# Patient Record
Sex: FEMALE | Race: WHITE | NOT HISPANIC OR LATINO | Employment: OTHER | URBAN - METROPOLITAN AREA
[De-identification: names, ages, dates, MRNs, and addresses within clinical notes are randomized per-mention and may not be internally consistent; named-entity substitution may affect disease eponyms.]

---

## 2017-01-02 ENCOUNTER — TELEPHONE (OUTPATIENT)
Dept: PEDIATRICS | Facility: CLINIC | Age: 77
End: 2017-01-02

## 2017-01-02 NOTE — TELEPHONE ENCOUNTER
Can try Robitussin DM however if the cough has been going on for while may need to make an appointment to be seen

## 2017-01-02 NOTE — TELEPHONE ENCOUNTER
Spoke with patient and informed her that per Atiya she can try robotussin DM.  Patient states she will try that and call the clinic back if it is not helping.      Ayde Sloan,CMA

## 2017-01-02 NOTE — TELEPHONE ENCOUNTER
"Spoke with patient and gave her the phone number to call and schedule test.       Patient also informed that she has stopped taking her allergy medication and started taking coricidin. Patient states she has a \"terrible\" cough. Patient is wondering is there is anything else OTC she should be taking.       Message routed to provider for review and response.    Ayde Sloan CMA    "

## 2017-01-02 NOTE — TELEPHONE ENCOUNTER
Notes Recorded by Cailin Ponce APRN CNP on 1/1/2017 at 6:55 PM  Stress echo not able to do due to leg pain   Will order dobutamine stress echo  I will place order. Please call [phone] to schedule.

## 2017-01-15 DIAGNOSIS — E78.5 HYPERLIPIDEMIA LDL GOAL <100: Primary | ICD-10-CM

## 2017-01-20 RX ORDER — ROSUVASTATIN CALCIUM 20 MG/1
TABLET, COATED ORAL
Qty: 90 TABLET | Refills: 1 | Status: SHIPPED | OUTPATIENT
Start: 2017-01-20 | End: 2017-07-17

## 2017-01-20 NOTE — TELEPHONE ENCOUNTER
Routing refill request to provider for review/approval because:  Drug interaction warning    Whit Rosado RN, AE-C   Crestor     Last Written Prescription Date: 7/22/16  Last Fill Quantity: 90, # refills: 1  Last Office Visit with [facility], ANY or [facility] prescribing provider: 11/8/16   Next 5 appointments (look out 90 days)     Jan 24, 2017 10:00 AM   Return Visit with Ehsan Araya DPM   [facility])    [address] [place] [id number]   [phone]            Apr 14, 2017 10:00 AM   Return Visit with Candice Worley MD, [facility] ENDO NURSE   [facility])    [address] [place] [id number]   [phone]                   CHOL      137   7/26/2016  HDL       57   7/26/2016  LDL       48   7/26/2016  LDL      114   7/21/2015  TRIG      162   7/26/2016  CHOLHDLRATIO      3.6   8/21/2015

## 2017-01-24 ENCOUNTER — OFFICE VISIT (OUTPATIENT)
Dept: PODIATRY | Facility: CLINIC | Age: 77
End: 2017-01-24
Payer: COMMERCIAL

## 2017-01-24 VITALS — OXYGEN SATURATION: 95 % | DIASTOLIC BLOOD PRESSURE: 77 MMHG | SYSTOLIC BLOOD PRESSURE: 127 MMHG | HEART RATE: 83 BPM

## 2017-01-24 DIAGNOSIS — E11.49 DIABETIC NEUROPATHY WITH NEUROLOGIC COMPLICATION (H): ICD-10-CM

## 2017-01-24 DIAGNOSIS — L60.2 ONYCHAUXIS: ICD-10-CM

## 2017-01-24 DIAGNOSIS — B35.1 DERMATOPHYTOSIS OF NAIL: Primary | ICD-10-CM

## 2017-01-24 DIAGNOSIS — L30.9 DERMATITIS: ICD-10-CM

## 2017-01-24 DIAGNOSIS — E11.40 DIABETIC NEUROPATHY WITH NEUROLOGIC COMPLICATION (H): ICD-10-CM

## 2017-01-24 PROCEDURE — G0127 TRIM NAIL(S): HCPCS | Performed by: PODIATRIST

## 2017-01-24 PROCEDURE — 99213 OFFICE O/P EST LOW 20 MIN: CPT | Mod: 25 | Performed by: PODIATRIST

## 2017-01-24 RX ORDER — TRIAMCINOLONE ACETONIDE 1 MG/G
CREAM TOPICAL
Qty: 45 G | Refills: 1 | Status: SHIPPED | OUTPATIENT
Start: 2017-01-24 | End: 2017-10-23

## 2017-01-24 NOTE — PROGRESS NOTES
Past Medical History   Diagnosis Date     Hyperlipidemia LDL goal < 100      Seasonal allergies      Hypothyroidism 1960     s/p subtotal thyroidectomy      Rheumatic fever      x2 between the ages of [age]     Diabetes mellitus (H) 2006     type 2     HTN (hypertension)      Degenerative joint disease      Patient Active Problem List   Diagnosis     Seasonal allergies     Hypothyroidism     Hyperlipidemia LDL goal <100     Fibromyalgia     Osteoarthritis     Actinic keratosis     Advanced directives, counseling/discussion     Glaucoma suspect     Cataracts, bilateral     Obesity     Type 2 diabetes mellitus with hyperglycemia, with long-term current use of insulin (H)     Hypertension goal BP (blood pressure) < 140/90     Overactive bladder     Primary osteoarthritis of both hands     Recurrent UTI     Past Surgical History   Procedure Laterality Date     Thyroidectomy        C appendectomy       C arthroplasty tmj       Colonoscopy       Colonoscopy N/A 8/13/2015     Procedure: COLONOSCOPY;  Surgeon: Duane, William Charles, MD;  Location: [facility] OR     Colonoscopy with co2 insufflation N/A 8/13/2015     Procedure: COLONOSCOPY WITH CO2 INSUFFLATION;  Surgeon: Duane, William Charles, MD;  Location: [facility] OR     Social History     Social History     Marital Status: Single     Spouse Name: N/A     Number of Children: N/A     Years of Education: N/A     Occupational History     Not on file.     Social History Main Topics     Smoking status: Never Smoker      Smokeless tobacco: Never Used      Comment: has had exposure to second hand smoke in the past from [employer], no current exposure     Alcohol Use: No     Drug Use: No     Sexual Activity: No     Other Topics Concern     Parent/Sibling W/ Cabg, Mi Or Angioplasty Before 65f 55m? 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     Hobby Hazards No     Sleep Concern Yes     difficulty staying asleep, up and down all night     Stress Concern No     Weight Concern Yes     would like to lose     Special Diet Yes     low card, low sugar diet     Back Care Yes     chiropratior     Exercise Yes     almost everyday     Bike Helmet No     does not ride bicycle any more     Seat Belt Yes     Self-Exams Yes     Social History Narrative     Family History   Problem Relation Age of Onset     CANCER Father      skin and leukemia     CEREBROVASCULAR DISEASE Maternal Grandfather      CEREBROVASCULAR DISEASE Paternal Grandmother      Eye Disorder Paternal Grandmother      cataracts     CEREBROVASCULAR DISEASE Paternal Grandfather      HEART DISEASE Paternal Grandfather      CANCER Sister      Breast Cancer     Eye Disorder Mother      cataracts     Eye Disorder Brother      cataract     Breast Cancer Daughter        A1C     11.0   11/8/2016   SUBJECTIVE:  A 76-year-old female returns to clinic for onychomycosis and diabetic foot check.  She relates she is doing well.  She has numbness, tingling and neuropathy in her feet.  No foot ulcers but she relates that about a week ago, she started taking a vitamin supplement.  She does not remember the name of it, but it is some sort of oil pill and she got a rash on her legs.  She stopped taking it and has been using triamcinolone cream and some Gold [place] and that has been improving and the rash is clearing up on both of her legs.  She relates it itches.          OBJECTIVE:  DP and PT 2/4 bilaterally.  She has dystrophic, thick and brittle nails with subungual debris on the hallux nails and dystrophy and discoloration bilaterally.  There is no erythema, no drainage, no odor, no calor bilaterally.  Deep tendon reflexes are intact bilaterally.  Sharp dull is decreased with 5.07 [place] monofilament bilaterally.  She has mild dorsally contracted digits bilaterally.  She has a rash on her lower  legs bilaterally that appears to be resolving.  There are no open lesions.        ASSESSMENT/PLAN:  Onychomycosis bilateral hallux nails, onychauxis bilaterally.  She is diabetic with peripheral neuropathy.  She has dermatitis on her bilateral legs.  I advised her to continue to stop the vitamin she was taking.  She relates she could use some more triamcinolone cream.  I sent in a prescription for this.  She is diabetic with peripheral neuropathy.  She will return to clinic and see me in 3 months.  If the rash does not resolve, she will see me sooner as needed.     All the nails were debrided or reduced bilaterally upon consent.

## 2017-01-24 NOTE — NURSING NOTE
"Brandy Leon's goals for this visit include: Toenail trim  She requests these members of her care team be copied on today's visit information: yes    PCP: Cailin Ponce    Referring Provider:  No referring provider defined for this encounter.    Chief Complaint   Patient presents with     RECHECK     Toenail trim       Initial /77 mmHg  Pulse 83  SpO2 95% Estimated body mass index is 33.42 kg/(m^2) as calculated from the following:    Height as of 12/30/16: 1.619 m (5' 3.75\").    Weight as of 12/30/16: 87.6 kg (193 lb 2 oz).  BP completed using cuff size: large    "

## 2017-01-24 NOTE — PATIENT INSTRUCTIONS
Thanks for coming today.  Ortho/Sports Medicine Clinic  [address] [place] [address]    To schedule future appointments in Ortho Clinic, you may call [phone].    To schedule ordered imaging by your Provider: Call [facility] at [phone]    [facility] available online at:   [email or web address]    Please call if any further questions or concerns [phone] and ask for the Orthopedic Department. Clinic hours 8 am to 5 pm.    Return to clinic if symptoms worsen.

## 2017-02-01 ENCOUNTER — TELEPHONE (OUTPATIENT)
Dept: PHARMACY | Facility: CLINIC | Age: 77
End: 2017-02-01

## 2017-02-01 NOTE — TELEPHONE ENCOUNTER
Called patient to schedule a f/u MTM appt. Patient is scheduled for 2/28/2017.  Mirna Perez, Pharm.D, BCACP  Medication Therapy Management Pharmacist

## 2017-02-09 ENCOUNTER — OFFICE VISIT (OUTPATIENT)
Dept: PEDIATRICS | Facility: CLINIC | Age: 77
End: 2017-02-09
Payer: COMMERCIAL

## 2017-02-09 VITALS
BODY MASS INDEX: 32.49 KG/M2 | DIASTOLIC BLOOD PRESSURE: 72 MMHG | SYSTOLIC BLOOD PRESSURE: 124 MMHG | WEIGHT: 190.3 LBS | OXYGEN SATURATION: 96 % | TEMPERATURE: 97 F | HEART RATE: 87 BPM | HEIGHT: 64 IN

## 2017-02-09 DIAGNOSIS — E11.65 TYPE 2 DIABETES MELLITUS WITH HYPERGLYCEMIA, WITH LONG-TERM CURRENT USE OF INSULIN (H): ICD-10-CM

## 2017-02-09 DIAGNOSIS — E03.9 HYPOTHYROIDISM, UNSPECIFIED TYPE: ICD-10-CM

## 2017-02-09 DIAGNOSIS — N32.81 OVERACTIVE BLADDER: ICD-10-CM

## 2017-02-09 DIAGNOSIS — E78.5 HYPERLIPIDEMIA LDL GOAL <100: ICD-10-CM

## 2017-02-09 DIAGNOSIS — R35.0 URINARY FREQUENCY: Primary | ICD-10-CM

## 2017-02-09 DIAGNOSIS — I10 HYPERTENSION GOAL BP (BLOOD PRESSURE) < 140/90: ICD-10-CM

## 2017-02-09 DIAGNOSIS — Z79.4 TYPE 2 DIABETES MELLITUS WITH HYPERGLYCEMIA, WITH LONG-TERM CURRENT USE OF INSULIN (H): ICD-10-CM

## 2017-02-09 LAB
ALBUMIN SERPL-MCNC: 3.8 G/DL (ref 3.4–5)
ALBUMIN UR-MCNC: NEGATIVE MG/DL
ALP SERPL-CCNC: 82 U/L (ref 40–150)
ALT SERPL W P-5'-P-CCNC: 21 U/L (ref 0–50)
ANION GAP SERPL CALCULATED.3IONS-SCNC: 7 MMOL/L (ref 3–14)
APPEARANCE UR: CLEAR
AST SERPL W P-5'-P-CCNC: 16 U/L (ref 0–45)
BACTERIA #/AREA URNS HPF: ABNORMAL /HPF
BILIRUB SERPL-MCNC: 0.3 MG/DL (ref 0.2–1.3)
BILIRUB UR QL STRIP: NEGATIVE
BUN SERPL-MCNC: 15 MG/DL (ref 7–30)
CALCIUM SERPL-MCNC: 8.9 MG/DL (ref 8.5–10.1)
CHLORIDE SERPL-SCNC: 101 MMOL/L (ref 94–109)
CHOLEST SERPL-MCNC: 142 MG/DL
CO2 SERPL-SCNC: 30 MMOL/L (ref 20–32)
COLOR UR AUTO: ABNORMAL
CREAT SERPL-MCNC: 0.67 MG/DL (ref 0.52–1.04)
CREAT UR-MCNC: 49 MG/DL
GFR SERPL CREATININE-BSD FRML MDRD: 86 ML/MIN/1.7M2
GLUCOSE SERPL-MCNC: 114 MG/DL (ref 70–99)
GLUCOSE UR STRIP-MCNC: >1000 MG/DL
HBA1C MFR BLD: 10.7 % (ref 4.3–6)
HDLC SERPL-MCNC: 59 MG/DL
HGB UR QL STRIP: NEGATIVE
KETONES UR STRIP-MCNC: 10 MG/DL
LDLC SERPL CALC-MCNC: 52 MG/DL
LEUKOCYTE ESTERASE UR QL STRIP: ABNORMAL
MICROALBUMIN UR-MCNC: 14 MG/L
MICROALBUMIN/CREAT UR: 28.07 MG/G CR (ref 0–25)
NITRATE UR QL: NEGATIVE
NON-SQ EPI CELLS #/AREA URNS LPF: ABNORMAL /LPF
NONHDLC SERPL-MCNC: 83 MG/DL
PH UR STRIP: 5.5 PH (ref 5–7)
POTASSIUM SERPL-SCNC: 4.2 MMOL/L (ref 3.4–5.3)
PROT SERPL-MCNC: 8.3 G/DL (ref 6.8–8.8)
RBC #/AREA URNS AUTO: ABNORMAL /HPF (ref 0–2)
SODIUM SERPL-SCNC: 138 MMOL/L (ref 133–144)
SP GR UR STRIP: 1.02 (ref 1–1.03)
T4 FREE SERPL-MCNC: 1.25 NG/DL (ref 0.76–1.46)
TRIGL SERPL-MCNC: 157 MG/DL
TSH SERPL DL<=0.05 MIU/L-ACNC: 1.33 MU/L (ref 0.4–4)
URN SPEC COLLECT METH UR: ABNORMAL
UROBILINOGEN UR STRIP-MCNC: NORMAL MG/DL (ref 0–2)
WBC #/AREA URNS AUTO: ABNORMAL /HPF (ref 0–2)
YEAST #/AREA URNS HPF: ABNORMAL /HPF

## 2017-02-09 PROCEDURE — 36415 COLL VENOUS BLD VENIPUNCTURE: CPT | Performed by: NURSE PRACTITIONER

## 2017-02-09 PROCEDURE — 80061 LIPID PANEL: CPT | Performed by: NURSE PRACTITIONER

## 2017-02-09 PROCEDURE — 80053 COMPREHEN METABOLIC PANEL: CPT | Performed by: NURSE PRACTITIONER

## 2017-02-09 PROCEDURE — 84439 ASSAY OF FREE THYROXINE: CPT | Performed by: NURSE PRACTITIONER

## 2017-02-09 PROCEDURE — 82043 UR ALBUMIN QUANTITATIVE: CPT | Performed by: NURSE PRACTITIONER

## 2017-02-09 PROCEDURE — 87086 URINE CULTURE/COLONY COUNT: CPT | Performed by: NURSE PRACTITIONER

## 2017-02-09 PROCEDURE — 83036 HEMOGLOBIN GLYCOSYLATED A1C: CPT | Performed by: NURSE PRACTITIONER

## 2017-02-09 PROCEDURE — 84443 ASSAY THYROID STIM HORMONE: CPT | Performed by: NURSE PRACTITIONER

## 2017-02-09 PROCEDURE — 99214 OFFICE O/P EST MOD 30 MIN: CPT | Performed by: NURSE PRACTITIONER

## 2017-02-09 PROCEDURE — 81001 URINALYSIS AUTO W/SCOPE: CPT | Performed by: NURSE PRACTITIONER

## 2017-02-09 RX ORDER — OXYBUTYNIN CHLORIDE 5 MG/1
TABLET, EXTENDED RELEASE ORAL
Qty: 70 TABLET | Refills: 1 | Status: SHIPPED | OUTPATIENT
Start: 2017-02-09 | End: 2017-04-24

## 2017-02-09 NOTE — PATIENT INSTRUCTIONS
It was a pleasure seeing you today at the [facility]. Thank you for allowing us to care for you today. We truly hope we provided you with the excellent service you deserve. Please let us know if there is anything else we can do for you so we can be sure you are leaving completley satisfied with your care experience.       General information about your clinic   Clinic Hours Lab Hours (Appointments are required)   Mon-Thurs: 7:30 AM - 7 PM Mon-Thurs: 7:30 AM - 7 PM   Fri: 7:30 AM - 5 PM Fri: 7:30 AM - 5 PM        After Hours Nurse Advise & Appts:  [facility]: [phone]  [facility] On Call: to make appointments anytime: [phone] On Call Physician: call [phone] and answering service will page the on call physician.        For urgent appointments, please call [phone] and ask for the triage nurse or your care team clinic nurse.  How to contact my care team:  [facility]: [email or web address]   Phone: [phone]   Fax: [phone]       [facility]:   Phone: [phone]  Hours: 8:00 AM - 6:00 PM  Medication Refills:  Call your pharmacy and they will forward the refill to us. Please allow 3 business days for your refills to be completed.       Normal or non-critical lab and imaging results will be communicated to you by MyChart, letter or phone within 7 days.  If you do not hear from us within 10 days, please call the clinic. If you have a critical or abnormal lab result, we will notify you by phone as soon as possible.       We now have PWIC (Pediatric Walk in Care)  Monday-Friday from 7:30-4. Simply walk in and be seen for your urgent needs like cough, fever, rash, diarrhea or vomiting, pink eye, UTI. No appointments needed. Ask one of the team for more information      -Your Care Team:    Dr. Tara Keller - Internal Medicine/Pediatrics   Dr. Kirit Pérez - Family Medicine  Dr. Deborah Andres - Pediatrics  Cailin Ponce CNP - Family Practice Nurse  Practitioner      PLAN:   1.   Symptomatic therapy suggested: increase fluids and call prn if symptoms persist or worsen.  Will try the Ditropan xl once a day to see if will help with frequency    2.  Orders Placed This Encounter   Medications     oxybutynin (DITROPAN-XL) 5 MG 24 hr tablet     Sig: Take 1 tablet by mouth daily x 1 week, 2 tablets daily     Dispense:  70 tablet     Refill:  1     Orders Placed This Encounter   Procedures     UA with Microscopic reflex to Culture ([facility])     Lipid panel reflex to direct LDL     Comprehensive metabolic panel     Hemoglobin A1c     Albumin Random Urine Quantitative     T4 free     TSH     UROLOGY ADULT REFERRAL       3. Patient needs to follow up in if no improvement,or sooner if worsening of symptoms or other symptoms develop.  CONSULTATION/REFERRAL to urology  Please call [phone] to make appointment  if you do not hear from referrals in the next few days.   Will follow up and/or notify patient on results via phone or mail to determine further need for followup  FOLLOW UP WITH SPECIALIST :Endocrinology as planned

## 2017-02-09 NOTE — PROGRESS NOTES
"  SUBJECTIVE:                                                    Brandy Leon is a 76 year old female who presents to clinic today for the following health issues:      URINARY TRACT SYMPTOMS     Onset: Off and On for the last year     Description:   Painful urination (Dysuria): no   Blood in urine (Hematuria): no   Delay in urine (Hesitency): no     Intensity: mild    Progression of Symptoms:  constant    Accompanying Signs & Symptoms:  Fever/chills: YES- Sweats  Flank pain no   Nausea and vomiting: no   Any vaginal symptoms: none  Abdominal/Pelvic Pain: no all over body aches   History:   History of frequent UTI's: YES  History of kidney stones: no   Sexually Active: no   Possibility of pregnancy: No    Precipitating factors:   Holding urine for more than 2 hours         Therapies Tried and outcome: 4 bottles of water a day     On and off for a year she has had trouble with urinary frequency, urgency, and incontinence.   This episode has been worse for the last 2-3 months.  When she has to go \"I really have to go\" and she is needs to wear a pad d/t incontinence  She is a  and cannot complete her 4 hour shift without stopping to go to the bathroom  No fever, flank pain, dysuria        Diabetes Follow-up    Patient is checking blood sugars: once or twice daily.    Blood sugar testing frequency justification: Uncontrolled diabetes  Results are as follows:         am - This AM was 138, typically 160s    Diabetic concerns: None     Symptoms of hypoglycemia (low blood sugar): none     Paresthesias (numbness or burning in feet) or sores: Yes - bilateral neuropathy in feet     Hyperlipidemia Follow-Up      Rate your low fat/cholesterol diet?: good    Taking statin?  \"off and on\"    Other lipid medications/supplements?:  none     Hypertension Follow-up      Outpatient blood pressures are being checked at drug store.  Results are 115 systolic.    Low Salt Diet: no added salt         Problem list and " histories reviewed & adjusted, as indicated.  Additional history: as documented    Patient Active Problem List   Diagnosis     Seasonal allergies     Hypothyroidism     Hyperlipidemia LDL goal <100     Fibromyalgia     Osteoarthritis     Actinic keratosis     Advanced directives, counseling/discussion     Glaucoma suspect     Cataracts, bilateral     Obesity     Type 2 diabetes mellitus with hyperglycemia, with long-term current use of insulin (H)     Hypertension goal BP (blood pressure) < 140/90     Overactive bladder     Primary osteoarthritis of both hands     Recurrent UTI     Past Surgical History   Procedure Laterality Date     Thyroidectomy        C appendectomy       C arthroplasty tmj       Colonoscopy       Colonoscopy N/A 8/13/2015     Procedure: COLONOSCOPY;  Surgeon: Duane, William Charles, MD;  Location: [facility] OR     Colonoscopy with co2 insufflation N/A 8/13/2015     Procedure: COLONOSCOPY WITH CO2 INSUFFLATION;  Surgeon: Duane, William Charles, MD;  Location: [facility] OR       Social History   Substance Use Topics     Smoking status: Never Smoker      Smokeless tobacco: Never Used      Comment: [facility], no current exposure     Alcohol Use: No     Family History   Problem Relation Age of Onset     CANCER Father      skin and leukemia     CEREBROVASCULAR DISEASE Maternal Grandfather      CEREBROVASCULAR DISEASE Paternal Grandmother      Eye Disorder Paternal Grandmother      cataracts     CEREBROVASCULAR DISEASE Paternal Grandfather      HEART DISEASE Paternal Grandfather      CANCER Sister      Breast Cancer     Eye Disorder Mother      cataracts     Eye Disorder Brother      cataract     Breast Cancer Daughter          Current Outpatient Prescriptions   Medication Sig Dispense Refill     triamcinolone (KENALOG) 0.1 % cream Apply sparingly to affected area two times daily. 45 g 1     canagliflozin (INVOKANA) 300 MG tablet Take 1 tablet (300 mg) by mouth  every morning (before breakfast) 30 tablet 3     insulin glargine (LANTUS SOLOSTAR) 100 UNIT/ML PEN Inject 26 Units Subcutaneous At Bedtime 45 mL 3     glipiZIDE (GLUCOTROL) 10 MG tablet TAKE 2 TABLETS BY MOUTH TWICE A DAY BEFORE MEALS 120 tablet 3     metFORMIN (GLUCOPHAGE) 1000 MG tablet TAKE 1 TABLET (1,000 MG) BY MOUTH 2 TIMES DAILY (WITH MEALS) 180 tablet 1     SYNTHROID 137 MCG tablet TAKE 1 TABLET BY MOUTH DAILY 90 tablet 2     latanoprost (XALATAN) 0.005 % ophthalmic solution Place 1 drop into both eyes At Bedtime 3 Bottle 4     famotidine (PEPCID) 20 MG tablet Take 20 mg by mouth nightly as needed       insulin pen needle (BD JUAN C U/F) 32G X 4 MM Use 1 daily as directed. 100 each PRN     irbesartan-hydrochlorothiazide (AVALIDE) 150-12.5 MG per tablet Take 1 tablet by mouth daily (Patient taking differently: Take 0.5 tablets by mouth daily ) 90 tablet 1     Efinaconazole 10 % SOLN Externally apply topically daily To affected toenails daily. 8 mL 11     order for DME Glucometer covered by insurance. 1 each 0     blood glucose test strip 300 strips by In Vitro route 2 times daily One Touch Ultra Mini test strips. 300 strip 3     aspirin 81 MG EC tablet Take 1 tablet by mouth daily. (Patient taking differently: Take 650 mg by mouth daily ) 90 tablet 3     ONE TOUCH DELICA LANCETS MISC 1 each 2 times daily. One Touch Delica   100 each 12     GLUCOSAMINE CHONDROITIN COMPLX OR 2 Daily       Omega-3 Fatty Acids (OMEGA 3 PO) Take by mouth 2 times daily.        MULTIVITAMIN OR 1 tablet daily       rosuvastatin (CRESTOR) 20 MG tablet TAKE 1 TABLET (20 MG) BY MOUTH DAILY 90 tablet 1     estradiol (ESTRACE VAGINAL) 0.1 MG/GM vaginal cream Apply a pea-sized dab to the vaginal area every night for 2 weeks. Then switch to three times weekly. 42.5 g 11     fluticasone (FLONASE) 50 MCG/ACT nasal spray Spray 2 sprays into both nostrils daily 16 g 5     Allergies   Allergen Reactions     Lipitor [Atorvastatin Calcium]       "Weak and shaky     Sulfa Drugs      Rash       Zocor [Simvastatin]      Weak and shakey       ROS:  CONSTITUTIONAL:NEGATIVE for fever, chills, change in weight  INTEGUMENTARY/SKIN: NEGATIVE for worrisome rashes, moles or lesions  EYES: NEGATIVE for vision changes or irritation  ENT/MOUTH: NEGATIVE for ear, mouth and throat problems  RESP:POSITIVE for cough-productive and NEGATIVE for wheezing  CV: NEGATIVE for chest pain, palpitations or peripheral edema  GI: NEGATIVE for nausea, abdominal pain, heartburn, or change in bowel habits  : POSITIVE for frequency, urgency, and incontinence  MUSCULOSKELETAL: NEGATIVE for significant arthralgias or myalgia  NEURO: NEGATIVE for weakness, dizziness or paresthesias  ENDOCRINE: NEGATIVE for temperature intolerance, skin/hair changes  HEME/ALLERGY/IMMUNE: NEGATIVE for bleeding problems  PSYCHIATRIC: NEGATIVE for changes in mood or affect    OBJECTIVE:                                                    /72 mmHg  Pulse 87  Temp(Src) 97  F (36.1  C) (Temporal)  Ht 5' 3.75\" (1.619 m)  Wt 190 lb 4.8 oz (86.32 kg)  BMI 32.93 kg/m2  SpO2 96%  Body mass index is 32.93 kg/(m^2).  GENERAL: healthy, alert and no distress  EYES: Eyes grossly normal to inspection, PERRL and conjunctivae and sclerae normal  HENT: ear canals and TM's normal, nose and mouth without ulcers or lesions  NECK: no adenopathy, no asymmetry, masses, or scars and thyroid normal to palpation  RESP: lungs clear to auscultation - no rales, rhonchi or wheezes  CV: regular rate and rhythm, normal S1 S2, no S3 or S4, no murmur, click or rub, no peripheral edema and peripheral pulses strong  ABDOMEN: soft, nontender, no hepatosplenomegaly, no masses and bowel sounds normal  MS: no gross musculoskeletal defects noted, no edema  SKIN: no suspicious lesions or rashes  NEURO: Normal strength and tone, mentation intact and speech normal  PSYCH: mentation appears normal, affect normal/bright    Diagnostic Test " Results:  Results for orders placed or performed in visit on 02/09/17 (from the past 24 hour(s))   UA with Microscopic reflex to Culture ([place])   Result Value Ref Range    Color Urine Light Yellow     Appearance Urine Clear     Glucose Urine >1000 (A) NEG mg/dL    Bilirubin Urine Negative NEG    Ketones Urine 10 (A) NEG mg/dL    Specific Gravity Urine 1.019 1.003 - 1.035    Blood Urine Negative NEG    pH Urine 5.5 5.0 - 7.0 pH    Protein Albumin Urine Negative NEG mg/dL    Urobilinogen mg/dL Normal 0.0 - 2.0 mg/dL    Nitrite Urine Negative NEG    Leukocyte Esterase Urine Small (A) NEG    Source Midstream Urine     WBC Urine 2-5 (A) 0 - 2 /HPF    RBC Urine O - 2 0 - 2 /HPF    Bacteria Urine Few (A) NEG /HPF    Yeast Urine Few (A) NEG /HPF    Squamous Epithelial /LPF Urine Few FEW /LPF   Albumin Random Urine Quantitative   Result Value Ref Range    Creatinine Urine 49 mg/dL    Albumin Urine mg/L 14 mg/L    Albumin Urine mg/g Cr 28.07 (H) 0 - 25 mg/g Cr   Lipid panel reflex to direct LDL   Result Value Ref Range    Cholesterol 142 <200 mg/dL    Triglycerides 157 (H) <150 mg/dL    HDL Cholesterol 59 >49 mg/dL    LDL Cholesterol Calculated 52 <100 mg/dL    Non HDL Cholesterol 83 <130 mg/dL   Comprehensive metabolic panel   Result Value Ref Range    Sodium 138 133 - 144 mmol/L    Potassium 4.2 3.4 - 5.3 mmol/L    Chloride 101 94 - 109 mmol/L    Carbon Dioxide 30 20 - 32 mmol/L    Anion Gap 7 3 - 14 mmol/L    Glucose 114 (H) 70 - 99 mg/dL    Urea Nitrogen 15 7 - 30 mg/dL    Creatinine 0.67 0.52 - 1.04 mg/dL    GFR Estimate 86 >60 mL/min/1.7m2    GFR Estimate If Black >90   GFR Calc   >60 mL/min/1.7m2    Calcium 8.9 8.5 - 10.1 mg/dL    Bilirubin Total 0.3 0.2 - 1.3 mg/dL    Albumin 3.8 3.4 - 5.0 g/dL    Protein Total 8.3 6.8 - 8.8 g/dL    Alkaline Phosphatase 82 40 - 150 U/L    ALT 21 0 - 50 U/L    AST 16 0 - 45 U/L   Hemoglobin A1c   Result Value Ref Range    Hemoglobin A1C 10.7 (H) 4.3 - 6.0 %   T4  free   Result Value Ref Range    T4 Free 1.25 0.76 - 1.46 ng/dL   TSH   Result Value Ref Range    TSH 1.33 0.40 - 4.00 mU/L        ASSESSMENT/PLAN:                                                        Brandy was seen today for uti.    Diagnoses and all orders for this visit:    Urinary frequency  -     UA with Microscopic reflex to Culture ([place])  -     Urine Culture Aerobic Bacterial  -     UROLOGY ADULT REFERRAL  -     oxybutynin (DITROPAN-XL) 5 MG 24 hr tablet; Take 1 tablet by mouth daily x 1 week, 2 tablets daily  Symptomatic therapy suggested: increase fluids and call prn if symptoms persist or worsen.  Will try the Ditropan xl once a day to see if will help with frequency    Overactive bladder  -     Urine Culture Aerobic Bacterial  -     UROLOGY ADULT REFERRAL  -     oxybutynin (DITROPAN-XL) 5 MG 24 hr tablet; Take 1 tablet by mouth daily x 1 week, 2 tablets daily    Type 2 diabetes mellitus with hyperglycemia, with long-term current use of insulin (H)  -     Hemoglobin A1c  -     Albumin Random Urine Quantitative  FOLLOW UP WITH SPECIALIST :Endocrinology  .foot care discussed and Podiatry visits discussed, annual eye examinations at Ophthalmology discussed, glycohemoglobin monitoring discussed and long term diabetic complications discussed    Hypertension goal BP (blood pressure) < 140/90  -     Comprehensive metabolic panel  -     Albumin Random Urine Quantitative  Continue current medications.    Hyperlipidemia LDL goal <100  -     Lipid panel reflex to direct LDL  Continue current medications.    Hypothyroidism, unspecified type  -     T4 free  -     TSH  Continue current medications.      PLAN:    Patient needs to follow up in if no improvement,or sooner if worsening of symptoms or other symptoms develop.  CONSULTATION/REFERRAL to urology  Please call [phone] to make appointment  if you do not hear from referrals in the next few days.   Will follow up and/or notify patient on results  via phone or mail to determine further need for followup  FOLLOW UP WITH SPECIALIST :Endocrinology as planned   See Patient Instructions    MAGO Baker CNP  [facility]    CÉSAR Ceja student at [employer] have examined the patient and agree with written evaluation. Assessment and plan presented by this provider.   Cailin Ponce CNP

## 2017-02-09 NOTE — MR AVS SNAPSHOT
After Visit Summary   2/9/2017    Brandy Leon    MRN: [id number]           Patient Information     Date Of Birth          1940        Visit Information        Provider Department      2/9/2017 11:00 AM Cailin Ponce APRN [facility]        Today's Diagnoses     Urinary frequency    -  1     Overactive bladder         Type 2 diabetes mellitus with hyperglycemia, with long-term current use of insulin (H)         Hypertension goal BP (blood pressure) < 140/90         Hyperlipidemia LDL goal <100         Hypothyroidism, unspecified type           Care Instructions    It was a pleasure seeing you today at the [facility]. Thank you for allowing us to care for you today. We truly hope we provided you with the excellent service you deserve. Please let us know if there is anything else we can do for you so we can be sure you are leaving completley satisfied with your care experience.       General information about your clinic   Clinic Hours Lab Hours (Appointments are required)   Mon-Thurs: 7:30 AM - 7 PM Mon-Thurs: 7:30 AM - 7 PM   Fri: 7:30 AM - 5 PM Fri: 7:30 AM - 5 PM        After Hours Nurse Advise & Appts:  [facility]: [phone]  [facility] On Call: to make appointments anytime: [phone] On Call Physician: call [phone] and answering service will page the on call physician.        For urgent appointments, please call [phone] and ask for the triage nurse or your care team clinic nurse.  How to contact my care team:  MyChart: [email or web address]   Phone: [phone]   Fax: [phone]       [facility]:   Phone: [phone]  Hours: 8:00 AM - 6:00 PM  Medication Refills:  Call your pharmacy and they will forward the refill to us. Please allow 3 business days for your refills to be completed.       Normal or non-critical lab and imaging results will be communicated to you by MyChart, letter or  phone within 7 days.  If you do not hear from us within 10 days, please call the clinic. If you have a critical or abnormal lab result, we will notify you by phone as soon as possible.       We now have PWIC (Pediatric Walk in Care)  Monday-Friday from 7:30-4. Simply walk in and be seen for your urgent needs like cough, fever, rash, diarrhea or vomiting, pink eye, UTI. No appointments needed. Ask one of the team for more information      -Your Care Team:    Dr. Tara Keller - Internal Medicine/Pediatrics   Dr. Kirit Pérez - Family Medicine  Dr. Deborah Andres - Pediatrics  Cailin Ponce CNP - Family Practice Nurse Practitioner      PLAN:   1.   Symptomatic therapy suggested: increase fluids and call prn if symptoms persist or worsen.  Will try the Ditropan xl once a day to see if will help with frequency    2.  Orders Placed This Encounter   Medications     oxybutynin (DITROPAN-XL) 5 MG 24 hr tablet     Sig: Take 1 tablet by mouth daily x 1 week, 2 tablets daily     Dispense:  70 tablet     Refill:  1     Orders Placed This Encounter   Procedures     UA with Microscopic reflex to Culture ([place])     Lipid panel reflex to direct LDL     Comprehensive metabolic panel     Hemoglobin A1c     Albumin Random Urine Quantitative     T4 free     TSH     UROLOGY ADULT REFERRAL       3. Patient needs to follow up in if no improvement,or sooner if worsening of symptoms or other symptoms develop.  CONSULTATION/REFERRAL to urology  Please call [phone] to make appointment  if you do not hear from referrals in the next few days.   Will follow up and/or notify patient on results via phone or mail to determine further need for followup  FOLLOW UP WITH SPECIALIST :Endocrinology as planned             Follow-ups after your visit        Additional Services     UROLOGY ADULT REFERRAL       Your provider has referred you to: [facility] [facility] [phone]    [email or web address]    Please be aware that coverage of these services is subject to the terms and limitations of your health insurance plan.  Call member services at your health plan with any benefit or coverage questions.      Please bring the following with you to your appointment:    (1) Any X-Rays, CTs or MRIs which have been performed.  Contact the facility where they were done to arrange for  prior to your scheduled appointment.    (2) List of current medications  (3) This referral request   (4) Any documents/labs given to you for this referral                  Your next 10 appointments already scheduled     Feb 17, 2017 10:30 AM   Ech Dobutamine Stress Test with MGECHR1, MG IMAGING NURSE, MG CARD, MG STRESS RM, MG ECHO TECH, MG CV TECH   [facility] ([facility])    [id number] [place] [address] [phone]           1.  Please bring or wear a comfortable two-piece outfit and walking shoes. 2.  Stop eating 3 hours before the test. You may drink water or juice. 3.  Stop all caffeine 12 hours before the test. This includes coffee, tea, soda pop, chocolate and certain medicines (such as Anacin and Excederin). Also avoid decaf coffee and tea, as these contain small amounts of caffeine. 4.  No alcohol, smoking or use of other tobacco products for 12 hours before the test. 5.  Refer to your provider instructions to see if you need to stop any medications (such as beta-blockers or nitrates) for this test. 6.  For patients with diabetes: -   If you take insulin, call your diabetes care team. Ask if you should take a   dose the morning of your test. -   If you take diabetes medicine by mouth, don't take it on the morning of your test. Bring it with you to take after the test.  (If you have questions, call your diabetes care team) 7.  When you arrive, please tell us if: -   You have diabetes. -   You have taken Viagra,  Cialis or Levitra in the past 48 hours. 8.  For any questions that cannot be answered, please contact the ordering physician            Feb 28, 2017 10:00 AM   TELEMEDICINE with Mirna Perez [facility] [place]    [address] [place] [address] [address]  [facility] [id number]  [facility] [id number] [phone]           Note: this is not an onsite visit; there is no need to come to the facility.            Apr 14, 2017 10:00 AM   Return Visit with Candice Worley MD, [facility] ENDO NURSE   [facility] ([facility])    [address] [place] [address] [address] [facility] [address] [phone]            Apr 25, 2017 10:00 AM   Return Visit with Ehsan Araya DPM   [facility] ([facility])    [address] [place] [address] [phone]              Who to contact     If you have questions or need follow up information about today's clinic visit or your schedule please contact [facility] directly at [phone].  Normal or non-critical lab and imaging results will be communicated to you by MyChart, letter or phone within 4 business days after the clinic has received the results. If you do not hear from us within 7 days, please contact the clinic through MyChart or phone. If you have a critical or abnormal lab result, we will notify you by phone as soon as possible.  Submit refill requests through [employer] or call your pharmacy and they will forward the refill request to us. Please allow 3 business days for your refill to be completed.          Additional Information About Your Visit        [employer] Information     [employer] is an electronic gateway that provides easy, online access to your medical records. With [employer], you can request a clinic appointment, read your test results, renew a prescription or communicate with your care team.     To sign up for [employer] visit the website at  "[email or web address]   You will be asked to enter the access code listed below, as well as some personal information. Please follow the directions to create your username and password.     Your access code is: [email or web address]  Expires: 3/30/2017  1:44 PM     Your access code will  in 90 days. If you need help or a new code, please contact your [facility] or call [phone] for assistance.        Care EveryWhere ID     This is your Care EveryWhere ID. This could be used by other organizations to access your [facility] medical records  [id number]        Your Vitals Were     Pulse Temperature Height BMI (Body Mass Index) Pulse Oximetry       87 97  F (36.1  C) (Temporal) 5' 3.75\" (1.619 m) 32.93 kg/m2 96%        Blood Pressure from Last 3 Encounters:   17 124/72   17 127/77   16 126/78    Weight from Last 3 Encounters:   17 190 lb 4.8 oz (86.32 kg)   16 193 lb 2 oz (87.6 kg)   16 196 lb 4.8 oz (89.041 kg)              We Performed the Following     Albumin Random Urine Quantitative     Comprehensive metabolic panel     Hemoglobin A1c     Lipid panel reflex to direct LDL     T4 free     TSH     UA with Microscopic reflex to Culture ([facility])     Urine Culture Aerobic Bacterial     UROLOGY ADULT REFERRAL          Today's Medication Changes          These changes are accurate as of: 17 11:51 AM.  If you have any questions, ask your nurse or doctor.               Start taking these medicines.        Dose/Directions    oxybutynin 5 MG 24 hr tablet   Commonly known as:  DITROPAN-XL   Used for:  Urinary frequency, Overactive bladder   Started by:  Cailin Ponce APRN CNP        Take 1 tablet by mouth daily x 1 week, 2 tablets daily   Quantity:  70 tablet   Refills:  1         These medicines have changed or have updated prescriptions.        Dose/Directions    aspirin 81 MG EC tablet   This may have changed:  how much to take   Used " for:  Type 2 diabetes, HbA1c goal < 7% (H)        Dose:  81 mg   Take 1 tablet by mouth daily.   Quantity:  90 tablet   Refills:  3       irbesartan-hydrochlorothiazide 150-12.5 MG per tablet   Commonly known as:  AVALIDE   This may have changed:  how much to take   Used for:  Hypertension goal BP (blood pressure) < 140/90        Dose:  1 tablet   Take 1 tablet by mouth daily   Quantity:  90 tablet   Refills:  1            Where to get your medicines      These medications were sent to [facility]/pharmacy #[id number] - [address] AT CORNER OF [address] [address]     Phone:  [phone]    - oxybutynin 5 MG 24 hr tablet             Primary Care Provider Office Phone # Fax #    Cailin MAGO [facility]al [facility] [phone]       [facility] [address]  [place] [address]        Thank you!     Thank you for choosing [facility]  for your care. Our goal is always to provide you with excellent care. Hearing back from our patients is one way we can continue to improve our services. Please take a few minutes to complete the written survey that you may receive in the mail after your visit with us. Thank you!             Your Updated Medication List - Protect others around you: Learn how to safely use, store and throw away your medicines at www.disposemymeds.org.          This list is accurate as of: 2/9/17 11:51 AM.  Always use your most recent med list.                   Brand Name Dispense Instructions for use    aspirin 81 MG EC tablet     90 tablet    Take 1 tablet by mouth daily.       blood glucose monitoring test strip    no brand specified    300 strip    300 strips by In Vitro route 2 times daily One Touch Ultra Mini test strips.       canagliflozin 300 MG tablet    INVOKANA    30 tablet    Take 1 tablet (300 mg) by mouth every morning (before breakfast)       Efinaconazole 10 % Soln     8 mL    Externally apply  topically daily To affected toenails daily.       estradiol 0.1 MG/GM cream    ESTRACE VAGINAL    42.5 g    Apply a pea-sized dab to the vaginal area every night for 2 weeks. Then switch to three times weekly.       famotidine 20 MG tablet    PEPCID     Take 20 mg by mouth nightly as needed       fluticasone 50 MCG/ACT spray    FLONASE    16 g    Spray 2 sprays into both nostrils daily       glipiZIDE 10 MG tablet    GLUCOTROL    120 tablet    TAKE 2 TABLETS BY MOUTH TWICE A DAY BEFORE MEALS       GLUCOSAMINE CHONDROITIN COMPLX PO      2 Daily       insulin glargine 100 UNIT/ML injection    LANTUS SOLOSTAR    45 mL    Inject 26 Units Subcutaneous At Bedtime       insulin pen needle 32G X 4 MM    BD JUAN C U/F    100 each    Use 1 daily as directed.       irbesartan-hydrochlorothiazide 150-12.5 MG per tablet    AVALIDE    90 tablet    Take 1 tablet by mouth daily       latanoprost 0.005 % ophthalmic solution    XALATAN    3 Bottle    Place 1 drop into both eyes At Bedtime       metFORMIN 1000 MG tablet    GLUCOPHAGE    180 tablet    TAKE 1 TABLET (1,000 MG) BY MOUTH 2 TIMES DAILY (WITH MEALS)       MULTIVITAMIN PO      1 tablet daily       OMEGA 3 PO      Take by mouth 2 times daily.       ONE TOUCH DELICA LANCETS Misc     100 each    1 each 2 times daily. One Touch Delica       order for DME     1 each    Glucometer covered by insurance.       oxybutynin 5 MG 24 hr tablet    DITROPAN-XL    70 tablet    Take 1 tablet by mouth daily x 1 week, 2 tablets daily       rosuvastatin 20 MG tablet    CRESTOR    90 tablet    TAKE 1 TABLET (20 MG) BY MOUTH DAILY       SYNTHROID 137 MCG tablet   Generic drug:  levothyroxine     90 tablet    TAKE 1 TABLET BY MOUTH DAILY       triamcinolone 0.1 % cream    KENALOG    45 g    Apply sparingly to affected area two times daily.

## 2017-02-09 NOTE — NURSING NOTE
"Chief Complaint   Patient presents with     UTI     Recheck       Initial /72 mmHg  Pulse 87  Temp(Src) 97  F (36.1  C) (Temporal)  Ht 5' 3.75\" (1.619 m)  Wt 190 lb 4.8 oz (86.32 kg)  BMI 32.93 kg/m2  SpO2 96% Estimated body mass index is 32.93 kg/(m^2) as calculated from the following:    Height as of this encounter: 5' 3.75\" (1.619 m).    Weight as of this encounter: 190 lb 4.8 oz (86.32 kg).  Medication Reconciliation: complete   Ade Sanchez CMA  "

## 2017-02-13 LAB
BACTERIA SPEC CULT: NORMAL
MICRO REPORT STATUS: NORMAL
SPECIMEN SOURCE: NORMAL

## 2017-02-15 ENCOUNTER — TELEPHONE (OUTPATIENT)
Dept: CARDIOLOGY | Facility: CLINIC | Age: 77
End: 2017-02-15

## 2017-02-16 ENCOUNTER — TELEPHONE (OUTPATIENT)
Dept: PEDIATRICS | Facility: CLINIC | Age: 77
End: 2017-02-16

## 2017-02-16 NOTE — TELEPHONE ENCOUNTER
Attempt #1:  Left message for patient to return call to clinic regarding results. Clinic number given.  Shruthi Marcus CMA

## 2017-02-16 NOTE — TELEPHONE ENCOUNTER
Notes Recorded by Cailin Ponce APRN CNP on 2/15/2017 at 9:39 PM  Please call Brandy Leon  Let her know   -Liver and gallbladder tests are normal. (ALT,AST, Alk phos, bilirubin), kidney function is normal (Cr, GFR), Sodium is normal, Potassium is normal, Calcium is normal,   -Cholesterol levels are at your goal levels.  ADVISE: Continuing your medication, a regular exercise program with at least 30 minutes of aerobic exercise 3-4 days/week ( 45 minutes 4-6 days/week if weight loss needed), and a low saturated fat,/low carbohydrate diet are helpful to maintain this.  Rechecking your fasting cholesterol panel in 12 months is recommended (Lipid w/ LDL reflex).  -TSH (thyroid stimulating hormone) level is normal which indicates normal thyroid function.  -A1C test (average blood sugar the last 2-3 months) is still way above your goal. Kieep appointment with endocrinology     -Urine culture is abnormal but it looks like a contaminated, non clean-catch sample.  There is no need for antibiotics at this point.  If new, worsening or persistent symptoms, then you should call or return for a recheck.   make appointment with urology as we talked about

## 2017-02-17 ENCOUNTER — RADIANT APPOINTMENT (OUTPATIENT)
Dept: CARDIOLOGY | Facility: CLINIC | Age: 77
End: 2017-02-17
Attending: NURSE PRACTITIONER
Payer: COMMERCIAL

## 2017-02-17 VITALS — HEART RATE: 80 BPM | SYSTOLIC BLOOD PRESSURE: 144 MMHG | DIASTOLIC BLOOD PRESSURE: 87 MMHG

## 2017-02-17 DIAGNOSIS — R07.89 ATYPICAL CHEST PAIN: ICD-10-CM

## 2017-02-17 PROCEDURE — 93352 ADMIN ECG CONTRAST AGENT: CPT | Performed by: INTERNAL MEDICINE

## 2017-02-17 PROCEDURE — 93350 STRESS TTE ONLY: CPT | Mod: TC | Performed by: INTERNAL MEDICINE

## 2017-02-17 PROCEDURE — 93017 CV STRESS TEST TRACING ONLY: CPT | Performed by: INTERNAL MEDICINE

## 2017-02-17 PROCEDURE — 93016 CV STRESS TEST SUPVJ ONLY: CPT | Performed by: INTERNAL MEDICINE

## 2017-02-17 PROCEDURE — 93350 STRESS TTE ONLY: CPT | Mod: 26 | Performed by: INTERNAL MEDICINE

## 2017-02-17 PROCEDURE — 93018 CV STRESS TEST I&R ONLY: CPT | Performed by: INTERNAL MEDICINE

## 2017-02-17 PROCEDURE — 93321 DOPPLER ECHO F-UP/LMTD STD: CPT | Mod: TC | Performed by: INTERNAL MEDICINE

## 2017-02-17 PROCEDURE — 93325 DOPPLER ECHO COLOR FLOW MAPG: CPT | Mod: 26 | Performed by: INTERNAL MEDICINE

## 2017-02-17 PROCEDURE — 93325 DOPPLER ECHO COLOR FLOW MAPG: CPT | Mod: TC | Performed by: INTERNAL MEDICINE

## 2017-02-17 PROCEDURE — 93321 DOPPLER ECHO F-UP/LMTD STD: CPT | Mod: 26 | Performed by: INTERNAL MEDICINE

## 2017-02-17 RX ORDER — DOBUTAMINE HYDROCHLORIDE 200 MG/100ML
20 INJECTION INTRAVENOUS CONTINUOUS
Status: DISCONTINUED | OUTPATIENT
Start: 2017-02-17 | End: 2019-05-10

## 2017-02-17 RX ADMIN — Medication 6 ML: at 11:25

## 2017-02-17 RX ADMIN — DOBUTAMINE HYDROCHLORIDE 20 MCG/KG/MIN: 200 INJECTION INTRAVENOUS at 11:24

## 2017-02-17 NOTE — TELEPHONE ENCOUNTER
Patient advised of all information below regarding labs and appointments per Cailin Ponce CNP.  Patient understand and agrees.    Ade Sanchez CMA

## 2017-02-17 NOTE — NURSING NOTE
IV started with a 20g Jelco catheter in the left AC.     Stress portion of Dobutamine Echo started utilizing 20 mcg/kg/min of Dobutamine.   Dobutamine increased to 20 mcg/kg/min by increments of 5 mcg/kg/min.                                                                            Dobutamine NDC# 8162-5053-13  Definity given  6 ml , 4 wasted  ml       1.5ml Definity mixed with 8.5ml Saline - NDC 01901-443-55    Dr. Garrido was the supervising physician of this test.

## 2017-02-20 ENCOUNTER — TELEPHONE (OUTPATIENT)
Dept: PEDIATRICS | Facility: CLINIC | Age: 77
End: 2017-02-20

## 2017-02-20 NOTE — PROGRESS NOTES
Please call  Brandy Leon,    Attached are your test results.  Stress echo looks negative for ischemia   If persistent symptoms need to follow up    Please contact us if you have any questions.    Cailin Ponce, CNP

## 2017-02-20 NOTE — LETTER
February 22, 2017      Brandy Leon  [address] [facility] [address]              Dear Brandy,    Attached are your test results.  Stress echo looks negative for ischemia.  If persistent symptoms need to follow up.      Stress Results     Protocol:  Dobutamine Stress Echo        Maximum Predicted HR:   145 bpm     Target HR: 123 bpm                       % Maximum Predicted HR: 86 %                             StageDurationHeart Rate  BP                                (mm:ss)   (bpm)                           Base            82    144/87                           Peak  6:28      125   120/46                             Stress Duration:   6:28 mm:ss                       Maximum Stress HR: 125 bpm     Left Ventricle  The left ventricle is normal in size. Left ventricular systolic function is  normal.     Right Ventricle  The right ventricle is normal in size and function.     Mitral Valve  The mitral valve is normal in structure and function.        Tricuspid Valve  The tricuspid valve is normal in structure and function.     Aortic Valve  The aortic valve is trileaflet. Mild valvular aortic stenosis. The peak AoV  pressure gradient is 2.0mmHg. The mean AoV pressure gradient is 9mmHg.     Vessels  The aortic root is normal size.     Pericardium  There is no pericardial effusion.              Please contact us if you have any questions.        Sincerely,      Cailin Ponce RN, CNP

## 2017-02-20 NOTE — LETTER
February 22, 2017      Brandy Leon  [address] [facility] [address]              Dear Brandy,    Attached are your test results.    Stress echo looks negative for ischemia.  If persistent symptoms need to follow up        Please contact us if you have any questions.          Sincerely,      Cailin Ponce RN, CNP

## 2017-02-20 NOTE — TELEPHONE ENCOUNTER
Notes Recorded by Cailin Ponce APRN CNP on 2/19/2017 at 7:14 PM  Please call  Brandy Leon,    Attached are your test results.  Stress echo looks negative for ischemia   If persistent symptoms need to follow up    Please contact us if you have any questions.    Cailin Ponce, CNP

## 2017-02-22 NOTE — TELEPHONE ENCOUNTER
Patient returned call.  Informed patient of echo results and that a letter was sent with results to her in the mail.    Reny Cedeno RN,   [facility], [facility]

## 2017-02-24 DIAGNOSIS — Z79.4 TYPE 2 DIABETES MELLITUS WITH HYPERGLYCEMIA, WITH LONG-TERM CURRENT USE OF INSULIN (H): ICD-10-CM

## 2017-02-24 DIAGNOSIS — E11.65 TYPE 2 DIABETES MELLITUS WITH HYPERGLYCEMIA, WITH LONG-TERM CURRENT USE OF INSULIN (H): ICD-10-CM

## 2017-02-24 RX ORDER — GLIPIZIDE 10 MG/1
TABLET ORAL
Qty: 120 TABLET | Refills: 2 | Status: SHIPPED | OUTPATIENT
Start: 2017-02-24 | End: 2017-05-23

## 2017-02-28 ENCOUNTER — ALLIED HEALTH/NURSE VISIT (OUTPATIENT)
Dept: PHARMACY | Facility: CLINIC | Age: 77
End: 2017-02-28
Payer: COMMERCIAL

## 2017-02-28 DIAGNOSIS — I10 HYPERTENSION GOAL BP (BLOOD PRESSURE) < 140/90: ICD-10-CM

## 2017-02-28 DIAGNOSIS — B35.1 DERMATOPHYTOSIS OF NAIL: ICD-10-CM

## 2017-02-28 DIAGNOSIS — E11.65 TYPE 2 DIABETES MELLITUS WITH HYPERGLYCEMIA, WITH LONG-TERM CURRENT USE OF INSULIN (H): ICD-10-CM

## 2017-02-28 DIAGNOSIS — Z79.4 TYPE 2 DIABETES MELLITUS WITH HYPERGLYCEMIA, WITH LONG-TERM CURRENT USE OF INSULIN (H): ICD-10-CM

## 2017-02-28 DIAGNOSIS — H40.003 GLAUCOMA SUSPECT, BILATERAL: ICD-10-CM

## 2017-02-28 DIAGNOSIS — N32.81 OVERACTIVE BLADDER: ICD-10-CM

## 2017-02-28 DIAGNOSIS — E78.5 HYPERLIPIDEMIA LDL GOAL <100: ICD-10-CM

## 2017-02-28 DIAGNOSIS — E03.9 HYPOTHYROIDISM, UNSPECIFIED TYPE: ICD-10-CM

## 2017-02-28 PROCEDURE — 99605 MTMS BY PHARM NP 15 MIN: CPT | Performed by: PHARMACIST

## 2017-02-28 PROCEDURE — 99607 MTMS BY PHARM ADDL 15 MIN: CPT | Performed by: PHARMACIST

## 2017-02-28 NOTE — MR AVS SNAPSHOT
After Visit Summary   2/28/2017    Brandy Leon    MRN: [id number]           Patient Information     Date Of Birth          1940        Visit Information        Provider Department      2/28/2017 10:00 AM Mirna Perez, [facility]        Today's Diagnoses     Hyperlipidemia LDL goal <100        Glaucoma suspect, bilateral        Hypertension goal BP (blood pressure) < 140/90        Hypothyroidism, unspecified type        Overactive bladder        Type 2 diabetes mellitus with hyperglycemia, with long-term current use of insulin (H)        Dermatophytosis of nail          Care Instructions    Take Crestor every other day  Go to the lab and have your CPeptide level drawn        Follow-ups after your visit        Your next 10 appointments already scheduled     Mar 28, 2017 10:30 AM CDT   TELEMEDICINE with Mirna Perez [facility] ([facility])    [address] [place], [place] [address]  [facility] [address] [phone]           Note: this is not an onsite visit; there is no need to come to the facility.            Apr 14, 2017 10:00 AM CDT   Return Visit with Candice Worley MD, [facility] ENDO NURSE   [facility])    [id number] [place] [id number] [phone]            Apr 25, 2017 10:00 AM CDT   Return Visit with Ehsan Araya DPM   [facility])    [address] [place] [address] [phone]              Who to contact     If you have questions or need follow up information about today's clinic visit or your schedule please contact [facility] directly at [phone].  Normal or non-critical lab and imaging results will be communicated to you by MyChart, letter or phone within 4 business days after the clinic has received the results. If you do  "not hear from us within 7 days, please contact the clinic through [employer] or phone. If you have a critical or abnormal lab result, we will notify you by phone as soon as possible.  Submit refill requests through [employer] or call your pharmacy and they will forward the refill request to us. Please allow 3 business days for your refill to be completed.          Additional Information About Your Visit        [employer] Information     [employer] lets you send messages to your doctor, view your test results, renew your prescriptions, schedule appointments and more. To sign up, go to [email or web address] . Click on \"Log in\" on the left side of the screen, which will take you to the Welcome page. Then click on \"Sign up Now\" on the right side of the page.     You will be asked to enter the access code listed below, as well as some personal information. Please follow the directions to create your username and password.     Your access code is: [email or web address]  Expires: 3/30/2017  1:44 PM     Your access code will  in 90 days. If you need help or a new code, please call your [facility] clinic or [phone].        Care EveryWhere ID     This is your Care EveryWhere ID. This could be used by other organizations to access your [facility] medical records  [id number]         Blood Pressure from Last 3 Encounters:   17 144/87   17 124/72   17 127/77    Weight from Last 3 Encounters:   17 190 lb 4.8 oz (86.3 kg)   16 193 lb 2 oz (87.6 kg)   16 196 lb 4.8 oz (89 kg)              Today, you had the following     No orders found for display         Today's Medication Changes          These changes are accurate as of: 17 11:30 AM.  If you have any questions, ask your nurse or doctor.               These medicines have changed or have updated prescriptions.        Dose/Directions    aspirin 81 MG EC tablet   This may have changed:  how much to take   Used for:  Type 2 diabetes, HbA1c goal < 7% " (H)        Dose:  81 mg   Take 1 tablet by mouth daily.   Quantity:  90 tablet   Refills:  3       canagliflozin 300 MG tablet   Commonly known as:  INVOKANA   This may have changed:  how much to take   Used for:  Type 2 diabetes mellitus with hyperglycemia, with long-term current use of insulin (H)        Dose:  300 mg   Take 1 tablet (300 mg) by mouth every morning (before breakfast)   Quantity:  30 tablet   Refills:  3       insulin glargine 100 UNIT/ML injection   Commonly known as:  LANTUS SOLOSTAR   This may have changed:  how much to take   Used for:  Type 2 diabetes mellitus with hyperglycemia, with long-term current use of insulin (H)        Dose:  26 Units   Inject 26 Units Subcutaneous At Bedtime   Quantity:  45 mL   Refills:  3       irbesartan-hydrochlorothiazide 150-12.5 MG per tablet   Commonly known as:  AVALIDE   This may have changed:  how much to take   Used for:  Hypertension goal BP (blood pressure) < 140/90        Dose:  1 tablet   Take 1 tablet by mouth daily   Quantity:  90 tablet   Refills:  1         Stop taking these medicines if you haven't already. Please contact your care team if you have questions.     Efinaconazole 10 % Soln                    Primary Care Provider Office Phone # Fax #    Cailin GreeneMAGO [facility] [phone]       [facility] [address]  [place] [address]        Thank you!     Thank you for choosing [facility]  for your care. Our goal is always to provide you with excellent care. Hearing back from our patients is one way we can continue to improve our services. Please take a few minutes to complete the written survey that you may receive in the mail after your visit with us. Thank you!             Your Updated Medication List - Protect others around you: Learn how to safely use, store and throw away your medicines at www.disposemymeds.org.          This list is accurate as of: 2/28/17 11:30 AM.  Always  use your most recent med list.                   Brand Name Dispense Instructions for use    aspirin 81 MG EC tablet     90 tablet    Take 1 tablet by mouth daily.       blood glucose monitoring test strip    no brand specified    300 strip    300 strips by In Vitro route 2 times daily One Touch Ultra Mini test strips.       canagliflozin 300 MG tablet    INVOKANA    30 tablet    Take 1 tablet (300 mg) by mouth every morning (before breakfast)       estradiol 0.1 MG/GM cream    ESTRACE VAGINAL    42.5 g    Apply a pea-sized dab to the vaginal area every night for 2 weeks. Then switch to three times weekly.       famotidine 20 MG tablet    PEPCID     Take 20 mg by mouth nightly as needed       fluticasone 50 MCG/ACT spray    FLONASE    16 g    Spray 2 sprays into both nostrils daily       glipiZIDE 10 MG tablet    GLUCOTROL    120 tablet    TAKE 2 TABLETS BY MOUTH TWICE A DAY BEFORE MEALS       GLUCOSAMINE CHONDROITIN COMPLX PO      2 Daily       insulin glargine 100 UNIT/ML injection    LANTUS SOLOSTAR    45 mL    Inject 26 Units Subcutaneous At Bedtime       insulin pen needle 32G X 4 MM    BD JUAN C U/F    100 each    Use 1 daily as directed.       irbesartan-hydrochlorothiazide 150-12.5 MG per tablet    AVALIDE    90 tablet    Take 1 tablet by mouth daily       latanoprost 0.005 % ophthalmic solution    XALATAN    3 Bottle    Place 1 drop into both eyes At Bedtime       metFORMIN 1000 MG tablet    GLUCOPHAGE    180 tablet    TAKE 1 TABLET (1,000 MG) BY MOUTH 2 TIMES DAILY (WITH MEALS)       MULTIVITAMIN PO      1 tablet daily       OMEGA 3 PO      Take by mouth 2 times daily.       ONE TOUCH DELICA LANCETS Misc     100 each    1 each 2 times daily. One Touch Delica       order for DME     1 each    Glucometer covered by insurance.       oxybutynin 5 MG 24 hr tablet    DITROPAN-XL    70 tablet    Take 1 tablet by mouth daily x 1 week, 2 tablets daily       rosuvastatin 20 MG tablet    CRESTOR    90 tablet    TAKE 1  TABLET (20 MG) BY MOUTH DAILY       SYNTHROID 137 MCG tablet   Generic drug:  levothyroxine     90 tablet    TAKE 1 TABLET BY MOUTH DAILY       triamcinolone 0.1 % cream    KENALOG    45 g    Apply sparingly to affected area two times daily.

## 2017-02-28 NOTE — PROGRESS NOTES
SUBJECTIVE/OBJECTIVE:                                                    Brandy Leon is a 76 year old female called for an initial visit for Medication Therapy Management for 2017.  She was referred to me from Cailin Ponce.     Chief Complaint: Medication Review    Allergies/ADRs: Reviewed in Epic  Tobacco: No tobacco use   Alcohol: none  Caffeine: 2 cups/day of coffee  Activity: walking to car and to her bus and getting in and out of her bus.  PMH: Reviewed in Epic    Medication Adherence: no issues reported    Diabetes:  Pt currently taking current therapy includes Invokana 150mg daily, glipizide 20mg bid, Lantus 22 units daily, metformin 1000mg bid. Patient experiences urinary frequency but this was prior to starting Invokana. Denies UTIs and yeast infections.  Patient thought she could go off of the glipizide but she is still taking it.  SMBG: one time daily.   Ranges (patient reported): 119mg/dL 145, 170, 158, 138mg/dL fasting.  Symptoms of low blood sugar? none. Frequency of hypoglycemia? rarely.  Recent symptoms of high blood sugar? none  Eye exam: up to date  Foot exam: up to date  Microalbumin is < 30 mg/g. Pt is taking an ACEi/ARB.  Aspirin: Taking 81mg daily and denies side effects    Hypertension: Current medications include irbesartan/HCTZ 150-12.5mg 1/2 tablet daily.  Patient does not self-monitor BP.  Patient reports no current medication side effects.    Dermatophytosis: current therapy OTC Fungal Care. Patient is apply once at night. Patient has been using since summer 2016. Patient feels this is working for her. Insurance did not cover efinaconazole.    Hypothyroidism: Patient is taking levothyroxine 137 mcg daily. Patient is having the following symptoms: none.    Hyperlipidemia: Current therapy includes rosuvastatin 20mg once daily.  Pt reports myalgias since on medication. Patient was off of rosuvastatin when she saw endocrinology in December and notes that the pain in her arms  improved  The 10-year ASCVD risk score (Qing JACKMAN Jr., et al., 2013) is: 43%    Values used to calculate the score:      Age: 76 years      Sex: Female      Is Non- : No      Diabetic: Yes      Tobacco smoker: No      Systolic Blood Pressure: 144 mmHg      Is Prescribed Antihypertensives: Yes      HDL Cholesterol: 59 mg/dL      Total Cholesterol: 142 mg/dL     Urinary Incontinence: current therapy includes oxybutynin XL 5mg bid and estradiol cream as needed.  This seems to be helping her symptoms and is not experiencing any side effects. Patient uses estradiol when she experiences vaginal itching.    Glaucoma: current therapy includes Latanoprost 1 drop each eye at bedtime. Patient has an eye exam every August. Patient feels this this working. IOP are good per eye exam 8/23/2016.    Current labs include:  BP Readings from Last 3 Encounters:   02/17/17 144/87   02/09/17 124/72   01/24/17 127/77     Today's Vitals: There were no vitals taken for this visit.  Lab Results   Component Value Date    A1C 10.7 02/09/2017   .  Lab Results   Component Value Date    CHOL 142 02/09/2017     Lab Results   Component Value Date    TRIG 157 02/09/2017     Lab Results   Component Value Date    HDL 59 02/09/2017     Lab Results   Component Value Date    LDL 52 02/09/2017     07/21/2015       Liver Function Studies -   Recent Labs   Lab Test  02/09/17   1200   PROTTOTAL  8.3   ALBUMIN  3.8   BILITOTAL  0.3   ALKPHOS  82   AST  16   ALT  21       Lab Results   Component Value Date    UCRR 49 02/09/2017    MICROL 14 02/09/2017    UMALCR 28.07 (H) 02/09/2017       Last Basic Metabolic Panel:  Lab Results   Component Value Date     02/09/2017      Lab Results   Component Value Date    POTASSIUM 4.2 02/09/2017     Lab Results   Component Value Date    CHLORIDE 101 02/09/2017     Lab Results   Component Value Date    BUN 15 02/09/2017     Lab Results   Component Value Date    CR 0.67 02/09/2017     GFR  Estimate   Date Value Ref Range Status   02/09/2017 86 >60 mL/min/1.7m2 Final     Comment:     Non  GFR Calc   11/08/2016 >90  Non  GFR Calc   >60 mL/min/1.7m2 Final   04/25/2016 80 >60 mL/min/1.7m2 Final     Comment:     Non  GFR Calc     GFR Estimate If Black   Date Value Ref Range Status   02/09/2017 >90   GFR Calc   >60 mL/min/1.7m2 Final   11/08/2016 >90   GFR Calc   >60 mL/min/1.7m2 Final   04/25/2016 >90   GFR Calc   >60 mL/min/1.7m2 Final     TSH   Date Value Ref Range Status   02/09/2017 1.33 0.40 - 4.00 mU/L Final   ]    Most Recent Immunizations   Administered Date(s) Administered     TD (ADULT, 7+) 03/21/2005     Varicella Not Indicated - By Hx 07/24/2000     Zoster vaccine, live 04/27/2012     ASSESSMENT:                                                       Current medications were reviewed today.     Medication Adherence: no issues identified    Diabetes: Needs Improvement. Patient is not meeting A1c goal of < 8%. Patient would benefit from getting her C-Peptide levels drawn before next endocrinology appt.    Hypertension: Stable. Patient is meeting BP goal of < 140/90mmHg. Last BP in clinic was elevated but all other BPs prior have been within range. Recheck next time in clinic.    Hyperlipidemia: Needs Improvement. Patient may benefit from taking rosuvastatin every other day.    Urinary Incontinence: stable    Glaucoma: stable    PLAN:                                                      Rercommend taking Crestor every other day  Have your C-Peptide lab drawn as was previously ordered.    I spent 30 minutes with this patient today.  All changes were made via collaborative practice agreement with Cailin Ponce. A copy of the visit note was provided to the patient's primary care provider.    Will follow up in 1 month.    The patient declined a summary of these recommendations as an after visit  summary.     Mirna Perez, Pharm.D, BCACP  Medication Therapy Management Pharmacist

## 2017-03-04 DIAGNOSIS — E11.65 TYPE 2 DIABETES MELLITUS WITH HYPERGLYCEMIA, WITH LONG-TERM CURRENT USE OF INSULIN (H): Primary | ICD-10-CM

## 2017-03-04 DIAGNOSIS — Z79.4 TYPE 2 DIABETES MELLITUS WITH HYPERGLYCEMIA, WITH LONG-TERM CURRENT USE OF INSULIN (H): Primary | ICD-10-CM

## 2017-03-04 DIAGNOSIS — E11.9 TYPE 2 DIABETES, HBA1C GOAL < 7% (H): ICD-10-CM

## 2017-03-04 NOTE — TELEPHONE ENCOUNTER
blood glucose test strip      Last Written Prescription Date: 11/26/14  Last Fill Quantity: 300,  # refills: 3  Last Office Visit with [facility], [facility] or [facility] prescribing provider: 2/9/17                                         Next 5 appointments (look out 90 days)     Apr 14, 2017 10:00 AM CDT   Return Visit with Candice Worley MD, [facility] ENDO NURSE   [facility]    [address] [place] [id number] [phone]            Apr 25, 2017 10:00 AM CDT   Return Visit with Ehsan Araya DPM   [facility]    [address] [place] [id number] [phone]                      Fritz Faarax  [facility]

## 2017-04-11 ENCOUNTER — TELEPHONE (OUTPATIENT)
Dept: PHARMACY | Facility: CLINIC | Age: 77
End: 2017-04-11

## 2017-04-11 DIAGNOSIS — Z79.4 TYPE 2 DIABETES MELLITUS WITH HYPERGLYCEMIA, WITH LONG-TERM CURRENT USE OF INSULIN (H): ICD-10-CM

## 2017-04-11 DIAGNOSIS — E11.65 TYPE 2 DIABETES MELLITUS WITH HYPERGLYCEMIA, WITH LONG-TERM CURRENT USE OF INSULIN (H): ICD-10-CM

## 2017-04-11 NOTE — TELEPHONE ENCOUNTER
Called patient to schedule appt. LM for patient to return call.  Mirna Perez, Pharm.D, BCACP  Medication Therapy Management Pharmacist

## 2017-04-14 ENCOUNTER — OFFICE VISIT (OUTPATIENT)
Dept: ENDOCRINOLOGY | Facility: CLINIC | Age: 77
End: 2017-04-14
Payer: COMMERCIAL

## 2017-04-14 VITALS
SYSTOLIC BLOOD PRESSURE: 118 MMHG | HEART RATE: 81 BPM | WEIGHT: 187 LBS | HEIGHT: 64 IN | BODY MASS INDEX: 31.92 KG/M2 | DIASTOLIC BLOOD PRESSURE: 78 MMHG

## 2017-04-14 DIAGNOSIS — E11.65 TYPE 2 DIABETES MELLITUS WITH HYPERGLYCEMIA, WITH LONG-TERM CURRENT USE OF INSULIN (H): Primary | ICD-10-CM

## 2017-04-14 DIAGNOSIS — Z79.4 TYPE 2 DIABETES MELLITUS WITH HYPERGLYCEMIA, WITH LONG-TERM CURRENT USE OF INSULIN (H): Primary | ICD-10-CM

## 2017-04-14 LAB
ALT SERPL W P-5'-P-CCNC: 21 U/L (ref 0–50)
ANION GAP SERPL CALCULATED.3IONS-SCNC: 6 MMOL/L (ref 3–14)
BUN SERPL-MCNC: 21 MG/DL (ref 7–30)
CALCIUM SERPL-MCNC: 9.5 MG/DL (ref 8.5–10.1)
CHLORIDE SERPL-SCNC: 102 MMOL/L (ref 94–109)
CK SERPL-CCNC: 168 U/L (ref 30–225)
CO2 SERPL-SCNC: 29 MMOL/L (ref 20–32)
CREAT SERPL-MCNC: 0.76 MG/DL (ref 0.52–1.04)
GFR SERPL CREATININE-BSD FRML MDRD: 74 ML/MIN/1.7M2
GLUCOSE SERPL-MCNC: 151 MG/DL (ref 70–99)
HBA1C MFR BLD: 10.2 % (ref 0–5.7)
HGB BLD-MCNC: 15.5 G/DL (ref 11.7–15.7)
POTASSIUM SERPL-SCNC: 4.3 MMOL/L (ref 3.4–5.3)
SODIUM SERPL-SCNC: 137 MMOL/L (ref 133–144)
VIT B12 SERPL-MCNC: 433 PG/ML (ref 193–986)

## 2017-04-14 PROCEDURE — 80048 BASIC METABOLIC PNL TOTAL CA: CPT | Performed by: INTERNAL MEDICINE

## 2017-04-14 PROCEDURE — 84681 ASSAY OF C-PEPTIDE: CPT | Performed by: INTERNAL MEDICINE

## 2017-04-14 PROCEDURE — 36415 COLL VENOUS BLD VENIPUNCTURE: CPT | Performed by: INTERNAL MEDICINE

## 2017-04-14 PROCEDURE — 82607 VITAMIN B-12: CPT | Performed by: INTERNAL MEDICINE

## 2017-04-14 PROCEDURE — 83036 HEMOGLOBIN GLYCOSYLATED A1C: CPT | Performed by: INTERNAL MEDICINE

## 2017-04-14 PROCEDURE — 82550 ASSAY OF CK (CPK): CPT | Performed by: INTERNAL MEDICINE

## 2017-04-14 PROCEDURE — 84460 ALANINE AMINO (ALT) (SGPT): CPT | Performed by: INTERNAL MEDICINE

## 2017-04-14 PROCEDURE — 99214 OFFICE O/P EST MOD 30 MIN: CPT | Performed by: INTERNAL MEDICINE

## 2017-04-14 PROCEDURE — 85018 HEMOGLOBIN: CPT | Performed by: INTERNAL MEDICINE

## 2017-04-14 NOTE — TELEPHONE ENCOUNTER
Metformin     Last Written Prescription Date: 10/3/2016  Last Fill Quantity: 180, # refills: 1  Last Office Visit with G, P or [facility] prescribing provider:  2/9/2017   Next 5 appointments (look out 90 days)     Apr 25, 2017 10:00 AM CDT   Return Visit with Ehsan Araya DPM   [facility]    [id number] [place] [address] [phone]                   BP Readings from Last 3 Encounters:   04/14/17 118/78   02/17/17 144/87   02/09/17 124/72     Lab Results   Component Value Date    MICROL 14 02/09/2017     Lab Results   Component Value Date    UMALCR 28.07 02/09/2017     Creatinine   Date Value Ref Range Status   04/14/2017 0.76 0.52 - 1.04 mg/dL Final   ]  GFR Estimate   Date Value Ref Range Status   04/14/2017 74 >60 mL/min/1.7m2 Final     Comment:     Non  GFR Calc   02/09/2017 86 >60 mL/min/1.7m2 Final     Comment:     Non  GFR Calc   11/08/2016 >90  Non  GFR Calc   >60 mL/min/1.7m2 Final     GFR Estimate If Black   Date Value Ref Range Status   04/14/2017 90 >60 mL/min/1.7m2 Final     Comment:      GFR Calc   02/09/2017 >90   GFR Calc   >60 mL/min/1.7m2 Final   11/08/2016 >90   GFR Calc   >60 mL/min/1.7m2 Final     Lab Results   Component Value Date    CHOL 142 02/09/2017     Lab Results   Component Value Date    HDL 59 02/09/2017     Lab Results   Component Value Date    LDL 52 02/09/2017     Lab Results   Component Value Date    TRIG 157 02/09/2017     Lab Results   Component Value Date    CHOLHDLRATIO 3.6 08/21/2015     Lab Results   Component Value Date    AST 16 02/09/2017     Lab Results   Component Value Date    ALT 21 04/14/2017     Lab Results   Component Value Date    A1C 10.2 04/14/2017    A1C 10.7 02/09/2017    A1C 11.0 11/08/2016    A1C 10.4 07/26/2016    A1C 11.5 04/15/2016     Potassium   Date Value Ref Range Status   04/14/2017  4.3 3.4 - 5.3 mmol/L Final       Refilled per [facility] protocol.    Sharla Young RN

## 2017-04-14 NOTE — PATIENT INSTRUCTIONS
Test creatinine today. If normal, we will increase Invokana to 300 mg daily     Lab test for C-peptide today.       Sending blood sugars to your provider at [facility]:  We want to help you with your diabetes management, which often requires frequent adjustments to your therapy. For your convenience, we have several ways to send your blood sugars to your doctor for review.    - Send message directly to your doctor through My Chart.  Please ask the rooming staff if you would like to sign up for My Chart.  This is a fast and confidential way to send your information and communicate directly with your provider.   - Record readings and fax to [phone].  We have a template for you to use for your convenience.  - If you have a Medtronic pump, upload to [employer] and notify your provider of your username and password.   - Stop by the clinic with your meter for download.   - My Chart or call Jeannie Hathaway, Diabetes Educator at [phone]  - Call the clinic and speak to one of the endocrine nurses to relay information on the telephone.  Earnestine Sánchez, or Pauly at [phone].   -    Please call the on-call Endocrinologist at the [facility] for after       hours/weekend needs at [phone] Option #4.    Please note that you do not need to FAST if you are just having an A1C drawn. Please remember to ALWAYS bring your glucose meter with to your appointment. This data is very important for the management of your care.    Thank you!  Your [facility] Care Team

## 2017-04-14 NOTE — NURSING NOTE
"Brandy Leon's goals for this visit include:   Chief Complaint   Patient presents with     Diabetes       She requests these members of her care team be copied on today's visit information: Cailin Ponce      PCP: Cailin Ponce    Referring Provider:  ESTABLISHED PATIENT  No address on file    Chief Complaint   Patient presents with     Diabetes       Initial /78  Pulse 81  Ht 1.619 m (5' 3.75\")  Wt 84.8 kg (187 lb)  BMI 32.35 kg/m2 Estimated body mass index is 32.35 kg/(m^2) as calculated from the following:    Height as of this encounter: 1.619 m (5' 3.75\").    Weight as of this encounter: 84.8 kg (187 lb).  Medication Reconciliation: complete    Do you need any medication refills at today's visit? No    Pauly Adler LPN      "

## 2017-04-14 NOTE — PROGRESS NOTES
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     She has had diabetes for more than 12 years  She has retinopathy and neuropathy.  Last eye exam was on August 2016   Last microalbuminuria  Feb 2017 + Microalbuminuria, normal creatinine  Denies any hypoglycemia      Glucometer:   She is usually tests blood sugars in the morning  Average blood sugar 165 with standard deviation of 44  Recent blood sugars have ranged from 100-130    A1C     11.0   11/8/2016  A1C     10.4   7/26/2016  A1C     11.5   4/15/2016  A1C     11.2   2/23/2016  A1C      9.9   12/22/2015    Initially, she took metformin 1 g twice a day and glipizide 20 mg twice daily  Due to difficult blood sugar control in the recent months she was started on Lantus 26 units daily but patient states that this has not benefited her.   Invokana added during the first visit.     Preventative medications  Crestor at this time due to muscle pain but plans to do so at lower dose  Continue mg every other day, but still has pain  irbesartan and aspirin    Diet  She drinks coffee with a toast in the morning and goes to work.  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  She eats a toast with eggs or oatmeal with blueberries.  Around noon she eats a sandwich with milk for lunch and around 5 PM she has dinner which usually very eats depending on what her son cooks.  She has navy beans with soup and fish and milk for dinner.     She does not drink alcohol or sugary beverage  She does not snack in between meals    Exercise  There is no structured exercise    Hypothyroidism  Diagnoses one than 20 years ago with overactive thyroid when she had shakiness.  Eventually she underwent thyroid surgery and  started on levothyroxine  Currently she takes 137  g of levothyroxine  She denies having cold intolerance, change in diet or appetite    Family history  No history of diabetes, obesity.  Brother has history of thyroid surgery     Allergies   Allergen Reactions     Estradiol Itching     Lipitor [Atorvastatin Calcium]      Weak and shaky     Sulfa Drugs      Rash       Zocor [Simvastatin]      Weak and shakey       Current Outpatient Prescriptions   Medication Sig Dispense Refill     ONE TOUCH ULTRA test strip TEST TWICE DAILY 200 strip 11     glipiZIDE (GLUCOTROL) 10 MG tablet TAKE 2 TABLETS BY MOUTH TWICE A DAY BEFORE MEALS 120 tablet 2     oxybutynin (DITROPAN-XL) 5 MG 24 hr tablet Take 1 tablet by mouth daily x 1 week, 2 tablets daily 70 tablet 1     triamcinolone (KENALOG) 0.1 % cream Apply sparingly to affected area two times daily. 45 g 1     rosuvastatin (CRESTOR) 20 MG tablet TAKE 1 TABLET (20 MG) BY MOUTH DAILY 90 tablet 1     canagliflozin (INVOKANA) 300 MG tablet Take 1 tablet (300 mg) by mouth every morning (before breakfast) (Patient taking differently: Take 150 mg by mouth every morning (before breakfast) ) 30 tablet 3     insulin glargine (LANTUS SOLOSTAR) 100 UNIT/ML PEN Inject 26 Units Subcutaneous At Bedtime (Patient taking differently: Inject 22 Units Subcutaneous At Bedtime ) 45 mL 3     metFORMIN (GLUCOPHAGE) 1000 MG tablet TAKE 1 TABLET (1,000 MG) BY MOUTH 2 TIMES DAILY (WITH MEALS) 180 tablet 1     SYNTHROID 137 MCG tablet TAKE 1 TABLET BY MOUTH DAILY 90 tablet 2     latanoprost (XALATAN) 0.005 % ophthalmic solution Place 1 drop into both eyes At Bedtime 3 Bottle 4     famotidine (PEPCID) 20 MG tablet Take 20 mg by mouth nightly as needed       insulin pen needle (BD JUAN C U/F) 32G X 4 MM Use 1 daily as directed. 100 each PRN     irbesartan-hydrochlorothiazide (AVALIDE) 150-12.5 MG per tablet Take 1 tablet by mouth daily (Patient taking differently: Take 0.5 tablets by mouth daily ) 90 tablet 1      order for DME Glucometer covered by insurance. 1 each 0     aspirin 81 MG EC tablet Take 1 tablet by mouth daily. (Patient taking differently: Take 650 mg by mouth daily ) 90 tablet 3     ONE TOUCH DELICA LANCETS MISC 1 each 2 times daily. One Touch Delica   100 each 12     GLUCOSAMINE CHONDROITIN COMPLX OR 2 Daily       Omega-3 Fatty Acids (OMEGA 3 PO) Take by mouth 2 times daily.        MULTIVITAMIN OR 1 tablet daily       fluticasone (FLONASE) 50 MCG/ACT nasal spray Spray 2 sprays into both nostrils daily (Patient not taking: Reported on 4/14/2017) 16 g 5       Review of Systems     Constitutional: Negative for fever and unexpected weight change. Appetite normal   Head: no headache   Eye: no vision change/ loss of peripheral vision.   ENT: No throat congestion.   Respiratory: no cough   Cardiovascular: no chest pain  Gastrointestinal: Negative for vomiting, abdominal pain and diarrhea.  Genitourinary: No bladder problems.  Musculoskeletal: Negative for myalgias. No weakness.  Neurological: Negative for seizures and headaches.  Psychiatric/Behavioral: Negative for behavioral problem and dysphoric mood.    Past Medical History:   Diagnosis Date     Degenerative joint disease      Diabetes mellitus (H) 2006    type 2     HTN (hypertension)      Hyperlipidemia LDL goal < 100      Hypothyroidism 1960    s/p subtotal thyroidectomy      Rheumatic fever     x2 between the ages of 16-18     Seasonal allergies        Past Surgical History:   Procedure Laterality Date     C APPENDECTOMY       C ARTHROPLASTY TMJ       COLONOSCOPY       COLONOSCOPY N/A 8/13/2015    Procedure: COLONOSCOPY;  Surgeon: Duane, William Charles, MD;  Location: [facility] OR     COLONOSCOPY WITH CO2 INSUFFLATION N/A 8/13/2015    Procedure: COLONOSCOPY WITH CO2 INSUFFLATION;  Surgeon: Duane, William Charles, MD;  Location: [facility] OR     THYROIDECTOMY          Family History   Problem Relation Age of Onset     CANCER Father      skin and leukemia      "CEREBROVASCULAR DISEASE Maternal Grandfather      CEREBROVASCULAR DISEASE Paternal Grandmother      Eye Disorder Paternal Grandmother      cataracts     CEREBROVASCULAR DISEASE Paternal Grandfather      HEART DISEASE Paternal Grandfather      CANCER Sister      Breast Cancer     Eye Disorder Mother      cataracts     Eye Disorder Brother      cataract     Breast Cancer Daughter        Social History     Social History     Marital Status: Single     Spouse Name: N/A     Number of Children: N/A     Years of Education: N/A     Social History Main Topics     Smoking status: Never Smoker      Smokeless tobacco: Never Used      Comment: has had exposure to second hand smoke in the past from [facility], no current exposure     Alcohol Use: No     Drug Use: No     Sexual Activity: No     Other Topics Concern     Parent/Sibling W/ Cabg, Mi Or Angioplasty Before 65f 55m? 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     Hobby Hazards No     Sleep Concern Yes     difficulty staying asleep, up and down all night     Stress Concern No     Weight Concern Yes     would like to lose     Special Diet Yes     low card, low sugar diet     Back Care Yes     chiropratior     Exercise Yes     almost everyday     Bike Helmet No     does not ride bicycle any more     Seat Belt Yes     Self-Exams Yes     Social History Narrative       Objective:   /78  Pulse 81  Ht 1.619 m (5' 3.75\")  Wt 84.8 kg (187 lb)  BMI 32.35 kg/m2  Constitutional: Elderly lady in no acute distress   EYES: anicteric, normal extra-ocular movements, no lid lag or retraction   HEENT: Mouth/Throat: Mucous membrane is moist. Oropharynx is clear. No adenopathy. Normal thyroid   Cardiovascular: RRR, S1, S2 normal. No m/g/r   Pulmonary/Chest: CTAB. No wheezing or rales   Abdominal: +BS. Nontender to palpation. " No organomegaly present.  Neurological: Alert. Normal affect. CNII-XII intact. Muscle strength 5/5. Sensory is intact.  Extremities: No clubbing, cyanosis or inflammation   Skin: normal texture, color  Feet: bilateral neuropathy left worse than right  Lymphatic: no cervical lymphadenopathy.  Psychological: appropriate mood     In House Labs:   A1C     11.0   11/8/2016  A1C     10.4   7/26/2016  A1C     11.5   4/15/2016  A1C     11.2   2/23/2016  A1C      9.9   12/22/2015    TSH   Date Value Ref Range Status   02/09/2017 1.33 0.40 - 4.00 mU/L Final   09/27/2016 1.88 0.40 - 4.00 mU/L Final   07/26/2016 1.78 0.40 - 4.00 mU/L Final   11/26/2014 1.14 0.40 - 4.00 mU/L Final     Comment:     Effective 7/30/2014, the reference range for this assay has changed to reflect   new instrumentation/methodology.     08/01/2012 1.34 0.4 - 5.0 mU/L Final     T4 Free   Date Value Ref Range Status   02/09/2017 1.25 0.76 - 1.46 ng/dL Final       Creatinine   Date Value Ref Range Status   02/09/2017 0.67 0.52 - 1.04 mg/dL Final   ]    Recent Labs   Lab Test  07/26/16   0828  02/23/16   1115   08/21/15   0825   06/16/15   0941   CHOL  137  184   < >  174   --   224*   HDL  57  59   < >  49*   --   47*   LDL  48  88   < >  86   < >  120   TRIG  162*  187*   < >  197*   --   283*   CHOLHDLRATIO   --    --    --   3.6   --   4.8    < > = values in this interval not displayed.       No results found for: [id number], [id number], [id number]      Assessment/Treatment Plan:      Brandy Leon is a 76 year old year old female with    1. Type 2 Diabetes with uncontrolled hyperglycemia :   Neuropathy, nephropathy.    Her recent A1c was 10.2. A1c has improved compared to the last visit when it was 11.  We are slowly making progress especially because she is a  and hypoglycemia is a significant risk.   She denies having changes in diet but she may be underreporting her diet.      She has received high dose of glipizide and due  to progress in diabetes she may no longer be producing insulin.  She has not done C-peptide levels. But she plans to do it today.    Further plans  If the creatinine is normal, we will increase Invokana to 300 mg daily  If C-peptide is normal, we will plan to use GLP1 agonist and taper the Lantus.    2.  Hypothyroidism S/P Thyroidectomy for Graves' disease.  Continue levothyroxine 137  g daily  Recheck TSH levels in about six months    3.  Hyperlipidemia  She was complaining about significant muscle pains in both arms and legs.  She reduced Crestor to every other day  We will test CPK, and seems LDL at goal, plan to reduce the dose further      Patient Instructions   Test creatinine today. If normal, we will increase Invokana to 300 mg daily     Lab test for C-peptide today.       Sending blood sugars to your provider at [place]:  We want to help you with your diabetes management, which often requires frequent adjustments to your therapy. For your convenience, we have several ways to send your blood sugars to your doctor for review.    - Send message directly to your doctor through My Chart.  Please ask the rooming staff if you would like to sign up for My Chart.  This is a fast and confidential way to send your information and communicate directly with your provider.   - Record readings and fax to [phone].  We have a template for you to use for your convenience.  - If you have a [employer] pump, upload to [employer] and notify your provider of your username and password.   - Stop by the clinic with your meter for download.   - My Chart or call Jeannie Hathaway Diabetes Educator at [phone]  - Call the clinic and speak to one of the endocrine nurses to relay information on the telephone.  Earnestine Sánchez, or Pauly at [phone].   -    Please call the on-call Endocrinologist at the [place] for after       hours/weekend needs at [phone] Option #4.    Please note that you do not need to FAST if you  are just having an A1C drawn. Please remember to ALWAYS bring your glucose meter with to your appointment. This data is very important for the management of your care.    Thank you!  Your [facility] Team          I will contact the patient with the test results.  Return to clinic in 3 months.    Test and/or medications prescribed today:  Orders Placed This Encounter   Procedures     Hemoglobin A1c POCT     Basic metabolic panel     ALT     Hemoglobin     CK total     Vitamin B12       Candice Worley MD  [id number]  Endocrinology Service

## 2017-04-14 NOTE — LETTER
4/14/2017       RE: Brandy Leon  [address] [facility] [address]     Dear Colleague,    Thank you for referring your patient, Brandy Leon, to the [facility] at [facility]. Please see a copy of my visit note below.    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     She has had diabetes for more than 12 years  She has retinopathy and neuropathy.  Last eye exam was on August 2016   Last microalbuminuria  Feb 2017 + Microalbuminuria, normal creatinine  Denies any hypoglycemia      Glucometer:   She is usually tests blood sugars in the morning  Average blood sugar 165 with standard deviation of 44  Recent blood sugars have ranged from 100-130    A1C     11.0   11/8/2016  A1C     10.4   7/26/2016  A1C     11.5   4/15/2016  A1C     11.2   2/23/2016  A1C      9.9   12/22/2015    Initially, she took metformin 1 g twice a day and glipizide 20 mg twice daily  Due to difficult blood sugar control in the recent months she was started on Lantus 26 units daily but patient states that this has not benefited her.   Invokana added during the first visit.     Preventative medications  Crestor at this time due to muscle pain but plans to do so at lower dose  Continue mg every other day, but still has pain  irbesartan and aspirin    Diet  She drinks coffee with a toast in the morning and goes to work.  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  She eats a toast with eggs or oatmeal with blueberries.  Around noon she eats a sandwich with milk for lunch and around 5 PM she has dinner which usually very eats depending on what her son cooks.  She has navy beans with soup  and fish and milk for dinner.     She does not drink alcohol or sugary beverage  She does not snack in between meals    Exercise  There is no structured exercise    Hypothyroidism  Diagnoses one than 20 years ago with overactive thyroid when she had shakiness.  Eventually she underwent thyroid surgery and started on levothyroxine  Currently she takes 137  g of levothyroxine  She denies having cold intolerance, change in diet or appetite    Family history  No history of diabetes, obesity.  Brother has history of thyroid surgery     Allergies   Allergen Reactions     Estradiol Itching     Lipitor [Atorvastatin Calcium]      Weak and shaky     Sulfa Drugs      Rash       Zocor [Simvastatin]      Weak and shakey       Current Outpatient Prescriptions   Medication Sig Dispense Refill     ONE TOUCH ULTRA test strip TEST TWICE DAILY 200 strip 11     glipiZIDE (GLUCOTROL) 10 MG tablet TAKE 2 TABLETS BY MOUTH TWICE A DAY BEFORE MEALS 120 tablet 2     oxybutynin (DITROPAN-XL) 5 MG 24 hr tablet Take 1 tablet by mouth daily x 1 week, 2 tablets daily 70 tablet 1     triamcinolone (KENALOG) 0.1 % cream Apply sparingly to affected area two times daily. 45 g 1     rosuvastatin (CRESTOR) 20 MG tablet TAKE 1 TABLET (20 MG) BY MOUTH DAILY 90 tablet 1     canagliflozin (INVOKANA) 300 MG tablet Take 1 tablet (300 mg) by mouth every morning (before breakfast) (Patient taking differently: Take 150 mg by mouth every morning (before breakfast) ) 30 tablet 3     insulin glargine (LANTUS SOLOSTAR) 100 UNIT/ML PEN Inject 26 Units Subcutaneous At Bedtime (Patient taking differently: Inject 22 Units Subcutaneous At Bedtime ) 45 mL 3     metFORMIN (GLUCOPHAGE) 1000 MG tablet TAKE 1 TABLET (1,000 MG) BY MOUTH 2 TIMES DAILY (WITH MEALS) 180 tablet 1     SYNTHROID 137 MCG tablet TAKE 1 TABLET BY MOUTH DAILY 90 tablet 2     latanoprost (XALATAN) 0.005 % ophthalmic solution Place 1 drop into both eyes At Bedtime 3 Bottle 4     famotidine (PEPCID) 20  MG tablet Take 20 mg by mouth nightly as needed       insulin pen needle (BD JUAN C U/F) 32G X 4 MM Use 1 daily as directed. 100 each PRN     irbesartan-hydrochlorothiazide (AVALIDE) 150-12.5 MG per tablet Take 1 tablet by mouth daily (Patient taking differently: Take 0.5 tablets by mouth daily ) 90 tablet 1     order for DME Glucometer covered by insurance. 1 each 0     aspirin 81 MG EC tablet Take 1 tablet by mouth daily. (Patient taking differently: Take 650 mg by mouth daily ) 90 tablet 3     ONE TOUCH DELICA LANCETS MISC 1 each 2 times daily. One Touch Delica   100 each 12     GLUCOSAMINE CHONDROITIN COMPLX OR 2 Daily       Omega-3 Fatty Acids (OMEGA 3 PO) Take by mouth 2 times daily.        MULTIVITAMIN OR 1 tablet daily       fluticasone (FLONASE) 50 MCG/ACT nasal spray Spray 2 sprays into both nostrils daily (Patient not taking: Reported on 4/14/2017) 16 g 5       Review of Systems     Constitutional: Negative for fever and unexpected weight change. Appetite normal   Head: no headache   Eye: no vision change/ loss of peripheral vision.   ENT: No throat congestion.   Respiratory: no cough   Cardiovascular: no chest pain  Gastrointestinal: Negative for vomiting, abdominal pain and diarrhea.  Genitourinary: No bladder problems.  Musculoskeletal: Negative for myalgias. No weakness.  Neurological: Negative for seizures and headaches.  Psychiatric/Behavioral: Negative for behavioral problem and dysphoric mood.    Past Medical History:   Diagnosis Date     Degenerative joint disease      Diabetes mellitus (H) 2006    type 2     HTN (hypertension)      Hyperlipidemia LDL goal < 100      Hypothyroidism 1960    s/p subtotal thyroidectomy      Rheumatic fever     x2 between the ages of 15-18     Seasonal allergies        Past Surgical History:   Procedure Laterality Date     C APPENDECTOMY       C ARTHROPLASTY TMJ       COLONOSCOPY       COLONOSCOPY N/A 8/13/2015    Procedure: COLONOSCOPY;  Surgeon: Duane, William  "MD Nahun;  Location: [facility] OR     COLONOSCOPY WITH CO2 INSUFFLATION N/A 8/13/2015    Procedure: COLONOSCOPY WITH CO2 INSUFFLATION;  Surgeon: Duane, William Charles, MD;  Location: [facility] OR     THYROIDECTOMY          Family History   Problem Relation Age of Onset     CANCER Father      skin and leukemia     CEREBROVASCULAR DISEASE Maternal Grandfather      CEREBROVASCULAR DISEASE Paternal Grandmother      Eye Disorder Paternal Grandmother      cataracts     CEREBROVASCULAR DISEASE Paternal Grandfather      HEART DISEASE Paternal Grandfather      CANCER Sister      Breast Cancer     Eye Disorder Mother      cataracts     Eye Disorder Brother      cataract     Breast Cancer Daughter        Social History     Social History     Marital Status: Single     Spouse Name: N/A     Number of Children: N/A     Years of Education: N/A     Social History Main Topics     Smoking status: Never Smoker      Smokeless tobacco: Never Used      Comment: has had exposure to second hand smoke in the past from [place], no current exposure     Alcohol Use: No     Drug Use: No     Sexual Activity: No     Other Topics Concern     Parent/Sibling W/ Cabg, Mi Or Angioplasty Before 65f 55m? 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     Hobby Hazards No     Sleep Concern Yes     difficulty staying asleep, up and down all night     Stress Concern No     Weight Concern Yes     would like to lose     Special Diet Yes     low card, low sugar diet     Back Care Yes     chiropratior     Exercise Yes     almost everyday     Bike Helmet No     does not ride bicycle any more     Seat Belt Yes     Self-Exams Yes     Social History Narrative       Objective:   /78  Pulse 81  Ht 1.619 m (5' 3.75\")  Wt 84.8 kg (187 lb)  BMI 32.35 kg/m2  Constitutional: Elderly lady in no acute distress "   EYES: anicteric, normal extra-ocular movements, no lid lag or retraction   HEENT: Mouth/Throat: Mucous membrane is moist. Oropharynx is clear. No adenopathy. Normal thyroid   Cardiovascular: RRR, S1, S2 normal. No m/g/r   Pulmonary/Chest: CTAB. No wheezing or rales   Abdominal: +BS. Nontender to palpation. No organomegaly present.  Neurological: Alert. Normal affect. CNII-XII intact. Muscle strength 5/5. Sensory is intact.  Extremities: No clubbing, cyanosis or inflammation   Skin: normal texture, color  Feet: bilateral neuropathy left worse than right  Lymphatic: no cervical lymphadenopathy.  Psychological: appropriate mood     In House Labs:   A1C     11.0   11/8/2016  A1C     10.4   7/26/2016  A1C     11.5   4/15/2016  A1C     11.2   2/23/2016  A1C      9.9   12/22/2015    TSH   Date Value Ref Range Status   02/09/2017 1.33 0.40 - 4.00 mU/L Final   09/27/2016 1.88 0.40 - 4.00 mU/L Final   07/26/2016 1.78 0.40 - 4.00 mU/L Final   11/26/2014 1.14 0.40 - 4.00 mU/L Final     Comment:     Effective 7/30/2014, the reference range for this assay has changed to reflect   new instrumentation/methodology.     08/01/2012 1.34 0.4 - 5.0 mU/L Final     T4 Free   Date Value Ref Range Status   02/09/2017 1.25 0.76 - 1.46 ng/dL Final       Creatinine   Date Value Ref Range Status   02/09/2017 0.67 0.52 - 1.04 mg/dL Final   ]    Recent Labs   Lab Test  07/26/16   0828  02/23/16   1115   08/21/15   0825   06/16/15   0941   CHOL  137  184   < >  174   --   224*   HDL  57  59   < >  49*   --   47*   LDL  48  88   < >  86   < >  120   TRIG  162*  187*   < >  197*   --   283*   CHOLHDLRATIO   --    --    --   3.6   --   4.8    < > = values in this interval not displayed.       No results found for: [id number], [id number], [id number]      Assessment/Treatment Plan:      Brandy Leon is a 76 year old year old female with    1. Type 2 Diabetes with uncontrolled hyperglycemia :   Neuropathy, nephropathy.    Her recent A1c was  10.2. A1c has improved compared to the last visit when it was 11.  We are slowly making progress especially because she is a  and hypoglycemia is a significant risk.   She denies having changes in diet but she may be underreporting her diet.      She has received high dose of glipizide and due to progress in diabetes she may no longer be producing insulin.  She has not done C-peptide levels. But she plans to do it today.    Further plans  If the creatinine is normal, we will increase Invokana to 300 mg daily  If C-peptide is normal, we will plan to use GLP1 agonist and taper the Lantus.    2.  Hypothyroidism S/P Thyroidectomy for Graves' disease.  Continue levothyroxine 137  g daily  Recheck TSH levels in about six months    3.  Hyperlipidemia  She was complaining about significant muscle pains in both arms and legs.  She reduced Crestor to every other day  We will test CPK, and seems LDL at goal, plan to reduce the dose further      Patient Instructions   Test creatinine today. If normal, we will increase Invokana to 300 mg daily     Lab test for C-peptide today.       Sending blood sugars to your provider at [place]:  We want to help you with your diabetes management, which often requires frequent adjustments to your therapy. For your convenience, we have several ways to send your blood sugars to your doctor for review.    - Send message directly to your doctor through My Chart.  Please ask the rooming staff if you would like to sign up for My Chart.  This is a fast and confidential way to send your information and communicate directly with your provider.   - Record readings and fax to [phone].  We have a template for you to use for your convenience.  - If you have a Medtronic pump, upload to [employer] and notify your provider of your username and password.   - Stop by the clinic with your meter for download.   - My Chart or call Jeannie Hathaway, Diabetes Educator at [phone]  - Call the  clinic and speak to one of the endocrine nurses to relay information on the telephone.  Earnestine Sánchez, or Pauly at [phone].   -    Please call the on-call Endocrinologist at the [facility] [address] for after       hours/weekend needs at [phone] Option #4.    Please note that you do not need to FAST if you are just having an A1C drawn. Please remember to ALWAYS bring your glucose meter with to your appointment. This data is very important for the management of your care.    Thank you!  Your [place] [facility] Care Team          I will contact the patient with the test results.  Return to clinic in 3 months.    Test and/or medications prescribed today:  Orders Placed This Encounter   Procedures     Hemoglobin A1c POCT     Basic metabolic panel     ALT     Hemoglobin     CK total     Vitamin B12       Candice Worley MD  [address]  Endocrinology Service              Again, thank you for allowing me to participate in the care of your patient.      Sincerely,    Candice Worley MD

## 2017-04-14 NOTE — MR AVS SNAPSHOT
After Visit Summary   4/14/2017    Brandy Leon    MRN: [id number]           Patient Information     Date Of Birth          1940        Visit Information        Provider Department      4/14/2017 10:00 AM Candice Worley MD; [facility] NURSE [facility]        Today's Diagnoses     Type 2 diabetes mellitus with hyperglycemia, with long-term current use of insulin (H)    -  1      Care Instructions    Test creatinine today. If normal, we will increase Invokana to 300 mg daily     Lab test for C-peptide today.       Sending blood sugars to your provider at [place]:  We want to help you with your diabetes management, which often requires frequent adjustments to your therapy. For your convenience, we have several ways to send your blood sugars to your doctor for review.    - Send message directly to your doctor through My Chart.  Please ask the rooming staff if you would like to sign up for My Chart.  This is a fast and confidential way to send your information and communicate directly with your provider.   - Record readings and fax to [phone].  We have a template for you to use for your convenience.  - If you have a Medtronic pump, upload to "[employer]" and notify your provider of your username and password.   - Stop by the clinic with your meter for download.   - My Chart or call Jeannie Hathaway, Diabetes Educator at [phone]  - Call the clinic and speak to one of the endocrine nurses to relay information on the telephone.  Earnestine Sánchez, or Pauly at [phone].   -    Please call the on-call Endocrinologist at the [place] for after       hours/weekend needs at [phone] Option #4.    Please note that you do not need to FAST if you are just having an A1C drawn. Please remember to ALWAYS bring your glucose meter with to your appointment. This data is very important for the management of your care.    Thank you!  Your [place] [facility]  Team              Follow-ups after your visit        Follow-up notes from your care team     Return in about 3 months (around 7/14/2017).      Your next 10 appointments already scheduled     Apr 25, 2017 10:00 AM CDT   Return Visit with Ehsan A[facility]a DPM   [facility]    [address] [address] [place] [id number] [phone]            Jul 21, 2017  2:50 PM CDT   LAB with Candice Worley MD, [facility] ENDO NURSE   [facility] ([facility])    [address] [place] [id number] [phone]           Patient must bring picture ID.  Patient should be prepared to give a urine specimen  Please do not eat 10-12 hours before your appointment if you are coming in fasting for labs on lipids, cholesterol, or glucose (sugar).  Pregnant women should follow their Care Team instructions. Water with medications is okay. Do not drink coffee or other fluids.   If you have concerns about taking  your medications, please ask at office or if scheduling via Directr, send a message by clicking on Secure Messaging, Message Your Care Team.              Future tests that were ordered for you today     Open Standing Orders        Priority Remaining Interval Expires Ordered    Hemoglobin A1c POCT Routine 3/4  4/14/2018 4/14/2017            Who to contact     If you have questions or need follow up information about today's clinic visit or your schedule please contact [facility] directly at [phone].  Normal or non-critical lab and imaging results will be communicated to you by [employer], letter or phone within 4 business days after the clinic has received the results. If you do not hear from us within 7 days, please contact the clinic through [employer] or phone. If you have a critical or abnormal lab result, we will notify you by phone as soon as possible.  Submit refill requests through Directr or call your  "pharmacy and they will forward the refill request to us. Please allow 3 business days for your refill to be completed.          Additional Information About Your Visit        [employer] Information     [employer] is an electronic gateway that provides easy, online access to your medical records. With [employer], you can request a clinic appointment, read your test results, renew a prescription or communicate with your care team.     To sign up for [employer] visit the website at [email or web address]   You will be asked to enter the access code listed below, as well as some personal information. Please follow the directions to create your username and password.     Your access code is: [email or web address]  Expires: 2017 10:26 AM     Your access code will  in 90 days. If you need help or a new code, please contact your [facility] or call [phone] for assistance.        Care EveryWhere ID     This is your Care EveryWhere ID. This could be used by other organizations to access your [facility] medical records  [id number]        Your Vitals Were     Pulse Height BMI (Body Mass Index)             81 1.619 m (5' 3.75\") 32.35 kg/m2          Blood Pressure from Last 3 Encounters:   17 118/78   17 144/87   17 124/72    Weight from Last 3 Encounters:   17 84.8 kg (187 lb)   17 86.3 kg (190 lb 4.8 oz)   16 87.6 kg (193 lb 2 oz)              We Performed the Following     ALT     Basic metabolic panel     C-peptide     CK total     Hemoglobin A1c POCT     Hemoglobin     Vitamin B12          Today's Medication Changes          These changes are accurate as of: 17 10:26 AM.  If you have any questions, ask your nurse or doctor.               These medicines have changed or have updated prescriptions.        Dose/Directions    aspirin 81 MG EC tablet   This may have changed:  how much to take   Used for:  Type 2 diabetes, HbA1c goal < 7% (H)        Dose:  81 " mg   Take 1 tablet by mouth daily.   Quantity:  90 tablet   Refills:  3       canagliflozin 300 MG tablet   Commonly known as:  INVOKANA   This may have changed:  how much to take   Used for:  Type 2 diabetes mellitus with hyperglycemia, with long-term current use of insulin (H)        Dose:  300 mg   Take 1 tablet (300 mg) by mouth every morning (before breakfast)   Quantity:  30 tablet   Refills:  3       insulin glargine 100 UNIT/ML injection   Commonly known as:  LANTUS SOLOSTAR   This may have changed:  how much to take   Used for:  Type 2 diabetes mellitus with hyperglycemia, with long-term current use of insulin (H)        Dose:  26 Units   Inject 26 Units Subcutaneous At Bedtime   Quantity:  45 mL   Refills:  3       irbesartan-hydrochlorothiazide 150-12.5 MG per tablet   Commonly known as:  AVALIDE   This may have changed:  how much to take   Used for:  Hypertension goal BP (blood pressure) < 140/90        Dose:  1 tablet   Take 1 tablet by mouth daily   Quantity:  90 tablet   Refills:  1                Primary Care Provider Office Phone # Fax #    Cailin MAGO Bernal [facility] [phone]       [facility] [address]  [place] [address]        Thank you!     Thank you for choosing [facility]  for your care. Our goal is always to provide you with excellent care. Hearing back from our patients is one way we can continue to improve our services. Please take a few minutes to complete the written survey that you may receive in the mail after your visit with us. Thank you!             Your Updated Medication List - Protect others around you: Learn how to safely use, store and throw away your medicines at www.disposemymeds.org.          This list is accurate as of: 4/14/17 10:26 AM.  Always use your most recent med list.                   Brand Name Dispense Instructions for use    aspirin 81 MG EC tablet     90 tablet    Take 1 tablet by mouth daily.        canagliflozin 300 MG tablet    INVOKANA    30 tablet    Take 1 tablet (300 mg) by mouth every morning (before breakfast)       famotidine 20 MG tablet    PEPCID     Take 20 mg by mouth nightly as needed       fluticasone 50 MCG/ACT spray    FLONASE    16 g    Spray 2 sprays into both nostrils daily       glipiZIDE 10 MG tablet    GLUCOTROL    120 tablet    TAKE 2 TABLETS BY MOUTH TWICE A DAY BEFORE MEALS       GLUCOSAMINE CHONDROITIN COMPLX PO      2 Daily       insulin glargine 100 UNIT/ML injection    LANTUS SOLOSTAR    45 mL    Inject 26 Units Subcutaneous At Bedtime       insulin pen needle 32G X 4 MM    BD JUAN C U/F    100 each    Use 1 daily as directed.       irbesartan-hydrochlorothiazide 150-12.5 MG per tablet    AVALIDE    90 tablet    Take 1 tablet by mouth daily       latanoprost 0.005 % ophthalmic solution    XALATAN    3 Bottle    Place 1 drop into both eyes At Bedtime       metFORMIN 1000 MG tablet    GLUCOPHAGE    180 tablet    TAKE 1 TABLET (1,000 MG) BY MOUTH 2 TIMES DAILY (WITH MEALS)       MULTIVITAMIN PO      1 tablet daily       OMEGA 3 PO      Take by mouth 2 times daily.       ONE TOUCH DELICA LANCETS Misc     100 each    1 each 2 times daily. One Touch Delica       ONE TOUCH ULTRA test strip   Generic drug:  blood glucose monitoring     200 strip    TEST TWICE DAILY       order for DME     1 each    Glucometer covered by insurance.       oxybutynin 5 MG 24 hr tablet    DITROPAN-XL    70 tablet    Take 1 tablet by mouth daily x 1 week, 2 tablets daily       rosuvastatin 20 MG tablet    CRESTOR    90 tablet    TAKE 1 TABLET (20 MG) BY MOUTH DAILY       SYNTHROID 137 MCG tablet   Generic drug:  levothyroxine     90 tablet    TAKE 1 TABLET BY MOUTH DAILY       triamcinolone 0.1 % cream    KENALOG    45 g    Apply sparingly to affected area two times daily.

## 2017-04-17 LAB — C PEPTIDE SERPL-MCNC: 2 NG/ML (ref 0.9–6.9)

## 2017-04-24 DIAGNOSIS — Z79.4 TYPE 2 DIABETES MELLITUS WITH HYPERGLYCEMIA, WITH LONG-TERM CURRENT USE OF INSULIN (H): ICD-10-CM

## 2017-04-24 DIAGNOSIS — E11.65 TYPE 2 DIABETES MELLITUS WITH HYPERGLYCEMIA, WITH LONG-TERM CURRENT USE OF INSULIN (H): ICD-10-CM

## 2017-04-24 DIAGNOSIS — R35.0 URINARY FREQUENCY: ICD-10-CM

## 2017-04-24 DIAGNOSIS — N32.81 OVERACTIVE BLADDER: ICD-10-CM

## 2017-04-25 ENCOUNTER — OFFICE VISIT (OUTPATIENT)
Dept: PODIATRY | Facility: CLINIC | Age: 77
End: 2017-04-25
Payer: COMMERCIAL

## 2017-04-25 VITALS — DIASTOLIC BLOOD PRESSURE: 66 MMHG | SYSTOLIC BLOOD PRESSURE: 128 MMHG

## 2017-04-25 DIAGNOSIS — E11.49 DIABETIC NEUROPATHY WITH NEUROLOGIC COMPLICATION (H): ICD-10-CM

## 2017-04-25 DIAGNOSIS — E11.40 DIABETIC NEUROPATHY WITH NEUROLOGIC COMPLICATION (H): ICD-10-CM

## 2017-04-25 DIAGNOSIS — B35.1 DERMATOPHYTOSIS OF NAIL: Primary | ICD-10-CM

## 2017-04-25 DIAGNOSIS — L60.2 ONYCHAUXIS: ICD-10-CM

## 2017-04-25 PROCEDURE — 99213 OFFICE O/P EST LOW 20 MIN: CPT | Performed by: PODIATRIST

## 2017-04-25 RX ORDER — OXYBUTYNIN CHLORIDE 5 MG/1
TABLET, EXTENDED RELEASE ORAL
Qty: 70 TABLET | Refills: 1 | Status: SHIPPED | OUTPATIENT
Start: 2017-04-25 | End: 2017-07-07

## 2017-04-25 ASSESSMENT — PAIN SCALES - GENERAL: PAINLEVEL: NO PAIN (0)

## 2017-04-25 NOTE — NURSING NOTE
"Brandy Leon's goals for this visit include: Toenail trim  She requests these members of her care team be copied on today's visit information: yes    PCP: Cailin Ponce    Referring Provider:  No referring provider defined for this encounter.    Chief Complaint   Patient presents with     RECHECK     Toenail trim       Initial /66 Estimated body mass index is 32.35 kg/(m^2) as calculated from the following:    Height as of 4/14/17: 1.619 m (5' 3.75\").    Weight as of 4/14/17: 84.8 kg (187 lb).  Medication Reconciliation: complete    "

## 2017-04-25 NOTE — PROGRESS NOTES
Past Medical History:   Diagnosis Date     Degenerative joint disease      Diabetes mellitus (H) 2006    type 2     HTN (hypertension)      Hyperlipidemia LDL goal < 100      Hypothyroidism 1960    s/p subtotal thyroidectomy      Rheumatic fever     x2 between the ages of 15-18     Seasonal allergies      Patient Active Problem List   Diagnosis     Seasonal allergies     Hypothyroidism     Hyperlipidemia LDL goal <100     Fibromyalgia     Osteoarthritis     Actinic keratosis     Advanced directives, counseling/discussion     Glaucoma suspect     Cataracts, bilateral     Obesity     Type 2 diabetes mellitus with hyperglycemia, with long-term current use of insulin (H)     Hypertension goal BP (blood pressure) < 140/90     Overactive bladder     Primary osteoarthritis of both hands     Recurrent UTI     Past Surgical History:   Procedure Laterality Date     C APPENDECTOMY       C ARTHROPLASTY TMJ       COLONOSCOPY       COLONOSCOPY N/A 8/13/2015    Procedure: COLONOSCOPY;  Surgeon: Duane, William Charles, MD;  Location: [facility]     COLONOSCOPY WITH CO2 INSUFFLATION N/A 8/13/2015    Procedure: COLONOSCOPY WITH CO2 INSUFFLATION;  Surgeon: Duane, William Charles, MD;  Location: [facility] OR     THYROIDECTOMY        Social History     Social History     Marital status: Single     Spouse name: N/A     Number of children: N/A     Years of education: N/A     Occupational History     Not on file.     Social History Main Topics     Smoking status: Never Smoker     Smokeless tobacco: Never Used      Comment: has had exposure to second hand smoke in the past from [employer], no current exposure     Alcohol use No     Drug use: No     Sexual activity: No     Other Topics Concern     Parent/Sibling W/ Cabg, Mi Or Angioplasty Before 65f 55m? 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     Hobby Hazards No     Sleep Concern Yes     difficulty staying asleep, up and down all night     Stress Concern No     Weight Concern Yes     would like to lose     Special Diet Yes     low card, low sugar diet     Back Care Yes     chiropratior     Exercise Yes     almost everyday     Bike Helmet No     does not ride bicycle any more     Seat Belt Yes     Self-Exams Yes     Social History Narrative     Family History   Problem Relation Age of Onset     CANCER Father      skin and leukemia     CEREBROVASCULAR DISEASE Maternal Grandfather      CEREBROVASCULAR DISEASE Paternal Grandmother      Eye Disorder Paternal Grandmother      cataracts     CEREBROVASCULAR DISEASE Paternal Grandfather      HEART DISEASE Paternal Grandfather      CANCER Sister      Breast Cancer     Eye Disorder Mother      cataracts     Eye Disorder Brother      cataract     Breast Cancer Daughter      Lab Results   Component Value Date    A1C 10.2 04/14/2017      Last Basic Metabolic Panel:  Lab Results   Component Value Date     04/14/2017      Lab Results   Component Value Date    POTASSIUM 4.3 04/14/2017     Lab Results   Component Value Date    CHLORIDE 102 04/14/2017     Lab Results   Component Value Date    FREDY 9.5 04/14/2017     Lab Results   Component Value Date    CO2 29 04/14/2017     Lab Results   Component Value Date    BUN 21 04/14/2017     Lab Results   Component Value Date    CR 0.76 04/14/2017     Lab Results   Component Value Date     04/14/2017     SUBJECTIVE FINDINGS:  A 76-year-old female presents for a diabetic foot check and onychomycosis and onychauxis.  She relates she is doing well, no ulcers or sores since we had seen her last.  She does have neuropathy that is unchanged.  She is using lotion daily on her feet.      OBJECTIVE FINDINGS:  DP and PT are 2/4 bilaterally.  She has incurvated nails 1 through 5 bilaterally.  She has thick, dystrophic nails with subungual debris and discoloration of the  hallux nails bilaterally.  There is no erythema, no drainage, no odor, no calor bilaterally.      ASSESSMENT AND PLAN:  Onychomycosis bilaterally.  Onychauxis bilaterally.  She is diabetic with peripheral neuropathy.  Diagnosis and treatment options discussed with her.  All the nails were debrided or reduced bilaterally upon consent.  Her dermatitis appears resolved.  She will return to clinic and see me in 3 months.

## 2017-04-25 NOTE — TELEPHONE ENCOUNTER
Oxybutynin     Last Written Prescription Date: 2/9/2017  Last Fill Quantity: 70  # refills: 1   Last Office Visit with [facility] prescribing provider: 2/9/2017         Medication filled according to the [facility] refill protocol.

## 2017-04-25 NOTE — PATIENT INSTRUCTIONS
Thanks for coming today.  [facility]  [address] [place] [address]    To schedule future appointments in Ortho Clinic, you may call [phone].    To schedule ordered imaging by your provider:   Call Central [facility] [phone]    To schedule an injection ordered by your provider:  Call Central [facility] Injection scheduling line: [phone]  [email or web address] available online at:  [email or web address]    Please call if any further questions or concerns ([phone]).  Clinic hours 8 am to 5 pm.    Return to clinic (call) if symptoms worsen or fail to improve.

## 2017-04-28 ENCOUNTER — TELEPHONE (OUTPATIENT)
Dept: FAMILY MEDICINE | Facility: CLINIC | Age: 77
End: 2017-04-28

## 2017-04-28 DIAGNOSIS — E88.9 NUTRITIONAL AND METABOLIC CARDIOMYOPATHY (H): ICD-10-CM

## 2017-04-28 DIAGNOSIS — I51.9 MYXEDEMA HEART DISEASE: ICD-10-CM

## 2017-04-28 DIAGNOSIS — E03.9 MYXEDEMA HEART DISEASE: ICD-10-CM

## 2017-04-28 DIAGNOSIS — I43 NUTRITIONAL AND METABOLIC CARDIOMYOPATHY (H): ICD-10-CM

## 2017-04-28 DIAGNOSIS — E63.9 NUTRITIONAL AND METABOLIC CARDIOMYOPATHY (H): ICD-10-CM

## 2017-05-01 RX ORDER — LEVOTHYROXINE SODIUM 137 UG/1
137 TABLET ORAL DAILY
Qty: 90 TABLET | Refills: 0 | Status: SHIPPED | OUTPATIENT
Start: 2017-05-01 | End: 2017-08-24

## 2017-05-18 ENCOUNTER — OFFICE VISIT (OUTPATIENT)
Dept: URGENT CARE | Facility: URGENT CARE | Age: 77
End: 2017-05-18
Payer: COMMERCIAL

## 2017-05-18 ENCOUNTER — TELEPHONE (OUTPATIENT)
Dept: PEDIATRICS | Facility: CLINIC | Age: 77
End: 2017-05-18

## 2017-05-18 VITALS
BODY MASS INDEX: 32.63 KG/M2 | WEIGHT: 188.6 LBS | SYSTOLIC BLOOD PRESSURE: 146 MMHG | HEART RATE: 75 BPM | OXYGEN SATURATION: 97 % | DIASTOLIC BLOOD PRESSURE: 76 MMHG

## 2017-05-18 DIAGNOSIS — J01.90 ACUTE SINUSITIS WITH SYMPTOMS > 10 DAYS: ICD-10-CM

## 2017-05-18 DIAGNOSIS — R30.0 DYSURIA: Primary | ICD-10-CM

## 2017-05-18 LAB
ALBUMIN UR-MCNC: NEGATIVE MG/DL
APPEARANCE UR: ABNORMAL
BACTERIA #/AREA URNS HPF: ABNORMAL /HPF
BILIRUB UR QL STRIP: NEGATIVE
COLOR UR AUTO: YELLOW
GLUCOSE UR STRIP-MCNC: >=1000 MG/DL
HGB UR QL STRIP: ABNORMAL
KETONES UR STRIP-MCNC: ABNORMAL MG/DL
LEUKOCYTE ESTERASE UR QL STRIP: ABNORMAL
NITRATE UR QL: NEGATIVE
NON-SQ EPI CELLS #/AREA URNS LPF: ABNORMAL /LPF
PH UR STRIP: 5.5 PH (ref 5–7)
RBC #/AREA URNS AUTO: ABNORMAL /HPF (ref 0–2)
SP GR UR STRIP: 1.02 (ref 1–1.03)
URN SPEC COLLECT METH UR: ABNORMAL
UROBILINOGEN UR STRIP-ACNC: 0.2 EU/DL (ref 0.2–1)
WBC #/AREA URNS AUTO: ABNORMAL /HPF (ref 0–2)

## 2017-05-18 PROCEDURE — 99213 OFFICE O/P EST LOW 20 MIN: CPT | Performed by: NURSE PRACTITIONER

## 2017-05-18 PROCEDURE — 81001 URINALYSIS AUTO W/SCOPE: CPT | Performed by: NURSE PRACTITIONER

## 2017-05-18 RX ORDER — AMOXICILLIN 500 MG/1
500 CAPSULE ORAL 3 TIMES DAILY
Qty: 21 CAPSULE | Refills: 0 | Status: SHIPPED | OUTPATIENT
Start: 2017-05-18 | End: 2017-05-25

## 2017-05-18 RX ORDER — NITROFURANTOIN 25; 75 MG/1; MG/1
100 CAPSULE ORAL 2 TIMES DAILY
Qty: 14 CAPSULE | Refills: 0 | Status: SHIPPED | OUTPATIENT
Start: 2017-05-18 | End: 2017-10-31

## 2017-05-18 NOTE — TELEPHONE ENCOUNTER
"[facility] Call Center    Phone Message    Name of Caller: Brandy    Phone Number: Home number on file [phone] (home) or Cell number on file:    Telephone Information:   Mobile NONE       Best time to return call: Any    May a detailed message be left on voicemail: yes    Relation to patient: Self    Reason for Call: Symptoms or Concerns     Does patient have any of the following \"red flag\" symptoms: None    Does patient have any \"Red Flag\" symptoms? No.     Current symptom or concern: Patient is calling regarding her symptoms and would like to be advised on what to do or if she should be seen today with Cailin. Patient states that she has had chest/sinus and coughing up yellow phlem. Patient also reports that her legs especially her right leg pain along with UTI-urgency when urinating and abdominal pressure. Please call to advise.    Symptoms have been present for:  1 week(s)  Action Taken: Message routed to:  Primary Care p [phone]    "

## 2017-05-18 NOTE — PATIENT INSTRUCTIONS
"  Dysuria  Dysuria is pain felt during urination. It is often described as a burning. Learn more about this problem and how it can be treated.     Painful urination (dysuria) is often caused by a problem in the urinary tract.   What causes dysuria?  Possible causes include:    Infection with a bacteria or virus. This can be a urinary tract infection (UTI). Or it may be a sexually transmitted infection (STI).    Sensitivity or allergy to chemicals. These chemicals are found in lotions and other products.    Prostate or bladder problems    Radiation therapy to the pelvic area  How is dysuria diagnosed?  Your health care provider will examine you. He or she will ask about your symptoms and health. After talking with you and doing a physical exam, your health care provider may know what is causing your dysuria. He or she will usually request  a sample of your urine. Tests of your urine, or a \"urinalysis,\" are done. A urinalysis may include:    Looking at the urine sample (visual exam)    Checking for substances (chemical exam)    Checking a small amount under a microscope (microscopic exam)  Some parts of the urinalysis may be done in the provider's office and some in a lab. And, the urine sample may be checked for bacteria and yeast (urine culture). Your health care provider will tell you more about these tests if they are needed.  How is dysuria treated?  Treatment depends on the cause. If you have a bacterial infection, you may need antibiotics. You may be given medications to make it easier for you to urinate and help relieve pain. Your health care provider can tell you more about your treatment options. Untreated, symptoms may get worse.  Call the health care provider right away if you have any of the following:    Fever of 100.4 F (38 C) or higher     No improvement after three days of treatment    Trouble urinating because of pain    New or increased discharge from the vagina or penis    Rash or joint pain    " Increased back or abdominal pain    Enlarged painful lymph nodes (lumps) in the groin     [id number] [employer]. [address] [place] [address], [place] [address]. All rights reserved. This information is not intended as a substitute for professional medical care. Always follow your healthcare professional's instructions.

## 2017-05-18 NOTE — PROGRESS NOTES
SUBJECTIVE:                                                    Brandy Leon is a 76 year old female who presents to clinic today for the following health issues:      RESPIRATORY SYMPTOMS      Duration: 1 month    Description  nasal congestion, rhinorrhea, cough, wheezing, chills, fatigue/malaise, hoarse voice, myalgias and nausea    Severity: moderate    Accompanying signs and symptoms: None    History (predisposing factors):  none    Precipitating or alleviating factors: None    Therapies tried and outcome:  rest and fluids OTC NSAID     Musculoskeletal problem/pain      Duration: 1 week    Description  Location:right leg    Intensity:  moderate    Accompanying signs and symptoms: none    History  Previous similar problem: no   Previous evaluation:  none    Precipitating or alleviating factors:  Trauma or overuse: no   Aggravating factors include: walking    Therapies tried and outcome: rest/inactivity and NSAID - ibuprofen     URINARY TRACT SYMPTOMS      Duration: 1 month    Description  frequency and urgency    Intensity:  moderate    Accompanying signs and symptoms:  Fever/chills: YES  Flank pain no   Nausea and vomiting: YES  Vaginal symptoms: itching  Abdominal/Pelvic Pain: YES    History  History of frequent UTI's: YES  History of kidney stones: no   Sexually Active: no   Possibility of pregnancy: No    Precipitating or alleviating factors: None    Therapies tried and outcome: ibuprofen   Outcome: none           Problem list and histories reviewed & adjusted, as indicated.  Additional history: as documented    Patient Active Problem List   Diagnosis     Seasonal allergies     Hypothyroidism     Hyperlipidemia LDL goal <100     Fibromyalgia     Osteoarthritis     Actinic keratosis     Advanced directives, counseling/discussion     Glaucoma suspect     Cataracts, bilateral     Obesity     Type 2 diabetes mellitus with hyperglycemia, with long-term current use of insulin (H)     Hypertension goal BP  (blood pressure) < 140/90     Overactive bladder     Primary osteoarthritis of both hands     Recurrent UTI     Past Surgical History:   Procedure Laterality Date     C APPENDECTOMY       C ARTHROPLASTY TMJ       COLONOSCOPY       COLONOSCOPY N/A 8/13/2015    Procedure: COLONOSCOPY;  Surgeon: Duane, William Charles, MD;  Location: [facility] OR     COLONOSCOPY WITH CO2 INSUFFLATION N/A 8/13/2015    Procedure: COLONOSCOPY WITH CO2 INSUFFLATION;  Surgeon: Duane, William Charles, MD;  Location: [facility] OR     THYROIDECTOMY          Social History   Substance Use Topics     Smoking status: Never Smoker     Smokeless tobacco: Never Used      Comment: has had exposure to second hand smoke in the past from [employer], no current exposure     Alcohol use No     Family History   Problem Relation Age of Onset     CANCER Father      skin and leukemia     CEREBROVASCULAR DISEASE Maternal Grandfather      CEREBROVASCULAR DISEASE Paternal Grandmother      Eye Disorder Paternal Grandmother      cataracts     CEREBROVASCULAR DISEASE Paternal Grandfather      HEART DISEASE Paternal Grandfather      CANCER Sister      Breast Cancer     Eye Disorder Mother      cataracts     Eye Disorder Brother      cataract     Breast Cancer Daughter          Current Outpatient Prescriptions   Medication Sig Dispense Refill     amoxicillin (AMOXIL) 500 MG capsule Take 1 capsule (500 mg) by mouth 3 times daily for 7 days 21 capsule 0     nitrofurantoin, macrocrystal-monohydrate, (MACROBID) 100 MG capsule Take 1 capsule (100 mg) by mouth 2 times daily 14 capsule 0     levothyroxine (SYNTHROID) 137 MCG tablet Take 1 tablet (137 mcg) by mouth daily 90 tablet 0     oxybutynin (DITROPAN-XL) 5 MG 24 hr tablet TAKE 1 TABLET BY MOUTH DAILY FOR 1 WEEK THEN TAKE 2 TABLETS DAILY 70 tablet 1     canagliflozin (INVOKANA) 300 MG tablet Take 1 tablet (300 mg) by mouth every morning (before breakfast) 30 tablet 3     metFORMIN (GLUCOPHAGE) 1000 MG tablet TAKE 1 TABLET (1,000 MG) BY  MOUTH 2 TIMES DAILY (WITH MEALS) 180 tablet 0     ONE TOUCH ULTRA test strip TEST TWICE DAILY 200 strip 11     glipiZIDE (GLUCOTROL) 10 MG tablet TAKE 2 TABLETS BY MOUTH TWICE A DAY BEFORE MEALS 120 tablet 2     triamcinolone (KENALOG) 0.1 % cream Apply sparingly to affected area two times daily. 45 g 1     rosuvastatin (CRESTOR) 20 MG tablet TAKE 1 TABLET (20 MG) BY MOUTH DAILY 90 tablet 1     insulin glargine (LANTUS SOLOSTAR) 100 UNIT/ML PEN Inject 26 Units Subcutaneous At Bedtime (Patient taking differently: Inject 22 Units Subcutaneous At Bedtime ) 45 mL 3     latanoprost (XALATAN) 0.005 % ophthalmic solution Place 1 drop into both eyes At Bedtime 3 Bottle 4     famotidine (PEPCID) 20 MG tablet Take 20 mg by mouth nightly as needed       insulin pen needle (BD JUAN C U/F) 32G X 4 MM Use 1 daily as directed. 100 each PRN     irbesartan-hydrochlorothiazide (AVALIDE) 150-12.5 MG per tablet Take 1 tablet by mouth daily (Patient taking differently: Take 0.5 tablets by mouth daily ) 90 tablet 1     order for DME Glucometer covered by insurance. 1 each 0     aspirin 81 MG EC tablet Take 1 tablet by mouth daily. (Patient taking differently: Take 650 mg by mouth daily ) 90 tablet 3     ONE TOUCH DELICA LANCETS MISC 1 each 2 times daily. One Touch Delica   100 each 12     GLUCOSAMINE CHONDROITIN COMPLX OR 2 Daily       Omega-3 Fatty Acids (OMEGA 3 PO) Take by mouth 2 times daily.        MULTIVITAMIN OR 1 tablet daily       fluticasone (FLONASE) 50 MCG/ACT nasal spray Spray 2 sprays into both nostrils daily (Patient not taking: Reported on 4/14/2017) 16 g 5     Allergies   Allergen Reactions     Estradiol Itching     Lipitor [Atorvastatin Calcium]      Micah and robert     Sulfa Drugs      Rash       Zocor [Simvastatin]      Weak and shakey       Reviewed and updated as needed this visit by clinical staff  Tobacco  Allergies  Meds       Reviewed and updated as needed this visit by Provider         ROS:  Constitutional,  HEENT, cardiovascular, pulmonary, gi and  systems are negative, except as otherwise noted.    OBJECTIVE:                                                    /76 (BP Location: Right arm, Patient Position: Chair, Cuff Size: Adult Large)  Pulse 75  Wt 188 lb 9.6 oz (85.5 kg)  SpO2 97%  BMI 32.63 kg/m2  Body mass index is 32.63 kg/(m^2).  GENERAL: healthy, alert and no distress  NECK: no adenopathy, no asymmetry, masses, or scars and thyroid normal to palpation  Nose: Nasal turbinates with erythema and purulent discharge  RESP: lungs clear to auscultation - no rales, rhonchi or wheezes  CV: regular rate and rhythm, normal S1 S2, no S3 or S4, no murmur, click or rub, no peripheral edema and peripheral pulses strong  ABDOMEN: soft, nontender, no hepatosplenomegaly, no masses and bowel sounds normal  MS: tenderness of right hip joint on walking. Pulses and sensation intact. ROM is intact    Diagnostic Test Results:  Urinalysis -   Results for orders placed or performed in visit on 05/18/17 (from the past 24 hour(s))   *UA reflex to Microscopic and Culture ([place] and [facility] (except [place] and [place])   Result Value Ref Range    Color Urine Yellow     Appearance Urine Slightly Cloudy     Glucose Urine >=1000 (A) NEG mg/dL    Bilirubin Urine Negative NEG    Ketones Urine Trace (A) NEG mg/dL    Specific Gravity Urine 1.020 1.003 - 1.035    Blood Urine Trace (A) NEG    pH Urine 5.5 5.0 - 7.0 pH    Protein Albumin Urine Negative NEG mg/dL    Urobilinogen Urine 0.2 0.2 - 1.0 EU/dL    Nitrite Urine Negative NEG    Leukocyte Esterase Urine Small (A) NEG    Source Midstream Urine    Urine Microscopic   Result Value Ref Range    WBC Urine  (A) 0 - 2 /HPF    RBC Urine O - 2 0 - 2 /HPF    Squamous Epithelial /LPF Urine Few FEW /LPF    Bacteria Urine Many (A) NEG /HPF          ASSESSMENT/PLAN:                                                        ICD-10-CM    1. Dysuria R30.0 *UA reflex to Microscopic and  Culture ([place] and [facility] (except [place] and [place])     Urine Microscopic     nitrofurantoin, macrocrystal-monohydrate, (MACROBID) 100 MG capsule   2. Acute sinusitis with symptoms > 10 days J01.90 amoxicillin (AMOXIL) 500 MG capsule       I discussed lab results with the patient.  Patient is advised to rest from strainous activities, elevate foot/hip while sitting , ice and tylenol.  Antibiotics as prescribed.  Take more fluids for hydration  Patient educational/instructional material provided including reasons for follow-up.    The patient indicates understanding of these issues and agrees with the plan.    Alesha Diaz NP  [facility]

## 2017-05-18 NOTE — TELEPHONE ENCOUNTER
Patient states she is having pain in her right groin, feels pressure, having urgency with urination, possible UTI.  She states she is also having chest congestion and sinus congestion and couging up yellow phlegm.  Informed patient she needs to be seen.  Offered appt with Dr. Pérez tomorrow but she works until 10am.  She states she will go to [facility] after work today.    Reny Cedeno RN,   [facility]

## 2017-05-18 NOTE — NURSING NOTE
"Chief Complaint   Patient presents with     URI     UTI     Musculoskeletal Problem     right leg       Initial /76 (BP Location: Right arm, Patient Position: Chair, Cuff Size: Adult Large)  Pulse 75  Wt 188 lb 9.6 oz (85.5 kg)  SpO2 97%  BMI 32.63 kg/m2 Estimated body mass index is 32.63 kg/(m^2) as calculated from the following:    Height as of 4/14/17: 5' 3.75\" (1.619 m).    Weight as of this encounter: 188 lb 9.6 oz (85.5 kg).  Medication Reconciliation: complete     Ju Beltran MA    "

## 2017-05-18 NOTE — MR AVS SNAPSHOT
"              After Visit Summary   5/18/2017    Brandy Leon    MRN: [id number]           Patient Information     Date Of Birth          1940        Visit Information        Provider Department      5/18/2017 5:25 PM Alesha Diaz NP [facility]        Today's Diagnoses     Dysuria    -  1    Acute sinusitis with symptoms > 10 days          Care Instructions      Dysuria  Dysuria is pain felt during urination. It is often described as a burning. Learn more about this problem and how it can be treated.     Painful urination (dysuria) is often caused by a problem in the urinary tract.   What causes dysuria?  Possible causes include:    Infection with a bacteria or virus. This can be a urinary tract infection (UTI). Or it may be a sexually transmitted infection (STI).    Sensitivity or allergy to chemicals. These chemicals are found in lotions and other products.    Prostate or bladder problems    Radiation therapy to the pelvic area  How is dysuria diagnosed?  Your health care provider will examine you. He or she will ask about your symptoms and health. After talking with you and doing a physical exam, your health care provider may know what is causing your dysuria. He or she will usually request  a sample of your urine. Tests of your urine, or a \"urinalysis,\" are done. A urinalysis may include:    Looking at the urine sample (visual exam)    Checking for substances (chemical exam)    Checking a small amount under a microscope (microscopic exam)  Some parts of the urinalysis may be done in the provider's office and some in a lab. And, the urine sample may be checked for bacteria and yeast (urine culture). Your health care provider will tell you more about these tests if they are needed.  How is dysuria treated?  Treatment depends on the cause. If you have a bacterial infection, you may need antibiotics. You may be given medications to make it easier for you to urinate and help relieve " pain. Your health care provider can tell you more about your treatment options. Untreated, symptoms may get worse.  Call the health care provider right away if you have any of the following:    Fever of 100.4 F (38 C) or higher     No improvement after three days of treatment    Trouble urinating because of pain    New or increased discharge from the vagina or penis    Rash or joint pain    Increased back or abdominal pain    Enlarged painful lymph nodes (lumps) in the groin     0493-8698 [employer]. [address] [place] [address]. All rights reserved. This information is not intended as a substitute for professional medical care. Always follow your healthcare professional's instructions.              Follow-ups after your visit        Your next 10 appointments already scheduled     Jul 21, 2017  2:50 PM CDT   LAB with Candice Worley MD, MG ENDO NURSE   [facility]    [address] [place] [address] [phone]           Patient must bring picture ID.  Patient should be prepared to give a urine specimen  Please do not eat 10-12 hours before your appointment if you are coming in fasting for labs on lipids, cholesterol, or glucose (sugar).  Pregnant women should follow their Care Team instructions. Water with medications is okay. Do not drink coffee or other fluids.   If you have concerns about taking  your medications, please ask at office or if scheduling via Fastmobile[employer], send a message by clicking on Secure Messaging, Message Your Care Team.            Jul 25, 2017  9:00 AM CDT   Return Visit with Ehsan Araya DPM   [facility]    [address] [place] [address] [phone]              Who to contact     If you have questions or need follow up information about today's clinic visit or your schedule please [facility]  "directly at [phone].  Normal or non-critical lab and imaging results will be communicated to you by [facility], letter or phone within 4 business days after the clinic has received the results. If you do not hear from us within 7 days, please contact the clinic through [facility] or phone. If you have a critical or abnormal lab result, we will notify you by phone as soon as possible.  Submit refill requests through [employer] or call your pharmacy and they will forward the refill request to us. Please allow 3 business days for your refill to be completed.          Additional Information About Your Visit        [facility] Information     Studyplaces lets you send messages to your doctor, view your test results, renew your prescriptions, schedule appointments and more. To sign up, go to [email or web address] . Click on \"Log in\" on the left side of the screen, which will take you to the Welcome page. Then click on \"Sign up Now\" on the right side of the page.     You will be asked to enter the access code listed below, as well as some personal information. Please follow the directions to create your username and password.     Your access code is: [email or web address]  Expires: 2017 10:26 AM     Your access code will  in 90 days. If you need help or a new code, please call your [facility] clinic or [phone].        Care EveryWhere ID     This is your Care EveryWhere ID. This could be used by other organizations to access your [facility] medical records  [id number]        Your Vitals Were     Pulse Pulse Oximetry BMI (Body Mass Index)             75 97% 32.63 kg/m2          Blood Pressure from Last 3 Encounters:   17 146/76   17 128/66   17 118/78    Weight from Last 3 Encounters:   17 188 lb 9.6 oz (85.5 kg)   17 187 lb (84.8 kg)   17 190 lb 4.8 oz (86.3 kg)              We Performed the Following     *UA reflex to Microscopic and Culture ([facility] and [facility] (except [facility] and " [place])     Urine Microscopic          Today's Medication Changes          These changes are accurate as of: 5/18/17  6:14 PM.  If you have any questions, ask your nurse or doctor.               Start taking these medicines.        Dose/Directions    amoxicillin 500 MG capsule   Commonly known as:  AMOXIL   Used for:  Acute sinusitis with symptoms > 10 days   Started by:  Alesha Diaz NP        Dose:  500 mg   Take 1 capsule (500 mg) by mouth 3 times daily for 7 days   Quantity:  21 capsule   Refills:  0       nitrofurantoin (macrocrystal-monohydrate) 100 MG capsule   Commonly known as:  MACROBID   Used for:  Dysuria   Started by:  Alesha Diaz NP        Dose:  100 mg   Take 1 capsule (100 mg) by mouth 2 times daily   Quantity:  14 capsule   Refills:  0         These medicines have changed or have updated prescriptions.        Dose/Directions    aspirin 81 MG EC tablet   This may have changed:  how much to take   Used for:  Type 2 diabetes, HbA1c goal < 7% (H)        Dose:  81 mg   Take 1 tablet by mouth daily.   Quantity:  90 tablet   Refills:  3       insulin glargine 100 UNIT/ML injection   Commonly known as:  LANTUS SOLOSTAR   This may have changed:  how much to take   Used for:  Type 2 diabetes mellitus with hyperglycemia, with long-term current use of insulin (H)        Dose:  26 Units   Inject 26 Units Subcutaneous At Bedtime   Quantity:  45 mL   Refills:  3       irbesartan-hydrochlorothiazide 150-12.5 MG per tablet   Commonly known as:  AVALIDE   This may have changed:  how much to take   Used for:  Hypertension goal BP (blood pressure) < 140/90        Dose:  1 tablet   Take 1 tablet by mouth daily   Quantity:  90 tablet   Refills:  1            Where to get your medicines      These medications were sent to [facility] [place] - [place], [place] - [address]  [address], [place] [address]     Phone:  [phone]     amoxicillin 500 MG capsule    nitrofurantoin  (macrocrystal-monohydrate) 100 MG capsule                Primary Care Provider Office Phone # Fax #    Cailin Ponce, [facility] [phone]       [facility] [address]  [place] [address]        Thank you!     Thank you for choosing [facility]  for your care. Our goal is always to provide you with excellent care. Hearing back from our patients is one way we can continue to improve our services. Please take a few minutes to complete the written survey that you may receive in the mail after your visit with us. Thank you!             Your Updated Medication List - Protect others around you: Learn how to safely use, store and throw away your medicines at www.disposemymeds.org.          This list is accurate as of: 5/18/17  6:14 PM.  Always use your most recent med list.                   Brand Name Dispense Instructions for use    amoxicillin 500 MG capsule    AMOXIL    21 capsule    Take 1 capsule (500 mg) by mouth 3 times daily for 7 days       aspirin 81 MG EC tablet     90 tablet    Take 1 tablet by mouth daily.       canagliflozin 300 MG tablet    INVOKANA    30 tablet    Take 1 tablet (300 mg) by mouth every morning (before breakfast)       famotidine 20 MG tablet    PEPCID     Take 20 mg by mouth nightly as needed       fluticasone 50 MCG/ACT spray    FLONASE    16 g    Spray 2 sprays into both nostrils daily       glipiZIDE 10 MG tablet    GLUCOTROL    120 tablet    TAKE 2 TABLETS BY MOUTH TWICE A DAY BEFORE MEALS       GLUCOSAMINE CHONDROITIN COMPLX PO      2 Daily       insulin glargine 100 UNIT/ML injection    LANTUS SOLOSTAR    45 mL    Inject 26 Units Subcutaneous At Bedtime       insulin pen needle 32G X 4 MM    BD JAUN C U/F    100 each    Use 1 daily as directed.       irbesartan-hydrochlorothiazide 150-12.5 MG per tablet    AVALIDE    90 tablet    Take 1 tablet by mouth daily       latanoprost 0.005 % ophthalmic solution    XALATAN    3  Bottle    Place 1 drop into both eyes At Bedtime       levothyroxine 137 MCG tablet    SYNTHROID    90 tablet    Take 1 tablet (137 mcg) by mouth daily       metFORMIN 1000 MG tablet    GLUCOPHAGE    180 tablet    TAKE 1 TABLET (1,000 MG) BY MOUTH 2 TIMES DAILY (WITH MEALS)       MULTIVITAMIN PO      1 tablet daily       nitrofurantoin (macrocrystal-monohydrate) 100 MG capsule    MACROBID    14 capsule    Take 1 capsule (100 mg) by mouth 2 times daily       OMEGA 3 PO      Take by mouth 2 times daily.       ONE TOUCH DELICA LANCETS Misc     100 each    1 each 2 times daily. One Touch Delica       ONE TOUCH ULTRA test strip   Generic drug:  blood glucose monitoring     200 strip    TEST TWICE DAILY       order for DME     1 each    Glucometer covered by insurance.       oxybutynin 5 MG 24 hr tablet    DITROPAN-XL    70 tablet    TAKE 1 TABLET BY MOUTH DAILY FOR 1 WEEK THEN TAKE 2 TABLETS DAILY       rosuvastatin 20 MG tablet    CRESTOR    90 tablet    TAKE 1 TABLET (20 MG) BY MOUTH DAILY       triamcinolone 0.1 % cream    KENALOG    45 g    Apply sparingly to affected area two times daily.

## 2017-05-23 DIAGNOSIS — E11.65 TYPE 2 DIABETES MELLITUS WITH HYPERGLYCEMIA, WITH LONG-TERM CURRENT USE OF INSULIN (H): ICD-10-CM

## 2017-05-23 DIAGNOSIS — Z79.4 TYPE 2 DIABETES MELLITUS WITH HYPERGLYCEMIA, WITH LONG-TERM CURRENT USE OF INSULIN (H): ICD-10-CM

## 2017-05-24 RX ORDER — GLIPIZIDE 10 MG/1
TABLET ORAL
Qty: 120 TABLET | Refills: 2 | Status: SHIPPED | OUTPATIENT
Start: 2017-05-24 | End: 2017-08-14

## 2017-05-24 NOTE — TELEPHONE ENCOUNTER
glipiZIDE (GLUCOTROL) 10 MG tablet     Last Written Prescription Date: 2/24/2017  Last Fill Quantity: 120, # refills: 2  Last Office Visit with [facility], [facility] or [facility] prescribing provider:  5/18/2017   Next 5 appointments (look out 90 days)     Jul 25, 2017  9:00 AM CDT   Return Visit with Ehsan Araya DPM   [facility]    [address] [facility] [address] [phone]                   BP Readings from Last 3 Encounters:   05/18/17 146/76   04/25/17 128/66   04/14/17 118/78     Lab Results   Component Value Date    MICROL 14 02/09/2017     Lab Results   Component Value Date    UMALCR 28.07 02/09/2017     Creatinine   Date Value Ref Range Status   04/14/2017 0.76 0.52 - 1.04 mg/dL Final   ]  GFR Estimate   Date Value Ref Range Status   04/14/2017 74 >60 mL/min/1.7m2 Final     Comment:     Non  GFR Calc   02/09/2017 86 >60 mL/min/1.7m2 Final     Comment:     Non  GFR Calc   11/08/2016 >90  Non  GFR Calc   >60 mL/min/1.7m2 Final     GFR Estimate If Black   Date Value Ref Range Status   04/14/2017 90 >60 mL/min/1.7m2 Final     Comment:      GFR Calc   02/09/2017 >90   GFR Calc   >60 mL/min/1.7m2 Final   11/08/2016 >90   GFR Calc   >60 mL/min/1.7m2 Final     Lab Results   Component Value Date    CHOL 142 02/09/2017     Lab Results   Component Value Date    HDL 59 02/09/2017     Lab Results   Component Value Date    LDL 52 02/09/2017     Lab Results   Component Value Date    TRIG 157 02/09/2017     Lab Results   Component Value Date    CHOLHDLRATIO 3.6 08/21/2015     Lab Results   Component Value Date    AST 16 02/09/2017     Lab Results   Component Value Date    ALT 21 04/14/2017     Lab Results   Component Value Date    A1C 10.2 04/14/2017    A1C 10.7 02/09/2017    A1C 11.0 11/08/2016    A1C 10.4 07/26/2016    A1C 11.5 04/15/2016     Potassium   Date Value Ref  Range Status   04/14/2017 4.3 3.4 - 5.3 mmol/L Final     Refilled per P protocol.    Sharla Young RN

## 2017-06-23 DIAGNOSIS — E11.65 TYPE 2 DIABETES MELLITUS WITH HYPERGLYCEMIA (H): ICD-10-CM

## 2017-06-23 RX ORDER — PEN NEEDLE, DIABETIC 32GX 5/32"
NEEDLE, DISPOSABLE MISCELLANEOUS
Qty: 100 EACH | Refills: 3 | Status: SHIPPED | OUTPATIENT
Start: 2017-06-23 | End: 2018-07-23

## 2017-06-23 NOTE — TELEPHONE ENCOUNTER
This previously came from endocrinology clinic, spoke to Mai Kinsey, she said to route directly to her.    Mirna Melendrez RN, [facility]

## 2017-07-07 DIAGNOSIS — R35.0 URINARY FREQUENCY: ICD-10-CM

## 2017-07-07 DIAGNOSIS — N32.81 OVERACTIVE BLADDER: ICD-10-CM

## 2017-07-07 NOTE — TELEPHONE ENCOUNTER
oxybutynin (DITROPAN-XL) 5 MG 24 hr tablet      Last Written Prescription Date: 04/25/17  Last Fill Quantity: 70,  # refills: 1   Last Office Visit with FMDEANN, [facility] or [facility] prescribing provider: 02/09/17.                                           Next 5 appointments (look out 90 days)     Jul 25, 2017  9:00 AM CDT   Return Visit with Ehsan Araya DPM   [facility] ([facility]    [address] [place] [address] [phone]

## 2017-07-10 RX ORDER — OXYBUTYNIN CHLORIDE 5 MG/1
TABLET, EXTENDED RELEASE ORAL
Qty: 70 TABLET | Refills: 2 | Status: SHIPPED | OUTPATIENT
Start: 2017-07-10 | End: 2017-10-23

## 2017-07-13 DIAGNOSIS — Z79.4 TYPE 2 DIABETES MELLITUS WITH HYPERGLYCEMIA, WITH LONG-TERM CURRENT USE OF INSULIN (H): ICD-10-CM

## 2017-07-13 DIAGNOSIS — E11.65 TYPE 2 DIABETES MELLITUS WITH HYPERGLYCEMIA, WITH LONG-TERM CURRENT USE OF INSULIN (H): ICD-10-CM

## 2017-07-14 NOTE — TELEPHONE ENCOUNTER
metFORMIN (GLUCOPHAGE) 1000 MG tablet     Last Written Prescription Date: 4/14/2017  Last Fill Quantity: 180, # refills: 0  Last Office Visit with [facility], [facility] or [facility] prescribing provider:  2/9/2017   Next 5 appointments (look out 90 days)     Jul 25, 2017  9:00 AM CDT   Return Visit with Ehsan Araya DPM   [facility]    [address] [place] [address] [phone]                   BP Readings from Last 3 Encounters:   05/18/17 146/76   04/25/17 128/66   04/14/17 118/78     Lab Results   Component Value Date    MICROL 14 02/09/2017     Lab Results   Component Value Date    UMALCR 28.07 02/09/2017     Creatinine   Date Value Ref Range Status   04/14/2017 0.76 0.52 - 1.04 mg/dL Final   ]  GFR Estimate   Date Value Ref Range Status   04/14/2017 74 >60 mL/min/1.7m2 Final     Comment:     Non  GFR Calc   02/09/2017 86 >60 mL/min/1.7m2 Final     Comment:     Non  GFR Calc   11/08/2016 >90  Non  GFR Calc   >60 mL/min/1.7m2 Final     GFR Estimate If Black   Date Value Ref Range Status   04/14/2017 90 >60 mL/min/1.7m2 Final     Comment:      GFR Calc   02/09/2017 >90   GFR Calc   >60 mL/min/1.7m2 Final   11/08/2016 >90   GFR Calc   >60 mL/min/1.7m2 Final     Lab Results   Component Value Date    CHOL 142 02/09/2017     Lab Results   Component Value Date    HDL 59 02/09/2017     Lab Results   Component Value Date    LDL 52 02/09/2017     Lab Results   Component Value Date    TRIG 157 02/09/2017     Lab Results   Component Value Date    CHOLHDLRATIO 3.6 08/21/2015     Lab Results   Component Value Date    AST 16 02/09/2017     Lab Results   Component Value Date    ALT 21 04/14/2017     Lab Results   Component Value Date    A1C 10.2 04/14/2017    A1C 10.7 02/09/2017    A1C 11.0 11/08/2016    A1C 10.4 07/26/2016    A1C 11.5 04/15/2016     Potassium   Date Value  Ref Range Status   04/14/2017 4.3 3.4 - 5.3 mmol/L Final     Patient due for an A1C. Called patient and left detailed message that a 30 day rosita refill was provided for the metformin and to call the main line at [phone] to schedule an A1C lab appointment.     Sharla Young RN

## 2017-07-17 DIAGNOSIS — E78.5 HYPERLIPIDEMIA LDL GOAL <100: ICD-10-CM

## 2017-07-17 RX ORDER — ROSUVASTATIN CALCIUM 20 MG/1
TABLET, COATED ORAL
Qty: 90 TABLET | Refills: 1 | Status: SHIPPED | OUTPATIENT
Start: 2017-07-17 | End: 2017-10-23

## 2017-07-17 NOTE — TELEPHONE ENCOUNTER
[facility]st[facility]     Last Written Prescription Date: 1/20/17  Last Fill Quantity: 90, # refills: 1  Last Office Visit with [facility], [facility] or [facility] prescribing provider: 2/9/17  Next 5 appointments (look out 90 days)     Jul 20, 2017 11:00 AM CDT   Return Visit with MAGO Baker CNP   [facility]    [address] [place] [address]   [phone]            Jul 25, 2017  9:00 AM CDT   Return Visit with Ehsan Araya DPM   [facility] ([facility]    [address] [place] [address] [phone]                   Lab Results   Component Value Date    CHOL 142 02/09/2017     Lab Results   Component Value Date    HDL 59 02/09/2017     Lab Results   Component Value Date    LDL 52 02/09/2017     Lab Results   Component Value Date    TRIG 157 02/09/2017     Lab Results   Component Value Date    CHOLHDLRATIO 3.6 08/21/2015     Medication filled for 6 months per protocol.      Reny Cedeno RN, [facility]

## 2017-07-20 ENCOUNTER — OFFICE VISIT (OUTPATIENT)
Dept: PEDIATRICS | Facility: CLINIC | Age: 77
End: 2017-07-20
Payer: COMMERCIAL

## 2017-07-20 ENCOUNTER — RADIANT APPOINTMENT (OUTPATIENT)
Dept: GENERAL RADIOLOGY | Facility: CLINIC | Age: 77
End: 2017-07-20
Attending: NURSE PRACTITIONER
Payer: COMMERCIAL

## 2017-07-20 VITALS
OXYGEN SATURATION: 94 % | DIASTOLIC BLOOD PRESSURE: 60 MMHG | HEART RATE: 96 BPM | SYSTOLIC BLOOD PRESSURE: 102 MMHG | TEMPERATURE: 97.9 F | BODY MASS INDEX: 31.75 KG/M2 | WEIGHT: 183.5 LBS

## 2017-07-20 DIAGNOSIS — M19.012 OSTEOARTHRITIS OF LEFT SHOULDER, UNSPECIFIED OSTEOARTHRITIS TYPE: Primary | ICD-10-CM

## 2017-07-20 DIAGNOSIS — N39.0 URINARY TRACT INFECTION, SITE UNSPECIFIED: Primary | ICD-10-CM

## 2017-07-20 DIAGNOSIS — R82.90 NONSPECIFIC FINDING ON EXAMINATION OF URINE: ICD-10-CM

## 2017-07-20 DIAGNOSIS — M62.830 SPASM OF BACK MUSCLES: ICD-10-CM

## 2017-07-20 DIAGNOSIS — E11.65 TYPE 2 DIABETES MELLITUS WITH HYPERGLYCEMIA, WITH LONG-TERM CURRENT USE OF INSULIN (H): ICD-10-CM

## 2017-07-20 DIAGNOSIS — M25.512 LEFT SHOULDER PAIN, UNSPECIFIED CHRONICITY: ICD-10-CM

## 2017-07-20 DIAGNOSIS — Z79.4 TYPE 2 DIABETES MELLITUS WITH HYPERGLYCEMIA, WITH LONG-TERM CURRENT USE OF INSULIN (H): ICD-10-CM

## 2017-07-20 DIAGNOSIS — R35.0 URINARY FREQUENCY: ICD-10-CM

## 2017-07-20 LAB
ALBUMIN UR-MCNC: NEGATIVE MG/DL
APPEARANCE UR: CLEAR
BACTERIA #/AREA URNS HPF: ABNORMAL /HPF
BILIRUB UR QL STRIP: NEGATIVE
COLOR UR AUTO: ABNORMAL
GLUCOSE UR STRIP-MCNC: >1000 MG/DL
HBA1C MFR BLD: 9.3 % (ref 4.3–6)
HGB UR QL STRIP: NEGATIVE
KETONES UR STRIP-MCNC: 10 MG/DL
LEUKOCYTE ESTERASE UR QL STRIP: ABNORMAL
NITRATE UR QL: POSITIVE
NON-SQ EPI CELLS #/AREA URNS LPF: ABNORMAL /LPF
PH UR STRIP: 6 PH (ref 5–7)
RBC #/AREA URNS AUTO: ABNORMAL /HPF (ref 0–2)
SP GR UR STRIP: 1.02 (ref 1–1.03)
URN SPEC COLLECT METH UR: ABNORMAL
UROBILINOGEN UR STRIP-MCNC: NORMAL MG/DL (ref 0–2)
WBC #/AREA URNS AUTO: ABNORMAL /HPF (ref 0–2)

## 2017-07-20 PROCEDURE — 81001 URINALYSIS AUTO W/SCOPE: CPT | Performed by: NURSE PRACTITIONER

## 2017-07-20 PROCEDURE — 99214 OFFICE O/P EST MOD 30 MIN: CPT | Performed by: NURSE PRACTITIONER

## 2017-07-20 PROCEDURE — 73030 X-RAY EXAM OF SHOULDER: CPT | Mod: LT | Performed by: RADIOLOGY

## 2017-07-20 PROCEDURE — 87086 URINE CULTURE/COLONY COUNT: CPT | Performed by: NURSE PRACTITIONER

## 2017-07-20 PROCEDURE — 36415 COLL VENOUS BLD VENIPUNCTURE: CPT | Performed by: FAMILY MEDICINE

## 2017-07-20 PROCEDURE — 87088 URINE BACTERIA CULTURE: CPT | Performed by: NURSE PRACTITIONER

## 2017-07-20 PROCEDURE — 87186 SC STD MICRODIL/AGAR DIL: CPT | Performed by: NURSE PRACTITIONER

## 2017-07-20 PROCEDURE — 83036 HEMOGLOBIN GLYCOSYLATED A1C: CPT | Mod: 59 | Performed by: FAMILY MEDICINE

## 2017-07-20 PROCEDURE — 83036 HEMOGLOBIN GLYCOSYLATED A1C: CPT | Performed by: FAMILY MEDICINE

## 2017-07-20 RX ORDER — CEPHALEXIN 500 MG/1
500 CAPSULE ORAL 2 TIMES DAILY
Qty: 14 CAPSULE | Refills: 0 | Status: SHIPPED | OUTPATIENT
Start: 2017-07-20 | End: 2017-07-27

## 2017-07-20 NOTE — MR AVS SNAPSHOT
After Visit Summary   7/20/2017    Brandy Leon    MRN: [id number]           Patient Information     Date Of Birth          1940        Visit Information        Provider Department      7/20/2017 11:00 AM Cailin Ponce APRN [facility]        Today's Diagnoses     Urinary tract infection, site unspecified    -  1    Left shoulder pain, unspecified chronicity        Spasm of upper back muscles        Urinary frequency          Care Instructions    PLAN:   1.   Symptomatic therapy suggested: OTC ibuprofen and call prn if symptoms persist or worsen.  apply heat to affected area, prescription for muscle relaxant    2.  Orders Placed This Encounter   Medications     cephALEXin (KEFLEX) 500 MG capsule     Sig: Take 1 capsule (500 mg) by mouth 2 times daily for 7 days     Dispense:  14 capsule     Refill:  0     tiZANidine (ZANAFLEX) 4 MG tablet     Sig: Take 1 tablet (4 mg) by mouth 3 times daily as needed for muscle spasms     Dispense:  30 tablet     Refill:  1     Orders Placed This Encounter   Procedures     XR Shoulder Left 2 Views     UA with Microscopic reflex to Culture ([facility])       3. Patient needs to follow up in if no improvement,or sooner if worsening of symptoms or other symptoms develop.  FURTHER TESTING:       - xray shoulder   CONSULTATION/REFERRAL to chiropractor   Will follow up and/or notify patient on results via phone or mail to determine further need for followup      It was a pleasure seeing you today at the [facility]. Thank you for allowing us to care for you today. We truly hope we provided you with the excellent service you deserve. Please let us know if there is anything else we can do for you so we can be sure [facility].       General information about your clinic   Clinic Hours Lab Hours (Appointments are required)   Mon-Thurs:  7:30 AM - 7 PM Mon-Thurs: 7:30 AM - 7 PM   Fri: 7:30 AM - 5 PM Fri: 7:30 AM - 5 PM        After Hours Nurse Advise & Appts:  [facility]: [phone]  [facility] On Call: to make appointments anytime: [phone] On Call Physician: call [phone] and answering service will page the on call physician.        For urgent appointments, please call [phone] and ask for the triage nurse or your care team clinic nurse.  How to contact my care team:  MyChart: [email or web address]   Phone: [phone]   Fax: [phone]       [facility]:   Phone: [phone]  Hours: 8:00 AM - 6:00 PM  Medication Refills:  Call your pharmacy and they will forward the refill to us. Please allow 3 business days for your refills to be completed.       Normal or non-critical lab and imaging results will be communicated to you by MyChart, letter or phone within 7 days.  If you do not hear from us within 10 days, please call the clinic. If you have a critical or abnormal lab result, we will notify you by phone as soon as possible.       We now have PWIC (Pediatric Walk in Care)  Monday-Friday from 7:30-4. Simply walk in and be seen for your urgent needs like cough, fever, rash, diarrhea or vomiting, pink eye, UTI. No appointments needed. Ask one of the team for more information      -Your Care Team:    Dr. Tara Keller - Internal Medicine/Pediatrics   Dr. Kirit Pérez - Family Medicine  Dr. Deborah Andres - Pediatrics  Cailin Ponce CNP - Family Practice Nurse Practitioner  Dr. Adelia Drew - Pediatrics  Dr. Marco Antonio Salazar - Internal Medicine                      Follow-ups after your visit        Additional Services     CHIROPRACTIC REFERRAL       Your provider has referred you to: [facility]     Please be aware that coverage of these services is subject to the terms and limitations of your health insurance plan.  Call member services at your health plan with any benefit or  coverage questions.      Please bring the following to your appointment:    >>   Any x-rays, CTs or MRIs which have been performed.  Contact the facility where they were done to arrange for  prior to your scheduled appointment.    >>   List of current medications   >>   This referral request   >>   Any documents/labs given to you for this referral                  Your next 10 appointments already scheduled     Jul 21, 2017  2:50 PM CDT   LAB with Candice Worley MD, MG ENDO NURSE   [facility]    [address] [place] [id number] [phone]           Patient must bring picture ID.  Patient should be prepared to give a urine specimen  Please do not eat 10-12 hours before your appointment if you are coming in fasting for labs on lipids, cholesterol, or glucose (sugar).  Pregnant women should follow their Care Team instructions. Water with medications is okay. Do not drink coffee or other fluids.   If you have concerns about taking  your medications, please ask at office or if scheduling via [employer], send a message by clicking on Secure Messaging, Message Your Care Team.            Jul 25, 2017  9:00 AM CDT   Return Visit with Ehsan Araya DPM   [facility]    [address] [place] [id number] [phone]              Future tests that were ordered for you today     Open Future Orders        Priority Expected Expires Ordered    XR Shoulder Left 2 Views Routine 7/20/2017 7/20/2018 7/20/2017            Who to contact     If you have questions or need follow up information about today's clinic visit or your schedule please contact [facility] directly at [phone].  Normal or non-critical lab and imaging results will be communicated to you by MyChart, letter or phone within 4 business days after the clinic has received the results. If you do not hear  from us within 7 days, please contact the clinic through [employer] or phone. If you have a critical or abnormal lab result, we will notify you by phone as soon as possible.  Submit refill requests through [employer] or call your pharmacy and they will forward the refill request to us. Please allow 3 business days for your refill to be completed.          Additional Information About Your Visit        [employer] Information     [employer] is an electronic gateway that provides easy, online access to your medical records. With [employer], you can request a clinic appointment, read your test results, renew a prescription or communicate with your care team.     To sign up for [employer] visit the website at [email or web address]   You will be asked to enter the access code listed below, as well as some personal information. Please follow the directions to create your username and password.     Your access code is: [email or web address]  Expires: 10/18/2017 11:38 AM     Your access code will  in 90 days. If you need help or a new code, please contact your [facility] or call [phone] for assistance.        Care EveryWhere ID     This is your Care EveryWhere ID. This could be used by other organizations to access your [place] medical records  [id number]        Your Vitals Were     Pulse Temperature Pulse Oximetry Breastfeeding? BMI (Body Mass Index)       96 97.9  F (36.6  C) (Temporal) 94% No 31.75 kg/m2        Blood Pressure from Last 3 Encounters:   17 102/60   17 146/76   17 128/66    Weight from Last 3 Encounters:   17 183 lb 8 oz (83.2 kg)   17 188 lb 9.6 oz (85.5 kg)   17 187 lb (84.8 kg)              We Performed the Following     CHIROPRACTIC REFERRAL     UA with Microscopic reflex to Culture (Agnes Schaefer; [facility])          Today's Medication Changes          These changes are accurate as of: 17 11:38 AM.  If you have any questions, ask your  nurse or doctor.               Start taking these medicines.        Dose/Directions    cephALEXin 500 MG capsule   Commonly known as:  KEFLEX   Used for:  Urinary tract infection, site unspecified   Started by:  Cailin Ponce APRN CNP        Dose:  500 mg   Take 1 capsule (500 mg) by mouth 2 times daily for 7 days   Quantity:  14 capsule   Refills:  0       tiZANidine 4 MG tablet   Commonly known as:  ZANAFLEX   Used for:  Spasm of back muscles   Started by:  Cailin Ponce APRN CNP        Dose:  4 mg   Take 1 tablet (4 mg) by mouth 3 times daily as needed for muscle spasms   Quantity:  30 tablet   Refills:  1         These medicines have changed or have updated prescriptions.        Dose/Directions    aspirin 81 MG EC tablet   This may have changed:  how much to take   Used for:  Type 2 diabetes, HbA1c goal < 7% (H)        Dose:  81 mg   Take 1 tablet by mouth daily.   Quantity:  90 tablet   Refills:  3       insulin glargine 100 UNIT/ML injection   Commonly known as:  LANTUS SOLOSTAR   This may have changed:  how much to take   Used for:  Type 2 diabetes mellitus with hyperglycemia, with long-term current use of insulin (H)        Dose:  26 Units   Inject 26 Units Subcutaneous At Bedtime   Quantity:  45 mL   Refills:  3       irbesartan-hydrochlorothiazide 150-12.5 MG per tablet   Commonly known as:  AVALIDE   This may have changed:  how much to take   Used for:  Hypertension goal BP (blood pressure) < 140/90        Dose:  1 tablet   Take 1 tablet by mouth daily   Quantity:  90 tablet   Refills:  1            Where to get your medicines      These medications were sent to [facility]/pharmacy #[id number] [address] [address]     Phone:  [phone]     cephALEXin 500 MG capsule    tiZANidine 4 MG tablet                Primary Care Provider Office Phone # Fax #    MAGO Baker [phone]        [facility] [address]  [place] [address]        Equal Access to Services     NUSRAT KHAN : Hadii aad ku hadjavio Sojurgenali, waaxda luqadaha, qaybta kaalmada ademarkda, mitch claudioin hayaava celayapema aguillon jolly alaniz. So [facility] [phone].    ATENCIÓN: Si habla español, tiene a garcía disposición servicios gratuitos de asistencia lingüística. Llame al [phone].    We comply with applicable federal civil rights laws and [place] laws. We do not discriminate on the basis of race, color, national origin, age, disability sex, sexual orientation or gender identity.            Thank you!     Thank you for choosing [facility]  for your care. Our goal is always to provide you with excellent care. Hearing back from our patients is one way we can continue to improve our services. Please take a few minutes to complete the written survey that you may receive in the mail after your visit with us. Thank you!             Your Updated Medication List - Protect others around you: Learn how to safely use, store and throw away your medicines at www.disposemymeds.org.          This list is accurate as of: 7/20/17 11:38 AM.  Always use your most recent med list.                   Brand Name Dispense Instructions for use Diagnosis    aspirin 81 MG EC tablet     90 tablet    Take 1 tablet by mouth daily.    Type 2 diabetes, HbA1c goal < 7% (H)       BD JUAN C U/F 32G X 4 MM   Generic drug:  insulin pen needle     100 each    USE 1 DAILY AS DIRECTED.    Type 2 diabetes mellitus with hyperglycemia (H)       canagliflozin 300 MG tablet    INVOKANA    30 tablet    Take 1 tablet (300 mg) by mouth every morning (before breakfast)    Type 2 diabetes mellitus with hyperglycemia, with long-term current use of insulin (H)       cephALEXin 500 MG capsule    KEFLEX    14 capsule    Take 1 capsule (500 mg) by mouth 2 times daily for 7 days    Urinary tract infection, site unspecified       famotidine 20 MG tablet     PEPCID     Take 20 mg by mouth nightly as needed        fluticasone 50 MCG/ACT spray    FLONASE    16 g    Spray 2 sprays into both nostrils daily    Postnasal drip       glipiZIDE 10 MG tablet    GLUCOTROL    120 tablet    TAKE 2 TABLETS BY MOUTH TWICE A DAY BEFORE MEALS    Type 2 diabetes mellitus with hyperglycemia, with long-term current use of insulin (H)       GLUCOSAMINE CHONDROITIN COMPLX PO      2 Daily        insulin glargine 100 UNIT/ML injection    LANTUS SOLOSTAR    45 mL    Inject 26 Units Subcutaneous At Bedtime    Type 2 diabetes mellitus with hyperglycemia, with long-term current use of insulin (H)       irbesartan-hydrochlorothiazide 150-12.5 MG per tablet    AVALIDE    90 tablet    Take 1 tablet by mouth daily    Hypertension goal BP (blood pressure) < 140/90       latanoprost 0.005 % ophthalmic solution    XALATAN    3 Bottle    Place 1 drop into both eyes At Bedtime    Cataracts, bilateral, Primary open angle glaucoma of both eyes, moderate stage       levothyroxine 137 MCG tablet    SYNTHROID    90 tablet    Take 1 tablet (137 mcg) by mouth daily    Myxedema heart disease (H), Nutritional and metabolic cardiomyopathy (H)       metFORMIN 1000 MG tablet    GLUCOPHAGE    60 tablet    TAKE 1 TABLET (1,000 MG) BY MOUTH 2 TIMES DAILY (WITH MEALS)    Type 2 diabetes mellitus with hyperglycemia, with long-term current use of insulin (H)       MULTIVITAMIN PO      1 tablet daily        nitroFURantoin (macrocrystal-monohydrate) 100 MG capsule    MACROBID    14 capsule    Take 1 capsule (100 mg) by mouth 2 times daily    Dysuria       OMEGA 3 PO      Take by mouth 2 times daily.        ONE TOUCH DELICA LANCETS Misc     100 each    1 each 2 times daily. One Touch Delica    Type 2 diabetes, HbA1c goal < 7% (H)       ONE TOUCH ULTRA test strip   Generic drug:  blood glucose monitoring     200 strip    TEST TWICE DAILY    Type 2 diabetes mellitus with hyperglycemia, with long-term current use of insulin (H)        order for DME     1 each    Glucometer covered by insurance.    Type 2 diabetes, HbA1c goal < 7% (H)       oxybutynin 5 MG 24 hr tablet    DITROPAN-XL    70 tablet    TAKE 1 TABLET BY MOUTH DAILY FOR 1 WEEK THEN TAKE 2 TABLETS DAILY    Urinary frequency, Overactive bladder       rosuvastatin 20 MG tablet    CRESTOR    90 tablet    TAKE 1 TABLET (20 MG) BY MOUTH DAILY    Hyperlipidemia LDL goal <100       tiZANidine 4 MG tablet    ZANAFLEX    30 tablet    Take 1 tablet (4 mg) by mouth 3 times daily as needed for muscle spasms    Spasm of back muscles       triamcinolone 0.1 % cream    KENALOG    45 g    Apply sparingly to affected area two times daily.    Dermatitis

## 2017-07-20 NOTE — PROGRESS NOTES
SUBJECTIVE:                                                    Brandy Leon is a 76 year old female who presents to clinic today for the following health issues:    Joint Pain    Onset: 3-4 weeks    Description:   Location: left arm  Character: Sharp and Dull ache    Intensity: moderate, severe    Progression of Symptoms: worse    Accompanying Signs & Symptoms:  Other symptoms: radiation of pain to shoulder, numbness and tingling, ROM is limited     History:   Previous similar pain: no       Precipitating factors:   Trauma or overuse: YES- patient spent 2 weeks traveling     Alleviating factors:  Improved by: NSAID - ibuprofen     Therapies Tried and outcome: ibuprofen, [place] cream, hot water,   Started about 3 weeks ago while on vacation   No injury   Has been using ibuprofen     URINARY TRACT SYMPTOMS  Onset: 2-3 weeks    Description:   Painful urination (Dysuria): no   Blood in urine (Hematuria): no   Delay in urine (Hesitency): no     Intensity: moderate, severe    Progression of Symptoms:  waxing and waning    Accompanying Signs & Symptoms:  Fever/chills: YES- chills  Flank pain no   Nausea and vomiting: YES  Any vaginal symptoms: none  Abdominal/Pelvic Pain: no     History:   History of frequent UTI's: YES  History of kidney stones: no   Sexually Active: no   Possibility of pregnancy: No    Precipitating factors:   none    Therapies Tried and outcome: Cranberry juice prn (contraindicated in Coumadin patients) and Increase fluid intake    Problem list and histories reviewed & adjusted, as indicated.  Additional history: as documented    Patient Active Problem List   Diagnosis     Seasonal allergies     Hypothyroidism     Hyperlipidemia LDL goal <100     Fibromyalgia     Osteoarthritis     Actinic keratosis     Advanced directives, counseling/discussion     Glaucoma suspect     Cataracts, bilateral     Obesity     Type 2 diabetes mellitus with hyperglycemia, with long-term current use of insulin  (H)     Hypertension goal BP (blood pressure) < 140/90     Overactive bladder     Primary osteoarthritis of both hands     Recurrent UTI     Past Surgical History:   Procedure Laterality Date     C APPENDECTOMY       C ARTHROPLASTY TMJ       COLONOSCOPY       COLONOSCOPY N/A 8/13/2015    Procedure: COLONOSCOPY;  Surgeon: Duane, William Charles, MD;  Location: [facility]     COLONOSCOPY WITH CO2 INSUFFLATION N/A 8/13/2015    Procedure: COLONOSCOPY WITH CO2 INSUFFLATION;  Surgeon: Duane, William Charles, MD;  Location: [facility]     THYROIDECTOMY          Social History   Substance Use Topics     Smoking status: Never Smoker     Smokeless tobacco: Never Used      Comment: has had exposure to second hand smoke in the past from [place], no current exposure     Alcohol use No     Family History   Problem Relation Age of Onset     CANCER Father      skin and leukemia     CEREBROVASCULAR DISEASE Maternal Grandfather      CEREBROVASCULAR DISEASE Paternal Grandmother      Eye Disorder Paternal Grandmother      cataracts     CEREBROVASCULAR DISEASE Paternal Grandfather      HEART DISEASE Paternal Grandfather      CANCER Sister      Breast Cancer     Eye Disorder Mother      cataracts     Eye Disorder Brother      cataract     Breast Cancer Daughter          Current Outpatient Prescriptions   Medication Sig Dispense Refill     rosuvastatin (CRESTOR) 20 MG tablet TAKE 1 TABLET (20 MG) BY MOUTH DAILY 90 tablet 1     metFORMIN (GLUCOPHAGE) 1000 MG tablet TAKE 1 TABLET (1,000 MG) BY MOUTH 2 TIMES DAILY (WITH MEALS) 60 tablet 0     oxybutynin (DITROPAN-XL) 5 MG 24 hr tablet TAKE 1 TABLET BY MOUTH DAILY FOR 1 WEEK THEN TAKE 2 TABLETS DAILY 70 tablet 2     BD JUAN C U/F 32G X 4 MM insulin pen needle USE 1 DAILY AS DIRECTED. 100 each 3     glipiZIDE (GLUCOTROL) 10 MG tablet TAKE 2 TABLETS BY MOUTH TWICE A DAY BEFORE MEALS 120 tablet 2     nitrofurantoin, macrocrystal-monohydrate, (MACROBID) 100 MG capsule Take 1 capsule (100 mg) by mouth 2 times  daily 14 capsule 0     levothyroxine (SYNTHROID) 137 MCG tablet Take 1 tablet (137 mcg) by mouth daily 90 tablet 0     canagliflozin (INVOKANA) 300 MG tablet Take 1 tablet (300 mg) by mouth every morning (before breakfast) 30 tablet 3     ONE TOUCH ULTRA test strip TEST TWICE DAILY 200 strip 11     triamcinolone (KENALOG) 0.1 % cream Apply sparingly to affected area two times daily. 45 g 1     insulin glargine (LANTUS SOLOSTAR) 100 UNIT/ML PEN Inject 26 Units Subcutaneous At Bedtime (Patient taking differently: Inject 22 Units Subcutaneous At Bedtime ) 45 mL 3     latanoprost (XALATAN) 0.005 % ophthalmic solution Place 1 drop into both eyes At Bedtime 3 Bottle 4     fluticasone (FLONASE) 50 MCG/ACT nasal spray Spray 2 sprays into both nostrils daily 16 g 5     famotidine (PEPCID) 20 MG tablet Take 20 mg by mouth nightly as needed       irbesartan-hydrochlorothiazide (AVALIDE) 150-12.5 MG per tablet Take 1 tablet by mouth daily (Patient taking differently: Take 0.5 tablets by mouth daily ) 90 tablet 1     order for DME Glucometer covered by insurance. 1 each 0     aspirin 81 MG EC tablet Take 1 tablet by mouth daily. (Patient taking differently: Take 650 mg by mouth daily ) 90 tablet 3     ONE TOUCH DELICA LANCETS MISC 1 each 2 times daily. One Touch Delica   100 each 12     GLUCOSAMINE CHONDROITIN COMPLX OR 2 Daily       Omega-3 Fatty Acids (OMEGA 3 PO) Take by mouth 2 times daily.        MULTIVITAMIN OR 1 tablet daily       Allergies   Allergen Reactions     Estradiol Itching     Lipitor [Atorvastatin Calcium]      Micah and robert     Sulfa Drugs      Rash       Zocor [Simvastatin]      Micah and juan carlos     Recent Labs   Lab Test  04/14/17   1028 04/14/17 02/09/17   1200  11/08/16   1009  09/27/16   1036  07/26/16   0828   02/23/16   1115   A1C   --   10.2  10.7*  11.0*   --   10.4*   < >  11.2*   LDL   --    --   52   --    --   48   --   88   HDL   --    --   59   --    --   57   --   59   TRIG   --    --   157*    --    --   162*   --   187*   ALT  21   --   21  31   --    --    --    --    CR  0.76   --   0.67  0.62   --    --    < >  0.71   GFRESTIMATED  74   --   86  >90  Non  GFR Calc     --    --    < >  80   GFRESTBLACK  90   --   >90   GFR Calc    >90   GFR Calc     --    --    < >  >90   GFR Calc     POTASSIUM  4.3   --   4.2  4.1   --    --    < >  4.0   TSH   --    --   1.33   --   1.88  1.78   --    --     < > = values in this interval not displayed.      BP Readings from Last 3 Encounters:   07/20/17 102/60   05/18/17 146/76   04/25/17 128/66    Wt Readings from Last 3 Encounters:   07/20/17 183 lb 8 oz (83.2 kg)   05/18/17 188 lb 9.6 oz (85.5 kg)   04/14/17 187 lb (84.8 kg)           Reviewed and updated as needed this visit by clinical staffTobacco  Allergies  Meds  Med Hx  Surg Hx  Fam Hx  Soc Hx      Reviewed and updated as needed this visit by Provider          ROS:  CONSTITUTIONAL:NEGATIVE for fever, chills, change in weight  INTEGUMENTARY/SKIN: NEGATIVE for rash   RESP:NEGATIVE for significant cough or SOB  CV: NEGATIVE for chest pain, palpitations or peripheral edema  : dysuria and urinary tract infection  MUSCULOSKELETAL: POSITIVE  for joint pain left shoulder  and NEGATIVE for joint swelling  and joint warmth   NEURO: NEGATIVE for weakness, dizziness or paresthesias  ENDOCRINE: NEGATIVE for temperature intolerance, skin/hair changes and POSITIVE  for HX diabetes    OBJECTIVE:     /60 (BP Location: Right arm, Patient Position: Sitting, Cuff Size: Adult Regular)  Pulse 96  Temp 97.9  F (36.6  C) (Temporal)  Wt 183 lb 8 oz (83.2 kg)  SpO2 94%  Breastfeeding? No  BMI 31.75 kg/m2  Body mass index is 31.75 kg/(m^2).  GENERAL APPEARANCE: alert, active and no distress  NECK: no adenopathy  RESP: lungs clear to auscultation - no rales, rhonchi or wheezes  CV: regular rates and rhythm  ABDOMEN: soft, non-tender  MS: extremities  normal- no gross deformities noted  Inspection: no swelling, bruising, discoloration, or obvious deformity or asymmetry  Tender: AC joint  Non-tender: SC joint, proximal-mid clavicle, proximal bicep tendon and greater tuberosity  Range of Motion  Active:limited due to pain.  Passive: limited due to pain.  Strength: decreased due to pain     Inspection: normal cervical lordosis  Tender:  left paracervical muscles, right paracervical muscles  Non-tender:  spinous processes  Range of Motion:  Full ROM of cervical spine  Strength: Full strength of all neck muscles    SKIN: no suspicious lesions or rashes  NEURO: Normal strength and tone, mentation intact and speech normal  PSYCH: mentation appears normal and affect normal/bright    Diagnostic Test Results:  Results for orders placed or performed in visit on 05/18/17   *UA reflex to Microscopic and Culture ([facility] (except [place] and [place])   Result Value Ref Range    Color Urine Yellow     Appearance Urine Slightly Cloudy     Glucose Urine >=1000 (A) NEG mg/dL    Bilirubin Urine Negative NEG    Ketones Urine Trace (A) NEG mg/dL    Specific Gravity Urine 1.020 1.003 - 1.035    Blood Urine Trace (A) NEG    pH Urine 5.5 5.0 - 7.0 pH    Protein Albumin Urine Negative NEG mg/dL    Urobilinogen Urine 0.2 0.2 - 1.0 EU/dL    Nitrite Urine Negative NEG    Leukocyte Esterase Urine Small (A) NEG    Source Midstream Urine    Urine Microscopic   Result Value Ref Range    WBC Urine  (A) 0 - 2 /HPF    RBC Urine O - 2 0 - 2 /HPF    Squamous Epithelial /LPF Urine Few FEW /LPF    Bacteria Urine Many (A) NEG /HPF       ASSESSMENT/PLAN:     Brandy was seen today for musculoskeletal problem.    Diagnoses and all orders for this visit:    Urinary tract infection, site unspecified  -     cephALEXin (KEFLEX) 500 MG capsule; Take 1 capsule (500 mg) by mouth 2 times daily for 7 days    Left shoulder pain, unspecified chronicity  -     XR Shoulder Left 2 Views;  Future    Spasm of upper back muscles  -     tiZANidine (ZANAFLEX) 4 MG tablet; Take 1 tablet (4 mg) by mouth 3 times daily as needed for muscle spasms  -     CHIROPRACTIC REFERRAL    Urinary frequency  -     UA with Microscopic reflex to Culture ([facility])    Nonspecific finding on examination of urine  -     Urine Culture Aerobic Bacterial    PLAN:   1.   Symptomatic therapy suggested: OTC ibuprofen and call prn if symptoms persist or worsen.  apply heat to affected area, prescription for muscle relaxant    2.  Orders Placed This Encounter   Medications     cephALEXin (KEFLEX) 500 MG capsule     Sig: Take 1 capsule (500 mg) by mouth 2 times daily for 7 days     Dispense:  14 capsule     Refill:  0     tiZANidine (ZANAFLEX) 4 MG tablet     Sig: Take 1 tablet (4 mg) by mouth 3 times daily as needed for muscle spasms     Dispense:  30 tablet     Refill:  1     Orders Placed This Encounter   Procedures     XR Shoulder Left 2 Views     UA with Microscopic reflex to Culture ([facility])       3. Patient needs to follow up in if no improvement,or sooner if worsening of symptoms or other symptoms develop.  FURTHER TESTING:       - xray shoulder   CONSULTATION/REFERRAL to chiropractor   Will follow up and/or notify patient on results via phone or mail to determine further need for followup      See Patient Instructions    MAGO Baker CNP  [facility]

## 2017-07-20 NOTE — PATIENT INSTRUCTIONS
PLAN:   1.   Symptomatic therapy suggested: OTC ibuprofen and call prn if symptoms persist or worsen.  apply heat to affected area, prescription for muscle relaxant    2.  Orders Placed This Encounter   Medications     cephALEXin (KEFLEX) 500 MG capsule     Sig: Take 1 capsule (500 mg) by mouth 2 times daily for 7 days     Dispense:  14 capsule     Refill:  0     tiZANidine (ZANAFLEX) 4 MG tablet     Sig: Take 1 tablet (4 mg) by mouth 3 times daily as needed for muscle spasms     Dispense:  30 tablet     Refill:  1     Orders Placed This Encounter   Procedures     XR Shoulder Left 2 Views     UA with Microscopic reflex to Culture ([place]; [facility])       3. Patient needs to follow up in if no improvement,or sooner if worsening of symptoms or other symptoms develop.  FURTHER TESTING:       - xray shoulder   CONSULTATION/REFERRAL to chiropractor   Will follow up and/or notify patient on results via phone or mail to determine further need for followup      It was a pleasure seeing you today at the [facility]. Thank you for allowing us to care for you today. We truly hope we provided you with the excellent service you deserve. Please let us know if there is anything else we can do for you so we can be sure you are leaving completley satisfied with your care experience.       General information about your clinic   Clinic Hours Lab Hours (Appointments are required)   Mon-Thurs: 7:30 AM - 7 PM Mon-Thurs: 7:30 AM - 7 PM   Fri: 7:30 AM - 5 PM Fri: 7:30 AM - 5 PM        After Hours Nurse Advise & Appts:  [facility]: [phone]  [facility] On Call: to make appointments anytime: [phone] On Call Physician: call [phone] and answering service will page the on call physician.        For urgent appointments, please call [phone] and ask for the triage nurse or your care team clinic nurse.  How to contact my care team:  MyChart: [email or web address]   Phone:  [phone]   Fax: [phone]       [facility]:   Phone: [phone]  Hours: 8:00 AM - 6:00 PM  Medication Refills:  Call your pharmacy and they will forward the refill to us. Please allow 3 business days for your refills to be completed.       Normal or non-critical lab and imaging results will be communicated to you by MyChart, letter or phone within 7 days.  If you do not hear from us within 10 days, please call the clinic. If you have a critical or abnormal lab result, we will notify you by phone as soon as possible.       We now have PWIC (Pediatric Walk in Care)  Monday-Friday from 7:30-4. Simply walk in and be seen for your urgent needs like cough, fever, rash, diarrhea or vomiting, pink eye, UTI. No appointments needed. Ask one of the team for more information      -Your Care Team:    Dr. Tara Keller - Internal Medicine/Pediatrics   Dr. Kirit Pérez - Family Medicine  Dr. Deborah Andres - Pediatrics  Cailin Ponce CNP - Family Practice Nurse Practitioner  Dr. Adelia Drew - Pediatrics  Dr. Marco Antonio Salazar - Internal Medicine

## 2017-07-20 NOTE — NURSING NOTE
"Chief Complaint   Patient presents with     Musculoskeletal Problem     left arm pain x 3-4 weeks, no known injury       Initial /60 (BP Location: Right arm, Patient Position: Sitting, Cuff Size: Adult Regular)  Pulse 96  Temp 97.9  F (36.6  C) (Temporal)  Wt 183 lb 8 oz (83.2 kg)  SpO2 94%  Breastfeeding? No  BMI 31.75 kg/m2 Estimated body mass index is 31.75 kg/(m^2) as calculated from the following:    Height as of 4/14/17: 5' 3.75\" (1.619 m).    Weight as of this encounter: 183 lb 8 oz (83.2 kg).  Medication Reconciliation: complete      KATINA Tucker      "

## 2017-07-21 ENCOUNTER — TELEPHONE (OUTPATIENT)
Dept: PHARMACY | Facility: CLINIC | Age: 77
End: 2017-07-21

## 2017-07-21 NOTE — TELEPHONE ENCOUNTER
Okay to leave detailed message per chart.     Called patient and LM with Atiya's response to XR results. Advised patient to call the clinic at [phone] to schedule an appointment with orthopedics if she has not been contacted by them in the next couple business days.     XR Shoulder Left 2 Views   Status:  Final result   Visible to patient:  No (Not Released) Dx:  Left shoulder pain, unspecified chron... Order: [id number]       Notes Recorded by Cailin Ponce APRN CNP on 7/20/2017 at 11:07 PM  Please let patient know indeed had arthritis in that should and am going to refer her to orthopedics   Please call [phone] to make appointment  if you do not hear from referrals in the next few days.          Details        Reading Physician Reading Date Result Priority       Ellermann, Jutta M, MD 7/20/2017            Narrative             EXAMINATION: XR SHOULDER 2 VIEW LEFT  7/20/2017 11:59 AM     CLINICAL HISTORY:  Pain in left shoulder     COMPARISON: None available    FINDINGS: AP and lateral views of the left shoulder were obtained.  There is mild joint space narrowing involving the glenohumeral joint  with early osteophyte formation. Mild to moderate degenerative changes  of the acromioclavicular joint are seen. Subcortical sclerosis and  cystic changes of the greater tuberosity. No evidence of fracture or  dislocation.             Impression             IMPRESSION:   1. Mild to moderate glenohumeral osteoarthritis.  2. Mild to moderate acromioclavicular osteoarthritis.  3. No evidence of fracture or dislocation.    JUTTA ELLERMANN, MD Tia Kupfer, RN

## 2017-07-22 LAB
BACTERIA SPEC CULT: ABNORMAL
MICRO REPORT STATUS: ABNORMAL
MICROORGANISM SPEC CULT: ABNORMAL
SPECIMEN SOURCE: ABNORMAL

## 2017-07-24 ENCOUNTER — TELEPHONE (OUTPATIENT)
Dept: PEDIATRICS | Facility: CLINIC | Age: 77
End: 2017-07-24

## 2017-07-24 NOTE — TELEPHONE ENCOUNTER
Notes Recorded by Cailin Ponce APRN CNP on 7/23/2017 at 7:43 PM  Please let patient know   -Urine culture is abnormal and grew out bacteria that are sensitive to the antibiotic you have been given.  Complete the medication as prescribed and if you experience new, worsening or persistent symptoms, you should call or return for a recheck.

## 2017-07-24 NOTE — TELEPHONE ENCOUNTER
As per ok to leave message on phone, left detailed message with Atiya Ponce's message.  Left phone number to call back if further questions.    Mirna Melendrez RN, [facility]

## 2017-08-07 DIAGNOSIS — I10 HYPERTENSION GOAL BP (BLOOD PRESSURE) < 140/90: ICD-10-CM

## 2017-08-07 NOTE — TELEPHONE ENCOUNTER
irbesartan-hydrochlorothiazide (AVALIDE) 150-12.5 MG per tablet      Last Written Prescription Date: 02/23/16  Last Fill Quantity: 90, # refills: 1  Last Office Visit with FMDEANN, ERIN or [facility] prescribing provider: 07/20/17    Next 5 appointments (look out 90 days)     Aug 16, 2017  7:00 AM CDT   Return Visit with Ehsan Araya DPM   [facility]    [id number] [place] [id number] [phone]            Sep 25, 2017  8:15 AM CDT   Return Visit with Thomas Serna MD, [facility] NURSE ONLY   [facility] ([facility])    [id number] [place] [address] [id number] [phone]                   Potassium   Date Value Ref Range Status   04/14/2017 4.3 3.4 - 5.3 mmol/L Final     Creatinine   Date Value Ref Range Status   04/14/2017 0.76 0.52 - 1.04 mg/dL Final     BP Readings from Last 3 Encounters:   07/20/17 102/60   05/18/17 146/76   04/25/17 128/66         Phyllis Nix  [facility]

## 2017-08-13 RX ORDER — IRBESARTAN AND HYDROCHLOROTHIAZIDE 150; 12.5 MG/1; MG/1
TABLET, FILM COATED ORAL
Qty: 90 TABLET | Refills: 3 | Status: SHIPPED | OUTPATIENT
Start: 2017-08-13 | End: 2018-06-27 | Stop reason: DRUGHIGH

## 2017-08-14 DIAGNOSIS — Z79.4 TYPE 2 DIABETES MELLITUS WITH HYPERGLYCEMIA, WITH LONG-TERM CURRENT USE OF INSULIN (H): ICD-10-CM

## 2017-08-14 DIAGNOSIS — E11.65 TYPE 2 DIABETES MELLITUS WITH HYPERGLYCEMIA, WITH LONG-TERM CURRENT USE OF INSULIN (H): ICD-10-CM

## 2017-08-14 RX ORDER — GLIPIZIDE 10 MG/1
TABLET ORAL
Qty: 120 TABLET | Refills: 2 | Status: SHIPPED | OUTPATIENT
Start: 2017-08-14 | End: 2017-11-20

## 2017-08-14 NOTE — TELEPHONE ENCOUNTER
glipiZIDE (GLUCOTROL) 10 MG tablet   Last Written Prescription Date: 5/24/2017  Last Fill Quantity: 120, # refills: 2  Last Office Visit with FMG, UMANY or [facility] prescribing provider:  7/20/2017   Next 5 appointments (look out 90 days)     Aug 16, 2017  7:00 AM CDT   Return Visit with Ehsan Araya DPM   [facility]    [address] [facility] [address]   [phone]            Sep 25, 2017  8:15 AM CDT   Return Visit with Thomas Serna MD,  [facility] NURSE ONLY   [facility]    [address] [facility] [id number] [phone]                   BP Readings from Last 3 Encounters:   07/20/17 102/60   05/18/17 146/76   04/25/17 128/66     Lab Results   Component Value Date    MICROL 14 02/09/2017     Lab Results   Component Value Date    UMALCR 28.07 02/09/2017     Creatinine   Date Value Ref Range Status   04/14/2017 0.76 0.52 - 1.04 mg/dL Final   ]  GFR Estimate   Date Value Ref Range Status   04/14/2017 74 >60 mL/min/1.7m2 Final     Comment:     Non  GFR Calc   02/09/2017 86 >60 mL/min/1.7m2 Final     Comment:     Non  GFR Calc   11/08/2016 >90  Non  GFR Calc   >60 mL/min/1.7m2 Final     GFR Estimate If Black   Date Value Ref Range Status   04/14/2017 90 >60 mL/min/1.7m2 Final     Comment:      GFR Calc   02/09/2017 >90   GFR Calc   >60 mL/min/1.7m2 Final   11/08/2016 >90   GFR Calc   >60 mL/min/1.7m2 Final     Lab Results   Component Value Date    CHOL 142 02/09/2017     Lab Results   Component Value Date    HDL 59 02/09/2017     Lab Results   Component Value Date    LDL 52 02/09/2017     Lab Results   Component Value Date    TRIG 157 02/09/2017     Lab Results   Component Value Date    CHOLHDLRATIO 3.6 08/21/2015     Lab Results   Component Value Date    AST 16 02/09/2017     Lab Results    Component Value Date    ALT 21 04/14/2017     Lab Results   Component Value Date    A1C 9.3 07/20/2017    A1C 10.2 04/14/2017    A1C 10.7 02/09/2017    A1C 11.0 11/08/2016    A1C 10.4 07/26/2016     Potassium   Date Value Ref Range Status   04/14/2017 4.3 3.4 - 5.3 mmol/L Final     Refilled per [facility] protocol.    Sharla Young RN

## 2017-08-16 ENCOUNTER — OFFICE VISIT (OUTPATIENT)
Dept: PODIATRY | Facility: CLINIC | Age: 77
End: 2017-08-16
Payer: COMMERCIAL

## 2017-08-16 VITALS — DIASTOLIC BLOOD PRESSURE: 78 MMHG | SYSTOLIC BLOOD PRESSURE: 128 MMHG | OXYGEN SATURATION: 93 % | HEART RATE: 95 BPM

## 2017-08-16 DIAGNOSIS — L60.2 ONYCHAUXIS: ICD-10-CM

## 2017-08-16 DIAGNOSIS — E11.40 DIABETIC NEUROPATHY WITH NEUROLOGIC COMPLICATION (H): ICD-10-CM

## 2017-08-16 DIAGNOSIS — E11.49 DIABETIC NEUROPATHY WITH NEUROLOGIC COMPLICATION (H): ICD-10-CM

## 2017-08-16 DIAGNOSIS — B35.1 DERMATOPHYTOSIS OF NAIL: Primary | ICD-10-CM

## 2017-08-16 PROCEDURE — 99213 OFFICE O/P EST LOW 20 MIN: CPT | Performed by: PODIATRIST

## 2017-08-16 NOTE — MR AVS SNAPSHOT
After Visit Summary   8/16/2017    Brandy Leon    MRN: [id number]           Patient Information     Date Of Birth          1940        Visit Information        Provider Department      8/16/2017 7:00 AM Ehsan Araya DPM [facility]        Today's Diagnoses     Dermatophytosis of nail    -  1    Onychauxis        Diabetic neuropathy with neurologic complication (H)          Care Instructions    Thanks for coming today.  Ortho[facility] Medicine Clinic  [address] [place] [address]    To schedule future appointments in Ortho Clinic, you may call [phone].    To schedule ordered imaging by your Provider: Call [facility] at [phone]    LiveDeal available online at:   [email or web address]    Please call if any further questions or concerns [phone] and ask for the Orthopedic Department. Clinic hours 8 am to 5 pm.    Return to clinic if symptoms worsen.            Follow-ups after your visit        Your next 10 appointments already scheduled     Sep 25, 2017  8:15 AM CDT   Return Visit with Thomas Serna MD, [facility] NURSE ONLY   [facility])    [address] [place] [id number] [phone]            Dec 27, 2017  7:00 AM CST   Return Visit with Ehsan Araya DPM   [facility])    [address] [place] [address] [phone]            Jan 30, 2018  8:45 AM CST   Return Visit with Lucita Jordan MD   [facility])    [address] [place] [address] [phone]              Who to contact     If you have questions or need follow up information about today's clinic visit or your schedule please contact [facility] directly at [phone].  Normal or non-critical lab and imaging results will be communicated to you by  MyChart, letter or phone within 4 business days after the clinic has received the results. If you do not hear from us within 7 days, please contact the clinic through [employer] Light[employer] or phone. If you have a critical or abnormal lab result, we will notify you by phone as soon as possible.  Submit refill requests through RescueTime or call your pharmacy and they will forward the refill request to us. Please allow 3 business days for your refill to be completed.          Additional Information About Your Visit        RescueTime Information     RescueTime is an electronic gateway that provides easy, online access to your medical records. With RescueTime, you can request a clinic appointment, read your test results, renew a prescription or communicate with your care team.     To sign up for RescueTime visit the website at [email or web address]   You will be asked to enter the access code listed below, as well as some personal information. Please follow the directions to create your username and password.     Your access code is: [email or web address]  Expires: 10/18/2017 11:38 AM     Your access code will  in 90 days. If you need help or a new code, please contact your [facility] or call [phone] for assistance.        Care EveryWhere ID     This is your Care EveryWhere ID. This could be used by other organizations to access your [place] medical records  [id number]        Your Vitals Were     Pulse Pulse Oximetry                95 93%           Blood Pressure from Last 3 Encounters:   17 128/78   17 102/60   17 146/76    Weight from Last 3 Encounters:   17 83.2 kg (183 lb 8 oz)   17 85.5 kg (188 lb 9.6 oz)   17 84.8 kg (187 lb)              We Performed the Following     DEBRIDEMENT OF NAIL(S), 1-5          Today's Medication Changes          These changes are accurate as of: 17  7:17 AM.  If you have any questions, ask your nurse or doctor.               These  medicines have changed or have updated prescriptions.        Dose/Directions    aspirin 81 MG EC tablet   This may have changed:  how much to take   Used for:  Type 2 diabetes, HbA1c goal < 7% (H)        Dose:  81 mg   Take 1 tablet by mouth daily.   Quantity:  90 tablet   Refills:  3       insulin glargine 100 UNIT/ML injection   Commonly known as:  LANTUS SOLOSTAR   This may have changed:  how much to take   Used for:  Type 2 diabetes mellitus with hyperglycemia, with long-term current use of insulin (H)        Dose:  26 Units   Inject 26 Units Subcutaneous At Bedtime   Quantity:  45 mL   Refills:  3                Primary Care Provider Office Phone # Fax #    Cailin Ponce, APRN [facility] [phone]       [address]  [place] [address]        Equal Access to Services     NUSRAT KHAN : Hadii ana olverao Sotorey, waaxda luqadaha, qaybta kaalmada adeegyada, mitch azevedo . So [facility] [phone].    ATENCIÓN: Si habla español, tiene a garcía disposición servicios gratuitos de asistencia lingüística. Llame al [phone].    We comply with applicable federal civil rights laws and [place] laws. We do not discriminate on the basis of race, color, national origin, age, disability sex, sexual orientation or gender identity.            Thank you!     Thank you for choosing [facility]  for your care. Our goal is always to provide you with excellent care. Hearing back from our patients is one way we can continue to improve our services. Please take a few minutes to complete the written survey that you may receive in the mail after your visit with us. Thank you!             Your Updated Medication List - Protect others around you: Learn how to safely use, store and throw away your medicines at www.disposemymeds.org.          This list is accurate as of: 8/16/17  7:17 AM.  Always use your most recent med list.                   Brand Name Dispense  Instructions for use Diagnosis    aspirin 81 MG EC tablet     90 tablet    Take 1 tablet by mouth daily.    Type 2 diabetes, HbA1c goal < 7% (H)       BD JUAN C U/F 32G X 4 MM   Generic drug:  insulin pen needle     100 each    USE 1 DAILY AS DIRECTED.    Type 2 diabetes mellitus with hyperglycemia (H)       canagliflozin 300 MG tablet    INVOKANA    30 tablet    Take 1 tablet (300 mg) by mouth every morning (before breakfast)    Type 2 diabetes mellitus with hyperglycemia, with long-term current use of insulin (H)       famotidine 20 MG tablet    PEPCID     Take 20 mg by mouth nightly as needed        fluticasone 50 MCG/ACT spray    FLONASE    16 g    Spray 2 sprays into both nostrils daily    Postnasal drip       glipiZIDE 10 MG tablet    GLUCOTROL    120 tablet    TAKE 2 TABLETS BY MOUTH TWICE A DAY BEFORE MEALS    Type 2 diabetes mellitus with hyperglycemia, with long-term current use of insulin (H)       GLUCOSAMINE CHONDROITIN COMPLX PO      2 Daily        insulin glargine 100 UNIT/ML injection    LANTUS SOLOSTAR    45 mL    Inject 26 Units Subcutaneous At Bedtime    Type 2 diabetes mellitus with hyperglycemia, with long-term current use of insulin (H)       irbesartan-hydrochlorothiazide 150-12.5 MG per tablet    AVALIDE    90 tablet    TAKE 1 TABLET BY MOUTH DAILY    Hypertension goal BP (blood pressure) < 140/90       latanoprost 0.005 % ophthalmic solution    XALATAN    3 Bottle    Place 1 drop into both eyes At Bedtime    Cataracts, bilateral, Primary open angle glaucoma of both eyes, moderate stage       levothyroxine 137 MCG tablet    SYNTHROID    90 tablet    Take 1 tablet (137 mcg) by mouth daily    Myxedema heart disease (H), Nutritional and metabolic cardiomyopathy (H)       metFORMIN 1000 MG tablet    GLUCOPHAGE    60 tablet    TAKE 1 TABLET (1,000 MG) BY MOUTH 2 TIMES DAILY (WITH MEALS)    Type 2 diabetes mellitus with hyperglycemia, with long-term current use of insulin (H)       MULTIVITAMIN PO       1 tablet daily        nitroFURantoin (macrocrystal-monohydrate) 100 MG capsule    MACROBID    14 capsule    Take 1 capsule (100 mg) by mouth 2 times daily    Dysuria       OMEGA 3 PO      Take by mouth 2 times daily.        ONE TOUCH DELICA LANCETS Misc     100 each    1 each 2 times daily. One Touch Delica    Type 2 diabetes, HbA1c goal < 7% (H)       ONE TOUCH ULTRA test strip   Generic drug:  blood glucose monitoring     200 strip    TEST TWICE DAILY    Type 2 diabetes mellitus with hyperglycemia, with long-term current use of insulin (H)       order for DME     1 each    Glucometer covered by insurance.    Type 2 diabetes, HbA1c goal < 7% (H)       oxybutynin 5 MG 24 hr tablet    DITROPAN-XL    70 tablet    TAKE 1 TABLET BY MOUTH DAILY FOR 1 WEEK THEN TAKE 2 TABLETS DAILY    Urinary frequency, Overactive bladder       rosuvastatin 20 MG tablet    CRESTOR    90 tablet    TAKE 1 TABLET (20 MG) BY MOUTH DAILY    Hyperlipidemia LDL goal <100       tiZANidine 4 MG tablet    ZANAFLEX    30 tablet    Take 1 tablet (4 mg) by mouth 3 times daily as needed for muscle spasms    Spasm of back muscles       triamcinolone 0.1 % cream    KENALOG    45 g    Apply sparingly to affected area two times daily.    Dermatitis

## 2017-08-16 NOTE — PATIENT INSTRUCTIONS
Thanks for coming today.  Ortho/Sports Medicine Clinic  [address] [place] [address]    To schedule future appointments in Ortho Clinic, you may call [phone].    To schedule ordered imaging by your Provider: Call [facility] at [phone]    How do you roll? available online at:   [email or web address]    Please call if any further questions or concerns [phone] and ask for the Orthopedic Department. Clinic hours 8 am to 5 pm.    Return to clinic if symptoms worsen.

## 2017-08-16 NOTE — PROGRESS NOTES
Past Medical History:   Diagnosis Date     Degenerative joint disease      Diabetes mellitus (H) 2006    type 2     HTN (hypertension)      Hyperlipidemia LDL goal < 100      Hypothyroidism 1960    s/p subtotal thyroidectomy      Rheumatic fever     x2 between the ages of 16-18     Seasonal allergies      Patient Active Problem List   Diagnosis     Seasonal allergies     Hypothyroidism     Hyperlipidemia LDL goal <100     Fibromyalgia     Osteoarthritis     Actinic keratosis     Advanced directives, counseling/discussion     Glaucoma suspect     Cataracts, bilateral     Obesity     Type 2 diabetes mellitus with hyperglycemia, with long-term current use of insulin (H)     Hypertension goal BP (blood pressure) < 140/90     Overactive bladder     Primary osteoarthritis of both hands     Recurrent UTI     Past Surgical History:   Procedure Laterality Date     C APPENDECTOMY       C ARTHROPLASTY TMJ       COLONOSCOPY       COLONOSCOPY N/A 8/13/2015    Procedure: COLONOSCOPY;  Surgeon: Duane, William Charles, MD;  Location: [facility] OR     COLONOSCOPY WITH CO2 INSUFFLATION N/A 8/13/2015    Procedure: COLONOSCOPY WITH CO2 INSUFFLATION;  Surgeon: Duane, William Charles, MD;  Location: [facility] OR     THYROIDECTOMY        Social History     Social History     Marital status: Single     Spouse name: N/A     Number of children: N/A     Years of education: N/A     Occupational History     Not on file.     Social History Main Topics     Smoking status: Never Smoker     Smokeless tobacco: Never Used      Comment: has had exposure to second hand smoke in the past from [place], no current exposure     Alcohol use No     Drug use: No     Sexual activity: No     Other Topics Concern     Parent/Sibling W/ Cabg, Mi Or Angioplasty Before 65f 55m? 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     Hobby Hazards No     Sleep Concern Yes     difficulty staying asleep, up and down all night     Stress Concern No     Weight Concern Yes     would like to lose     Special Diet Yes     low card, low sugar diet     Back Care Yes     chiropratior     Exercise Yes     almost everyday     Bike Helmet No     does not ride bicycle any more     Seat Belt Yes     Self-Exams Yes     Social History Narrative     Family History   Problem Relation Age of Onset     CANCER Father      skin and leukemia     CEREBROVASCULAR DISEASE Maternal Grandfather      CEREBROVASCULAR DISEASE Paternal Grandmother      Eye Disorder Paternal Grandmother      cataracts     CEREBROVASCULAR DISEASE Paternal Grandfather      HEART DISEASE Paternal Grandfather      CANCER Sister      Breast Cancer     Eye Disorder Mother      cataracts     Eye Disorder Brother      cataract     Breast Cancer Daughter      Lab Results   Component Value Date    A1C 9.3 07/20/2017        SUBJECTIVE FINDINGS:  A 76-year-old female presents for a diabetic foot check and onychomycosis and onychauxis.  She relates she is doing well, no ulcers or sores since we had seen her last, but she did wear a pair of sandals that rubbed top of feet.  She does have neuropathy that is unchanged.  She is using lotion daily on her feet.       OBJECTIVE FINDINGS:  DP and PT are 2/4 bilaterally.  She has incurvated nails 1 through 5 bilaterally.  She has thick, dystrophic nails with subungual debris and discoloration of the hallux nails bilaterally.  Sharp/dull is decreased with 5.07 Henry Weinstien monofilament bilaterally.  There is no erythema, no drainage, no odor, no calor bilaterally.       ASSESSMENT AND PLAN:  Onychomycosis bilaterally.  Onychauxis bilaterally.  She is diabetic with peripheral neuropathy.  Diagnosis and treatment options discussed with her.  All the nails were debrided or reduced bilaterally upon consent.  She opted for no Diabetic shoes.  She will return  to clinic and see me in 3-4 months.

## 2017-08-16 NOTE — NURSING NOTE
"Brandy Leon's goals for this visit include: Diabetic foot and toenail check   She requests these members of her care team be copied on today's visit information: yes    PCP: Cailin Ponce    Referring Provider:  ESTABLISHED PATIENT  No address on file    Chief Complaint   Patient presents with     RECHECK     Diabetic foot check and toenail trim       Initial /78  Pulse 95  SpO2 93% Estimated body mass index is 31.75 kg/(m^2) as calculated from the following:    Height as of 4/14/17: 1.619 m (5' 3.75\").    Weight as of 7/20/17: 83.2 kg (183 lb 8 oz).  Medication Reconciliation: complete    "

## 2017-08-21 ENCOUNTER — TELEPHONE (OUTPATIENT)
Dept: FAMILY MEDICINE | Facility: CLINIC | Age: 77
End: 2017-08-21

## 2017-08-21 DIAGNOSIS — E88.9 NUTRITIONAL AND METABOLIC CARDIOMYOPATHY (H): ICD-10-CM

## 2017-08-21 DIAGNOSIS — E63.9 NUTRITIONAL AND METABOLIC CARDIOMYOPATHY (H): ICD-10-CM

## 2017-08-21 DIAGNOSIS — I51.9 MYXEDEMA HEART DISEASE: ICD-10-CM

## 2017-08-21 DIAGNOSIS — E03.9 MYXEDEMA HEART DISEASE: ICD-10-CM

## 2017-08-21 DIAGNOSIS — I43 NUTRITIONAL AND METABOLIC CARDIOMYOPATHY (H): ICD-10-CM

## 2017-08-21 NOTE — TELEPHONE ENCOUNTER
..Reason for Call: prescription    Detailed comments: Mitul from [facility] said they faxed a denial to us for a prio-auth.  He was wondering was we able to do something about it. CYNTHROID and insurance has denied due to the fact it was not written as an generic    Phone Number Patient can be reached at: [phone]    Best Time: ANYTIME    Can we leave a detailed message on this number? YES    Call taken on 8/21/2017 at 11:53 AM by Antwon Hernandez

## 2017-08-23 NOTE — TELEPHONE ENCOUNTER
Spoke to [facility] pharmacist.  Patient paid cash for 90 days supply on 5/31/17 for brand synthroid. Insurance would not pay for brand.   Patient wants brand synthroid dispensed and wants insurance company to pay for it.  Requesting PA be done.    Reny Cedeno RN,   [facility]

## 2017-08-23 NOTE — TELEPHONE ENCOUNTER
Can we call pharmacy as the order in the chart is for generic synthroid?  Not sure what they are needing

## 2017-08-23 NOTE — TELEPHONE ENCOUNTER
Pharmacy currently closed.  Will try again later.    Reny Cedeno RN,   [facility], [facility]

## 2017-08-24 RX ORDER — LEVOTHYROXINE SODIUM 137 MCG
137 TABLET ORAL DAILY
Qty: 90 TABLET | Refills: 1 | Status: SHIPPED | OUTPATIENT
Start: 2017-08-24 | End: 2018-02-16

## 2017-08-24 NOTE — TELEPHONE ENCOUNTER
[facility] Prior Authorization Team   Phone: [phone]  Fax: [phone]    PA Initiation    Medication: SYNTHROID  Insurance Company:    Pharmacy Filling the Rx: [facility]/PHARMACY #[id number] [place] [id number] [facility]  Filling Pharmacy Phone: [phone]  Filling Pharmacy Fax: [phone]  Start Date: 8/24/2017    INITIATED VIA PHONE.

## 2017-08-24 NOTE — TELEPHONE ENCOUNTER
We can send it for a PA  However we need some reasons why she wants it switched so we have some documentation for the PA team to submit to insurance company

## 2017-08-24 NOTE — TELEPHONE ENCOUNTER
Prior Authorization Approval    Authorization Effective Date: 7/25/2017  Authorization Expiration Date: 8/24/2018  Medication: SYNTHROID-APPROVED  Approved Dose/Quantity:    Reference #: [id number]   Insurance Company: [employer] - Phone [phone] Fax [phone]  Expected CoPay: UNKNOWN     CoPay Card Available:      Foundation Assistance Needed:    Which Pharmacy is filling the prescription (Not needed for infusion/clinic administered): [facility]/PHARMACY #[id number] - [place], [place] - [address] [place] [address] [facility]  Pharmacy Notified: Yes  Patient Notified: Yes

## 2017-08-24 NOTE — TELEPHONE ENCOUNTER
Patient states she cannot take the generic levothyroxine.  It makes her feel weird.  She has been taking the brand synthroid for a couple of years and she did try the generic and did not like it.  She states why mess things up if its working.    Routed to PA Department.    Reny Cedeno RN,   [facility], [facility]

## 2017-08-24 NOTE — TELEPHONE ENCOUNTER
PA approved.  Routed to provider to order RX.    Reny Cedeno RN,   [facility]

## 2017-08-30 ENCOUNTER — TELEPHONE (OUTPATIENT)
Dept: DERMATOLOGY | Facility: CLINIC | Age: 77
End: 2017-08-30

## 2017-08-30 NOTE — TELEPHONE ENCOUNTER
This CMA left message for pt to call clinic back @ [phone] to move up appt to dr. Long.     Francisco Davila, CMA

## 2017-09-25 ENCOUNTER — OFFICE VISIT (OUTPATIENT)
Dept: DERMATOLOGY | Facility: CLINIC | Age: 77
End: 2017-09-25
Payer: COMMERCIAL

## 2017-09-25 ENCOUNTER — OFFICE VISIT (OUTPATIENT)
Dept: OPHTHALMOLOGY | Facility: CLINIC | Age: 77
End: 2017-09-25
Payer: COMMERCIAL

## 2017-09-25 DIAGNOSIS — H40.003 GLAUCOMA SUSPECT, BILATERAL: ICD-10-CM

## 2017-09-25 DIAGNOSIS — E11.65 TYPE 2 DIABETES MELLITUS WITH HYPERGLYCEMIA, WITH LONG-TERM CURRENT USE OF INSULIN (H): Primary | ICD-10-CM

## 2017-09-25 DIAGNOSIS — H52.223 REGULAR ASTIGMATISM OF BOTH EYES: ICD-10-CM

## 2017-09-25 DIAGNOSIS — H52.13 MYOPIA, BILATERAL: ICD-10-CM

## 2017-09-25 DIAGNOSIS — D23.9 DERMATOFIBROMA: ICD-10-CM

## 2017-09-25 DIAGNOSIS — H40.1132 PRIMARY OPEN ANGLE GLAUCOMA OF BOTH EYES, MODERATE STAGE: ICD-10-CM

## 2017-09-25 DIAGNOSIS — Z12.83 SKIN EXAM, SCREENING FOR CANCER: ICD-10-CM

## 2017-09-25 DIAGNOSIS — D22.9 MULTIPLE BENIGN NEVI: Primary | ICD-10-CM

## 2017-09-25 DIAGNOSIS — Z79.4 TYPE 2 DIABETES MELLITUS WITH HYPERGLYCEMIA, WITH LONG-TERM CURRENT USE OF INSULIN (H): Primary | ICD-10-CM

## 2017-09-25 DIAGNOSIS — H52.4 PRESBYOPIA: ICD-10-CM

## 2017-09-25 PROCEDURE — 76514 ECHO EXAM OF EYE THICKNESS: CPT | Performed by: OPHTHALMOLOGY

## 2017-09-25 PROCEDURE — 92083 EXTENDED VISUAL FIELD XM: CPT | Performed by: OPHTHALMOLOGY

## 2017-09-25 PROCEDURE — 92133 CPTRZD OPH DX IMG PST SGM ON: CPT | Performed by: OPHTHALMOLOGY

## 2017-09-25 PROCEDURE — 92015 DETERMINE REFRACTIVE STATE: CPT | Performed by: OPHTHALMOLOGY

## 2017-09-25 PROCEDURE — 99213 OFFICE O/P EST LOW 20 MIN: CPT | Performed by: DERMATOLOGY

## 2017-09-25 PROCEDURE — 92014 COMPRE OPH EXAM EST PT 1/>: CPT | Performed by: OPHTHALMOLOGY

## 2017-09-25 ASSESSMENT — SLIT LAMP EXAM - LIDS
COMMENTS: 2+ DERMATOCHALASIS - UPPER LID
COMMENTS: 2+ DERMATOCHALASIS - UPPER LID

## 2017-09-25 ASSESSMENT — REFRACTION_WEARINGRX
OS_SPHERE: -0.50
OS_AXIS: 170
OD_ADD: +2.75
OS_CYLINDER: +1.00
SPECS_TYPE: BIFOCAL
OD_CYLINDER: +1.25
OS_ADD: +2.75
OD_SPHERE: -1.00
OD_AXIS: 005

## 2017-09-25 ASSESSMENT — REFRACTION_MANIFEST
OS_CYLINDER: +1.25
OS_SPHERE: PLANO
OD_CYLINDER: +1.00
OD_SPHERE: -0.75
OD_ADD: +2.75
OD_AXIS: 005
OS_AXIS: 010
OS_ADD: +2.75

## 2017-09-25 ASSESSMENT — VISUAL ACUITY
OS_SC: 20/30
OS_SC+: -2
OD_SC: 20/20
OD_CC: 20/30
OD_CC: 20/25
METHOD: SNELLEN - LINEAR
OS_CC: 20/20-
OS_CC: 20/25
OD_SC+: -1

## 2017-09-25 ASSESSMENT — TONOMETRY
OD_IOP_MMHG: 15
IOP_METHOD: TONOPEN
OS_IOP_MMHG: 15
OS_IOP_MMHG: 18
OD_IOP_MMHG: 17

## 2017-09-25 ASSESSMENT — PACHYMETRY
OD_CT(UM): 559
OS_CT(UM): 558

## 2017-09-25 ASSESSMENT — CUP TO DISC RATIO
OD_RATIO: 0.55
OS_RATIO: 0.5

## 2017-09-25 ASSESSMENT — EXTERNAL EXAM - LEFT EYE: OS_EXAM: NORMAL

## 2017-09-25 ASSESSMENT — EXTERNAL EXAM - RIGHT EYE: OD_EXAM: NORMAL

## 2017-09-25 NOTE — MR AVS SNAPSHOT
After Visit Summary   9/25/2017    Brandy Leon    MRN: [id number]           Patient Information     Date Of Birth          1940        Visit Information        Provider Department      9/25/2017 8:15 AM Thomas Serna MD; MG OPHTH NURSE ONLY [facility]        Today's Diagnoses     Type 2 diabetes mellitus with hyperglycemia, with long-term current use of insulin (H)    -  1    Glaucoma suspect        Myopia, bilateral        Regular astigmatism of both eyes        Presbyopia        Primary open angle glaucoma of both eyes, moderate stage           Follow-ups after your visit        Follow-up notes from your care team     Return in about 1 year (around 9/25/2018) for Annual Eye Exam, Glaucoma check, Visual field, OCT.      Your next 10 appointments already scheduled     Sep 25, 2017 11:30 AM CDT   [facility] Visit with ANGELIQUE Locke MD   [facility]    [address] [place] [address] [id number] [phone]            Dec 20, 2017  7:00 AM CST   Return Visit with Ehsan Araya DPM   [facility]    [address] [place] [address] [id number] [phone]              Who to contact     If you have questions or need follow up information about today's clinic visit or your schedule please contact [facility] directly at [phone].  Normal or non-critical lab and imaging results will be communicated to you by MyChart, letter or phone within 4 business days after the clinic has received the results. If you do not hear from us within 7 days, please contact the clinic through MyChart or phone. If you have a critical or abnormal lab result, we will notify you by phone as soon as possible.  Submit refill requests through [employer] or call your pharmacy and they will forward the refill request to us. Please allow 3 business days for your refill to  be completed.          Additional Information About Your Visit        [employer] Information     [employer] is an electronic gateway that provides easy, online access to your medical records. With [employer], you can request a clinic appointment, read your test results, renew a prescription or communicate with your care team.     To sign up for [employer] visit the website at [email or web address]   You will be asked to enter the access code listed below, as well as some personal information. Please follow the directions to create your username and password.     Your access code is: [email or web address]  Expires: 10/18/2017 11:38 AM     Your access code will  in 90 days. If you need help or a new code, please contact your [facility] or call [phone] for assistance.        Care EveryWhere ID     This is your Care EveryWhere ID. This could be used by other organizations to access your [facility] medical records  [id number]         Blood Pressure from Last 3 Encounters:   17 128/78   17 102/60   17 146/76    Weight from Last 3 Encounters:   17 83.2 kg (183 lb 8 oz)   17 85.5 kg (188 lb 9.6 oz)   17 84.8 kg (187 lb)              We Performed the Following     HVF 24-2 OU     OCT Optic Nerve RNFL Optovue OU (both eyes)          Today's Medication Changes          These changes are accurate as of: 17  9:27 AM.  If you have any questions, ask your nurse or doctor.               These medicines have changed or have updated prescriptions.        Dose/Directions    aspirin 81 MG EC tablet   This may have changed:  how much to take   Used for:  Type 2 diabetes, HbA1c goal < 7% (H)        Dose:  81 mg   Take 1 tablet by mouth daily.   Quantity:  90 tablet   Refills:  3       insulin glargine 100 UNIT/ML injection   Commonly known as:  LANTUS SOLOSTAR   This may have changed:  how much to take   Used for:  Type 2 diabetes mellitus with hyperglycemia, with  long-term current use of insulin (H)        Dose:  26 Units   Inject 26 Units Subcutaneous At Bedtime   Quantity:  45 mL   Refills:  3                Primary Care Provider Office Phone # Fax #    MAGO Baker [facility] [phone] [address]  [place] [address]        Equal Access to Services     NAI KHAN : Hadii aad ku hadasho Soomaali, waaxda luqadaha, qaybta kaalmada adeegyada, waxay idiin hayaan adeeg kharash laArnoldoaan . So [facility] [phone].    ATENCIÓN: Si habla español, tiene a garcía disposición servicios gratuitos de asistencia lingüística. Llame al [phone].    We comply with applicable federal civil rights laws and [place] laws. We do not discriminate on the basis of race, color, national origin, age, disability sex, sexual orientation or gender identity.            Thank you!     Thank you for choosing [facility]  for your care. Our goal is always to provide you with excellent care. Hearing back from our patients is one way we can continue to improve our services. Please take a few minutes to complete the written survey that you may receive in the mail after your visit with us. Thank you!             Your Updated Medication List - Protect others around you: Learn how to safely use, store and throw away your medicines at www.disposemymeds.org.          This list is accurate as of: 9/25/17  9:27 AM.  Always use your most recent med list.                   Brand Name Dispense Instructions for use Diagnosis    aspirin 81 MG EC tablet     90 tablet    Take 1 tablet by mouth daily.    Type 2 diabetes, HbA1c goal < 7% (H)       BD JUAN C U/F 32G X 4 MM   Generic drug:  insulin pen needle     100 each    USE 1 DAILY AS DIRECTED.    Type 2 diabetes mellitus with hyperglycemia (H)       canagliflozin 300 MG tablet    INVOKANA    30 tablet    Take 1 tablet (300 mg) by mouth every morning (before breakfast)    Type 2 diabetes mellitus with  hyperglycemia, with long-term current use of insulin (H)       famotidine 20 MG tablet    PEPCID     Take 20 mg by mouth nightly as needed        fluticasone 50 MCG/ACT spray    FLONASE    16 g    Spray 2 sprays into both nostrils daily    Postnasal drip       glipiZIDE 10 MG tablet    GLUCOTROL    120 tablet    TAKE 2 TABLETS BY MOUTH TWICE A DAY BEFORE MEALS    Type 2 diabetes mellitus with hyperglycemia, with long-term current use of insulin (H)       GLUCOSAMINE CHONDROITIN COMPLX PO      2 Daily        insulin glargine 100 UNIT/ML injection    LANTUS SOLOSTAR    45 mL    Inject 26 Units Subcutaneous At Bedtime    Type 2 diabetes mellitus with hyperglycemia, with long-term current use of insulin (H)       irbesartan-hydrochlorothiazide 150-12.5 MG per tablet    AVALIDE    90 tablet    TAKE 1 TABLET BY MOUTH DAILY    Hypertension goal BP (blood pressure) < 140/90       latanoprost 0.005 % ophthalmic solution    XALATAN    3 Bottle    Place 1 drop into both eyes At Bedtime    Cataracts, bilateral, Primary open angle glaucoma of both eyes, moderate stage       metFORMIN 1000 MG tablet    GLUCOPHAGE    60 tablet    TAKE 1 TABLET (1,000 MG) BY MOUTH 2 TIMES DAILY (WITH MEALS)    Type 2 diabetes mellitus with hyperglycemia, with long-term current use of insulin (H)       MULTIVITAMIN PO      1 tablet daily        nitroFURantoin (macrocrystal-monohydrate) 100 MG capsule    MACROBID    14 capsule    Take 1 capsule (100 mg) by mouth 2 times daily    Dysuria       OMEGA 3 PO      Take by mouth 2 times daily.        ONE TOUCH DELICA LANCETS Misc     100 each    1 each 2 times daily. One Touch Delica    Type 2 diabetes, HbA1c goal < 7% (H)       ONE TOUCH ULTRA test strip   Generic drug:  blood glucose monitoring     200 strip    TEST TWICE DAILY    Type 2 diabetes mellitus with hyperglycemia, with long-term current use of insulin (H)       order for DME     1 each    Glucometer covered by insurance.    Type 2 diabetes,  HbA1c goal < 7% (H)       oxybutynin 5 MG 24 hr tablet    DITROPAN-XL    70 tablet    TAKE 1 TABLET BY MOUTH DAILY FOR 1 WEEK THEN TAKE 2 TABLETS DAILY    Urinary frequency, Overactive bladder       rosuvastatin 20 MG tablet    CRESTOR    90 tablet    TAKE 1 TABLET (20 MG) BY MOUTH DAILY    Hyperlipidemia LDL goal <100       SYNTHROID 137 MCG tablet   Generic drug:  levothyroxine     90 tablet    Take 1 tablet (137 mcg) by mouth daily    Myxedema heart disease (H), Nutritional and metabolic cardiomyopathy (H)       tiZANidine 4 MG tablet    ZANAFLEX    30 tablet    Take 1 tablet (4 mg) by mouth 3 times daily as needed for muscle spasms    Spasm of back muscles       triamcinolone 0.1 % cream    KENALOG    45 g    Apply sparingly to affected area two times daily.    Dermatitis

## 2017-09-25 NOTE — NURSING NOTE
Patient presents with:  Diabetic Eye Exam  Glaucoma Suspect Follow Up: Pt currently using Xalatan QHS OU, Last gtt 8:30 last night, Compliant  COMPREHENSIVE EYE EXAM      Referring Provider:  ESTABLISHED PATIENT  No address on file    HPI    Last Eye Exam:  8/23/16   Informant(s):  EMR   Affected eye(s):  Both   Symptoms:        Frequency:  Intermittent       Do you have eye pain now?:  No      Comments:  Pt is Type II DM,  this am, A1c  9.3, 7/20/17.  OU feel sticky a times.   No mattering, itching or burning.

## 2017-09-25 NOTE — NURSING NOTE
Dermatology Rooming Note    Brandy Leon's goals for this visit include:   Chief Complaint   Patient presents with     Skin Check     no areas of concern       Is a scribe okay for this visit:N/A    Are records needed for this visit(If yes, obtain release of information): No     Vitals: There were no vitals taken for this visit.    Referring Provider:  ESTABLISHED PATIENT  No address on file    Keisha Spence LPN

## 2017-09-25 NOTE — PROGRESS NOTES
SUBJECTIVE:  Brandy Leon returns for general recheck.  She is a lady who has a family history of melanoma.  Her father who passed away from melanoma. Her daughter currently has ovarian cancer after having survived breast cancer.  She has no particular lesions of concern today, would like a general skin check.        GENERAL:  Shows a healthy lady in no distress.   SKIN:  We checked her face, neck, chest, back, arms, legs, hands and feet.  On her thighs were several  papular lesions suggestive of dermatofibroma.  She had numerous lentigines, I found no actinic keratoses or precancers.  She has numerous small and medium-sized nevi scattered about.      ASSESSMENT:  No worrisome lesions.  No family history of melanoma and cancer in the family.      PLAN:  She was reassured.  She was asked to return in 1 year or sooner if necessary and to be watchful of any expanding macular dark lesion that might suggest an early melanoma.      MEDICATIONS AND ALLERGIES:  Reviewed.         ANGELIQUE HIGGINS MD             D: 2017 12:56   T: 2017 18:57   MT: EILEEN#[id number]      Name:     BRANDY LEON   MRN:      [id number]        Account:      [id number]   :      1940           Visit Date:   2017      Document: [id number]

## 2017-09-25 NOTE — LETTER
2017      RE: Brandy Leon  [address] [id number]       SUBJECTIVE:  Brandy Leon returns for general recheck.  She is a lady who has a family history of melanoma.  Her father who passed away from melanoma. Her daughter currently has ovarian cancer after having survived breast cancer.  She has no particular lesions of concern today, would like a general skin check.        GENERAL:  Shows a healthy lady in no distress.   SKIN:  We checked her face, neck, chest, back, arms, legs, hands and feet.  On her thighs were several  papular lesions suggestive of dermatofibroma.  She had numerous lentigines, I found no actinic keratoses or precancers.  She has numerous small and medium-sized nevi scattered about.      ASSESSMENT:  No worrisome lesions.  No family history of melanoma and cancer in the family.      PLAN:  She was reassured.  She was asked to return in 1 year or sooner if necessary and to be watchful of any expanding macular dark lesion that might suggest an early melanoma.      MEDICATIONS AND ALLERGIES:  Reviewed.         ANGELIQUE HIGGINS MD             D: 2017 12:56   T: 2017 18:57   MT: EILEEN#[id number]      Name:     BRANDY LEON   MRN:      [id number]        Account:      [id number]   :      1940           Visit Date:   2017      Document: [id number]        ANGELIQUE Higgins MD

## 2017-09-25 NOTE — PROGRESS NOTES
Assessment & Plan   Brandy Leon is a 77 year old female who presents with:   Review of systems for the eyes was negative other than the pertinent positives and negatives noted in the HPI.  History is obtained from the patient.    Type 2 diabetes mellitus with hyperglycemia, with long-term current use of insulin (H)  - No signs of retinopathy    Glaucoma suspect  - Stable IOP on Xalatan CPM  - OCT Optic Nerve RNFL Optovue OU (both eyes)  - HVF 24-2 OU  - US OPHTHALMIC DIAG, PACHYMETRY    Myopia, bilateral  - REFRACTION    Regular astigmatism of both eyes  - REFRACTION    Presbyopia  - REFRACTION    Primary open angle glaucoma of both eyes, moderate stage  - Orders sent to pharmacy on file  - latanoprost (XALATAN) 0.005 % ophthalmic solution; Place 1 drop into both eyes At Bedtime      Return in 1 year for annual exam w/ HVF, OCT and pach    Documentation for today's encounter was performed by Minda Roe COA. OSC. Acting as a scribe in my presence. I have reviewed and verified that it is an accurate recording of today's encounter.    Attending Physician Attestation:  Complete documentation of historical and exam elements from today's encounter can be found in the full encounter summary report (not reduplicated in this progress note).  I personally obtained the chief complaint(s) and history of present illness.  I confirmed and edited as necessary the review of systems, past medical/surgical history, family history, social history, and examination findings as documented by others; and I examined the patient myself.  I personally reviewed the relevant tests, images, and reports as documented above.  I formulated and edited as necessary the assessment and plan and discussed the findings and management plan with the patient and family. - Thomas Serna MD

## 2017-09-26 RX ORDER — LATANOPROST 50 UG/ML
1 SOLUTION/ DROPS OPHTHALMIC AT BEDTIME
Qty: 3 BOTTLE | Refills: 4 | Status: SHIPPED | OUTPATIENT
Start: 2017-09-26 | End: 2018-10-15

## 2017-10-23 ENCOUNTER — TELEPHONE (OUTPATIENT)
Dept: PEDIATRICS | Facility: CLINIC | Age: 77
End: 2017-10-23

## 2017-10-23 ENCOUNTER — OFFICE VISIT (OUTPATIENT)
Dept: FAMILY MEDICINE | Facility: CLINIC | Age: 77
End: 2017-10-23
Payer: COMMERCIAL

## 2017-10-23 VITALS
BODY MASS INDEX: 31.38 KG/M2 | WEIGHT: 183.8 LBS | HEIGHT: 64 IN | OXYGEN SATURATION: 96 % | DIASTOLIC BLOOD PRESSURE: 67 MMHG | SYSTOLIC BLOOD PRESSURE: 124 MMHG | TEMPERATURE: 98.1 F | HEART RATE: 92 BPM

## 2017-10-23 DIAGNOSIS — J98.8 VIRAL RESPIRATORY ILLNESS: Primary | ICD-10-CM

## 2017-10-23 DIAGNOSIS — E11.65 TYPE 2 DIABETES MELLITUS WITH HYPERGLYCEMIA, WITH LONG-TERM CURRENT USE OF INSULIN (H): ICD-10-CM

## 2017-10-23 DIAGNOSIS — R35.0 URINARY FREQUENCY: ICD-10-CM

## 2017-10-23 DIAGNOSIS — B97.89 VIRAL RESPIRATORY ILLNESS: Primary | ICD-10-CM

## 2017-10-23 DIAGNOSIS — Z79.4 TYPE 2 DIABETES MELLITUS WITH HYPERGLYCEMIA, WITH LONG-TERM CURRENT USE OF INSULIN (H): ICD-10-CM

## 2017-10-23 DIAGNOSIS — N32.81 OVERACTIVE BLADDER: ICD-10-CM

## 2017-10-23 DIAGNOSIS — I10 HYPERTENSION GOAL BP (BLOOD PRESSURE) < 140/90: ICD-10-CM

## 2017-10-23 DIAGNOSIS — Z28.21 INFLUENZA VACCINATION DECLINED BY PATIENT: ICD-10-CM

## 2017-10-23 LAB
DEPRECATED S PYO AG THROAT QL EIA: NORMAL
FLUAV+FLUBV AG SPEC QL: NEGATIVE
FLUAV+FLUBV AG SPEC QL: NEGATIVE
SPECIMEN SOURCE: NORMAL
SPECIMEN SOURCE: NORMAL

## 2017-10-23 PROCEDURE — 87804 INFLUENZA ASSAY W/OPTIC: CPT | Performed by: NURSE PRACTITIONER

## 2017-10-23 PROCEDURE — 87880 STREP A ASSAY W/OPTIC: CPT | Performed by: NURSE PRACTITIONER

## 2017-10-23 PROCEDURE — 99214 OFFICE O/P EST MOD 30 MIN: CPT | Performed by: NURSE PRACTITIONER

## 2017-10-23 PROCEDURE — 87081 CULTURE SCREEN ONLY: CPT | Performed by: NURSE PRACTITIONER

## 2017-10-23 NOTE — TELEPHONE ENCOUNTER
10.23.17  Patient has laryngitis, sore throat and not feeling well.  Asked to see AIDAN Ponce.  No appt avail.  Will send message to dept for a nurse call back.     Home Phone [phone]   Mobile NONE

## 2017-10-23 NOTE — PATIENT INSTRUCTIONS
Based on your medical history and these are the current health maintenance or preventive care services that you are due for (some may have been done at this visit)  Health Maintenance Due   Topic Date Due     INFLUENZA VACCINE (SYSTEM ASSIGNED)  09/01/2017         At [facility], we strive to deliver an exceptional experience to you, every time we see you.    If you receive a survey in the mail, please send us back your thoughts. We really do value your feedback.    Your care team's suggested websites for health information:  Www.[employer].org : Up to date and easily searchable information on multiple topics.  Www.medlineplus.gov : medication info, interactive tutorials, watch real surgeries online  Www.familydoctor.org : good info from the Academy of Family Physicians  Www.cdc.gov : public health info, travel advisories, epidemics (H1N1)  Www.aap.org : children's health info, normal development, vaccinations  Www.health.[facility].[place].us : [place] dept of health, public health issues in [place], N1N1    How to contact your care team:   [facility] [phone]         Pharmacy [phone]    Dr. Montiel, Janet Van PA-C, Dr. Koch, Lorelei MERCEDES CNP, Ellen Payne PA-C, Dr. Tavera, and MAGO Villarreal CNP    Team RN: Shantell      Clinic hours  M-Th 7 am-7 pm   Fri 7 am-5 pm.   Urgent care M-F 11 am-9 pm,   Sat/Sun 9 am-5 pm.  Pharmacy M-Th 8 am-8 pm Fri 8 am-6 pm  Sat/Sun 9 am-5 pm.     All password changes, disabled accounts, or ID changes in [employer]/MyHealth will be done by our Access Services Department.    If you need help with your account or password, call: [phone]. Clinic staff no longer has the ability to change passwords.     Viral Upper Respiratory Illness (Adult)  You have a viral upper respiratory illness (URI), which is another term for the common cold. This illness is contagious during the first few days. It is spread through the air by coughing and sneezing.  It may also be spread by direct contact (touching the sick person and then touching your own eyes, nose, or mouth). Frequent handwashing will decrease risk of spread. Most viral illnesses go away within 7 to 10 days with rest and simple home remedies. Sometimes the illness may last for several weeks. Antibiotics will not kill a virus, and they are generally not prescribed for this condition.    Home care    If symptoms are severe, rest at home for the first 2 to 3 days. When you resume activity, don't let yourself get too tired.    Avoid being exposed to cigarette smoke (yours or others ).    You may use acetaminophen or ibuprofen to control pain and fever, unless another medicine was prescribed. (Note: If you have chronic liver or kidney disease, have ever had a stomach ulcer or gastrointestinal bleeding, or are taking blood-thinning medicines, talk with your healthcare provider before using these medicines.) Aspirin should never be given to anyone under 18 years of age who is ill with a viral infection or fever. It may cause severe liver or brain damage.    Your appetite may be poor, so a light diet is fine. Avoid dehydration by drinking 6 to 8 glasses of fluids per day (water, soft drinks, juices, tea, or soup). Extra fluids will help loosen secretions in the nose and lungs.    Over-the-counter cold medicines will not shorten the length of time you re sick, but they may be helpful for the following symptoms: cough, sore throat, and nasal and sinus congestion. (Note: Do not use decongestants if you have high blood pressure.)  Follow-up care  Follow up with your healthcare provider, or as advised.  When to seek medical advice  Call your healthcare provider right away if any of these occur:    Cough with lots of colored sputum (mucus)    Severe headache; face, neck, or ear pain    Difficulty swallowing due to throat pain    Fever of 100.4 F (38 C)  Call 911, or get immediate medical care  Call emergency services  right away if any of these occur:    Chest pain, shortness of breath, wheezing, or difficulty breathing    Coughing up blood    Inability to swallow due to throat pain  Date Last Reviewed: 9/13/2015 2000-2017 [employer]. [address] [place] [address], [place] [address]. All rights reserved. This information is not intended as a substitute for professional medical care. Always follow your healthcare professional's instructions.

## 2017-10-23 NOTE — MR AVS SNAPSHOT
After Visit Summary   10/23/2017    Brandy Leon    MRN: [id number]           Patient Information     Date Of Birth          1940        Visit Information        Provider Department      10/23/2017 1:40 PM Jennifer Wheeler APRN CNP [facility]        Today's Diagnoses     Viral respiratory illness    -  1    Influenza vaccination declined by patient          Care Instructions    Based on your medical history and these are the current health maintenance or preventive care services that you are due for (some may have been done at this visit)  Health Maintenance Due   Topic Date Due     INFLUENZA VACCINE (SYSTEM ASSIGNED)  09/01/2017         At [facility], we strive to deliver an exceptional experience to you, every time we see you.    If you receive a survey in the mail, please send us back your thoughts. We really do value your feedback.    Your care team's suggested websites for health information:  Www.Big Screen Tools : Up to date and easily searchable information on multiple topics.  Www.medlineplus.gov : medication info, interactive tutorials, watch real surgeries online  Www.familydoctor.org : good info from the Academy of Family Physicians  Www.cdc.gov : public health info, travel advisories, epidemics (H1N1)  Www.aap.org : children's health info, normal development, vaccinations  Www.health.[facility].[place].us : [place] dept of health, public health issues in [place], N1N1    How to contact your care team:   Lesia Turner/Trav [phone]         Pharmacy [phone]    Dr. Montiel, Janet Van PA-C, Dr. Koch, Lorelei MERCEDES CNP, Ellen Payne PA-C, Dr. Tavera, and MAGO Villarreal CNP    Team RN: Shantell      Clinic hours  M-Th 7 am-7 pm   Fri 7 am-5 pm.   Urgent care M-F 11 am-9 pm,   Sat/Sun 9 am-5 pm.  Pharmacy M-Th 8 am-8 pm Fri 8 am-6 pm  Sat/Sun 9 am-5 pm.     All password changes, disabled accounts, or ID changes in  MyChart/MyHealth will be done by our Access Services Department.    If you need help with your account or password, call: [phone]. Clinic staff no longer has the ability to change passwords.     Viral Upper Respiratory Illness (Adult)  You have a viral upper respiratory illness (URI), which is another term for the common cold. This illness is contagious during the first few days. It is spread through the air by coughing and sneezing. It may also be spread by direct contact (touching the sick person and then touching your own eyes, nose, or mouth). Frequent handwashing will decrease risk of spread. Most viral illnesses go away within 7 to 10 days with rest and simple home remedies. Sometimes the illness may last for several weeks. Antibiotics will not kill a virus, and they are generally not prescribed for this condition.    Home care    If symptoms are severe, rest at home for the first 2 to 3 days. When you resume activity, don't let yourself get too tired.    Avoid being exposed to cigarette smoke (yours or others ).    You may use acetaminophen or ibuprofen to control pain and fever, unless another medicine was prescribed. (Note: If you have chronic liver or kidney disease, have ever had a stomach ulcer or gastrointestinal bleeding, or are taking blood-thinning medicines, talk with your healthcare provider before using these medicines.) Aspirin should never be given to anyone under 18 years of age who is ill with a viral infection or fever. It may cause severe liver or brain damage.    Your appetite may be poor, so a light diet is fine. Avoid dehydration by drinking 6 to 8 glasses of fluids per day (water, soft drinks, juices, tea, or soup). Extra fluids will help loosen secretions in the nose and lungs.    Over-the-counter cold medicines will not shorten the length of time you re sick, but they may be helpful for the following symptoms: cough, sore throat, and nasal and sinus congestion. (Note: Do not use  decongestants if you have high blood pressure.)  Follow-up care  Follow up with your healthcare provider, or as advised.  When to seek medical advice  Call your healthcare provider right away if any of these occur:    Cough with lots of colored sputum (mucus)    Severe headache; face, neck, or ear pain    Difficulty swallowing due to throat pain    Fever of 100.4 F (38 C)  Call 911, or get immediate medical care  Call emergency services right away if any of these occur:    Chest pain, shortness of breath, wheezing, or difficulty breathing    Coughing up blood    Inability to swallow due to throat pain  Date Last Reviewed: 9/13/2015 2000-2017 [employer]. [address] [place] [address]. All rights reserved. This information is not intended as a substitute for professional medical care. Always follow your healthcare professional's instructions.                Follow-ups after your visit        Your next 10 appointments already scheduled     Dec 20, 2017  7:00 AM CST   Return Visit with Ehsan Araya DPM   [facility])    [address] [place] [id number] [phone]            Sep 25, 2018 10:45 AM CDT   Return Visit with Lucita Jordan MD   [facility])    [address] [place] [id number] [phone]              Who to contact     If you have questions or need follow up information about today's clinic visit or your schedule please contact [facility] directly at [phone].  Normal or non-critical lab and imaging results will be communicated to you by MyChart, letter or phone within 4 business days after the clinic has received the results. If you do not hear from us within 7 days, please contact the clinic through MyChart or phone. If you have a critical or abnormal lab result, we will notify you by phone as soon as possible.  Submit refill  "requests through [employer] or call your pharmacy and they will forward the refill request to us. Please allow 3 business days for your refill to be completed.          Additional Information About Your Visit        [facility] [employer] Information     [employer] lets you send messages to your doctor, view your test results, renew your prescriptions, schedule appointments and more. To sign up, go to [email or web address] . Click on \"Log in\" on the left side of the screen, which will take you to the Welcome page. Then click on \"Sign up Now\" on the right side of the page.     You will be asked to enter the access code listed below, as well as some personal information. Please follow the directions to create your username and password.     Your access code is: [email or web address]  Expires: 2018  2:28 PM     Your access code will  in 90 days. If you need help or a new code, please call your [facility] clinic or [phone].        Care EveryWhere ID     This is your Care EveryWhere ID. This could be used by other organizations to access your [facility] medical records  [id number]        Your Vitals Were     Pulse Temperature Height Pulse Oximetry BMI (Body Mass Index)       92 98.1  F (36.7  C) (Oral) 5' 3.75\" (1.619 m) 96% 31.8 kg/m2        Blood Pressure from Last 3 Encounters:   10/23/17 124/67   17 128/78   17 102/60    Weight from Last 3 Encounters:   10/23/17 183 lb 12.8 oz (83.4 kg)   17 183 lb 8 oz (83.2 kg)   17 188 lb 9.6 oz (85.5 kg)              We Performed the Following     Beta strep group A culture     Influenza A/B antigen     Strep, Rapid Screen          Today's Medication Changes          These changes are accurate as of: 10/23/17  2:28 PM.  If you have any questions, ask your nurse or doctor.               These medicines have changed or have updated prescriptions.        Dose/Directions    aspirin 81 MG EC tablet   This may have changed:  how much to take   Used for:  Type 2 diabetes, " HbA1c goal < 7% (H)        Dose:  81 mg   Take 1 tablet by mouth daily.   Quantity:  90 tablet   Refills:  3       insulin glargine 100 UNIT/ML injection   Commonly known as:  LANTUS SOLOSTAR   This may have changed:  how much to take   Used for:  Type 2 diabetes mellitus with hyperglycemia, with long-term current use of insulin (H)        Dose:  26 Units   Inject 26 Units Subcutaneous At Bedtime   Quantity:  45 mL   Refills:  3       irbesartan-hydrochlorothiazide 150-12.5 MG per tablet   Commonly known as:  AVALIDE   This may have changed:  See the new instructions.   Used for:  Hypertension goal BP (blood pressure) < 140/90        TAKE 1 TABLET BY MOUTH DAILY   Quantity:  90 tablet   Refills:  3                Primary Care Provider Office Phone # Fax #    Cailin Ponce, APRN [phone] [address]  [place] [address]        Equal Access to Services     [facility] : Hadii aad ku hadasho Soomaali, waaxda luqadaha, qaybta kaalmada adeegyada, waxay idiin hayaan keri azevedo . So [facility] [phone].    ATENCIÓN: Si habla español, tiene a garcía disposición servicios gratuitos de asistencia lingüística. Vesna al [phone].    We comply with applicable federal civil rights laws and [place] laws. We do not discriminate on the basis of race, color, national origin, age, disability, sex, sexual orientation, or gender identity.            Thank you!     Thank you for choosing [facility]  for your care. Our goal is always to provide you with excellent care. Hearing back from our patients is one way we can continue to improve our services. Please take a few minutes to complete the written survey that you may receive in the mail after your visit with us. Thank you!             Your Updated Medication List - Protect others around you: Learn how to safely use, store and throw away your medicines at www.disposemymeds.org.          This list is accurate  as of: 10/23/17  2:28 PM.  Always use your most recent med list.                   Brand Name Dispense Instructions for use Diagnosis    aspirin 81 MG EC tablet     90 tablet    Take 1 tablet by mouth daily.    Type 2 diabetes, HbA1c goal < 7% (H)       BD JUAN C U/F 32G X 4 MM   Generic drug:  insulin pen needle     100 each    USE 1 DAILY AS DIRECTED.    Type 2 diabetes mellitus with hyperglycemia (H)       canagliflozin 300 MG tablet    INVOKANA    30 tablet    Take 1 tablet (300 mg) by mouth every morning (before breakfast)    Type 2 diabetes mellitus with hyperglycemia, with long-term current use of insulin (H)       famotidine 20 MG tablet    PEPCID     Take 20 mg by mouth nightly as needed        fluticasone 50 MCG/ACT spray    FLONASE    16 g    Spray 2 sprays into both nostrils daily    Postnasal drip       glipiZIDE 10 MG tablet    GLUCOTROL    120 tablet    TAKE 2 TABLETS BY MOUTH TWICE A DAY BEFORE MEALS    Type 2 diabetes mellitus with hyperglycemia, with long-term current use of insulin (H)       GLUCOSAMINE CHONDROITIN COMPLX PO      2 Daily        insulin glargine 100 UNIT/ML injection    LANTUS SOLOSTAR    45 mL    Inject 26 Units Subcutaneous At Bedtime    Type 2 diabetes mellitus with hyperglycemia, with long-term current use of insulin (H)       irbesartan-hydrochlorothiazide 150-12.5 MG per tablet    AVALIDE    90 tablet    TAKE 1 TABLET BY MOUTH DAILY    Hypertension goal BP (blood pressure) < 140/90       latanoprost 0.005 % ophthalmic solution    XALATAN    3 Bottle    Place 1 drop into both eyes At Bedtime    Primary open angle glaucoma of both eyes, moderate stage       metFORMIN 1000 MG tablet    GLUCOPHAGE    60 tablet    TAKE 1 TABLET (1,000 MG) BY MOUTH 2 TIMES DAILY (WITH MEALS)    Type 2 diabetes mellitus with hyperglycemia, with long-term current use of insulin (H)       MULTIVITAMIN PO      1 tablet daily        nitroFURantoin (macrocrystal-monohydrate) 100 MG capsule    MACROBID     14 capsule    Take 1 capsule (100 mg) by mouth 2 times daily    Dysuria       OMEGA 3 PO      Take by mouth 2 times daily.        ONE TOUCH DELICA LANCETS Misc     100 each    1 each 2 times daily. One Touch Delica    Type 2 diabetes, HbA1c goal < 7% (H)       ONE TOUCH ULTRA test strip   Generic drug:  blood glucose monitoring     200 strip    TEST TWICE DAILY    Type 2 diabetes mellitus with hyperglycemia, with long-term current use of insulin (H)       order for DME     1 each    Glucometer covered by insurance.    Type 2 diabetes, HbA1c goal < 7% (H)       oxybutynin 5 MG 24 hr tablet    DITROPAN-XL    70 tablet    TAKE 1 TABLET BY MOUTH DAILY FOR 1 WEEK THEN TAKE 2 TABLETS DAILY    Urinary frequency, Overactive bladder       SYNTHROID 137 MCG tablet   Generic drug:  levothyroxine     90 tablet    Take 1 tablet (137 mcg) by mouth daily    Myxedema heart disease (H), Nutritional and metabolic cardiomyopathy (H)

## 2017-10-23 NOTE — LETTER
October 25, 2017      Brandy Leon  [address] [address]            Dear Brandy,    Your throat culture was negative.  Continue with the supportive measures we discussed at your visit.  Please feel free to call me with any questions or concerns.    Sincerely,      Jennifer Wheeler APRN, CNP/ak        Results for orders placed or performed in visit on 10/23/17   Strep, Rapid Screen   Result Value Ref Range    Specimen Description Throat     Rapid Strep A Screen       NEGATIVE: No Group A streptococcal antigen detected by immunoassay, await culture report.   Influenza A/B antigen   Result Value Ref Range    Influenza A/B Agn Specimen Nasal     Influenza A Negative NEG^Negative    Influenza B Negative NEG^Negative   Beta strep group A culture   Result Value Ref Range    Specimen Description Throat     Culture Micro No beta hemolytic Streptococcus Group A isolated

## 2017-10-23 NOTE — TELEPHONE ENCOUNTER
Called patient, she states she has onset yesterday morning of slightly productive cough, body aches, chills/hot and voice hoarse. She has not taken her temperature.      She denies SOB, wheezing, nasal drainage.    She feels she has slight sore throat from cough.  Advised to be seen today, she will go to [facility] to see Jennifer Wheeler NP.    Routing as FYI to  Jennifer Wheeler NP.    Mirna Melendrez RN, [facility]

## 2017-10-23 NOTE — NURSING NOTE
"Chief Complaint   Patient presents with     URI       Initial /67 (BP Location: Left arm, Patient Position: Sitting, Cuff Size: Adult Regular)  Pulse 92  Temp 98.1  F (36.7  C) (Oral)  Ht 5' 3.75\" (1.619 m)  Wt 183 lb 12.8 oz (83.4 kg)  SpO2 96%  BMI 31.8 kg/m2 Estimated body mass index is 31.8 kg/(m^2) as calculated from the following:    Height as of this encounter: 5' 3.75\" (1.619 m).    Weight as of this encounter: 183 lb 12.8 oz (83.4 kg).  Medication Reconciliation: complete   Jackie Snowden MA      "

## 2017-10-23 NOTE — PROGRESS NOTES
"  SUBJECTIVE:   Brandy Leon is a 77 year old female who presents to clinic today for the following health issues:      ENT Symptoms             Symptoms: cc Present Absent Comment   Fever/Chills  x  Chills    Fatigue  x     Muscle Aches  x     Eye Irritation  x  \"feels like glue in them\"    Sneezing  x  Intermittent    Nasal Cresencio/Drg  x  Nasal drip - thick green with reddish brown lumps   Sinus Pressure/Pain  x  Frontal and maxillary bilaterally   Loss of smell   x    Dental pain   x    Sore Throat  x  Difficulty talking    Swollen Glands  x     Ear Pain/Fullness   x    Cough  x     Wheeze   x    Chest Pain   x    Shortness of breath   x    Rash   x    Other         Symptom duration:  Sunday    Symptom severity:  Severe    Treatments tried:  Coricidin   Contacts:  Yes-friend with similar symptoms.     Blood sugars have been well controlled although her am sugar was 133, normally it runs in the 90's.        No concerns with medication compliance, no adverse effects per patient.     Problem list and histories reviewed & adjusted, as indicated.  Additional history: as documented    Patient Active Problem List   Diagnosis     Seasonal allergies     Hypothyroidism     Hyperlipidemia LDL goal <100     Fibromyalgia     Osteoarthritis     Actinic keratosis     Advanced directives, counseling/discussion     Glaucoma suspect     Cataracts, bilateral     Obesity     Type 2 diabetes mellitus with hyperglycemia, with long-term current use of insulin (H)     Hypertension goal BP (blood pressure) < 140/90     Overactive bladder     Primary osteoarthritis of both hands     Recurrent UTI     Past Surgical History:   Procedure Laterality Date     C APPENDECTOMY       C ARTHROPLASTY TMJ       COLONOSCOPY       COLONOSCOPY N/A 8/13/2015    Procedure: COLONOSCOPY;  Surgeon: Duane, William Charles, MD;  Location: [facility] OR     COLONOSCOPY WITH CO2 INSUFFLATION N/A 8/13/2015    Procedure: COLONOSCOPY WITH CO2 INSUFFLATION;  Surgeon: " "Duane, William Charles, MD;  Location: [facility] OR     THYROIDECTOMY          Social History   Substance Use Topics     Smoking status: Never Smoker     Smokeless tobacco: Never Used      Comment: has had exposure to second hand smoke in the past from [employer], no current exposure     Alcohol use No     Family History   Problem Relation Age of Onset     CANCER Father      skin and leukemia     CEREBROVASCULAR DISEASE Maternal Grandfather      CEREBROVASCULAR DISEASE Paternal Grandmother      Eye Disorder Paternal Grandmother      cataracts     CEREBROVASCULAR DISEASE Paternal Grandfather      HEART DISEASE Paternal Grandfather      CANCER Sister      Breast Cancer     Eye Disorder Mother      cataracts     Eye Disorder Brother      cataract     Breast Cancer Daughter          BP Readings from Last 3 Encounters:   10/23/17 124/67   08/16/17 128/78   07/20/17 102/60    Wt Readings from Last 3 Encounters:   10/23/17 183 lb 12.8 oz (83.4 kg)   07/20/17 183 lb 8 oz (83.2 kg)   05/18/17 188 lb 9.6 oz (85.5 kg)                  Labs reviewed in EPIC        Reviewed and updated as needed this visit by clinical staff       Reviewed and updated as needed this visit by Provider         ROS:  Constitutional, HEENT, cardiovascular, pulmonary, gi and gu systems are negative, except as otherwise noted.      OBJECTIVE:   /67 (BP Location: Left arm, Patient Position: Sitting, Cuff Size: Adult Regular)  Pulse 92  Temp 98.1  F (36.7  C) (Oral)  Ht 5' 3.75\" (1.619 m)  Wt 183 lb 12.8 oz (83.4 kg)  SpO2 96%  BMI 31.8 kg/m2  Body mass index is 31.8 kg/(m^2).  GENERAL: healthy, alert and no distress  EYES: Eyes grossly normal to inspection, PERRL and conjunctivae and sclerae normal  HENT: normal cephalic/atraumatic, ear canals and TM's normal, nose and mouth without ulcers or lesions, posterior pharynx is erythematous but without exudate,  and oral mucous membranes moist  NECK: no adenopathy, no asymmetry, masses, or scars and thyroid " "normal to palpation  RESP: lungs clear to auscultation - no rales, rhonchi or wheezes  CV: regular rate and rhythm, normal S1 S2, no S3 or S4, no murmur, click or rub, no peripheral edema and peripheral pulses strong  ABDOMEN: soft, nontender, no hepatosplenomegaly, no masses and bowel sounds normal  MS: no gross musculoskeletal defects noted, no edema  SKIN: no suspicious lesions or rashes  NEURO: Normal strength and tone, mentation intact and speech normal  PSYCH: mentation appears normal, affect normal/bright  LYMPH: normal ant/post cervical, supraclavicular nodes    Diagnostic Test Results:  Results for orders placed or performed in visit on 10/23/17 (from the past 24 hour(s))   Strep, Rapid Screen   Result Value Ref Range    Specimen Description Throat     Rapid Strep A Screen       NEGATIVE: No Group A streptococcal antigen detected by immunoassay, await culture report.   Influenza A/B antigen   Result Value Ref Range    Influenza A/B Agn Specimen Nasal     Influenza A Negative NEG^Negative    Influenza B Negative NEG^Negative       ASSESSMENT/PLAN:         BMI:   Estimated body mass index is 31.8 kg/(m^2) as calculated from the following:    Height as of this encounter: 5' 3.75\" (1.619 m).    Weight as of this encounter: 183 lb 12.8 oz (83.4 kg).   Weight management plan: Discussed healthy diet and exercise guidelines and patient will follow up in 6 months in clinic to re-evaluate.    Declined immunizations: Influenza due to Concurrent illness      1. Viral respiratory illness    RST and Influenza A/B Ag negative, await TC result.  Supportive measures for now- fluids, rest, humidified air, tylenol/Ibuprofen prn fever, body aches, hand washing reviewed, avoid shared eating/drinking utensils.    - Strep, Rapid Screen  - Influenza A/B antigen  - Beta strep group A culture    2. Hypertension goal BP (blood pressure) < 140/90  BP well controlled, continue present managemetn    3. Type 2 diabetes mellitus with " hyperglycemia, with long-term current use of insulin (H)  Last A1c 9.3 but patient reports normal blood sugars today.Recheck A1c (future order placed). Discussed benefits of well controlled diabetes, risks of poorly controlled diabetes. Patient declines CDE referral at this time.    4. Influenza vaccination declined by patient        See Patient Instructions    MAGO Zapata [facility]

## 2017-10-24 LAB
BACTERIA SPEC CULT: NORMAL
SPECIMEN SOURCE: NORMAL

## 2017-10-24 RX ORDER — OXYBUTYNIN CHLORIDE 5 MG/1
10 TABLET, EXTENDED RELEASE ORAL DAILY
Qty: 180 TABLET | Refills: 1 | Status: SHIPPED | OUTPATIENT
Start: 2017-10-24 | End: 2018-04-23

## 2017-10-24 NOTE — TELEPHONE ENCOUNTER
oxybutynin (DITROPAN-XL) 5 MG 24 hr tablet    Last Written Prescription Date: 7/10/2017  Last Fill Quantity: 70,  # refills: 2   Last Office Visit with [facility], ANY or [facility] prescribing provider: 7/20/2017                                         Next 5 appointments (look out 90 days)     Dec 20, 2017  7:00 AM CST   [facility] Ehsan [facility]a DPM   [facility]    [address] [place] [id number] [phone]                  Refilled per [facility] protocol.    Sharla Young RN

## 2017-10-31 ENCOUNTER — OFFICE VISIT (OUTPATIENT)
Dept: PEDIATRICS | Facility: CLINIC | Age: 77
End: 2017-10-31
Payer: COMMERCIAL

## 2017-10-31 VITALS
BODY MASS INDEX: 31.24 KG/M2 | DIASTOLIC BLOOD PRESSURE: 60 MMHG | SYSTOLIC BLOOD PRESSURE: 120 MMHG | OXYGEN SATURATION: 96 % | TEMPERATURE: 96.4 F | WEIGHT: 180.6 LBS | HEART RATE: 98 BPM

## 2017-10-31 DIAGNOSIS — N39.0 RECURRENT UTI: ICD-10-CM

## 2017-10-31 DIAGNOSIS — R82.90 NONSPECIFIC FINDING ON EXAMINATION OF URINE: ICD-10-CM

## 2017-10-31 DIAGNOSIS — N39.0 URINARY TRACT INFECTION WITHOUT HEMATURIA, SITE UNSPECIFIED: ICD-10-CM

## 2017-10-31 DIAGNOSIS — N32.81 OVERACTIVE BLADDER: ICD-10-CM

## 2017-10-31 DIAGNOSIS — J98.01 COUGH DUE TO BRONCHOSPASM: ICD-10-CM

## 2017-10-31 DIAGNOSIS — J20.9 ACUTE BRONCHITIS WITH SYMPTOMS > 10 DAYS: Primary | ICD-10-CM

## 2017-10-31 LAB
ALBUMIN UR-MCNC: NEGATIVE MG/DL
APPEARANCE UR: CLEAR
BACTERIA #/AREA URNS HPF: ABNORMAL /HPF
BILIRUB UR QL STRIP: NEGATIVE
COLOR UR AUTO: ABNORMAL
GLUCOSE UR STRIP-MCNC: >1000 MG/DL
HGB UR QL STRIP: NEGATIVE
KETONES UR STRIP-MCNC: 5 MG/DL
LEUKOCYTE ESTERASE UR QL STRIP: ABNORMAL
NITRATE UR QL: POSITIVE
NON-SQ EPI CELLS #/AREA URNS LPF: ABNORMAL /LPF
PH UR STRIP: 6 PH (ref 5–7)
RBC #/AREA URNS AUTO: ABNORMAL /HPF
SOURCE: ABNORMAL
SP GR UR STRIP: 1.02 (ref 1–1.03)
UROBILINOGEN UR STRIP-MCNC: NORMAL MG/DL (ref 0–2)
WBC #/AREA URNS AUTO: ABNORMAL /HPF

## 2017-10-31 PROCEDURE — 99214 OFFICE O/P EST MOD 30 MIN: CPT | Performed by: NURSE PRACTITIONER

## 2017-10-31 PROCEDURE — 81001 URINALYSIS AUTO W/SCOPE: CPT | Performed by: NURSE PRACTITIONER

## 2017-10-31 PROCEDURE — 87186 SC STD MICRODIL/AGAR DIL: CPT | Performed by: NURSE PRACTITIONER

## 2017-10-31 PROCEDURE — 87086 URINE CULTURE/COLONY COUNT: CPT | Performed by: NURSE PRACTITIONER

## 2017-10-31 PROCEDURE — 87088 URINE BACTERIA CULTURE: CPT | Performed by: NURSE PRACTITIONER

## 2017-10-31 RX ORDER — DOXYCYCLINE HYCLATE 100 MG
100 TABLET ORAL 2 TIMES DAILY
Qty: 14 TABLET | Refills: 0 | Status: SHIPPED | OUTPATIENT
Start: 2017-10-31 | End: 2017-12-13

## 2017-10-31 RX ORDER — ALBUTEROL SULFATE 90 UG/1
2 AEROSOL, METERED RESPIRATORY (INHALATION) EVERY 4 HOURS PRN
Qty: 1 INHALER | Refills: 1 | Status: SHIPPED | OUTPATIENT
Start: 2017-10-31 | End: 2018-10-18

## 2017-10-31 NOTE — PROGRESS NOTES
SUBJECTIVE:   Brandy Leon is a 77 year old female who presents to clinic today for the following health issues:    Acute Illness   Acute illness concerns: coughing, body aches  Onset: 2 weeks    Fever: no    Chills/Sweats: YES    Headache (location?): YES    Sinus Pressure:YES    Conjunctivitis:  no    Ear Pain: YES- plugged    Rhinorrhea: YES    Congestion: YES    Sore Throat: YES     Cough: YES-productive of grayish brown sputum    Wheeze: no    Decreased Appetite: YES    Nausea: YES    Vomiting: no    Diarrhea:  no    Dysuria/Freq.: YES- hard time controlling bladder    Fatigue/Achiness: YES    Sick/Strep Exposure: no     Therapies Tried and outcome: ibuprofen, coricidin   Been sick for the last 2 weeks  Was seen about a week ago and negative for flu and RSV   Now coughing so hard will pee when she cough   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  Additional history: as documented    Patient Active Problem List   Diagnosis     Seasonal allergies     Hypothyroidism     Hyperlipidemia LDL goal <100     Fibromyalgia     Osteoarthritis     Actinic keratosis     Advanced directives, counseling/discussion     Glaucoma suspect     Cataracts, bilateral     Obesity     Type 2 diabetes mellitus with hyperglycemia, with long-term current use of insulin (H)     Hypertension goal BP (blood pressure) < 140/90     Overactive bladder     Primary osteoarthritis of both hands     Recurrent UTI     Past Surgical History:   Procedure Laterality Date     C APPENDECTOMY       C ARTHROPLASTY TMJ       COLONOSCOPY       COLONOSCOPY N/A 8/13/2015    Procedure: COLONOSCOPY;  Surgeon: Duane, William Charles, MD;  Location: [facility] OR     COLONOSCOPY WITH CO2 INSUFFLATION N/A 8/13/2015    Procedure: COLONOSCOPY WITH CO2 INSUFFLATION;  Surgeon: Duane, William Charles, MD;  Location: [facility] OR     THYROIDECTOMY          Social History   Substance Use Topics     Smoking  status: Never Smoker     Smokeless tobacco: Never Used      Comment: [facility]     Alcohol use No     Family History   Problem Relation Age of Onset     CANCER Father      skin and leukemia     CEREBROVASCULAR DISEASE Maternal Grandfather      CEREBROVASCULAR DISEASE Paternal Grandmother      Eye Disorder Paternal Grandmother      cataracts     CEREBROVASCULAR DISEASE Paternal Grandfather      HEART DISEASE Paternal Grandfather      CANCER Sister      Breast Cancer     Eye Disorder Mother      cataracts     Eye Disorder Brother      cataract     Breast Cancer Daughter      Other Cancer Daughter      ovarian cancer         Current Outpatient Prescriptions   Medication Sig Dispense Refill     oxybutynin (DITROPAN-XL) 5 MG 24 hr tablet Take 2 tablets (10 mg) by mouth daily 180 tablet 1     latanoprost (XALATAN) 0.005 % ophthalmic solution Place 1 drop into both eyes At Bedtime 3 Bottle 4     SYNTHROID 137 MCG tablet Take 1 tablet (137 mcg) by mouth daily 90 tablet 1     glipiZIDE (GLUCOTROL) 10 MG tablet TAKE 2 TABLETS BY MOUTH TWICE A DAY BEFORE MEALS 120 tablet 2     irbesartan-hydrochlorothiazide (AVALIDE) 150-12.5 MG per tablet TAKE 1 TABLET BY MOUTH DAILY (Patient taking differently: TAKE .5 TABLET BY MOUTH DAILY) 90 tablet 3     metFORMIN (GLUCOPHAGE) 1000 MG tablet TAKE 1 TABLET (1,000 MG) BY MOUTH 2 TIMES DAILY (WITH MEALS) 60 tablet 0     BD JUAN C U/F 32G X 4 MM insulin pen needle USE 1 DAILY AS DIRECTED. 100 each 3     canagliflozin (INVOKANA) 300 MG tablet Take 1 tablet (300 mg) by mouth every morning (before breakfast) (Patient taking differently: 0.5 tablet by mouth daily) 30 tablet 3     ONE TOUCH ULTRA test strip TEST TWICE DAILY 200 strip 11     insulin glargine (LANTUS SOLOSTAR) 100 UNIT/ML PEN Inject 26 Units Subcutaneous At Bedtime (Patient taking differently: Inject 18 Units Subcutaneous At Bedtime ) 45 mL 3     famotidine  (PEPCID) 20 MG tablet Take 20 mg by mouth nightly as needed       order for DME Glucometer covered by insurance. 1 each 0     aspirin 81 MG EC tablet Take 1 tablet by mouth daily. (Patient taking differently: Take 650 mg by mouth daily ) 90 tablet 3     ONE TOUCH DELICA LANCETS MISC 1 each 2 times daily. One Touch Delica   100 each 12     GLUCOSAMINE CHONDROITIN COMPLX OR 2 Daily       Omega-3 Fatty Acids (OMEGA 3 PO) Take by mouth 2 times daily.        MULTIVITAMIN OR 1 tablet daily       Allergies   Allergen Reactions     Estradiol Itching     Lipitor [Atorvastatin Calcium]      Weak and shaky     Sulfa Drugs      Rash       Zocor [Simvastatin]      Weak and shakey     Recent Labs   Lab Test  07/20/17   1202  04/14/17   1028 04/14/17 02/09/17   1200  11/08/16   1009  09/27/16   1036  07/26/16   0828   02/23/16   1115   A1C  9.3*   --   10.2  10.7*  11.0*   --   10.4*   < >  11.2*   LDL   --    --    --   52   --    --   48   --   88   HDL   --    --    --   59   --    --   57   --   59   TRIG   --    --    --   157*   --    --   162*   --   187*   ALT   --   21   --   21  31   --    --    --    --    CR   --   0.76   --   0.67  0.62   --    --    < >  0.71   GFRESTIMATED   --   74   --   86  >90  Non  GFR Calc     --    --    < >  80   GFRESTBLACK   --   90   --   >90   GFR Calc    >90   GFR Calc     --    --    < >  >90   GFR Calc     POTASSIUM   --   4.3   --   4.2  4.1   --    --    < >  4.0   TSH   --    --    --   1.33   --   1.88  1.78   --    --     < > = values in this interval not displayed.      BP Readings from Last 3 Encounters:   10/31/17 120/60   10/23/17 124/67   08/16/17 128/78    Wt Readings from Last 3 Encounters:   10/31/17 180 lb 9.6 oz (81.9 kg)   10/23/17 183 lb 12.8 oz (83.4 kg)   07/20/17 183 lb 8 oz (83.2 kg)                  Labs reviewed in EPIC          Reviewed and updated as needed this visit by clinical staffTobacco   Allergies  Meds  Med Hx  Surg Hx  Fam Hx  Soc Hx      Reviewed and updated as needed this visit by Provider         ROS:  CONSTITUTIONAL:POSITIVE  for chills and NEGATIVE  for fever   ENT/MOUTH: POSITIVE for earache bilateral, nasal congestion, postnasal drainage, sinus pressure, sore throat and tooth pain and NEGATIVE for epistaxis and fever  RESP:POSITIVE for cough-productive and NEGATIVE for SOB/dyspnea and wheezing  CV: NEGATIVE for chest pain/chest pressure  GI: NEGATIVE for nausea, poor appetite and vomiting  MUSCULOSKELETAL: NEGATIVE for significant arthralgias or myalgia  ENDOCRINE: NEGATIVE for temperature intolerance, skin/hair changes and POSITIVE  for HX diabetes    OBJECTIVE:     /60 (BP Location: Right arm, Patient Position: Sitting, Cuff Size: Adult Regular)  Pulse 98  Temp 96.4  F (35.8  C) (Temporal)  Wt 180 lb 9.6 oz (81.9 kg)  SpO2 96%  Breastfeeding? No  BMI 31.24 kg/m2  Body mass index is 31.24 kg/(m^2).   Wt Readings from Last 4 Encounters:   10/31/17 180 lb 9.6 oz (81.9 kg)   10/23/17 183 lb 12.8 oz (83.4 kg)   07/20/17 183 lb 8 oz (83.2 kg)   05/18/17 188 lb 9.6 oz (85.5 kg)       GENERAL: Patient is well nourished, well developed,in no apparent distress  ill and uncomfortable  EYES:  Right conjunctiva is not injected and without discharge.  Left conjunctiva is not injected and without discharge.  EARS: negative findings: external ears normal to inspection and palpation, no mastoid process tenderness, positive findings: , Right TM: serous middle ear fluid, Left TM: serous middle ear fluid  NOSE: positive findings: mucosa erythematous and swollen,  Sinus not tender.  THROAT: normal.  NECK: supple with no adenopathy,   CARDIAC:NORMAL - regular rate and rhythm without murmur.  RESP: normal respiratory rate and rhythm and mild intermittent expiratory wheezes  unlabored respirations, no intercostal retractions or accessory muscle use  ABD: Abdomen soft, non-tender.  SKIN: Skin  color, texture, turgor normal. No rashes or lesions.  MS: extremities normal- no gross deformities noted, gait normal and normal muscle tone        Diagnostic Test Results:  Results for orders placed or performed in visit on 10/31/17   UA with Microscopic reflex to Culture   Result Value Ref Range    Color Urine Light Yellow     Appearance Urine Clear     Glucose Urine >1000 (A) NEG^Negative mg/dL    Bilirubin Urine Negative NEG^Negative    Ketones Urine 5 (A) NEG^Negative mg/dL    Specific Gravity Urine 1.022 1.003 - 1.035    Blood Urine Negative NEG^Negative    pH Urine 6.0 5.0 - 7.0 pH    Protein Albumin Urine Negative NEG^Negative mg/dL    Urobilinogen mg/dL Normal 0.0 - 2.0 mg/dL    Nitrite Urine Positive (A) NEG^Negative    Leukocyte Esterase Urine Moderate (A) NEG^Negative    Source Midstream Urine     WBC Urine 10-25 (A) OTO2^O - 2 /HPF    RBC Urine O - 2 OTO2^O - 2 /HPF    Bacteria Urine Many (A) NEG^Negative /HPF    Squamous Epithelial /LPF Urine Few FEW^Few /LPF   Urine Culture Aerobic Bacterial   Result Value Ref Range    Specimen Description Midstream Urine     Culture Micro >100,000 colonies/mL  Escherichia coli   (A)        Susceptibility    Escherichia coli - XIMENA     AMPICILLIN >=32 Resistant ug/mL     CEFAZOLIN* <=4 Sensitive ug/mL      * Cefazolin XIMENA breakpoints are for the treatment of uncomplicated urinary tract infections.  For the treatment of systemic infections, please contact the laboratory for additional testing.     CEFOXITIN <=4 Sensitive ug/mL     CEFTAZIDIME <=1 Sensitive ug/mL     CEFTRIAXONE <=1 Sensitive ug/mL     CIPROFLOXACIN 0.5 Sensitive ug/mL     GENTAMICIN <=1 Sensitive ug/mL     LEVOFLOXACIN 1 Sensitive ug/mL     NITROFURANTOIN <=16 Sensitive ug/mL     TOBRAMYCIN <=1 Sensitive ug/mL     Trimethoprim/Sulfa <=1/19 Sensitive ug/mL     AMPICILLIN/SULBACTAM 16 Intermediate ug/mL     Piperacillin/Tazo <=4 Sensitive ug/mL     CEFEPIME <=1 Sensitive ug/mL       ASSESSMENT/PLAN:      Brandy was seen today for uri.    Diagnoses and all orders for this visit:    Acute bronchitis with symptoms > 10 days  -     doxycycline (VIBRA-TABS) 100 MG tablet; Take 1 tablet (100 mg) by mouth 2 times daily  -     cefdinir (OMNICEF) 300 MG capsule; Take 1 capsule (300 mg) by mouth 2 times daily  Symptomatic therapy suggested: rest, apply heat to sinuses prn, use mist of vaporizer prn, OTC Robitussin DM and call prn if symptoms persist or worsen.\    Cough due to bronchospasm  -     albuterol (PROAIR HFA/PROVENTIL HFA/VENTOLIN HFA) 108 (90 BASE) MCG/ACT Inhaler; Inhale 2 puffs into the lungs every 4 hours as needed for shortness of breath / dyspnea or wheezing  -     Spacer/Aero-Holding Chambers (SPACER/AERO-HOLD CHAMBER MASK) SANTANA; 1 Device as needed    Recurrent UTI  -     UA with Microscopic reflex to Culture  Patient was instructed to drink plenty of fluids, urinate frequently and to contact the office promptly should she notice fever greater than 102, increase in discomfort, skin rash, lack of improvement after 2 days of treatment, or the appearance of new symptoms.    Overactive bladder  -     UA with Microscopic reflex to Culture    Nonspecific finding on examination of urine  -     Urine Culture Aerobic Bacterial    Urinary tract infection without hematuria, site unspecified  -     cefdinir (OMNICEF) 300 MG capsule; Take 1 capsule (300 mg) by mouth 2 times daily    PLAN:   1.   Symptomatic therapy suggested: rest, apply heat to sinuses prn, use mist of vaporizer prn, OTC Robitussin DM and call prn if symptoms persist or worsen.  2.  Orders Placed This Encounter   Medications     doxycycline (VIBRA-TABS) 100 MG tablet     Sig: Take 1 tablet (100 mg) by mouth 2 times daily     Dispense:  14 tablet     Refill:  0     albuterol (PROAIR HFA/PROVENTIL HFA/VENTOLIN HFA) 108 (90 BASE) MCG/ACT Inhaler     Sig: Inhale 2 puffs into the lungs every 4 hours as needed for shortness of breath / dyspnea or  wheezing     Dispense:  1 Inhaler     Refill:  1     Spacer/Aero-Holding Chambers (SPACER/AERO-HOLD CHAMBER MASK) SANTANA     Si Device as needed     Dispense:  1 each     Refill:  0     Orders Placed This Encounter   Procedures     UA with Microscopic reflex to Culture       3. Patient needs to follow up in if no improvement,or sooner if worsening of symptoms or other symptoms develop.      See Patient Instructions    MAGO [facility]

## 2017-10-31 NOTE — NURSING NOTE
"Chief Complaint   Patient presents with     URI     URI x 2 weeks, getting worst       Initial /60 (BP Location: Right arm, Patient Position: Sitting, Cuff Size: Adult Regular)  Pulse 98  Temp 96.4  F (35.8  C) (Temporal)  Wt 180 lb 9.6 oz (81.9 kg)  SpO2 96%  Breastfeeding? No  BMI 31.24 kg/m2 Estimated body mass index is 31.24 kg/(m^2) as calculated from the following:    Height as of 10/23/17: 5' 3.75\" (1.619 m).    Weight as of this encounter: 180 lb 9.6 oz (81.9 kg).  Medication Reconciliation: complete      KATINA Tucker      "

## 2017-10-31 NOTE — PATIENT INSTRUCTIONS
PLAN:   1.   Symptomatic therapy suggested: rest, apply heat to sinuses prn, use mist of vaporizer prn, OTC Robitussin DM and call prn if symptoms persist or worsen.  2.  Orders Placed This Encounter   Medications     doxycycline (VIBRA-TABS) 100 MG tablet     Sig: Take 1 tablet (100 mg) by mouth 2 times daily     Dispense:  14 tablet     Refill:  0     albuterol (PROAIR HFA/PROVENTIL HFA/VENTOLIN HFA) 108 (90 BASE) MCG/ACT Inhaler     Sig: Inhale 2 puffs into the lungs every 4 hours as needed for shortness of breath / dyspnea or wheezing     Dispense:  1 Inhaler     Refill:  1     Spacer/Aero-Holding Chambers (SPACER/AERO-HOLD CHAMBER MASK) SANTANA     Si Device as needed     Dispense:  1 each     Refill:  0     Orders Placed This Encounter   Procedures     UA with Microscopic reflex to Culture       3. Patient needs to follow up in if no improvement,or sooner if worsening of symptoms or other symptoms develop.    It was a pleasure seeing you today at the [facility]. Thank you for allowing us to care for you today. We truly hope we provided you with the excellent service you deserve. Please let us know if there is anything else we can do for you so we can be sure you are leaving completley satisfied with your care experience.       General information about your clinic   Clinic Hours Lab Hours (Appointments are required)   Mon-Thurs: 7:30 AM - 7 PM Mon-Thurs: 7:30 AM - 7 PM   Fri: 7:30 AM - 5 PM Fri: 7:30 AM - 5 PM        After Hours Nurse Advise & Appts:  [facility]: [phone]  [facility] On Call: to make appointments anytime: [phone] On Call Physician: call [phone] and answering service will page the on call physician.        For urgent appointments, please call [phone] and ask for the triage nurse or your care team clinic nurse.  How to contact my care team:  [place]: [email or web address]   Phone: [phone]   Fax: [phone]       [facility]  Pharmacy:   Phone: [phone]  Hours: 8:00 AM - 6:00 PM  Medication Refills:  Call your pharmacy and they will forward the refill to us. Please allow 3 business days for your refills to be completed.       Normal or non-critical lab and imaging results will be communicated to you by MyChart, letter or phone within 7 days.  If you do not hear from us within 10 days, please call the clinic. If you have a critical or abnormal lab result, we will notify you by phone as soon as possible.       We now have PWIC (Pediatric Walk in Care)  Monday-Friday from 7:30-4. Simply walk in and be seen for your urgent needs like cough, fever, rash, diarrhea or vomiting, pink eye, UTI. No appointments needed. Ask one of the team for more information      -Your Care Team:    Dr. Tara Keller - Internal Medicine/Pediatrics   Dr. Kirit Pérez - Family Medicine  Dr. Deborah Andres - Pediatrics  Cailin Ponce CNP - Family Practice Nurse Practitioner  Dr. Adelia Drew - Pediatrics               Using an Inhaler with a Spacer  To control asthma, you need to use your medicines the right way. Some medicines are inhaled using a device called a metered-dose inhaler (MDI). Metered-dose inhalers deliver medicine with a fine spray. You may be asked to use a spacer (holding tube) with your inhaler. The spacer helps make sure all the medicine you need goes into your lungs.   Steps for using an inhaler with a spacer  Step 1:    Remove the caps from the inhaler and spacer.    Shake the inhaler well and attach the spacer. If the inhaler is being used for the first time or has not been used for a while, prime it as directed by the product maker.  Step 2:    Breathe out normally.    Put the spacer between your teeth. Close your lips tightly around it.    Keep your chin up.  Step 3:    Spray 1 puff into the spacer by pressing down on the inhaler.    Then breathe in through your mouth as slowly and deeply as you can. This should take about 5-10 seconds. If  you breathe too quickly, you may hear a whistling sound in certain spacers.  Step 4:    Take the spacer out of your mouth.    Hold your breath for a count of 10.    Then hold your lips together and slowly breathe out through your mouth.          If you re prescribed more than 1 puff of medicine at a time, wait at least 30 seconds between puffs. This number may be different for different medicines. Shake the inhaler again. Then repeat steps 2 to 4.   Date Last Reviewed: 10/1/2016    0191-0707 [employer]. [address] [place] [address]. All rights reserved. This information is not intended as a substitute for professional medical care. Always follow your healthcare professional's instructions.

## 2017-10-31 NOTE — MR AVS SNAPSHOT
After Visit Summary   10/31/2017    Brandy Leon    MRN: [id number]           Patient Information     Date Of Birth          1940        Visit Information        Provider Department      10/31/2017 10:10 AM Cailin Ponce APRN CNP [facility]        Today's Diagnoses     Acute bronchitis with symptoms > 10 days    -  1    Cough due to bronchospasm        Recurrent UTI        Overactive bladder          Care Instructions    PLAN:   1.   Symptomatic therapy suggested: rest, apply heat to sinuses prn, use mist of vaporizer prn, OTC Robitussin DM and call prn if symptoms persist or worsen.  2.  Orders Placed This Encounter   Medications     doxycycline (VIBRA-TABS) 100 MG tablet     Sig: Take 1 tablet (100 mg) by mouth 2 times daily     Dispense:  14 tablet     Refill:  0     albuterol (PROAIR HFA/PROVENTIL HFA/VENTOLIN HFA) 108 (90 BASE) MCG/ACT Inhaler     Sig: Inhale 2 puffs into the lungs every 4 hours as needed for shortness of breath / dyspnea or wheezing     Dispense:  1 Inhaler     Refill:  1     Spacer/Aero-Holding Chambers (SPACER/AERO-HOLD CHAMBER MASK) SANTANA     Si Device as needed     Dispense:  1 each     Refill:  0     Orders Placed This Encounter   Procedures     UA with Microscopic reflex to Culture       3. Patient needs to follow up in if no improvement,or sooner if worsening of symptoms or other symptoms develop.    It was a pleasure seeing you today at the [facility]. Thank you for allowing us to care for you today. We truly hope we provided you with the excellent service you deserve. Please let us know if there is anything else we can do for you so we can be sure you are leaving completley satisfied with your care experience.       General information about your clinic   Clinic Hours Lab Hours (Appointments are required)   Mon-Thurs: 7:30 AM - 7 PM Mon-Thurs: 7:30 AM - 7 PM   Fri: 7:30 AM - 5 PM Fri: 7:30 AM - 5  PM        After Hours Nurse Advise & Appts:  [facility]: [phone]  [facility] On Call: to make appointments anytime: [phone] On Call Physician: call [phone] and answering service will page the on call physician.        For urgent appointments, please call [phone] and ask for the triage nurse or your care team clinic nurse.  How to contact my care team:  MyChart: [email or web address]   Phone: [phone]   Fax: [phone]       [facility]:   Phone: [phone]  Hours: 8:00 AM - 6:00 PM  Medication Refills:  Call your pharmacy and they will forward the refill to us. Please allow 3 business days for your refills to be completed.       Normal or non-critical lab and imaging results will be communicated to you by MyChart, letter or phone within 7 days.  If you do not hear from us within 10 days, please call the clinic. If you have a critical or abnormal lab result, we will notify you by phone as soon as possible.       We now have PWIC (Pediatric Walk in Care)  Monday-Friday from 7:30-4. Simply walk in and be seen for your urgent needs like cough, fever, rash, diarrhea or vomiting, pink eye, UTI. No appointments needed. Ask one of the team for more information      -Your Care Team:    Dr. Tara Keller - Internal Medicine/Pediatrics   Dr. Kirit Pérez - Family Medicine  Dr. Deborah Andres - Pediatrics  Cailin Ponce CNP - Family Practice Nurse Practitioner  Dr. Adelia Drew - Pediatrics               Using an Inhaler with a Spacer  To control asthma, you need to use your medicines the right way. Some medicines are inhaled using a device called a metered-dose inhaler (MDI). Metered-dose inhalers deliver medicine with a fine spray. You may be asked to use a spacer (holding tube) with your inhaler. The spacer helps make sure all the medicine you need goes into your lungs.   Steps for using an inhaler with a spacer  Step 1:    Remove the caps from the inhaler and spacer.    Shake  the inhaler well and attach the spacer. If the inhaler is being used for the first time or has not been used for a while, prime it as directed by the product maker.  Step 2:    Breathe out normally.    Put the spacer between your teeth. Close your lips tightly around it.    Keep your chin up.  Step 3:    Spray 1 puff into the spacer by pressing down on the inhaler.    Then breathe in through your mouth as slowly and deeply as you can. This should take about 5-10 seconds. If you breathe too quickly, you may hear a whistling sound in certain spacers.  Step 4:    Take the spacer out of your mouth.    Hold your breath for a count of 10.    Then hold your lips together and slowly breathe out through your mouth.          If you re prescribed more than 1 puff of medicine at a time, wait at least 30 seconds between puffs. This number may be different for different medicines. Shake the inhaler again. Then repeat steps 2 to 4.   Date Last Reviewed: 10/1/2016    1440-2135 [employer]. [address] [place] [address]. All rights reserved. This information is not intended as a substitute for professional medical care. Always follow your healthcare professional's instructions.                Follow-ups after your visit        Your next 10 appointments already scheduled     Dec 20, 2017  7:00 AM CST   Return Visit with Ehsan Araya DPM   [facility])    [address] [address] [place] [address] [phone]            Sep 25, 2018 10:45 AM CDT   Return Visit with Lucita Jordan MD   [facility])    [address] [place] [address] [phone]              Who to contact     If you have questions or need follow up information about today's clinic visit or your schedule please contact [facility] directly at [phone].  Normal or non-critical lab and imaging results will  be communicated to you by Donewshart, letter or phone within 4 business days after the clinic has received the results. If you do not hear from us within 7 days, please contact the clinic through [employer] or phone. If you have a critical or abnormal lab result, we will notify you by phone as soon as possible.  Submit refill requests through [employer] or call your pharmacy and they will forward the refill request to us. Please allow 3 business days for your refill to be completed.          Additional Information About Your Visit        [employer] Information     [employer] is an electronic gateway that provides easy, online access to your medical records. With [employer], you can request a clinic appointment, read your test results, renew a prescription or communicate with your care team.     To sign up for [employer] visit the website at [email or web address]   You will be asked to enter the access code listed below, as well as some personal information. Please follow the directions to create your username and password.     Your access code is: [email or web address]  Expires: 2018  2:28 PM     Your access code will  in 90 days. If you need help or a new code, please contact your [facility] or call [phone] for assistance.        Care EveryWhere ID     This is your Care EveryWhere ID. This could be used by other organizations to access your [facility] medical records  [id number]        Your Vitals Were     Pulse Temperature Pulse Oximetry Breastfeeding? BMI (Body Mass Index)       98 96.4  F (35.8  C) (Temporal) 96% No 31.24 kg/m2        Blood Pressure from Last 3 Encounters:   10/31/17 120/60   10/23/17 124/67   17 128/78    Weight from Last 3 Encounters:   10/31/17 180 lb 9.6 oz (81.9 kg)   10/23/17 183 lb 12.8 oz (83.4 kg)   17 183 lb 8 oz (83.2 kg)              We Performed the Following     UA with Microscopic reflex to Culture          Today's Medication Changes           These changes are accurate as of: 10/31/17 10:27 AM.  If you have any questions, ask your nurse or doctor.               Start taking these medicines.        Dose/Directions    albuterol 108 (90 BASE) MCG/ACT Inhaler   Commonly known as:  PROAIR HFA/PROVENTIL HFA/VENTOLIN HFA   Used for:  Cough due to bronchospasm   Started by:  Cailin Ponce APRN CNP        Dose:  2 puff   Inhale 2 puffs into the lungs every 4 hours as needed for shortness of breath / dyspnea or wheezing   Quantity:  1 Inhaler   Refills:  1       doxycycline 100 MG tablet   Commonly known as:  VIBRA-TABS   Used for:  Acute bronchitis with symptoms > 10 days   Started by:  Cailin Ponce APRN CNP        Dose:  100 mg   Take 1 tablet (100 mg) by mouth 2 times daily   Quantity:  14 tablet   Refills:  0       spacer/aero-hold chamber mask Aaliyah   Used for:  Cough due to bronchospasm   Started by:  Cailin Ponce APRN CNP        Dose:  1 Device   1 Device as needed   Quantity:  1 each   Refills:  0         These medicines have changed or have updated prescriptions.        Dose/Directions    aspirin 81 MG EC tablet   This may have changed:  how much to take   Used for:  Type 2 diabetes, HbA1c goal < 7% (H)        Dose:  81 mg   Take 1 tablet by mouth daily.   Quantity:  90 tablet   Refills:  3       canagliflozin 300 MG tablet   Commonly known as:  INVOKANA   This may have changed:    - how much to take  - how to take this  - when to take this  - additional instructions   Used for:  Type 2 diabetes mellitus with hyperglycemia, with long-term current use of insulin (H)        Dose:  300 mg   Take 1 tablet (300 mg) by mouth every morning (before breakfast)   Quantity:  30 tablet   Refills:  3       insulin glargine 100 UNIT/ML injection   Commonly known as:  LANTUS SOLOSTAR   This may have changed:  how much to take   Used for:  Type 2 diabetes mellitus with hyperglycemia, with long-term current use of insulin (H)        Dose:  26  Units   Inject 26 Units Subcutaneous At Bedtime   Quantity:  45 mL   Refills:  3       irbesartan-hydrochlorothiazide 150-12.5 MG per tablet   Commonly known as:  AVALIDE   This may have changed:  See the new instructions.   Used for:  Hypertension goal BP (blood pressure) < 140/90        TAKE 1 TABLET BY MOUTH DAILY   Quantity:  90 tablet   Refills:  3            Where to get your medicines      These medications were sent to [facility]/pharmacy #[id number] [address] AT CORNER OF [address] [address]     Phone:  [phone]     albuterol 108 (90 BASE) MCG/ACT Inhaler    doxycycline 100 MG tablet    spacer/aero-hold chamber mask Aaliyah                Primary Care Provider Office Phone # Fax #    Cailin MAGO Bernal [phone] [address]  [place] [address]        Equal Access to Services     [facility]: Hadii aad ku hadasho Soomaali, waaxda luqadaha, qaybta kaalmada adeegyada, waxay idiin hayaan keri azevedo . So [facility] [phone].    ATENCIÓN: Si habla español, tiene a garcía disposición servicios gratuitos de asistencia lingüística. Vesna al [phone].    We comply with applicable federal civil rights laws and [place] laws. We do not discriminate on the basis of race, color, national origin, age, disability, sex, sexual orientation, or gender identity.            Thank you!     Thank you for choosing [facility]  for your care. Our goal is always to provide you with excellent care. Hearing back from our patients is one way we can continue to improve our services. Please take a few minutes to complete the written survey that you may receive in the mail after your visit with us. Thank you!             Your Updated Medication List - Protect others around you: Learn how to safely use, store and throw away your medicines at www.disposemymeds.org.          This list is accurate as of: 10/31/17  10:27 AM.  Always use your most recent med list.                   Brand Name Dispense Instructions for use Diagnosis    albuterol 108 (90 BASE) MCG/ACT Inhaler    PROAIR HFA/PROVENTIL HFA/VENTOLIN HFA    1 Inhaler    Inhale 2 puffs into the lungs every 4 hours as needed for shortness of breath / dyspnea or wheezing    Cough due to bronchospasm       aspirin 81 MG EC tablet     90 tablet    Take 1 tablet by mouth daily.    Type 2 diabetes, HbA1c goal < 7% (H)       BD JUAN C U/F 32G X 4 MM   Generic drug:  insulin pen needle     100 each    USE 1 DAILY AS DIRECTED.    Type 2 diabetes mellitus with hyperglycemia (H)       canagliflozin 300 MG tablet    INVOKANA    30 tablet    Take 1 tablet (300 mg) by mouth every morning (before breakfast)    Type 2 diabetes mellitus with hyperglycemia, with long-term current use of insulin (H)       doxycycline 100 MG tablet    VIBRA-TABS    14 tablet    Take 1 tablet (100 mg) by mouth 2 times daily    Acute bronchitis with symptoms > 10 days       famotidine 20 MG tablet    PEPCID     Take 20 mg by mouth nightly as needed        glipiZIDE 10 MG tablet    GLUCOTROL    120 tablet    TAKE 2 TABLETS BY MOUTH TWICE A DAY BEFORE MEALS    Type 2 diabetes mellitus with hyperglycemia, with long-term current use of insulin (H)       GLUCOSAMINE CHONDROITIN COMPLX PO      2 Daily        insulin glargine 100 UNIT/ML injection    LANTUS SOLOSTAR    45 mL    Inject 26 Units Subcutaneous At Bedtime    Type 2 diabetes mellitus with hyperglycemia, with long-term current use of insulin (H)       irbesartan-hydrochlorothiazide 150-12.5 MG per tablet    AVALIDE    90 tablet    TAKE 1 TABLET BY MOUTH DAILY    Hypertension goal BP (blood pressure) < 140/90       latanoprost 0.005 % ophthalmic solution    XALATAN    3 Bottle    Place 1 drop into both eyes At Bedtime    Primary open angle glaucoma of both eyes, moderate stage       metFORMIN 1000 MG tablet    GLUCOPHAGE    60 tablet    TAKE 1 TABLET (1,000  MG) BY MOUTH 2 TIMES DAILY (WITH MEALS)    Type 2 diabetes mellitus with hyperglycemia, with long-term current use of insulin (H)       MULTIVITAMIN PO      1 tablet daily        OMEGA 3 PO      Take by mouth 2 times daily.        ONE TOUCH DELICA LANCETS Misc     100 each    1 each 2 times daily. One Touch Delica    Type 2 diabetes, HbA1c goal < 7% (H)       ONE TOUCH ULTRA test strip   Generic drug:  blood glucose monitoring     200 strip    TEST TWICE DAILY    Type 2 diabetes mellitus with hyperglycemia, with long-term current use of insulin (H)       order for DME     1 each    Glucometer covered by insurance.    Type 2 diabetes, HbA1c goal < 7% (H)       oxybutynin 5 MG 24 hr tablet    DITROPAN-XL    180 tablet    Take 2 tablets (10 mg) by mouth daily    Urinary frequency, Overactive bladder       spacer/aero-hold chamber mask Aaliyah     1 each    1 Device as needed    Cough due to bronchospasm       SYNTHROID 137 MCG tablet   Generic drug:  levothyroxine     90 tablet    Take 1 tablet (137 mcg) by mouth daily    Myxedema heart disease (H), Nutritional and metabolic cardiomyopathy (H)

## 2017-11-01 DIAGNOSIS — Z79.4 TYPE 2 DIABETES MELLITUS WITH HYPERGLYCEMIA, WITH LONG-TERM CURRENT USE OF INSULIN (H): ICD-10-CM

## 2017-11-01 DIAGNOSIS — E11.65 TYPE 2 DIABETES MELLITUS WITH HYPERGLYCEMIA, WITH LONG-TERM CURRENT USE OF INSULIN (H): ICD-10-CM

## 2017-11-01 RX ORDER — INSULIN GLARGINE 100 [IU]/ML
INJECTION, SOLUTION SUBCUTANEOUS
Qty: 45 ML | Refills: 0 | Status: SHIPPED | OUTPATIENT
Start: 2017-11-01 | End: 2017-12-13

## 2017-11-01 NOTE — TELEPHONE ENCOUNTER
UMANGSILVESTREUS SANOSTAR 100 UNIT/ML soln        Last Written Prescription Date: 11/8/2016  Last Fill Quantity: 45 mL, # refills: 3  Last Office Visit with [facility], [facility] or [facility] prescribing provider:  10/31/2017   Next 5 appointments (look out 90 days)     Dec 20, 2017  7:00 AM CST   [facility] Ehsan [facility]a DPM   [facility]    [address] [facility] [address] [phone]                   BP Readings from Last 3 Encounters:   10/31/17 120/60   10/23/17 124/67   08/16/17 128/78     Lab Results   Component Value Date    MICROL 14 02/09/2017     Lab Results   Component Value Date    UMALCR 28.07 02/09/2017     Creatinine   Date Value Ref Range Status   04/14/2017 0.76 0.52 - 1.04 mg/dL Final   ]  GFR Estimate   Date Value Ref Range Status   04/14/2017 74 >60 mL/min/1.7m2 Final     Comment:     Non  GFR Calc   02/09/2017 86 >60 mL/min/1.7m2 Final     Comment:     Non  GFR Calc   11/08/2016 >90  Non  GFR Calc   >60 mL/min/1.7m2 Final     GFR Estimate If Black   Date Value Ref Range Status   04/14/2017 90 >60 mL/min/1.7m2 Final     Comment:      GFR Calc   02/09/2017 >90   GFR Calc   >60 mL/min/1.7m2 Final   11/08/2016 >90   GFR Calc   >60 mL/min/1.7m2 Final     Lab Results   Component Value Date    CHOL 142 02/09/2017     Lab Results   Component Value Date    HDL 59 02/09/2017     Lab Results   Component Value Date    LDL 52 02/09/2017     Lab Results   Component Value Date    TRIG 157 02/09/2017     Lab Results   Component Value Date    CHOLHDLRATIO 3.6 08/21/2015     Lab Results   Component Value Date    AST 16 02/09/2017     Lab Results   Component Value Date    ALT 21 04/14/2017     Lab Results   Component Value Date    A1C 9.3 07/20/2017    A1C 10.2 04/14/2017    A1C 10.7 02/09/2017    A1C 11.0 11/08/2016    A1C 10.4 07/26/2016     Potassium   Date Value  Ref Range Status   04/14/2017 4.3 3.4 - 5.3 mmol/L Final     Refilled per P protocol.    Sharla Young RN

## 2017-11-01 NOTE — LETTER
November 1, 2017      Brandy Leon  [address] [facility] [address]              Dear Brandy,      We recently received a call from your pharmacy requesting a refill of your medication. We have provided a 30 day refill of your medication, LANTUS SOLOSTAR 100 UNIT/ML soln, as requested.      A review of your chart indicates that your last A1c lab test was on 7/20/2017 and was 9.3.     We have authorized one time 30 day refill of your medication to allow time for you to schedule an A1c recheck lab test.     If you have a history of diabetes or high cholesterol, please come in fasting for the appointment. Fasting entails nothing to eat or drink 8 hours prior to your appointment; with the exception on water. You may take your medication the day of the appointment.    Please call the clinic to schedule your appointment.    Thank you for taking an active role in your healthcare.    Sincerely,  Sharla Young RN,   [facility]  Cailin Ponce CNP

## 2017-11-02 LAB
BACTERIA SPEC CULT: ABNORMAL
SPECIMEN SOURCE: ABNORMAL

## 2017-11-03 ENCOUNTER — TELEPHONE (OUTPATIENT)
Dept: PEDIATRICS | Facility: CLINIC | Age: 77
End: 2017-11-03

## 2017-11-03 DIAGNOSIS — J30.2 SEASONAL ALLERGIC RHINITIS, UNSPECIFIED CHRONICITY, UNSPECIFIED TRIGGER: ICD-10-CM

## 2017-11-03 DIAGNOSIS — Z79.4 TYPE 2 DIABETES MELLITUS WITH HYPERGLYCEMIA, WITH LONG-TERM CURRENT USE OF INSULIN (H): Primary | ICD-10-CM

## 2017-11-03 DIAGNOSIS — E11.65 TYPE 2 DIABETES MELLITUS WITH HYPERGLYCEMIA, WITH LONG-TERM CURRENT USE OF INSULIN (H): Primary | ICD-10-CM

## 2017-11-03 RX ORDER — CEFDINIR 300 MG/1
300 CAPSULE ORAL 2 TIMES DAILY
Qty: 20 CAPSULE | Refills: 0 | Status: SHIPPED | OUTPATIENT
Start: 2017-11-03 | End: 2017-12-13

## 2017-11-03 NOTE — TELEPHONE ENCOUNTER
Called patient and gave message per Atiya Ponce NP.  Patient verbalized understanding and agreement to plan. Mirna Melendrez RN, [facility]          Notes Recorded by Cailin Ponce APRN CNP on 11/3/2017 at 7:40 AM  Please call   Urine culture positive but not to doxycycline which we are using for her URI  Will send in new script   Stop the other antibiotic        3d ago       Specimen Description Midstream Urine     Culture Micro >100,000 colonies/mL  Escherichia coli  (A)

## 2017-11-15 ENCOUNTER — TELEPHONE (OUTPATIENT)
Dept: PEDIATRICS | Facility: CLINIC | Age: 77
End: 2017-11-15

## 2017-11-15 RX ORDER — FLUTICASONE PROPIONATE 50 MCG
1-2 SPRAY, SUSPENSION (ML) NASAL DAILY
Qty: 1 BOTTLE | Refills: 3 | Status: SHIPPED | OUTPATIENT
Start: 2017-11-15 | End: 2017-12-22

## 2017-11-15 NOTE — TELEPHONE ENCOUNTER
Patient called to return call from Mirna Melendrez, would like a call back as soon as possible to talk about previous phone call and questions.

## 2017-11-15 NOTE — TELEPHONE ENCOUNTER
She can complete the doxycycline but antibiotics do not help congestion  Symptomatic therapy suggested: rest, increase fluids, apply heat to sinuses prn, use mist of vaporizer prn, OTC saline nasal spray as needed and call prn if symptoms persist or worsen.  We can even send in a script for some flonase to help open up her head as well.   Also needs to make a follow up appointment with the endocrinologist.

## 2017-11-15 NOTE — TELEPHONE ENCOUNTER
Spoke with patient she reports that she continues to suffer from head congestion. The patient states it is not getting any better. Writer could hear how congested the patient was on the phone, it sounds as though she cannot breathe at all through her nose.     Patient has completed the cefdinir (OMNICEF) 300 MG capsule for the UTI, it did not seem to help alleviate the head congestion though.     Patient is wondering if she should restart the doxycycline (VIBRA-TABS) 100 MG tablet? She currently has 3 days left of this medication.     Patient is also requesting a refill of Metformin. Writer instructed patient that she is due for an A1c. Writer helped patient schedule lab appointment for tomorrow 11/16. There is already an A1c order pending for a MAGO Benavidez. Patient is wondering if she should have a repeat UA? She denies UTI symptoms. Or if any other blood work she be done?    Routing refill request to provider for review/approval because:  Yue given x1 and patient did not follow up until today, please advise  metFORMIN (GLUCOPHAGE) 1000 MG tablet       Last Written Prescription Date: 7/14/2017  Last Fill Quantity: 60, # refills: 0  Last Office Visit with [facility], [facility] or [facility] prescribing provider:  10/31/2017   Next 5 appointments (look out 90 days)     Dec 20, 2017  7:00 AM CST   Return Visit with Ehsan Araya DPM   [facility]    [address] [place] [address] [phone]                   BP Readings from Last 3 Encounters:   10/31/17 120/60   10/23/17 124/67   08/16/17 128/78     Lab Results   Component Value Date    MICROL 14 02/09/2017     Lab Results   Component Value Date    UMALCR 28.07 02/09/2017     Creatinine   Date Value Ref Range Status   04/14/2017 0.76 0.52 - 1.04 mg/dL Final   ]  GFR Estimate   Date Value Ref Range Status   04/14/2017 74 >60 mL/min/1.7m2 Final     Comment:     Non  GFR Calc   02/09/2017  86 >60 mL/min/1.7m2 Final     Comment:     Non  GFR Calc   11/08/2016 >90  Non  GFR Calc   >60 mL/min/1.7m2 Final     GFR Estimate If Black   Date Value Ref Range Status   04/14/2017 90 >60 mL/min/1.7m2 Final     Comment:      GFR Calc   02/09/2017 >90   GFR Calc   >60 mL/min/1.7m2 Final   11/08/2016 >90   GFR Calc   >60 mL/min/1.7m2 Final     Lab Results   Component Value Date    CHOL 142 02/09/2017     Lab Results   Component Value Date    HDL 59 02/09/2017     Lab Results   Component Value Date    LDL 52 02/09/2017     Lab Results   Component Value Date    TRIG 157 02/09/2017     Lab Results   Component Value Date    CHOLHDLRATIO 3.6 08/21/2015     Lab Results   Component Value Date    AST 16 02/09/2017     Lab Results   Component Value Date    ALT 21 04/14/2017     Lab Results   Component Value Date    A1C 9.3 07/20/2017    A1C 10.2 04/14/2017    A1C 10.7 02/09/2017    A1C 11.0 11/08/2016    A1C 10.4 07/26/2016     Potassium   Date Value Ref Range Status   04/14/2017 4.3 3.4 - 5.3 mmol/L Final     Sharla Young RN

## 2017-11-15 NOTE — TELEPHONE ENCOUNTER
Left message for patient to return call to clinic and ask to speak with RN.    Reny Cedeno RN,   [facility]

## 2017-11-16 DIAGNOSIS — E11.65 TYPE 2 DIABETES MELLITUS WITH HYPERGLYCEMIA, WITH LONG-TERM CURRENT USE OF INSULIN (H): ICD-10-CM

## 2017-11-16 DIAGNOSIS — Z79.4 TYPE 2 DIABETES MELLITUS WITH HYPERGLYCEMIA, WITH LONG-TERM CURRENT USE OF INSULIN (H): ICD-10-CM

## 2017-11-16 LAB — HBA1C MFR BLD: 8.8 % (ref 4.3–6)

## 2017-11-16 PROCEDURE — 83036 HEMOGLOBIN GLYCOSYLATED A1C: CPT | Performed by: NURSE PRACTITIONER

## 2017-11-16 PROCEDURE — 36416 COLLJ CAPILLARY BLOOD SPEC: CPT | Performed by: NURSE PRACTITIONER

## 2017-11-16 NOTE — TELEPHONE ENCOUNTER
Clinic Action Needed:No/FYI only  Reason for Call: Brandy was returning call to clinic.  Gave her Cailin Ponce's advice that she can finish the doxycycline, but abx don't help congestion.  Reviewed symptom management advice per Cailin's notes and advised to follow up with worsening symptoms or not responding to home care.  Brandy appears to understand directives and agrees with plan.   Routed to: [facility]    Jeannie Arias, RN  [facility]

## 2017-11-16 NOTE — TELEPHONE ENCOUNTER
Jeannie Arias, RN Registered Nurse Signed    Date of Service: 11/15/2017 6:43 PM Creation Time: 11/15/2017 6:43 PM          Clinic Action Needed:No/FYI only  Reason for Call: Brandy was returning call to clinic.  Gave her Cailin Ponce's advice that she can finish the doxycycline, but abx don't help congestion.  Reviewed symptom management advice per Cailin's notes and advised to follow up with worsening symptoms or not responding to home care.  Brandy appears to understand directives and agrees with plan.   Routed to: [facility]     Jeannie Arias, RN  [facility]

## 2017-11-20 DIAGNOSIS — Z79.4 TYPE 2 DIABETES MELLITUS WITH HYPERGLYCEMIA, WITH LONG-TERM CURRENT USE OF INSULIN (H): ICD-10-CM

## 2017-11-20 DIAGNOSIS — E11.65 TYPE 2 DIABETES MELLITUS WITH HYPERGLYCEMIA, WITH LONG-TERM CURRENT USE OF INSULIN (H): ICD-10-CM

## 2017-11-21 RX ORDER — GLIPIZIDE 10 MG/1
TABLET ORAL
Qty: 120 TABLET | Refills: 5 | Status: SHIPPED | OUTPATIENT
Start: 2017-11-21 | End: 2018-04-03

## 2017-11-21 NOTE — TELEPHONE ENCOUNTER
glipiZIDE (GLUCOTROL) 10 MG tablet     Last Written Prescription Date: 8/14/2017  Last Fill Quantity: 120, # refills: 2  Last Office Visit with [facility], [facility] or [facility] prescribing provider:  10/31/2017   Next 5 appointments (look out 90 days)     Dec 20, 2017  7:00 AM CST   Return Visit with Ehsan Araya DPM   [facility] ([facility])    [address] [place] [id number] [phone]                   BP Readings from Last 3 Encounters:   10/31/17 120/60   10/23/17 124/67   08/16/17 128/78     Lab Results   Component Value Date    MICROL 14 02/09/2017     Lab Results   Component Value Date    UMALCR 28.07 02/09/2017     Creatinine   Date Value Ref Range Status   04/14/2017 0.76 0.52 - 1.04 mg/dL Final   ]  GFR Estimate   Date Value Ref Range Status   04/14/2017 74 >60 mL/min/1.7m2 Final     Comment:     Non  GFR Calc   02/09/2017 86 >60 mL/min/1.7m2 Final     Comment:     Non  GFR Calc   11/08/2016 >90  Non  GFR Calc   >60 mL/min/1.7m2 Final     GFR Estimate If Black   Date Value Ref Range Status   04/14/2017 90 >60 mL/min/1.7m2 Final     Comment:      GFR Calc   02/09/2017 >90   GFR Calc   >60 mL/min/1.7m2 Final   11/08/2016 >90   GFR Calc   >60 mL/min/1.7m2 Final     Lab Results   Component Value Date    CHOL 142 02/09/2017     Lab Results   Component Value Date    HDL 59 02/09/2017     Lab Results   Component Value Date    LDL 52 02/09/2017     Lab Results   Component Value Date    TRIG 157 02/09/2017     Lab Results   Component Value Date    CHOLHDLRATIO 3.6 08/21/2015     Lab Results   Component Value Date    AST 16 02/09/2017     Lab Results   Component Value Date    ALT 21 04/14/2017     Lab Results   Component Value Date    A1C 8.8 11/16/2017    A1C 9.3 07/20/2017    A1C 10.2 04/14/2017    A1C 10.7 02/09/2017    A1C 11.0 11/08/2016     Potassium   Date Value Ref  Range Status   04/14/2017 4.3 3.4 - 5.3 mmol/L Final     Refilled per P protocol.    Sharla Young RN

## 2017-11-22 ENCOUNTER — TELEPHONE (OUTPATIENT)
Dept: PEDIATRICS | Facility: CLINIC | Age: 77
End: 2017-11-22

## 2017-11-22 NOTE — TELEPHONE ENCOUNTER
[facility] Call Center    Phone Message    Name of Caller: Brandy    Phone Number: [phone]    Best time to return call: Any    May a detailed message be left on voicemail: yes    Relation to patient: Self    Reason for Call: Other: Patient is calling to follow up on when Cailin would like her to follow up with Endo. Please advise.      Action Taken: Message routed to:  Adult Clinics: Endocrinology p [phone]

## 2017-11-28 NOTE — TELEPHONE ENCOUNTER
Routing to Atiya Ponce NP to review routing comments from Alisia Mace RN.    Mirna Melendrez RN, [facility]

## 2017-11-28 NOTE — TELEPHONE ENCOUNTER
Contacted patient to review and determine if patient is having concerns with her diabetes and reasoning for Cailin Ponce requesting sooner appointment with Dr. Worley.     Patient notes that she is not certain why Atiya is wanting patient to be seen. She notes that her blood sugars are running between  and she denies any low blood sugars below 70.    Patient requests that writer reach out to primary care provider to determine if she needs sooner office visit other than Dr. Worley's next available. Offered patient appointment this Friday, 12/1/17 at 2:30pm (cancellation slot). Patient reports that it is too hard for her to get to clinic in the afternoon. Next AM appointment with Dr. Worley is in April 2018.      Princess Mace, RN  Endocrine Care Coordinator  [facility]

## 2017-11-29 ENCOUNTER — TELEPHONE (OUTPATIENT)
Dept: PHARMACY | Facility: CLINIC | Age: 77
End: 2017-11-29

## 2017-11-29 NOTE — TELEPHONE ENCOUNTER
In Dr Chow' s note in April she noted to follow up in 3 months so was more of a reminder she needs to make a follow up appointment as looked like was overdue with her follow up   As her numbers are gradually improving if the soonest she can get her in is April I think we need to get her on the books   Thanks

## 2017-11-29 NOTE — TELEPHONE ENCOUNTER
Called patient to schedule f/u MTM visit. LM for patient to return call.    Mirna Perez, Pharm.D, BCACP  Medication Therapy Management Pharmacist

## 2017-11-30 NOTE — TELEPHONE ENCOUNTER
Patient LM on voicemail. Returned patient's call. Patient is scheduled for 12/13 at 8:30am.    Mirna Perez, Pharm.D, BCACP  Medication Therapy Management Pharmacist

## 2017-11-30 NOTE — TELEPHONE ENCOUNTER
Contacted patient to review. Again offered her appointment on 12/1/17 with Dr. Worley. Patient cannot come to afternoon appointments and first available appointment that will fit patient's schedule it 4/6/18. Appointment scheduled. Advised patient to call sooner with any questions or concerns. Patient verbalizes understanding and agrees to plan. Will also place patient on wait list for AM appointment with Dr. Worley.      Princess Mace RN  Endocrine Care Coordinator  [facility]

## 2017-12-13 ENCOUNTER — ALLIED HEALTH/NURSE VISIT (OUTPATIENT)
Dept: PHARMACY | Facility: CLINIC | Age: 77
End: 2017-12-13
Payer: COMMERCIAL

## 2017-12-13 DIAGNOSIS — I10 HYPERTENSION GOAL BP (BLOOD PRESSURE) < 140/90: ICD-10-CM

## 2017-12-13 DIAGNOSIS — E11.65 TYPE 2 DIABETES MELLITUS WITH HYPERGLYCEMIA, WITH LONG-TERM CURRENT USE OF INSULIN (H): ICD-10-CM

## 2017-12-13 DIAGNOSIS — M25.579 PAIN IN JOINT, ANKLE AND FOOT, UNSPECIFIED LATERALITY: ICD-10-CM

## 2017-12-13 DIAGNOSIS — Z79.4 TYPE 2 DIABETES MELLITUS WITH HYPERGLYCEMIA, WITH LONG-TERM CURRENT USE OF INSULIN (H): ICD-10-CM

## 2017-12-13 DIAGNOSIS — J40 BRONCHITIS: ICD-10-CM

## 2017-12-13 DIAGNOSIS — E78.5 DYSLIPIDEMIA, GOAL LDL BELOW 100: Primary | ICD-10-CM

## 2017-12-13 PROCEDURE — 99606 MTMS BY PHARM EST 15 MIN: CPT | Performed by: PHARMACIST

## 2017-12-13 PROCEDURE — 99607 MTMS BY PHARM ADDL 15 MIN: CPT | Performed by: PHARMACIST

## 2017-12-13 RX ORDER — ROSUVASTATIN CALCIUM 5 MG/1
TABLET, COATED ORAL
Qty: 8 TABLET | Refills: 3 | Status: SHIPPED | OUTPATIENT
Start: 2017-12-13 | End: 2018-04-23

## 2017-12-13 NOTE — Clinical Note
Dennis Worley Brandy has not been taking invokana for the last week because she hasn't been feeling well and she has had in increase in frequency of UTIs. Her fasting blood sugars have been running in the 170'smg/dL. Would you like to increase her Lantus dose? She is currently taking 18 units every day. I'd be happy to reach out to patient with a plan. Just let me know. Mirna Perez, Pharm.D, BCACP Medication Therapy Management Pharmacist

## 2017-12-13 NOTE — PROGRESS NOTES
SUBJECTIVE/OBJECTIVE:                                                    Brandy Leon is a 76 year old female called for a follow up visit for Medication Therapy Management.  She was referred to me from Cailin Ponce.     Chief Complaint: Medication Review. Follow-up from 2/28/2017.    Allergies/ADRs: Reviewed in Epic  Tobacco: No tobacco use   Alcohol: none  Caffeine: 2 cups/day of coffee  Activity: walking to car and to her bus and getting in and out of her bus.  PMH: Reviewed in Epic    Medication Adherence: no issues reported    Bronchitis: patient continues to cough and has phlegm (brown/green in color). Patient is not running a fever. Patient was prescribed Omnicef and doxycycline at the end of October. Patient finished all of her antibiotics and did not miss any doses and continues to have symptoms. Robitussin is helping at night and has been sleeping well. Patient has not been using her albuterol inhaler.    Diabetes:  Pt currently taking current therapy includes Invokana 300mg daily (although patient has not been taking for the last week), glipizide 20mg bid, Lantus 18 units daily, metformin 1000mg bid. Patient has not been on Invokana for the last week because she hasn't felt very good. Patient feels like there has been a frequency in urinary tract infections since starting Invokana. Patient thinks she has had about 3 infections this year.   SMBG: one time daily.   Ranges (patient reported): 170'smg/dL fasting. Patient can't get into to see endocrinology until April.   Symptoms of low blood sugar? none. Frequency of hypoglycemia? rarely.  Recent symptoms of high blood sugar? none  Eye exam: up to date  Foot exam: up to date  Microalbumin is < 30 mg/g. Pt is taking an ACEi/ARB.  Aspirin: Taking 81mg daily and denies side effects    Stiffness in Ankles: this occurs all day. Patient has been taking Aleve (3 tablets at time) and this seems to help and she only takes at night. Patient takes this  about 3-4 times a week. Patient states that tylenol doesn't work for her.     Hypertension: Current medications include irbesartan/HCTZ 150-12.5mg 1/2 tablet daily. Patient is taking at night and doesn't report getting up to go to the bathroom.  Patient does not self-monitor BP.  Patient reports no current medication side effects.    Hyperlipidemia: Current therapy includes nothing.  Pt reports myalgias since on medication. Patient has not tried less frequent dosing of statin medication.    The 10-year ASCVD risk score ([place] GASPER Jr, et al., 2013) is: 35.8%    Values used to calculate the score:      Age: 77 years      Sex: Female      Is Non- : No      Diabetic: Yes      Tobacco smoker: No      Systolic Blood Pressure: 120 mmHg      Is BP treated: Yes      HDL Cholesterol: 59 mg/dL      Total Cholesterol: 142 mg/dL     Current labs include:  BP Readings from Last 3 Encounters:   10/31/17 120/60   10/23/17 124/67   08/16/17 128/78     Today's Vitals: There were no vitals taken for this visit.  Lab Results   Component Value Date    A1C 8.8 11/16/2017   .  Lab Results   Component Value Date    CHOL 142 02/09/2017     Lab Results   Component Value Date    TRIG 157 02/09/2017     Lab Results   Component Value Date    HDL 59 02/09/2017     Lab Results   Component Value Date    LDL 52 02/09/2017       Liver Function Studies -   Recent Labs   Lab Test  04/14/17   1028  02/09/17   1200   PROTTOTAL   --   8.3   ALBUMIN   --   3.8   BILITOTAL   --   0.3   ALKPHOS   --   82   AST   --   16   ALT  21  21       Lab Results   Component Value Date    UCRR 49 02/09/2017    MICROL 14 02/09/2017    UMALCR 28.07 (H) 02/09/2017       Last Basic Metabolic Panel:  Lab Results   Component Value Date     04/14/2017      Lab Results   Component Value Date    POTASSIUM 4.3 04/14/2017     Lab Results   Component Value Date    CHLORIDE 102 04/14/2017     Lab Results   Component Value Date    BUN 21 04/14/2017      Lab Results   Component Value Date    CR 0.76 04/14/2017     GFR Estimate   Date Value Ref Range Status   04/14/2017 74 >60 mL/min/1.7m2 Final     Comment:     Non  GFR Calc   02/09/2017 86 >60 mL/min/1.7m2 Final     Comment:     Non  GFR Calc   11/08/2016 >90  Non  GFR Calc   >60 mL/min/1.7m2 Final     GFR Estimate If Black   Date Value Ref Range Status   04/14/2017 90 >60 mL/min/1.7m2 Final     Comment:      GFR Calc   02/09/2017 >90   GFR Calc   >60 mL/min/1.7m2 Final   11/08/2016 >90   GFR Calc   >60 mL/min/1.7m2 Final     TSH   Date Value Ref Range Status   02/09/2017 1.33 0.40 - 4.00 mU/L Final   ]    Most Recent Immunizations   Administered Date(s) Administered     TD (ADULT, 7+) 03/21/2005     Varicella Pt Report Hx of Varicella/Chicken Pox 07/24/2000     Zoster vaccine, live 04/27/2012       ASSESSMENT:                                                       Current medications were reviewed today.     Medication Adherence: no issues identified    Bronchitis: patient needs further evaluation; recommend patient follow up with PCP. Patient may also benefit from using albuterol inhaler for cough.    Diabetes: Needs Improvement. Patient is not meeting A1c goal of < 8%. Invokana may be contributing to increased frequency of UTI. Will consult with endocrinology.    Hypertension: Stable. Patient is meeting BP goal of < 140/90mmHg.     Hyperlipidemia: Needs Improvement. Patient may benefit from taking rosuvastatin 5mg twice a week.    Stiffness in Ankles: patient would benefit from avoiding NSAID use or at least limiting use and decreasing the dose to 1 tablet before bed if needed. Discussed risk of NSAID use with patient.    PLAN:                                                      Recommend using albuterol for cough  Recommend scheduling follow up with PCP  Recommend Crestor 5mg twice a week.  Decrease Aleve dose to  1 tablet and minimize frequency of use.    I spent 30 minutes with this patient today.  All changes were made via collaborative practice agreement with Cailin Ponce. A copy of the visit note was provided to the patient's primary care provider.    Will follow up in 1 month.    The patient declined a summary of these recommendations as an after visit summary.     Mirna Perez, Pharm.D, [facility]  Medication Therapy Management Pharmacist

## 2017-12-13 NOTE — PATIENT INSTRUCTIONS
Recommend using albuterol for cough  Recommend scheduling follow up with PCP  Recommend Crestor 5mg twice a week.  Decrease Aleve dose to 1 tablet and minimize frequency of use.

## 2017-12-13 NOTE — MR AVS SNAPSHOT
After Visit Summary   12/13/2017    Brandy Leon    MRN: [id number]           Patient Information     Date Of Birth          1940        Visit Information        Provider Department      12/13/2017 8:30 AM Mirna Perez [facility] [place]        Today's Diagnoses     Dyslipidemia, goal LDL below 100    -  1    Type 2 diabetes mellitus with hyperglycemia, with long-term current use of insulin (H)        Hypertension goal BP (blood pressure) < 140/90        Bronchitis        Pain in joint, ankle and foot, unspecified laterality          Care Instructions    Recommend using albuterol for cough  Recommend scheduling follow up with PCP  Recommend Crestor 5mg twice a week.  Decrease Aleve dose to 1 tablet and minimize frequency of use.            Follow-ups after your visit        Your next 10 appointments already scheduled     Dec 20, 2017  7:00 AM CST   Return Visit with Ehsan Araya DPM   [facility])    [id number] [place] [id number] [phone]            Apr 06, 2018  8:15 AM CDT   Return Visit with Candice Worley MD, [facility] ENDO NURSE   [facility])    [address] [place] [id number] [phone]            Sep 25, 2018 10:45 AM CDT   Return Visit with Lucita Jordan MD   [facility])    [id number] [place] [id number] [phone]              Who to contact     If you have questions or need follow up information about today's clinic visit or your schedule please contact [facility] directly at [phone].  Normal or non-critical lab and imaging results will be communicated to you by MyChart, letter or phone within 4 business days after the clinic has received the results. If you do not hear from us within 7 days, please contact the  "clinic through [employer] or phone. If you have a critical or abnormal lab result, we will notify you by phone as soon as possible.  Submit refill requests through [employer] or call your pharmacy and they will forward the refill request to us. Please allow 3 business days for your refill to be completed.          Additional Information About Your Visit        [employer] Information     [employer] lets you send messages to your doctor, view your test results, renew your prescriptions, schedule appointments and more. To sign up, go to [email or web address] . Click on \"Log in\" on the left side of the screen, which will take you to the Welcome page. Then click on \"Sign up Now\" on the right side of the page.     You will be asked to enter the access code listed below, as well as some personal information. Please follow the directions to create your username and password.     Your access code is: [email or web address]  Expires: 2018  1:28 PM     Your access code will  in 90 days. If you need help or a new code, please call your [facility] clinic or [phone].        Care EveryWhere ID     This is your Care EveryWhere ID. This could be used by other organizations to access your [facility] medical records  [id number]         Blood Pressure from Last 3 Encounters:   10/31/17 120/60   10/23/17 124/67   17 128/78    Weight from Last 3 Encounters:   10/31/17 180 lb 9.6 oz (81.9 kg)   10/23/17 183 lb 12.8 oz (83.4 kg)   17 183 lb 8 oz (83.2 kg)              Today, you had the following     No orders found for display         Today's Medication Changes          These changes are accurate as of: 17  9:04 AM.  If you have any questions, ask your nurse or doctor.               Start taking these medicines.        Dose/Directions    rosuvastatin 5 MG tablet   Commonly known as:  CRESTOR   Used for:  Dyslipidemia, goal LDL below 100        Take 1 tablet twice a week   Quantity:  8 tablet   Refills:  3         These " medicines have changed or have updated prescriptions.        Dose/Directions    aspirin 81 MG EC tablet   This may have changed:  how much to take   Used for:  Type 2 diabetes, HbA1c goal < 7% (H)        Dose:  81 mg   Take 1 tablet by mouth daily.   Quantity:  90 tablet   Refills:  3       insulin glargine 100 UNIT/ML injection   Commonly known as:  LANTUS SOLOSTAR   This may have changed:  how much to take   Used for:  Type 2 diabetes mellitus with hyperglycemia, with long-term current use of insulin (H)        Dose:  26 Units   Inject 26 Units Subcutaneous At Bedtime   Quantity:  45 mL   Refills:  3       irbesartan-hydrochlorothiazide 150-12.5 MG per tablet   Commonly known as:  AVALIDE   This may have changed:  See the new instructions.   Used for:  Hypertension goal BP (blood pressure) < 140/90        TAKE 1 TABLET BY MOUTH DAILY   Quantity:  90 tablet   Refills:  3            Where to get your medicines      These medications were sent to [facility]/pharmacy [address] AT [address] [address]     Phone:  [phone]     rosuvastatin 5 MG tablet                Primary Care Provider Office Phone # Fax #    Cailin Mckenna Kris, APRN [facility] [phone]       [address]  [place] [address]        Equal Access to Services     NUSRAT KHAN AH: Akbar olverao Sotorey, waaxda luqadaha, qaybta kaalmada adeegyada, mitch toledo hayeugene alaniz. So [facility] [phone].    ATENCIÓN: Si habla español, tiene a garcía disposición servicios gratuitos de asistencia lingüística. ame al [phone].    We comply with applicable federal civil rights laws and [place] laws. We do not discriminate on the basis of race, color, national origin, age, disability, sex, sexual orientation, or gender identity.            Thank you!     Thank you for choosing [facility]  for your care. Our goal is always to  provide you with excellent care. Hearing back from our patients is one way we can continue to improve our services. Please take a few minutes to complete the written survey that you may receive in the mail after your visit with us. Thank you!             Your Updated Medication List - Protect others around you: Learn how to safely use, store and throw away your medicines at www.disposemymeds.org.          This list is accurate as of: 12/13/17  9:04 AM.  Always use your most recent med list.                   Brand Name Dispense Instructions for use Diagnosis    albuterol 108 (90 BASE) MCG/ACT Inhaler    PROAIR HFA/PROVENTIL HFA/VENTOLIN HFA    1 Inhaler    Inhale 2 puffs into the lungs every 4 hours as needed for shortness of breath / dyspnea or wheezing    Cough due to bronchospasm       aspirin 81 MG EC tablet     90 tablet    Take 1 tablet by mouth daily.    Type 2 diabetes, HbA1c goal < 7% (H)       BD JUAN C U/F 32G X 4 MM   Generic drug:  insulin pen needle     100 each    USE 1 DAILY AS DIRECTED.    Type 2 diabetes mellitus with hyperglycemia (H)       canagliflozin 300 MG tablet    INVOKANA    30 tablet    Take 1 tablet (300 mg) by mouth every morning (before breakfast)    Type 2 diabetes mellitus with hyperglycemia, with long-term current use of insulin (H)       famotidine 20 MG tablet    PEPCID     Take 20 mg by mouth nightly as needed        fluticasone 50 MCG/ACT spray    FLONASE    1 Bottle    Spray 1-2 sprays into both nostrils daily    Seasonal allergic rhinitis, unspecified chronicity, unspecified trigger       glipiZIDE 10 MG tablet    GLUCOTROL    120 tablet    TAKE 2 TABLETS BY MOUTH TWICE A DAY BEFORE MEALS    Type 2 diabetes mellitus with hyperglycemia, with long-term current use of insulin (H)       GLUCOSAMINE CHONDROITIN COMPLX PO      2 Daily        insulin glargine 100 UNIT/ML injection    LANTUS SOLOSTAR    45 mL    Inject 26 Units Subcutaneous At Bedtime    Type 2 diabetes mellitus with  hyperglycemia, with long-term current use of insulin (H)       irbesartan-hydrochlorothiazide 150-12.5 MG per tablet    AVALIDE    90 tablet    TAKE 1 TABLET BY MOUTH DAILY    Hypertension goal BP (blood pressure) < 140/90       latanoprost 0.005 % ophthalmic solution    XALATAN    3 Bottle    Place 1 drop into both eyes At Bedtime    Primary open angle glaucoma of both eyes, moderate stage       metFORMIN 1000 MG tablet    GLUCOPHAGE    180 tablet    TAKE 1 TABLET (1,000 MG) BY MOUTH 2 TIMES DAILY (WITH MEALS)    Type 2 diabetes mellitus with hyperglycemia, with long-term current use of insulin (H)       MULTIVITAMIN PO      1 tablet daily        OMEGA 3 PO      Take by mouth 2 times daily.        ONE TOUCH DELICA LANCETS Misc     100 each    1 each 2 times daily. One Touch Delica    Type 2 diabetes, HbA1c goal < 7% (H)       ONETOUCH ULTRA test strip   Generic drug:  blood glucose monitoring     200 strip    TEST TWICE DAILY    Type 2 diabetes mellitus with hyperglycemia, with long-term current use of insulin (H)       order for DME     1 each    Glucometer covered by insurance.    Type 2 diabetes, HbA1c goal < 7% (H)       oxybutynin 5 MG 24 hr tablet    DITROPAN-XL    180 tablet    Take 2 tablets (10 mg) by mouth daily    Urinary frequency, Overactive bladder       rosuvastatin 5 MG tablet    CRESTOR    8 tablet    Take 1 tablet twice a week    Dyslipidemia, goal LDL below 100       spacer/aero-hold chamber mask Aaliyah     1 each    1 Device as needed    Cough due to bronchospasm       SYNTHROID 137 MCG tablet   Generic drug:  levothyroxine     90 tablet    Take 1 tablet (137 mcg) by mouth daily    Myxedema heart disease (H), Nutritional and metabolic cardiomyopathy (H)

## 2017-12-18 NOTE — PROGRESS NOTES
Plan:   Increase Lantus to 20 units, Test fasting BG daily and if persistently above 150 for 3 days , increase by 2 units.     Thanks  RTS  Candice Worley MD  [id number]  Endocrinology Service

## 2017-12-20 ENCOUNTER — TELEPHONE (OUTPATIENT)
Dept: PHARMACY | Facility: CLINIC | Age: 77
End: 2017-12-20

## 2017-12-20 ENCOUNTER — OFFICE VISIT (OUTPATIENT)
Dept: PODIATRY | Facility: CLINIC | Age: 77
End: 2017-12-20
Payer: COMMERCIAL

## 2017-12-20 VITALS — HEART RATE: 107 BPM | OXYGEN SATURATION: 97 % | DIASTOLIC BLOOD PRESSURE: 77 MMHG | SYSTOLIC BLOOD PRESSURE: 134 MMHG

## 2017-12-20 DIAGNOSIS — B35.1 DERMATOPHYTOSIS OF NAIL: Primary | ICD-10-CM

## 2017-12-20 DIAGNOSIS — E11.65 TYPE 2 DIABETES MELLITUS WITH HYPERGLYCEMIA, WITH LONG-TERM CURRENT USE OF INSULIN (H): ICD-10-CM

## 2017-12-20 DIAGNOSIS — Z79.4 TYPE 2 DIABETES MELLITUS WITH HYPERGLYCEMIA, WITH LONG-TERM CURRENT USE OF INSULIN (H): ICD-10-CM

## 2017-12-20 DIAGNOSIS — E11.49 TYPE II OR UNSPECIFIED TYPE DIABETES MELLITUS WITH NEUROLOGICAL MANIFESTATIONS, NOT STATED AS UNCONTROLLED(250.60) (H): ICD-10-CM

## 2017-12-20 PROCEDURE — 99213 OFFICE O/P EST LOW 20 MIN: CPT | Performed by: PODIATRIST

## 2017-12-20 NOTE — MR AVS SNAPSHOT
After Visit Summary   12/20/2017    Brandy Leon    MRN: [id number]           Patient Information     Date Of Birth          1940        Visit Information        Provider Department      12/20/2017 11:30 AM Ehsan Araya DPM [facility]        Today's Diagnoses     Dermatophytosis of nail    -  1    Type II or unspecified type diabetes mellitus with neurological manifestations, not stated as uncontrolled(250.60) (H)          Care Instructions    Thanks for coming today.  [facility]  [address] [place] [address]    To schedule future appointments in Ortho Clinic, you may call [phone].    To schedule ordered imaging by your Provider: Call [facility] at [phone]    [employer] available online at:   [email or web address]    Please call if any further questions or concerns [phone] and ask for the Orthopedic Department. Clinic hours 8 am to 5 pm.    Return to clinic if symptoms worsen.            Follow-ups after your visit        Your next 10 appointments already scheduled     Dec 22, 2017 10:50 AM CST   Return Visit with MAGO Baker [facility])    [address] [place] [id number] [phone]            Arsenio 10, 2018  8:30 AM CST   TELEMEDICINE with Mirna Perez [facility] ([facility])    [address] [place] [address] [facility] [id number]  [facility] [address] [phone]           Note: this is not an onsite visit; there is no need to come to the facility.            Apr 04, 2018  9:00 AM CDT   Return Visit with Ehsan Araya DPM   [facility])    [address] [facility] [id number] [phone]            Apr 06, 2018  8:15 AM CDT   Return Visit with Candice Wroley MD, [facility] ENDO NURSE   [facility]  ([facility])    [address] [place] [id number] [phone]            Sep 25, 2018 10:45 AM CDT   Return Visit with Lucita Jordan MD   [facility] ([facility])    [address] [facility] [address] [phone]              Who to contact     If you have questions or need follow up information about today's clinic visit or your schedule please contact [facility] directly at [phone].  Normal or non-critical lab and imaging results will be communicated to you by MyChart, letter or phone within 4 business days after the clinic has received the results. If you do not hear from us within 7 days, please contact the clinic through [employer] or phone. If you have a critical or abnormal lab result, we will notify you by phone as soon as possible.  Submit refill requests through [employer] or call your pharmacy and they will forward the refill request to us. Please allow 3 business days for your refill to be completed.          Additional Information About Your Visit        [employer] Information     [employer] is an electronic gateway that provides easy, online access to your medical records. With [employer], you can request a clinic appointment, read your test results, renew a prescription or communicate with your care team.     To sign up for [employer] visit the website at [email or web address]   You will be asked to enter the access code listed below, as well as some personal information. Please follow the directions to create your username and password.     Your access code is: [email or web address]  Expires: 2018  1:28 PM     Your access code will  in 90 days. If you need help or a new code, please contact your [facility] or call [phone] for assistance.        Care EveryWhere ID     This is your Care EveryWhere ID. This could be used by other organizations to access your [place]  medical records  [id number]        Your Vitals Were     Pulse Pulse Oximetry                107 97%           Blood Pressure from Last 3 Encounters:   12/20/17 134/77   10/31/17 120/60   10/23/17 124/67    Weight from Last 3 Encounters:   10/31/17 81.9 kg (180 lb 9.6 oz)   10/23/17 83.4 kg (183 lb 12.8 oz)   07/20/17 83.2 kg (183 lb 8 oz)              We Performed the Following     DEBRIDEMENT OF NAIL(S), 1-5          Today's Medication Changes          These changes are accurate as of: 12/20/17 12:02 PM.  If you have any questions, ask your nurse or doctor.               These medicines have changed or have updated prescriptions.        Dose/Directions    aspirin 81 MG EC tablet   This may have changed:  how much to take   Used for:  Type 2 diabetes, HbA1c goal < 7% (H)        Dose:  81 mg   Take 1 tablet by mouth daily.   Quantity:  90 tablet   Refills:  3       insulin glargine 100 UNIT/ML injection   Commonly known as:  LANMITESH BELCHER   This may have changed:  how much to take   Used for:  Type 2 diabetes mellitus with hyperglycemia, with long-term current use of insulin (H)   Changed by:  Mirna Perez, [facility]        Dose:  20 Units   Inject 20 Units Subcutaneous At Bedtime   Quantity:  45 mL   Refills:  3       irbesartan-hydrochlorothiazide 150-12.5 MG per tablet   Commonly known as:  AVALIDE   This may have changed:  See the new instructions.   Used for:  Hypertension goal BP (blood pressure) < 140/90        TAKE 1 TABLET BY MOUTH DAILY   Quantity:  90 tablet   Refills:  3            Where to get your medicines      Some of these will need a paper prescription and others can be bought over the counter.  Ask your nurse if you have questions.     You don't need a prescription for these medications     insulin glargine 100 UNIT/ML injection                Primary Care Provider Office Phone # Fax #    MAGO Baker [facility] [phone] [address]  [facility]  [address]        Equal Access to Services     [facility]: Hadii ana pimentel yuliya Loya, waharshda luqadaha, qaybta kamarcial chris, waxjohnnie paris lowryhowardedwige alaniz. [facility] [phone].    ATENCIÓN: Si habla español, tiene a garcía disposición servicios gratuitos de asistencia lingüística. Sariame al [phone].    We comply with applicable federal civil rights laws and [place] laws. We do not discriminate on the basis of race, color, national origin, age, disability, sex, sexual orientation, or gender identity.            Thank you!     Thank you for choosing [facility]  for your care. Our goal is always to provide you with excellent care. Hearing back from our patients is one way we can continue to improve our services. Please take a few minutes to complete the written survey that you may receive in the mail after your visit with us. Thank you!             Your Updated Medication List - Protect others around you: Learn how to safely use, store and throw away your medicines at www.disposemymeds.org.          This list is accurate as of: 12/20/17 12:02 PM.  Always use your most recent med list.                   Brand Name Dispense Instructions for use Diagnosis    albuterol 108 (90 BASE) MCG/ACT Inhaler    PROAIR HFA/PROVENTIL HFA/VENTOLIN HFA    1 Inhaler    Inhale 2 puffs into the lungs every 4 hours as needed for shortness of breath / dyspnea or wheezing    Cough due to bronchospasm       aspirin 81 MG EC tablet     90 tablet    Take 1 tablet by mouth daily.    Type 2 diabetes, HbA1c goal < 7% (H)       BD JUAN C U/F 32G X 4 MM   Generic drug:  insulin pen needle     100 each    USE 1 DAILY AS DIRECTED.    Type 2 diabetes mellitus with hyperglycemia (H)       famotidine 20 MG tablet    PEPCID     Take 20 mg by mouth nightly as needed        fluticasone 50 MCG/ACT spray    FLONASE    1 Bottle    Spray 1-2 sprays into both nostrils daily    Seasonal allergic rhinitis, unspecified  chronicity, unspecified trigger       glipiZIDE 10 MG tablet    GLUCOTROL    120 tablet    TAKE 2 TABLETS BY MOUTH TWICE A DAY BEFORE MEALS    Type 2 diabetes mellitus with hyperglycemia, with long-term current use of insulin (H)       GLUCOSAMINE CHONDROITIN COMPLX PO      2 Daily        insulin glargine 100 UNIT/ML injection    LANTUS SOLOSTAR    45 mL    Inject 20 Units Subcutaneous At Bedtime    Type 2 diabetes mellitus with hyperglycemia, with long-term current use of insulin (H)       irbesartan-hydrochlorothiazide 150-12.5 MG per tablet    AVALIDE    90 tablet    TAKE 1 TABLET BY MOUTH DAILY    Hypertension goal BP (blood pressure) < 140/90       latanoprost 0.005 % ophthalmic solution    XALATAN    3 Bottle    Place 1 drop into both eyes At Bedtime    Primary open angle glaucoma of both eyes, moderate stage       metFORMIN 1000 MG tablet    GLUCOPHAGE    180 tablet    TAKE 1 TABLET (1,000 MG) BY MOUTH 2 TIMES DAILY (WITH MEALS)    Type 2 diabetes mellitus with hyperglycemia, with long-term current use of insulin (H)       MULTIVITAMIN PO      1 tablet daily        OMEGA 3 PO      Take by mouth 2 times daily.        ONE TOUCH DELICA LANCETS Misc     100 each    1 each 2 times daily. One Touch Delica    Type 2 diabetes, HbA1c goal < 7% (H)       ONETOUCH ULTRA test strip   Generic drug:  blood glucose monitoring     200 strip    TEST TWICE DAILY    Type 2 diabetes mellitus with hyperglycemia, with long-term current use of insulin (H)       order for DME     1 each    Glucometer covered by insurance.    Type 2 diabetes, HbA1c goal < 7% (H)       oxybutynin 5 MG 24 hr tablet    DITROPAN-XL    180 tablet    Take 2 tablets (10 mg) by mouth daily    Urinary frequency, Overactive bladder       rosuvastatin 5 MG tablet    CRESTOR    8 tablet    Take 1 tablet twice a week    Dyslipidemia, goal LDL below 100       spacer/aero-hold chamber mask Aaliyah     1 each    1 Device as needed    Cough due to bronchospasm        SYNTHROID 137 MCG tablet   Generic drug:  levothyroxine     90 tablet    Take 1 tablet (137 mcg) by mouth daily    Myxedema heart disease (H), Nutritional and metabolic cardiomyopathy (H)

## 2017-12-20 NOTE — NURSING NOTE
"Brandy Leon's goals for this visit include: Diabetic foot check   She requests these members of her care team be copied on today's visit information: yes    PCP: Cailin Ponce    Referring Provider:  No referring provider defined for this encounter.    Chief Complaint   Patient presents with     RECHECK     Diabetic foot check        Initial /77  Pulse 107  SpO2 97% Estimated body mass index is 31.24 kg/(m^2) as calculated from the following:    Height as of 10/23/17: 1.619 m (5' 3.75\").    Weight as of 10/31/17: 81.9 kg (180 lb 9.6 oz).  Medication Reconciliation: complete    "

## 2017-12-20 NOTE — PROGRESS NOTES
Past Medical History:   Diagnosis Date     Degenerative joint disease      Diabetes mellitus (H) 2006    type 2     HTN (hypertension)      Hyperlipidemia LDL goal < 100      Hypothyroidism 1960    s/p subtotal thyroidectomy      Rheumatic fever     x2 between the ages of 15-18     Seasonal allergies      Patient Active Problem List   Diagnosis     Seasonal allergies     Hypothyroidism     Hyperlipidemia LDL goal <100     Fibromyalgia     Osteoarthritis     Actinic keratosis     Advanced directives, counseling/discussion     Glaucoma suspect     Cataracts, bilateral     Obesity     Type 2 diabetes mellitus with hyperglycemia, with long-term current use of insulin (H)     Hypertension goal BP (blood pressure) < 140/90     Overactive bladder     Primary osteoarthritis of both hands     Recurrent UTI     Past Surgical History:   Procedure Laterality Date     C APPENDECTOMY       C ARTHROPLASTY TMJ       COLONOSCOPY       COLONOSCOPY N/A 8/13/2015    Procedure: COLONOSCOPY;  Surgeon: Duane, William Charles, MD;  Location: [facility] OR     COLONOSCOPY WITH CO2 INSUFFLATION N/A 8/13/2015    Procedure: COLONOSCOPY WITH CO2 INSUFFLATION;  Surgeon: Duane, William Charles, MD;  Location: [facility] OR     THYROIDECTOMY        Social History     Social History     Marital status: Single     Spouse name: N/A     Number of children: N/A     Years of education: N/A     Occupational History     Not on file.     Social History Main Topics     Smoking status: Never Smoker     Smokeless tobacco: Never Used      Comment: has had exposure to second hand smoke in the past from [employer], no current exposure     Alcohol use No     Drug use: No     Sexual activity: No     Other Topics Concern     Parent/Sibling W/ Cabg, Mi Or Angioplasty Before 65f 55m? 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     Hobby Hazards No     Sleep Concern Yes     difficulty staying asleep, up and down all night     Stress Concern No     Weight Concern Yes     would like to lose     Special Diet Yes     low card, low sugar diet     Back Care Yes     chiropratior     Exercise Yes     almost everyday     Bike Helmet No     does not ride bicycle any more     Seat Belt Yes     Self-Exams Yes     Social History Narrative     Family History   Problem Relation Age of Onset     CANCER Father      skin and leukemia     CEREBROVASCULAR DISEASE Maternal Grandfather      CEREBROVASCULAR DISEASE Paternal Grandmother      Eye Disorder Paternal Grandmother      cataracts     CEREBROVASCULAR DISEASE Paternal Grandfather      HEART DISEASE Paternal Grandfather      CANCER Sister      Breast Cancer     Eye Disorder Mother      cataracts     Eye Disorder Brother      cataract     Breast Cancer Daughter      Other Cancer Daughter      ovarian cancer     Lab Results   Component Value Date    A1C 8.8 11/16/2017      SUBJECTIVE FINDINGS:  A 77-year-old female presents for a diabetic foot check and onychomycosis and onychauxis.  She relates she is doing well, no ulcers or sores since we had seen her last.  She does have neuropathy and her feet feel cold that is unchanged.  She is using lotion daily on her feet.       OBJECTIVE FINDINGS:  DP and PT are 2/4 bilaterally, cft less than 3 seconds.  She has incurvated nails 1 through 5 bilaterally.  She has thick, dystrophic nails with subungual debris and discoloration of the hallux nails bilaterally.  There is no erythema, no drainage, no odor, no calor bilaterally.       ASSESSMENT AND PLAN:  Onychomycosis bilaterally.  Onychauxis bilaterally.  She is diabetic with peripheral neuropathy.  Diagnosis and treatment options discussed with her.  All the nails were debrided or reduced bilaterally upon consent.  She opted for no Diabetic shoes.  She will return to clinic and see me in 3-4 months.

## 2017-12-20 NOTE — TELEPHONE ENCOUNTER
Called patient to discuss plan from endocrinology. Discussed plan with patient below.    Candice Worley MD at 12/13/2017  8:30 AM        Status: Signed            Plan:   Increase Lantus to 20 units, Test fasting BG daily and if persistently above 150 for 3 days , increase by 2 units.      Thanks  RTS  Candice Worley MD  [address]  Endocrinology Service          Mirna Perez, Pharm.D, BCACP  Medication Therapy Management Pharmacist

## 2017-12-20 NOTE — PATIENT INSTRUCTIONS
Thanks for coming today.  Ortho/Sports Medicine Clinic  [address] [place] [address]    To schedule future appointments in Ortho Clinic, you may call [phone].    To schedule ordered imaging by your Provider: Call [facility] at [phone]    [employer] available online at:   [email or web address]    Please call if any further questions or concerns [phone] and ask for the Orthopedic Department. Clinic hours 8 am to 5 pm.    Return to clinic if symptoms worsen.

## 2017-12-22 ENCOUNTER — OFFICE VISIT (OUTPATIENT)
Dept: PEDIATRICS | Facility: CLINIC | Age: 77
End: 2017-12-22
Payer: COMMERCIAL

## 2017-12-22 ENCOUNTER — RADIANT APPOINTMENT (OUTPATIENT)
Dept: GENERAL RADIOLOGY | Facility: CLINIC | Age: 77
End: 2017-12-22
Attending: NURSE PRACTITIONER
Payer: COMMERCIAL

## 2017-12-22 VITALS
TEMPERATURE: 96.6 F | WEIGHT: 182.4 LBS | HEART RATE: 94 BPM | BODY MASS INDEX: 31.55 KG/M2 | OXYGEN SATURATION: 96 % | DIASTOLIC BLOOD PRESSURE: 60 MMHG | SYSTOLIC BLOOD PRESSURE: 120 MMHG

## 2017-12-22 DIAGNOSIS — J20.9 ACUTE BRONCHITIS, UNSPECIFIED ORGANISM: Primary | ICD-10-CM

## 2017-12-22 DIAGNOSIS — R82.90 NONSPECIFIC FINDING ON EXAMINATION OF URINE: ICD-10-CM

## 2017-12-22 DIAGNOSIS — Z79.4 TYPE 2 DIABETES MELLITUS WITH HYPERGLYCEMIA, WITH LONG-TERM CURRENT USE OF INSULIN (H): ICD-10-CM

## 2017-12-22 DIAGNOSIS — R53.83 OTHER FATIGUE: ICD-10-CM

## 2017-12-22 DIAGNOSIS — E11.65 TYPE 2 DIABETES MELLITUS WITH HYPERGLYCEMIA, WITH LONG-TERM CURRENT USE OF INSULIN (H): ICD-10-CM

## 2017-12-22 DIAGNOSIS — E03.9 HYPOTHYROIDISM, UNSPECIFIED TYPE: ICD-10-CM

## 2017-12-22 DIAGNOSIS — K21.9 GASTROESOPHAGEAL REFLUX DISEASE WITHOUT ESOPHAGITIS: ICD-10-CM

## 2017-12-22 DIAGNOSIS — E78.5 HYPERLIPIDEMIA LDL GOAL <100: ICD-10-CM

## 2017-12-22 DIAGNOSIS — R35.0 URINARY FREQUENCY: ICD-10-CM

## 2017-12-22 DIAGNOSIS — R05.9 COUGH: ICD-10-CM

## 2017-12-22 DIAGNOSIS — J01.00 ACUTE NON-RECURRENT MAXILLARY SINUSITIS: ICD-10-CM

## 2017-12-22 LAB
ALBUMIN SERPL-MCNC: 3.5 G/DL (ref 3.4–5)
ALBUMIN UR-MCNC: NEGATIVE MG/DL
ALP SERPL-CCNC: 80 U/L (ref 40–150)
ALT SERPL W P-5'-P-CCNC: 18 U/L (ref 0–50)
ANION GAP SERPL CALCULATED.3IONS-SCNC: 6 MMOL/L (ref 3–14)
APPEARANCE UR: CLEAR
AST SERPL W P-5'-P-CCNC: 19 U/L (ref 0–45)
BACTERIA #/AREA URNS HPF: ABNORMAL /HPF
BASOPHILS # BLD AUTO: 0.1 10E9/L (ref 0–0.2)
BASOPHILS NFR BLD AUTO: 0.6 %
BILIRUB SERPL-MCNC: 0.4 MG/DL (ref 0.2–1.3)
BILIRUB UR QL STRIP: NEGATIVE
BUN SERPL-MCNC: 16 MG/DL (ref 7–30)
CALCIUM SERPL-MCNC: 8.7 MG/DL (ref 8.5–10.1)
CHLORIDE SERPL-SCNC: 100 MMOL/L (ref 94–109)
CHOLEST SERPL-MCNC: 201 MG/DL
CO2 SERPL-SCNC: 29 MMOL/L (ref 20–32)
COLOR UR AUTO: ABNORMAL
CREAT SERPL-MCNC: 0.76 MG/DL (ref 0.52–1.04)
CREAT UR-MCNC: 76 MG/DL
DIFFERENTIAL METHOD BLD: ABNORMAL
EOSINOPHIL # BLD AUTO: 0.6 10E9/L (ref 0–0.7)
EOSINOPHIL NFR BLD AUTO: 5.1 %
ERYTHROCYTE [DISTWIDTH] IN BLOOD BY AUTOMATED COUNT: 14.7 % (ref 10–15)
GFR SERPL CREATININE-BSD FRML MDRD: 74 ML/MIN/1.7M2
GLUCOSE SERPL-MCNC: 146 MG/DL (ref 70–99)
GLUCOSE UR STRIP-MCNC: >1000 MG/DL
HCT VFR BLD AUTO: 44.2 % (ref 35–47)
HDLC SERPL-MCNC: 54 MG/DL
HGB BLD-MCNC: 13.9 G/DL (ref 11.7–15.7)
HGB UR QL STRIP: NEGATIVE
IMM GRANULOCYTES # BLD: 0.1 10E9/L (ref 0–0.4)
IMM GRANULOCYTES NFR BLD: 0.4 %
KETONES UR STRIP-MCNC: 5 MG/DL
LDLC SERPL CALC-MCNC: 115 MG/DL
LEUKOCYTE ESTERASE UR QL STRIP: ABNORMAL
LYMPHOCYTES # BLD AUTO: 2.9 10E9/L (ref 0.8–5.3)
LYMPHOCYTES NFR BLD AUTO: 25.1 %
MCH RBC QN AUTO: 26.8 PG (ref 26.5–33)
MCHC RBC AUTO-ENTMCNC: 31.4 G/DL (ref 31.5–36.5)
MCV RBC AUTO: 85 FL (ref 78–100)
MICROALBUMIN UR-MCNC: 9 MG/L
MICROALBUMIN/CREAT UR: 12.26 MG/G CR (ref 0–25)
MONOCYTES # BLD AUTO: 1 10E9/L (ref 0–1.3)
MONOCYTES NFR BLD AUTO: 8.4 %
NEUTROPHILS # BLD AUTO: 6.9 10E9/L (ref 1.6–8.3)
NEUTROPHILS NFR BLD AUTO: 60.4 %
NITRATE UR QL: NEGATIVE
NON-SQ EPI CELLS #/AREA URNS LPF: ABNORMAL /LPF
NONHDLC SERPL-MCNC: 147 MG/DL
PH UR STRIP: 5.5 PH (ref 5–7)
PLATELET # BLD AUTO: 320 10E9/L (ref 150–450)
POTASSIUM SERPL-SCNC: 4.4 MMOL/L (ref 3.4–5.3)
PROT SERPL-MCNC: 7.9 G/DL (ref 6.8–8.8)
RBC # BLD AUTO: 5.18 10E12/L (ref 3.8–5.2)
RBC #/AREA URNS AUTO: ABNORMAL /HPF
SODIUM SERPL-SCNC: 135 MMOL/L (ref 133–144)
SOURCE: ABNORMAL
SP GR UR STRIP: 1.02 (ref 1–1.03)
T4 FREE SERPL-MCNC: 1.16 NG/DL (ref 0.76–1.46)
TRIGL SERPL-MCNC: 161 MG/DL
TSH SERPL DL<=0.005 MIU/L-ACNC: 1.7 MU/L (ref 0.4–4)
UROBILINOGEN UR STRIP-MCNC: NORMAL MG/DL (ref 0–2)
WBC # BLD AUTO: 11.4 10E9/L (ref 4–11)
WBC #/AREA URNS AUTO: ABNORMAL /HPF

## 2017-12-22 PROCEDURE — 82043 UR ALBUMIN QUANTITATIVE: CPT | Performed by: NURSE PRACTITIONER

## 2017-12-22 PROCEDURE — 71020 XR CHEST 2 VW: CPT | Performed by: RADIOLOGY

## 2017-12-22 PROCEDURE — 84439 ASSAY OF FREE THYROXINE: CPT | Performed by: NURSE PRACTITIONER

## 2017-12-22 PROCEDURE — 87086 URINE CULTURE/COLONY COUNT: CPT | Performed by: NURSE PRACTITIONER

## 2017-12-22 PROCEDURE — 80050 GENERAL HEALTH PANEL: CPT | Performed by: NURSE PRACTITIONER

## 2017-12-22 PROCEDURE — 99214 OFFICE O/P EST MOD 30 MIN: CPT | Performed by: NURSE PRACTITIONER

## 2017-12-22 PROCEDURE — 80061 LIPID PANEL: CPT | Performed by: NURSE PRACTITIONER

## 2017-12-22 PROCEDURE — 36415 COLL VENOUS BLD VENIPUNCTURE: CPT | Performed by: NURSE PRACTITIONER

## 2017-12-22 PROCEDURE — 81001 URINALYSIS AUTO W/SCOPE: CPT | Performed by: NURSE PRACTITIONER

## 2017-12-22 RX ORDER — FAMOTIDINE 20 MG/1
20 TABLET, FILM COATED ORAL 2 TIMES DAILY
Qty: 60 TABLET | Refills: 1 | COMMUNITY
Start: 2017-12-22 | End: 2018-01-10

## 2017-12-22 RX ORDER — AMOXICILLIN 875 MG
875 TABLET ORAL 2 TIMES DAILY
Qty: 20 TABLET | Refills: 0 | Status: SHIPPED | OUTPATIENT
Start: 2017-12-22 | End: 2018-01-10

## 2017-12-22 NOTE — PROGRESS NOTES
SUBJECTIVE:   Brandy Leon is a 77 year old female who presents to clinic today for the following health issues:    Acute Illness   Acute illness concerns: cough  Onset: 3 months    Fever: no    Chills/Sweats: YES    Headache (location?): YES    Sinus Pressure:YES    Conjunctivitis:  no    Ear Pain: YES- feel plugged    Rhinorrhea: YES    Congestion: YES    Sore Throat: YES     Cough: YES-productive of yellow sputum    Wheeze: no    Decreased Appetite: no    Nausea: YES    Vomiting: no    Diarrhea:  no    Dysuria/Freq.: YES    Fatigue/Achiness: YES    Sick/Strep Exposure: no     Therapies Tried and outcome: cefdinir, doxycycline     Treated for bronchitis about a month ago   Feels some better but has a persistent cough  Never smoked  No blood coughed up   No SOB   After the antibiotics did better but still productive cough  No history of asthma except grand daughter.   Will have heartburn and takes pepcid but takes intermittently         Problem list and histories reviewed & adjusted, as indicated.  Additional history: as documented    Patient Active Problem List   Diagnosis     Seasonal allergies     Hypothyroidism     Hyperlipidemia LDL goal <100     Fibromyalgia     Osteoarthritis     Actinic keratosis     Advanced directives, counseling/discussion     Glaucoma suspect     Cataracts, bilateral     Obesity     Type 2 diabetes mellitus with hyperglycemia, with long-term current use of insulin (H)     Hypertension goal BP (blood pressure) < 140/90     Overactive bladder     Primary osteoarthritis of both hands     Recurrent UTI     Past Surgical History:   Procedure Laterality Date     C APPENDECTOMY       C ARTHROPLASTY TMJ       COLONOSCOPY       COLONOSCOPY N/A 8/13/2015    Procedure: COLONOSCOPY;  Surgeon: Duane, William Charles, MD;  Location: [facility] OR     COLONOSCOPY WITH CO2 INSUFFLATION N/A 8/13/2015    Procedure: COLONOSCOPY WITH CO2 INSUFFLATION;  Surgeon: Duane, William Charles, MD;  Location:  OR      THYROIDECTOMY          Social History   Substance Use Topics     Smoking status: Never Smoker     Smokeless tobacco: Never Used      Comment: has had exposure to second hand smoke in the past from [place], no current exposure     Alcohol use No     Family History   Problem Relation Age of Onset     CANCER Father      skin and leukemia     CEREBROVASCULAR DISEASE Maternal Grandfather      CEREBROVASCULAR DISEASE Paternal Grandmother      Eye Disorder Paternal Grandmother      cataracts     CEREBROVASCULAR DISEASE Paternal Grandfather      HEART DISEASE Paternal Grandfather      CANCER Sister      Breast Cancer     Eye Disorder Mother      cataracts     Eye Disorder Brother      cataract     Breast Cancer Daughter      Other Cancer Daughter      ovarian cancer         Current Outpatient Prescriptions   Medication Sig Dispense Refill     insulin glargine (LANTUS SOLOSTAR) 100 UNIT/ML injection Inject 20 Units Subcutaneous At Bedtime 45 mL 3     rosuvastatin (CRESTOR) 5 MG tablet Take 1 tablet twice a week 8 tablet 3     glipiZIDE (GLUCOTROL) 10 MG tablet TAKE 2 TABLETS BY MOUTH TWICE A DAY BEFORE MEALS 120 tablet 5     metFORMIN (GLUCOPHAGE) 1000 MG tablet TAKE 1 TABLET (1,000 MG) BY MOUTH 2 TIMES DAILY (WITH MEALS) 180 tablet 1     albuterol (PROAIR HFA/PROVENTIL HFA/VENTOLIN HFA) 108 (90 BASE) MCG/ACT Inhaler Inhale 2 puffs into the lungs every 4 hours as needed for shortness of breath / dyspnea or wheezing 1 Inhaler 1     Spacer/Aero-Holding Chambers (SPACER/AERO-HOLD CHAMBER MASK) SANTANA 1 Device as needed 1 each 0     oxybutynin (DITROPAN-XL) 5 MG 24 hr tablet Take 2 tablets (10 mg) by mouth daily 180 tablet 1     latanoprost (XALATAN) 0.005 % ophthalmic solution Place 1 drop into both eyes At Bedtime 3 Bottle 4     SYNTHROID 137 MCG tablet Take 1 tablet (137 mcg) by mouth daily 90 tablet 1     irbesartan-hydrochlorothiazide (AVALIDE) 150-12.5 MG per tablet TAKE 1 TABLET BY MOUTH DAILY (Patient taking  differently: TAKE .5 TABLET BY MOUTH DAILY) 90 tablet 3     BD JUAN C U/F 32G X 4 MM insulin pen needle USE 1 DAILY AS DIRECTED. 100 each 3     ONE TOUCH ULTRA test strip TEST TWICE DAILY 200 strip 11     famotidine (PEPCID) 20 MG tablet Take 20 mg by mouth nightly as needed       order for DME Glucometer covered by insurance. 1 each 0     aspirin 81 MG EC tablet Take 1 tablet by mouth daily. (Patient taking differently: Take 650 mg by mouth daily ) 90 tablet 3     ONE TOUCH DELICA LANCETS MISC 1 each 2 times daily. One Touch Delica   100 each 12     GLUCOSAMINE CHONDROITIN COMPLX OR 2 Daily       Omega-3 Fatty Acids (OMEGA 3 PO) Take by mouth 2 times daily.        MULTIVITAMIN OR 1 tablet daily       Allergies   Allergen Reactions     Estradiol Itching     Lipitor [Atorvastatin Calcium]      Weak and shaky     Sulfa Drugs      Rash       Zocor [Simvastatin]      Weak and shakey     Labs reviewed in EPIC      Reviewed and updated as needed this visit by clinical staffTobacco  Allergies  Meds  Med Hx  Surg Hx  Fam Hx  Soc Hx      Reviewed and updated as needed this visit by Provider         ROS:  CONSTITUTIONAL:NEGATIVE for fever, chills, change in weight  ENT/MOUTH: POSITIVE for nasal congestion, postnasal drainage and sinus pressure and NEGATIVE for epistaxis and fever  RESP:POSITIVE for cough-productive and NEGATIVE for SOB/dyspnea and wheezing  CV: NEGATIVE for chest pain/chest pressure  : POSITIVE for feels like it scrapes when goes to the bathroom. Will have a little pink on the tissue.  and NEGATIVE for nausea, poor appetite and vomiting  HEME/ALLERGY/IMMUNE: NEGATIVE for bleeding problems    OBJECTIVE:     /60 (BP Location: Right arm, Patient Position: Sitting, Cuff Size: Adult Regular)  Pulse 94  Temp 96.6  F (35.9  C) (Temporal)  Wt 182 lb 6.4 oz (82.7 kg)  SpO2 96%  Breastfeeding? No  BMI 31.55 kg/m2  Body mass index is 31.55 kg/(m^2).  GENERAL: Patient is well nourished, well  developed,in no apparent distress, non-toxic, in no respiratory distress and acyanotic, alert and cooperative  mildly ill but alert and responsive  EYES:  Right conjunctiva is not injected and without discharge.  Left conjunctiva is not injected and without discharge.  EARS: negative findings: external ears normal to inspection and palpation, no mastoid process tenderness, positive findings: , Right TM: serous middle ear fluid, Left TM: serous middle ear fluid  NOSE: positive findings: mucosa erythematous and swollen,  Sinus maxillary tenderness on bilaterally.  THROAT: normal.  NECK: supple with no adenopathy,   CARDIAC:NORMAL - regular rate and rhythm without murmur.  RESP: normal respiratory rate and rhythm, lungs clear to auscultation  unlabored respirations, no intercostal retractions or accessory muscle use  ABD: Abdomen soft, non-tender.  SKIN: Skin color, texture, turgor normal. No rashes or lesions.  MS: extremities normal- no gross deformities noted, gait normal and normal muscle tone    Diagnostic Test Results:  Results for orders placed or performed in visit on 12/22/17   UA with Microscopic reflex to Culture ([place]; [facility])   Result Value Ref Range    Color Urine Light Yellow     Appearance Urine Clear     Glucose Urine >1000 (A) NEG^Negative mg/dL    Bilirubin Urine Negative NEG^Negative    Ketones Urine 5 (A) NEG^Negative mg/dL    Specific Gravity Urine 1.019 1.003 - 1.035    Blood Urine Negative NEG^Negative    pH Urine 5.5 5.0 - 7.0 pH    Protein Albumin Urine Negative NEG^Negative mg/dL    Urobilinogen mg/dL Normal 0.0 - 2.0 mg/dL    Nitrite Urine Negative NEG^Negative    Leukocyte Esterase Urine Moderate (A) NEG^Negative    Source Midstream Urine     WBC Urine 2-5 (A) OTO2^O - 2 /HPF    RBC Urine 2-5 (A) OTO2^O - 2 /HPF    Bacteria Urine Moderate (A) NEG^Negative /HPF    Squamous Epithelial /LPF Urine Few FEW^Few /LPF   CBC with platelets differential   Result Value Ref Range     WBC 11.4 (H) 4.0 - 11.0 10e9/L    RBC Count 5.18 3.8 - 5.2 10e12/L    Hemoglobin 13.9 11.7 - 15.7 g/dL    Hematocrit 44.2 35.0 - 47.0 %    MCV 85 78 - 100 fl    MCH 26.8 26.5 - 33.0 pg    MCHC 31.4 (L) 31.5 - 36.5 g/dL    RDW 14.7 10.0 - 15.0 %    Platelet Count 320 150 - 450 10e9/L    Diff Method Automated Method     % Neutrophils 60.4 %    % Lymphocytes 25.1 %    % Monocytes 8.4 %    % Eosinophils 5.1 %    % Basophils 0.6 %    % Immature Granulocytes 0.4 %    Absolute Neutrophil 6.9 1.6 - 8.3 10e9/L    Absolute Lymphocytes 2.9 0.8 - 5.3 10e9/L    Absolute Monocytes 1.0 0.0 - 1.3 10e9/L    Absolute Eosinophils 0.6 0.0 - 0.7 10e9/L    Absolute Basophils 0.1 0.0 - 0.2 10e9/L    Abs Immature Granulocytes 0.1 0 - 0.4 10e9/L   T4 free   Result Value Ref Range    T4 Free 1.16 0.76 - 1.46 ng/dL   TSH   Result Value Ref Range    TSH 1.70 0.40 - 4.00 mU/L   Comprehensive metabolic panel   Result Value Ref Range    Sodium 135 133 - 144 mmol/L    Potassium 4.4 3.4 - 5.3 mmol/L    Chloride 100 94 - 109 mmol/L    Carbon Dioxide 29 20 - 32 mmol/L    Anion Gap 6 3 - 14 mmol/L    Glucose 146 (H) 70 - 99 mg/dL    Urea Nitrogen 16 7 - 30 mg/dL    Creatinine 0.76 0.52 - 1.04 mg/dL    GFR Estimate 74 >60 mL/min/1.7m2    GFR Estimate If Black 90 >60 mL/min/1.7m2    Calcium 8.7 8.5 - 10.1 mg/dL    Bilirubin Total 0.4 0.2 - 1.3 mg/dL    Albumin 3.5 3.4 - 5.0 g/dL    Protein Total 7.9 6.8 - 8.8 g/dL    Alkaline Phosphatase 80 40 - 150 U/L    ALT 18 0 - 50 U/L    AST 19 0 - 45 U/L   Lipid panel reflex to direct LDL Fasting   Result Value Ref Range    Cholesterol 201 (H) <200 mg/dL    Triglycerides 161 (H) <150 mg/dL    HDL Cholesterol 54 >49 mg/dL    LDL Cholesterol Calculated 115 (H) <100 mg/dL    Non HDL Cholesterol 147 (H) <130 mg/dL   Albumin Random Urine Quantitative with Creat Ratio   Result Value Ref Range    Creatinine Urine 76 mg/dL    Albumin Urine mg/L 9 mg/L    Albumin Urine mg/g Cr 12.26 0 - 25 mg/g Cr   Urine Culture Aerobic  Bacterial   Result Value Ref Range    Specimen Description Midstream Urine     Culture Micro       <10,000 colonies/mL  mixed urogenital erwin  Susceptibility testing not routinely done       Recent Results (from the past 744 hour(s))   XR Chest 2 Views    Narrative    XR CHEST 2 VW  12/22/2017 11:39 AM      HISTORY: ; Cough    COMPARISON: 11/8/2016    FINDINGS: PA and lateral views of the chest. No acute airspace  opacities. No pneumothorax or pleural effusion. Heart size is normal.      Impression    IMPRESSION: No acute cardiopulmonary findings.    I have personally reviewed the examination and initial interpretation  and I agree with the findings.    BEATRIZ SUTTON MD         ASSESSMENT/PLAN:     Brandy was seen today for cough.    Diagnoses and all orders for this visit:    Acute bronchitis, unspecified organism  -    : XR Chest 2 Views; Future  Symptomatic therapy suggested: rest, increase fluids, apply heat to sinuses prn, use mist of vaporizer prn and call prn if symptoms persist or worsen.    Acute non-recurrent maxillary sinusitis  -     amoxicillin (AMOXIL) 875 MG tablet; Take 1 tablet (875 mg) by mouth 2 times daily    Cough  -     XR Chest 2 Views; Future  Start on Pepcid twice a day     Urinary frequency  -     UA with Microscopic reflex to Culture ([facility])    Gastroesophageal reflux disease without esophagitis  -     famotidine (PEPCID) 20 MG tablet; Take 1 tablet (20 mg) by mouth 2 times daily    Other fatigue  -     CBC with platelets differential  Will follow up and/or notify patient on results via phone or mail to determine further need for followup      Hyperlipidemia LDL goal <100  Continue current medications.    Type 2 diabetes mellitus with hyperglycemia, with long-term current use of insulin (H)  -     Comprehensive metabolic panel  -     Lipid panel reflex to direct LDL Fasting  -     Albumin Random Urine Quantitative with Creat Ratio  FOLLOW UP WITH SPECIALIST  :Endocrinology    Hypothyroidism, unspecified type  -     T4 free  -     TSH  Will follow up and/or notify patient on results via phone or mail to determine further need for followup      Nonspecific finding on examination of urine  -     Urine Culture Aerobic Bacterial    PLAN:    Patient needs to follow up in if no improvement,or sooner if worsening of symptoms or other symptoms develop.  FURTHER TESTING:       - CXR   Will follow up and/or notify patient on results via phone or mail to determine further need for followup  May need to consider pulmonary function tests if cough persists or CT chest     See Patient Instructions    MAGO Baker [facility]

## 2017-12-22 NOTE — PATIENT INSTRUCTIONS
PLAN:   1.   Symptomatic therapy suggested: rest, increase fluids, apply heat to sinuses prn, use mist of vaporizer prn and call prn if symptoms persist or worsen.  Start on Pepcid twice a day   A high fiber diet with plenty of fluids (up to 8 glasses of water daily) is suggested to relieve these symptoms.  Metamucil, 1 tablespoon once or twice daily can be used to keep bowels regular if needed.  2.  Orders Placed This Encounter   Medications     amoxicillin (AMOXIL) 875 MG tablet     Sig: Take 1 tablet (875 mg) by mouth 2 times daily     Dispense:  20 tablet     Refill:  0     famotidine (PEPCID) 20 MG tablet     Sig: Take 1 tablet (20 mg) by mouth 2 times daily     Dispense:  60 tablet     Refill:  1     Orders Placed This Encounter   Procedures     XR Chest 2 Views     UA with Microscopic reflex to Culture ([place]; [facility])     CBC with platelets differential     T4 free     TSH     Comprehensive metabolic panel     Lipid panel reflex to direct LDL Fasting     Albumin Random Urine Quantitative with Creat Ratio       3. Patient needs to follow up in if no improvement,or sooner if worsening of symptoms or other symptoms develop.  FURTHER TESTING:       - CXR   Will follow up and/or notify patient on results via phone or mail to determine further need for followup  May need to consider pulmonary function tests if cough persists or CT chest     It was a pleasure seeing you today at the [facility]. Thank you for allowing us to care for you today. We truly hope we provided you with the excellent service you deserve. Please let us know if there is anything else we can do for you so we can be sure [facility].       General information about your clinic   Clinic Hours Lab Hours (Appointments are required)   Mon-Thurs: 7:30 AM - 7 PM Mon-Thurs: 7:30 AM - 7 PM   Fri: 7:30 AM - 5 PM Fri: 7:30 AM - 5 PM        After Hours Nurse  Advise & Appts:  [facility]: [phone]  [place] On Call: to make appointments anytime: [phone] On Call Physician: call [phone] and answering service will page the on call physician.        For urgent appointments, please call [phone] and ask for the triage nurse or your care team clinic nurse.  How to contact my care team:  MyChart: [email or web address]   Phone: [phone]   Fax: [phone]       [facility]:   Phone: [phone]  Hours: 8:00 AM - 6:00 PM  Medication Refills:  Call your pharmacy and they will forward the refill to us. Please allow 3 business days for your refills to be completed.       Normal or non-critical lab and imaging results will be communicated to you by MyChart, letter or phone within 7 days.  If you do not hear from us within 10 days, please call the clinic. If you have a critical or abnormal lab result, we will notify you by phone as soon as possible.       We now have PWIC (Pediatric Walk in Care)  Monday-Friday from 7:30-4. Simply walk in and be seen for your urgent needs like cough, fever, rash, diarrhea or vomiting, pink eye, UTI. No appointments needed. Ask one of the team for more information      -Your Care Team:    Dr. Tara Keller - Internal Medicine/Pediatrics   Dr. Kirit Pérez - Family Medicine  Dr. Deborah Andres - Pediatrics  Dr. Adelia Drew - Pediatrics  Cailin Ponce CNP - Family Practice Nurse Practitioner

## 2017-12-22 NOTE — NURSING NOTE
"Chief Complaint   Patient presents with     Cough     cough x 3 months       Initial /60 (BP Location: Right arm, Patient Position: Sitting, Cuff Size: Adult Regular)  Pulse 94  Temp 96.6  F (35.9  C) (Temporal)  Wt 182 lb 6.4 oz (82.7 kg)  SpO2 96%  Breastfeeding? No  BMI 31.55 kg/m2 Estimated body mass index is 31.55 kg/(m^2) as calculated from the following:    Height as of 10/23/17: 5' 3.75\" (1.619 m).    Weight as of this encounter: 182 lb 6.4 oz (82.7 kg).  Medication Reconciliation: complete      KATINA Tucker      "

## 2017-12-22 NOTE — MR AVS SNAPSHOT
After Visit Summary   12/22/2017    Brandy Leon    MRN: [id number]           Patient Information     Date Of Birth          1940        Visit Information        Provider Department      12/22/2017 10:50 AM Cailin Ponce APRN CNP [facility]        Today's Diagnoses     Urinary frequency    -  1    Cough        Gastroesophageal reflux disease without esophagitis        Acute bronchitis, unspecified organism        Other fatigue        Acute non-recurrent maxillary sinusitis        Hyperlipidemia LDL goal <100        Type 2 diabetes mellitus with hyperglycemia, with long-term current use of insulin (H)        Hypothyroidism, unspecified type        Nonspecific finding on examination of urine          Care Instructions    PLAN:   1.   Symptomatic therapy suggested: rest, increase fluids, apply heat to sinuses prn, use mist of vaporizer prn and call prn if symptoms persist or worsen.  Start on Pepcid twice a day   A high fiber diet with plenty of fluids (up to 8 glasses of water daily) is suggested to relieve these symptoms.  Metamucil, 1 tablespoon once or twice daily can be used to keep bowels regular if needed.  2.  Orders Placed This Encounter   Medications     amoxicillin (AMOXIL) 875 MG tablet     Sig: Take 1 tablet (875 mg) by mouth 2 times daily     Dispense:  20 tablet     Refill:  0     famotidine (PEPCID) 20 MG tablet     Sig: Take 1 tablet (20 mg) by mouth 2 times daily     Dispense:  60 tablet     Refill:  1     Orders Placed This Encounter   Procedures     XR Chest 2 Views     UA with Microscopic reflex to Culture ([facility])     CBC with platelets differential     T4 free     TSH     Comprehensive metabolic panel     Lipid panel reflex to direct LDL Fasting     Albumin Random Urine Quantitative with Creat Ratio       3. Patient needs to follow up in if no improvement,or sooner if worsening of symptoms or other symptoms  develop.  FURTHER TESTING:       - CXR   Will follow up and/or notify patient on results via phone or mail to determine further need for followup  May need to consider pulmonary function tests if cough persists or CT chest     It was a pleasure seeing you today at the [facility]. Thank you for allowing us to care for you today. We truly hope we provided you with the excellent service you deserve. Please let us know if there is anything else we can do for you so we can be sure you are leaving completley satisfied with your care experience.       General information about your clinic   Clinic Hours Lab Hours (Appointments are required)   Mon-Thurs: 7:30 AM - 7 PM Mon-Thurs: 7:30 AM - 7 PM   Fri: 7:30 AM - 5 PM Fri: 7:30 AM - 5 PM        After Hours Nurse Advise & Appts:  [facility]: [phone]  [facility] On Call: to make appointments anytime: [phone] On Call Physician: call [phone] and answering service will page the on call physician.        For urgent appointments, please call [phone] and ask for the triage nurse or your care team clinic nurse.  How to contact my care team:  [facility]: [email or web address]   Phone: [phone]   Fax: [phone]       [facility]:   Phone: [phone]  Hours: 8:00 AM - 6:00 PM  Medication Refills:  Call your pharmacy and they will forward the refill to us. Please allow 3 business days for your refills to be completed.       Normal or non-critical lab and imaging results will be communicated to you by MyChart, letter or phone within 7 days.  If you do not hear from us within 10 days, please call the clinic. If you have a critical or abnormal lab result, we will notify you by phone as soon as possible.       We now have PWIC (Pediatric Walk in Care)  Monday-Friday from 7:30-4. Simply walk in and be seen for your urgent needs like cough, fever, rash, diarrhea or vomiting, pink eye, UTI. No appointments needed. Ask  one of the team for more information      -Your Care Team:    Dr. Tara Keller - Internal Medicine/Pediatrics   Dr. Kirit Pérez - Family Medicine  Dr. Deborah Andres - Pediatrics  Dr. Adelia Drew - Pediatrics  Cailin Ponce CNP - Family Practice Nurse Practitioner                           Follow-ups after your visit        Your next 10 appointments already scheduled     Arsenio 10, 2018  8:30 AM CST   TELEMEDICINE with Mirna Perez [facility] ([facility])    [address] [place] [address] N  [facility] [address]  [employer] [id number] [phone]           Note: this is not an onsite visit; there is no need to come to the facility.            Apr 04, 2018  9:00 AM CDT   Return Visit with Ehsan Araya DPM   [facility]    [address] [place] [id number]   [phone]            Apr 06, 2018  8:15 AM CDT   Return Visit with Candice Worley MD, [facility] ENDO NURSE   [facility])    [address] [place] [id number] [phone]            Sep 25, 2018 10:45 AM CDT   Return Visit with Lucita Jordan MD   [facility])    [id number] [place] [id number] [phone]              Future tests that were ordered for you today     Open Future Orders        Priority Expected Expires Ordered    XR Chest 2 Views Routine 12/22/2017 12/22/2018 12/22/2017            Who to contact     If you have questions or need follow up information about today's clinic visit or your schedule please contact [facility] directly at [phone].  Normal or non-critical lab and imaging results will be communicated to you by MyChart, letter or phone within 4 business days after the clinic has received the results. If you do not hear from us within 7 days, please contact the clinic through [employer]  or phone. If you have a critical or abnormal lab result, we will notify you by phone as soon as possible.  Submit refill requests through [employer] or call your pharmacy and they will forward the refill request to us. Please allow 3 business days for your refill to be completed.          Additional Information About Your Visit        [employer] Information     [employer] is an electronic gateway that provides easy, online access to your medical records. With [employer], you can request a clinic appointment, read your test results, renew a prescription or communicate with your care team.     To sign up for [employer] visit the website at [email or web address]   You will be asked to enter the access code listed below, as well as some personal information. Please follow the directions to create your username and password.     Your access code is: [email or web address]  Expires: 2018  1:28 PM     Your access code will  in 90 days. If you need help or a new code, please contact your [facility] or call [phone] for assistance.        Care EveryWhere ID     This is your Care EveryWhere ID. This could be used by other organizations to access your [place] medical records  [id number]        Your Vitals Were     Pulse Temperature Pulse Oximetry Breastfeeding? BMI (Body Mass Index)       94 96.6  F (35.9  C) (Temporal) 96% No 31.55 kg/m2        Blood Pressure from Last 3 Encounters:   17 120/60   17 134/77   10/31/17 120/60    Weight from Last 3 Encounters:   17 182 lb 6.4 oz (82.7 kg)   10/31/17 180 lb 9.6 oz (81.9 kg)   10/23/17 183 lb 12.8 oz (83.4 kg)              We Performed the Following     Albumin Random Urine Quantitative with Creat Ratio     CBC with platelets differential     Comprehensive metabolic panel     Lipid panel reflex to direct LDL Fasting     T4 free     TSH     UA with Microscopic reflex to Culture ([facility]gnes Schaefer; [facility])     Urine Culture Aerobic  Bacterial          Today's Medication Changes          These changes are accurate as of: 12/22/17 11:10 AM.  If you have any questions, ask your nurse or doctor.               Start taking these medicines.        Dose/Directions    amoxicillin 875 MG tablet   Commonly known as:  AMOXIL   Used for:  Acute non-recurrent maxillary sinusitis   Started by:  Cailin Ponce APRN CNP        Dose:  875 mg   Take 1 tablet (875 mg) by mouth 2 times daily   Quantity:  20 tablet   Refills:  0         These medicines have changed or have updated prescriptions.        Dose/Directions    aspirin 81 MG EC tablet   This may have changed:  how much to take   Used for:  Type 2 diabetes, HbA1c goal < 7% (H)        Dose:  81 mg   Take 1 tablet by mouth daily.   Quantity:  90 tablet   Refills:  3       * famotidine 20 MG tablet   Commonly known as:  PEPCID   This may have changed:  Another medication with the same name was added. Make sure you understand how and when to take each.   Changed by:  Mirna Perez [facility]        Dose:  20 mg   Take 20 mg by mouth nightly as needed   Refills:  0       * famotidine 20 MG tablet   Commonly known as:  PEPCID   This may have changed:  You were already taking a medication with the same name, and this prescription was added. Make sure you understand how and when to take each.   Used for:  Gastroesophageal reflux disease without esophagitis   Changed by:  Cailin Ponce APRN CNP        Dose:  20 mg   Take 1 tablet (20 mg) by mouth 2 times daily   Quantity:  60 tablet   Refills:  1       irbesartan-hydrochlorothiazide 150-12.5 MG per tablet   Commonly known as:  AVALIDE   This may have changed:  See the new instructions.   Used for:  Hypertension goal BP (blood pressure) < 140/90        TAKE 1 TABLET BY MOUTH DAILY   Quantity:  90 tablet   Refills:  3       * Notice:  This list has 2 medication(s) that are the same as other medications prescribed for you. Read the directions  carefully, and ask your doctor or other care provider to review them with you.         Where to get your medicines      These medications were sent to [facility]/pharmacy #[id number] - [address] AT COR[address] OF [address] [address]     Phone:  [phone]     amoxicillin 875 MG tablet         Some of these will need a paper prescription and others can be bought over the counter.  Ask your nurse if you have questions.     You don't need a prescription for these medications     famotidine 20 MG tablet                Primary Care Provider Office Phone # Fax #    Cailin Ponce, APRN [phone]       [address]  [place] [address]        Equal Access to Services     NUSRAT KHAN : Hadii ana ku hadasho Soomaali, waaxda luqadaha, qaybta kaalmada adeegyada, mitch azevedo . So [facility] [phone].    ATENCIÓN: Si habla español, tiene a garcía disposición servicios gratuitos de asistencia lingüística. [facility] [phone].    We comply with applicable federal civil rights laws and [place] laws. We do not discriminate on the basis of race, color, national origin, age, disability, sex, sexual orientation, or gender identity.            Thank you!     Thank you for choosing [facility]  for your care. Our goal is always to provide you with excellent care. Hearing back from our patients is one way we can continue to improve our services. Please take a few minutes to complete the written survey that you may receive in the mail after your visit with us. Thank you!             Your Updated Medication List - Protect others around you: Learn how to safely use, store and throw away your medicines at www.disposemymeds.org.          This list is accurate as of: 12/22/17 11:10 AM.  Always use your most recent med list.                   Brand Name Dispense Instructions for use Diagnosis    albuterol 108 (90 BASE)  MCG/ACT Inhaler    PROAIR HFA/PROVENTIL HFA/VENTOLIN HFA    1 Inhaler    Inhale 2 puffs into the lungs every 4 hours as needed for shortness of breath / dyspnea or wheezing    Cough due to bronchospasm       amoxicillin 875 MG tablet    AMOXIL    20 tablet    Take 1 tablet (875 mg) by mouth 2 times daily    Acute non-recurrent maxillary sinusitis       aspirin 81 MG EC tablet     90 tablet    Take 1 tablet by mouth daily.    Type 2 diabetes, HbA1c goal < 7% (H)       BD JUAN C U/F 32G X 4 MM   Generic drug:  insulin pen needle     100 each    USE 1 DAILY AS DIRECTED.    Type 2 diabetes mellitus with hyperglycemia (H)       * famotidine 20 MG tablet    PEPCID     Take 20 mg by mouth nightly as needed        * famotidine 20 MG tablet    PEPCID    60 tablet    Take 1 tablet (20 mg) by mouth 2 times daily    Gastroesophageal reflux disease without esophagitis       glipiZIDE 10 MG tablet    GLUCOTROL    120 tablet    TAKE 2 TABLETS BY MOUTH TWICE A DAY BEFORE MEALS    Type 2 diabetes mellitus with hyperglycemia, with long-term current use of insulin (H)       GLUCOSAMINE CHONDROITIN COMPLX PO      2 Daily        insulin glargine 100 UNIT/ML injection    LANTUS SOLOSTAR    45 mL    Inject 20 Units Subcutaneous At Bedtime    Type 2 diabetes mellitus with hyperglycemia, with long-term current use of insulin (H)       irbesartan-hydrochlorothiazide 150-12.5 MG per tablet    AVALIDE    90 tablet    TAKE 1 TABLET BY MOUTH DAILY    Hypertension goal BP (blood pressure) < 140/90       latanoprost 0.005 % ophthalmic solution    XALATAN    3 Bottle    Place 1 drop into both eyes At Bedtime    Primary open angle glaucoma of both eyes, moderate stage       metFORMIN 1000 MG tablet    GLUCOPHAGE    180 tablet    TAKE 1 TABLET (1,000 MG) BY MOUTH 2 TIMES DAILY (WITH MEALS)    Type 2 diabetes mellitus with hyperglycemia, with long-term current use of insulin (H)       MULTIVITAMIN PO      1 tablet daily        OMEGA 3 PO      Take by  mouth 2 times daily.        ONE TOUCH DELICA LANCETS Misc     100 each    1 each 2 times daily. One Touch Delica    Type 2 diabetes, HbA1c goal < 7% (H)       ONETOUCH ULTRA test strip   Generic drug:  blood glucose monitoring     200 strip    TEST TWICE DAILY    Type 2 diabetes mellitus with hyperglycemia, with long-term current use of insulin (H)       order for DME     1 each    Glucometer covered by insurance.    Type 2 diabetes, HbA1c goal < 7% (H)       oxybutynin 5 MG 24 hr tablet    DITROPAN-XL    180 tablet    Take 2 tablets (10 mg) by mouth daily    Urinary frequency, Overactive bladder       rosuvastatin 5 MG tablet    CRESTOR    8 tablet    Take 1 tablet twice a week    Dyslipidemia, goal LDL below 100       spacer/aero-hold chamber mask Aaliyah     1 each    1 Device as needed    Cough due to bronchospasm       SYNTHROID 137 MCG tablet   Generic drug:  levothyroxine     90 tablet    Take 1 tablet (137 mcg) by mouth daily    Myxedema heart disease (H), Nutritional and metabolic cardiomyopathy (H)       * Notice:  This list has 2 medication(s) that are the same as other medications prescribed for you. Read the directions carefully, and ask your doctor or other care provider to review them with you.

## 2017-12-23 LAB
BACTERIA SPEC CULT: NORMAL
SPECIMEN SOURCE: NORMAL

## 2017-12-27 ENCOUNTER — TELEPHONE (OUTPATIENT)
Dept: PEDIATRICS | Facility: CLINIC | Age: 77
End: 2017-12-27

## 2017-12-27 NOTE — TELEPHONE ENCOUNTER
Attempt #1.    Unable to leave message.   No answer.    Reny Cedeno RN,   [facility]

## 2017-12-27 NOTE — TELEPHONE ENCOUNTER
TSH   Status:  Final result   Visible to patient:  No (Inaccessible in MyChart) Dx:  Hypothyroidism, unspecified type Order: [id number]       Notes Recorded by Cailin Ponce APRN CNP on 12/26/2017 at 9:37 PM  Please call   Labs are good on present medications   No urine infection   Keep appointment with endocrinology as planned  Cailin Ponce NP, APRN CNP             Ref Range & Units 5d ago   10mo ago   1yr ago        TSH 0.40 - 4.00 mU/L 1.70 1.33 1.88     Resulting Agency  [facility]          Specimen Collected: 12/22/17 11:18 AM Last Resulted: 12/22/17 11:56 AM                                 T4 free   Status:  Final result   Visible to patient:  No (Inaccessible in MyChart) Dx:  Hypothyroidism, unspecified type Order: [id number]       Notes Recorded by Cailin Ponce APRN CNP on 12/26/2017 at 9:37 PM  Please call   Labs are good on present medications   No urine infection   Keep appointment with endocrinology as planned  Cailin Ponce NP, MAGO CNP             Ref Range & Units 5d ago   10mo ago        T4 Free 0.76 - 1.46 ng/dL 1.16 1.25     Resulting Agency  [facility]          Specimen Collected: 12/22/17 11:18 AM Last Resulted: 12/22/17 11:49 AM                                Comprehensive metabolic panel   Status:  Final result   Visible to patient:  No (Inaccessible in MyChart) Dx:  Type 2 diabetes mellitus with hypergl... Order: [id number]       Notes Recorded by Cailin Ponce APRN CNP on 12/26/2017 at 9:37 PM  Please call   Labs are good on present medications   No urine infection   Keep appointment with endocrinology as planned  Cailin Ponce NP, APRN CNP             Ref Range & Units 5d ago  (12/22/17) 8mo ago  (4/14/17) 8mo ago  (4/14/17)      Sodium 133 - 144 mmol/L 135  137      Potassium 3.4 - 5.3 mmol/L 4.4  4.3      Chloride 94 - 109 mmol/L 100  102      Carbon Dioxide 20 - 32 mmol/L 29  29      Anion Gap 3 - 14 mmol/L 6  6       Glucose 70 - 99 mg/dL 146 (H)  151 (H)CM     Comments: Fasting specimen      Urea Nitrogen 7 - 30 mg/dL 16  21      Creatinine 0.52 - 1.04 mg/dL 0.76  0.76      GFR Estimate >60 mL/min/1.7m2 74  74CM     Comments: Non  GFR Calc      GFR Estimate If Black >60 mL/min/1.7m2 90  90CM     Comments:  GFR Calc      Calcium 8.5 - 10.1 mg/dL 8.7  9.5      Bilirubin Total 0.2 - 1.3 mg/dL 0.4        Albumin 3.4 - 5.0 g/dL 3.5        Protein Total 6.8 - 8.8 g/dL 7.9        Alkaline Phosphatase 40 - 150 U/L 80        ALT 0 - 50 U/L 18 21       AST 0 - 45 U/L 19       Resulting Agency  [facility]          Specimen Collected: 12/22/17 11:18 AM Last Resulted: 12/22/17 11:49 AM                CM=Additional comments                     Lipid panel reflex to direct LDL Fasting   Status:  Final result   Visible to patient:  No (Inaccessible in MyChart) Dx:  Type 2 diabetes mellitus with hypergl... Order: [id number]       Notes Recorded by Cailin Ponce APRN CNP on 12/26/2017 at 9:37 PM  Please call   Labs are good on present medications   No urine infection   Keep appointment with endocrinology as planned  Cailin Ponce, FARZANA, APRN CNP             Ref Range & Units 5d ago   10mo ago   1yr ago        Cholesterol <200 mg/dL 201 (H) 142 137     Comments: Desirable:       <200 mg/dl      Triglycerides <150 mg/dL 161 (H) 157 (H) (H)CM     Comments: Borderline high:  150-199 mg/dl   High:             200-499 mg/dl   Very high:       >499 mg/dl   Fasting specimen         HDL Cholesterol >49 mg/dL 54 59 57      LDL Cholesterol Calculated <100 mg/dL 115 (H) 52CM 48CM     Comments: Above desirable:  100-129 mg/dl   Borderline High:  130-159 mg/dL   High:             160-189 mg/dL   Very high:       >189 mg/dl         Non HDL Cholesterol <130 mg/dL 147 (H) 83 80     Comments: Above Desirable:  130-159 mg/dl   Borderline high:  160-189 mg/dl   High:             190-219 mg/dl   Very high:       >219  mg/dl        Resulting Agency  [facility]  [facility]          Specimen Collected: 12/22/17 11:18 AM Last Resulted: 12/22/17 11:49 AM                CM=Additional comments                     CBC with platelets differential   Status:  Final result   Visible to patient:  No (Inaccessible in MyChart) Dx:  Other fatigue Order: [id number]       Notes Recorded by Cailin Ponce, MGAO CNP on 12/26/2017 at 9:37 PM  Please call   Labs are good on present medications   No urine infection   Keep appointment with endocrinology as planned  Cailin Ponce, NP, APRN CNP             Ref Range & Units 5d ago   8mo ago   1yr ago        WBC 4.0 - 11.0 10e9/L 11.4 (H)  11.0      RBC Count 3.8 - 5.2 10e12/L 5.18  4.56      Hemoglobin 11.7 - 15.7 g/dL 13.9 15.5 13.4      Hematocrit 35.0 - 47.0 % 44.2  39.9      MCV 78 - 100 fl 85  88      MCH 26.5 - 33.0 pg 26.8  29.4      MCHC 31.5 - 36.5 g/dL 31.4 (L)  33.6      RDW 10.0 - 15.0 % 14.7  13.6      Platelet Count 150 - 450 10e9/L 320  279      Diff Method  Automated Method  Automated Method      % Neutrophils % 60.4  66.9      % Lymphocytes % 25.1  21.4      % Monocytes % 8.4  8.0      % Eosinophils % 5.1  3.5      % Basophils % 0.6  0.2      % Immature Granulocytes % 0.4        Absolute Neutrophil 1.6 - 8.3 10e9/L 6.9  7.4      Absolute Lymphocytes 0.8 - 5.3 10e9/L 2.9  2.4      Absolute Monocytes 0.0 - 1.3 10e9/L 1.0  0.9      Absolute Eosinophils 0.0 - 0.7 10e9/L 0.6  0.4      Absolute Basophils 0.0 - 0.2 10e9/L 0.1  0.0      Abs Immature Granulocytes 0 - 0.4 10e9/L 0.1       Resulting Agency  [facility]          Specimen Collected: 12/22/17 11:18 AM Last Resulted: 12/22/17 11:28 AM                                 Urine Culture Aerobic Bacterial   Status:  Final result   Visible to patient:  No (Inaccessible in MyChart) Dx:  Nonspecific finding on examination of... Order: [id number]       Notes Recorded by Cailin Ponce APRN CNP on 12/26/2017 at 9:37  PM  Please call   Labs are good on present medications   No urine infection   Keep appointment with endocrinology as planned  Cailin Ponce NP, APRN CNP          5d ago       Specimen Description Midstream Urine     Culture Micro <10,000 colonies/mL  mixed urogenital erwin  Susceptibility testing not routinely done      Resulting Agency [facility]          Specimen Collected: 12/22/17 10:45 AM Last Resulted: 12/23/17 11:20 AM                                 Albumin Random Urine Quantitative with Creat Ratio   Status:  Final result   [facility] patient[facility]  [facility] Dx:  Type 2 diabetes mellitus with hypergl... Order: [id number]       Notes Recorded by Cailin Ponce APRN CNP on 12/26/2017 at 9:37 PM  Please call   Labs are good on present medications   No urine infection   Keep appointment with endocrinology as planned  Cailin Ponce NP, APRN CNP             Ref Range & Units 5d ago   10mo ago   1yr ago        Creatinine Urine mg/dL 76 49 117      Albumin Urine mg/L mg/L 9 14 5      Albumin Urine mg/g Cr 0 - 25 mg/g Cr 12.26 28.07 (H) 4.54     [facility]ing Agency  [facility]          Specimen Collected: 12/22/17 10:45 AM Last Resulted: 12/22/17 11:35 AM                                UA with Microscopic reflex to Culture ([facility]; [facility])   Status:  Final result   Visible to patient:  No (Inaccessible in [facility]) Dx:  Urinary frequency Order: [id number]       Notes Recorded by Cailin Ponce APRN CNP on 12/26/2017 at 9:37 PM  Please call   Labs are good on present medications   No urine infection   Keep appointment with endocrinology as planned  Cailin Ponce NP, APRN CNP             Ref Range & Units 5d ago   1mo ago   5mo ago        Color Urine  Light Yellow Light Yellow Light Yellow      Appearance Urine  Clear Clear Clear      Glucose Urine  NEG^Negative mg/dL >1000 (A) >1000 (A) >1000 (A)R      Bilirubin Urine NEG^Negative Negative Negative NegativeR      Ketones Urine NEG^Negative mg/dL 5 (A) 5 (A) 10 (A)R      Specific Gravity Urine 1.003 - 1.035 1.019 1.022 1.019      Blood Urine NEG^Negative Negative Negative NegativeR      pH Urine 5.0 - 7.0 pH 5.5 6.0 6.0      Protein Albumin Urine NEG^Negative mg/dL Negative Negative NegativeR      Urobilinogen mg/dL 0.0 - 2.0 mg/dL Normal Normal Normal      Nitrite Urine NEG^Negative Negative Positive (A) Positive (A)R      Leukocyte Esterase Urine NEG^Negative Moderate (A) Moderate (A) Small (A)R      Source  Midstream Urine Midstream Urine Midstream Urine      WBC Urine OTO2^O - 2 /HPF 2-5 (A) 10-25 (A) 2-5 (A)R      RBC Urine OTO2^O - 2 /HPF 2-5 (A) O - 2 O - 2R      Bacteria Urine NEG^Negative /HPF Moderate (A) Many (A) Moderate (A)R      Squamous Epithelial /LPF Urine FEW^Few /LPF Few Few FewR     Resulting Agency  [facility]          Specimen Collected: 12/22/17 10:45 AM Last Resulted: 12/22/17 11:12 AM                       Sharla Young RN

## 2017-12-28 NOTE — TELEPHONE ENCOUNTER
2nd attempt:    Patient given results below.  Verbalized understanding. She states the antibiotic is working great for her sinus infection.    Reny Cedeno RN,   [facility]

## 2018-01-10 ENCOUNTER — ALLIED HEALTH/NURSE VISIT (OUTPATIENT)
Dept: PHARMACY | Facility: CLINIC | Age: 78
End: 2018-01-10
Payer: COMMERCIAL

## 2018-01-10 DIAGNOSIS — E11.65 TYPE 2 DIABETES MELLITUS WITH HYPERGLYCEMIA, WITH LONG-TERM CURRENT USE OF INSULIN (H): Primary | ICD-10-CM

## 2018-01-10 DIAGNOSIS — I10 HYPERTENSION GOAL BP (BLOOD PRESSURE) < 140/90: ICD-10-CM

## 2018-01-10 DIAGNOSIS — Z79.4 TYPE 2 DIABETES MELLITUS WITH HYPERGLYCEMIA, WITH LONG-TERM CURRENT USE OF INSULIN (H): Primary | ICD-10-CM

## 2018-01-10 DIAGNOSIS — E63.9 NUTRITIONAL DEFICIENCY: ICD-10-CM

## 2018-01-10 DIAGNOSIS — E78.5 DYSLIPIDEMIA, GOAL LDL BELOW 100: ICD-10-CM

## 2018-01-10 DIAGNOSIS — N32.81 OVERACTIVE BLADDER: ICD-10-CM

## 2018-01-10 DIAGNOSIS — H40.003 GLAUCOMA SUSPECT, BILATERAL: ICD-10-CM

## 2018-01-10 DIAGNOSIS — K21.9 GASTROESOPHAGEAL REFLUX DISEASE WITHOUT ESOPHAGITIS: ICD-10-CM

## 2018-01-10 DIAGNOSIS — R05.9 COUGH: ICD-10-CM

## 2018-01-10 DIAGNOSIS — E03.9 HYPOTHYROIDISM, UNSPECIFIED TYPE: ICD-10-CM

## 2018-01-10 PROCEDURE — 99607 MTMS BY PHARM ADDL 15 MIN: CPT | Performed by: PHARMACIST

## 2018-01-10 PROCEDURE — 99605 MTMS BY PHARM NP 15 MIN: CPT | Performed by: PHARMACIST

## 2018-01-10 NOTE — MR AVS SNAPSHOT
After Visit Summary   1/10/2018    Brandy Leon    MRN: [id number]           Patient Information     Date Of Birth          1940        Visit Information        Provider Department      1/10/2018 8:30 AM Mirna Perez, [facility]        Today's Diagnoses     Type 2 diabetes mellitus with hyperglycemia, with long-term current use of insulin (H)    -  1    Dyslipidemia, goal LDL below 100        Hypertension goal BP (blood pressure) < 140/90        Glaucoma suspect, bilateral        Overactive bladder        Nutritional deficiency        Hypothyroidism, unspecified type        Cough        Gastroesophageal reflux disease without esophagitis          Care Instructions    Increase Lantus to 22units as previously recommended by Dr. Worley  Stop rosuvastatin  Start using albuterol             Follow-ups after your visit        Your next 10 appointments already scheduled     Jan 17, 2018  9:00 AM CST   TELEMEDICINE with Mirna Perez, [facility] ([facility])    [id number] [place] [address] [id number]  [facility] [address]   [phone]           Note: this is not an onsite visit; there is no need to come to the facility.            Apr 04, 2018  9:00 AM CDT   Return Visit with Ehsan Araya DPM   [facility])    [address] [facility] [address]   [phone]            Apr 06, 2018  8:15 AM CDT   Return Visit with Candice Worley MD, [facility] ENDO NURSE   [facility])    [address] [place] [address]   [phone]            Sep 25, 2018 10:45 AM CDT   Return Visit with Lucita Jordan MD   [facility])    [address] [facility] [address] [facility] [address]   [phone]              Who to contact     If you have questions or need  "follow up information about today's clinic visit or your schedule please contact [facility] directly at [phone].  Normal or non-critical lab and imaging results will be communicated to you by MyChart, letter or phone within 4 business days after the clinic has received the results. If you do not hear from us within 7 days, please contact the clinic through [facility] or phone. If you have a critical or abnormal lab result, we will notify you by phone as soon as possible.  Submit refill requests through [facility] or call your pharmacy and they will forward the refill request to us. Please allow 3 business days for your refill to be completed.          Additional Information About Your Visit        [facility] Information     [facility] lets you send messages to your doctor, view your test results, renew your prescriptions, schedule appointments and more. To sign up, go to [email or web address] . Click on \"Log in\" on the left side of the screen, which will take you to the Welcome page. Then click on \"Sign up Now\" on the right side of the page.     You will be asked to enter the access code listed below, as well as some personal information. Please follow the directions to create your username and password.     Your access code is: [email or web address]  Expires: 2018  1:28 PM     Your access code will  in 90 days. If you need help or a new code, please call your [place] clinic or [phone].        Care EveryWhere ID     This is your Care EveryWhere ID. This could be used by other organizations to access your [place] medical records  [id number]         Blood Pressure from Last 3 Encounters:   17 120/60   17 134/77   10/31/17 120/60    Weight from Last 3 Encounters:   17 182 lb 6.4 oz (82.7 kg)   10/31/17 180 lb 9.6 oz (81.9 kg)   10/23/17 183 lb 12.8 oz (83.4 kg)              Today, you had the following     No orders found for display         Today's Medication Changes        "   These changes are accurate as of: 1/10/18  9:27 AM.  If you have any questions, ask your nurse or doctor.               These medicines have changed or have updated prescriptions.        Dose/Directions    aspirin 81 MG EC tablet   This may have changed:  how much to take   Used for:  Type 2 diabetes, HbA1c goal < 7% (H)        Dose:  81 mg   Take 1 tablet by mouth daily.   Quantity:  90 tablet   Refills:  3       irbesartan-hydrochlorothiazide 150-12.5 MG per tablet   Commonly known as:  AVALIDE   This may have changed:  See the new instructions.   Used for:  Hypertension goal BP (blood pressure) < 140/90        TAKE 1 TABLET BY MOUTH DAILY   Quantity:  90 tablet   Refills:  3                Primary Care Provider Office Phone # Fax #    Cailin Mckenna Ponce, APRN [facility] [phone]       [address]  [place] [address]        Equal Access to Services     [facility] : Hadii ana pimentel hadasho Sotorey, waaxda luqadaha, qaybta kaalmada adeegyada, mitch toledo hayeugene azevedo . So [facility] [phone].    ATENCIÓN: Si habla español, tiene a garcía disposición servicios gratuitos de asistencia lingüística. Llame al [phone].    We comply with applicable federal civil rights laws and [place] laws. We do not discriminate on the basis of race, color, national origin, age, disability, sex, sexual orientation, or gender identity.            Thank you!     Thank you for choosing [facility]  for your care. Our goal is always to provide you with excellent care. Hearing back from our patients is one way we can continue to improve our services. Please take a few minutes to complete the written survey that you may receive in the mail after your visit with us. Thank you!             Your Updated Medication List - Protect others around you: Learn how to safely use, store and throw away your medicines at www.disposemymeds.org.          This list is accurate as of: 1/10/18   9:27 AM.  Always use your most recent med list.                   Brand Name Dispense Instructions for use Diagnosis    albuterol 108 (90 BASE) MCG/ACT Inhaler    PROAIR HFA/PROVENTIL HFA/VENTOLIN HFA    1 Inhaler    Inhale 2 puffs into the lungs every 4 hours as needed for shortness of breath / dyspnea or wheezing    Cough due to bronchospasm       aspirin 81 MG EC tablet     90 tablet    Take 1 tablet by mouth daily.    Type 2 diabetes, HbA1c goal < 7% (H)       BD JUAN C U/F 32G X 4 MM   Generic drug:  insulin pen needle     100 each    USE 1 DAILY AS DIRECTED.    Type 2 diabetes mellitus with hyperglycemia (H)       famotidine 20 MG tablet    PEPCID     Take 20 mg by mouth nightly as needed        glipiZIDE 10 MG tablet    GLUCOTROL    120 tablet    TAKE 2 TABLETS BY MOUTH TWICE A DAY BEFORE MEALS    Type 2 diabetes mellitus with hyperglycemia, with long-term current use of insulin (H)       GLUCOSAMINE CHONDROITIN COMPLX PO      2 Daily        insulin glargine 100 UNIT/ML injection    LANTUS SOLOSTAR    45 mL    Inject 20 Units Subcutaneous At Bedtime    Type 2 diabetes mellitus with hyperglycemia, with long-term current use of insulin (H)       irbesartan-hydrochlorothiazide 150-12.5 MG per tablet    AVALIDE    90 tablet    TAKE 1 TABLET BY MOUTH DAILY    Hypertension goal BP (blood pressure) < 140/90       latanoprost 0.005 % ophthalmic solution    XALATAN    3 Bottle    Place 1 drop into both eyes At Bedtime    Primary open angle glaucoma of both eyes, moderate stage       metFORMIN 1000 MG tablet    GLUCOPHAGE    180 tablet    TAKE 1 TABLET (1,000 MG) BY MOUTH 2 TIMES DAILY (WITH MEALS)    Type 2 diabetes mellitus with hyperglycemia, with long-term current use of insulin (H)       MULTIVITAMIN PO      1 tablet daily        OMEGA 3 PO      Take by mouth 2 times daily.        ONE TOUCH DELICA LANCETS Misc     100 each    1 each 2 times daily. One Touch Delica    Type 2 diabetes, HbA1c goal < 7% (H)        ONETOUCH ULTRA test strip   Generic drug:  blood glucose monitoring     200 strip    TEST TWICE DAILY    Type 2 diabetes mellitus with hyperglycemia, with long-term current use of insulin (H)       order for DME     1 each    Glucometer covered by insurance.    Type 2 diabetes, HbA1c goal < 7% (H)       oxybutynin 5 MG 24 hr tablet    DITROPAN-XL    180 tablet    Take 2 tablets (10 mg) by mouth daily    Urinary frequency, Overactive bladder       rosuvastatin 5 MG tablet    CRESTOR    8 tablet    Take 1 tablet twice a week    Dyslipidemia, goal LDL below 100       spacer/aero-hold chamber mask Aaliyah     1 each    1 Device as needed    Cough due to bronchospasm       SYNTHROID 137 MCG tablet   Generic drug:  levothyroxine     90 tablet    Take 1 tablet (137 mcg) by mouth daily    Myxedema heart disease (H), Nutritional and metabolic cardiomyopathy (H)

## 2018-01-10 NOTE — PROGRESS NOTES
SUBJECTIVE/OBJECTIVE:                           Brandy Leon is a 77 year old female called for an initial visit for Medication Therapy Management for 2018.  She was referred to me from Cailin Ponce.     Chief Complaint: Blood Sugars; Medication Review; patient feels she is taking too many medications.    Allergies/ADRs: Reviewed in Epic  Tobacco: No tobacco use  Alcohol: none  Caffeine: 2 cups/day of coffee  Activity: walking; a little  PMH: Reviewed in Epic    Medication Adherence/Access  The patient misses their medication 0 times per week.    Patient is responsible for his/her own medications.   Adherence/Compliance is described as good.  Medication barriers: no issues reported by patient.  The patient fills general medications at  [facility].      Diabetes:  Pt currently taking Lantus 20 units, metformin 1000mg bid, glipizide 20mg bid. Pt is not experiencing side effects.  SMBG: one time daily.   Ranges (patient reported): 200mg/dL yesterday morning; most of her blood sugars are running around 200mg/dL.  Patient is not experiencing hypoglycemia  Recent symptoms of high blood sugar? polydipsia  Eye exam: up to date  Foot exam: up to date  Microalbumin is < 30 mg/g. Pt is taking an ACEi/ARB.  Aspirin: Taking 81mg daily and denies side effects    Incontinece: current therapy includes oxybutynin XL 10mg daily. Patient does complain of dry mouth.     Hypertension: Current medications include irbesartan/HCTZ 150-12.5mg daily.  Patient does not self-monitor BP.  Patient reports no current medication side effects.    Hypothyroidism: Patient is taking levothyroxine 137 mcg daily. Patient is having the following symptoms: none.    Cough: patient continues to cough and brings up phlegm (gray/green color) usually in the morning and when she lays down at night. Patient has not been using the albuterol inhaler.     Heartburn: current therapy includes famotidine 20mg at night as needed. Patient does find this  help her symptoms. Patient has more issues if she eats spicy foods, tomato sauce.     Glaucoma: current therapy includes latanoprost 1 drop into both eyes at bedtime. Stable IOP from ophthalmology visit 9/25/17.    Hyperlipidemia: Current therapy includes rosuvastatin 5mg twice weekly.  Pt reports myalgias since on medication. and cramping and muscle stiffness.   The 10-year ASCVD risk score (Qingjuju JACKMAN Jr, et al., 2013) is: 36.2%    Values used to calculate the score:      Age: 77 years      Sex: Female      Is Non- : No      Diabetic: Yes      Tobacco smoker: No      Systolic Blood Pressure: 120 mmHg      Is BP treated: Yes      HDL Cholesterol: 54 mg/dL      Total Cholesterol: 201 mg/dL     Supplments: current therapy includes MVI, glucosamine/chondroitin 1 bid-patient feels this make a difference, fish oil 1 twice daily. Patient feels better when she takes fish oil and can tell when she doesn't take them.     Current labs include:BP Readings from Last 3 Encounters:   12/22/17 120/60   12/20/17 134/77   10/31/17 120/60     Today's Vitals: There were no vitals taken for this visit.  Lab Results   Component Value Date    A1C 8.8 11/16/2017   .  Lab Results   Component Value Date    CHOL 201 12/22/2017     Lab Results   Component Value Date    TRIG 161 12/22/2017     Lab Results   Component Value Date    HDL 54 12/22/2017     Lab Results   Component Value Date     12/22/2017       Liver Function Studies -   Recent Labs   Lab Test  12/22/17   1118   PROTTOTAL  7.9   ALBUMIN  3.5   BILITOTAL  0.4   ALKPHOS  80   AST  19   ALT  18       Lab Results   Component Value Date    UCRR 76 12/22/2017    MICROL 9 12/22/2017    UMALCR 12.26 12/22/2017       Last Basic Metabolic Panel:  Lab Results   Component Value Date     12/22/2017      Lab Results   Component Value Date    POTASSIUM 4.4 12/22/2017     Lab Results   Component Value Date    CHLORIDE 100 12/22/2017     Lab Results   Component  Value Date    BUN 16 12/22/2017     Lab Results   Component Value Date    CR 0.76 12/22/2017     GFR Estimate   Date Value Ref Range Status   12/22/2017 74 >60 mL/min/1.7m2 Final     Comment:     Non  GFR Calc   04/14/2017 74 >60 mL/min/1.7m2 Final     Comment:     Non  GFR Calc   02/09/2017 86 >60 mL/min/1.7m2 Final     Comment:     Non  GFR Calc     GFR Estimate If Black   Date Value Ref Range Status   12/22/2017 90 >60 mL/min/1.7m2 Final     Comment:      GFR Calc   04/14/2017 90 >60 mL/min/1.7m2 Final     Comment:      GFR Calc   02/09/2017 >90   GFR Calc   >60 mL/min/1.7m2 Final     TSH   Date Value Ref Range Status   12/22/2017 1.70 0.40 - 4.00 mU/L Final   ]    Most Recent Immunizations   Administered Date(s) Administered     TD (ADULT, 7+) 03/21/2005     Varicella Pt Report Hx of Varicella/Chicken Pox 07/24/2000     Zoster vaccine, live 04/27/2012       ASSESSMENT:                             Current medications were reviewed today.     Medication Adherence: no issues identified    Diabetes:  Needs improvement. 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 Patient is meeting goal <140/90mmHg.    Hypothyroidism: stable    Cough: patient may benefit from trying albuterol inhaler for her cough    Heartburn: stable    Glaucoma: stable    Hyperlipidemia: patient may benefit from a trial off of rosuvastatin    Supplments: stable    PLAN:                            Increase Lantus to 22units as previously recommended by Dr. Worley  Stop rosuvastatin  Start using albuterol     I spent 30 minutes with this patient today. All changes were made via collaborative practice agreement with Cailin Ponce. A copy of the visit note was provided to the patient's primary care provider.    Will follow up  in 1 week.    The patient declined a summary of these recommendations as an after visit summary.     Mirna Perez, Pharm.D, [facility]  Medication Therapy Management Pharmacist

## 2018-01-10 NOTE — PATIENT INSTRUCTIONS
Increase Lantus to 22units as previously recommended by Dr. Worley  Stop rosuvastatin  Start using albuterol

## 2018-01-18 ENCOUNTER — TELEPHONE (OUTPATIENT)
Dept: PHARMACY | Facility: CLINIC | Age: 78
End: 2018-01-18

## 2018-01-18 NOTE — TELEPHONE ENCOUNTER
Patient missed her visit yesterday and called today to     Patient was taking Lantus 22 units and she would get up in the morning and feeling lightheaded and just not feeling good so she decreased her dose back to 20 units. Patient did not check her blood sugar when she was feeling light headed. Patient states her blood sugars have been running 170-200mg/dL.     Recommend patient split dose taking 10 units in the morning and 10 units at night and then ideally go up on the dose from there as patient tolerates.     Will follow-up with patient next week.    Mirna Perez, Pharm.D, [facility]  Medication Therapy Management Pharmacist

## 2018-01-29 ENCOUNTER — TELEPHONE (OUTPATIENT)
Dept: PHARMACY | Facility: CLINIC | Age: 78
End: 2018-01-29

## 2018-01-29 DIAGNOSIS — E11.65 TYPE 2 DIABETES MELLITUS WITH HYPERGLYCEMIA, WITH LONG-TERM CURRENT USE OF INSULIN (H): ICD-10-CM

## 2018-01-29 DIAGNOSIS — Z79.4 TYPE 2 DIABETES MELLITUS WITH HYPERGLYCEMIA, WITH LONG-TERM CURRENT USE OF INSULIN (H): ICD-10-CM

## 2018-01-29 NOTE — TELEPHONE ENCOUNTER
Called patient to check.  Patient still has her cough. Patient has been using albuterol at night before she goes to bed which is when the cough is the worse. She doesn't think it's working. Patient has been using coricidin and she feels this seems to be helping.     Blood sugars have not changed. Patient is taking Lantus 10 units twice daily. Patient feels ok but her blood sugars have been running high. Yesterday her blood sugar was 200mg/dL.     Patient is off of rosuvastatin. Feels her muscle aches have gone away.     Recommendation from Endo has been goal morning blood sugar 80-120mg/dL. Recommend patient increase Lantus to 12 units bid.    Will check in with patient in a week.    Mirna Perez, Pharm.D, BCACP  Medication Therapy Management Pharmacist

## 2018-02-13 ENCOUNTER — TELEPHONE (OUTPATIENT)
Dept: PHARMACY | Facility: CLINIC | Age: 78
End: 2018-02-13

## 2018-02-13 DIAGNOSIS — E11.65 TYPE 2 DIABETES MELLITUS WITH HYPERGLYCEMIA, WITH LONG-TERM CURRENT USE OF INSULIN (H): ICD-10-CM

## 2018-02-13 DIAGNOSIS — Z79.4 TYPE 2 DIABETES MELLITUS WITH HYPERGLYCEMIA, WITH LONG-TERM CURRENT USE OF INSULIN (H): ICD-10-CM

## 2018-02-13 NOTE — TELEPHONE ENCOUNTER
Called patient to review blood sugars. Blood sugars have been running around 200mg/dL in the morning. Patient gets dizzy when she takes in the morning. Patient is taking 22units of Lantus at night.   Recommend patient increase her Lantus to 27 units for goal <150mg/dL.     Will follow up with patient in 1-2 weeks.    Mirna Perez, Pharm.D, [facility]  Medication Therapy Management Pharmacist

## 2018-02-23 ENCOUNTER — TRANSFERRED RECORDS (OUTPATIENT)
Dept: HEALTH INFORMATION MANAGEMENT | Facility: CLINIC | Age: 78
End: 2018-02-23

## 2018-02-28 ENCOUNTER — ALLIED HEALTH/NURSE VISIT (OUTPATIENT)
Dept: PHARMACY | Facility: CLINIC | Age: 78
End: 2018-02-28
Payer: COMMERCIAL

## 2018-02-28 DIAGNOSIS — Z79.4 TYPE 2 DIABETES MELLITUS WITH HYPERGLYCEMIA, WITH LONG-TERM CURRENT USE OF INSULIN (H): Primary | ICD-10-CM

## 2018-02-28 DIAGNOSIS — E11.65 TYPE 2 DIABETES MELLITUS WITH HYPERGLYCEMIA, WITH LONG-TERM CURRENT USE OF INSULIN (H): Primary | ICD-10-CM

## 2018-02-28 PROCEDURE — 99606 MTMS BY PHARM EST 15 MIN: CPT | Performed by: PHARMACIST

## 2018-02-28 NOTE — PROGRESS NOTES
SUBJECTIVE/OBJECTIVE:                           Branyd Leon is a 77 year old female called for a follow-up visit for Medication Therapy Management. She was referred to me from Cailin Ponce.     Chief Complaint: Blood Sugars   Allergies/ADRs: Reviewed in Epic  Tobacco: No tobacco use  Alcohol: none  Caffeine: 2 cups/day of coffee  Activity: walking; a little  PMH: Reviewed in Epic    Medication Adherence/Access  The patient misses their medication 0 times per week.    Patient is responsible for his/her own medications.   Adherence/Compliance is described as good.  Medication barriers: no issues reported by patient.  The patient fills general medications at  [facility].      Diabetes:  Pt currently taking Lantus 22 units QHS, metformin 1000mg bid, glipizide 20mg bid. Pt is not experiencing side effects.  SMBG: one time daily. Ranges (patient reported): This morning it was 282mg/dL. It has been consistently in the 200s, patient reports it has not been < 190 in the past few days. She does not have her meter with her today. Patient reports she does not want to increase dose of Lantus, she feels ready to give up on insulin as she does not feel it is effective. She has been feeling ill lately but has not had time to go in to doctor.   Patient is not experiencing hypoglycemia  Recent symptoms of high blood sugar? None.   Eye exam: up to date  Foot exam: due  Microalbumin is < 30 mg/g. Pt is taking an ACEi/ARB.  Aspirin: Taking 81mg daily and denies side effects    Current labs include:  Today's Vitals: There were no vitals taken for this visit.  BP Readings from Last 3 Encounters:   12/22/17 120/60   12/20/17 134/77   10/31/17 120/60     Lab Results   Component Value Date    A1C 8.8 11/16/2017   .  Lab Results   Component Value Date    CHOL 201 12/22/2017     Lab Results   Component Value Date    TRIG 161 12/22/2017     Lab Results   Component Value Date    HDL 54 12/22/2017     Lab Results   Component  Value Date     12/22/2017       Liver Function Studies -   Recent Labs   Lab Test  12/22/17   1118   PROTTOTAL  7.9   ALBUMIN  3.5   BILITOTAL  0.4   ALKPHOS  80   AST  19   ALT  18       Lab Results   Component Value Date    UCRR 76 12/22/2017    MICROL 9 12/22/2017    UMALCR 12.26 12/22/2017       Last Basic Metabolic Panel:  Lab Results   Component Value Date     12/22/2017      Lab Results   Component Value Date    POTASSIUM 4.4 12/22/2017     Lab Results   Component Value Date    CHLORIDE 100 12/22/2017     Lab Results   Component Value Date    BUN 16 12/22/2017     Lab Results   Component Value Date    CR 0.76 12/22/2017     GFR Estimate   Date Value Ref Range Status   12/22/2017 74 >60 mL/min/1.7m2 Final     Comment:     Non  GFR Calc   04/14/2017 74 >60 mL/min/1.7m2 Final     Comment:     Non  GFR Calc   02/09/2017 86 >60 mL/min/1.7m2 Final     Comment:     Non  GFR Calc     GFR Estimate If Black   Date Value Ref Range Status   12/22/2017 90 >60 mL/min/1.7m2 Final     Comment:      GFR Calc   04/14/2017 90 >60 mL/min/1.7m2 Final     Comment:      GFR Calc   02/09/2017 >90   GFR Calc   >60 mL/min/1.7m2 Final     TSH   Date Value Ref Range Status   12/22/2017 1.70 0.40 - 4.00 mU/L Final   ]    Most Recent Immunizations   Administered Date(s) Administered     TD (ADULT, 7+) 03/21/2005     Varicella Pt Report Hx of Varicella/Chicken Pox 07/24/2000     Zoster vaccine, live 04/27/2012         ASSESSMENT:                             Medication Adherence: no issues identified.     Diabetes: Needs improvement. Patient is not meeting A1c goal of < 8%. Self monitoring of blood glucose is not at goal of fasting  mg/dL and post prandial < 180 mg/dL. Pt would benefit from Basal Insulin (Lantus):increase dose to 25 units QHS. We discussed the benefits of attaining control of BG as it relates to reducing the  risk of microvascular complications and immunosuppression. Patient prefers to be conservative with dose increases and will require close follow-up to ensure patient is on an effective dose.        PLAN:                            Increase Lantus to 25units as previously recommended by Dr. Worley    I spent 15 minutes with this patient today. All changes were made via collaborative practice agreement with Cailin Ponce. A copy of the visit note was provided to the patient's primary care provider.    Will follow up in 5 days.     The patient declined a summary of these recommendations as an after visit summary.     Suyapa Melvin, PD4  Ambulatory Care Pharmacy Intern  Mirna Perez, Pharm.D, BCACP  Medication Therapy Management Pharmacist

## 2018-02-28 NOTE — MR AVS SNAPSHOT
After Visit Summary   2/28/2018    Brandy Leon    MRN: [id number]           Patient Information     Date Of Birth          1940        Visit Information        Provider Department      2/28/2018 9:00 AM Mirna Perez [facility]        Today's Diagnoses     Type 2 diabetes mellitus with hyperglycemia, with long-term current use of insulin (H)    -  1      Care Instructions    Increase Lantus to 25 units daily          Follow-ups after your visit        Your next 10 appointments already scheduled     Apr 04, 2018  9:00 AM CDT   Return Visit with Ehsan Araya DPM   [facility])    [id number] [place] [address] [id number] [phone]            Apr 06, 2018  8:15 AM CDT   Return Visit with Candice Worley MD, [facility] ENDO NURSE   [facility])    [id number] [place] [address] [id number] [phone]            Sep 25, 2018 10:45 AM CDT   Return Visit with Lucita Jordan MD   [facility])    [id number] [place] [address] [id number] [phone]              Who to contact     If you have questions or need follow up information about today's clinic visit or your schedule please contact [facility] directly at [phone].  Normal or non-critical lab and imaging results will be communicated to you by MyChart, letter or phone within 4 business days after the clinic has received the results. If you do not hear from us within 7 days, please contact the clinic through MyChart or phone. If you have a critical or abnormal lab result, we will notify you by phone as soon as possible.  Submit refill requests through [email or web address] or call your pharmacy and they will forward the refill request to us. Please allow 3 business days for your refill to be completed.          Additional  "Information About Your Visit        MyChart Information     [employer] lets you send messages to your doctor, view your test results, renew your prescriptions, schedule appointments and more. To sign up, go to [email or web address] . Click on \"Log in\" on the left side of the screen, which will take you to the Welcome page. Then click on \"Sign up Now\" on the right side of the page.     You will be asked to enter the access code listed below, as well as some personal information. Please follow the directions to create your username and password.     Your access code is: [email or web address]  Expires: 2018  1:13 PM     Your access code will  in 90 days. If you need help or a new code, please call your [facility] clinic or [phone].        Care EveryWhere ID     This is your Care EveryWhere ID. This could be used by other organizations to access your [facility] medical records  [id number]         Blood Pressure from Last 3 Encounters:   17 120/60   17 134/77   10/31/17 120/60    Weight from Last 3 Encounters:   17 182 lb 6.4 oz (82.7 kg)   10/31/17 180 lb 9.6 oz (81.9 kg)   10/23/17 183 lb 12.8 oz (83.4 kg)              Today, you had the following     No orders found for display         Today's Medication Changes          These changes are accurate as of 18  1:13 PM.  If you have any questions, ask your nurse or doctor.               These medicines have changed or have updated prescriptions.        Dose/Directions    aspirin 81 MG EC tablet   This may have changed:  how much to take   Used for:  Type 2 diabetes, HbA1c goal < 7% (H)        Dose:  81 mg   Take 1 tablet by mouth daily.   Quantity:  90 tablet   Refills:  3       irbesartan-hydrochlorothiazide 150-12.5 MG per tablet   Commonly known as:  AVALIDE   This may have changed:  See the new instructions.   Used for:  Hypertension goal BP (blood pressure) < 140/90        TAKE 1 TABLET BY MOUTH DAILY   Quantity:  90 tablet   Refills:  3    "             Primary Care Provider Office Phone # Fax #    Cailin Ponce, APRN [facility] [phone]       [address]  [place] [address]        Equal Access to Services     NUSRAT KHAN : Hadii aad ku hadjavio Soomaali, waaxda luqadaha, qaybta kaalmada adeegyada, waxjohnnie idiin hayjustinn keri aguillon lajimmie alaniz. So [facility] [phone].    ATENCIÓN: Si habla español, tiene a garcía disposición servicios gratuitos de asistencia lingüística. Llame al [phone].    We comply with applicable federal civil rights laws and [place] laws. We do not discriminate on the basis of race, color, national origin, age, disability, sex, sexual orientation, or gender identity.            Thank you!     Thank you for choosing [facility]  for your care. Our goal is always to provide you with excellent care. Hearing back from our patients is one way we can continue to improve our services. Please take a few minutes to complete the written survey that you may receive in the mail after your visit with us. Thank you!             Your Updated Medication List - Protect others around you: Learn how to safely use, store and throw away your medicines at www.disposemymeds.org.          This list is accurate as of 2/28/18  1:13 PM.  Always use your most recent med list.                   Brand Name Dispense Instructions for use Diagnosis    albuterol 108 (90 BASE) MCG/ACT Inhaler    PROAIR HFA/PROVENTIL HFA/VENTOLIN HFA    1 Inhaler    Inhale 2 puffs into the lungs every 4 hours as needed for shortness of breath / dyspnea or wheezing    Cough due to bronchospasm       aspirin 81 MG EC tablet     90 tablet    Take 1 tablet by mouth daily.    Type 2 diabetes, HbA1c goal < 7% (H)       BD JUAN C U/F 32G X 4 MM   Generic drug:  insulin pen needle     100 each    USE 1 DAILY AS DIRECTED.    Type 2 diabetes mellitus with hyperglycemia (H)       famotidine 20 MG tablet    PEPCID     Take 20 mg by mouth nightly as  needed        glipiZIDE 10 MG tablet    GLUCOTROL    120 tablet    TAKE 2 TABLETS BY MOUTH TWICE A DAY BEFORE MEALS    Type 2 diabetes mellitus with hyperglycemia, with long-term current use of insulin (H)       GLUCOSAMINE CHONDROITIN COMPLX PO      2 Daily        insulin glargine 100 UNIT/ML injection    LANTUS SOLOSTAR    45 mL    Inject 27 Units Subcutaneous At Bedtime    Type 2 diabetes mellitus with hyperglycemia, with long-term current use of insulin (H)       irbesartan-hydrochlorothiazide 150-12.5 MG per tablet    AVALIDE    90 tablet    TAKE 1 TABLET BY MOUTH DAILY    Hypertension goal BP (blood pressure) < 140/90       latanoprost 0.005 % ophthalmic solution    XALATAN    3 Bottle    Place 1 drop into both eyes At Bedtime    Primary open angle glaucoma of both eyes, moderate stage       metFORMIN 1000 MG tablet    GLUCOPHAGE    180 tablet    TAKE 1 TABLET (1,000 MG) BY MOUTH 2 TIMES DAILY (WITH MEALS)    Type 2 diabetes mellitus with hyperglycemia, with long-term current use of insulin (H)       MULTIVITAMIN PO      1 tablet daily        OMEGA 3 PO      Take by mouth 2 times daily.        ONE TOUCH DELICA LANCETS Misc     100 each    1 each 2 times daily. One Touch Delica    Type 2 diabetes, HbA1c goal < 7% (H)       ONETOUCH ULTRA test strip   Generic drug:  blood glucose monitoring     200 strip    TEST TWICE DAILY    Type 2 diabetes mellitus with hyperglycemia, with long-term current use of insulin (H)       order for DME     1 each    Glucometer covered by insurance.    Type 2 diabetes, HbA1c goal < 7% (H)       oxybutynin 5 MG 24 hr tablet    DITROPAN-XL    180 tablet    Take 2 tablets (10 mg) by mouth daily    Urinary frequency, Overactive bladder       rosuvastatin 5 MG tablet    CRESTOR    8 tablet    Take 1 tablet twice a week    Dyslipidemia, goal LDL below 100       spacer/aero-hold chamber mask Aaliyah     1 each    1 Device as needed    Cough due to bronchospasm       SYNTHROID 137 MCG tablet    Generic drug:  levothyroxine     90 tablet    TAKE 1 TABLET (137 MCG) BY MOUTH DAILY    Myxedema heart disease (H), Nutritional and metabolic cardiomyopathy (H)

## 2018-03-07 ENCOUNTER — TELEPHONE (OUTPATIENT)
Dept: PHARMACY | Facility: CLINIC | Age: 78
End: 2018-03-07

## 2018-03-08 ENCOUNTER — OFFICE VISIT (OUTPATIENT)
Dept: PEDIATRICS | Facility: CLINIC | Age: 78
End: 2018-03-08
Payer: COMMERCIAL

## 2018-03-08 ENCOUNTER — RADIANT APPOINTMENT (OUTPATIENT)
Dept: CT IMAGING | Facility: CLINIC | Age: 78
End: 2018-03-08
Attending: NURSE PRACTITIONER
Payer: COMMERCIAL

## 2018-03-08 ENCOUNTER — TELEPHONE (OUTPATIENT)
Dept: PEDIATRICS | Facility: CLINIC | Age: 78
End: 2018-03-08

## 2018-03-08 VITALS
DIASTOLIC BLOOD PRESSURE: 55 MMHG | BODY MASS INDEX: 31.88 KG/M2 | OXYGEN SATURATION: 95 % | WEIGHT: 184.3 LBS | TEMPERATURE: 97.9 F | SYSTOLIC BLOOD PRESSURE: 125 MMHG | HEART RATE: 94 BPM

## 2018-03-08 DIAGNOSIS — R52 BODY ACHES: ICD-10-CM

## 2018-03-08 DIAGNOSIS — M25.50 MULTIPLE JOINT PAIN: ICD-10-CM

## 2018-03-08 DIAGNOSIS — J01.01 ACUTE RECURRENT MAXILLARY SINUSITIS: ICD-10-CM

## 2018-03-08 DIAGNOSIS — R06.83 SNORING: ICD-10-CM

## 2018-03-08 DIAGNOSIS — Z79.4 TYPE 2 DIABETES MELLITUS WITH HYPERGLYCEMIA, WITH LONG-TERM CURRENT USE OF INSULIN (H): ICD-10-CM

## 2018-03-08 DIAGNOSIS — E11.65 TYPE 2 DIABETES MELLITUS WITH HYPERGLYCEMIA, WITH LONG-TERM CURRENT USE OF INSULIN (H): ICD-10-CM

## 2018-03-08 DIAGNOSIS — J01.01 ACUTE RECURRENT MAXILLARY SINUSITIS: Primary | ICD-10-CM

## 2018-03-08 LAB
ANION GAP SERPL CALCULATED.3IONS-SCNC: 4 MMOL/L (ref 3–14)
BUN SERPL-MCNC: 16 MG/DL (ref 7–30)
CALCIUM SERPL-MCNC: 9 MG/DL (ref 8.5–10.1)
CHLORIDE SERPL-SCNC: 99 MMOL/L (ref 94–109)
CK SERPL-CCNC: 133 U/L (ref 30–225)
CO2 SERPL-SCNC: 32 MMOL/L (ref 20–32)
CREAT SERPL-MCNC: 0.72 MG/DL (ref 0.52–1.04)
CRP SERPL-MCNC: 6.3 MG/L (ref 0–8)
ERYTHROCYTE [SEDIMENTATION RATE] IN BLOOD BY WESTERGREN METHOD: 13 MM/H (ref 0–30)
FLUAV+FLUBV AG SPEC QL: NEGATIVE
FLUAV+FLUBV AG SPEC QL: NEGATIVE
GFR SERPL CREATININE-BSD FRML MDRD: 79 ML/MIN/1.7M2
GLUCOSE SERPL-MCNC: 184 MG/DL (ref 70–99)
HBA1C MFR BLD: 10.6 % (ref 4.3–6)
POTASSIUM SERPL-SCNC: 4 MMOL/L (ref 3.4–5.3)
SODIUM SERPL-SCNC: 135 MMOL/L (ref 133–144)
SPECIMEN SOURCE: NORMAL

## 2018-03-08 PROCEDURE — 83036 HEMOGLOBIN GLYCOSYLATED A1C: CPT | Performed by: NURSE PRACTITIONER

## 2018-03-08 PROCEDURE — 70486 CT MAXILLOFACIAL W/O DYE: CPT | Performed by: RADIOLOGY

## 2018-03-08 PROCEDURE — 86038 ANTINUCLEAR ANTIBODIES: CPT | Performed by: NURSE PRACTITIONER

## 2018-03-08 PROCEDURE — 85652 RBC SED RATE AUTOMATED: CPT | Performed by: NURSE PRACTITIONER

## 2018-03-08 PROCEDURE — 99214 OFFICE O/P EST MOD 30 MIN: CPT | Performed by: NURSE PRACTITIONER

## 2018-03-08 PROCEDURE — 82550 ASSAY OF CK (CPK): CPT | Performed by: NURSE PRACTITIONER

## 2018-03-08 PROCEDURE — 36415 COLL VENOUS BLD VENIPUNCTURE: CPT | Performed by: NURSE PRACTITIONER

## 2018-03-08 PROCEDURE — 87804 INFLUENZA ASSAY W/OPTIC: CPT | Performed by: NURSE PRACTITIONER

## 2018-03-08 PROCEDURE — 86200 CCP ANTIBODY: CPT | Performed by: NURSE PRACTITIONER

## 2018-03-08 PROCEDURE — 80048 BASIC METABOLIC PNL TOTAL CA: CPT | Performed by: NURSE PRACTITIONER

## 2018-03-08 PROCEDURE — 86140 C-REACTIVE PROTEIN: CPT | Performed by: NURSE PRACTITIONER

## 2018-03-08 PROCEDURE — 86431 RHEUMATOID FACTOR QUANT: CPT | Performed by: NURSE PRACTITIONER

## 2018-03-08 RX ORDER — AMOXICILLIN 875 MG
875 TABLET ORAL 2 TIMES DAILY
Qty: 20 TABLET | Refills: 0 | Status: SHIPPED | OUTPATIENT
Start: 2018-03-08 | End: 2018-05-01

## 2018-03-08 NOTE — PROGRESS NOTES
SUBJECTIVE:   Brandy Leon is a 77 year old female who presents to clinic today for the following health issues:    Acute Illness   Acute illness concerns: body aches, coughing, weakness  Onset: worst in the last week    Fever: no    Chills/Sweats: YES    Headache (location?): YES    Sinus Pressure:YES- post-nasal drainage and facial pain    Conjunctivitis:  no    Ear Pain: YES: left    Rhinorrhea: YES    Congestion: YES    Sore Throat: YES     Cough: YES-productive of green sputum    Wheeze: no    Decreased Appetite: no    Nausea: no    Vomiting: no    Diarrhea:  no    Dysuria/Freq.: YES    Fatigue/Achiness: YES- weakness     Sick/Strep Exposure: no     Therapies Tried and outcome: coricidin, tylenol     Arms have been aching  Mouth is dry in the morning  Does snore   Arms and legs feel achey and stiff for that has been going  Her symptoms are mainly in her sinuses     Problem list and histories reviewed & adjusted, as indicated.  Additional history: as documented    Patient Active Problem List   Diagnosis     Seasonal allergies     Hypothyroidism     Hyperlipidemia LDL goal <100     Fibromyalgia     Osteoarthritis     Actinic keratosis     Advanced directives, counseling/discussion     Glaucoma suspect     Cataracts, bilateral     Obesity     Type 2 diabetes mellitus with hyperglycemia, with long-term current use of insulin (H)     Hypertension goal BP (blood pressure) < 140/90     Overactive bladder     Primary osteoarthritis of both hands     Recurrent UTI     Past Surgical History:   Procedure Laterality Date     C APPENDECTOMY       C ARTHROPLASTY TMJ       COLONOSCOPY       COLONOSCOPY N/A 8/13/2015    Procedure: COLONOSCOPY;  Surgeon: Duane, William Charles, MD;  Location: [facility] OR     COLONOSCOPY WITH CO2 INSUFFLATION N/A 8/13/2015    Procedure: COLONOSCOPY WITH CO2 INSUFFLATION;  Surgeon: Duane, William Charles, MD;  Location: [facility] OR     THYROIDECTOMY          Social History   Substance Use Topics      Smoking status: Never Smoker     Smokeless tobacco: Never Used      Comment: has had exposure to second hand smoke in the past from [place], no current exposure     Alcohol use No     Family History   Problem Relation Age of Onset     CANCER Father      skin and leukemia     CEREBROVASCULAR DISEASE Maternal Grandfather      CEREBROVASCULAR DISEASE Paternal Grandmother      Eye Disorder Paternal Grandmother      cataracts     CEREBROVASCULAR DISEASE Paternal Grandfather      HEART DISEASE Paternal Grandfather      CANCER Sister      Breast Cancer     Eye Disorder Mother      cataracts     Eye Disorder Brother      cataract     Breast Cancer Daughter      Other Cancer Daughter      ovarian cancer         Current Outpatient Prescriptions   Medication Sig Dispense Refill     SYNTHROID 137 MCG tablet TAKE 1 TABLET (137 MCG) BY MOUTH DAILY 90 tablet 2     insulin glargine (LANTUS SOLOSTAR) 100 UNIT/ML injection Inject 27 Units Subcutaneous At Bedtime (Patient taking differently: Inject 25 Units Subcutaneous At Bedtime ) 45 mL 3     rosuvastatin (CRESTOR) 5 MG tablet Take 1 tablet twice a week 8 tablet 3     glipiZIDE (GLUCOTROL) 10 MG tablet TAKE 2 TABLETS BY MOUTH TWICE A DAY BEFORE MEALS 120 tablet 5     metFORMIN (GLUCOPHAGE) 1000 MG tablet TAKE 1 TABLET (1,000 MG) BY MOUTH 2 TIMES DAILY (WITH MEALS) 180 tablet 1     Spacer/Aero-Holding Chambers (SPACER/AERO-HOLD CHAMBER MASK) SANTANA 1 Device as needed 1 each 0     oxybutynin (DITROPAN-XL) 5 MG 24 hr tablet Take 2 tablets (10 mg) by mouth daily 180 tablet 1     latanoprost (XALATAN) 0.005 % ophthalmic solution Place 1 drop into both eyes At Bedtime 3 Bottle 4     irbesartan-hydrochlorothiazide (AVALIDE) 150-12.5 MG per tablet TAKE 1 TABLET BY MOUTH DAILY (Patient taking differently: TAKE .5 TABLET BY MOUTH DAILY) 90 tablet 3     BD JUAN C U/F 32G X 4 MM insulin pen needle USE 1 DAILY AS DIRECTED. 100 each 3     ONE TOUCH ULTRA test strip TEST TWICE DAILY 200 strip 11      famotidine (PEPCID) 20 MG tablet Take 20 mg by mouth nightly as needed       order for DME Glucometer covered by insurance. 1 each 0     aspirin 81 MG EC tablet Take 1 tablet by mouth daily. (Patient taking differently: Take 650 mg by mouth daily ) 90 tablet 3     ONE TOUCH DELICA LANCETS MISC 1 each 2 times daily. One Touch Delica   100 each 12     GLUCOSAMINE CHONDROITIN COMPLX OR 2 Daily       Omega-3 Fatty Acids (OMEGA 3 PO) Take by mouth 2 times daily.        MULTIVITAMIN OR 1 tablet daily       albuterol (PROAIR HFA/PROVENTIL HFA/VENTOLIN HFA) 108 (90 BASE) MCG/ACT Inhaler Inhale 2 puffs into the lungs every 4 hours as needed for shortness of breath / dyspnea or wheezing (Patient not taking: Reported on 1/10/2018) 1 Inhaler 1     Allergies   Allergen Reactions     Estradiol Itching     Lipitor [Atorvastatin Calcium]      Weak and shaky     Sulfa Drugs      Rash       Zocor [Simvastatin]      Weak and shakey     Recent Labs   Lab Test  12/22/17   1118  11/16/17   0746  07/20/17   1202  04/14/17   1028 04/14/17 02/09/17   1200   07/26/16   0828   A1C   --   8.8*  9.3*   --   10.2  10.7*   < >  10.4*   LDL  115*   --    --    --    --   52   --   48   HDL  54   --    --    --    --   59   --   57   TRIG  161*   --    --    --    --   157*   --   162*   ALT  18   --    --   21   --   21   < >   --    CR  0.76   --    --   0.76   --   0.67   < >   --    GFRESTIMATED  74   --    --   74   --   86   < >   --    GFRESTBLACK  90   --    --   90   --   >90   GFR Calc     < >   --    POTASSIUM  4.4   --    --   4.3   --   4.2   < >   --    TSH  1.70   --    --    --    --   1.33   < >  1.78    < > = values in this interval not displayed.      BP Readings from Last 3 Encounters:   03/08/18 125/55   12/22/17 120/60   12/20/17 134/77    Wt Readings from Last 3 Encounters:   03/08/18 184 lb 4.8 oz (83.6 kg)   12/22/17 182 lb 6.4 oz (82.7 kg)   10/31/17 180 lb 9.6 oz (81.9 kg)                  Labs reviewed  in EPIC    Reviewed and updated as needed this visit by clinical staff  Tobacco  Allergies  Meds  Med Hx  Surg Hx  Fam Hx  Soc Hx      Reviewed and updated as needed this visit by Provider         ROS:  CONSTITUTIONAL:POSITIVE  for arthralgias and myalgias and NEGATIVE  for fever   INTEGUMENTARY/SKIN: NEGATIVE for rash   ENT/MOUTH: POSITIVE for nasal congestion, postnasal drainage, sinus pressure and tooth pain and NEGATIVE for epistaxis and fever  RESP:POSITIVE for cough-non productive and NEGATIVE for hemoptysis  CV: NEGATIVE for chest pain, palpitations or peripheral edema  MUSCULOSKELETAL: POSITIVE  for arthralgias  and myalgia and NEGATIVE for joint swelling  and joint warmth   HEME/ALLERGY/IMMUNE: POSITIVE  for allergies and NEGATIVE for chills    OBJECTIVE:     /55 (BP Location: Right arm, Patient Position: Sitting, Cuff Size: Adult Regular)  Pulse 94  Temp 97.9  F (36.6  C) (Temporal)  Wt 184 lb 4.8 oz (83.6 kg)  SpO2 95%  Breastfeeding? No  BMI 31.88 kg/m2  Body mass index is 31.88 kg/(m^2).  GENERAL: Patient is well nourished, well developed,in no apparent distress, non-toxic, in no respiratory distress and acyanotic, alert, cooperative and well hydrated  mildly ill but alert and responsive  EYES:  Right conjunctiva is not injected and without discharge.  Left conjunctiva is not injected and without discharge.  EARS: negative findings: external ears normal to inspection and palpation, no mastoid process tenderness, positive findings: , Right TM: serous middle ear fluid, Left TM: serous middle ear fluid  NOSE: positive findings: mucosa erythematous and swollen,  Sinus maxillary tenderness on bilaterally.  THROAT: normal.  NECK: supple with no adenopathy,   CARDIAC:NORMAL - regular rate and rhythm without murmur.  RESP: normal respiratory rate and rhythm, lungs clear to auscultation  unlabored respirations, no intercostal retractions or accessory muscle use  ABD: Abdomen soft,  non-tender.  SKIN: Skin color, texture, turgor normal. No rashes or lesions.  MS: extremities normal- no gross deformities noted, gait normal and normal muscle tone        Diagnostic Test Results:  Results for orders placed or performed in visit on 12/22/17   XR Chest 2 Views    Narrative    XR CHEST 2 VW  12/22/2017 11:39 AM      HISTORY: ; Cough    COMPARISON: 11/8/2016    FINDINGS: PA and lateral views of the chest. No acute airspace  opacities. No pneumothorax or pleural effusion. Heart size is normal.      Impression    IMPRESSION: No acute cardiopulmonary findings.    I have personally reviewed the examination and initial interpretation  and I agree with the findings.    BEATRIZ SUTTON MD     Results for orders placed or performed in visit on 03/08/18   Basic metabolic panel   Result Value Ref Range    Sodium 135 133 - 144 mmol/L    Potassium 4.0 3.4 - 5.3 mmol/L    Chloride 99 94 - 109 mmol/L    Carbon Dioxide 32 20 - 32 mmol/L    Anion Gap 4 3 - 14 mmol/L    Glucose 184 (H) 70 - 99 mg/dL    Urea Nitrogen 16 7 - 30 mg/dL    Creatinine 0.72 0.52 - 1.04 mg/dL    GFR Estimate 79 >60 mL/min/1.7m2    GFR Estimate If Black >90 >60 mL/min/1.7m2    Calcium 9.0 8.5 - 10.1 mg/dL   Hemoglobin A1c   Result Value Ref Range    Hemoglobin A1C 10.6 (H) 4.3 - 6.0 %   Rheumatoid factor   Result Value Ref Range    Rheumatoid Factor <20 <20 IU/mL   Erythrocyte sedimentation rate auto   Result Value Ref Range    Sed Rate 13 0 - 30 mm/h   CRP inflammation   Result Value Ref Range    CRP Inflammation 6.3 0.0 - 8.0 mg/L   Cyclic Citrullinated Peptide Antibody IgG   Result Value Ref Range    Cyclic Citrullinated Peptide Antibody, IgG 1 <7 U/mL   Anti Nuclear Della IgG by IFA with Reflex   Result Value Ref Range    GREG interpretation Negative NEG^Negative   CK total   Result Value Ref Range    CK Total 133 30 - 225 U/L   Influenza A/B antigen   Result Value Ref Range    Influenza A/B Agn Specimen Nasopharyngeal     Influenza A Negative  NEG^Negative    Influenza B Negative NEG^Negative         ASSESSMENT/PLAN:     Brandy was seen today for generalized body aches.    Diagnoses and all orders for this visit:    Acute recurrent maxillary sinusitis  -     amoxicillin (AMOXIL) 875 MG tablet; Take 1 tablet (875 mg) by mouth 2 times daily  -     CT Maxillofacial w/o Contrast; Future  Symptomatic therapy suggested: rest, increase fluids, apply heat to sinuses prn, use mist of vaporizer prn, OTC saline nasal spray as needed and call prn if symptoms persist or worsen.      Body aches  -     Influenza A/B antigen    Type 2 diabetes mellitus with hyperglycemia, with long-term current use of insulin (H)  -     Basic metabolic panel  -     Hemoglobin A1c    Snoring  -     SLEEP EVALUATION & MANAGEMENT REFERRAL - [facility] - Roseanne Abarca  [phone] (Age 15 and up); Future    Multiple joint pain  -     Rheumatoid factor  -     Erythrocyte sedimentation rate auto  -     CRP inflammation  -     Cyclic Citrullinated Peptide Antibody IgG  -     Anti Nuclear Della IgG by IFA with Reflex  -     CK total  Will follow up and/or notify patient on results via phone or mail to determine further need for followup      PLAN:   Patient needs to follow up in if no improvement,or sooner if worsening of symptoms or other symptoms develop.  FURTHER TESTING:       - CT sinus  CONSULTATION/REFERRAL to sleep referral   Will follow up and/or notify patient on results via phone or mail to determine further need for followup    See Patient Instructions    MAGO [facility]

## 2018-03-08 NOTE — NURSING NOTE
"Chief Complaint   Patient presents with     Generalized Body Aches     body aches, coughing, weakness worst within the last week       Initial /55 (BP Location: Right arm, Patient Position: Sitting, Cuff Size: Adult Regular)  Pulse 94  Temp 97.9  F (36.6  C) (Temporal)  Wt 184 lb 4.8 oz (83.6 kg)  SpO2 95%  Breastfeeding? No  BMI 31.88 kg/m2 Estimated body mass index is 31.88 kg/(m^2) as calculated from the following:    Height as of 10/23/17: 5' 3.75\" (1.619 m).    Weight as of this encounter: 184 lb 4.8 oz (83.6 kg).  Medication Reconciliation: complete      KATINA Tucker      "

## 2018-03-08 NOTE — TELEPHONE ENCOUNTER
CT Maxillofacial w/o Contrast   Status:  Final result   Visible to patient:  No (Not Released) Dx:  Acute recurrent maxillary sinusitis Order: [id number]       Notes Recorded by Cailin Ponce APRN CNP on 3/8/2018 at 1:13 PM  Please call   CT of her sinuses are normal   May need to refer to allergist   [facility] [place] [phone]   [email or web address]  Please fax over referral is patient is OK with this   Cailin Ponce, NP, APRN CNP         Details        Reading Physician Reading Date Result Priority       Lamin Bradford MD Ozutemiz, Can, MD 3/8/2018  3/8/2018            Narrative             CT MAXILLOFACIAL W/O CONTRAST 3/8/2018 11:34 AM    History: ; Acute recurrent maxillary sinusitis     Comparison: None     Technique:  Using thin collimation multidetector helical acquisition  technique, axial, coronal, and sagittal thin section CT images were  reconstructed through the paranasal sinuses. Images were reviewed in  bone and soft tissue windows.    Findings: Normal aeration of all paranasal sinuses without any  evidence of sinusitis. The ostiomeatal units appear patent  bilaterally. The bony walls of the paranasal sinuses are intact.The  adenoid tonsils in the nasopharynx are unremarkable.Small cerebellar  in the right external auditory canal. Bilateral clear mastoid air  cells.             Impression             Impression:   1. No evidence of sinusitis.  2. Tiny cerumen in the right external auditory canal.    I have personally reviewed the examination and initial interpretation  and I agree with the findings.    LAMIN BRADFORD MD               Last Resulted: 03/08/18 11:51 AM                            CRP inflammation   Status:  Final result   Visible to patient:  No (Not Released) Dx:  Multiple joint pain Order: [id number]       Notes Recorded by Cailin Ponce APRN CNP on 3/8/2018 at 12:46 PM  Please call   Her labs are normal except  her diabetes has shot way out of control from last and this alone can make her fatigue and not feeling good   Has she been taking her medications and Lantus regularly?  We really need to get her back in with endocrinology so they can help get this back under control   Cailin Ponce NP, APRN CNP             Ref Range & Units 11:35 AM   1yr ago        CRP Inflammation 0.0 - 8.0 mg/L 6.3 5.5     Resulting Agency  [facility]          Specimen Collected: 03/08/18 11:35 AM Last Resulted: 03/08/18 12:16 PM                                 CK total   Status:  Final result   Visible to patient:  No (Not Released) Dx:  Multiple joint pain Order: [id number]       Notes Recorded by Cailin Ponce APRN CNP on 3/8/2018 at 12:46 PM  Please call   Her labs are normal except her diabetes has shot way out of control from last and this alone can make her fatigue and not feeling good   Has she been taking her medications and Lantus regularly?  We really need to get her back in with endocrinology so they can help get this back under control   Cailin Ponce NP, APRN CNP             Ref Range & Units 11:35 AM   10mo ago   2yr ago        CK Total 30 - 225 U/L 133 168 128     Resulting Agency  [facility]          Specimen Collected: 03/08/18 11:35 AM Last Resulted: 03/08/18 12:16 PM                                Basic metabolic panel   Status:  Final result   Visible to patient:  No (Not Released) Dx:  Type 2 diabetes mellitus with hypergl... Order: [id number]       Notes Recorded by Cailin Ponce APRN CNP on 3/8/2018 at 12:46 PM  Please call   Her labs are normal except her diabetes has shot way out of control from last and this alone can make her fatigue and not feeling good   Has she been taking her medications and Lantus regularly?  We really need to get her back in with endocrinology so they can help get this back under control   Cailin Ponce NP, APRN CNP             Ref Range & Units 11:35 AM   2mo ago    10mo ago        Sodium 133 - 144 mmol/L 135 135 137      Potassium 3.4 - 5.3 mmol/L 4.0 4.4 4.3      Chloride 94 - 109 mmol/L 99 100 102      Carbon Dioxide 20 - 32 mmol/L 32 29 29      Anion Gap 3 - 14 mmol/L 4 6 6      Glucose 70 - 99 mg/dL 184 (H) 146 (H) (H)CM     Comments: Non Fasting      Urea Nitrogen 7 - 30 mg/dL 16 16 21      Creatinine 0.52 - 1.04 mg/dL 0.72 0.76 0.76      GFR Estimate >60 mL/min/1.7m2 79 74CM 74CM     Comments: Non  GFR Calc      GFR Estimate If Black >60 mL/min/1.7m2 >90 90CM 90CM     Comments:  GFR Calc      Calcium 8.5 - 10.1 mg/dL 9.0 8.7 9.5     Resulting Agency  [facility]          Specimen Collected: 03/08/18 11:35 AM Last Resulted: 03/08/18 12:13 PM                CM=Additional comments                      Erythrocyte sedimentation rate auto   Status:  Final result   Visible to patient:  No (Not Released) Dx:  Multiple joint pain Order: [id number]       Notes Recorded by Cailin Ponce APRN CNP on 3/8/2018 at 12:46 PM  Please call   Her labs are normal except her diabetes has shot way out of control from last and this alone can make her fatigue and not feeling good   Has she been taking her medications and Lantus regularly?  We really need to get her back in with endocrinology so they can help get this back under control   Cailin Ponce, FARZANA, MAGO CNP             Ref Range & Units 11:35 AM   1yr ago   2yr ago        Sed Rate 0 - 30 mm/h 13 12 10     Resulting Agency  [facility]          Specimen Collected: 03/08/18 11:35 AM Last Resulted: 03/08/18 12:02 PM                                Hemoglobin A1c   Status:  Final result   Visible to patient:  No (Not Released) Dx:  Type 2 diabetes mellitus with hypergl... Order: [id number]       Notes Recorded by Cailin Ponce APRN CNP on 3/8/2018 at 12:46 PM  Please call   Her labs are normal except her diabetes has shot way out of control from last and this alone can make her fatigue  and not feeling good   Has she been taking her medications and Lantus regularly?  We really need to get her back in with endocrinology so they can help get this back under control   Cailin Ponce NP, APRN CNP             Ref Range & Units 11:35 AM   3mo ago   7mo ago        Hemoglobin A1C 4.3 - 6.0 % 10.6 (H) 8.8 (H) 9.3 (H)     Resulting Agency  [facility]          Specimen Collected: 03/08/18 11:35 AM Last Resulted: 03/08/18 11:47 AM                                 Influenza A/B antigen   Status:  Final result   Visible to patient:  No (Not Released) Dx:  Body aches Order: [id number]       Notes Recorded by Cailin Ponce APRN CNP on 3/8/2018 at 12:46 PM  Please call   Her labs are normal except her diabetes has shot way out of control from last and this alone can make her fatigue and not feeling good   Has she been taking her medications and Lantus regularly?  We really need to get her back in with endocrinology so they can help get this back under control   Cailin Ponce NP, APRN CNP             Ref Range & Units 10:42 AM   4mo ago        Influenza A/B Agn Specimen  Nasopharyngeal Nasal      Influenza A NEG^Negative Negative Negative      Influenza B NEG^Negative Negative NegativeCM     Comments: Test results must be correlated with clinical data. If necessary, results   should be confirmed by a molecular assay or viral culture.        Resulting Agency  [facility]          Specimen Collected: 03/08/18 10:42 AM Last Resulted: 03/08/18 11:11 AM                CM=Additional comments                  Sharla Young RN

## 2018-03-08 NOTE — PATIENT INSTRUCTIONS
PLAN:   1.   Symptomatic therapy suggested: rest, increase fluids, apply heat to sinuses prn, use mist of vaporizer prn, OTC saline nasal spray as needed and call prn if symptoms persist or worsen.    2.  Orders Placed This Encounter   Medications     amoxicillin (AMOXIL) 875 MG tablet     Sig: Take 1 tablet (875 mg) by mouth 2 times daily     Dispense:  20 tablet     Refill:  0     Orders Placed This Encounter   Procedures     CT Maxillofacial w/o Contrast     Basic metabolic panel     Hemoglobin A1c     Rheumatoid factor     Erythrocyte sedimentation rate auto     CRP inflammation     Cyclic Citrullinated Peptide Antibody IgG     Anti Nuclear Della IgG by IFA with Reflex     CK total     SLEEP EVALUATION & MANAGEMENT REFERRAL - ADULT -[facility] [place]  [phone] (Age 15 and up)       3. Patient needs to follow up in if no improvement,or sooner if worsening of symptoms or other symptoms develop.  FURTHER TESTING:       - CT sinus  CONSULTATION/REFERRAL to sleep referral   Will follow up and/or notify patient on results via phone or mail to determine further need for followup    It was a pleasure seeing you today at the [facility]. Thank you for allowing us to care for you today. We truly hope we provided you with the excellent service you deserve. Please let us know if there is anything else we can do for you so we can be sure you are leaving completley satisfied with your care experience.       General information about your clinic   Clinic Hours Lab Hours (Appointments are required)   Mon-Thurs: 7:30 AM - 7 PM Mon-Thurs: 7:30 AM - 7 PM   Fri: 7:30 AM - 5 PM Fri: 7:30 AM - 5 PM        After Hours Nurse Advise & Appts:  [facility] Nurse Advisors: [phone]  [facility] On Call: to make appointments anytime: [phone] On Call Physician: call [phone] and answering service will page the on call physician.        For urgent appointments, please call  [phone] and ask for the triage nurse or your care team clinic nurse.  How to contact my care team:  [place]: [email or web address]   Phone: [phone]   Fax: [phone]       [facility]:   Phone: [phone]  Hours: 8:00 AM - 6:00 PM  Medication Refills:  Call your pharmacy and they will forward the refill to us. Please allow 3 business days for your refills to be completed.       Normal or non-critical lab and imaging results will be communicated to you by MyChart, letter or phone within 7 days.  If you do not hear from us within 10 days, please call the clinic. If you have a critical or abnormal lab result, we will notify you by phone as soon as possible.       We now have PWIC (Pediatric Walk in Care)  Monday-Friday from 7:30-4. Simply walk in and be seen for your urgent needs like cough, fever, rash, diarrhea or vomiting, pink eye, UTI. No appointments needed. Ask one of the team for more information      -Your Care Team:    Dr. Tara Keller - Internal Medicine/Pediatrics   Dr. Kirit Pérez - Family Medicine  Dr. Deborah Andres - Pediatrics  Dr. Adelia Drew - Pediatrics  Cailin Ponce CNP - Family Practice Nurse Practitioner

## 2018-03-08 NOTE — MR AVS SNAPSHOT
After Visit Summary   3/8/2018    Brandy Leon    MRN: [id number]           Patient Information     Date Of Birth          1940        Visit Information        Provider Department      3/8/2018 10:30 AM Cailin Ponce APRN CNP [facility]        Today's Diagnoses     Body aches    -  1    Type 2 diabetes mellitus with hyperglycemia, with long-term current use of insulin (H)        Snoring        Acute recurrent maxillary sinusitis        Multiple joint pain          Care Instructions    PLAN:   1.   Symptomatic therapy suggested: rest, increase fluids, apply heat to sinuses prn, use mist of vaporizer prn, OTC saline nasal spray as needed and call prn if symptoms persist or worsen.    2.  Orders Placed This Encounter   Medications     amoxicillin (AMOXIL) 875 MG tablet     Sig: Take 1 tablet (875 mg) by mouth 2 times daily     Dispense:  20 tablet     Refill:  0     Orders Placed This Encounter   Procedures     CT Maxillofacial w/o Contrast     Basic metabolic panel     Hemoglobin A1c     Rheumatoid factor     Erythrocyte sedimentation rate auto     CRP inflammation     Cyclic Citrullinated Peptide Antibody IgG     Anti Nuclear Della IgG by IFA with Reflex     CK total     SLEEP EVALUATION & MANAGEMENT REFERRAL - [facility]  [phone] (Age 15 and up)       3. Patient needs to follow up in if no improvement,or sooner if worsening of symptoms or other symptoms develop.  FURTHER TESTING:       - CT sinus  CONSULTATION/REFERRAL to sleep referral   Will follow up and/or notify patient on results via phone or mail to determine further need for followup    It was a pleasure seeing you today at the [facility]. Thank you for allowing us to care for you today. We truly hope we provided you with the excellent service you deserve. Please let us know if there is anything else we can do for you so we can be sure  you are leaving completley satisfied with your care experience.       General information about your clinic   Clinic Hours Lab Hours (Appointments are required)   Mon-Thurs: 7:30 AM - 7 PM Mon-Thurs: 7:30 AM - 7 PM   Fri: 7:30 AM - 5 PM Fri: 7:30 AM - 5 PM        After Hours Nurse Advise & Appts:  [facility] Nurse Advisors: [phone]  [facility] On Call: to make appointments anytime: [phone] On Call Physician: call [phone] and answering service will page the on call physician.        For urgent appointments, please call [phone] and ask for the triage nurse or your care team clinic nurse.  How to contact my care team:  MyChart: [email or web address]   Phone: [phone]   Fax: [phone]       [facility]:   Phone: [phone]  Hours: 8:00 AM - 6:00 PM  Medication Refills:  Call your pharmacy and they will forward the refill to us. Please allow 3 business days for your refills to be completed.       Normal or non-critical lab and imaging results will be communicated to you by MyChart, letter or phone within 7 days.  If you do not hear from us within 10 days, please call the clinic. If you have a critical or abnormal lab result, we will notify you by phone as soon as possible.       We now have PWIC (Pediatric Walk in Care)  Monday-Friday from 7:30-4. Simply walk in and be seen for your urgent needs like cough, fever, rash, diarrhea or vomiting, pink eye, UTI. No appointments needed. Ask one of the team for more information      -Your Care Team:    Dr. Tara Keller - Internal Medicine/Pediatrics   Dr. Kirit Pérez - Family Medicine  Dr. Deborah Andres - Pediatrics  Dr. Adelia Drew - Pediatrics  Cailin Ponce CNP - Family Practice Nurse Practitioner                           Follow-ups after your visit        Additional Services     SLEEP EVALUATION & MANAGEMENT REFERRAL - ADULT -[facility] [place]  [phone] (Age 15 and up)       Please be aware that coverage of these  services is subject to the terms and limitations of your health insurance plan.  Call member services at your health plan with any benefit or coverage questions.      Please bring the following to your appointment:    >>   List of current medications   >>   This referral request   >>   Any documents/labs given to you for this referral                      Your next 10 appointments already scheduled     Apr 04, 2018  9:00 AM CDT   [facility]    [address] [place] [id number] [phone]            Apr 06, 2018  8:15 AM CDT   Return Visit with Candice Worley MD, [facility] ENDO NURSE   [facility])    [address] [place] [id number] [phone]            Sep 25, 2018 10:45 AM CDT   Return Visit with Lucita Jordan MD   [facility])    [address] [place] [id number] [phone]              Future tests that were ordered for you today     Open Future Orders        Priority Expected Expires Ordered    CT Maxillofacial w/o Contrast Routine  3/8/2019 3/8/2018    SLEEP EVALUATION & MANAGEMENT REFERRAL - [facility]  [phone] (Age 15 and up) Routine  3/8/2019 3/8/2018            Who to contact     If you have questions or need follow up information about today's clinic visit or your schedule please contact [facility] directly at [phone].  Normal or non-critical lab and imaging results will be communicated to you by MyChart, letter or phone within 4 business days after the clinic has received the results. If you do not hear from us within 7 days, please contact the clinic through MyChart or phone. If you have a critical or abnormal lab result, we will notify you by phone as soon as possible.  Submit refill requests through  [employer] or call your pharmacy and they will forward the refill request to us. Please allow 3 business days for your refill to be completed.          Additional Information About Your Visit        City[employer] Information     City[employer] is an electronic gateway that provides easy, online access to your medical records. With City[employer], you can request a clinic appointment, read your test results, renew a prescription or communicate with your care team.     To sign up for City[employer] visit the website at [email or web address]   You will be asked to enter the access code listed below, as well as some personal information. Please follow the directions to create your username and password.     Your access code is: [email or web address]  Expires: 2018  1:13 PM     Your access code will  in 90 days. If you need help or a new code, please contact your [facility] or call [phone] for assistance.        Care EveryWhere ID     This is your Care EveryWhere ID. This could be used by other organizations to access your [place] medical records  [id number]        Your Vitals Were     Pulse Temperature Pulse Oximetry Breastfeeding? BMI (Body Mass Index)       94 97.9  F (36.6  C) (Temporal) 95% No 31.88 kg/m2        Blood Pressure from Last 3 Encounters:   18 125/55   17 120/60   17 134/77    Weight from Last 3 Encounters:   18 184 lb 4.8 oz (83.6 kg)   17 182 lb 6.4 oz (82.7 kg)   10/31/17 180 lb 9.6 oz (81.9 kg)              We Performed the Following     Anti Nuclear Della IgG by IFA with Reflex     Basic metabolic panel     CK total     CRP inflammation     Cyclic Citrullinated Peptide Antibody IgG     Erythrocyte sedimentation rate auto     Hemoglobin A1c     Influenza A/B antigen     Rheumatoid factor          Today's Medication Changes          These changes are accurate as of 3/8/18 11:10 AM.  If you have any questions, ask your nurse or doctor.               Start  taking these medicines.        Dose/Directions    amoxicillin 875 MG tablet   Commonly known as:  AMOXIL   Used for:  Acute recurrent maxillary sinusitis   Started by:  Cailin Ponce APRN CNP        Dose:  875 mg   Take 1 tablet (875 mg) by mouth 2 times daily   Quantity:  20 tablet   Refills:  0         These medicines have changed or have updated prescriptions.        Dose/Directions    aspirin 81 MG EC tablet   This may have changed:  how much to take   Used for:  Type 2 diabetes, HbA1c goal < 7% (H)        Dose:  81 mg   Take 1 tablet by mouth daily.   Quantity:  90 tablet   Refills:  3       insulin glargine 100 UNIT/ML injection   Commonly known as:  LANTUS SOLOSTAR   This may have changed:  how much to take   Used for:  Type 2 diabetes mellitus with hyperglycemia, with long-term current use of insulin (H)        Dose:  27 Units   Inject 27 Units Subcutaneous At Bedtime   Quantity:  45 mL   Refills:  3       irbesartan-hydrochlorothiazide 150-12.5 MG per tablet   Commonly known as:  AVALIDE   This may have changed:  See the new instructions.   Used for:  Hypertension goal BP (blood pressure) < 140/90        TAKE 1 TABLET BY MOUTH DAILY   Quantity:  90 tablet   Refills:  3            Where to get your medicines      These medications were sent to [facility]/pharmacy [address] [place] [address] AT [address] [address]     Phone:  [phone]     amoxicillin 875 MG tablet                Primary Care Provider Office Phone # Fax #    MAGO Baker [employer] [phone]       [address] [facility] [address]  [place] [address]        Equal Access to Services     NUSRAT KHAN AH: Akbar dwyer Sotorey, waaxda luqadaha, qaybta kaalmada adeegjoana, mitch alaniz. So [facility] [phone].    ATENCIÓN: Si habla español, tiene a garcía disposición servicios gratuitos de asistencia lingüística. Llame al  [phone].    We comply with applicable federal civil rights laws and [place] laws. We do not discriminate on the basis of race, color, national origin, age, disability, sex, sexual orientation, or gender identity.            Thank you!     Thank you for choosing [facility]  for your care. Our goal is always to provide you with excellent care. Hearing back from our patients is one way we can continue to improve our services. Please take a few minutes to complete the written survey that you may receive in the mail after your visit with us. Thank you!             Your Updated Medication List - Protect others around you: Learn how to safely use, store and throw away your medicines at www.disposemymeds.org.          This list is accurate as of 3/8/18 11:10 AM.  Always use your most recent med list.                   Brand Name Dispense Instructions for use Diagnosis    albuterol 108 (90 BASE) MCG/ACT Inhaler    PROAIR HFA/PROVENTIL HFA/VENTOLIN HFA    1 Inhaler    Inhale 2 puffs into the lungs every 4 hours as needed for shortness of breath / dyspnea or wheezing    Cough due to bronchospasm       amoxicillin 875 MG tablet    AMOXIL    20 tablet    Take 1 tablet (875 mg) by mouth 2 times daily    Acute recurrent maxillary sinusitis       aspirin 81 MG EC tablet     90 tablet    Take 1 tablet by mouth daily.    Type 2 diabetes, HbA1c goal < 7% (H)       BD JUAN C U/F 32G X 4 MM   Generic drug:  insulin pen needle     100 each    USE 1 DAILY AS DIRECTED.    Type 2 diabetes mellitus with hyperglycemia (H)       famotidine 20 MG tablet    PEPCID     Take 20 mg by mouth nightly as needed        glipiZIDE 10 MG tablet    GLUCOTROL    120 tablet    TAKE 2 TABLETS BY MOUTH TWICE A DAY BEFORE MEALS    Type 2 diabetes mellitus with hyperglycemia, with long-term current use of insulin (H)       GLUCOSAMINE CHONDROITIN COMPLX PO      2 Daily        insulin glargine 100 UNIT/ML injection    LANTUS SOLOSTAR    45  mL    Inject 27 Units Subcutaneous At Bedtime    Type 2 diabetes mellitus with hyperglycemia, with long-term current use of insulin (H)       irbesartan-hydrochlorothiazide 150-12.5 MG per tablet    AVALIDE    90 tablet    TAKE 1 TABLET BY MOUTH DAILY    Hypertension goal BP (blood pressure) < 140/90       latanoprost 0.005 % ophthalmic solution    XALATAN    3 Bottle    Place 1 drop into both eyes At Bedtime    Primary open angle glaucoma of both eyes, moderate stage       metFORMIN 1000 MG tablet    GLUCOPHAGE    180 tablet    TAKE 1 TABLET (1,000 MG) BY MOUTH 2 TIMES DAILY (WITH MEALS)    Type 2 diabetes mellitus with hyperglycemia, with long-term current use of insulin (H)       MULTIVITAMIN PO      1 tablet daily        OMEGA 3 PO      Take by mouth 2 times daily.        ONE TOUCH DELICA LANCETS Misc     100 each    1 each 2 times daily. One Touch Delica    Type 2 diabetes, HbA1c goal < 7% (H)       ONETOUCH ULTRA test strip   Generic drug:  blood glucose monitoring     200 strip    TEST TWICE DAILY    Type 2 diabetes mellitus with hyperglycemia, with long-term current use of insulin (H)       order for DME     1 each    Glucometer covered by insurance.    Type 2 diabetes, HbA1c goal < 7% (H)       oxybutynin 5 MG 24 hr tablet    DITROPAN-XL    180 tablet    Take 2 tablets (10 mg) by mouth daily    Urinary frequency, Overactive bladder       rosuvastatin 5 MG tablet    CRESTOR    8 tablet    Take 1 tablet twice a week    Dyslipidemia, goal LDL below 100       spacer/aero-hold chamber mask Aaliyah     1 each    1 Device as needed    Cough due to bronchospasm       SYNTHROID 137 MCG tablet   Generic drug:  levothyroxine     90 tablet    TAKE 1 TABLET (137 MCG) BY MOUTH DAILY    Myxedema heart disease (H), Nutritional and metabolic cardiomyopathy (H)

## 2018-03-09 LAB
ANA SER QL IF: NEGATIVE
CCP AB SER IA-ACNC: 1 U/ML
RHEUMATOID FACT SER NEPH-ACNC: <20 IU/ML (ref 0–20)

## 2018-03-09 NOTE — TELEPHONE ENCOUNTER
Patient given lab results and CT results.    She states that she has taken 2 doses of the amoxicillin and she already feels better.  She would like to wait on the allergist for now.  She will call back for a referral if she decides to see one in the future.    She states she is taking her medications and lantus regularly.  She has an appointment with pro on 4/6/18.    Reny Cedeno RN,   [facility]

## 2018-03-12 DIAGNOSIS — Z79.4 TYPE 2 DIABETES MELLITUS WITH HYPERGLYCEMIA, WITH LONG-TERM CURRENT USE OF INSULIN (H): ICD-10-CM

## 2018-03-12 DIAGNOSIS — E11.65 TYPE 2 DIABETES MELLITUS WITH HYPERGLYCEMIA, WITH LONG-TERM CURRENT USE OF INSULIN (H): ICD-10-CM

## 2018-03-13 NOTE — TELEPHONE ENCOUNTER
ONE TOUCH ULTRA test strip 200 strip 11 3/8/2017  No   Sig: TEST TWICE DAILY   Class: E-Prescribe     Last OV with Cailin Ponce, CNP: 3/8/2018    Future OV: none    Diabetic Supplies Protocol Passed3/12 1:55 AM   Patient is 18 years of age or older    Recent (6 mo) or future (30 days) visit within the authorizing provider's specialty     Refilled per [facility] protocol.    Sharla Young RN

## 2018-03-26 ENCOUNTER — TELEPHONE (OUTPATIENT)
Dept: PEDIATRICS | Facility: CLINIC | Age: 78
End: 2018-03-26

## 2018-03-26 ENCOUNTER — OFFICE VISIT (OUTPATIENT)
Dept: FAMILY MEDICINE | Facility: CLINIC | Age: 78
End: 2018-03-26
Payer: COMMERCIAL

## 2018-03-26 VITALS
HEART RATE: 108 BPM | DIASTOLIC BLOOD PRESSURE: 78 MMHG | TEMPERATURE: 98.3 F | BODY MASS INDEX: 32.49 KG/M2 | OXYGEN SATURATION: 94 % | SYSTOLIC BLOOD PRESSURE: 139 MMHG | WEIGHT: 187.8 LBS

## 2018-03-26 DIAGNOSIS — R82.90 NONSPECIFIC FINDING ON EXAMINATION OF URINE: ICD-10-CM

## 2018-03-26 DIAGNOSIS — N30.00 ACUTE CYSTITIS WITHOUT HEMATURIA: Primary | ICD-10-CM

## 2018-03-26 DIAGNOSIS — E11.65 TYPE 2 DIABETES MELLITUS WITH HYPERGLYCEMIA, WITH LONG-TERM CURRENT USE OF INSULIN (H): ICD-10-CM

## 2018-03-26 DIAGNOSIS — Z79.4 TYPE 2 DIABETES MELLITUS WITH HYPERGLYCEMIA, WITH LONG-TERM CURRENT USE OF INSULIN (H): ICD-10-CM

## 2018-03-26 LAB
ALBUMIN UR-MCNC: NEGATIVE MG/DL
APPEARANCE UR: ABNORMAL
BACTERIA #/AREA URNS HPF: ABNORMAL /HPF
BILIRUB UR QL STRIP: NEGATIVE
COLOR UR AUTO: YELLOW
GLUCOSE UR STRIP-MCNC: >=1000 MG/DL
HGB UR QL STRIP: NEGATIVE
KETONES UR STRIP-MCNC: NEGATIVE MG/DL
LEUKOCYTE ESTERASE UR QL STRIP: NEGATIVE
NITRATE UR QL: POSITIVE
NON-SQ EPI CELLS #/AREA URNS LPF: ABNORMAL /LPF
PH UR STRIP: 5.5 PH (ref 5–7)
RBC #/AREA URNS AUTO: ABNORMAL /HPF
SOURCE: ABNORMAL
SP GR UR STRIP: 1.01 (ref 1–1.03)
UROBILINOGEN UR STRIP-ACNC: 0.2 EU/DL (ref 0.2–1)
WBC #/AREA URNS AUTO: ABNORMAL /HPF

## 2018-03-26 PROCEDURE — 87086 URINE CULTURE/COLONY COUNT: CPT | Performed by: PHYSICIAN ASSISTANT

## 2018-03-26 PROCEDURE — 87088 URINE BACTERIA CULTURE: CPT | Performed by: PHYSICIAN ASSISTANT

## 2018-03-26 PROCEDURE — 99214 OFFICE O/P EST MOD 30 MIN: CPT | Performed by: PHYSICIAN ASSISTANT

## 2018-03-26 PROCEDURE — 87186 SC STD MICRODIL/AGAR DIL: CPT | Performed by: PHYSICIAN ASSISTANT

## 2018-03-26 PROCEDURE — 81001 URINALYSIS AUTO W/SCOPE: CPT | Performed by: PHYSICIAN ASSISTANT

## 2018-03-26 RX ORDER — NITROFURANTOIN 25; 75 MG/1; MG/1
100 CAPSULE ORAL 2 TIMES DAILY
Qty: 14 CAPSULE | Refills: 0 | Status: SHIPPED | OUTPATIENT
Start: 2018-03-26 | End: 2018-04-02

## 2018-03-26 NOTE — PATIENT INSTRUCTIONS
Please take Nitrofurantoin 100 mg 1 tablet twice a day for 7 days with large amount of water  Increase fluids  Follow up or call in 3 days if not better  Urine culture is pending  For prevention wipe front to back, urinate and wash after BM

## 2018-03-26 NOTE — LETTER
[address] [address]  Phone: [phone]   April 2, 2018      Brandy Leon  [address] [facility] [address]            Dear Brandy Leon    Culture was positive for infection and its covered by current antibiotic.     Enclosed is a copy of the results.  It was a pleasure to see you at your last appointment.    If you have any questions or concerns, please call myself or my nurse at [phone].      Sincerely,      RYANN Richter/NIKITA Conroy MA      Results for orders placed or performed in visit on 03/26/18   UA reflex to Microscopic and Culture   Result Value Ref Range    Color Urine Yellow     Appearance Urine Slightly Cloudy     Glucose Urine >=1000 (A) NEG^Negative mg/dL    Bilirubin Urine Negative NEG^Negative    Ketones Urine Negative NEG^Negative mg/dL    Specific Gravity Urine 1.010 1.003 - 1.035    Blood Urine Negative NEG^Negative    pH Urine 5.5 5.0 - 7.0 pH    Protein Albumin Urine Negative NEG^Negative mg/dL    Urobilinogen Urine 0.2 0.2 - 1.0 EU/dL    Nitrite Urine Positive (A) NEG^Negative    Leukocyte Esterase Urine Negative NEG^Negative    Source Midstream Urine    Urine Microscopic   Result Value Ref Range    WBC Urine 25-50 (A) OTO5^0 - 5 /HPF    RBC Urine O - 2 OTO2^O - 2 /HPF    Squamous Epithelial /LPF Urine Few FEW^Few /LPF    Bacteria Urine Many (A) NEG^Negative /HPF   Urine Culture Aerobic Bacterial   Result Value Ref Range    Specimen Description Midstream Urine     Culture Micro >100,000 colonies/mL  Escherichia coli   (A)     Culture Micro       <10,000 colonies/mL  urogenital erwin  Susceptibility testing not routinely done         Susceptibility    Escherichia coli - XIMENA     AMPICILLIN >=32 Resistant ug/mL     CEFAZOLIN* 32 Resistant ug/mL      * Cefazolin XIMENA breakpoints are for the treatment of uncomplicated urinary tract infections.  For the treatment of systemic  infections, please contact the laboratory for additional testing.     CEFOXITIN <=4 Sensitive ug/mL     CEFTAZIDIME <=1 Sensitive ug/mL     CEFTRIAXONE <=1 Sensitive ug/mL     CIPROFLOXACIN <=0.25 Sensitive ug/mL     GENTAMICIN <=1 Sensitive ug/mL     LEVOFLOXACIN 1 Sensitive ug/mL     NITROFURANTOIN <=16 Sensitive ug/mL     TOBRAMYCIN <=1 Sensitive ug/mL     Trimethoprim/Sulfa <=1/19 Sensitive ug/mL     AMPICILLIN/SULBACTAM >=32 Resistant ug/mL     Piperacillin/Tazo >=128 Resistant ug/mL     CEFEPIME <=1 Sensitive ug/mL

## 2018-03-26 NOTE — MR AVS SNAPSHOT
After Visit Summary   3/26/2018    Brandy Leon    MRN: [id number]           Patient Information     Date Of Birth          1940        Visit Information        Provider Department      3/26/2018 4:00 PM Lucía Van PA-C [facility]        Today's Diagnoses     Dysuria    -  1    Screening for diabetic peripheral neuropathy        Need for prophylactic vaccination against Streptococcus pneumoniae (pneumococcus)        Nonspecific finding on examination of urine        Type 2 diabetes mellitus with hyperglycemia, with long-term current use of insulin (H)        Acute cystitis without hematuria          Care Instructions    Please take Nitrofurantoin 100 mg 1 tablet twice a day for 7 days with large amount of water  Increase fluids  Follow up or call in 3 days if not better  Urine culture is pending  For prevention wipe front to back, urinate and wash after sex.             Follow-ups after your visit        Your next 10 appointments already scheduled     Apr 04, 2018  9:00 AM CDT   Return Visit with Ehsan Araya DPM   [facility])    [address] [place] [address] [id number] [phone]            Apr 06, 2018  8:15 AM CDT   Return Visit with Candice Worley MD, [facility] ENDO NURSE   [facility])    [id number] [place] [address] [id number] [phone]            Sep 25, 2018 10:45 AM CDT   Return Visit with Lucita Jordan MD   [facility])    [id number] [place] [address] [id number] [phone]              Who to contact     If you have questions or need follow up information about today's clinic visit or your schedule please contact [facility] directly at [phone].  Normal or non-critical lab and imaging results will be communicated to you by Denise  "letter or phone within 4 business days after the clinic has received the results. If you do not hear from us within 7 days, please contact the clinic through [employer] or phone. If you have a critical or abnormal lab result, we will notify you by phone as soon as possible.  Submit refill requests through [employer] or call your pharmacy and they will forward the refill request to us. Please allow 3 business days for your refill to be completed.          Additional Information About Your Visit        [employer] Information     [employer] lets you send messages to your doctor, view your test results, renew your prescriptions, schedule appointments and more. To sign up, go to [email or web address] . Click on \"Log in\" on the left side of the screen, which will take you to the Welcome page. Then click on \"Sign up Now\" on the right side of the page.     You will be asked to enter the access code listed below, as well as some personal information. Please follow the directions to create your username and password.     Your access code is: [email or web address]  Expires: 2018  2:13 PM     Your access code will  in 90 days. If you need help or a new code, please call your [facility] clinic or [phone].        Care EveryWhere ID     This is your Care EveryWhere ID. This could be used by other organizations to access your [facility] medical records  [id number]        Your Vitals Were     Pulse Temperature Pulse Oximetry BMI (Body Mass Index)          108 98.3  F (36.8  C) (Oral) 94% 32.49 kg/m2         Blood Pressure from Last 3 Encounters:   18 139/78   18 125/55   17 120/60    Weight from Last 3 Encounters:   18 187 lb 12.8 oz (85.2 kg)   18 184 lb 4.8 oz (83.6 kg)   17 182 lb 6.4 oz (82.7 kg)              We Performed the Following     Hemoglobin A1c     UA reflex to Microscopic and Culture     Urine Culture Aerobic Bacterial     Urine Microscopic          Today's Medication Changes        "   These changes are accurate as of 3/26/18  5:10 PM.  If you have any questions, ask your nurse or doctor.               Start taking these medicines.        Dose/Directions    nitroFURantoin (macrocrystal-monohydrate) 100 MG capsule   Commonly known as:  MACROBID   Used for:  Acute cystitis without hematuria   Started by:  Lucía Van PA-C        Dose:  100 mg   Take 1 capsule (100 mg) by mouth 2 times daily for 7 days   Quantity:  14 capsule   Refills:  0         These medicines have changed or have updated prescriptions.        Dose/Directions    aspirin 81 MG EC tablet   This may have changed:  how much to take   Used for:  Type 2 diabetes, HbA1c goal < 7% (H)        Dose:  81 mg   Take 1 tablet by mouth daily.   Quantity:  90 tablet   Refills:  3       insulin glargine 100 UNIT/ML injection   Commonly known as:  LANTUS SOLOSTAR   This may have changed:  how much to take   Used for:  Type 2 diabetes mellitus with hyperglycemia, with long-term current use of insulin (H)        Dose:  27 Units   Inject 27 Units Subcutaneous At Bedtime   Quantity:  45 mL   Refills:  3       irbesartan-hydrochlorothiazide 150-12.5 MG per tablet   Commonly known as:  AVALIDE   This may have changed:  See the new instructions.   Used for:  Hypertension goal BP (blood pressure) < 140/90        TAKE 1 TABLET BY MOUTH DAILY   Quantity:  90 tablet   Refills:  3            Where to get your medicines      These medications were sent to [facility]/pharmacy #[id number] [address] [address]     Phone:  [phone]     nitroFURantoin (macrocrystal-monohydrate) 100 MG capsule                Primary Care Provider Office Phone # Fax #    MAGO [facility] [place] [phone]       [address]  [place] [address]        Equal Access to Services     NUSRAT KHAN AH: Akbar Loya, claus valladares  mitch uriaspema azevedo ah. Jolanta [facility] [phone].    ATENCIÓN: Si stefan calhoun, tiene a garcía disposición servicios gratuitos de asistencia lingüística. Vesna al [phone].    We comply with applicable federal civil rights laws and [place] laws. We do not discriminate on the basis of race, color, national origin, age, disability, sex, sexual orientation, or gender identity.            Thank you!     Thank you for choosing [facility]  for your care. Our goal is always to provide you with excellent care. Hearing back from our patients is one way we can continue to improve our services. Please take a few minutes to complete the written survey that you may receive in the mail after your visit with us. Thank you!             Your Updated Medication List - Protect others around you: Learn how to safely use, store and throw away your medicines at www.disposemymeds.org.          This list is accurate as of 3/26/18  5:10 PM.  Always use your most recent med list.                   Brand Name Dispense Instructions for use Diagnosis    albuterol 108 (90 BASE) MCG/ACT Inhaler    PROAIR HFA/PROVENTIL HFA/VENTOLIN HFA    1 Inhaler    Inhale 2 puffs into the lungs every 4 hours as needed for shortness of breath / dyspnea or wheezing    Cough due to bronchospasm       amoxicillin 875 MG tablet    AMOXIL    20 tablet    Take 1 tablet (875 mg) by mouth 2 times daily    Acute recurrent maxillary sinusitis       aspirin 81 MG EC tablet     90 tablet    Take 1 tablet by mouth daily.    Type 2 diabetes, HbA1c goal < 7% (H)       BD JUAN C U/F 32G X 4 MM   Generic drug:  insulin pen needle     100 each    USE 1 DAILY AS DIRECTED.    Type 2 diabetes mellitus with hyperglycemia (H)       famotidine 20 MG tablet    PEPCID     Take 20 mg by mouth nightly as needed        glipiZIDE 10 MG tablet    GLUCOTROL    120 tablet    TAKE 2 TABLETS BY MOUTH TWICE A DAY BEFORE MEALS    Type 2  diabetes mellitus with hyperglycemia, with long-term current use of insulin (H)       GLUCOSAMINE CHONDROITIN COMPLX PO      2 Daily        insulin glargine 100 UNIT/ML injection    LANTUS SOLOSTAR    45 mL    Inject 27 Units Subcutaneous At Bedtime    Type 2 diabetes mellitus with hyperglycemia, with long-term current use of insulin (H)       irbesartan-hydrochlorothiazide 150-12.5 MG per tablet    AVALIDE    90 tablet    TAKE 1 TABLET BY MOUTH DAILY    Hypertension goal BP (blood pressure) < 140/90       latanoprost 0.005 % ophthalmic solution    XALATAN    3 Bottle    Place 1 drop into both eyes At Bedtime    Primary open angle glaucoma of both eyes, moderate stage       metFORMIN 1000 MG tablet    GLUCOPHAGE    180 tablet    TAKE 1 TABLET (1,000 MG) BY MOUTH 2 TIMES DAILY (WITH MEALS)    Type 2 diabetes mellitus with hyperglycemia, with long-term current use of insulin (H)       MULTIVITAMIN PO      1 tablet daily        nitroFURantoin (macrocrystal-monohydrate) 100 MG capsule    MACROBID    14 capsule    Take 1 capsule (100 mg) by mouth 2 times daily for 7 days    Acute cystitis without hematuria       OMEGA 3 PO      Take by mouth 2 times daily.        ONE TOUCH DELICA LANCETS Misc     100 each    1 each 2 times daily. One Touch Delica    Type 2 diabetes, HbA1c goal < 7% (H)       ONETOUCH ULTRA test strip   Generic drug:  blood glucose monitoring     200 strip    USE TO TEST TWICE A DAY    Type 2 diabetes mellitus with hyperglycemia, with long-term current use of insulin (H)       order for DME     1 each    Glucometer covered by insurance.    Type 2 diabetes, HbA1c goal < 7% (H)       oxybutynin 5 MG 24 hr tablet    DITROPAN-XL    180 tablet    Take 2 tablets (10 mg) by mouth daily    Urinary frequency, Overactive bladder       rosuvastatin 5 MG tablet    CRESTOR    8 tablet    Take 1 tablet twice a week    Dyslipidemia, goal LDL below 100       spacer/aero-hold chamber mask Aaliyah     1 each    1 Device as  needed    Cough due to bronchospasm       SYNTHROID 137 MCG tablet   Generic drug:  levothyroxine     90 tablet    TAKE 1 TABLET (137 MCG) BY MOUTH DAILY    Myxedema heart disease (H), Nutritional and metabolic cardiomyopathy (H)

## 2018-03-26 NOTE — PROGRESS NOTES
SUBJECTIVE:   Brandy Leon is a 77 year old female who presents to clinic today for the following health issues:  URINARY TRACT SYMPTOMS  Onset: Today- was doing her Exam for school bus and was told that her urine looks like she has UTI    Description:   Painful urination (Dysuria): no   Blood in urine (Hematuria): no   Delay in urine (Hesitency): no     Intensity: moderate    Progression of Symptoms:  same    Accompanying Signs & Symptoms:  Fever/chills: YES- chills, no fever  Flank pain no   Nausea and vomiting: no   Diarrhea: last night  Any vaginal symptoms: none  Abdominal/Pelvic Pain: Had right lower quadrant pain but thought could be due to diarrhea  Arms and legs pain: yes    History:   History of frequent UTI's: no   History of kidney stones: no   Sexually Active: no   Possibility of pregnancy: No  Therapies Tried and outcome: none        Problem list and histories reviewed & adjusted, as indicated.  Additional history: as documented    Patient Active Problem List   Diagnosis     Seasonal allergies     Hypothyroidism     Hyperlipidemia LDL goal <100     Fibromyalgia     Osteoarthritis     Actinic keratosis     Advanced directives, counseling/discussion     Glaucoma suspect     Cataracts, bilateral     Obesity     Type 2 diabetes mellitus with hyperglycemia, with long-term current use of insulin (H)     Hypertension goal BP (blood pressure) < 140/90     Overactive bladder     Primary osteoarthritis of both hands     Recurrent UTI     Past Surgical History:   Procedure Laterality Date     C APPENDECTOMY       C ARTHROPLASTY TMJ       COLONOSCOPY       COLONOSCOPY N/A 8/13/2015    Procedure: COLONOSCOPY;  Surgeon: Duane, William Charles, MD;  Location: [facility] OR     COLONOSCOPY WITH CO2 INSUFFLATION N/A 8/13/2015    Procedure: COLONOSCOPY WITH CO2 INSUFFLATION;  Surgeon: Duane, William Charles, MD;  Location: [facility] OR     THYROIDECTOMY          Social History   Substance Use Topics     Smoking status: Never  Smoker     Smokeless tobacco: Never Used      Comment: has had exposure to second hand smoke in the past from [facility], no current exposure     Alcohol use No     Family History   Problem Relation Age of Onset     CANCER Father      skin and leukemia     CEREBROVASCULAR DISEASE Maternal Grandfather      CEREBROVASCULAR DISEASE Paternal Grandmother      Eye Disorder Paternal Grandmother      cataracts     CEREBROVASCULAR DISEASE Paternal Grandfather      HEART DISEASE Paternal Grandfather      CANCER Sister      Breast Cancer     Eye Disorder Mother      cataracts     Eye Disorder Brother      cataract     Breast Cancer Daughter      Other Cancer Daughter      ovarian cancer         Current Outpatient Prescriptions   Medication Sig Dispense Refill     nitroFURantoin, macrocrystal-monohydrate, (MACROBID) 100 MG capsule Take 1 capsule (100 mg) by mouth 2 times daily for 7 days 14 capsule 0     ONETOUCH ULTRA test strip USE TO TEST TWICE A  strip 11     amoxicillin (AMOXIL) 875 MG tablet Take 1 tablet (875 mg) by mouth 2 times daily 20 tablet 0     SYNTHROID 137 MCG tablet TAKE 1 TABLET (137 MCG) BY MOUTH DAILY 90 tablet 2     insulin glargine (LANTUS SOLOSTAR) 100 UNIT/ML injection Inject 27 Units Subcutaneous At Bedtime (Patient taking differently: Inject 25 Units Subcutaneous At Bedtime ) 45 mL 3     rosuvastatin (CRESTOR) 5 MG tablet Take 1 tablet twice a week 8 tablet 3     glipiZIDE (GLUCOTROL) 10 MG tablet TAKE 2 TABLETS BY MOUTH TWICE A DAY BEFORE MEALS 120 tablet 5     metFORMIN (GLUCOPHAGE) 1000 MG tablet TAKE 1 TABLET (1,000 MG) BY MOUTH 2 TIMES DAILY (WITH MEALS) 180 tablet 1     albuterol (PROAIR HFA/PROVENTIL HFA/VENTOLIN HFA) 108 (90 BASE) MCG/ACT Inhaler Inhale 2 puffs into the lungs every 4 hours as needed for shortness of breath / dyspnea or wheezing 1 Inhaler 1     Spacer/Aero-Holding Chambers (SPACER/AERO-HOLD CHAMBER MASK) SANTANA 1 Device as needed 1 each 0     oxybutynin (DITROPAN-XL) 5 MG 24 hr  tablet Take 2 tablets (10 mg) by mouth daily 180 tablet 1     latanoprost (XALATAN) 0.005 % ophthalmic solution Place 1 drop into both eyes At Bedtime 3 Bottle 4     irbesartan-hydrochlorothiazide (AVALIDE) 150-12.5 MG per tablet TAKE 1 TABLET BY MOUTH DAILY (Patient taking differently: TAKE .5 TABLET BY MOUTH DAILY) 90 tablet 3     BD JUANC  U/F 32G X 4 MM insulin pen needle USE 1 DAILY AS DIRECTED. 100 each 3     famotidine (PEPCID) 20 MG tablet Take 20 mg by mouth nightly as needed       order for DME Glucometer covered by insurance. 1 each 0     aspirin 81 MG EC tablet Take 1 tablet by mouth daily. (Patient taking differently: Take 650 mg by mouth daily ) 90 tablet 3     ONE TOUCH DELICA LANCETS MISC 1 each 2 times daily. One Touch Delica   100 each 12     GLUCOSAMINE CHONDROITIN COMPLX OR 2 Daily       Omega-3 Fatty Acids (OMEGA 3 PO) Take by mouth 2 times daily.        MULTIVITAMIN OR 1 tablet daily       Allergies   Allergen Reactions     Estradiol Itching     Lipitor [Atorvastatin Calcium]      Weak and shaky     Sulfa Drugs      Rash       Zocor [Simvastatin]      Weak and shakey         ROS:  Constitutional, HEENT, cardiovascular, pulmonary, GI, , musculoskeletal, neuro, skin, endocrine and psych systems are negative, except as otherwise noted.    OBJECTIVE:     /78 (BP Location: Right arm, Patient Position: Chair, Cuff Size: Adult Large)  Pulse 108  Temp 98.3  F (36.8  C) (Oral)  Wt 187 lb 12.8 oz (85.2 kg)  SpO2 94%  BMI 32.49 kg/m2  Body mass index is 32.49 kg/(m^2).  GENERAL: healthy, alert and no distress  NECK: no adenopathy, no asymmetry, masses, or scars and thyroid normal to palpation  RESP: lungs clear to auscultation - no rales, rhonchi or wheezes  CV: regular rate and rhythm, normal S1 S2, no S3 or S4, no murmur, click or rub, no peripheral edema and peripheral pulses strong  ABDOMEN: soft, nontender, no hepatosplenomegaly, no masses and bowel sounds normal  MS: no gross  musculoskeletal defects noted, no edema  BACK: no CVA tenderness, no paralumbar tenderness    Diagnostic Test Results:  Results for orders placed or performed in visit on 03/26/18   UA reflex to Microscopic and Culture   Result Value Ref Range    Color Urine Yellow     Appearance Urine Slightly Cloudy     Glucose Urine >=1000 (A) NEG^Negative mg/dL    Bilirubin Urine Negative NEG^Negative    Ketones Urine Negative NEG^Negative mg/dL    Specific Gravity Urine 1.010 1.003 - 1.035    Blood Urine Negative NEG^Negative    pH Urine 5.5 5.0 - 7.0 pH    Protein Albumin Urine Negative NEG^Negative mg/dL    Urobilinogen Urine 0.2 0.2 - 1.0 EU/dL    Nitrite Urine Positive (A) NEG^Negative    Leukocyte Esterase Urine Negative NEG^Negative    Source Midstream Urine    Urine Microscopic   Result Value Ref Range    WBC Urine 25-50 (A) OTO5^0 - 5 /HPF    RBC Urine O - 2 OTO2^O - 2 /HPF    Squamous Epithelial /LPF Urine Few FEW^Few /LPF    Bacteria Urine Many (A) NEG^Negative /HPF   Urine Culture Aerobic Bacterial   Result Value Ref Range    Specimen Description Midstream Urine     Culture Micro (A)      >100,000 colonies/mL  Escherichia coli  Susceptibility testing in progress      Culture Micro       <10,000 colonies/mL  urogenital erwin  Susceptibility testing not routinely done         ASSESSMENT/PLAN:       ICD-10-CM    1. Acute cystitis without hematuria N30.00 UA reflex to Microscopic and Culture     Urine Microscopic     nitroFURantoin, macrocrystal-monohydrate, (MACROBID) 100 MG capsule   2. Nonspecific finding on examination of urine R82.90 Urine Culture Aerobic Bacterial   3. Type 2 diabetes mellitus with hyperglycemia, with long-term current use of insulin (H) E11.65 Hemoglobin A1c    Z79.4 CANCELED: Hemoglobin A1c   Please take Nitrofurantoin 100 mg 1 tablet twice a day for 7 days with large amount of water  Increase fluids  Follow up or call in 3 days if not better  Urine culture is pending  For prevention wipe front  to back, urinate and wash after BM    3. Large glucose in urine last A1C 1 week ago was 10.6. Patient has been taking 25 units of insulin, but she was supposed to take 27 per primary care provider prescription. Patient will increase to 27 units in the evening and follow up in 1 month with primary care provider.         Lucía Van PA-C  [facility]

## 2018-03-26 NOTE — TELEPHONE ENCOUNTER
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  They told patient that she has some indication that she may have a UTI.    They told her to check this with her provider.   Since there are no clinic visits today, advised to go to the [facility] or Walk in clinic to be evaluated.  She agrees to plan.    Mirna Melendrez RN, [facility]

## 2018-03-28 LAB
BACTERIA SPEC CULT: ABNORMAL
BACTERIA SPEC CULT: ABNORMAL
SPECIMEN SOURCE: ABNORMAL

## 2018-04-03 ENCOUNTER — TELEPHONE (OUTPATIENT)
Dept: PEDIATRICS | Facility: CLINIC | Age: 78
End: 2018-04-03

## 2018-04-03 DIAGNOSIS — Z79.4 TYPE 2 DIABETES MELLITUS WITH HYPERGLYCEMIA, WITH LONG-TERM CURRENT USE OF INSULIN (H): ICD-10-CM

## 2018-04-03 DIAGNOSIS — E11.65 TYPE 2 DIABETES MELLITUS WITH HYPERGLYCEMIA, WITH LONG-TERM CURRENT USE OF INSULIN (H): ICD-10-CM

## 2018-04-03 RX ORDER — GLIPIZIDE 10 MG/1
TABLET ORAL
Qty: 120 TABLET | Refills: 0 | Status: SHIPPED | OUTPATIENT
Start: 2018-04-03 | End: 2018-04-23

## 2018-04-03 NOTE — TELEPHONE ENCOUNTER
[facility] Call Center    Phone Message    May a detailed message be left on voicemail: no    Reason for Call: Medication Question or concern regarding medication   Prescription Clarification  Name of Medication: glipiZIDE (GLUCOTROL) 10 MG tablet  Prescribing Provider: Atiya Ponce   What on the order needs clarification? Pt states medication is discontinued wants to know if she should be taking it. Please give a call back to discuss          Action Taken: Message routed to:  Primary Care p [phone]

## 2018-04-03 NOTE — TELEPHONE ENCOUNTER
glipiZIDE (GLUCOTROL) 10 MG tablet 120 tablet 5 11/21/2017  No   Sig: TAKE 2 TABLETS BY MOUTH TWICE A DAY BEFORE MEALS     Seeing endocrinology 4/6/2018.     Refilled per [facility] protocol.    Sharla Young RN

## 2018-04-04 ENCOUNTER — OFFICE VISIT (OUTPATIENT)
Dept: PODIATRY | Facility: CLINIC | Age: 78
End: 2018-04-04
Payer: COMMERCIAL

## 2018-04-04 VITALS — SYSTOLIC BLOOD PRESSURE: 122 MMHG | DIASTOLIC BLOOD PRESSURE: 62 MMHG | OXYGEN SATURATION: 96 % | HEART RATE: 98 BPM

## 2018-04-04 DIAGNOSIS — E11.49 TYPE II OR UNSPECIFIED TYPE DIABETES MELLITUS WITH NEUROLOGICAL MANIFESTATIONS, NOT STATED AS UNCONTROLLED(250.60) (H): ICD-10-CM

## 2018-04-04 DIAGNOSIS — B35.3 TINEA PEDIS OF BOTH FEET: ICD-10-CM

## 2018-04-04 DIAGNOSIS — B35.1 DERMATOPHYTOSIS OF NAIL: Primary | ICD-10-CM

## 2018-04-04 PROCEDURE — 99213 OFFICE O/P EST LOW 20 MIN: CPT | Performed by: PODIATRIST

## 2018-04-04 RX ORDER — CICLOPIROX OLAMINE 7.7 MG/G
CREAM TOPICAL 2 TIMES DAILY
Qty: 90 G | Refills: 6 | Status: SHIPPED | OUTPATIENT
Start: 2018-04-04 | End: 2019-11-20

## 2018-04-04 NOTE — PATIENT INSTRUCTIONS
Thanks for coming today.  Ortho/Sports Medicine Clinic  [address] [place] [address]    To schedule future appointments in Ortho Clinic, you may call [phone].    To schedule ordered imaging by your Provider: Call [facility] at [phone]    [employer] available online at:   [email or web address]    Please call if any further questions or concerns [phone] and ask for the Orthopedic Department. Clinic hours 8 am to 5 pm.    Return to clinic if symptoms worsen.

## 2018-04-04 NOTE — PROGRESS NOTES
Past Medical History:   Diagnosis Date     Degenerative joint disease      Diabetes mellitus (H) 2006    type 2     HTN (hypertension)      Hyperlipidemia LDL goal < 100      Hypothyroidism 1960    s/p subtotal thyroidectomy      Rheumatic fever     x2 between the ages of 15-18     Seasonal allergies      Patient Active Problem List   Diagnosis     Seasonal allergies     Hypothyroidism     Hyperlipidemia LDL goal <100     Fibromyalgia     Osteoarthritis     Actinic keratosis     Advanced directives, counseling/discussion     Glaucoma suspect     Cataracts, bilateral     Obesity     Type 2 diabetes mellitus with hyperglycemia, with long-term current use of insulin (H)     Hypertension goal BP (blood pressure) < 140/90     Overactive bladder     Primary osteoarthritis of both hands     Recurrent UTI     Past Surgical History:   Procedure Laterality Date     C APPENDECTOMY       C ARTHROPLASTY TMJ       COLONOSCOPY       COLONOSCOPY N/A 8/13/2015    Procedure: COLONOSCOPY;  Surgeon: Duane, William Charles, MD;  Location: [facility]     COLONOSCOPY WITH CO2 INSUFFLATION N/A 8/13/2015    Procedure: COLONOSCOPY WITH CO2 INSUFFLATION;  Surgeon: Duane, William Charles, MD;  Location: [facility]     THYROIDECTOMY        Social History     Social History     Marital status: Single     Spouse name: N/A     Number of children: N/A     Years of education: N/A     Occupational History     Not on file.     Social History Main Topics     Smoking status: Never Smoker     Smokeless tobacco: Never Used      Comment: has had exposure to second hand smoke in the past from husban, no current exposure     Alcohol use No     Drug use: No     Sexual activity: No     Other Topics Concern     Parent/Sibling W/ Cabg, Mi Or Angioplasty Before 65f 55m? 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     Hobby Hazards No     Sleep Concern Yes     difficulty staying asleep, up and down all night     Stress Concern No     Weight Concern Yes     would like to lose     Special Diet Yes     low card, low sugar diet     Back Care Yes     chiropratior     Exercise Yes     almost everyday     Bike Helmet No     does not ride bicycle any more     Seat Belt Yes     Self-Exams Yes     Social History Narrative     Family History   Problem Relation Age of Onset     CANCER Father      skin and leukemia     CEREBROVASCULAR DISEASE Maternal Grandfather      CEREBROVASCULAR DISEASE Paternal Grandmother      Eye Disorder Paternal Grandmother      cataracts     CEREBROVASCULAR DISEASE Paternal Grandfather      HEART DISEASE Paternal Grandfather      CANCER Sister      Breast Cancer     Eye Disorder Mother      cataracts     Eye Disorder Brother      cataract     Breast Cancer Daughter      Other Cancer Daughter      ovarian cancer     Lab Results   Component Value Date    A1C 10.6 03/08/2018        SUBJECTIVE FINDINGS:  A 77-year-old female presents for a diabetic foot check and onychomycosis and onychauxis.  She relates she is doing well, no ulcers or sores since we had seen her last.  She does have neuropathy and her feet feel cold that is unchanged.  She is using lotion daily on her feet.       OBJECTIVE FINDINGS:  DP and PT are 2/4 bilaterally, cft less than 3 seconds.  She has incurvated nails 1 through 5 bilaterally.  She has thick, dystrophic nails with subungual debris and discoloration of the hallux nails bilaterally.  Sharp/dull is decreased with 5.07 [place] Yao monofilament bilaterally.  She has dry scaly skin in moccasin pattern bilaterally.  There is no erythema, no drainage, no odor, no calor bilaterally.       ASSESSMENT AND PLAN:  Onychomycosis bilaterally.  Onychauxis bilaterally.  She is diabetic with peripheral neuropathy.  Diagnosis and treatment options discussed with her.  All the nails were  debrided or reduced bilaterally upon consent.  Prescription for Loprox given and use discussed with her.  She will return to clinic and see me in 3-4 months.

## 2018-04-04 NOTE — NURSING NOTE
"Brandy Leon's goals for this visit include: diabetic foot check and toenail trim  She requests these members of her care team be copied on today's visit information: yes    PCP: Cailin Ponce    Referring Provider:  No referring provider defined for this encounter.    Chief Complaint   Patient presents with     RECHECK     Diabetic foot check and toenail trim       Initial /62  Pulse 98  SpO2 96% Estimated body mass index is 32.49 kg/(m^2) as calculated from the following:    Height as of 10/23/17: 1.619 m (5' 3.75\").    Weight as of 3/26/18: 85.2 kg (187 lb 12.8 oz).  Medication Reconciliation: complete    "

## 2018-04-04 NOTE — MR AVS SNAPSHOT
After Visit Summary   4/4/2018    Brandy Leon    MRN: [id number]           Patient Information     Date Of Birth          1940        Visit Information        Provider Department      4/4/2018 9:00 AM Ehsan Araya DPM [facility]        Today's Diagnoses     Dermatophytosis of nail    -  1    Tinea pedis of both feet        Type II or unspecified type diabetes mellitus with neurological manifestations, not stated as uncontrolled(250.60) (H)          Care Instructions    Thanks for coming today.  Ortho/Sports Medicine Clinic  [address] [place] [address]    To schedule future appointments in Ortho Clinic, you may call [phone].    To schedule ordered imaging by your Provider: Call [facility] at [phone]    [employer] available online at:   [employer]    Please call if any further questions or concerns [phone] and ask for the Orthopedic Department. Clinic hours 8 am to 5 pm.    Return to clinic if symptoms worsen.            Follow-ups after your visit        Follow-up notes from your care team     Return in about 3 months (around 7/4/2018).      Your next 10 appointments already scheduled     Apr 06, 2018  8:15 AM CDT   Return Visit with Candice Worley MD, [facility] END[facility] NURSE   [facility])    [id number] [place] [id number] [phone]            Jul 20, 2018  9:00 AM CDT   Return Visit with Ehsan Araya DPM   [facility])    [id number] [place] [id number] [phone]            Sep 25, 2018 10:45 AM CDT   Return Visit with Lucita Jordan MD   [facility])    [id number] [place] [id number] [phone]              Who to contact     If you have questions or need follow up information about today's clinic visit or your  schedule please contact [facility] directly at [phone].  Normal or non-critical lab and imaging results will be communicated to you by [email or web address], letter or phone within 4 business days after the clinic has received the results. If you do not hear from us within 7 days, please contact the clinic through [email or web address] or phone. If you have a critical or abnormal lab result, we will notify you by phone as soon as possible.  Submit refill requests through WhoWanna or call your pharmacy and they will forward the refill request to us. Please allow 3 business days for your refill to be completed.          Additional Information About Your Visit        Who[employer]anna Information     [employer] is an electronic gateway that provides easy, online access to your medical records. With WhoWanna, you can request a clinic appointment, read your test results, renew a prescription or communicate with your care team.     To sign up for [employer] visit the website at [email or web address]   You will be asked to enter the access code listed below, as well as some personal information. Please follow the directions to create your username and password.     Your access code is: [email or web address]  Expires: 2018  2:13 PM     Your access code will  in 90 days. If you need help or a new code, please contact your [facility] or call [phone] for assistance.        Care EveryWhere ID     This is your Care EveryWhere ID. This could be used by other organizations to access your [facility] medical records  [id number]        Your Vitals Were     Pulse Pulse Oximetry                98 96%           Blood Pressure from Last 3 Encounters:   18 122/62   18 139/78   18 125/55    Weight from Last 3 Encounters:   18 85.2 kg (187 lb 12.8 oz)   18 83.6 kg (184 lb 4.8 oz)   17 82.7 kg (182 lb 6.4 oz)              We Performed the Following     DEBRIDEMENT OF NAIL(S), 1-5           Today's Medication Changes          These changes are accurate as of 4/4/18  9:43 AM.  If you have any questions, ask your nurse or doctor.               Start taking these medicines.        Dose/Directions    ciclopirox 0.77 % cream   Commonly known as:  LOPROX   Used for:  Tinea pedis of both feet        Apply topically 2 times daily To feet and toenails.   Quantity:  90 g   Refills:  6         These medicines have changed or have updated prescriptions.        Dose/Directions    aspirin 81 MG EC tablet   This may have changed:  how much to take   Used for:  Type 2 diabetes, HbA1c goal < 7% (H)        Dose:  81 mg   Take 1 tablet by mouth daily.   Quantity:  90 tablet   Refills:  3       insulin glargine 100 UNIT/ML injection   Commonly known as:  LANTUS SOLOSTAR   This may have changed:  how much to take   Used for:  Type 2 diabetes mellitus with hyperglycemia, with long-term current use of insulin (H)        Dose:  27 Units   Inject 27 Units Subcutaneous At Bedtime   Quantity:  45 mL   Refills:  3       irbesartan-hydrochlorothiazide 150-12.5 MG per tablet   Commonly known as:  AVALIDE   This may have changed:  See the new instructions.   Used for:  Hypertension goal BP (blood pressure) < 140/90        TAKE 1 TABLET BY MOUTH DAILY   Quantity:  90 tablet   Refills:  3            Where to get your medicines      These medications were sent to [facility]/pharmacy #[id number] [address] [address]     Phone:  [phone]     ciclopirox 0.77 % cream                Primary Care Provider Office Phone # Fax #    Cailin MAGO Bernal [facility] [phone]       [address] [facility] [address]  [place] [address]        Equal Access to Services     NAI KHAN : Akbar Loya, farzana syed, mitch henry. So [facility] [phone].    ATENCIÓN: marcelina Cortes  garcía disposición servicios gratuitos de asistencia lingüística. Vesna lees [phone].    We comply with applicable federal civil rights laws and [place] laws. We do not discriminate on the basis of race, color, national origin, age, disability, sex, sexual orientation, or gender identity.            Thank you!     Thank you for choosing [facility]  for your care. Our goal is always to provide you with excellent care. Hearing back from our patients is one way we can continue to improve our services. Please take a few minutes to complete the written survey that you may receive in the mail after your visit with us. Thank you!             Your Updated Medication List - Protect others around you: Learn how to safely use, store and throw away your medicines at www.disposemymeds.org.          This list is accurate as of 4/4/18  9:43 AM.  Always use your most recent med list.                   Brand Name Dispense Instructions for use Diagnosis    albuterol 108 (90 BASE) MCG/ACT Inhaler    PROAIR HFA/PROVENTIL HFA/VENTOLIN HFA    1 Inhaler    Inhale 2 puffs into the lungs every 4 hours as needed for shortness of breath / dyspnea or wheezing    Cough due to bronchospasm       amoxicillin 875 MG tablet    AMOXIL    20 tablet    Take 1 tablet (875 mg) by mouth 2 times daily    Acute recurrent maxillary sinusitis       aspirin 81 MG EC tablet     90 tablet    Take 1 tablet by mouth daily.    Type 2 diabetes, HbA1c goal < 7% (H)       BD JUAN C U/F 32G X 4 MM   Generic drug:  insulin pen needle     100 each    USE 1 DAILY AS DIRECTED.    Type 2 diabetes mellitus with hyperglycemia (H)       ciclopirox 0.77 % cream    LOPROX    90 g    Apply topically 2 times daily To feet and toenails.    Tinea pedis of both feet       famotidine 20 MG tablet    PEPCID     Take 20 mg by mouth nightly as needed        glipiZIDE 10 MG tablet    GLUCOTROL    120 tablet    Take 2 tablets by mouth twice a day before meals    Type 2  diabetes mellitus with hyperglycemia, with long-term current use of insulin (H)       GLUCOSAMINE CHONDROITIN COMPLX PO      2 Daily        insulin glargine 100 UNIT/ML injection    LANTUS SOLOSTAR    45 mL    Inject 27 Units Subcutaneous At Bedtime    Type 2 diabetes mellitus with hyperglycemia, with long-term current use of insulin (H)       irbesartan-hydrochlorothiazide 150-12.5 MG per tablet    AVALIDE    90 tablet    TAKE 1 TABLET BY MOUTH DAILY    Hypertension goal BP (blood pressure) < 140/90       latanoprost 0.005 % ophthalmic solution    XALATAN    3 Bottle    Place 1 drop into both eyes At Bedtime    Primary open angle glaucoma of both eyes, moderate stage       metFORMIN 1000 MG tablet    GLUCOPHAGE    180 tablet    TAKE 1 TABLET (1,000 MG) BY MOUTH 2 TIMES DAILY (WITH MEALS)    Type 2 diabetes mellitus with hyperglycemia, with long-term current use of insulin (H)       MULTIVITAMIN PO      1 tablet daily        OMEGA 3 PO      Take by mouth 2 times daily.        ONE TOUCH DELICA LANCETS Misc     100 each    1 each 2 times daily. One Touch Delica    Type 2 diabetes, HbA1c goal < 7% (H)       ONETOUCH ULTRA test strip   Generic drug:  blood glucose monitoring     200 strip    USE TO TEST TWICE A DAY    Type 2 diabetes mellitus with hyperglycemia, with long-term current use of insulin (H)       order for DME     1 each    Glucometer covered by insurance.    Type 2 diabetes, HbA1c goal < 7% (H)       oxybutynin 5 MG 24 hr tablet    DITROPAN-XL    180 tablet    Take 2 tablets (10 mg) by mouth daily    Urinary frequency, Overactive bladder       rosuvastatin 5 MG tablet    CRESTOR    8 tablet    Take 1 tablet twice a week    Dyslipidemia, goal LDL below 100       spacer/aero-hold chamber mask Aaliyah     1 each    1 Device as needed    Cough due to bronchospasm       SYNTHROID 137 MCG tablet   Generic drug:  levothyroxine     90 tablet    TAKE 1 TABLET (137 MCG) BY MOUTH DAILY    Myxedema heart disease (H),  Nutritional and metabolic cardiomyopathy (H)

## 2018-04-04 NOTE — LETTER
4/4/2018         RE: Brandy Leon  [address] [address]        Dear Colleague,    Thank you for referring your patient, Brandy Leon, to the [facility]. Please see a copy of my visit note below.    Past Medical History:   Diagnosis Date     Degenerative joint disease      Diabetes mellitus (H) 2006    type 2     HTN (hypertension)      Hyperlipidemia LDL goal < 100      Hypothyroidism 1960    s/p subtotal thyroidectomy      Rheumatic fever     x2 between the ages of 15-18     Seasonal allergies      Patient Active Problem List   Diagnosis     Seasonal allergies     Hypothyroidism     Hyperlipidemia LDL goal <100     Fibromyalgia     Osteoarthritis     Actinic keratosis     Advanced directives, counseling/discussion     Glaucoma suspect     Cataracts, bilateral     Obesity     Type 2 diabetes mellitus with hyperglycemia, with long-term current use of insulin (H)     Hypertension goal BP (blood pressure) < 140/90     Overactive bladder     Primary osteoarthritis of both hands     Recurrent UTI     Past Surgical History:   Procedure Laterality Date     C APPENDECTOMY       C ARTHROPLASTY TMJ       COLONOSCOPY       COLONOSCOPY N/A 8/13/2015    Procedure: COLONOSCOPY;  Surgeon: Duane, William Charles, MD;  Location: [facility]     COLONOSCOPY WITH CO2 INSUFFLATION N/A 8/13/2015    Procedure: COLONOSCOPY WITH CO2 INSUFFLATION;  Surgeon: Duane, William Charles, MD;  Location: [facility]     THYROIDECTOMY        Social History     Social History     Marital status: Single     Spouse name: N/A     Number of children: N/A     Years of education: N/A     Occupational History     Not on file.     Social History Main Topics     Smoking status: Never Smoker     Smokeless tobacco: Never Used      Comment: has had exposure to second hand smoke in the past from husban, no current exposure     Alcohol use No     Drug use: No     Sexual activity: No     Other Topics  Concern     Parent/Sibling W/ Cabg, Mi Or Angioplasty Before 65f 55m? 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     Hobby Hazards No     Sleep Concern Yes     difficulty staying asleep, up and down all night     Stress Concern No     Weight Concern Yes     would like to lose     Special Diet Yes     low card, low sugar diet     Back Care Yes     chiropratior     Exercise Yes     almost everyday     Bike Helmet No     does not ride bicycle any more     Seat Belt Yes     Self-Exams Yes     Social History Narrative     Family History   Problem Relation Age of Onset     CANCER Father      skin and leukemia     CEREBROVASCULAR DISEASE Maternal Grandfather      CEREBROVASCULAR DISEASE Paternal Grandmother      Eye Disorder Paternal Grandmother      cataracts     CEREBROVASCULAR DISEASE Paternal Grandfather      HEART DISEASE Paternal Grandfather      CANCER Sister      Breast Cancer     Eye Disorder Mother      cataracts     Eye Disorder Brother      cataract     Breast Cancer Daughter      Other Cancer Daughter      ovarian cancer     Lab Results   Component Value Date    A1C 10.6 03/08/2018        SUBJECTIVE FINDINGS:  A 77-year-old female presents for a diabetic foot check and onychomycosis and onychauxis.  She relates she is doing well, no ulcers or sores since we had seen her last.  She does have neuropathy and her feet feel cold that is unchanged.  She is using lotion daily on her feet.       OBJECTIVE FINDINGS:  DP and PT are 2/4 bilaterally, cft less than 3 seconds.  She has incurvated nails 1 through 5 bilaterally.  She has thick, dystrophic nails with subungual debris and discoloration of the hallux nails bilaterally.  Sharp/dull is decreased with 5.07 [place] Yao monofilament bilaterally.  She has dry scaly skin in moccasin pattern  bilaterally.  There is no erythema, no drainage, no odor, no calor bilaterally.       ASSESSMENT AND PLAN:  Onychomycosis bilaterally.  Onychauxis bilaterally.  She is diabetic with peripheral neuropathy.  Diagnosis and treatment options discussed with her.  All the nails were debrided or reduced bilaterally upon consent.  Prescription for Loprox given and use discussed with her.  She will return to clinic and see me in 3-4 months.     Again, thank you for allowing me to participate in the care of your patient.        Sincerely,        Ehsan Araya DPM

## 2018-04-06 ENCOUNTER — OFFICE VISIT (OUTPATIENT)
Dept: ENDOCRINOLOGY | Facility: CLINIC | Age: 78
End: 2018-04-06
Payer: COMMERCIAL

## 2018-04-06 VITALS
DIASTOLIC BLOOD PRESSURE: 73 MMHG | WEIGHT: 190.48 LBS | BODY MASS INDEX: 32.95 KG/M2 | SYSTOLIC BLOOD PRESSURE: 145 MMHG | HEART RATE: 97 BPM

## 2018-04-06 DIAGNOSIS — E11.65 TYPE 2 DIABETES MELLITUS WITH HYPERGLYCEMIA, WITH LONG-TERM CURRENT USE OF INSULIN (H): Primary | ICD-10-CM

## 2018-04-06 DIAGNOSIS — Z79.4 TYPE 2 DIABETES MELLITUS WITH HYPERGLYCEMIA, WITH LONG-TERM CURRENT USE OF INSULIN (H): Primary | ICD-10-CM

## 2018-04-06 PROCEDURE — 99214 OFFICE O/P EST MOD 30 MIN: CPT | Performed by: INTERNAL MEDICINE

## 2018-04-06 RX ORDER — INSULIN GLARGINE 100 [IU]/ML
INJECTION, SOLUTION SUBCUTANEOUS
Qty: 9 ML | Refills: 3 | Status: SHIPPED | OUTPATIENT
Start: 2018-04-06 | End: 2018-04-23

## 2018-04-06 NOTE — PATIENT INSTRUCTIONS
Start Bydureon once  A week.   Once you start this medication, reduce Glipizide to 1 pill twice a day and reduce Lantus to 20 units daily.   Consider CGM.     [facility]-Department of Endocrinology  Jeannie Hathaway RN, Diabetes Educator: [phone]  Clinic Nurses Princess Colby: [phone]  Clinic Fax: [phone]  On-Call Endocrine at the [facility] (after hours/weekends): [phone] option 4  Scheduling Line: [phone]    Appointment Reminders:  * Please bring meter with for staff to download  * If you are due ONLY for an A1C, it is scheduled with the nurse and will be done in clinic. You do not need to schedule a lab appointment. Fasting is not required for an A1C.  * Refill request should be submitted to your pharmacy. They will contact clinic for approval.

## 2018-04-06 NOTE — NURSING NOTE
"Brandy Leon's goals for this visit include: f/u diabetes  She requests these members of her care team be copied on today's visit information: yes    PCP: Cailin Ponce    Referring Provider:  Cailin Ponce, APRN CNP  [address]  [place] [address]    Chief Complaint   Patient presents with     Diabetes       Initial /73  Pulse 97  Wt 86.4 kg (190 lb 7.6 oz)  BMI 32.95 kg/m2 Estimated body mass index is 32.95 kg/(m^2) as calculated from the following:    Height as of 10/23/17: 1.619 m (5' 3.75\").    Weight as of this encounter: 86.4 kg (190 lb 7.6 oz).  Medication Reconciliation: complete      "

## 2018-04-06 NOTE — MR AVS SNAPSHOT
After Visit Summary   4/6/2018    Brandy Leon    MRN: [id number]           Patient Information     Date Of Birth          1940        Visit Information        Provider Department      4/6/2018 8:15 AM Candice Worley MD; [facility] ENDO NURSE [facility]        Today's Diagnoses     Type 2 diabetes mellitus with hyperglycemia, with long-term current use of insulin (H)    -  1      Care Instructions    Start Bydureon once  A week.   Once you start this medication, reduce Glipizide to 1 pill twice a day and reduce Lantus to 20 units daily.   Consider CGM.     [facility]-Department of Endocrinology  Jeannie Hathaway RN, Diabetes Educator: [phone]  Clinic Nurses Princess Colby: [phone]  Clinic Fax: [phone]  [facility]-Call [facility]ocrine [facility] (after hours/weekends): [phone] option 4  Scheduling Line: [phone]    Appointment Reminders:  * Please bring meter with for staff to download  * If you are due ONLY for an A1C, it is scheduled with the nurse and will be done in clinic. You do not need to schedule a lab appointment. Fasting is not required for an A1C.  * Refill request should be submitted to your pharmacy. They will contact clinic for approval.                Follow-ups after your visit        Follow-up notes from your care team     Return in about 3 months (around 7/6/2018).      Your next 10 appointments already scheduled     Jul 20, 2018  9:00 AM CDT   Return Visit with Ehsan Araya DPM   [facility])    [address] [place] [address]   [phone]            Aug 03, 2018 11:15 AM CDT   Return Visit with Candice Worley MD, [facility] ENDO NURSE   [facility] ([facility])    [address] [place] [address]   [phone]            Sep 25, 2018 10:45 AM CDT   [facility]    [address] [place] [id number] [phone]              Who to contact     If you have questions or need follow up information about today's clinic visit or your schedule please contact [facility] directly at [phone].  Normal or non-critical lab and imaging results will be communicated to you by MyChart, letter or phone within 4 business days after the clinic has received the results. If you do not hear from us within 7 days, please contact the clinic through MyChart or phone. If you have a critical or abnormal lab result, we will notify you by phone as soon as possible.  Submit refill requests through [employer] or call your pharmacy and they will forward the refill request to us. Please allow 3 business days for your refill to be completed.          Additional Information About Your Visit        [employer] Information     [employer] is an electronic gateway that provides easy, online access to your medical records. With [employer], you can request a clinic appointment, read your test results, renew a prescription or communicate with your care team.     To sign up for [employer] visit the website at [email or web address]   You will be asked to enter the access code listed below, as well as some personal information. Please follow the directions to create your username and password.     Your access code is: [email or web address]  Expires: 2018  2:13 PM     Your access code will  in 90 days. If you need help or a new code, please contact your [facility] or call [phone] for assistance.        Care EveryWhere ID     This is your Care EveryWhere ID. This could be used by other organizations to access your [facility] medical records  [id number]        Your Vitals Were     Pulse BMI (Body Mass Index)                97 32.95 kg/m2           Blood Pressure from Last 3 Encounters:   18  145/73   04/04/18 122/62   03/26/18 139/78    Weight from Last 3 Encounters:   04/06/18 86.4 kg (190 lb 7.6 oz)   03/26/18 85.2 kg (187 lb 12.8 oz)   03/08/18 83.6 kg (184 lb 4.8 oz)              Today, you had the following     No orders found for display         Today's Medication Changes          These changes are accurate as of 4/6/18  9:04 AM.  If you have any questions, ask your nurse or doctor.               Start taking these medicines.        Dose/Directions    exenatide ER 2 MG pen   Commonly known as:  BYDUREON   Used for:  Type 2 diabetes mellitus with hyperglycemia, with long-term current use of insulin (H)   Started by:  Candice Worley MD        Dose:  2 mg   Inject 2 mg Subcutaneous every 7 days   Quantity:  4 each   Refills:  3         These medicines have changed or have updated prescriptions.        Dose/Directions    aspirin 81 MG EC tablet   This may have changed:  how much to take   Used for:  Type 2 diabetes, HbA1c goal < 7% (H)        Dose:  81 mg   Take 1 tablet by mouth daily.   Quantity:  90 tablet   Refills:  3       BASAGLAR 100 UNIT/ML injection   This may have changed:    - how much to take  - how to take this  - when to take this  - additional instructions   Used for:  Type 2 diabetes mellitus with hyperglycemia, with long-term current use of insulin (H)   Changed by:  Candice Worley MD        27 units daily   Quantity:  9 mL   Refills:  3       irbesartan-hydrochlorothiazide 150-12.5 MG per tablet   Commonly known as:  AVALIDE   This may have changed:  See the new instructions.   Used for:  Hypertension goal BP (blood pressure) < 140/90        TAKE 1 TABLET BY MOUTH DAILY   Quantity:  90 tablet   Refills:  3            Where to get your medicines      These medications were sent to [facility]/pharmacy #[id number] [address] AT [address] [address]     Phone:  [phone]     BASAGLAR 100 UNIT/ML  injection    exenatide ER 2 MG pen                Primary Care Provider Office Phone # Fax #    Cailin Ponce, APRN [facility] [phone]       [address]  [place] [address]        Equal Access to Services     NUSRAT KHAN : Hadii aad ku hadjavio Soomaali, waaxda luqadaha, qaybta kaalmada adeegyada, waxjohnnie idiin hayaan adeeg henna lajimmie . So [facility] [phone].    ATENCIÓN: Si habla español, tiene a garcía disposición servicios gratuitos de asistencia lingüística. Llame al [phone].    We comply with applicable federal civil rights laws and [place] laws. We do not discriminate on the basis of race, color, national origin, age, disability, sex, sexual orientation, or gender identity.            Thank you!     Thank you for choosing [facility]  for your care. Our goal is always to provide you with excellent care. Hearing back from our patients is one way we can continue to improve our services. Please take a few minutes to complete the written survey that you may receive in the mail after your visit with us. Thank you!             Your Updated Medication List - Protect others around you: Learn how to safely use, store and throw away your medicines at www.disposemymeds.org.          This list is accurate as of 4/6/18  9:04 AM.  Always use your most recent med list.                   Brand Name Dispense Instructions for use Diagnosis    albuterol 108 (90 BASE) MCG/ACT Inhaler    PROAIR HFA/PROVENTIL HFA/VENTOLIN HFA    1 Inhaler    Inhale 2 puffs into the lungs every 4 hours as needed for shortness of breath / dyspnea or wheezing    Cough due to bronchospasm       amoxicillin 875 MG tablet    AMOXIL    20 tablet    Take 1 tablet (875 mg) by mouth 2 times daily    Acute recurrent maxillary sinusitis       aspirin 81 MG EC tablet     90 tablet    Take 1 tablet by mouth daily.    Type 2 diabetes, HbA1c goal < 7% (H)       BASAGLAR 100 UNIT/ML injection     9 mL    27  units daily    Type 2 diabetes mellitus with hyperglycemia, with long-term current use of insulin (H)       BD JUAN C U/F 32G X 4 MM   Generic drug:  insulin pen needle     100 each    USE 1 DAILY AS DIRECTED.    Type 2 diabetes mellitus with hyperglycemia (H)       ciclopirox 0.77 % cream    LOPROX    90 g    Apply topically 2 times daily To feet and toenails.    Tinea pedis of both feet       exenatide ER 2 MG pen    BYDUREON    4 each    Inject 2 mg Subcutaneous every 7 days    Type 2 diabetes mellitus with hyperglycemia, with long-term current use of insulin (H)       famotidine 20 MG tablet    PEPCID     Take 20 mg by mouth nightly as needed        glipiZIDE 10 MG tablet    GLUCOTROL    120 tablet    Take 2 tablets by mouth twice a day before meals    Type 2 diabetes mellitus with hyperglycemia, with long-term current use of insulin (H)       GLUCOSAMINE CHONDROITIN COMPLX PO      2 Daily        irbesartan-hydrochlorothiazide 150-12.5 MG per tablet    AVALIDE    90 tablet    TAKE 1 TABLET BY MOUTH DAILY    Hypertension goal BP (blood pressure) < 140/90       latanoprost 0.005 % ophthalmic solution    XALATAN    3 Bottle    Place 1 drop into both eyes At Bedtime    Primary open angle glaucoma of both eyes, moderate stage       metFORMIN 1000 MG tablet    GLUCOPHAGE    180 tablet    TAKE 1 TABLET (1,000 MG) BY MOUTH 2 TIMES DAILY (WITH MEALS)    Type 2 diabetes mellitus with hyperglycemia, with long-term current use of insulin (H)       MULTIVITAMIN PO      1 tablet daily        OMEGA 3 PO      Take by mouth 2 times daily.        ONE TOUCH DELICA LANCETS Misc     100 each    1 each 2 times daily. One Touch Delica    Type 2 diabetes, HbA1c goal < 7% (H)       ONETOUCH ULTRA test strip   Generic drug:  blood glucose monitoring     200 strip    USE TO TEST TWICE A DAY    Type 2 diabetes mellitus with hyperglycemia, with long-term current use of insulin (H)       order for DME     1 each    Glucometer covered by  insurance.    Type 2 diabetes, HbA1c goal < 7% (H)       oxybutynin 5 MG 24 hr tablet    DITROPAN-XL    180 tablet    Take 2 tablets (10 mg) by mouth daily    Urinary frequency, Overactive bladder       rosuvastatin 5 MG tablet    CRESTOR    8 tablet    Take 1 tablet twice a week    Dyslipidemia, goal LDL below 100       spacer/aero-hold chamber mask Aaliyah     1 each    1 Device as needed    Cough due to bronchospasm       SYNTHROID 137 MCG tablet   Generic drug:  levothyroxine     90 tablet    TAKE 1 TABLET (137 MCG) BY MOUTH DAILY    Myxedema heart disease (H), Nutritional and metabolic cardiomyopathy (H)

## 2018-04-06 NOTE — LETTER
4/6/2018         RE: Brandy Leon  [address] [facility] [address]        Dear Colleague,    Thank you for referring your patient, Brandy Leon, to the [facility]. Please see a copy of my visit note below.    Endocrinology Clinic Visit    Chief Complaint: Diabetes       Subjective:         HPI: Brandy is here for a FU    She has PMH of history of type 2 diabetes mellitus, hypertension, hyperlipidemia, hypothyroidism and degenerative joint disease.     She has had diabetes for more than 12 years  She has retinopathy and neuropathy.  Last eye exam was on August 2016   Last microalbuminuria  Feb 2017 + Microalbuminuria, normal creatinine  Denies any hypoglycemia    Glucometer:   She is usually tests blood sugars in the morning  Recent values are higher per patient report  This am > 200.     Lab Results   Component Value Date    A1C 10.6 (H) 03/08/2018    A1C 8.8 (H) 11/16/2017    A1C 9.3 (H) 07/20/2017    A1C 10.2 04/14/2017    A1C 10.7 (H) 02/09/2017       Medication history.   Initially, she took metformin 1 g twice a day and glipizide 20 mg twice daily  Due to difficult blood sugar control in the recent months she was started on Lantus 27 units daily but patient states that this has not benefited her.   Invokana added during the first visit.   A1c improved but she discontinued it due to amputation risk. Unwilling to try other drugs in this class.       Preventative medications  Crestor at this time due to muscle pain but plans to do so at lower dose  irbesartan and aspirin    Diet  She drinks coffee with a toast in the morning and goes to work.  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  She eats a toast with eggs or oatmeal with blueberries.  Around noon she eats a sandwich with milk for lunch and around 5 PM she has dinner which usually very eats depending on what her son cooks.  She has navy beans with soup and fish and milk for dinner.      She does not drink alcohol or sugary beverage  She does not snack in between meals    Exercise  There is no structured exercise    Hypothyroidism  Diagnoses one than 20 years ago with overactive thyroid when she had shakiness.  Eventually she underwent thyroid surgery and started on levothyroxine  Currently she takes 137  g of levothyroxine  She denies having cold intolerance, change in diet or appetite    Family history  No history of diabetes, obesity.  Brother has history of thyroid surgery     Allergies   Allergen Reactions     Estradiol Itching     Lipitor [Atorvastatin Calcium]      Weak and shaky     Sulfa Drugs      Rash       Zocor [Simvastatin]      Weak and shakey       Current Outpatient Prescriptions   Medication Sig Dispense Refill     exenatide ER (BYDUREON) 2 MG pen Inject 2 mg Subcutaneous every 7 days 4 each 3     BASAGLAR 100 UNIT/ML injection 27 units daily 9 mL 3     ciclopirox (LOPROX) 0.77 % cream Apply topically 2 times daily To feet and toenails. 90 g 6     glipiZIDE (GLUCOTROL) 10 MG tablet Take 2 tablets by mouth twice a day before meals 120 tablet 0     ONETOUCH ULTRA test strip USE TO TEST TWICE A  strip 11     amoxicillin (AMOXIL) 875 MG tablet Take 1 tablet (875 mg) by mouth 2 times daily 20 tablet 0     SYNTHROID 137 MCG tablet TAKE 1 TABLET (137 MCG) BY MOUTH DAILY 90 tablet 2     rosuvastatin (CRESTOR) 5 MG tablet Take 1 tablet twice a week 8 tablet 3     metFORMIN (GLUCOPHAGE) 1000 MG tablet TAKE 1 TABLET (1,000 MG) BY MOUTH 2 TIMES DAILY (WITH MEALS) 180 tablet 1     albuterol (PROAIR HFA/PROVENTIL HFA/VENTOLIN HFA) 108 (90 BASE) MCG/ACT Inhaler Inhale 2 puffs into the lungs every 4 hours as needed for shortness of breath / dyspnea or wheezing 1 Inhaler 1     Spacer/Aero-Holding Chambers (SPACER/AERO-HOLD CHAMBER MASK) SANTANA 1 Device as needed 1 each 0     oxybutynin (DITROPAN-XL) 5 MG 24 hr tablet Take 2 tablets (10 mg) by mouth daily 180 tablet 1     latanoprost  (XALATAN) 0.005 % ophthalmic solution Place 1 drop into both eyes At Bedtime 3 Bottle 4     irbesartan-hydrochlorothiazide (AVALIDE) 150-12.5 MG per tablet TAKE 1 TABLET BY MOUTH DAILY (Patient taking differently: TAKE .5 TABLET BY MOUTH DAILY) 90 tablet 3     BD JUAN C U/F 32G X 4 MM insulin pen needle USE 1 DAILY AS DIRECTED. 100 each 3     famotidine (PEPCID) 20 MG tablet Take 20 mg by mouth nightly as needed       order for DME Glucometer covered by insurance. 1 each 0     aspirin 81 MG EC tablet Take 1 tablet by mouth daily. (Patient taking differently: Take 650 mg by mouth daily ) 90 tablet 3     ONE TOUCH DELICA LANCETS MISC 1 each 2 times daily. One Touch Delica   100 each 12     GLUCOSAMINE CHONDROITIN COMPLX OR 2 Daily       Omega-3 Fatty Acids (OMEGA 3 PO) Take by mouth 2 times daily.        MULTIVITAMIN OR 1 tablet daily       [DISCONTINUED] insulin glargine (LANTUS SOLOSTAR) 100 UNIT/ML injection Inject 27 Units Subcutaneous At Bedtime (Patient taking differently: Inject 25 Units Subcutaneous At Bedtime ) 45 mL 3       Review of Systems     Constitutional: Negative for fever and unexpected weight change. Appetite normal   Head: no headache   Eye: no vision change/ loss of peripheral vision.   ENT: No throat congestion.   Respiratory: no cough   Cardiovascular: no chest pain  Gastrointestinal: Negative for vomiting, abdominal pain and diarrhea.  Genitourinary: No bladder problems.  Musculoskeletal: Negative for myalgias. No weakness.  Neurological: Negative for seizures and headaches.  Psychiatric/Behavioral: Negative for behavioral problem and dysphoric mood.    Past Medical History:   Diagnosis Date     Degenerative joint disease      Diabetes mellitus (H) 2006    type 2     HTN (hypertension)      Hyperlipidemia LDL goal < 100      Hypothyroidism 1960    s/p subtotal thyroidectomy      Rheumatic fever     x2 between the ages of 15-18     Seasonal allergies        Past Surgical History:   Procedure  Laterality Date     C APPENDECTOMY       C ARTHROPLASTY TMJ       COLONOSCOPY       COLONOSCOPY N/A 8/13/2015    Procedure: COLONOSCOPY;  Surgeon: Duane, William Charles, MD;  Location: [facility] OR     COLONOSCOPY WITH CO2 INSUFFLATION N/A 8/13/2015    Procedure: COLONOSCOPY WITH CO2 INSUFFLATION;  Surgeon: Duane, William Charles, MD;  Location: [facility] OR     THYROIDECTOMY          Family History   Problem Relation Age of Onset     CANCER Father      skin and leukemia     CEREBROVASCULAR DISEASE Maternal Grandfather      CEREBROVASCULAR DISEASE Paternal Grandmother      Eye Disorder Paternal Grandmother      cataracts     CEREBROVASCULAR DISEASE Paternal Grandfather      HEART DISEASE Paternal Grandfather      CANCER Sister      Breast Cancer     Eye Disorder Mother      cataracts     Eye Disorder Brother      cataract     Breast Cancer Daughter      Other Cancer Daughter      ovarian cancer       Social History     Social History     Marital Status: Single     Spouse Name: N/A     Number of Children: N/A     Years of Education: N/A     Social History Main Topics     Smoking status: Never Smoker      Smokeless tobacco: Never Used      Comment: has had exposure to second hand smoke in the past from [employer], no current exposure     Alcohol Use: No     Drug Use: No     Sexual Activity: No     Other Topics Concern     Parent/Sibling W/ Cabg, Mi Or Angioplasty Before 65f 55m? 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     Hobby Hazards No     Sleep Concern Yes     difficulty staying asleep, up and down all night     Stress Concern No     Weight Concern Yes     would like to lose     Special Diet Yes     low card, low sugar diet     Back Care Yes     chiropratior     Exercise Yes     almost everyday     Bike Helmet No     does not ride bicycle any more     Seat Belt Yes      Self-Exams Yes     Social History Narrative       Objective:   /73  Pulse 97  Wt 86.4 kg (190 lb 7.6 oz)  BMI 32.95 kg/m2  Constitutional: Elderly lady in no acute distress   EYES: anicteric, normal extra-ocular movements, no lid lag or retraction   HEENT: Mouth/Throat: Mucous membrane is moist. Oropharynx is clear. No adenopathy. Normal thyroid   Cardiovascular: systolic murmur +, regular rate and rhythm  Pulmonary/Chest: CTAB. No wheezing or rales   Abdominal: +BS. Nontender to palpation. No organomegaly present.  Neurological: Alert. Normal affect. CNII-XII intact. Muscle strength 5/5. Sensory is intact.  Extremities: No clubbing, cyanosis or inflammation   Skin: normal texture, color  Feet: bilateral neuropathy left worse than right [ due 12/2018]  Lymphatic: no cervical lymphadenopathy.  Psychological: appropriate mood     In House Labs:   A1C     11.0   11/8/2016  A1C     10.4   7/26/2016  A1C     11.5   4/15/2016  A1C     11.2   2/23/2016  A1C      9.9   12/22/2015    TSH   Date Value Ref Range Status   12/22/2017 1.70 0.40 - 4.00 mU/L Final   02/09/2017 1.33 0.40 - 4.00 mU/L Final   09/27/2016 1.88 0.40 - 4.00 mU/L Final   07/26/2016 1.78 0.40 - 4.00 mU/L Final   11/26/2014 1.14 0.40 - 4.00 mU/L Final     Comment:     Effective 7/30/2014, the reference range for this assay has changed to reflect   new instrumentation/methodology.       T4 Free   Date Value Ref Range Status   12/22/2017 1.16 0.76 - 1.46 ng/dL Final   02/09/2017 1.25 0.76 - 1.46 ng/dL Final       Creatinine   Date Value Ref Range Status   03/08/2018 0.72 0.52 - 1.04 mg/dL Final   ]    Recent Labs   Lab Test  07/26/16   0828  02/23/16   1115   08/21/15   0825   06/16/15   0941   CHOL  137  184   < >  174   --   224*   HDL  57  59   < >  49*   --   47*   LDL  48  88   < >  86   < >  120   TRIG  162*  187*   < >  197*   --   283*   CHOLHDLRATIO   --    --    --   3.6   --   4.8    < > = values in this interval not displayed.       No  results found for: [id number], [id number], [id number]      Assessment/Treatment Plan:      77 year old female  here for a follow up visit.     1. Type 2 Diabetes with uncontrolled hyperglycemia :   Neuropathy, nephropathy.  No retinopathy.   Her recent A1c was 10.6 indicating poor control  Barriers:   She is a  and hypoglycemia is a significant risk and patient tries to avoid at all costs.   She denies having changes in diet but she may be underreporting her diet.    Does not want SGLT2 due to amputation risk. I discussed about other agents as Farxiga.   Cost barrier: Lantus cost was 400 for 3 months  GLP1 agonist and taper the Lantus - she is agreeable to the plan.     -- Start Bydureon once  A week.   -- Once you start this medication, reduce Glipizide to 1 pill twice a day and reduce Lantus to 20 units daily.   Consider CGM.     2.  Hypothyroidism S/P Thyroidectomy for Graves' disease.  Continue levothyroxine 137  g daily  Recheck TSH levels in 12/2018.     3.  Hyperlipidemia  She was complaining about significant muscle pains in both arms and legs. : most likely due to lack of meds.       Patient Instructions   Start Bydureon once  A week.   Once you start this medication, reduce Glipizide to 1 pill twice a day and reduce Lantus to 20 units daily.   Consider CGM.       I will contact the patient with the test results.  Return to clinic in 3 months.    Candice Worley MD  2007  Endocrinology Service              Again, thank you for allowing me to participate in the care of your patient.        Sincerely,        Candice Worley MD

## 2018-04-06 NOTE — PROGRESS NOTES
Endocrinology Clinic Visit    Chief Complaint: Diabetes       Subjective:         HPI: Brandy is here for a FU    She has PMH of history of type 2 diabetes mellitus, hypertension, hyperlipidemia, hypothyroidism and degenerative joint disease.     She has had diabetes for more than 12 years  She has retinopathy and neuropathy.  Last eye exam was on August 2016   Last microalbuminuria  Feb 2017 + Microalbuminuria, normal creatinine  Denies any hypoglycemia    Glucometer:   She is usually tests blood sugars in the morning  Recent values are higher per patient report  This am > 200.     Lab Results   Component Value Date    A1C 10.6 (H) 03/08/2018    A1C 8.8 (H) 11/16/2017    A1C 9.3 (H) 07/20/2017    A1C 10.2 04/14/2017    A1C 10.7 (H) 02/09/2017       Medication history.   Initially, she took metformin 1 g twice a day and glipizide 20 mg twice daily  Due to difficult blood sugar control in the recent months she was started on Lantus 27 units daily but patient states that this has not benefited her.   Invokana added during the first visit.   A1c improved but she discontinued it due to amputation risk. Unwilling to try other drugs in this class.       Preventative medications  Crestor at this time due to muscle pain but plans to do so at lower dose  irbesartan and aspirin    Diet  She drinks coffee with a toast in the morning and goes to work.  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  She eats a toast with eggs or oatmeal with blueberries.  Around noon she eats a sandwich with milk for lunch and around 5 PM she has dinner which usually very eats depending on what her son cooks.  She has navy beans with soup and fish and milk for dinner.     She does not drink alcohol or sugary beverage  She does not snack in between meals    Exercise  There is no structured exercise    Hypothyroidism  Diagnoses one than 20 years ago with overactive thyroid when she had shakiness.  Eventually she underwent thyroid surgery and  started on levothyroxine  Currently she takes 137  g of levothyroxine  She denies having cold intolerance, change in diet or appetite    Family history  No history of diabetes, obesity.  Brother has history of thyroid surgery     Allergies   Allergen Reactions     Estradiol Itching     Lipitor [Atorvastatin Calcium]      Weak and shaky     Sulfa Drugs      Rash       Zocor [Simvastatin]      Weak and shakey       Current Outpatient Prescriptions   Medication Sig Dispense Refill     exenatide ER (BYDUREON) 2 MG pen Inject 2 mg Subcutaneous every 7 days 4 each 3     BASAGLAR 100 UNIT/ML injection 27 units daily 9 mL 3     ciclopirox (LOPROX) 0.77 % cream Apply topically 2 times daily To feet and toenails. 90 g 6     glipiZIDE (GLUCOTROL) 10 MG tablet Take 2 tablets by mouth twice a day before meals 120 tablet 0     ONETOUCH ULTRA test strip USE TO TEST TWICE A  strip 11     amoxicillin (AMOXIL) 875 MG tablet Take 1 tablet (875 mg) by mouth 2 times daily 20 tablet 0     SYNTHROID 137 MCG tablet TAKE 1 TABLET (137 MCG) BY MOUTH DAILY 90 tablet 2     rosuvastatin (CRESTOR) 5 MG tablet Take 1 tablet twice a week 8 tablet 3     metFORMIN (GLUCOPHAGE) 1000 MG tablet TAKE 1 TABLET (1,000 MG) BY MOUTH 2 TIMES DAILY (WITH MEALS) 180 tablet 1     albuterol (PROAIR HFA/PROVENTIL HFA/VENTOLIN HFA) 108 (90 BASE) MCG/ACT Inhaler Inhale 2 puffs into the lungs every 4 hours as needed for shortness of breath / dyspnea or wheezing 1 Inhaler 1     Spacer/Aero-Holding Chambers (SPACER/AERO-HOLD CHAMBER MASK) SANTANA 1 Device as needed 1 each 0     oxybutynin (DITROPAN-XL) 5 MG 24 hr tablet Take 2 tablets (10 mg) by mouth daily 180 tablet 1     latanoprost (XALATAN) 0.005 % ophthalmic solution Place 1 drop into both eyes At Bedtime 3 Bottle 4     irbesartan-hydrochlorothiazide (AVALIDE) 150-12.5 MG per tablet TAKE 1 TABLET BY MOUTH DAILY (Patient taking differently: TAKE .5 TABLET BY MOUTH DAILY) 90 tablet 3     BD JUAN C U/F 32G X 4 MM  insulin pen needle USE 1 DAILY AS DIRECTED. 100 each 3     famotidine (PEPCID) 20 MG tablet Take 20 mg by mouth nightly as needed       order for DME Glucometer covered by insurance. 1 each 0     aspirin 81 MG EC tablet Take 1 tablet by mouth daily. (Patient taking differently: Take 650 mg by mouth daily ) 90 tablet 3     ONE TOUCH DELICA LANCETS MISC 1 each 2 times daily. One Touch Delica   100 each 12     GLUCOSAMINE CHONDROITIN COMPLX OR 2 Daily       Omega-3 Fatty Acids (OMEGA 3 PO) Take by mouth 2 times daily.        MULTIVITAMIN OR 1 tablet daily       [DISCONTINUED] insulin glargine (LANTUS SOLOSTAR) 100 UNIT/ML injection Inject 27 Units Subcutaneous At Bedtime (Patient taking differently: Inject 25 Units Subcutaneous At Bedtime ) 45 mL 3       Review of Systems     Constitutional: Negative for fever and unexpected weight change. Appetite normal   Head: no headache   Eye: no vision change/ loss of peripheral vision.   ENT: No throat congestion.   Respiratory: no cough   Cardiovascular: no chest pain  Gastrointestinal: Negative for vomiting, abdominal pain and diarrhea.  Genitourinary: No bladder problems.  Musculoskeletal: Negative for myalgias. No weakness.  Neurological: Negative for seizures and headaches.  Psychiatric/Behavioral: Negative for behavioral problem and dysphoric mood.    Past Medical History:   Diagnosis Date     Degenerative joint disease      Diabetes mellitus (H) 2006    type 2     HTN (hypertension)      Hyperlipidemia LDL goal < 100      Hypothyroidism 1960    s/p subtotal thyroidectomy      Rheumatic fever     x2 between the ages of 15-18     Seasonal allergies        Past Surgical History:   Procedure Laterality Date     C APPENDECTOMY       C ARTHROPLASTY TMJ       COLONOSCOPY       COLONOSCOPY N/A 8/13/2015    Procedure: COLONOSCOPY;  Surgeon: Duane, William Charles, MD;  Location: [facility] OR     COLONOSCOPY WITH CO2 INSUFFLATION N/A 8/13/2015    Procedure: COLONOSCOPY WITH CO2  INSUFFLATION;  Surgeon: Duane, William Charles, MD;  Location: [facility] OR     THYROIDECTOMY          Family History   Problem Relation Age of Onset     CANCER Father      skin and leukemia     CEREBROVASCULAR DISEASE Maternal Grandfather      CEREBROVASCULAR DISEASE Paternal Grandmother      Eye Disorder Paternal Grandmother      cataracts     CEREBROVASCULAR DISEASE Paternal Grandfather      HEART DISEASE Paternal Grandfather      CANCER Sister      Breast Cancer     Eye Disorder Mother      cataracts     Eye Disorder Brother      cataract     Breast Cancer Daughter      Other Cancer Daughter      ovarian cancer       Social History     Social History     Marital Status: Single     Spouse Name: N/A     Number of Children: N/A     Years of Education: N/A     Social History Main Topics     Smoking status: Never Smoker      Smokeless tobacco: Never Used      Comment: has had exposure to second hand smoke in the past from [employer], no current exposure     Alcohol Use: No     Drug Use: No     Sexual Activity: No     Other Topics Concern     Parent/Sibling W/ Cabg, Mi Or Angioplasty Before 65f 55m? 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     Hobby Hazards No     Sleep Concern Yes     difficulty staying asleep, up and down all night     Stress Concern No     Weight Concern Yes     would like to lose     Special Diet Yes     low card, low sugar diet     Back Care Yes     chiropratior     Exercise Yes     almost everyday     Bike Helmet No     does not ride bicycle any more     Seat Belt Yes     Self-Exams Yes     Social History Narrative       Objective:   /73  Pulse 97  Wt 86.4 kg (190 lb 7.6 oz)  BMI 32.95 kg/m2  Constitutional: Elderly lady in no acute distress   EYES: anicteric, normal extra-ocular movements, no lid lag or retraction   HEENT:  Mouth/Throat: Mucous membrane is moist. Oropharynx is clear. No adenopathy. Normal thyroid   Cardiovascular: systolic murmur +, regular rate and rhythm  Pulmonary/Chest: CTAB. No wheezing or rales   Abdominal: +BS. Nontender to palpation. No organomegaly present.  Neurological: Alert. Normal affect. CNII-XII intact. Muscle strength 5/5. Sensory is intact.  Extremities: No clubbing, cyanosis or inflammation   Skin: normal texture, color  Feet: bilateral neuropathy left worse than right [ due 12/2018]  Lymphatic: no cervical lymphadenopathy.  Psychological: appropriate mood     In House Labs:   A1C     11.0   11/8/2016  A1C     10.4   7/26/2016  A1C     11.5   4/15/2016  A1C     11.2   2/23/2016  A1C      9.9   12/22/2015    TSH   Date Value Ref Range Status   12/22/2017 1.70 0.40 - 4.00 mU/L Final   02/09/2017 1.33 0.40 - 4.00 mU/L Final   09/27/2016 1.88 0.40 - 4.00 mU/L Final   07/26/2016 1.78 0.40 - 4.00 mU/L Final   11/26/2014 1.14 0.40 - 4.00 mU/L Final     Comment:     Effective 7/30/2014, the reference range for this assay has changed to reflect   new instrumentation/methodology.       T4 Free   Date Value Ref Range Status   12/22/2017 1.16 0.76 - 1.46 ng/dL Final   02/09/2017 1.25 0.76 - 1.46 ng/dL Final       Creatinine   Date Value Ref Range Status   03/08/2018 0.72 0.52 - 1.04 mg/dL Final   ]    Recent Labs   Lab Test  07/26/16   0828  02/23/16   1115   08/21/15   0825   06/16/15   0941   CHOL  137  184   < >  174   --   224*   HDL  57  59   < >  49*   --   47*   LDL  48  88   < >  86   < >  120   TRIG  162*  187*   < >  197*   --   283*   CHOLHDLRATIO   --    --    --   3.6   --   4.8    < > = values in this interval not displayed.       No results found for: [id number], [id number], [id number]      Assessment/Treatment Plan:      77 year old female  here for a follow up visit.     1. Type 2 Diabetes with uncontrolled hyperglycemia :   Neuropathy, nephropathy.  No retinopathy.   Her  recent A1c was 10.6 indicating poor control  Barriers:   She is a  and hypoglycemia is a significant risk and patient tries to avoid at all costs.   She denies having changes in diet but she may be underreporting her diet.    Does not want SGLT2 due to amputation risk. I discussed about other agents as Farxiga.   Cost barrier: Lantus cost was 400 for 3 months  GLP1 agonist and taper the Lantus - she is agreeable to the plan.     -- Start Bydureon once  A week.   -- Once you start this medication, reduce Glipizide to 1 pill twice a day and reduce Lantus to 20 units daily.   Consider CGM.     2.  Hypothyroidism S/P Thyroidectomy for Graves' disease.  Continue levothyroxine 137  g daily  Recheck TSH levels in 12/2018.     3.  Hyperlipidemia  She was complaining about significant muscle pains in both arms and legs. : most likely due to lack of meds.       Patient Instructions   Start Bydureon once  A week.   Once you start this medication, reduce Glipizide to 1 pill twice a day and reduce Lantus to 20 units daily.   Consider CGM.       I will contact the patient with the test results.  Return to clinic in 3 months.    Candice Worley MD  [id number]  Endocrinology Service

## 2018-04-22 DIAGNOSIS — E11.65 TYPE 2 DIABETES MELLITUS WITH HYPERGLYCEMIA, WITH LONG-TERM CURRENT USE OF INSULIN (H): ICD-10-CM

## 2018-04-22 DIAGNOSIS — Z79.4 TYPE 2 DIABETES MELLITUS WITH HYPERGLYCEMIA, WITH LONG-TERM CURRENT USE OF INSULIN (H): ICD-10-CM

## 2018-04-23 DIAGNOSIS — R35.0 URINARY FREQUENCY: ICD-10-CM

## 2018-04-23 DIAGNOSIS — Z79.4 TYPE 2 DIABETES MELLITUS WITH HYPERGLYCEMIA, WITH LONG-TERM CURRENT USE OF INSULIN (H): ICD-10-CM

## 2018-04-23 DIAGNOSIS — E78.5 DYSLIPIDEMIA, GOAL LDL BELOW 100: ICD-10-CM

## 2018-04-23 DIAGNOSIS — N32.81 OVERACTIVE BLADDER: ICD-10-CM

## 2018-04-23 DIAGNOSIS — E11.65 TYPE 2 DIABETES MELLITUS WITH HYPERGLYCEMIA, WITH LONG-TERM CURRENT USE OF INSULIN (H): ICD-10-CM

## 2018-04-23 RX ORDER — OXYBUTYNIN CHLORIDE 5 MG/1
TABLET, EXTENDED RELEASE ORAL
Qty: 180 TABLET | Refills: 2 | Status: SHIPPED | OUTPATIENT
Start: 2018-04-23 | End: 2019-01-20

## 2018-04-23 RX ORDER — ROSUVASTATIN CALCIUM 5 MG/1
TABLET, COATED ORAL
Qty: 8 TABLET | Refills: 1 | Status: SHIPPED | OUTPATIENT
Start: 2018-04-23 | End: 2018-10-19

## 2018-04-23 RX ORDER — GLIPIZIDE 10 MG/1
TABLET ORAL
Qty: 120 TABLET | Refills: 0 | Status: SHIPPED | OUTPATIENT
Start: 2018-04-23 | End: 2018-06-27 | Stop reason: DRUGHIGH

## 2018-04-23 RX ORDER — ROSUVASTATIN CALCIUM 5 MG/1
TABLET, COATED ORAL
Qty: 8 TABLET | Refills: 3 | OUTPATIENT
Start: 2018-04-23

## 2018-04-23 RX ORDER — INSULIN GLARGINE 100 [IU]/ML
INJECTION, SOLUTION SUBCUTANEOUS
Qty: 9 ML | Refills: 3 | Status: SHIPPED | OUTPATIENT
Start: 2018-04-23 | End: 2018-05-22

## 2018-04-23 NOTE — TELEPHONE ENCOUNTER
rosuvastatin (CRESTOR) 5 MG tablet 8 tablet 1 4/23/2018  No   Sig: Take 1 tablet twice a week     Statins Protocol Passed4/23 2:20 PM   LDL on file in past 12 months    No abnormal creatine kinase in past 12 months    Recent (12 mo) or future (30 days) visit within the authorizing provider's specialty    Patient is age 18 or older    No active pregnancy on record    No positive pregnancy test in past 12 months     Our records indicate duplicate request. Same requesting pharmacy. Sharla Young RN

## 2018-04-23 NOTE — TELEPHONE ENCOUNTER
BASAGLAR 100 UNIT/ML injection 9 mL 3 2018  --   Si units daily     Duplicate     Long Acting Insulin Protocol Failed 12:12 PM   Blood pressure less than 140/90 in past 6 months    LDL on file in past 12 months    Microalbumin on file in past 12 months    Serum creatinine on file in past 12 months    HgbA1C in past 3 or 6 months    Patient is age 18 or older    Recent (6 mo) or future (30 days) visit within the authorizing provider's specialty     rosuvastatin (CRESTOR) 5 MG tablet 8 tablet 3 2017  --   Sig: Take 1 tablet twice a week     LDL Cholesterol Calculated   Date Value Ref Range Status   2017 115 (H) <100 mg/dL Final     Comment:     Above desirable:  100-129 mg/dl  Borderline High:  130-159 mg/dL  High:             160-189 mg/dL  Very high:       >189 mg/dl       Creatinine   Date Value Ref Range Status   2018 0.72 0.52 - 1.04 mg/dL Final     Statins Protocol Passed 12:12 PM   LDL on file in past 12 months    No abnormal creatine kinase in past 12 months    Recent (12 mo) or future (30 days) visit within the authorizing provider's specialty    Patient is age 18 or older    No active pregnancy on record    No positive pregnancy test in past 12 months     glipiZIDE (GLUCOTROL) 10 MG tablet 120 tablet 0 4/3/2018  No   Sig: Take 2 tablets by mouth twice a day before meals     BP Readings from Last 3 Encounters:   18 145/73   18 122/62   18 139/78     Creatinine   Date Value Ref Range Status   2018 0.72 0.52 - 1.04 mg/dL Final   ]  Lab Results   Component Value Date    A1C 10.6 2018    A1C 8.8 2017    A1C 9.3 2017    A1C 10.2 2017    A1C 10.7 2017     Sulfonylurea Agents Failed 12:12 PM   Blood pressure less than 140/90 in past 6 months    Patient has documented LDL within the past 12 mos.    Patient has had a Microalbumin in the past 12 mos.    Patient has documented A1c within the specified period of time.    Patient  is age 18 or older    No active pregnancy on record    Patient has a recent creatinine (normal) within the past 12 mos.    Patient has not had a positive pregnancy test within the past 12 mos.    Recent (6 mo) or future (30 days) visit within the authorizing provider's specialty     Refilled per [facility] protocol.    Sharla Young RN

## 2018-04-23 NOTE — TELEPHONE ENCOUNTER
metFORMIN (GLUCOPHAGE) 1000 MG tablet 180 tablet 1 11/15/2017  No   Sig: TAKE 1 TABLET (1,000 MG) BY MOUTH 2 TIMES DAILY (WITH MEALS)     Last OV with Endo: 4/6/2018    Next 5 appointments (look out 90 days)     Jul 20, 2018  9:00 AM CDT   Return Visit with Ehsan Araya DPM   [facility] ([facility])    [address] [place] [id number] [phone]                BP Readings from Last 3 Encounters:   04/06/18 145/73   04/04/18 122/62   03/26/18 139/78     LDL Cholesterol Calculated   Date Value Ref Range Status   12/22/2017 115 (H) <100 mg/dL Final     Comment:     Above desirable:  100-129 mg/dl  Borderline High:  130-159 mg/dL  High:             160-189 mg/dL  Very high:       >189 mg/dl       Creatinine   Date Value Ref Range Status   03/08/2018 0.72 0.52 - 1.04 mg/dL Final     Lab Results   Component Value Date    A1C 10.6 03/08/2018    A1C 8.8 11/16/2017    A1C 9.3 07/20/2017    A1C 10.2 04/14/2017    A1C 10.7 02/09/2017     Biguanide Agents Failed4/22 3:10 PM   Blood pressure less than 140/90 in past 6 months    Patient has documented LDL within the past 12 mos.    Patient has had a Microalbumin in the past 12 mos.    Patient is age 10 or older    Patient has documented A1c within the specified period of time.    Patient's CR is NOT>1.4 OR Patient's EGFR is NOT<45 within past 12 mos.    Patient does NOT have a diagnosis of CHF.    Patient is not pregnant    Patient has not had a positive pregnancy test within the past 12 mos.     Recent (6 mo) or future (30 days) visit within the authorizing provider's specialty     insulin glargine (LANTUS SOLOSTAR) 100 UNIT/ML injection (Discontinued) 45 mL 3 2/13/2018 4/6/2018 No   Sig: Inject 27 Units Subcutaneous At Bedtime     Long Acting Insulin Protocol Failed4/22 3:10 PM   Blood pressure less than 140/90 in past 6 months    LDL on file in past 12 months    Microalbumin on file in past 12 months    Serum creatinine  on file in past 12 months    HgbA1C in past 3 or 6 months    Patient is age 18 or older    Recent (6 mo) or future (30 days) visit within the authorizing provider's specialty     Sharla Young RN

## 2018-04-23 NOTE — TELEPHONE ENCOUNTER
oxybutynin (DITROPAN-XL) 5 MG 24 hr tablet 180 tablet 1 10/24/2017  No   Sig: Take 2 tablets (10 mg) by mouth daily     Last OV with Cailin Ponce CNP: 3/8/2018    Next 5 appointments (look out 90 days)     Jul 20, 2018  9:00 AM CDT   Return Visit with Ehsan Araya DPM   [facility]    [address] [place] [id number] [phone]                Muscarinic Antagonists (Urinary Incontinence Agents) Passed4/23 1:23 AM   Recent (12 mo) or future (30 days) visit within the authorizing provider's specialty    Medication is Oxybutynin and patient is 5 years of age or older    Patient does not have a diagnosis of glaucoma on the problem list    Patient is 18 years of age or older     Refilled per [facility] protocol.    Sharla Young RN

## 2018-04-23 NOTE — TELEPHONE ENCOUNTER
TORSTEN ESTEVEZAR 100 UNIT/ML   Last Written Prescription Date:  04/06/2018  Last Fill Quantity: 9 ML,  # refills: 3   Last office visit: 3/8/2018 with prescribing provider:    Last refill per Pharmacy   Future Office Visit:   Next 5 appointments (look out 90 days)     Jul 20, 2018  9:00 AM CDT   Return Visit with Ehsan Araya DPM   [facility] ([facility]    [address] [address] [place] [address]   [phone]                   ROSUVASTATIN CALCIUM 5 MG TAB  Last Written Prescription Date:  12/13/2018  Last Fill Quantity: 8 TABLETS,  # refills: 3   Last office visit: 3/8/2018 with prescribing provider:    Last refill per Pharmacy   Future Office Visit:   Next 5 appointments (look out 90 days)     Jul 20, 2018  9:00 AM CDT   Return Visit with Ehsan Araya DPM   [facility] ([facility])    [address] [address] [place] [id number]   [phone]                 GLIPIZIDE 10 MG TABLET  Last Written Prescription Date:  04/03/2018  Last Fill Quantity: 120,  # refills: 0   Last office visit: 3/8/2018 with prescribing provider:    Last refill per Pharmacy   Future Office Visit:   Next 5 appointments (look out 90 days)     Jul 20, 2018  9:00 AM CDT   Return Visit with Ehsan Araya DPM   [facility] ([facility])    [address] [facility] [address]   [phone]

## 2018-04-25 ENCOUNTER — ALLIED HEALTH/NURSE VISIT (OUTPATIENT)
Dept: PHARMACY | Facility: CLINIC | Age: 78
End: 2018-04-25
Payer: COMMERCIAL

## 2018-04-25 DIAGNOSIS — Z79.4 TYPE 2 DIABETES MELLITUS WITH HYPERGLYCEMIA, WITH LONG-TERM CURRENT USE OF INSULIN (H): Primary | ICD-10-CM

## 2018-04-25 DIAGNOSIS — E11.65 TYPE 2 DIABETES MELLITUS WITH HYPERGLYCEMIA, WITH LONG-TERM CURRENT USE OF INSULIN (H): Primary | ICD-10-CM

## 2018-04-25 PROCEDURE — 99606 MTMS BY PHARM EST 15 MIN: CPT | Performed by: PHARMACIST

## 2018-04-25 NOTE — PROGRESS NOTES
SUBJECTIVE/OBJECTIVE:                Brandy Leon is a 77 year old female called for a follow-up visit for Medication Therapy Management.  She was referred to me from Cailin Ponce.     Chief Complaint: Follow up from our visit on 1/29/2018.  Blood Sugars  Tobacco: No tobacco use  Alcohol: none    Medication Adherence/Access:  no issues reported    Diabetes:  Pt currently taking Bydureon 2mg every 7 days, Lantus 20units daily, glipizide 1 tablet bid, metforming 1000mg bid. Pt is not experiencing side effects. Patient is having trouble injecting Bydureon. Patient started 3 weeks ago. Patient is able to get the needle into her skin but she sees liquid on the outside of her skin when she withdraws the needle. Patient is injecting in her abdomen. She is holding the needle in place 5-10 seconds after administering the medication. Patient does not like using the Bydureon and she doesn't think that it is working. Patient does not like sticking needles into her skin. She does not want to take SLGT because of the risk of amputation.   She does not like SMBG: one time daily.   Ranges (patient reported): 160-223mg/dL in the am.   Patient is not experiencing hypoglycemia  Recent symptoms of high blood sugar? none  Eye exam: up to date  Foot exam: up to date  Microalbumin is < 30 mg/g. Pt is taking an ACEi/ARB.  Aspirin: Taking 81mg daily and denies side effects    Current labs include:  Today's Vitals: There were no vitals taken for this visit.-phone visit  BP Readings from Last 3 Encounters:   04/06/18 145/73   04/04/18 122/62   03/26/18 139/78     Lab Results   Component Value Date    A1C 10.6 03/08/2018   .  Lab Results   Component Value Date    CHOL 201 12/22/2017     Lab Results   Component Value Date    TRIG 161 12/22/2017     Lab Results   Component Value Date    HDL 54 12/22/2017     Lab Results   Component Value Date     12/22/2017       Liver Function Studies -   Recent Labs   Lab Test   12/22/17   1118   PROTTOTAL  7.9   ALBUMIN  3.5   BILITOTAL  0.4   ALKPHOS  80   AST  19   ALT  18       Lab Results   Component Value Date    UCRR 76 12/22/2017    MICROL 9 12/22/2017    UMALCR 12.26 12/22/2017       Last Basic Metabolic Panel:  Lab Results   Component Value Date     03/08/2018      Lab Results   Component Value Date    POTASSIUM 4.0 03/08/2018     Lab Results   Component Value Date    CHLORIDE 99 03/08/2018     Lab Results   Component Value Date    BUN 16 03/08/2018     Lab Results   Component Value Date    CR 0.72 03/08/2018     GFR Estimate   Date Value Ref Range Status   03/08/2018 79 >60 mL/min/1.7m2 Final     Comment:     Non  GFR Calc   12/22/2017 74 >60 mL/min/1.7m2 Final     Comment:     Non  GFR Calc   04/14/2017 74 >60 mL/min/1.7m2 Final     Comment:     Non  GFR Calc     GFR Estimate If Black   Date Value Ref Range Status   03/08/2018 >90 >60 mL/min/1.7m2 Final     Comment:      GFR Calc   12/22/2017 90 >60 mL/min/1.7m2 Final     Comment:      GFR Calc   04/14/2017 90 >60 mL/min/1.7m2 Final     Comment:      GFR Calc       Most Recent Immunizations   Administered Date(s) Administered     TD (ADULT, 7+) 03/21/2005     Varicella Pt Report Hx of Varicella/Chicken Pox 07/24/2000     Zoster vaccine, live 04/27/2012       ASSESSMENT:              Current medications were reviewed today as discussed above.      Medication Adherence: fair, no issues identified    Diabetes: Needs Improvement. Self monitoring of blood glucose is not at goal of fasting  mg/dL.  Could consider Trulicity because patient would not be able to see the needle but it would require a PA from her insurance or possibly the new Bcise pen which may be more user friendly which also requires a PA.  Would also be helpful to observe patient's injection technique in clinic and holding the needle in place for 10 seconds.      PLAN:                  Continue on Bydureon for now. Could consider Bydureon B-Cise or Trulicity which both require a PA. Patient will discuss with endocrinology.    I spent 15 minutes with this patient today. All changes were made via collaborative practice agreement with Cailin Ponce. A copy of the visit note was provided to the patient's primary care provider.     Will follow up in 1 week.    The patient declined a summary of these recommendations as an after visit summary.    Mirna Perez, Pharm.D, BCACP  Medication Therapy Management Pharmacist

## 2018-04-25 NOTE — MR AVS SNAPSHOT
After Visit Summary   4/25/2018    Brandy Leon    MRN: [id number]           Patient Information     Date Of Birth          1940        Visit Information        Provider Department      4/25/2018 9:30 AM Mirna Perez, [facility]        Today's Diagnoses     Type 2 diabetes mellitus with hyperglycemia, with long-term current use of insulin (H)    -  1       Follow-ups after your visit        Your next 10 appointments already scheduled     May 01, 2018  9:00 AM CDT   TELEMEDICINE with Mirna Perez [facility] ([facility])    [address]  [facility] [address]  [facility] [address] [phone]           Note: this is not an onsite visit; there is no need to come to the facility.            Jul 20, 2018  9:00 AM CDT   Return Visit with Ehsan Araya DPM   [facility])    [address] [facility] [id number] [phone]            Aug 03, 2018 11:15 AM CDT   Return Visit with Candice Worley MD, [facility] ENDO NURSE   [facility])    [address] [facility] [id number] [phone]            Sep 25, 2018 10:45 AM CDT   Return Visit with Lucita Jordan MD   [facility])    [address] [address] [place] [id number] [phone]              Who to contact     If you have questions or need follow up information about today's clinic visit or your schedule please contact [facility] directly at [phone].  Normal or non-critical lab and imaging results will be communicated to you by MyChart, letter or phone within 4 business days after the clinic has received the results. If you do not hear from us within 7 days, please contact the clinic through MyChart or phone. If you have a critical or abnormal lab  "result, we will notify you by phone as soon as possible.  Submit refill requests through [employer] or call your pharmacy and they will forward the refill request to us. Please allow 3 business days for your refill to be completed.          Additional Information About Your Visit        hopTohart Information     [employer] lets you send messages to your doctor, view your test results, renew your prescriptions, schedule appointments and more. To sign up, go to [email or web address] . Click on \"Log in\" on the left side of the screen, which will take you to the Welcome page. Then click on \"Sign up Now\" on the right side of the page.     You will be asked to enter the access code listed below, as well as some personal information. Please follow the directions to create your username and password.     Your access code is: [email or web address]  Expires: 2018  2:13 PM     Your access code will  in 90 days. If you need help or a new code, please call your [place] clinic or [phone].        Care EveryWhere ID     This is your Care EveryWhere ID. This could be used by other organizations to access your [place] medical records  [id number]         Blood Pressure from Last 3 Encounters:   18 145/73   18 122/62   18 139/78    Weight from Last 3 Encounters:   18 190 lb 7.6 oz (86.4 kg)   18 187 lb 12.8 oz (85.2 kg)   18 184 lb 4.8 oz (83.6 kg)              Today, you had the following     No orders found for display         Today's Medication Changes          These changes are accurate as of 18 11:59 PM.  If you have any questions, ask your nurse or doctor.               These medicines have changed or have updated prescriptions.        Dose/Directions    aspirin 81 MG EC tablet   This may have changed:  how much to take   Used for:  Type 2 diabetes, HbA1c goal < 7% (H)        Dose:  81 mg   Take 1 tablet by mouth daily.   Quantity:  90 tablet   Refills:  3       glipiZIDE 10 MG tablet "   Commonly known as:  GLUCOTROL   This may have changed:    - how much to take  - additional instructions   Used for:  Type 2 diabetes mellitus with hyperglycemia, with long-term current use of insulin (H)        Take 2 tablets by mouth twice a day before meals   Quantity:  120 tablet   Refills:  0       * insulin glargine 100 UNIT/ML injection   Commonly known as:  LANTUS SOLOSTAR   This may have changed:  Another medication with the same name was changed. Make sure you understand how and when to take each.   Used for:  Type 2 diabetes mellitus with hyperglycemia, with long-term current use of insulin (H)        Dose:  20 Units   Inject 20 Units Subcutaneous daily   Quantity:  45 mL   Refills:  3       * BASAGLAR 100 UNIT/ML injection   This may have changed:    - how much to take  - when to take this  - additional instructions   Used for:  Type 2 diabetes mellitus with hyperglycemia, with long-term current use of insulin (H)        27 units daily   Quantity:  9 mL   Refills:  3       irbesartan-hydrochlorothiazide 150-12.5 MG per tablet   Commonly known as:  AVALIDE   This may have changed:  See the new instructions.   Used for:  Hypertension goal BP (blood pressure) < 140/90        TAKE 1 TABLET BY MOUTH DAILY   Quantity:  90 tablet   Refills:  3       * Notice:  This list has 2 medication(s) that are the same as other medications prescribed for you. Read the directions carefully, and ask your doctor or other care provider to review them with you.             Primary Care Provider Office Phone # Fax #    Cailin Greenealexx Ponce, APRN [facility] [phone]       [address]  [place] [address]        Equal Access to Services     [facility] : Hadii ana olverao Sotorey, waaxda luqadaha, qaybta kaalmada adeegyada, mitch azevedo . So [facility] [phone].    ATENCIÓN: Si habla español, tiene a garcía disposición servicios gratuitos de asistencia lingüística. Llame al  [phone].    We comply with applicable federal civil rights laws and [place] laws. We do not discriminate on the basis of race, color, national origin, age, disability, sex, sexual orientation, or gender identity.            Thank you!     Thank you for choosing [facility]  for your care. Our goal is always to provide you with excellent care. Hearing back from our patients is one way we can continue to improve our services. Please take a few minutes to complete the written survey that you may receive in the mail after your visit with us. Thank you!             Your Updated Medication List - Protect others around you: Learn how to safely use, store and throw away your medicines at www.disposemymeds.org.          This list is accurate as of 4/25/18 11:59 PM.  Always use your most recent med list.                   Brand Name Dispense Instructions for use Diagnosis    albuterol 108 (90 Base) MCG/ACT Inhaler    PROAIR HFA/PROVENTIL HFA/VENTOLIN HFA    1 Inhaler    Inhale 2 puffs into the lungs every 4 hours as needed for shortness of breath / dyspnea or wheezing    Cough due to bronchospasm       amoxicillin 875 MG tablet    AMOXIL    20 tablet    Take 1 tablet (875 mg) by mouth 2 times daily    Acute recurrent maxillary sinusitis       aspirin 81 MG EC tablet     90 tablet    Take 1 tablet by mouth daily.    Type 2 diabetes, HbA1c goal < 7% (H)       BD JUAN C U/F 32G X 4 MM   Generic drug:  insulin pen needle     100 each    USE 1 DAILY AS DIRECTED.    Type 2 diabetes mellitus with hyperglycemia (H)       ciclopirox 0.77 % cream    LOPROX    90 g    Apply topically 2 times daily To feet and toenails.    Tinea pedis of both feet       exenatide ER 2 MG pen    BYDUREON    4 each    Inject 2 mg Subcutaneous every 7 days    Type 2 diabetes mellitus with hyperglycemia, with long-term current use of insulin (H)       famotidine 20 MG tablet    PEPCID     Take 20 mg by mouth nightly as needed         glipiZIDE 10 MG tablet    GLUCOTROL    120 tablet    Take 2 tablets by mouth twice a day before meals    Type 2 diabetes mellitus with hyperglycemia, with long-term current use of insulin (H)       GLUCOSAMINE CHONDROITIN COMPLX PO      2 Daily        * insulin glargine 100 UNIT/ML injection    LANTUS SOLOSTAR    45 mL    Inject 20 Units Subcutaneous daily    Type 2 diabetes mellitus with hyperglycemia, with long-term current use of insulin (H)       * BASAGLAR 100 UNIT/ML injection     9 mL    27 units daily    Type 2 diabetes mellitus with hyperglycemia, with long-term current use of insulin (H)       irbesartan-hydrochlorothiazide 150-12.5 MG per tablet    AVALIDE    90 tablet    TAKE 1 TABLET BY MOUTH DAILY    Hypertension goal BP (blood pressure) < 140/90       latanoprost 0.005 % ophthalmic solution    XALATAN    3 Bottle    Place 1 drop into both eyes At Bedtime    Primary open angle glaucoma of both eyes, moderate stage       metFORMIN 1000 MG tablet    GLUCOPHAGE    180 tablet    TAKE 1 TABLET (1,000 MG) BY MOUTH 2 TIMES DAILY (WITH MEALS)    Type 2 diabetes mellitus with hyperglycemia, with long-term current use of insulin (H)       MULTIVITAMIN PO      1 tablet daily        OMEGA 3 PO      Take by mouth 2 times daily.        ONE TOUCH DELICA LANCETS Misc     100 each    1 each 2 times daily. One Touch Delica    Type 2 diabetes, HbA1c goal < 7% (H)       ONETOUCH ULTRA test strip   Generic drug:  blood glucose monitoring     200 strip    USE TO TEST TWICE A DAY    Type 2 diabetes mellitus with hyperglycemia, with long-term current use of insulin (H)       order for DME     1 each    Glucometer covered by insurance.    Type 2 diabetes, HbA1c goal < 7% (H)       oxybutynin 5 MG 24 hr tablet    DITROPAN-XL    180 tablet    TAKE 2 TABLETS (10 MG) BY MOUTH DAILY    Urinary frequency, Overactive bladder       rosuvastatin 5 MG tablet    CRESTOR    8 tablet    Take 1 tablet twice a week    Dyslipidemia, goal LDL  below 100       spacer/aero-hold chamber mask Aaliyah     1 each    1 Device as needed    Cough due to bronchospasm       SYNTHROID 137 MCG tablet   Generic drug:  levothyroxine     90 tablet    TAKE 1 TABLET (137 MCG) BY MOUTH DAILY    Myxedema heart disease (H), Nutritional and metabolic cardiomyopathy (H)       * Notice:  This list has 2 medication(s) that are the same as other medications prescribed for you. Read the directions carefully, and ask your doctor or other care provider to review them with you.

## 2018-05-01 ENCOUNTER — ALLIED HEALTH/NURSE VISIT (OUTPATIENT)
Dept: PHARMACY | Facility: CLINIC | Age: 78
End: 2018-05-01
Payer: COMMERCIAL

## 2018-05-01 DIAGNOSIS — R12 HEARTBURN: ICD-10-CM

## 2018-05-01 DIAGNOSIS — Z79.4 TYPE 2 DIABETES MELLITUS WITH HYPERGLYCEMIA, WITH LONG-TERM CURRENT USE OF INSULIN (H): Primary | ICD-10-CM

## 2018-05-01 DIAGNOSIS — E11.65 TYPE 2 DIABETES MELLITUS WITH HYPERGLYCEMIA, WITH LONG-TERM CURRENT USE OF INSULIN (H): Primary | ICD-10-CM

## 2018-05-01 DIAGNOSIS — I10 ESSENTIAL HYPERTENSION WITH GOAL BLOOD PRESSURE LESS THAN 140/90: ICD-10-CM

## 2018-05-01 PROCEDURE — 99607 MTMS BY PHARM ADDL 15 MIN: CPT | Performed by: PHARMACIST

## 2018-05-01 PROCEDURE — 99606 MTMS BY PHARM EST 15 MIN: CPT | Performed by: PHARMACIST

## 2018-05-01 NOTE — Clinical Note
Dr. Worley, patient is struggling with the size of the needle on Bydureon and isn't sure she wants to continue on. We did talk about Trulicity or Bcise device as being options and patient would be open to either of these. Let me know your thoughts and I would be happy to communicate the plan to the patient. Mirna Perez, Pharm.D, [facility] Med[facility] Therapy Management Pharmacist

## 2018-05-01 NOTE — MR AVS SNAPSHOT
After Visit Summary   5/1/2018    Brandy Leon    MRN: [id number]           Patient Information     Date Of Birth          1940        Visit Information        Provider Department      5/1/2018 9:00 AM Mirna Perez [facility]        Today's Diagnoses     Type 2 diabetes mellitus with hyperglycemia, with long-term current use of insulin (H)    -  1    Essential hypertension with goal blood pressure less than 140/90        Heartburn          Care Instructions    No medication changes made today.          Follow-ups after your visit        Your next 10 appointments already scheduled     Jul 20, 2018  9:00 AM CDT   Return Visit with Ehsan Araya DPM   [facility])    [address] [address] [place] [address] [phone]            Aug 03, 2018 11:15 AM CDT   Return Visit with Candice Worley MD, [facility] ENDO NURSE   [facility])    [id number] 45kg [facility] [id number] [phone]            Sep 25, 2018 10:45 AM CDT   Return Visit with Lucita Jordan MD   [facility])    [address] [place] [address] [phone]              Who to contact     If you have questions or need follow up information about today's clinic visit or your schedule please contact [facility] directly at [phone].  Normal or non-critical lab and imaging results will be communicated to you by MyChart, letter or phone within 4 business days after the clinic has received the results. If you do not hear from us within 7 days, please contact the clinic through [employer] or phone. If you have a critical or abnormal lab result, we will notify you by phone as soon as possible.  Submit refill requests through [facility] or call your pharmacy and they will forward the refill request to us.  "Please allow 3 business days for your refill to be completed.          Additional Information About Your Visit        MyChart Information     [employer] lets you send messages to your doctor, view your test results, renew your prescriptions, schedule appointments and more. To sign up, go to [email or web address] . Click on \"Log in\" on the left side of the screen, which will take you to the Welcome page. Then click on \"Sign up Now\" on the right side of the page.     You will be asked to enter the access code listed below, as well as some personal information. Please follow the directions to create your username and password.     Your access code is: [email or web address]  Expires: 2018  2:13 PM     Your access code will  in 90 days. If you need help or a new code, please call your [place] clinic or [phone].        Care EveryWhere ID     This is your Care EveryWhere ID. This could be used by other organizations to access your [place] medical records  [id number]         Blood Pressure from Last 3 Encounters:   18 145/73   18 122/62   18 139/78    Weight from Last 3 Encounters:   18 190 lb 7.6 oz (86.4 kg)   18 187 lb 12.8 oz (85.2 kg)   18 184 lb 4.8 oz (83.6 kg)              Today, you had the following     No orders found for display         Today's Medication Changes          These changes are accurate as of 18  9:28 AM.  If you have any questions, ask your nurse or doctor.               These medicines have changed or have updated prescriptions.        Dose/Directions    aspirin 81 MG EC tablet   This may have changed:  how much to take   Used for:  Type 2 diabetes, HbA1c goal < 7% (H)        Dose:  81 mg   Take 1 tablet by mouth daily.   Quantity:  90 tablet   Refills:  3       glipiZIDE 10 MG tablet   Commonly known as:  GLUCOTROL   This may have changed:    - how much to take  - additional instructions   Used for:  Type 2 diabetes mellitus with hyperglycemia, " with long-term current use of insulin (H)        Take 2 tablets by mouth twice a day before meals   Quantity:  120 tablet   Refills:  0       * insulin glargine 100 UNIT/ML injection   Commonly known as:  LANTUS SOLOSTAR   This may have changed:  Another medication with the same name was changed. Make sure you understand how and when to take each.   Used for:  Type 2 diabetes mellitus with hyperglycemia, with long-term current use of insulin (H)        Dose:  20 Units   Inject 20 Units Subcutaneous daily   Quantity:  45 mL   Refills:  3       * BASAGLAR 100 UNIT/ML injection   This may have changed:    - how much to take  - when to take this  - additional instructions   Used for:  Type 2 diabetes mellitus with hyperglycemia, with long-term current use of insulin (H)        27 units daily   Quantity:  9 mL   Refills:  3       irbesartan-hydrochlorothiazide 150-12.5 MG per tablet   Commonly known as:  AVALIDE   This may have changed:  See the new instructions.   Used for:  Hypertension goal BP (blood pressure) < 140/90        TAKE 1 TABLET BY MOUTH DAILY   Quantity:  90 tablet   Refills:  3       * Notice:  This list has 2 medication(s) that are the same as other medications prescribed for you. Read the directions carefully, and ask your doctor or other care provider to review them with you.             Primary Care Provider Office Phone # Fax #    Cailin MAGO [facility] [phone]       [address]  [place] [address]        Equal Access to Services     NUSRAT KHAN : Hadramez Loya, waaxda yahaira, qaybta kaalmada chris, mitch azevedo . So [facility] [phone].    ATENCIÓN: Si habla español, tiene a garcía disposición servicios gratuitos de asistencia lingüística. Vesna al [phone].    We comply with applicable federal civil rights laws and [place] laws. We do not discriminate on the basis of race, color, national origin, age,  disability, sex, sexual orientation, or gender identity.            Thank you!     Thank you for choosing [facility]  for your care. Our goal is always to provide you with excellent care. Hearing back from our patients is one way we can continue to improve our services. Please take a few minutes to complete the written survey that you may receive in the mail after your visit with us. Thank you!             Your Updated Medication List - Protect others around you: Learn how to safely use, store and throw away your medicines at www.disposemymeds.org.          This list is accurate as of 5/1/18  9:28 AM.  Always use your most recent med list.                   Brand Name Dispense Instructions for use Diagnosis    albuterol 108 (90 Base) MCG/ACT Inhaler    PROAIR HFA/PROVENTIL HFA/VENTOLIN HFA    1 Inhaler    Inhale 2 puffs into the lungs every 4 hours as needed for shortness of breath / dyspnea or wheezing    Cough due to bronchospasm       aspirin 81 MG EC tablet     90 tablet    Take 1 tablet by mouth daily.    Type 2 diabetes, HbA1c goal < 7% (H)       BD JUAN C U/F 32G X 4 MM   Generic drug:  insulin pen needle     100 each    USE 1 DAILY AS DIRECTED.    Type 2 diabetes mellitus with hyperglycemia (H)       ciclopirox 0.77 % cream    LOPROX    90 g    Apply topically 2 times daily To feet and toenails.    Tinea pedis of both feet       exenatide ER 2 MG pen    BYDUREON    4 each    Inject 2 mg Subcutaneous every 7 days    Type 2 diabetes mellitus with hyperglycemia, with long-term current use of insulin (H)       famotidine 20 MG tablet    PEPCID     Take 20 mg by mouth nightly as needed        glipiZIDE 10 MG tablet    GLUCOTROL    120 tablet    Take 2 tablets by mouth twice a day before meals    Type 2 diabetes mellitus with hyperglycemia, with long-term current use of insulin (H)       GLUCOSAMINE CHONDROITIN COMPLX PO      2 Daily        * insulin glargine 100 UNIT/ML injection    LANTUS  SOLOSTAR    45 mL    Inject 20 Units Subcutaneous daily    Type 2 diabetes mellitus with hyperglycemia, with long-term current use of insulin (H)       * BASAGLAR 100 UNIT/ML injection     9 mL    27 units daily    Type 2 diabetes mellitus with hyperglycemia, with long-term current use of insulin (H)       irbesartan-hydrochlorothiazide 150-12.5 MG per tablet    AVALIDE    90 tablet    TAKE 1 TABLET BY MOUTH DAILY    Hypertension goal BP (blood pressure) < 140/90       latanoprost 0.005 % ophthalmic solution    XALATAN    3 Bottle    Place 1 drop into both eyes At Bedtime    Primary open angle glaucoma of both eyes, moderate stage       metFORMIN 1000 MG tablet    GLUCOPHAGE    180 tablet    TAKE 1 TABLET (1,000 MG) BY MOUTH 2 TIMES DAILY (WITH MEALS)    Type 2 diabetes mellitus with hyperglycemia, with long-term current use of insulin (H)       MULTIVITAMIN PO      1 tablet daily        OMEGA 3 PO      Take by mouth 2 times daily.        ONE TOUCH DELICA LANCETS Misc     100 each    1 each 2 times daily. One Touch Delica    Type 2 diabetes, HbA1c goal < 7% (H)       ONETOUCH ULTRA test strip   Generic drug:  blood glucose monitoring     200 strip    USE TO TEST TWICE A DAY    Type 2 diabetes mellitus with hyperglycemia, with long-term current use of insulin (H)       order for DME     1 each    Glucometer covered by insurance.    Type 2 diabetes, HbA1c goal < 7% (H)       oxybutynin 5 MG 24 hr tablet    DITROPAN-XL    180 tablet    TAKE 2 TABLETS (10 MG) BY MOUTH DAILY    Urinary frequency, Overactive bladder       rosuvastatin 5 MG tablet    CRESTOR    8 tablet    Take 1 tablet twice a week    Dyslipidemia, goal LDL below 100       spacer/aero-hold chamber mask Aaliyah     1 each    1 Device as needed    Cough due to bronchospasm       SYNTHROID 137 MCG tablet   Generic drug:  levothyroxine     90 tablet    TAKE 1 TABLET (137 MCG) BY MOUTH DAILY    Myxedema heart disease (H), Nutritional and metabolic cardiomyopathy  (H)       * Notice:  This list has 2 medication(s) that are the same as other medications prescribed for you. Read the directions carefully, and ask your doctor or other care provider to review them with you.

## 2018-05-01 NOTE — PROGRESS NOTES
"SUBJECTIVE/OBJECTIVE:                Brandy Leon is a 77 year old female called for a follow-up visit for Medication Therapy Management.  She was referred to me from Cailin Ponce.     Chief Complaint: Follow up from our visit on 4/25/2018.  Blood Sugars  Tobacco: No tobacco use  Alcohol: none    Medication Adherence/Access:  no issues reported; patient does not miss any doses of medications.     Diabetes:  Pt currently taking Bydureon 2mg weekly, metformin 1000mg bid,  Glipizide 10mg bid, lantus 20 units daily. Pt is not experiencing side effects. Towards the end of the week patient feels shaky and weak when she gets up in the morning. Patient states \"the worst part of Bydureon is sticking the needle in her stomach, it is much worse than the Lantus injection\".  Patient is only seeing a small drop of liquid on the needle when she pulls the needle out of her skin.    SMBG: one time daily.   Ranges (patient reported):   Date FBG/ 2hours post Lunch/2hours post Dinner /2hours post    5/1 244      4/30 230      4/29 181      4/28 227      4/27 231                      Patient is not experiencing hypoglycemia  Recent symptoms of high blood sugar? none  Eye exam: up to date  Foot exam: up to date  Microalbumin is < 30 mg/g. Pt is taking an ACEi/ARB.  Aspirin: Taking 81mg daily and denies side effects    Heartburn: current therapy includes famotidine 20mg nightly as needed. Patient takes 0-3 times a week. Spicy foods make this worse. When patient does take the medication it dose help relieve her symptoms.    Hypertension: Current medications include irbesartan/HCTZ 150-12.5mg 0.5 tablets daily.  Patient does not self-monitor BP.  Patient reports the following medication side effects: if patient takes full tablet feels weak, dizzy, light headed.    Current labs include:  Today's Vitals: There were no vitals taken for this visit.-phone visit  BP Readings from Last 3 Encounters:   04/06/18 145/73   04/04/18 " 122/62   03/26/18 139/78     Lab Results   Component Value Date    A1C 10.6 03/08/2018   .  Lab Results   Component Value Date    CHOL 201 12/22/2017     Lab Results   Component Value Date    TRIG 161 12/22/2017     Lab Results   Component Value Date    HDL 54 12/22/2017     Lab Results   Component Value Date     12/22/2017       Liver Function Studies -   Recent Labs   Lab Test  12/22/17   1118   PROTTOTAL  7.9   ALBUMIN  3.5   BILITOTAL  0.4   ALKPHOS  80   AST  19   ALT  18       Lab Results   Component Value Date    UCRR 76 12/22/2017    MICROL 9 12/22/2017    UMALCR 12.26 12/22/2017       Last Basic Metabolic Panel:  Lab Results   Component Value Date     03/08/2018      Lab Results   Component Value Date    POTASSIUM 4.0 03/08/2018     Lab Results   Component Value Date    CHLORIDE 99 03/08/2018     Lab Results   Component Value Date    BUN 16 03/08/2018     Lab Results   Component Value Date    CR 0.72 03/08/2018     GFR Estimate   Date Value Ref Range Status   03/08/2018 79 >60 mL/min/1.7m2 Final     Comment:     Non  GFR Calc   12/22/2017 74 >60 mL/min/1.7m2 Final     Comment:     Non  GFR Calc   04/14/2017 74 >60 mL/min/1.7m2 Final     Comment:     Non  GFR Calc     GFR Estimate If Black   Date Value Ref Range Status   03/08/2018 >90 >60 mL/min/1.7m2 Final     Comment:      GFR Calc   12/22/2017 90 >60 mL/min/1.7m2 Final     Comment:      GFR Calc   04/14/2017 90 >60 mL/min/1.7m2 Final     Comment:      GFR Calc       Most Recent Immunizations   Administered Date(s) Administered     TD (ADULT, 7+) 03/21/2005     Varicella Pt Report Hx of Varicella/Chicken Pox 07/24/2000     Zoster vaccine, live 04/27/2012       ASSESSMENT:              Current medications were reviewed today as discussed above.      Medication Adherence: good, no issues identified    Diabetes: Needs Improvement. Self monitoring of  blood glucose is not at goal of fasting  mg/dL. Patient may benefit from Trulicity or Bcise in place of Bydureon because patient does not like the administration of Bydureon. Patient to reach out to endocrinology.    Heartburn: stable    Hypertension: Needs Improvement. Patient is not meeting BP goal of < 140/90mmHg. Patient to recheck BP in clinic at next visit.     PLAN:                  Could consider BCise or Trulicity.     I spent 30 minutes with this patient today. All changes were made via collaborative practice agreement with Cailin Ponce. A copy of the visit note was provided to the patient's primary care provider.     Will follow up in 2-3 weeks.    The patient declined a summary of these recommendations as an after visit summary.    Mirna Perez, Pharm.D, BCACP  Medication Therapy Management Pharmacist

## 2018-05-03 NOTE — PROGRESS NOTES
I think Trulicity start with 0.75 mg for 4 weeks, followed by 1.5 ml is a great option if the insurance covers it.     Candice Worley MD  [id number]  Endocrinology [facility]

## 2018-05-04 DIAGNOSIS — Z79.4 TYPE 2 DIABETES MELLITUS WITH HYPERGLYCEMIA, WITH LONG-TERM CURRENT USE OF INSULIN (H): ICD-10-CM

## 2018-05-04 DIAGNOSIS — E11.65 TYPE 2 DIABETES MELLITUS WITH HYPERGLYCEMIA, WITH LONG-TERM CURRENT USE OF INSULIN (H): ICD-10-CM

## 2018-05-06 DIAGNOSIS — Z79.4 TYPE 2 DIABETES MELLITUS WITH HYPERGLYCEMIA, WITH LONG-TERM CURRENT USE OF INSULIN (H): ICD-10-CM

## 2018-05-06 DIAGNOSIS — E11.65 TYPE 2 DIABETES MELLITUS WITH HYPERGLYCEMIA, WITH LONG-TERM CURRENT USE OF INSULIN (H): ICD-10-CM

## 2018-05-07 ENCOUNTER — TELEPHONE (OUTPATIENT)
Dept: PHARMACY | Facility: CLINIC | Age: 78
End: 2018-05-07

## 2018-05-07 DIAGNOSIS — E11.8 TYPE 2 DIABETES MELLITUS WITH COMPLICATION, UNSPECIFIED LONG TERM INSULIN USE STATUS: Primary | ICD-10-CM

## 2018-05-07 DIAGNOSIS — Z79.4 TYPE 2 DIABETES MELLITUS WITH HYPERGLYCEMIA, WITH LONG-TERM CURRENT USE OF INSULIN (H): Primary | ICD-10-CM

## 2018-05-07 DIAGNOSIS — E11.65 TYPE 2 DIABETES MELLITUS WITH HYPERGLYCEMIA, WITH LONG-TERM CURRENT USE OF INSULIN (H): Primary | ICD-10-CM

## 2018-05-07 NOTE — TELEPHONE ENCOUNTER
Contacted patient to review. Patient reports that the Trulicity pen is covered also, so she would prefer to have new prescription for Trulicty be sent to pharmacy.    Will send to Dr. Worley for approval.      Princess Mace RN  Endocrine Care Coordinator  [facility]

## 2018-05-07 NOTE — TELEPHONE ENCOUNTER
Called patient to follow-up on recommendation from Dr. Worley to use Trulicity in place of Bydureon. LM for patient to return call to see if she is in agreement with the plan.    Mirna Perez, Pharm.D, BCACP  Medication Therapy Management Pharmacist

## 2018-05-07 NOTE — TELEPHONE ENCOUNTER
----- Message from Jenny Mistry RN sent at 5/4/2018  4:43 PM CDT -----  Regarding: Rx  I accidentally solved this pen issue then realized she is MG pt.    She is asking to try Trulicity because she is having hard time preparing the Byduer pen,but Bcise pens work just same as Trulicity so I routed new Rx of Bydureon Bcise to Worley to sign---can you please explain this to her Monday--not sure if her insurance covers Trulicity but I dont see why change if the new bydureon pens work just same as trulicity---

## 2018-05-07 NOTE — TELEPHONE ENCOUNTER
metFORMIN (GLUCOPHAGE) 1000 MG tablet 180 tablet 3 4/23/2018  No   Sig: TAKE 1 TABLET (1,000 MG) BY MOUTH 2 TIMES DAILY (WITH MEALS       Biguanide Agents Failed5/6 8:17 AM   Blood pressure less than 140/90 in past 6 months    Patient has documented LDL within the past 12 mos.    Patient has had a Microalbumin in the past 12 mos.    Patient is age 10 or older    Patient has documented A1c within the specified period of time.    Patient's CR is NOT>1.4 OR Patient's EGFR is NOT<45 within past 12 mos.    Patient does NOT have a diagnosis of CHF.    Patient is not pregnant    Patient has not had a positive pregnancy test within the past 12 mos.     Recent (6 mo) or future (30 days) visit within the authorizing provider's specialty     Our records indicate duplicate request. Same requesting pharmacy. Sharla Young RN

## 2018-05-08 NOTE — TELEPHONE ENCOUNTER
Patient returned call and would like to try Trulicity. RX sent to [facility].    Mirna Perez, Pharm.D, BCACP  Medication Therapy Management Pharmacist

## 2018-05-22 ENCOUNTER — ALLIED HEALTH/NURSE VISIT (OUTPATIENT)
Dept: PHARMACY | Facility: CLINIC | Age: 78
End: 2018-05-22
Payer: COMMERCIAL

## 2018-05-22 DIAGNOSIS — Z79.4 TYPE 2 DIABETES MELLITUS WITH HYPERGLYCEMIA, WITH LONG-TERM CURRENT USE OF INSULIN (H): Primary | ICD-10-CM

## 2018-05-22 DIAGNOSIS — E11.65 TYPE 2 DIABETES MELLITUS WITH HYPERGLYCEMIA, WITH LONG-TERM CURRENT USE OF INSULIN (H): Primary | ICD-10-CM

## 2018-05-22 DIAGNOSIS — E78.5 HYPERLIPIDEMIA LDL GOAL <100: ICD-10-CM

## 2018-05-22 DIAGNOSIS — I10 ESSENTIAL HYPERTENSION WITH GOAL BLOOD PRESSURE LESS THAN 140/90: ICD-10-CM

## 2018-05-22 PROCEDURE — 99606 MTMS BY PHARM EST 15 MIN: CPT | Performed by: PHARMACIST

## 2018-05-22 PROCEDURE — 99607 MTMS BY PHARM ADDL 15 MIN: CPT | Performed by: PHARMACIST

## 2018-05-22 RX ORDER — FEXOFENADINE HCL 180 MG/1
180 TABLET ORAL PRN
COMMUNITY

## 2018-05-22 NOTE — Clinical Note
Zoila Paul Dr. started on Trulicity this week and is liking it better than the Bydureon. Her blood sugars are >200 in the morning. Would we consider increasing her Lantus until the Trulicity starts to be beneficial or would you like to wait? Let me know what your thoughts are and I communicate plan with the patient. Thanks, Mirna Perez, Pharm.D, BCACP Medication Therapy Management Pharmacist

## 2018-05-22 NOTE — PATIENT INSTRUCTIONS
Recommendations from today's MTM visit:                                                    Could consider increasing Lantus.  Revisit statin therapy in the future.  Recommend patient follow-up with PCP in June for blood pressure recheck.    Next MTM visit: 3 weeks    To schedule another MTM appointment, please call the clinic directly or you may call the MTM scheduling line at [phone] or toll-free at [phone].     My Clinical Pharmacist's contact information:                                                      It was a pleasure seeing you today!  Please feel free to contact me with any questions or concerns you have.      Mirna Perez, Pharm.D, [facility]  Medication Therapy Management Pharmacist'    You may receive a survey about the MTM services you received.  I would appreciate your feedback to help me serve you better in the future. Please fill it out and return it when you can. Your comments will be anonymous.

## 2018-05-22 NOTE — PROGRESS NOTES
SUBJECTIVE/OBJECTIVE:                Brandy Leon is a 77 year old female called for a follow-up visit for Medication Therapy Management.  She was referred to me from Cailin Ponce.     Chief Complaint: Follow up from our visit on 5/1/2018.  Blood Sugars  Tobacco: No tobacco use  Alcohol: none    Medication Adherence/Access:  no issues reported; patient does not miss any doses of medications.     Diabetes:  Pt currently taking Trulicity 0.75mg weekly (pt started Monday, patient is injecting in her abdomen), metformin 1000mg bid,  Glipizide 10mg bid, lantus 20 units daily. Pt started feeling weak and shaky yesterday and was feeling this way throughout the the day; patient did not check blood sugar. Patient feels this is a better option than the Bydureon.     SMBG: one time daily.   Ranges (patient reported):   Date FBG/ 2hours post Lunch/2hours post Dinner /2hours post    5/22 237      5/21 253      5/20 267                                    Patient is not experiencing hypoglycemia  Recent symptoms of high blood sugar? none  Eye exam: up to date  Foot exam: up to date  Microalbumin is < 30 mg/g. Pt is taking an ACEi/ARB.  Aspirin: Taking 81mg daily and denies side effects  Patient had breaded chicken and asparagus for dinner last night     Hyperlipidemia: Current therapy includes rosuvastatin 5mg twice weekly but patient is not taking; patient stopped it quite awhile ago but unsure why she stopped.  Patient does not like the way it makes her feel-shaky, tired.    The 10-year ASCVD risk score (Qing GASPER Jr, et al., 2013) is: 48.3%    Values used to calculate the score:      Age: 77 years      Sex: Female      Is Non- : No      Diabetic: Yes      Tobacco smoker: No      Systolic Blood Pressure: 145 mmHg      Is BP treated: Yes      HDL Cholesterol: 54 mg/dL      Total Cholesterol: 201 mg/dL    Hypertension: Current medications include irbesartan/HCTZ 150-1/2 tablet once daily.   Patient checked blood pressure at [facility] and it was 117/70 mmHg.  Lasting clinic blood pressure was elevated.  Patient reports no current medication side effects.    Current labs include:  Today's Vitals: There were no vitals taken for this visit.-phone visit  BP Readings from Last 3 Encounters:   04/06/18 145/73   04/04/18 122/62   03/26/18 139/78     Lab Results   Component Value Date    A1C 10.6 03/08/2018   .  Lab Results   Component Value Date    CHOL 201 12/22/2017     Lab Results   Component Value Date    TRIG 161 12/22/2017     Lab Results   Component Value Date    HDL 54 12/22/2017     Lab Results   Component Value Date     12/22/2017       Liver Function Studies -   Recent Labs   Lab Test  12/22/17   1118   PROTTOTAL  7.9   ALBUMIN  3.5   BILITOTAL  0.4   ALKPHOS  80   AST  19   ALT  18       Lab Results   Component Value Date    UCRR 76 12/22/2017    MICROL 9 12/22/2017    UMALCR 12.26 12/22/2017       Last Basic Metabolic Panel:  Lab Results   Component Value Date     03/08/2018      Lab Results   Component Value Date    POTASSIUM 4.0 03/08/2018     Lab Results   Component Value Date    CHLORIDE 99 03/08/2018     Lab Results   Component Value Date    BUN 16 03/08/2018     Lab Results   Component Value Date    CR 0.72 03/08/2018     GFR Estimate   Date Value Ref Range Status   03/08/2018 79 >60 mL/min/1.7m2 Final     Comment:     Non  GFR Calc   12/22/2017 74 >60 mL/min/1.7m2 Final     Comment:     Non  GFR Calc   04/14/2017 74 >60 mL/min/1.7m2 Final     Comment:     Non  GFR Calc     GFR Estimate If Black   Date Value Ref Range Status   03/08/2018 >90 >60 mL/min/1.7m2 Final     Comment:      GFR Calc   12/22/2017 90 >60 mL/min/1.7m2 Final     Comment:      GFR Calc   04/14/2017 90 >60 mL/min/1.7m2 Final     Comment:      GFR Calc       Most Recent Immunizations   Administered Date(s) Administered      TD (ADULT, 7+) 03/21/2005     Varicella Pt Report Hx of Varicella/Chicken Pox 07/24/2000     Zoster vaccine, live 04/27/2012         ASSESSMENT:              Current medications were reviewed today as discussed above.      Medication Adherence: good, no issues identified    Diabetes: Needs Improvement. Self monitoring of blood glucose is not at goal of fasting  mg/dL. Patient may benefit from increase in Lantus will consult with endocrinology.    Hyperlipidemia: Needs improvement.  Patient would benefit from statin therapy.  Patient is aware of risk of not taking but does not feel it is relevant to her.  Will be open to possibly restarting after on Trulicity therapy is established.    Hypertension: Needs improvement.  Patient not meeting blood pressure goal of less than 140/90 mmHg.  Patient would benefit from updated blood pressure monitoring.     PLAN:                  Could consider increasing Lantus.  Revisit statin therapy in the future.  Recommend patient follow-up with PCP in June for blood pressure recheck.    I spent 30 minutes with this patient today. All changes were made via collaborative practice agreement with Cailin Ponce. A copy of the visit note was provided to the patient's primary care provider.     Will follow up in 2-3 weeks.    The patient declined a summary of these recommendations as an after visit summary.    Mirna Perez, Pharm.D, BCACP  Medication Therapy Management Pharmacist

## 2018-05-22 NOTE — MR AVS SNAPSHOT
After Visit Summary   5/22/2018    Brandy Leon    MRN: [id number]           Patient Information     Date Of Birth          1940        Visit Information        Provider Department      5/22/2018 9:00 AM Mirna Perez, [facility] MT        Today's Diagnoses     Type 2 diabetes mellitus with hyperglycemia, with long-term current use of insulin (H)    -  1    Essential hypertension with goal blood pressure less than 140/90        Hyperlipidemia LDL goal <100          Care Instructions    Recommendations from today's MT visit:                                                    Could consider increasing Lantus.  Revisit statin therapy in the future.  Recommend patient follow-up with PCP in June for blood pressure recheck.    Next MTM visit: 3 weeks    To schedule another MTM appointment, please call the clinic directly or you may call the MTM scheduling line at [phone] or toll-free at [phone].     My Clinical Pharmacist's contact information:                                                      It was a pleasure seeing you today!  Please feel free to contact me with any questions or concerns you have.      Mirna Perez, Pharm.D, [facility]  Medication Therapy Management Pharmacist'    You may receive a survey about the MT services you received.  I would appreciate your feedback to help me serve you better in the future. Please fill it out and return it when you can. Your comments will be anonymous.                  Follow-ups after your visit        Your next 10 appointments already scheduled     Jun 27, 2018  8:30 AM CDT   TELEMEDICINE with Mirna Perez [facility]    [address] [place] [id number] [phone]           Note: this is not an onsite visit; there is no need to come to the facility.            Jul 20, 2018  9:00 AM CDT   Return Visit with  "Ehsan Araya DPM   [email or web address] ([facility] [[email or web address]    [id number] [place] [address] [id number] [phone]            Aug 03, 2018 11:15 AM CDT   Return Visit with Candice Worley MD, [facility] ENDO NURSE   [facility] [[email or web address]] ([facility] [[email or web address]    [id number] [place] [id number] [phone]            Sep 25, 2018 10:45 AM CDT   Return Visit with Lucita Jordan MD   [email or web address] ([facility] [[email or web address]    [address] [place] [address] [id number] [phone]              Who to contact     If you have questions or need follow up information about today's clinic visit or your schedule please contact [facility] directly at [phone].  Normal or non-critical lab and imaging results will be communicated to you by [facility], letter or phone within 4 business days after the clinic has received the results. If you do not hear from us within 7 days, please contact the clinic through [facility] or phone. If you have a critical or abnormal lab result, we will notify you by phone as soon as possible.  Submit refill requests through [facility] or call your pharmacy and they will forward the refill request to us. Please allow 3 business days for your refill to be completed.          Additional Information About Your Visit        [facility] Information     [facility] lets you send messages to your doctor, view your test results, renew your prescriptions, schedule appointments and more. To sign up, go to [email or web address] . Click on \"Log in\" on the left side of the screen, which will take you to the Welcome page. Then click on \"Sign up Now\" on the right side of the page.     You will be asked to enter the access code listed below, as well as some personal information. Please follow the directions to create your username and password.     Your access code is: [email or web address]  Expires: 5/29/2018  2:13 PM   "   Your access code will  in 90 days. If you need help or a new code, please call your [place] clinic or [phone].        Care EveryWhere ID     This is your Care EveryWhere ID. This could be used by other organizations to access your [place] medical records  [id number]         Blood Pressure from Last 3 Encounters:   18 145/73   18 122/62   18 139/78    Weight from Last 3 Encounters:   18 190 lb 7.6 oz (86.4 kg)   18 187 lb 12.8 oz (85.2 kg)   18 184 lb 4.8 oz (83.6 kg)              Today, you had the following     No orders found for display         Today's Medication Changes          These changes are accurate as of 18 11:58 AM.  If you have any questions, ask your nurse or doctor.               These medicines have changed or have updated prescriptions.        Dose/Directions    aspirin 81 MG EC tablet   This may have changed:  how much to take   Used for:  Type 2 diabetes, HbA1c goal < 7% (H)        Dose:  81 mg   Take 1 tablet by mouth daily.   Quantity:  90 tablet   Refills:  3       glipiZIDE 10 MG tablet   Commonly known as:  GLUCOTROL   This may have changed:    - how much to take  - additional instructions   Used for:  Type 2 diabetes mellitus with hyperglycemia, with long-term current use of insulin (H)        Take 2 tablets by mouth twice a day before meals   Quantity:  120 tablet   Refills:  0       irbesartan-hydrochlorothiazide 150-12.5 MG per tablet   Commonly known as:  AVALIDE   This may have changed:  See the new instructions.   Used for:  Hypertension goal BP (blood pressure) < 140/90        TAKE 1 TABLET BY MOUTH DAILY   Quantity:  90 tablet   Refills:  3                Primary Care Provider Office Phone # Fax #    MAGO Baker [phone]       [address] [facility] [address]  [place] [address]        Equal Access to Services     NUSRAT KHAN AH: Hadii ana Loya, farzana syed, claus leonard  micth perezpema drakeaan ah. [facility] [phone].    ATENCIÓN: Si habla lj, tiene a garcía disposición servicios gratuitos de asistencia lingüística. Vesna al [phone].    We comply with applicable federal civil rights laws and [place] laws. We do not discriminate on the basis of race, color, national origin, age, disability, sex, sexual orientation, or gender identity.            Thank you!     Thank you for choosing [facility]  for your care. Our goal is always to provide you with excellent care. Hearing back from our patients is one way we can continue to improve our services. Please take a few minutes to complete the written survey that you may receive in the mail after your visit with us. Thank you!             Your Updated Medication List - Protect others around you: Learn how to safely use, store and throw away your medicines at www.disposemymeds.org.          This list is accurate as of 5/22/18 11:58 AM.  Always use your most recent med list.                   Brand Name Dispense Instructions for use Diagnosis    albuterol 108 (90 Base) MCG/ACT Inhaler    PROAIR HFA/PROVENTIL HFA/VENTOLIN HFA    1 Inhaler    Inhale 2 puffs into the lungs every 4 hours as needed for shortness of breath / dyspnea or wheezing    Cough due to bronchospasm       aspirin 81 MG EC tablet     90 tablet    Take 1 tablet by mouth daily.    Type 2 diabetes, HbA1c goal < 7% (H)       BD JUAN C U/F 32G X 4 MM   Generic drug:  insulin pen needle     100 each    USE 1 DAILY AS DIRECTED.    Type 2 diabetes mellitus with hyperglycemia (H)       ciclopirox 0.77 % cream    LOPROX    90 g    Apply topically 2 times daily To feet and toenails.    Tinea pedis of both feet       dulaglutide 0.75 MG/0.5ML pen    TRULICITY    1.5 mL    Inject 0.75 mg Subcutaneous every 7 days    Type 2 diabetes mellitus with hyperglycemia, with long-term current use of insulin (H)       famotidine 20 MG tablet     PEPCID     Take 20 mg by mouth nightly as needed        fexofenadine 180 MG tablet    ALLEGRA     Take 180 mg by mouth daily        glipiZIDE 10 MG tablet    GLUCOTROL    120 tablet    Take 2 tablets by mouth twice a day before meals    Type 2 diabetes mellitus with hyperglycemia, with long-term current use of insulin (H)       GLUCOSAMINE CHONDROITIN COMPLX PO      2 Daily        insulin glargine 100 UNIT/ML injection    LANTUS SOLOSTAR    45 mL    Inject 20 Units Subcutaneous daily    Type 2 diabetes mellitus with hyperglycemia, with long-term current use of insulin (H)       irbesartan-hydrochlorothiazide 150-12.5 MG per tablet    AVALIDE    90 tablet    TAKE 1 TABLET BY MOUTH DAILY    Hypertension goal BP (blood pressure) < 140/90       latanoprost 0.005 % ophthalmic solution    XALATAN    3 Bottle    Place 1 drop into both eyes At Bedtime    Primary open angle glaucoma of both eyes, moderate stage       metFORMIN 1000 MG tablet    GLUCOPHAGE    180 tablet    TAKE 1 TABLET (1,000 MG) BY MOUTH 2 TIMES DAILY (WITH MEALS)    Type 2 diabetes mellitus with hyperglycemia, with long-term current use of insulin (H)       MULTIVITAMIN PO      1 tablet daily        OMEGA 3 PO      Take by mouth 2 times daily.        ONE TOUCH DELICA LANCETS Misc     100 each    1 each 2 times daily. One Touch Delica    Type 2 diabetes, HbA1c goal < 7% (H)       ONETOUCH ULTRA test strip   Generic drug:  blood glucose monitoring     200 strip    USE TO TEST TWICE A DAY    Type 2 diabetes mellitus with hyperglycemia, with long-term current use of insulin (H)       order for DME     1 each    Glucometer covered by insurance.    Type 2 diabetes, HbA1c goal < 7% (H)       oxybutynin 5 MG 24 hr tablet    DITROPAN-XL    180 tablet    TAKE 2 TABLETS (10 MG) BY MOUTH DAILY    Urinary frequency, Overactive bladder       rosuvastatin 5 MG tablet    CRESTOR    8 tablet    Take 1 tablet twice a week    Dyslipidemia, goal LDL below 100        spacer/aero-hold chamber mask Aaliyah     1 each    1 Device as needed    Cough due to bronchospasm       SYNTHROID 137 MCG tablet   Generic drug:  levothyroxine     90 tablet    TAKE 1 TABLET (137 MCG) BY MOUTH DAILY    Myxedema heart disease (H), Nutritional and metabolic cardiomyopathy (H)

## 2018-05-29 NOTE — PROGRESS NOTES
We can plan to increase the Trulicity further to 1.5 mg weekly.     Candice Worley MD  [id number]  Endocrinology Service

## 2018-06-25 ENCOUNTER — OFFICE VISIT (OUTPATIENT)
Dept: PEDIATRICS | Facility: CLINIC | Age: 78
End: 2018-06-25
Payer: COMMERCIAL

## 2018-06-25 VITALS
HEART RATE: 92 BPM | OXYGEN SATURATION: 96 % | SYSTOLIC BLOOD PRESSURE: 115 MMHG | DIASTOLIC BLOOD PRESSURE: 50 MMHG | TEMPERATURE: 96.9 F | HEIGHT: 64 IN | BODY MASS INDEX: 31.55 KG/M2 | WEIGHT: 184.8 LBS

## 2018-06-25 DIAGNOSIS — E11.65 TYPE 2 DIABETES MELLITUS WITH HYPERGLYCEMIA, WITH LONG-TERM CURRENT USE OF INSULIN (H): Primary | ICD-10-CM

## 2018-06-25 DIAGNOSIS — M25.50 MULTIPLE JOINT PAIN: ICD-10-CM

## 2018-06-25 DIAGNOSIS — Z79.4 TYPE 2 DIABETES MELLITUS WITH HYPERGLYCEMIA, WITH LONG-TERM CURRENT USE OF INSULIN (H): Primary | ICD-10-CM

## 2018-06-25 DIAGNOSIS — E03.9 HYPOTHYROIDISM, UNSPECIFIED TYPE: ICD-10-CM

## 2018-06-25 DIAGNOSIS — G47.19 EXCESSIVE DAYTIME SLEEPINESS: ICD-10-CM

## 2018-06-25 DIAGNOSIS — I10 HYPERTENSION GOAL BP (BLOOD PRESSURE) < 140/90: ICD-10-CM

## 2018-06-25 DIAGNOSIS — E78.5 HYPERLIPIDEMIA LDL GOAL <100: ICD-10-CM

## 2018-06-25 LAB
ALBUMIN SERPL-MCNC: 3.8 G/DL (ref 3.4–5)
ALP SERPL-CCNC: 77 U/L (ref 40–150)
ALT SERPL W P-5'-P-CCNC: 27 U/L (ref 0–50)
ANION GAP SERPL CALCULATED.3IONS-SCNC: 5 MMOL/L (ref 3–14)
AST SERPL W P-5'-P-CCNC: 29 U/L (ref 0–45)
BILIRUB SERPL-MCNC: 0.5 MG/DL (ref 0.2–1.3)
BUN SERPL-MCNC: 18 MG/DL (ref 7–30)
CALCIUM SERPL-MCNC: 9.1 MG/DL (ref 8.5–10.1)
CHLORIDE SERPL-SCNC: 100 MMOL/L (ref 94–109)
CHOLEST SERPL-MCNC: 198 MG/DL
CO2 SERPL-SCNC: 30 MMOL/L (ref 20–32)
CREAT SERPL-MCNC: 0.68 MG/DL (ref 0.52–1.04)
CREAT UR-MCNC: 159 MG/DL
CRP SERPL-MCNC: 8.3 MG/L (ref 0–8)
ERYTHROCYTE [SEDIMENTATION RATE] IN BLOOD BY WESTERGREN METHOD: 12 MM/H (ref 0–30)
GFR SERPL CREATININE-BSD FRML MDRD: 84 ML/MIN/1.7M2
GLUCOSE SERPL-MCNC: 136 MG/DL (ref 70–99)
HBA1C MFR BLD: 10.4 % (ref 0–5.6)
HDLC SERPL-MCNC: 53 MG/DL
LDLC SERPL CALC-MCNC: 93 MG/DL
MICROALBUMIN UR-MCNC: 17 MG/L
MICROALBUMIN/CREAT UR: 10.82 MG/G CR (ref 0–25)
NONHDLC SERPL-MCNC: 145 MG/DL
POTASSIUM SERPL-SCNC: 4.3 MMOL/L (ref 3.4–5.3)
PROT SERPL-MCNC: 8.3 G/DL (ref 6.8–8.8)
SODIUM SERPL-SCNC: 135 MMOL/L (ref 133–144)
T4 FREE SERPL-MCNC: 1.17 NG/DL (ref 0.76–1.46)
TRIGL SERPL-MCNC: 258 MG/DL
TSH SERPL DL<=0.005 MIU/L-ACNC: 2.46 MU/L (ref 0.4–4)

## 2018-06-25 PROCEDURE — 86140 C-REACTIVE PROTEIN: CPT | Performed by: NURSE PRACTITIONER

## 2018-06-25 PROCEDURE — 80053 COMPREHEN METABOLIC PANEL: CPT | Performed by: NURSE PRACTITIONER

## 2018-06-25 PROCEDURE — 86431 RHEUMATOID FACTOR QUANT: CPT | Performed by: NURSE PRACTITIONER

## 2018-06-25 PROCEDURE — 84439 ASSAY OF FREE THYROXINE: CPT | Performed by: NURSE PRACTITIONER

## 2018-06-25 PROCEDURE — 83036 HEMOGLOBIN GLYCOSYLATED A1C: CPT | Performed by: NURSE PRACTITIONER

## 2018-06-25 PROCEDURE — 84443 ASSAY THYROID STIM HORMONE: CPT | Performed by: NURSE PRACTITIONER

## 2018-06-25 PROCEDURE — 82043 UR ALBUMIN QUANTITATIVE: CPT | Performed by: NURSE PRACTITIONER

## 2018-06-25 PROCEDURE — 85652 RBC SED RATE AUTOMATED: CPT | Performed by: NURSE PRACTITIONER

## 2018-06-25 PROCEDURE — 36415 COLL VENOUS BLD VENIPUNCTURE: CPT | Performed by: NURSE PRACTITIONER

## 2018-06-25 PROCEDURE — 80061 LIPID PANEL: CPT | Performed by: NURSE PRACTITIONER

## 2018-06-25 PROCEDURE — 99214 OFFICE O/P EST MOD 30 MIN: CPT | Performed by: NURSE PRACTITIONER

## 2018-06-25 PROCEDURE — 86038 ANTINUCLEAR ANTIBODIES: CPT | Performed by: NURSE PRACTITIONER

## 2018-06-25 NOTE — PROGRESS NOTES
SUBJECTIVE:   Brandy Leon is a 77 year old female who presents to clinic today for the following health issues:    Diabetes Follow-up    Patient is checking blood sugars: once daily.  Results are as follows:         am - 152    Diabetic concerns: None     Symptoms of hypoglycemia (low blood sugar): none     Paresthesias (numbness or burning in feet) or sores: Yes whole body     Date of last diabetic eye exam: upcoming appointment in October     Diabetes Management Resources    Hyperlipidemia Follow-Up      Rate your low fat/cholesterol diet?: fair    Taking statin?  Yes, possible muscle aches from statin    Other lipid medications/supplements?:  Fish oil/Omega 3, dose 2000 without side effects    Hypertension Follow-up      Outpatient blood pressures are being checked at store.  Results are 110/60's.    Low Salt Diet: not monitoring salt    BP Readings from Last 2 Encounters:   06/25/18 115/50   04/06/18 145/73     Hemoglobin A1C (%)   Date Value   06/25/2018 10.4 (H)   03/08/2018 10.6 (H)     LDL Cholesterol Calculated (mg/dL)   Date Value   06/25/2018 93   12/22/2017 115 (H)     Hypothyroidism Follow-up      Since last visit, patient describes the following symptoms: dry skin      Amount of exercise or physical activity: 6-7 days/week for an average of 45-60 minutes, yard work    Problems taking medications regularly: No    Medication side effects: muscle aches    Diet: regular (no restrictions)        Problem list and histories reviewed & adjusted, as indicated.  Additional history: as documented    Patient Active Problem List   Diagnosis     Seasonal allergies     Hypothyroidism     Hyperlipidemia LDL goal <100     Fibromyalgia     Osteoarthritis     Actinic keratosis     Advanced directives, counseling/discussion     Glaucoma suspect     Cataracts, bilateral     Obesity     Type 2 diabetes mellitus with hyperglycemia, with long-term current use of insulin (H)     Hypertension goal BP (blood pressure)  < 140/90     Overactive bladder     Primary osteoarthritis of both hands     Recurrent UTI     Past Surgical History:   Procedure Laterality Date     C APPENDECTOMY       C ARTHROPLASTY TMJ       COLONOSCOPY       COLONOSCOPY N/A 8/13/2015    Procedure: COLONOSCOPY;  Surgeon: Duane, William Charles, MD;  Location: [facility] OR     COLONOSCOPY WITH CO2 INSUFFLATION N/A 8/13/2015    Procedure: COLONOSCOPY WITH CO2 INSUFFLATION;  Surgeon: Duane, William Charles, MD;  Location: [facility] OR     THYROIDECTOMY          Social History   Substance Use Topics     Smoking status: Never Smoker     Smokeless tobacco: Never Used      Comment: has had exposure to second hand smoke in the past from [place], no current exposure     Alcohol use No     Family History   Problem Relation Age of Onset     Cancer Father      skin and leukemia     Cerebrovascular Disease Maternal Grandfather      Cerebrovascular Disease Paternal Grandmother      Eye Disorder Paternal Grandmother      cataracts     Cerebrovascular Disease Paternal Grandfather      HEART DISEASE Paternal Grandfather      Cancer Sister      Breast Cancer     Eye Disorder Mother      cataracts     Eye Disorder Brother      cataract     Breast Cancer Daughter      Other Cancer Daughter      ovarian cancer         Current Outpatient Prescriptions   Medication Sig Dispense Refill     albuterol (PROAIR HFA/PROVENTIL HFA/VENTOLIN HFA) 108 (90 BASE) MCG/ACT Inhaler Inhale 2 puffs into the lungs every 4 hours as needed for shortness of breath / dyspnea or wheezing 1 Inhaler 1     aspirin 81 MG EC tablet Take 1 tablet by mouth daily. (Patient taking differently: Take 650 mg by mouth daily ) 90 tablet 3     BD JUAN C U/F 32G X 4 MM insulin pen needle USE 1 DAILY AS DIRECTED. 100 each 3     ciclopirox (LOPROX) 0.77 % cream Apply topically 2 times daily To feet and toenails. 90 g 6     dulaglutide (TRULICITY) 0.75 MG/0.5ML pen Inject 0.75 mg Subcutaneous every 7 days 1.5 mL 0     famotidine (PEPCID) 20  MG tablet Take 20 mg by mouth nightly as needed       fexofenadine (ALLEGRA) 180 MG tablet Take 180 mg by mouth daily       glipiZIDE (GLUCOTROL) 10 MG tablet Take 2 tablets by mouth twice a day before meals (Patient taking differently: 5 mg Take 2 tablets by mouth twice a day before meals) 120 tablet 0     GLUCOSAMINE CHONDROITIN COMPLX OR 2 Daily       insulin glargine (LANTUS SOLOSTAR) 100 UNIT/ML injection Inject 20 Units Subcutaneous daily 45 mL 3     irbesartan-hydrochlorothiazide (AVALIDE) 150-12.5 MG per tablet TAKE 1 TABLET BY MOUTH DAILY (Patient taking differently: TAKE .5 TABLET BY MOUTH DAILY) 90 tablet 3     latanoprost (XALATAN) 0.005 % ophthalmic solution Place 1 drop into both eyes At Bedtime 3 Bottle 4     metFORMIN (GLUCOPHAGE) 1000 MG tablet TAKE 1 TABLET (1,000 MG) BY MOUTH 2 TIMES DAILY (WITH MEALS) 180 tablet 3     MULTIVITAMIN OR 1 tablet daily       Omega-3 Fatty Acids (OMEGA 3 PO) Take by mouth 2 times daily.        ONE TOUCH DELICA LANCETS MISC 1 each 2 times daily. One Touch Delica   100 each 12     ONETOUCH ULTRA test strip USE TO TEST TWICE A  strip 11     order for DME Glucometer covered by insurance. 1 each 0     oxybutynin (DITROPAN-XL) 5 MG 24 hr tablet TAKE 2 TABLETS (10 MG) BY MOUTH DAILY 180 tablet 2     rosuvastatin (CRESTOR) 5 MG tablet Take 1 tablet twice a week 8 tablet 1     Spacer/Aero-Holding Chambers (SPACER/AERO-HOLD CHAMBER MASK) SANTANA 1 Device as needed 1 each 0     SYNTHROID 137 MCG tablet TAKE 1 TABLET (137 MCG) BY MOUTH DAILY 90 tablet 2     Allergies   Allergen Reactions     Estradiol Itching     Lipitor [Atorvastatin Calcium]      Weak and shaky     Sulfa Drugs      Rash       Zocor [Simvastatin]      Weak and shakey     Recent Labs   Lab Test  03/08/18   1135  12/22/17   1118  11/16/17   0746  07/20/17   1202  04/14/17   1028   02/09/17   1200   07/26/16   0828   A1C  10.6*   --   8.8*  9.3*   --    < >  10.7*   < >  10.4*   LDL   --   115*   --    --    --  "   --   52   --   48   HDL   --   54   --    --    --    --   59   --   57   TRIG   --   161*   --    --    --    --   157*   --   162*   ALT   --   18   --    --   21   --   21   < >   --    CR  0.72  0.76   --    --   0.76   --   0.67   < >   --    GFRESTIMATED  79  74   --    --   74   --   86   < >   --    GFRESTBLACK  >90  90   --    --   90   --   >90   GFR Calc     < >   --    POTASSIUM  4.0  4.4   --    --   4.3   --   4.2   < >   --    TSH   --   1.70   --    --    --    --   1.33   < >  1.78    < > = values in this interval not displayed.      Labs reviewed in EPIC    Reviewed and updated as needed this visit by clinical staff  Tobacco  Allergies  Med Hx  Surg Hx  Fam Hx  Soc Hx      Reviewed and updated as needed this visit by Provider         ROS:  CONSTITUTIONAL:POSITIVE  for weight loss and NEGATIVE  for anorexia  ENT/MOUTH: NEGATIVE for ear, mouth and throat problems  RESP:NEGATIVE for significant cough or SOB  CV: POSITIVE for exertional fatigue.  and NEGATIVE for chest pain/chest pressure, cyanosis, diaphoresis, palpitations and syncope or near-syncope  GI: NEGATIVE for nausea, poor appetite and vomiting  MUSCULOSKELETAL: POSITIVE  for joint stiffness  and takes a while to get going in the morning  and NEGATIVE for joint swelling  and joint warmth   NEURO: NEGATIVE for weakness, dizziness or paresthesias  ENDOCRINE: POSITIVE  for HX diabetes and HX thyroid disease and NEGATIVE for polydipsia, polyphagia and polyuria  Does not sleep good at night. Hard time falling and staying. Kids say she does snore  Is sleepy in the middle of the day. Would sometimes take a nap     OBJECTIVE:     /50 (BP Location: Right arm, Patient Position: Sitting, Cuff Size: Adult Regular)  Pulse 92  Temp 96.9  F (36.1  C) (Temporal)  Ht 5' 3.5\" (1.613 m)  Wt 184 lb 12.8 oz (83.8 kg)  SpO2 96%  Breastfeeding? No  BMI 32.22 kg/m2  Body mass index is 32.22 kg/(m^2).   Wt Readings from Last 4 " Encounters:   06/25/18 184 lb 12.8 oz (83.8 kg)   04/06/18 190 lb 7.6 oz (86.4 kg)   03/26/18 187 lb 12.8 oz (85.2 kg)   03/08/18 184 lb 4.8 oz (83.6 kg)       GENERAL: Patient is well nourished, well developed,in no apparent distress, non-toxic, in no respiratory distress and acyanotic, alert, cooperative and well hydrated  EYES: Eyes grossly normal to inspection and conjunctivae and sclerae normal  HENT:ear canals and TM's normal and nose and mouth without ulcers or lesions   NECK:normal, supple and no adenopathy  CARDIAC:regular rates and rhythm, no murmur, click or rub and no irregular beats  without extra heart sounds and without LE edema bilaterally  RESP: normal respiratory rate and rhythm, lungs clear to auscultation  unlabored respirations, no intercostal retractions or accessory muscle use  ABD:soft, nontender  SKIN: Skin color, texture, turgor normal. No rashes or lesions.  MS: extremities normal- no gross deformities noted, gait normal and normal muscle tone  NEURO: mentation intact and speech normal  PSYCH: Alert, oriented, thought content appropriate,mentation appears normal., affect and mood normal    Diagnostic Test Results:  Results for orders placed or performed in visit on 06/25/18   Lipid panel reflex to direct LDL Fasting   Result Value Ref Range    Cholesterol 198 <200 mg/dL    Triglycerides 258 (H) <150 mg/dL    HDL Cholesterol 53 >49 mg/dL    LDL Cholesterol Calculated 93 <100 mg/dL    Non HDL Cholesterol 145 (H) <130 mg/dL   Albumin Random Urine Quantitative with Creat Ratio   Result Value Ref Range    Creatinine Urine 159 mg/dL    Albumin Urine mg/L 17 mg/L    Albumin Urine mg/g Cr 10.82 0 - 25 mg/g Cr   Comprehensive metabolic panel   Result Value Ref Range    Sodium 135 133 - 144 mmol/L    Potassium 4.3 3.4 - 5.3 mmol/L    Chloride 100 94 - 109 mmol/L    Carbon Dioxide 30 20 - 32 mmol/L    Anion Gap 5 3 - 14 mmol/L    Glucose 136 (H) 70 - 99 mg/dL    Urea Nitrogen 18 7 - 30 mg/dL     Creatinine 0.68 0.52 - 1.04 mg/dL    GFR Estimate 84 >60 mL/min/1.7m2    GFR Estimate If Black >90 >60 mL/min/1.7m2    Calcium 9.1 8.5 - 10.1 mg/dL    Bilirubin Total 0.5 0.2 - 1.3 mg/dL    Albumin 3.8 3.4 - 5.0 g/dL    Protein Total 8.3 6.8 - 8.8 g/dL    Alkaline Phosphatase 77 40 - 150 U/L    ALT 27 0 - 50 U/L    AST 29 0 - 45 U/L   Hemoglobin A1c   Result Value Ref Range    Hemoglobin A1C 10.4 (H) 0 - 5.6 %   T4 free   Result Value Ref Range    T4 Free 1.17 0.76 - 1.46 ng/dL   TSH   Result Value Ref Range    TSH 2.46 0.40 - 4.00 mU/L   Rheumatoid factor   Result Value Ref Range    Rheumatoid Factor <20 <20 IU/mL   Anti Nuclear Della IgG by IFA with Reflex   Result Value Ref Range    GREG interpretation Negative NEG^Negative   CRP inflammation   Result Value Ref Range    CRP Inflammation 8.3 (H) 0.0 - 8.0 mg/L   Erythrocyte sedimentation rate auto   Result Value Ref Range    Sed Rate 12 0 - 30 mm/h       ASSESSMENT/PLAN:     Brandy was seen today for diabetes, hypertension, lipids and thyroid disease.    Diagnoses and all orders for this visit:    Type 2 diabetes mellitus with hyperglycemia, with long-term current use of insulin (H)  -     Albumin Random Urine Quantitative with Creat Ratio  -     Comprehensive metabolic panel  -     Hemoglobin A1c  -     JUST IN CASE  -      dulaglutide (TRULICITY) 0.75 MG/0.5ML pen; Inject 0.75 mg Subcutaneous every 7 days  foot care discussed and Podiatry visits discussed, annual eye examinations at Ophthalmology discussed, glycohemoglobin monitoring discussed and long term diabetic complications discussed  Attempt to improve diet  Weight loss advised  Medication changes as follows: plan to increase Trulicity patient wants to wait after labs are in   FOLLOW UP WITH SPECIALIST :Endocrinology and MTM    Hypothyroidism, unspecified type  -     T4 free  -     TSH  Continue current medications.    Hypertension goal BP (blood pressure) < 140/90  -     Albumin Random Urine Quantitative  with Creat Ratio  -     Comprehensive metabolic panel  HTN Plan:  1)  Medication: continue current medication regimen unchanged  2)  Dietary sodium restriction  3)  Regular aerobic exercise  4)  Recheck in 6 months, sooner should new symptoms or   problems arise.  5) See todays orders.    Patient Education: Reviewed risks of hypertension and principles of   treatment.      Hyperlipidemia LDL goal <100  -     Lipid panel reflex to direct LDL Fasting  -     Comprehensive metabolic panel  Continue current medications.    Excessive daytime sleepiness  -     SLEEP EVALUATION & MANAGEMENT REFERRAL - ADULT -[facility] - Roseanne Abarca  [phone] (Age 15 and up); Future    Multiple joint pain  -     Rheumatoid factor  -     Anti Nuclear Della IgG by IFA with Reflex  -     CRP inflammation  -     Erythrocyte sedimentation rate auto  Will follow up and/or notify patient on results via phone or mail to determine further need for followup      PLAN:    Patient needs to follow up in if no improvement,or sooner if worsening of symptoms or other symptoms develop.  CONSULTATION/REFERRAL to sleep center   Will follow up and/or notify patient on results via phone or mail to determine further need for followup    See Patient Instructions    MAGO Baker [facility]

## 2018-06-25 NOTE — PATIENT INSTRUCTIONS
PLAN:   1.   Symptomatic therapy suggested: Continue current medications.  2.  Orders Placed This Encounter   Medications     dulaglutide (TRULICITY) 0.75 MG/0.5ML pen     Sig: Inject 0.75 mg Subcutaneous every 7 days     Dispense:  1.5 mL     Refill:  0     Orders Placed This Encounter   Procedures     Lipid panel reflex to direct LDL Fasting     Albumin Random Urine Quantitative with Creat Ratio     Comprehensive metabolic panel     Hemoglobin A1c     T4 free     TSH     JUST IN CASE     Rheumatoid factor     Anti Nuclear Della IgG by IFA with Reflex     CRP inflammation     Erythrocyte sedimentation rate auto     SLEEP EVALUATION & MANAGEMENT REFERRAL - ADULT -[facility] [place]  [phone] (Age 15 and up)       3. Patient needs to follow up in if no improvement,or sooner if worsening of symptoms or other symptoms develop.  CONSULTATION/REFERRAL to sleep center   Will follow up and/or notify patient on results via phone or mail to determine further need for followup    It was a pleasure seeing you today at the [facility]. Thank you for allowing us to care for you today. We truly hope we provided you with the excellent service you deserve. Please let us know if there is anything else we can do for you so we can be sure you are leaving completley satisfied with your care experience.       General information about your clinic   Clinic Hours Lab Hours (Appointments are required)   Mon-Thurs: 7:30 AM - 7 PM Mon-Thurs: 7:30 AM - 7 PM   Fri: 7:30 AM - 5 PM Fri: 7:30 AM - 5 PM        After Hours Nurse Advise & Appts:  [facility] Nurse Advisors: [phone]  [facility] On Call: to make appointments anytime: [phone] On Call Physician: call [phone] and answering service will page the on call physician.        For urgent appointments, please call [phone] and ask for the triage nurse or your care team clinic nurse.  How to contact my care team:  Denise:  [email or web address]   Phone: [phone]   Fax: [phone]       [facility]:   Phone: [phone]  Hours: 8:00 AM - 6:00 PM  Medication Refills:  Call your pharmacy and they will forward the refill to us. Please allow 3 business days for your refills to be completed.       Normal or non-critical lab and imaging results will be communicated to you by MyChart, letter or phone within 7 days.  If you do not hear from us within 10 days, please call the clinic. If you have a critical or abnormal lab result, we will notify you by phone as soon as possible.       We now have PWIC (Pediatric Walk in Care)  Monday-Friday from 7:30-4. Simply walk in and be seen for your urgent needs like cough, fever, rash, diarrhea or vomiting, pink eye, UTI. No appointments needed. Ask one of the team for more information      -Your Care Team:    Dr. Tara Keller - Internal Medicine/Pediatrics   Dr. Kirit Pérez - Family Medicine  Dr. Deborah Andres - Pediatrics  Dr. Adelia Drew - Pediatrics  Cailin Ponce CNP - Family Practice Nurse Practitioner

## 2018-06-25 NOTE — NURSING NOTE
"Chief Complaint   Patient presents with     Diabetes     Hypertension     Lipids     fasting today     Thyroid Disease       Initial /50 (BP Location: Right arm, Patient Position: Sitting, Cuff Size: Adult Regular)  Pulse 92  Temp 96.9  F (36.1  C) (Temporal)  Ht 5' 3.5\" (1.613 m)  Wt 184 lb 12.8 oz (83.8 kg)  SpO2 96%  Breastfeeding? No  BMI 32.22 kg/m2 Estimated body mass index is 32.22 kg/(m^2) as calculated from the following:    Height as of this encounter: 5' 3.5\" (1.613 m).    Weight as of this encounter: 184 lb 12.8 oz (83.8 kg).  Medication Reconciliation: complete      KATINA Tucker      "

## 2018-06-25 NOTE — MR AVS SNAPSHOT
After Visit Summary   6/25/2018    Brandy Leon    MRN: [id number]           Patient Information     Date Of Birth          1940        Visit Information        Provider Department      6/25/2018 1:50 PM Cailin Ponce APRN [facility]        Today's Diagnoses     Type 2 diabetes mellitus with hyperglycemia, with long-term current use of insulin (H)    -  1    Hypothyroidism, unspecified type        Hypertension goal BP (blood pressure) < 140/90        Hyperlipidemia LDL goal <100        Excessive daytime sleepiness        Multiple joint pain          Care Instructions    PLAN:   1.   Symptomatic therapy suggested: Continue current medications.  2.  Orders Placed This Encounter   Medications     dulaglutide (TRULICITY) 0.75 MG/0.5ML pen     Sig: Inject 0.75 mg Subcutaneous every 7 days     Dispense:  1.5 mL     Refill:  0     Orders Placed This Encounter   Procedures     Lipid panel reflex to direct LDL Fasting     Albumin Random Urine Quantitative with Creat Ratio     Comprehensive metabolic panel     Hemoglobin A1c     T4 free     TSH     JUST IN CASE     Rheumatoid factor     Anti Nuclear Della IgG by IFA with Reflex     CRP inflammation     Erythrocyte sedimentation rate auto     SLEEP EVALUATION & MANAGEMENT REFERRAL - [facility]  [phone] (Age 15 and up)       3. Patient needs to follow up in if no improvement,or sooner if worsening of symptoms or other symptoms develop.  CONSULTATION/REFERRAL to sleep center   Will follow up and/or notify patient on results via phone or mail to determine further need for followup    It was a pleasure seeing you today at the [facility]. Thank you for allowing us to care for you today. We truly hope we provided you with the excellent service you deserve. Please let us know if there is anything else we can do for you so we can be sure you are leaving  [place] satisfied with your care experience.       General information about your clinic   Clinic Hours Lab Hours (Appointments are required)   Mon-Thurs: 7:30 AM - 7 PM Mon-Thurs: 7:30 AM - 7 PM   Fri: 7:30 AM - 5 PM Fri: 7:30 AM - 5 PM        After Hours Nurse Advise & Appts:  [facility] Nurse Advisors: [phone]  [facility]yann On Call: to make appointments anytime: [phone] On Call Physician: call [phone] and answering service will page the on call physician.        For urgent appointments, please call [phone] and ask for the triage nurse or your care team clinic nurse.  How to contact my care team:  MyChart: [email or web address]   Phone: [phone]   Fax: [phone]       [facility]:   Phone: [phone]  Hours: 8:00 AM - 6:00 PM  Medication Refills:  Call your pharmacy and they will forward the refill to us. Please allow 3 business days for your refills to be completed.       Normal or non-critical lab and imaging results will be communicated to you by MyChart, letter or phone within 7 days.  If you do not hear from us within 10 days, please call the clinic. If you have a critical or abnormal lab result, we will notify you by phone as soon as possible.       We now have PWIC (Pediatric Walk in Care)  Monday-Friday from 7:30-4. Simply walk in and be seen for your urgent needs like cough, fever, rash, diarrhea or vomiting, pink eye, UTI. No appointments needed. Ask one of the team for more information      -Your Care Team:    Dr. Tara Keller - Internal Medicine/Pediatrics   Dr. Kirit Pérez - Family Medicine  Dr. Deborah Andres - Pediatrics  Dr. Adelia Drew - Pediatrics  Cailin Ponce CNP - Family Practice Nurse Practitioner                         Follow-ups after your visit        Additional Services     SLEEP EVALUATION & MANAGEMENT REFERRAL - ADULT -[facility] [place]  [phone] (Age 15 and up)       Please be aware that coverage of these services is  subject to the terms and limitations of your health insurance plan.  Call member services at your health plan with any benefit or coverage questions.      Please bring the following to your appointment:    >>   List of current medications   >>   This referral request   >>   Any documents/labs given to you for this referral                      Your next 10 appointments already scheduled     Jun 27, 2018  8:30 AM CDT   TELEMEDICINE with Mirna Perez [facility]    [address]  [facility] [address]  [facility] [address]   [phone]           Note: this is not an onsite visit; there is no need to come to the facility.            Jul 20, 2018  9:00 AM CDT   Return Visit with Ehsan Araya DPM   [facility])    [address] [facility] [address]   [phone]            Aug 03, 2018 11:15 AM CDT   Return Visit with Candice Worley MD, [facility] ENDO NURSE   [facility])    [address] [facility] [address]   [phone]            Sep 25, 2018 10:45 AM CDT   Return Visit with Lucita Jordan MD   [facility])    [address] [facility] [address]   [phone]            Oct 01, 2018  8:00 AM CDT   Return Visit with MG OPHTH NURSE ONLY   [facility])    [address] [facility] [address]   [phone]            Oct 01, 2018  8:45 AM CDT   Return Visit with Thomas Serna MD   [facility])    [address] [facility] [address]   [phone]              Future tests that were ordered for you today     Open Future Orders        Priority Expected Expires Ordered    SLEEP EVALUATION & MANAGEMENT REFERRAL - ADULT -[facility] -  "Roseanne Abarca  [phone] (Age 15 and up) Routine  2019            Who to contact     If you have questions or need follow up information about today's clinic visit or your schedule please contact [facility] directly at [phone].  Normal or non-critical lab and imaging results will be communicated to you by MyChart, letter or phone within 4 business days after the clinic has received the results. If you do not hear from us within 7 days, please contact the clinic through MyChart or phone. If you have a critical or abnormal lab result, we will notify you by phone as soon as possible.  Submit refill requests through [employer] or call your pharmacy and they will forward the refill request to us. Please allow 3 business days for your refill to be completed.          Additional Information About Your Visit        MyChart Information     [employer] is an electronic gateway that provides easy, online access to your medical records. With [employer], you can request a clinic appointment, read your test results, renew a prescription or communicate with your care team.     To sign up for [employer] visit the website at [email or web address]   You will be asked to enter the access code listed below, as well as some personal information. Please follow the directions to create your username and password.     Your access code is: [email or web address]  Expires: 2018  2:30 PM     Your access code will  in 90 days. If you need help or a new code, please contact your [facility] or call [phone] for assistance.        Care EveryWhere ID     This is your Care EveryWhere ID. This could be used by other organizations to access your [facility] medical records  [id number]        Your Vitals Were     Pulse Temperature Height Pulse Oximetry Breastfeeding? BMI (Body Mass Index)    92 96.9  F (36.1  C) (Temporal) 5' 3.5\" (1.613 m) 96% No 32.22 kg/m2       Blood Pressure " from Last 3 Encounters:   06/25/18 115/50   04/06/18 145/73   04/04/18 122/62    Weight from Last 3 Encounters:   06/25/18 184 lb 12.8 oz (83.8 kg)   04/06/18 190 lb 7.6 oz (86.4 kg)   03/26/18 187 lb 12.8 oz (85.2 kg)              We Performed the Following     Albumin Random Urine Quantitative with Creat Ratio     Anti Nuclear Della IgG by IFA with Reflex     Comprehensive metabolic panel     CRP inflammation     Erythrocyte sedimentation rate auto     Hemoglobin A1c     JUST IN CASE     Lipid panel reflex to direct LDL Fasting     Rheumatoid factor     T4 free     TSH          Today's Medication Changes          These changes are accurate as of 6/25/18  2:30 PM.  If you have any questions, ask your nurse or doctor.               These medicines have changed or have updated prescriptions.        Dose/Directions    aspirin 81 MG EC tablet   This may have changed:  how much to take   Used for:  Type 2 diabetes, HbA1c goal < 7% (H)        Dose:  81 mg   Take 1 tablet by mouth daily.   Quantity:  90 tablet   Refills:  3       glipiZIDE 10 MG tablet   Commonly known as:  GLUCOTROL   This may have changed:    - how much to take  - additional instructions   Used for:  Type 2 diabetes mellitus with hyperglycemia, with long-term current use of insulin (H)        Take 2 tablets by mouth twice a day before meals   Quantity:  120 tablet   Refills:  0       irbesartan-hydrochlorothiazide 150-12.5 MG per tablet   Commonly known as:  AVALIDE   This may have changed:  See the new instructions.   Used for:  Hypertension goal BP (blood pressure) < 140/90        TAKE 1 TABLET BY MOUTH DAILY   Quantity:  90 tablet   Refills:  3            Where to get your medicines      Call your pharmacy to confirm that your medication is ready for pickup. It may take up to 24 hours for them to receive the prescription. If the prescription is not ready within 3 business days, please contact your clinic or your provider.     We will let you know when  these medications are ready. If you don't hear back within 3 business days, please contact us.     dulaglutide 0.75 MG/0.5ML pen                Primary Care Provider Office Phone # Fax #    MAGO Baker [phone] [address]  [place] [address]        Equal Access to Services     [facility]: Hadii aad ku hadasho Soomaali, waaxda luqadaha, qaybta kaalmada adeegyada, waxay idiin hayaan adeeg kharash la'aan . So [facility] [phone].    ATENCIÓN: Si habla español, tiene a garcía disposición servicios gratuitos de asistencia lingüística. Vesna al [phone].    We comply with applicable federal civil rights laws and [place] laws. We do not discriminate on the basis of race, color, national origin, age, disability, sex, sexual orientation, or gender identity.            Thank you!     Thank you for choosing [facility]  for your care. Our goal is always to provide you with excellent care. Hearing back from our patients is one way we can continue to improve our services. Please take a few minutes to complete the written survey that you may receive in the mail after your visit with us. Thank you!             Your Updated Medication List - Protect others around you: Learn how to safely use, store and throw away your medicines at www.disposemymeds.org.          This list is accurate as of 6/25/18  2:30 PM.  Always use your most recent med list.                   Brand Name Dispense Instructions for use Diagnosis    albuterol 108 (90 Base) MCG/ACT Inhaler    PROAIR HFA/PROVENTIL HFA/VENTOLIN HFA    1 Inhaler    Inhale 2 puffs into the lungs every 4 hours as needed for shortness of breath / dyspnea or wheezing    Cough due to bronchospasm       aspirin 81 MG EC tablet     90 tablet    Take 1 tablet by mouth daily.    Type 2 diabetes, HbA1c goal < 7% (H)       BD JUAN C U/F 32G X 4 MM   Generic drug:  insulin pen needle     100 each    USE 1 DAILY AS  DIRECTED.    Type 2 diabetes mellitus with hyperglycemia (H)       ciclopirox 0.77 % cream    LOPROX    90 g    Apply topically 2 times daily To feet and toenails.    Tinea pedis of both feet       dulaglutide 0.75 MG/0.5ML pen    TRULICITY    1.5 mL    Inject 0.75 mg Subcutaneous every 7 days    Type 2 diabetes mellitus with hyperglycemia, with long-term current use of insulin (H)       famotidine 20 MG tablet    PEPCID     Take 20 mg by mouth nightly as needed        fexofenadine 180 MG tablet    ALLEGRA     Take 180 mg by mouth daily        glipiZIDE 10 MG tablet    GLUCOTROL    120 tablet    Take 2 tablets by mouth twice a day before meals    Type 2 diabetes mellitus with hyperglycemia, with long-term current use of insulin (H)       GLUCOSAMINE CHONDROITIN COMPLX PO      2 Daily        insulin glargine 100 UNIT/ML injection    LANTUS SOLOSTAR    45 mL    Inject 20 Units Subcutaneous daily    Type 2 diabetes mellitus with hyperglycemia, with long-term current use of insulin (H)       irbesartan-hydrochlorothiazide 150-12.5 MG per tablet    AVALIDE    90 tablet    TAKE 1 TABLET BY MOUTH DAILY    Hypertension goal BP (blood pressure) < 140/90       latanoprost 0.005 % ophthalmic solution    XALATAN    3 Bottle    Place 1 drop into both eyes At Bedtime    Primary open angle glaucoma of both eyes, moderate stage       metFORMIN 1000 MG tablet    GLUCOPHAGE    180 tablet    TAKE 1 TABLET (1,000 MG) BY MOUTH 2 TIMES DAILY (WITH MEALS)    Type 2 diabetes mellitus with hyperglycemia, with long-term current use of insulin (H)       MULTIVITAMIN PO      1 tablet daily        OMEGA 3 PO      Take by mouth 2 times daily.        ONE TOUCH DELICA LANCETS Misc     100 each    1 each 2 times daily. One Touch Delica    Type 2 diabetes, HbA1c goal < 7% (H)       ONETOUCH ULTRA test strip   Generic drug:  blood glucose monitoring     200 strip    USE TO TEST TWICE A DAY    Type 2 diabetes mellitus with hyperglycemia, with  long-term current use of insulin (H)       order for DME     1 each    Glucometer covered by insurance.    Type 2 diabetes, HbA1c goal < 7% (H)       oxybutynin 5 MG 24 hr tablet    DITROPAN-XL    180 tablet    TAKE 2 TABLETS (10 MG) BY MOUTH DAILY    Urinary frequency, Overactive bladder       rosuvastatin 5 MG tablet    CRESTOR    8 tablet    Take 1 tablet twice a week    Dyslipidemia, goal LDL below 100       spacer/aero-hold chamber mask Aaliyah     1 each    1 Device as needed    Cough due to bronchospasm       SYNTHROID 137 MCG tablet   Generic drug:  levothyroxine     90 tablet    TAKE 1 TABLET (137 MCG) BY MOUTH DAILY    Myxedema heart disease (H), Nutritional and metabolic cardiomyopathy (H)

## 2018-06-26 LAB — RHEUMATOID FACT SER NEPH-ACNC: <20 IU/ML (ref 0–20)

## 2018-06-27 ENCOUNTER — ALLIED HEALTH/NURSE VISIT (OUTPATIENT)
Dept: PHARMACY | Facility: CLINIC | Age: 78
End: 2018-06-27
Payer: COMMERCIAL

## 2018-06-27 DIAGNOSIS — E11.65 TYPE 2 DIABETES MELLITUS WITH HYPERGLYCEMIA, WITH LONG-TERM CURRENT USE OF INSULIN (H): Primary | ICD-10-CM

## 2018-06-27 DIAGNOSIS — E78.5 HYPERLIPIDEMIA LDL GOAL <100: ICD-10-CM

## 2018-06-27 DIAGNOSIS — Z79.4 TYPE 2 DIABETES MELLITUS WITH HYPERGLYCEMIA, WITH LONG-TERM CURRENT USE OF INSULIN (H): Primary | ICD-10-CM

## 2018-06-27 DIAGNOSIS — I10 ESSENTIAL HYPERTENSION WITH GOAL BLOOD PRESSURE LESS THAN 140/90: ICD-10-CM

## 2018-06-27 LAB — ANA SER QL IF: NEGATIVE

## 2018-06-27 PROCEDURE — 99607 MTMS BY PHARM ADDL 15 MIN: CPT | Performed by: PHARMACIST

## 2018-06-27 PROCEDURE — 99606 MTMS BY PHARM EST 15 MIN: CPT | Performed by: PHARMACIST

## 2018-06-27 RX ORDER — IRBESARTAN AND HYDROCHLOROTHIAZIDE 150; 12.5 MG/1; MG/1
0.5 TABLET, FILM COATED ORAL DAILY
COMMUNITY
End: 2018-10-19

## 2018-06-27 RX ORDER — GLIPIZIDE 10 MG/1
10 TABLET ORAL
COMMUNITY
End: 2018-09-10

## 2018-06-27 NOTE — PATIENT INSTRUCTIONS
Recommendations from today's MTM visit:                                                      Increase Trulicity to 1.5mg once a week. Start 1.5 mg after finishing the new box of 0.75 mg. Rx sent to pharmacy.    Next MTM visit: August 29th    To schedule another MTM appointment, please call the clinic directly or you may call the MTM scheduling line at [phone] or toll-free at [phone].     My Clinical Pharmacist's contact information:                                                      It was a pleasure seeing you today!  Please feel free to contact me with any questions or concerns you have.      Mirna Perez, Pharm.D, [facility]  Medication Therapy Management Pharmacist      You may receive a survey about the MTM services you received.  I would appreciate your feedback to help me serve you better in the future. Please fill it out and return it when you can. Your comments will be anonymous.

## 2018-06-27 NOTE — MR AVS SNAPSHOT
After Visit Summary   6/27/2018    Brandy Leon    MRN: [id number]           Patient Information     Date Of Birth          1940        Visit Information        Provider Department      6/27/2018 8:30 AM Mirna Perez, [facility] MTM        Today's Diagnoses     Type 2 diabetes mellitus with hyperglycemia, with long-term current use of insulin (H)    -  1    Essential hypertension with goal blood pressure less than 140/90        Hyperlipidemia LDL goal <100          Care Instructions    Recommendations from today's MTM visit:                                                      Increase Trulicity to 1.5mg once a week. Start 1.5 mg after finishing the new box of 0.75 mg. Rx sent to pharmacy.    Next MTM visit: August 29th    To schedule another MTM appointment, please call the clinic directly or you may call the MTM scheduling line at [phone] or toll-free at [phone].     My Clinical Pharmacist's contact information:                                                      It was a pleasure seeing you today!  Please feel free to contact me with any questions or concerns you have.      Mirna Perez, Pharm.D, [facility]  Medication Therapy Management Pharmacist      You may receive a survey about the MTM services you received.  I would appreciate your feedback to help me serve you better in the future. Please fill it out and return it when you can. Your comments will be anonymous.                Follow-ups after your visit        Your next 10 appointments already scheduled     Jul 20, 2018  9:00 AM CDT   Return Visit with Ehsan Araya DPM   [facility]    [address] [place] [address]   [phone]            Aug 03, 2018 11:15 AM CDT   Return Visit with Candice Worley MD, [facility] ENDO NURSE   [facility] ([facility])    [address] [place] [address]  "N  [facility] [address]   [phone]            Aug 29, 2018  9:00 AM CDT   TELEMEDICINE with Mirna Perez [facility] [place] ([facility])    [address]  [address]  [facility] [address]   [phone]           Note: this is not an onsite visit; there is no need to come to the facility.            Sep 25, 2018 10:45 AM CDT   [facility] with Lucita Jordan MD   [facility]    [address]  [facility] [address]   [phone]            Oct 01, 2018  8:00 AM CDT   Return Visit with Thomas Serna MD, [facility] Ophth Nurse Only 2   [facility])    [address]  [facility] [address]   [phone]              Who to contact     If you have questions or need follow up information about today's clinic visit or your schedule please contact [facility] directly at [phone].  Normal or non-critical lab and imaging results will be communicated to you by MyChart, letter or phone within 4 business days after the clinic has received the results. If you do not hear from us within 7 days, please contact the clinic through MyChart or phone. If you have a critical or abnormal lab result, we will notify you by phone as soon as possible.  Submit refill requests through [employer] or call your pharmacy and they will forward the refill request to us. Please allow 3 business days for your refill to be completed.          Additional Information About Your Visit        ReviewZAP Information     Review[employer]AP lets you send messages to your doctor, view your test results, renew your prescriptions, schedule appointments and more. To sign up, go to [email or web address] . Click on \"Log in\" on the left side of the screen, which will take you to the Welcome page. Then click on \"Sign up Now\" on the right side of the page.     You will be " asked to enter the access code listed below, as well as some personal information. Please follow the directions to create your username and password.     Your access code is: [email or web address]  Expires: 2018  2:30 PM     Your access code will  in 90 days. If you need help or a new code, please call your [place] clinic or [phone].        Care EveryWhere ID     This is your Care EveryWhere ID. This could be used by other organizations to access your [place] medical records  [id number]         Blood Pressure from Last 3 Encounters:   18 115/50   18 145/73   18 122/62    Weight from Last 3 Encounters:   18 184 lb 12.8 oz (83.8 kg)   18 190 lb 7.6 oz (86.4 kg)   18 187 lb 12.8 oz (85.2 kg)              Today, you had the following     No orders found for display         Today's Medication Changes          These changes are accurate as of 18 10:26 AM.  If you have any questions, ask your nurse or doctor.               Start taking these medicines.        Dose/Directions    dulaglutide 1.5 MG/0.5ML pen   Commonly known as:  TRULICITY   Used for:  Type 2 diabetes mellitus with hyperglycemia, with long-term current use of insulin (H)   Replaces:  dulaglutide 0.75 MG/0.5ML pen        Dose:  1.5 mg   Inject 1.5 mg Subcutaneous every 7 days   Quantity:  2 mL   Refills:  1         These medicines have changed or have updated prescriptions.        Dose/Directions    glipiZIDE 10 MG tablet   Commonly known as:  GLUCOTROL   This may have changed:  Another medication with the same name was removed. Continue taking this medication, and follow the directions you see here.        Dose:  10 mg   Take 10 mg by mouth 2 times daily (before meals)   Refills:  0       irbesartan-hydrochlorothiazide 150-12.5 MG per tablet   Commonly known as:  AVALIDE   This may have changed:  Another medication with the same name was removed. Continue taking this medication, and follow the directions you  see here.        Dose:  0.5 tablet   Take 0.5 tablets by mouth daily   Refills:  0         Stop taking these medicines if you haven't already. Please contact your care team if you have questions.     dulaglutide 0.75 MG/0.5ML pen   Commonly known as:  TRULICITY   Replaced by:  dulaglutide 1.5 MG/0.5ML pen                Where to get your medicines      Call your pharmacy to confirm that your medication is ready for pickup. It may take up to 24 hours for them to receive the prescription. If the prescription is not ready within 3 business days, please contact your clinic or your provider.     We will let you know when these medications are ready. If you don't hear back within 3 business days, please contact us.     dulaglutide 1.5 MG/0.5ML pen                Primary Care Provider Office Phone # Fax #    Cailin Greenealexx Ponce, APRN [facility] [phone]       [address]  [place] [address]        Equal Access to Services     NUSRAT KHAN : Hadii aad ku hadasho Soomaali, waaxda luqadaha, qaybta kaalmada adeegyada, waxay idiin hayaan keri kharash jolly . So [facility] [phone].    ATENCIÓN: Si habla español, tiene a garcía disposición servicios gratuitos de asistencia lingüística. Sariame al [phone].    We comply with applicable federal civil rights laws and [place] laws. We do not discriminate on the basis of race, color, national origin, age, disability, sex, sexual orientation, or gender identity.            Thank you!     Thank you for choosing [facility]  for your care. Our goal is always to provide you with excellent care. Hearing back from our patients is one way we can continue to improve our services. Please take a few minutes to complete the written survey that you may receive in the mail after your visit with us. Thank you!             Your Updated Medication List - Protect others around you: Learn how to safely use, store and throw away your medicines at  www.disposemymeds.org.          This list is accurate as of 6/27/18 10:26 AM.  Always use your most recent med list.                   Brand Name Dispense Instructions for use Diagnosis    albuterol 108 (90 Base) MCG/ACT Inhaler    PROAIR HFA/PROVENTIL HFA/VENTOLIN HFA    1 Inhaler    Inhale 2 puffs into the lungs every 4 hours as needed for shortness of breath / dyspnea or wheezing    Cough due to bronchospasm       aspirin 81 MG EC tablet     90 tablet    Take 1 tablet by mouth daily.    Type 2 diabetes, HbA1c goal < 7% (H)       BD JUAN C U/F 32G X 4 MM   Generic drug:  insulin pen needle     100 each    USE 1 DAILY AS DIRECTED.    Type 2 diabetes mellitus with hyperglycemia (H)       ciclopirox 0.77 % cream    LOPROX    90 g    Apply topically 2 times daily To feet and toenails.    Tinea pedis of both feet       dulaglutide 1.5 MG/0.5ML pen    TRULICITY    2 mL    Inject 1.5 mg Subcutaneous every 7 days    Type 2 diabetes mellitus with hyperglycemia, with long-term current use of insulin (H)       famotidine 20 MG tablet    PEPCID     Take 20 mg by mouth nightly as needed        fexofenadine 180 MG tablet    ALLEGRA     Take 180 mg by mouth daily        glipiZIDE 10 MG tablet    GLUCOTROL     Take 10 mg by mouth 2 times daily (before meals)        GLUCOSAMINE CHONDROITIN COMPLX PO      2 Daily        insulin glargine 100 UNIT/ML injection    LANTUS SOLOSTAR    45 mL    Inject 20 Units Subcutaneous daily    Type 2 diabetes mellitus with hyperglycemia, with long-term current use of insulin (H)       irbesartan-hydrochlorothiazide 150-12.5 MG per tablet    AVALIDE     Take 0.5 tablets by mouth daily        latanoprost 0.005 % ophthalmic solution    XALATAN    3 Bottle    Place 1 drop into both eyes At Bedtime    Primary open angle glaucoma of both eyes, moderate stage       metFORMIN 1000 MG tablet    GLUCOPHAGE    180 tablet    TAKE 1 TABLET (1,000 MG) BY MOUTH 2 TIMES DAILY (WITH MEALS)    Type 2 diabetes mellitus  with hyperglycemia, with long-term current use of insulin (H)       MULTIVITAMIN PO      1 tablet daily        OMEGA 3 PO      Take by mouth 2 times daily.        ONE TOUCH DELICA LANCETS Misc     100 each    1 each 2 times daily. One Touch Delica    Type 2 diabetes, HbA1c goal < 7% (H)       ONETOUCH ULTRA test strip   Generic drug:  blood glucose monitoring     200 strip    USE TO TEST TWICE A DAY    Type 2 diabetes mellitus with hyperglycemia, with long-term current use of insulin (H)       order for DME     1 each    Glucometer covered by insurance.    Type 2 diabetes, HbA1c goal < 7% (H)       oxybutynin 5 MG 24 hr tablet    DITROPAN-XL    180 tablet    TAKE 2 TABLETS (10 MG) BY MOUTH DAILY    Urinary frequency, Overactive bladder       rosuvastatin 5 MG tablet    CRESTOR    8 tablet    Take 1 tablet twice a week    Dyslipidemia, goal LDL below 100       spacer/aero-hold chamber mask Aaliyah     1 each    1 Device as needed    Cough due to bronchospasm       SYNTHROID 137 MCG tablet   Generic drug:  levothyroxine     90 tablet    TAKE 1 TABLET (137 MCG) BY MOUTH DAILY    Myxedema heart disease (H), Nutritional and metabolic cardiomyopathy (H)

## 2018-06-27 NOTE — PROGRESS NOTES
SUBJECTIVE/OBJECTIVE:                Brandy Leon is a 77 year old female called for a follow-up visit for Medication Therapy Management.  She was referred to me from Cailin Ponce.     Chief Complaint: Follow up from our visit on 5/22/2018.  Blood Sugars  Tobacco: No tobacco use  Alcohol: none    Medication Adherence/Access:  no issues reported; patient does not miss any doses of medications.     Diabetes:  Pt currently taking Trulicity 0.75mg weekly, metformin 1000mg bid,  Glipizide 10mg bid, lantus 21 units daily. Patient just picked up 1 month supply for 0.75mg. Patient states her right side where she injects Trulicity is sore. Patient inject Lantus on the left side and Trulicity on the right side.    SMBG: one time daily.   Ranges (patient reported):   Date FBG/ 2hours post Lunch/2hours post Dinner /2hours post   6/26 152     6/25 162     6/24 217     6/23 214     6/22 221     6/21 181     6/20 178     6/19 172       Patient is not experiencing hypoglycemia  Recent symptoms of high blood sugar? achy  Eye exam: up to date - upcoming appointment in October.   Foot exam: up to date  Microalbumin is < 30 mg/g. Pt is taking an ACEi/ARB.  Aspirin: Taking 81mg daily and denies side effects  Eats dinner around 5:30. Pasta increases blood sugars so she tries to avoid. Last night had coleslaw, chicken pot pie and cheese curds.  Patient has been gardening, no other form of exercise.    Hyperlipidemia: Current therapy includes rosuvastatin 5mg twice weekly. Patient restarted this a couple of months ago. If she takes more than twice a week she experiences more myalgias.She does have some now. Patient feels much better when she is off of the medication. Patient is not really worried about her cardiac risk because many of her family members lived into their 90s without any medication.   The 10-year ASCVD risk score (Qing GASPER Jr, et al., 2013) is: 33.8%    Values used to calculate the score:      Age: 77 years       Sex: Female      Is Non- : No      Diabetic: Yes      Tobacco smoker: No      Systolic Blood Pressure: 115 mmHg      Is BP treated: Yes      HDL Cholesterol: 53 mg/dL      Total Cholesterol: 198 mg/dL    Hypertension: Current medications include irbesartan/HCTZ 150-12.5mg 1/2 tablet once daily. Patient occasionally checks her blood pressure at home or when she is at the pharmacy and it runs around 110/60mmHg. Patient reports no current medication side effects.    Current labs include:  Today's Vitals: There were no vitals taken for this visit.-phone visit  BP Readings from Last 3 Encounters:   06/25/18 115/50   04/06/18 145/73   04/04/18 122/62     Lab Results   Component Value Date    A1C 10.6 03/08/2018   .  Lab Results   Component Value Date    CHOL 201 12/22/2017     Lab Results   Component Value Date    TRIG 161 12/22/2017     Lab Results   Component Value Date    HDL 54 12/22/2017     Lab Results   Component Value Date     12/22/2017       Liver Function Studies -   Recent Labs   Lab Test  12/22/17   1118   PROTTOTAL  7.9   ALBUMIN  3.5   BILITOTAL  0.4   ALKPHOS  80   AST  19   ALT  18       Lab Results   Component Value Date    UCRR 159 06/25/2018    MICROL 17 06/25/2018    UMALCR 10.82 06/25/2018       Last Basic Metabolic Panel:  Lab Results   Component Value Date     03/08/2018      Lab Results   Component Value Date    POTASSIUM 4.0 03/08/2018     Lab Results   Component Value Date    CHLORIDE 99 03/08/2018     Lab Results   Component Value Date    BUN 16 03/08/2018     Lab Results   Component Value Date    CR 0.72 03/08/2018     GFR Estimate   Date Value Ref Range Status   06/25/2018 84 >60 mL/min/1.7m2 Final     Comment:     Non  GFR Calc   03/08/2018 79 >60 mL/min/1.7m2 Final     Comment:     Non  GFR Calc   12/22/2017 74 >60 mL/min/1.7m2 Final     Comment:     Non  GFR Calc     GFR Estimate If Black   Date Value  Ref Range Status   06/25/2018 >90 >60 mL/min/1.7m2 Final     Comment:      GFR Calc   03/08/2018 >90 >60 mL/min/1.7m2 Final     Comment:      GFR Calc   12/22/2017 90 >60 mL/min/1.7m2 Final     Comment:      GFR Calc       ASSESSMENT:              Current medications were reviewed today as discussed above.      Medication Adherence: good, no issues identified    Diabetes: Needs Improvement. Patient is not meeting A1c goal of < 8%. Self monitoring of blood glucose is not at goal of fasting  mg/dL. Patient may benefit from increase in Trulicity dose to further lower BG (dose change was confirmed with endocrinologist). Discussed importance of rotating administration sites.    Hyperlipidemia: Stable. Discussed risks and benefits of statin.     Hypertension: Stable. Patient is meeting blood pressure goal of less than 140/90 mmHg.      PLAN:                  1. Increase Trulicity to 1.5mg once a week. Start 1.5 mg after finishing the new box of 0.75 mg. Rx sent to pharmacy.     I spent 30 minutes with this patient today. All changes were made via collaborative practice agreement with Cailin Ponce. A copy of the visit note was provided to the patient's primary care provider.     Will follow up in 2 months. Endocrinology in 1 month.    The patient declined a summary of these recommendations as an after visit summary.    Mirna Perez, Pharm.D, [facility]  Medication Therapy Management Pharmacist

## 2018-06-29 ENCOUNTER — TELEPHONE (OUTPATIENT)
Dept: PEDIATRICS | Facility: CLINIC | Age: 78
End: 2018-06-29

## 2018-06-29 NOTE — TELEPHONE ENCOUNTER
Attempt #1:  Left message for patient to return call to clinic. Clinic number given.  Shruthi Marcus, JACOB

## 2018-06-29 NOTE — TELEPHONE ENCOUNTER
Patient advised of information below per Cailin Ponce.  Patient stated understanding.      Ade Sanchez CMA

## 2018-06-29 NOTE — PROGRESS NOTES
Brandy Leon,  Please call   Labs are normal except A1c   Laisha has already increased your dose of Trulicity      Please contact us if you have any questions.    Cailin Ponce, CNP

## 2018-06-29 NOTE — TELEPHONE ENCOUNTER
Notes Recorded by Cailin Ponce APRN CNP on 6/28/2018 at 11:50 PM  Brandy Leon,  Please call   Labs are normal except A1c   Laisha has already increased your dose of Trulicity      Please contact us if you have any questions.    Cailin Ponce CNP    Ref Range & Units 4d ago   6mo ago   1yr ago   2yr ago   3yr ago   4yr ago   5yr ago         Creatinine Urine mg/dL 159 76 49 117 118 213 85    Albumin Urine mg/L mg/L 17 9 14 5 10 6 <5 Urine Micr...    Albumin Urine mg/g Cr 0 - 25 mg/g Cr 10.82 12.26 28.07 (H) 4.54 8.10 2.82 Unable to rajesh...   Resulting Agency  [facility]        Specimen Collected: 06/25/18  2:53 PM Last Resulted: 06/25/18  3:35 PM         Ref Range & Units 4d ago   3mo ago         GREG interpretation NEG^Negative Negative NegativeCM   Comments:                                    Reference range:   <1:40  NEGATIVE   1:40 - 1:80  BORDERLINE POSITIVE   >1:80 POSITIVE      Resulting Agency  [facility]      Specimen Collected: 06/25/18  2:34 PM Last Resulted: 06/27/18  8:01 AM        Ref Range & Units 4d ago   3mo ago   2yr ago   8yr ago         Rheumatoid Factor <20 IU/mL <20 <20 <20 10R   Resulting Agency  [facility]      Specimen Collected: 06/25/18  2:34 PM Last Resulted: 06/26/18 10:55 AM        Ref Range & Units 4d ago  (6/25/18) 6mo ago  (12/22/17) 1yr ago  (2/9/17) 1yr ago  (9/27/16) 1yr ago  (7/26/16) 3yr ago  (11/26/14) 5yr ago  (8/1/12)       TSH 0.40 - 4.00 mU/L 2.46 1.70 1.33 1.88 1.78 1.14CM 1.34R   Resulting Agency   MG MG MG MG MG MG MG      Specimen Collected: 06/25/18  2:34 PM Last Resulted: 06/25/18  3:11 PM        Ref Range & Units 4d ago  (6/25/18) 3mo ago  (3/8/18) 6mo ago  (12/22/17) 1yr ago  (4/14/17) 1yr ago  (4/14/17) 1yr ago  (2/9/17) 1yr ago  (11/8/16)       Sodium 133 - 144 mmol/L 135 135 135  137 138 134   Comments: Specimen slightly hemolyzed, potassium may be falsely elevated    Potassium 3.4 - 5.3 mmol/L 4.3 4.0 4.4  4.3 4.2 4.1   Comments: Specimen slightly hemolyzed, potassium may be falsely elevated    Chloride 94 - 109 mmol/L 100 99 100  102 101 98    Carbon Dioxide 20 - 32 mmol/L 30 32 29  29 30 28    Anion Gap 3 - 14 mmol/L 5 4 6  6 7 8    Glucose 70 - 99 mg/dL 136 (H) 184 (H) (H)CM  151 (H) (H) (H)CM   Comments: Fasting specimen    Urea Nitrogen 7 - 30 mg/dL 18 16 16  21 15 15    Creatinine 0.52 - 1.04 mg/dL 0.68 0.72 0.76  0.76 0.67 0.62    GFR Estimate >60 mL/min/1.7m2 84 79CM 74CM  74CM 86CM >90  Non Afri...   Comments: Non  GFR Calc    GFR Estimate If Black >60 mL/min/1.7m2 >90 >90CM 90CM  90CM >90   ... >90   ...   Comments:  GFR Calc    Calcium 8.5 - 10.1 mg/dL 9.1 9.0 8.7  9.5 8.9 8.9    Bilirubin Total 0.2 - 1.3 mg/dL 0.5  0.4   0.3 0.3    Albumin 3.4 - 5.0 g/dL 3.8  3.5   3.8 3.6    Protein Total 6.8 - 8.8 g/dL 8.3  7.9   8.3 8.0    Alkaline Phosphatase 40 - 150 U/L 77  80   82 100    ALT 0 - 50 U/L 27  18 21  21 31    AST 0 - 45 U/L 29  19   16 24   Comments: Specimen is hemolyzed which can falsely elevate AST. Analysis of a   non-hemolyzed specimen may result in a lower value.      Resulting Agency  MG MG MG MG MG [facility]  MG MG      Specimen Collected: 06/25/18  2:34 PM Last Resulted: 06/25/18  3:10 PM        Ref Range & Units 4d ago   6mo ago   1yr ago          T4 Free 0.76 - 1.46 ng/dL 1.17 1.16 1.25     Resulting Agency  MG MG [facility]          Specimen Collected: 06/25/18  2:34 PM  Last Resulted: 06/25/18  3:09 PM         Ref Range & Units 4d ago   3mo ago   2yr ago          CRP Inflammation 0.0 - 8.0 mg/L 8.3 (H) 6.3 5.5     Resulting Agency  MG MG MG          Specimen Collected: 06/25/18  2:34 PM Last Resulted: 06/25/18  3:09 PM         Ref Range & Units 4d ago  (6/25/18) 6mo ago  (12/22/17) 1yr ago  (2/9/17) 1yr ago  (7/26/16) 2yr ago  (2/23/16) 2yr ago  (12/22/15) 2yr ago  (8/21/15)       Cholesterol <200 mg/dL 198 201 (H) 137 184 239 (H)CM 174CM    Triglycerides <150 mg/dL 258 (H) 161 (H) (H) (H) (H) (H) (H)R, CM   Comments: Borderline high:  150-199 mg/dl   High:             200-499 mg/dl   Very high:       >499 mg/dl   Fasting specimen       HDL Cholesterol >49 mg/dL 53 54 59 57 59 54 49 (L)R    LDL Cholesterol Calculated <100 mg/dL 93 115 (H)CM 52CM 48CM 88CM 130 (H)CM 86R, CM   Comments: Desirable:       <100 mg/dl    Non HDL Cholesterol <130 mg/dL 145 (H) 147 (H)CM 83 80 125 185 (H)CM    Comments: Above Desirable:  130-159 mg/dl   Borderline high:  160-189 mg/dl   High:             190-219 mg/dl   Very high:       >219 mg/dl      Resulting Agency  [facility]  MG MG [facility]      Specimen Collected: 06/25/18  2:34 PM Last Resulted: 06/25/18  3:09 PM        Ref Range & Units 4d ago  (6/25/18) 3mo ago  (3/8/18) 2yr ago  (4/25/16) 2yr ago  (12/22/15) 8yr ago  (1/18/10) 8yr ago  (11/4/09)        Sed Rate 0 - 30 mm/h 12 13 12 10 15 15     Resulting Agency  MG MG MG [facility] MG          Specimen Collected: 06/25/18  2:34 PM Last Resulted: 06/25/18  2:54 PM         Ref Range & Units 4d ago  (6/25/18) 3mo ago  (3/8/18) 7mo ago  (11/16/17) 11mo ago  (7/20/17) 1yr ago  (4/14/17) 1yr ago  (2/9/17) 1yr ago  (11/8/16)        Hemoglobin A1C 0 - 5.6 % 10.4 (H) 10.6 (H)R 8.8 (H)R 9.3 (H)R 10.2R 10.7 (H)R 11.0 (H)R     Comments: Normal <5.7% Prediabetes 5.7-6.4%  Diabetes 6.5% or higher - adopted from ADA   consensus  guidelines.        Resulting Agency  MG MG MG MG  MG MG          Specimen Collected: 06/25/18  2:34 PM Last Resulted: 06/25/18  2:47 PM

## 2018-07-13 DIAGNOSIS — Z79.4 TYPE 2 DIABETES MELLITUS WITH HYPERGLYCEMIA, WITH LONG-TERM CURRENT USE OF INSULIN (H): ICD-10-CM

## 2018-07-13 DIAGNOSIS — E11.65 TYPE 2 DIABETES MELLITUS WITH HYPERGLYCEMIA, WITH LONG-TERM CURRENT USE OF INSULIN (H): ICD-10-CM

## 2018-07-15 DIAGNOSIS — E78.5 DYSLIPIDEMIA, GOAL LDL BELOW 100: ICD-10-CM

## 2018-07-16 RX ORDER — GLIPIZIDE 10 MG/1
TABLET ORAL
Qty: 120 TABLET | Refills: 0 | Status: SHIPPED | OUTPATIENT
Start: 2018-07-16 | End: 2018-09-10

## 2018-07-16 RX ORDER — ROSUVASTATIN CALCIUM 5 MG/1
TABLET, COATED ORAL
Qty: 8 TABLET | Refills: 3 | Status: SHIPPED | OUTPATIENT
Start: 2018-07-16 | End: 2018-11-09

## 2018-07-16 NOTE — TELEPHONE ENCOUNTER
rosuvastatin (CRESTOR) 5 MG tablet 8 tablet 1 4/23/2018  No   Sig: Take 1 tablet twice a week     Last OV with Cailin Ponce CNP: 6/25/2018    Next 5 appointments (look out 90 days)     Jul 20, 2018  9:00 AM CDT   Return Visit with Ehsan Araya DPM   [facility])    [address] [facility] [address]   [phone]            Aug 03, 2018 11:15 AM CDT   Return Visit with Candice Worley MD, [facility] ENDO NURSE   [facility])    [address] [place] [address]   [phone]            Sep 25, 2018 10:45 AM CDT   Return Visit with Lucita Jordan MD   [facility])    [address] [place] [address]   [phone]            Oct 01, 2018  8:00 AM CDT   Return Visit with Thomas Serna MD, [facility] Ophth Nurse Only 2   [facility]    [address] [place] [address]   [phone]                LDL Cholesterol Calculated   Date Value Ref Range Status   06/25/2018 93 <100 mg/dL Final     Comment:     Desirable:       <100 mg/dl     Creatinine   Date Value Ref Range Status   06/25/2018 0.68 0.52 - 1.04 mg/dL Final     Statins Protocol Passed7/15 1:32 AM   LDL on file in past 12 months    No abnormal creatine kinase in past 12 months    Recent (12 mo) or future (30 days) visit within the authorizing provider's specialty    Patient is age 18 or older    No active pregnancy on record    No positive pregnancy test in past 12 months     Refilled per [facility] protocol.    Sharla Young RN

## 2018-07-16 NOTE — TELEPHONE ENCOUNTER
Subjective:       Patient ID: Fredi Heredia glipiZIDE (GLUCOTROL) 10 MG tablet     --   Sig - Route: Take 10 mg by mouth 2 times daily (before meals) - Oral   Class: Historical     Last OV with Cailin Ponce CNP: 6/25/2018    Next 5 appointments (look out 90 days)     Jul 20, 2018  9:00 AM CDT   [facility]    [address] [place] [address]   [phone]            Aug 03, 2018 11:15 AM CDT   Return Visit with Candice Worley MD, [facility] ENDO NURSE   [facility]    [address] [address] [address]   [phone]            Sep 25, 2018 10:45 AM CDT   Return Visit with Lucita Jordan MD   [facility])    [address] [place] [address]   [phone]            Oct 01, 2018  8:00 AM CDT   Return Visit with Thomas Serna MD, [facility] Ophth Nurse Only 2   [facility]    [address] [place] [address]   [phone]                LDL Cholesterol Calculated   Date Value Ref Range Status   06/25/2018 93 <100 mg/dL Final     Comment:     Desirable:       <100 mg/dl     BP Readings from Last 3 Encounters:   06/25/18 115/50   04/06/18 145/73   04/04/18 122/62     Lab Results   Component Value Date    A1C 10.4 06/25/2018    A1C 10.6 03/08/2018    A1C 8.8 11/16/2017    A1C 9.3 07/20/2017    A1C 10.2 04/14/2017     Creatinine   Date Value Ref Range Status   06/25/2018 0.68 0.52 - 1.04 mg/dL Final     Lab Results   Component Value Date    MICROL 17 06/25/2018       Sulfonylurea Agents Passed7/13 11:39 AM   Blood pressure less than 140/90 in past 6 months    Patient has documented LDL within the past 12 mos.    Patient has had a Microalbumin in the past 12 mos.    Patient has documented A1c within the specified period of time.    Patient is age 18 or older    No active    pregnancy on record    Patient has a recent creatinine (normal) within the past 12 mos.    Patient has not had a positive pregnancy test within the past 12 mos.    Recent (6 mo) or future (30 days) visit within the authorizing provider's specialty     Refilled per [facility] protocol.    Sharla Young RN

## 2018-07-20 ENCOUNTER — OFFICE VISIT (OUTPATIENT)
Dept: PODIATRY | Facility: CLINIC | Age: 78
End: 2018-07-20
Payer: COMMERCIAL

## 2018-07-20 VITALS — DIASTOLIC BLOOD PRESSURE: 68 MMHG | OXYGEN SATURATION: 96 % | HEART RATE: 74 BPM | SYSTOLIC BLOOD PRESSURE: 109 MMHG

## 2018-07-20 DIAGNOSIS — E11.49 TYPE II OR UNSPECIFIED TYPE DIABETES MELLITUS WITH NEUROLOGICAL MANIFESTATIONS, NOT STATED AS UNCONTROLLED(250.60) (H): ICD-10-CM

## 2018-07-20 DIAGNOSIS — B35.1 DERMATOPHYTOSIS OF NAIL: Primary | ICD-10-CM

## 2018-07-20 PROCEDURE — 99213 OFFICE O/P EST LOW 20 MIN: CPT | Performed by: PODIATRIST

## 2018-07-20 NOTE — LETTER
7/20/2018         RE: Brandy Leon  [address] [facility] [address]        Dear Colleague,    Thank you for referring your patient, Brandy Leon, to the [facility]. Please see a copy of my visit note below.    Past Medical History:   Diagnosis Date     Degenerative joint disease      Diabetes mellitus (H) 2006    type 2     HTN (hypertension)      Hyperlipidemia LDL goal < 100      Hypothyroidism 1960    s/p subtotal thyroidectomy      Rheumatic fever     x2 between the ages of 15-18     Seasonal allergies      Patient Active Problem List   Diagnosis     Seasonal allergies     Hypothyroidism     Hyperlipidemia LDL goal <100     Fibromyalgia     Osteoarthritis     Actinic keratosis     Advanced directives, counseling/discussion     Glaucoma suspect     Cataracts, bilateral     Obesity     Type 2 diabetes mellitus with hyperglycemia, with long-term current use of insulin (H)     Hypertension goal BP (blood pressure) < 140/90     Overactive bladder     Primary osteoarthritis of both hands     Recurrent UTI     Past Surgical History:   Procedure Laterality Date     C APPENDECTOMY       C ARTHROPLASTY TMJ       COLONOSCOPY       COLONOSCOPY N/A 8/13/2015    Procedure: COLONOSCOPY;  Surgeon: Duane, William Charles, MD;  Location: [facility] OR     COLONOSCOPY WITH CO2 INSUFFLATION N/A 8/13/2015    Procedure: COLONOSCOPY WITH CO2 INSUFFLATION;  Surgeon: Duane, William Charles, MD;  Location: [facility] OR     THYROIDECTOMY        Social History     Social History     Marital status: Single     Spouse name: N/A     Number of children: N/A     Years of education: N/A     Occupational History     Not on file.     Social History Main Topics     Smoking status: Never Smoker     Smokeless tobacco: Never Used      Comment: has had exposure to second hand smoke in the past from [place], no current exposure     Alcohol use No     Drug use: No     Sexual activity: No     Other Topics  Concern     Parent/Sibling W/ Cabg, Mi Or Angioplasty Before 65f 55m? 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     Hobby Hazards No     Sleep Concern Yes     difficulty staying asleep, up and down all night     Stress Concern No     Weight Concern Yes     would like to lose     Special Diet Yes     low card, low sugar diet     Back Care Yes     chiropratior     Exercise Yes     almost everyday     Bike Helmet No     does not ride bicycle any more     Seat Belt Yes     Self-Exams Yes     Social History Narrative     Family History   Problem Relation Age of Onset     Cancer Father      skin and leukemia     Cerebrovascular Disease Maternal Grandfather      Cerebrovascular Disease Paternal Grandmother      Eye Disorder Paternal Grandmother      cataracts     Cerebrovascular Disease Paternal Grandfather      HEART DISEASE Paternal Grandfather      Cancer Sister      Breast Cancer     Eye Disorder Mother      cataracts     Eye Disorder Brother      cataract     Breast Cancer Daughter      Other Cancer Daughter      ovarian cancer     Lab Results   Component Value Date    A1C 10.4 06/25/2018      SUBJECTIVE  FINDINGS:  This 77-year-old female returns to clinic for diabetic foot check, onychomycosis, onychauxis.  She relates that she is doing okay.  She has numbness, tingling, neuropathy in her feet.  She relates she had stubbed her toe about 2 weeks ago, coming in and she hit her stair stepping type machine and she had shoes on, but the nail bruised.  Relates it did not hurt.  She is using lotion.      OBJECTIVE FINDINGS:  DP and PT are 2-4 bilaterally.  CFT is less than 3 seconds.  She has thickened, dystrophic, discolored nails with subungual debris and dystrophy and discoloration.  Hallux nails bilaterally greater than the rest to differing degrees.  She  has subungual bruising on the left hallux nail and mildly on the right.  There is no erythema, no drainage, no odor, no calor bilaterally.      ASSESSMENT AND PLAN:  Onychomycosis bilaterally, onychauxis bilaterally.  Bruising left hallux nail and a little bit on the right hallux nail secondary to injury.  She is diabetic with peripheral neuropathy.  Diagnosis and treatment discussed with her.  All the nails were debrided or reduced bilaterally upon consent.  She still has some mild tinea pedis changes.  She will continue the Loprox cream.  We will observe the subungual bruising on the left hallux.  Return to clinic to see me in 3 months.         Again, thank you for allowing me to participate in the care of your patient.        Sincerely,        Ehsan Araya DPM

## 2018-07-20 NOTE — PATIENT INSTRUCTIONS
Thanks for coming today.  [facility] Clinic  [address] [place] [address]    To schedule future appointments in Ortho Clinic, you may call [phone].    To schedule ordered imaging by your Provider: Call [facility] at [phone]    Pre[employer] available online at:   [email or web address]    Please call if any further questions or concerns [phone] and ask for the Orthopedic Department. Clinic hours 8 am to 5 pm.    Return to clinic if symptoms worsen.

## 2018-07-20 NOTE — NURSING NOTE
Brandy Leon's chief complaint for this visit includes:  Chief Complaint   Patient presents with     RECHECK     Toenail check      PCP: Cailin Ponce    Referring Provider:  No referring provider defined for this encounter.    /68  Pulse 74  SpO2 96%  Data Unavailable     Do you need any medication refills at today's visit? no

## 2018-07-20 NOTE — PROGRESS NOTES
Past Medical History:   Diagnosis Date     Degenerative joint disease      Diabetes mellitus (H) 2006    type 2     HTN (hypertension)      Hyperlipidemia LDL goal < 100      Hypothyroidism 1960    s/p subtotal thyroidectomy      Rheumatic fever     x2 between the ages of 15-18     Seasonal allergies      Patient Active Problem List   Diagnosis     Seasonal allergies     Hypothyroidism     Hyperlipidemia LDL goal <100     Fibromyalgia     Osteoarthritis     Actinic keratosis     Advanced directives, counseling/discussion     Glaucoma suspect     Cataracts, bilateral     Obesity     Type 2 diabetes mellitus with hyperglycemia, with long-term current use of insulin (H)     Hypertension goal BP (blood pressure) < 140/90     Overactive bladder     Primary osteoarthritis of both hands     Recurrent UTI     Past Surgical History:   Procedure Laterality Date     C APPENDECTOMY       C ARTHROPLASTY TMJ       COLONOSCOPY       COLONOSCOPY N/A 8/13/2015    Procedure: COLONOSCOPY;  Surgeon: Duane, William Charles, MD;  Location: [facility]     COLONOSCOPY WITH CO2 INSUFFLATION N/A 8/13/2015    Procedure: COLONOSCOPY WITH CO2 INSUFFLATION;  Surgeon: Duane, William Charles, MD;  Location: [facility] OR     THYROIDECTOMY        Social History     Social History     Marital status: Single     Spouse name: N/A     Number of children: N/A     Years of education: N/A     Occupational History     Not on file.     Social History Main Topics     Smoking status: Never Smoker     Smokeless tobacco: Never Used      Comment: has had exposure to second hand smoke in the past from [place], no current exposure     Alcohol use No     Drug use: No     Sexual activity: No     Other Topics Concern     Parent/Sibling W/ Cabg, Mi Or Angioplasty Before 65f 55m? 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     Hobby Hazards No     Sleep Concern Yes     difficulty staying asleep, up and down all night     Stress Concern No     Weight Concern Yes     would like to lose     Special Diet Yes     low card, low sugar diet     Back Care Yes     chiropratior     Exercise Yes     almost everyday     Bike Helmet No     does not ride bicycle any more     Seat Belt Yes     Self-Exams Yes     Social History Narrative     Family History   Problem Relation Age of Onset     Cancer Father      skin and leukemia     Cerebrovascular Disease Maternal Grandfather      Cerebrovascular Disease Paternal Grandmother      Eye Disorder Paternal Grandmother      cataracts     Cerebrovascular Disease Paternal Grandfather      HEART DISEASE Paternal Grandfather      Cancer Sister      Breast Cancer     Eye Disorder Mother      cataracts     Eye Disorder Brother      cataract     Breast Cancer Daughter      Other Cancer Daughter      ovarian cancer     Lab Results   Component Value Date    A1C 10.4 06/25/2018      SUBJECTIVE  FINDINGS:  This 77-year-old female returns to clinic for diabetic foot check, onychomycosis, onychauxis.  She relates that she is doing okay.  She has numbness, tingling, neuropathy in her feet.  She relates she had stubbed her toe about 2 weeks ago, coming in and she hit her stair stepping type machine and she had shoes on, but the nail bruised.  Relates it did not hurt.  She is using lotion.      OBJECTIVE FINDINGS:  DP and PT are 2-4 bilaterally.  CFT is less than 3 seconds.  She has thickened, dystrophic, discolored nails with subungual debris and dystrophy and discoloration.  Hallux nails bilaterally greater than the rest to differing degrees.  She has subungual bruising on the left hallux nail and mildly on the right.  There is no erythema, no drainage, no odor, no calor bilaterally.      ASSESSMENT AND PLAN:  Onychomycosis bilaterally, onychauxis bilaterally.  Bruising left hallux nail and a little bit on the  right hallux nail secondary to injury.  She is diabetic with peripheral neuropathy.  Diagnosis and treatment discussed with her.  All the nails were debrided or reduced bilaterally upon consent.  She still has some mild tinea pedis changes.  She will continue the Loprox cream.  We will observe the subungual bruising on the left hallux.  Return to clinic to see me in 3 months.

## 2018-07-23 DIAGNOSIS — E11.65 TYPE 2 DIABETES MELLITUS WITH HYPERGLYCEMIA, WITH LONG-TERM CURRENT USE OF INSULIN (H): ICD-10-CM

## 2018-07-23 DIAGNOSIS — E11.65 TYPE 2 DIABETES MELLITUS WITH HYPERGLYCEMIA (H): ICD-10-CM

## 2018-07-23 DIAGNOSIS — Z79.4 TYPE 2 DIABETES MELLITUS WITH HYPERGLYCEMIA, WITH LONG-TERM CURRENT USE OF INSULIN (H): ICD-10-CM

## 2018-07-24 RX ORDER — DULAGLUTIDE 0.75 MG/.5ML
INJECTION, SOLUTION SUBCUTANEOUS
Refills: 0 | OUTPATIENT
Start: 2018-07-24

## 2018-07-24 NOTE — TELEPHONE ENCOUNTER
TRULICITY 0.75 MG/0.5ML pen  The source prescription has been discontinued.  INJECT 0.75 MG SUBCUTANEOUS EVERY 7 DAYS    dulaglutide (TRULICITY) 1.5 MG/0.5ML pen 2 mL 1 6/27/2018  --   Sig - Route: Inject 1.5 mg Subcutaneous every 7 days - Subcutaneous   Class: E-Prescribe   Notes to Pharmacy: Dose increase. Patient will call when needs refill   Order: [id number]       GLP-1 Agonists Protocol Passed7/23 7:44 PM   Blood pressure less than 140/90 in past 6 months    LDL on file in past 12 months    Microalbumin on file in past 12 months    HgbA1C in past 3 or 6 months    Patient is age 18 or older    No active pregnancy on record    Normal serum creatinine on file in past 12 months    No positive pregnancy test in past 12 months    Recent (6 mo) or future (30 days) visit within the authorizing provider's specialty     Please note the new dose and the refill request for old dose. Sharla Young RN

## 2018-08-03 ENCOUNTER — OFFICE VISIT (OUTPATIENT)
Dept: ENDOCRINOLOGY | Facility: CLINIC | Age: 78
End: 2018-08-03
Payer: COMMERCIAL

## 2018-08-03 VITALS
DIASTOLIC BLOOD PRESSURE: 72 MMHG | HEART RATE: 92 BPM | SYSTOLIC BLOOD PRESSURE: 119 MMHG | WEIGHT: 188.5 LBS | HEIGHT: 64 IN | OXYGEN SATURATION: 97 % | BODY MASS INDEX: 32.18 KG/M2

## 2018-08-03 DIAGNOSIS — J01.01 ACUTE RECURRENT MAXILLARY SINUSITIS: ICD-10-CM

## 2018-08-03 DIAGNOSIS — E11.65 TYPE 2 DIABETES MELLITUS WITH HYPERGLYCEMIA, WITH LONG-TERM CURRENT USE OF INSULIN (H): Primary | ICD-10-CM

## 2018-08-03 DIAGNOSIS — Z79.4 TYPE 2 DIABETES MELLITUS WITH HYPERGLYCEMIA, WITH LONG-TERM CURRENT USE OF INSULIN (H): Primary | ICD-10-CM

## 2018-08-03 PROCEDURE — 99214 OFFICE O/P EST MOD 30 MIN: CPT | Performed by: INTERNAL MEDICINE

## 2018-08-03 RX ORDER — AMOXICILLIN 875 MG
875 TABLET ORAL 2 TIMES DAILY
Qty: 20 TABLET | Refills: 0 | Status: SHIPPED | OUTPATIENT
Start: 2018-08-03 | End: 2018-09-25

## 2018-08-03 RX ORDER — DEXAMETHASONE 1 MG
TABLET ORAL
Qty: 1 TABLET | Refills: 0 | Status: SHIPPED | OUTPATIENT
Start: 2018-08-03 | End: 2018-10-18

## 2018-08-03 ASSESSMENT — PAIN SCALES - GENERAL: PAINLEVEL: NO PAIN (0)

## 2018-08-03 NOTE — LETTER
8/3/2018         RE: Brandy Leon  [address] [facility] [address]        Dear Colleague,    Thank you for referring your patient, Brandy Leon, to the [facility]. Please see a copy of my visit note below.    Endocrinology Clinic Visit    Chief Complaint: RECHECK (diabetes )     Interval history:   Did not bring meter, but testing regularly. This am > 200. BG usually high  Increased Trulicity recently x 2 injection thus far.   No nausea but no notable change in appetite or BG.   No change in wt  No GI symptoms.       HPI: Brandy is here for a FU    She has PMH of history of type 2 diabetes mellitus, hypertension, hyperlipidemia, hypothyroidism and degenerative joint disease.     She has had diabetes for more than 12 years  She has retinopathy and neuropathy.  Last eye exam was on August 2016   Last microalbuminuria  Feb 2017 + Microalbuminuria, normal creatinine  Denies any hypoglycemia    Glucometer:   She is usually tests blood sugars in the morning  Recent values are higher per patient report  This am > 200.     Lab Results   Component Value Date    A1C 10.4 (H) 06/25/2018    A1C 10.6 (H) 03/08/2018    A1C 8.8 (H) 11/16/2017    A1C 9.3 (H) 07/20/2017    A1C 10.2 04/14/2017       Medication history.   Initially, she took metformin 1 g twice a day and glipizide 20 mg twice daily  Due to difficult blood sugar control in the recent months she was started on Lantus 27 units daily but patient states that this has not benefited her.   Invokana added during the first visit.   A1c improved but she discontinued it due to amputation risk. Unwilling to try other drugs in this class.     Preventative medications  Crestor every other day now.   irbesartan and aspirin    Diet  She drinks coffee with a toast in the morning and goes to work.  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  She eats a toast with eggs or oatmeal with blueberries.  Around noon she  eats a sandwich with milk for lunch and around 5 PM she has dinner which usually very eats depending on what her son cooks.  She has navy beans with soup and fish and milk for dinner.     She does not drink alcohol or sugary beverage  She does not snack in between meals    Exercise  There is no structured exercise    Hypothyroidism  Diagnoses one than 20 years ago with overactive thyroid when she had shakiness.  Eventually she underwent thyroid surgery and started on levothyroxine  Currently she takes 137  g of levothyroxine  She denies having cold intolerance, change in diet or appetite    Family history  No history of diabetes, obesity.  Brother has history of thyroid surgery     Allergies   Allergen Reactions     Estradiol Itching     Lipitor [Atorvastatin Calcium]      Weak and shaky     Sulfa Drugs      Rash       Zocor [Simvastatin]      Weak and shakey       Current Outpatient Prescriptions   Medication Sig Dispense Refill     albuterol (PROAIR HFA/PROVENTIL HFA/VENTOLIN HFA) 108 (90 BASE) MCG/ACT Inhaler Inhale 2 puffs into the lungs every 4 hours as needed for shortness of breath / dyspnea or wheezing 1 Inhaler 1     aspirin 81 MG EC tablet Take 1 tablet by mouth daily. 90 tablet 3     ciclopirox (LOPROX) 0.77 % cream Apply topically 2 times daily To feet and toenails. 90 g 6     dulaglutide (TRULICITY) 1.5 MG/0.5ML pen Inject 1.5 mg Subcutaneous every 7 days 2 mL 1     famotidine (PEPCID) 20 MG tablet Take 20 mg by mouth nightly as needed       fexofenadine (ALLEGRA) 180 MG tablet Take 180 mg by mouth daily       glipiZIDE (GLUCOTROL) 10 MG tablet TAKE 2 TABLETS BY MOUTH TWICE A DAY BEFORE MEALS 120 tablet 0     glipiZIDE (GLUCOTROL) 10 MG tablet Take 10 mg by mouth 2 times daily (before meals)       GLUCOSAMINE CHONDROITIN COMPLX OR 2 Daily       insulin glargine (LANTUS SOLOSTAR) 100 UNIT/ML injection Inject 20 Units Subcutaneous daily 45 mL 3     insulin pen needle (BD JUAN C U/F) 32G X 4 MM USE 1 DAILY  AS DIRECTED. 100 each 3     irbesartan-hydrochlorothiazide (AVALIDE) 150-12.5 MG per tablet Take 0.5 tablets by mouth daily       latanoprost (XALATAN) 0.005 % ophthalmic solution Place 1 drop into both eyes At Bedtime 3 Bottle 4     metFORMIN (GLUCOPHAGE) 1000 MG tablet TAKE 1 TABLET (1,000 MG) BY MOUTH 2 TIMES DAILY (WITH MEALS) 180 tablet 3     MULTIVITAMIN OR 1 tablet daily       Omega-3 Fatty Acids (OMEGA 3 PO) Take by mouth 2 times daily.        ONE TOUCH DELICA LANCETS MISC 1 each 2 times daily. One Touch Delica   100 each 12     ONETOUCH ULTRA test strip USE TO TEST TWICE A  strip 11     order for DME Glucometer covered by insurance. 1 each 0     oxybutynin (DITROPAN-XL) 5 MG 24 hr tablet TAKE 2 TABLETS (10 MG) BY MOUTH DAILY 180 tablet 2     rosuvastatin (CRESTOR) 5 MG tablet TAKE 1 TABLET BY MOUTH TWICE WEEKLY 8 tablet 3     rosuvastatin (CRESTOR) 5 MG tablet Take 1 tablet twice a week 8 tablet 1     Spacer/Aero-Holding Chambers (SPACER/AERO-HOLD CHAMBER MASK) SANTANA 1 Device as needed 1 each 0     SYNTHROID 137 MCG tablet TAKE 1 TABLET (137 MCG) BY MOUTH DAILY 90 tablet 2       Review of Systems     Constitutional: Negative for fever and unexpected weight change. Appetite normal   Head: no headache   Eye: no vision change/ loss of peripheral vision.   ENT: No throat congestion.   Respiratory: no cough   Cardiovascular: no chest pain  Gastrointestinal: Negative for vomiting, abdominal pain and diarrhea.  Genitourinary: No bladder problems.  Musculoskeletal: Negative for myalgias. No weakness.  Neurological: Negative for seizures and headaches.  Psychiatric/Behavioral: Negative for behavioral problem and dysphoric mood.    Past Medical History:   Diagnosis Date     Degenerative joint disease      Diabetes mellitus (H) 2006    type 2     HTN (hypertension)      Hyperlipidemia LDL goal < 100      Hypothyroidism 1960    s/p subtotal thyroidectomy      Rheumatic fever     x2 between the ages of 15-18      Seasonal allergies        Past Surgical History:   Procedure Laterality Date     C APPENDECTOMY       C ARTHROPLASTY TMJ       COLONOSCOPY       COLONOSCOPY N/A 8/13/2015    Procedure: COLONOSCOPY;  Surgeon: Duane, William Charles, MD;  Location: [facility] OR     COLONOSCOPY WITH CO2 INSUFFLATION N/A 8/13/2015    Procedure: COLONOSCOPY WITH CO2 INSUFFLATION;  Surgeon: Duane, William Charles, MD;  Location: [facility] OR     THYROIDECTOMY          Family History   Problem Relation Age of Onset     Cancer Father      skin and leukemia     Cerebrovascular Disease Maternal Grandfather      Cerebrovascular Disease Paternal Grandmother      Eye Disorder Paternal Grandmother      cataracts     Cerebrovascular Disease Paternal Grandfather      HEART DISEASE Paternal Grandfather      Cancer Sister      Breast Cancer     Eye Disorder Mother      cataracts     Eye Disorder Brother      cataract     Breast Cancer Daughter      Other Cancer Daughter      ovarian cancer       Social History     Social History     Marital Status: Single     Spouse Name: N/A     Number of Children: N/A     Years of Education: N/A     Social History Main Topics     Smoking status: Never Smoker      Smokeless tobacco: Never Used      Comment: has had exposure to second hand smoke in the past from [place], no current exposure     Alcohol Use: No     Drug Use: No     Sexual Activity: No     Other Topics Concern     Parent/Sibling W/ Cabg, Mi Or Angioplasty Before 65f 55m? 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     Hobby Hazards No     Sleep Concern Yes     difficulty staying asleep, up and down all night     Stress Concern No     Weight Concern Yes     would like to lose     Special Diet Yes     low card, low sugar diet     Back Care Yes     chiropratior     Exercise Yes     almost everyday     Bike  "Helmet No     does not ride bicycle any more     Seat Belt Yes     Self-Exams Yes     Social History Narrative       Objective:   /72  Pulse 92  Ht 1.615 m (5' 3.58\")  Wt 85.5 kg (188 lb 8 oz)  SpO2 97%  BMI 32.78 kg/m2  Constitutional: Elderly lady in no acute distress   EYES: anicteric, normal extra-ocular movements, no lid lag or retraction   HEENT: Mouth/Throat: Mucous membrane is moist. Oropharynx is clear. No adenopathy. Normal thyroid   Cardiovascular: systolic murmur +, regular rate and rhythm  Pulmonary/Chest: CTAB. No wheezing or rales   Abdominal: +BS. Nontender to palpation. No organomegaly present.  Neurological: Alert. Normal affect. CNII-XII intact. Muscle strength 5/5. Sensory is intact.  Extremities: No clubbing, cyanosis or inflammation   Skin: normal texture, color  Feet: bilateral neuropathy left worse than right [ due 12/2018]  Lymphatic: no cervical lymphadenopathy.  Psychological: appropriate mood     In House Labs:   A1C     11.0   11/8/2016  A1C     10.4   7/26/2016  A1C     11.5   4/15/2016  A1C     11.2   2/23/2016  A1C      9.9   12/22/2015    TSH   Date Value Ref Range Status   06/25/2018 2.46 0.40 - 4.00 mU/L Final   12/22/2017 1.70 0.40 - 4.00 mU/L Final   02/09/2017 1.33 0.40 - 4.00 mU/L Final   09/27/2016 1.88 0.40 - 4.00 mU/L Final   07/26/2016 1.78 0.40 - 4.00 mU/L Final     T4 Free   Date Value Ref Range Status   06/25/2018 1.17 0.76 - 1.46 ng/dL Final   12/22/2017 1.16 0.76 - 1.46 ng/dL Final   02/09/2017 1.25 0.76 - 1.46 ng/dL Final       Creatinine   Date Value Ref Range Status   06/25/2018 0.68 0.52 - 1.04 mg/dL Final   ]    Recent Labs   Lab Test  07/26/16   0828  02/23/16   1115   08/21/15   0825   06/16/15   0941   CHOL  137  184   < >  174   --   224*   HDL  57  59   < >  49*   --   47*   LDL  48  88   < >  86   < >  120   TRIG  162*  187*   < >  197*   --   283*   CHOLHDLRATIO   --    --    --   3.6   --   4.8    < > = values in this interval not displayed. "       No results found for: [id number], [id number], [id number]      Assessment/Treatment Plan:      77 year old female  here for a follow up visit.     1. Type 2 Diabetes with uncontrolled hyperglycemia :   Neuropathy, nephropathy.  No retinopathy.   Her recent A1c was 10.4 indicating poor control    Barriers:   She is a  and hypoglycemia is a significant risk and patient tries to avoid at all costs.   She denies having changes in diet but she may be underreporting her diet.    Does not want SGLT2 due to amputation risk. I discussed about other agents as Farxiga.   Cost barrier: Lantus cost was 400 for 3 months    Plan:   Increase Lantus to 26 units.   Trulicity recently increased to 1.5 mg  Metformin 1 g BID  Gilipizide 1 mg BID  Dex suppression.   IGF1    2.  Hypothyroidism S/P Thyroidectomy for Graves' disease.  Continue levothyroxine 137  g daily  Recheck TSH levels in 12/2018.     3.  Hyperlipidemia  She was complaining about significant muscle pains in both arms and legs. : most likely due to lack of meds.       Candice Worley MD  [address]  Endocrinology Service              Again, thank you for allowing me to participate in the care of your patient.        Sincerely,        Candice Worley MD

## 2018-08-03 NOTE — MR AVS SNAPSHOT
After Visit Summary   8/3/2018    Brandy Leon    MRN: [id number]           Patient Information     Date Of Birth          1940        Visit Information        Provider Department      8/3/2018 11:15 AM Candice Worley MD; [facility] END[facility] NURSE [facility]        Today's Diagnoses     Type 2 diabetes mellitus with hyperglycemia, with long-term current use of insulin (H)    -  1    Acute recurrent maxillary sinusitis          Care Instructions    Increase lantus to 26 units daily.     Amoxicillin for 2 weeks,     After 2-3 weeks, take Dexamethasone 1 mg at 11:30 pm and go to lab next morning to check for cortisol at 8 AM. Please call local [facility] to schedule lab appt    [facility]-Department of Endocrinology  Jeannie Hathaway RN, Diabetes Educator: [phone]  Clinic Nurses Princess Colby: [phone]  Clinic Fax: [phone]  On-Call Endocrine at [facility] (after hours/weekends): [phone] option 4  Scheduling Line: [phone]    Appointment Reminders:  * Please bring meter with for staff to download  * If you are due ONLY for an A1C, it is scheduled with the nurse and will be done in clinic. You do not need to schedule a lab appointment. Fasting is not required for an A1C.  * Refill request should be submitted to your pharmacy. They will contact clinic for approval.                Follow-ups after your visit        Follow-up notes from your care team     Return in about 3 months (around 11/3/2018).      Your next 10 appointments already scheduled     Aug 29, 2018  9:00 AM CDT   TELEMEDICINE with Mirna Perez [facility] ([facility]    [address] [place] [address] [address]  [address] [place] [address] [phone]           Note: this is not an onsite visit; there is no need to come to the facility.            Sep 25, 2018 10:45 AM CDT   [facility]    [address] [place] [address]   [phone]            Oct 01, 2018  8:00 AM CDT   Return Visit with Thomas Serna MD, [facility] Nurse [facility]   [facility]    [address] [place] [address]   [phone]            Nov 02, 2018  9:00 AM CDT   [facility] TOSHIA HillM   [facility]    [address] [place] [address]   [phone]            Dec 28, 2018  8:15 AM CST   Return Visit with Candice Worley MD, [facility] NUR[facility]   [facility]    [address] [place] [id number] [phone]              Future tests that were ordered for you today     Open Future Orders        Priority Expected Expires Ordered    Cortisol Routine  8/3/2019 8/3/2018            Who to contact     If you have questions or need follow up information about today's clinic visit or your schedule please contact [facility] directly at [phone].  Normal or non-critical lab and imaging results will be communicated to you by MyChart, letter or phone within 4 business days after the clinic has received the results. If you do not hear from us within 7 days, please contact the clinic through MyChart or phone. If you have a critical or abnormal lab result, we will notify you by phone as soon as possible.  Submit refill requests through [employer] or call your pharmacy and they will forward the refill request to us. Please allow 3 business days for your refill to be completed.          Additional Information About Your Visit        Care EveryWhere ID     This is your Care EveryWhere ID. This could be used by other organizations to access your [facility] medical records  [id number]        Your Vitals Were     Pulse Height Pulse Oximetry  "BMI (Body Mass Index)          92 1.615 m (5' 3.58\") 97% 32.78 kg/m2         Blood Pressure from Last 3 Encounters:   08/03/18 119/72   07/20/18 109/68   06/25/18 115/50    Weight from Last 3 Encounters:   08/03/18 85.5 kg (188 lb 8 oz)   06/25/18 83.8 kg (184 lb 12.8 oz)   04/06/18 86.4 kg (190 lb 7.6 oz)                 Today's Medication Changes          These changes are accurate as of 8/3/18 11:23 AM.  If you have any questions, ask your nurse or doctor.               Start taking these medicines.        Dose/Directions    amoxicillin 875 MG tablet   Commonly known as:  AMOXIL   Used for:  Acute recurrent maxillary sinusitis        Dose:  875 mg   Take 1 tablet (875 mg) by mouth 2 times daily   Quantity:  20 tablet   Refills:  0       dexamethasone 1 MG tablet   Commonly known as:  DECADRON   Used for:  Type 2 diabetes mellitus with hyperglycemia, with long-term current use of insulin (H)        1 tab at midnight and go to lab at 8 am next morning.   Quantity:  1 tablet   Refills:  0            Where to get your medicines      These medications were sent to [facility]/pharmacy [address] [place] [address] AT [address] [address]     Phone:  [phone]     amoxicillin 875 MG tablet    dexamethasone 1 MG tablet                Primary Care Provider Office Phone # Fax #    Cailin Clarisa Ponce, APRN [phone]       [address]  [place] [address]        Equal Access to Services     [facility]: Hadii ana olverao Sotorey, waaxda luqadaha, qaybta kaalmada adejodie, mitch alaniz. So [facility] [phone].    ATENCIÓN: Si habla español, tiene a garcía disposición servicios gratuitos de asistencia lingüística. Sariame al [phone].    We comply with applicable federal civil rights laws and [place] laws. We do not discriminate on the basis of race, color, national origin, age, disability, sex, sexual " orientation, or gender identity.            Thank you!     Thank you for choosing [facility]  for your care. Our goal is always to provide you with excellent care. Hearing back from our patients is one way we can continue to improve our services. Please take a few minutes to complete the written survey that you may receive in the mail after your visit with us. Thank you!             Your Updated Medication List - Protect others around you: Learn how to safely use, store and throw away your medicines at www.disposemymeds.org.          This list is accurate as of 8/3/18 11:23 AM.  Always use your most recent med list.                   Brand Name Dispense Instructions for use Diagnosis    albuterol 108 (90 Base) MCG/ACT Inhaler    PROAIR HFA/PROVENTIL HFA/VENTOLIN HFA    1 Inhaler    Inhale 2 puffs into the lungs every 4 hours as needed for shortness of breath / dyspnea or wheezing    Cough due to bronchospasm       amoxicillin 875 MG tablet    AMOXIL    20 tablet    Take 1 tablet (875 mg) by mouth 2 times daily    Acute recurrent maxillary sinusitis       aspirin 81 MG EC tablet     90 tablet    Take 1 tablet by mouth daily.    Type 2 diabetes, HbA1c goal < 7% (H)       ciclopirox 0.77 % cream    LOPROX    90 g    Apply topically 2 times daily To feet and toenails.    Tinea pedis of both feet       dexamethasone 1 MG tablet    DECADRON    1 tablet    1 tab at midnight and go to lab at 8 am next morning.    Type 2 diabetes mellitus with hyperglycemia, with long-term current use of insulin (H)       dulaglutide 1.5 MG/0.5ML pen    TRULICITY    2 mL    Inject 1.5 mg Subcutaneous every 7 days    Type 2 diabetes mellitus with hyperglycemia, with long-term current use of insulin (H)       famotidine 20 MG tablet    PEPCID     Take 20 mg by mouth nightly as needed        fexofenadine 180 MG tablet    ALLEGRA     Take 180 mg by mouth daily        * glipiZIDE 10 MG tablet    GLUCOTROL     Take 10 mg by mouth  2 times daily (before meals)        * glipiZIDE 10 MG tablet    GLUCOTROL    120 tablet    TAKE 2 TABLETS BY MOUTH TWICE A DAY BEFORE MEALS    Type 2 diabetes mellitus with hyperglycemia, with long-term current use of insulin (H)       GLUCOSAMINE CHONDROITIN COMPLX PO      2 Daily        insulin glargine 100 UNIT/ML injection    LANTUS SOLOSTAR    45 mL    Inject 20 Units Subcutaneous daily    Type 2 diabetes mellitus with hyperglycemia, with long-term current use of insulin (H)       insulin pen needle 32G X 4 MM    BD JUAN C U/F    100 each    USE 1 DAILY AS DIRECTED.    Type 2 diabetes mellitus with hyperglycemia (H)       irbesartan-hydrochlorothiazide 150-12.5 MG per tablet    AVALIDE     Take 0.5 tablets by mouth daily        latanoprost 0.005 % ophthalmic solution    XALATAN    3 Bottle    Place 1 drop into both eyes At Bedtime    Primary open angle glaucoma of both eyes, moderate stage       metFORMIN 1000 MG tablet    GLUCOPHAGE    180 tablet    TAKE 1 TABLET (1,000 MG) BY MOUTH 2 TIMES DAILY (WITH MEALS)    Type 2 diabetes mellitus with hyperglycemia, with long-term current use of insulin (H)       MULTIVITAMIN PO      1 tablet daily        OMEGA 3 PO      Take by mouth 2 times daily.        ONE TOUCH DELICA LANCETS Misc     100 each    1 each 2 times daily. One Touch Delica    Type 2 diabetes, HbA1c goal < 7% (H)       ONETOUCH ULTRA test strip   Generic drug:  blood glucose monitoring     200 strip    USE TO TEST TWICE A DAY    Type 2 diabetes mellitus with hyperglycemia, with long-term current use of insulin (H)       order for DME     1 each    Glucometer covered by insurance.    Type 2 diabetes, HbA1c goal < 7% (H)       oxybutynin 5 MG 24 hr tablet    DITROPAN-XL    180 tablet    TAKE 2 TABLETS (10 MG) BY MOUTH DAILY    Urinary frequency, Overactive bladder       * rosuvastatin 5 MG tablet    CRESTOR    8 tablet    Take 1 tablet twice a week    Dyslipidemia, goal LDL below 100       * rosuvastatin 5  MG tablet    CRESTOR    8 tablet    TAKE 1 TABLET BY MOUTH TWICE WEEKLY    Dyslipidemia, goal LDL below 100       spacer/aero-hold chamber mask Aaliyah     1 each    1 Device as needed    Cough due to bronchospasm       SYNTHROID 137 MCG tablet   Generic drug:  levothyroxine     90 tablet    TAKE 1 TABLET (137 MCG) BY MOUTH DAILY    Myxedema heart disease (H), Nutritional and metabolic cardiomyopathy (H)       * Notice:  This list has 4 medication(s) that are the same as other medications prescribed for you. Read the directions carefully, and ask your doctor or other care provider to review them with you.

## 2018-08-03 NOTE — NURSING NOTE
"Brandy Leon's goals for this visit include:   Chief Complaint   Patient presents with     RECHECK     diabetes        She requests these members of her care team be copied on today's visit information: no       PCP: Cailin Ponce    Referring Provider:  No referring provider defined for this encounter.    /72  Pulse 92  Ht 1.615 m (5' 3.58\")  Wt 85.5 kg (188 lb 8 oz)  SpO2 97%  BMI 32.78 kg/m2    Do you need any medication refills at today's visit? No     Amorrorlando Davila CMA        "

## 2018-08-03 NOTE — PROGRESS NOTES
Endocrinology Clinic Visit    Chief Complaint: RECHECK (diabetes )     Interval history:   Did not bring meter, but testing regularly. This am > 200. BG usually high  Increased Trulicity recently x 2 injection thus far.   No nausea but no notable change in appetite or BG.   No change in wt  No GI symptoms.       HPI: Brandy is here for a FU    She has PMH of history of type 2 diabetes mellitus, hypertension, hyperlipidemia, hypothyroidism and degenerative joint disease.     She has had diabetes for more than 12 years  She has retinopathy and neuropathy.  Last eye exam was on August 2016   Last microalbuminuria  Feb 2017 + Microalbuminuria, normal creatinine  Denies any hypoglycemia    Glucometer:   She is usually tests blood sugars in the morning  Recent values are higher per patient report  This am > 200.     Lab Results   Component Value Date    A1C 10.4 (H) 06/25/2018    A1C 10.6 (H) 03/08/2018    A1C 8.8 (H) 11/16/2017    A1C 9.3 (H) 07/20/2017    A1C 10.2 04/14/2017       Medication history.   Initially, she took metformin 1 g twice a day and glipizide 20 mg twice daily  Due to difficult blood sugar control in the recent months she was started on Lantus 27 units daily but patient states that this has not benefited her.   Invokana added during the first visit.   A1c improved but she discontinued it due to amputation risk. Unwilling to try other drugs in this class.     Preventative medications  Crestor every other day now.   irbesartan and aspirin    Diet  She drinks coffee with a toast in the morning and goes to work.  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  She eats a toast with eggs or oatmeal with blueberries.  Around noon she eats a sandwich with milk for lunch and around 5 PM she has dinner which usually very eats depending on what her son cooks.  She has navy beans with soup and fish and milk for dinner.     She does not drink alcohol or sugary beverage  She does not snack in between  meals    Exercise  There is no structured exercise    Hypothyroidism  Diagnoses one than 20 years ago with overactive thyroid when she had shakiness.  Eventually she underwent thyroid surgery and started on levothyroxine  Currently she takes 137  g of levothyroxine  She denies having cold intolerance, change in diet or appetite    Family history  No history of diabetes, obesity.  Brother has history of thyroid surgery     Allergies   Allergen Reactions     Estradiol Itching     Lipitor [Atorvastatin Calcium]      Weak and shaky     Sulfa Drugs      Rash       Zocor [Simvastatin]      Weak and shakey       Current Outpatient Prescriptions   Medication Sig Dispense Refill     albuterol (PROAIR HFA/PROVENTIL HFA/VENTOLIN HFA) 108 (90 BASE) MCG/ACT Inhaler Inhale 2 puffs into the lungs every 4 hours as needed for shortness of breath / dyspnea or wheezing 1 Inhaler 1     aspirin 81 MG EC tablet Take 1 tablet by mouth daily. 90 tablet 3     ciclopirox (LOPROX) 0.77 % cream Apply topically 2 times daily To feet and toenails. 90 g 6     dulaglutide (TRULICITY) 1.5 MG/0.5ML pen Inject 1.5 mg Subcutaneous every 7 days 2 mL 1     famotidine (PEPCID) 20 MG tablet Take 20 mg by mouth nightly as needed       fexofenadine (ALLEGRA) 180 MG tablet Take 180 mg by mouth daily       glipiZIDE (GLUCOTROL) 10 MG tablet TAKE 2 TABLETS BY MOUTH TWICE A DAY BEFORE MEALS 120 tablet 0     glipiZIDE (GLUCOTROL) 10 MG tablet Take 10 mg by mouth 2 times daily (before meals)       GLUCOSAMINE CHONDROITIN COMPLX OR 2 Daily       insulin glargine (LANTUS SOLOSTAR) 100 UNIT/ML injection Inject 20 Units Subcutaneous daily 45 mL 3     insulin pen needle (BD JUAN C U/F) 32G X 4 MM USE 1 DAILY AS DIRECTED. 100 each 3     irbesartan-hydrochlorothiazide (AVALIDE) 150-12.5 MG per tablet Take 0.5 tablets by mouth daily       latanoprost (XALATAN) 0.005 % ophthalmic solution Place 1 drop into both eyes At Bedtime 3 Bottle 4     metFORMIN (GLUCOPHAGE) 1000 MG  tablet TAKE 1 TABLET (1,000 MG) BY MOUTH 2 TIMES DAILY (WITH MEALS) 180 tablet 3     MULTIVITAMIN OR 1 tablet daily       Omega-3 Fatty Acids (OMEGA 3 PO) Take by mouth 2 times daily.        ONE TOUCH DELICA LANCETS MISC 1 each 2 times daily. One Touch Delica   100 each 12     ONETOUCH ULTRA test strip USE TO TEST TWICE A  strip 11     order for DME Glucometer covered by insurance. 1 each 0     oxybutynin (DITROPAN-XL) 5 MG 24 hr tablet TAKE 2 TABLETS (10 MG) BY MOUTH DAILY 180 tablet 2     rosuvastatin (CRESTOR) 5 MG tablet TAKE 1 TABLET BY MOUTH TWICE WEEKLY 8 tablet 3     rosuvastatin (CRESTOR) 5 MG tablet Take 1 tablet twice a week 8 tablet 1     Spacer/Aero-Holding Chambers (SPACER/AERO-HOLD CHAMBER MASK) SANTANA 1 Device as needed 1 each 0     SYNTHROID 137 MCG tablet TAKE 1 TABLET (137 MCG) BY MOUTH DAILY 90 tablet 2       Review of Systems     Constitutional: Negative for fever and unexpected weight change. Appetite normal   Head: no headache   Eye: no vision change/ loss of peripheral vision.   ENT: No throat congestion.   Respiratory: no cough   Cardiovascular: no chest pain  Gastrointestinal: Negative for vomiting, abdominal pain and diarrhea.  Genitourinary: No bladder problems.  Musculoskeletal: Negative for myalgias. No weakness.  Neurological: Negative for seizures and headaches.  Psychiatric/Behavioral: Negative for behavioral problem and dysphoric mood.    Past Medical History:   Diagnosis Date     Degenerative joint disease      Diabetes mellitus (H) 2006    type 2     HTN (hypertension)      Hyperlipidemia LDL goal < 100      Hypothyroidism 1960    s/p subtotal thyroidectomy      Rheumatic fever     x2 between the ages of 15-18     Seasonal allergies        Past Surgical History:   Procedure Laterality Date     C APPENDECTOMY       C ARTHROPLASTY TMJ       COLONOSCOPY       COLONOSCOPY N/A 8/13/2015    Procedure: COLONOSCOPY;  Surgeon: Duane, William Charles, MD;  Location:  OR      "COLONOSCOPY WITH CO2 INSUFFLATION N/A 8/13/2015    Procedure: COLONOSCOPY WITH CO2 INSUFFLATION;  Surgeon: Duane, William Charles, MD;  Location: [facility] OR     THYROIDECTOMY          Family History   Problem Relation Age of Onset     Cancer Father      skin and leukemia     Cerebrovascular Disease Maternal Grandfather      Cerebrovascular Disease Paternal Grandmother      Eye Disorder Paternal Grandmother      cataracts     Cerebrovascular Disease Paternal Grandfather      HEART DISEASE Paternal Grandfather      Cancer Sister      Breast Cancer     Eye Disorder Mother      cataracts     Eye Disorder Brother      cataract     Breast Cancer Daughter      Other Cancer Daughter      ovarian cancer       Social History     Social History     Marital Status: Single     Spouse Name: N/A     Number of Children: N/A     Years of Education: N/A     Social History Main Topics     Smoking status: Never Smoker      Smokeless tobacco: Never Used      Comment: has had exposure to second hand smoke in the past from [place], no current exposure     Alcohol Use: No     Drug Use: No     Sexual Activity: No     Other Topics Concern     Parent/Sibling W/ Cabg, Mi Or Angioplasty Before 65f 55m? 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     Hobby Hazards No     Sleep Concern Yes     difficulty staying asleep, up and down all night     Stress Concern No     Weight Concern Yes     would like to lose     Special Diet Yes     low card, low sugar diet     Back Care Yes     chiropratior     Exercise Yes     almost everyday     Bike Helmet No     does not ride bicycle any more     Seat Belt Yes     Self-Exams Yes     Social History Narrative       Objective:   /72  Pulse 92  Ht 1.615 m (5' 3.58\")  Wt 85.5 kg (188 lb 8 oz)  SpO2 97%  BMI 32.78 kg/m2  Constitutional: Elderly " lady in no acute distress   EYES: anicteric, normal extra-ocular movements, no lid lag or retraction   HEENT: Mouth/Throat: Mucous membrane is moist. Oropharynx is clear. No adenopathy. Normal thyroid   Cardiovascular: systolic murmur +, regular rate and rhythm  Pulmonary/Chest: CTAB. No wheezing or rales   Abdominal: +BS. Nontender to palpation. No organomegaly present.  Neurological: Alert. Normal affect. CNII-XII intact. Muscle strength 5/5. Sensory is intact.  Extremities: No clubbing, cyanosis or inflammation   Skin: normal texture, color  Feet: bilateral neuropathy left worse than right [ due 12/2018]  Lymphatic: no cervical lymphadenopathy.  Psychological: appropriate mood     In House Labs:   A1C     11.0   11/8/2016  A1C     10.4   7/26/2016  A1C     11.5   4/15/2016  A1C     11.2   2/23/2016  A1C      9.9   12/22/2015    TSH   Date Value Ref Range Status   06/25/2018 2.46 0.40 - 4.00 mU/L Final   12/22/2017 1.70 0.40 - 4.00 mU/L Final   02/09/2017 1.33 0.40 - 4.00 mU/L Final   09/27/2016 1.88 0.40 - 4.00 mU/L Final   07/26/2016 1.78 0.40 - 4.00 mU/L Final     T4 Free   Date Value Ref Range Status   06/25/2018 1.17 0.76 - 1.46 ng/dL Final   12/22/2017 1.16 0.76 - 1.46 ng/dL Final   02/09/2017 1.25 0.76 - 1.46 ng/dL Final       Creatinine   Date Value Ref Range Status   06/25/2018 0.68 0.52 - 1.04 mg/dL Final   ]    Recent Labs   Lab Test  07/26/16   0828  02/23/16   1115   08/21/15   0825   06/16/15   0941   CHOL  137  184   < >  174   --   224*   HDL  57  59   < >  49*   --   47*   LDL  48  88   < >  86   < >  120   TRIG  162*  187*   < >  197*   --   283*   CHOLHDLRATIO   --    --    --   3.6   --   4.8    < > = values in this interval not displayed.       No results found for: [id number], [id number], [id number]      Assessment/Treatment Plan:      77 year old female  here for a follow up visit.     1. Type 2 Diabetes with uncontrolled hyperglycemia :   Neuropathy, nephropathy.  No  retinopathy.   Her recent A1c was 10.4 indicating poor control    Barriers:   She is a  and hypoglycemia is a significant risk and patient tries to avoid at all costs.   She denies having changes in diet but she may be underreporting her diet.    Does not want SGLT2 due to amputation risk. I discussed about other agents as Farxiga.   Cost barrier: Lantus cost was 400 for 3 months    Plan:   Increase Lantus to 26 units.   Trulicity recently increased to 1.5 mg  Metformin 1 g BID  Gilipizide 1 mg BID  Dex suppression.   IGF1    2.  Hypothyroidism S/P Thyroidectomy for Graves' disease.  Continue levothyroxine 137  g daily  Recheck TSH levels in 12/2018.     3.  Hyperlipidemia  She was complaining about significant muscle pains in both arms and legs. : most likely due to lack of meds.       Candice Worley MD  [id number]  Endocrinology Service

## 2018-08-03 NOTE — PATIENT INSTRUCTIONS
Increase lantus to 26 units daily.     Amoxicillin for 2 weeks,     After 2-3 weeks, take Dexamethasone 1 mg at 11:30 pm and go to lab next morning to check for cortisol at 8 AM. Please call local [facility] to schedule lab appt    [facility]-Department of Endocrinology  Jeannie Hathaway RN, Diabetes Educator: [phone]  Clinic Nurses Princess Colby: [phone]  Clinic Fax: [phone]  On-Call Endocrine [facility] (after hours/weekends): [phone] option 4  Scheduling Line: [phone]    Appointment Reminders:  * Please bring meter with for staff to download  * If you are due ONLY for an A1C, it is scheduled with the nurse and will be done in clinic. You do not need to schedule a lab appointment. Fasting is not required for an A1C.  * Refill request should be submitted to your pharmacy. They will contact clinic for approval.

## 2018-08-08 DIAGNOSIS — I10 HYPERTENSION GOAL BP (BLOOD PRESSURE) < 140/90: ICD-10-CM

## 2018-08-08 RX ORDER — IRBESARTAN AND HYDROCHLOROTHIAZIDE 150; 12.5 MG/1; MG/1
TABLET, FILM COATED ORAL
Qty: 90 TABLET | Refills: 1 | Status: SHIPPED | OUTPATIENT
Start: 2018-08-08 | End: 2019-10-18

## 2018-08-08 NOTE — TELEPHONE ENCOUNTER
irbesartan-hydrochlorothiazide (AVALIDE) 150-12.5 MG per tablet     --   Sig - Route: Take 0.5 tablets by mouth daily - Oral   Class: Historical     Last OV with Cailin Ponce CNP: 6/25/2018    Next 5 appointments (look out 90 days)     Sep 25, 2018 10:45 AM CDT   Return Visit with Lucita Jordan MD   [facility]    [address] [facility] [address]   [phone]            Oct 01, 2018  8:00 AM CDT   Return Visit with Thomas Serna MD, [facility] Oph Nurse Only 2   [facility] ([facility])    [address] [facility] [address]   [phone]            Nov 02, 2018  9:00 AM CDT   Return Visit with Ehsan Araya DPM   [facility] ([facility])    [address] [facility] [address]   [phone]                BP Readings from Last 3 Encounters:   08/03/18 119/72   07/20/18 109/68   06/25/18 115/50     Creatinine   Date Value Ref Range Status   06/25/2018 0.68 0.52 - 1.04 mg/dL Final     Potassium   Date Value Ref Range Status   06/25/2018 4.3 3.4 - 5.3 mmol/L Final     Comment:     Specimen slightly hemolyzed, potassium may be falsely elevated     Angiotensin-II Receptors Passed8/8 1:14 AM   Blood pressure under 140/90 in past 12 months    Recent (12 mo) or future (30 days) visit within the authorizing provider's specialty    Patient is age 18 or older    No active pregnancy on record    Normal serum creatinine on file in past 12 months    Normal serum potassium on file in past 12 months    No positive pregnancy test in past 12 months     Refilled per [facility] protocol.    Sharla Young RN

## 2018-08-14 NOTE — TELEPHONE ENCOUNTER
Attempt #3    Left message for patient to call back.  Letter sent to patient to their home address regarding information below.    Ade Sanchez CMA   Gokul Floyd is a 19 year old male who presents for his well evaluation.    REVIEW OF SYSTEMS: Concerns include the following; his asthma is no longer persistent, mostly with exercise..  Review of all other systems is negative.    Past Medical History:   Diagnosis Date   • Allergic rhinitis, cause unspecified 9/21/2009   • Extrinsic asthma, unspecified 9/21/2009       Past Surgical History:   Procedure Laterality Date   • Past surgical history      none       Social History     Social History   • Marital status: Single     Spouse name: N/A   • Number of children: N/A   • Years of education: N/A     Occupational History   • Not on file.     Social History Main Topics   • Smoking status: Never Smoker   • Smokeless tobacco: Never Used      Comment: no smokers in the home   • Alcohol use No   • Drug use: Unknown   • Sexual activity: Not on file     Other Topics Concern   • Not on file     Social History Narrative   • No narrative on file       Patient Active Problem List   Diagnosis   • Extrinsic asthma, unspecified   • Allergic rhinitis, cause unspecified   • Scoliosis   • Heart palpitations       IMMUNIZATION HISTORY:  There has been no reactions to past immunizations.        EDUCATION:      Safety-    Swimming safety skills - discussed                 Bike helmets/seat belts - discussed                 Lock up firearms - discussed      Behavior - Teeth brushing/routine dental visit - discussed                 Regular physical behavior - discussed                 Sexual activity - discussed avoidance, pregnancy and STD's prevention                 Alcohol, tobacco avoidance - discussed                 Rules/privileges - discussed      Diet -   Food pyramid - discussed                   PHYSICAL EXAMINATION:    GENERAL: alert in no acute distress  SKIN: pink, warm, no rashes, no ecchymosis;acne - none  HEAD: atraumatic, normocephalic, symmetric  EYES: Pupils equal,round and reactive to light. Extraocular movements  intact, fundi grossly normal, conjunctivae and sclerae normal. Normal peripheral vision  EARS: normal external ears and canals. Tympanic membranes with normal landmarks, light reflex and mobility  NOSE: no rhinorrhea, no nasal flare  THROAT: moist mucous membranes, +1 tonsils without erythema, exudates or petechia  NECK: normal, supple and no lymphadenopathy  TRUNK AND THORAX: symmetrical and no chest wall deformities or tenderness  LUNGS: Clear to auscultation, no wheezing, crackles or retractions  HEART: quiet precordium, regular rate and rhythm without murmurs, clicks, or gallops and Capillary refill test <2 secs.  ABDOMEN: Soft, nontender, bowel sounds - normal.  No masses or hepatosplenomegaly.  GENITALIA: within normal limits, Tal stage V  EXTREMITIES: Symmetric extremities, normal feet  BACK:  mild scoliosis  Checked a couple times in the past  NEUROLOGIC: normal symmetric deep tendon reflexes, +4 muscular tone and strength throughout    ASSESSMENT AND PLAN:  See Diagnosis, Orders/Medication, and Follow-up instructions.    The parent was counseled about each component of the vaccine, including possible side effects, risks and benefits.

## 2018-08-29 ENCOUNTER — APPOINTMENT (OUTPATIENT)
Dept: PHARMACY | Facility: CLINIC | Age: 78
End: 2018-08-29
Payer: COMMERCIAL

## 2018-09-10 DIAGNOSIS — Z79.4 TYPE 2 DIABETES MELLITUS WITH HYPERGLYCEMIA, WITH LONG-TERM CURRENT USE OF INSULIN (H): Primary | ICD-10-CM

## 2018-09-10 DIAGNOSIS — E11.65 TYPE 2 DIABETES MELLITUS WITH HYPERGLYCEMIA, WITH LONG-TERM CURRENT USE OF INSULIN (H): Primary | ICD-10-CM

## 2018-09-10 RX ORDER — GLIPIZIDE 10 MG/1
10 TABLET ORAL
Qty: 360 TABLET | Refills: 0 | Status: SHIPPED | OUTPATIENT
Start: 2018-09-10 | End: 2019-03-09

## 2018-09-10 NOTE — TELEPHONE ENCOUNTER
glipiZIDE (GLUCOTROL) 10 MG tablet 120 tablet 0 7/16/2018  No   Sig: TAKE 2 TABLETS BY MOUTH TWICE A DAY BEFORE MEALS     Last OV with BLANCA Ponce CNP: 6/25/2018    Next 5 appointments (look out 90 days)     Sep 25, 2018 10:45 AM CDT   [facility] Visit with Lucita Jordan MD   [facility]    [address] [place] [address]   [phone]            Oct 01, 2018  8:00 AM CDT   Return Visit with Thomas Serna MD, [facility] Oph Nurse Only 2   [facility])    [address] [address] [address]   [phone]            Nov 02, 2018  9:00 AM CDT   Return Visit with Ehsan Araya DPM   [facility] ([facility])    [address] [place] [address]   [phone]                BP Readings from Last 3 Encounters:   08/03/18 119/72   07/20/18 109/68   06/25/18 115/50     Creatinine   Date Value Ref Range Status   06/25/2018 0.68 0.52 - 1.04 mg/dL Final     LDL Cholesterol Calculated   Date Value Ref Range Status   06/25/2018 93 <100 mg/dL Final     Comment:     Desirable:       <100 mg/dl     Lab Results   Component Value Date    MICROL 17 06/25/2018     Lab Results   Component Value Date    A1C 10.4 06/25/2018    A1C 10.6 03/08/2018    A1C 8.8 11/16/2017    A1C 9.3 07/20/2017    A1C 10.2 04/14/2017     Sulfonylurea Agents Passed9/10 2:19 PM   Blood pressure less than 140/90 in past 6 months    Patient has documented LDL within the past 12 mos.    Patient has had a Microalbumin in the past 12 mos.    Patient has documented A1c within the specified period of time.    Patient is age 18 or older    No active pregnancy on record    Patient has a recent creatinine (normal) within the past 12 mos.    Patient has not had a positive pregnancy test within the past 12 mos.    Recent (6 mo) or future (30 days) visit within the authorizing provider's specialty      Refilled per P protocol.    Sharla Young RN

## 2018-09-17 DIAGNOSIS — E11.65 TYPE 2 DIABETES MELLITUS WITH HYPERGLYCEMIA, WITH LONG-TERM CURRENT USE OF INSULIN (H): ICD-10-CM

## 2018-09-17 DIAGNOSIS — Z79.4 TYPE 2 DIABETES MELLITUS WITH HYPERGLYCEMIA, WITH LONG-TERM CURRENT USE OF INSULIN (H): ICD-10-CM

## 2018-09-18 NOTE — TELEPHONE ENCOUNTER
Routing refill request to provider for review/approval because:  Patients A1c was 10.4 on 6/25/2018  - the patiens trulicity was increased on this date by [facility]. Please advise when patient should return to clinic for lab/OV follow up.       dulaglutide (TRULICITY) 1.5 MG/0.5ML pen 2 mL 1 7/24/2018  No   Sig - Route: Inject 1.5 mg Subcutaneous every 7 days - Subcutaneous     Last OV with BLANCA Ponce CNP: 6/25/2018    Next 5 appointments (look out 90 days)     Sep 25, 2018 10:45 AM CDT   Return Visit with Lucita Jordan MD   [facility]    [address] [facility] [address]   [phone]            Oct 01, 2018  8:00 AM CDT   Return Visit with Thomas Serna MD, [facility] Ophth Nurse Only 2   [facility]    [address] [facility] [address]   [phone]            Nov 02, 2018  9:00 AM CDT   Return Visit with Ehsan Araya DPM   [facility]    [address] [facility] [address]   [phone]                BP Readings from Last 3 Encounters:   08/03/18 119/72   07/20/18 109/68   06/25/18 115/50     LDL Cholesterol Calculated   Date Value Ref Range Status   06/25/2018 93 <100 mg/dL Final     Comment:     Desirable:       <100 mg/dl     Creatinine   Date Value Ref Range Status   06/25/2018 0.68 0.52 - 1.04 mg/dL Final     Lab Results   Component Value Date    MICROL 17 06/25/2018     Lab Results   Component Value Date    A1C 10.4 06/25/2018    A1C 10.6 03/08/2018    A1C 8.8 11/16/2017    A1C 9.3 07/20/2017    A1C 10.2 04/14/2017     GLP-1 Agonists Protocol Passed9/17 1:23 AM   Blood pressure less than 140/90 in past 6 months    LDL on file in past 12 months    Microalbumin on file in past 12 months    HgbA1C in past 3 or 6 months    Patient is age 18 or older    No active pregnancy on record    Normal serum creatinine on file in  past 12 months    No positive pregnancy test in past 12 months    Recent (6 mo) or future (30 days) visit within the authorizing provider's specialty     Sharla Young RN

## 2018-09-25 ENCOUNTER — OFFICE VISIT (OUTPATIENT)
Dept: DERMATOLOGY | Facility: CLINIC | Age: 78
End: 2018-09-25
Payer: COMMERCIAL

## 2018-09-25 DIAGNOSIS — L57.0 AK (ACTINIC KERATOSIS): Primary | ICD-10-CM

## 2018-09-25 DIAGNOSIS — R23.3 TALON NOIR: ICD-10-CM

## 2018-09-25 PROCEDURE — 17000 DESTRUCT PREMALG LESION: CPT | Performed by: DERMATOLOGY

## 2018-09-25 PROCEDURE — 17003 DESTRUCT PREMALG LES 2-14: CPT | Performed by: DERMATOLOGY

## 2018-09-25 PROCEDURE — 99213 OFFICE O/P EST LOW 20 MIN: CPT | Mod: 25 | Performed by: DERMATOLOGY

## 2018-09-25 ASSESSMENT — PAIN SCALES - GENERAL: PAINLEVEL: NO PAIN (0)

## 2018-09-25 NOTE — PATIENT INSTRUCTIONS
Cryotherapy    What is it?    Use of a very cold liquid, such as liquid nitrogen, to freeze and destroy abnormal skin cells that need to be removed    What should I expect?    Tenderness and redness    A small blister that might grow and fill with dark purple blood. There may be crusting.    More than one treatment may be needed if the lesions do not go away.    How do I care for the treated area?    Gently wash the area with your hands when bathing.    Use a thin layer of Vaseline to help with healing. You may use a Band-Aid.     The area should heal within 7-10 days and may leave behind a pink or lighter color.     Do not use an antibiotic or Neosporin ointment.     You may take acetaminophen (Tylenol) for pain.     Call your Doctor if you have:    Severe pain    Signs of infection (warmth, redness, cloudy yellow drainage, and or a bad smell)    Questions or concerns    Who should I call with questions?       [facility]: [phone]       [facility]: [phone]       For urgent needs outside of business hours call the [facility] at [phone]        and ask for the dermatology resident on call

## 2018-09-25 NOTE — PROGRESS NOTES
"[facility] Dermatology Note      Dermatology Problem List:  1. Family history of melanoma, father  2. Actinic keratosis  -s/p cryotherapy  3. Seborrheic dermatitis, scalp  -s/p ketoconazole shampoo  -s/p Free & Clear Shampoo  -s/p hydroxyzine initiated 6/18/2013  -s/p Nicoral shampoo initiated 3/12/2013  -s/p fluocinonide solution initiated 3/12/2013    Encounter Date: Sep 25, 2018    CC:  Chief Complaint   Patient presents with     Skin Check     No spots of concern today         History of Present Illness:  This 78 year old female presents as a follow-up for family history of melanoma. The patient was last seen with Dr. Locke on 9/25/17 when \"no worrisome lesions.\" Today, the patient reports that she has no major concerns to address today other that a site of prior trauma on the right leg. She fell recently and sustained an injury on this leg from the fall. No bleeding, crusting, or changing lesions to address otherwise. Walking normally. Scalp has been asymptomatic and does not want refills.     Past Medical History:   Past Medical History:   Diagnosis Date     Degenerative joint disease      Diabetes mellitus (H) 2006    type 2     HTN (hypertension)      Hyperlipidemia LDL goal < 100      Hypothyroidism 1960    s/p subtotal thyroidectomy      Rheumatic fever     x2 between the ages of 15-18     Seasonal allergies      Past Surgical History:   Procedure Laterality Date     C APPENDECTOMY       C ARTHROPLASTY TMJ       COLONOSCOPY       COLONOSCOPY N/A 8/13/2015    Procedure: COLONOSCOPY;  Surgeon: Duane, William Charles, MD;  Location: [facility]     COLONOSCOPY WITH CO2 INSUFFLATION N/A 8/13/2015    Procedure: COLONOSCOPY WITH CO2 INSUFFLATION;  Surgeon: Duane, William Charles, MD;  Location: [facility] OR     THYROIDECTOMY          Social  History:  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       Family History:  The patient reports a family history of melanoma in her " father.    Medications:  Current Outpatient Prescriptions   Medication Sig Dispense Refill     albuterol (PROAIR HFA/PROVENTIL HFA/VENTOLIN HFA) 108 (90 BASE) MCG/ACT Inhaler Inhale 2 puffs into the lungs every 4 hours as needed for shortness of breath / dyspnea or wheezing 1 Inhaler 1     aspirin 81 MG EC tablet Take 1 tablet by mouth daily. 90 tablet 3     ciclopirox (LOPROX) 0.77 % cream Apply topically 2 times daily To feet and toenails. 90 g 6     dexamethasone (DECADRON) 1 MG tablet 1 tab at midnight and go to lab at 8 am next morning. 1 tablet 0     dulaglutide (TRULICITY) 1.5 MG/0.5ML pen Inject 1.5 mg Subcutaneous every 7 days 2 mL 3     famotidine (PEPCID) 20 MG tablet Take 20 mg by mouth nightly as needed       fexofenadine (ALLEGRA) 180 MG tablet Take 180 mg by mouth daily       glipiZIDE (GLUCOTROL) 10 MG tablet Take 1 tablet (10 mg) by mouth 2 times daily (before meals) 360 tablet 0     GLUCOSAMINE CHONDROITIN COMPLX OR 2 Daily       insulin glargine (LANTUS SOLOSTAR) 100 UNIT/ML injection Inject 20 Units Subcutaneous daily 45 mL 3     insulin pen needle (BD JUAN C U/F) 32G X 4 MM USE 1 DAILY AS DIRECTED. 100 each 3     irbesartan-hydrochlorothiazide (AVALIDE) 150-12.5 MG per tablet TAKE 1 TABLET BY MOUTH DAILY 90 tablet 1     irbesartan-hydrochlorothiazide (AVALIDE) 150-12.5 MG per tablet Take 0.5 tablets by mouth daily       latanoprost (XALATAN) 0.005 % ophthalmic solution Place 1 drop into both eyes At Bedtime 3 Bottle 4     metFORMIN (GLUCOPHAGE) 1000 MG tablet TAKE 1 TABLET (1,000 MG) BY MOUTH 2 TIMES DAILY (WITH MEALS) 180 tablet 3     MULTIVITAMIN OR 1 tablet daily       Omega-3 Fatty Acids (OMEGA 3 PO) Take by mouth 2 times daily.        ONE TOUCH DELICA LANCETS MISC 1 each 2 times daily. One Touch Delica   (Patient not taking: Reported on 9/25/2018) 100 each 12     ONETOUCH ULTRA test strip USE TO TEST TWICE A DAY (Patient not taking: Reported on 9/25/2018) 200 strip 11     order for DME  Glucometer covered by insurance. (Patient not taking: Reported on 9/25/2018) 1 each 0     oxybutynin (DITROPAN-XL) 5 MG 24 hr tablet TAKE 2 TABLETS (10 MG) BY MOUTH DAILY 180 tablet 2     rosuvastatin (CRESTOR) 5 MG tablet TAKE 1 TABLET BY MOUTH TWICE WEEKLY 8 tablet 3     rosuvastatin (CRESTOR) 5 MG tablet Take 1 tablet twice a week 8 tablet 1     Spacer/Aero-Holding Chambers (SPACER/AERO-HOLD CHAMBER MASK) SANTANA 1 Device as needed (Patient not taking: Reported on 9/25/2018) 1 each 0     SYNTHROID 137 MCG tablet TAKE 1 TABLET (137 MCG) BY MOUTH DAILY 90 tablet 2     Allergies   Allergen Reactions     Estradiol Itching     Lipitor [Atorvastatin Calcium]      Weak and shaky     Sulfa Drugs      Rash       Zocor [Simvastatin]      Weak and shakey     Review of Systems:  -Const: Denies fevers, chills or night sweats.   -Skin: As above in HPI. No additional skin concerns.    Physical exam:  There were no vitals taken for this visit.  GEN: This is a well developed, well-nourished female in no acute distress, in a pleasant mood.    SKIN: Total skin excluding the undergarment areas was performed. The exam included the head/face, neck, both arms, chest, back, abdomen, both legs, digits and/or nails. Declines genital exam  - There are erythematous macules with overyling adherent scale on the right medial brow x2, left temple, left cheek.   - Geometric and linear scars on the right lower leg.  - Hemorrhage in right great toenail.   -No other lesions of concern on areas examined.     Impression/Plan:  1. Family history of melanoma  Sun precaution was advised including the use of sun screens of SPF 30 or higher, sun protective clothing, and avoidance of tanning beds.  ABCDs of melanoma were discussed and self skin checks were advised.    2. Actinic keratosis, right medial brow x2, left temple, left cheek.  Cryotherapy procedure note: After verbal consent and discussion of risks and benefits including but no limited to  dyspigmentation/scar, blister, and pain, 4 was(were) treated with 1-2mm freeze border for 2 cycles with liquid nitrogen. Post cryotherapy instructions were provided.     3. Geometric and linear scars on the right lower leg, consistent with history of trauma    Recommended applying Vaseline to this area until it resolves.     4. Hemorrhage in right great toenail, hx of trauma. Tate noir, she states this is improving, follows with podiatry    Photodocumentation obtained - we will monitor this to ensure it resolves.     Follow up in 6 months for spot check (AKs), earlier for new or changing lesions.       Staff Involved:  Scribe/Staff    Scribe Disclosure  I, Rajendra Naranjo, am serving as a scribe to document services personally performed by Dr. Lucita Jordan MD, based on data collection and the provider's statements to me.     Provider Disclosure:   The documentation recorded by the scribe accurately reflects the services I personally performed and the decisions made by me.    Lucita Jordan MD    Department of Dermatology  [facility] [place] [facility] [place]: Phone: [phone], Fax:[phone]  [facility]: Phone: [phone], Fax: [phone]

## 2018-09-25 NOTE — MR AVS SNAPSHOT
After Visit Summary   9/25/2018    Brandy Leon    MRN: [id number]           Patient Information     Date Of Birth          1940        Visit Information        Provider Department      9/25/2018 10:45 AM Lucita Jordan MD [facility]        Today's Diagnoses     AK (actinic keratosis)    -  1    Ilya noir          Care Instructions    Cryotherapy    What is it?    Use of a very cold liquid, such as liquid nitrogen, to freeze and destroy abnormal skin cells that need to be removed    What should I expect?    Tenderness and redness    A small blister that might grow and fill with dark purple blood. There may be crusting.    More than one treatment may be needed if the lesions do not go away.    How do I care for the treated area?    Gently wash the area with your hands when bathing.    Use a thin layer of Vaseline to help with healing. You may use a Band-Aid.     The area should heal within 7-10 days and may leave behind a pink or lighter color.     Do not use an antibiotic or Neosporin ointment.     You may take acetaminophen (Tylenol) for pain.     Call your Doctor if you have:    Severe pain    Signs of infection (warmth, redness, cloudy yellow drainage, and or a bad smell)    Questions or concerns    Who should I call with questions?       [facility]: [phone]       [facility]: [phone]       For urgent needs outside of business hours call the [facility] at [phone]        and ask for the dermatology resident on call            Follow-ups after your visit        Follow-up notes from your care team     Return in about 6 months (around 3/25/2019) for spot check aks on head.      Your next 10 appointments already scheduled     Oct 01, 2018  8:00 AM CDT   Return Visit with Thomas Serna MD, [facility] O[facility] Nurse Only 2   [facility]    [address]  [address] [place] [address]   [phone]            Oct 18, 2018 11:00 AM CDT   New Sleep Patient with RYANN Mendez   [facility] ([facility])    [address] [facility] [address]  [facility] [address]   [phone]            Nov 02, 2018  9:00 AM CDT   Return Visit with Ehsan Araya DPM   [facility])    [address] [place] [address]   [phone]            Dec 28, 2018  8:15 AM CST   Return Visit with Candice Worley MD, [facility] ENDO NURSE   [facility])    [address] [place] [address]   [phone]            Mar 26, 2019 10:45 AM CDT   Return Visit with Lucita Jordan MD   [facility])    [address] [place] [address]   [phone]              Who to contact     If you have questions or need follow up information about today's clinic visit or your schedule please contact [facility] directly at [phone].  Normal or non-critical lab and imaging results will be communicated to you by MyChart, letter or phone within 4 business days after the clinic has received the results. If you do not hear from us within 7 days, please contact the clinic through MyChart or phone. If you have a critical or abnormal lab result, we will notify you by phone as soon as possible.  Submit refill requests through [employer] or call your pharmacy and they will forward the refill request to us. Please allow 3 business days for your refill to be completed.          Additional Information About Your Visit        Care EveryWhere ID     This is your Care EveryWhere ID. This could be used by other organizations to access your [facility] medical records  [id number]         Blood Pressure from Last 3 Encounters:   08/03/18 119/72   07/20/18 109/68    06/25/18 115/50    Weight from Last 3 Encounters:   08/03/18 85.5 kg (188 lb 8 oz)   06/25/18 83.8 kg (184 lb 12.8 oz)   04/06/18 86.4 kg (190 lb 7.6 oz)              We Performed the Following     DESTRUCT PREMALIGNANT LESION, 2-14     DESTRUCT PREMALIGNANT LESION, FIRST        Primary Care Provider Office Phone # Fax #    Cailin Ponce, APRN [facility] [phone]       [address]  [place] [address]        Equal Access to Services     [facility]: Hadii aad ku hadasho Soomaali, waaxda luqadaha, qaybta kaalmada adepemayada, mitch azevedo . So [facility] [phone].    ATENCIÓN: Si habla español, tiene a garcía disposición servicios gratuitos de asistencia lingüística. [facility] [phone].    We comply with applicable federal civil rights laws and [place] laws. We do not discriminate on the basis of race, color, national origin, age, disability, sex, sexual orientation, or gender identity.            Thank you!     Thank you for choosing [facility]  for your care. Our goal is always to provide you with excellent care. Hearing back from our patients is one way we can continue to improve our services. Please take a few minutes to complete the written survey that you may receive in the mail after your visit with us. Thank you!             Your Updated Medication List - Protect others around you: Learn how to safely use, store and throw away your medicines at www.disposemymeds.org.          This list is accurate as of 9/25/18 11:45 AM.  Always use your most recent med list.                   Brand Name Dispense Instructions for use Diagnosis    albuterol 108 (90 Base) MCG/ACT inhaler    PROAIR HFA/PROVENTIL HFA/VENTOLIN HFA    1 Inhaler    Inhale 2 puffs into the lungs every 4 hours as needed for shortness of breath / dyspnea or wheezing    Cough due to bronchospasm       aspirin 81 MG EC tablet     90 tablet    Take 1 tablet by mouth daily.     Type 2 diabetes, HbA1c goal < 7% (H)       ciclopirox 0.77 % cream    LOPROX    90 g    Apply topically 2 times daily To feet and toenails.    Tinea pedis of both feet       dexamethasone 1 MG tablet    DECADRON    1 tablet    1 tab at midnight and go to lab at 8 am next morning.    Type 2 diabetes mellitus with hyperglycemia, with long-term current use of insulin (H)       dulaglutide 1.5 MG/0.5ML pen    TRULICITY    2 mL    Inject 1.5 mg Subcutaneous every 7 days    Type 2 diabetes mellitus with hyperglycemia, with long-term current use of insulin (H)       famotidine 20 MG tablet    PEPCID     Take 20 mg by mouth nightly as needed        fexofenadine 180 MG tablet    ALLEGRA     Take 180 mg by mouth daily        glipiZIDE 10 MG tablet    GLUCOTROL    360 tablet    Take 1 tablet (10 mg) by mouth 2 times daily (before meals)    Type 2 diabetes mellitus with hyperglycemia, with long-term current use of insulin (H)       GLUCOSAMINE CHONDROITIN COMPLX PO      2 Daily        insulin glargine 100 UNIT/ML injection    LANTUS SOLOSTAR    45 mL    Inject 20 Units Subcutaneous daily    Type 2 diabetes mellitus with hyperglycemia, with long-term current use of insulin (H)       insulin pen needle 32G X 4 MM    BD JUAN C U/F    100 each    USE 1 DAILY AS DIRECTED.    Type 2 diabetes mellitus with hyperglycemia (H)       * irbesartan-hydrochlorothiazide 150-12.5 MG per tablet    AVALIDE     Take 0.5 tablets by mouth daily        * irbesartan-hydrochlorothiazide 150-12.5 MG per tablet    AVALIDE    90 tablet    TAKE 1 TABLET BY MOUTH DAILY    Hypertension goal BP (blood pressure) < 140/90       latanoprost 0.005 % ophthalmic solution    XALATAN    3 Bottle    Place 1 drop into both eyes At Bedtime    Primary open angle glaucoma of both eyes, moderate stage       metFORMIN 1000 MG tablet    GLUCOPHAGE    180 tablet    TAKE 1 TABLET (1,000 MG) BY MOUTH 2 TIMES DAILY (WITH MEALS)    Type 2 diabetes mellitus with hyperglycemia, with  long-term current use of insulin (H)       MULTIVITAMIN PO      1 tablet daily        OMEGA 3 PO      Take by mouth 2 times daily.        ONE TOUCH DELICA LANCETS Misc     100 each    1 each 2 times daily. One Touch Delica    Type 2 diabetes, HbA1c goal < 7% (H)       ONETOUCH ULTRA test strip   Generic drug:  blood glucose monitoring     200 strip    USE TO TEST TWICE A DAY    Type 2 diabetes mellitus with hyperglycemia, with long-term current use of insulin (H)       order for DME     1 each    Glucometer covered by insurance.    Type 2 diabetes, HbA1c goal < 7% (H)       oxybutynin 5 MG 24 hr tablet    DITROPAN-XL    180 tablet    TAKE 2 TABLETS (10 MG) BY MOUTH DAILY    Urinary frequency, Overactive bladder       * rosuvastatin 5 MG tablet    CRESTOR    8 tablet    Take 1 tablet twice a week    Dyslipidemia, goal LDL below 100       * rosuvastatin 5 MG tablet    CRESTOR    8 tablet    TAKE 1 TABLET BY MOUTH TWICE WEEKLY    Dyslipidemia, goal LDL below 100       spacer/aero-hold chamber mask Aaliyah     1 each    1 Device as needed    Cough due to bronchospasm       SYNTHROID 137 MCG tablet   Generic drug:  levothyroxine     90 tablet    TAKE 1 TABLET (137 MCG) BY MOUTH DAILY    Myxedema heart disease (H), Nutritional and metabolic cardiomyopathy (H)       * Notice:  This list has 4 medication(s) that are the same as other medications prescribed for you. Read the directions carefully, and ask your doctor or other care provider to review them with you.

## 2018-09-25 NOTE — LETTER
"    9/25/2018         RE: Brandy Leon  [address] [facility] [address]        Dear Colleague,    Thank you for referring your patient, Brandy Leon, to the [facility]. Please see a copy of my visit note below.    [facility] Dermatology Note      Dermatology Problem List:  1. Family history of melanoma, father  2. Actinic keratosis  -s/p cryotherapy  3. Seborrheic dermatitis, scalp  -s/p ketoconazole shampoo  -s/p Free & Clear Shampoo  -s/p hydroxyzine initiated 6/18/2013  -s/p Nicoral shampoo initiated 3/12/2013  -s/p fluocinonide solution initiated 3/12/2013    Encounter Date: Sep 25, 2018    CC:  Chief Complaint   Patient presents with     Skin Check     No spots of concern today         History of Present Illness:  This 78 year old female presents as a follow-up for family history of melanoma. The patient was last seen with Dr. Locke on 9/25/17 when \"no worrisome lesions.\" Today, the patient reports that she has no major concerns to address today other that a site of prior trauma on the right leg. She fell recently and sustained an injury on this leg from the fall. No bleeding, crusting, or changing lesions to address otherwise. Walking normally. Scalp has been asymptomatic and does not want refills.     Past Medical History:   Past Medical History:   Diagnosis Date     Degenerative joint disease      Diabetes mellitus (H) 2006    type 2     HTN (hypertension)      Hyperlipidemia LDL goal < 100      Hypothyroidism 1960    s/p subtotal thyroidectomy      Rheumatic fever     x2 between the ages of 15-18     Seasonal allergies      Past Surgical History:   Procedure Laterality Date     C APPENDECTOMY       C ARTHROPLASTY TMJ       COLONOSCOPY       COLONOSCOPY N/A 8/13/2015    Procedure: COLONOSCOPY;  Surgeon: Duane, William Charles, MD;  Location: [facility]     COLONOSCOPY WITH CO2 INSUFFLATION N/A 8/13/2015    Procedure: COLONOSCOPY WITH " CO2 INSUFFLATION;  Surgeon: Duane, William Charles, MD;  Location: [facility]     THYROIDECTOMY          Social  History:  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       Family History:  The patient reports a family history of melanoma in her father.    Medications:  Current Outpatient Prescriptions   Medication Sig Dispense Refill     albuterol (PROAIR HFA/PROVENTIL HFA/VENTOLIN HFA) 108 (90 BASE) MCG/ACT Inhaler Inhale 2 puffs into the lungs every 4 hours as needed for shortness of breath / dyspnea or wheezing 1 Inhaler 1     aspirin 81 MG EC tablet Take 1 tablet by mouth daily. 90 tablet 3     ciclopirox (LOPROX) 0.77 % cream Apply topically 2 times daily To feet and toenails. 90 g 6     dexamethasone (DECADRON) 1 MG tablet 1 tab at midnight and go to lab at 8 am next morning. 1 tablet 0     dulaglutide (TRULICITY) 1.5 MG/0.5ML pen Inject 1.5 mg Subcutaneous every 7 days 2 mL 3     famotidine (PEPCID) 20 MG tablet Take 20 mg by mouth nightly as needed       fexofenadine (ALLEGRA) 180 MG tablet Take 180 mg by mouth daily       glipiZIDE (GLUCOTROL) 10 MG tablet Take 1 tablet (10 mg) by mouth 2 times daily (before meals) 360 tablet 0     GLUCOSAMINE CHONDROITIN COMPLX OR 2 Daily       insulin glargine (LANTUS SOLOSTAR) 100 UNIT/ML injection Inject 20 Units Subcutaneous daily 45 mL 3     insulin pen needle (BD JUAN C U/F) 32G X 4 MM USE 1 DAILY AS DIRECTED. 100 each 3     irbesartan-hydrochlorothiazide (AVALIDE) 150-12.5 MG per tablet TAKE 1 TABLET BY MOUTH DAILY 90 tablet 1     irbesartan-hydrochlorothiazide (AVALIDE) 150-12.5 MG per tablet Take 0.5 tablets by mouth daily       latanoprost (XALATAN) 0.005 % ophthalmic solution Place 1 drop into both eyes At Bedtime 3 Bottle 4     metFORMIN (GLUCOPHAGE) 1000 MG tablet TAKE 1 TABLET (1,000 MG) BY MOUTH 2 TIMES DAILY (WITH MEALS) 180 tablet 3     MULTIVITAMIN OR 1 tablet daily       Omega-3 Fatty Acids (OMEGA 3 PO) Take by mouth 2 times daily.         ONE TOUCH DELICA LANCETS MISC 1 each 2 times daily. One Touch Delica   (Patient not taking: Reported on 9/25/2018) 100 each 12     ONETOUCH ULTRA test strip USE TO TEST TWICE A DAY (Patient not taking: Reported on 9/25/2018) 200 strip 11     order for DME Glucometer covered by insurance. (Patient not taking: Reported on 9/25/2018) 1 each 0     oxybutynin (DITROPAN-XL) 5 MG 24 hr tablet TAKE 2 TABLETS (10 MG) BY MOUTH DAILY 180 tablet 2     rosuvastatin (CRESTOR) 5 MG tablet TAKE 1 TABLET BY MOUTH TWICE WEEKLY 8 tablet 3     rosuvastatin (CRESTOR) 5 MG tablet Take 1 tablet twice a week 8 tablet 1     Spacer/Aero-Holding Chambers (SPACER/AERO-HOLD CHAMBER MASK) SANTANA 1 Device as needed (Patient not taking: Reported on 9/25/2018) 1 each 0     SYNTHROID 137 MCG tablet TAKE 1 TABLET (137 MCG) BY MOUTH DAILY 90 tablet 2     Allergies   Allergen Reactions     Estradiol Itching     Lipitor [Atorvastatin Calcium]      Weak and shaky     Sulfa Drugs      Rash       Zocor [Simvastatin]      Weak and shakey     Review of Systems:  -Const: Denies fevers, chills or night sweats.   -Skin: As above in HPI. No additional skin concerns.    Physical exam:  There were no vitals taken for this visit.  GEN: This is a well developed, well-nourished female in no acute distress, in a pleasant mood.    SKIN: Total skin excluding the undergarment areas was performed. The exam included the head/face, neck, both arms, chest, back, abdomen, both legs, digits and/or nails. Declines genital exam  - There are erythematous macules with overyling adherent scale on the right medial brow x2, left temple, left cheek.   - Geometric and linear scars on the right lower leg.  - Hemorrhage in right great toenail.   -No other lesions of concern on areas examined.     Impression/Plan:  1. Family history of melanoma  Sun precaution was advised including the use of sun screens of SPF 30 or higher, sun protective clothing, and avoidance of tanning beds.  ABCDs of  melanoma were discussed and self skin checks were advised.    2. Actinic keratosis, right medial brow x2, left temple, left cheek.  Cryotherapy procedure note: After verbal consent and discussion of risks and benefits including but no limited to dyspigmentation/scar, blister, and pain, 4 was(were) treated with 1-2mm freeze border for 2 cycles with liquid nitrogen. Post cryotherapy instructions were provided.     3. Geometric and linear scars on the right lower leg, consistent with history of trauma    Recommended applying Vaseline to this area until it resolves.     4. Hemorrhage in right great toenail, hx of trauma. Tate noir, she states this is improving, follows with podiatry    Photodocumentation obtained - we will monitor this to ensure it resolves.     Follow up in 6 months for spot check (AKs), earlier for new or changing lesions.       Staff Involved:  Scribe/Staff    Scribe Disclosure  I, Rajendra Naranjo, am serving as a scribe to document services personally performed by Dr. Lucita Jordan MD, based on data collection and the provider's statements to me.     Provider Disclosure:   The documentation recorded by the scribe accurately reflects the services I personally performed and the decisions made by me.    Lucita Jordan MD    Department of Dermatology  [facility]: Phone: [phone], Fax:[phone]  [facility]: Phone: [phone], Fax: [phone]        Again, thank you for allowing me to participate in the care of your patient.        Sincerely,        Lucita Jordan MD

## 2018-09-25 NOTE — NURSING NOTE
Brandy Leon's goals for this visit include:   Chief Complaint   Patient presents with     Skin Check     No spots of concern today       She requests these members of her care team be copied on today's visit information: PCP    PCP: Cailin Ponce    Referring Provider:  No referring provider defined for this encounter.    There were no vitals taken for this visit.    Do you need any medication refills at today's visit? None      Devorah Armstrong CMA

## 2018-10-15 ENCOUNTER — TELEPHONE (OUTPATIENT)
Dept: OPHTHALMOLOGY | Facility: CLINIC | Age: 78
End: 2018-10-15

## 2018-10-15 ENCOUNTER — OFFICE VISIT (OUTPATIENT)
Dept: OPHTHALMOLOGY | Facility: CLINIC | Age: 78
End: 2018-10-15
Payer: COMMERCIAL

## 2018-10-15 DIAGNOSIS — H25.13 AGE-RELATED NUCLEAR CATARACT OF BOTH EYES: ICD-10-CM

## 2018-10-15 DIAGNOSIS — Z79.4 TYPE 2 DIABETES MELLITUS WITH HYPERGLYCEMIA, WITH LONG-TERM CURRENT USE OF INSULIN (H): Primary | ICD-10-CM

## 2018-10-15 DIAGNOSIS — H26.9 CATARACTS, BILATERAL: Primary | Chronic | ICD-10-CM

## 2018-10-15 DIAGNOSIS — E11.65 TYPE 2 DIABETES MELLITUS WITH HYPERGLYCEMIA, WITH LONG-TERM CURRENT USE OF INSULIN (H): Primary | ICD-10-CM

## 2018-10-15 DIAGNOSIS — H40.1132 PRIMARY OPEN ANGLE GLAUCOMA OF BOTH EYES, MODERATE STAGE: ICD-10-CM

## 2018-10-15 DIAGNOSIS — H52.4 BILATERAL PRESBYOPIA: ICD-10-CM

## 2018-10-15 PROCEDURE — 92015 DETERMINE REFRACTIVE STATE: CPT | Performed by: OPHTHALMOLOGY

## 2018-10-15 PROCEDURE — 92136 OPHTHALMIC BIOMETRY: CPT | Performed by: OPHTHALMOLOGY

## 2018-10-15 PROCEDURE — 92014 COMPRE OPH EXAM EST PT 1/>: CPT | Performed by: OPHTHALMOLOGY

## 2018-10-15 PROCEDURE — 92083 EXTENDED VISUAL FIELD XM: CPT | Performed by: OPHTHALMOLOGY

## 2018-10-15 PROCEDURE — 92133 CPTRZD OPH DX IMG PST SGM ON: CPT | Performed by: OPHTHALMOLOGY

## 2018-10-15 ASSESSMENT — EXTERNAL EXAM - LEFT EYE: OS_EXAM: NORMAL

## 2018-10-15 ASSESSMENT — REFRACTION_MANIFEST
OS_CYLINDER: +0.75
OD_AXIS: 010
OD_ADD: +2.75
OS_AXIS: 008
OD_CYLINDER: +0.75
OS_SPHERE: PLANO
OS_ADD: +2.75
OD_SPHERE: -0.75

## 2018-10-15 ASSESSMENT — TONOMETRY
OS_IOP_MMHG: 17
IOP_METHOD: ICARE
OS_IOP_MMHG: 15
OD_IOP_MMHG: 18
IOP_METHOD: ICARE
OD_IOP_MMHG: 15
OS_IOP_MMHG: 18
OD_IOP_MMHG: 18

## 2018-10-15 ASSESSMENT — SLIT LAMP EXAM - LIDS
COMMENTS: 2+ DERMATOCHALASIS - UPPER LID
COMMENTS: 2+ DERMATOCHALASIS - UPPER LID

## 2018-10-15 ASSESSMENT — VISUAL ACUITY
OD_SC: 20/20
OS_SC: 20/25
OD_SC: 20/100
METHOD: SNELLEN - LINEAR
OS_SC: 20/70

## 2018-10-15 ASSESSMENT — CUP TO DISC RATIO
OD_RATIO: 0.55
OS_RATIO: 0.5

## 2018-10-15 ASSESSMENT — EXTERNAL EXAM - RIGHT EYE: OD_EXAM: NORMAL

## 2018-10-15 NOTE — LETTER
10/15/2018       RE: Brandy Leon  [address] [address]     Dear Colleague,    Thank you for referring your patient, Brandy Leon, to the [facility] at [facility]. Please see a copy of my visit note below.    Assessment & Plan   Brandy Leon is a 78 year old female who presents with:   Review of systems for the eyes was negative other than the pertinent positives and negatives noted in the HPI.  History is obtained from the patient.    Chief Complaint   Patient presents with     Diabetes     last A1C 10.6 on 03/08/2018 patient states BS is running high usually in the 200s     Primary Open Angle Glaucoma Follow Up     OCT HVF DFE     COMPREHENSIVE EYE EXAM     patient states eyes are dry and itchy        Lab Results   Component Value Date    A1C 10.4 06/25/2018    A1C 10.6 03/08/2018    A1C 8.8 11/16/2017    A1C 9.3 07/20/2017    A1C 10.2 04/14/2017         Type 2 diabetes mellitus with hyperglycemia, with long-term current use of insulin (H)  - DM II w/o retinopathy  - Will send report to PCP    Age-related nuclear cataract of both eyes  - visually significant left eye greater than right eye.  - A's and K's today - results reviewed and appropriate lenses chosen, see scanned documentation.  - Schedule surgery - at [facility]   Discussed lens options. Standard lens monofocal vs monovision. Multifocal vs Toric     Risks, benefits, an alternatives of intended procedure discussed. She wishes to proceed.    Pt would like to proceed with surgery starting with the left eye using a standard lens setting both eyes for distance.    She understands that She may still need to wear glasses after surgery.    - OPHTHALMIC BIOMETY W IOL POWER CALC    Bilateral presbyopia  - REFRACTION    Primary open angle glaucoma of both eyes, moderate stage  - OCT Optic Nerve RNFL Optovue OU (both eyes)  - HVF 24-2  OU  - latanoprost (XALATAN) 0.005 % ophthalmic solution; Place 1 drop into both eyes At Bedtime      Return in about 1 year (around 10/15/2019) for Annual Eye Exam, Glaucoma check, Visual field and OCT.    Documentation for today's encounter was performed by Minda AGUILAR. Acting as a scribe in my presence. I have reviewed and verified that it is an accurate recording of today's encounter.    Attending Physician Attestation:  Complete documentation of historical and exam elements from today's encounter can be found in the full encounter summary report (not reduplicated in this progress note).  I personally obtained the chief complaint(s) and history of present illness.  I confirmed and edited as necessary the review of systems, past medical/surgical history, family history, social history, and examination findings as documented by others; and I examined the patient myself.  I personally reviewed the relevant tests, images, and reports as documented above.  I formulated and edited as necessary the assessment and plan and discussed the findings and management plan with the patient and family. - Thomas Serna MD      Again, thank you for allowing me to participate in the care of your patient.      Sincerely,    Thomas Serna MD

## 2018-10-15 NOTE — NURSING NOTE
Patient presents with:  Diabetes: last A1C 10.6 on 03/08/2018 patient states BS is running high usually in the 200s  Primary Open Angle Glaucoma Follow Up: OCT HVF DFE  COMPREHENSIVE EYE EXAM: patient states eyes are dry and itchy       Referring Provider:  No referring provider defined for this encounter.    HPI    Last Eye Exam:  9/25/17   Informant(s):  pt   Symptoms:     Decreased vision   Difficulty with driving   Flashes   Glare            Comments:  patient is using latanoprost at bedtime each eye  Patient notes a decrease is dva and more difficulty driving at night due to glare and halos    Patient is taking latanoprost at bedtime OU               Atiya Jj, COA

## 2018-10-15 NOTE — PROGRESS NOTES
Assessment & Plan   Brandy Leon is a 78 year old female who presents with:   Review of systems for the eyes was negative other than the pertinent positives and negatives noted in the HPI.  History is obtained from the patient.    Chief Complaint   Patient presents with     Diabetes     last A1C 10.6 on 03/08/2018 patient states BS is running high usually in the 200s     Primary Open Angle Glaucoma Follow Up     OCT HVF DFE     COMPREHENSIVE EYE EXAM     patient states eyes are dry and itchy        Lab Results   Component Value Date    A1C 10.4 06/25/2018    A1C 10.6 03/08/2018    A1C 8.8 11/16/2017    A1C 9.3 07/20/2017    A1C 10.2 04/14/2017         Type 2 diabetes mellitus with hyperglycemia, with long-term current use of insulin (H)  - DM II w/o retinopathy  - Will send report to PCP    Age-related nuclear cataract of both eyes  - visually significant left eye greater than right eye.  - A's and K's today - results reviewed and appropriate lenses chosen, see scanned documentation.  - Schedule surgery - at [facility]   Discussed lens options. Standard lens monofocal vs monovision. Multifocal vs Toric     Risks, benefits, an alternatives of intended procedure discussed. She wishes to proceed.    Pt would like to proceed with surgery starting with the left eye using a standard lens setting both eyes for distance.    She understands that She may still need to wear glasses after surgery.    - OPHTHALMIC BIOMETY W IOL POWER CALC    Bilateral presbyopia  - REFRACTION    Primary open angle glaucoma of both eyes, moderate stage  - OCT Optic Nerve RNFL Optovue OU (both eyes)  - HVF 24-2 OU  - latanoprost (XALATAN) 0.005 % ophthalmic solution; Place 1 drop into both eyes At Bedtime      Return in about 1 year (around 10/15/2019) for Annual Eye Exam, Glaucoma check, Visual field and OCT.    Documentation for today's encounter was performed by Minda Roe COA. OSC. Acting as a scribe in my  presence. I have reviewed and verified that it is an accurate recording of today's encounter.    Attending Physician Attestation:  Complete documentation of historical and exam elements from today's encounter can be found in the full encounter summary report (not reduplicated in this progress note).  I personally obtained the chief complaint(s) and history of present illness.  I confirmed and edited as necessary the review of systems, past medical/surgical history, family history, social history, and examination findings as documented by others; and I examined the patient myself.  I personally reviewed the relevant tests, images, and reports as documented above.  I formulated and edited as necessary the assessment and plan and discussed the findings and management plan with the patient and family. - Thomas Serna MD

## 2018-10-15 NOTE — TELEPHONE ENCOUNTER
"Procedure: left Cataract  Facility: [facility]  Length: 0.5 hour(s)  Surgeon:Austin Serna MD  Anesthesia: Local with MAC  Diagnosis: Cataract  Out Patient or AM admit:  (Same day)  BMI:Estimated body mass index is 32.78 kg/(m^2) as calculated from the following:    Height as of 8/3/18: 1.615 m (5' 3.58\").    Weight as of 8/3/18: 85.5 kg (188 lb 8 oz). (If over 43 for general or 45 for MAC cannot be scheduled at [facility])   Pre-op Appointments needed: History & Physical within 30 days of surgery  Post-op appointments needed: Cataract 1 day 1 week   needed:No   Surgery packet/instructions given to patient?:  Yes  Pre op teaching done:  Yes  Lens Orders Needed: Yes: ZCB00 22.5, with incision at  130    Referring provider: Dr. Lagos  Special Considerations:     Procedure: right Cataract  Facility: [facility]  Length: 0.5 hour(s)  Surgeon:Austin Serna MD  Anesthesia: Local with MAC  Diagnosis: Cataract  Out Patient or AM admit:  (Same day)  BMI:Estimated body mass index is 32.78 kg/(m^2) as calculated from the following:    Height as of 8/3/18: 1.615 m (5' 3.58\").    Weight as of 8/3/18: 85.5 kg (188 lb 8 oz). (If over 43 for general or 45 for MAC cannot be scheduled at [facility])   Pre-op Appointments needed: History & Physical within 30 days of surgery  Post-op appointments needed: Cataract 1 day 1 week 4 weeks   needed:No   Surgery packet/instructions given to patient?:  Yes  Pre op teaching done:  Yes  Lens Orders Needed: Yes: ZCB00 22.5, with incision at  90    Referring provider: Dr. Lagos  Special Considerations:                   "

## 2018-10-15 NOTE — MR AVS SNAPSHOT
After Visit Summary   10/15/2018    Brandy Leon    MRN: [id number]           Patient Information     Date Of Birth          1940        Visit Information        Provider Department      10/15/2018 10:00 AM 2, Mg Ophth Nurse Only; Thomas Serna MD [facility]        Today's Diagnoses     Age-related nuclear cataract of both eyes    -  1    Bilateral presbyopia           Follow-ups after your visit        Your next 10 appointments already scheduled     Oct 18, 2018 11:00 AM CDT   New Sleep Patient with RYANN Mendez   [facility] ([facility])    [address] [address] [address]   [phone]            Oct 19, 2018  9:30 AM CDT   Pre-Op physical with MAGO Baker CNP   [facility])    [id number] [place] [id number] [phone]            Nov 02, 2018  9:00 AM CDT   Return Visit with Ehsan Araya DPM   [facility])    [address] [place] [id number] [phone]            Dec 28, 2018  8:15 AM CST   Return Visit with Candice Worley MD, [facility] ENDO NURSE   [facility])    [address] [place] [id number] [phone]            Mar 26, 2019 10:45 AM CDT   Return Visit with Lucita Jordan MD   [facility])    [address] [place] [id number] [phone]              Who to contact     If you have questions or need follow up information about today's clinic visit or your schedule please contact [facility] directly at [phone].  Normal or non-critical lab and imaging results will be communicated to you by MyChart, letter or phone within 4 business days after the clinic has received the  results. If you do not hear from us within 7 days, please contact the clinic through [employer] or phone. If you have a critical or abnormal lab result, we will notify you by phone as soon as possible.  Submit refill requests through LE [employer]OTE or call your pharmacy and they will forward the refill request to us. Please allow 3 business days for your refill to be completed.          Additional Information About Your Visit        Care EveryWhere ID     This is your Care EveryWhere ID. This could be used by other organizations to access your [place] medical records  [id number]         Blood Pressure from Last 3 Encounters:   08/03/18 119/72   07/20/18 109/68   06/25/18 115/50    Weight from Last 3 Encounters:   08/03/18 85.5 kg (188 lb 8 oz)   06/25/18 83.8 kg (184 lb 12.8 oz)   04/06/18 86.4 kg (190 lb 7.6 oz)              We Performed the Following     HVF 24-2 OU     OCT Optic Nerve RNFL Optovue OU (both eyes)        Primary Care Provider Office Phone # Fax #    Cailin Clarisa Ponce, APRN [facility] [phone]       [address]  [place] [address]        Equal Access to Services     NUSART KHAN : Hadii ana ku hadasho Soomaali, waaxda luqadaha, qaybta kaalmada adeegyada, mitch alaniz. So [facility] [phone].    ATENCIÓN: Si habla español, tiene a garcía disposición servicios gratuitos de asistencia lingüística. Sariame al [phone].    We comply with applicable federal civil rights laws and [place] laws. We do not discriminate on the basis of race, color, national origin, age, disability, sex, sexual orientation, or gender identity.            Thank you!     Thank you for choosing [facility]  for your care. Our goal is always to provide you with excellent care. Hearing back from our patients is one way we can continue to improve our services. Please take a few minutes to complete the written survey that you may receive in the mail after your visit with  us. Thank you!             Your Updated Medication List - Protect others around you: Learn how to safely use, store and throw away your medicines at www.disposemymeds.org.          This list is accurate as of 10/15/18 11:35 AM.  Always use your most recent med list.                   Brand Name Dispense Instructions for use Diagnosis    albuterol 108 (90 Base) MCG/ACT inhaler    PROAIR HFA/PROVENTIL HFA/VENTOLIN HFA    1 Inhaler    Inhale 2 puffs into the lungs every 4 hours as needed for shortness of breath / dyspnea or wheezing    Cough due to bronchospasm       aspirin 81 MG EC tablet     90 tablet    Take 1 tablet by mouth daily.    Type 2 diabetes, HbA1c goal < 7% (H)       ciclopirox 0.77 % cream    LOPROX    90 g    Apply topically 2 times daily To feet and toenails.    Tinea pedis of both feet       dexamethasone 1 MG tablet    DECADRON    1 tablet    1 tab at midnight and go to lab at 8 am next morning.    Type 2 diabetes mellitus with hyperglycemia, with long-term current use of insulin (H)       dulaglutide 1.5 MG/0.5ML pen    TRULICITY    2 mL    Inject 1.5 mg Subcutaneous every 7 days    Type 2 diabetes mellitus with hyperglycemia, with long-term current use of insulin (H)       famotidine 20 MG tablet    PEPCID     Take 20 mg by mouth nightly as needed        fexofenadine 180 MG tablet    ALLEGRA     Take 180 mg by mouth daily        glipiZIDE 10 MG tablet    GLUCOTROL    360 tablet    Take 1 tablet (10 mg) by mouth 2 times daily (before meals)    Type 2 diabetes mellitus with hyperglycemia, with long-term current use of insulin (H)       GLUCOSAMINE CHONDROITIN COMPLX PO      2 Daily        insulin glargine 100 UNIT/ML injection    LANTUS SOLOSTAR    45 mL    Inject 20 Units Subcutaneous daily    Type 2 diabetes mellitus with hyperglycemia, with long-term current use of insulin (H)       insulin pen needle 32G X 4 MM    BD JUAN C U/F    100 each    USE 1 DAILY AS DIRECTED.    Type 2 diabetes mellitus  with hyperglycemia (H)       * irbesartan-hydrochlorothiazide 150-12.5 MG per tablet    AVALIDE     Take 0.5 tablets by mouth daily        * irbesartan-hydrochlorothiazide 150-12.5 MG per tablet    AVALIDE    90 tablet    TAKE 1 TABLET BY MOUTH DAILY    Hypertension goal BP (blood pressure) < 140/90       latanoprost 0.005 % ophthalmic solution    XALATAN    3 Bottle    Place 1 drop into both eyes At Bedtime    Primary open angle glaucoma of both eyes, moderate stage       metFORMIN 1000 MG tablet    GLUCOPHAGE    180 tablet    TAKE 1 TABLET (1,000 MG) BY MOUTH 2 TIMES DAILY (WITH MEALS)    Type 2 diabetes mellitus with hyperglycemia, with long-term current use of insulin (H)       MULTIVITAMIN PO      1 tablet daily        OMEGA 3 PO      Take by mouth 2 times daily.        ONE TOUCH DELICA LANCETS Misc     100 each    1 each 2 times daily. One Touch Delica    Type 2 diabetes, HbA1c goal < 7% (H)       ONETOUCH ULTRA test strip   Generic drug:  blood glucose monitoring     200 strip    USE TO TEST TWICE A DAY    Type 2 diabetes mellitus with hyperglycemia, with long-term current use of insulin (H)       order for DME     1 each    Glucometer covered by insurance.    Type 2 diabetes, HbA1c goal < 7% (H)       oxybutynin 5 MG 24 hr tablet    DITROPAN-XL    180 tablet    TAKE 2 TABLETS (10 MG) BY MOUTH DAILY    Urinary frequency, Overactive bladder       * rosuvastatin 5 MG tablet    CRESTOR    8 tablet    Take 1 tablet twice a week    Dyslipidemia, goal LDL below 100       * rosuvastatin 5 MG tablet    CRESTOR    8 tablet    TAKE 1 TABLET BY MOUTH TWICE WEEKLY    Dyslipidemia, goal LDL below 100       spacer/aero-hold chamber mask Aaliyah     1 each    1 Device as needed    Cough due to bronchospasm       SYNTHROID 137 MCG tablet   Generic drug:  levothyroxine     90 tablet    TAKE 1 TABLET (137 MCG) BY MOUTH DAILY    Myxedema heart disease, Nutritional and metabolic cardiomyopathy (H)       * Notice:  This list has 4  medication(s) that are the same as other medications prescribed for you. Read the directions carefully, and ask your doctor or other care provider to review them with you.

## 2018-10-16 RX ORDER — LATANOPROST 50 UG/ML
1 SOLUTION/ DROPS OPHTHALMIC AT BEDTIME
Qty: 3 BOTTLE | Refills: 4 | Status: SHIPPED | OUTPATIENT
Start: 2018-10-16 | End: 2019-10-28

## 2018-10-18 ENCOUNTER — OFFICE VISIT (OUTPATIENT)
Dept: SLEEP MEDICINE | Facility: CLINIC | Age: 78
End: 2018-10-18
Payer: COMMERCIAL

## 2018-10-18 VITALS
WEIGHT: 189 LBS | HEIGHT: 64 IN | OXYGEN SATURATION: 96 % | DIASTOLIC BLOOD PRESSURE: 80 MMHG | HEART RATE: 83 BPM | BODY MASS INDEX: 32.27 KG/M2 | SYSTOLIC BLOOD PRESSURE: 128 MMHG

## 2018-10-18 DIAGNOSIS — E66.811 CLASS 1 OBESITY DUE TO EXCESS CALORIES WITH BODY MASS INDEX (BMI) OF 32.0 TO 32.9 IN ADULT, UNSPECIFIED WHETHER SERIOUS COMORBIDITY PRESENT: ICD-10-CM

## 2018-10-18 DIAGNOSIS — Z72.820 LACK OF ADEQUATE SLEEP: ICD-10-CM

## 2018-10-18 DIAGNOSIS — G47.19 EXCESSIVE DAYTIME SLEEPINESS: ICD-10-CM

## 2018-10-18 DIAGNOSIS — I10 ESSENTIAL HYPERTENSION: ICD-10-CM

## 2018-10-18 DIAGNOSIS — R06.89 DYSPNEA AND RESPIRATORY ABNORMALITY: Primary | ICD-10-CM

## 2018-10-18 DIAGNOSIS — R06.00 DYSPNEA AND RESPIRATORY ABNORMALITY: Primary | ICD-10-CM

## 2018-10-18 DIAGNOSIS — R53.81 MALAISE AND FATIGUE: ICD-10-CM

## 2018-10-18 DIAGNOSIS — E66.09 CLASS 1 OBESITY DUE TO EXCESS CALORIES WITH BODY MASS INDEX (BMI) OF 32.0 TO 32.9 IN ADULT, UNSPECIFIED WHETHER SERIOUS COMORBIDITY PRESENT: ICD-10-CM

## 2018-10-18 DIAGNOSIS — R53.83 MALAISE AND FATIGUE: ICD-10-CM

## 2018-10-18 PROCEDURE — 99204 OFFICE O/P NEW MOD 45 MIN: CPT | Performed by: PHYSICIAN ASSISTANT

## 2018-10-18 NOTE — MR AVS SNAPSHOT
After Visit Summary   10/18/2018    Brandy Leon    MRN: [id number]           Patient Information     Date Of Birth          1940        Visit Information        Provider Department      10/18/2018 11:00 AM Favian Cedeño PA [facility]        Today's Diagnoses     Dyspnea and respiratory abnormality    -  1    Excessive daytime sleepiness        Class 1 obesity due to excess calories with body mass index (BMI) of 32.0 to 32.9 in adult, unspecified whether serious comorbidity present        Lack of adequate sleep        Essential hypertension        Malaise and fatigue          Care Instructions      Your BMI is Body mass index is 32.44 kg/(m^2).  Weight management is a personal decision.  If you are interested in exploring weight loss strategies, the following discussion covers the approaches that may be successful. Body mass index (BMI) is one way to tell whether you are at a healthy weight, overweight, or obese. It measures your weight in relation to your height.  A BMI of 18.5 to 24.9 is in the healthy range. A person with a BMI of 25 to 29.9 is considered overweight, and someone with a BMI of 30 or greater is considered obese. More than two-thirds of American adults are considered overweight or obese.  Being overweight or obese increases the risk for further weight gain. Excess weight may lead to heart disease and diabetes.  Creating and following plans for healthy eating and physical activity may help you improve your health.  Weight control is part of healthy lifestyle and includes exercise, emotional health, and healthy eating habits. Careful eating habits lifelong are the mainstay of weight control. Though there are significant health benefits from weight loss, long-term weight loss with diet alone may be very difficult to achieve- studies show long-term success with dietary management in less than 10% of people. Attaining a healthy weight may be especially  difficult to achieve in those with severe obesity. In some cases, medications, devices and surgical management might be considered.  What can you do?  If you are overweight or obese and are interested in methods for weight loss, you should discuss this with your provider.     Consider reducing daily calorie intake by 500 calories.     Keep a food journal.     Avoiding skipping meals, consider cutting portions instead.    Diet combined with exercise helps maintain muscle while optimizing fat loss. Strength training is particularly important for building and maintaining muscle mass. Exercise helps reduce stress, increase energy, and improves fitness. Increasing exercise without diet control, however, may not burn enough calories to loose weight.       Start walking three days a week 10-20 minutes at a time    Work towards walking thirty minutes five days a week     Eventually, increase the speed of your walking for 1-2 minutes at time    In addition, we recommend that you review healthy lifestyles and methods for weight loss available through the National Institutes of Health patient information sites:  http://win.niddk.nih.gov/publications/index.htm    And look into health and wellness programs that may be available through your health insurance provider, employer, local community center, or brandi club.    Weight management plan: Patient was referred to their PCP to discuss a diet and exercise plan.              Follow-ups after your visit        Your next 10 appointments already scheduled     Oct 19, 2018  9:30 AM CDT   Pre-Op physical with MAGO Baker CNP   [facility] ([facility])    [address] [place] [address]   [phone]            Oct 25, 2018   Procedure with Thomas Serna MD   [facility] [place]    [address] [place] [address]   [phone]            Oct 26, 2018 12:00 PM CDT   [facility] Visit with  Bola Lagos OD   [facility]    [address] [facility] [address]   [phone]            Oct 31, 2018 12:00 PM CDT   Return Visit with Bola Lagos OD   [facility]    [address] [facility] [address]   [phone]            Nov 02, 2018  9:00 AM CDT   Return Visit with Ehsan Araya DPM   [facility]    [address] [facility] [address]   [phone]            Nov 08, 2018   Procedure with Thomas Serna MD   [facility] (--)    [address] [facility] [address]   [phone]            Nov 09, 2018 12:00 PM CST   Return Visit with Bola Lagos OD   [facility]    [address] [facility] [address]   [phone]            Nov 14, 2018 12:00 PM CST   Return Visit with Bola Lagos OD   [facility]    [address] [facility] [address]   [phone]            Dec 05, 2018 10:20 AM CST   Return Visit with Bola Lagos OD   [facility]    [address] [facility] [address]   [phone]            Dec 26, 2018  8:00 PM CST   PSG Split with BK BED 1   [facility] ([facility])    [address] [facility] [address]  [facility] [id number] [phone]              Future tests that were ordered for you today     Open Future Orders        Priority Expected Expires Ordered    Comprehensive Sleep Study Routine  4/16/2019 10/18/2018            Who to contact     If you have questions or need follow up information about today's clinic visit or your schedule please contact [facility] directly at [phone].  Normal or non-critical lab and imaging  "results will be communicated to you by MyChart, letter or phone within 4 business days after the clinic has received the results. If you do not hear from us within 7 days, please contact the clinic through MyChart or phone. If you have a critical or abnormal lab result, we will notify you by phone as soon as possible.  Submit refill requests through [employer] or call your pharmacy and they will forward the refill request to us. Please allow 3 business days for your refill to be completed.          Additional Information About Your Visit        Care EveryWhere ID     This is your Care EveryWhere ID. This could be used by other organizations to access your [facility] medical records  [id number]        Your Vitals Were     Pulse Height Pulse Oximetry BMI (Body Mass Index)          83 1.626 m (5' 4\") 96% 32.44 kg/m2         Blood Pressure from Last 3 Encounters:   10/18/18 128/80   08/03/18 119/72   07/20/18 109/68    Weight from Last 3 Encounters:   10/18/18 85.7 kg (189 lb)   08/03/18 85.5 kg (188 lb 8 oz)   06/25/18 83.8 kg (184 lb 12.8 oz)              We Performed the Following     SLEEP EVALUATION & MANAGEMENT REFERRAL - ADULT -[facility] [place]  [phone] (Age 15 and up)        Primary Care Provider Office Phone # Fax #    Cailin PonceMAGO [facility] [phone]       [address]  [place] [address]        Equal Access to Services     NUSRAT KHAN : Hadii aad ku hadasho Soomaali, waaxda luqadaha, qaybta kaalmada adeegyada, waxay paris azevedo . So [facility] [phone].    ATENCIÓN: Si habla español, tiene a garcía disposición servicios gratuitos de asistencia lingüística. Vesna al [phone].    We comply with applicable federal civil rights laws and [place] laws. We do not discriminate on the basis of race, color, national origin, age, disability, sex, sexual orientation, or gender identity.            Thank you!     Thank you for choosing " [facility]  for your care. Our goal is always to provide you with excellent care. Hearing back from our patients is one way we can continue to improve our services. Please take a few minutes to complete the written survey that you may receive in the mail after your visit with us. Thank you!             Your Updated Medication List - Protect others around you: Learn how to safely use, store and throw away your medicines at www.disposemymeds.org.          This list is accurate as of 10/18/18 11:33 AM.  Always use your most recent med list.                   Brand Name Dispense Instructions for use Diagnosis    aspirin 81 MG EC tablet     90 tablet    Take 1 tablet by mouth daily.    Type 2 diabetes, HbA1c goal < 7% (H)       ciclopirox 0.77 % cream    LOPROX    90 g    Apply topically 2 times daily To feet and toenails.    Tinea pedis of both feet       famotidine 20 MG tablet    PEPCID     Take 20 mg by mouth nightly as needed        fexofenadine 180 MG tablet    ALLEGRA     Take 180 mg by mouth daily        glipiZIDE 10 MG tablet    GLUCOTROL    360 tablet    Take 1 tablet (10 mg) by mouth 2 times daily (before meals)    Type 2 diabetes mellitus with hyperglycemia, with long-term current use of insulin (H)       GLUCOSAMINE CHONDROITIN COMPLX PO      2 Daily        insulin glargine 100 UNIT/ML injection    LANTUS SOLOSTAR    45 mL    Inject 20 Units Subcutaneous daily    Type 2 diabetes mellitus with hyperglycemia, with long-term current use of insulin (H)       insulin pen needle 32G X 4 MM    BD JUAN C U/F    100 each    USE 1 DAILY AS DIRECTED.    Type 2 diabetes mellitus with hyperglycemia (H)       * irbesartan-hydrochlorothiazide 150-12.5 MG per tablet    AVALIDE     Take 0.5 tablets by mouth daily        * irbesartan-hydrochlorothiazide 150-12.5 MG per tablet    AVALIDE    90 tablet    TAKE 1 TABLET BY MOUTH DAILY    Hypertension goal BP (blood pressure) < 140/90       latanoprost 0.005 %  ophthalmic solution    XALATAN    3 Bottle    Place 1 drop into both eyes At Bedtime    Primary open angle glaucoma of both eyes, moderate stage       metFORMIN 1000 MG tablet    GLUCOPHAGE    180 tablet    TAKE 1 TABLET (1,000 MG) BY MOUTH 2 TIMES DAILY (WITH MEALS)    Type 2 diabetes mellitus with hyperglycemia, with long-term current use of insulin (H)       MULTIVITAMIN PO      1 tablet daily        OMEGA 3 PO      Take by mouth 2 times daily.        ONE TOUCH DELICA LANCETS Misc     100 each    1 each 2 times daily. One Touch Delica    Type 2 diabetes, HbA1c goal < 7% (H)       ONETOUCH ULTRA test strip   Generic drug:  blood glucose monitoring     200 strip    USE TO TEST TWICE A DAY    Type 2 diabetes mellitus with hyperglycemia, with long-term current use of insulin (H)       order for DME     1 each    Glucometer covered by insurance.    Type 2 diabetes, HbA1c goal < 7% (H)       oxybutynin 5 MG 24 hr tablet    DITROPAN-XL    180 tablet    TAKE 2 TABLETS (10 MG) BY MOUTH DAILY    Urinary frequency, Overactive bladder       * rosuvastatin 5 MG tablet    CRESTOR    8 tablet    Take 1 tablet twice a week    Dyslipidemia, goal LDL below 100       * rosuvastatin 5 MG tablet    CRESTOR    8 tablet    TAKE 1 TABLET BY MOUTH TWICE WEEKLY    Dyslipidemia, goal LDL below 100       SYNTHROID 137 MCG tablet   Generic drug:  levothyroxine     90 tablet    TAKE 1 TABLET (137 MCG) BY MOUTH DAILY    Myxedema heart disease, Nutritional and metabolic cardiomyopathy (H)       * Notice:  This list has 4 medication(s) that are the same as other medications prescribed for you. Read the directions carefully, and ask your doctor or other care provider to review them with you.

## 2018-10-18 NOTE — TELEPHONE ENCOUNTER
Date Scheduled: 10-25 and 11-8  Facility: [facility]  Surgeon: Dr. Serna   Post-op appointment scheduled:   Next 5 appointments (look out 90 days)     Oct 19, 2018  9:30 AM CDT   Pre-Op physical with MAGO Baker CNP   [facility])    [id number] [place] [address]   [phone]            Oct 26, 2018 12:00 PM CDT   Return Visit with Bola Lagos OD   [facility]    [address] [place] [address]   [phone]            Oct 31, 2018 12:00 PM CDT   Return Visit with Bola Lagos OD   [facility])    [address] [place] [address]   [phone]            Nov 02, 2018  9:00 AM CDT   Return Visit with Ehsan Araya DPM   [facility])    [address] [place] [address]   [phone]            Nov 09, 2018 12:00 PM CST   Return Visit with Bola Lagos OD   [facility])    [address] [place] [address]   [phone]                   scheduled?: No  Surgery packet/instructions confirmed received?  Yes  Special Considerations:   Maria Elena Greenfield, Surgery Scheduling Coordinator

## 2018-10-18 NOTE — PATIENT INSTRUCTIONS

## 2018-10-18 NOTE — PROGRESS NOTES
"  Sleep Consultation:    Date on this visit: 10/18/2018    Brandy Leon is sent by Cailin Ponce for a sleep consultation regarding excessive daytime sleepiness.    Primary Physician: Cailin Ponce     CC:\"My kids are complaining that I snore. I was supposed to do this 12 years ago\"    Brandy goes to sleep between 8 and 10 PM. She wakes up at 7:00 AM without an alarm. She falls asleep in 30 minutes.  Brandy denies difficulty falling asleep.  She wakes up ~3 times a night for 10 minutes before falling back to sleep.  Brandy wakes up to go to the bathroom.  Patient gets 6-8 hours of sleep per night. She does not feel refreshed.     Patient does watch TV in bed and does not use electronics in bed and read in bed.     Brandy does not do shift work.      Brandy does snore every night and snoring is loud. Patient does not have a regular bed partner. There is report of snoring.  She does not have witnessed apneas. Patient sleeps on her back, side and stomach. She has frequent morning dry mouth, denies morning headaches, morning confusion and restless legs. Brandy denies any bruxism, sleep walking, sleep talking, dream enactment, sleep paralysis, cataplexy and hypnogogic/hypnopompic hallucinations.    Brandy denies difficulty breathing through her nose, claustrophobia and reflux at night.      Brandy has gained 0-5 pounds in the last year.  Patient describes themself as a morning person.  She would prefer to go to sleep at 9:00 PM and wake up at 7:00 AM.  Patient's [place] Sleepiness score 4/24 inconsistent with excessive  daytime sleepiness.  She has fatigue. She reports difficulty maintaining alertness during quiet times while watching television.     Brandy naps 1-2 times per week for 60 minutes, feels refreshed after naps. She takes some inadvertant naps.  She denies falling asleep while driving. Patient was counseled on the importance of driving while alert, to pull over " if drowsy, or nap before getting into the vehicle if sleepy.  She uses 2-3 cups/day of coffee. Last caffeine intake is usually before noon.    Allergies:    Allergies   Allergen Reactions     Estradiol Itching     Lipitor [Atorvastatin Calcium]      Weak and shaky     Sulfa Drugs      Rash       Zocor [Simvastatin]      Weak and shakey       Medications:    Current Outpatient Prescriptions   Medication Sig Dispense Refill     aspirin 81 MG EC tablet Take 1 tablet by mouth daily. 90 tablet 3     ciclopirox (LOPROX) 0.77 % cream Apply topically 2 times daily To feet and toenails. 90 g 6     famotidine (PEPCID) 20 MG tablet Take 20 mg by mouth nightly as needed       fexofenadine (ALLEGRA) 180 MG tablet Take 180 mg by mouth daily       glipiZIDE (GLUCOTROL) 10 MG tablet Take 1 tablet (10 mg) by mouth 2 times daily (before meals) 360 tablet 0     GLUCOSAMINE CHONDROITIN COMPLX OR 2 Daily       insulin glargine (LANTUS SOLOSTAR) 100 UNIT/ML injection Inject 20 Units Subcutaneous daily 45 mL 3     insulin pen needle (BD JUAN C U/F) 32G X 4 MM USE 1 DAILY AS DIRECTED. 100 each 3     irbesartan-hydrochlorothiazide (AVALIDE) 150-12.5 MG per tablet TAKE 1 TABLET BY MOUTH DAILY 90 tablet 1     irbesartan-hydrochlorothiazide (AVALIDE) 150-12.5 MG per tablet Take 0.5 tablets by mouth daily       latanoprost (XALATAN) 0.005 % ophthalmic solution Place 1 drop into both eyes At Bedtime 3 Bottle 4     metFORMIN (GLUCOPHAGE) 1000 MG tablet TAKE 1 TABLET (1,000 MG) BY MOUTH 2 TIMES DAILY (WITH MEALS) 180 tablet 3     MULTIVITAMIN OR 1 tablet daily       Omega-3 Fatty Acids (OMEGA 3 PO) Take by mouth 2 times daily.        ONE TOUCH DELICA LANCETS MISC 1 each 2 times daily. One Touch Delica   100 each 12     ONETOUCH ULTRA test strip USE TO TEST TWICE A  strip 11     order for DME Glucometer covered by insurance. 1 each 0     oxybutynin (DITROPAN-XL) 5 MG 24 hr tablet TAKE 2 TABLETS (10 MG) BY MOUTH DAILY 180 tablet 2      "rosuvastatin (CRESTOR) 5 MG tablet TAKE 1 TABLET BY MOUTH TWICE WEEKLY 8 tablet 3     rosuvastatin (CRESTOR) 5 MG tablet Take 1 tablet twice a week 8 tablet 1     SYNTHROID 137 MCG tablet TAKE 1 TABLET (137 MCG) BY MOUTH DAILY 90 tablet 2       Problem List:  Patient Active Problem List    Diagnosis Date Noted     Recurrent UTI 06/29/2016     Priority: Medium     Primary osteoarthritis of both hands 04/26/2016     Priority: Medium     Overactive bladder 12/22/2015     Priority: Medium     Obesity 10/23/2015     Priority: Medium     Type 2 diabetes mellitus with hyperglycemia, with long-term current use of insulin (H) 10/23/2015     Priority: Medium     Hypertension goal BP (blood pressure) < 140/90 10/23/2015     Priority: Medium     Glaucoma suspect 08/19/2014     Priority: Medium     Cataracts, bilateral 08/19/2014     Priority: Medium     Advanced directives, counseling/discussion 10/18/2013     Priority: Medium     Advance Care Planning:   ACP Review and Resources Provided:  Reviewed chart for advance care plan.  Brandy ZACARIAS Edward has no plan or code status on file. Mailed available resources and provided with information. Confirmed code status reflects current choices pending further ACP discussions.  Confirmed/documented designated decision maker(s). See permanent comments section of demographics in clinical tab.   Added by Josefina Pagan on 5/7/2015  Patient does not have an Advance/Health Care Directive (HCD), given \"What is Advance Care Planning?\" flyer and requests blank HCD form.  Rocio Sparks  October 18, 2013       Osteoarthritis 12/04/2009     Priority: Medium     Per Dr. Ya 2009       Fibromyalgia 05/22/2009     Priority: Medium     Hyperlipidemia LDL goal <100 04/29/2009     Priority: Medium     Seasonal allergies      Priority: Medium     Hypothyroidism      Priority: Medium     s/p subtotal thyroidectomy           Past Medical/Surgical History:  Past Medical History:   Diagnosis Date     " Actinic keratosis 3/12/2013     Degenerative joint disease      Diabetes mellitus (H) 2006    type 2     HTN (hypertension)      Hyperlipidemia LDL goal < 100      Hypothyroidism 1960    s/p subtotal thyroidectomy      Rheumatic fever     x2 between the ages of 16-18     Seasonal allergies      Past Surgical History:   Procedure Laterality Date     C APPENDECTOMY       C ARTHROPLASTY TMJ       COLONOSCOPY       COLONOSCOPY N/A 8/13/2015    Procedure: COLONOSCOPY;  Surgeon: Duane, William Charles, MD;  Location: [facility] OR     COLONOSCOPY WITH CO2 INSUFFLATION N/A 8/13/2015    Procedure: COLONOSCOPY WITH CO2 INSUFFLATION;  Surgeon: Duane, William Charles, MD;  Location: [facility] OR     THYROIDECTOMY          Social History:  Social History     Social History     Marital status: Single     Spouse name: N/A     Number of children: N/A     Years of education: N/A     Occupational History     Not on file.     Social History Main Topics     Smoking status: Never Smoker     Smokeless tobacco: Never Used      Comment: has had exposure to second hand smoke in the past from [place], no current exposure     Alcohol use No     Drug use: No     Sexual activity: No     Other Topics Concern     Parent/Sibling W/ Cabg, Mi Or Angioplasty Before 65f 55m? 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     Hobby Hazards No     Sleep Concern Yes     difficulty staying asleep, up and down all night     Stress Concern No     Weight Concern Yes     would like to lose     Special Diet Yes     low card, low sugar diet     Back Care Yes     chiropratior     Exercise Yes     almost everyday     Bike Helmet No     does not ride bicycle any more     Seat Belt Yes     Self-Exams Yes     Social History Narrative       Family History:  Family History   Problem Relation Age of Onset     Cancer Father   "    skin and leukemia     Cerebrovascular Disease Maternal Grandfather      Cerebrovascular Disease Paternal Grandmother      Eye Disorder Paternal Grandmother      cataracts     Cerebrovascular Disease Paternal Grandfather      HEART DISEASE Paternal Grandfather      Cancer Sister      Breast Cancer     Eye Disorder Mother      cataracts     Eye Disorder Brother      cataract     Breast Cancer Daughter      Other Cancer Daughter      ovarian cancer       Review of Systems:  A complete review of systems reviewed by me is negative with the exeption of what has been mentioned in the history of present illness.  CONSTITUTIONAL: NEGATIVE for weight gain/loss, fever, chills, sweats or night sweats, drug allergies.  EYES: NEGATIVE for changes in vision, blind spots, double vision.  ENT: NEGATIVE for ear pain, sore throat, sinus pain, post-nasal drip, runny nose, bloody nose  CARDIAC: NEGATIVE for fast heartbeats or fluttering in chest, chest pain or pressure, breathlessness when lying flat, swollen legs or swollen feet.  NEUROLOGIC: NEGATIVE headaches, weakness or numbness in the arms or legs.  DERMATOLOGIC: NEGATIVE for rashes, new moles or change in mole(s)  PULMONARY: NEGATIVE SOB at rest, SOB with activity, dry cough, productive cough, coughing up blood, wheezing or whistling when breathing.    GASTROINTESTINAL: NEGATIVE for nausea or vomitting, loose or watery stools, fat or grease in stools, constipation, abdominal pain, bowel movements black in color or blood noted.  GENITOURINARY: NEGATIVE for pain during urination, blood in urine, urinating more frequently than usual, irregular menstrual periods.  MUSCULOSKELETAL:  POSITIVE for  muscle pain  ENDOCRINE: NEGATIVE for increased thirst or urination, diabetes.  LYMPHATIC: NEGATIVE for swollen lymph nodes, lumps or bumps in the breasts or nipple discharge.    Physical Examination:  Vitals: /80  Pulse 83  Ht 1.626 m (5' 4\")  Wt 85.7 kg (189 lb)  SpO2 96%  BMI " 32.44 kg/m2  BMI= Body mass index is 32.44 kg/(m^2).    Neck Cir (cm): 42 cm    Murray Total Score 10/18/2018   Total score - Murray 4       MARY KATE Total Score: 13 (10/18/18 1000)    GENERAL APPEARANCE: alert and no distress  EYES: Eyes grossly normal to inspection, PERRL and conjunctivae and sclerae normal  HENT: ear canals and TM's normal, nose and mouth without ulcers or lesions, oropharynx crowded and tongue base enlarged  NECK: no adenopathy and no asymmetry, masses, or scars  RESP: lungs clear to auscultation - no rales, rhonchi or wheezes  CV: regular rates and rhythm and normal S1 S2, no S3 or S4  MS: extremities normal- no gross deformities noted  NEURO: Normal strength and tone, mentation intact and speech normal  PSYCH: mentation appears normal and affect normal/bright  Mallampati Class: III.  Tonsillar Stage:    Last Comprehensive Metabolic Panel:  Sodium   Date Value Ref Range Status   06/25/2018 135 133 - 144 mmol/L Final     Comment:     Specimen slightly hemolyzed, potassium may be falsely elevated     Potassium   Date Value Ref Range Status   06/25/2018 4.3 3.4 - 5.3 mmol/L Final     Comment:     Specimen slightly hemolyzed, potassium may be falsely elevated     Chloride   Date Value Ref Range Status   06/25/2018 100 94 - 109 mmol/L Final     Carbon Dioxide   Date Value Ref Range Status   06/25/2018 30 20 - 32 mmol/L Final     Anion Gap   Date Value Ref Range Status   06/25/2018 5 3 - 14 mmol/L Final     Glucose   Date Value Ref Range Status   06/25/2018 136 (H) 70 - 99 mg/dL Final     Comment:     Fasting specimen     Urea Nitrogen   Date Value Ref Range Status   06/25/2018 18 7 - 30 mg/dL Final     Creatinine   Date Value Ref Range Status   06/25/2018 0.68 0.52 - 1.04 mg/dL Final     GFR Estimate   Date Value Ref Range Status   06/25/2018 84 >60 mL/min/1.7m2 Final     Comment:     Non  GFR Calc     Calcium   Date Value Ref Range Status   06/25/2018 9.1 8.5 - 10.1 mg/dL Final     TSH    Date Value Ref Range Status   06/25/2018 2.46 0.40 - 4.00 mU/L Final     Impression:  Patient has features and risk factors for possible obstructive sleep apnea including: loud snoring, non-refreshing sleep, daytime fatigue/sleepiness, difficulty maintaining sleep, crowded oropharynx and co-morbid HTN. The STOP-BANG score is 5/8.     Plan:    1. Polysomnogram (using 4% desaturation/Medicare/2012 AASM 1B scoring rules) for high probability obstructive sleep apnea.   2. Advised her against drowsy driving.    3. Recommend weight management.     Literature provided regarding sleep apnea and sleep hygiene.      She will follow up with me in approximately two weeks after her sleep study has been competed to review the results and discuss plan of care.       Polysomnography reviewed.  Obstructive sleep apnea reviewed.  Complications of untreated sleep apnea were reviewed.    Favian Cedeño PA-C    CC: Cailin Ponce

## 2018-10-18 NOTE — NURSING NOTE
"Chief Complaint   Patient presents with     Sleep Problem       Initial Ht 1.626 m (5' 4\")  Wt 85.7 kg (189 lb)  BMI 32.44 kg/m2 Estimated body mass index is 32.44 kg/(m^2) as calculated from the following:    Height as of this encounter: 1.626 m (5' 4\").    Weight as of this encounter: 85.7 kg (189 lb).    Medication Reconciliation: complete    Neck circumference: 16.5 inches / 42 centimeters.        "

## 2018-10-19 ENCOUNTER — OFFICE VISIT (OUTPATIENT)
Dept: PEDIATRICS | Facility: CLINIC | Age: 78
End: 2018-10-19
Payer: COMMERCIAL

## 2018-10-19 VITALS
HEIGHT: 64 IN | OXYGEN SATURATION: 96 % | TEMPERATURE: 96.8 F | SYSTOLIC BLOOD PRESSURE: 120 MMHG | BODY MASS INDEX: 32.01 KG/M2 | WEIGHT: 187.5 LBS | HEART RATE: 87 BPM | DIASTOLIC BLOOD PRESSURE: 60 MMHG

## 2018-10-19 DIAGNOSIS — Z01.818 PREOP GENERAL PHYSICAL EXAM: Primary | ICD-10-CM

## 2018-10-19 DIAGNOSIS — Z79.4 TYPE 2 DIABETES MELLITUS WITH HYPERGLYCEMIA, WITH LONG-TERM CURRENT USE OF INSULIN (H): Chronic | ICD-10-CM

## 2018-10-19 DIAGNOSIS — H25.13 AGE-RELATED NUCLEAR CATARACT OF BOTH EYES: Chronic | ICD-10-CM

## 2018-10-19 DIAGNOSIS — E11.65 TYPE 2 DIABETES MELLITUS WITH HYPERGLYCEMIA, WITH LONG-TERM CURRENT USE OF INSULIN (H): Chronic | ICD-10-CM

## 2018-10-19 LAB
ANION GAP SERPL CALCULATED.3IONS-SCNC: 6 MMOL/L (ref 3–14)
BUN SERPL-MCNC: 19 MG/DL (ref 7–30)
CALCIUM SERPL-MCNC: 9.2 MG/DL (ref 8.5–10.1)
CHLORIDE SERPL-SCNC: 98 MMOL/L (ref 94–109)
CO2 SERPL-SCNC: 30 MMOL/L (ref 20–32)
CREAT SERPL-MCNC: 0.83 MG/DL (ref 0.52–1.04)
GFR SERPL CREATININE-BSD FRML MDRD: 67 ML/MIN/1.7M2
GLUCOSE SERPL-MCNC: 255 MG/DL (ref 70–99)
HBA1C MFR BLD: 11.1 % (ref 0–5.6)
POTASSIUM SERPL-SCNC: 4.6 MMOL/L (ref 3.4–5.3)
SODIUM SERPL-SCNC: 134 MMOL/L (ref 133–144)

## 2018-10-19 PROCEDURE — 99214 OFFICE O/P EST MOD 30 MIN: CPT | Performed by: NURSE PRACTITIONER

## 2018-10-19 PROCEDURE — 83036 HEMOGLOBIN GLYCOSYLATED A1C: CPT | Performed by: NURSE PRACTITIONER

## 2018-10-19 PROCEDURE — 36415 COLL VENOUS BLD VENIPUNCTURE: CPT | Performed by: NURSE PRACTITIONER

## 2018-10-19 PROCEDURE — 80048 BASIC METABOLIC PNL TOTAL CA: CPT | Performed by: NURSE PRACTITIONER

## 2018-10-19 RX ORDER — KETOROLAC TROMETHAMINE 5 MG/ML
1 SOLUTION OPHTHALMIC 4 TIMES DAILY
Qty: 1 BOTTLE | Refills: 1 | Status: SHIPPED | OUTPATIENT
Start: 2018-10-19 | End: 2018-11-09

## 2018-10-19 RX ORDER — PREDNISOLONE ACETATE 10 MG/ML
1 SUSPENSION/ DROPS OPHTHALMIC 4 TIMES DAILY
Qty: 1 BOTTLE | Refills: 1 | Status: SHIPPED | OUTPATIENT
Start: 2018-10-19 | End: 2018-11-09

## 2018-10-19 RX ORDER — OFLOXACIN 3 MG/ML
1 SOLUTION/ DROPS OPHTHALMIC 4 TIMES DAILY
Qty: 1 BOTTLE | Refills: 1 | Status: SHIPPED | OUTPATIENT
Start: 2018-10-19 | End: 2018-10-29

## 2018-10-19 NOTE — MR AVS SNAPSHOT
After Visit Summary   10/19/2018    Brandy Leon    MRN: [id number]           Patient Information     Date Of Birth          1940        Visit Information        Provider Department      10/19/2018 9:30 AM Cailin Ponce APRN [facility]        Today's Diagnoses     Preop general physical exam    -  1    Age-related nuclear cataract of both eyes        Type 2 diabetes mellitus with hyperglycemia, with long-term current use of insulin (H)          Care Instructions    PLAN:   1.   Symptomatic therapy suggested: Continue current medications.  2.  Orders Placed This Encounter   Procedures     Basic metabolic panel     Hemoglobin A1c       3. Patient needs to follow up in if no improvement,or sooner if worsening of symptoms or other symptoms develop.  Will follow up and/or notify patient on results via phone or mail to determine further need for followup    Next 5 appointments (look out 90 days)     Oct 26, 2018 12:00 PM CDT   Return Visit with Bola Lagos OD   [facility])    [address] [place] [address]   [phone]            Oct 31, 2018 12:00 PM CDT   Return Visit with Bola Lagos OD   [facility])    [address] [place] [address]   [phone]            Nov 02, 2018  9:00 AM CDT   Return Visit with Ehsan Araya DPM   [facility])    [address] [facility] [address]   [phone]            Nov 09, 2018 12:00 PM CST   Return Visit with Bola Lagos OD   [facility])    [address] [facility] [address]   [phone]            Nov 14, 2018 12:00 PM CST   Return Visit with Bola Lagos OD   [facility])    [address] [address] [address]    [phone]                    Before Your Surgery      Call your surgeon if there is any change in your health. This includes signs of a cold or flu (such as a sore throat, runny nose, cough, rash or fever).    Do not smoke, drink alcohol or take over the counter medicine (unless your surgeon or primary care doctor tells you to) for the 24 hours before and after surgery.    If you take prescribed drugs: Follow your doctor s orders about which medicines to take and which to stop until after surgery.    Eating and drinking prior to surgery: follow the instructions from your surgeon    Take a shower or bath the night before surgery. Use the soap your surgeon gave you to gently clean your skin. If you do not have soap from your surgeon, use your regular soap. Do not shave or scrub the surgery site.  Wear clean pajamas and have clean sheets on your bed.   It was a pleasure seeing you today at the [facility]. Thank you for allowing us to care for you today. We truly hope we provided you with the excellent service you deserve. Please let us know if there is anything else we can do for you so we can be sure you are leaving completley satisfied with your care experience.       General information about your clinic   Clinic Hours Lab Hours (Appointments are required)   Mon-Thurs: 7:30 AM - 7 PM Mon-Thurs: 7:30 AM - 7 PM   Fri: 7:30 AM - 5 PM Fri: 7:30 AM - 5 PM        After Hours Nurse Advise & Appts:  [facility]: [phone]  [facility] On Call: to make appointments anytime: [phone] On Call Physician: call [phone] and answering service will page the on call physician.        For urgent appointments, please call [phone] and ask for the triage nurse or your care team clinic nurse.  How to contact my care team:  MyChart: [email or web address]   Phone: [phone]   Fax: [phone]       [facility]:   Phone: [phone]  Hours: 8:00 AM - 6:00 PM   Medication Refills:  Call your pharmacy and they will forward the refill to us. Please allow 3 business days for your refills to be completed.       Normal or non-critical lab and imaging results will be communicated to you by MyChart, letter or phone within 7 days.  If you do not hear from us within 10 days, please call the clinic. If you have a critical or abnormal lab result, we will notify you by phone as soon as possible.       We now have PWIC (Pediatric Walk in Care)  Monday-Friday from 7:30-4. Simply walk in and be seen for your urgent needs like cough, fever, rash, diarrhea or vomiting, pink eye, UTI. No appointments needed. Ask one of the team for more information      -Your Care Team:    Dr. Tara Keller - Internal Medicine/Pediatrics   Dr. Kirit Pérez - Family Medicine  Dr. Deborah Andres - Pediatrics  Dr. Adelia Drew - Pediatrics  Cailin Ponce CNP - Family Practice Nurse Practitioner                         Follow-ups after your visit        Your next 10 appointments already scheduled     Oct 25, 2018   Procedure with Thomas Serna MD   [facility] ([facility])    [address]  [facility] [address]   [phone]            Oct 26, 2018 12:00 PM CDT   Return Visit with Bola Lagos OD   [facility])    [address] [facility] [address]   [phone]            Oct 31, 2018 12:00 PM CDT   Return Visit with Bola Lagos OD   [facility])    [address] [facility] [address]   [phone]            Nov 02, 2018  9:00 AM CDT   Return Visit with Ehsan Araya DPM   [facility])    [address] [facility] [address]   [phone]            Nov 08, 2018   Procedure with Thomas Serna MD   [facility] (--)    [address]  [facility] [place] [address]   [phone]             Nov 09, 2018 12:00 PM CST   [facility] with Bola [facility]os OD   [facility]    [address] [place] [address]   [phone]            Nov 14, 2018 12:00 PM CST   Return Visit with Bola Lagos OD   [facility]    [address] [place] [address]   [phone]            Dec 05, 2018 10:20 AM CST   Return Visit with Bola Lagos OD   [facility])    [address] [place] [address]   [phone]            Dec 26, 2018  8:00 PM CST   PSG Split with BK BED 1   [facility] ([facility])    [address] [address] [phone]            Dec 28, 2018  8:15 AM CST   Return Visit with Candice Worley MD   [facility]    [address] [place] [address] [phone]              Future tests that were ordered for you today     Open Future Orders        Priority Expected Expires Ordered    Comprehensive Sleep Study Routine  4/16/2019 10/18/2018            Who to contact     If you have questions or need follow up information about today's clinic visit or your schedule please contact [facility] directly at [phone].  Normal or non-critical lab and imaging results will be communicated to you by MyChart, letter or phone within 4 business days after the clinic has received the results. If you do not hear from us within 7 days, please contact the clinic through MyChart or phone. If you have a critical or abnormal lab result, we will notify you by phone as soon as possible.  Submit refill requests through [facility] or call your pharmacy and they will forward the refill request to us. Please allow 3 business days for your refill to be completed.           "Additional Information About Your Visit        Care EveryWhere ID     This is your Care EveryWhere ID. This could be used by other organizations to access your [facility] medical records  [id number]        Your Vitals Were     Pulse Temperature Height Pulse Oximetry Breastfeeding? BMI (Body Mass Index)    87 96.8  F (36  C) (Temporal) 5' 3.5\" (1.613 m) 96% No 32.69 kg/m2       Blood Pressure from Last 3 Encounters:   10/19/18 120/60   10/18/18 128/80   08/03/18 119/72    Weight from Last 3 Encounters:   10/19/18 187 lb 8 oz (85 kg)   10/18/18 189 lb (85.7 kg)   08/03/18 188 lb 8 oz (85.5 kg)              We Performed the Following     Basic metabolic panel     Hemoglobin A1c          Today's Medication Changes          These changes are accurate as of 10/19/18 10:04 AM.  If you have any questions, ask your nurse or doctor.               These medicines have changed or have updated prescriptions.        Dose/Directions    irbesartan-hydrochlorothiazide 150-12.5 MG per tablet   Commonly known as:  AVALIDE   This may have changed:  See the new instructions.   Used for:  Hypertension goal BP (blood pressure) < 140/90        TAKE 1 TABLET BY MOUTH DAILY   Quantity:  90 tablet   Refills:  1                Primary Care Provider Office Phone # Fax #    Cailin MAGO Bernal [facility] [phone]       [address]  [place] [address]        Equal Access to Services     NUSRAT KHAN AH: Hadii aad ku hadasho Soomaali, waaxda luqadaha, qaybta kaalmada adeegyada, mitch alaniz. So [facility] [phone].    ATENCIÓN: Si habla español, tiene a garcía disposición servicios gratuitos de asistencia lingüística. Vesna al [phone].    We comply with applicable federal civil rights laws and [place] laws. We do not discriminate on the basis of race, color, national origin, age, disability, sex, sexual orientation, or gender identity.            Thank you!     Thank you for choosing [facility]  for your care. Our goal is always to provide you with excellent care. Hearing back from our patients is one way we can continue to improve our services. Please take a few minutes to complete the written survey that you may receive in the mail after your visit with us. Thank you!             Your Updated Medication List - Protect others around you: Learn how to safely use, store and throw away your medicines at www.disposemymeds.org.          This list is accurate as of 10/19/18 10:04 AM.  Always use your most recent med list.                   Brand Name Dispense Instructions for use Diagnosis    aspirin 81 MG EC tablet     90 tablet    Take 1 tablet by mouth daily.    Type 2 diabetes, HbA1c goal < 7% (H)       ciclopirox 0.77 % cream    LOPROX    90 g    Apply topically 2 times daily To feet and toenails.    Tinea pedis of both feet       famotidine 20 MG tablet    PEPCID     Take 20 mg by mouth nightly as needed        fexofenadine 180 MG tablet    ALLEGRA     Take 180 mg by mouth daily        glipiZIDE 10 MG tablet    GLUCOTROL    360 tablet    Take 1 tablet (10 mg) by mouth 2 times daily (before meals)    Type 2 diabetes mellitus with hyperglycemia, with long-term current use of insulin (H)       GLUCOSAMINE CHONDROITIN COMPLX PO      2 Daily        insulin glargine 100 UNIT/ML injection    LANTUS SOLOSTAR    45 mL    Inject 20 Units Subcutaneous daily    Type 2 diabetes mellitus with hyperglycemia, with long-term current use of insulin (H)       insulin pen needle 32G X 4 MM    BD JUAN C U/F    100 each    USE 1 DAILY AS DIRECTED.    Type 2 diabetes mellitus with hyperglycemia (H)       irbesartan-hydrochlorothiazide 150-12.5 MG per tablet    AVALIDE    90 tablet    TAKE 1 TABLET BY MOUTH DAILY    Hypertension goal BP (blood pressure) < 140/90       latanoprost 0.005 % ophthalmic solution    XALATAN    3 Bottle    Place 1 drop into both eyes At Bedtime    Primary open angle glaucoma of  both eyes, moderate stage       metFORMIN 1000 MG tablet    GLUCOPHAGE    180 tablet    TAKE 1 TABLET (1,000 MG) BY MOUTH 2 TIMES DAILY (WITH MEALS)    Type 2 diabetes mellitus with hyperglycemia, with long-term current use of insulin (H)       MULTIVITAMIN PO      1 tablet daily        OMEGA 3 PO      Take by mouth 2 times daily.        ONE TOUCH DELICA LANCETS Misc     100 each    1 each 2 times daily. One Touch Delica    Type 2 diabetes, HbA1c goal < 7% (H)       ONETOUCH ULTRA test strip   Generic drug:  blood glucose monitoring     200 strip    USE TO TEST TWICE A DAY    Type 2 diabetes mellitus with hyperglycemia, with long-term current use of insulin (H)       order for DME     1 each    Glucometer covered by insurance.    Type 2 diabetes, HbA1c goal < 7% (H)       oxybutynin 5 MG 24 hr tablet    DITROPAN-XL    180 tablet    TAKE 2 TABLETS (10 MG) BY MOUTH DAILY    Urinary frequency, Overactive bladder       rosuvastatin 5 MG tablet    CRESTOR    8 tablet    TAKE 1 TABLET BY MOUTH TWICE WEEKLY    Dyslipidemia, goal LDL below 100       SYNTHROID 137 MCG tablet   Generic drug:  levothyroxine     90 tablet    TAKE 1 TABLET (137 MCG) BY MOUTH DAILY    Myxedema heart disease, Nutritional and metabolic cardiomyopathy (H)

## 2018-10-19 NOTE — TELEPHONE ENCOUNTER
IOL calcs and orders emailed to  [facility]. Medication orders sent to [facility].  Whit Solis RN

## 2018-10-19 NOTE — PATIENT INSTRUCTIONS
PLAN:   1.   Symptomatic therapy suggested: Continue current medications.  2.  Orders Placed This Encounter   Procedures     Basic metabolic panel     Hemoglobin A1c       3. Patient needs to follow up in if no improvement,or sooner if worsening of symptoms or other symptoms develop.  Will follow up and/or notify patient on results via phone or mail to determine further need for followup    Next 5 appointments (look out 90 days)     Oct 26, 2018 12:00 PM CDT   [facility]    [address] [place] [address]   [phone]            Oct 31, 2018 12:00 PM CDT   Return Visit with Bola Lagos OD   [facility])    [address] [place] [address]   [phone]            Nov 02, 2018  9:00 AM CDT   Return Visit with Ehsan Araya DPM   [facility])    [address] [place] [address]   [phone]            Nov 09, 2018 12:00 PM CST   Return Visit with Bola Lagos OD   [facility])    [address] [facility] [address]   [phone]            Nov 14, 2018 12:00 PM CST   Return Visit with Bola Lagos OD   [facility])    [address] [place] [address]   [phone]                    Before Your Surgery      Call your surgeon if there is any change in your health. This includes signs of a cold or flu (such as a sore throat, runny nose, cough, rash or fever).    Do not smoke, drink alcohol or take over the counter medicine (unless your surgeon or primary care doctor tells you to) for the 24 hours before and after surgery.    If you take prescribed drugs: Follow your doctor s orders about which medicines to take and which to stop until after surgery.    Eating and  drinking prior to surgery: follow the instructions from your surgeon    Take a shower or bath the night before surgery. Use the soap your surgeon gave you to gently clean your skin. If you do not have soap from your surgeon, use your regular soap. Do not shave or scrub the surgery site.  Wear clean pajamas and have clean sheets on your bed.   It was a pleasure seeing you today at the [facility]. Thank you for allowing us to care for you today. We truly hope we provided you with the excellent service you deserve. Please let us know if there is anything else we can do for you so we can be sure you are leaving completley satisfied with your care experience.       General information about your clinic   Clinic Hours Lab Hours (Appointments are required)   Mon-Thurs: 7:30 AM - 7 PM Mon-Thurs: 7:30 AM - 7 PM   Fri: 7:30 AM - 5 PM Fri: 7:30 AM - 5 PM        After Hours Nurse Advise & Appts:  [facility]: [phone]  [facility] On Call: to make appointments anytime: [phone] On Call Physician: call [phone] and answering service will page the on call physician.        For urgent appointments, please call [phone] and ask for the triage nurse or your care team clinic nurse.  How to contact my care team:  [facility]: [email or web address]   Phone: [phone]   Fax: [phone]       [facility]:   Phone: [phone]  Hours: 8:00 AM - 6:00 PM  Medication Refills:  Call your pharmacy and they will forward the refill to us. Please allow 3 business days for your refills to be completed.       Normal or non-critical lab and imaging results will be communicated to you by MyChart, letter or phone within 7 days.  If you do not hear from us within 10 days, please call the clinic. If you have a critical or abnormal lab result, we will notify you by phone as soon as possible.       We now have PWIC (Pediatric Walk in Care)  Monday-Friday from 7:30-4. Simply walk in and be  seen for your urgent needs like cough, fever, rash, diarrhea or vomiting, pink eye, UTI. No appointments needed. Ask one of the team for more information      -Your Care Team:    Dr. Tara Keller - Internal Medicine/Pediatrics   Dr. Kirit Pérez - Family Medicine  Dr. Deborah Andres - Pediatrics  Dr. Adelia Drew - Pediatrics  Cailin Ponce CNP - Family Practice Nurse Practitioner

## 2018-10-19 NOTE — Clinical Note
Dennis Interiano  Brandy stopped Trulicity sounds like some complaints of side effects but looks like $200 a script She says her insurance will not cover to see you  Idea of cost effective alternative? I could send her to [facility]? Appointment with endocrine is few months from now Thoughts? Thanks Atiya

## 2018-10-19 NOTE — Clinical Note
Hi Dr Kareem Leon A1C is 11.1  I told her I would talk to you as not sure how you felt about doing her cataracts with A1c this elevated Thanks  Cailin Ponce, FARZANA, APRN CNP

## 2018-10-19 NOTE — PROGRESS NOTES
[address] [address]  [phone]  Dept: [phone]    PRE-OP EVALUATION:  Today's date: 10/19/2018    Brandy Leon (: 1940) presents for pre-operative evaluation assessment as requested by Dr. Serna.  She requires evaluation and anesthesia risk assessment prior to undergoing surgery/procedure for treatment of cataracts .    Proposed Surgery/ Procedure: LEFT PHACOEMULSIFICATION WITH STANDARD INTRAOCULAR LENS IMPLANT, RIGHT PHACOEMULSIFICATION WITH STANDARD INTRAOCULAR LENS IMPLANT  Date of Surgery/ Procedure: 10/25/18, 18  Time of Surgery/ Procedure: 10:25AM, 8:45AM  Hospital/Surgical Facility: [facility]  Fax number for surgical facility:   Primary Physician: Cailin Ponce  Type of Anesthesia Anticipated: Monitor Anesthesia Care    Patient has a Health Care Directive or Living Will:  NO    1. NO - Do you have a history of heart attack, stroke, stent, bypass or surgery on an artery in the head, neck, heart or legs?  2. NO - Do you ever have any pain or discomfort in your chest?  3. NO - Do you have a history of  Heart Failure?  4. NO - Are you troubled by shortness of breath when: walking on the level, up a slight hill or at night?  5. NO - Do you currently have a cold, bronchitis or other respiratory infection?  6.  NO  - DO YOU HAVE A COUGH, SHORTNESS OF BREATH OR WHEEZING?   7. NO - Do you sometimes get pains in the calves of your legs when you walk?  8. NO - Do you or anyone in your family have previous history of blood clots?  9. NO - Do you or does anyone in your family have a serious bleeding problem such as prolonged bleeding following surgeries or cuts?  10. NO - Have you ever had problems with anemia or been told to take iron pills?  11. NO - Have you had any abnormal blood loss such as black, tarry or bloody stools, or abnormal vaginal bleeding?  12. YES - HAVE YOU EVER HAD A BLOOD TRANSFUSION? 20-30  years ago  13. NO - Have you or any of your relatives ever had problems with anesthesia?  14. YES - DO YOU HAVE SLEEP APNEA, EXCESSIVE SNORING OR DAYTIME DROWSINESS? Sleep apnea and snoring  15. NO - Do you have any prosthetic heart valves?  16. NO - Do you have prosthetic joints?  17. NO - Is there any chance that you may be pregnant?      HPI:     HPI related to upcoming procedure:  treatment of cataracts .    Proposed Surgery/ Procedure: LEFT PHACOEMULSIFICATION WITH STANDARD INTRAOCULAR LENS IMPLANT, RIGHT PHACOEMULSIFICATION WITH STANDARD INTRAOCULAR LENS IMPLANT  Date of Surgery/ Procedure: 10/25/18, 11/8/18        DIABETES - Patient has a longstanding history of DiabetesType Type II . Patient is being treated with diet, oral agents and insulin injections and denies significant side effects. Control has been poor. Complicating factors include but are not limited to: hypertension and hyperlipidemia.                                                                                                                            .  HYPERTENSION - Patient has longstanding history of HTN , currently denies any symptoms referable to elevated blood pressure. Specifically denies chest pain, palpitations, dyspnea, orthopnea, PND or peripheral edema. Blood pressure readings have been in normal range. Current medication regimen is as listed below. Patient denies any side effects of medication.                                                                                                                                                                                          .    MEDICAL HISTORY:     Patient Active Problem List    Diagnosis Date Noted     Recurrent UTI 06/29/2016     Priority: Medium     Overactive bladder 12/22/2015     Priority: Medium     Obesity 10/23/2015     Priority: Medium     Type 2 diabetes mellitus with hyperglycemia, with long-term current use of insulin (H) 10/23/2015     Priority: Medium      "Hypertension goal BP (blood pressure) < 140/90 10/23/2015     Priority: Medium     Cataracts, bilateral 08/19/2014     Priority: Medium     Advanced directives, counseling/discussion 10/18/2013     Priority: Medium     Advance Care Planning:   ACP Review and Resources Provided:  Reviewed chart for advance care plan.  Brandy Leon has no plan or code status on file. Mailed available resources and provided with information. Confirmed code status reflects current choices pending further ACP discussions.  Confirmed/documented designated decision maker(s). See permanent comments section of demographics in clinical tab.   Added by Josefina Pagan on 5/7/2015  Patient does not have an Advance/Health Care Directive (HCD), given \"What is Advance Care Planning?\" flyer and requests blank HCD form.  Rocio Sparks  October 18, 2013       Osteoarthritis 12/04/2009     Priority: Medium     Per Dr. Ya 2009       Fibromyalgia 05/22/2009     Priority: Medium     Hyperlipidemia LDL goal <100 04/29/2009     Priority: Medium     Seasonal allergies      Priority: Medium     Hypothyroidism      Priority: Medium     s/p subtotal thyroidectomy         Past Medical History:   Diagnosis Date     Actinic keratosis 3/12/2013     Degenerative joint disease      Rheumatic fever     x2 between the ages of 15-18     Seasonal allergies      Past Surgical History:   Procedure Laterality Date     C APPENDECTOMY       C ARTHROPLASTY TMJ       COLONOSCOPY       COLONOSCOPY N/A 8/13/2015    Procedure: COLONOSCOPY;  Surgeon: Duane, William Charles, MD;  Location: [facility] OR     COLONOSCOPY WITH CO2 INSUFFLATION N/A 8/13/2015    Procedure: COLONOSCOPY WITH CO2 INSUFFLATION;  Surgeon: Duane, William Charles, MD;  Location: [facility] OR     THYROIDECTOMY        Current Outpatient Prescriptions   Medication Sig Dispense Refill     aspirin 81 MG EC tablet Take 1 tablet by mouth daily. 90 tablet 3     ciclopirox (LOPROX) 0.77 % cream Apply topically 2 times " daily To feet and toenails. 90 g 6     famotidine (PEPCID) 20 MG tablet Take 20 mg by mouth nightly as needed       fexofenadine (ALLEGRA) 180 MG tablet Take 180 mg by mouth daily       glipiZIDE (GLUCOTROL) 10 MG tablet Take 1 tablet (10 mg) by mouth 2 times daily (before meals) 360 tablet 0     GLUCOSAMINE CHONDROITIN COMPLX OR 2 Daily       insulin glargine (LANTUS SOLOSTAR) 100 UNIT/ML injection Inject 20 Units Subcutaneous daily 45 mL 3     insulin pen needle (BD JUAN C U/F) 32G X 4 MM USE 1 DAILY AS DIRECTED. 100 each 3     irbesartan-hydrochlorothiazide (AVALIDE) 150-12.5 MG per tablet TAKE 1 TABLET BY MOUTH DAILY (Patient taking differently: TAKE 0.5 TABLET BY MOUTH DAILY) 90 tablet 1     latanoprost (XALATAN) 0.005 % ophthalmic solution Place 1 drop into both eyes At Bedtime 3 Bottle 4     metFORMIN (GLUCOPHAGE) 1000 MG tablet TAKE 1 TABLET (1,000 MG) BY MOUTH 2 TIMES DAILY (WITH MEALS) 180 tablet 3     MULTIVITAMIN OR 1 tablet daily       Omega-3 Fatty Acids (OMEGA 3 PO) Take by mouth 2 times daily.        ONE TOUCH DELICA LANCETS MISC 1 each 2 times daily. One Touch Delica   100 each 12     ONETOUCH ULTRA test strip USE TO TEST TWICE A  strip 11     order for DME Glucometer covered by insurance. 1 each 0     oxybutynin (DITROPAN-XL) 5 MG 24 hr tablet TAKE 2 TABLETS (10 MG) BY MOUTH DAILY 180 tablet 2     rosuvastatin (CRESTOR) 5 MG tablet TAKE 1 TABLET BY MOUTH TWICE WEEKLY 8 tablet 3     SYNTHROID 137 MCG tablet TAKE 1 TABLET (137 MCG) BY MOUTH DAILY 90 tablet 2     [DISCONTINUED] irbesartan-hydrochlorothiazide (AVALIDE) 150-12.5 MG per tablet Take 0.5 tablets by mouth daily       [DISCONTINUED] rosuvastatin (CRESTOR) 5 MG tablet Take 1 tablet twice a week 8 tablet 1     OTC products: no recent use of OTC ASA, NSAIDS or Steroids and no use of herbal medications or other supplements    Allergies   Allergen Reactions     Estradiol Itching     Lipitor [Atorvastatin Calcium]      Weak and shaky      "Sulfa Drugs      Rash       Zocor [Simvastatin]      Weak and shakey      Latex Allergy: NO    Social History   Substance Use Topics     Smoking status: Never Smoker     Smokeless tobacco: Never Used      Comment: has had exposure to second hand smoke in the past from [facility], no current exposure     Alcohol use No     History   Drug Use No       REVIEW OF SYSTEMS:   CONSTITUTIONAL:NEGATIVE for fever, chills, change in weight  INTEGUMENTARY/SKIN: NEGATIVE for rash   EYES: POSITIVE for Hx cataracts  ENT: NEGATIVE for ear, mouth and throat problems  RESP:NEGATIVE for significant cough or SOB  Cough at night   CV: NEGATIVE for chest pain, palpitations or peripheral edema  GI: POSITIVE for Hx GERD and  Occasional pepcid NEGATIVE for nausea and vomiting  MUSCULOSKELETAL:NEGATIVE for significant arthralgias or myalgia  NEURO: NEGATIVE for weakness, dizziness or paresthesias  ENDOCRINE: NEGATIVE for temperature intolerance, skin/hair changes and POSITIVE  for HX diabetes  Stopped the Trulicity a month ago as said was not feeling great on it and was $200 a month   HEME/ALLERGY/IMMUNE: NEGATIVE for bleeding problems  PSYCHIATRIC: NEGATIVE for changes in mood or affect      EXAM:   /60 (BP Location: Right arm, Patient Position: Sitting, Cuff Size: Adult Regular)  Pulse 87  Temp 96.8  F (36  C) (Temporal)  Ht 5' 3.5\" (1.613 m)  Wt 187 lb 8 oz (85 kg)  SpO2 96%  Breastfeeding? No  BMI 32.69 kg/m2  GENERAL APPEARANCE: healthy, alert and no distress  HENT: ear canals and TM's normal and nose and mouth without ulcers or lesions  RESP: lungs clear to auscultation - no rales, rhonchi or wheezes  CV: regular rates and rhythm and no murmur, click or rub  ABDOMEN: soft, non-tender  MS: extremities normal- no gross deformities noted  NEURO: Normal strength and tone, sensory exam grossly normal, mentation intact and speech normal  PSYCH: mentation appears normal and affect normal/bright    DIAGNOSTICS:   No labs or EKG " required for low risk surgery (cataract, skin procedure, breast biopsy, etc)  Results for orders placed or performed in visit on 10/19/18   Basic metabolic panel   Result Value Ref Range    Sodium 134 133 - 144 mmol/L    Potassium 4.6 3.4 - 5.3 mmol/L    Chloride 98 94 - 109 mmol/L    Carbon Dioxide 30 20 - 32 mmol/L    Anion Gap 6 3 - 14 mmol/L    Glucose 255 (H) 70 - 99 mg/dL    Urea Nitrogen 19 7 - 30 mg/dL    Creatinine 0.83 0.52 - 1.04 mg/dL    GFR Estimate 67 >60 mL/min/1.7m2    GFR Estimate If Black 81 >60 mL/min/1.7m2    Calcium 9.2 8.5 - 10.1 mg/dL   Hemoglobin A1c   Result Value Ref Range    Hemoglobin A1C 11.1 (H) 0 - 5.6 %     IMPRESSION:   Reason for surgery/procedure:  treatment of cataracts .    Proposed Surgery/ Procedure: LEFT PHACOEMULSIFICATION WITH STANDARD INTRAOCULAR LENS IMPLANT, RIGHT PHACOEMULSIFICATION WITH STANDARD INTRAOCULAR LENS IMPLANT  Date of Surgery/ Procedure: 10/25/18, 11/8/18    Diagnosis/reason for consult: preop evaluation     The proposed surgical procedure is considered LOW risk.    REVISED CARDIAC RISK INDEX  The patient has the following serious cardiovascular risks for perioperative complications such as (MI, PE, VFib and 3  AV Block):  Diabetes Mellitus (on Insulin)  INTERPRETATION: 1 risks: Class II (low risk - 0.9% complication rate)    The patient has the following additional risks for perioperative complications:  The 10-year ASCVD risk score (Hollywood GASPER Jr, et al., 2013) is: 39.5%    Values used to calculate the score:      Age: 78 years      Sex: Female      Is Non- : No      Diabetic: Yes      Tobacco smoker: No      Systolic Blood Pressure: 120 mmHg      Is BP treated: Yes      HDL Cholesterol: 53 mg/dL      Total Cholesterol: 198 mg/dL    Brandy was seen today for pre-op exam.    Diagnoses and all orders for this visit:    Preop general physical exam  -     Basic metabolic panel  -     Hemoglobin A1c    Age-related nuclear cataract of both  eyes  -     Basic metabolic panel  -     Hemoglobin A1c    Type 2 diabetes mellitus with hyperglycemia, with long-term current use of insulin (H)  -     Basic metabolic panel  -     Hemoglobin A1c  Will confer with pharmacist on other more cost effective options to start on and patient will follow up with her endocrinologist        RECOMMENDATIONS:       Diabetes Medication Use  -----Hold usual oral and non-insulin diabetic meds (e.g. Metformin, Actos, Glipizide) while NPO.   -----Take 80% of long acting insulin (e.g. Lantus, NPH) while NPO (fasting)      APPROVAL GIVEN to proceed with proposed procedure, without further diagnostic evaluation       Signed Electronically by: MAGO Baker CNP    Copy of this evaluation report is provided to requesting physician.    [facility] Preop Guidelines    Revised Cardiac Risk Index

## 2018-10-19 NOTE — TELEPHONE ENCOUNTER
Phone call to pt, left message stating eye drops have been sent to [facility], reminded pt to start eye drops in the left eye on Tuesday, in preparation for cataract surgery on Thursday.  Asked pt to call back with questions or concerns.  Whit SolisRN

## 2018-10-23 NOTE — PROGRESS NOTES
Reviewed and agreed with assessment and plan.   A1c is very high. Needs endocrine follow up but may proceed with cataract surgery.     Signed Electronically by: Tara Keller MD

## 2018-10-24 ENCOUNTER — TELEPHONE (OUTPATIENT)
Dept: PEDIATRICS | Facility: CLINIC | Age: 78
End: 2018-10-24

## 2018-10-24 ENCOUNTER — ANESTHESIA EVENT (OUTPATIENT)
Dept: SURGERY | Facility: AMBULATORY SURGERY CENTER | Age: 78
End: 2018-10-24

## 2018-10-24 NOTE — TELEPHONE ENCOUNTER
Attempt 1    Left detailed message for patient. Asked that patient return clinics call with any questions. Sharla Young RN

## 2018-10-24 NOTE — TELEPHONE ENCOUNTER
Basic metabolic panel   Status:  Final result   Visible to patient:  No (Inaccessible in MyChart) Dx:  Preop general physical exam; Age-rela... Order: [id number]       Notes Recorded by Cailin Ponce APRN CNP on 10/23/2018 at 4:13 PM  Please call   -Kidney function is normal (Cr, GFR), Sodium is normal, Potassium is normal, Calcium is normal, Glucose is elevated   -A1C test (average blood sugar the last 2-3 months) is above your goal.   ADVISE: would you be willing to try Actos? Should not be so expensive  Recheck A1C in 3 months  Keep your appointment with your endocrinologist as planned   I will send her a message so she knows what we are thinking   Cailin Ponce, NP, APRN CNP             Ref Range & Units 5d ago   4mo ago   7mo ago        Sodium 133 - 144 mmol/L 134 135CM 135      Potassium 3.4 - 5.3 mmol/L 4.6 4.3CM 4.0      Chloride 94 - 109 mmol/L 98 100 99      Carbon Dioxide 20 - 32 mmol/L 30 30 32      Anion Gap 3 - 14 mmol/L 6 5 4      Glucose 70 - 99 mg/dL 255 (H) 136 (H) (H)CM     Comments: Non Fasting      Urea Nitrogen 7 - 30 mg/dL 19 18 16      Creatinine 0.52 - 1.04 mg/dL 0.83 0.68 0.72      GFR Estimate >60 mL/min/1.7m2 67 84CM 79CM     Comments: Non  GFR Calc      GFR Estimate If Black >60 mL/min/1.7m2 81 >90CM >90CM     Comments:  GFR Calc      Calcium 8.5 - 10.1 mg/dL 9.2 9.1 9.0     [facility]ing Agency  [facility]          Specimen Collected: 10/19/18 10:17 AM Last Resulted: 10/19/18 10:42 AM                CM=Additional comments                     Hemoglobin A1c   Status:  Final result   Visible to patient:  No (Inaccessible in [employer]) Dx:  Preop general physical exam; Age-rela... Order: [id number]       Notes Recorded by Cailin Ponce APRN CNP on 10/23/2018 at 4:13 PM  Please call   -Kidney function is normal (Cr, GFR), Sodium is normal, Potassium is normal, Calcium is normal, Glucose is elevated   -A1C test (average blood sugar the  last 2-3 months) is above your goal.   ADVISE: would you be willing to try Actos? Should not be so expensive  Recheck A1C in 3 months  Keep your appointment with your endocrinologist as planned   I will send her a message so she knows what we are thinking   Cailin Ponce, NP, APRN CNP             Ref Range & Units 5d ago   4mo ago   7mo ago        Hemoglobin A1C 0 - 5.6 % 11.1 (H) 10.4 (H)CM 10.6 (H)R     Comments: Normal <5.7% Prediabetes 5.7-6.4%  Diabetes 6.5% or higher - adopted from ADA   consensus guidelines.        [facility]ing Agency  [facility]          Specimen Collected: 10/19/18 10:17 AM Last Resulted: 10/19/18 10:27 AM                CM=Additional comments  R=Reference range differs from displayed range                 Sharla Young RN

## 2018-10-25 ENCOUNTER — HOSPITAL ENCOUNTER (OUTPATIENT)
Facility: AMBULATORY SURGERY CENTER | Age: 78
Discharge: HOME | End: 2018-10-25
Attending: OPHTHALMOLOGY | Admitting: OPHTHALMOLOGY
Payer: COMMERCIAL

## 2018-10-25 ENCOUNTER — ANESTHESIA (OUTPATIENT)
Dept: SURGERY | Facility: AMBULATORY SURGERY CENTER | Age: 78
End: 2018-10-25
Payer: COMMERCIAL

## 2018-10-25 ENCOUNTER — SURGERY (OUTPATIENT)
Age: 78
End: 2018-10-25
Payer: COMMERCIAL

## 2018-10-25 VITALS
HEART RATE: 97 BPM | SYSTOLIC BLOOD PRESSURE: 107 MMHG | RESPIRATION RATE: 16 BRPM | DIASTOLIC BLOOD PRESSURE: 41 MMHG | OXYGEN SATURATION: 94 % | TEMPERATURE: 97.5 F

## 2018-10-25 LAB — GLUCOSE BLDC GLUCOMTR-MCNC: 229 MG/DL (ref 70–99)

## 2018-10-25 PROCEDURE — 66984 XCAPSL CTRC RMVL W/O ECP: CPT | Mod: LT

## 2018-10-25 PROCEDURE — 66984 XCAPSL CTRC RMVL W/O ECP: CPT | Mod: LT | Performed by: OPHTHALMOLOGY

## 2018-10-25 PROCEDURE — G8918 PT W/O PREOP ORDER IV AB PRO: HCPCS

## 2018-10-25 PROCEDURE — G8907 PT DOC NO EVENTS ON DISCHARG: HCPCS

## 2018-10-25 DEVICE — EYE IMP IOL AMO PCL TECNIS ZCB00 22.5: Type: IMPLANTABLE DEVICE | Site: EYE | Status: FUNCTIONAL

## 2018-10-25 RX ORDER — ALBUTEROL SULFATE 0.83 MG/ML
2.5 SOLUTION RESPIRATORY (INHALATION) EVERY 4 HOURS PRN
Status: DISCONTINUED | OUTPATIENT
Start: 2018-10-25 | End: 2018-10-26 | Stop reason: HOSPADM

## 2018-10-25 RX ORDER — ONDANSETRON 2 MG/ML
4 INJECTION INTRAMUSCULAR; INTRAVENOUS EVERY 30 MIN PRN
Status: DISCONTINUED | OUTPATIENT
Start: 2018-10-25 | End: 2018-10-26 | Stop reason: HOSPADM

## 2018-10-25 RX ORDER — KETOROLAC TROMETHAMINE 5 MG/ML
1 SOLUTION OPHTHALMIC
Status: COMPLETED | OUTPATIENT
Start: 2018-10-25 | End: 2018-10-25

## 2018-10-25 RX ORDER — NALOXONE HYDROCHLORIDE 0.4 MG/ML
.1-.4 INJECTION, SOLUTION INTRAMUSCULAR; INTRAVENOUS; SUBCUTANEOUS
Status: DISCONTINUED | OUTPATIENT
Start: 2018-10-25 | End: 2018-10-26 | Stop reason: HOSPADM

## 2018-10-25 RX ORDER — HYDROMORPHONE HYDROCHLORIDE 1 MG/ML
.3-.5 INJECTION, SOLUTION INTRAMUSCULAR; INTRAVENOUS; SUBCUTANEOUS EVERY 10 MIN PRN
Status: DISCONTINUED | OUTPATIENT
Start: 2018-10-25 | End: 2018-10-26 | Stop reason: HOSPADM

## 2018-10-25 RX ORDER — LIDOCAINE 40 MG/G
CREAM TOPICAL
Status: DISCONTINUED | OUTPATIENT
Start: 2018-10-25 | End: 2018-10-26 | Stop reason: HOSPADM

## 2018-10-25 RX ORDER — TETRACAINE HYDROCHLORIDE 5 MG/ML
SOLUTION OPHTHALMIC PRN
Status: DISCONTINUED | OUTPATIENT
Start: 2018-10-25 | End: 2018-10-25 | Stop reason: HOSPADM

## 2018-10-25 RX ORDER — PHENYLEPHRINE HYDROCHLORIDE 25 MG/ML
1 SOLUTION/ DROPS OPHTHALMIC
Status: COMPLETED | OUTPATIENT
Start: 2018-10-25 | End: 2018-10-25

## 2018-10-25 RX ORDER — FENTANYL CITRATE 50 UG/ML
25-50 INJECTION, SOLUTION INTRAMUSCULAR; INTRAVENOUS
Status: DISCONTINUED | OUTPATIENT
Start: 2018-10-25 | End: 2018-10-26 | Stop reason: HOSPADM

## 2018-10-25 RX ORDER — MOXIFLOXACIN 5 MG/ML
1 SOLUTION/ DROPS OPHTHALMIC
Status: COMPLETED | OUTPATIENT
Start: 2018-10-25 | End: 2018-10-25

## 2018-10-25 RX ORDER — KETAMINE HYDROCHLORIDE 10 MG/ML
INJECTION, SOLUTION INTRAMUSCULAR; INTRAVENOUS PRN
Status: DISCONTINUED | OUTPATIENT
Start: 2018-10-25 | End: 2018-10-25

## 2018-10-25 RX ORDER — MOXIFLOXACIN 5 MG/ML
SOLUTION/ DROPS OPHTHALMIC PRN
Status: DISCONTINUED | OUTPATIENT
Start: 2018-10-25 | End: 2018-10-25 | Stop reason: HOSPADM

## 2018-10-25 RX ORDER — ONDANSETRON 4 MG/1
4 TABLET, ORALLY DISINTEGRATING ORAL EVERY 30 MIN PRN
Status: DISCONTINUED | OUTPATIENT
Start: 2018-10-25 | End: 2018-10-26 | Stop reason: HOSPADM

## 2018-10-25 RX ORDER — ACETAMINOPHEN 325 MG/1
975 TABLET ORAL ONCE
Status: COMPLETED | OUTPATIENT
Start: 2018-10-25 | End: 2018-10-25

## 2018-10-25 RX ORDER — CYCLOPENTOLATE HYDROCHLORIDE 10 MG/ML
1 SOLUTION/ DROPS OPHTHALMIC
Status: COMPLETED | OUTPATIENT
Start: 2018-10-25 | End: 2018-10-25

## 2018-10-25 RX ORDER — DEXAMETHASONE SODIUM PHOSPHATE 4 MG/ML
4 INJECTION, SOLUTION INTRA-ARTICULAR; INTRALESIONAL; INTRAMUSCULAR; INTRAVENOUS; SOFT TISSUE EVERY 10 MIN PRN
Status: DISCONTINUED | OUTPATIENT
Start: 2018-10-25 | End: 2018-10-26 | Stop reason: HOSPADM

## 2018-10-25 RX ORDER — MEPERIDINE HYDROCHLORIDE 25 MG/ML
12.5 INJECTION INTRAMUSCULAR; INTRAVENOUS; SUBCUTANEOUS
Status: DISCONTINUED | OUTPATIENT
Start: 2018-10-25 | End: 2018-10-26 | Stop reason: HOSPADM

## 2018-10-25 RX ORDER — BALANCED SALT SOLUTION 6.4; .75; .48; .3; 3.9; 1.7 MG/ML; MG/ML; MG/ML; MG/ML; MG/ML; MG/ML
SOLUTION OPHTHALMIC PRN
Status: DISCONTINUED | OUTPATIENT
Start: 2018-10-25 | End: 2018-10-25 | Stop reason: HOSPADM

## 2018-10-25 RX ORDER — OXYCODONE HYDROCHLORIDE 5 MG/1
5-10 TABLET ORAL EVERY 4 HOURS PRN
Status: DISCONTINUED | OUTPATIENT
Start: 2018-10-25 | End: 2018-10-26 | Stop reason: HOSPADM

## 2018-10-25 RX ORDER — SODIUM CHLORIDE, SODIUM LACTATE, POTASSIUM CHLORIDE, CALCIUM CHLORIDE 600; 310; 30; 20 MG/100ML; MG/100ML; MG/100ML; MG/100ML
INJECTION, SOLUTION INTRAVENOUS CONTINUOUS
Status: DISCONTINUED | OUTPATIENT
Start: 2018-10-25 | End: 2018-10-26 | Stop reason: HOSPADM

## 2018-10-25 RX ORDER — PROPARACAINE HYDROCHLORIDE 5 MG/ML
1 SOLUTION/ DROPS OPHTHALMIC
Status: COMPLETED | OUTPATIENT
Start: 2018-10-25 | End: 2018-10-25

## 2018-10-25 RX ADMIN — KETAMINE HYDROCHLORIDE 10 MG: 10 INJECTION, SOLUTION INTRAMUSCULAR; INTRAVENOUS at 10:35

## 2018-10-25 RX ADMIN — KETAMINE HYDROCHLORIDE 10 MG: 10 INJECTION, SOLUTION INTRAMUSCULAR; INTRAVENOUS at 10:33

## 2018-10-25 RX ADMIN — KETOROLAC TROMETHAMINE 1 DROP: 5 SOLUTION OPHTHALMIC at 10:02

## 2018-10-25 RX ADMIN — MOXIFLOXACIN 1 DROP: 5 SOLUTION/ DROPS OPHTHALMIC at 10:02

## 2018-10-25 RX ADMIN — CYCLOPENTOLATE HYDROCHLORIDE 1 DROP: 10 SOLUTION/ DROPS OPHTHALMIC at 10:02

## 2018-10-25 RX ADMIN — MOXIFLOXACIN 1 DROP: 5 SOLUTION/ DROPS OPHTHALMIC at 09:51

## 2018-10-25 RX ADMIN — Medication 1 DROP: at 10:40

## 2018-10-25 RX ADMIN — KETOROLAC TROMETHAMINE 1 DROP: 5 SOLUTION OPHTHALMIC at 09:51

## 2018-10-25 RX ADMIN — CYCLOPENTOLATE HYDROCHLORIDE 1 DROP: 10 SOLUTION/ DROPS OPHTHALMIC at 09:56

## 2018-10-25 RX ADMIN — MOXIFLOXACIN 1 DROP: 5 SOLUTION/ DROPS OPHTHALMIC at 10:44

## 2018-10-25 RX ADMIN — BALANCED SALT SOLUTION 15 ML: 6.4; .75; .48; .3; 3.9; 1.7 SOLUTION OPHTHALMIC at 10:42

## 2018-10-25 RX ADMIN — MOXIFLOXACIN 1 DROP: 5 SOLUTION/ DROPS OPHTHALMIC at 09:56

## 2018-10-25 RX ADMIN — ACETAMINOPHEN 975 MG: 325 TABLET ORAL at 09:54

## 2018-10-25 RX ADMIN — TETRACAINE HYDROCHLORIDE 1 DROP: 5 SOLUTION OPHTHALMIC at 10:31

## 2018-10-25 RX ADMIN — PHENYLEPHRINE HYDROCHLORIDE 1 DROP: 25 SOLUTION/ DROPS OPHTHALMIC at 09:51

## 2018-10-25 RX ADMIN — Medication 250 ML: at 10:42

## 2018-10-25 RX ADMIN — PHENYLEPHRINE HYDROCHLORIDE 1 DROP: 25 SOLUTION/ DROPS OPHTHALMIC at 10:03

## 2018-10-25 RX ADMIN — KETOROLAC TROMETHAMINE 1 DROP: 5 SOLUTION OPHTHALMIC at 09:56

## 2018-10-25 RX ADMIN — PROPARACAINE HYDROCHLORIDE 1 DROP: 5 SOLUTION/ DROPS OPHTHALMIC at 09:50

## 2018-10-25 RX ADMIN — PHENYLEPHRINE HYDROCHLORIDE 1 DROP: 25 SOLUTION/ DROPS OPHTHALMIC at 09:56

## 2018-10-25 RX ADMIN — CYCLOPENTOLATE HYDROCHLORIDE 1 DROP: 10 SOLUTION/ DROPS OPHTHALMIC at 09:51

## 2018-10-25 NOTE — ANESTHESIA PREPROCEDURE EVALUATION
)Anesthesia Pre-Procedure Evaluation    Patient: Brandy Leon   MRN:     [id number] Gender:   female   Age:    78 year old :      1940        Preoperative Diagnosis: LEFT CATARACT   Procedure(s):  LEFT PHACOEMULSIFICATION WITH STANDARD INTRAOCULAR LENS IMPLANT       Past Medical History:   Diagnosis Date     Actinic keratosis 3/12/2013     Degenerative joint disease      Diabetes (H)      Rheumatic fever     x2 between the ages of 15-18     Seasonal allergies      Past Surgical History:   Procedure Laterality Date     C APPENDECTOMY       C ARTHROPLASTY TMJ       COLONOSCOPY       COLONOSCOPY N/A 2015    Procedure: COLONOSCOPY;  Surgeon: Duane, William Charles, MD;  Location: [facility]     COLONOSCOPY WITH CO2 INSUFFLATION N/A 2015    Procedure: COLONOSCOPY WITH CO2 INSUFFLATION;  Surgeon: Duane, William Charles, MD;  Location: [facility]     THYROIDECTOMY          Anesthesia Evaluation     .             ROS/MED HX    ENT/Pulmonary:  - neg pulmonary ROS     Neurologic:  - neg neurologic ROS     Cardiovascular:  - neg cardiovascular ROS   (+) hypertension----. : . . . :. .       METS/Exercise Tolerance:     Hematologic:  - neg hematologic  ROS       Musculoskeletal:  - neg musculoskeletal ROS       GI/Hepatic:  - neg GI/hepatic ROS       Renal/Genitourinary:  - ROS Renal section negative       Endo:  - neg endo ROS   (+) type II DM thyroid problem Obesity, .      Psychiatric:  - neg psychiatric ROS       Infectious Disease:  - neg infectious disease ROS       Malignancy:      - no malignancy   Other:    - neg other ROS                     PHYSICAL EXAM:   Mental Status/Neuro: A/A/O   Airway: Mallampati: I  Mouth/Opening: Full  TM distance: > 6 cm  Neck ROM: Full   Respiratory: Auscultation: CTAB     Resp. Rate: Normal     Resp. Effort: Normal      CV: Rhythm: Regular  Rate: Age appropriate  Heart: Normal Sounds   Comments:      Dental: Normal                Lab Results   Component Value Date    WBC  "11.4 (H) 12/22/2017    HGB 13.9 12/22/2017    HCT 44.2 12/22/2017     12/22/2017    CRP 8.3 (H) 06/25/2018    SED 12 06/25/2018     10/19/2018    POTASSIUM 4.6 10/19/2018    CHLORIDE 98 10/19/2018    CO2 30 10/19/2018    BUN 19 10/19/2018    CR 0.83 10/19/2018     (H) 10/19/2018    FREDY 9.2 10/19/2018    ALBUMIN 3.8 06/25/2018    PROTTOTAL 8.3 06/25/2018    ALT 27 06/25/2018    AST 29 06/25/2018    ALKPHOS 77 06/25/2018    BILITOTAL 0.5 06/25/2018    TSH 2.46 06/25/2018    T4 1.17 06/25/2018       Preop Vitals  BP Readings from Last 3 Encounters:   10/25/18 137/65   10/19/18 120/60   10/18/18 128/80    Pulse Readings from Last 3 Encounters:   10/25/18 97   10/19/18 87   10/18/18 83      Resp Readings from Last 3 Encounters:   10/25/18 18   06/01/16 18   08/13/15 14    SpO2 Readings from Last 3 Encounters:   10/25/18 95%   10/19/18 96%   10/18/18 96%      Temp Readings from Last 1 Encounters:   10/25/18 36.2  C (97.2  F) (Temporal)    Ht Readings from Last 1 Encounters:   10/19/18 1.613 m (5' 3.5\")      Wt Readings from Last 1 Encounters:   10/19/18 85 kg (187 lb 8 oz)    Estimated body mass index is 32.69 kg/(m^2) as calculated from the following:    Height as of 10/19/18: 1.613 m (5' 3.5\").    Weight as of 10/19/18: 85 kg (187 lb 8 oz).     LDA:  Peripheral IV 10/25/18 Left Hand (Active)   Number of days:0       Assessment:   ASA SCORE: 2       Documentation: H&P complete; Preop Testing complete; Consents complete   Proceeding: Proceed without further delay  Tobacco Use:  NO Active use of Tobacco/UNKNOWN Tobacco use status     Plan:   Anes. Type:  MAC   Pre-Induction: Midazolam IV   Induction:  Not applicable   Airway: Native Airway   Access/Monitoring: PIV   Maintenance: N/a   Emergence: N/a   Logistics: Same Day Surgery     Postop Pain/Sedation Strategy:  Standard-Options: Opioids PRN     PONV Management:  Adult Risk Factors: Female, Non-Smoker, Postop Opioids     CONSENT: Direct conversation "   Plan and risks discussed with: Patient   Blood Products: Consent Deferred (Minimal Blood Loss)                         Cliff Dailey MD

## 2018-10-25 NOTE — IP AVS SNAPSHOT
[facility]    [address] [place] [address]    Phone:  [phone]                                       After Visit Summary   10/25/2018    Brandy Leon    MRN: [id number]           After Visit Summary Signature Page     I have received my discharge instructions, and my questions have been answered. I have discussed any challenges I see with this plan with the nurse or doctor.    ..........................................................................................................................................  Patient/Patient Representative Signature      ..........................................................................................................................................  Patient Representative Print Name and Relationship to Patient    ..................................................               ................................................  Date                                   Time    ..........................................................................................................................................  Reviewed by Signature/Title    ...................................................              ..............................................  Date                                               Time          22EPIC Rev 08/18

## 2018-10-25 NOTE — ANESTHESIA CARE TRANSFER NOTE
Patient: Brandy Leon    Procedure(s):  LEFT PHACOEMULSIFICATION WITH STANDARD INTRAOCULAR LENS IMPLANT    Diagnosis: LEFT CATARACT  Diagnosis Additional Information: No value filed.    Anesthesia Type:   MAC     Note:  Airway :Room Air  Patient transferred to:Phase II  Handoff Report: Identifed the Patient, Identified the Reponsible Provider, Reviewed the pertinent medical history, Discussed the surgical course, Reviewed Intra-OP anesthesia mangement and issues during anesthesia, Set expectations for post-procedure period and Allowed opportunity for questions and acknowledgement of understanding      Vitals: (Last set prior to Anesthesia Care Transfer)    CRNA VITALS  10/25/2018 1020 - 10/25/2018 1052      10/25/2018             Pulse: 90    SpO2: 99 %                Electronically Signed By: MAGO Alvarado CRNA  October 25, 2018  10:52 AM

## 2018-10-25 NOTE — ANESTHESIA POSTPROCEDURE EVALUATION
Anesthesia POST Procedure Evaluation    Patient: Brandy Leon   MRN:     [id number] Gender:   female   Age:    78 year old :      1940        Preoperative Diagnosis: LEFT CATARACT   Procedure(s):  LEFT PHACOEMULSIFICATION WITH STANDARD INTRAOCULAR LENS IMPLANT   Postop Comments: No value filed.       Anesthesia Type:  MAC    Reportable Event: NO     PAIN: Uncomplicated   Sign Out status: Comfortable, Well controlled pain     PONV: No PONV   Sign Out status:  No Nausea or Vomiting     Neuro/Psych: Uneventful perioperative course   Sign Out Status: Preoperative baseline; Age appropriate mentation     Airway/Resp.: Uneventful perioperative course   Sign Out Status: Non labored breathing, age appropriate RR; Resp. Status within EXPECTED Parameters     CV: Uneventful perioperative course   Sign Out status: Appropriate BP and perfusion indices; Appropriate HR/Rhythm     Disposition:   Sign Out in:  PACU  Disposition:  Phase II; Home  Recovery Course: Uneventful  Follow-Up: Not required           Last Anesthesia Record Vitals:  CRNA VITALS  10/25/2018 1020 - 10/25/2018 1120      10/25/2018             Pulse: 90    SpO2: 99 %          Last PACU/Preop Vitals:  Vitals:    10/25/18 1052 10/25/18 1107 10/25/18 1122   BP: 124/57 129/60 107/41   Pulse:      Resp: 16 16 16   Temp: 36.2  C (97.1  F)  36.4  C (97.5  F)   SpO2: 93% 94% 94%         Electronically Signed By: Cliff Dailey MD, 2018, 4:35 PM

## 2018-10-25 NOTE — IP AVS SNAPSHOT
MRN:[id number]                      After Visit Summary   10/25/2018    Brandy Leon    MRN: [id number]           Thank you!     Thank you for choosing [facility] for your care. Our goal is always to provide you with excellent care. Hearing back from our patients is one way we can continue to improve our services. Please take a few minutes to complete the written survey that you may receive in the mail after you visit with us. Thank you!        Patient Information     Date Of Birth          1940        About your hospital stay     You were admitted on:  October 25, 2018 You last received care in the:  [facility]    You were discharged on:  October 25, 2018       Who to Call     For medical emergencies, please call 911.  For non-urgent questions about your medical care, please call your primary care provider or clinic, [phone]  For questions related to your surgery, please call your surgery clinic        Attending Provider     Provider Thomas Rivas MD Ophthalmology       Primary Care Provider Office Phone # Fax #    Cailin GreeneMAGO Martinez [facility] [phone]      Your next 10 appointments already scheduled     Oct 26, 2018 12:00 PM CDT   Return Visit with Bola Lagos OD   [facility])    [address] [facility] [address]   [phone]            Oct 31, 2018 12:00 PM CDT   Return Visit with Bola Lagos OD   [facility])    [address] [facility] [address]   [phone]            Nov 05, 2018  9:00 AM CST   Return Visit with Ehsan A[facility]a DPM   [facility]    [address] [facility] [address]   [phone]            Nov 08, 2018   Procedure with Thomas Serna MD   [facility] (--)    [address] [place]  [place] [address]   [phone]            Nov 09, 2018 12:00 PM CST   [facility] with Bola Lagos, OD   [facility]    [address] [place] [address]   [phone]            Nov 14, 2018 12:00 PM CST   Return Visit with Bola Lagos, OD   [facility]    [address] [place] [address]   [phone]            Dec 05, 2018 10:20 AM CST   Return Visit with Bola Lagos, OD   [facility]    [address] [place] [address]   [phone]            Dec 26, 2018  8:00 PM CST   PSG Split with BK BED 1   [facility] ([facility])    [address] [address]   [phone]            Dec 28, 2018  8:15 AM CST   Return Visit with Candice Worley MD, [facility] ENDO NURSE   [facility]    [address] [place] [address]   [phone]              Further instructions from your care team                Brandy Leon    Cataract Surgery Postoperative Instructions    Postoperative Medications: After surgery, you will use several different eye drop  medications. In most cases you will start these eye drops 2 days before surgery.    1. Ocuflox - is an antibiotic drop that is used to minimize the risk of infection. It should be used 4 times daily for 10 days total or until you are told to discontinue.    (Acceptable alternatives to Ocuflox include: Zymaxid, Besivance, Gatafloxacin, Vigamox)     2.  Ketorolac - is an anti-inflammatory drop. Use it 4 time daily for 21 days total or until you are told to discontinue.  (Acceptable alternatives to Ketorolac include: Acuvail, Nevenac, Xibrom)    3. Prednisilone - is a steroid eye drop, used to minimize inflammation and modulate  healing. It  should be used 4 times daily for 21 days total or until you are told to discontinue.  (Acceptable alternatives for Prednisilone include: Pred Forte, Omnipred, Econopred)      IF YOU GET AN ALTERNATIVE EYE DROP PLEASE FOLLOW THE DIRECTIONS ON THE BOTTLE OF DROPS. THEY WILL BE DIFFERENT!      The drops might sting a little when they are instilled, and that is normal.    It doesn t matter what order you put the drops into your eyes, but you should wait at least one minute between drops.    Please continue any glaucoma, dry eye, or other medications you were using prior to the surgery.    Please allow 24 to 48 hours when requesting refills, and call BEFORE you run out of drops.      Artificial Tears - are lubricating drops used to moisturize the eye. You can use these as much as you want, particularly if your eyes feel watery, gritty, or uncomfortable. Chilling these drops in the refrigerator results in a more soothing feeling. There are several brands of artificial tears available including, but not limited to: Optive, Refresh, Systane, Blink, Genteal, Soothe, and others. You should not use drops that  get the red out . You do not need a prescription for these medications.      Restriction on Activities - It is extremely important that you DO NOT RUB THE  TREATED EYE.  - You will be given a clear plastic shield to wear as protection over your eye the  night after surgery.  - Refrain from any activities that may put your eye at risk of injury, as well as areas  containing a high volume of chemicals, dust, and debris.  - Do Not wear any eye makeup or moisturizer around the eye for 1 week after  surgery.  - Do Not swim or go into a hot-tub, Jacuzzi, or sauna for 1 week after surgery. You  can take showers as normal, but avoid getting shampoo or soap in your eyes.  - Avoid strenuous activity, including lifting more than 30 lbs, for 1 week after  surgery.  - It is fine to bathe, read, watch TV, and use the  computer.  Symptoms requiring medical attention:  - Sudden onset of increased discharge from the eye  - Persistent or increasing pain in the eye  - Sudden decrease in vision  - Persistent nausea or vomiting    If you have any questions or concerns before or after your surgery, please contact:    Dr. Serna s office at [phone]  [facility]  Same-Day Surgery   Adult Discharge Orders & Instructions   For 24 hours after surgery  1. Get plenty of rest.  A responsible adult must stay with you for at least 24 hours after you leave the hospital.   2. Do not drive or use heavy equipment.  If you have weakness or tingling, don't drive or use heavy equipment until this feeling goes away.  3. Do not drink alcohol.  4. Avoid strenuous or risky activities.  Ask for help when climbing stairs.   5. You may feel lightheaded.  IF so, sit for a few minutes before standing.  Have someone help you get up.   6. If you have nausea (feel sick to your stomach): Drink only clear liquids such as apple juice, ginger ale, broth or 7-Up.  Rest may also help.  Be sure to drink enough fluids.  Move to a regular diet as you feel able.  7. You may have a slight fever. Call the doctor if your fever is over 100 F (37.7 C) (taken under the tongue) or lasts longer than 24 hours.  8. You may have a dry mouth, a sore throat, muscle aches or trouble sleeping.  These should go away after 24 hours.  9. Do not make important or legal decisions.   Call your doctor for any of the followin.  Signs of infection (fever, growing tenderness at the surgery site, a large amount of drainage or bleeding, severe pain, foul-smelling drainage, redness, swelling).    2. It has been over 8 to 10 hours since surgery and you are still not able to urinate (pass water).    3.  Headache for over 24 hours.          Pending Results     No orders found from 10/23/2018 to 10/26/2018.            Admission Information     Date & Time Provider  Department Dept. Phone    10/25/2018 Thomas Serna MD [facility] [phone]      Your Vitals Were     Blood Pressure Pulse Temperature Respirations Pulse Oximetry       137/65 97 97.2  F (36.2  C) (Temporal) 18 95%       Care EveryWhere ID     This is your Care EveryWhere ID. This could be used by other organizations to access your [facility] medical records  [id number]        Equal Access to Services     NUSRAT KHAN : Hadii aad ku hadasho Soomaali, waaxda luqadaha, qaybta kaalmada adeegyada, mitch toledo hayjustinn keri sarinaedwige azevedo . So [facility] [phone].    ATENCIÓN: Si habla espjohnny, tiene a garcía disposición servicios gratuitos de asistencia lingüística. Llame al [phone].    We comply with applicable federal civil rights laws and [place] laws. We do not discriminate on the basis of race, color, national origin, age, disability, sex, sexual orientation, or gender identity.               Review of your medicines      UNREVIEWED medicines. Ask your doctor about these medicines        Dose / Directions    aspirin 81 MG EC tablet   Used for:  Type 2 diabetes, HbA1c goal < 7% (H)        Dose:  81 mg   Take 1 tablet by mouth daily.   Quantity:  90 tablet   Refills:  3       ciclopirox 0.77 % cream   Commonly known as:  LOPROX   Used for:  Tinea pedis of both feet        Apply topically 2 times daily To feet and toenails.   Quantity:  90 g   Refills:  6       famotidine 20 MG tablet   Commonly known as:  PEPCID        Dose:  20 mg   Take 20 mg by mouth nightly as needed   Refills:  0       fexofenadine 180 MG tablet   Commonly known as:  ALLEGRA        Dose:  180 mg   Take 180 mg by mouth daily   Refills:  0       glipiZIDE 10 MG tablet   Commonly known as:  GLUCOTROL   Used for:  Type 2 diabetes mellitus with hyperglycemia, with long-term current use of insulin (H)        Dose:  10 mg   Take 1 tablet (10 mg) by mouth 2 times daily (before meals)   Quantity:  360 tablet   Refills:  0        GLUCOSAMINE CHONDROITIN COMPLX PO        2 Daily   Refills:  0       insulin glargine 100 UNIT/ML injection   Commonly known as:  LANTUS SOLOSTAR   Used for:  Type 2 diabetes mellitus with hyperglycemia, with long-term current use of insulin (H)        Dose:  20 Units   Inject 20 Units Subcutaneous daily   Quantity:  45 mL   Refills:  3       irbesartan-hydrochlorothiazide 150-12.5 MG per tablet   Commonly known as:  AVALIDE   Used for:  Hypertension goal BP (blood pressure) < 140/90        TAKE 1 TABLET BY MOUTH DAILY   Quantity:  90 tablet   Refills:  1       ketorolac 0.5 % ophthalmic solution   Commonly known as:  ACULAR   Used for:  Cataracts, bilateral        Dose:  1 drop   Apply 1 drop to eye 4 times daily for 21 days Start 2 days prior to surgery in operative eye.   Quantity:  1 Bottle   Refills:  1       latanoprost 0.005 % ophthalmic solution   Commonly known as:  XALATAN   Used for:  Primary open angle glaucoma of both eyes, moderate stage        Dose:  1 drop   Place 1 drop into both eyes At Bedtime   Quantity:  3 Bottle   Refills:  4       metFORMIN 1000 MG tablet   Commonly known as:  GLUCOPHAGE   Used for:  Type 2 diabetes mellitus with hyperglycemia, with long-term current use of insulin (H)        TAKE 1 TABLET (1,000 MG) BY MOUTH 2 TIMES DAILY (WITH MEALS)   Quantity:  180 tablet   Refills:  3       MULTIVITAMIN PO        1 tablet daily   Refills:  0       ofloxacin 0.3 % ophthalmic solution   Commonly known as:  OCUFLOX   Used for:  Cataracts, bilateral        Dose:  1 drop   Apply 1 drop to eye 4 times daily for 10 days Start 2 days prior to surgery in operative eye   Quantity:  1 Bottle   Refills:  1       OMEGA 3 PO        Take by mouth 2 times daily.   Refills:  0       oxybutynin 5 MG 24 hr tablet   Commonly known as:  DITROPAN-XL   Used for:  Urinary frequency, Overactive bladder        TAKE 2 TABLETS (10 MG) BY MOUTH DAILY   Quantity:  180 tablet   Refills:  2       prednisoLONE  acetate 1 % ophthalmic susp   Commonly known as:  PRED FORTE   Used for:  Cataracts, bilateral        Dose:  1 drop   Apply 1 drop to eye 4 times daily for 21 days Start 2 days prior to surgery in operative eye.   Quantity:  1 Bottle   Refills:  1       rosuvastatin 5 MG tablet   Commonly known as:  CRESTOR   Used for:  Dyslipidemia, goal LDL below 100        TAKE 1 TABLET BY MOUTH TWICE WEEKLY   Quantity:  8 tablet   Refills:  3       SYNTHROID 137 MCG tablet   Used for:  Myxedema heart disease, Nutritional and metabolic cardiomyopathy (H)   Generic drug:  levothyroxine        TAKE 1 TABLET (137 MCG) BY MOUTH DAILY   Quantity:  90 tablet   Refills:  2         CONTINUE these medicines which have NOT CHANGED        Dose / Directions    insulin pen needle 32G X 4 MM   Commonly known as:  BD JUAN C U/F   Used for:  Type 2 diabetes mellitus with hyperglycemia (H)        USE 1 DAILY AS DIRECTED.   Quantity:  100 each   Refills:  3       ONE TOUCH DELICA LANCETS Misc   Used for:  Type 2 diabetes, HbA1c goal < 7% (H)        Dose:  1 each   1 each 2 times daily. One Touch Delica   Quantity:  100 each   Refills:  12       ONETOUCH ULTRA test strip   Used for:  Type 2 diabetes mellitus with hyperglycemia, with long-term current use of insulin (H)   Generic drug:  blood glucose monitoring        USE TO TEST TWICE A DAY   Quantity:  200 strip   Refills:  11       order for DME   Used for:  Type 2 diabetes, HbA1c goal < 7% (H)        Glucometer covered by insurance.   Quantity:  1 each   Refills:  0                Protect others around you: Learn how to safely use, store and throw away your medicines at www.disposemymeds.org.             Medication List: This is a list of all your medications and when to take them. Check marks below indicate your daily home schedule. Keep this list as a reference.      Medications           Morning Afternoon Evening Bedtime As Needed    aspirin 81 MG EC tablet   Take 1 tablet by mouth daily.                                 ciclopirox 0.77 % cream   Commonly known as:  LOPROX   Apply topically 2 times daily To feet and toenails.                                famotidine 20 MG tablet   Commonly known as:  PEPCID   Take 20 mg by mouth nightly as needed                                fexofenadine 180 MG tablet   Commonly known as:  ALLEGRA   Take 180 mg by mouth daily                                glipiZIDE 10 MG tablet   Commonly known as:  GLUCOTROL   Take 1 tablet (10 mg) by mouth 2 times daily (before meals)                                GLUCOSAMINE CHONDROITIN COMPLX PO   2 Daily                                insulin glargine 100 UNIT/ML injection   Commonly known as:  LANTUS SOLOSTAR   Inject 20 Units Subcutaneous daily                                insulin pen needle 32G X 4 MM   Commonly known as:  BD JUAN C U/F   USE 1 DAILY AS DIRECTED.                                irbesartan-hydrochlorothiazide 150-12.5 MG per tablet   Commonly known as:  AVALIDE   TAKE 1 TABLET BY MOUTH DAILY                                ketorolac 0.5 % ophthalmic solution   Commonly known as:  ACULAR   Apply 1 drop to eye 4 times daily for 21 days Start 2 days prior to surgery in operative eye.   Last time this was given:  1 drop on 10/25/2018 10:02 AM                                latanoprost 0.005 % ophthalmic solution   Commonly known as:  XALATAN   Place 1 drop into both eyes At Bedtime                                metFORMIN 1000 MG tablet   Commonly known as:  GLUCOPHAGE   TAKE 1 TABLET (1,000 MG) BY MOUTH 2 TIMES DAILY (WITH MEALS)                                MULTIVITAMIN PO   1 tablet daily                                ofloxacin 0.3 % ophthalmic solution   Commonly known as:  OCUFLOX   Apply 1 drop to eye 4 times daily for 10 days Start 2 days prior to surgery in operative eye                                OMEGA 3 PO   Take by mouth 2 times daily.                                ONE TOUCH DELICA LANCETS Misc   1  each 2 times daily. One Touch Delica                                ONETOUCH ULTRA test strip   USE TO TEST TWICE A DAY   Generic drug:  blood glucose monitoring                                [place] [facility]   Glucometer covered by insurance.                                oxybutynin 5 MG 24 hr tablet   Commonly known as:  DITROPAN-XL   TAKE 2 TABLETS (10 MG) BY MOUTH DAILY                                prednisoLONE acetate 1 % ophthalmic susp   Commonly known as:  PRED FORTE   Apply 1 drop to eye 4 times daily for 21 days Start 2 days prior to surgery in operative eye.                                rosuvastatin 5 MG tablet   Commonly known as:  CRESTOR   TAKE 1 TABLET BY MOUTH TWICE WEEKLY                                SYNTHROID 137 MCG tablet   TAKE 1 TABLET (137 MCG) BY MOUTH DAILY   Generic drug:  levothyroxine

## 2018-10-25 NOTE — DISCHARGE INSTRUCTIONS
Brandy Leon    Cataract Surgery Postoperative Instructions    Postoperative Medications: After surgery, you will use several different eye drop  medications. In most cases you will start these eye drops 2 days before surgery.    1. Ocuflox - is an antibiotic drop that is used to minimize the risk of infection. It should be used 4 times daily for 10 days total or until you are told to discontinue.    (Acceptable alternatives to Ocuflox include: Zymaxid, Besivance, Gatafloxacin, Vigamox)     2.  Ketorolac - is an anti-inflammatory drop. Use it 4 time daily for 21 days total or until you are told to discontinue.  (Acceptable alternatives to Ketorolac include: Acuvail, Nevenac, Xibrom)    3. Prednisilone - is a steroid eye drop, used to minimize inflammation and modulate  healing. It should be used 4 times daily for 21 days total or until you are told to discontinue.  (Acceptable alternatives for Prednisilone include: Pred Forte, Omnipred, Econopred)      IF YOU GET AN ALTERNATIVE EYE DROP PLEASE FOLLOW THE DIRECTIONS ON THE BOTTLE OF DROPS. THEY WILL BE DIFFERENT!      The drops might sting a little when they are instilled, and that is normal.    It doesn t matter what order you put the drops into your eyes, but you should wait at least one minute between drops.    Please continue any glaucoma, dry eye, or other medications you were using prior to the surgery.    Please allow 24 to 48 hours when requesting refills, and call BEFORE you run out of drops.      Artificial Tears - are lubricating drops used to moisturize the eye. You can use these as much as you want, particularly if your eyes feel watery, gritty, or uncomfortable. Chilling these drops in the refrigerator results in a more soothing feeling. There are several brands of artificial tears available including, but not limited to: Optive, Refresh, Systane, Blink, Genteal, Soothe, and others. You should not use drops that  get the red out . You do  not need a prescription for these medications.      Restriction on Activities - It is extremely important that you DO NOT RUB THE  TREATED EYE.  - You will be given a clear plastic shield to wear as protection over your eye the  night after surgery.  - Refrain from any activities that may put your eye at risk of injury, as well as areas  containing a high volume of chemicals, dust, and debris.  - Do Not wear any eye makeup or moisturizer around the eye for 1 week after  surgery.  - Do Not swim or go into a hot-tub, Jacuzzi, or sauna for 1 week after surgery. You  can take showers as normal, but avoid getting shampoo or soap in your eyes.  - Avoid strenuous activity, including lifting more than 30 lbs, for 1 week after  surgery.  - It is fine to bathe, read, watch TV, and use the computer.  Symptoms requiring medical attention:  - Sudden onset of increased discharge from the eye  - Persistent or increasing pain in the eye  - Sudden decrease in vision  - Persistent nausea or vomiting    If you have any questions or concerns before or after your surgery, please contact:    Dr. Serna s office at [phone]  [facility]  Same-Day Surgery   Adult Discharge Orders & Instructions   For 24 hours after surgery  1. Get plenty of rest.  A responsible adult must stay with you for at least 24 hours after you leave the hospital.   2. Do not drive or use heavy equipment.  If you have weakness or tingling, don't drive or use heavy equipment until this feeling goes away.  3. Do not drink alcohol.  4. Avoid strenuous or risky activities.  Ask for help when climbing stairs.   5. You may feel lightheaded.  IF so, sit for a few minutes before standing.  Have someone help you get up.   6. If you have nausea (feel sick to your stomach): Drink only clear liquids such as apple juice, ginger ale, broth or 7-Up.  Rest may also help.  Be sure to drink enough fluids.  Move to a regular diet as you feel  able.  7. You may have a slight fever. Call the doctor if your fever is over 100 F (37.7 C) (taken under the tongue) or lasts longer than 24 hours.  8. You may have a dry mouth, a sore throat, muscle aches or trouble sleeping.  These should go away after 24 hours.  9. Do not make important or legal decisions.   Call your doctor for any of the followin.  Signs of infection (fever, growing tenderness at the surgery site, a large amount of drainage or bleeding, severe pain, foul-smelling drainage, redness, swelling).    2. It has been over 8 to 10 hours since surgery and you are still not able to urinate (pass water).    3.  Headache for over 24 hours.

## 2018-10-26 ENCOUNTER — OFFICE VISIT (OUTPATIENT)
Dept: OPTOMETRY | Facility: CLINIC | Age: 78
End: 2018-10-26
Payer: COMMERCIAL

## 2018-10-26 DIAGNOSIS — Z96.1 PSEUDOPHAKIA OF LEFT EYE: Primary | ICD-10-CM

## 2018-10-26 PROCEDURE — 99024 POSTOP FOLLOW-UP VISIT: CPT | Performed by: OPTOMETRIST

## 2018-10-26 ASSESSMENT — TONOMETRY
OS_IOP_MMHG: 15
IOP_METHOD: TONOPEN
OS_IOP_MMHG: 20
OD_IOP_MMHG: 15

## 2018-10-26 ASSESSMENT — REFRACTION_WEARINGRX
OS_AXIS: 008
OS_ADD: +2.75
OD_CYLINDER: +0.75
OS_CYLINDER: +0.75
OD_ADD: +2.75
OS_SPHERE: PLANO
OD_AXIS: 010
OD_SPHERE: -0.75

## 2018-10-26 ASSESSMENT — EXTERNAL EXAM - LEFT EYE: OS_EXAM: NORMAL

## 2018-10-26 ASSESSMENT — VISUAL ACUITY
OD_SC: 20/20
OS_SC: 20/30
METHOD: SNELLEN - LINEAR
CORRECTION_TYPE: GLASSES
OS_SC+: -1
OD_SC+: -1
OS_PH_SC: 20/20-1

## 2018-10-26 ASSESSMENT — SLIT LAMP EXAM - LIDS: COMMENTS: NORMAL

## 2018-10-26 NOTE — PROGRESS NOTES
CHIEF COMPLAINT:   Chief Complaint   Patient presents with     Surgical Followup     cataract 1 day post op OS       Type of surgery cataract  Date of surgery left eye  10/25/18; right eye 11/8/2018    Feels like something in her eye - she feels like she has to keep blinking her eyes.        Drops reviewed. Drops last taken at 11:30 this morning    OBJECTIVE:     See ophthalmology exam    ASSESSMENT:         ICD-10-CM    1. Pseudophakia of left eye Z96.1 POST-OP FOLLOW-UP VISIT     PLAN:      Patient Instructions   Continue with drops and scheduled follow up appointments.  Follow directions on your bottles as far as how many drops to use daily.    Wear shield while sleeping.       Please continue any glaucoma, dry eye, or other medications you were using prior to the surgery.        Bola Lagos, GINO  [facility]  [address] [place] [address]  Tel- [phone]  [phone]

## 2018-10-26 NOTE — MR AVS SNAPSHOT
After Visit Summary   10/26/2018    Brandy Leon    MRN: [id number]           Patient Information     Date Of Birth          1940        Visit Information        Provider Department      10/26/2018 12:00 PM Bola Lagos OD [facility]        Today's Diagnoses     Pseudophakia of left eye    -  1      Care Instructions    Continue with drops and scheduled follow up appointments.  Follow directions on your bottles as far as how many drops to use daily.    Wear shield while sleeping.       Please continue any glaucoma, dry eye, or other medications you were using prior to the surgery.        Bola Lagos OD  [facility]  [address] [place] [address]  Tel- [phone]  [phone]            Follow-ups after your visit        Your next 10 appointments already scheduled     Oct 26, 2018 12:00 PM CDT   Return Visit with Bola Lagos OD   [facility])    [address] [facility] [address]   [phone]            Oct 31, 2018 12:00 PM CDT   Return Visit with Bola Lagos OD   [facility] ([facility])    [address] [facility] [address]   [phone]            Nov 05, 2018  9:00 AM CST   Return Visit with Ehsan Araya DPM   [facility])    [address] [facility] [address]   [phone]            Nov 08, 2018   Procedure with Thomas Serna MD   [facility] [place]    [address]  [facility] [address]   [phone]            Nov 09, 2018 12:00 PM CST   Return Visit with Bola Lagos OD   [facility])    [address] [facility] [address]   [phone]            Nov 14, 2018 12:00 PM CST   Return Visit with Bola Lagos OD   [facility] ([facility])    [address] [place] [address]   [phone]            Dec 05, 2018 10:20 AM CST   [facility]    [address] [place] [address]   [phone]            Dec 26, 2018  8:00 PM CST   PSG Split with BK BED 1   [facility] ([facility])    [address] [facility] [address]  [facility] [address] [phone]            Dec 28, 2018  8:15 AM CST   Return Visit with Candice Worley MD, [facility] ENDO NURSE   [facility]    [address] [place] [address] [phone]              Who to contact     If you have questions or need follow up information about today's clinic visit or your schedule please contact [facility] directly at [phone].  Normal or non-critical lab and imaging results will be communicated to you by MyChart, letter or phone within 4 business days after the clinic has received the results. If you do not hear from us within 7 days, please contact the clinic through MyChart or phone. If you have a critical or abnormal lab result, we will notify you by phone as soon as possible.  Submit refill requests through [email or web address] or call your pharmacy and they will forward the refill request to us. Please allow 3 business days for your refill to be completed.          Additional Information About Your Visit        Care EveryWhere ID     This is your Care EveryWhere ID. This could be used by other organizations to access your [place] medical records  [id number]         Blood Pressure from Last 3 Encounters:   10/25/18 107/41   10/19/18 120/60   10/18/18 128/80    Weight from Last 3 Encounters:   10/19/18 85 kg (187 lb 8 oz)   10/18/18 85.7 kg (189 lb)   08/03/18 85.5 kg (188 lb 8 oz)              We Performed the Following     POST-OP  FOLLOW-UP VISIT          Today's Medication Changes          These changes are accurate as of 10/26/18  9:07 AM.  If you have any questions, ask your nurse or doctor.               These medicines have changed or have updated prescriptions.        Dose/Directions    irbesartan-hydrochlorothiazide 150-12.5 MG per tablet   Commonly known as:  AVALIDE   This may have changed:  See the new instructions.   Used for:  Hypertension goal BP (blood pressure) < 140/90        TAKE 1 TABLET BY MOUTH DAILY   Quantity:  90 tablet   Refills:  1                Primary Care Provider Office Phone # Fax #    Cailin Ponce, APRN [facility] [phone]       [address]  [place] [address]        Equal Access to Services     NUSRAT KHAN : Hadii ana olverao Sotorey, waaxda luqadaha, qaybta kaalmada adepemayada, mitch azevedo . So [facility] [phone].    ATENCIÓN: Si habla español, tiene a garcía disposición servicios gratuitos de asistencia lingüística. Llame al [phone].    We comply with applicable federal civil rights laws and [place] laws. We do not discriminate on the basis of race, color, national origin, age, disability, sex, sexual orientation, or gender identity.            Thank you!     Thank you for choosing [facility]  for your care. Our goal is always to provide you with excellent care. Hearing back from our patients is one way we can continue to improve our services. Please take a few minutes to complete the written survey that you may receive in the mail after your visit with us. Thank you!             Your Updated Medication List - Protect others around you: Learn how to safely use, store and throw away your medicines at www.disposemymeds.org.          This list is accurate as of 10/26/18  9:07 AM.  Always use your most recent med list.                   Brand Name Dispense Instructions for use Diagnosis    aspirin 81 MG EC tablet     90 tablet     Take 1 tablet by mouth daily.    Type 2 diabetes, HbA1c goal < 7% (H)       ciclopirox 0.77 % cream    LOPROX    90 g    Apply topically 2 times daily To feet and toenails.    Tinea pedis of both feet       famotidine 20 MG tablet    PEPCID     Take 20 mg by mouth nightly as needed        fexofenadine 180 MG tablet    ALLEGRA     Take 180 mg by mouth daily        glipiZIDE 10 MG tablet    GLUCOTROL    360 tablet    Take 1 tablet (10 mg) by mouth 2 times daily (before meals)    Type 2 diabetes mellitus with hyperglycemia, with long-term current use of insulin (H)       GLUCOSAMINE CHONDROITIN COMPLX PO      2 Daily        insulin glargine 100 UNIT/ML injection    LANTUS SOLOSTAR    45 mL    Inject 20 Units Subcutaneous daily    Type 2 diabetes mellitus with hyperglycemia, with long-term current use of insulin (H)       insulin pen needle 32G X 4 MM    BD JUAN C U/F    100 each    USE 1 DAILY AS DIRECTED.    Type 2 diabetes mellitus with hyperglycemia (H)       irbesartan-hydrochlorothiazide 150-12.5 MG per tablet    AVALIDE    90 tablet    TAKE 1 TABLET BY MOUTH DAILY    Hypertension goal BP (blood pressure) < 140/90       ketorolac 0.5 % ophthalmic solution    ACULAR    1 Bottle    Apply 1 drop to eye 4 times daily for 21 days Start 2 days prior to surgery in operative eye.    Cataracts, bilateral       latanoprost 0.005 % ophthalmic solution    XALATAN    3 Bottle    Place 1 drop into both eyes At Bedtime    Primary open angle glaucoma of both eyes, moderate stage       metFORMIN 1000 MG tablet    GLUCOPHAGE    180 tablet    TAKE 1 TABLET (1,000 MG) BY MOUTH 2 TIMES DAILY (WITH MEALS)    Type 2 diabetes mellitus with hyperglycemia, with long-term current use of insulin (H)       MULTIVITAMIN PO      1 tablet daily        ofloxacin 0.3 % ophthalmic solution    OCUFLOX    1 Bottle    Apply 1 drop to eye 4 times daily for 10 days Start 2 days prior to surgery in operative eye    Cataracts, bilateral       OMEGA 3 PO       Take by mouth 2 times daily.        ONE TOUCH DELICA LANCETS Misc     100 each    1 each 2 times daily. One Touch Delica    Type 2 diabetes, HbA1c goal < 7% (H)       ONETOUCH ULTRA test strip   Generic drug:  blood glucose monitoring     200 strip    USE TO TEST TWICE A DAY    Type 2 diabetes mellitus with hyperglycemia, with long-term current use of insulin (H)       order for DME     1 each    Glucometer covered by insurance.    Type 2 diabetes, HbA1c goal < 7% (H)       oxybutynin 5 MG 24 hr tablet    DITROPAN-XL    180 tablet    TAKE 2 TABLETS (10 MG) BY MOUTH DAILY    Urinary frequency, Overactive bladder       prednisoLONE acetate 1 % ophthalmic susp    PRED FORTE    1 Bottle    Apply 1 drop to eye 4 times daily for 21 days Start 2 days prior to surgery in operative eye.    Cataracts, bilateral       rosuvastatin 5 MG tablet    CRESTOR    8 tablet    TAKE 1 TABLET BY MOUTH TWICE WEEKLY    Dyslipidemia, goal LDL below 100       SYNTHROID 137 MCG tablet   Generic drug:  levothyroxine     90 tablet    TAKE 1 TABLET (137 MCG) BY MOUTH DAILY    Myxedema heart disease, Nutritional and metabolic cardiomyopathy (H)

## 2018-10-26 NOTE — PATIENT INSTRUCTIONS
Continue with drops and scheduled follow up appointments.  Follow directions on your bottles as far as how many drops to use daily.    Wear shield while sleeping.       Please continue any glaucoma, dry eye, or other medications you were using prior to the surgery.        Bola Lagos, OD  [facility]  [address] [place] [address]  Tel- [phone]  [phone]

## 2018-10-31 ENCOUNTER — OFFICE VISIT (OUTPATIENT)
Dept: OPTOMETRY | Facility: CLINIC | Age: 78
End: 2018-10-31
Payer: COMMERCIAL

## 2018-10-31 DIAGNOSIS — Z96.1 PSEUDOPHAKIA OF LEFT EYE: Primary | ICD-10-CM

## 2018-10-31 PROCEDURE — 99024 POSTOP FOLLOW-UP VISIT: CPT | Performed by: OPTOMETRIST

## 2018-10-31 ASSESSMENT — REFRACTION_WEARINGRX
OD_SPHERE: -0.75
OS_AXIS: 008
OD_ADD: +2.75
OS_ADD: +2.75
OS_SPHERE: PLANO
OD_AXIS: 010
OS_CYLINDER: +0.75
OD_CYLINDER: +0.75

## 2018-10-31 ASSESSMENT — EXTERNAL EXAM - LEFT EYE: OS_EXAM: NORMAL

## 2018-10-31 ASSESSMENT — SLIT LAMP EXAM - LIDS: COMMENTS: NORMAL

## 2018-10-31 ASSESSMENT — TONOMETRY
IOP_METHOD: TONOPEN
OS_IOP_MMHG: 15
OD_IOP_MMHG: 15
OS_IOP_MMHG: 21

## 2018-10-31 ASSESSMENT — VISUAL ACUITY
METHOD: SNELLEN - LINEAR
OD_SC: 20/20
OS_SC: 20/40
OD_SC+: -3

## 2018-10-31 NOTE — MR AVS SNAPSHOT
After Visit Summary   10/31/2018    Brandy Leon    MRN: [id number]           Patient Information     Date Of Birth          1940        Visit Information        Provider Department      10/31/2018 12:00 PM Bola Lagos OD [facility]        Today's Diagnoses     Pseudophakia of left eye    -  1      Care Instructions    Discontinue ofloxacin.  Continue prednisolone acetate and ketorolac for the full 21 days.    Ok to discontinue shield while sleeping.  All restrictions are lifted.    Keep follow up appointments as scheduled.    Start all 3 drops on surgical eye (right) on Tuesday of next week.       Please continue any glaucoma, dry eye, or other medications you were using prior to the surgery.        Bola Lagos OD  [facility]  [address]  [place] [address]  Tel- [phone]            Follow-ups after your visit        Your next 10 appointments already scheduled     Oct 31, 2018 12:00 PM CDT   Return Visit with Bola Lagos OD   [facility])    [address] [facility] [address] [phone]            Nov 05, 2018  9:00 AM CST   Return Visit with Ehsan Araya DPM   [facility])    [address] [facility] [address] [phone]            Nov 08, 2018   Procedure with Thomas Serna MD   [facility])    [address]  [facility] [address] [phone]            Nov 09, 2018 12:00 PM CST   Return Visit with Bola Lagos OD   [facility])    [address] [facility] [address] [phone]            Nov 14, 2018 12:00 PM CST   Return Visit with Bola Lagos OD   [facility] ([facility])    [address] [facility] [address] [phone]            Dec 05, 2018  10:20 AM CST   Return Visit with Bola Lagos OD   [facility]    [address] [place] [address]   [phone]            Dec 26, 2018  8:00 PM CST   PSG Split with BK BED 1   [facility] ([facility])    [address] [facility] [address] [address]  [facility] [address]   [phone]            Dec 28, 2018  8:15 AM CST   Return Visit with Candice Worley MD, [facility] ENDO NURSE   [facility])    [address] [place] [address]   [phone]            Arsenio 10, 2019  9:00 AM CST   Return Sleep Patient with RYANN Mendez   [facility] ([facility])    [address] [facility] [address] [address]  [facility] [address]   [phone]              Who to contact     If you have questions or need follow up information about today's clinic visit or your schedule please contact [facility] directly at [phone].  Normal or non-critical lab and imaging results will be communicated to you by MyChart, letter or phone within 4 business days after the clinic has received the results. If you do not hear from us within 7 days, please contact the clinic through MyChart or phone. If you have a critical or abnormal lab result, we will notify you by phone as soon as possible.  Submit refill requests through [employer] or call your pharmacy and they will forward the refill request to us. Please allow 3 business days for your refill to be completed.          Additional Information About Your Visit        Care EveryWhere ID     This is your Care EveryWhere ID. This could be used by other organizations to access your [place] medical records  [id number]         Blood Pressure from Last 3 Encounters:   10/25/18 107/41   10/19/18 120/60   10/18/18 128/80    Weight from Last 3 Encounters:   10/19/18 85 kg (187  lb 8 oz)   10/18/18 85.7 kg (189 lb)   08/03/18 85.5 kg (188 lb 8 oz)              We Performed the Following     POST-OP FOLLOW-UP VISIT          Today's Medication Changes          These changes are accurate as of 10/31/18  8:49 AM.  If you have any questions, ask your nurse or doctor.               These medicines have changed or have updated prescriptions.        Dose/Directions    irbesartan-hydrochlorothiazide 150-12.5 MG per tablet   Commonly known as:  AVALIDE   This may have changed:  See the new instructions.   Used for:  Hypertension goal BP (blood pressure) < 140/90        TAKE 1 TABLET BY MOUTH DAILY   Quantity:  90 tablet   Refills:  1                Primary Care Provider Office Phone # Fax #    Cailin Mckenna Kris, APRN [facility] [phone] [address]  [place] [address]        Equal Access to Services     [facility] : Hadii ana olverao Sotorey, waaxda luqadaha, qaybta kaalmada adepemayada, mitch azevedo . So [facility] [phone].    ATENCIÓN: Si habla español, tiene a garcía disposición servicios gratuitos de asistencia lingüística. Vesna al [phone].    We comply with applicable federal civil rights laws and [place] laws. We do not discriminate on the basis of race, color, national origin, age, disability, sex, sexual orientation, or gender identity.            Thank you!     Thank you for choosing [facility]  for your care. Our goal is always to provide you with excellent care. Hearing back from our patients is one way we can continue to improve our services. Please take a few minutes to complete the written survey that you may receive in the mail after your visit with us. Thank you!             Your Updated Medication List - Protect others around you: Learn how to safely use, store and throw away your medicines at www.disposemymeds.org.          This list is accurate as of 10/31/18  8:49 AM.  Always use your most  recent med list.                   Brand Name Dispense Instructions for use Diagnosis    aspirin 81 MG EC tablet     90 tablet    Take 1 tablet by mouth daily.    Type 2 diabetes, HbA1c goal < 7% (H)       ciclopirox 0.77 % cream    LOPROX    90 g    Apply topically 2 times daily To feet and toenails.    Tinea pedis of both feet       famotidine 20 MG tablet    PEPCID     Take 20 mg by mouth nightly as needed        fexofenadine 180 MG tablet    ALLEGRA     Take 180 mg by mouth daily        glipiZIDE 10 MG tablet    GLUCOTROL    360 tablet    Take 1 tablet (10 mg) by mouth 2 times daily (before meals)    Type 2 diabetes mellitus with hyperglycemia, with long-term current use of insulin (H)       GLUCOSAMINE CHONDROITIN COMPLX PO      2 Daily        insulin glargine 100 UNIT/ML injection    LANTUS SOLOSTAR    45 mL    Inject 20 Units Subcutaneous daily    Type 2 diabetes mellitus with hyperglycemia, with long-term current use of insulin (H)       insulin pen needle 32G X 4 MM    BD JUAN C U/F    100 each    USE 1 DAILY AS DIRECTED.    Type 2 diabetes mellitus with hyperglycemia (H)       irbesartan-hydrochlorothiazide 150-12.5 MG per tablet    AVALIDE    90 tablet    TAKE 1 TABLET BY MOUTH DAILY    Hypertension goal BP (blood pressure) < 140/90       ketorolac 0.5 % ophthalmic solution    ACULAR    1 Bottle    Apply 1 drop to eye 4 times daily for 21 days Start 2 days prior to surgery in operative eye.    Cataracts, bilateral       latanoprost 0.005 % ophthalmic solution    XALATAN    3 Bottle    Place 1 drop into both eyes At Bedtime    Primary open angle glaucoma of both eyes, moderate stage       metFORMIN 1000 MG tablet    GLUCOPHAGE    180 tablet    TAKE 1 TABLET (1,000 MG) BY MOUTH 2 TIMES DAILY (WITH MEALS)    Type 2 diabetes mellitus with hyperglycemia, with long-term current use of insulin (H)       MULTIVITAMIN PO      1 tablet daily        OMEGA 3 PO      Take by mouth 2 times daily.        ONE TOUCH DELICA  LANCETS Misc     100 each    1 each 2 times daily. One Touch Delica    Type 2 diabetes, HbA1c goal < 7% (H)       ONETOUCH ULTRA test strip   Generic drug:  blood glucose monitoring     200 strip    USE TO TEST TWICE A DAY    Type 2 diabetes mellitus with hyperglycemia, with long-term current use of insulin (H)       order for DME     1 each    Glucometer covered by insurance.    Type 2 diabetes, HbA1c goal < 7% (H)       oxybutynin 5 MG 24 hr tablet    DITROPAN-XL    180 tablet    TAKE 2 TABLETS (10 MG) BY MOUTH DAILY    Urinary frequency, Overactive bladder       prednisoLONE acetate 1 % ophthalmic susp    PRED FORTE    1 Bottle    Apply 1 drop to eye 4 times daily for 21 days Start 2 days prior to surgery in operative eye.    Cataracts, bilateral       rosuvastatin 5 MG tablet    CRESTOR    8 tablet    TAKE 1 TABLET BY MOUTH TWICE WEEKLY    Dyslipidemia, goal LDL below 100       SYNTHROID 137 MCG tablet   Generic drug:  levothyroxine     90 tablet    TAKE 1 TABLET (137 MCG) BY MOUTH DAILY    Myxedema heart disease, Nutritional and metabolic cardiomyopathy (H)

## 2018-10-31 NOTE — PROGRESS NOTES
CHIEF COMPLAINT:   Chief Complaint   Patient presents with     Surgical Followup     1 week post op cataract os eye       Type of surgery cataract   Date of surgery 10- os eye    Tammy Funes, Optometric Assistant, A.B.O.C.     Drops reviewed.    OBJECTIVE:     See ophthalmology exam    ASSESSMENT:         ICD-10-CM    1. Pseudophakia of left eye Z96.1 POST-OP FOLLOW-UP VISIT     PLAN:      Patient Instructions   Discontinue ofloxacin.  Continue prednisolone acetate and ketorolac for the full 21 days.    Ok to discontinue shield while sleeping.  All restrictions are lifted.    Keep follow up appointments as scheduled.    Start all 3 drops on surgical eye (right) on Tuesday of next week.       Please continue any glaucoma, dry eye, or other medications you were using prior to the surgery.        Bola Lagos, OD  [facility]  [address] [place] [address]  Tel- [phone]

## 2018-10-31 NOTE — PATIENT INSTRUCTIONS
Discontinue ofloxacin.  Continue prednisolone acetate and ketorolac for the full 21 days.    Ok to discontinue shield while sleeping.  All restrictions are lifted.    Keep follow up appointments as scheduled.    Start all 3 drops on surgical eye (right) on Tuesday of next week.       Please continue any glaucoma, dry eye, or other medications you were using prior to the surgery.        Bola Lagos, OD  [facility]  [address] [place] [address]  Tel- [phone]

## 2018-11-06 ENCOUNTER — OFFICE VISIT (OUTPATIENT)
Dept: PODIATRY | Facility: CLINIC | Age: 78
End: 2018-11-06
Payer: COMMERCIAL

## 2018-11-06 VITALS — HEART RATE: 98 BPM | DIASTOLIC BLOOD PRESSURE: 78 MMHG | OXYGEN SATURATION: 95 % | SYSTOLIC BLOOD PRESSURE: 142 MMHG

## 2018-11-06 DIAGNOSIS — B35.1 ONYCHOMYCOSIS: Primary | ICD-10-CM

## 2018-11-06 DIAGNOSIS — E11.49 TYPE II OR UNSPECIFIED TYPE DIABETES MELLITUS WITH NEUROLOGICAL MANIFESTATIONS, NOT STATED AS UNCONTROLLED(250.60) (H): ICD-10-CM

## 2018-11-06 PROCEDURE — 99213 OFFICE O/P EST LOW 20 MIN: CPT | Performed by: PODIATRIST

## 2018-11-06 NOTE — PATIENT INSTRUCTIONS
Thanks for coming today.  [facility]  [address] [place] [address]    To schedule future appointments in Ortho Clinic, you may call [phone].    To schedule ordered imaging by your provider:   Call [facility] [phone]    To schedule an injection ordered by your provider:  Call [facility] Injection scheduling line: [phone]  [facility] available online at:  [email or web address]    Please call if any further questions or concerns ([phone]).  Clinic hours 8 am to 5 pm.    Return to clinic (call) if symptoms worsen or fail to improve.

## 2018-11-06 NOTE — LETTER
11/6/2018         RE: Brandy Leon  [address] [facility] [address]        Dear Colleague,    Thank you for referring your patient, Brandy Leon, to the [facility]. Please see a copy of my visit note below.    Past Medical History:   Diagnosis Date     Actinic keratosis 3/12/2013     Degenerative joint disease      Diabetes (H)      Rheumatic fever     x2 between the ages of 15-18     Seasonal allergies      Patient Active Problem List   Diagnosis     Seasonal allergies     Hypothyroidism     Hyperlipidemia LDL goal <100     Fibromyalgia     Osteoarthritis     Advanced directives, counseling/discussion     Cataracts, bilateral     Obesity     Type 2 diabetes mellitus with hyperglycemia, with long-term current use of insulin (H)     Hypertension goal BP (blood pressure) < 140/90     Overactive bladder     Recurrent UTI     Past Surgical History:   Procedure Laterality Date     C APPENDECTOMY       C ARTHROPLASTY TMJ       CATARACT IOL, RT/LT       COLONOSCOPY       COLONOSCOPY N/A 8/13/2015    Procedure: COLONOSCOPY;  Surgeon: Duane, William Charles, MD;  Location: [facility] OR     COLONOSCOPY WITH CO2 INSUFFLATION N/A 8/13/2015    Procedure: COLONOSCOPY WITH CO2 INSUFFLATION;  Surgeon: Duane, William Charles, MD;  Location: [facility] OR     PHACOEMULSIFICATION WITH STANDARD INTRAOCULAR LENS IMPLANT Left 10/25/2018    Procedure: LEFT PHACOEMULSIFICATION WITH STANDARD INTRAOCULAR LENS IMPLANT;  Surgeon: Thomas Serna MD;  Location: [facility] OR     THYROIDECTOMY        Social History     Social History     Marital status: Single     Spouse name: N/A     Number of children: N/A     Years of education: N/A     Occupational History           Social History Main Topics     Smoking status: Never Smoker     Smokeless tobacco: Never Used      Comment: has had exposure to second hand smoke in the past from [employer], no current exposure     Alcohol use No      Drug use: No     Sexual activity: No     Other Topics Concern     Parent/Sibling W/ Cabg, Mi Or Angioplasty Before 65f 55m? 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     Hobby Hazards No     Sleep Concern Yes     difficulty staying asleep, up and down all night     Stress Concern No     Weight Concern Yes     would like to lose     Special Diet Yes     low card, low sugar diet     Back Care Yes     chiropratior     Exercise Yes     almost everyday     Bike Helmet No     does not ride bicycle any more     Seat Belt Yes     Self-Exams Yes     Social History Narrative     Family History   Problem Relation Age of Onset     Cancer Father      skin and leukemia     Cerebrovascular Disease Maternal Grandfather      Cerebrovascular Disease Paternal Grandmother      Eye Disorder Paternal Grandmother      cataracts     Cerebrovascular Disease Paternal Grandfather      HEART DISEASE Paternal Grandfather      Cancer Sister      Breast Cancer     Eye Disorder Mother      cataracts     Eye Disorder Brother      cataract     Breast Cancer Daughter      Other Cancer Daughter      ovarian cancer     Lab Results   Component Value Date    A1C 11.1 10/19/2018        SUBJECTIVE  FINDINGS:  This 77-year-old female returns to clinic for diabetic foot check, onychomycosis, onychauxis.  She relates that she is doing okay.  She has numbness, tingling, neuropathy in her feet.  She is using lotion.       OBJECTIVE FINDINGS:  DP and PT are 2-4 bilaterally.  CFT is less than 3 seconds.  She has thickened, dystrophic, discolored nails with subungual debris and dystrophy and discoloration.  Hallux nails bilaterally greater than the rest to differing degrees.  She has subungual bruising on the left hallux nail and mildly on the right with some dried subungual blood with underlying  nail bed intact upon debridement.  There is no erythema, no drainage, no odor, no calor bilaterally.       ASSESSMENT AND PLAN:  Onychomycosis bilaterally, onychauxis bilaterally.  Bruising left hallux nail and a little bit on the right hallux nail secondary to injury.  She is diabetic with peripheral neuropathy.  Diagnosis and treatment discussed with her.  All the nails were debrided or reduced bilaterally upon consent.  She still has some mild tinea pedis changes.  She will continue the Loprox cream.  Return to clinic to see me in 3 months.     Again, thank you for allowing me to participate in the care of your patient.        Sincerely,        Ehsan Araya DPM

## 2018-11-06 NOTE — MR AVS SNAPSHOT
After Visit Summary   2018    Brandy Leon    MRN: [id number]           Patient Information     Date Of Birth          1940        Visit Information        Provider Department      2018 9:30 AM Ehsan Araya DPM [facility]        Today's Diagnoses     Onychomycosis    -  1    Type II or unspecified type diabetes mellitus with neurological manifestations, not stated as uncontrolled(250.60) (H)          Care Instructions    Thanks for coming today.  [facility]  [address] [place] [address]    To schedule future appointments in Ortho Clinic, you may call [phone].    To schedule ordered imaging by your provider:   Call Central Imaging [phone]    To schedule an injection ordered by your provider:  Call Central Imaging Injection scheduling line: [phone]  MyChart available online at:  [email or web address]    Please call if any further questions or concerns ([phone]).  Clinic hours 8 am to 5 pm.    Return to clinic (call) if symptoms worsen or fail to improve.            Follow-ups after your visit        Your next 10 appointments already scheduled     2018  9:30 AM CST   Return Visit with Ehsan Araya DPM   [facility])    [address] [facility] [address] [phone]            2018   Procedure with Thomas Serna MD   [facility] (--)    [address] [facility] [address]   [phone]            2018 12:00 PM CST   [facility] with Bola Lagos OD   [facility]    [address] [facility] [address]   [phone]            2018 12:00 PM CST   Return Visit with Bola Lagos OD   [facility]    [address] [facility] [address] [phone]             Dec 05, 2018 10:20 AM CST   [facility] with B[facility]os [facility]    [address] [place] [address]   [phone]            Dec 26, 2018  8:00 PM CST   PSG Split with BK BED 1   [facility] ([facility])    [address] [facility] [address]  [facility] [address]   [phone]            Dec 28, 2018  8:15 AM CST   Return Visit with Candice Worley MD, [facility] ENDO NURSE   [facility]    [address] [place] [address]   [phone]            Arsenio 10, 2019  9:00 AM CST   Return Sleep Patient with RYANN Mendez   [facility] ([facility])    [address]  [facility] [address]   [phone]            Feb 06, 2019  9:30 AM CST   Return Visit with Ehsan Araya DPM   [facility]    [address] [place] [address]   [phone]              Who to contact     If you have questions or need follow up information about today's clinic visit or your schedule please contact [facility] directly at [phone].  Normal or non-critical lab and imaging results will be communicated to you by [employer], letter or phone within 4 business days after the clinic has received the results. If you do not hear from us within 7 days, please contact the clinic through [employer] F[employer]hart or phone. If you have a critical or abnormal lab result, we will notify you by phone as soon as possible.  Submit refill requests through Airstone or call your pharmacy and they will forward the refill request to us. Please allow 3 business days for your refill to be completed.          Additional Information About Your Visit        Airstone Information     Airstone is an electronic gateway that provides easy,  online access to your medical records. With [employer], you can request a clinic appointment, read your test results, renew a prescription or communicate with your care team.     To sign up for [employer] visit the website at [email or web address]   You will be asked to enter the access code listed below, as well as some personal information. Please follow the directions to create your username and password.     Your access code is: [email or web address]  Expires: 2019  9:17 AM     Your access code will  in 90 days. If you need help or a new code, please contact your [facility] or call [phone] for assistance.        Care EveryWhere ID     This is your Care EveryWhere ID. This could be used by other organizations to access your [facility] medical records  [id number]        Your Vitals Were     Pulse Pulse Oximetry                98 95%           Blood Pressure from Last 3 Encounters:   18 142/78   10/25/18 107/41   10/19/18 120/60    Weight from Last 3 Encounters:   10/19/18 85 kg (187 lb 8 oz)   10/18/18 85.7 kg (189 lb)   18 85.5 kg (188 lb 8 oz)              We Performed the Following     DEBRIDEMENT OF NAIL(S), 1-5          Today's Medication Changes          These changes are accurate as of 18  9:17 AM.  If you have any questions, ask your nurse or doctor.               These medicines have changed or have updated prescriptions.        Dose/Directions    irbesartan-hydrochlorothiazide 150-12.5 MG per tablet   Commonly known as:  AVALIDE   This may have changed:  See the new instructions.   Used for:  Hypertension goal BP (blood pressure) < 140/90        TAKE 1 TABLET BY MOUTH DAILY   Quantity:  90 tablet   Refills:  1                Primary Care Provider Office Phone # Fax #    MAGO Baker [facility] [phone]       [address]  [place] [address]        Equal Access to Services     NUSRAT KHAN AH: Akbar dwyer  Sojurgenali, waharshda luqadaha, qaybta kaalmada chris, mitch vazqeuzva katty. So [facility] [phone].    ATENCIÓN: Si stefan calhoun, tiene a garcía disposición servicios gratuitos de asistencia lingüística. Vesna al [phone].    We comply with applicable federal civil rights laws and [place] laws. We do not discriminate on the basis of race, color, national origin, age, disability, sex, sexual orientation, or gender identity.            Thank you!     Thank you for choosing [facility]  for your care. Our goal is always to provide you with excellent care. Hearing back from our patients is one way we can continue to improve our services. Please take a few minutes to complete the written survey that you may receive in the mail after your visit with us. Thank you!             Your Updated Medication List - Protect others around you: Learn how to safely use, store and throw away your medicines at www.disposemymeds.org.          This list is accurate as of 11/6/18  9:17 AM.  Always use your most recent med list.                   Brand Name Dispense Instructions for use Diagnosis    aspirin 81 MG EC tablet     90 tablet    Take 1 tablet by mouth daily.    Type 2 diabetes, HbA1c goal < 7% (H)       ciclopirox 0.77 % cream    LOPROX    90 g    Apply topically 2 times daily To feet and toenails.    Tinea pedis of both feet       famotidine 20 MG tablet    PEPCID     Take 20 mg by mouth nightly as needed        fexofenadine 180 MG tablet    ALLEGRA     Take 180 mg by mouth daily        glipiZIDE 10 MG tablet    GLUCOTROL    360 tablet    Take 1 tablet (10 mg) by mouth 2 times daily (before meals)    Type 2 diabetes mellitus with hyperglycemia, with long-term current use of insulin (H)       GLUCOSAMINE CHONDROITIN COMPLX PO      2 Daily        insulin glargine 100 UNIT/ML injection    LANTUS SOLOSTAR    45 mL    Inject 20 Units Subcutaneous daily    Type 2 diabetes mellitus with  hyperglycemia, with long-term current use of insulin (H)       insulin pen needle 32G X 4 MM    BD JUAN C U/F    100 each    USE 1 DAILY AS DIRECTED.    Type 2 diabetes mellitus with hyperglycemia (H)       irbesartan-hydrochlorothiazide 150-12.5 MG per tablet    AVALIDE    90 tablet    TAKE 1 TABLET BY MOUTH DAILY    Hypertension goal BP (blood pressure) < 140/90       ketorolac 0.5 % ophthalmic solution    ACULAR    1 Bottle    Apply 1 drop to eye 4 times daily for 21 days Start 2 days prior to surgery in operative eye.    Cataracts, bilateral       latanoprost 0.005 % ophthalmic solution    XALATAN    3 Bottle    Place 1 drop into both eyes At Bedtime    Primary open angle glaucoma of both eyes, moderate stage       metFORMIN 1000 MG tablet    GLUCOPHAGE    180 tablet    TAKE 1 TABLET (1,000 MG) BY MOUTH 2 TIMES DAILY (WITH MEALS)    Type 2 diabetes mellitus with hyperglycemia, with long-term current use of insulin (H)       MULTIVITAMIN PO      1 tablet daily        OMEGA 3 PO      Take by mouth 2 times daily.        ONE TOUCH DELICA LANCETS Misc     100 each    1 each 2 times daily. One Touch Delica    Type 2 diabetes, HbA1c goal < 7% (H)       ONETOUCH ULTRA test strip   Generic drug:  blood glucose monitoring     200 strip    USE TO TEST TWICE A DAY    Type 2 diabetes mellitus with hyperglycemia, with long-term current use of insulin (H)       order for DME     1 each    Glucometer covered by insurance.    Type 2 diabetes, HbA1c goal < 7% (H)       oxybutynin 5 MG 24 hr tablet    DITROPAN-XL    180 tablet    TAKE 2 TABLETS (10 MG) BY MOUTH DAILY    Urinary frequency, Overactive bladder       prednisoLONE acetate 1 % ophthalmic susp    PRED FORTE    1 Bottle    Apply 1 drop to eye 4 times daily for 21 days Start 2 days prior to surgery in operative eye.    Cataracts, bilateral       rosuvastatin 5 MG tablet    CRESTOR    8 tablet    TAKE 1 TABLET BY MOUTH TWICE WEEKLY    Dyslipidemia, goal LDL below 100        SYNTHROID 137 MCG tablet   Generic drug:  levothyroxine     90 tablet    TAKE 1 TABLET (137 MCG) BY MOUTH DAILY    Myxedema heart disease, Nutritional and metabolic cardiomyopathy (H)

## 2018-11-06 NOTE — NURSING NOTE
Brandy Leon's chief complaint for this visit includes:  Chief Complaint   Patient presents with     RECHECK     toe nails trim     PCP: Cailin Ponce    Referring Provider:  No referring provider defined for this encounter.    /78  Pulse 98  SpO2 95%  Data Unavailable     Do you need any medication refills at today's visit? no

## 2018-11-07 ENCOUNTER — ANESTHESIA EVENT (OUTPATIENT)
Dept: SURGERY | Facility: AMBULATORY SURGERY CENTER | Age: 78
End: 2018-11-07

## 2018-11-07 RX ORDER — ALBUTEROL SULFATE 0.83 MG/ML
2.5 SOLUTION RESPIRATORY (INHALATION) EVERY 4 HOURS PRN
Status: CANCELLED | OUTPATIENT
Start: 2018-11-07

## 2018-11-07 RX ORDER — PHYSOSTIGMINE SALICYLATE 1 MG/ML
1.2 INJECTION INTRAVENOUS
Status: CANCELLED | OUTPATIENT
Start: 2018-11-07

## 2018-11-07 RX ORDER — MEPERIDINE HYDROCHLORIDE 25 MG/ML
12.5 INJECTION INTRAMUSCULAR; INTRAVENOUS; SUBCUTANEOUS
Status: CANCELLED | OUTPATIENT
Start: 2018-11-07

## 2018-11-07 RX ORDER — NALOXONE HYDROCHLORIDE 0.4 MG/ML
.1-.4 INJECTION, SOLUTION INTRAMUSCULAR; INTRAVENOUS; SUBCUTANEOUS
Status: CANCELLED | OUTPATIENT
Start: 2018-11-07 | End: 2018-11-08

## 2018-11-07 RX ORDER — FENTANYL CITRATE 50 UG/ML
25-50 INJECTION, SOLUTION INTRAMUSCULAR; INTRAVENOUS
Status: CANCELLED | OUTPATIENT
Start: 2018-11-07

## 2018-11-07 RX ORDER — SODIUM CHLORIDE, SODIUM LACTATE, POTASSIUM CHLORIDE, CALCIUM CHLORIDE 600; 310; 30; 20 MG/100ML; MG/100ML; MG/100ML; MG/100ML
INJECTION, SOLUTION INTRAVENOUS CONTINUOUS
Status: CANCELLED | OUTPATIENT
Start: 2018-11-07

## 2018-11-07 RX ORDER — ONDANSETRON 4 MG/1
4 TABLET, ORALLY DISINTEGRATING ORAL EVERY 30 MIN PRN
Status: CANCELLED | OUTPATIENT
Start: 2018-11-07

## 2018-11-07 RX ORDER — OXYCODONE HYDROCHLORIDE 5 MG/1
10 TABLET ORAL EVERY 4 HOURS PRN
Status: CANCELLED | OUTPATIENT
Start: 2018-11-07

## 2018-11-07 RX ORDER — HYDROMORPHONE HYDROCHLORIDE 1 MG/ML
.3-.5 INJECTION, SOLUTION INTRAMUSCULAR; INTRAVENOUS; SUBCUTANEOUS EVERY 10 MIN PRN
Status: CANCELLED | OUTPATIENT
Start: 2018-11-07

## 2018-11-07 RX ORDER — METOPROLOL TARTRATE 1 MG/ML
1-2 INJECTION, SOLUTION INTRAVENOUS EVERY 5 MIN PRN
Status: CANCELLED | OUTPATIENT
Start: 2018-11-07

## 2018-11-07 RX ORDER — HYDRALAZINE HYDROCHLORIDE 20 MG/ML
2.5-5 INJECTION INTRAMUSCULAR; INTRAVENOUS EVERY 10 MIN PRN
Status: CANCELLED | OUTPATIENT
Start: 2018-11-07

## 2018-11-07 RX ORDER — ONDANSETRON 2 MG/ML
4 INJECTION INTRAMUSCULAR; INTRAVENOUS EVERY 30 MIN PRN
Status: CANCELLED | OUTPATIENT
Start: 2018-11-07

## 2018-11-07 RX ORDER — DEXAMETHASONE SODIUM PHOSPHATE 4 MG/ML
4 INJECTION, SOLUTION INTRA-ARTICULAR; INTRALESIONAL; INTRAMUSCULAR; INTRAVENOUS; SOFT TISSUE EVERY 10 MIN PRN
Status: CANCELLED | OUTPATIENT
Start: 2018-11-07

## 2018-11-08 ENCOUNTER — SURGERY (OUTPATIENT)
Age: 78
End: 2018-11-08
Payer: COMMERCIAL

## 2018-11-08 ENCOUNTER — HOSPITAL ENCOUNTER (OUTPATIENT)
Facility: AMBULATORY SURGERY CENTER | Age: 78
Discharge: HOME | End: 2018-11-08
Attending: OPHTHALMOLOGY | Admitting: OPHTHALMOLOGY
Payer: COMMERCIAL

## 2018-11-08 ENCOUNTER — ANESTHESIA (OUTPATIENT)
Dept: SURGERY | Facility: AMBULATORY SURGERY CENTER | Age: 78
End: 2018-11-08
Payer: COMMERCIAL

## 2018-11-08 VITALS
OXYGEN SATURATION: 95 % | RESPIRATION RATE: 16 BRPM | SYSTOLIC BLOOD PRESSURE: 113 MMHG | DIASTOLIC BLOOD PRESSURE: 58 MMHG | TEMPERATURE: 97.8 F

## 2018-11-08 PROCEDURE — 66984 XCAPSL CTRC RMVL W/O ECP: CPT | Mod: 79 | Performed by: OPHTHALMOLOGY

## 2018-11-08 PROCEDURE — G8918 PT W/O PREOP ORDER IV AB PRO: HCPCS

## 2018-11-08 PROCEDURE — 66984 XCAPSL CTRC RMVL W/O ECP: CPT | Mod: RT

## 2018-11-08 PROCEDURE — G8907 PT DOC NO EVENTS ON DISCHARG: HCPCS

## 2018-11-08 DEVICE — EYE IMP IOL AMO PCL TECNIS ZCB00 22.5: Type: IMPLANTABLE DEVICE | Site: EYE | Status: FUNCTIONAL

## 2018-11-08 RX ORDER — TETRACAINE HYDROCHLORIDE 5 MG/ML
SOLUTION OPHTHALMIC PRN
Status: DISCONTINUED | OUTPATIENT
Start: 2018-11-08 | End: 2018-11-08 | Stop reason: HOSPADM

## 2018-11-08 RX ORDER — MOXIFLOXACIN 5 MG/ML
1 SOLUTION/ DROPS OPHTHALMIC
Status: COMPLETED | OUTPATIENT
Start: 2018-11-08 | End: 2018-11-08

## 2018-11-08 RX ORDER — PHENYLEPHRINE HYDROCHLORIDE 25 MG/ML
1 SOLUTION/ DROPS OPHTHALMIC
Status: COMPLETED | OUTPATIENT
Start: 2018-11-08 | End: 2018-11-08

## 2018-11-08 RX ORDER — CYCLOPENTOLATE HYDROCHLORIDE 10 MG/ML
1 SOLUTION/ DROPS OPHTHALMIC
Status: COMPLETED | OUTPATIENT
Start: 2018-11-08 | End: 2018-11-08

## 2018-11-08 RX ORDER — ACETAMINOPHEN 325 MG/1
975 TABLET ORAL ONCE
Status: COMPLETED | OUTPATIENT
Start: 2018-11-08 | End: 2018-11-08

## 2018-11-08 RX ORDER — KETAMINE HYDROCHLORIDE 10 MG/ML
INJECTION, SOLUTION INTRAMUSCULAR; INTRAVENOUS PRN
Status: DISCONTINUED | OUTPATIENT
Start: 2018-11-08 | End: 2018-11-08

## 2018-11-08 RX ORDER — SODIUM CHLORIDE, SODIUM LACTATE, POTASSIUM CHLORIDE, CALCIUM CHLORIDE 600; 310; 30; 20 MG/100ML; MG/100ML; MG/100ML; MG/100ML
INJECTION, SOLUTION INTRAVENOUS CONTINUOUS
Status: DISCONTINUED | OUTPATIENT
Start: 2018-11-08 | End: 2018-11-09 | Stop reason: HOSPADM

## 2018-11-08 RX ORDER — MOXIFLOXACIN 5 MG/ML
SOLUTION/ DROPS OPHTHALMIC PRN
Status: DISCONTINUED | OUTPATIENT
Start: 2018-11-08 | End: 2018-11-08 | Stop reason: HOSPADM

## 2018-11-08 RX ORDER — LIDOCAINE 40 MG/G
CREAM TOPICAL
Status: DISCONTINUED | OUTPATIENT
Start: 2018-11-08 | End: 2018-11-09 | Stop reason: HOSPADM

## 2018-11-08 RX ORDER — PROPARACAINE HYDROCHLORIDE 5 MG/ML
1 SOLUTION/ DROPS OPHTHALMIC
Status: COMPLETED | OUTPATIENT
Start: 2018-11-08 | End: 2018-11-08

## 2018-11-08 RX ORDER — KETOROLAC TROMETHAMINE 5 MG/ML
1 SOLUTION OPHTHALMIC
Status: COMPLETED | OUTPATIENT
Start: 2018-11-08 | End: 2018-11-08

## 2018-11-08 RX ADMIN — TETRACAINE HYDROCHLORIDE 1 DROP: 5 SOLUTION OPHTHALMIC at 08:37

## 2018-11-08 RX ADMIN — PHENYLEPHRINE HYDROCHLORIDE 1 DROP: 25 SOLUTION/ DROPS OPHTHALMIC at 08:02

## 2018-11-08 RX ADMIN — KETAMINE HYDROCHLORIDE 10 MG: 10 INJECTION, SOLUTION INTRAMUSCULAR; INTRAVENOUS at 08:39

## 2018-11-08 RX ADMIN — MOXIFLOXACIN 1 DROP: 5 SOLUTION/ DROPS OPHTHALMIC at 07:51

## 2018-11-08 RX ADMIN — KETOROLAC TROMETHAMINE 1 DROP: 5 SOLUTION OPHTHALMIC at 08:01

## 2018-11-08 RX ADMIN — KETOROLAC TROMETHAMINE 1 DROP: 5 SOLUTION OPHTHALMIC at 07:50

## 2018-11-08 RX ADMIN — PHENYLEPHRINE HYDROCHLORIDE 1 DROP: 25 SOLUTION/ DROPS OPHTHALMIC at 07:58

## 2018-11-08 RX ADMIN — MOXIFLOXACIN 1 DROP: 5 SOLUTION/ DROPS OPHTHALMIC at 08:01

## 2018-11-08 RX ADMIN — Medication 1 DROP: at 08:41

## 2018-11-08 RX ADMIN — CYCLOPENTOLATE HYDROCHLORIDE 1 DROP: 10 SOLUTION/ DROPS OPHTHALMIC at 07:58

## 2018-11-08 RX ADMIN — PHENYLEPHRINE HYDROCHLORIDE 1 DROP: 25 SOLUTION/ DROPS OPHTHALMIC at 07:51

## 2018-11-08 RX ADMIN — KETOROLAC TROMETHAMINE 1 DROP: 5 SOLUTION OPHTHALMIC at 07:58

## 2018-11-08 RX ADMIN — ACETAMINOPHEN 975 MG: 325 TABLET ORAL at 07:51

## 2018-11-08 RX ADMIN — CYCLOPENTOLATE HYDROCHLORIDE 1 DROP: 10 SOLUTION/ DROPS OPHTHALMIC at 07:50

## 2018-11-08 RX ADMIN — Medication 250 ML: at 08:44

## 2018-11-08 RX ADMIN — MOXIFLOXACIN 1 DROP: 5 SOLUTION/ DROPS OPHTHALMIC at 07:58

## 2018-11-08 RX ADMIN — PROPARACAINE HYDROCHLORIDE 1 DROP: 5 SOLUTION/ DROPS OPHTHALMIC at 07:51

## 2018-11-08 RX ADMIN — CYCLOPENTOLATE HYDROCHLORIDE 1 DROP: 10 SOLUTION/ DROPS OPHTHALMIC at 08:01

## 2018-11-08 RX ADMIN — MOXIFLOXACIN 1 DROP: 5 SOLUTION/ DROPS OPHTHALMIC at 08:45

## 2018-11-08 NOTE — OP NOTE
PATIENT NAME:  Brandy Leon    :  1940    PATIENT NUMBER:  [id number]    DATE OF SURGERY:  2018    SURGEON:  Thomas Serna M.D.    PREOPERATIVE DIAGNOSIS: Cataract right eye.    POSTOPERATIVE DIAGNOSIS:  Same    PROCEDURE PERFORMED:    1. Phacoemulsification with posterior chamber intraocular lens right eye.    ANESTHESIA:  Topical/MAC    COMPLICATIONS:  None    PROCEDURE: Following adequate preoperative dilation the patient was given topical anesthesia consisting of Proparacaine.  The patient was brought to the operative suite where the eye was prepped and draped in the usual sterile fashion.  A lid speculum was applied. A super sharp blade was used to create a paracentesis, through which 1% preservative free Lidocaine was injected.  Visoelastic was then used to inflate the anterior chamber.  A biplanar incision at the clear cornea limbus was created with a keratome.  A continuous curvilinear capsulorrhexis was started with a cystitome and completed using Utrata forceps.  The lens was hydrodissected and hydro delineated using BSS on a cannula.  The lens nucleus was removed using phacoemulsification.  Remaining cortex was removed using irrigation and aspiration.  Viscoelastic was injected to inflate the capsular bag and a 22.5 D ZCB00 IOL was inserted into the capsular bag without difficulty.  Residual viscoelastic and provisc material was removed with irrigation and aspiration.  BSS was used to hydrate the corneal incision and paracentesis sites which were checked and noted to be watertight.  A drop of Vigamox was applied to the eye and a clear plastic shield was placed.  The patient tolerated the procedure well and left the operative suite in stable condition.    Thomas Serna M.D.

## 2018-11-08 NOTE — DISCHARGE INSTRUCTIONS
Brandy Leon    Cataract Surgery Postoperative Instructions    Postoperative Medications: After surgery, you will use several different eye drop  medications. In most cases you will start these eye drops 2 days before surgery.    1. Ocuflox - is an antibiotic drop that is used to minimize the risk of infection. It should be used 4 times daily for 10 days total or until you are told to discontinue.    (Acceptable alternatives to Ocuflox include: Zymaxid, Besivance, Gatafloxacin, Vigamox)     2.  Ketorolac - is an anti-inflammatory drop. Use it 4 time daily for 21 days total or until you are told to discontinue.  (Acceptable alternatives to Ketorolac include: Acuvail, Nevenac, Xibrom)    3. Prednisilone - is a steroid eye drop, used to minimize inflammation and modulate  healing. It should be used 4 times daily for 21 days total or until you are told to discontinue.  (Acceptable alternatives for Prednisilone include: Pred Forte, Omnipred, Econopred)      IF YOU GET AN ALTERNATIVE EYE DROP PLEASE FOLLOW THE DIRECTIONS ON THE BOTTLE OF DROPS. THEY WILL BE DIFFERENT!      The drops might sting a little when they are instilled, and that is normal.    It doesn t matter what order you put the drops into your eyes, but you should wait at least one minute between drops.    Please continue any glaucoma, dry eye, or other medications you were using prior to the surgery.    Please allow 24 to 48 hours when requesting refills, and call BEFORE you run out of drops.      Artificial Tears - are lubricating drops used to moisturize the eye. You can use these as much as you want, particularly if your eyes feel watery, gritty, or uncomfortable. Chilling these drops in the refrigerator results in a more soothing feeling. There are several brands of artificial tears available including, but not limited to: Optive, Refresh, Systane, Blink, Genteal, Soothe, and others. You should not use drops that  get the red out . You do  not need a prescription for these medications.      Restriction on Activities - It is extremely important that you DO NOT RUB THE  TREATED EYE.  - You will be given a clear plastic shield to wear as protection over your eye the  night after surgery.  - Refrain from any activities that may put your eye at risk of injury, as well as areas  containing a high volume of chemicals, dust, and debris.  - Do Not wear any eye makeup or moisturizer around the eye for 1 week after  surgery.  - Do Not swim or go into a hot-tub, Jacuzzi, or sauna for 1 week after surgery. You  can take showers as normal, but avoid getting shampoo or soap in your eyes.  - Avoid strenuous activity, including lifting more than 30 lbs, for 1 week after  surgery.  - It is fine to bathe, read, watch TV, and use the computer.  Symptoms requiring medical attention:  - Sudden onset of increased discharge from the eye  - Persistent or increasing pain in the eye  - Sudden decrease in vision  - Persistent nausea or vomiting    If you have any questions or concerns before or after your surgery, please contact:    Dr. Serna s office at [phone]      [facility]  Same-Day Surgery   Adult Discharge Orders & Instructions   For 24 hours after surgery  1. Get plenty of rest.  A responsible adult must stay with you for at least 24 hours after you leave the hospital.   2. Do not drive or use heavy equipment.  If you have weakness or tingling, don't drive or use heavy equipment until this feeling goes away.  3. Do not drink alcohol.  4. Avoid strenuous or risky activities.  Ask for help when climbing stairs.   5. You may feel lightheaded.  IF so, sit for a few minutes before standing.  Have someone help you get up.   6. If you have nausea (feel sick to your stomach): Drink only clear liquids such as apple juice, ginger ale, broth or 7-Up.  Rest may also help.  Be sure to drink enough fluids.  Move to a regular diet as you feel  able.  7. You may have a slight fever. Call the doctor if your fever is over 100 F (37.7 C) (taken under the tongue) or lasts longer than 24 hours.  8. You may have a dry mouth, a sore throat, muscle aches or trouble sleeping.  These should go away after 24 hours.  9. Do not make important or legal decisions.   Call your doctor for any of the followin.  Signs of infection (fever, growing tenderness at the surgery site, a large amount of drainage or bleeding, severe pain, foul-smelling drainage, redness, swelling).    2. It has been over 8 to 10 hours since surgery and you are still not able to urinate (pass water).    3.  Headache for over 24 hours.  To contact Dr Sanderson call:  [phone]

## 2018-11-08 NOTE — ANESTHESIA CARE TRANSFER NOTE
Patient: Brandy Leon    Procedure(s):  RIGHT PHACOEMULSIFICATION WITH STANDARD INTRAOCULAR LENS IMPLANT    Diagnosis: RIGHT CATARACT  Diagnosis Additional Information: No value filed.    Anesthesia Type:   MAC     Note:  Airway :Room Air  Patient transferred to:Phase II  Comments: To Phase II. Report to RN.  VSS Resp status stable.Handoff Report: Identifed the Patient, Identified the Reponsible Provider, Reviewed the pertinent medical history, Discussed the surgical course, Reviewed Intra-OP anesthesia mangement and issues during anesthesia, Set expectations for post-procedure period and Allowed opportunity for questions and acknowledgement of understanding      Vitals: (Last set prior to Anesthesia Care Transfer)    CRNA VITALS  11/8/2018 0823 - 11/8/2018 0856      11/8/2018             Pulse: 87    SpO2: 97 %                Electronically Signed By: MAGO Fortune CRNA  November 8, 2018  8:56 AM

## 2018-11-08 NOTE — IP AVS SNAPSHOT
MRN:[id number]                      After Visit Summary   11/8/2018    Brandy Leon    MRN: [id number]           Thank you!     Thank you for choosing [facility] for your care. Our goal is always to provide you with excellent care. Hearing back from our patients is one way we can continue to improve our services. Please take a few minutes to complete the written survey that you may receive in the mail after you visit with us. Thank you!        Patient Information     Date Of Birth          1940        About your hospital stay     You were admitted on:  November 8, 2018 You last received care in the:  [facility]    You were discharged on:  November 8, 2018       Who to Call     For medical emergencies, please call 911.  For non-urgent questions about your medical care, please call your primary care provider or clinic, [phone]  For questions related to your surgery, please call your surgery clinic        Attending Provider     Provider Specialty    Thomas Serna MD Ophthalmology       Primary Care Provider Office Phone # Fax #    Cailin MAGO Bernal [facility] [phone]      Your next 10 appointments already scheduled     Nov 09, 2018 12:00 PM CST   Return Visit with Bola Lagos OD   [facility])    [id number] [place] [address]   [phone]            Nov 14, 2018 12:00 PM CST   Return Visit with Bola Lagos OD   [facility])    [address] [place] [address]   [phone]            Dec 05, 2018 10:20 AM CST   Return Visit with Bola Lagos OD   [facility])    [address] [place] [address]   [phone]            Dec 26, 2018  8:00 PM CST   PSG Split with  BED 1   [facility] ([facility])    [address]  [facility] [address]  [facility] [address]   [phone]            Dec 28, 2018  8:15 AM CST   Return Visit with Candice Worley MD, [facility]O NURSE   [facility]    [address] [place] [address]   [phone]            Arsenio 10, 2019  9:00 AM CST   Return Sleep Patient with RYANN Mendez   [facility] ([facility])    [address] [facility] [address]  [facility] [address]   [phone]            Feb 06, 2019  9:30 AM CST   Return Visit with Ehsan A[facility]a DPM   [facility]    [address] [place] [address]   [phone]            Mar 26, 2019 10:45 AM CDT   Return Visit with Lucita Jordan MD   [facility])    [address] [place] [address]   [phone]              Further instructions from your care team                Brandy Leon    Cataract Surgery Postoperative Instructions    Postoperative Medications: After surgery, you will use several different eye drop  medications. In most cases you will start these eye drops 2 days before surgery.    1. Ocuflox - is an antibiotic drop that is used to minimize the risk of infection. It should be used 4 times daily for 10 days total or until you are told to discontinue.    (Acceptable alternatives to Ocuflox include: Zymaxid, Besivance, Gatafloxacin, Vigamox)     2.  Ketorolac - is an anti-inflammatory drop. Use it 4 time daily for 21 days total or until you are told to discontinue.  (Acceptable alternatives to Ketorolac include: Acuvail, Nevenac, Xibrom)    3. Prednisilone - is a steroid eye drop, used to minimize inflammation and modulate  healing. It should be used 4 times daily for 21 days total or until you are told to discontinue.  (Acceptable alternatives for Prednisilone include:  Pred Forte, Omnipred, Econopred)      IF YOU GET AN ALTERNATIVE EYE DROP PLEASE FOLLOW THE DIRECTIONS ON THE BOTTLE OF DROPS. THEY WILL BE DIFFERENT!      The drops might sting a little when they are instilled, and that is normal.    It doesn t matter what order you put the drops into your eyes, but you should wait at least one minute between drops.    Please continue any glaucoma, dry eye, or other medications you were using prior to the surgery.    Please allow 24 to 48 hours when requesting refills, and call BEFORE you run out of drops.      Artificial Tears - are lubricating drops used to moisturize the eye. You can use these as much as you want, particularly if your eyes feel watery, gritty, or uncomfortable. Chilling these drops in the refrigerator results in a more soothing feeling. There are several brands of artificial tears available including, but not limited to: Optive, Refresh, Systane, Blink, Genteal, Soothe, and others. You should not use drops that  get the red out . You do not need a prescription for these medications.      Restriction on Activities - It is extremely important that you DO NOT RUB THE  TREATED EYE.  - You will be given a clear plastic shield to wear as protection over your eye the  night after surgery.  - Refrain from any activities that may put your eye at risk of injury, as well as areas  containing a high volume of chemicals, dust, and debris.  - Do Not wear any eye makeup or moisturizer around the eye for 1 week after  surgery.  - Do Not swim or go into a hot-tub, Jacuzzi, or sauna for 1 week after surgery. You  can take showers as normal, but avoid getting shampoo or soap in your eyes.  - Avoid strenuous activity, including lifting more than 30 lbs, for 1 week after  surgery.  - It is fine to bathe, read, watch TV, and use the computer.  Symptoms requiring medical attention:  - Sudden onset of increased discharge from the eye  - Persistent or increasing pain in the eye  - Sudden  decrease in vision  - Persistent nausea or vomiting    If you have any questions or concerns before or after your surgery, please contact:    Dr. Serna s office at [phone]      [facility]  Same-Day Surgery   Adult Discharge Orders & Instructions   For 24 hours after surgery  1. Get plenty of rest.  A responsible adult must stay with you for at least 24 hours after you leave the hospital.   2. Do not drive or use heavy equipment.  If you have weakness or tingling, don't drive or use heavy equipment until this feeling goes away.  3. Do not drink alcohol.  4. Avoid strenuous or risky activities.  Ask for help when climbing stairs.   5. You may feel lightheaded.  IF so, sit for a few minutes before standing.  Have someone help you get up.   6. If you have nausea (feel sick to your stomach): Drink only clear liquids such as apple juice, ginger ale, broth or 7-Up.  Rest may also help.  Be sure to drink enough fluids.  Move to a regular diet as you feel able.  7. You may have a slight fever. Call the doctor if your fever is over 100 F (37.7 C) (taken under the tongue) or lasts longer than 24 hours.  8. You may have a dry mouth, a sore throat, muscle aches or trouble sleeping.  These should go away after 24 hours.  9. Do not make important or legal decisions.   Call your doctor for any of the followin.  Signs of infection (fever, growing tenderness at the surgery site, a large amount of drainage or bleeding, severe pain, foul-smelling drainage, redness, swelling).    2. It has been over 8 to 10 hours since surgery and you are still not able to urinate (pass water).    3.  Headache for over 24 hours.  To contact Dr Sanderson call:  [phone]    Pending Results     No orders found from 2018 to 2018.            Admission Information     Date & Time Provider Department Dept. Phone    2018 Thomas Serna MD [facility] [phone]      Your  "Vitals Were     Blood Pressure Temperature Respirations Pulse Oximetry          119/59 97.8  F (36.6  C) (Temporal) 18 95%        MyChart Information     [email or web address] lets you send messages to your doctor, view your test results, renew your prescriptions, schedule appointments and more. To sign up, go to [email or web address] . Click on \"Log in\" on the left side of the screen, which will take you to the Welcome page. Then click on \"Sign up Now\" on the right side of the page.     You will be asked to enter the access code listed below, as well as some personal information. Please follow the directions to create your username and password.     Your access code is: [email or web address]  Expires: 2019  9:17 AM     Your access code will  in 90 days. If you need help or a new code, please call your [facility] clinic or [phone].        Care EveryWhere ID     This is your Care EveryWhere ID. This could be used by other organizations to access your [facility] medical records  [id number]        Equal Access to Services     [facility]: Hadii ana dwyer Sotorey, waaxda lunery, qaybta kaalmamary perez, mitch alaniz. So [facility] [phone].    ATENCIÓN: Si habla español, tiene a garcía disposición servicios gratuitos de asistencia lingüística. Vesna al [phone].    We comply with applicable federal civil rights laws and [place] laws. We do not discriminate on the basis of race, color, national origin, age, disability, sex, sexual orientation, or gender identity.               Review of your medicines      UNREVIEWED medicines. Ask your doctor about these medicines        Dose / Directions    aspirin 81 MG EC tablet   Used for:  Type 2 diabetes, HbA1c goal < 7% (H)        Dose:  81 mg   Take 1 tablet by mouth daily.   Quantity:  90 tablet   Refills:  3       ciclopirox 0.77 % cream   Commonly known as:  LOPROX   Used for:  Tinea pedis of both feet        Apply topically 2 times daily To " feet and toenails.   Quantity:  90 g   Refills:  6       famotidine 20 MG tablet   Commonly known as:  PEPCID        Dose:  20 mg   Take 20 mg by mouth nightly as needed   Refills:  0       fexofenadine 180 MG tablet   Commonly known as:  ALLEGRA        Dose:  180 mg   Take 180 mg by mouth daily   Refills:  0       glipiZIDE 10 MG tablet   Commonly known as:  GLUCOTROL   Used for:  Type 2 diabetes mellitus with hyperglycemia, with long-term current use of insulin (H)        Dose:  10 mg   Take 1 tablet (10 mg) by mouth 2 times daily (before meals)   Quantity:  360 tablet   Refills:  0       GLUCOSAMINE CHONDROITIN COMPLX PO        2 Daily   Refills:  0       insulin glargine 100 UNIT/ML injection   Commonly known as:  LANTUS SOLOSTAR   Used for:  Type 2 diabetes mellitus with hyperglycemia, with long-term current use of insulin (H)        Dose:  20 Units   Inject 20 Units Subcutaneous daily   Quantity:  45 mL   Refills:  3       irbesartan-hydrochlorothiazide 150-12.5 MG per tablet   Commonly known as:  AVALIDE   Used for:  Hypertension goal BP (blood pressure) < 140/90        TAKE 1 TABLET BY MOUTH DAILY   Quantity:  90 tablet   Refills:  1       ketorolac 0.5 % ophthalmic solution   Commonly known as:  ACULAR   Used for:  Cataracts, bilateral        Dose:  1 drop   Apply 1 drop to eye 4 times daily for 21 days Start 2 days prior to surgery in operative eye.   Quantity:  1 Bottle   Refills:  1       latanoprost 0.005 % ophthalmic solution   Commonly known as:  XALATAN   Used for:  Primary open angle glaucoma of both eyes, moderate stage        Dose:  1 drop   Place 1 drop into both eyes At Bedtime   Quantity:  3 Bottle   Refills:  4       metFORMIN 1000 MG tablet   Commonly known as:  GLUCOPHAGE   Used for:  Type 2 diabetes mellitus with hyperglycemia, with long-term current use of insulin (H)        TAKE 1 TABLET (1,000 MG) BY MOUTH 2 TIMES DAILY (WITH MEALS)   Quantity:  180 tablet   Refills:  3        MULTIVITAMIN PO        1 tablet daily   Refills:  0       OMEGA 3 PO        Take by mouth 2 times daily.   Refills:  0       oxybutynin 5 MG 24 hr tablet   Commonly known as:  DITROPAN-XL   Used for:  Urinary frequency, Overactive bladder        TAKE 2 TABLETS (10 MG) BY MOUTH DAILY   Quantity:  180 tablet   Refills:  2       prednisoLONE acetate 1 % ophthalmic susp   Commonly known as:  PRED FORTE   Used for:  Cataracts, bilateral        Dose:  1 drop   Apply 1 drop to eye 4 times daily for 21 days Start 2 days prior to surgery in operative eye.   Quantity:  1 Bottle   Refills:  1       rosuvastatin 5 MG tablet   Commonly known as:  CRESTOR   Used for:  Dyslipidemia, goal LDL below 100        TAKE 1 TABLET BY MOUTH TWICE WEEKLY   Quantity:  8 tablet   Refills:  3       SYNTHROID 137 MCG tablet   Used for:  Myxedema heart disease, Nutritional and metabolic cardiomyopathy (H)   Generic drug:  levothyroxine        TAKE 1 TABLET (137 MCG) BY MOUTH DAILY   Quantity:  90 tablet   Refills:  2         CONTINUE these medicines which have NOT CHANGED        Dose / Directions    insulin pen needle 32G X 4 MM   Commonly known as:  BD JUAN C U/F   Used for:  Type 2 diabetes mellitus with hyperglycemia (H)        USE 1 DAILY AS DIRECTED.   Quantity:  100 each   Refills:  3       ONE TOUCH DELICA LANCETS Misc   Used for:  Type 2 diabetes, HbA1c goal < 7% (H)        Dose:  1 each   1 each 2 times daily. One Touch Delica   Quantity:  100 each   Refills:  12       ONETOUCH ULTRA test strip   Used for:  Type 2 diabetes mellitus with hyperglycemia, with long-term current use of insulin (H)   Generic drug:  blood glucose monitoring        USE TO TEST TWICE A DAY   Quantity:  200 strip   Refills:  11       order for DME   Used for:  Type 2 diabetes, HbA1c goal < 7% (H)        Glucometer covered by insurance.   Quantity:  1 each   Refills:  0                Protect others around you: Learn how to safely use, store and throw away your  medicines at www.disposemymeds.org.             Medication List: This is a list of all your medications and when to take them. Check marks below indicate your daily home schedule. Keep this list as a reference.      Medications           Morning Afternoon Evening Bedtime As Needed    aspirin 81 MG EC tablet   Take 1 tablet by mouth daily.                                ciclopirox 0.77 % cream   Commonly known as:  LOPROX   Apply topically 2 times daily To feet and toenails.                                famotidine 20 MG tablet   Commonly known as:  PEPCID   Take 20 mg by mouth nightly as needed                                fexofenadine 180 MG tablet   Commonly known as:  ALLEGRA   Take 180 mg by mouth daily                                glipiZIDE 10 MG tablet   Commonly known as:  GLUCOTROL   Take 1 tablet (10 mg) by mouth 2 times daily (before meals)                                GLUCOSAMINE CHONDROITIN COMPLX PO   2 Daily                                insulin glargine 100 UNIT/ML injection   Commonly known as:  LANTUS SOLOSTAR   Inject 20 Units Subcutaneous daily                                insulin pen needle 32G X 4 MM   Commonly known as:  BD JUAN C U/F   USE 1 DAILY AS DIRECTED.                                irbesartan-hydrochlorothiazide 150-12.5 MG per tablet   Commonly known as:  AVALIDE   TAKE 1 TABLET BY MOUTH DAILY                                ketorolac 0.5 % ophthalmic solution   Commonly known as:  ACULAR   Apply 1 drop to eye 4 times daily for 21 days Start 2 days prior to surgery in operative eye.   Last time this was given:  1 drop on 11/8/2018  8:01 AM                                latanoprost 0.005 % ophthalmic solution   Commonly known as:  XALATAN   Place 1 drop into both eyes At Bedtime                                metFORMIN 1000 MG tablet   Commonly known as:  GLUCOPHAGE   TAKE 1 TABLET (1,000 MG) BY MOUTH 2 TIMES DAILY (WITH MEALS)                                MULTIVITAMIN PO    1 tablet daily                                OMEGA 3 PO   Take by mouth 2 times daily.                                ONE TOUCH DELICA LANCETS Misc   1 each 2 times daily. One Touch Delica                                ONETOUCH ULTRA test strip   USE TO TEST TWICE A DAY   Generic drug:  blood glucose monitoring                                order for [facility]   Glucometer covered by insurance.                                oxybutynin 5 MG 24 hr tablet   Commonly known as:  DITROPAN-XL   TAKE 2 TABLETS (10 MG) BY MOUTH DAILY                                prednisoLONE acetate 1 % ophthalmic susp   Commonly known as:  PRED FORTE   Apply 1 drop to eye 4 times daily for 21 days Start 2 days prior to surgery in operative eye.                                rosuvastatin 5 MG tablet   Commonly known as:  CRESTOR   TAKE 1 TABLET BY MOUTH TWICE WEEKLY                                SYNTHROID 137 MCG tablet   TAKE 1 TABLET (137 MCG) BY MOUTH DAILY   Generic drug:  levothyroxine

## 2018-11-08 NOTE — ANESTHESIA PREPROCEDURE EVALUATION
Anesthesia Pre-Procedure Evaluation    Patient: Brandy Leon   MRN:     [id number] Gender:   female   Age:    78 year old :      1940        Preoperative Diagnosis: RIGHT CATARACT   Procedure(s):  RIGHT PHACOEMULSIFICATION WITH STANDARD INTRAOCULAR LENS IMPLANT     Past Medical History:   Diagnosis Date     Actinic keratosis 3/12/2013     Degenerative joint disease      Diabetes (H)      Rheumatic fever     x2 between the ages of 15-18     Seasonal allergies       Past Surgical History:   Procedure Laterality Date     C APPENDECTOMY       C ARTHROPLASTY TMJ       CATARACT IOL, RT/LT       COLONOSCOPY       COLONOSCOPY N/A 2015    Procedure: COLONOSCOPY;  Surgeon: Duane, William Charles, MD;  Location: [facility] OR     COLONOSCOPY WITH CO2 INSUFFLATION N/A 2015    Procedure: COLONOSCOPY WITH CO2 INSUFFLATION;  Surgeon: Duane, William Charles, MD;  Location: [facility] OR     PHACOEMULSIFICATION WITH STANDARD INTRAOCULAR LENS IMPLANT Left 10/25/2018    Procedure: LEFT PHACOEMULSIFICATION WITH STANDARD INTRAOCULAR LENS IMPLANT;  Surgeon: Thomas Serna MD;  Location: [facility] OR     THYROIDECTOMY             Anesthesia Evaluation     . Pt has had prior anesthetic. Type: MAC    No history of anesthetic complications          ROS/MED HX    ENT/Pulmonary:  - neg pulmonary ROS     Neurologic:  - neg neurologic ROS     Cardiovascular:     (+) Dyslipidemia, hypertension----. : . . . :. .       METS/Exercise Tolerance:     Hematologic:  - neg hematologic  ROS       Musculoskeletal: Comment: Fibromyalgia        GI/Hepatic:  - neg GI/hepatic ROS       Renal/Genitourinary:  - ROS Renal section negative       Endo:     (+) type II DM Using insulin thyroid problem hypothyroidism, Obesity, .      Psychiatric:  - neg psychiatric ROS       Infectious Disease:         Malignancy:      - no malignancy   Other:    - neg other ROS                     PHYSICAL EXAM:   Mental Status/Neuro: A/A/O   Airway: Facies:  "Feasible  Mallampati: I  Mouth/Opening: Full  TM distance: > 6 cm  Neck ROM: Full   Respiratory: Auscultation: CTAB     Resp. Rate: Normal     Resp. Effort: Normal      CV: Rhythm: Regular  Rate: Age appropriate  Heart: Normal Sounds   Comments:      Dental: Normal                  Lab Results   Component Value Date    WBC 11.4 (H) 12/22/2017    HGB 13.9 12/22/2017    HCT 44.2 12/22/2017     12/22/2017    CRP 8.3 (H) 06/25/2018    SED 12 06/25/2018     10/19/2018    POTASSIUM 4.6 10/19/2018    CHLORIDE 98 10/19/2018    CO2 30 10/19/2018    BUN 19 10/19/2018    CR 0.83 10/19/2018     (H) 10/19/2018    FREDY 9.2 10/19/2018    ALBUMIN 3.8 06/25/2018    PROTTOTAL 8.3 06/25/2018    ALT 27 06/25/2018    AST 29 06/25/2018    ALKPHOS 77 06/25/2018    BILITOTAL 0.5 06/25/2018    TSH 2.46 06/25/2018    T4 1.17 06/25/2018       Preop Vitals  BP Readings from Last 3 Encounters:   11/08/18 142/71   11/06/18 142/78   10/25/18 107/41    Pulse Readings from Last 3 Encounters:   11/06/18 98   10/25/18 97   10/19/18 87      Resp Readings from Last 3 Encounters:   11/08/18 18   10/25/18 16   06/01/16 18    SpO2 Readings from Last 3 Encounters:   11/08/18 96%   11/06/18 95%   10/25/18 94%      Temp Readings from Last 1 Encounters:   11/08/18 97.7  F (36.5  C) (Temporal)    Ht Readings from Last 1 Encounters:   10/19/18 1.613 m (5' 3.5\")      Wt Readings from Last 1 Encounters:   10/19/18 85 kg (187 lb 8 oz)    Estimated body mass index is 32.69 kg/(m^2) as calculated from the following:    Height as of 10/19/18: 1.613 m (5' 3.5\").    Weight as of 10/19/18: 85 kg (187 lb 8 oz).     LDA:  Peripheral IV 11/08/18 Left Hand (Active)   Site Assessment WDL 11/8/2018  7:57 AM   Line Status Saline locked 11/8/2018  7:57 AM   Phlebitis Scale 0-->no symptoms 11/8/2018  7:57 AM   Dressing Intervention New dressing  11/8/2018  7:57 AM   Number of days:0            Assessment:   ASA SCORE: 3    NPO Status: > 6 hours since completed " Solid Foods   Documentation: H&P complete; Preop Testing complete; Consents complete   Proceeding: Proceed without further delay  Tobacco Use:  NO Active use of Tobacco/UNKNOWN Tobacco use status     Plan:   Anes. Type:  MAC   Pre-Induction: Midazolam IV; Acetaminophen PO   Induction:  Not applicable   Airway: Native Airway   Access/Monitoring: PIV   Maintenance: N/a   Emergence: N/a   Logistics: Same Day Surgery     Postop Pain/Sedation Strategy:  Standard-Options: Opioids PRN     PONV Management:  Adult Risk Factors: Female, Non-Smoker, Postop Opioids     CONSENT: Direct conversation   Plan and risks discussed with: Patient   Blood Products: Consent Deferred (Minimal Blood Loss)                         Jayce Gan MD

## 2018-11-08 NOTE — IP AVS SNAPSHOT
[facility]    [address] [place] [address]    Phone:  [phone]                                       After Visit Summary   11/8/2018    Brandy Leon    MRN: [id number]           After Visit Summary Signature Page     I have received my discharge instructions, and my questions have been answered. I have discussed any challenges I see with this plan with the nurse or doctor.    ..........................................................................................................................................  Patient/Patient Representative Signature      ..........................................................................................................................................  Patient Representative Print Name and Relationship to Patient    ..................................................               ................................................  Date                                   Time    ..........................................................................................................................................  Reviewed by Signature/Title    ...................................................              ..............................................  Date                                               Time          22EPIC Rev 08/18

## 2018-11-08 NOTE — ANESTHESIA POSTPROCEDURE EVALUATION
Anesthesia POST Procedure Evaluation    Patient: Brandy Leon   MRN:     [id number] Gender:   female   Age:    78 year old :      1940        Preoperative Diagnosis: RIGHT CATARACT   Procedure(s):  RIGHT PHACOEMULSIFICATION WITH STANDARD INTRAOCULAR LENS IMPLANT   Postop Comments: No value filed.       Anesthesia Type:  MAC    Reportable Event: NO     PAIN: Uncomplicated   Sign Out status: Comfortable, Well controlled pain     PONV: No PONV   Sign Out status:  No Nausea or Vomiting     Neuro/Psych: Uneventful perioperative course   Sign Out Status: Preoperative baseline; Age appropriate mentation     Airway/Resp.: Uneventful perioperative course   Sign Out Status: Non labored breathing, age appropriate RR; Resp. Status within EXPECTED Parameters     CV: Uneventful perioperative course   Sign Out status: Appropriate BP and perfusion indices; Appropriate HR/Rhythm     Disposition:   Sign Out in:  Phase II  Disposition:  Home  Recovery Course: Uneventful  Follow-Up: Not required           Last Anesthesia Record Vitals:  CRNA VITALS  2018 0823 - 2018 0923      2018             Pulse: 87    SpO2: 97 %          Last PACU/Preop Vitals:  Vitals:    18 0752 18 0855 18 0910   BP: 142/71 119/59 113/58   Resp:  16   Temp: 97.7  F (36.5  C) 97.8  F (36.6  C)    SpO2: 96% 95% 95%         Electronically Signed By: Jayce Gan MD, 2018, 9:30 AM

## 2018-11-09 ENCOUNTER — OFFICE VISIT (OUTPATIENT)
Dept: OPTOMETRY | Facility: CLINIC | Age: 78
End: 2018-11-09
Payer: COMMERCIAL

## 2018-11-09 DIAGNOSIS — Z96.1 PSEUDOPHAKIA OF RIGHT EYE: Primary | ICD-10-CM

## 2018-11-09 DIAGNOSIS — E78.5 DYSLIPIDEMIA, GOAL LDL BELOW 100: ICD-10-CM

## 2018-11-09 LAB — GLUCOSE BLDC GLUCOMTR-MCNC: 313 MG/DL (ref 70–99)

## 2018-11-09 PROCEDURE — 99024 POSTOP FOLLOW-UP VISIT: CPT | Performed by: OPTOMETRIST

## 2018-11-09 ASSESSMENT — TONOMETRY
IOP_METHOD: TONOPEN
OS_IOP_MMHG: 19
OD_IOP_MMHG: 28
OD_IOP_MMHG: 15
OS_IOP_MMHG: 15

## 2018-11-09 ASSESSMENT — REFRACTION_WEARINGRX
OS_SPHERE: PLANO
OD_CYLINDER: +0.75
OS_CYLINDER: +0.75
OD_SPHERE: -0.75
OS_AXIS: 008
OS_ADD: +2.75
OD_ADD: +2.75
OD_AXIS: 010

## 2018-11-09 ASSESSMENT — SLIT LAMP EXAM - LIDS
COMMENTS: NORMAL
COMMENTS: NORMAL

## 2018-11-09 ASSESSMENT — EXTERNAL EXAM - RIGHT EYE: OD_EXAM: NORMAL

## 2018-11-09 ASSESSMENT — VISUAL ACUITY
OD_PH_SC: 20/30-2
OS_SC+: -1
METHOD: SNELLEN - LINEAR
OD_SC: 20/70
OS_SC: 20/30

## 2018-11-09 NOTE — PROGRESS NOTES
CHIEF COMPLAINT:   Chief Complaint   Patient presents with     Surgical Followup     1 day post op cataract od eye       Type of surgery cataract   Date of surgery 11-8-2018 od eye                             10- os eye  Os eye feels like something in eye and rubbing,gets kind of blurry    Tammy Funes, Optometric Assistant, [facility]    Drops reviewed.    OBJECTIVE:     See ophthalmology exam    ASSESSMENT:         ICD-10-CM    1. Pseudophakia of right eye Z96.1 POST-OP FOLLOW-UP VISIT     PLAN:      Patient Instructions   Continue with drops and scheduled follow up appointments.  Follow directions on your bottles as far as how many drops to use daily.    Non preserved artificial tears- 1 drop left eye 4 x day.    Wear shield while sleeping.       Please continue any glaucoma, dry eye, or other medications you were using prior to the surgery.        Bola Lagos, OD  [facility]  [address] [place] [address]  Tel- [phone]  Sgo-[phone]

## 2018-11-09 NOTE — MR AVS SNAPSHOT
After Visit Summary   11/9/2018    Brnady Leon    MRN: [id number]           Patient Information     Date Of Birth          1940        Visit Information        Provider Department      11/9/2018 12:00 PM Bola Lagos OD [facility]        Today's Diagnoses     Pseudophakia of right eye    -  1      Care Instructions    Continue with drops and scheduled follow up appointments.  Follow directions on your bottles as far as how many drops to use daily.    Wear shield while sleeping.       Please continue any glaucoma, dry eye, or other medications you were using prior to the surgery.        Bola Lagos OD  [facility]  [address] [place] [address]  Tel- [phone]  [phone]            Follow-ups after your visit        Your next 10 appointments already scheduled     Nov 09, 2018 12:00 PM CST   [facility]    [address] [place] [address] [phone]            Nov 14, 2018 12:00 PM CST   [facility] with [facility]   [facility]    [address] [facility] [address]   [phone]            Dec 05, 2018 10:20 AM CST   Return Visit with Bola Lagos OD   [facility]    [address] [facility] [address]   [phone]            Dec 26, 2018  8:00 PM CST   PSG Split with BK BED 1   [facility] ([facility])    [address] [place] [address]   [phone]            Dec 28, 2018  8:15 AM CST   Return Visit with Candice Worley MD, [facility] ENDO NURSE   [facility]    [address] [facility] [address]   [phone]            Arsenio 10, 2019  9:00 AM CST   Return Sleep  Patient with RYANN Mendez   [facility] ([facility])    [address] [facility] [address]  [facility] [address]   [phone]            Feb 06, 2019  9:30 AM CST   Return Visit with Ehsan Araya DPM   [facility] ([facility])    [address] [place] [id number] [phone]            Mar 26, 2019 10:45 AM CDT   Return Visit with Lucita Jordan MD   [facility] ([facility])    [address] [place] [address] [phone]              Who to contact     If you have questions or need follow up information about today's clinic visit or your schedule please contact [facility] directly at [phone].  Normal or non-critical lab and imaging results will be communicated to you by MyChart, letter or phone within 4 business days after the clinic has received the results. If you do not hear from us within 7 days, please contact the clinic through [facility] or phone. If you have a critical or abnormal lab result, we will notify you by phone as soon as possible.  Submit refill requests through Shaser or call your pharmacy and they will forward the refill request to us. Please allow 3 business days for your refill to be completed.          Additional Information About Your Visit        [facility] [employer] Information     Shaser is an electronic gateway that provides easy, online access to your medical records. With Shaser, you can request a clinic appointment, read your test results, renew a prescription or communicate with your care team.     To sign up for Shaser visit the website at [email or web address]   You will be asked to enter the access code listed below, as well as some personal information. Please follow the directions to create your username and password.     Your access code is: [email or web address]  Expires: 2/4/2019  9:17 AM     Your access code  will  in 90 days. If you need help or a new code, please contact your [facility] or call [phone] for assistance.        Care EveryWhere ID     This is your Care EveryWhere ID. This could be used by other organizations to access your [place] medical records  [id number]         Blood Pressure from Last 3 Encounters:   18 113/58   18 142/78   10/25/18 107/41    Weight from Last 3 Encounters:   10/19/18 85 kg (187 lb 8 oz)   10/18/18 85.7 kg (189 lb)   18 85.5 kg (188 lb 8 oz)              We Performed the Following     POST-OP FOLLOW-UP VISIT          Today's Medication Changes          These changes are accurate as of 18 11:55 AM.  If you have any questions, ask your nurse or doctor.               These medicines have changed or have updated prescriptions.        Dose/Directions    irbesartan-hydrochlorothiazide 150-12.5 MG per tablet   Commonly known as:  AVALIDE   This may have changed:  See the new instructions.   Used for:  Hypertension goal BP (blood pressure) < 140/90        TAKE 1 TABLET BY MOUTH DAILY   Quantity:  90 tablet   Refills:  1                Primary Care Provider Office Phone # Fax #    Cailin Clarisa Ponce, [facility] [phone]       [address]  [place] [address]        Equal Access to Services     NUSRAT KHAN : Hadii ana ku hadasho Soomaali, waaxda luqadaha, qaybta kaalmada adeegyada, mitch alaniz. So [facility] [phone].    ATENCIÓN: Si habla español, tiene a garcía disposición servicios gratuitos de asistencia lingüística. Vesna al [phone].    We comply with applicable federal civil rights laws and [place] laws. We do not discriminate on the basis of race, color, national origin, age, disability, sex, sexual orientation, or gender identity.            Thank you!     Thank you for choosing [facility]  for your care. Our goal is always to provide you  with excellent care. Hearing back from our patients is one way we can continue to improve our services. Please take a few minutes to complete the written survey that you may receive in the mail after your visit with us. Thank you!             Your Updated Medication List - Protect others around you: Learn how to safely use, store and throw away your medicines at www.disposemymeds.org.          This list is accurate as of 11/9/18 11:55 AM.  Always use your most recent med list.                   Brand Name Dispense Instructions for use Diagnosis    aspirin 81 MG EC tablet     90 tablet    Take 1 tablet by mouth daily.    Type 2 diabetes, HbA1c goal < 7% (H)       ciclopirox 0.77 % cream    LOPROX    90 g    Apply topically 2 times daily To feet and toenails.    Tinea pedis of both feet       famotidine 20 MG tablet    PEPCID     Take 20 mg by mouth nightly as needed        fexofenadine 180 MG tablet    ALLEGRA     Take 180 mg by mouth daily        glipiZIDE 10 MG tablet    GLUCOTROL    360 tablet    Take 1 tablet (10 mg) by mouth 2 times daily (before meals)    Type 2 diabetes mellitus with hyperglycemia, with long-term current use of insulin (H)       GLUCOSAMINE CHONDROITIN COMPLX PO      2 Daily        insulin glargine 100 UNIT/ML injection    LANTUS SOLOSTAR    45 mL    Inject 20 Units Subcutaneous daily    Type 2 diabetes mellitus with hyperglycemia, with long-term current use of insulin (H)       insulin pen needle 32G X 4 MM    BD JUAN C U/F    100 each    USE 1 DAILY AS DIRECTED.    Type 2 diabetes mellitus with hyperglycemia (H)       irbesartan-hydrochlorothiazide 150-12.5 MG per tablet    AVALIDE    90 tablet    TAKE 1 TABLET BY MOUTH DAILY    Hypertension goal BP (blood pressure) < 140/90       ketorolac 0.5 % ophthalmic solution    ACULAR    1 Bottle    Apply 1 drop to eye 4 times daily for 21 days Start 2 days prior to surgery in operative eye.    Cataracts, bilateral       latanoprost 0.005 % ophthalmic  solution    XALATAN    3 Bottle    Place 1 drop into both eyes At Bedtime    Primary open angle glaucoma of both eyes, moderate stage       metFORMIN 1000 MG tablet    GLUCOPHAGE    180 tablet    TAKE 1 TABLET (1,000 MG) BY MOUTH 2 TIMES DAILY (WITH MEALS)    Type 2 diabetes mellitus with hyperglycemia, with long-term current use of insulin (H)       MULTIVITAMIN PO      1 tablet daily        OMEGA 3 PO      Take by mouth 2 times daily.        ONE TOUCH DELICA LANCETS Misc     100 each    1 each 2 times daily. One Touch Delica    Type 2 diabetes, HbA1c goal < 7% (H)       ONETOUCH ULTRA test strip   Generic drug:  blood glucose monitoring     200 strip    USE TO TEST TWICE A DAY    Type 2 diabetes mellitus with hyperglycemia, with long-term current use of insulin (H)       order for DME     1 each    Glucometer covered by insurance.    Type 2 diabetes, HbA1c goal < 7% (H)       oxybutynin 5 MG 24 hr tablet    DITROPAN-XL    180 tablet    TAKE 2 TABLETS (10 MG) BY MOUTH DAILY    Urinary frequency, Overactive bladder       prednisoLONE acetate 1 % ophthalmic susp    PRED FORTE    1 Bottle    Apply 1 drop to eye 4 times daily for 21 days Start 2 days prior to surgery in operative eye.    Cataracts, bilateral       rosuvastatin 5 MG tablet    CRESTOR    8 tablet    TAKE 1 TABLET BY MOUTH TWICE WEEKLY    Dyslipidemia, goal LDL below 100       SYNTHROID 137 MCG tablet   Generic drug:  levothyroxine     90 tablet    TAKE 1 TABLET (137 MCG) BY MOUTH DAILY    Myxedema heart disease, Nutritional and metabolic cardiomyopathy (H)

## 2018-11-09 NOTE — PATIENT INSTRUCTIONS
Continue with drops and scheduled follow up appointments.  Follow directions on your bottles as far as how many drops to use daily.    Non preserved artificial tears- 1 drop left eye 4 x day.    Wear shield while sleeping.       Please continue any glaucoma, dry eye, or other medications you were using prior to the surgery.        Bola Lagos, OD  [facility]  [address] [place] [address]  Tel- [phone]  Ssx-[phone]

## 2018-11-12 RX ORDER — ROSUVASTATIN CALCIUM 5 MG/1
TABLET, COATED ORAL
Qty: 8 TABLET | Refills: 6 | Status: SHIPPED | OUTPATIENT
Start: 2018-11-12 | End: 2019-05-12

## 2018-11-12 NOTE — TELEPHONE ENCOUNTER
Medication filled per RN refill protocol.      Reny Cedeno RN, [facility], [facility]

## 2018-11-14 ENCOUNTER — OFFICE VISIT (OUTPATIENT)
Dept: OPTOMETRY | Facility: CLINIC | Age: 78
End: 2018-11-14
Payer: COMMERCIAL

## 2018-11-14 DIAGNOSIS — E03.9 MYXEDEMA HEART DISEASE: ICD-10-CM

## 2018-11-14 DIAGNOSIS — E88.9 NUTRITIONAL AND METABOLIC CARDIOMYOPATHY (H): ICD-10-CM

## 2018-11-14 DIAGNOSIS — I43 NUTRITIONAL AND METABOLIC CARDIOMYOPATHY (H): ICD-10-CM

## 2018-11-14 DIAGNOSIS — I51.9 MYXEDEMA HEART DISEASE: ICD-10-CM

## 2018-11-14 DIAGNOSIS — E63.9 NUTRITIONAL AND METABOLIC CARDIOMYOPATHY (H): ICD-10-CM

## 2018-11-14 DIAGNOSIS — Z96.1 PSEUDOPHAKIA OF RIGHT EYE: Primary | ICD-10-CM

## 2018-11-14 PROCEDURE — 99024 POSTOP FOLLOW-UP VISIT: CPT | Performed by: OPTOMETRIST

## 2018-11-14 ASSESSMENT — VISUAL ACUITY
OD_SC+: -2
OD_PH_SC: 20/40
OS_SC+: -2
OS_SC: 20/25
METHOD: SNELLEN - LINEAR
OD_SC: 20/50

## 2018-11-14 ASSESSMENT — EXTERNAL EXAM - RIGHT EYE: OD_EXAM: NORMAL

## 2018-11-14 ASSESSMENT — SLIT LAMP EXAM - LIDS
COMMENTS: NORMAL
COMMENTS: NORMAL

## 2018-11-14 ASSESSMENT — TONOMETRY
OS_IOP_MMHG: 15
OS_IOP_MMHG: 13
IOP_METHOD: TONOPEN
OD_IOP_MMHG: 15

## 2018-11-14 ASSESSMENT — REFRACTION_WEARINGRX
OS_AXIS: 008
OD_SPHERE: -0.75
OS_SPHERE: PLANO
OS_CYLINDER: +0.75
OD_CYLINDER: +0.75
OS_ADD: +2.75
OD_ADD: +2.75
OD_AXIS: 010

## 2018-11-14 NOTE — TELEPHONE ENCOUNTER
SYNTHROID 137 MCG TABLET  Last Written Prescription Date: 11/08/2018  Last Fill Quantity: 90 TABLETS ,  # refills: 1   Last office visit: 10/19/2018 with prescribing provider:    Last refill per Pharmacy   Future Office Visit:   Next 5 appointments (look out 90 days)     Dec 05, 2018 10:20 AM CST   Return Visit with Bola Lagos OD   [facility]    [address] [place] [address]   [phone]            Dec 28, 2018  8:15 AM CST   Return Visit with Candice Worley MD, [facility] ENDO NURSE   [facility])    [address] [place] [address]   [phone]            Feb 06, 2019  9:30 AM CST   Return Visit with Ehsan Araya DPM   [facility])    [address] [place] [address]   [phone]

## 2018-11-14 NOTE — PROGRESS NOTES
CHIEF COMPLAINT:   Chief Complaint   Patient presents with     Surgical Followup     1 week cataract post op - od     Type of surgery cataract   Date of surgery 11-8-2018 od eye                             10- os eye  Eyes feel gummy    Drops reviewed. - last used around 10 this morning.    Latanoprost used last used around 9:30 last night    OBJECTIVE:     See ophthalmology exam    ASSESSMENT:         ICD-10-CM    1. 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  Continue prednisolone acetate and ketorolac for the full 21 days.    Ok to discontinue shield while sleeping.  All restrictions are lifted.    Non preserved artificial tears- 1 drop left eye every 2 hours.    Keep follow up appointments as scheduled.       Please continue any glaucoma, dry eye, or other medications you were using prior to the surgery.        Bola Lagos, OD  [facility]  [address] [place] [address]  Tel- [phone]

## 2018-11-14 NOTE — MR AVS SNAPSHOT
After Visit Summary   11/14/2018    Brandy Leon    MRN: [id number]           Patient Information     Date Of Birth          1940        Visit Information        Provider Department      11/14/2018 12:00 PM Bola Lagos, OD [facility]        Today's Diagnoses     Pseudophakia of right eye    -  1      Care Instructions    Discontinue ofloxacin.  Continue prednisolone acetate and ketorolac for the full 21 days.    Ok to discontinue shield while sleeping.  All restrictions are lifted.    Keep follow up appointments as scheduled.       Please continue any glaucoma, dry eye, or other medications you were using prior to the surgery.        Bola Lagos OD  [facility]  [address] [place] [address]  Tel- [phone]            Follow-ups after your visit        Your next 10 appointments already scheduled     Dec 05, 2018 10:20 AM CST   Return Visit with Bola Lagos OD   [facility]    [address] [facility] [address] [facility] [address] [phone]            Dec 26, 2018  8:00 PM CST   PSG Split with BK BED 1   [facility] ([facility])    [address] [facility] [address]  [facility] [id number] [phone]            Dec 28, 2018  8:15 AM CST   Return Visit with Candice Worley MD, [facility] ENDO NURSE   [facility])    [address] [place] [address] [phone]            Arsenio 10, 2019  9:00 AM CST   Return Sleep Patient with RYANN Mendez   [facility] ([facility])    [address] [facility] [address]  [facility] [id number] [phone]            Feb 06, 2019  9:30 AM CST   Return Visit with Ehsan Araya DPM   [facility]    [address] [facility] [address]    [phone]            Mar 26, 2019 10:45 AM CDT   Return Visit with Lucita Jordan MD   [facility] ([facility])    [address] [place] [address] [phone]              Who to contact     If you have questions or need follow up information about today's clinic visit or your schedule please contact [facility] directly at [phone].  Normal or non-critical lab and imaging results will be communicated to you by Selenok[employer]hart, letter or phone within 4 business days after the clinic has received the results. If you do not hear from us within 7 days, please contact the clinic through MyChart or phone. If you have a critical or abnormal lab result, we will notify you by phone as soon as possible.  Submit refill requests through [employer] or call your pharmacy and they will forward the refill request to us. Please allow 3 business days for your refill to be completed.          Additional Information About Your Visit        [employer] Information     [employer] is an electronic gateway that provides easy, online access to your medical records. With [employer], you can request a clinic appointment, read your test results, renew a prescription or communicate with your care team.     To sign up for [employer] visit the website at [email or web address]   You will be asked to enter the access code listed below, as well as some personal information. Please follow the directions to create your username and password.     Your access code is: [email or web address]  Expires: 2019  9:17 AM     Your access code will  in 90 days. If you need help or a new code, please contact your [facility] or call [phone] for assistance.        Care EveryWhere ID     This is your Care EveryWhere ID. This could be used by other organizations to access your [place] medical records  [id number]         Blood Pressure from Last 3 Encounters:   18  113/58   11/06/18 142/78   10/25/18 107/41    Weight from Last 3 Encounters:   10/19/18 85 kg (187 lb 8 oz)   10/18/18 85.7 kg (189 lb)   08/03/18 85.5 kg (188 lb 8 oz)              Today, you had the following     No orders found for display         Today's Medication Changes          These changes are accurate as of 11/14/18 12:14 PM.  If you have any questions, ask your nurse or doctor.               These medicines have changed or have updated prescriptions.        Dose/Directions    irbesartan-hydrochlorothiazide 150-12.5 MG per tablet   Commonly known as:  AVALIDE   This may have changed:  See the new instructions.   Used for:  Hypertension goal BP (blood pressure) < 140/90        TAKE 1 TABLET BY MOUTH DAILY   Quantity:  90 tablet   Refills:  1                Primary Care Provider Office Phone # Fax #    Cailin Mckenna Kris, APRN [facility] [phone]       [address]  [place] [address]        Equal Access to Services     NAI KHAN : Hadii aad ku hadasho Soomaali, waaxda luqadaha, qaybta kaalmada adeegyada, waxay idiin hayjustnin keri azevedo . So [facility] [place] [facility] [phone].    ATENCIÓN: Si habla español, tiene a garcía disposición servicios gratuitos de asistencia lingüística. Llame al [phone].    We comply with applicable federal civil rights laws and [place] laws. We do not discriminate on the basis of race, color, national origin, age, disability, sex, sexual orientation, or gender identity.            Thank you!     Thank you for choosing [email or web address]  for your care. Our goal is always to provide you with excellent care. Hearing back from our patients is one way we can continue to improve our services. Please take a few minutes to complete the written survey that you may receive in the mail after your visit with us. Thank you!             Your Updated Medication List - Protect others around you: Learn how to safely use, store and throw away your medicines at  www.disposemymeds.org.          This list is accurate as of 11/14/18 12:14 PM.  Always use your most recent med list.                   Brand Name Dispense Instructions for use Diagnosis    aspirin 81 MG EC tablet     90 tablet    Take 1 tablet by mouth daily.    Type 2 diabetes, HbA1c goal < 7% (H)       ciclopirox 0.77 % cream    LOPROX    90 g    Apply topically 2 times daily To feet and toenails.    Tinea pedis of both feet       famotidine 20 MG tablet    PEPCID     Take 20 mg by mouth nightly as needed        fexofenadine 180 MG tablet    ALLEGRA     Take 180 mg by mouth daily        glipiZIDE 10 MG tablet    GLUCOTROL    360 tablet    Take 1 tablet (10 mg) by mouth 2 times daily (before meals)    Type 2 diabetes mellitus with hyperglycemia, with long-term current use of insulin (H)       GLUCOSAMINE CHONDROITIN COMPLX PO      2 Daily        insulin glargine 100 UNIT/ML injection    LANTUS SOLOSTAR    45 mL    Inject 20 Units Subcutaneous daily    Type 2 diabetes mellitus with hyperglycemia, with long-term current use of insulin (H)       insulin pen needle 32G X 4 MM    BD JUAN C U/F    100 each    USE 1 DAILY AS DIRECTED.    Type 2 diabetes mellitus with hyperglycemia (H)       irbesartan-hydrochlorothiazide 150-12.5 MG per tablet    AVALIDE    90 tablet    TAKE 1 TABLET BY MOUTH DAILY    Hypertension goal BP (blood pressure) < 140/90       latanoprost 0.005 % ophthalmic solution    XALATAN    3 Bottle    Place 1 drop into both eyes At Bedtime    Primary open angle glaucoma of both eyes, moderate stage       metFORMIN 1000 MG tablet    GLUCOPHAGE    180 tablet    TAKE 1 TABLET (1,000 MG) BY MOUTH 2 TIMES DAILY (WITH MEALS)    Type 2 diabetes mellitus with hyperglycemia, with long-term current use of insulin (H)       MULTIVITAMIN PO      1 tablet daily        OMEGA 3 PO      Take by mouth 2 times daily.        ONE TOUCH DELICA LANCETS Misc     100 each    1 each 2 times daily. One Touch Delica    Type 2  diabetes, HbA1c goal < 7% (H)       ONETOUCH ULTRA test strip   Generic drug:  blood glucose monitoring     200 strip    USE TO TEST TWICE A DAY    Type 2 diabetes mellitus with hyperglycemia, with long-term current use of insulin (H)       order for DME     1 each    Glucometer covered by insurance.    Type 2 diabetes, HbA1c goal < 7% (H)       oxybutynin 5 MG 24 hr tablet    DITROPAN-XL    180 tablet    TAKE 2 TABLETS (10 MG) BY MOUTH DAILY    Urinary frequency, Overactive bladder       rosuvastatin 5 MG tablet    CRESTOR    8 tablet    TAKE 1 TABLET BY MOUTH TWICE WEEKLY    Dyslipidemia, goal LDL below 100       SYNTHROID 137 MCG tablet   Generic drug:  levothyroxine     90 tablet    TAKE 1 TABLET (137 MCG) BY MOUTH DAILY    Myxedema heart disease, Nutritional and metabolic cardiomyopathy (H)

## 2018-11-14 NOTE — PATIENT INSTRUCTIONS
Discontinue ofloxacin.  Continue prednisolone acetate and ketorolac for the full 21 days.    Ok to discontinue shield while sleeping.  All restrictions are lifted.    Non preserved artificial tears- 1 drop left eye every 2 hours.    Keep follow up appointments as scheduled.       Please continue any glaucoma, dry eye, or other medications you were using prior to the surgery.        Bola Lagos, OD  [facility]  [address] [place] [address]  Tel- [phone]

## 2018-11-15 RX ORDER — LEVOTHYROXINE SODIUM 137 MCG
TABLET ORAL
Qty: 90 TABLET | Refills: 1 | OUTPATIENT
Start: 2018-11-15

## 2018-11-15 NOTE — TELEPHONE ENCOUNTER
SYNTHROID 137 MCG tablet 90 tablet 1 11/8/2018  Yes   Sig: TAKE 1 TABLET (137 MCG) BY MOUTH DAILY       Thyroid Protocol Vcmaup13/14 1:34 PM   Patient is 12 years or older    Recent (12 mo) or future (30 days) visit within the authorizing provider's specialty    Normal TSH on file in past 12 months    No active pregnancy on record    No positive pregnancy test in past 12 months     Pharmacy   [id number] [address] [facility] AT [facility]     Our records indicate duplicate request. Same requesting pharmacy. Sharla Young, RN

## 2018-11-22 NOTE — NURSING NOTE
"Chief Complaint   Patient presents with     Pre-Op Exam     DOS:10/25/18, 11/8/18       Initial /60 (BP Location: Right arm, Patient Position: Sitting, Cuff Size: Adult Regular)  Pulse 87  Temp 96.8  F (36  C) (Temporal)  Ht 5' 3.5\" (1.613 m)  Wt 187 lb 8 oz (85 kg)  SpO2 96%  Breastfeeding? No  BMI 32.69 kg/m2 Estimated body mass index is 32.69 kg/(m^2) as calculated from the following:    Height as of this encounter: 5' 3.5\" (1.613 m).    Weight as of this encounter: 187 lb 8 oz (85 kg).  Medication Reconciliation: complete      KATINA Tucker      " Home

## 2018-12-05 ENCOUNTER — OFFICE VISIT (OUTPATIENT)
Dept: OPTOMETRY | Facility: CLINIC | Age: 78
End: 2018-12-05
Payer: COMMERCIAL

## 2018-12-05 ENCOUNTER — TELEPHONE (OUTPATIENT)
Dept: OPHTHALMOLOGY | Facility: CLINIC | Age: 78
End: 2018-12-05

## 2018-12-05 DIAGNOSIS — Z96.1 PSEUDOPHAKIA OF BOTH EYES: Primary | ICD-10-CM

## 2018-12-05 DIAGNOSIS — H40.1132 PRIMARY OPEN ANGLE GLAUCOMA (POAG) OF BOTH EYES, MODERATE STAGE: ICD-10-CM

## 2018-12-05 PROCEDURE — 99024 POSTOP FOLLOW-UP VISIT: CPT | Performed by: OPTOMETRIST

## 2018-12-05 ASSESSMENT — REFRACTION_WEARINGRX
OS_SPHERE: PLANO
OS_ADD: +2.75
OS_AXIS: 008
OS_CYLINDER: +0.75
OD_AXIS: 010
OD_CYLINDER: +0.75
OD_ADD: +2.75
OD_SPHERE: -0.75

## 2018-12-05 ASSESSMENT — VISUAL ACUITY
OS_SC: 20/30
OS_SC+: -2
OD_SC: 20/30
METHOD: SNELLEN - LINEAR
OD_SC+: -1

## 2018-12-05 ASSESSMENT — TONOMETRY
OD_IOP_MMHG: 15
IOP_METHOD: TONOPEN
OD_IOP_MMHG: 28
OS_IOP_MMHG: 25
IOP_METHOD: TONOPEN
OS_IOP_MMHG: 24
OS_IOP_MMHG: 15
OD_IOP_MMHG: 30

## 2018-12-05 ASSESSMENT — REFRACTION_MANIFEST
OS_AXIS: 155
OD_AXIS: 035
OD_ADD: +2.75
OD_SPHERE: -0.50
OS_ADD: +2.75
OS_CYLINDER: +1.00
OD_CYLINDER: +0.50
OS_SPHERE: -1.00

## 2018-12-05 ASSESSMENT — SLIT LAMP EXAM - LIDS
COMMENTS: 2+ DERMATOCHALASIS - UPPER LID
COMMENTS: 2+ DERMATOCHALASIS - UPPER LID

## 2018-12-05 ASSESSMENT — CUP TO DISC RATIO
OS_RATIO: 0.5
OD_RATIO: 0.55

## 2018-12-05 ASSESSMENT — EXTERNAL EXAM - RIGHT EYE: OD_EXAM: NORMAL

## 2018-12-05 ASSESSMENT — EXTERNAL EXAM - LEFT EYE: OS_EXAM: NORMAL

## 2018-12-05 NOTE — PROGRESS NOTES
CHIEF COMPLAINT:   Chief Complaint   Patient presents with     Surgical Followup     final refraction       Type of surgery cataract   Date of surgery 11-8-2018 od eye                             10- os eye    Tammy Funes, Optometric Assistant, A.B.O.C.     Drops reviewed.    OBJECTIVE:     See ophthalmology exam    ASSESSMENT:         ICD-10-CM    1. 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 Primary open angle glaucoma (POAG) of both eyes, moderate stage H40.1132 OPHTHALMOLOGY ADULT REFERRAL     PLAN:      Patient Instructions   Eyeglass prescription given.    Continue Xalatan- 1 drop both eyes at night.    Schedule IOP check in 2-3 weeks.  No additional treatment until rechecked per Dr. Serna.    Return in 1 year for a complete eye exam or sooner if needed.    Bola Lagos, OD

## 2018-12-05 NOTE — TELEPHONE ENCOUNTER
Please call patient and schedule IOP check in 2-3 weeks- no other treament at this time. Bola Lagos, OD

## 2018-12-05 NOTE — TELEPHONE ENCOUNTER
Called and left message for patient to schedule appointment with Dr. Serna on December 17th or 18th, 2018 for IOP check. (No Nurse Visit Needed!)  Atiya Jj, COA

## 2018-12-05 NOTE — PATIENT INSTRUCTIONS
Eyeglass prescription given.    Continue Xalatan- 1 drop both eyes at night.    Schedule IOP check in 2-3 weeks.  No additional treatment until rechecked per Dr. Serna.    Return in 1 year for a complete eye exam or sooner if needed.    Bola Lagos, OD

## 2018-12-05 NOTE — MR AVS SNAPSHOT
After Visit Summary   12/5/2018    Brandy Leon    MRN: [id number]           Patient Information     Date Of Birth          1940        Visit Information        Provider Department      12/5/2018 10:20 AM Bola Lagos, OD [facility]        Today's Diagnoses     Pseudophakia of both eyes    -  1    Primary open angle glaucoma (POAG) of both eyes, moderate stage          Care Instructions    Eyeglass prescription given.    Continue Xalatan- 1 drop both eyes at night.    Schedule IOP check in 2-3 weeks.  No additional treatment until rechecked per Dr. Serna.    Return in 1 year for a complete eye exam or sooner if needed.    Bola Lagos, GINO            Follow-ups after your visit        Additional Services     OPHTHALMOLOGY ADULT REFERRAL       Your provider has referred you to:  [facility]: [facility] [phone]   [email or web address]      Please be aware that coverage of these services is subject to the terms and limitations of your health insurance plan.  Call member services at your health plan with any benefit or coverage questions.      Please bring the following to your appointment:  >>   Any x-rays, CTs or MRIs which have been performed.  Contact the facility where they were done to arrange for  prior to your scheduled appointment.  Any new CT, MRI or other procedures ordered by your specialist must be performed at a [facility] facility or coordinated by your clinic's referral office.    >>   List of current medications   >>   This referral request   >>   Any documents/labs given to you for this referral                  Follow-up notes from your care team     Return in about 1 year (around 12/5/2019) for Annual Visit.      Your next 10 appointments already scheduled     Dec 26, 2018  8:00 PM CST   PSG Split with BK BED 1   [facility] ([facility]  [place])    [address]  [facility] [address]   [phone]            Dec 28, 2018  8:15 AM CST   Return Visit with Candice Worley MD, [facility] ENDO NURSE   [facility] ([facility])    [address] [place] [address]   [phone]            Arsenio 10, 2019  9:00 AM CST   Return Sleep Patient with RYANN Mendez   [facility] ([facility])    [address]  [facility] [address]   [phone]            Feb 06, 2019  9:30 AM CST   Return Visit with Ehsan Araya DPM   [facility] ([facility])    [address] [place] [address]   [phone]            Mar 26, 2019 10:45 AM CDT   Return Visit with Lucita Jordan MD   [facility])    [address] [place] [address]   [phone]              Who to contact     If you have questions or need follow up information about today's clinic visit or your schedule please contact [facility] directly at [phone].  Normal or non-critical lab and imaging results will be communicated to you by [employer], letter or phone within 4 business days after the clinic has received the results. If you do not hear from us within 7 days, please contact the clinic through [employer] or phone. If you have a critical or abnormal lab result, we will notify you by phone as soon as possible.  Submit refill requests through [employer] or call your pharmacy and they will forward the refill request to us. Please allow 3 business days for your refill to be completed.          Additional Information About Your Visit        [employer] Information     [employer] is an electronic gateway that provides easy, online access to your medical records. With [employer], you can request a clinic appointment, read your test results, renew a  prescription or communicate with your care team.     To sign up for PrivacyStart visit the website at [email or web address]   You will be asked to enter the access code listed below, as well as some personal information. Please follow the directions to create your username and password.     Your access code is: [email or web address]  Expires: 2019  9:17 AM     Your access code will  in 90 days. If you need help or a new code, please contact your [facility] or call [phone] for assistance.        Care EveryWhere ID     This is your Care EveryWhere ID. This could be used by other organizations to access your [facility] medical records  [id number]         Blood Pressure from Last 3 Encounters:   18 113/58   18 142/78   10/25/18 107/41    Weight from Last 3 Encounters:   10/19/18 85 kg (187 lb 8 oz)   10/18/18 85.7 kg (189 lb)   18 85.5 kg (188 lb 8 oz)              We Performed the Following     OPHTHALMOLOGY ADULT REFERRAL     POST-OP FOLLOW-UP VISIT          Today's Medication Changes          These changes are accurate as of 18  5:07 PM.  If you have any questions, ask your nurse or doctor.               These medicines have changed or have updated prescriptions.        Dose/Directions    irbesartan-hydrochlorothiazide 150-12.5 MG tablet   Commonly known as:  AVALIDE   This may have changed:  See the new instructions.   Used for:  Hypertension goal BP (blood pressure) < 140/90        TAKE 1 TABLET BY MOUTH DAILY   Quantity:  90 tablet   Refills:  1                Primary Care Provider Office Phone # Fax #    CailinMAGO Martinez [facility] [phone]       [address]  [place] [address]        Equal Access to Services     NUSRAT KHAN : Akbar Loya, farzana syed, qajose antonio kamarcial perez, mitch alaniz. So [facility] [phone].    ATENCIÓN: Si habla español, tiene a garcía disposición  servicios gratuitos de asistencia lingüística. Vesna lees [phone].    We comply with applicable federal civil rights laws and [place] laws. We do not discriminate on the basis of race, color, national origin, age, disability, sex, sexual orientation, or gender identity.            Thank you!     Thank you for choosing [facility]  for your care. Our goal is always to provide you with excellent care. Hearing back from our patients is one way we can continue to improve our services. Please take a few minutes to complete the written survey that you may receive in the mail after your visit with us. Thank you!             Your Updated Medication List - Protect others around you: Learn how to safely use, store and throw away your medicines at www.disposemymeds.org.          This list is accurate as of 12/5/18  5:07 PM.  Always use your most recent med list.                   Brand Name Dispense Instructions for use Diagnosis    aspirin 81 MG EC tablet    ASA    90 tablet    Take 1 tablet by mouth daily.    Type 2 diabetes, HbA1c goal < 7% (H)       ciclopirox 0.77 % cream    LOPROX    90 g    Apply topically 2 times daily To feet and toenails.    Tinea pedis of both feet       famotidine 20 MG tablet    PEPCID     Take 20 mg by mouth nightly as needed        fexofenadine 180 MG tablet    ALLEGRA     Take 180 mg by mouth daily        glipiZIDE 10 MG tablet    GLUCOTROL    360 tablet    Take 1 tablet (10 mg) by mouth 2 times daily (before meals)    Type 2 diabetes mellitus with hyperglycemia, with long-term current use of insulin (H)       GLUCOSAMINE CHONDROITIN COMPLX PO      2 Daily        insulin glargine 100 UNIT/ML pen    LANTUS SOLOSTAR    45 mL    Inject 20 Units Subcutaneous daily    Type 2 diabetes mellitus with hyperglycemia, with long-term current use of insulin (H)       insulin pen needle 32G X 4 MM miscellaneous    BD JUAN C U/F    100 each    USE 1 DAILY AS DIRECTED.    Type 2 diabetes  mellitus with hyperglycemia (H)       irbesartan-hydrochlorothiazide 150-12.5 MG tablet    AVALIDE    90 tablet    TAKE 1 TABLET BY MOUTH DAILY    Hypertension goal BP (blood pressure) < 140/90       latanoprost 0.005 % ophthalmic solution    XALATAN    3 Bottle    Place 1 drop into both eyes At Bedtime    Primary open angle glaucoma of both eyes, moderate stage       metFORMIN 1000 MG tablet    GLUCOPHAGE    180 tablet    TAKE 1 TABLET (1,000 MG) BY MOUTH 2 TIMES DAILY (WITH MEALS)    Type 2 diabetes mellitus with hyperglycemia, with long-term current use of insulin (H)       MULTIVITAMIN PO      1 tablet daily        OMEGA 3 PO      Take by mouth 2 times daily.        ONE TOUCH DELICA LANCETS Misc     100 each    1 each 2 times daily. One Touch Delica    Type 2 diabetes, HbA1c goal < 7% (H)       ONETOUCH ULTRA test strip   Generic drug:  blood glucose monitoring     200 strip    USE TO TEST TWICE A DAY    Type 2 diabetes mellitus with hyperglycemia, with long-term current use of insulin (H)       order for DME     1 each    Glucometer covered by insurance.    Type 2 diabetes, HbA1c goal < 7% (H)       oxybutynin ER 5 MG 24 hr tablet    DITROPAN-XL    180 tablet    TAKE 2 TABLETS (10 MG) BY MOUTH DAILY    Urinary frequency, Overactive bladder       rosuvastatin 5 MG tablet    CRESTOR    8 tablet    TAKE 1 TABLET BY MOUTH TWICE WEEKLY    Dyslipidemia, goal LDL below 100       SYNTHROID 137 MCG tablet   Generic drug:  levothyroxine     90 tablet    TAKE 1 TABLET (137 MCG) BY MOUTH DAILY    Myxedema heart disease, Nutritional and metabolic cardiomyopathy (H)

## 2018-12-06 NOTE — TELEPHONE ENCOUNTER
Pt called back and is scheduled for 12/17/18 at 0830 w/ dr garber for an iop check per dr sears.  Minda ARIZA. COA. OSC

## 2018-12-07 ENCOUNTER — APPOINTMENT (OUTPATIENT)
Dept: OPTOMETRY | Facility: CLINIC | Age: 78
End: 2018-12-07
Payer: COMMERCIAL

## 2018-12-07 PROCEDURE — V2200 LENS SPHER BIFOC PLANO 4.00D: HCPCS | Mod: LT | Performed by: OPTOMETRIST

## 2018-12-17 ENCOUNTER — OFFICE VISIT (OUTPATIENT)
Dept: OPHTHALMOLOGY | Facility: CLINIC | Age: 78
End: 2018-12-17
Payer: COMMERCIAL

## 2018-12-17 DIAGNOSIS — Z96.1 PSEUDOPHAKIA OF BOTH EYES: Primary | ICD-10-CM

## 2018-12-17 PROCEDURE — 99024 POSTOP FOLLOW-UP VISIT: CPT | Performed by: OPHTHALMOLOGY

## 2018-12-17 ASSESSMENT — TONOMETRY
IOP_METHOD: TONOPEN
OS_IOP_MMHG: 15
OS_IOP_MMHG: 9
OD_IOP_MMHG: 10
IOP_METHOD: TONOPEN
OS_IOP_MMHG: 13
OD_IOP_MMHG: 15
OD_IOP_MMHG: 12

## 2018-12-17 ASSESSMENT — VISUAL ACUITY
OS_SC+: +2
OD_SC+: -1
METHOD: SNELLEN - LINEAR
OD_SC: 20/20
OS_SC: 20/25

## 2018-12-17 ASSESSMENT — CUP TO DISC RATIO
OS_RATIO: 0.5
OD_RATIO: 0.55

## 2018-12-17 ASSESSMENT — SLIT LAMP EXAM - LIDS
COMMENTS: 2+ DERMATOCHALASIS - UPPER LID
COMMENTS: 2+ DERMATOCHALASIS - UPPER LID

## 2018-12-17 ASSESSMENT — PACHYMETRY
OD_CT(UM): 559
OS_CT(UM): 558

## 2018-12-17 ASSESSMENT — EXTERNAL EXAM - RIGHT EYE: OD_EXAM: NORMAL

## 2018-12-17 ASSESSMENT — EXTERNAL EXAM - LEFT EYE: OS_EXAM: NORMAL

## 2018-12-17 NOTE — PROGRESS NOTES
Assessment & Plan   Brandy Leon is a 78 year old female who presents with:   Review of systems for the eyes was negative other than the pertinent positives and negatives noted in the HPI.  History is obtained from the patient.    Chief Complaint   Patient presents with     Pressure Check     IOP recheck per Dr Lagos, IOP was elevated at last visit pt was still using all eye drops. She is no longer using any of the post surgical eye drops for the last week.       Lab Results   Component Value Date    A1C 11.1 10/19/2018    A1C 10.4 06/25/2018    A1C 10.6 03/08/2018    A1C 8.8 11/16/2017    A1C 9.3 07/20/2017         Pseudophakia of both eyes  - possible steroid responder.  - IOP improved today    Return in about 10 months (around 10/17/2019) for Annual Eye Exam, Glaucoma check, Visual field, OCT.    Documentation for today's encounter was performed by Minda Roe COA. OSC. Acting as a scribe in my presence. I have reviewed and verified that it is an accurate recording of today's encounter.    Attending Physician Attestation:  Complete documentation of historical and exam elements from today's encounter can be found in the full encounter summary report (not reduplicated in this progress note).  I personally obtained the chief complaint(s) and history of present illness.  I confirmed and edited as necessary the review of systems, past medical/surgical history, family history, social history, and examination findings as documented by others; and I examined the patient myself.  I personally reviewed the relevant tests, images, and reports as documented above.  I formulated and edited as necessary the assessment and plan and discussed the findings and management plan with the patient and family. - Thomas Serna MD

## 2018-12-26 ENCOUNTER — THERAPY VISIT (OUTPATIENT)
Dept: SLEEP MEDICINE | Facility: CLINIC | Age: 78
End: 2018-12-26
Payer: COMMERCIAL

## 2018-12-26 DIAGNOSIS — E66.811 CLASS 1 OBESITY DUE TO EXCESS CALORIES WITH BODY MASS INDEX (BMI) OF 32.0 TO 32.9 IN ADULT, UNSPECIFIED WHETHER SERIOUS COMORBIDITY PRESENT: ICD-10-CM

## 2018-12-26 DIAGNOSIS — G47.19 EXCESSIVE DAYTIME SLEEPINESS: ICD-10-CM

## 2018-12-26 DIAGNOSIS — R06.00 DYSPNEA AND RESPIRATORY ABNORMALITY: ICD-10-CM

## 2018-12-26 DIAGNOSIS — R53.81 MALAISE AND FATIGUE: ICD-10-CM

## 2018-12-26 DIAGNOSIS — R53.83 MALAISE AND FATIGUE: ICD-10-CM

## 2018-12-26 DIAGNOSIS — G47.33 OBSTRUCTIVE SLEEP APNEA: Primary | ICD-10-CM

## 2018-12-26 DIAGNOSIS — E66.09 CLASS 1 OBESITY DUE TO EXCESS CALORIES WITH BODY MASS INDEX (BMI) OF 32.0 TO 32.9 IN ADULT, UNSPECIFIED WHETHER SERIOUS COMORBIDITY PRESENT: ICD-10-CM

## 2018-12-26 DIAGNOSIS — Z72.820 LACK OF ADEQUATE SLEEP: ICD-10-CM

## 2018-12-26 DIAGNOSIS — I10 ESSENTIAL HYPERTENSION: ICD-10-CM

## 2018-12-26 DIAGNOSIS — R06.89 DYSPNEA AND RESPIRATORY ABNORMALITY: ICD-10-CM

## 2018-12-26 PROCEDURE — 95810 POLYSOM 6/> YRS 4/> PARAM: CPT | Performed by: INTERNAL MEDICINE

## 2018-12-27 PROBLEM — G47.33 OSA (OBSTRUCTIVE SLEEP APNEA): Chronic | Status: ACTIVE | Noted: 2018-12-27

## 2018-12-27 NOTE — PROCEDURES
" SLEEP STUDY INTERPRETATION  DIAGNOSTIC POLYSOMNOGRAPHY REPORT      Patient: PAUL LAST  YOB: 1940  Study Date: 12/26/2018  MRN: [id number]  Referring Provider: MAGO Clarke, CNP  Ordering Provider: TEVIN Chua    Indications for Polysomnography: The patient is a 78 y old Female who is 5' 4\" and weighs 189.0 lbs. Her BMI is 32.7, [place] sleepiness scale 9.0 and neck circumference is 42.0 cm. A diagnostic polysomnogram was performed to evaluate for possible obstructive sleep apnea: loud snoring, non-refreshing sleep, daytime fatigue/sleepiness, difficulty maintaining sleep, crowded oropharynx and co-morbid HTN.      Polysomnogram Data: A full night polysomnogram recorded the standard physiologic parameters including EEG, EOG, EMG, ECG, nasal and oral airflow. Respiratory parameters of chest and abdominal movements were recorded with respiratory inductance plethysmography. Oxygen saturation was recorded by pulse oximetry. Hypopnea scoring rule used: 1B 4%.      Sleep Architecture:   The total recording time of the polysomnogram was 473.8 minutes. The total sleep time was 290.0 minutes. Sleep latency was increased at 39.5 minutes without the use of a sleep aid. REM latency was 308.5 minutes. Arousal index was increased at 84.8 arousals per hour. Sleep efficiency was decreased at 61.2%. Wake after sleep onset was 127.5 minutes. The patient spent 34.1% of total sleep time in Stage N1, 52.9% in Stage N2, 10.0% in Stage N3, and 2.9% in REM. Time in REM supine was 8.5 minutes.      Respiration:     Events ? The polysomnogram revealed a presence of 137 obstructive, 5 central, and 0 mixed apneas resulting in an apnea index of 29.4 events per hour. There were 129 obstructive hypopneas and 0 central hypopneas resulting in an obstructive hypopnea index of 26.7 and central hypopnea index of 0 events per hour. The combined apnea/hypopnea index was 56.1 events per hour (central apnea/hypopnea " index was 1.0 events per hour). The REM AHI was 35.3 events per hour. The supine AHI was 56.1 events per hour. The RERA index was 21.7 events per hour.  The RDI was 77.8 events per hour.    Snoring - was reported as mild to loud.    Respiratory rate and pattern - was notable for normal respiratory rate and pattern.    Sustained Sleep Associated Hypoventilation - Transcutaneous carbon dioxide monitoring was not used, however significant hypoventilation was not suggested by oximetry    Sleep Associated Hypoxemia - (Greater than 5 minutes O2 sat at or below 88%) was present. Baseline oxygen saturation was 94.2%. Lowest oxygen saturation was 48.6%. Time spent less than or equal to 88% was 15.4 minutes. Time spent less than or equal to 89% was 20.4 minutes.    Movement Activity:     Periodic Limb Activity - There were 0 PLMs during the entire study    REM EMG Activity - Excessive transient/sustained muscle activity was not present.    Nocturnal Behavior - Abnormal sleep related behaviors were not noted     Bruxism - None apparent.      Cardiac Summary:   The average pulse rate was 80.5 bpm. The minimum pulse rate was 55.3 bpm while the maximum pulse rate was 118.1 bpm.  Arrhythmias were not noted.      Assessment:     Sever obstructive sleep apnea in the supine position    Sleep related hypoxemia    Recommendations:    Consider repeat polysomnography with full night titration of positive airway pressure therapy for the control of sleep disordered breathing.    Alternativel, treatment could be empirically initiated with Auto?titrating PAP therapy with a range of 5 to 18 cmH2O. Recommend clinical follow up with sleep management team including oximetry.    Patient may be a candidate for dental appliance through referral to Sleep Dentistry for the treatment of obstructive sleep apnea and/or socially disruptive snoring.    Advice regarding the risks of drowsy driving.    Suggest optimizing sleep schedule and avoiding sleep  deprivation.    Weight management (if BMI > 30).    Diagnostic Codes:   Obstructive Sleep Apnea G47.33  Sleep Hypoxemia/Hypoventilation G47.36        _____________________________________   Electronically Signed By: Hesham Light MD 12/27/18           Range(%) Time in range (min)   0.0 - 89.0 20.4   0.0 - 88.0 15.4         Stage Min(mm Hg) Max(mm Hg)   Wake - -   NREM(1+2+3) - -   REM - -       Range(mmHg) Time in range (min)   55.0 - 100.0 -   Excluded data <20.0 & >65.0 474.0

## 2018-12-27 NOTE — PROGRESS NOTES
Diagnostic PSG completed per provider order.  Patient met criteria for PAP therapy too late into the study to start titration.

## 2018-12-28 ENCOUNTER — OFFICE VISIT (OUTPATIENT)
Dept: ENDOCRINOLOGY | Facility: CLINIC | Age: 78
End: 2018-12-28
Payer: COMMERCIAL

## 2018-12-28 VITALS
BODY MASS INDEX: 33.01 KG/M2 | OXYGEN SATURATION: 97 % | HEART RATE: 93 BPM | WEIGHT: 189.3 LBS | DIASTOLIC BLOOD PRESSURE: 75 MMHG | SYSTOLIC BLOOD PRESSURE: 119 MMHG

## 2018-12-28 DIAGNOSIS — E11.65 TYPE 2 DIABETES MELLITUS WITH HYPERGLYCEMIA, WITH LONG-TERM CURRENT USE OF INSULIN (H): Primary | Chronic | ICD-10-CM

## 2018-12-28 DIAGNOSIS — Z79.4 TYPE 2 DIABETES MELLITUS WITH HYPERGLYCEMIA, WITH LONG-TERM CURRENT USE OF INSULIN (H): Primary | Chronic | ICD-10-CM

## 2018-12-28 DIAGNOSIS — J01.01 ACUTE RECURRENT MAXILLARY SINUSITIS: ICD-10-CM

## 2018-12-28 LAB
CORTIS SERPL-MCNC: 9.5 UG/DL (ref 4–22)
IGF-I BLD-MCNC: 113 NG/ML (ref 20–214)
TSH SERPL DL<=0.005 MIU/L-ACNC: 2.96 MU/L (ref 0.4–4)

## 2018-12-28 PROCEDURE — 84305 ASSAY OF SOMATOMEDIN: CPT | Performed by: INTERNAL MEDICINE

## 2018-12-28 PROCEDURE — 99214 OFFICE O/P EST MOD 30 MIN: CPT | Performed by: INTERNAL MEDICINE

## 2018-12-28 PROCEDURE — 82533 TOTAL CORTISOL: CPT | Performed by: INTERNAL MEDICINE

## 2018-12-28 PROCEDURE — 36415 COLL VENOUS BLD VENIPUNCTURE: CPT | Performed by: INTERNAL MEDICINE

## 2018-12-28 PROCEDURE — 84443 ASSAY THYROID STIM HORMONE: CPT | Performed by: INTERNAL MEDICINE

## 2018-12-28 NOTE — NURSING NOTE
Brandy Leon's goals for this visit include: DM follow up - pt did not bring meter  She requests these members of her care team be copied on today's visit information: No    PCP: Cailin Ponce    Referring Provider:  No referring provider defined for this encounter.    /75 (BP Location: Left arm, Patient Position: Chair, Cuff Size: Adult Regular)   Pulse 93   Wt 85.9 kg (189 lb 4.8 oz)   SpO2 97%   BMI 33.01 kg/m      Do you need any medication refills at today's visit? No

## 2018-12-28 NOTE — PROGRESS NOTES
I reviewed her sleep study: 12/26/2018 [facility] Study (189.0 lbs) - AHI 56.1, RDI 77.8, Supine AHI 56.1, REM AHI 35.3, Low O2 48.6%, Time Spent ?88% 15.4 minutes / Time Spent ?89% 20.4 minutes.    Order for Comprehensive DME: auto-CPAP 5-15 cm/H20

## 2018-12-28 NOTE — PATIENT INSTRUCTIONS
Start Bydureon 2 mg every week. If the insurance does not cover this please call and increase lantus to 30 units daily.     Please bring in the meter next time.     Continue metformin and glipizide.     Medications in the class include: Trulicity, Bydureon, Victoza, Ozempic. Please find out with you insurance which of these are covered.     [facility]-Department of Endocrinology  Jeannie Hathaway RN, Diabetes Educator: [phone]  Clinic Nurses Princess Colby: [phone]  Clinic Fax: [phone]  On-Call Endocrine at the [place] (after hours/weekends): [phone] option 4  Scheduling Line: [phone]    Appointment Reminders:  * Please bring meter with for staff to download  * If you are due ONLY for an A1C, it is scheduled with the nurse and will be done in clinic. You do not need to schedule a lab appointment. Fasting is not required for an A1C.  * Refill request should be submitted to your pharmacy. They will contact clinic for approval.

## 2018-12-28 NOTE — PROGRESS NOTES
Endocrinology Clinic Visit        Chief Complaint: No chief complaint on file.       Sent letter to get BG data for 1 week tsting 4 times a day.     Interval history:   Did not bring meter, but testing regularly per patient.  Did not test today, but usually fasting BG > 220   Stopped Trulicity as it was not covered.   Continues to take other meds, still driving school bus.   Had a fall in August without any fractures or major injury   No nausea but no notable change in appetite or BG.   No change in wt  No GI symptoms.       HPI: Brandy is here for a FU    She has PMH of history of type 2 diabetes mellitus, hypertension, hyperlipidemia, hypothyroidism and degenerative joint disease.     She has had diabetes for more than 12 years  She has retinopathy and neuropathy.  Last eye exam was on August 2016   Last microalbuminuria  Feb 2017 + Microalbuminuria, normal creatinine  Denies any hypoglycemia    Glucometer:   She is usually tests blood sugars in the morning  Recent values are higher per patient report  This am > 200.     Lab Results   Component Value Date    A1C 11.1 (H) 10/19/2018    A1C 10.4 (H) 06/25/2018    A1C 10.6 (H) 03/08/2018    A1C 8.8 (H) 11/16/2017    A1C 9.3 (H) 07/20/2017       Medication history.   Initially, she took metformin 1 g twice a day and glipizide 20 mg twice daily  Due to difficult blood sugar control in the recent months she was started on Lantus 27 units daily but patient states that this has not benefited her.   Invokana added during the first visit.   A1c improved but she discontinued it due to amputation risk. Unwilling to try other drugs in this class.   Trulicity was not covered by the insurance.     Preventative medications  Crestor every other day now.   irbesartan   aspirin    Diet -- remains unchanged.   She drinks coffee with a toast in the morning and goes to work.  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  She eats a toast with eggs or oatmeal with blueberries.   Around noon she eats a sandwich with milk for lunch and around 5 PM she has dinner which usually very eats depending on what her son cooks.  She has navy beans with soup and fish and milk for dinner.     She does not drink alcohol or sugary beverage  She does not snack in between meals    Exercise  There is no structured exercise    Hypothyroidism  Diagnoses one than 20 years ago with overactive thyroid when she had shakiness.  Eventually she underwent thyroid surgery and started on levothyroxine  Currently she takes 137  g of levothyroxine  She denies having cold intolerance, change in diet or appetite    Family history  No history of diabetes, obesity.  Brother has history of thyroid surgery     Allergies   Allergen Reactions     Estradiol Itching     Lipitor [Atorvastatin Calcium]      Weak and shaky     Sulfa Drugs      Rash       Zocor [Simvastatin]      Weak and shakey       Current Outpatient Medications   Medication Sig Dispense Refill     aspirin 81 MG EC tablet Take 1 tablet by mouth daily. 90 tablet 3     ciclopirox (LOPROX) 0.77 % cream Apply topically 2 times daily To feet and toenails. 90 g 6     famotidine (PEPCID) 20 MG tablet Take 20 mg by mouth nightly as needed       fexofenadine (ALLEGRA) 180 MG tablet Take 180 mg by mouth daily       glipiZIDE (GLUCOTROL) 10 MG tablet Take 1 tablet (10 mg) by mouth 2 times daily (before meals) 360 tablet 0     GLUCOSAMINE CHONDROITIN COMPLX OR 2 Daily       insulin glargine (LANTUS SOLOSTAR) 100 UNIT/ML injection Inject 20 Units Subcutaneous daily 45 mL 3     insulin pen needle (BD JUAN C U/F) 32G X 4 MM USE 1 DAILY AS DIRECTED. 100 each 3     irbesartan-hydrochlorothiazide (AVALIDE) 150-12.5 MG per tablet TAKE 1 TABLET BY MOUTH DAILY (Patient taking differently: TAKE 0.5 TABLET BY MOUTH DAILY) 90 tablet 1     latanoprost (XALATAN) 0.005 % ophthalmic solution Place 1 drop into both eyes At Bedtime 3 Bottle 4     metFORMIN (GLUCOPHAGE) 1000 MG tablet TAKE 1 TABLET  (1,000 MG) BY MOUTH 2 TIMES DAILY (WITH MEALS) 180 tablet 3     MULTIVITAMIN OR 1 tablet daily       Omega-3 Fatty Acids (OMEGA 3 PO) Take by mouth 2 times daily.        ONE TOUCH DELICA LANCETS MISC 1 each 2 times daily. One Touch Delica   100 each 12     ONETOUCH ULTRA test strip USE TO TEST TWICE A  strip 11     order for DME Glucometer covered by insurance. 1 each 0     oxybutynin (DITROPAN-XL) 5 MG 24 hr tablet TAKE 2 TABLETS (10 MG) BY MOUTH DAILY 180 tablet 2     rosuvastatin (CRESTOR) 5 MG tablet TAKE 1 TABLET BY MOUTH TWICE WEEKLY 8 tablet 6     SYNTHROID 137 MCG tablet TAKE 1 TABLET (137 MCG) BY MOUTH DAILY 90 tablet 1       Review of Systems     Constitutional: Negative for fever and unexpected weight change. Appetite normal   Head: no headache   Eye: no vision change/ loss of peripheral vision.   ENT: No throat congestion.   Respiratory: no cough   Cardiovascular: no chest pain  Gastrointestinal: Negative for vomiting, abdominal pain and diarrhea.  Genitourinary: No bladder problems.  Musculoskeletal: Negative for myalgias. No weakness.  Neurological: Negative for seizures and headaches.  Psychiatric/Behavioral: Negative for behavioral problem and dysphoric mood.    Past Medical History:   Diagnosis Date     Actinic keratosis 3/12/2013     Degenerative joint disease      Diabetes (H)      Rheumatic fever 1957    x2 between the ages of 15-18     Seasonal allergies        Past Surgical History:   Procedure Laterality Date     C APPENDECTOMY       C ARTHROPLASTY TMJ       CATARACT IOL, RT/LT       COLONOSCOPY       COLONOSCOPY N/A 8/13/2015    Procedure: COLONOSCOPY;  Surgeon: Duane, William Charles, MD;  Location: [facility]     COLONOSCOPY WITH CO2 INSUFFLATION N/A 8/13/2015    Procedure: COLONOSCOPY WITH CO2 INSUFFLATION;  Surgeon: Duane, William Charles, MD;  Location: [facility]     PHACOEMULSIFICATION WITH STANDARD INTRAOCULAR LENS IMPLANT Left 10/25/2018    Procedure: LEFT PHACOEMULSIFICATION WITH  STANDARD INTRAOCULAR LENS IMPLANT;  Surgeon: Thomas Serna MD;  Location: [facility]     PHACOEMULSIFICATION WITH STANDARD INTRAOCULAR LENS IMPLANT Right 11/8/2018    Procedure: RIGHT PHACOEMULSIFICATION WITH STANDARD INTRAOCULAR LENS IMPLANT;  Surgeon: Thomas Serna MD;  Location: [facility]     THYROIDECTOMY          Family History   Problem Relation Age of Onset     Cancer Father         skin and leukemia     Cerebrovascular Disease Maternal Grandfather      Cerebrovascular Disease Paternal Grandmother      Eye Disorder Paternal Grandmother         cataracts     Cerebrovascular Disease Paternal Grandfather      Heart Disease Paternal Grandfather      Cancer Sister         Breast Cancer     Eye Disorder Mother         cataracts     Eye Disorder Brother         cataract     Breast Cancer Daughter      Other Cancer Daughter         ovarian cancer     Glaucoma No family hx of      Macular Degeneration No family hx of        Social History     Social History     Marital Status: Single     Spouse Name: N/A     Number of Children: N/A     Years of Education: N/A     Social History Main Topics     Smoking status: Never Smoker      Smokeless tobacco: Never Used      Comment: has had exposure to second hand smoke in the past from [employer], no current exposure     Alcohol Use: No     Drug Use: No     Sexual Activity: No     Other Topics Concern     Parent/Sibling W/ Cabg, Mi Or Angioplasty Before 65f 55m? 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     Hobby Hazards No     Sleep Concern Yes     difficulty staying asleep, up and down all night     Stress Concern No     Weight Concern Yes     would like to lose     Special Diet Yes     low card, low sugar diet     Back Care Yes     chiropratior     Exercise Yes     almost everyday     Bike Helmet No      does not ride bicycle any more     Seat Belt Yes     Self-Exams Yes     Social History Narrative       Objective:   /75 (BP Location: Left arm, Patient Position: Chair, Cuff Size: Adult Regular)   Pulse 93   Wt 85.9 kg (189 lb 4.8 oz)   SpO2 97%   BMI 33.01 kg/m     Constitutional: Elderly lady in no acute distress   EYES: anicteric, normal extra-ocular movements, no lid lag or retraction   HEENT: Mouth/Throat: Mucous membrane is moist. Oropharynx is clear. No adenopathy. Normal thyroid   Cardiovascular: systolic murmur +, regular rate and rhythm  Pulmonary/Chest: CTAB. No wheezing or rales   Abdominal: +BS. Nontender to palpation. No organomegaly present.  Neurological: Alert. Normal affect. CNII-XII intact. Muscle strength 5/5. Sensory is intact.  Extremities: No clubbing, cyanosis or inflammation   Skin: normal texture, color  Feet: bilateral neuropathy left worse than right, follows Dr. Araya.  [ due 12/2019]  Lymphatic: no cervical lymphadenopathy.  Psychological: appropriate mood     In House Labs:   A1C     11.0   11/8/2016  A1C     10.4   7/26/2016  A1C     11.5   4/15/2016  A1C     11.2   2/23/2016  A1C      9.9   12/22/2015    TSH   Date Value Ref Range Status   06/25/2018 2.46 0.40 - 4.00 mU/L Final   12/22/2017 1.70 0.40 - 4.00 mU/L Final   02/09/2017 1.33 0.40 - 4.00 mU/L Final   09/27/2016 1.88 0.40 - 4.00 mU/L Final   07/26/2016 1.78 0.40 - 4.00 mU/L Final     T4 Free   Date Value Ref Range Status   06/25/2018 1.17 0.76 - 1.46 ng/dL Final   12/22/2017 1.16 0.76 - 1.46 ng/dL Final   02/09/2017 1.25 0.76 - 1.46 ng/dL Final       Creatinine   Date Value Ref Range Status   10/19/2018 0.83 0.52 - 1.04 mg/dL Final   ]    Recent Labs   Lab Test  07/26/16   0828  02/23/16   1115   08/21/15   0825   06/16/15   0941   CHOL  137  184   < >  174   --   224*   HDL  57  59   < >  49*   --   47*   LDL  48  88   < >  86   < >  120   TRIG  162*  187*   < >  197*   --   283*   CHOLHDLRATIO   --    --    --    3.6   --   4.8    < > = values in this interval not displayed.       No results found for: [id number], [id number], [id number]      Assessment/Treatment Plan:      78 year old female  here for a follow up visit.     1. Type 2 Diabetes with uncontrolled hyperglycemia :   Neuropathy, nephropathy.  No retinopathy.   Her recent A1c was 11.2 indicating poor control    Barriers:   She is a  and hypoglycemia is a significant risk and patient tries to avoid at all costs.   She denies having changes in diet but she may be underreporting her diet.    Does not want SGLT2 due to amputation risk. I discussed about other agents as Farxiga.   Cost barrier: Lantus cost was 400 for 3 months  Trulicity not covered by insurance.     Plan:   Try bydureon injections, if not covered increase Lantus to 30 units with goal of increasing 2 units every week if fasting BG > 150/ .   Trulicity recently increased to 1.5 mg  Metformin 1 g BID  Gilipizide 1 mg BID  Cortisol and IGF1    2.  Hypothyroidism S/P Thyroidectomy for Graves' disease.  Continue levothyroxine 137  g daily  Recheck TSH levels.     3.  Hyperlipidemia  Crestor on alternate days.       Candice Worley MD  [id number]  Endocrinology Service

## 2018-12-28 NOTE — LETTER
12/28/2018         RE: Brandy Leon  [address] [facility] [address]        Dear Colleague,    Thank you for referring your patient, Brandy Leon, to the [facility]. Please see a copy of my visit note below.    Endocrinology Clinic Visit    Chief Complaint: No chief complaint on file.     Interval history:   Did not bring meter, but testing regularly per patient.  Did not test today, but usually fasting BG > 220   Stopped Trulicity as it was not covered.   Continues to take other meds, still driving school bus.   Had a fall in August without any fractures or major injury   No nausea but no notable change in appetite or BG.   No change in wt  No GI symptoms.       HPI: Brandy is here for a FU    She has PMH of history of type 2 diabetes mellitus, hypertension, hyperlipidemia, hypothyroidism and degenerative joint disease.     She has had diabetes for more than 12 years  She has retinopathy and neuropathy.  Last eye exam was on August 2016   Last microalbuminuria  Feb 2017 + Microalbuminuria, normal creatinine  Denies any hypoglycemia    Glucometer:   She is usually tests blood sugars in the morning  Recent values are higher per patient report  This am > 200.     Lab Results   Component Value Date    A1C 11.1 (H) 10/19/2018    A1C 10.4 (H) 06/25/2018    A1C 10.6 (H) 03/08/2018    A1C 8.8 (H) 11/16/2017    A1C 9.3 (H) 07/20/2017       Medication history.   Initially, she took metformin 1 g twice a day and glipizide 20 mg twice daily  Due to difficult blood sugar control in the recent months she was started on Lantus 27 units daily but patient states that this has not benefited her.   Invokana added during the first visit.   A1c improved but she discontinued it due to amputation risk. Unwilling to try other drugs in this class.   Trulicity was not covered by the insurance.     Preventative medications  Crestor every other day now.   irbesartan    aspirin    Diet -- remains unchanged.   She drinks coffee with a toast in the morning and goes to work.  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  She eats a toast with eggs or oatmeal with blueberries.  Around noon she eats a sandwich with milk for lunch and around 5 PM she has dinner which usually very eats depending on what her son cooks.  She has navy beans with soup and fish and milk for dinner.     She does not drink alcohol or sugary beverage  She does not snack in between meals    Exercise  There is no structured exercise    Hypothyroidism  Diagnoses one than 20 years ago with overactive thyroid when she had shakiness.  Eventually she underwent thyroid surgery and started on levothyroxine  Currently she takes 137  g of levothyroxine  She denies having cold intolerance, change in diet or appetite    Family history  No history of diabetes, obesity.  Brother has history of thyroid surgery     Allergies   Allergen Reactions     Estradiol Itching     Lipitor [Atorvastatin Calcium]      Weak and shaky     Sulfa Drugs      Rash       Zocor [Simvastatin]      Weak and shakey       Current Outpatient Medications   Medication Sig Dispense Refill     aspirin 81 MG EC tablet Take 1 tablet by mouth daily. 90 tablet 3     ciclopirox (LOPROX) 0.77 % cream Apply topically 2 times daily To feet and toenails. 90 g 6     famotidine (PEPCID) 20 MG tablet Take 20 mg by mouth nightly as needed       fexofenadine (ALLEGRA) 180 MG tablet Take 180 mg by mouth daily       glipiZIDE (GLUCOTROL) 10 MG tablet Take 1 tablet (10 mg) by mouth 2 times daily (before meals) 360 tablet 0     GLUCOSAMINE CHONDROITIN COMPLX OR 2 Daily       insulin glargine (LANTUS SOLOSTAR) 100 UNIT/ML injection Inject 20 Units Subcutaneous daily 45 mL 3     insulin pen needle (BD JUAN C U/F) 32G X 4 MM USE 1 DAILY AS DIRECTED. 100 each 3     irbesartan-hydrochlorothiazide (AVALIDE) 150-12.5 MG per tablet TAKE 1 TABLET BY MOUTH DAILY (Patient taking  differently: TAKE 0.5 TABLET BY MOUTH DAILY) 90 tablet 1     latanoprost (XALATAN) 0.005 % ophthalmic solution Place 1 drop into both eyes At Bedtime 3 Bottle 4     metFORMIN (GLUCOPHAGE) 1000 MG tablet TAKE 1 TABLET (1,000 MG) BY MOUTH 2 TIMES DAILY (WITH MEALS) 180 tablet 3     MULTIVITAMIN OR 1 tablet daily       Omega-3 Fatty Acids (OMEGA 3 PO) Take by mouth 2 times daily.        ONE TOUCH DELICA LANCETS MISC 1 each 2 times daily. One Touch Delica   100 each 12     ONETOUCH ULTRA test strip USE TO TEST TWICE A  strip 11     order for DME Glucometer covered by insurance. 1 each 0     oxybutynin (DITROPAN-XL) 5 MG 24 hr tablet TAKE 2 TABLETS (10 MG) BY MOUTH DAILY 180 tablet 2     rosuvastatin (CRESTOR) 5 MG tablet TAKE 1 TABLET BY MOUTH TWICE WEEKLY 8 tablet 6     SYNTHROID 137 MCG tablet TAKE 1 TABLET (137 MCG) BY MOUTH DAILY 90 tablet 1       Review of Systems     Constitutional: Negative for fever and unexpected weight change. Appetite normal   Head: no headache   Eye: no vision change/ loss of peripheral vision.   ENT: No throat congestion.   Respiratory: no cough   Cardiovascular: no chest pain  Gastrointestinal: Negative for vomiting, abdominal pain and diarrhea.  Genitourinary: No bladder problems.  Musculoskeletal: Negative for myalgias. No weakness.  Neurological: Negative for seizures and headaches.  Psychiatric/Behavioral: Negative for behavioral problem and dysphoric mood.    Past Medical History:   Diagnosis Date     Actinic keratosis 3/12/2013     Degenerative joint disease      Diabetes (H)      Rheumatic fever 1957    x2 between the ages of 15-18     Seasonal allergies        Past Surgical History:   Procedure Laterality Date     C APPENDECTOMY       C ARTHROPLASTY TMJ       CATARACT IOL, RT/LT       COLONOSCOPY       COLONOSCOPY N/A 8/13/2015    Procedure: COLONOSCOPY;  Surgeon: Duane, William Charles, MD;  Location: [facility]     COLONOSCOPY WITH CO2 INSUFFLATION N/A 8/13/2015    Procedure:  COLONOSCOPY WITH CO2 INSUFFLATION;  Surgeon: Duane, William Charles, MD;  Location: [facility]     PHACOEMULSIFICATION WITH STANDARD INTRAOCULAR LENS IMPLANT Left 10/25/2018    Procedure: LEFT PHACOEMULSIFICATION WITH STANDARD INTRAOCULAR LENS IMPLANT;  Surgeon: Thomas Serna MD;  Location: [facility]     PHACOEMULSIFICATION WITH STANDARD INTRAOCULAR LENS IMPLANT Right 11/8/2018    Procedure: RIGHT PHACOEMULSIFICATION WITH STANDARD INTRAOCULAR LENS IMPLANT;  Surgeon: Thomas Serna MD;  Location: [facility]     THYROIDECTOMY          Family History   Problem Relation Age of Onset     Cancer Father         skin and leukemia     Cerebrovascular Disease Maternal Grandfather      Cerebrovascular Disease Paternal Grandmother      Eye Disorder Paternal Grandmother         cataracts     Cerebrovascular Disease Paternal Grandfather      Heart Disease Paternal Grandfather      Cancer Sister         Breast Cancer     Eye Disorder Mother         cataracts     Eye Disorder Brother         cataract     Breast Cancer Daughter      Other Cancer Daughter         ovarian cancer     Glaucoma No family hx of      Macular Degeneration No family hx of        Social History     Social History     Marital Status: Single     Spouse Name: N/A     Number of Children: N/A     Years of Education: N/A     Social History Main Topics     Smoking status: Never Smoker      Smokeless tobacco: Never Used      Comment: has had exposure to second hand smoke in the past from [place], no current exposure     Alcohol Use: No     Drug Use: No     Sexual Activity: No     Other Topics Concern     Parent/Sibling W/ Cabg, Mi Or Angioplasty Before 65f 55m? 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     Hobby Hazards No     Sleep Concern Yes     difficulty staying asleep, up and down all night      Stress Concern No     Weight Concern Yes     would like to lose     Special Diet Yes     low card, low sugar diet     Back Care Yes     chiropratior     Exercise Yes     almost everyday     Bike Helmet No     does not ride bicycle any more     Seat Belt Yes     Self-Exams Yes     Social History Narrative       Objective:   /75 (BP Location: Left arm, Patient Position: Chair, Cuff Size: Adult Regular)   Pulse 93   Wt 85.9 kg (189 lb 4.8 oz)   SpO2 97%   BMI 33.01 kg/m      Constitutional: Elderly lady in no acute distress   EYES: anicteric, normal extra-ocular movements, no lid lag or retraction   HEENT: Mouth/Throat: Mucous membrane is moist. Oropharynx is clear. No adenopathy. Normal thyroid   Cardiovascular: systolic murmur +, regular rate and rhythm  Pulmonary/Chest: CTAB. No wheezing or rales   Abdominal: +BS. Nontender to palpation. No organomegaly present.  Neurological: Alert. Normal affect. CNII-XII intact. Muscle strength 5/5. Sensory is intact.  Extremities: No clubbing, cyanosis or inflammation   Skin: normal texture, color  Feet: bilateral neuropathy left worse than right, follows Dr. Araya.  [ due 12/2019]  Lymphatic: no cervical lymphadenopathy.  Psychological: appropriate mood     In House Labs:   A1C     11.0   11/8/2016  A1C     10.4   7/26/2016  A1C     11.5   4/15/2016  A1C     11.2   2/23/2016  A1C      9.9   12/22/2015    TSH   Date Value Ref Range Status   06/25/2018 2.46 0.40 - 4.00 mU/L Final   12/22/2017 1.70 0.40 - 4.00 mU/L Final   02/09/2017 1.33 0.40 - 4.00 mU/L Final   09/27/2016 1.88 0.40 - 4.00 mU/L Final   07/26/2016 1.78 0.40 - 4.00 mU/L Final     T4 Free   Date Value Ref Range Status   06/25/2018 1.17 0.76 - 1.46 ng/dL Final   12/22/2017 1.16 0.76 - 1.46 ng/dL Final   02/09/2017 1.25 0.76 - 1.46 ng/dL Final       Creatinine   Date Value Ref Range Status   10/19/2018 0.83 0.52 - 1.04 mg/dL Final   ]    Recent Labs   Lab Test  07/26/16   0828  02/23/16   1119    08/21/15   0825   06/16/15   0941   CHOL  137  184   < >  174   --   224*   HDL  57  59   < >  49*   --   47*   LDL  48  88   < >  86   < >  120   TRIG  162*  187*   < >  197*   --   283*   CHOLHDLRATIO   --    --    --   3.6   --   4.8    < > = values in this interval not displayed.       No results found for: [id number], [id number], [id number]      Assessment/Treatment Plan:      78 year old female  here for a follow up visit.     1. Type 2 Diabetes with uncontrolled hyperglycemia :   Neuropathy, nephropathy.  No retinopathy.   Her recent A1c was 11.2 indicating poor control    Barriers:   She is a  and hypoglycemia is a significant risk and patient tries to avoid at all costs.   She denies having changes in diet but she may be underreporting her diet.    Does not want SGLT2 due to amputation risk. I discussed about other agents as Farxiga.   Cost barrier: Lantus cost was 400 for 3 months  Trulicity not covered by insurance.     Plan:   Try bydureon injections, if not covered increase Lantus to 30 units with goal of increasing 2 units every week if fasting BG > 150/ .   Trulicity recently increased to 1.5 mg  Metformin 1 g BID  Gilipizide 1 mg BID  Cortisol and IGF1    2.  Hypothyroidism S/P Thyroidectomy for Graves' disease.  Continue levothyroxine 137  g daily  Recheck TSH levels.     3.  Hyperlipidemia  Crestor on alternate days.       Candice Worley MD  [id number]  Endocrinology Service              Again, thank you for allowing me to participate in the care of your patient.        Sincerely,        Candice Worley MD

## 2018-12-28 NOTE — LETTER
Patient:  Brandy Leon  :   1940  MRN:     [id number]        Ms.Charlotte DEANN Leon  [address] [address]        January 3, 2019    Dear ,    We are writing to inform you of your test results. These testing do not indicate there are other hormonal problems that is making your blood glucose control difficult.     Please check frequently at home and send me a report of 1 week blood sugar : Fasting, before meals and at bedtime.     Best regards,   Candice Worley MD  [id number]  Endocrinology Service        Resulted Orders   Insulin Growth Factor 1 by Immunoassay   Result Value Ref Range    Ins Growth Factor 1 113 20 - 214 ng/ml   Cortisol   Result Value Ref Range    Cortisol Serum 9.5 4 - 22 ug/dL      Comment:      8 AM Cortisol Reference Range = 4-22 ug/dL  4 PM Cortisol Reference Range = 3-17 ug/dL     TSH with free T4 reflex   Result Value Ref Range    TSH 2.96 0.40 - 4.00 mU/L       Candice Worley MD

## 2018-12-29 ENCOUNTER — TELEPHONE (OUTPATIENT)
Dept: SLEEP MEDICINE | Facility: CLINIC | Age: 78
End: 2018-12-29

## 2018-12-29 DIAGNOSIS — G47.33 OSA (OBSTRUCTIVE SLEEP APNEA): ICD-10-CM

## 2018-12-29 NOTE — TELEPHONE ENCOUNTER
Called patient to schedule auto cpap setup that Favian Cedeño ordered, but patient's line is currently busy.

## 2018-12-31 ENCOUNTER — TELEPHONE (OUTPATIENT)
Dept: SLEEP MEDICINE | Facility: CLINIC | Age: 78
End: 2018-12-31

## 2018-12-31 DIAGNOSIS — G47.33 OSA (OBSTRUCTIVE SLEEP APNEA): ICD-10-CM

## 2018-12-31 NOTE — TELEPHONE ENCOUNTER
Called patient to schedule auto cpap setup that Favian Cedeño ordered. Scheduled for 1/7/19 at 8:30 AM at  Sleep. Informed patient that we could get her in sooner at [place], and patient prefers  sleep. Confirmed that patient is staying with [facility], and there is no change in her member ID. Instructed patient to call [facility] if she has any further questions or concerns.

## 2019-01-07 ENCOUNTER — DOCUMENTATION ONLY (OUTPATIENT)
Dept: SLEEP MEDICINE | Facility: CLINIC | Age: 79
End: 2019-01-07
Payer: COMMERCIAL

## 2019-01-07 DIAGNOSIS — G47.33 OSA (OBSTRUCTIVE SLEEP APNEA): ICD-10-CM

## 2019-01-07 NOTE — PROGRESS NOTES
Patient was offered choice of vendor and chose [facility].  Brandy Leon was set up at [place] on January 7, 2019 Patient received a Resmed AirSense 10 Auto. Pressures were set at 6-15 cm H2O.   Patient s ramp is 5 cm H2O for Auto and FLEX/EPR is EPR, 2.  Patient received a Resmed Mask name: Airfit N20 Nasal mask Size Small, heated tubing and heated humidifier.  Patient is enrolled in the STM Program and does need to meet compliance. Patient has a follow up on 02/15/19 with RYANN hCua.    Emily Cortes

## 2019-01-10 ENCOUNTER — DOCUMENTATION ONLY (OUTPATIENT)
Dept: SLEEP MEDICINE | Facility: CLINIC | Age: 79
End: 2019-01-10

## 2019-01-10 DIAGNOSIS — G47.33 OSA (OBSTRUCTIVE SLEEP APNEA): ICD-10-CM

## 2019-01-10 NOTE — PROGRESS NOTES
Patient returned call.     Subjective measures: Pt states things are going well and has no issues or complaints.  Pt is benefiting from therapy. Patient meeting subjective benchmarks.     Action plan:pt to have 14 day [facility] visit.

## 2019-01-10 NOTE — PROGRESS NOTES
3 DAY STM VISIT    Diagnostic AHI: 56.1 PSG    Patient contacted for 3 day STM visit  Message left for patient to return call     Device type: Auto-CPAP  PAP settings from order::  CPAP min 6 cm  H20       CPAP max 15 cm  H20  Mask type:    Nasal Mask     Device settings from machine      Min CPAP 6.0            Max CPAP 15.0      Assessment: Nightly usage, most nights over four hours   Action plan: Pt to have f/u 14 day STM visit.  Patient has a follow up visit scheduled:   yes within 31-90 days of set up.

## 2019-01-20 DIAGNOSIS — N32.81 OVERACTIVE BLADDER: ICD-10-CM

## 2019-01-20 DIAGNOSIS — R35.0 URINARY FREQUENCY: ICD-10-CM

## 2019-01-22 ENCOUNTER — DOCUMENTATION ONLY (OUTPATIENT)
Dept: SLEEP MEDICINE | Facility: CLINIC | Age: 79
End: 2019-01-22

## 2019-01-22 DIAGNOSIS — G47.33 OSA (OBSTRUCTIVE SLEEP APNEA): ICD-10-CM

## 2019-01-22 RX ORDER — OXYBUTYNIN CHLORIDE 5 MG/1
TABLET, EXTENDED RELEASE ORAL
Qty: 180 TABLET | Refills: 1 | Status: SHIPPED | OUTPATIENT
Start: 2019-01-22 | End: 2019-07-13

## 2019-01-22 NOTE — PROGRESS NOTES
14  DAY STM VISIT    Diagnostic AHI: 56.1   PSG    Subjective measures:   Pt states things are going well and has no issues or complaints.  Pt is benefiting from therapy.      Assessment: Pt meeting objective benchmarks.  Patient meeting subjective benchmarks.     Action plan: pt to have 30 day STM visit.      Device type: Auto-CPAP    PAP settings: CPAP min 6.0 cm  H20       CPAP max 15.0 cm  H20      95th% pressure 15 cm  H20     Mask type:  Nasal Mask    Objective measures: 14 day rolling measures      Compliance  100 %      Leak  28.89 lpm  last  Upload- few nights with very elevated leak.       AHI 2.43   last  upload      Average number of minutes 435      Objective measure goal  Compliance   Goal >70%  Leak   Goal < 24 lpm  AHI  Goal < 5  Usage  Goal >240

## 2019-02-07 ENCOUNTER — DOCUMENTATION ONLY (OUTPATIENT)
Dept: SLEEP MEDICINE | Facility: CLINIC | Age: 79
End: 2019-02-07

## 2019-02-07 DIAGNOSIS — G47.33 OSA (OBSTRUCTIVE SLEEP APNEA): ICD-10-CM

## 2019-02-07 NOTE — PROGRESS NOTES
30 DAY STM VISIT    Diagnostic AHI: 56.1   PSG    Subjective measures: Pt reporting that she was feeling good however she recently fell.  No issues with pressures or mask.  She feels she is sleeping very well.         Assessment: Pt meeting objective benchmarks.  Patient meeting subjective benchmarks.   Action plan: pt to have 6 month STM visit  Patient has scheduled a follow up visit with RYANN Chua on 2/15/2019.   Device type: Auto-CPAP  PAP settings: CPAP min 6.0 cm  H20     CPAP max 15.0 cm  H20         95th% pressure 15 cm  H20   Mask type:  Nasal Mask  Objective measures: 14 day rolling measures      Compliance  92 %      Leak  24.16 lpm  last  upload      AHI 2.24   last  upload      Average number of minutes 482      Objective measure goal  Compliance   Goal >70%  Leak   Goal < 24 lpm  AHI  Goal < 5  Usage  Goal >240

## 2019-02-15 ENCOUNTER — OFFICE VISIT (OUTPATIENT)
Dept: SLEEP MEDICINE | Facility: CLINIC | Age: 79
End: 2019-02-15
Payer: COMMERCIAL

## 2019-02-15 VITALS
WEIGHT: 191 LBS | DIASTOLIC BLOOD PRESSURE: 73 MMHG | HEIGHT: 64 IN | HEART RATE: 99 BPM | OXYGEN SATURATION: 95 % | BODY MASS INDEX: 32.61 KG/M2 | SYSTOLIC BLOOD PRESSURE: 115 MMHG

## 2019-02-15 DIAGNOSIS — G47.33 OSA (OBSTRUCTIVE SLEEP APNEA): Primary | ICD-10-CM

## 2019-02-15 PROCEDURE — 99214 OFFICE O/P EST MOD 30 MIN: CPT | Performed by: PHYSICIAN ASSISTANT

## 2019-02-15 ASSESSMENT — MIFFLIN-ST. JEOR: SCORE: 1336.37

## 2019-02-15 NOTE — PROGRESS NOTES
Obstructive Sleep Apnea - PAP Follow-Up Visit:    Chief Complaint   Patient presents with     CPAP Follow Up       Brandy Leon comes in today for follow-up of their severe sleep apnea, managed with CPAP.     Brandy Leon initially presented with a loud snoring, non-refreshing sleep, daytime fatigue/sleepiness, difficulty maintaining sleep, crowded oropharynx and co-morbid HTN.    12/26/2018 [facility] Diagnostic Sleep Study (189.0 lbs) - AHI 56.1, RDI 77.8, Supine AHI 56.1, REM AHI 35.3, Low O2 48.6%, Time Spent =88% 15.4 minutes / Time Spent =89% 20.4 minutes. This was reviewed with the patient.     January 7, 2019 Patient received a Resmed AirSense 10 Auto. Pressures were set at 6-15 cm H2O.    Overall, she rates the experience with PAP as 9 (0 poor, 10 great). The mask is comfortable.    The mask is leaking.  She is not snoring with the mask on. She is not having gasp arousals.  She is having significant oral/nasal dryness. The pressure is comfortable.     Her PAP interface is Nasal Mask.    Bedtime is typically 2200. Usually it takes about 10 min minutes to fall asleep with the mask on. Wake time is typically 0800.  Patient is using PAP therapy 6 hours per night. The patient is usually getting 7 hours of sleep per night.    She does feel rested in the morning.    Total score - Mil[place]: 2 (2/15/2019  9:00 AM)      MARY KATE Total Score: 3      ResMed     Auto-PAP 6.0 - 15.0 cmH2O 30 day usage data:    93% of days with > 4 hours of use. 0/30 days with no use.   Average use 436 minutes per day.   95%ile Leak 24.39 L/min.   CPAP 95% pressure 15 cm.   AHI 2.32 events per hour.     Past medical/surgical history, family history, social history, medications and allergies were reviewed.      Problem List:  Patient Active Problem List    Diagnosis Date Noted     DINORA (obstructive sleep apnea)- severe (AHI 56) 12/27/2018     Priority: Medium     12/26/2018 [facility] Diagnostic Sleep Study (189.0 lbs) - AHI 56.1, RDI  "77.8, Supine AHI 56.1, REM AHI 35.3, Low O2 48.6%, Time Spent ?88% 15.4 minutes / Time Spent ?89% 20.4 minutes.       Pseudophakia of both eyes 12/05/2018     Priority: Medium     Recurrent UTI 06/29/2016     Priority: Medium     Overactive bladder 12/22/2015     Priority: Medium     Obesity 10/23/2015     Priority: Medium     Type 2 diabetes mellitus with hyperglycemia, with long-term current use of insulin (H) 10/23/2015     Priority: Medium     Hypertension goal BP (blood pressure) < 140/90 10/23/2015     Priority: Medium     Advanced directives, counseling/discussion 10/18/2013     Priority: Medium     Advance Care Planning:   ACP Review and Resources Provided:  Reviewed chart for advance care plan.  Brandy Leon has no plan or code status on file. Mailed available resources and provided with information. Confirmed code status reflects current choices pending further ACP discussions.  Confirmed/documented designated decision maker(s). See permanent comments section of demographics in clinical tab.   Added by Josefina Pagan on 5/7/2015  Patient does not have an Advance/Health Care Directive (HCD), given \"What is Advance Care Planning?\" flyer and requests blank HCD form.  Rocio Sparks  October 18, 2013       Osteoarthritis 12/04/2009     Priority: Medium     Per Dr. Ya 2009       Fibromyalgia 05/22/2009     Priority: Medium     Hyperlipidemia LDL goal <100 04/29/2009     Priority: Medium     Seasonal allergies      Priority: Medium     Hypothyroidism      Priority: Medium     s/p subtotal thyroidectomy           /73   Pulse 99   Ht 1.626 m (5' 4\")   Wt 86.6 kg (191 lb)   SpO2 95%   BMI 32.79 kg/m      Impression/Plan:  Severe obstructive sleep apnea with sleep related hypoxemia-  Excellent compliance and AHI appears well controlled on CPAP 6-15 cm/H20.  Continue current treatment.   Mask fitting.   Overnight oximetry.    Brandy Leon will follow up in about 1 year, sooner if " questions/concerns.    Twenty-five minutes spent with patient, all of which were spent face-to-face counseling, consulting, coordinating plan of care regarding DINORA and sleep related hypoxemia.      Favian Cedeño PA-C    CC:  Cailin Ponce

## 2019-02-15 NOTE — NURSING NOTE
"Chief Complaint   Patient presents with     CPAP Follow Up       Initial /73   Pulse 99   Ht 1.626 m (5' 4\")   Wt 86.6 kg (191 lb)   SpO2 95%   BMI 32.79 kg/m   Estimated body mass index is 32.79 kg/m  as calculated from the following:    Height as of this encounter: 1.626 m (5' 4\").    Weight as of this encounter: 86.6 kg (191 lb).    Medication Reconciliation: complete      "

## 2019-02-18 ENCOUNTER — DOCUMENTATION ONLY (OUTPATIENT)
Dept: SLEEP MEDICINE | Facility: CLINIC | Age: 79
End: 2019-02-18
Payer: COMMERCIAL

## 2019-02-19 ENCOUNTER — OFFICE VISIT (OUTPATIENT)
Dept: PODIATRY | Facility: CLINIC | Age: 79
End: 2019-02-19
Payer: COMMERCIAL

## 2019-02-19 VITALS — HEART RATE: 100 BPM | OXYGEN SATURATION: 96 % | DIASTOLIC BLOOD PRESSURE: 67 MMHG | SYSTOLIC BLOOD PRESSURE: 118 MMHG

## 2019-02-19 DIAGNOSIS — E11.49 TYPE II OR UNSPECIFIED TYPE DIABETES MELLITUS WITH NEUROLOGICAL MANIFESTATIONS, NOT STATED AS UNCONTROLLED(250.60) (H): ICD-10-CM

## 2019-02-19 DIAGNOSIS — B35.1 ONYCHOMYCOSIS: Primary | ICD-10-CM

## 2019-02-19 DIAGNOSIS — L60.2 ONYCHAUXIS: ICD-10-CM

## 2019-02-19 DIAGNOSIS — B35.3 TINEA PEDIS OF BOTH FEET: ICD-10-CM

## 2019-02-19 PROCEDURE — 99213 OFFICE O/P EST LOW 20 MIN: CPT | Performed by: PODIATRIST

## 2019-02-19 RX ORDER — NYSTATIN AND TRIAMCINOLONE ACETONIDE 100000; 1 [USP'U]/G; MG/G
CREAM TOPICAL 2 TIMES DAILY
Qty: 60 G | Refills: 2 | Status: SHIPPED | OUTPATIENT
Start: 2019-02-19 | End: 2020-06-23

## 2019-02-19 NOTE — LETTER
2/19/2019         RE: Brandy Leon  [address] [id number]        Dear Colleague,    Thank you for referring your patient, Brandy Leon, to the [facility]. Please see a copy of my visit note below.    Past Medical History:   Diagnosis Date     Actinic keratosis 3/12/2013     Degenerative joint disease      Diabetes (H)      Rheumatic fever 1957    x2 between the ages of 15-18     Seasonal allergies      Patient Active Problem List   Diagnosis     Seasonal allergies     Hypothyroidism     Hyperlipidemia LDL goal <100     Fibromyalgia     Osteoarthritis     Advanced directives, counseling/discussion     Obesity     Type 2 diabetes mellitus with hyperglycemia, with long-term current use of insulin (H)     Hypertension goal BP (blood pressure) < 140/90     Overactive bladder     Recurrent UTI     Pseudophakia of both eyes     DINORA (obstructive sleep apnea)- severe (AHI 56)     Past Surgical History:   Procedure Laterality Date     C APPENDECTOMY       C ARTHROPLASTY TMJ       CATARACT IOL, RT/LT       COLONOSCOPY       COLONOSCOPY N/A 8/13/2015    Procedure: COLONOSCOPY;  Surgeon: Duane, William Charles, MD;  Location: [facility] OR     COLONOSCOPY WITH CO2 INSUFFLATION N/A 8/13/2015    Procedure: COLONOSCOPY WITH CO2 INSUFFLATION;  Surgeon: Duane, William Charles, MD;  Location: [facility] OR     PHACOEMULSIFICATION WITH STANDARD INTRAOCULAR LENS IMPLANT Left 10/25/2018    Procedure: LEFT PHACOEMULSIFICATION WITH STANDARD INTRAOCULAR LENS IMPLANT;  Surgeon: Thomas Serna MD;  Location: [facility] OR     PHACOEMULSIFICATION WITH STANDARD INTRAOCULAR LENS IMPLANT Right 11/8/2018    Procedure: RIGHT PHACOEMULSIFICATION WITH STANDARD INTRAOCULAR LENS IMPLANT;  Surgeon: Thomas Serna MD;  Location: [facility] OR     THYROIDECTOMY        Social History     Socioeconomic History     Marital status: Single     Spouse name: Not on file     Number of children: Not on  file     Years of education: Not on file     Highest education level: Not on file   Social Needs     Financial resource strain: Not on file     Food insecurity - worry: Not on file     Food insecurity - inability: Not on file     Transportation needs - medical: Not on file     Transportation needs - non-medical: Not on file   Occupational History     Occupation:    Tobacco Use     Smoking status: Never Smoker     Smokeless tobacco: Never Used     Tobacco comment: has had exposure to second hand smoke in the past from [facility], no current exposure   Substance and Sexual Activity     Alcohol use: No     Drug use: No     Sexual activity: No   Other Topics Concern     Parent/sibling w/ CABG, MI or angioplasty before 65F 55M? 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     Hobby Hazards No     Sleep Concern Yes     Comment: difficulty staying asleep, up and down all night     Stress Concern No     Weight Concern Yes     Comment: would like to lose     Special Diet Yes     Comment: low card, low sugar diet     Back Care Yes     Comment: chiropratior     Exercise Yes     Comment: almost everyday     Bike Helmet No     Comment: does not ride bicycle any more     Seat Belt Yes     Self-Exams Yes   Social History Narrative     Not on file     Family History   Problem Relation Age of Onset     Cancer Father         skin and leukemia     Cerebrovascular Disease Maternal Grandfather      Cerebrovascular Disease Paternal Grandmother      Eye Disorder Paternal Grandmother         cataracts     Cerebrovascular Disease Paternal Grandfather      Heart Disease Paternal Grandfather      Cancer Sister         Breast Cancer     Eye Disorder Mother         cataracts     Eye Disorder Brother         cataract     Breast Cancer Daughter      Other  Cancer Daughter         ovarian cancer     Glaucoma No family hx of      Macular Degeneration No family hx of      SUBJECTIVE FINDINGS:  A 78-year-old female returns to clinic for toenail care and diabetic foot check.  She relates she is doing well.  No ulcers or sores since we have seen her last.  She is not wearing diabetic shoes.  She just wears an extra wide shoe.  She does not want diabetic shoes.  She relates she does have numbness, tingling and neuropathy in her feet.  She relates she is using moisturizing lotion, but she still has dry feet.  No injuries since we have seen her last.      OBJECTIVE FINDINGS:  DP and PT are 2/4 bilaterally.  She has decreased hair growth bilaterally.  CFT is less than 3 seconds bilaterally.  Sharp/dull is decreased with 5.07 [place] monofilament bilaterally.  She has decreased ankle joint dorsiflexion bilaterally.  Deep tendon reflexes are intact bilaterally.  She has dorsally contracted digits 1-5 bilaterally.  There are no gross tendon voids bilaterally.  No erythema, no edema, no drainage, no odor, no calor bilaterally.  She has dystrophic, thickened, brittle nails with subungual debris, dystrophy and discoloration, hallux nails bilaterally, and mildly in the lesser nails bilaterally.  She has some dried ecchymosis in the left hallux nail that is growing out.       ASSESSMENT AND PLAN:  Onychomycosis, onychauxis bilaterally.  She is diabetic with peripheral neuropathy.  Diagnosis and treatment options discussed with the patient.  She has tinea pedis present.  All the nails were debrided or reduced bilaterally upon consent.  Prescription for Mycolog cream given and use discussed with her.  Return to clinic and see me in 3 months.         Again, thank you for allowing me to participate in the care of your patient.        Sincerely,        Ehsan Araya DPM

## 2019-02-19 NOTE — NURSING NOTE
Brandy Leon's chief complaint for this visit includes:  Chief Complaint   Patient presents with     Right Foot - Toenail     Left Foot - Toenail     PCP: Cailin Ponce    Referring Provider:  No referring provider defined for this encounter.    /67 (BP Location: Left arm, Patient Position: Sitting, Cuff Size: Adult Regular)   Pulse 100   SpO2 96%   Data Unavailable     Do you need any medication refills at today's visit? No    Taylor Vail LPN

## 2019-02-19 NOTE — PATIENT INSTRUCTIONS
Thanks for coming today.  [facility]  [address] [place] [address]    To schedule future appointments in Ortho Clinic, you may call [phone].    To schedule ordered imaging by your provider:   Call Central Imaging [phone]    To schedule an injection ordered by your provider:  Call Central Imaging Injection scheduling line: [phone]  [employer] available online at:  [email or web address]    Please call if any further questions or concerns ([phone]).  Clinic hours 8 am to 5 pm.    Return to clinic (call) if symptoms worsen or fail to improve.

## 2019-02-19 NOTE — PROGRESS NOTES
Past Medical History:   Diagnosis Date     Actinic keratosis 3/12/2013     Degenerative joint disease      Diabetes (H)      Rheumatic fever 1957    x2 between the ages of 15-18     Seasonal allergies      Patient Active Problem List   Diagnosis     Seasonal allergies     Hypothyroidism     Hyperlipidemia LDL goal <100     Fibromyalgia     Osteoarthritis     Advanced directives, counseling/discussion     Obesity     Type 2 diabetes mellitus with hyperglycemia, with long-term current use of insulin (H)     Hypertension goal BP (blood pressure) < 140/90     Overactive bladder     Recurrent UTI     Pseudophakia of both eyes     DINORA (obstructive sleep apnea)- severe (AHI 56)     Past Surgical History:   Procedure Laterality Date     C APPENDECTOMY       C ARTHROPLASTY TMJ       CATARACT IOL, RT/LT       COLONOSCOPY       COLONOSCOPY N/A 8/13/2015    Procedure: COLONOSCOPY;  Surgeon: Duane, William Charles, MD;  Location: [facility] OR     COLONOSCOPY WITH CO2 INSUFFLATION N/A 8/13/2015    Procedure: COLONOSCOPY WITH CO2 INSUFFLATION;  Surgeon: Duane, William Charles, MD;  Location: [facility]     PHACOEMULSIFICATION WITH STANDARD INTRAOCULAR LENS IMPLANT Left 10/25/2018    Procedure: LEFT PHACOEMULSIFICATION WITH STANDARD INTRAOCULAR LENS IMPLANT;  Surgeon: Thomas Serna MD;  Location: [facility]     PHACOEMULSIFICATION WITH STANDARD INTRAOCULAR LENS IMPLANT Right 11/8/2018    Procedure: RIGHT PHACOEMULSIFICATION WITH STANDARD INTRAOCULAR LENS IMPLANT;  Surgeon: Thomas Serna MD;  Location: [facility]     THYROIDECTOMY        Social History     Socioeconomic History     Marital status: Single     Spouse name: Not on file     Number of children: Not on file     Years of education: Not on file     Highest education level: Not on file   Social Needs     Financial resource strain: Not on file     Food insecurity - worry: Not on file     Food insecurity - inability: Not on file     Transportation needs - medical: Not on file      Transportation needs - non-medical: Not on file   Occupational History     Occupation:    Tobacco Use     Smoking status: Never Smoker     Smokeless tobacco: Never Used     Tobacco comment: has had exposure to second hand smoke in the past from [employer], no current exposure   Substance and Sexual Activity     Alcohol use: No     Drug use: No     Sexual activity: No   Other Topics Concern     Parent/sibling w/ CABG, MI or angioplasty before 65F 55M? 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     Hobby Hazards No     Sleep Concern Yes     Comment: difficulty staying asleep, up and down all night     Stress Concern No     Weight Concern Yes     Comment: would like to lose     Special Diet Yes     Comment: low card, low sugar diet     Back Care Yes     Comment: chiropratior     Exercise Yes     Comment: almost everyday     Bike Helmet No     Comment: does not ride bicycle any more     Seat Belt Yes     Self-Exams Yes   Social History Narrative     Not on file     Family History   Problem Relation Age of Onset     Cancer Father         skin and leukemia     Cerebrovascular Disease Maternal Grandfather      Cerebrovascular Disease Paternal Grandmother      Eye Disorder Paternal Grandmother         cataracts     Cerebrovascular Disease Paternal Grandfather      Heart Disease Paternal Grandfather      Cancer Sister         Breast Cancer     Eye Disorder Mother         cataracts     Eye Disorder Brother         cataract     Breast Cancer Daughter      Other Cancer Daughter         ovarian cancer     Glaucoma No family hx of      Macular Degeneration No family hx of      SUBJECTIVE FINDINGS:  A 78-year-old female returns to clinic for toenail care and diabetic foot check.  She relates she is doing well.  No ulcers or sores  since we have seen her last.  She is not wearing diabetic shoes.  She just wears an extra wide shoe.  She does not want diabetic shoes.  She relates she does have numbness, tingling and neuropathy in her feet.  She relates she is using moisturizing lotion, but she still has dry feet.  No injuries since we have seen her last.      OBJECTIVE FINDINGS:  DP and PT are 2/4 bilaterally.  She has decreased hair growth bilaterally.  CFT is less than 3 seconds bilaterally.  Sharp/dull is decreased with 5.07 [place]-Yao monofilament bilaterally.  She has decreased ankle joint dorsiflexion bilaterally.  Deep tendon reflexes are intact bilaterally.  She has dorsally contracted digits 1-5 bilaterally.  There are no gross tendon voids bilaterally.  No erythema, no edema, no drainage, no odor, no calor bilaterally.  She has dystrophic, thickened, brittle nails with subungual debris, dystrophy and discoloration, hallux nails bilaterally, and mildly in the lesser nails bilaterally.  She has some dried ecchymosis in the left hallux nail that is growing out.       ASSESSMENT AND PLAN:  Onychomycosis, onychauxis bilaterally.  She is diabetic with peripheral neuropathy.  Diagnosis and treatment options discussed with the patient.  She has tinea pedis present.  All the nails were debrided or reduced bilaterally upon consent.  Prescription for Mycolog cream given and use discussed with her.  Return to clinic and see me in 3 months.

## 2019-02-20 ENCOUNTER — TELEPHONE (OUTPATIENT)
Dept: PHARMACY | Facility: CLINIC | Age: 79
End: 2019-02-20

## 2019-02-20 NOTE — TELEPHONE ENCOUNTER
Called patient to schedule follow-up appointment with [facility] pharmacist. Patient would not like to schedule at this time. Multiple attempts made to schedule follow-up and patient declined, MTM no longer following.     Anay Magaña, Pharm.D. IV Student

## 2019-03-04 NOTE — TELEPHONE ENCOUNTER
MTM will no longer be following. Would be happy to see patient again in the future.  Mirna Perez, Pharm.D, BCACP  Medication Therapy Management Pharmacist

## 2019-03-09 DIAGNOSIS — Z79.4 TYPE 2 DIABETES MELLITUS WITH HYPERGLYCEMIA, WITH LONG-TERM CURRENT USE OF INSULIN (H): ICD-10-CM

## 2019-03-09 DIAGNOSIS — E11.65 TYPE 2 DIABETES MELLITUS WITH HYPERGLYCEMIA, WITH LONG-TERM CURRENT USE OF INSULIN (H): ICD-10-CM

## 2019-03-11 RX ORDER — GLIPIZIDE 10 MG/1
10 TABLET ORAL
Qty: 360 TABLET | Refills: 0 | Status: SHIPPED | OUTPATIENT
Start: 2019-03-11 | End: 2019-09-11

## 2019-03-26 ENCOUNTER — OFFICE VISIT (OUTPATIENT)
Dept: DERMATOLOGY | Facility: CLINIC | Age: 79
End: 2019-03-26
Payer: COMMERCIAL

## 2019-03-26 ENCOUNTER — OFFICE VISIT (OUTPATIENT)
Dept: PEDIATRICS | Facility: CLINIC | Age: 79
End: 2019-03-26
Payer: COMMERCIAL

## 2019-03-26 VITALS
HEART RATE: 100 BPM | HEIGHT: 64 IN | TEMPERATURE: 98.1 F | OXYGEN SATURATION: 95 % | DIASTOLIC BLOOD PRESSURE: 72 MMHG | BODY MASS INDEX: 31.55 KG/M2 | WEIGHT: 184.8 LBS | SYSTOLIC BLOOD PRESSURE: 122 MMHG

## 2019-03-26 DIAGNOSIS — L57.0 AK (ACTINIC KERATOSIS): Primary | ICD-10-CM

## 2019-03-26 DIAGNOSIS — Z79.4 TYPE 2 DIABETES MELLITUS WITH HYPERGLYCEMIA, WITH LONG-TERM CURRENT USE OF INSULIN (H): Chronic | ICD-10-CM

## 2019-03-26 DIAGNOSIS — G47.33 OSA (OBSTRUCTIVE SLEEP APNEA): Chronic | ICD-10-CM

## 2019-03-26 DIAGNOSIS — R05.9 COUGH: ICD-10-CM

## 2019-03-26 DIAGNOSIS — J01.90 ACUTE SINUSITIS WITH SYMPTOMS > 10 DAYS: Primary | ICD-10-CM

## 2019-03-26 DIAGNOSIS — E11.65 TYPE 2 DIABETES MELLITUS WITH HYPERGLYCEMIA, WITH LONG-TERM CURRENT USE OF INSULIN (H): Chronic | ICD-10-CM

## 2019-03-26 PROCEDURE — 99213 OFFICE O/P EST LOW 20 MIN: CPT | Performed by: INTERNAL MEDICINE

## 2019-03-26 PROCEDURE — 17000 DESTRUCT PREMALG LESION: CPT | Performed by: DERMATOLOGY

## 2019-03-26 RX ORDER — CEFUROXIME AXETIL 500 MG/1
500 TABLET ORAL 2 TIMES DAILY
Qty: 14 TABLET | Refills: 0 | Status: SHIPPED | OUTPATIENT
Start: 2019-03-26 | End: 2019-09-13

## 2019-03-26 ASSESSMENT — PAIN SCALES - GENERAL: PAINLEVEL: NO PAIN (0)

## 2019-03-26 ASSESSMENT — MIFFLIN-ST. JEOR: SCORE: 1308.25

## 2019-03-26 NOTE — PATIENT INSTRUCTIONS
Cryotherapy    What is it?    Use of a very cold liquid, such as liquid nitrogen, to freeze and destroy abnormal skin cells that need to be removed    What should I expect?    Tenderness and redness    A small blister that might grow and fill with dark purple blood. There may be crusting.    More than one treatment may be needed if the lesions do not go away.    How do I care for the treated area?    Gently wash the area with your hands when bathing.    Use a thin layer of Vaseline to help with healing. You may use a Band-Aid.     The area should heal within 7-10 days and may leave behind a pink or lighter color.     Do not use an antibiotic or Neosporin ointment.     You may take acetaminophen (Tylenol) for pain.     Call your Doctor if you have:    Severe pain    Signs of infection (warmth, redness, cloudy yellow drainage, and or a bad smell)    Questions or concerns    Who should I call with questions?       [facility]: [phone]       [facility]: [phone]       For urgent needs outside of business hours call the [facility] at [phone]        and ask for the dermatology resident on call

## 2019-03-26 NOTE — PROGRESS NOTES
SUBJECTIVE:   Brandy Leon is a 78 year old female who presents to clinic today for the following health issues:      Acute Illness   Acute illness concerns: Cough  Onset: 2 weeks    Fever: no     Chills/Sweats: YES- chills at night    Headache (location?): YES    Sinus Pressure:YES    Conjunctivitis:  no    Ear Pain: YES- sore this morning    Rhinorrhea: YES    Congestion: YES    Sore Throat: YES     Cough: YES-productive of green sputum    Wheeze: no     Decreased Appetite: no     Nausea: no     Vomiting: no     Diarrhea:  no     Dysuria/Freq.: no     Fatigue/Achiness: YES    Sick/Strep Exposure: YES  Symptoms have worsened since she has been using her CPAP machine   Therapies Tried and outcome: OTC cold medication    HPI  78-year-old lady with history of diabetes and obstructive sleep apnea presents with 2-week history of postnasal drip cough head congestion and aching.  She denies shortness of breath.  No fever or chills.          Problem list and histories reviewed & adjusted, as indicated.  Additional history: as documented    Patient Active Problem List   Diagnosis     Seasonal allergies     Hypothyroidism     Hyperlipidemia LDL goal <100     Fibromyalgia     Osteoarthritis     Advanced directives, counseling/discussion     Obesity     Type 2 diabetes mellitus with hyperglycemia, with long-term current use of insulin (H)     Hypertension goal BP (blood pressure) < 140/90     Overactive bladder     Recurrent UTI     Pseudophakia of both eyes     DINORA (obstructive sleep apnea)- severe (AHI 56)     Past Surgical History:   Procedure Laterality Date     C APPENDECTOMY       C ARTHROPLASTY TMJ       CATARACT IOL, RT/LT       COLONOSCOPY       COLONOSCOPY N/A 8/13/2015    Procedure: COLONOSCOPY;  Surgeon: Duane, William Charles, MD;  Location: [facility]     COLONOSCOPY WITH CO2 INSUFFLATION N/A 8/13/2015    Procedure: COLONOSCOPY WITH CO2 INSUFFLATION;  Surgeon: Duane, William Charles, MD;  Location:  OR      "PHACOEMULSIFICATION WITH STANDARD INTRAOCULAR LENS IMPLANT Left 10/25/2018    Procedure: LEFT PHACOEMULSIFICATION WITH STANDARD INTRAOCULAR LENS IMPLANT;  Surgeon: Thomas Serna MD;  Location: [facility]     PHACOEMULSIFICATION WITH STANDARD INTRAOCULAR LENS IMPLANT Right 11/8/2018    Procedure: RIGHT PHACOEMULSIFICATION WITH STANDARD INTRAOCULAR LENS IMPLANT;  Surgeon: Thomas Serna MD;  Location: [facility]     THYROIDECTOMY          Social History     Tobacco Use     Smoking status: Never Smoker     Smokeless tobacco: Never Used     Tobacco comment: has had exposure to second hand smoke in the past from [place], no current exposure   Substance Use Topics     Alcohol use: No     Family History   Problem Relation Age of Onset     Cancer Father         skin and leukemia     Cerebrovascular Disease Maternal Grandfather      Cerebrovascular Disease Paternal Grandmother      Eye Disorder Paternal Grandmother         cataracts     Cerebrovascular Disease Paternal Grandfather      Heart Disease Paternal Grandfather      Cancer Sister         Breast Cancer     Eye Disorder Mother         cataracts     Eye Disorder Brother         cataract     Breast Cancer Daughter      Other Cancer Daughter         ovarian cancer     Glaucoma No family hx of      Macular Degeneration No family hx of            Reviewed and updated as needed this visit by clinical staff  Tobacco  Allergies  Meds  Med Hx  Surg Hx  Fam Hx  Soc Hx      Reviewed and updated as needed this visit by Provider         ROS:  Constitutional, HEENT, cardiovascular, pulmonary, GI, , musculoskeletal, neuro, skin, endocrine and psych systems are negative, except as otherwise noted.    OBJECTIVE:     /72 (BP Location: Right arm, Patient Position: Sitting, Cuff Size: Adult Large)   Pulse 100   Temp 98.1  F (36.7  C) (Temporal)   Ht 1.626 m (5' 4\")   Wt 83.8 kg (184 lb 12.8 oz)   SpO2 95%   BMI 31.72 kg/m    Body mass index is 31.72 " kg/m .  GENERAL: healthy, alert and no distress  HENT: ear canals and TM's normal, nose and mouth without ulcers or lesions.   Bilateral maxillary and ethmoidal sinus tenderness  NECK: no adenopathy, no asymmetry, masses, or scars and thyroid normal to palpation  RESP: lungs clear to auscultation - no rales, rhonchi or wheezes  CV: regular rate and rhythm, normal S1 S2, no S3 or S4, no murmur, click or rub, no peripheral edema and peripheral pulses strong    Diagnostic Test Results:  none     ASSESSMENT/PLAN:     1.  AcuteMaxillary and ethmoidal sinusitis.  Will treat with cefuroxime 500 mg twice a day for 7 days.  2.  Cough probably secondary to #1 or possible post viral cough.  3.  Type 2 diabetes mellitus  4.  Obstructive sleep apnea on CPAP.    Kyle Caldera MD  [facility]

## 2019-03-26 NOTE — LETTER
3/26/2019         RE: Brandy Leon  [address] [id number]        Dear Colleague,    Thank you for referring your patient, Brandy Leon, to the [facility]. Please see a copy of my visit note below.    [facility] Dermatology Note      Dermatology Problem List:  1. Family history of melanoma, father  2. Actinic keratosis  -s/p cryotherapy  3. Seborrheic dermatitis, scalp  -s/p ketoconazole shampoo  -s/p Free & Clear Shampoo  -s/p hydroxyzine initiated 6/18/2013  -s/p Nicoral shampoo initiated 3/12/2013  -s/p fluocinonide solution initiated 3/12/2013    Encounter Date: Mar 26, 2019    CC:  Chief Complaint   Patient presents with     Actinic Keratosis     Recheck of face         History of Present Illness:  This 78 year old female presents as a follow-up for spot check of actinic keratoses on the face. The patient was last seen 9/25/18 when these lesions were treated with cryotherapy. Today, the patient reports that she has not noticed any new lesions on her face. The trauma on her right shin has been steadily healing, as has the melita noir on her left foot. No other concerns to address.     Past Medical History:   Past Medical History:   Diagnosis Date     Actinic keratosis 3/12/2013     Degenerative joint disease      Diabetes (H)      Rheumatic fever 1957    x2 between the ages of 15-18     Seasonal allergies      Past Surgical History:   Procedure Laterality Date     C APPENDECTOMY       C ARTHROPLASTY TMJ       CATARACT IOL, RT/LT       COLONOSCOPY       COLONOSCOPY N/A 8/13/2015    Procedure: COLONOSCOPY;  Surgeon: Duane, William Charles, MD;  Location: [facility]     COLONOSCOPY WITH CO2 INSUFFLATION N/A 8/13/2015    Procedure: COLONOSCOPY WITH CO2 INSUFFLATION;  Surgeon: Duane, William Charles, MD;  Location: [facility]     PHACOEMULSIFICATION WITH STANDARD INTRAOCULAR LENS IMPLANT Left 10/25/2018    Procedure: LEFT  PHACOEMULSIFICATION WITH STANDARD INTRAOCULAR LENS IMPLANT;  Surgeon: Thomas Serna MD;  Location: [facility]     PHACOEMULSIFICATION WITH STANDARD INTRAOCULAR LENS IMPLANT Right 11/8/2018    Procedure: RIGHT PHACOEMULSIFICATION WITH STANDARD INTRAOCULAR LENS IMPLANT;  Surgeon: Thomas Serna MD;  Location: [facility]     THYROIDECTOMY          Social  History:  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       Family History:  The patient reports a family history of melanoma in her father.    Medications:  Current Outpatient Medications   Medication Sig Dispense Refill     aspirin 81 MG EC tablet Take 1 tablet by mouth daily. 90 tablet 3     ciclopirox (LOPROX) 0.77 % cream Apply topically 2 times daily To feet and toenails. 90 g 6     exenatide ER (BYDUREON) 2 MG pen Inject 2 mg Subcutaneous every 7 days 7.8 mL 3     famotidine (PEPCID) 20 MG tablet Take 20 mg by mouth nightly as needed       fexofenadine (ALLEGRA) 180 MG tablet Take 180 mg by mouth daily       glipiZIDE (GLUCOTROL) 10 MG tablet TAKE 1 TABLET (10 MG) BY MOUTH 2 TIMES DAILY (BEFORE MEALS) 360 tablet 0     GLUCOSAMINE CHONDROITIN COMPLX OR 2 Daily       insulin glargine (LANTUS SOLOSTAR) 100 UNIT/ML injection Inject 20 Units Subcutaneous daily 45 mL 3     insulin pen needle (BD JUAN C U/F) 32G X 4 MM USE 1 DAILY AS DIRECTED. 100 each 3     irbesartan-hydrochlorothiazide (AVALIDE) 150-12.5 MG per tablet TAKE 1 TABLET BY MOUTH DAILY (Patient taking differently: TAKE 0.5 TABLET BY MOUTH DAILY) 90 tablet 1     latanoprost (XALATAN) 0.005 % ophthalmic solution Place 1 drop into both eyes At Bedtime 3 Bottle 4     metFORMIN (GLUCOPHAGE) 1000 MG tablet TAKE 1 TABLET (1,000 MG) BY MOUTH 2 TIMES DAILY (WITH MEALS) 180 tablet 3     MULTIVITAMIN OR 1 tablet daily       nystatin-triamcinolone (MYCOLOG II) 394172-7.1 UNIT/GM-% external cream Apply topically 2 times daily To feet and toenails. 60 g 2     Omega-3 Fatty Acids (OMEGA 3  PO) Take by mouth 2 times daily.        ONE TOUCH DELICA LANCETS MISC 1 each 2 times daily. One Touch Delica   100 each 12     ONETOUCH ULTRA test strip USE TO TEST TWICE A  strip 11     order for DME Glucometer covered by insurance. 1 each 0     oxybutynin ER (DITROPAN-XL) 5 MG 24 hr tablet TAKE 2 TABLETS (10 MG) BY MOUTH DAILY 180 tablet 1     rosuvastatin (CRESTOR) 5 MG tablet TAKE 1 TABLET BY MOUTH TWICE WEEKLY 8 tablet 6     SYNTHROID 137 MCG tablet TAKE 1 TABLET (137 MCG) BY MOUTH DAILY 90 tablet 1     Allergies   Allergen Reactions     Estradiol Itching     Lipitor [Atorvastatin Calcium]      Weak and shaky     Sulfa Drugs      Rash       Zocor [Simvastatin]      Weak and shakey     Review of Systems:  -Const: Pt is ill - seeing primary care today.  Has cold  -Skin: As above in HPI. No additional skin concerns.    Physical exam:  There were no vitals taken for this visit.  GEN: This is a well developed, well-nourished female in no acute distress, in a pleasant mood.    SKIN: Focused examination of the face, hands, forearms, right shin, left foot was performed.  - Post inflammatory hyperpigmentation on the right shin  - 1-2 cm of clearing at the base of the left great toe nail  - There is an erythematous macule with overyling adherent scale on the left medial brow.  -No other lesions of concern on areas examined.     Impression/Plan:  1. Family history of melanoma    2. Actinic keratosis, left medial brow  Cryotherapy procedure note: After verbal consent and discussion of risks and benefits including but no limited to dyspigmentation/scar, blister, and pain, 1 was(were) treated with 1-2mm freeze border for 2 cycles with liquid nitrogen. Post cryotherapy instructions were provided.     3. Postinflammatory hyperpigmentation s/p trauma, right shin    No further intervention needed.     4. Ilya noir - improving.     No further intervention needed.      Follow up within 9 months for skin exam      Staff  Involved:  Scribe/Staff    Scribe Disclosure  I, Rajendra Naranjo, am serving as a scribe to document services personally performed by Dr. Lucita Jordan MD, based on data collection and the provider's statements to me.     Provider Disclosure:   The documentation recorded by the scribe accurately reflects the services I personally performed and the decisions made by me.    Lucita Jordan MD    Department of Dermatology  [facility]: Phone: [phone], Fax:[phone]  [facility]: Phone: [phone], Fax: [phone]        Again, thank you for allowing me to participate in the care of your patient.        Sincerely,        Lucita Jordan MD

## 2019-03-26 NOTE — NURSING NOTE
Brandy Leon's goals for this visit include:   Chief Complaint   Patient presents with     Actinic Keratosis     Recheck of face       She requests these members of her care team be copied on today's visit information:    PCP: Cailin Ponce    Referring Provider:  No referring provider defined for this encounter.    There were no vitals taken for this visit.    Do you need any medication refills at today's visit? No  Michelle Colorado LPN on 3/26/2019 at 11:02 AM

## 2019-03-26 NOTE — PROGRESS NOTES
[facility] Dermatology Note      Dermatology Problem List:  1. Family history of melanoma, father  2. Actinic keratosis  -s/p cryotherapy  3. Seborrheic dermatitis, scalp  -s/p ketoconazole shampoo  -s/p Free & Clear Shampoo  -s/p hydroxyzine initiated 6/18/2013  -s/p Nicoral shampoo initiated 3/12/2013  -s/p fluocinonide solution initiated 3/12/2013    Encounter Date: Mar 26, 2019    CC:  Chief Complaint   Patient presents with     Actinic Keratosis     Recheck of face         History of Present Illness:  This 78 year old female presents as a follow-up for spot check of actinic keratoses on the face. The patient was last seen 9/25/18 when these lesions were treated with cryotherapy. Today, the patient reports that she has not noticed any new lesions on her face. The trauma on her right shin has been steadily healing, as has the melita noir on her left foot. No other concerns to address.     Past Medical History:   Past Medical History:   Diagnosis Date     Actinic keratosis 3/12/2013     Degenerative joint disease      Diabetes (H)      Rheumatic fever 1957    x2 between the ages of 15-18     Seasonal allergies      Past Surgical History:   Procedure Laterality Date     C APPENDECTOMY       C ARTHROPLASTY TMJ       CATARACT IOL, RT/LT       COLONOSCOPY       COLONOSCOPY N/A 8/13/2015    Procedure: COLONOSCOPY;  Surgeon: Duane, William Charles, MD;  Location: [facility]     COLONOSCOPY WITH CO2 INSUFFLATION N/A 8/13/2015    Procedure: COLONOSCOPY WITH CO2 INSUFFLATION;  Surgeon: Duane, William Charles, MD;  Location: [facility]     PHACOEMULSIFICATION WITH STANDARD INTRAOCULAR LENS IMPLANT Left 10/25/2018    Procedure: LEFT PHACOEMULSIFICATION WITH STANDARD INTRAOCULAR LENS IMPLANT;  Surgeon: Thomas Serna MD;  Location: [facility]     PHACOEMULSIFICATION WITH STANDARD INTRAOCULAR LENS IMPLANT Right 11/8/2018    Procedure: RIGHT PHACOEMULSIFICATION WITH STANDARD INTRAOCULAR LENS IMPLANT;  Surgeon:  Thomas Serna MD;  Location: [facility]     THYROIDECTOMY          Social  History:  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       Family History:  The patient reports a family history of melanoma in her father.    Medications:  Current Outpatient Medications   Medication Sig Dispense Refill     aspirin 81 MG EC tablet Take 1 tablet by mouth daily. 90 tablet 3     ciclopirox (LOPROX) 0.77 % cream Apply topically 2 times daily To feet and toenails. 90 g 6     exenatide ER (BYDUREON) 2 MG pen Inject 2 mg Subcutaneous every 7 days 7.8 mL 3     famotidine (PEPCID) 20 MG tablet Take 20 mg by mouth nightly as needed       fexofenadine (ALLEGRA) 180 MG tablet Take 180 mg by mouth daily       glipiZIDE (GLUCOTROL) 10 MG tablet TAKE 1 TABLET (10 MG) BY MOUTH 2 TIMES DAILY (BEFORE MEALS) 360 tablet 0     GLUCOSAMINE CHONDROITIN COMPLX OR 2 Daily       insulin glargine (LANTUS SOLOSTAR) 100 UNIT/ML injection Inject 20 Units Subcutaneous daily 45 mL 3     insulin pen needle (BD JUAN C U/F) 32G X 4 MM USE 1 DAILY AS DIRECTED. 100 each 3     irbesartan-hydrochlorothiazide (AVALIDE) 150-12.5 MG per tablet TAKE 1 TABLET BY MOUTH DAILY (Patient taking differently: TAKE 0.5 TABLET BY MOUTH DAILY) 90 tablet 1     latanoprost (XALATAN) 0.005 % ophthalmic solution Place 1 drop into both eyes At Bedtime 3 Bottle 4     metFORMIN (GLUCOPHAGE) 1000 MG tablet TAKE 1 TABLET (1,000 MG) BY MOUTH 2 TIMES DAILY (WITH MEALS) 180 tablet 3     MULTIVITAMIN OR 1 tablet daily       nystatin-triamcinolone (MYCOLOG II) 708737-3.1 UNIT/GM-% external cream Apply topically 2 times daily To feet and toenails. 60 g 2     Omega-3 Fatty Acids (OMEGA 3 PO) Take by mouth 2 times daily.        ONE TOUCH DELICA LANCETS MISC 1 each 2 times daily. One Touch Delica   100 each 12     ONETOUCH ULTRA test strip USE TO TEST TWICE A  strip 11     order for DME Glucometer covered by insurance. 1 each 0     oxybutynin ER (DITROPAN-XL) 5  MG 24 hr tablet TAKE 2 TABLETS (10 MG) BY MOUTH DAILY 180 tablet 1     rosuvastatin (CRESTOR) 5 MG tablet TAKE 1 TABLET BY MOUTH TWICE WEEKLY 8 tablet 6     SYNTHROID 137 MCG tablet TAKE 1 TABLET (137 MCG) BY MOUTH DAILY 90 tablet 1     Allergies   Allergen Reactions     Estradiol Itching     Lipitor [Atorvastatin Calcium]      Weak and shaky     Sulfa Drugs      Rash       Zocor [Simvastatin]      Weak and shakey     Review of Systems:  -Const: Pt is ill - seeing primary care today.  Has cold  -Skin: As above in HPI. No additional skin concerns.    Physical exam:  There were no vitals taken for this visit.  GEN: This is a well developed, well-nourished female in no acute distress, in a pleasant mood.    SKIN: Focused examination of the face, hands, forearms, right shin, left foot was performed.  - Post inflammatory hyperpigmentation on the right shin  - 1-2 cm of clearing at the base of the left great toe nail  - There is an erythematous macule with overyling adherent scale on the left medial brow.  -No other lesions of concern on areas examined.     Impression/Plan:  1. Family history of melanoma    2. Actinic keratosis, left medial brow  Cryotherapy procedure note: After verbal consent and discussion of risks and benefits including but no limited to dyspigmentation/scar, blister, and pain, 1 was(were) treated with 1-2mm freeze border for 2 cycles with liquid nitrogen. Post cryotherapy instructions were provided.     3. Postinflammatory hyperpigmentation s/p trauma, right shin    No further intervention needed.     4. Ilya noir - improving.     No further intervention needed.      Follow up within 9 months for skin exam      Staff Involved:  Scribe/Staff    Scribe Disclosure  I, Rajendra Naranjo, am serving as a scribe to document services personally performed by Dr. Lucita Jordan MD, based on data collection and the provider's statements to me.     Provider Disclosure:   The documentation recorded by the scribe  accurately reflects the services I personally performed and the decisions made by me.    Lucita Jordan MD    Department of Dermatology  [facility]: Phone: [phone], Fax:[phone]  [facility]: Phone: [phone], Fax: [phone]

## 2019-03-29 ENCOUNTER — TELEPHONE (OUTPATIENT)
Dept: ENDOCRINOLOGY | Facility: CLINIC | Age: 79
End: 2019-03-29

## 2019-03-29 ENCOUNTER — OFFICE VISIT (OUTPATIENT)
Dept: ENDOCRINOLOGY | Facility: CLINIC | Age: 79
End: 2019-03-29
Payer: COMMERCIAL

## 2019-03-29 VITALS
WEIGHT: 184.3 LBS | OXYGEN SATURATION: 96 % | DIASTOLIC BLOOD PRESSURE: 77 MMHG | HEART RATE: 99 BPM | BODY MASS INDEX: 31.47 KG/M2 | SYSTOLIC BLOOD PRESSURE: 121 MMHG | HEIGHT: 64 IN

## 2019-03-29 DIAGNOSIS — Z79.4 TYPE 2 DIABETES MELLITUS WITH HYPERGLYCEMIA, WITH LONG-TERM CURRENT USE OF INSULIN (H): Primary | Chronic | ICD-10-CM

## 2019-03-29 DIAGNOSIS — E11.65 TYPE 2 DIABETES MELLITUS WITH HYPERGLYCEMIA, WITH LONG-TERM CURRENT USE OF INSULIN (H): Primary | Chronic | ICD-10-CM

## 2019-03-29 LAB — HBA1C MFR BLD: 12.6 % (ref 0–5.6)

## 2019-03-29 PROCEDURE — 83036 HEMOGLOBIN GLYCOSYLATED A1C: CPT | Performed by: INTERNAL MEDICINE

## 2019-03-29 PROCEDURE — 99214 OFFICE O/P EST MOD 30 MIN: CPT | Performed by: INTERNAL MEDICINE

## 2019-03-29 PROCEDURE — 36415 COLL VENOUS BLD VENIPUNCTURE: CPT | Performed by: INTERNAL MEDICINE

## 2019-03-29 ASSESSMENT — MIFFLIN-ST. JEOR: SCORE: 1298.75

## 2019-03-29 NOTE — LETTER
3/29/2019         RE: Brandy Leon  [address] [facility] [id number]        Dear Colleague,    Thank you for referring your patient, Brandy Leon, to the [facility], [place]. Please see a copy of my visit note below.    Endocrinology Clinic Visit        Chief Complaint: DM  03/29/2019     Interval history:   Fasting BG still > 200 at most times.   Did not get Bydureon due to cost.   Frequent urination.   No progression of neuropathy.   No nausea but no notable change in appetite or BG.   No change in wt  No GI symptoms.     Assessment/Treatment Plan:      78 year old female  here for a follow up visit.     1. Type 2 Diabetes with uncontrolled hyperglycemia without low values.   Neuropathy, nephropathy.  No retinopathy.   Her recent A1c was12.6 indicating poor control    Barriers:   She is a  and hypoglycemia is a significant risk and patient tries to avoid at all costs.   She denies having changes in diet but she may be underreporting her diet.    Cost barrier: Lantus cost was 400 for 3 months  Trulicity not covered by insurance.     Plan:   Lantus to 30 units if Jardiance is started.   Metformin 1 g BID  Gilipizide 1 mg BID  Jardiance 10 mg daily.       2.  Hypothyroidism S/P Thyroidectomy for Graves' disease.  Continue levothyroxine 137  g daily  Recheck TSH levels.     3.  Hyperlipidemia  Crestor on alternate days.     Candice Worley MD  [id number]  Endocrinology Service        HPI: Brandy is here for a FU    She has PMH of history of type 2 diabetes mellitus, hypertension, hyperlipidemia, hypothyroidism and degenerative joint disease.     She has had diabetes for more than 12 years  She has retinopathy and neuropathy.  Last eye exam was on August 2016   Last microalbuminuria  Feb 2017 + Microalbuminuria, normal creatinine  Denies any hypoglycemia    Glucometer:   239  AM values 200-300, no hypoglycemia. .     Lab Results    Component Value Date    A1C 11.1 (H) 10/19/2018    A1C 10.4 (H) 06/25/2018    A1C 10.6 (H) 03/08/2018    A1C 8.8 (H) 11/16/2017    A1C 9.3 (H) 07/20/2017       Medication history.   Initially, she took metformin 1 g twice a day and glipizide 20 mg twice daily  Lantus 27 units daily but patient states that this has not benefited her.   Invokana added during the first visit.   A1c improved but she discontinued it due to amputation risk. Unwilling to try other drugs in this class.   Trulicity was not covered by the insurance.     Preventative medications  Crestor every other day now.   irbesartan   aspirin    Diet -- remains unchanged.   She drinks coffee with a toast in the morning and goes to work.  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  She eats a toast with eggs or oatmeal with blueberries.  Around noon she eats a sandwich with milk for lunch and around 5 PM she has dinner which usually very eats depending on what her son cooks.  She has navy beans with soup and fish and milk for dinner.     She does not drink alcohol or sugary beverage  She does not snack in between meals    Exercise  There is no structured exercise    Hypothyroidism  Diagnoses one than 20 years ago with overactive thyroid when she had shakiness.  Eventually she underwent thyroid surgery and started on levothyroxine  Currently she takes 137  g of levothyroxine  She denies having cold intolerance, change in diet or appetite    Family history  No history of diabetes, obesity.  Brother has history of thyroid surgery     Allergies   Allergen Reactions     Estradiol Itching     Lipitor [Atorvastatin Calcium]      Weak and robert     Sulfa Drugs      Rash       Zocor [Simvastatin]      Weak and juan carlos       Current Outpatient Medications   Medication Sig Dispense Refill     aspirin 81 MG EC tablet Take 1 tablet by mouth daily. 90 tablet 3     cefuroxime (CEFTIN) 500 MG tablet Take 1 tablet (500 mg) by mouth 2 times daily for 7 days 14 tablet  0     ciclopirox (LOPROX) 0.77 % cream Apply topically 2 times daily To feet and toenails. 90 g 6     exenatide ER (BYDUREON) 2 MG pen Inject 2 mg Subcutaneous every 7 days (Patient not taking: Reported on 3/26/2019) 7.8 mL 3     famotidine (PEPCID) 20 MG tablet Take 20 mg by mouth nightly as needed       fexofenadine (ALLEGRA) 180 MG tablet Take 180 mg by mouth daily       glipiZIDE (GLUCOTROL) 10 MG tablet TAKE 1 TABLET (10 MG) BY MOUTH 2 TIMES DAILY (BEFORE MEALS) 360 tablet 0     GLUCOSAMINE CHONDROITIN COMPLX OR 2 Daily       insulin glargine (LANTUS SOLOSTAR) 100 UNIT/ML injection Inject 20 Units Subcutaneous daily 45 mL 3     insulin pen needle (BD JUAN C U/F) 32G X 4 MM USE 1 DAILY AS DIRECTED. 100 each 3     irbesartan-hydrochlorothiazide (AVALIDE) 150-12.5 MG per tablet TAKE 1 TABLET BY MOUTH DAILY (Patient taking differently: TAKE 0.5 TABLET BY MOUTH DAILY) 90 tablet 1     latanoprost (XALATAN) 0.005 % ophthalmic solution Place 1 drop into both eyes At Bedtime 3 Bottle 4     metFORMIN (GLUCOPHAGE) 1000 MG tablet TAKE 1 TABLET (1,000 MG) BY MOUTH 2 TIMES DAILY (WITH MEALS) 180 tablet 3     MULTIVITAMIN OR 1 tablet daily       nystatin-triamcinolone (MYCOLOG II) 864822-6.1 UNIT/GM-% external cream Apply topically 2 times daily To feet and toenails. 60 g 2     Omega-3 Fatty Acids (OMEGA 3 PO) Take by mouth 2 times daily.        ONE TOUCH DELICA LANCETS MISC 1 each 2 times daily. One Touch Delica   100 each 12     ONETOUCH ULTRA test strip USE TO TEST TWICE A  strip 11     order for DME Glucometer covered by insurance. 1 each 0     oxybutynin ER (DITROPAN-XL) 5 MG 24 hr tablet TAKE 2 TABLETS (10 MG) BY MOUTH DAILY 180 tablet 1     rosuvastatin (CRESTOR) 5 MG tablet TAKE 1 TABLET BY MOUTH TWICE WEEKLY 8 tablet 6     SYNTHROID 137 MCG tablet TAKE 1 TABLET (137 MCG) BY MOUTH DAILY 90 tablet 1       Review of Systems     Constitutional: Negative for fever and unexpected weight change. Appetite normal   Head:  no headache   Eye: no vision change/ loss of peripheral vision.   ENT: No throat congestion.   Respiratory: no cough   Cardiovascular: no chest pain  Gastrointestinal: Negative for vomiting, abdominal pain and diarrhea.  Genitourinary: No bladder problems.  Musculoskeletal: Negative for myalgias. No weakness.  Neurological: Negative for seizures and headaches.  Psychiatric/Behavioral: Negative for behavioral problem and dysphoric mood.    Past Medical History:   Diagnosis Date     Actinic keratosis 3/12/2013     Degenerative joint disease      Diabetes (H)      Rheumatic fever 1957    x2 between the ages of 15-18     Seasonal allergies        Past Surgical History:   Procedure Laterality Date     C APPENDECTOMY       C ARTHROPLASTY TMJ       CATARACT IOL, RT/LT       COLONOSCOPY       COLONOSCOPY N/A 8/13/2015    Procedure: COLONOSCOPY;  Surgeon: Duane, William Charles, MD;  Location: [facility]     COLONOSCOPY WITH CO2 INSUFFLATION N/A 8/13/2015    Procedure: COLONOSCOPY WITH CO2 INSUFFLATION;  Surgeon: Duane, William Charles, MD;  Location: [facility]     PHACOEMULSIFICATION WITH STANDARD INTRAOCULAR LENS IMPLANT Left 10/25/2018    Procedure: LEFT PHACOEMULSIFICATION WITH STANDARD INTRAOCULAR LENS IMPLANT;  Surgeon: Thomas Serna MD;  Location: [facility]     PHACOEMULSIFICATION WITH STANDARD INTRAOCULAR LENS IMPLANT Right 11/8/2018    Procedure: RIGHT PHACOEMULSIFICATION WITH STANDARD INTRAOCULAR LENS IMPLANT;  Surgeon: Thomas Serna MD;  Location: [facility]     THYROIDECTOMY          Family History   Problem Relation Age of Onset     Cancer Father         skin and leukemia     Cerebrovascular Disease Maternal Grandfather      Cerebrovascular Disease Paternal Grandmother      Eye Disorder Paternal Grandmother         cataracts     Cerebrovascular Disease Paternal Grandfather      Heart Disease Paternal Grandfather      Cancer Sister         Breast Cancer     Eye Disorder Mother         cataracts     Eye  "Disorder Brother         cataract     Breast Cancer Daughter      Other Cancer Daughter         ovarian cancer     Glaucoma No family hx of      Macular Degeneration No family hx of        Social History     Social History     Marital Status: Single     Spouse Name: N/A     Number of Children: N/A     Years of Education: N/A     Social History Main Topics     Smoking status: Never Smoker      Smokeless tobacco: Never Used      Comment: has had exposure to second hand smoke in the past from [employer], no current exposure     Alcohol Use: No     Drug Use: No     Sexual Activity: No     Other Topics Concern     Parent/Sibling W/ Cabg, Mi Or Angioplasty Before 65f 55m? 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     Hobby Hazards No     Sleep Concern Yes     difficulty staying asleep, up and down all night     Stress Concern No     Weight Concern Yes     would like to lose     Special Diet Yes     low card, low sugar diet     Back Care Yes     chiropratior     Exercise Yes     almost everyday     Bike Helmet No     does not ride bicycle any more     Seat Belt Yes     Self-Exams Yes     Social History Narrative       Objective:   /77 (BP Location: Left arm, Patient Position: Chair, Cuff Size: Adult Regular)   Pulse 99   Ht 1.614 m (5' 3.54\")   Wt 83.6 kg (184 lb 4.9 oz)   SpO2 96%   BMI 32.09 kg/m      Constitutional: Elderly lady in no acute distress   EYES: anicteric, normal extra-ocular movements, no lid lag or retraction   HEENT: Mouth/Throat: Mucous membrane is moist. Oropharynx is clear. No adenopathy. Normal thyroid   Cardiovascular: regular rate and rhythm  Pulmonary/Chest: CTAB. No wheezing or rales   Abdominal: +BS. Nontender to palpation. No organomegaly present.  Neurological: Alert. Normal affect.   Extremities: No clubbing, cyanosis or " inflammation   Skin: normal texture, color  Feet: bilateral neuropathy left worse than right, follows Dr. Araya.  [ due 12/2019]  Lymphatic: no cervical lymphadenopathy.  Psychological: appropriate mood     In House Labs:   A1C     11.0   11/8/2016  A1C     10.4   7/26/2016  A1C     11.5   4/15/2016  A1C     11.2   2/23/2016  A1C      9.9   12/22/2015    TSH   Date Value Ref Range Status   12/28/2018 2.96 0.40 - 4.00 mU/L Final   06/25/2018 2.46 0.40 - 4.00 mU/L Final   12/22/2017 1.70 0.40 - 4.00 mU/L Final   02/09/2017 1.33 0.40 - 4.00 mU/L Final   09/27/2016 1.88 0.40 - 4.00 mU/L Final     T4 Free   Date Value Ref Range Status   06/25/2018 1.17 0.76 - 1.46 ng/dL Final   12/22/2017 1.16 0.76 - 1.46 ng/dL Final   02/09/2017 1.25 0.76 - 1.46 ng/dL Final       Creatinine   Date Value Ref Range Status   10/19/2018 0.83 0.52 - 1.04 mg/dL Final   ]    Recent Labs   Lab Test  07/26/16   0828  02/23/16   1115   08/21/15   0825   06/16/15   0941   CHOL  137  184   < >  174   --   224*   HDL  57  59   < >  49*   --   47*   LDL  48  88   < >  86   < >  120   TRIG  162*  187*   < >  197*   --   283*   CHOLHDLRATIO   --    --    --   3.6   --   4.8    < > = values in this interval not displayed.       No results found for: [id number], [id number], [id number]          Again, thank you for allowing me to participate in the care of your patient.        Sincerely,        Candice Worley MD

## 2019-03-29 NOTE — PROGRESS NOTES
Endocrinology Clinic Visit        Chief Complaint: DM  03/29/2019     Interval history:   Fasting BG still > 200 at most times.   Did not get Bydureon due to cost.   Frequent urination.   No progression of neuropathy.   No nausea but no notable change in appetite or BG.   No change in wt  No GI symptoms.     Assessment/Treatment Plan:      78 year old female  here for a follow up visit.     1. Type 2 Diabetes with uncontrolled hyperglycemia without low values.   Neuropathy, nephropathy.  No retinopathy.   Her recent A1c was12.6 indicating poor control    Barriers:   She is a  and hypoglycemia is a significant risk and patient tries to avoid at all costs.   She denies having changes in diet but she may be underreporting her diet.    Cost barrier: Lantus cost was 400 for 3 months  Trulicity not covered by insurance.     Plan:   Lantus to 30 units if Jardiance is started.   Metformin 1 g BID  Gilipizide 1 mg BID  Jardiance 10 mg daily.       2.  Hypothyroidism S/P Thyroidectomy for Graves' disease.  Continue levothyroxine 137  g daily  Recheck TSH levels.     3.  Hyperlipidemia  Crestor on alternate days.     Candice Worley MD  [id number]  Endocrinology Service        HPI: Brandy is here for a FU    She has PMH of history of type 2 diabetes mellitus, hypertension, hyperlipidemia, hypothyroidism and degenerative joint disease.     She has had diabetes for more than 12 years  She has retinopathy and neuropathy.  Last eye exam was on August 2016   Last microalbuminuria  Feb 2017 + Microalbuminuria, normal creatinine  Denies any hypoglycemia    Glucometer:   239  AM values 200-300, no hypoglycemia. .     Lab Results   Component Value Date    A1C 11.1 (H) 10/19/2018    A1C 10.4 (H) 06/25/2018    A1C 10.6 (H) 03/08/2018    A1C 8.8 (H) 11/16/2017    A1C 9.3 (H) 07/20/2017       Medication history.   Initially, she took metformin 1 g twice a day and glipizide 20 mg twice daily  Lantus 27  units daily but patient states that this has not benefited her.   Invokana added during the first visit.   A1c improved but she discontinued it due to amputation risk. Unwilling to try other drugs in this class.   Trulicity was not covered by the insurance.     Preventative medications  Crestor every other day now.   irbesartan   aspirin    Diet -- remains unchanged.   She drinks coffee with a toast in the morning and goes to work.  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  She eats a toast with eggs or oatmeal with blueberries.  Around noon she eats a sandwich with milk for lunch and around 5 PM she has dinner which usually very eats depending on what her son cooks.  She has navy beans with soup and fish and milk for dinner.     She does not drink alcohol or sugary beverage  She does not snack in between meals    Exercise  There is no structured exercise    Hypothyroidism  Diagnoses one than 20 years ago with overactive thyroid when she had shakiness.  Eventually she underwent thyroid surgery and started on levothyroxine  Currently she takes 137  g of levothyroxine  She denies having cold intolerance, change in diet or appetite    Family history  No history of diabetes, obesity.  Brother has history of thyroid surgery     Allergies   Allergen Reactions     Estradiol Itching     Lipitor [Atorvastatin Calcium]      Weak and shaky     Sulfa Drugs      Rash       Zocor [Simvastatin]      Weak and shakey       Current Outpatient Medications   Medication Sig Dispense Refill     aspirin 81 MG EC tablet Take 1 tablet by mouth daily. 90 tablet 3     cefuroxime (CEFTIN) 500 MG tablet Take 1 tablet (500 mg) by mouth 2 times daily for 7 days 14 tablet 0     ciclopirox (LOPROX) 0.77 % cream Apply topically 2 times daily To feet and toenails. 90 g 6     exenatide ER (BYDUREON) 2 MG pen Inject 2 mg Subcutaneous every 7 days (Patient not taking: Reported on 3/26/2019) 7.8 mL 3     famotidine (PEPCID) 20 MG tablet Take 20 mg  by mouth nightly as needed       fexofenadine (ALLEGRA) 180 MG tablet Take 180 mg by mouth daily       glipiZIDE (GLUCOTROL) 10 MG tablet TAKE 1 TABLET (10 MG) BY MOUTH 2 TIMES DAILY (BEFORE MEALS) 360 tablet 0     GLUCOSAMINE CHONDROITIN COMPLX OR 2 Daily       insulin glargine (LANTUS SOLOSTAR) 100 UNIT/ML injection Inject 20 Units Subcutaneous daily 45 mL 3     insulin pen needle (BD JUAN C U/F) 32G X 4 MM USE 1 DAILY AS DIRECTED. 100 each 3     irbesartan-hydrochlorothiazide (AVALIDE) 150-12.5 MG per tablet TAKE 1 TABLET BY MOUTH DAILY (Patient taking differently: TAKE 0.5 TABLET BY MOUTH DAILY) 90 tablet 1     latanoprost (XALATAN) 0.005 % ophthalmic solution Place 1 drop into both eyes At Bedtime 3 Bottle 4     metFORMIN (GLUCOPHAGE) 1000 MG tablet TAKE 1 TABLET (1,000 MG) BY MOUTH 2 TIMES DAILY (WITH MEALS) 180 tablet 3     MULTIVITAMIN OR 1 tablet daily       nystatin-triamcinolone (MYCOLOG II) 663171-0.1 UNIT/GM-% external cream Apply topically 2 times daily To feet and toenails. 60 g 2     Omega-3 Fatty Acids (OMEGA 3 PO) Take by mouth 2 times daily.        ONE TOUCH DELICA LANCETS MISC 1 each 2 times daily. One Touch Delica   100 each 12     ONETOUCH ULTRA test strip USE TO TEST TWICE A  strip 11     order for DME Glucometer covered by insurance. 1 each 0     oxybutynin ER (DITROPAN-XL) 5 MG 24 hr tablet TAKE 2 TABLETS (10 MG) BY MOUTH DAILY 180 tablet 1     rosuvastatin (CRESTOR) 5 MG tablet TAKE 1 TABLET BY MOUTH TWICE WEEKLY 8 tablet 6     SYNTHROID 137 MCG tablet TAKE 1 TABLET (137 MCG) BY MOUTH DAILY 90 tablet 1       Review of Systems     Constitutional: Negative for fever and unexpected weight change. Appetite normal   Head: no headache   Eye: no vision change/ loss of peripheral vision.   ENT: No throat congestion.   Respiratory: no cough   Cardiovascular: no chest pain  Gastrointestinal: Negative for vomiting, abdominal pain and diarrhea.  Genitourinary: No bladder  problems.  Musculoskeletal: Negative for myalgias. No weakness.  Neurological: Negative for seizures and headaches.  Psychiatric/Behavioral: Negative for behavioral problem and dysphoric mood.    Past Medical History:   Diagnosis Date     Actinic keratosis 3/12/2013     Degenerative joint disease      Diabetes (H)      Rheumatic fever 1957    x2 between the ages of 15-18     Seasonal allergies        Past Surgical History:   Procedure Laterality Date     C APPENDECTOMY       C ARTHROPLASTY TMJ       CATARACT IOL, RT/LT       COLONOSCOPY       COLONOSCOPY N/A 8/13/2015    Procedure: COLONOSCOPY;  Surgeon: Duane, William Charles, MD;  Location: [facility]     COLONOSCOPY WITH CO2 INSUFFLATION N/A 8/13/2015    Procedure: COLONOSCOPY WITH CO2 INSUFFLATION;  Surgeon: Duane, William Charles, MD;  Location: [facility]     PHACOEMULSIFICATION WITH STANDARD INTRAOCULAR LENS IMPLANT Left 10/25/2018    Procedure: LEFT PHACOEMULSIFICATION WITH STANDARD INTRAOCULAR LENS IMPLANT;  Surgeon: Thomas Serna MD;  Location: [facility]     PHACOEMULSIFICATION WITH STANDARD INTRAOCULAR LENS IMPLANT Right 11/8/2018    Procedure: RIGHT PHACOEMULSIFICATION WITH STANDARD INTRAOCULAR LENS IMPLANT;  Surgeon: Thomas Serna MD;  Location: [facility]     THYROIDECTOMY          Family History   Problem Relation Age of Onset     Cancer Father         skin and leukemia     Cerebrovascular Disease Maternal Grandfather      Cerebrovascular Disease Paternal Grandmother      Eye Disorder Paternal Grandmother         cataracts     Cerebrovascular Disease Paternal Grandfather      Heart Disease Paternal Grandfather      Cancer Sister         Breast Cancer     Eye Disorder Mother         cataracts     Eye Disorder Brother         cataract     Breast Cancer Daughter      Other Cancer Daughter         ovarian cancer     Glaucoma No family hx of      Macular Degeneration No family hx of        Social History     Social History     Marital Status: Single      "Spouse Name: N/A     Number of Children: N/A     Years of Education: N/A     Social History Main Topics     Smoking status: Never Smoker      Smokeless tobacco: Never Used      Comment: has had exposure to second hand smoke in the past from [place], no current exposure     Alcohol Use: No     Drug Use: No     Sexual Activity: No     Other Topics Concern     Parent/Sibling W/ Cabg, Mi Or Angioplasty Before 65f 55m? 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     Hobby Hazards No     Sleep Concern Yes     difficulty staying asleep, up and down all night     Stress Concern No     Weight Concern Yes     would like to lose     Special Diet Yes     low card, low sugar diet     Back Care Yes     chiropratior     Exercise Yes     almost everyday     Bike Helmet No     does not ride bicycle any more     Seat Belt Yes     Self-Exams Yes     Social History Narrative       Objective:   /77 (BP Location: Left arm, Patient Position: Chair, Cuff Size: Adult Regular)   Pulse 99   Ht 1.614 m (5' 3.54\")   Wt 83.6 kg (184 lb 4.9 oz)   SpO2 96%   BMI 32.09 kg/m     Constitutional: Elderly lady in no acute distress   EYES: anicteric, normal extra-ocular movements, no lid lag or retraction   HEENT: Mouth/Throat: Mucous membrane is moist. Oropharynx is clear. No adenopathy. Normal thyroid   Cardiovascular: regular rate and rhythm  Pulmonary/Chest: CTAB. No wheezing or rales   Abdominal: +BS. Nontender to palpation. No organomegaly present.  Neurological: Alert. Normal affect.   Extremities: No clubbing, cyanosis or inflammation   Skin: normal texture, color  Feet: bilateral neuropathy left worse than right, follows Dr. Araya.  [ due 12/2019]  Lymphatic: no cervical lymphadenopathy.  Psychological: appropriate mood     In House Labs:   A1C     11.0   11/8/2016  A1C "     10.4   7/26/2016  A1C     11.5   4/15/2016  A1C     11.2   2/23/2016  A1C      9.9   12/22/2015    TSH   Date Value Ref Range Status   12/28/2018 2.96 0.40 - 4.00 mU/L Final   06/25/2018 2.46 0.40 - 4.00 mU/L Final   12/22/2017 1.70 0.40 - 4.00 mU/L Final   02/09/2017 1.33 0.40 - 4.00 mU/L Final   09/27/2016 1.88 0.40 - 4.00 mU/L Final     T4 Free   Date Value Ref Range Status   06/25/2018 1.17 0.76 - 1.46 ng/dL Final   12/22/2017 1.16 0.76 - 1.46 ng/dL Final   02/09/2017 1.25 0.76 - 1.46 ng/dL Final       Creatinine   Date Value Ref Range Status   10/19/2018 0.83 0.52 - 1.04 mg/dL Final   ]    Recent Labs   Lab Test  07/26/16   0828  02/23/16   1115   08/21/15   0825   06/16/15   0941   CHOL  137  184   < >  174   --   224*   HDL  57  59   < >  49*   --   47*   LDL  48  88   < >  86   < >  120   TRIG  162*  187*   < >  197*   --   283*   CHOLHDLRATIO   --    --    --   3.6   --   4.8    < > = values in this interval not displayed.       No results found for: [id number], [id number], [id number]

## 2019-03-29 NOTE — TELEPHONE ENCOUNTER
Dr Worley would like patient to start taking 30 units of insulin vs the 35 units as discussed in clinic seeing as patient is starting Jardiance as well.  Pt is to call in one week with blood sugar readings and then go up to 35 units based on readings.  Left detailed message on pts home number, ok per demos to leave message.  Whit Jensen, CMA

## 2019-03-29 NOTE — NURSING NOTE
"Brandy Leon's goals for this visit include:   Chief Complaint   Patient presents with     Diabetes       She requests these members of her care team be copied on today's visit information: Yes    PCP: Cailin Ponce    Referring Provider:  No referring provider defined for this encounter.    /77 (BP Location: Left arm, Patient Position: Chair, Cuff Size: Adult Regular)   Pulse 99   Ht 1.614 m (5' 3.54\")   Wt 83.6 kg (184 lb 4.9 oz)   SpO2 96%   BMI 32.09 kg/m      Do you need any medication refills at today's visit? No    "

## 2019-04-02 ENCOUNTER — TELEPHONE (OUTPATIENT)
Dept: ENDOCRINOLOGY | Facility: CLINIC | Age: 79
End: 2019-04-02

## 2019-04-02 NOTE — TELEPHONE ENCOUNTER
Forms filled out for patient by Dr Worley at pts 3/29/19 appointment.  Forms faxed to [facility].  Copy sent to scanning and original mailed back to patient.  Whit Jensen CMA

## 2019-04-19 DIAGNOSIS — Z79.4 TYPE 2 DIABETES MELLITUS WITH HYPERGLYCEMIA, WITH LONG-TERM CURRENT USE OF INSULIN (H): ICD-10-CM

## 2019-04-19 DIAGNOSIS — E11.65 TYPE 2 DIABETES MELLITUS WITH HYPERGLYCEMIA, WITH LONG-TERM CURRENT USE OF INSULIN (H): ICD-10-CM

## 2019-04-19 NOTE — TELEPHONE ENCOUNTER
Faxed refill request received from   [facility]/pharmacy #[id number] - [address] AT [address] [address]  Phone: [phone] Fax: [phone].   Medication Requested: Metformin  Directions: take 1 tab by mouth 2 times daily  Quantity: 180  Last Office Visit: 3/29/19  Next Appointment Scheduled for: 5/10/19    Suri Maher CMA  Adult Endocrinology  [facility]

## 2019-04-23 ENCOUNTER — TELEPHONE (OUTPATIENT)
Dept: ENDOCRINOLOGY | Facility: CLINIC | Age: 79
End: 2019-04-23

## 2019-04-23 DIAGNOSIS — E11.65 TYPE 2 DIABETES MELLITUS WITH HYPERGLYCEMIA, WITH LONG-TERM CURRENT USE OF INSULIN (H): Chronic | ICD-10-CM

## 2019-04-23 DIAGNOSIS — Z79.4 TYPE 2 DIABETES MELLITUS WITH HYPERGLYCEMIA, WITH LONG-TERM CURRENT USE OF INSULIN (H): Chronic | ICD-10-CM

## 2019-04-23 NOTE — TELEPHONE ENCOUNTER
Faxed refill request received from   [facility]/pharmacy #[id number]   [address] [facility] [address]  Phone: [phone] Fax: [phone].   Medication Requested: Jardiance  Directions: take 1 tab daily  Quantity: 30  Last Office Visit: 3/29/19  Next Appointment Scheduled for: 5/10/19    Suri Maher CMA  Adult Endocrinology  [facility]

## 2019-04-29 NOTE — TELEPHONE ENCOUNTER
Refill was completed on 4/23/19 as follows:    empagliflozin (JARDIANCE) 10 MG TABS tablet 30 tablet 3 4/23/2019  No   Sig - Route: Take 1 tablet (10 mg) by mouth daily - Oral   Sent to pharmacy as: empagliflozin (JARDIANCE) 10 MG TABS tablet   Class: E-Prescribe   Order: [id number]   E-Prescribing Status: Receipt confirmed by pharmacy (4/23/2019  2:24 PM CDT)       Prescription was sent to [facility] and she would like it sent to [facility]. Prescription updated to [facility]. Patient has office visit with Dr. Worley on 5/10/19 to discuss increase in Jardiance.       Princess Mace, RN  Endocrine Care Coordinator  [facility]

## 2019-05-10 ENCOUNTER — OFFICE VISIT (OUTPATIENT)
Dept: ENDOCRINOLOGY | Facility: CLINIC | Age: 79
End: 2019-05-10
Payer: COMMERCIAL

## 2019-05-10 VITALS
DIASTOLIC BLOOD PRESSURE: 75 MMHG | BODY MASS INDEX: 32.06 KG/M2 | WEIGHT: 184.1 LBS | HEART RATE: 94 BPM | SYSTOLIC BLOOD PRESSURE: 120 MMHG | OXYGEN SATURATION: 99 %

## 2019-05-10 DIAGNOSIS — Z79.4 TYPE 2 DIABETES MELLITUS WITH HYPERGLYCEMIA, WITH LONG-TERM CURRENT USE OF INSULIN (H): Primary | Chronic | ICD-10-CM

## 2019-05-10 DIAGNOSIS — Z79.4 TYPE 2 DIABETES MELLITUS WITH HYPERGLYCEMIA, WITH LONG-TERM CURRENT USE OF INSULIN (H): Chronic | ICD-10-CM

## 2019-05-10 DIAGNOSIS — E11.65 TYPE 2 DIABETES MELLITUS WITH HYPERGLYCEMIA, WITH LONG-TERM CURRENT USE OF INSULIN (H): Chronic | ICD-10-CM

## 2019-05-10 DIAGNOSIS — E11.65 TYPE 2 DIABETES MELLITUS WITH HYPERGLYCEMIA, WITH LONG-TERM CURRENT USE OF INSULIN (H): Primary | Chronic | ICD-10-CM

## 2019-05-10 LAB
ALBUMIN SERPL-MCNC: 3.6 G/DL (ref 3.4–5)
ALT SERPL W P-5'-P-CCNC: 24 U/L (ref 0–50)
ANION GAP SERPL CALCULATED.3IONS-SCNC: 6 MMOL/L (ref 3–14)
AST SERPL W P-5'-P-CCNC: 11 U/L (ref 0–45)
BASOPHILS # BLD AUTO: 0 10E9/L (ref 0–0.2)
BASOPHILS NFR BLD AUTO: 0.4 %
BUN SERPL-MCNC: 20 MG/DL (ref 7–30)
CALCIUM SERPL-MCNC: 9 MG/DL (ref 8.5–10.1)
CHLORIDE SERPL-SCNC: 101 MMOL/L (ref 94–109)
CK SERPL-CCNC: 168 U/L (ref 30–225)
CO2 SERPL-SCNC: 31 MMOL/L (ref 20–32)
CREAT SERPL-MCNC: 0.81 MG/DL (ref 0.52–1.04)
CREAT UR-MCNC: 61 MG/DL
DIFFERENTIAL METHOD BLD: ABNORMAL
EOSINOPHIL # BLD AUTO: 0.5 10E9/L (ref 0–0.7)
EOSINOPHIL NFR BLD AUTO: 4.9 %
ERYTHROCYTE [DISTWIDTH] IN BLOOD BY AUTOMATED COUNT: 13.3 % (ref 10–15)
GFR SERPL CREATININE-BSD FRML MDRD: 70 ML/MIN/{1.73_M2}
GLUCOSE SERPL-MCNC: 161 MG/DL (ref 70–99)
HCT VFR BLD AUTO: 47.7 % (ref 35–47)
HGB BLD-MCNC: 15.3 G/DL (ref 11.7–15.7)
IMM GRANULOCYTES # BLD: 0.1 10E9/L (ref 0–0.4)
IMM GRANULOCYTES NFR BLD: 0.6 %
LDLC SERPL DIRECT ASSAY-MCNC: 114 MG/DL
LYMPHOCYTES # BLD AUTO: 2.6 10E9/L (ref 0.8–5.3)
LYMPHOCYTES NFR BLD AUTO: 26.7 %
MCH RBC QN AUTO: 28.3 PG (ref 26.5–33)
MCHC RBC AUTO-ENTMCNC: 32.1 G/DL (ref 31.5–36.5)
MCV RBC AUTO: 88 FL (ref 78–100)
MICROALBUMIN UR-MCNC: 50 MG/L
MICROALBUMIN/CREAT UR: 81.43 MG/G CR (ref 0–25)
MONOCYTES # BLD AUTO: 1 10E9/L (ref 0–1.3)
MONOCYTES NFR BLD AUTO: 10.6 %
NEUTROPHILS # BLD AUTO: 5.5 10E9/L (ref 1.6–8.3)
NEUTROPHILS NFR BLD AUTO: 56.8 %
PHOSPHATE SERPL-MCNC: 4.4 MG/DL (ref 2.5–4.5)
PLATELET # BLD AUTO: 314 10E9/L (ref 150–450)
POTASSIUM SERPL-SCNC: 4 MMOL/L (ref 3.4–5.3)
RBC # BLD AUTO: 5.4 10E12/L (ref 3.8–5.2)
SODIUM SERPL-SCNC: 138 MMOL/L (ref 133–144)
TSH SERPL DL<=0.005 MIU/L-ACNC: 2.61 MU/L (ref 0.4–4)
WBC # BLD AUTO: 9.7 10E9/L (ref 4–11)

## 2019-05-10 PROCEDURE — 85025 COMPLETE CBC W/AUTO DIFF WBC: CPT | Performed by: INTERNAL MEDICINE

## 2019-05-10 PROCEDURE — 82550 ASSAY OF CK (CPK): CPT | Performed by: INTERNAL MEDICINE

## 2019-05-10 PROCEDURE — 36415 COLL VENOUS BLD VENIPUNCTURE: CPT | Performed by: INTERNAL MEDICINE

## 2019-05-10 PROCEDURE — 99214 OFFICE O/P EST MOD 30 MIN: CPT | Performed by: INTERNAL MEDICINE

## 2019-05-10 PROCEDURE — 84443 ASSAY THYROID STIM HORMONE: CPT | Performed by: INTERNAL MEDICINE

## 2019-05-10 PROCEDURE — 84460 ALANINE AMINO (ALT) (SGPT): CPT | Performed by: INTERNAL MEDICINE

## 2019-05-10 PROCEDURE — 80069 RENAL FUNCTION PANEL: CPT | Performed by: INTERNAL MEDICINE

## 2019-05-10 PROCEDURE — 84450 TRANSFERASE (AST) (SGOT): CPT | Performed by: INTERNAL MEDICINE

## 2019-05-10 PROCEDURE — 82043 UR ALBUMIN QUANTITATIVE: CPT | Performed by: INTERNAL MEDICINE

## 2019-05-10 PROCEDURE — 83721 ASSAY OF BLOOD LIPOPROTEIN: CPT | Performed by: INTERNAL MEDICINE

## 2019-05-10 NOTE — NURSING NOTE
Brandy Leon's goals for this visit include:   Chief Complaint   Patient presents with     Diabetes       She requests these members of her care team be copied on today's visit information: Yes    PCP: Cailin Ponce    Referring Provider:  No referring provider defined for this encounter.    /75 (BP Location: Left arm, Patient Position: Chair, Cuff Size: Adult Regular)   Pulse 94   Wt 83.5 kg (184 lb 1.6 oz)   SpO2 99%   BMI 32.06 kg/m      Do you need any medication refills at today's visit? No

## 2019-05-10 NOTE — PROGRESS NOTES
Endocrinology Clinic Visit      Chief Complaint: DM  05/10/2019     Interval history:   BG improved after using Jardiance. No low BG. Compliant with medication.   Frequent urination has subsided.  No progression of neuropathy.   No nausea but no notable change in appetite or BG.   No change in wt  No GI symptoms.     Labs:   I reviewed labs with the patient.   Normal lytes and renal function.   Normal counts.   However, urine protein levels are higher.     BG assessment: AM , low 100s in most occasion  PM Bg high 100s.       Assessment/Treatment Plan:      78 year old female  here for a follow up visit.     1. Type 2 Diabetes with uncontrolled hyperglycemia without low values.   Neuropathy, nephropathy.  No retinopathy.   Her recent A1c was12.6 indicating poor control.   BG testing showed improvement after Jardiance.     Barriers:   She is a  and hypoglycemia is a significant risk and patient tries to avoid at all costs.   She denies having changes in diet but she may be underreporting her diet.    Cost barrier: Lantus cost was 400 for 3 months  Trulicity not covered by insurance.     Plan:   Lantus to 30 units  Metformin 1 g BID  Gilipizide 10 mg BID  Jardiance 10 mg daily.       2.  Hypothyroidism S/P Thyroidectomy for Graves' disease.  Continue levothyroxine 137  g daily  Normal TSH levels.     3.  Hyperlipidemia  Crestor on alternate days.     Candice Worley MD  [id number]  Endocrinology Service        HPI: Brandy is here for a FU    She has PMH of history of type 2 diabetes mellitus, hypertension, hyperlipidemia, hypothyroidism and degenerative joint disease.     She has had diabetes for more than 12 years  She has retinopathy and neuropathy.  Last eye exam was on August 2016   Last microalbuminuria  Feb 2017 + Microalbuminuria, normal creatinine  Denies any hypoglycemia    Glucometer:   239  AM values 200-300, no hypoglycemia. .     Lab Results   Component Value Date     A1C 12.6 (A) 03/29/2019    A1C 11.1 (H) 10/19/2018    A1C 10.4 (H) 06/25/2018    A1C 10.6 (H) 03/08/2018    A1C 8.8 (H) 11/16/2017       Medication history.   Initially, she took metformin 1 g twice a day and glipizide 20 mg twice daily  Lantus 27 units daily but patient states that this has not benefited her.   Invokana added during the first visit.   A1c improved but she discontinued it due to amputation risk. Unwilling to try other drugs in this class.   Trulicity was not covered by the insurance.     Preventative medications  Crestor every other day now.   irbesartan   aspirin    Diet -- remains unchanged.   She drinks coffee with a toast in the morning and goes to work.  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  She eats a toast with eggs or oatmeal with blueberries.  Around noon she eats a sandwich with milk for lunch and around 5 PM she has dinner which usually very eats depending on what her son cooks.  She has navy beans with soup and fish and milk for dinner.     She does not drink alcohol or sugary beverage  She does not snack in between meals    Exercise  There is no structured exercise    Hypothyroidism  Diagnoses one than 20 years ago with overactive thyroid when she had shakiness.  Eventually she underwent thyroid surgery and started on levothyroxine  Currently she takes 137  g of levothyroxine  She denies having cold intolerance, change in diet or appetite    Family history  No history of diabetes, obesity.  Brother has history of thyroid surgery     Allergies   Allergen Reactions     Estradiol Itching     Lipitor [Atorvastatin Calcium]      Weak and shaky     Sulfa Drugs      Rash       Zocor [Simvastatin]      Weak and shakey       Current Outpatient Medications   Medication Sig Dispense Refill     aspirin 81 MG EC tablet Take 1 tablet by mouth daily. 90 tablet 3     ciclopirox (LOPROX) 0.77 % cream Apply topically 2 times daily To feet and toenails. 90 g 6     empagliflozin (JARDIANCE) 10  MG TABS tablet Take 1 tablet (10 mg) by mouth daily 90 tablet 3     empagliflozin (JARDIANCE) 10 MG TABS tablet Take 1 tablet (10 mg) by mouth daily 30 tablet 0     famotidine (PEPCID) 20 MG tablet Take 20 mg by mouth nightly as needed       fexofenadine (ALLEGRA) 180 MG tablet Take 180 mg by mouth daily       glipiZIDE (GLUCOTROL) 10 MG tablet TAKE 1 TABLET (10 MG) BY MOUTH 2 TIMES DAILY (BEFORE MEALS) 360 tablet 0     GLUCOSAMINE CHONDROITIN COMPLX OR 2 Daily       insulin glargine (LANTUS SOLOSTAR) 100 UNIT/ML injection Inject 20 Units Subcutaneous daily 45 mL 3     insulin pen needle (BD JUAN C U/F) 32G X 4 MM USE 1 DAILY AS DIRECTED. 100 each 3     irbesartan-hydrochlorothiazide (AVALIDE) 150-12.5 MG per tablet TAKE 1 TABLET BY MOUTH DAILY (Patient taking differently: TAKE 0.5 TABLET BY MOUTH DAILY) 90 tablet 1     latanoprost (XALATAN) 0.005 % ophthalmic solution Place 1 drop into both eyes At Bedtime 3 Bottle 4     metFORMIN (GLUCOPHAGE) 1000 MG tablet TAKE 1 TABLET (1,000 MG) BY MOUTH 2 TIMES DAILY (WITH MEALS) 180 tablet 3     MULTIVITAMIN OR 1 tablet daily       nystatin-triamcinolone (MYCOLOG II) 923848-3.1 UNIT/GM-% external cream Apply topically 2 times daily To feet and toenails. 60 g 2     Omega-3 Fatty Acids (OMEGA 3 PO) Take by mouth 2 times daily.        ONE TOUCH DELICA LANCETS MISC 1 each 2 times daily. One Touch Delica   100 each 12     ONETOUCH ULTRA test strip USE TO TEST TWICE A  strip 11     order for DME Glucometer covered by insurance. 1 each 0     oxybutynin ER (DITROPAN-XL) 5 MG 24 hr tablet TAKE 2 TABLETS (10 MG) BY MOUTH DAILY 180 tablet 1     rosuvastatin (CRESTOR) 5 MG tablet TAKE 1 TABLET BY MOUTH TWICE WEEKLY 8 tablet 6     SYNTHROID 137 MCG tablet TAKE 1 TABLET (137 MCG) BY MOUTH DAILY 90 tablet 1       Review of Systems     Constitutional: Negative for fever and unexpected weight change. Appetite normal   Head: no headache   Eye: no vision change/ loss of peripheral vision.    ENT: No throat congestion.   Respiratory: no cough   Cardiovascular: no chest pain  Gastrointestinal: Negative for vomiting, abdominal pain and diarrhea.  Genitourinary: No bladder problems.  Musculoskeletal: Negative for myalgias. No weakness.  Neurological: Negative for seizures and headaches.  Psychiatric/Behavioral: Negative for behavioral problem and dysphoric mood.    Past Medical History:   Diagnosis Date     Actinic keratosis 3/12/2013     Degenerative joint disease      Diabetes (H)      Rheumatic fever 1957    x2 between the ages of 15-18     Seasonal allergies        Past Surgical History:   Procedure Laterality Date     C APPENDECTOMY       C ARTHROPLASTY TMJ       CATARACT IOL, RT/LT       COLONOSCOPY       COLONOSCOPY N/A 8/13/2015    Procedure: COLONOSCOPY;  Surgeon: Duane, William Charles, MD;  Location: [facility]     COLONOSCOPY WITH CO2 INSUFFLATION N/A 8/13/2015    Procedure: COLONOSCOPY WITH CO2 INSUFFLATION;  Surgeon: Duane, William Charles, MD;  Location: [facility]     PHACOEMULSIFICATION WITH STANDARD INTRAOCULAR LENS IMPLANT Left 10/25/2018    Procedure: LEFT PHACOEMULSIFICATION WITH STANDARD INTRAOCULAR LENS IMPLANT;  Surgeon: Thomas Serna MD;  Location: [facility]     PHACOEMULSIFICATION WITH STANDARD INTRAOCULAR LENS IMPLANT Right 11/8/2018    Procedure: RIGHT PHACOEMULSIFICATION WITH STANDARD INTRAOCULAR LENS IMPLANT;  Surgeon: Thomas Serna MD;  Location: [facility]     THYROIDECTOMY          Family History   Problem Relation Age of Onset     Cancer Father         skin and leukemia     Cerebrovascular Disease Maternal Grandfather      Cerebrovascular Disease Paternal Grandmother      Eye Disorder Paternal Grandmother         cataracts     Cerebrovascular Disease Paternal Grandfather      Heart Disease Paternal Grandfather      Cancer Sister         Breast Cancer     Eye Disorder Mother         cataracts     Eye Disorder Brother         cataract     Breast Cancer Daughter       Other Cancer Daughter         ovarian cancer     Glaucoma No family hx of      Macular Degeneration No family hx of        Social History     Social History     Marital Status: Single     Spouse Name: N/A     Number of Children: N/A     Years of Education: N/A     Social History Main Topics     Smoking status: Never Smoker      Smokeless tobacco: Never Used      Comment: has had exposure to second hand smoke in the past from [employer], no current exposure     Alcohol Use: No     Drug Use: No     Sexual Activity: No     Other Topics Concern     Parent/Sibling W/ Cabg, Mi Or Angioplasty Before 65f 55m? 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     Hobby Hazards No     Sleep Concern Yes     difficulty staying asleep, up and down all night     Stress Concern No     Weight Concern Yes     would like to lose     Special Diet Yes     low card, low sugar diet     Back Care Yes     chiropratior     Exercise Yes     almost everyday     Bike Helmet No     does not ride bicycle any more     Seat Belt Yes     Self-Exams Yes     Social History Narrative       Objective:   /75 (BP Location: Left arm, Patient Position: Chair, Cuff Size: Adult Regular)   Pulse 94   Wt 83.5 kg (184 lb 1.6 oz)   SpO2 99%   BMI 32.06 kg/m     Constitutional: Elderly lady in no acute distress   EYES: anicteric, normal extra-ocular movements, no lid lag or retraction   HEENT: Mouth/Throat: Mucous membrane is moist. Oropharynx is clear. No adenopathy. Normal thyroid   Cardiovascular: regular rate and rhythm  Pulmonary/Chest: CTAB. No wheezing or rales   Abdominal: +BS. Nontender to palpation. No organomegaly present.  Neurological: Alert. Normal affect.   Extremities: No clubbing, cyanosis or inflammation   Skin: normal texture, color  Feet: bilateral neuropathy left worse than right,  follows Dr. Araya.  [due 12/2019]  Lymphatic: no cervical lymphadenopathy.  Psychological: appropriate mood     In House Labs:   A1C     11.0   11/8/2016  A1C     10.4   7/26/2016  A1C     11.5   4/15/2016  A1C     11.2   2/23/2016  A1C      9.9   12/22/2015    TSH   Date Value Ref Range Status   05/10/2019 2.61 0.40 - 4.00 mU/L Final   12/28/2018 2.96 0.40 - 4.00 mU/L Final   06/25/2018 2.46 0.40 - 4.00 mU/L Final   12/22/2017 1.70 0.40 - 4.00 mU/L Final   02/09/2017 1.33 0.40 - 4.00 mU/L Final     T4 Free   Date Value Ref Range Status   06/25/2018 1.17 0.76 - 1.46 ng/dL Final   12/22/2017 1.16 0.76 - 1.46 ng/dL Final   02/09/2017 1.25 0.76 - 1.46 ng/dL Final       Creatinine   Date Value Ref Range Status   05/10/2019 0.81 0.52 - 1.04 mg/dL Final   ]    Recent Labs   Lab Test  07/26/16   0828  02/23/16   1115   08/21/15   0825   06/16/15   0941   CHOL  137  184   < >  174   --   224*   HDL  57  59   < >  49*   --   47*   LDL  48  88   < >  86   < >  120   TRIG  162*  187*   < >  197*   --   283*   CHOLHDLRATIO   --    --    --   3.6   --   4.8    < > = values in this interval not displayed.       No results found for: [id number], [id number], [id number]

## 2019-05-10 NOTE — LETTER
5/10/2019         RE: Brandy Leon  [address] [id number]        Dear Colleague,    Thank you for referring your patient, Brandy Leon, to the [facility]. Please see a copy of my visit note below.    Endocrinology Clinic Visit      Chief Complaint: DM  05/10/2019     Interval history:   BG improved after using Jardiance. No low BG. Compliant with medication.   Frequent urination has subsided.  No progression of neuropathy.   No nausea but no notable change in appetite or BG.   No change in wt  No GI symptoms.     Labs:   I reviewed labs with the patient.   Normal lytes and renal function.   Normal counts.   However, urine protein levels are higher.     BG assessment: AM , low 100s in most occasion  PM Bg high 100s.       Assessment/Treatment Plan:      78 year old female  here for a follow up visit.     1. Type 2 Diabetes with uncontrolled hyperglycemia without low values.   Neuropathy, nephropathy.  No retinopathy.   Her recent A1c was12.6 indicating poor control.   BG testing showed improvement after Jardiance.     Barriers:   She is a  and hypoglycemia is a significant risk and patient tries to avoid at all costs.   She denies having changes in diet but she may be underreporting her diet.    Cost barrier: Lantus cost was 400 for 3 months  Trulicity not covered by insurance.     Plan:   Lantus to 30 units  Metformin 1 g BID  Gilipizide 10 mg BID  Jardiance 10 mg daily.       2.  Hypothyroidism S/P Thyroidectomy for Graves' disease.  Continue levothyroxine 137  g daily  Normal TSH levels.     3.  Hyperlipidemia  Crestor on alternate days.     Candice Worley MD  [id number]  Endocrinology Service        HPI: Brandy is here for a FU    She has PMH of history of type 2 diabetes mellitus, hypertension, hyperlipidemia, hypothyroidism and degenerative joint disease.     She has had diabetes for more than 12  years  She has retinopathy and neuropathy.  Last eye exam was on August 2016   Last microalbuminuria  Feb 2017 + Microalbuminuria, normal creatinine  Denies any hypoglycemia    Glucometer:   239  AM values 200-300, no hypoglycemia. .     Lab Results   Component Value Date    A1C 12.6 (A) 03/29/2019    A1C 11.1 (H) 10/19/2018    A1C 10.4 (H) 06/25/2018    A1C 10.6 (H) 03/08/2018    A1C 8.8 (H) 11/16/2017       Medication history.   Initially, she took metformin 1 g twice a day and glipizide 20 mg twice daily  Lantus 27 units daily but patient states that this has not benefited her.   Invokana added during the first visit.   A1c improved but she discontinued it due to amputation risk. Unwilling to try other drugs in this class.   Trulicity was not covered by the insurance.     Preventative medications  Crestor every other day now.   irbesartan   aspirin    Diet -- remains unchanged.   She drinks coffee with a toast in the morning and goes to work.  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  She eats a toast with eggs or oatmeal with blueberries.  Around noon she eats a sandwich with milk for lunch and around 5 PM she has dinner which usually very eats depending on what her son cooks.  She has navy beans with soup and fish and milk for dinner.     She does not drink alcohol or sugary beverage  She does not snack in between meals    Exercise  There is no structured exercise    Hypothyroidism  Diagnoses one than 20 years ago with overactive thyroid when she had shakiness.  Eventually she underwent thyroid surgery and started on levothyroxine  Currently she takes 137  g of levothyroxine  She denies having cold intolerance, change in diet or appetite    Family history  No history of diabetes, obesity.  Brother has history of thyroid surgery     Allergies   Allergen Reactions     Estradiol Itching     Lipitor [Atorvastatin Calcium]      Weak and shaky     Sulfa Drugs      Rash       Zocor [Simvastatin]      Weak and  juan carlos       Current Outpatient Medications   Medication Sig Dispense Refill     aspirin 81 MG EC tablet Take 1 tablet by mouth daily. 90 tablet 3     ciclopirox (LOPROX) 0.77 % cream Apply topically 2 times daily To feet and toenails. 90 g 6     empagliflozin (JARDIANCE) 10 MG TABS tablet Take 1 tablet (10 mg) by mouth daily 90 tablet 3     empagliflozin (JARDIANCE) 10 MG TABS tablet Take 1 tablet (10 mg) by mouth daily 30 tablet 0     famotidine (PEPCID) 20 MG tablet Take 20 mg by mouth nightly as needed       fexofenadine (ALLEGRA) 180 MG tablet Take 180 mg by mouth daily       glipiZIDE (GLUCOTROL) 10 MG tablet TAKE 1 TABLET (10 MG) BY MOUTH 2 TIMES DAILY (BEFORE MEALS) 360 tablet 0     GLUCOSAMINE CHONDROITIN COMPLX OR 2 Daily       insulin glargine (LANTUS SOLOSTAR) 100 UNIT/ML injection Inject 20 Units Subcutaneous daily 45 mL 3     insulin pen needle (BD JUAN C U/F) 32G X 4 MM USE 1 DAILY AS DIRECTED. 100 each 3     irbesartan-hydrochlorothiazide (AVALIDE) 150-12.5 MG per tablet TAKE 1 TABLET BY MOUTH DAILY (Patient taking differently: TAKE 0.5 TABLET BY MOUTH DAILY) 90 tablet 1     latanoprost (XALATAN) 0.005 % ophthalmic solution Place 1 drop into both eyes At Bedtime 3 Bottle 4     metFORMIN (GLUCOPHAGE) 1000 MG tablet TAKE 1 TABLET (1,000 MG) BY MOUTH 2 TIMES DAILY (WITH MEALS) 180 tablet 3     MULTIVITAMIN OR 1 tablet daily       nystatin-triamcinolone (MYCOLOG II) 676965-0.1 UNIT/GM-% external cream Apply topically 2 times daily To feet and toenails. 60 g 2     Omega-3 Fatty Acids (OMEGA 3 PO) Take by mouth 2 times daily.        ONE TOUCH DELICA LANCETS MISC 1 each 2 times daily. One Touch Delica   100 each 12     ONETOUCH ULTRA test strip USE TO TEST TWICE A  strip 11     order for DME Glucometer covered by insurance. 1 each 0     oxybutynin ER (DITROPAN-XL) 5 MG 24 hr tablet TAKE 2 TABLETS (10 MG) BY MOUTH DAILY 180 tablet 1     rosuvastatin (CRESTOR) 5 MG tablet TAKE 1 TABLET BY MOUTH TWICE  WEEKLY 8 tablet 6     SYNTHROID 137 MCG tablet TAKE 1 TABLET (137 MCG) BY MOUTH DAILY 90 tablet 1       Review of Systems     Constitutional: Negative for fever and unexpected weight change. Appetite normal   Head: no headache   Eye: no vision change/ loss of peripheral vision.   ENT: No throat congestion.   Respiratory: no cough   Cardiovascular: no chest pain  Gastrointestinal: Negative for vomiting, abdominal pain and diarrhea.  Genitourinary: No bladder problems.  Musculoskeletal: Negative for myalgias. No weakness.  Neurological: Negative for seizures and headaches.  Psychiatric/Behavioral: Negative for behavioral problem and dysphoric mood.    Past Medical History:   Diagnosis Date     Actinic keratosis 3/12/2013     Degenerative joint disease      Diabetes (H)      Rheumatic fever 1957    x2 between the ages of 15-18     Seasonal allergies        Past Surgical History:   Procedure Laterality Date     C APPENDECTOMY       C ARTHROPLASTY TMJ       CATARACT IOL, RT/LT       COLONOSCOPY       COLONOSCOPY N/A 8/13/2015    Procedure: COLONOSCOPY;  Surgeon: Duane, William Charles, MD;  Location: [facility]     COLONOSCOPY WITH CO2 INSUFFLATION N/A 8/13/2015    Procedure: COLONOSCOPY WITH CO2 INSUFFLATION;  Surgeon: Duane, William Charles, MD;  Location: [facility]     PHACOEMULSIFICATION WITH STANDARD INTRAOCULAR LENS IMPLANT Left 10/25/2018    Procedure: LEFT PHACOEMULSIFICATION WITH STANDARD INTRAOCULAR LENS IMPLANT;  Surgeon: Thomas Serna MD;  Location: [facility]     PHACOEMULSIFICATION WITH STANDARD INTRAOCULAR LENS IMPLANT Right 11/8/2018    Procedure: RIGHT PHACOEMULSIFICATION WITH STANDARD INTRAOCULAR LENS IMPLANT;  Surgeon: Thomas Serna MD;  Location: [facility]     THYROIDECTOMY          Family History   Problem Relation Age of Onset     Cancer Father         skin and leukemia     Cerebrovascular Disease Maternal Grandfather      Cerebrovascular Disease Paternal Grandmother      Eye Disorder Paternal  Grandmother         cataracts     Cerebrovascular Disease Paternal Grandfather      Heart Disease Paternal Grandfather      Cancer Sister         Breast Cancer     Eye Disorder Mother         cataracts     Eye Disorder Brother         cataract     Breast Cancer Daughter      Other Cancer Daughter         ovarian cancer     Glaucoma No family hx of      Macular Degeneration No family hx of        Social History     Social History     Marital Status: Single     Spouse Name: N/A     Number of Children: N/A     Years of Education: N/A     Social History Main Topics     Smoking status: Never Smoker      Smokeless tobacco: Never Used      Comment: has had exposure to second hand smoke in the past from [facility], no current exposure     Alcohol Use: No     Drug Use: No     Sexual Activity: No     Other Topics Concern     Parent/Sibling W/ Cabg, Mi Or Angioplasty Before 65f 55m? 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     Hobby Hazards No     Sleep Concern Yes     difficulty staying asleep, up and down all night     Stress Concern No     Weight Concern Yes     would like to lose     Special Diet Yes     low card, low sugar diet     Back Care Yes     chiropratior     Exercise Yes     almost everyday     Bike Helmet No     does not ride bicycle any more     Seat Belt Yes     Self-Exams Yes     Social History Narrative       Objective:   /75 (BP Location: Left arm, Patient Position: Chair, Cuff Size: Adult Regular)   Pulse 94   Wt 83.5 kg (184 lb 1.6 oz)   SpO2 99%   BMI 32.06 kg/m      Constitutional: Elderly lady in no acute distress   EYES: anicteric, normal extra-ocular movements, no lid lag or retraction   HEENT: Mouth/Throat: Mucous membrane is moist. Oropharynx is clear. No adenopathy. Normal thyroid   Cardiovascular: regular rate and  rhythm  Pulmonary/Chest: CTAB. No wheezing or rales   Abdominal: +BS. Nontender to palpation. No organomegaly present.  Neurological: Alert. Normal affect.   Extremities: No clubbing, cyanosis or inflammation   Skin: normal texture, color  Feet: bilateral neuropathy left worse than right, follows Dr. Araya.  [due 12/2019]  Lymphatic: no cervical lymphadenopathy.  Psychological: appropriate mood     In House Labs:   A1C     11.0   11/8/2016  A1C     10.4   7/26/2016  A1C     11.5   4/15/2016  A1C     11.2   2/23/2016  A1C      9.9   12/22/2015    TSH   Date Value Ref Range Status   05/10/2019 2.61 0.40 - 4.00 mU/L Final   12/28/2018 2.96 0.40 - 4.00 mU/L Final   06/25/2018 2.46 0.40 - 4.00 mU/L Final   12/22/2017 1.70 0.40 - 4.00 mU/L Final   02/09/2017 1.33 0.40 - 4.00 mU/L Final     T4 Free   Date Value Ref Range Status   06/25/2018 1.17 0.76 - 1.46 ng/dL Final   12/22/2017 1.16 0.76 - 1.46 ng/dL Final   02/09/2017 1.25 0.76 - 1.46 ng/dL Final       Creatinine   Date Value Ref Range Status   05/10/2019 0.81 0.52 - 1.04 mg/dL Final   ]    Recent Labs   Lab Test  07/26/16   0828  02/23/16   1115   08/21/15   0825   06/16/15   0941   CHOL  137  184   < >  174   --   224*   HDL  57  59   < >  49*   --   47*   LDL  48  88   < >  86   < >  120   TRIG  162*  187*   < >  197*   --   283*   CHOLHDLRATIO   --    --    --   3.6   --   4.8    < > = values in this interval not displayed.       No results found for: [id number], [id number], [id number]          Again, thank you for allowing me to participate in the care of your patient.        Sincerely,        Candice Worley MD

## 2019-05-12 DIAGNOSIS — E78.5 DYSLIPIDEMIA, GOAL LDL BELOW 100: ICD-10-CM

## 2019-05-13 RX ORDER — ROSUVASTATIN CALCIUM 5 MG/1
TABLET, COATED ORAL
Qty: 8 TABLET | Refills: 5 | Status: SHIPPED | OUTPATIENT
Start: 2019-05-13 | End: 2019-08-25

## 2019-05-14 DIAGNOSIS — I43 NUTRITIONAL AND METABOLIC CARDIOMYOPATHY (H): ICD-10-CM

## 2019-05-14 DIAGNOSIS — I51.9 MYXEDEMA HEART DISEASE: ICD-10-CM

## 2019-05-14 DIAGNOSIS — E63.9 NUTRITIONAL AND METABOLIC CARDIOMYOPATHY (H): ICD-10-CM

## 2019-05-14 DIAGNOSIS — E03.9 MYXEDEMA HEART DISEASE: ICD-10-CM

## 2019-05-14 DIAGNOSIS — E88.9 NUTRITIONAL AND METABOLIC CARDIOMYOPATHY (H): ICD-10-CM

## 2019-05-14 RX ORDER — LEVOTHYROXINE SODIUM 137 MCG
TABLET ORAL
Qty: 90 TABLET | Refills: 3 | Status: SHIPPED | OUTPATIENT
Start: 2019-05-14 | End: 2020-05-07

## 2019-05-14 NOTE — TELEPHONE ENCOUNTER
Please refer to RN refill guidelines. Refilled per protocol.    Jacqueline Robertson RN   [facility], [facility]

## 2019-05-20 ENCOUNTER — OFFICE VISIT (OUTPATIENT)
Dept: PODIATRY | Facility: CLINIC | Age: 79
End: 2019-05-20
Payer: COMMERCIAL

## 2019-05-20 VITALS — DIASTOLIC BLOOD PRESSURE: 80 MMHG | SYSTOLIC BLOOD PRESSURE: 127 MMHG | OXYGEN SATURATION: 98 % | HEART RATE: 89 BPM

## 2019-05-20 DIAGNOSIS — L60.2 ONYCHAUXIS: ICD-10-CM

## 2019-05-20 DIAGNOSIS — E11.49 TYPE II OR UNSPECIFIED TYPE DIABETES MELLITUS WITH NEUROLOGICAL MANIFESTATIONS, NOT STATED AS UNCONTROLLED(250.60) (H): ICD-10-CM

## 2019-05-20 DIAGNOSIS — B35.1 ONYCHOMYCOSIS: Primary | ICD-10-CM

## 2019-05-20 DIAGNOSIS — S90.129A BRUISE OF TOE: ICD-10-CM

## 2019-05-20 PROCEDURE — 99213 OFFICE O/P EST LOW 20 MIN: CPT | Performed by: PODIATRIST

## 2019-05-20 NOTE — NURSING NOTE
Brandy Leon's chief complaint for this visit includes:  Chief Complaint   Patient presents with     Left Foot - Follow Up     Right Foot - Follow Up     PCP: Cailin Ponce    Referring Provider:  No referring provider defined for this encounter.    There were no vitals taken for this visit.  Data Unavailable     Do you need any medication refills at today's visit? No    Taylor Vail LPN

## 2019-05-20 NOTE — PROGRESS NOTES
Past Medical History:   Diagnosis Date     Actinic keratosis 3/12/2013     Degenerative joint disease      Diabetes (H)      Rheumatic fever 1957    x2 between the ages of 15-18     Seasonal allergies      Patient Active Problem List   Diagnosis     Seasonal allergies     Hypothyroidism     Hyperlipidemia LDL goal <100     Fibromyalgia     Osteoarthritis     Advanced directives, counseling/discussion     Obesity     Type 2 diabetes mellitus with hyperglycemia, with long-term current use of insulin (H)     Hypertension goal BP (blood pressure) < 140/90     Overactive bladder     Recurrent UTI     Pseudophakia of both eyes     DINORA (obstructive sleep apnea)- severe (AHI 56)     Past Surgical History:   Procedure Laterality Date     C APPENDECTOMY       C ARTHROPLASTY TMJ       CATARACT IOL, RT/LT       COLONOSCOPY       COLONOSCOPY N/A 8/13/2015    Procedure: COLONOSCOPY;  Surgeon: Duane, William Charles, MD;  [facility]: [facility]     COLONOSCOPY WITH CO2 INSUFFLATION N/A 8/13/2015    Procedure: COLONOSCOPY WITH CO2 INSUFFLATION;  Surgeon: Duane, William Charles, MD;  [facility]: [facility]     PHACOEMULSIFICATION WITH STANDARD INTRAOCULAR LENS IMPLANT Left 10/25/2018    Procedure: LEFT PHACOEMULSIFICATION WITH STANDARD INTRAOCULAR LENS IMPLANT;  Surgeon: Thomas Serna MD;  [facility]: [facility]     PHACOEMULSIFICATION WITH STANDARD INTRAOCULAR LENS IMPLANT Right 11/8/2018    Procedure: RIGHT PHACOEMULSIFICATION WITH STANDARD INTRAOCULAR LENS IMPLANT;  Surgeon: Thomas Serna MD;  Location: [facility]     THYROIDECTOMY        Social History     Socioeconomic History     Marital status: Single     Spouse name: Not on file     Number of children: Not on file     Years of education: Not on file     Highest education level: Not on file   Occupational History     Occupation:    Social Needs     Financial resource strain: Not on file     Food insecurity:     Worry: Not on file     Inability: Not on file      Transportation needs:     Medical: Not on file     Non-medical: Not on file   Tobacco Use     Smoking status: Never Smoker     Smokeless tobacco: Never Used     Tobacco comment: has had exposure to second hand smoke in the past from [employer], no current exposure   Substance and Sexual Activity     Alcohol use: No     Drug use: No     Sexual activity: Never   Lifestyle     Physical activity:     Days per week: Not on file     Minutes per session: Not on file     Stress: Not on file   Relationships     Social connections:     Talks on phone: Not on file     Gets together: Not on file     Attends Jainism service: Not on file     Active member of club or organization: Not on file     Attends meetings of clubs or organizations: Not on file     Relationship status: Not on file     Intimate partner violence:     Fear of current or ex partner: Not on file     Emotionally abused: Not on file     Physically abused: Not on file     Forced sexual activity: Not on file   Other Topics Concern     Parent/sibling w/ CABG, MI or angioplasty before 65F 55M? 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     Hobby Hazards No     Sleep Concern Yes     Comment: difficulty staying asleep, up and down all night     Stress Concern No     Weight Concern Yes     Comment: would like to lose     Special Diet Yes     Comment: low card, low sugar diet     Back Care Yes     Comment: chiropratior     Exercise Yes     Comment: almost everyday     Bike Helmet No     Comment: does not ride bicycle any more     Seat Belt Yes     Self-Exams Yes   Social History Narrative     Not on file     Family History   Problem Relation Age of Onset     Cancer Father         skin and leukemia     Cerebrovascular Disease Maternal Grandfather      Cerebrovascular Disease Paternal  Grandmother      Eye Disorder Paternal Grandmother         cataracts     Cerebrovascular Disease Paternal Grandfather      Heart Disease Paternal Grandfather      Cancer Sister         Breast Cancer     Eye Disorder Mother         cataracts     Eye Disorder Brother         cataract     Breast Cancer Daughter      Other Cancer Daughter         ovarian cancer     Glaucoma No family hx of      Macular Degeneration No family hx of      Lab Results   Component Value Date    A1C 12.6 03/29/2019    A1C 11.1 10/19/2018    A1C 10.4 06/25/2018    A1C 10.6 03/08/2018    A1C 8.8 11/16/2017     SUBJECTIVE FINDINGS: This 78-year-old female returns to clinic for onychomycosis and onychauxis, diabetes with peripheral neuropathy. She relates she is doing okay. She relates no injuries. She does have some bruising on her left fourth toe. She relates that she was helping her son fix a door board and she held her foot against it so that might have gotten injured when she did that. That was Friday. Other than that, she does not know how it happened. She has some bruising on her right third toenail. She is not sure how long that has been there or how that happened either.       OBJECTIVE FINDINGS: DP and PT are 2/4 bilaterally. She has incurvated nails with some thickening, dystrophy, subungual debris and decreased brittleness from previous bilaterally to differing degrees. She has a subungual bruising on the right third toenail that is growing out. She has left fourth proximal nail border edema, bruising, no open lesions, no erythema, no odor, no calor bilaterally.       ASSESSMENT AND PLAN:  Onychomycosis and onychauxis bilaterally, diabetes with peripheral neuropathy. She has a left fourth toe bruising secondary to injury. She also has some subungual bruising on the right third toenail. Diagnosis and treatment discussed with her. All the nails were debrided or reduced bilaterally upon consent. We are going to hold off on any x-rays  today. Continue the Mycolog cream. She will observe the left fourth toe. She relates it does not hurt. If she has any problems with it, she will let us know. Return to clinic in 3 months.

## 2019-05-20 NOTE — LETTER
5/20/2019         RE: Brandy Leon  [address] [id number]        Dear Colleague,    Thank you for referring your patient, Brandy Leon, to the [facility]. Please see a copy of my visit note below.    Past Medical History:   Diagnosis Date     Actinic keratosis 3/12/2013     Degenerative joint disease      Diabetes (H)      Rheumatic fever 1957    x2 between the ages of 15-18     Seasonal allergies      Patient Active Problem List   Diagnosis     Seasonal allergies     Hypothyroidism     Hyperlipidemia LDL goal <100     Fibromyalgia     Osteoarthritis     Advanced directives, counseling/discussion     Obesity     Type 2 diabetes mellitus with hyperglycemia, with long-term current use of insulin (H)     Hypertension goal BP (blood pressure) < 140/90     Overactive bladder     Recurrent UTI     Pseudophakia of both eyes     DINORA (obstructive sleep apnea)- severe (AHI 56)     Past Surgical History:   Procedure Laterality Date     C APPENDECTOMY       C ARTHROPLASTY TMJ       CATARACT IOL, RT/LT       COLONOSCOPY       COLONOSCOPY N/A 8/13/2015    Procedure: COLONOSCOPY;  Surgeon: Duane, William Charles, MD;  [facility]     COLONOSCOPY WITH CO2 INSUFFLATION N/A 8/13/2015    Procedure: COLONOSCOPY WITH CO2 INSUFFLATION;  Surgeon: Duane, William Charles, MD;  [facility]     PHACOEMULSIFICATION WITH STANDARD INTRAOCULAR LENS IMPLANT Left 10/25/2018    Procedure: LEFT PHACOEMULSIFICATION WITH STANDARD INTRAOCULAR LENS IMPLANT;  Surgeon: Thomas Serna MD;  [facility]     PHACOEMULSIFICATION WITH STANDARD INTRAOCULAR LENS IMPLANT Right 11/8/2018    Procedure: RIGHT PHACOEMULSIFICATION WITH STANDARD INTRAOCULAR LENS IMPLANT;  Surgeon: Thomas Serna MD;  [facility]     THYROIDECTOMY        Social History     Socioeconomic History     Marital status: Single     Spouse name: Not on file     Number of children: Not on  file     Years of education: Not on file     Highest education level: Not on file   Occupational History     Occupation:    Social Needs     Financial resource strain: Not on file     Food insecurity:     Worry: Not on file     Inability: Not on file     Transportation needs:     Medical: Not on file     Non-medical: Not on file   Tobacco Use     Smoking status: Never Smoker     Smokeless tobacco: Never Used     Tobacco comment: has had exposure to second hand smoke in the past from [facility], no current exposure   Substance and Sexual Activity     Alcohol use: No     Drug use: No     Sexual activity: Never   Lifestyle     Physical activity:     Days per week: Not on file     Minutes per session: Not on file     Stress: Not on file   Relationships     Social connections:     Talks on phone: Not on file     Gets together: Not on file     Attends Amish service: Not on file     Active member of club or organization: Not on file     Attends meetings of clubs or organizations: Not on file     Relationship status: Not on file     Intimate partner violence:     Fear of current or ex partner: Not on file     Emotionally abused: Not on file     Physically abused: Not on file     Forced sexual activity: Not on file   Other Topics Concern     Parent/sibling w/ CABG, MI or angioplasty before 65F 55M? 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     Hobby Hazards No     Sleep Concern Yes     Comment: difficulty staying asleep, up and down all night     Stress Concern No     Weight Concern Yes     Comment: would like to lose     Special Diet Yes     Comment: low card, low sugar diet     Back Care Yes     Comment: chiropratior     Exercise Yes     Comment: almost everyday     Bike Helmet No     Comment: does not ride bicycle any more      Seat Belt Yes     Self-Exams Yes   Social History Narrative     Not on file     Family History   Problem Relation Age of Onset     Cancer Father         skin and leukemia     Cerebrovascular Disease Maternal Grandfather      Cerebrovascular Disease Paternal Grandmother      Eye Disorder Paternal Grandmother         cataracts     Cerebrovascular Disease Paternal Grandfather      Heart Disease Paternal Grandfather      Cancer Sister         Breast Cancer     Eye Disorder Mother         cataracts     Eye Disorder Brother         cataract     Breast Cancer Daughter      Other Cancer Daughter         ovarian cancer     Glaucoma No family hx of      Macular Degeneration No family hx of      Lab Results   Component Value Date    A1C 12.6 03/29/2019    A1C 11.1 10/19/2018    A1C 10.4 06/25/2018    A1C 10.6 03/08/2018    A1C 8.8 11/16/2017     SUBJECTIVE FINDINGS: This 78-year-old female returns to clinic for onychomycosis and onychauxis, diabetes with peripheral neuropathy. She relates she is doing okay. She relates no injuries. She does have some bruising on her left fourth toe. She relates that she was helping her son fix a door board and she held her foot against it so that might have gotten injured when she did that. That was Friday. Other than that, she does not know how it happened. She has some bruising on her right third toenail. She is not sure how long that has been there or how that happened either.       OBJECTIVE FINDINGS: DP and PT are 2/4 bilaterally. She has incurvated nails with some thickening, dystrophy, subungual debris and decreased brittleness from previous bilaterally to differing degrees. She has a subungual bruising on the right third toenail that is growing out. She has left fourth proximal nail border edema, bruising, no open lesions, no erythema, no odor, no calor bilaterally.       ASSESSMENT AND PLAN:  Onychomycosis and onychauxis bilaterally, diabetes with peripheral neuropathy. She has a  left fourth toe bruising secondary to injury. She also has some subungual bruising on the right third toenail. Diagnosis and treatment discussed with her. All the nails were debrided or reduced bilaterally upon consent. We are going to hold off on any x-rays today. Continue the Mycolog cream. She will observe the left fourth toe. She relates it does not hurt. If she has any problems with it, she will let us know. Return to clinic in 3 months.         Again, thank you for allowing me to participate in the care of your patient.        Sincerely,        Ehsan Araya DPM

## 2019-05-20 NOTE — PATIENT INSTRUCTIONS
Thanks for coming today.  Ortho/Sports Medicine Clinic  [address] [place] [address]    To schedule future appointments in Ortho Clinic, you may call [phone].    To schedule ordered imaging by your provider:   Call Central Imaging [phone]    To schedule an injection ordered by your provider:  Call Central Imaging Injection scheduling line: [phone]  [email or web address] available online at:  [email or web address]    Please call if any further questions or concerns ([phone]).  Clinic hours 8 am to 5 pm.    Return to clinic (call) if symptoms worsen or fail to improve.

## 2019-05-31 ENCOUNTER — TELEPHONE (OUTPATIENT)
Dept: ENDOCRINOLOGY | Facility: CLINIC | Age: 79
End: 2019-05-31

## 2019-05-31 DIAGNOSIS — Z79.4 TYPE 2 DIABETES MELLITUS WITH HYPERGLYCEMIA, WITH LONG-TERM CURRENT USE OF INSULIN (H): ICD-10-CM

## 2019-05-31 DIAGNOSIS — E11.65 TYPE 2 DIABETES MELLITUS WITH HYPERGLYCEMIA, WITH LONG-TERM CURRENT USE OF INSULIN (H): ICD-10-CM

## 2019-05-31 NOTE — TELEPHONE ENCOUNTER
Refill completed to [facility].     Patient notified.    Earnestine Kinsey LPN  Diabetes Clinic Coordinator   Adult Endocrinology and Pediatric Specialty Clinics  [facility]

## 2019-05-31 NOTE — TELEPHONE ENCOUNTER
[facility] Call Center    Phone Message    May a detailed message be left on voicemail: yes    Reason for Call: Medication Refill Request    Has the patient contacted the pharmacy for the refill? Yes   Name of medication being requested: metFORMIN (GLUCOPHAGE) 1000 MG tablet [[id number]] (Order [id number])    Provider who prescribed the medication: Dr. Worley  Pharmacy: [facility]   Date medication is needed: ASAP- pt is out.          Action Taken: Message routed to:  Adult Clinics: Endocrinology p [phone]

## 2019-06-13 DIAGNOSIS — E11.65 TYPE 2 DIABETES MELLITUS WITH HYPERGLYCEMIA, WITH LONG-TERM CURRENT USE OF INSULIN (H): ICD-10-CM

## 2019-06-13 DIAGNOSIS — Z79.4 TYPE 2 DIABETES MELLITUS WITH HYPERGLYCEMIA, WITH LONG-TERM CURRENT USE OF INSULIN (H): ICD-10-CM

## 2019-06-17 NOTE — TELEPHONE ENCOUNTER
"Requested Prescriptions   Signed Prescriptions Disp Refills    MediaInterface DresdenTOUCH ULTRA test strip 200 strip 4     Sig: USE TO TEST TWICE A DAY       Diabetic Supplies Protocol Passed - 6/13/2019  1:08 AM        Passed - Medication is active on med list        Passed - Patient is 18 years of age or older        Passed - Recent (6 mo) or future (30 days) visit within the authorizing provider's specialty     Patient had office visit in the last 6 months or has a visit in the next 30 days with authorizing provider.  See \"Patient Info\" tab in inbasket, or \"Choose Columns\" in Meds & Orders section of the refill encounter.            Refill authorized per [facility] protocol.    Jayleen Rosa RN    "

## 2019-06-28 DIAGNOSIS — E11.65 TYPE 2 DIABETES MELLITUS WITH HYPERGLYCEMIA, WITH LONG-TERM CURRENT USE OF INSULIN (H): ICD-10-CM

## 2019-06-28 DIAGNOSIS — Z79.4 TYPE 2 DIABETES MELLITUS WITH HYPERGLYCEMIA, WITH LONG-TERM CURRENT USE OF INSULIN (H): ICD-10-CM

## 2019-06-28 NOTE — TELEPHONE ENCOUNTER
Faxed refill request received from [facility].   Medication Requested: Lesly Trejo  Directions: Inject 20 units subcutaneous daily  Quantity: 3  Last Office Visit: 5/10/19  Next Appointment Scheduled for: 9/13/19

## 2019-06-28 NOTE — TELEPHONE ENCOUNTER
Reviewed 5/10/19 progress note from Dr. Worley.    Attempted to contact patient to confirm Lantus dosing. Patient confirms that she has been taking Lantus 30 units daily.     Updated prescription sent to pharmacy.       Princess Mace RN  Endocrine Care Coordinator  [facility]

## 2019-07-08 ENCOUNTER — DOCUMENTATION ONLY (OUTPATIENT)
Dept: SLEEP MEDICINE | Facility: CLINIC | Age: 79
End: 2019-07-08

## 2019-07-08 DIAGNOSIS — Z79.4 TYPE 2 DIABETES MELLITUS WITH HYPERGLYCEMIA, WITH LONG-TERM CURRENT USE OF INSULIN (H): Primary | ICD-10-CM

## 2019-07-08 DIAGNOSIS — G47.33 OSA (OBSTRUCTIVE SLEEP APNEA): ICD-10-CM

## 2019-07-08 DIAGNOSIS — E11.65 TYPE 2 DIABETES MELLITUS WITH HYPERGLYCEMIA, WITH LONG-TERM CURRENT USE OF INSULIN (H): Primary | ICD-10-CM

## 2019-07-08 LAB — HBA1C MFR BLD: 10.2 % (ref 0–5.6)

## 2019-07-08 PROCEDURE — 36416 COLLJ CAPILLARY BLOOD SPEC: CPT | Performed by: INTERNAL MEDICINE

## 2019-07-08 PROCEDURE — 83036 HEMOGLOBIN GLYCOSYLATED A1C: CPT | Performed by: INTERNAL MEDICINE

## 2019-07-08 NOTE — PROGRESS NOTES
6 month [facility]    STM Recheck Visit     Diagnostic AHI: 56.1  PSG    Data only recheck     Assessment: Pt meeting objective benchmarks.     Action plan:  pt to follow up per provider request       Device type: Auto-CPAP  PAP settings: CPAP min 6.0 cm  H20     CPAP max 15.0 cm  H20    95th% pressure 15 cm  H20   Objective measures: 14 day rolling measures      Compliance  70 %      Leak  26.68 lpm  last  upload      AHI 3.36   last  upload      Average number of minutes 264      Objective measure goal  Compliance   Goal >70%  Leak   Goal < 24 lpm  AHI  Goal < 5  Usage  Goal >240

## 2019-07-13 DIAGNOSIS — R35.0 URINARY FREQUENCY: ICD-10-CM

## 2019-07-13 DIAGNOSIS — N32.81 OVERACTIVE BLADDER: ICD-10-CM

## 2019-07-15 RX ORDER — OXYBUTYNIN CHLORIDE 5 MG/1
TABLET, EXTENDED RELEASE ORAL
Qty: 180 TABLET | Refills: 1 | Status: SHIPPED | OUTPATIENT
Start: 2019-07-15 | End: 2020-01-09

## 2019-07-16 ENCOUNTER — TELEPHONE (OUTPATIENT)
Dept: PEDIATRICS | Facility: CLINIC | Age: 79
End: 2019-07-16

## 2019-07-16 NOTE — TELEPHONE ENCOUNTER
[facility] Call Center    Phone Message    May a detailed message be left on voicemail: yes    Reason for Call: Requesting Results   Needs results of a1c faxed immediately over to [facility]  Fax: [phone]  Ph: [phone]    Please call patient after faxing to confirm done.       Action Taken: Message routed to:  Primary Care p [phone]

## 2019-07-31 DIAGNOSIS — E11.65 TYPE 2 DIABETES MELLITUS WITH HYPERGLYCEMIA (H): ICD-10-CM

## 2019-08-19 ENCOUNTER — OFFICE VISIT (OUTPATIENT)
Dept: PODIATRY | Facility: CLINIC | Age: 79
End: 2019-08-19
Payer: COMMERCIAL

## 2019-08-19 VITALS
SYSTOLIC BLOOD PRESSURE: 107 MMHG | HEIGHT: 64 IN | OXYGEN SATURATION: 94 % | BODY MASS INDEX: 32.3 KG/M2 | DIASTOLIC BLOOD PRESSURE: 68 MMHG | WEIGHT: 189.2 LBS | HEART RATE: 89 BPM

## 2019-08-19 DIAGNOSIS — L60.2 ONYCHAUXIS: ICD-10-CM

## 2019-08-19 DIAGNOSIS — E11.49 TYPE II OR UNSPECIFIED TYPE DIABETES MELLITUS WITH NEUROLOGICAL MANIFESTATIONS, NOT STATED AS UNCONTROLLED(250.60) (H): ICD-10-CM

## 2019-08-19 DIAGNOSIS — B35.1 ONYCHOMYCOSIS: Primary | ICD-10-CM

## 2019-08-19 PROCEDURE — 99213 OFFICE O/P EST LOW 20 MIN: CPT | Performed by: PODIATRIST

## 2019-08-19 ASSESSMENT — MIFFLIN-ST. JEOR: SCORE: 1323.21

## 2019-08-19 NOTE — PATIENT INSTRUCTIONS
Thanks for coming today.  Ortho/Sports Medicine Clinic  [address] [place] [address]    To schedule future appointments in Ortho Clinic, you may call [phone].    To schedule ordered imaging by your provider:   Call Central Imaging [phone]    To schedule an injection ordered by your provider:  Call [facility] Injection scheduling line: [phone]  [email or web address] available online at:  [email or web address]    Please call if any further questions or concerns ([phone]).  Clinic hours 8 am to 5 pm.    Return to clinic (call) if symptoms worsen or fail to improve.

## 2019-08-19 NOTE — PROGRESS NOTES
Past Medical History:   Diagnosis Date     Actinic keratosis 3/12/2013     Degenerative joint disease      Diabetes (H)      Rheumatic fever 1957    x2 between the ages of 15-18     Seasonal allergies      Patient Active Problem List   Diagnosis     Seasonal allergies     Hypothyroidism     Hyperlipidemia LDL goal <100     Fibromyalgia     Osteoarthritis     Advanced directives, counseling/discussion     Obesity     Type 2 diabetes mellitus with hyperglycemia, with long-term current use of insulin (H)     Hypertension goal BP (blood pressure) < 140/90     Overactive bladder     Recurrent UTI     Pseudophakia of both eyes     DINORA (obstructive sleep apnea)- severe (AHI 56)     Past Surgical History:   Procedure Laterality Date     C APPENDECTOMY       C ARTHROPLASTY TMJ       CATARACT IOL, RT/LT       COLONOSCOPY       COLONOSCOPY N/A 8/13/2015    Procedure: COLONOSCOPY;  Surgeon: Duane, William Charles, MD;  Location: [facility]     COLONOSCOPY WITH CO2 INSUFFLATION N/A 8/13/2015    Procedure: COLONOSCOPY WITH CO2 INSUFFLATION;  Surgeon: Duane, William Charles, MD;  Location: [facility]     PHACOEMULSIFICATION WITH STANDARD INTRAOCULAR LENS IMPLANT Left 10/25/2018    Procedure: LEFT PHACOEMULSIFICATION WITH STANDARD INTRAOCULAR LENS IMPLANT;  Surgeon: Thomas Serna MD;  Location: [facility]     PHACOEMULSIFICATION WITH STANDARD INTRAOCULAR LENS IMPLANT Right 11/8/2018    Procedure: RIGHT PHACOEMULSIFICATION WITH STANDARD INTRAOCULAR LENS IMPLANT;  Surgeon: Thomas Serna MD;  Location: [facility]     THYROIDECTOMY        Social History     Socioeconomic History     Marital status: Single     Spouse name: Not on file     Number of children: Not on file     Years of education: Not on file     Highest education level: Not on file   Occupational History     Occupation:    Social Needs     Financial resource strain: Not on file     Food insecurity:     Worry: Not on file     Inability: Not on file      Transportation needs:     Medical: Not on file     Non-medical: Not on file   Tobacco Use     Smoking status: Never Smoker     Smokeless tobacco: Never Used     Tobacco comment: has had exposure to second hand smoke in the past from [employer], no current exposure   Substance and Sexual Activity     Alcohol use: No     Drug use: No     Sexual activity: Never   Lifestyle     Physical activity:     Days per week: Not on file     Minutes per session: Not on file     Stress: Not on file   Relationships     Social connections:     Talks on phone: Not on file     Gets together: Not on file     Attends Yazidism service: Not on file     Active member of club or organization: Not on file     Attends meetings of clubs or organizations: Not on file     Relationship status: Not on file     Intimate partner violence:     Fear of current or ex partner: Not on file     Emotionally abused: Not on file     Physically abused: Not on file     Forced sexual activity: Not on file   Other Topics Concern     Parent/sibling w/ CABG, MI or angioplasty before 65F 55M? 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     Hobby Hazards No     Sleep Concern Yes     Comment: difficulty staying asleep, up and down all night     Stress Concern No     Weight Concern Yes     Comment: would like to lose     Special Diet Yes     Comment: low card, low sugar diet     Back Care Yes     Comment: chiropratior     Exercise Yes     Comment: almost everyday     Bike Helmet No     Comment: does not ride bicycle any more     Seat Belt Yes     Self-Exams Yes   Social History Narrative     Not on file     Family History   Problem Relation Age of Onset     Cancer Father         skin and leukemia     Cerebrovascular Disease Maternal Grandfather      Cerebrovascular Disease Paternal  Grandmother      Eye Disorder Paternal Grandmother         cataracts     Cerebrovascular Disease Paternal Grandfather      Heart Disease Paternal Grandfather      Cancer Sister         Breast Cancer     Eye Disorder Mother         cataracts     Eye Disorder Brother         cataract     Breast Cancer Daughter      Other Cancer Daughter         ovarian cancer     Glaucoma No family hx of      Macular Degeneration No family hx of      Lab Results   Component Value Date    A1C 10.2 07/08/2019    A1C 12.6 03/29/2019    A1C 11.1 10/19/2018    A1C 10.4 06/25/2018    A1C 10.6 03/08/2018     SUBJECTIVE FINDINGS: This 79-year-old female returns to clinic for onychomycosis and onychauxis, diabetes with peripheral neuropathy. She relates she is doing okay. She relates no injuries.  She relates that she may be getting a corn on her left 4th toe.       OBJECTIVE FINDINGS: DP and PT are 2/4 bilaterally. She has incurvated nails with some thickening, dystrophy, subungual debris and decreased brittleness from previous bilaterally to differing degrees. She has a subungual bruising on the right third toenail that is growing out. She has dorsally contracted digits bilaterally with left 5th toenail putting pressure on 4th toe.  She has no open lesions, no erythema, no odor, no calor bilaterally.       ASSESSMENT AND PLAN:  Onychomycosis and onychauxis bilaterally, diabetes with peripheral neuropathy. She has a left fourth toe bruising secondary to injury that has resolved. She also has some subungual bruising on the right third toenail that is growing out. Diagnosis and treatment discussed with her. All the nails were debrided or reduced bilaterally upon consent.  Return to clinic in 3 months.

## 2019-08-19 NOTE — NURSING NOTE
"Brandy Leon's chief complaint for this visit includes:  Chief Complaint   Patient presents with     RECHECK     toe nail trim     PCP: Cailin Ponce    Referring Provider:  No referring provider defined for this encounter.    /68 (BP Location: Left arm, Patient Position: Sitting, Cuff Size: Adult Large)   Pulse 89   Ht 1.626 m (5' 4\")   Wt 85.8 kg (189 lb 3.2 oz)   SpO2 94%   BMI 32.48 kg/m    Data Unavailable     Do you need any medication refills at today's visit? No    Atiya Holder CMA        "

## 2019-08-19 NOTE — LETTER
8/19/2019         RE: Brandy Leon  [address] [facility] [id number]        Dear Colleague,    Thank you for referring your patient, Brandy Leon, to the [facility]. Please see a copy of my visit note below.    Past Medical History:   Diagnosis Date     Actinic keratosis 3/12/2013     Degenerative joint disease      Diabetes (H)      Rheumatic fever 1957    x2 between the ages of 15-18     Seasonal allergies      Patient Active Problem List   Diagnosis     Seasonal allergies     Hypothyroidism     Hyperlipidemia LDL goal <100     Fibromyalgia     Osteoarthritis     Advanced directives, counseling/discussion     Obesity     Type 2 diabetes mellitus with hyperglycemia, with long-term current use of insulin (H)     Hypertension goal BP (blood pressure) < 140/90     Overactive bladder     Recurrent UTI     Pseudophakia of both eyes     DINORA (obstructive sleep apnea)- severe (AHI 56)     Past Surgical History:   Procedure Laterality Date     C APPENDECTOMY       C ARTHROPLASTY TMJ       CATARACT IOL, RT/LT       COLONOSCOPY       COLONOSCOPY N/A 8/13/2015    Procedure: COLONOSCOPY;  Surgeon: Duane, William Charles, MD;  Location: [facility] OR     COLONOSCOPY WITH CO2 INSUFFLATION N/A 8/13/2015    Procedure: COLONOSCOPY WITH CO2 INSUFFLATION;  Surgeon: Duane, William Charles, MD;  Location: [facility]     PHACOEMULSIFICATION WITH STANDARD INTRAOCULAR LENS IMPLANT Left 10/25/2018    Procedure: LEFT PHACOEMULSIFICATION WITH STANDARD INTRAOCULAR LENS IMPLANT;  Surgeon: Thomas Serna MD;  Location: [facility]     PHACOEMULSIFICATION WITH STANDARD INTRAOCULAR LENS IMPLANT Right 11/8/2018    Procedure: RIGHT PHACOEMULSIFICATION WITH STANDARD INTRAOCULAR LENS IMPLANT;  Surgeon: Thomas Serna MD;  Location: [facility] OR     THYROIDECTOMY        Social History     Socioeconomic History     Marital status: Single     Spouse name: Not on file     Number of children: Not on  file     Years of education: Not on file     Highest education level: Not on file   Occupational History     Occupation:    Social Needs     Financial resource strain: Not on file     Food insecurity:     Worry: Not on file     Inability: Not on file     Transportation needs:     Medical: Not on file     Non-medical: Not on file   Tobacco Use     Smoking status: Never Smoker     Smokeless tobacco: Never Used     Tobacco comment: has had exposure to second hand smoke in the past from [facility], no current exposure   Substance and Sexual Activity     Alcohol use: No     Drug use: No     Sexual activity: Never   Lifestyle     Physical activity:     Days per week: Not on file     Minutes per session: Not on file     Stress: Not on file   Relationships     Social connections:     Talks on phone: Not on file     Gets together: Not on file     Attends Worship service: Not on file     Active member of club or organization: Not on file     Attends meetings of clubs or organizations: Not on file     Relationship status: Not on file     Intimate partner violence:     Fear of current or ex partner: Not on file     Emotionally abused: Not on file     Physically abused: Not on file     Forced sexual activity: Not on file   Other Topics Concern     Parent/sibling w/ CABG, MI or angioplasty before 65F 55M? 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     Hobby Hazards No     Sleep Concern Yes     Comment: difficulty staying asleep, up and down all night     Stress Concern No     Weight Concern Yes     Comment: would like to lose     Special Diet Yes     Comment: low card, low sugar diet     Back Care Yes     Comment: chiropratior     Exercise Yes     Comment: almost everyday     Bike Helmet No     Comment: does not ride bicycle any more      Seat Belt Yes     Self-Exams Yes   Social History Narrative     Not on file     Family History   Problem Relation Age of Onset     Cancer Father         skin and leukemia     Cerebrovascular Disease Maternal Grandfather      Cerebrovascular Disease Paternal Grandmother      Eye Disorder Paternal Grandmother         cataracts     Cerebrovascular Disease Paternal Grandfather      Heart Disease Paternal Grandfather      Cancer Sister         Breast Cancer     Eye Disorder Mother         cataracts     Eye Disorder Brother         cataract     Breast Cancer Daughter      Other Cancer Daughter         ovarian cancer     Glaucoma No family hx of      Macular Degeneration No family hx of      Lab Results   Component Value Date    A1C 10.2 07/08/2019    A1C 12.6 03/29/2019    A1C 11.1 10/19/2018    A1C 10.4 06/25/2018    A1C 10.6 03/08/2018     SUBJECTIVE FINDINGS: This 79-year-old female returns to clinic for onychomycosis and onychauxis, diabetes with peripheral neuropathy. She relates she is doing okay. She relates no injuries.  She relates that she may be getting a corn on her left 4th toe.       OBJECTIVE FINDINGS: DP and PT are 2/4 bilaterally. She has incurvated nails with some thickening, dystrophy, subungual debris and decreased brittleness from previous bilaterally to differing degrees. She has a subungual bruising on the right third toenail that is growing out. She has dorsally contracted digits bilaterally with left 5th toenail putting pressure on 4th toe.  She has no open lesions, no erythema, no odor, no calor bilaterally.       ASSESSMENT AND PLAN:  Onychomycosis and onychauxis bilaterally, diabetes with peripheral neuropathy. She has a left fourth toe bruising secondary to injury that has resolved. She also has some subungual bruising on the right third toenail that is growing out. Diagnosis and treatment discussed with her. All the nails were debrided or reduced bilaterally upon consent.  Return to  clinic in 3 months.        Again, thank you for allowing me to participate in the care of your patient.        Sincerely,        Ehsan Araya DPM

## 2019-08-25 DIAGNOSIS — E78.5 DYSLIPIDEMIA, GOAL LDL BELOW 100: ICD-10-CM

## 2019-08-26 RX ORDER — ROSUVASTATIN CALCIUM 5 MG/1
TABLET, COATED ORAL
Qty: 24 TABLET | Refills: 0 | Status: SHIPPED | OUTPATIENT
Start: 2019-08-26 | End: 2019-11-15

## 2019-08-26 NOTE — TELEPHONE ENCOUNTER
5/13 script should have refill left, but a higher quantity was requested and sent. Lab was done on 5/10.   Suyapa Davila RN

## 2019-09-11 DIAGNOSIS — E11.65 TYPE 2 DIABETES MELLITUS WITH HYPERGLYCEMIA, WITH LONG-TERM CURRENT USE OF INSULIN (H): ICD-10-CM

## 2019-09-11 DIAGNOSIS — Z79.4 TYPE 2 DIABETES MELLITUS WITH HYPERGLYCEMIA, WITH LONG-TERM CURRENT USE OF INSULIN (H): ICD-10-CM

## 2019-09-11 RX ORDER — GLIPIZIDE 10 MG/1
10 TABLET ORAL
Qty: 180 TABLET | Refills: 2 | Status: SHIPPED | OUTPATIENT
Start: 2019-09-11 | End: 2019-09-20 | Stop reason: ALTCHOICE

## 2019-09-13 ENCOUNTER — OFFICE VISIT (OUTPATIENT)
Dept: ENDOCRINOLOGY | Facility: CLINIC | Age: 79
End: 2019-09-13
Payer: COMMERCIAL

## 2019-09-13 VITALS
HEART RATE: 84 BPM | SYSTOLIC BLOOD PRESSURE: 122 MMHG | OXYGEN SATURATION: 95 % | BODY MASS INDEX: 31.74 KG/M2 | WEIGHT: 184.9 LBS | DIASTOLIC BLOOD PRESSURE: 75 MMHG

## 2019-09-13 DIAGNOSIS — E11.65 TYPE 2 DIABETES MELLITUS WITH HYPERGLYCEMIA, WITH LONG-TERM CURRENT USE OF INSULIN (H): Primary | ICD-10-CM

## 2019-09-13 DIAGNOSIS — Z79.4 TYPE 2 DIABETES MELLITUS WITH HYPERGLYCEMIA, WITH LONG-TERM CURRENT USE OF INSULIN (H): Primary | ICD-10-CM

## 2019-09-13 PROCEDURE — 99214 OFFICE O/P EST MOD 30 MIN: CPT | Performed by: INTERNAL MEDICINE

## 2019-09-13 NOTE — NURSING NOTE
Brandy Leon's goals for this visit include:   Chief Complaint   Patient presents with     Diabetes     Thyroid Disease     She requests these members of her care team be copied on today's visit information: Yes    PCP: Cailin Ponce    Referring Provider:  Cailin Ponce, APRN CNP  [id number]  [place] [address]    /75 (BP Location: Left arm, Patient Position: Sitting, Cuff Size: Adult Regular)   Pulse 84   Wt 83.9 kg (184 lb 14.4 oz)   SpO2 95%   BMI 31.74 kg/m      Do you need any medication refills at today's visit? No

## 2019-09-13 NOTE — LETTER
9/13/2019         RE: Brandy Leon  [address] [address]        Dear Colleague,    Thank you for referring your patient, Brandy Leon, to the [facility]. Please see a copy of my visit note below.    Endocrinology Clinic Visit      Chief Complaint: DM  09/13/2019     Interval history:   Had 1 fall, felt her legs gave out, bruise on the left leg, healing well.   BG improved after using Jardiance. However, cost is an issue, >$300 per month.   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   No low BG. Compliant with medication.   Frequent urination has subsided.  No progression of neuropathy.   No nausea but no notable change in appetite or BG.   No change in wt  No GI symptoms.     Labs:   A1c was 10.2    BG assessment: -209, low 100s in most occasion  PM Bg high 242       Assessment/Treatment Plan:      79 year old female  here for a follow up visit.     1. Type 2 Diabetes with uncontrolled hyperglycemia without low values.   Neuropathy, nephropathy.  No retinopathy.   Her recent A1c was10.2 indicating poor control but slowly improving.   BG testing showed improvement after Jardiance.     Barriers:   She is a  and hypoglycemia is a significant risk and patient tries to avoid at all costs.   She denies having changes in diet but she may be underreporting her diet.    Cost barrier: Lantus cost was $400 for 3 months  Trulicity not covered by insurance.     Plan:   Lantus to 30 units  Metformin 1 g BID  Gilipizide 10 mg BID  Jardiance 10 mg daily.       2.  Hypothyroidism S/P Thyroidectomy for Graves' disease.  Continue levothyroxine 137  g daily  Normal TSH levels.     3.  Hyperlipidemia  Crestor on alternate days.     Candice Worley MD  [id number]  Endocrinology Service        HPI:     Type 2 diabetes mellitus  She has had diabetes for more than 12 years  She has retinopathy and neuropathy.  Last eye  exam was on August 2016   Last microalbuminuria  Feb 2017 + Microalbuminuria, normal creatinine  Denies any hypoglycemia      Lab Results   Component Value Date    A1C 10.2 (H) 07/08/2019    A1C 12.6 (A) 03/29/2019    A1C 11.1 (H) 10/19/2018    A1C 10.4 (H) 06/25/2018    A1C 10.6 (H) 03/08/2018       Medication history.   Initially, she took metformin 1 g twice a day and glipizide 20 mg twice daily  Lantus 27 units daily but patient states that this has not benefited her.   Invokana added during the first visit.   A1c improved but she discontinued it due to amputation risk. Unwilling to try other drugs in this class.   Trulicity was not covered by the insurance.     Preventative medications  Crestor every other day now.   irbesartan   aspirin    Diet -- remains unchanged.   She drinks coffee with a toast in the morning and goes to work.  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  She eats a toast with eggs or oatmeal with blueberries.  Around noon she eats a sandwich with milk for lunch and around 5 PM she has dinner which usually very eats depending on what her son cooks.  She has navy beans with soup and fish and milk for dinner.     She does not drink alcohol or sugary beverage  She does not snack in between meals    Exercise  There is no structured exercise    Hypothyroidism  Diagnoses one than 20 years ago with overactive thyroid when she had shakiness.  Eventually she underwent thyroid surgery and started on levothyroxine  Currently she takes 137  g of levothyroxine  She denies having cold intolerance, change in diet or appetite    Family history  No history of diabetes, obesity.  Brother has history of thyroid surgery     Allergies   Allergen Reactions     Estradiol Itching     Lipitor [Atorvastatin Calcium]      Weak and shaky     Sulfa Drugs      Rash       Zocor [Simvastatin]      Weak and shakey       Current Outpatient Medications   Medication Sig Dispense Refill     aspirin 81 MG EC tablet Take 1  tablet by mouth daily. 90 tablet 3     ciclopirox (LOPROX) 0.77 % cream Apply topically 2 times daily To feet and toenails. 90 g 6     empagliflozin (JARDIANCE) 10 MG TABS tablet Take 1 tablet (10 mg) by mouth daily 90 tablet 3     famotidine (PEPCID) 20 MG tablet Take 20 mg by mouth nightly as needed       fexofenadine (ALLEGRA) 180 MG tablet Take 180 mg by mouth daily       glipiZIDE (GLUCOTROL) 10 MG tablet TAKE 1 TABLET (10 MG) BY MOUTH 2 TIMES DAILY (BEFORE MEALS) 180 tablet 2     GLUCOSAMINE CHONDROITIN COMPLX OR 2 Daily       insulin glargine (LANTUS SOLOSTAR PEN) 100 UNIT/ML pen Inject 30 Units Subcutaneous daily 45 mL 1     insulin pen needle (BD JUAN C U/F) 32G X 4 MM miscellaneous USE 1 DAILY AS DIRECTED. 100 each 3     irbesartan-hydrochlorothiazide (AVALIDE) 150-12.5 MG per tablet TAKE 1 TABLET BY MOUTH DAILY (Patient taking differently: TAKE 0.5 TABLET BY MOUTH DAILY) 90 tablet 1     latanoprost (XALATAN) 0.005 % ophthalmic solution Place 1 drop into both eyes At Bedtime 3 Bottle 4     metFORMIN (GLUCOPHAGE) 1000 MG tablet TAKE 1 TABLET (1,000 MG) BY MOUTH 2 TIMES DAILY (WITH MEALS) 180 tablet 3     MULTIVITAMIN OR 1 tablet daily       nystatin-triamcinolone (MYCOLOG II) 787739-0.1 UNIT/GM-% external cream Apply topically 2 times daily To feet and toenails. 60 g 2     Omega-3 Fatty Acids (OMEGA 3 PO) Take by mouth 2 times daily.        ONE TOUCH DELICA LANCETS MISC 1 each 2 times daily. One Touch Delica   100 each 12     ONETOUCH ULTRA test strip USE TO TEST TWICE A  strip 4     order for DME Glucometer covered by insurance. 1 each 0     oxybutynin ER (DITROPAN-XL) 5 MG 24 hr tablet TAKE 2 TABLETS (10 MG) BY MOUTH DAILY 180 tablet 1     rosuvastatin (CRESTOR) 5 MG tablet TAKE 1 TABLET BY MOUTH TWICE WEEKLY 24 tablet 0     SYNTHROID 137 MCG tablet TAKE 1 TABLET (137 MCG) BY MOUTH DAILY 90 tablet 3       Review of Systems     Constitutional: Negative for fever and unexpected weight change. Appetite  normal   Head: no headache   Eye: no vision change/ loss of peripheral vision.   ENT: No throat congestion.   Respiratory: no cough   Cardiovascular: no chest pain  Gastrointestinal: Negative for vomiting, abdominal pain and diarrhea.  Genitourinary: No bladder problems.  Musculoskeletal: Negative for myalgias. No weakness.  Neurological: Negative for seizures and headaches.  Psychiatric/Behavioral: Negative for behavioral problem and dysphoric mood.    Past Medical History:   Diagnosis Date     Actinic keratosis 3/12/2013     Degenerative joint disease      Diabetes (H)      Rheumatic fever 1957    x2 between the ages of 15-18     Seasonal allergies        Past Surgical History:   Procedure Laterality Date     C APPENDECTOMY       C ARTHROPLASTY TMJ       CATARACT IOL, RT/LT       COLONOSCOPY       COLONOSCOPY N/A 8/13/2015    Procedure: COLONOSCOPY;  Surgeon: Duane, William Charles, MD;  Location: [facility]     COLONOSCOPY WITH CO2 INSUFFLATION N/A 8/13/2015    Procedure: COLONOSCOPY WITH CO2 INSUFFLATION;  Surgeon: Duane, William Charles, MD;  Location: [facility]     PHACOEMULSIFICATION WITH STANDARD INTRAOCULAR LENS IMPLANT Left 10/25/2018    Procedure: LEFT PHACOEMULSIFICATION WITH STANDARD INTRAOCULAR LENS IMPLANT;  Surgeon: Thomas Serna MD;  Location: [facility]     PHACOEMULSIFICATION WITH STANDARD INTRAOCULAR LENS IMPLANT Right 11/8/2018    Procedure: RIGHT PHACOEMULSIFICATION WITH STANDARD INTRAOCULAR LENS IMPLANT;  Surgeon: Thomas Serna MD;  Location: [facility]     THYROIDECTOMY          Family History   Problem Relation Age of Onset     Cancer Father         skin and leukemia     Cerebrovascular Disease Maternal Grandfather      Cerebrovascular Disease Paternal Grandmother      Eye Disorder Paternal Grandmother         cataracts     Cerebrovascular Disease Paternal Grandfather      Heart Disease Paternal Grandfather      Cancer Sister         Breast Cancer     Eye Disorder Mother         cataracts      Eye Disorder Brother         cataract     Breast Cancer Daughter      Other Cancer Daughter         ovarian cancer     Glaucoma No family hx of      Macular Degeneration No family hx of        Social History     Social History     Marital Status: Single     Spouse Name: N/A     Number of Children: N/A     Years of Education: N/A     Social History Main Topics     Smoking status: Never Smoker      Smokeless tobacco: Never Used      Comment: has had exposure to second hand smoke in the past from [place], no current exposure     Alcohol Use: No     Drug Use: No     Sexual Activity: No     Other Topics Concern     Parent/Sibling W/ Cabg, Mi Or Angioplasty Before 65f 55m? 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     Hobby Hazards No     Sleep Concern Yes     difficulty staying asleep, up and down all night     Stress Concern No     Weight Concern Yes     would like to lose     Special Diet Yes     low card, low sugar diet     Back Care Yes     chiropratior     Exercise Yes     almost everyday     Bike Helmet No     does not ride bicycle any more     Seat Belt Yes     Self-Exams Yes     Social History Narrative       Objective:   /75 (BP Location: Left arm, Patient Position: Sitting, Cuff Size: Adult Regular)   Pulse 84   Wt 83.9 kg (184 lb 14.4 oz)   SpO2 95%   BMI 31.74 kg/m      Constitutional: Elderly lady in no acute distress   EYES: anicteric, normal extra-ocular movements, no lid lag or retraction   HEENT: Mouth/Throat: Mucous membrane is moist. Oropharynx is clear. No adenopathy. Normal thyroid   Cardiovascular: regular rate and rhythm  Pulmonary/Chest: CTAB. No wheezing or rales   Abdominal: +BS. Nontender to palpation. No organomegaly present.  Neurological: Alert. Normal affect.   Extremities: No clubbing, cyanosis or inflammation    Skin: normal texture, color  Feet: bilateral neuropathy left worse than right, follows Dr. Araya.  [due 12/2019]  Lymphatic: no cervical lymphadenopathy.  Psychological: appropriate mood     In House Labs:   A1C     11.0   11/8/2016  A1C     10.4   7/26/2016  A1C     11.5   4/15/2016  A1C     11.2   2/23/2016  A1C      9.9   12/22/2015    TSH   Date Value Ref Range Status   05/10/2019 2.61 0.40 - 4.00 mU/L Final   12/28/2018 2.96 0.40 - 4.00 mU/L Final   06/25/2018 2.46 0.40 - 4.00 mU/L Final   12/22/2017 1.70 0.40 - 4.00 mU/L Final   02/09/2017 1.33 0.40 - 4.00 mU/L Final     T4 Free   Date Value Ref Range Status   06/25/2018 1.17 0.76 - 1.46 ng/dL Final   12/22/2017 1.16 0.76 - 1.46 ng/dL Final   02/09/2017 1.25 0.76 - 1.46 ng/dL Final       Creatinine   Date Value Ref Range Status   05/10/2019 0.81 0.52 - 1.04 mg/dL Final   ]    Recent Labs   Lab Test  07/26/16   0828  02/23/16   1115   08/21/15   0825   06/16/15   0941   CHOL  137  184   < >  174   --   224*   HDL  57  59   < >  49*   --   47*   LDL  48  88   < >  86   < >  120   TRIG  162*  187*   < >  197*   --   283*   CHOLHDLRATIO   --    --    --   3.6   --   4.8    < > = values in this interval not displayed.       No results found for: [id number], [id number], [id number]          Again, thank you for allowing me to participate in the care of your patient.        Sincerely,        Candice Worley MD

## 2019-09-13 NOTE — PATIENT INSTRUCTIONS
Follow-up with PCP to discuss further about the falls.   Continue current regimen without change.   Labs before next visit.     [facility]  Jeannie Hathaway RN, Diabetes Educator: [phone]  Clinic Nurses KATHY Sánchez; CMA's: Whit Mccord [phone]  TOÑO [place] : [phone]  Clinic Fax: [phone]  On-Call Endocrine at the [place] (after hours/weekends): [phone] option 4  Scheduling Line: [phone]    Appointment Reminders:  * Please bring meter with for staff to download  * If you are due ONLY for an A1C, it is scheduled with the nurse and will be done in clinic. You do not need to schedule a lab appointment. Fasting is not required for an A1C.  * Refill request should be submitted to your pharmacy. They will contact clinic for approval.

## 2019-09-13 NOTE — PROGRESS NOTES
Endocrinology Clinic Visit      Chief Complaint: DM  09/13/2019     Interval history:   Had 1 fall, felt her legs gave out, bruise on the left leg, healing well.   BG improved after using Jardiance. However, cost is an issue, >$300 per month.   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   No low BG. Compliant with medication.   Frequent urination has subsided.  No progression of neuropathy.   No nausea but no notable change in appetite or BG.   No change in wt  No GI symptoms.     Labs:   A1c was 10.2    BG assessment: -209, low 100s in most occasion  PM Bg high 242       Assessment/Treatment Plan:      79 year old female  here for a follow up visit.     1. Type 2 Diabetes with uncontrolled hyperglycemia without low values.   Neuropathy, nephropathy.  No retinopathy.   Her recent A1c was10.2 indicating poor control but slowly improving.   BG testing showed improvement after Jardiance.     Barriers:   She is a  and hypoglycemia is a significant risk and patient tries to avoid at all costs.   She denies having changes in diet but she may be underreporting her diet.    Cost barrier: Lantus cost was $400 for 3 months  Trulicity not covered by insurance.     Plan:   Lantus to 30 units  Metformin 1 g BID  Gilipizide 10 mg BID  Jardiance 10 mg daily.       2.  Hypothyroidism S/P Thyroidectomy for Graves' disease.  Continue levothyroxine 137  g daily  Normal TSH levels.     3.  Hyperlipidemia  Crestor on alternate days.     Candice Worley MD  [id number]  Endocrinology Service        HPI:     Type 2 diabetes mellitus  She has had diabetes for more than 12 years  She has retinopathy and neuropathy.  Last eye exam was on August 2016   Last microalbuminuria  Feb 2017 + Microalbuminuria, normal creatinine  Denies any hypoglycemia      Lab Results   Component Value Date    A1C 10.2 (H) 07/08/2019    A1C 12.6 (A) 03/29/2019    A1C 11.1 (H) 10/19/2018    A1C 10.4 (H) 06/25/2018     A1C 10.6 (H) 03/08/2018       Medication history.   Initially, she took metformin 1 g twice a day and glipizide 20 mg twice daily  Lantus 27 units daily but patient states that this has not benefited her.   Invokana added during the first visit.   A1c improved but she discontinued it due to amputation risk. Unwilling to try other drugs in this class.   Trulicity was not covered by the insurance.     Preventative medications  Crestor every other day now.   irbesartan   aspirin    Diet -- remains unchanged.   She drinks coffee with a toast in the morning and goes to work.  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  She eats a toast with eggs or oatmeal with blueberries.  Around noon she eats a sandwich with milk for lunch and around 5 PM she has dinner which usually very eats depending on what her son cooks.  She has navy beans with soup and fish and milk for dinner.     She does not drink alcohol or sugary beverage  She does not snack in between meals    Exercise  There is no structured exercise    Hypothyroidism  Diagnoses one than 20 years ago with overactive thyroid when she had shakiness.  Eventually she underwent thyroid surgery and started on levothyroxine  Currently she takes 137  g of levothyroxine  She denies having cold intolerance, change in diet or appetite    Family history  No history of diabetes, obesity.  Brother has history of thyroid surgery     Allergies   Allergen Reactions     Estradiol Itching     Lipitor [Atorvastatin Calcium]      Weak and shaky     Sulfa Drugs      Rash       Zocor [Simvastatin]      Weak and shakey       Current Outpatient Medications   Medication Sig Dispense Refill     aspirin 81 MG EC tablet Take 1 tablet by mouth daily. 90 tablet 3     ciclopirox (LOPROX) 0.77 % cream Apply topically 2 times daily To feet and toenails. 90 g 6     empagliflozin (JARDIANCE) 10 MG TABS tablet Take 1 tablet (10 mg) by mouth daily 90 tablet 3     famotidine (PEPCID) 20 MG tablet Take 20  mg by mouth nightly as needed       fexofenadine (ALLEGRA) 180 MG tablet Take 180 mg by mouth daily       glipiZIDE (GLUCOTROL) 10 MG tablet TAKE 1 TABLET (10 MG) BY MOUTH 2 TIMES DAILY (BEFORE MEALS) 180 tablet 2     GLUCOSAMINE CHONDROITIN COMPLX OR 2 Daily       insulin glargine (LANTUS SOLOSTAR PEN) 100 UNIT/ML pen Inject 30 Units Subcutaneous daily 45 mL 1     insulin pen needle (BD JUAN C U/F) 32G X 4 MM miscellaneous USE 1 DAILY AS DIRECTED. 100 each 3     irbesartan-hydrochlorothiazide (AVALIDE) 150-12.5 MG per tablet TAKE 1 TABLET BY MOUTH DAILY (Patient taking differently: TAKE 0.5 TABLET BY MOUTH DAILY) 90 tablet 1     latanoprost (XALATAN) 0.005 % ophthalmic solution Place 1 drop into both eyes At Bedtime 3 Bottle 4     metFORMIN (GLUCOPHAGE) 1000 MG tablet TAKE 1 TABLET (1,000 MG) BY MOUTH 2 TIMES DAILY (WITH MEALS) 180 tablet 3     MULTIVITAMIN OR 1 tablet daily       nystatin-triamcinolone (MYCOLOG II) 908447-0.1 UNIT/GM-% external cream Apply topically 2 times daily To feet and toenails. 60 g 2     Omega-3 Fatty Acids (OMEGA 3 PO) Take by mouth 2 times daily.        ONE TOUCH DELICA LANCETS MISC 1 each 2 times daily. One Touch Delica   100 each 12     ONETOUCH ULTRA test strip USE TO TEST TWICE A  strip 4     order for DME Glucometer covered by insurance. 1 each 0     oxybutynin ER (DITROPAN-XL) 5 MG 24 hr tablet TAKE 2 TABLETS (10 MG) BY MOUTH DAILY 180 tablet 1     rosuvastatin (CRESTOR) 5 MG tablet TAKE 1 TABLET BY MOUTH TWICE WEEKLY 24 tablet 0     SYNTHROID 137 MCG tablet TAKE 1 TABLET (137 MCG) BY MOUTH DAILY 90 tablet 3       Review of Systems     Constitutional: Negative for fever and unexpected weight change. Appetite normal   Head: no headache   Eye: no vision change/ loss of peripheral vision.   ENT: No throat congestion.   Respiratory: no cough   Cardiovascular: no chest pain  Gastrointestinal: Negative for vomiting, abdominal pain and diarrhea.  Genitourinary: No bladder  problems.  Musculoskeletal: Negative for myalgias. No weakness.  Neurological: Negative for seizures and headaches.  Psychiatric/Behavioral: Negative for behavioral problem and dysphoric mood.    Past Medical History:   Diagnosis Date     Actinic keratosis 3/12/2013     Degenerative joint disease      Diabetes (H)      Rheumatic fever 1957    x2 between the ages of 15-18     Seasonal allergies        Past Surgical History:   Procedure Laterality Date     C APPENDECTOMY       C ARTHROPLASTY TMJ       CATARACT IOL, RT/LT       COLONOSCOPY       COLONOSCOPY N/A 8/13/2015    Procedure: COLONOSCOPY;  Surgeon: Duane, William Charles, MD;  Location: [facility] OR     COLONOSCOPY WITH CO2 INSUFFLATION N/A 8/13/2015    Procedure: COLONOSCOPY WITH CO2 INSUFFLATION;  Surgeon: Duane, William Charles, MD;  Location: [facility] OR     PHACOEMULSIFICATION WITH STANDARD INTRAOCULAR LENS IMPLANT Left 10/25/2018    Procedure: LEFT PHACOEMULSIFICATION WITH STANDARD INTRAOCULAR LENS IMPLANT;  Surgeon: Thomas Serna MD;  Location: [facility] OR     PHACOEMULSIFICATION WITH STANDARD INTRAOCULAR LENS IMPLANT Right 11/8/2018    Procedure: RIGHT PHACOEMULSIFICATION WITH STANDARD INTRAOCULAR LENS IMPLANT;  Surgeon: Thomas Serna MD;  Location: [facility] OR     THYROIDECTOMY          Family History   Problem Relation Age of Onset     Cancer Father         skin and leukemia     Cerebrovascular Disease Maternal Grandfather      Cerebrovascular Disease Paternal Grandmother      Eye Disorder Paternal Grandmother         cataracts     Cerebrovascular Disease Paternal Grandfather      Heart Disease Paternal Grandfather      Cancer Sister         Breast Cancer     Eye Disorder Mother         cataracts     Eye Disorder Brother         cataract     Breast Cancer Daughter      Other Cancer Daughter         ovarian cancer     Glaucoma No family hx of      Macular Degeneration No family hx of        Social History     Social History     Marital Status: Single      Spouse Name: N/A     Number of Children: N/A     Years of Education: N/A     Social History Main Topics     Smoking status: Never Smoker      Smokeless tobacco: Never Used      Comment: has had exposure to second hand smoke in the past from [place], no current exposure     Alcohol Use: No     Drug Use: No     Sexual Activity: No     Other Topics Concern     Parent/Sibling W/ Cabg, Mi Or Angioplasty Before 65f 55m? 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     Hobby Hazards No     Sleep Concern Yes     difficulty staying asleep, up and down all night     Stress Concern No     Weight Concern Yes     would like to lose     Special Diet Yes     low card, low sugar diet     Back Care Yes     chiropratior     Exercise Yes     almost everyday     Bike Helmet No     does not ride bicycle any more     Seat Belt Yes     Self-Exams Yes     Social History Narrative       Objective:   /75 (BP Location: Left arm, Patient Position: Sitting, Cuff Size: Adult Regular)   Pulse 84   Wt 83.9 kg (184 lb 14.4 oz)   SpO2 95%   BMI 31.74 kg/m     Constitutional: Elderly lady in no acute distress   EYES: anicteric, normal extra-ocular movements, no lid lag or retraction   HEENT: Mouth/Throat: Mucous membrane is moist. Oropharynx is clear. No adenopathy. Normal thyroid   Cardiovascular: regular rate and rhythm  Pulmonary/Chest: CTAB. No wheezing or rales   Abdominal: +BS. Nontender to palpation. No organomegaly present.  Neurological: Alert. Normal affect.   Extremities: No clubbing, cyanosis or inflammation   Skin: normal texture, color  Feet: bilateral neuropathy left worse than right, follows Dr. Araya.  [due 12/2019]  Lymphatic: no cervical lymphadenopathy.  Psychological: appropriate mood     In House Labs:   A1C     11.0   11/8/2016  A1C     10.4    7/26/2016  A1C     11.5   4/15/2016  A1C     11.2   2/23/2016  A1C      9.9   12/22/2015    TSH   Date Value Ref Range Status   05/10/2019 2.61 0.40 - 4.00 mU/L Final   12/28/2018 2.96 0.40 - 4.00 mU/L Final   06/25/2018 2.46 0.40 - 4.00 mU/L Final   12/22/2017 1.70 0.40 - 4.00 mU/L Final   02/09/2017 1.33 0.40 - 4.00 mU/L Final     T4 Free   Date Value Ref Range Status   06/25/2018 1.17 0.76 - 1.46 ng/dL Final   12/22/2017 1.16 0.76 - 1.46 ng/dL Final   02/09/2017 1.25 0.76 - 1.46 ng/dL Final       Creatinine   Date Value Ref Range Status   05/10/2019 0.81 0.52 - 1.04 mg/dL Final   ]    Recent Labs   Lab Test  07/26/16   0828  02/23/16   1115   08/21/15   0825   06/16/15   0941   CHOL  137  184   < >  174   --   224*   HDL  57  59   < >  49*   --   47*   LDL  48  88   < >  86   < >  120   TRIG  162*  187*   < >  197*   --   283*   CHOLHDLRATIO   --    --    --   3.6   --   4.8    < > = values in this interval not displayed.       No results found for: [id number], [id number], [id number]

## 2019-09-19 ENCOUNTER — TELEPHONE (OUTPATIENT)
Dept: ENDOCRINOLOGY | Facility: CLINIC | Age: 79
End: 2019-09-19

## 2019-09-19 DIAGNOSIS — Z79.4 TYPE 2 DIABETES MELLITUS WITH HYPERGLYCEMIA, WITH LONG-TERM CURRENT USE OF INSULIN (H): Primary | Chronic | ICD-10-CM

## 2019-09-19 DIAGNOSIS — E11.65 TYPE 2 DIABETES MELLITUS WITH HYPERGLYCEMIA, WITH LONG-TERM CURRENT USE OF INSULIN (H): Primary | Chronic | ICD-10-CM

## 2019-09-19 NOTE — TELEPHONE ENCOUNTER
Left message for patient to return call.    Eranestine Kinsey LPN  Diabetes Clinic Coordinator  [facility]  [facility]

## 2019-09-19 NOTE — TELEPHONE ENCOUNTER
----- Message from Candice Worley MD sent at 9/19/2019 11:50 AM CDT -----  Prandin 1 mg before each meal.   Discontinue Glipizide.   Candice Worley MD  Endocrinology Service    ----- Message -----  From: Earnestine Kinsey LPN  Sent: 9/19/2019  10:47 AM  To: Candice Worley MD    Ok to order Prandin? Dose?  Thanks!  Earnestine  ----- Message -----  From: Candice Worley MD  Sent: 9/18/2019   6:17 PM  To: Earnestine Kinsey LPN    Prandin will be an option.   Canidce Worley MD  Endocrinology Service    ----- Message -----  From: Earnestine Kinsey LPN  Sent: 9/13/2019   9:58 AM  To: Candice Worley MD    Hi,  Patient cannot afford Jardiance. I looked up formulary and Actos or Prandin may be more affordable. Are either of those an option?  Thank you!  Earnestine

## 2019-09-20 RX ORDER — REPAGLINIDE 1 MG/1
1 TABLET ORAL
Qty: 180 TABLET | Refills: 3 | Status: SHIPPED | OUTPATIENT
Start: 2019-09-20 | End: 2019-11-19

## 2019-09-20 NOTE — TELEPHONE ENCOUNTER
Patient notified to discontinue glipizide and start prandin 1 mg before meals via voicemail per patient request.    Encouraged to return call with questions.    Earnestine Kinsey LPN  Diabetes Clinic Coordinator   [facility]  [facility]

## 2019-10-18 ENCOUNTER — OFFICE VISIT (OUTPATIENT)
Dept: PEDIATRICS | Facility: CLINIC | Age: 79
End: 2019-10-18
Payer: COMMERCIAL

## 2019-10-18 VITALS
TEMPERATURE: 97.4 F | HEART RATE: 83 BPM | DIASTOLIC BLOOD PRESSURE: 60 MMHG | SYSTOLIC BLOOD PRESSURE: 130 MMHG | WEIGHT: 189.8 LBS | HEIGHT: 64 IN | OXYGEN SATURATION: 97 % | BODY MASS INDEX: 32.4 KG/M2

## 2019-10-18 DIAGNOSIS — Z79.4 TYPE 2 DIABETES MELLITUS WITH HYPERGLYCEMIA, WITH LONG-TERM CURRENT USE OF INSULIN (H): ICD-10-CM

## 2019-10-18 DIAGNOSIS — R30.0 DYSURIA: ICD-10-CM

## 2019-10-18 DIAGNOSIS — E11.65 TYPE 2 DIABETES MELLITUS WITH HYPERGLYCEMIA, WITH LONG-TERM CURRENT USE OF INSULIN (H): ICD-10-CM

## 2019-10-18 DIAGNOSIS — I10 HYPERTENSION GOAL BP (BLOOD PRESSURE) < 140/90: ICD-10-CM

## 2019-10-18 DIAGNOSIS — N39.0 URINARY TRACT INFECTION WITHOUT HEMATURIA, SITE UNSPECIFIED: Primary | ICD-10-CM

## 2019-10-18 DIAGNOSIS — E78.5 HYPERLIPIDEMIA LDL GOAL <100: ICD-10-CM

## 2019-10-18 LAB
ALBUMIN UR-MCNC: NEGATIVE MG/DL
APPEARANCE UR: ABNORMAL
BACTERIA #/AREA URNS HPF: ABNORMAL /HPF
BILIRUB UR QL STRIP: NEGATIVE
COLOR UR AUTO: ABNORMAL
GLUCOSE UR STRIP-MCNC: >1000 MG/DL
HGB UR QL STRIP: NEGATIVE
KETONES UR STRIP-MCNC: NEGATIVE MG/DL
LEUKOCYTE ESTERASE UR QL STRIP: ABNORMAL
NITRATE UR QL: POSITIVE
NON-SQ EPI CELLS #/AREA URNS LPF: ABNORMAL /LPF
PH UR STRIP: 6 PH (ref 5–7)
RBC #/AREA URNS AUTO: ABNORMAL /HPF
SOURCE: ABNORMAL
SP GR UR STRIP: 1.02 (ref 1–1.03)
UROBILINOGEN UR STRIP-MCNC: NORMAL MG/DL (ref 0–2)
WBC #/AREA URNS AUTO: ABNORMAL /HPF
WBC CLUMPS #/AREA URNS HPF: PRESENT /HPF

## 2019-10-18 PROCEDURE — 87088 URINE BACTERIA CULTURE: CPT | Performed by: NURSE PRACTITIONER

## 2019-10-18 PROCEDURE — 87086 URINE CULTURE/COLONY COUNT: CPT | Performed by: NURSE PRACTITIONER

## 2019-10-18 PROCEDURE — 87186 SC STD MICRODIL/AGAR DIL: CPT | Performed by: NURSE PRACTITIONER

## 2019-10-18 PROCEDURE — 81001 URINALYSIS AUTO W/SCOPE: CPT | Performed by: NURSE PRACTITIONER

## 2019-10-18 PROCEDURE — 99214 OFFICE O/P EST MOD 30 MIN: CPT | Performed by: NURSE PRACTITIONER

## 2019-10-18 RX ORDER — IRBESARTAN AND HYDROCHLOROTHIAZIDE 150; 12.5 MG/1; MG/1
TABLET, FILM COATED ORAL
Qty: 90 TABLET | Refills: 1 | Status: SHIPPED | OUTPATIENT
Start: 2019-10-18 | End: 2020-01-13

## 2019-10-18 RX ORDER — CEFUROXIME AXETIL 500 MG/1
500 TABLET ORAL 2 TIMES DAILY
Qty: 14 TABLET | Refills: 0 | Status: SHIPPED | OUTPATIENT
Start: 2019-10-18 | End: 2019-11-20

## 2019-10-18 ASSESSMENT — MIFFLIN-ST. JEOR: SCORE: 1325.93

## 2019-10-18 NOTE — PATIENT INSTRUCTIONS
PLAN:   1.   Symptomatic therapy suggested: increase fluids and call prn if symptoms persist or worsen.  Continue current medication regimen unchanged.    2.  Orders Placed This Encounter   Medications     cefuroxime (CEFTIN) 500 MG tablet     Sig: Take 1 tablet (500 mg) by mouth 2 times daily for 7 days     Dispense:  14 tablet     Refill:  0     irbesartan-hydrochlorothiazide (AVALIDE) 150-12.5 MG tablet     Sig: TAKE 0.5 TABLET BY MOUTH DAILY     Dispense:  90 tablet     Refill:  1     empagliflozin (JARDIANCE) 10 MG TABS tablet     Sig: Take 1 tablet (10 mg) by mouth daily     Dispense:  90 tablet     Refill:  3     Orders Placed This Encounter   Procedures     UA with Microscopic reflex to Culture       3. Patient needs to follow up in if no improvement,or sooner if worsening of symptoms or other symptoms develop.  FUTURE LABS:       - Schedule a fasting blood draw before appointment with new endocrinologist   Will follow up and/or notify patient on results via phone or mail to determine further need for followup  FOLLOW UP WITH SPECIALIST :Endocrinology  Will try to refill Jardiance and see what the cost is     Next 5 appointments (look out 90 days)    Oct 28, 2019 11:00 AM CDT  Return Visit with Rachel Morris MD  [facility]) [address] [facility] [address]  [phone]   Nov 05, 2019  9:00 AM CST  Return Visit with Thomas Serna MD  [facility]) [address] [facility] [address]  [phone]   Nov 19, 2019  8:00 AM CST  Return Visit with Ehsan Araya DPM  [facility]) [address] [facility] [address]  [phone]

## 2019-10-18 NOTE — PROGRESS NOTES
Subjective     Brandy Leon is a 79 year old female who presents to clinic today for the following health issues:    HPI   Diabetes Follow-up      How often are you checking your blood sugar? One time daily    What time of day are you checking your blood sugars (select all that apply)?  Before meals    Have you had any blood sugars above 200?  Yes 253 this morning    Have you had any blood sugars below 70?  No    What symptoms do you notice when your blood sugar is low?  None    What concerns do you have today about your diabetes? Other: discuss medication, endocrinologist is leaving and will be est care with new endocrinologist. jardiance was $300     Do you have any of these symptoms? (Select all that apply)  Weight gain, tingling in the hands     Have you had a diabetic eye exam in the last 12 months? Yes- Date of last eye exam: 11/14/18  Was just started on Prandin on September 19 before each meal  The jardiance was very expensive     Diabetes Management Resources    Hyperlipidemia Follow-Up      Are you having any of the following symptoms? (Select all that apply)  No complaints of shortness of breath, chest pain or pressure.  No increased sweating or nausea with activity.  No left-sided neck or arm pain.  No complaints of pain in calves when walking 1-2 blocks.    Are you regularly taking any medication or supplement to lower your cholesterol?   Yes- twice a week    Are you having muscle aches or other side effects that you think could be caused by your cholesterol lowering medication?  No    Hypertension Follow-up      Do you check your blood pressure regularly outside of the clinic? Yes     Are you following a low salt diet? No    Are your blood pressures ever more than 140 on the top number (systolic) OR more   than 90 on the bottom number (diastolic), for example 140/90? No    BP Readings from Last 2 Encounters:   10/28/19 (!) 149/72   10/18/19 130/60     Hemoglobin A1C (%)   Date Value   10/25/2019  11.1 (H)   07/08/2019 10.2 (H)     LDL Cholesterol Calculated (mg/dL)   Date Value   10/25/2019 91   06/25/2018 93     LDL Cholesterol Direct (mg/dL)   Date Value   05/10/2019 114 (H)         How many servings of fruits and vegetables do you eat daily?  2-3    On average, how many sweetened beverages do you drink each day (soda, juice, sweet tea, etc)?   0    How many days per week do you miss taking your medication? 0      Patient Active Problem List   Diagnosis     Seasonal allergies     Hypothyroidism     Hyperlipidemia LDL goal <100     Fibromyalgia     Osteoarthritis     Advanced directives, counseling/discussion     Obesity     Type 2 diabetes mellitus with hyperglycemia, with long-term current use of insulin (H)     Hypertension goal BP (blood pressure) < 140/90     Overactive bladder     Recurrent UTI     Pseudophakia of both eyes     DINORA (obstructive sleep apnea)- severe (AHI 56)     Past Surgical History:   Procedure Laterality Date     C APPENDECTOMY       C ARTHROPLASTY TMJ       CATARACT IOL, RT/LT       COLONOSCOPY       COLONOSCOPY N/A 8/13/2015    Procedure: COLONOSCOPY;  Surgeon: Duane, William Charles, MD;  Location: [facility]     COLONOSCOPY WITH CO2 INSUFFLATION N/A 8/13/2015    Procedure: COLONOSCOPY WITH CO2 INSUFFLATION;  Surgeon: Duane, William Charles, MD;  Location: [facility]     PHACOEMULSIFICATION WITH STANDARD INTRAOCULAR LENS IMPLANT Left 10/25/2018    Procedure: LEFT PHACOEMULSIFICATION WITH STANDARD INTRAOCULAR LENS IMPLANT;  Surgeon: Thomas Serna MD;  Location: [facility]     PHACOEMULSIFICATION WITH STANDARD INTRAOCULAR LENS IMPLANT Right 11/8/2018    Procedure: RIGHT PHACOEMULSIFICATION WITH STANDARD INTRAOCULAR LENS IMPLANT;  Surgeon: Thomas Serna MD;  Location: [facility]     THYROIDECTOMY          Social History     Tobacco Use     Smoking status: Never Smoker     Smokeless tobacco: Never Used     Tobacco comment: has had exposure to second hand smoke in the past from  [place], no current exposure   Substance Use Topics     Alcohol use: No     Family History   Problem Relation Age of Onset     Cancer Father         skin and leukemia     Cerebrovascular Disease Maternal Grandfather      Cerebrovascular Disease Paternal Grandmother      Eye Disorder Paternal Grandmother         cataracts     Cerebrovascular Disease Paternal Grandfather      Heart Disease Paternal Grandfather      Cancer Sister         Breast Cancer     Eye Disorder Mother         cataracts     Eye Disorder Brother         cataract     Breast Cancer Daughter      Other Cancer Daughter         ovarian cancer     Glaucoma No family hx of      Macular Degeneration No family hx of          Current Outpatient Medications   Medication Sig Dispense Refill     aspirin 81 MG EC tablet Take 1 tablet by mouth daily. 90 tablet 3     ciclopirox (LOPROX) 0.77 % cream Apply topically 2 times daily To feet and toenails. 90 g 6     empagliflozin (JARDIANCE) 10 MG TABS tablet Take 1 tablet (10 mg) by mouth daily 90 tablet 3     fexofenadine (ALLEGRA) 180 MG tablet Take 180 mg by mouth daily       GLUCOSAMINE CHONDROITIN COMPLX OR 2 Daily       insulin glargine (LANTUS SOLOSTAR PEN) 100 UNIT/ML pen Inject 30 Units Subcutaneous daily 45 mL 1     insulin pen needle (BD JUAN C U/F) 32G X 4 MM miscellaneous USE 1 DAILY AS DIRECTED. 100 each 3     irbesartan-hydrochlorothiazide (AVALIDE) 150-12.5 MG per tablet TAKE 1 TABLET BY MOUTH DAILY (Patient taking differently: TAKE 0.5 TABLET BY MOUTH DAILY) 90 tablet 1     latanoprost (XALATAN) 0.005 % ophthalmic solution Place 1 drop into both eyes At Bedtime 3 Bottle 4     metFORMIN (GLUCOPHAGE) 1000 MG tablet TAKE 1 TABLET (1,000 MG) BY MOUTH 2 TIMES DAILY (WITH MEALS) 180 tablet 3     MULTIVITAMIN OR 1 tablet daily       nystatin-triamcinolone (MYCOLOG II) 041235-1.1 UNIT/GM-% external cream Apply topically 2 times daily To feet and toenails. 60 g 2     Omega-3 Fatty Acids (OMEGA 3 PO) Take by  mouth 2 times daily.        ONE TOUCH DELICA LANCETS MISC 1 each 2 times daily. One Touch Delica   100 each 12     ONETOUCH ULTRA test strip USE TO TEST TWICE A  strip 4     order for DME Glucometer covered by insurance. 1 each 0     oxybutynin ER (DITROPAN-XL) 5 MG 24 hr tablet TAKE 2 TABLETS (10 MG) BY MOUTH DAILY 180 tablet 1     repaglinide (PRANDIN) 1 MG tablet Take 1 tablet (1 mg) by mouth 3 times daily (before meals) 180 tablet 3     rosuvastatin (CRESTOR) 5 MG tablet TAKE 1 TABLET BY MOUTH TWICE WEEKLY 24 tablet 0     SYNTHROID 137 MCG tablet TAKE 1 TABLET (137 MCG) BY MOUTH DAILY 90 tablet 3     famotidine (PEPCID) 20 MG tablet Take 20 mg by mouth nightly as needed       Allergies   Allergen Reactions     Estradiol Itching     Lipitor [Atorvastatin Calcium]      Weak and shaky     Sulfa Drugs      Rash       Zocor [Simvastatin]      Weak and shakey     BP Readings from Last 3 Encounters:   10/18/19 130/60   09/13/19 122/75   08/19/19 107/68    Wt Readings from Last 3 Encounters:   10/18/19 86.1 kg (189 lb 12.8 oz)   09/13/19 83.9 kg (184 lb 14.4 oz)   08/19/19 85.8 kg (189 lb 3.2 oz)              Reviewed and updated as needed this visit by Provider         Review of Systems   ROS COMP: CONSTITUTIONAL:NEGATIVE for fever, chills, change in weight  ENT/MOUTH: NEGATIVE for ear, mouth and throat problems  RESP:NEGATIVE for significant cough or SOB  CV: NEGATIVE for chest pain, palpitations or peripheral edema  GI: NEGATIVE for nausea, abdominal pain, heartburn, or change in bowel habits  : urinary frequency and burning   MUSCULOSKELETAL: POSITIVE  for arthralgias  and NEGATIVE for joint swelling  and joint warmth   NEURO: NEGATIVE for weakness, dizziness or paresthesias  ENDOCRINE: POSITIVE  for HX diabetes and polyuria and NEGATIVE for cold intolerance, heat intolerance, palpitations, weight gain and weight loss      Objective    /60 (BP Location: Right arm, Patient Position: Sitting, Cuff Size:  "Adult Regular)   Pulse 83   Temp 97.4  F (36.3  C) (Temporal)   Ht 1.626 m (5' 4\")   Wt 86.1 kg (189 lb 12.8 oz)   SpO2 97%   Breastfeeding? No   BMI 32.58 kg/m    Body mass index is 32.58 kg/m .   Wt Readings from Last 4 Encounters:   10/18/19 86.1 kg (189 lb 12.8 oz)   09/13/19 83.9 kg (184 lb 14.4 oz)   08/19/19 85.8 kg (189 lb 3.2 oz)   05/10/19 83.5 kg (184 lb 1.6 oz)       Physical Exam   GENERAL: Patient is well nourished, well developed,in no apparent distress, non-toxic, in no respiratory distress and acyanotic, alert, cooperative and well hydrated  EYES: Eyes grossly normal to inspection and conjunctivae and sclerae normal  HENT:ear canals and TM's normal and nose and mouth without ulcers or lesions   NECK:normal, supple and no adenopathy  CARDIAC:regular rates and rhythm, no murmur, click or rub and no irregular beats  carotids with brisk upstroke/without bruit bilaterally  RESP: normal respiratory rate and rhythm, lungs clear to auscultation  ABD:soft, nontender  SKIN: Skin color, texture, turgor normal. No rashes or lesions.  MS: extremities normal- no gross deformities noted, gait normal and normal muscle tone  NEURO: mentation intact and speech normal  PSYCH: Alert, oriented, thought content appropriate,mentation appears normal., affect and mood normal      Diagnostic Test Results:  Results for orders placed or performed in visit on 10/18/19   UA with Microscopic reflex to Culture     Status: Abnormal   Result Value Ref Range    Color Urine Light Yellow     Appearance Urine Slightly Cloudy     Glucose Urine >1000 (A) NEG^Negative mg/dL    Bilirubin Urine Negative NEG^Negative    Ketones Urine Negative NEG^Negative mg/dL    Specific Gravity Urine 1.018 1.003 - 1.035    Blood Urine Negative NEG^Negative    pH Urine 6.0 5.0 - 7.0 pH    Protein Albumin Urine Negative NEG^Negative mg/dL    Urobilinogen mg/dL Normal 0.0 - 2.0 mg/dL    Nitrite Urine Positive (A) NEG^Negative    Leukocyte Esterase " Urine Large (A) NEG^Negative    Source Midstream Urine     WBC Urine 25-50 (A) OTO5^0 - 5 /HPF    RBC Urine O - 2 OTO2^O - 2 /HPF    WBC Clumps Present (A) NEG^Negative /HPF    Bacteria Urine Many (A) NEG^Negative /HPF    Squamous Epithelial /LPF Urine Few FEW^Few /LPF   Urine Culture Aerobic Bacterial     Status: Abnormal   Result Value Ref Range    Specimen Description Midstream Urine     Culture Micro >100,000 colonies/mL  Escherichia coli   (A)        Susceptibility    Escherichia coli - XIMENA     AMPICILLIN 8 Sensitive ug/mL     CEFAZOLIN* <=4.0 Sensitive ug/mL      * Cefazolin XIMENA breakpoints are for the treatment of uncomplicated urinary tract infections.  For the treatment of systemic infections, please contact the laboratory for additional testing.     CEFOXITIN 8 Sensitive ug/mL     CEFTAZIDIME <=1.0 Sensitive ug/mL     CEFTRIAXONE <=1.0 Sensitive ug/mL     CIPROFLOXACIN <=0.25 Sensitive ug/mL     GENTAMICIN <=1.0 Sensitive ug/mL     LEVOFLOXACIN <=0.12 Sensitive ug/mL     NITROFURANTOIN <=16 Sensitive ug/mL     TOBRAMYCIN <=1.0 Sensitive ug/mL     Trimethoprim/Sulfa 1.0/19.0 Sensitive ug/mL     AMPICILLIN/SULBACTAM <=2.0 Sensitive ug/mL     Piperacillin/Tazo <=4.0 Sensitive ug/mL     CEFEPIME <=1.0 Sensitive ug/mL           Assessment & Plan     Brandy was seen today for hypertension, lipids and diabetes.    Diagnoses and all orders for this visit:    Urinary tract infection without hematuria, site unspecified  -     cefuroxime (CEFTIN) 500 MG tablet; Take 1 tablet (500 mg) by mouth 2 times daily for 7 days  Patient was instructed to drink plenty of fluids, urinate frequently and to contact the office promptly should she notice fever greater than 102, increase in discomfort, skin rash, lack of improvement after 2 days of treatment, or the appearance of new symptoms.    Dysuria  -     UA with Microscopic reflex to Culture  -     Urine Culture Aerobic Bacterial    Type 2 diabetes mellitus with  hyperglycemia, with long-term current use of insulin (H)  -     empagliflozin (JARDIANCE) 10 MG TABS tablet; Take 1 tablet (10 mg) by mouth daily  annual eye examinations at Ophthalmology discussed, glycohemoglobin monitoring discussed and long term diabetic complications discussed  Attempt to improve diet  Weight loss advised  Increased exercise advised  Medication changes as follows: will try to refill Jardiance again to see cost   Referral to MTM   Referral to Endocrinology   Recheck in 6 months, sooner should new symptoms or   problems arise.    Hypertension goal BP (blood pressure) < 140/90  -     irbesartan-hydrochlorothiazide (AVALIDE) 150-12.5 MG tablet; TAKE 0.5 TABLET BY MOUTH DAILY  HTN Plan:  1)  Medication: continue current medication regimen unchanged  2)  Dietary sodium restriction  3)  Regular aerobic exercise  4)  Recheck in 6 months, sooner should new symptoms or   problems arise.  5) See todays orders.    Patient Education: Reviewed risks of hypertension and principles of   treatment.      Hyperlipidemia LDL goal <100  Hyperlipidemia Plan:  Regular exercise and a low fat diet are encouraged.    Side effects of cholesterol medications discussed.  Continue current medication regimen unchanged.        PLAN:   Patient Instructions     PLAN:   1.   Symptomatic therapy suggested: increase fluids and call prn if symptoms persist or worsen.  Continue current medication regimen unchanged.    2.  Orders Placed This Encounter   Medications     cefuroxime (CEFTIN) 500 MG tablet     Sig: Take 1 tablet (500 mg) by mouth 2 times daily for 7 days     Dispense:  14 tablet     Refill:  0     irbesartan-hydrochlorothiazide (AVALIDE) 150-12.5 MG tablet     Sig: TAKE 0.5 TABLET BY MOUTH DAILY     Dispense:  90 tablet     Refill:  1     empagliflozin (JARDIANCE) 10 MG TABS tablet     Sig: Take 1 tablet (10 mg) by mouth daily     Dispense:  90 tablet     Refill:  3     Orders Placed This Encounter   Procedures     UA  "with Microscopic reflex to Culture       3. Patient needs to follow up in if no improvement,or sooner if worsening of symptoms or other symptoms develop.  FUTURE LABS:       - Schedule a fasting blood draw before appointment with new endocrinologist   Will follow up and/or notify patient on results via phone or mail to determine further need for followup  FOLLOW UP WITH SPECIALIST :Endocrinology  Will try to refill Jardiance and see what the cost is     Next 5 appointments (look out 90 days)    Oct 28, 2019 11:00 AM CDT  Return Visit with Rachel Morris MD  [facility] [address] [place] [id number]  [phone]   Nov 05, 2019  9:00 AM CST  Return Visit with Thomas Serna MD  [facility]) [address] [place] [id number]  [phone]   Nov 19, 2019  8:00 AM CST  Return Visit with Ehsan Araya DPM  [facility]) [address] [place] [id number]  [phone]         BMI:   Estimated body mass index is 32.58 kg/m  as calculated from the following:    Height as of this encounter: 1.626 m (5' 4\").    Weight as of this encounter: 86.1 kg (189 lb 12.8 oz).   Weight management plan: Discussed healthy diet and exercise guidelines      No follow-ups on file.    MAGO Baker [facility]      "

## 2019-10-18 NOTE — NURSING NOTE
"Chief Complaint   Patient presents with     Hypertension     Lipids     Diabetes     discuss medications, endo she was seeing has left       Initial /60 (BP Location: Right arm, Patient Position: Sitting, Cuff Size: Adult Regular)   Pulse 83   Temp 97.4  F (36.3  C) (Temporal)   Ht 1.626 m (5' 4\")   Wt 86.1 kg (189 lb 12.8 oz)   SpO2 97%   Breastfeeding? No   BMI 32.58 kg/m   Estimated body mass index is 32.58 kg/m  as calculated from the following:    Height as of this encounter: 1.626 m (5' 4\").    Weight as of this encounter: 86.1 kg (189 lb 12.8 oz).  Medication Reconciliation: complete      KATINA Tucker      "

## 2019-10-21 ENCOUNTER — TELEPHONE (OUTPATIENT)
Dept: PEDIATRICS | Facility: CLINIC | Age: 79
End: 2019-10-21

## 2019-10-21 LAB
BACTERIA SPEC CULT: ABNORMAL
SPECIMEN SOURCE: ABNORMAL

## 2019-10-21 NOTE — TELEPHONE ENCOUNTER
Please call   -Urine culture is abnormal and grew out bacteria that are sensitive to the antibiotic you have been given.  Complete the medication as prescribed and if you experience new, worsening or persistent symptoms, you should call or return for a recheck.  Cailin Ponce NP, APRN CNP

## 2019-10-21 NOTE — TELEPHONE ENCOUNTER
Called patient, left message with phone number to call back.       Mirna Melendrez RN, [facility]

## 2019-10-22 NOTE — TELEPHONE ENCOUNTER
Patient called back, discussed the results as noted below, understanding verbalized. Patient will follow up as indicated.     Jacqueline Robertson RN, BSN, PHN  [facility]

## 2019-10-25 DIAGNOSIS — E11.65 TYPE 2 DIABETES MELLITUS WITH HYPERGLYCEMIA, WITH LONG-TERM CURRENT USE OF INSULIN (H): ICD-10-CM

## 2019-10-25 DIAGNOSIS — Z79.4 TYPE 2 DIABETES MELLITUS WITH HYPERGLYCEMIA, WITH LONG-TERM CURRENT USE OF INSULIN (H): ICD-10-CM

## 2019-10-25 LAB
ALBUMIN SERPL-MCNC: 3.6 G/DL (ref 3.4–5)
ALT SERPL W P-5'-P-CCNC: 25 U/L (ref 0–50)
ANION GAP SERPL CALCULATED.3IONS-SCNC: 4 MMOL/L (ref 3–14)
BUN SERPL-MCNC: 20 MG/DL (ref 7–30)
CALCIUM SERPL-MCNC: 9.4 MG/DL (ref 8.5–10.1)
CHLORIDE SERPL-SCNC: 98 MMOL/L (ref 94–109)
CHOLEST SERPL-MCNC: 199 MG/DL
CK SERPL-CCNC: 205 U/L (ref 30–225)
CO2 SERPL-SCNC: 32 MMOL/L (ref 20–32)
CREAT SERPL-MCNC: 0.72 MG/DL (ref 0.52–1.04)
CREAT UR-MCNC: 79 MG/DL
GFR SERPL CREATININE-BSD FRML MDRD: 80 ML/MIN/{1.73_M2}
GLUCOSE SERPL-MCNC: 239 MG/DL (ref 70–99)
HBA1C MFR BLD: 11.1 % (ref 0–5.6)
HCT VFR BLD AUTO: 47.1 % (ref 35–47)
HDLC SERPL-MCNC: 57 MG/DL
LDLC SERPL CALC-MCNC: 91 MG/DL
MICROALBUMIN UR-MCNC: 16 MG/L
MICROALBUMIN/CREAT UR: 20.15 MG/G CR (ref 0–25)
NONHDLC SERPL-MCNC: 142 MG/DL
PHOSPHATE SERPL-MCNC: 3.6 MG/DL (ref 2.5–4.5)
POTASSIUM SERPL-SCNC: 4.4 MMOL/L (ref 3.4–5.3)
SODIUM SERPL-SCNC: 134 MMOL/L (ref 133–144)
TRIGL SERPL-MCNC: 254 MG/DL
TSH SERPL DL<=0.005 MIU/L-ACNC: 2.78 MU/L (ref 0.4–4)

## 2019-10-25 PROCEDURE — 84460 ALANINE AMINO (ALT) (SGPT): CPT | Performed by: INTERNAL MEDICINE

## 2019-10-25 PROCEDURE — 36415 COLL VENOUS BLD VENIPUNCTURE: CPT | Performed by: INTERNAL MEDICINE

## 2019-10-25 PROCEDURE — 85014 HEMATOCRIT: CPT | Performed by: INTERNAL MEDICINE

## 2019-10-25 PROCEDURE — 82550 ASSAY OF CK (CPK): CPT | Performed by: INTERNAL MEDICINE

## 2019-10-25 PROCEDURE — 82043 UR ALBUMIN QUANTITATIVE: CPT | Performed by: INTERNAL MEDICINE

## 2019-10-25 PROCEDURE — 84443 ASSAY THYROID STIM HORMONE: CPT | Performed by: INTERNAL MEDICINE

## 2019-10-25 PROCEDURE — 80069 RENAL FUNCTION PANEL: CPT | Performed by: INTERNAL MEDICINE

## 2019-10-25 PROCEDURE — 80061 LIPID PANEL: CPT | Performed by: INTERNAL MEDICINE

## 2019-10-25 PROCEDURE — 83036 HEMOGLOBIN GLYCOSYLATED A1C: CPT | Performed by: INTERNAL MEDICINE

## 2019-10-25 NOTE — LETTER
Patient:  Brandy Leon  :   1940  MRN:     [id number]        Ms.Charlotte DEANN Leon  [address] [place] [id number]        2019    Dear ,    We are writing to inform you of your test results.  Thyroid function normal, electrolytes and renal functions were stable.   LDL is stable, no proteins in urine.   A1c is worse than before, please schedule for a phone visit.     Best regards,  Candice Worley MD  Endocrinology Service          Resulted Orders   TSH with free T4 reflex   Result Value Ref Range    TSH 2.78 0.40 - 4.00 mU/L   Renal panel   Result Value Ref Range    Sodium 134 133 - 144 mmol/L    Potassium 4.4 3.4 - 5.3 mmol/L    Chloride 98 94 - 109 mmol/L    Carbon Dioxide 32 20 - 32 mmol/L    Anion Gap 4 3 - 14 mmol/L    Glucose 239 (H) 70 - 99 mg/dL      Comment:      Fasting specimen    Urea Nitrogen 20 7 - 30 mg/dL    Creatinine 0.72 0.52 - 1.04 mg/dL    GFR Estimate 80 >60 mL/min/[1.73_m2]      Comment:      Non  GFR Calc  Starting 2018, serum creatinine based estimated GFR (eGFR) will be   calculated using the Chronic Kidney Disease Epidemiology Collaboration   (CKD-EPI) equation.      GFR Estimate If Black >90 >60 mL/min/[1.73_m2]      Comment:       GFR Calc  Starting 2018, serum creatinine based estimated GFR (eGFR) will be   calculated using the Chronic Kidney Disease Epidemiology Collaboration   (CKD-EPI) equation.      Calcium 9.4 8.5 - 10.1 mg/dL    Phosphorus 3.6 2.5 - 4.5 mg/dL    Albumin 3.6 3.4 - 5.0 g/dL   Lipid panel reflex to direct LDL Fasting   Result Value Ref Range    Cholesterol 199 <200 mg/dL    Triglycerides 254 (H) <150 mg/dL      Comment:      Borderline high:  150-199 mg/dl  High:             200-499 mg/dl  Very high:       >499 mg/dl  Fasting specimen      HDL Cholesterol 57 >49 mg/dL    LDL Cholesterol Calculated 91 <100 mg/dL      Comment:      Desirable:       <100 mg/dl     Non HDL Cholesterol 142 (H) <130 mg/dL      Comment:      Above Desirable:  130-159 mg/dl  Borderline high:  160-189 mg/dl  High:             190-219 mg/dl  Very high:       >219 mg/dl     Hemoglobin A1c   Result Value Ref Range    Hemoglobin A1C 11.1 (H) 0 - 5.6 %      Comment:      Normal <5.7% Prediabetes 5.7-6.4%  Diabetes 6.5% or higher - adopted from ADA   consensus guidelines.     Hematocrit   Result Value Ref Range    Hematocrit 47.1 (H) 35.0 - 47.0 %   CK total   Result Value Ref Range    CK Total 205 30 - 225 U/L   ALT   Result Value Ref Range    ALT 25 0 - 50 U/L   Albumin Random Urine Quantitative with Creat Ratio   Result Value Ref Range    Creatinine Urine 79 mg/dL    Albumin Urine mg/L 16 mg/L    Albumin Urine mg/g Cr 20.15 0 - 25 mg/g Cr       First Floor Lab

## 2019-10-25 NOTE — RESULT ENCOUNTER NOTE
A1c is higher than before,   Please call patient to set up a phone visit (Monday or Wednesday slot)   Candice Worley MD  Endocrinology Service

## 2019-10-28 ENCOUNTER — OFFICE VISIT (OUTPATIENT)
Dept: ENDOCRINOLOGY | Facility: CLINIC | Age: 79
End: 2019-10-28
Payer: COMMERCIAL

## 2019-10-28 ENCOUNTER — TELEPHONE (OUTPATIENT)
Dept: ENDOCRINOLOGY | Facility: CLINIC | Age: 79
End: 2019-10-28

## 2019-10-28 VITALS
OXYGEN SATURATION: 94 % | BODY MASS INDEX: 32.32 KG/M2 | WEIGHT: 188.3 LBS | HEART RATE: 88 BPM | SYSTOLIC BLOOD PRESSURE: 149 MMHG | DIASTOLIC BLOOD PRESSURE: 72 MMHG

## 2019-10-28 DIAGNOSIS — Z79.4 TYPE 2 DIABETES MELLITUS WITH HYPERGLYCEMIA, WITH LONG-TERM CURRENT USE OF INSULIN (H): ICD-10-CM

## 2019-10-28 DIAGNOSIS — H40.1132 PRIMARY OPEN ANGLE GLAUCOMA OF BOTH EYES, MODERATE STAGE: ICD-10-CM

## 2019-10-28 DIAGNOSIS — E11.65 TYPE 2 DIABETES MELLITUS WITH HYPERGLYCEMIA, WITH LONG-TERM CURRENT USE OF INSULIN (H): ICD-10-CM

## 2019-10-28 PROCEDURE — 99214 OFFICE O/P EST MOD 30 MIN: CPT | Performed by: INTERNAL MEDICINE

## 2019-10-28 RX ORDER — LATANOPROST 50 UG/ML
1 SOLUTION/ DROPS OPHTHALMIC AT BEDTIME
Qty: 3 BOTTLE | Refills: 0 | Status: SHIPPED | OUTPATIENT
Start: 2019-10-28 | End: 2020-01-14

## 2019-10-28 NOTE — TELEPHONE ENCOUNTER
Patient advised of results and recommendations. Patient is actually seeing Dr. Morris today(10/28/19) due to Dr. Worley leaving the clinic.    Will update Dr. Worley.      Princess Mace, RN  Endocrine Care Coordinator  [facility]

## 2019-10-28 NOTE — PATIENT INSTRUCTIONS
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  If you find the prior diabetes medication, call us with the name  If you have low blood sugars call us  See are communicating via phone    [facility]-Department of Endocrinology  Jeannie Hathaway RN, Diabetes Educator: [phone]  Earnestine Kinsey LPN Diabetes Clinic Coordinator: [phone]  Clinic Line:[phone]  Clinic Fax: [phone]  On-Call Endocrine Provider at [facility] (after hours/weekends): [phone] option 4  Scheduling Line: [phone]    Appointment Reminders:  * Please bring meter and/or CGM device with for staff to download  * If you are due ONLY for an A1C, it is scheduled with the nurse and will be done in clinic. You do not need to schedule a lab appointment. Fasting is not required for an A1C.  * Refill request should be submitted to your pharmacy. They will contact clinic for approval.

## 2019-10-28 NOTE — LETTER
10/28/2019         RE: Brandy Leon  [address] [address]        Dear Colleague,    Thank you for referring your patient, Brandy Leon, to the [facility]. Please see a copy of my visit note below.    DIABETES New Visit  Brandy Leon    MRN: [id number]    1940  79 year old   female           HPI:  Brandy Leon a 79 year old female is here transferring care from Dr Arriola for treatment of Diabetes mellitus type 2 .       The patient has had diabetes since about 2009 and has diabetes complications of  peripheral neuropathy and albuminuria  .    Patient is not experiencing any polyuria or polydipsia, no blurred vision.Has had recurrent vaginal yeast infections.    Current diabetic medications: Metformin, Prandin, Lantus 30 untis  States medications does not work. Is taking it.    Previously used diabetes medications: has been on Invokana, which she did not like and stopped, according to chart she has been on Trulicity, however the patient insists that Trulicity was a tablet that worked very well for her but discontinued due to high copay, had scripts for Bydureon, which pt does not remember ever taking, had scripts for Jardiance which patient does not remember to ever have taken, Glipizde in the past and was stopped per MD    SMBG: blood in am 250s checks once daily; BGs reading were reviewed, blood sugar has been ranging between 220 and 326.     Hypoglycemia: none, patient has no hypoglycemic unawareness    The patient has had dietitian in the distant past. Knows what to eat.    Meal plan: 3 meals daily, not much snack, breakfast lots of carbs, dinner balanced, son cooks  Exercise:walking    Cardiovascular history: none    Review of Systems:    Complete 10 point review of systems is negative except for what mentioned above.    PMH:  Patient Active Problem List   Diagnosis     Seasonal allergies     Hypothyroidism      Hyperlipidemia LDL goal <100     Fibromyalgia     Osteoarthritis     Advanced directives, counseling/discussion     Obesity     Type 2 diabetes mellitus with hyperglycemia, with long-term current use of insulin (H)     Hypertension goal BP (blood pressure) < 140/90     Overactive bladder     Recurrent UTI     Pseudophakia of both eyes     DINORA (obstructive sleep apnea)- severe (AHI 56)       SOCIAL HISTORY:  Social History     Socioeconomic History     Marital status: Single     Spouse name: Not on file     Number of children: Not on file     Years of education: Not on file     Highest education level: Not on file   Occupational History     Occupation:    Social Needs     Financial resource strain: Not on file     Food insecurity:     Worry: Not on file     Inability: Not on file     Transportation needs:     Medical: Not on file     Non-medical: Not on file   Tobacco Use     Smoking status: Never Smoker     Smokeless tobacco: Never Used     Tobacco comment: has had exposure to second hand smoke in the past from [employer], no current exposure   Substance and Sexual Activity     Alcohol use: No     Drug use: No     Sexual activity: Never   Lifestyle     Physical activity:     Days per week: Not on file     Minutes per session: Not on file     Stress: Not on file   Relationships     Social connections:     Talks on phone: Not on file     Gets together: Not on file     Attends Yazidism service: Not on file     Active member of club or organization: Not on file     Attends meetings of clubs or organizations: Not on file     Relationship status: Not on file     Intimate partner violence:     Fear of current or ex partner: Not on file     Emotionally abused: Not on file     Physically abused: Not on file     Forced sexual activity: Not on file   Other Topics Concern     Parent/sibling w/ CABG, MI or angioplasty before 65F 55M? 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     Hobby Hazards No     Sleep Concern Yes     Comment: difficulty staying asleep, up and down all night     Stress Concern No     Weight Concern Yes     Comment: would like to lose     Special Diet Yes     Comment: low card, low sugar diet     Back Care Yes     Comment: chiropratior     Exercise Yes     Comment: almost everyday     Bike Helmet No     Comment: does not ride bicycle any more     Seat Belt Yes     Self-Exams Yes   Social History Narrative     Not on file       FAMILY HISTORY:  History of Diabetes mellitus in the family: none  History of the Thyroid disease:none    Medications:   Current Outpatient Medications   Medication Sig Dispense Refill     insulin glargine (LANTUS SOLOSTAR) 100 UNIT/ML pen Inject 35-50 Units Subcutaneous daily 25 mL 1     aspirin 81 MG EC tablet Take 1 tablet by mouth daily. 90 tablet 3     ciclopirox (LOPROX) 0.77 % cream Apply topically 2 times daily To feet and toenails. 90 g 6     empagliflozin (JARDIANCE) 10 MG TABS tablet Take 1 tablet (10 mg) by mouth daily 90 tablet 3     famotidine (PEPCID) 20 MG tablet Take 20 mg by mouth nightly as needed       fexofenadine (ALLEGRA) 180 MG tablet Take 180 mg by mouth daily       GLUCOSAMINE CHONDROITIN COMPLX OR 2 Daily       insulin pen needle (BD JUA NC U/F) 32G X 4 MM miscellaneous USE 1 DAILY AS DIRECTED. 100 each 3     irbesartan-hydrochlorothiazide (AVALIDE) 150-12.5 MG tablet TAKE 0.5 TABLET BY MOUTH DAILY 90 tablet 1     latanoprost (XALATAN) 0.005 % ophthalmic solution Place 1 drop into both eyes At Bedtime 3 Bottle 0     metFORMIN (GLUCOPHAGE) 1000 MG tablet TAKE 1 TABLET (1,000 MG) BY MOUTH 2 TIMES DAILY (WITH MEALS) 180 tablet 3     MULTIVITAMIN OR 1 tablet daily       nystatin-triamcinolone (MYCOLOG II) 475414-1.1 UNIT/GM-% external cream Apply topically 2 times daily To feet  and toenails. 60 g 2     Omega-3 Fatty Acids (OMEGA 3 PO) Take by mouth 2 times daily.        ONE TOUCH DELICA LANCETS MISC 1 each 2 times daily. One Touch Delica   100 each 12     ONETOUCH ULTRA test strip USE TO TEST TWICE A  strip 4     order for DME Glucometer covered by insurance. 1 each 0     oxybutynin ER (DITROPAN-XL) 5 MG 24 hr tablet TAKE 2 TABLETS (10 MG) BY MOUTH DAILY 180 tablet 1     repaglinide (PRANDIN) 1 MG tablet Take 1 tablet (1 mg) by mouth 3 times daily (before meals) 180 tablet 3     rosuvastatin (CRESTOR) 5 MG tablet TAKE 1 TABLET BY MOUTH TWICE WEEKLY 24 tablet 0     SYNTHROID 137 MCG tablet TAKE 1 TABLET (137 MCG) BY MOUTH DAILY 90 tablet 3       PHYSICAL EXAM:   BP (!) 149/72 (BP Location: Left arm, Patient Position: Sitting, Cuff Size: Adult Large)   Pulse 88   Wt 85.4 kg (188 lb 4.8 oz)   SpO2 94%   BMI 32.32 kg/m       Gen: NAD, comfortable  HEENT: Head normal, no lid lag, retraction, no proptosis  NECK: Supple, no LAD, no carotid bruit, no thyromegaly.  CVS: S1+S2 regular, no murmurs.  LUNGS: Normal regular breathing, no wheeze, no crepts, no rhonchi  GI: Soft, non tender, no visceromegaly, BS normal  CNS: Grossly normal, no focal deficit  Extremities: Normal pulses, hair distribution, no edema  SKIN: No skin changes.  PSYCH: Normal mood, pleasant affect    Foot exam:   foot check: normal DP and PT pulses, no trophic changes or ulcerative lesions, normal sensory exam and DP reduced bilateral    LABS:  Lab Results   Component Value Date    UMALCR 20.15 10/25/2019   (  Lab Results   Component Value Date    HDL 57 10/25/2019     Lab Results   Component Value Date     10/25/2019    CHLORIDE 98 10/25/2019    CO2 32 10/25/2019    CR 0.72 10/25/2019    ALBUMIN 3.6 10/25/2019    ALT 25 10/25/2019    AST 11 05/10/2019     No components found for: UMAL.CR      ASSESSMENT:   1. Type 2 diabetes mellitus with hyperglycemia, with long-term current use of insulin (H)      Brandy ZACARIAS  Edward a 79 year old female is here for an evaluation in regard Diabetes Mellitus type 2, by reviewing the available blood glucose reading and recent HgA1c, the patient's diabetes is considered to be in poor control.   We could not figure out which medications the patient was previously taking. There is a clear discrepancy between the patient chart and recollection, chaenl is Trulicity a injection and not a tablet.I talked to the pharmacy, and that patient had been picking up Trulicity in 2018. She did  and was instructed by the pharmacist in regards to Jardiance in 9/2019.  Discussed need for better blood glucose control. As she is a  she needs to check before she drives, and A1c needs to improve.    Albumiuria: on ARB, blood pressure not controlled, could consider to take full tab instead of 1 tab    Lipids on Rosuvastatin small dose, cont     on insulin: reviewed need for better control and more checks chanel prior to driving    Lab Results   Component Value Date    A1C 11.1 10/25/2019    A1C 10.2 07/08/2019    A1C 12.6 03/29/2019    A1C 11.1 10/19/2018    A1C 10.4 06/25/2018       PLAN:  Increase Lantus to 35 units today, then further until 50 units  Check BGs fasting and bedtime, send into office in 2 weeks  Titrate Lantus to 50 units. Continue Jardiance  Stop Prandin    If not achieving improved control would like to start GLP-1 - Victoza seems to be covered by insurance and might be a good choice.   I'm somewhat concerned that she is on a SLGT-2 inhibitor nad has frequent yeast infections. Would rather switch her (back?) to an GLP-1 analogue.    Diet: lots of carbs for breakfast, otherwise reasonable  Exercise: should walk more    Patient to contact the clinic if blood glucose (sugar) is under 70 two times in one week or above 200 for 3 consecutive days.       There is some concern that the patient is overwhelmed with her current meds and seems to have memory  issues.  Recommend eval for dementia.    Patient was advised to bring blood glucose meter, supplies and medications with to all clinic visits.     Rachel Morris MD  Endocrinology and Diabetes  [facility]  [address]  [facility] [address]  Tel [phone]    Again, thank you for allowing me to participate in the care of your patient.        Sincerely,        Rachel Morris MD

## 2019-10-28 NOTE — TELEPHONE ENCOUNTER
Patient had consultation today with Dr. Morris. Contacted patient to review. Patient reports that she has not been taking Jardiance but will start back on it as directed by Dr. Morris. Patient is requesting refills be sent to pharmacy by Dr. Morris.      Will send to Dr. Morris to review.         Princess Mace RN  Endocrine Care Coordinator  [facility]

## 2019-10-28 NOTE — NURSING NOTE
Brandy Leon's goals for this visit include:   Chief Complaint   Patient presents with     New Patient     Transferring care rom Dr. Worley, medication not effective, high sugars       She requests these members of her care team be copied on today's visit information: Cailin Ponce    PCP: Cailin Ponce    Referring Provider:  Cailin Ponce, APRN CNP  [address]  [place] [address]    BP (!) 149/72 (BP Location: Left arm, Patient Position: Sitting, Cuff Size: Adult Large)   Pulse 88   Wt 85.4 kg (188 lb 4.8 oz)   SpO2 94%   BMI 32.32 kg/m      Do you need any medication refills at today's visit? No    Taylor Vail LPN

## 2019-10-28 NOTE — TELEPHONE ENCOUNTER
[facility] Call Center    Phone Message    May a detailed message be left on voicemail: yes    Reason for Call: Medication Refill Request    Has the patient contacted the pharmacy for the refill? Yes   Name of medication being requested: empagliflozin (JARDIANCE) 10 MG TABS tablet [500004    Provider who prescribed the medication: Cailin Ponce CNP  Pharmacy: [facility]  Date medication is needed: ASAP         Action Taken: Message routed to:  Adult Clinics: Endocrinology p [phone]

## 2019-10-28 NOTE — TELEPHONE ENCOUNTER
----- Message from Candice Worley MD sent at 10/25/2019  4:45 PM CDT -----  A1c is higher than before,   Please call patient to set up a phone visit (Monday or Wednesday slot)   Candice Worley MD  Endocrinology Service

## 2019-10-28 NOTE — PROGRESS NOTES
DIABETES New Visit  Brandy Leon    MRN: [id number]    1940  79 year old   female           HPI:  Brandy Leon a 79 year old female is here transferring care from Dr Arriola for treatment of Diabetes mellitus type 2 .       The patient has had diabetes since about 2009 and has diabetes complications of  peripheral neuropathy and albuminuria  .    Patient is not experiencing any polyuria or polydipsia, no blurred vision.Has had recurrent vaginal yeast infections.    Current diabetic medications: Metformin, Prandin, Lantus 30 untis  States medications does not work. Is taking it.    Previously used diabetes medications: has been on Invokana, which she did not like and stopped, according to chart she has been on Trulicity, however the patient insists that Trulicity was a tablet that worked very well for her but discontinued due to high copay, had scripts for Bydureon, which pt does not remember ever taking, had scripts for Jardiance which patient does not remember to ever have taken, Glipizde in the past and was stopped per MD    SMBG: blood in am 250s checks once daily; BGs reading were reviewed, blood sugar has been ranging between 220 and 326.     Hypoglycemia: none, patient has no hypoglycemic unawareness    The patient has had dietitian in the distant past. Knows what to eat.    Meal plan: 3 meals daily, not much snack, breakfast lots of carbs, dinner balanced, son cooks  Exercise:walking    Cardiovascular history: none    Review of Systems:    Complete 10 point review of systems is negative except for what mentioned above.    PMH:  Patient Active Problem List   Diagnosis     Seasonal allergies     Hypothyroidism     Hyperlipidemia LDL goal <100     Fibromyalgia     Osteoarthritis     Advanced directives, counseling/discussion     Obesity     Type 2 diabetes mellitus with hyperglycemia, with long-term current use of insulin (H)     Hypertension goal BP (blood pressure) < 140/90     Overactive  bladder     Recurrent UTI     Pseudophakia of both eyes     DINORA (obstructive sleep apnea)- severe (AHI 56)       SOCIAL HISTORY:  Social History     Socioeconomic History     Marital status: Single     Spouse name: Not on file     Number of children: Not on file     Years of education: Not on file     Highest education level: Not on file   Occupational History     Occupation:    Social Needs     Financial resource strain: Not on file     Food insecurity:     Worry: Not on file     Inability: Not on file     Transportation needs:     Medical: Not on file     Non-medical: Not on file   Tobacco Use     Smoking status: Never Smoker     Smokeless tobacco: Never Used     Tobacco comment: has had exposure to second hand smoke in the past from [employer], no current exposure   Substance and Sexual Activity     Alcohol use: No     Drug use: No     Sexual activity: Never   Lifestyle     Physical activity:     Days per week: Not on file     Minutes per session: Not on file     Stress: Not on file   Relationships     Social connections:     Talks on phone: Not on file     Gets together: Not on file     Attends [employer] service: Not on file     Active member of club or organization: Not on file     Attends meetings of clubs or organizations: Not on file     Relationship status: Not on file     Intimate partner violence:     Fear of current or ex partner: Not on file     Emotionally abused: Not on file     Physically abused: Not on file     Forced sexual activity: Not on file   Other Topics Concern     Parent/sibling w/ CABG, MI or angioplasty before 65F 55M? 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     Hobby Hazards No     Sleep Concern Yes     Comment: difficulty staying asleep, up and down all night     Stress Concern No      Weight Concern Yes     Comment: would like to lose     Special Diet Yes     Comment: low card, low sugar diet     Back Care Yes     Comment: chiropratior     Exercise Yes     Comment: almost everyday     Bike Helmet No     Comment: does not ride bicycle any more     Seat Belt Yes     Self-Exams Yes   Social History Narrative     Not on file       FAMILY HISTORY:  History of Diabetes mellitus in the family: none  History of the Thyroid disease:none    Medications:   Current Outpatient Medications   Medication Sig Dispense Refill     insulin glargine (LANTUS SOLOSTAR) 100 UNIT/ML pen Inject 35-50 Units Subcutaneous daily 25 mL 1     aspirin 81 MG EC tablet Take 1 tablet by mouth daily. 90 tablet 3     ciclopirox (LOPROX) 0.77 % cream Apply topically 2 times daily To feet and toenails. 90 g 6     empagliflozin (JARDIANCE) 10 MG TABS tablet Take 1 tablet (10 mg) by mouth daily 90 tablet 3     famotidine (PEPCID) 20 MG tablet Take 20 mg by mouth nightly as needed       fexofenadine (ALLEGRA) 180 MG tablet Take 180 mg by mouth daily       GLUCOSAMINE CHONDROITIN COMPLX OR 2 Daily       insulin pen needle (BD JUAN C U/F) 32G X 4 MM miscellaneous USE 1 DAILY AS DIRECTED. 100 each 3     irbesartan-hydrochlorothiazide (AVALIDE) 150-12.5 MG tablet TAKE 0.5 TABLET BY MOUTH DAILY 90 tablet 1     latanoprost (XALATAN) 0.005 % ophthalmic solution Place 1 drop into both eyes At Bedtime 3 Bottle 0     metFORMIN (GLUCOPHAGE) 1000 MG tablet TAKE 1 TABLET (1,000 MG) BY MOUTH 2 TIMES DAILY (WITH MEALS) 180 tablet 3     MULTIVITAMIN OR 1 tablet daily       nystatin-triamcinolone (MYCOLOG II) 048488-8.1 UNIT/GM-% external cream Apply topically 2 times daily To feet and toenails. 60 g 2     Omega-3 Fatty Acids (OMEGA 3 PO) Take by mouth 2 times daily.        ONE TOUCH DELICA LANCETS MISC 1 each 2 times daily. One Touch Delica   100 each 12     ONETOUCH ULTRA test strip USE TO TEST TWICE A  strip 4     order for DME Glucometer covered  by insurance. 1 each 0     oxybutynin ER (DITROPAN-XL) 5 MG 24 hr tablet TAKE 2 TABLETS (10 MG) BY MOUTH DAILY 180 tablet 1     repaglinide (PRANDIN) 1 MG tablet Take 1 tablet (1 mg) by mouth 3 times daily (before meals) 180 tablet 3     rosuvastatin (CRESTOR) 5 MG tablet TAKE 1 TABLET BY MOUTH TWICE WEEKLY 24 tablet 0     SYNTHROID 137 MCG tablet TAKE 1 TABLET (137 MCG) BY MOUTH DAILY 90 tablet 3       PHYSICAL EXAM:   BP (!) 149/72 (BP Location: Left arm, Patient Position: Sitting, Cuff Size: Adult Large)   Pulse 88   Wt 85.4 kg (188 lb 4.8 oz)   SpO2 94%   BMI 32.32 kg/m      Gen: NAD, comfortable  HEENT: Head normal, no lid lag, retraction, no proptosis  NECK: Supple, no LAD, no carotid bruit, no thyromegaly.  CVS: S1+S2 regular, no murmurs.  LUNGS: Normal regular breathing, no wheeze, no crepts, no rhonchi  GI: Soft, non tender, no visceromegaly, BS normal  CNS: Grossly normal, no focal deficit  Extremities: Normal pulses, hair distribution, no edema  SKIN: No skin changes.  PSYCH: Normal mood, pleasant affect    Foot exam:   foot check: normal DP and PT pulses, no trophic changes or ulcerative lesions, normal sensory exam and DP reduced bilateral    LABS:  Lab Results   Component Value Date    UMALCR 20.15 10/25/2019   (  Lab Results   Component Value Date    HDL 57 10/25/2019     Lab Results   Component Value Date     10/25/2019    CHLORIDE 98 10/25/2019    CO2 32 10/25/2019    CR 0.72 10/25/2019    ALBUMIN 3.6 10/25/2019    ALT 25 10/25/2019    AST 11 05/10/2019     No components found for: UMAL.CR      ASSESSMENT:   1. Type 2 diabetes mellitus with hyperglycemia, with long-term current use of insulin (H)      Brandy Leon a 79 year old female is here for an evaluation in regard Diabetes Mellitus type 2, by reviewing the available blood glucose reading and recent HgA1c, the patient's diabetes is considered to be in poor control.   We could not figure out which medications the patient was  previously taking. There is a clear discrepancy between the patient chart and recollection, chanel is Trulicity a injection and not a tablet.I talked to the pharmacy, and that patient had been picking up Trulicity in 2018. She did  and was instructed by the pharmacist in regards to Jardiance in 9/2019.  Discussed need for better blood glucose control. As she is a  she needs to check before she drives, and A1c needs to improve.    Albumiuria: on ARB, blood pressure not controlled, could consider to take full tab instead of 1 tab    Lipids on Rosuvastatin small dose, cont     on insulin: reviewed need for better control and more checks chanel prior to driving    Lab Results   Component Value Date    A1C 11.1 10/25/2019    A1C 10.2 07/08/2019    A1C 12.6 03/29/2019    A1C 11.1 10/19/2018    A1C 10.4 06/25/2018       PLAN:  Increase Lantus to 35 units today, then further until 50 units  Check BGs fasting and bedtime, send into office in 2 weeks  Titrate Lantus to 50 units. Continue Jardiance  Stop Prandin    If not achieving improved control would like to start GLP-1 - Victoza seems to be covered by insurance and might be a good choice.   I'm somewhat concerned that she is on a SLGT-2 inhibitor nad has frequent yeast infections. Would rather switch her (back?) to an GLP-1 analogue.    Diet: lots of carbs for breakfast, otherwise reasonable  Exercise: should walk more    Patient to contact the clinic if blood glucose (sugar) is under 70 two times in one week or above 200 for 3 consecutive days.       There is some concern that the patient is overwhelmed with her current meds and seems to have memory issues.  Recommend eval for dementia.    Patient was advised to bring blood glucose meter, supplies and medications with to all clinic visits.     Rachel Morris MD  Endocrinology and Diabetes  [facility]  [address]  [facility] [address]  Tel [phone]

## 2019-10-31 NOTE — TELEPHONE ENCOUNTER
The patient was very confused about her medications at the visit. I called the pharmacy which made that even more obvious.   I would like the patient to stop Jardiance mostly because of all the yeast infections she had. We might restart it once that is resolved which probably is requiring better blood glucose control. She should for now increase the Lantus as discussed at the visit.

## 2019-11-04 NOTE — TELEPHONE ENCOUNTER
Patient advised. Patient verbalizes understanding and agrees to plan.       Princess Mace RN  Endocrine Care Coordinator  [facility]

## 2019-11-05 ENCOUNTER — OFFICE VISIT (OUTPATIENT)
Dept: OPHTHALMOLOGY | Facility: CLINIC | Age: 79
End: 2019-11-05
Payer: COMMERCIAL

## 2019-11-05 DIAGNOSIS — H40.1132 PRIMARY OPEN ANGLE GLAUCOMA OF BOTH EYES, MODERATE STAGE: Primary | ICD-10-CM

## 2019-11-05 DIAGNOSIS — E11.65 TYPE 2 DIABETES MELLITUS WITH HYPERGLYCEMIA, WITH LONG-TERM CURRENT USE OF INSULIN (H): ICD-10-CM

## 2019-11-05 DIAGNOSIS — Z79.4 TYPE 2 DIABETES MELLITUS WITH HYPERGLYCEMIA, WITH LONG-TERM CURRENT USE OF INSULIN (H): ICD-10-CM

## 2019-11-05 PROCEDURE — 92014 COMPRE OPH EXAM EST PT 1/>: CPT | Performed by: OPHTHALMOLOGY

## 2019-11-05 PROCEDURE — 92133 CPTRZD OPH DX IMG PST SGM ON: CPT | Performed by: OPHTHALMOLOGY

## 2019-11-05 PROCEDURE — 92083 EXTENDED VISUAL FIELD XM: CPT | Performed by: OPHTHALMOLOGY

## 2019-11-05 PROCEDURE — 92015 DETERMINE REFRACTIVE STATE: CPT | Performed by: OPHTHALMOLOGY

## 2019-11-05 ASSESSMENT — CUP TO DISC RATIO
OD_RATIO: 0.55
OS_RATIO: 0.5

## 2019-11-05 ASSESSMENT — KERATOMETRY
OD_K2POWER_DIOPTERS: 41.50
OD_K1POWER_DIOPTERS: 41.00
OD_AXISANGLE2_DEGREES: 167
OS_AXISANGLE2_DEGREES: 16
OS_K2POWER_DIOPTERS: 41.50
OS_K1POWER_DIOPTERS: 40.50

## 2019-11-05 ASSESSMENT — REFRACTION_MANIFEST
OD_AXIS: 171
OS_AXIS: 163
OD_CYLINDER: +1.00
OD_SPHERE: -1.25
METHOD_AUTOREFRACTION: 1
OS_SPHERE: -1.25
OS_CYLINDER: +1.00

## 2019-11-05 ASSESSMENT — PACHYMETRY
OD_CT(UM): 559
OS_CT(UM): 558

## 2019-11-05 ASSESSMENT — REFRACTION_WEARINGRX
OS_ADD: +2.75
SPECS_TYPE: BIFOCAL
OD_CYLINDER: +0.50
OS_CYLINDER: +1.00
OS_SPHERE: -1.00
OD_ADD: +2.75
OD_AXIS: 035
OD_SPHERE: -0.50
OS_AXIS: 155

## 2019-11-05 ASSESSMENT — EXTERNAL EXAM - RIGHT EYE: OD_EXAM: NORMAL

## 2019-11-05 ASSESSMENT — CONF VISUAL FIELD
OS_NORMAL: 1
METHOD: COUNTING FINGERS
OD_NORMAL: 1

## 2019-11-05 ASSESSMENT — SLIT LAMP EXAM - LIDS
COMMENTS: 2+ DERMATOCHALASIS - UPPER LID
COMMENTS: 2+ DERMATOCHALASIS - UPPER LID

## 2019-11-05 ASSESSMENT — VISUAL ACUITY
OS_SC+: -3
OD_CC: 20/25
CORRECTION_TYPE: GLASSES
OS_SC: 20/30
OD_SC+: +2
METHOD: SNELLEN - LINEAR
OD_SC: 20/30
OS_CC: 20/25

## 2019-11-05 ASSESSMENT — TONOMETRY
OD_IOP_MMHG: 20
OS_IOP_MMHG: 15
OD_IOP_MMHG: 15
OS_IOP_MMHG: 19
IOP_METHOD: TONOPEN

## 2019-11-05 ASSESSMENT — EXTERNAL EXAM - LEFT EYE: OS_EXAM: NORMAL

## 2019-11-05 NOTE — NURSING NOTE
Chief Complaints and History of Present Illnesses   Patient presents with     Diabetic Eye Exam       Chief Complaint(s) and History of Present Illness(es)     Diabetic Eye Exam     Diabetes Type: Type 2    Blood Sugars: is uncontrolled              Comments     Lab Results       Component                Value               Date                       A1C                      11.1                10/25/2019                 A1C                      10.2                07/08/2019                 A1C                      12.6                03/29/2019                 A1C                      11.1                10/19/2018                 A1C                      10.4                06/25/2018                            Melanie Jeans, OA

## 2019-11-05 NOTE — PROGRESS NOTES
Chief Complaint   Patient presents with     Diabetic Eye Exam     q  Hemoglobin A1C   Date Value Ref Range Status   10/25/2019 11.1 (H) 0 - 5.6 % Final     Comment:     Normal <5.7% Prediabetes 5.7-6.4%  Diabetes 6.5% or higher - adopted from ADA   consensus guidelines.     07/08/2019 10.2 (H) 0 - 5.6 % Final     Comment:     Normal <5.7% Prediabetes 5.7-6.4%  Diabetes 6.5% or higher - adopted from ADA   consensus guidelines.     03/29/2019 12.6 (A) 0 - 5.6 % Final         Last Eye Exam: 12/5/2018  Dilated Previously: Yes    What are you currently using to see?  Glasses, for reading     Distance Vision Acuity: Satisfied with vision    Near Vision Acuity: Noticed more trouble reading, has to have her glasses     Eye Comfort: eye have a film or gummy   Do you use eye drops? 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       OBJECTIVE: See Ophthalmology exam    ASSESSMENT:    ICD-10-CM    1. Primary open angle glaucoma of both eyes, moderate stage H40.1132 HVF 24-2 OU      PLAN:    Brandy Leon aware  eye exam results will be sent to Cailin Ponce.  There are no Patient Instructions on file for this visit.

## 2019-11-15 DIAGNOSIS — E78.5 DYSLIPIDEMIA, GOAL LDL BELOW 100: ICD-10-CM

## 2019-11-15 RX ORDER — ROSUVASTATIN CALCIUM 5 MG/1
TABLET, COATED ORAL
Qty: 90 TABLET | Refills: 3 | Status: SHIPPED | OUTPATIENT
Start: 2019-11-15 | End: 2021-02-05

## 2019-11-15 NOTE — TELEPHONE ENCOUNTER
LOV- 10/18 says to recheck HTN in 6 months. Prescription approved per refill protocol.    Suyapa Davila RN

## 2019-11-19 ENCOUNTER — OFFICE VISIT (OUTPATIENT)
Dept: PODIATRY | Facility: CLINIC | Age: 79
End: 2019-11-19
Payer: COMMERCIAL

## 2019-11-19 ENCOUNTER — ANCILLARY PROCEDURE (OUTPATIENT)
Dept: GENERAL RADIOLOGY | Facility: CLINIC | Age: 79
End: 2019-11-19
Attending: NURSE PRACTITIONER
Payer: COMMERCIAL

## 2019-11-19 ENCOUNTER — OFFICE VISIT (OUTPATIENT)
Dept: PEDIATRICS | Facility: CLINIC | Age: 79
End: 2019-11-19
Payer: COMMERCIAL

## 2019-11-19 VITALS
WEIGHT: 186.2 LBS | SYSTOLIC BLOOD PRESSURE: 125 MMHG | TEMPERATURE: 97.2 F | DIASTOLIC BLOOD PRESSURE: 60 MMHG | BODY MASS INDEX: 31.96 KG/M2 | HEART RATE: 84 BPM | OXYGEN SATURATION: 95 %

## 2019-11-19 VITALS — OXYGEN SATURATION: 96 % | HEART RATE: 87 BPM | SYSTOLIC BLOOD PRESSURE: 130 MMHG | DIASTOLIC BLOOD PRESSURE: 79 MMHG

## 2019-11-19 DIAGNOSIS — M79.621 PAIN OF RIGHT UPPER ARM: ICD-10-CM

## 2019-11-19 DIAGNOSIS — M25.511 ACUTE PAIN OF RIGHT SHOULDER: ICD-10-CM
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
## 2019-11-19 DIAGNOSIS — M79.641 PAIN OF RIGHT HAND: ICD-10-CM

## 2019-11-19 DIAGNOSIS — E11.65 TYPE 2 DIABETES MELLITUS WITH HYPERGLYCEMIA, WITH LONG-TERM CURRENT USE OF INSULIN (H): Primary | Chronic | ICD-10-CM

## 2019-11-19 DIAGNOSIS — L60.2 ONYCHAUXIS: ICD-10-CM

## 2019-11-19 DIAGNOSIS — B35.1 ONYCHOMYCOSIS: Primary | ICD-10-CM

## 2019-11-19 DIAGNOSIS — Z79.4 TYPE 2 DIABETES MELLITUS WITH HYPERGLYCEMIA, WITH LONG-TERM CURRENT USE OF INSULIN (H): Primary | Chronic | ICD-10-CM

## 2019-11-19 DIAGNOSIS — E11.49 TYPE II OR UNSPECIFIED TYPE DIABETES MELLITUS WITH NEUROLOGICAL MANIFESTATIONS, NOT STATED AS UNCONTROLLED(250.60) (H): ICD-10-CM

## 2019-11-19 PROCEDURE — 73060 X-RAY EXAM OF HUMERUS: CPT | Mod: RT | Performed by: RADIOLOGY

## 2019-11-19 PROCEDURE — 99214 OFFICE O/P EST MOD 30 MIN: CPT | Performed by: NURSE PRACTITIONER

## 2019-11-19 PROCEDURE — 73030 X-RAY EXAM OF SHOULDER: CPT | Mod: RT | Performed by: RADIOLOGY

## 2019-11-19 PROCEDURE — 99213 OFFICE O/P EST LOW 20 MIN: CPT | Performed by: PODIATRIST

## 2019-11-19 PROCEDURE — 73130 X-RAY EXAM OF HAND: CPT | Mod: RT | Performed by: RADIOLOGY

## 2019-11-19 NOTE — PATIENT INSTRUCTIONS
Thanks for coming today.  [facility]  [address] [place] [address]    To schedule future appointments in Ortho Clinic, you may call [phone].    To schedule ordered imaging by your Provider: Call [facility] at [phone]    Mlog available online at:   [email or web address]    Please call if any further questions or concerns [phone] and ask for the Orthopedic Department. Clinic hours 8 am to 5 pm.    Return to clinic if symptoms worsen.

## 2019-11-19 NOTE — NURSING NOTE
"Chief Complaint   Patient presents with     Fall     fell on saturday, having some right sided pain       Initial /60 (BP Location: Left arm, Patient Position: Sitting, Cuff Size: Adult Regular)   Pulse 84   Temp 97.2  F (36.2  C) (Temporal)   Wt 84.5 kg (186 lb 3.2 oz)   SpO2 95%   Breastfeeding No   BMI 31.96 kg/m   Estimated body mass index is 31.96 kg/m  as calculated from the following:    Height as of 10/18/19: 1.626 m (5' 4\").    Weight as of this encounter: 84.5 kg (186 lb 3.2 oz).  Medication Reconciliation: complete      KATINA Tucker      "

## 2019-11-19 NOTE — PATIENT INSTRUCTIONS
PLAN:   1.   Symptomatic therapy suggested: increase fluids, OTC ibuprofen and call prn if symptoms persist or worsen.  2.  Orders Placed This Encounter   Procedures     XR Shoulder Right 2 Views     XR Humerus Right G/E 2 Views       3. Patient needs to follow up in if no improvement,or sooner if worsening of symptoms or other symptoms develop.  Will follow up and/or notify patient on results via phone or mail to determine further need for followup  Keep appointment with Laisha Perez tomorrow and bring all medications

## 2019-11-19 NOTE — PROGRESS NOTES
Past Medical History:   Diagnosis Date     Actinic keratosis 3/12/2013     Degenerative joint disease      Diabetes (H)      Rheumatic fever 1957    x2 between the ages of 15-18     Seasonal allergies      Patient Active Problem List   Diagnosis     Seasonal allergies     Hypothyroidism     Hyperlipidemia LDL goal <100     Fibromyalgia     Osteoarthritis     Advanced directives, counseling/discussion     Obesity     Type 2 diabetes mellitus with hyperglycemia, with long-term current use of insulin (H)     Hypertension goal BP (blood pressure) < 140/90     Overactive bladder     Recurrent UTI     Pseudophakia of both eyes     DINORA (obstructive sleep apnea)- severe (AHI 56)     Past Surgical History:   Procedure Laterality Date     C APPENDECTOMY       C ARTHROPLASTY TMJ       CATARACT IOL, RT/LT       COLONOSCOPY       COLONOSCOPY N/A 8/13/2015    Procedure: COLONOSCOPY;  Surgeon: Duane, William Charles, MD;  Location: [facility]     COLONOSCOPY WITH CO2 INSUFFLATION N/A 8/13/2015    Procedure: COLONOSCOPY WITH CO2 INSUFFLATION;  Surgeon: Duane, William Charles, MD;  Location: [facility]     PHACOEMULSIFICATION WITH STANDARD INTRAOCULAR LENS IMPLANT Left 10/25/2018    Procedure: LEFT PHACOEMULSIFICATION WITH STANDARD INTRAOCULAR LENS IMPLANT;  Surgeon: Thomas Serna MD;  Location: [facility]     PHACOEMULSIFICATION WITH STANDARD INTRAOCULAR LENS IMPLANT Right 11/8/2018    Procedure: RIGHT PHACOEMULSIFICATION WITH STANDARD INTRAOCULAR LENS IMPLANT;  Surgeon: Thomas Serna MD;  Location: [facility]     THYROIDECTOMY        Social History     Socioeconomic History     Marital status: Single     Spouse name: Not on file     Number of children: Not on file     Years of education: Not on file     Highest education level: Not on file   Occupational History     Occupation:    Social Needs     Financial resource strain: Not on file     Food insecurity:     Worry: Not on file     Inability: Not on file      Transportation needs:     Medical: Not on file     Non-medical: Not on file   Tobacco Use     Smoking status: Never Smoker     Smokeless tobacco: Never Used     Tobacco comment: has had exposure to second hand smoke in the past from [employer], no current exposure   Substance and Sexual Activity     Alcohol use: No     Drug use: No     Sexual activity: Never   Lifestyle     Physical activity:     Days per week: Not on file     Minutes per session: Not on file     Stress: Not on file   Relationships     Social connections:     Talks on phone: Not on file     Gets together: Not on file     Attends L[employer]-day service: Not on file     Active member of club or organization: Not on file     Attends meetings of clubs or organizations: Not on file     Relationship status: Not on file     Intimate partner violence:     Fear of current or ex partner: Not on file     Emotionally abused: Not on file     Physically abused: Not on file     Forced sexual activity: Not on file   Other Topics Concern     Parent/sibling w/ CABG, MI or angioplasty before 65F 55M? 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     Hobby Hazards No     Sleep Concern Yes     Comment: difficulty staying asleep, up and down all night     Stress Concern No     Weight Concern Yes     Comment: would like to lose     Special Diet Yes     Comment: low card, low sugar diet     Back Care Yes     Comment: chiropratior     Exercise Yes     Comment: almost everyday     Bike Helmet No     Comment: does not ride bicycle any more     Seat Belt Yes     Self-Exams Yes   Social History Narrative     Not on file     Family History   Problem Relation Age of Onset     Cancer Father         skin and leukemia     Cerebrovascular Disease Maternal Grandfather      Cerebrovascular Disease Paternal  Grandmother      Eye Disorder Paternal Grandmother         cataracts     Cerebrovascular Disease Paternal Grandfather      Heart Disease Paternal Grandfather      Cancer Sister         Breast Cancer     Eye Disorder Mother         cataracts     Eye Disorder Brother         cataract     Breast Cancer Daughter      Other Cancer Daughter         ovarian cancer     Glaucoma No family hx of      Macular Degeneration No family hx of      Lab Results   Component Value Date    A1C 11.1 10/25/2019    A1C 10.2 07/08/2019    A1C 12.6 03/29/2019    A1C 11.1 10/19/2018    A1C 10.4 06/25/2018     Last Comprehensive Metabolic Panel:  Sodium   Date Value Ref Range Status   10/25/2019 134 133 - 144 mmol/L Final     Potassium   Date Value Ref Range Status   10/25/2019 4.4 3.4 - 5.3 mmol/L Final     Chloride   Date Value Ref Range Status   10/25/2019 98 94 - 109 mmol/L Final     Carbon Dioxide   Date Value Ref Range Status   10/25/2019 32 20 - 32 mmol/L Final     Anion Gap   Date Value Ref Range Status   10/25/2019 4 3 - 14 mmol/L Final     Glucose   Date Value Ref Range Status   10/25/2019 239 (H) 70 - 99 mg/dL Final     Comment:     Fasting specimen     Urea Nitrogen   Date Value Ref Range Status   10/25/2019 20 7 - 30 mg/dL Final     Creatinine   Date Value Ref Range Status   10/25/2019 0.72 0.52 - 1.04 mg/dL Final     GFR Estimate   Date Value Ref Range Status   10/25/2019 80 >60 mL/min/[1.73_m2] Final     Comment:     Non  GFR Calc  Starting 12/18/2018, serum creatinine based estimated GFR (eGFR) will be   calculated using the Chronic Kidney Disease Epidemiology Collaboration   (CKD-EPI) equation.       Calcium   Date Value Ref Range Status   10/25/2019 9.4 8.5 - 10.1 mg/dL Final     Lab Results   Component Value Date    AST 11 05/10/2019     Lab Results   Component Value Date    ALT 25 10/25/2019     No results found for: BILICONJ   Lab Results   Component Value Date    BILITOTAL 0.5 06/25/2018     Lab Results    Component Value Date    ALBUMIN 3.6 10/25/2019     Lab Results   Component Value Date    PROTTOTAL 8.3 06/25/2018      Lab Results   Component Value Date    ALKPHOS 77 06/25/2018     SUBJECTIVE FINDINGS: This 79-year-old female returns to clinic for onychomycosis and onychauxis, diabetes with peripheral neuropathy. She relates to no ulcers or sores on feet and she has numbness and tingling in feet. She relates she fell Saturday and has appointment today for that and had no foot injuries.      OBJECTIVE FINDINGS: DP and PT are 2/4 bilaterally. She has incurvated nails with some thickening, dystrophy, subungual debris and decreased brittleness from previous bilaterally to differing degrees. She has a small intact padmini abrasion on dorsal right 3rd toe. She has dorsally contracted digits bilaterally.  She has dry skin bilaterally.  She has no open lesions, no erythema, no odor, no calor bilaterally.       ASSESSMENT AND PLAN:  Onychomycosis and onychauxis bilaterally, diabetes with peripheral neuropathy. She has a healing right 3rd toe abrasion, will observe.  Diagnosis and treatment discussed with her. Advised her on lotion use.  All the nails were debrided or reduced bilaterally upon consent.  Return to clinic in 3 months.

## 2019-11-19 NOTE — PROGRESS NOTES
Subjective     Brandy Leon is a 79 year old female who presents to clinic today for the following health issues:    HPI     Muscle Pain    Onset: 3 days    Description:   Location: right sided   Character: Dull ache    Intensity: moderate    Progression of Symptoms: same    Accompanying Signs & Symptoms:  Other symptoms: some minor scrapes on her hand, right hand pain, used her hand stop her fall. some bruising on the right side of face, patient fell onto the cement.    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  Diabetes Follow-up  Stopped the Jardiance due to increased increased chance of UTI and now is just on insulin and metformin so stopped it   Stated the prandin did not seem to work at all so has not been on that   Is on 33 units of lantus at bedtime     How often are you checking your blood sugar? Two times daily  Blood sugar testing frequency justification:  Uncontrolled diabetes  What time of day are you checking your blood sugars (select all that apply)?  Before meals and At bedtime  Have you had any blood sugars above 200?  Yes   Have you had any blood sugars below 70?  No    What symptoms do you notice when your blood sugar is low?  None    What concerns do you have today about your diabetes? None     Do you have any of these symptoms? (Select all that apply)  Numbness in feet     Have you had a diabetic eye exam in the last 12 months? Yes- Date of last eye exam: just recently     BP Readings from Last 2 Encounters:   11/19/19 125/60   11/19/19 130/79     Hemoglobin A1C (%)   Date Value   10/25/2019 11.1 (H)    07/08/2019 10.2 (H)     LDL Cholesterol Calculated (mg/dL)   Date Value   10/25/2019 91   06/25/2018 93     LDL Cholesterol Direct (mg/dL)   Date Value   05/10/2019 114 (H)       Diabetes Management Resources      Patient Active Problem List   Diagnosis     Seasonal allergies     Hypothyroidism     Hyperlipidemia LDL goal <100     Fibromyalgia     Osteoarthritis     Advanced directives, counseling/discussion     Obesity     Type 2 diabetes mellitus with hyperglycemia, with long-term current use of insulin (H)     Hypertension goal BP (blood pressure) < 140/90     Overactive bladder     Recurrent UTI     Pseudophakia of both eyes     DINORA (obstructive sleep apnea)- severe (AHI 56)     Past Surgical History:   Procedure Laterality Date     C APPENDECTOMY       C ARTHROPLASTY TMJ       CATARACT IOL, RT/LT       COLONOSCOPY       COLONOSCOPY N/A 8/13/2015    Procedure: COLONOSCOPY;  Surgeon: Duane, William Charles, MD;  Location: [facility]     COLONOSCOPY WITH CO2 INSUFFLATION N/A 8/13/2015    Procedure: COLONOSCOPY WITH CO2 INSUFFLATION;  Surgeon: Duane, William Charles, MD;  Location: [facility]     PHACOEMULSIFICATION WITH STANDARD INTRAOCULAR LENS IMPLANT Left 10/25/2018    Procedure: LEFT PHACOEMULSIFICATION WITH STANDARD INTRAOCULAR LENS IMPLANT;  Surgeon: Thomas Serna MD;  Location: [facility]     PHACOEMULSIFICATION WITH STANDARD INTRAOCULAR LENS IMPLANT Right 11/8/2018    Procedure: RIGHT PHACOEMULSIFICATION WITH STANDARD INTRAOCULAR LENS IMPLANT;  Surgeon: Thomas Serna MD;  Location: [facility]     THYROIDECTOMY          Social History     Tobacco Use     Smoking status: Never Smoker     Smokeless tobacco: Never Used     Tobacco comment: has had exposure to second hand smoke in the past from [facility], no current exposure   Substance Use Topics     Alcohol use: No     Family History   Problem Relation Age of Onset     Cancer Father         skin and leukemia     Cerebrovascular Disease Maternal Grandfather       Cerebrovascular Disease Paternal Grandmother      Eye Disorder Paternal Grandmother         cataracts     Cerebrovascular Disease Paternal Grandfather      Heart Disease Paternal Grandfather      Cancer Sister         Breast Cancer     Eye Disorder Mother         cataracts     Eye Disorder Brother         cataract     Breast Cancer Daughter      Other Cancer Daughter         ovarian cancer     Glaucoma No family hx of      Macular Degeneration No family hx of          Current Outpatient Medications   Medication Sig Dispense Refill     aspirin 81 MG EC tablet Take 1 tablet by mouth daily. 90 tablet 3     ciclopirox (LOPROX) 0.77 % cream Apply topically 2 times daily To feet and toenails. 90 g 6     empagliflozin (JARDIANCE) 10 MG TABS tablet Take 1 tablet (10 mg) by mouth daily 90 tablet 3     famotidine (PEPCID) 20 MG tablet Take 20 mg by mouth nightly as needed       fexofenadine (ALLEGRA) 180 MG tablet Take 180 mg by mouth daily       GLUCOSAMINE CHONDROITIN COMPLX OR 2 Daily       insulin glargine (LANTUS SOLOSTAR) 100 UNIT/ML pen Inject 35-50 Units Subcutaneous daily 25 mL 1     insulin pen needle (BD JUAN C U/F) 32G X 4 MM miscellaneous USE 1 DAILY AS DIRECTED. 100 each 3     irbesartan-hydrochlorothiazide (AVALIDE) 150-12.5 MG tablet TAKE 0.5 TABLET BY MOUTH DAILY 90 tablet 1     latanoprost (XALATAN) 0.005 % ophthalmic solution Place 1 drop into both eyes At Bedtime 3 Bottle 0     metFORMIN (GLUCOPHAGE) 1000 MG tablet TAKE 1 TABLET (1,000 MG) BY MOUTH 2 TIMES DAILY (WITH MEALS) 180 tablet 3     MULTIVITAMIN OR 1 tablet daily       nystatin-triamcinolone (MYCOLOG II) 495721-3.1 UNIT/GM-% external cream Apply topically 2 times daily To feet and toenails. 60 g 2     Omega-3 Fatty Acids (OMEGA 3 PO) Take by mouth 2 times daily.        ONE TOUCH DELICA LANCETS MISC 1 each 2 times daily. One Touch Delica   100 each 12     ONETOUCH ULTRA test strip USE TO TEST TWICE A  strip 4     order for DME Glucometer  covered by insurance. 1 each 0     oxybutynin ER (DITROPAN-XL) 5 MG 24 hr tablet TAKE 2 TABLETS (10 MG) BY MOUTH DAILY 180 tablet 1     repaglinide (PRANDIN) 1 MG tablet Take 1 tablet (1 mg) by mouth 3 times daily (before meals) 180 tablet 3     rosuvastatin (CRESTOR) 5 MG tablet TAKE 1 TABLET BY MOUTH TWICE WEEKLY 90 tablet 3     SYNTHROID 137 MCG tablet TAKE 1 TABLET (137 MCG) BY MOUTH DAILY 90 tablet 3     Allergies   Allergen Reactions     Estradiol Itching     Lipitor [Atorvastatin Calcium]      Weak and shaky     Sulfa Drugs      Rash       Zocor [Simvastatin]      Weak and shakey     Recent Labs   Lab Test 10/25/19  0950 07/08/19  0935 05/10/19  0821 03/29/19 06/25/18  1434  12/22/17  1118   A1C 11.1* 10.2*  --  12.6*   < > 10.4*   < >  --    LDL 91  --  114*  --   --  93  --  115*   HDL 57  --   --   --   --  53  --  54   TRIG 254*  --   --   --   --  258*  --  161*   ALT 25  --  24  --   --  27  --  18   CR 0.72  --  0.81  --    < > 0.68   < > 0.76   GFRESTIMATED 80  --  70  --    < > 84   < > 74   GFRESTBLACK >90  --  81  --    < > >90   < > 90   POTASSIUM 4.4  --  4.0  --    < > 4.3   < > 4.4   TSH 2.78  --  2.61  --    < > 2.46  --  1.70    < > = values in this interval not displayed.      BP Readings from Last 3 Encounters:   11/19/19 125/60   11/19/19 130/79   10/28/19 (!) 149/72    Wt Readings from Last 3 Encounters:   11/19/19 84.5 kg (186 lb 3.2 oz)   10/28/19 85.4 kg (188 lb 4.8 oz)   10/18/19 86.1 kg (189 lb 12.8 oz)           Reviewed and updated as needed this visit by Provider         Review of Systems   ROS COMP: CONSTITUTIONAL:NEGATIVE for fever, chills, change in weight  ENT/MOUTH: NEGATIVE for ear, mouth and throat problems  RESP:NEGATIVE for significant cough or SOB  CV: NEGATIVE for chest pain, palpitations or peripheral edema  MUSCULOSKELETAL: POSITIVE  for joint pain right arm  and NEGATIVE for joint swelling  and joint warmth   NEURO: POSITIVE for paresthesias  and NEGATIVE for HX  head injury  , HX seizure D/O, involuntary movements, gait disturbance, loss of consciousness and syncope  ENDOCRINE: NEGATIVE for temperature intolerance, skin/hair changes and POSITIVE  for HX diabetes  HEME/ALLERGY/IMMUNE: NEGATIVE for bleeding problems      Objective    /60 (BP Location: Left arm, Patient Position: Sitting, Cuff Size: Adult Regular)   Pulse 84   Temp 97.2  F (36.2  C) (Temporal)   Wt 84.5 kg (186 lb 3.2 oz)   SpO2 95%   Breastfeeding No   BMI 31.96 kg/m    Body mass index is 31.96 kg/m .  Physical Exam   GENERAL APPEARANCE: alert, active and no distress  NECK: no adenopathy  RESP: lungs clear to auscultation - no rales, rhonchi or wheezes  CV: regular rates and rhythm  MS: extremities normal- no gross deformities noted  Right hand some mild soft tissue swelling but normal movement   Shoulder exam shows positive impingement signs are present with pain at high arc of abduction and forward flexion on right. There is tenderness of the upper arm .  SKIN: no suspicious lesions or rashes  NEURO: Normal strength and tone, mentation intact and speech normal  PSYCH: mentation appears normal and affect normal/bright  MENTAL STATUS EXAM:  Appearance/Behavior: No apparent distress, Casually groomed and Dressed appropriately for weather  Speech: Normal  Mood/Affect: normal affect  Insight: Adequate    Diagnostic Test Results:  Recent Results (from the past 744 hour(s))   XR Hand Right G/E 3 Views    Narrative    EXAM: XR HAND RT G/E 3 VW  11/19/2019 11:05 AM      HISTORY: Pain of right hand; Fall, initial encounter    COMPARISON: None    FINDINGS: 3 views of the right hand.    No acute osseous abnormality. Mild widening of the scapholunate  interval. Ulnar negative variance. Polyarticular degenerative change  most substantially involving the second distal interphalangeal joints  and first carpometacarpal joint. Soft tissues unremarkable.       Impression    IMPRESSION:   1. No acute osseous  abnormality.  2. Polyarticular degenerative change.    KRIS UMANZOR MD (Joe)   XR Shoulder Right 2 Views    Narrative    2 views right shoulder radiographs 11/19/2019 11:07 AM    History: 79-year-old female with fall 3 days ago and right shoulder  pain.    Comparison: Chest radiograph 12/22/2017    Findings:    AP and transscapular Y views of the right shoulder were obtained.     No acute osseous abnormality. Glenohumeral and acromioclavicular  joints are congruent. Mild degenerative changes of the  acromioclavicular joint. Soft tissue is unremarkable. The visualized  lung is clear.      Impression    Impression:  1. No evidence of fracture or dislocation.  2. Mild degenerative changes at the acromioclavicular joint.    I have personally reviewed the examination and initial interpretation  and I agree with the findings.    PARMINDER WHITTEN MD   XR Humerus Right G/E 2 Views    Narrative    Exam: 2 views of the right humerus dated 11/19/2019.    COMPARISON: Same day.    CLINICAL HISTORY: Acute pain of right shoulder.    FINDINGS: AP and lateral views of the right humerus were obtained. The  bones are well aligned. No displaced fractures. Degenerative changes  at the acromioclavicular joint.      Impression    IMPRESSION: No acute bone abnormality in the right humerus.    PARMINDER WHITTEN MD             Assessment & Plan     Brandy was seen today for fall.    Diagnoses and all orders for this visit:    Type 2 diabetes mellitus with hyperglycemia, with long-term current use of insulin (H)  foot care discussed and Podiatry visits discussed, annual eye examinations at Ophthalmology discussed, glycohemoglobin monitoring discussed and long term diabetic complications discussed  No changes in current regimen  Attempt to improve diet  Weight loss advised  Referral to MTM    Recheck in 3 months, sooner should new symptoms or   problems arise.  Acute pain of right shoulder  -     XR Shoulder Right 2 Views; Future  -     " XR Humerus Right G/E 2 Views; Future    Pain of right upper arm  -     XR Shoulder Right 2 Views; Future  -     XR Humerus Right G/E 2 Views; Future    Fall, initial encounter  -     XR Shoulder Right 2 Views; Future  -     XR Humerus Right G/E 2 Views; Future  -     XR Hand Right G/E 3 Views; Future    Pain of right hand  -     XR Hand Right G/E 3 Views; Future    See Patient Instructions  Patient Instructions     PLAN:   1.   Symptomatic therapy suggested: increase fluids, OTC ibuprofen and call prn if symptoms persist or worsen.  2.  Orders Placed This Encounter   Procedures     XR Shoulder Right 2 Views     XR Humerus Right G/E 2 Views       3. Patient needs to follow up in if no improvement,or sooner if worsening of symptoms or other symptoms develop.  Will follow up and/or notify patient on results via phone or mail to determine further need for followup  Keep appointment with Laisha Perez tomorrow and bring all medications     BMI:   Estimated body mass index is 31.96 kg/m  as calculated from the following:    Height as of 10/18/19: 1.626 m (5' 4\").    Weight as of this encounter: 84.5 kg (186 lb 3.2 oz).   Weight management plan: Discussed healthy diet and exercise guidelines      No follow-ups on file.    Cailin Ponce, MAGO CNP  [facility]      "

## 2019-11-19 NOTE — NURSING NOTE
Brandy Leon's chief complaint for this visit includes:  Chief Complaint   Patient presents with     RECHECK     toenail trim     PCP: Cailin Ponce    Referring Provider:  No referring provider defined for this encounter.    /79   Pulse 87   SpO2 96%   Data Unavailable     Do you need any medication refills at today's visit? no

## 2019-11-19 NOTE — LETTER
November 19, 2019      RE: Brandy Leon  [address] [id number]       To whom it may concern:    Brandy Leon was seen in our clinic today. She  may return to work with the following: No working or lifting restrictions on or about December 2,2019.    Sincerely,      Cailin Ponce RN, CNP

## 2019-11-19 NOTE — LETTER
11/19/2019         RE: Brandy Leon  [address] [facility] [id number]        Dear Colleague,    Thank you for referring your patient, Brandy Leon, to the [facility]. Please see a copy of my visit note below.    Past Medical History:   Diagnosis Date     Actinic keratosis 3/12/2013     Degenerative joint disease      Diabetes (H)      Rheumatic fever 1957    x2 between the ages of 15-18     Seasonal allergies      Patient Active Problem List   Diagnosis     Seasonal allergies     Hypothyroidism     Hyperlipidemia LDL goal <100     Fibromyalgia     Osteoarthritis     Advanced directives, counseling/discussion     Obesity     Type 2 diabetes mellitus with hyperglycemia, with long-term current use of insulin (H)     Hypertension goal BP (blood pressure) < 140/90     Overactive bladder     Recurrent UTI     Pseudophakia of both eyes     DINORA (obstructive sleep apnea)- severe (AHI 56)     Past Surgical History:   Procedure Laterality Date     C APPENDECTOMY       C ARTHROPLASTY TMJ       CATARACT IOL, RT/LT       COLONOSCOPY       COLONOSCOPY N/A 8/13/2015    Procedure: COLONOSCOPY;  Surgeon: Duane, William Charles, MD;  Location: [facility]     COLONOSCOPY WITH CO2 INSUFFLATION N/A 8/13/2015    Procedure: COLONOSCOPY WITH CO2 INSUFFLATION;  Surgeon: Duane, William Charles, MD;  Location: [facility]     PHACOEMULSIFICATION WITH STANDARD INTRAOCULAR LENS IMPLANT Left 10/25/2018    Procedure: LEFT PHACOEMULSIFICATION WITH STANDARD INTRAOCULAR LENS IMPLANT;  Surgeon: Thomas Serna MD;  Location: [facility]     PHACOEMULSIFICATION WITH STANDARD INTRAOCULAR LENS IMPLANT Right 11/8/2018    Procedure: RIGHT PHACOEMULSIFICATION WITH STANDARD INTRAOCULAR LENS IMPLANT;  Surgeon: Thomas Serna MD;  Location: [facility] OR     THYROIDECTOMY        Social History     Socioeconomic History     Marital status: Single     Spouse name: Not on file     Number of children: Not on  file     Years of education: Not on file     Highest education level: Not on file   Occupational History     Occupation:    Social Needs     Financial resource strain: Not on file     Food insecurity:     Worry: Not on file     Inability: Not on file     Transportation needs:     Medical: Not on file     Non-medical: Not on file   Tobacco Use     Smoking status: Never Smoker     Smokeless tobacco: Never Used     Tobacco comment: has had exposure to second hand smoke in the past from [employer], no current exposure   Substance and Sexual Activity     Alcohol use: No     Drug use: No     Sexual activity: Never   Lifestyle     Physical activity:     Days per week: Not on file     Minutes per session: Not on file     Stress: Not on file   Relationships     Social connections:     Talks on phone: Not on file     Gets together: Not on file     Attends L[employer] service: Not on file     Active member of club or organization: Not on file     Attends meetings of clubs or organizations: Not on file     Relationship status: Not on file     Intimate partner violence:     Fear of current or ex partner: Not on file     Emotionally abused: Not on file     Physically abused: Not on file     Forced sexual activity: Not on file   Other Topics Concern     Parent/sibling w/ CABG, MI or angioplasty before 65F 55M? 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     Hobby Hazards No     Sleep Concern Yes     Comment: difficulty staying asleep, up and down all night     Stress Concern No     Weight Concern Yes     Comment: would like to lose     Special Diet Yes     Comment: low card, low sugar diet     Back Care Yes     Comment: chiropratior     Exercise Yes     Comment: almost everyday     Bike Helmet No     Comment: does not ride bicycle any more      Seat Belt Yes     Self-Exams Yes   Social History Narrative     Not on file     Family History   Problem Relation Age of Onset     Cancer Father         skin and leukemia     Cerebrovascular Disease Maternal Grandfather      Cerebrovascular Disease Paternal Grandmother      Eye Disorder Paternal Grandmother         cataracts     Cerebrovascular Disease Paternal Grandfather      Heart Disease Paternal Grandfather      Cancer Sister         Breast Cancer     Eye Disorder Mother         cataracts     Eye Disorder Brother         cataract     Breast Cancer Daughter      Other Cancer Daughter         ovarian cancer     Glaucoma No family hx of      Macular Degeneration No family hx of      Lab Results   Component Value Date    A1C 11.1 10/25/2019    A1C 10.2 07/08/2019    A1C 12.6 03/29/2019    A1C 11.1 10/19/2018    A1C 10.4 06/25/2018     Last Comprehensive Metabolic Panel:  Sodium   Date Value Ref Range Status   10/25/2019 134 133 - 144 mmol/L Final     Potassium   Date Value Ref Range Status   10/25/2019 4.4 3.4 - 5.3 mmol/L Final     Chloride   Date Value Ref Range Status   10/25/2019 98 94 - 109 mmol/L Final     Carbon Dioxide   Date Value Ref Range Status   10/25/2019 32 20 - 32 mmol/L Final     Anion Gap   Date Value Ref Range Status   10/25/2019 4 3 - 14 mmol/L Final     Glucose   Date Value Ref Range Status   10/25/2019 239 (H) 70 - 99 mg/dL Final     Comment:     Fasting specimen     Urea Nitrogen   Date Value Ref Range Status   10/25/2019 20 7 - 30 mg/dL Final     Creatinine   Date Value Ref Range Status   10/25/2019 0.72 0.52 - 1.04 mg/dL Final     GFR Estimate   Date Value Ref Range Status   10/25/2019 80 >60 mL/min/[1.73_m2] Final     Comment:     Non  GFR Calc  Starting 12/18/2018, serum creatinine based estimated GFR (eGFR) will be   calculated using the Chronic Kidney Disease Epidemiology Collaboration   (CKD-EPI) equation.       Calcium   Date Value Ref Range Status   10/25/2019 9.4  8.5 - 10.1 mg/dL Final     Lab Results   Component Value Date    AST 11 05/10/2019     Lab Results   Component Value Date    ALT 25 10/25/2019     No results found for: BILICONJ   Lab Results   Component Value Date    BILITOTAL 0.5 06/25/2018     Lab Results   Component Value Date    ALBUMIN 3.6 10/25/2019     Lab Results   Component Value Date    PROTTOTAL 8.3 06/25/2018      Lab Results   Component Value Date    ALKPHOS 77 06/25/2018     SUBJECTIVE FINDINGS: This 79-year-old female returns to clinic for onychomycosis and onychauxis, diabetes with peripheral neuropathy. She relates to no ulcers or sores on feet and she has numbness and tingling in feet. She relates she fell Saturday and has appointment today for that and had no foot injuries.      OBJECTIVE FINDINGS: DP and PT are 2/4 bilaterally. She has incurvated nails with some thickening, dystrophy, subungual debris and decreased brittleness from previous bilaterally to differing degrees. She has a small intact padmini abrasion on dorsal right 3rd toe. She has dorsally contracted digits bilaterally.   She has dry skin bilaterally.  She has no open lesions, no erythema, no odor, no calor bilaterally.       ASSESSMENT AND PLAN:  Onychomycosis and onychauxis bilaterally, diabetes with peripheral neuropathy. She has a healing right 3rd toe abrasion, will observe.  Diagnosis and treatment discussed with her. Advised her on lotion use.  All the nails were debrided or reduced bilaterally upon consent.  Return to clinic in 3 months.     Again, thank you for allowing me to participate in the care of your patient.        Sincerely,        Ehsan Araya DPM

## 2019-11-20 ENCOUNTER — OFFICE VISIT (OUTPATIENT)
Dept: PEDIATRICS | Facility: CLINIC | Age: 79
End: 2019-11-20
Payer: COMMERCIAL

## 2019-11-20 ENCOUNTER — OFFICE VISIT (OUTPATIENT)
Dept: PHARMACY | Facility: CLINIC | Age: 79
End: 2019-11-20
Payer: COMMERCIAL

## 2019-11-20 VITALS
BODY MASS INDEX: 31.93 KG/M2 | HEART RATE: 87 BPM | OXYGEN SATURATION: 96 % | TEMPERATURE: 98.7 F | SYSTOLIC BLOOD PRESSURE: 107 MMHG | WEIGHT: 186 LBS | DIASTOLIC BLOOD PRESSURE: 69 MMHG

## 2019-11-20 DIAGNOSIS — N39.0 ACUTE UTI: Primary | ICD-10-CM

## 2019-11-20 DIAGNOSIS — R30.0 DYSURIA: ICD-10-CM

## 2019-11-20 DIAGNOSIS — R52 PAIN: ICD-10-CM

## 2019-11-20 DIAGNOSIS — R12 HEARTBURN: ICD-10-CM

## 2019-11-20 DIAGNOSIS — Z79.4 TYPE 2 DIABETES MELLITUS WITH HYPERGLYCEMIA, WITH LONG-TERM CURRENT USE OF INSULIN (H): Chronic | ICD-10-CM

## 2019-11-20 DIAGNOSIS — E11.65 TYPE 2 DIABETES MELLITUS WITH HYPERGLYCEMIA, WITH LONG-TERM CURRENT USE OF INSULIN (H): ICD-10-CM

## 2019-11-20 DIAGNOSIS — J30.2 SEASONAL ALLERGIC RHINITIS, UNSPECIFIED TRIGGER: ICD-10-CM

## 2019-11-20 DIAGNOSIS — I10 ESSENTIAL HYPERTENSION WITH GOAL BLOOD PRESSURE LESS THAN 140/90: ICD-10-CM

## 2019-11-20 DIAGNOSIS — Z79.4 TYPE 2 DIABETES MELLITUS WITH HYPERGLYCEMIA, WITH LONG-TERM CURRENT USE OF INSULIN (H): ICD-10-CM

## 2019-11-20 DIAGNOSIS — E11.65 TYPE 2 DIABETES MELLITUS WITH HYPERGLYCEMIA, WITH LONG-TERM CURRENT USE OF INSULIN (H): Chronic | ICD-10-CM

## 2019-11-20 DIAGNOSIS — J30.2 SEASONAL ALLERGIC RHINITIS: ICD-10-CM

## 2019-11-20 DIAGNOSIS — E78.5 DYSLIPIDEMIA, GOAL LDL BELOW 100: Primary | ICD-10-CM

## 2019-11-20 DIAGNOSIS — N39.0 URINARY TRACT INFECTION WITHOUT HEMATURIA, SITE UNSPECIFIED: ICD-10-CM

## 2019-11-20 DIAGNOSIS — R82.90 ABNORMAL URINE FINDINGS: ICD-10-CM

## 2019-11-20 DIAGNOSIS — E63.9 NUTRITIONAL DEFICIENCY: ICD-10-CM

## 2019-11-20 DIAGNOSIS — E03.9 HYPOTHYROIDISM, UNSPECIFIED TYPE: ICD-10-CM

## 2019-11-20 LAB
ALBUMIN UR-MCNC: NEGATIVE MG/DL
APPEARANCE UR: ABNORMAL
BACTERIA #/AREA URNS HPF: ABNORMAL /HPF
BILIRUB UR QL STRIP: NEGATIVE
COLOR UR AUTO: ABNORMAL
GLUCOSE UR STRIP-MCNC: >1000 MG/DL
HGB UR QL STRIP: ABNORMAL
KETONES UR STRIP-MCNC: NEGATIVE MG/DL
LEUKOCYTE ESTERASE UR QL STRIP: ABNORMAL
NITRATE UR QL: POSITIVE
PH UR STRIP: 5 PH (ref 5–7)
RBC #/AREA URNS AUTO: ABNORMAL /HPF
SOURCE: ABNORMAL
SP GR UR STRIP: 1.02 (ref 1–1.03)
UROBILINOGEN UR STRIP-MCNC: NORMAL MG/DL (ref 0–2)
WBC #/AREA URNS AUTO: >100 /HPF
WBC CLUMPS #/AREA URNS HPF: PRESENT /HPF

## 2019-11-20 PROCEDURE — 87086 URINE CULTURE/COLONY COUNT: CPT | Performed by: INTERNAL MEDICINE

## 2019-11-20 PROCEDURE — 99607 MTMS BY PHARM ADDL 15 MIN: CPT | Performed by: PHARMACIST

## 2019-11-20 PROCEDURE — 81001 URINALYSIS AUTO W/SCOPE: CPT | Performed by: INTERNAL MEDICINE

## 2019-11-20 PROCEDURE — 87088 URINE BACTERIA CULTURE: CPT | Performed by: INTERNAL MEDICINE

## 2019-11-20 PROCEDURE — 87186 SC STD MICRODIL/AGAR DIL: CPT | Performed by: INTERNAL MEDICINE

## 2019-11-20 PROCEDURE — 99605 MTMS BY PHARM NP 15 MIN: CPT | Performed by: PHARMACIST

## 2019-11-20 PROCEDURE — 99213 OFFICE O/P EST LOW 20 MIN: CPT | Performed by: INTERNAL MEDICINE

## 2019-11-20 RX ORDER — OMEGA-3 FATTY ACIDS/FISH OIL 300-1000MG
400 CAPSULE ORAL EVERY 4 HOURS PRN
COMMUNITY

## 2019-11-20 RX ORDER — NITROFURANTOIN MACROCRYSTAL 100 MG
100 CAPSULE ORAL 2 TIMES DAILY
Qty: 20 CAPSULE | Refills: 0 | Status: SHIPPED | OUTPATIENT
Start: 2019-11-20 | End: 2019-12-03

## 2019-11-20 NOTE — PROGRESS NOTES
"SUBJECTIVE/OBJECTIVE:                           Brandy Leon is a 79 year old female coming in for an initial visit for Medication Therapy Management.  She was referred to me from Cailin Ponce.    Chief Complaint: Medication Review. Patient was 30 minutes late for her visit today.    Allergies/ADRs: Reviewed in Epic  Tobacco:  reports that she has never smoked. She has never used smokeless tobacco.  Alcohol: none  Caffeine: 2 cups/day of coffee  Activity: None  PMH: Reviewed in Epic    Medication Adherence/Access:  Patient brings in all of her medications and pill box. Patient has glipizide, jardiance and glipizide and prandin but patient reports she has not been taking these. She reports she hasn't taken glipizide and prandin \"for some time\" and she said someone called her a couple of weeks ago to tell her to stop taking Jardiance.  Patient brings in her pillbox and one day is filled out. After reviewing, it is filled appropriately. Patient reports she is due to fill her med box.  Patient does not remember ever taking Trulicity which looks like patient was on back in 2018.    Diabetes:  Pt currently taking metformin 1000mg twice daily, Lantus 33units daily. Pt is not experiencing side effects.  Patient feels the Jardiance works for her. She prefers oral medication over injectable.  SMBG: two times daily.   Ranges (patient reported): patient reports her blood sugar was >300 this am.  Patient is not experiencing hypoglycemia  Recent symptoms of high blood sugar? none  Eye exam: up to date  Foot exam: up to date  ACEi/ARB: Yes: irbesartan/HCTZ.   Urine Albumin:   Lab Results   Component Value Date    UMALCR 20.15 10/25/2019      Aspirin: Taking 81mg daily and denies side effects  Diet/Exercise: patient lives with her son and he does all of the cooking. Patient has met with a dietician in the past and knows what she is supposed to eat but has a hard time putting the plan into action.     Hyperlipidemia: " Current therapy includes rosuvastatin 5mg twice weekly (TF), fish oil 1 tablet twice daily.  Pt reports no significant myalgias or other side effects.  The 10-year ASCVD risk score (Qingjuju JACKMAN JrJoes, et al., 2013) is: 46%    Values used to calculate the score:      Age: 79 years      Sex: Female      Is Non- : No      Diabetic: Yes      Tobacco smoker: No      Systolic Blood Pressure: 125 mmHg      Is BP treated: Yes      HDL Cholesterol: 57 mg/dL      Total Cholesterol: 199 mg/dL    Hypertension: Current medications include irbesartan/HCTZ 150-12.5mg 1/2 tablet daily.  Patient does not self-monitor BP.  Patient reports no current medication side effects.    Hypothyroidism: Patient is taking levothyroxine 137 mcg daily. Patient is having the following symptoms: none.   TSH   Date Value Ref Range Status   10/25/2019 2.78 0.40 - 4.00 mU/L Final     GERD: Current medications include: [place] PM nightly as needed. Pt c/o no current symptoms.  Patient feels that current regimen is effective.    Allergic rhinitis: Current medications include fexofenadine 180mg once daily. Pt feels that current therapy is effective. Patient only takes as needed.    Supplements: Currently taking MVI daily, Vitamin C.  Denies issues at this time.     Pain: Current medications include ibuprofen 600mg twice daily. Patient report this is the only thinig that works for her.    UTI: patient reports that she may have a UTI and is wondering if she could have this evaluated today. Patient reports that nitrofurantoin works well for her.    Today's Vitals: There were no vitals taken for this visit.-See OV from today    ASSESSMENT:                              Medication Adherence: fair.    Diabetes: Needs improvement. Patient is not meeting A1c goal of <8%. Patient would benefit from increasing Lantus to 35 units as recommended by Dr. Morris and titrating up to 50 units as needed. Patient would also benefit from either  restarting Jardiance or could consider Rybelsus as an oral GLP-1 agonist option if insurance allows.    Hypertension: Stable. Patient is meeting BP goal of < 140/90mmHg.     Hyperlipidemia: Stable. Patient unable to tolerate higher doses of statins.    Hypothyroidism: Stable. Last TSH is within normal limits.     GERD: Stable. After visit reviewed [place] PM and it contains diphenhydramine. Will discuss risks of this medication at next visit.    Allergic Rhinitis: Stable    Supplements: Stable    Pain: Needs improvement. Discussed risks of taking ibuprofen regularly. Patient would benefit from trial of acetaminophen and minimizing ibuprofen use.    UTI: Needs improvement. Appointment made with Dr. Keller today for further evaluation.    PLAN:                            Recommend increasing Lantus to 35 units daily as recommended by Dr. Morris.  Recommend acetaminophen extra strength 2 tablets three time daily. Minimize use of ibuprofen.  Could consider restarting Jardiance or initiating Rybelsus.  Call endocrinology clinic with blood sugars    I spent 30 minutes with this patient today. All changes were made via collaborative practice agreement with Cailin Ponce. A copy of the visit note was provided to the patient's primary care provider.    Will follow up in 2 weeks.    The patient was given a summary of these recommendations as an after visit summary.     Mirna Perez, Pharm.D, BCACP  Medication Therapy Management Pharmacist

## 2019-11-20 NOTE — PROGRESS NOTES
Subjective     Brandy Leon is a 79 year old female who presents to clinic today for the following health issues:    HPI   URINARY TRACT SYMPTOMS      Duration: 2 weeks    Description  frequency, urgency and nocturia x 2 weeks    Intensity:  moderate    Accompanying signs and symptoms:  Fever/chills: no   Flank pain no   Nausea and vomiting: no   Vaginal symptoms: none  Abdominal/Pelvic Pain: no     History  History of frequent UTI's: YES Recently treated no help   History of kidney stones: no   Sexually Active: no   Possibility of pregnancy: No    Precipitating or alleviating factors: None    Therapies tried and outcome: course of antibiotics - bactrim   Outcome: no help      Patient was seen a month ago for UTI, given Ceftin, which should have worked.  Patient feels that it never got the infection resolved.  In the past she did better with nitrofurantoin.    She has a history of poorly controlled diabetes.  She is set up to see endocrine to get this under better control.    ROS:  Constitutional, HEENT, cardiovascular, pulmonary, gi and gu systems are negative, except as otherwise noted.       aspirin 81 MG EC tablet, Take 1 tablet by mouth daily.  Aspirin-Diphenhydramine (VIJAY-SELTZER PM PO), Take 1 tablet by mouth daily as needed  Cranberry-Vitamin C-Vitamin E (CRANBERRY PLUS VITAMIN C) 4200-20-3 MG-MG-UNIT CAPS, Take 1 capsule by mouth 2 times daily  DM-APAP-CPM (CORICIDIN HBP) -2 MG TABS, Take 1 tablet by mouth daily as needed  fexofenadine (ALLEGRA) 180 MG tablet, Take 180 mg by mouth daily  GLUCOSAMINE CHONDROITIN COMPLX OR, 2 Daily  ibuprofen (ADVIL/MOTRIN) 200 MG capsule, Take 600 mg by mouth 2 times daily  insulin glargine (LANTUS SOLOSTAR) 100 UNIT/ML pen, Inject 35-50 Units Subcutaneous daily  insulin pen needle (BD JUAN C U/F) 32G X 4 MM miscellaneous, USE 1 DAILY AS DIRECTED.  irbesartan-hydrochlorothiazide (AVALIDE) 150-12.5 MG tablet, TAKE 0.5 TABLET BY MOUTH DAILY  latanoprost (XALATAN)  0.005 % ophthalmic solution, Place 1 drop into both eyes At Bedtime  metFORMIN (GLUCOPHAGE) 1000 MG tablet, TAKE 1 TABLET (1,000 MG) BY MOUTH 2 TIMES DAILY (WITH MEALS)  MULTIVITAMIN OR, 1 tablet daily  nystatin-triamcinolone (MYCOLOG II) 874282-9.1 UNIT/GM-% external cream, Apply topically 2 times daily To feet and toenails.  Omega-3 Fatty Acids (OMEGA 3 PO), Take by mouth 2 times daily.   oxybutynin ER (DITROPAN-XL) 5 MG 24 hr tablet, TAKE 2 TABLETS (10 MG) BY MOUTH DAILY  rosuvastatin (CRESTOR) 5 MG tablet, TAKE 1 TABLET BY MOUTH TWICE WEEKLY  SYNTHROID 137 MCG tablet, TAKE 1 TABLET (137 MCG) BY MOUTH DAILY  ONE TOUCH DELICA LANCETS MISC, 1 each 2 times daily. One Touch Delica    ONETOUCH ULTRA test strip, USE TO TEST TWICE A DAY  order for DME, Glucometer covered by insurance.    No current facility-administered medications on file prior to visit.          Patient Active Problem List   Diagnosis     Seasonal allergies     Hypothyroidism     Hyperlipidemia LDL goal <100     Fibromyalgia     Osteoarthritis     Advanced directives, counseling/discussion     Obesity     Type 2 diabetes mellitus with hyperglycemia, with long-term current use of insulin (H)     Hypertension goal BP (blood pressure) < 140/90     Overactive bladder     Recurrent UTI     Pseudophakia of both eyes     DINORA (obstructive sleep apnea)- severe (AHI 56)     Past Surgical History:   Procedure Laterality Date     C APPENDECTOMY       C ARTHROPLASTY TMJ       CATARACT IOL, RT/LT       COLONOSCOPY       COLONOSCOPY N/A 8/13/2015    Procedure: COLONOSCOPY;  Surgeon: Duane, William Charles, MD;  Location: [facility]     COLONOSCOPY WITH CO2 INSUFFLATION N/A 8/13/2015    Procedure: COLONOSCOPY WITH CO2 INSUFFLATION;  Surgeon: Duane, William Charles, MD;  Location: [facility]     PHACOEMULSIFICATION WITH STANDARD INTRAOCULAR LENS IMPLANT Left 10/25/2018    Procedure: LEFT PHACOEMULSIFICATION WITH STANDARD INTRAOCULAR LENS IMPLANT;  Surgeon: Thomas Serna  MD Trent;  Location: [facility]     PHACOEMULSIFICATION WITH STANDARD INTRAOCULAR LENS IMPLANT Right 11/8/2018    Procedure: RIGHT PHACOEMULSIFICATION WITH STANDARD INTRAOCULAR LENS IMPLANT;  Surgeon: Thomas Serna MD;  Location: [facility]     THYROIDECTOMY          Social History     Tobacco Use     Smoking status: Never Smoker     Smokeless tobacco: Never Used     Tobacco comment: has had exposure to second hand smoke in the past from [employer], no current exposure   Substance Use Topics     Alcohol use: No     Family History   Problem Relation Age of Onset     Cancer Father         skin and leukemia     Cerebrovascular Disease Maternal Grandfather      Cerebrovascular Disease Paternal Grandmother      Eye Disorder Paternal Grandmother         cataracts     Cerebrovascular Disease Paternal Grandfather      Heart Disease Paternal Grandfather      Cancer Sister         Breast Cancer     Eye Disorder Mother         cataracts     Eye Disorder Brother         cataract     Breast Cancer Daughter      Other Cancer Daughter         ovarian cancer     Glaucoma No family hx of      Macular Degeneration No family hx of              Problem list, Medication list, Allergies, and Medical/Social/Surgical histories reviewed in [facility] and updated as appropriate.    OBJECTIVE:                                                    /69   Pulse 87   Temp 98.7  F (37.1  C) (Temporal)   Wt 84.4 kg (186 lb)   SpO2 96%   BMI 31.93 kg/m      GENERAL: healthy, alert and no distress      Diagnostic test results:  Results for orders placed or performed in visit on 11/20/19   UA with Microscopic reflex to Culture     Status: Abnormal   Result Value Ref Range    Color Urine Light Yellow     Appearance Urine Slightly Cloudy     Glucose Urine >1000 (A) NEG^Negative mg/dL    Bilirubin Urine Negative NEG^Negative    Ketones Urine Negative NEG^Negative mg/dL    Specific Gravity Urine 1.022 1.003 - 1.035    Blood Urine Trace (A) NEG^Negative     pH Urine 5.0 5.0 - 7.0 pH    Protein Albumin Urine Negative NEG^Negative mg/dL    Urobilinogen mg/dL Normal 0.0 - 2.0 mg/dL    Nitrite Urine Positive (A) NEG^Negative    Leukocyte Esterase Urine Large (A) NEG^Negative    Source Midstream Urine     WBC Urine >100 (A) OTO5^0 - 5 /HPF    RBC Urine O - 2 OTO2^O - 2 /HPF    WBC Clumps Present (A) NEG^Negative /HPF    Bacteria Urine Many (A) NEG^Negative /HPF         ASSESSMENT/PLAN:                                                      79 year old female with the following diagnoses and treatment plan:      ICD-10-CM    1. Acute UTI N39.0 nitroFURantoin macrocrystal (MACRODANTIN) 100 MG capsule   2. Dysuria R30.0 UA with Microscopic reflex to Culture   3. Abnormal urine findings R82.90 Urine Culture Aerobic Bacterial   4. Type 2 diabetes mellitus with hyperglycemia, with long-term current use of insulin (H) E11.65     Z79.4        --Acute UTI: I gave her a prescription of nitrofurantoin.  This may need to be adjusted based on urine culture result.  Poorly controlled diabetes, follows with PCP and endocrine.    Will call or return to clinic if worsening or symptoms not improving as discussed.  See Patient Instructions.      [place]ara Keller MD-PhD  [facility] [place]    (Note: Chart documentation was done in part with Dragon Voice Recognition software. Although reviewed after completion, some word and grammatical errors may remain.)

## 2019-11-20 NOTE — RESULT ENCOUNTER NOTE
Results discussed directly with patient while patient was present. Any further details documented in the note.   Tara Keller MD PhD

## 2019-11-20 NOTE — PATIENT INSTRUCTIONS
Recommendations from today's MTM visit:                                                    MTM (medication therapy management) is a service provided by a clinical pharmacist designed to help you get the most of out of your medicines.   Today we reviewed what your medicines are for, how to know if they are working, that your medicines are safe and how to make your medicine regimen as easy as possible.     Increase Lantus to 35 units once daily. Can increase your insulin every 3 days by 3 units until your fasting blood sugar is 140mg/dL. Call endocrinology clinic with your readings.  Try to limit ibuprofen use. Try using 2 acetaminophen extra strength tablets three times daily or 2 Tylenol arthritis tablets three times daily.  Pharmacist will connect with Dr. Morris about your Jardiance.    It was great to speak with you today.  I value your experience and would be very thankful for your time with providing feedback on our clinic survey. You may receive a survey via email or text message in the next few days.     Next MTM visit: 12/3 at 10am.    To schedule another MTM appointment, please call the clinic directly or you may call the MTM scheduling line at [phone] or toll-free at [phone].     My Clinical Pharmacist's contact information:                                                      It was a pleasure talking with you today!  Please feel free to contact me with any questions or concerns you have.      Mirna Perez, Pharm.D, [facility]  Medication Therapy Management Pharmacist  [phone]

## 2019-11-20 NOTE — PATIENT INSTRUCTIONS
Medication(s) prescribed today:    Orders Placed This Encounter   Medications     nitroFURantoin macrocrystal (MACRODANTIN) 100 MG capsule     Sig: Take 1 capsule (100 mg) by mouth 2 times daily for 10 days     Dispense:  20 capsule     Refill:  0

## 2019-11-22 LAB
BACTERIA SPEC CULT: ABNORMAL
SPECIMEN SOURCE: ABNORMAL

## 2019-11-25 ENCOUNTER — TELEPHONE (OUTPATIENT)
Dept: ENDOCRINOLOGY | Facility: CLINIC | Age: 79
End: 2019-11-25

## 2019-11-25 NOTE — TELEPHONE ENCOUNTER
I called pt: still feeling weak and shaky, hst been on Lantus 35 units daily, and metformin.BGs in am 200 and higher, even higher during the day.  Pt has been treated for yeast infection, they are resolved.  Pt has thinks that Jardiance would help a lot for glucose control.    Plan: start Jardiance  increase Lantus to 40 units daily  Check BGs am and occ before supper  Will call next week with BGs

## 2019-12-03 ENCOUNTER — ALLIED HEALTH/NURSE VISIT (OUTPATIENT)
Dept: PHARMACY | Facility: CLINIC | Age: 79
End: 2019-12-03
Payer: COMMERCIAL

## 2019-12-03 DIAGNOSIS — E11.65 TYPE 2 DIABETES MELLITUS WITH HYPERGLYCEMIA, WITH LONG-TERM CURRENT USE OF INSULIN (H): Primary | ICD-10-CM

## 2019-12-03 DIAGNOSIS — N39.0 URINARY TRACT INFECTION WITHOUT HEMATURIA, SITE UNSPECIFIED: ICD-10-CM

## 2019-12-03 DIAGNOSIS — Z79.4 TYPE 2 DIABETES MELLITUS WITH HYPERGLYCEMIA, WITH LONG-TERM CURRENT USE OF INSULIN (H): Primary | ICD-10-CM

## 2019-12-03 DIAGNOSIS — N39.0 RECURRENT UTI: ICD-10-CM

## 2019-12-03 PROCEDURE — 99606 MTMS BY PHARM EST 15 MIN: CPT | Performed by: PHARMACIST

## 2019-12-03 NOTE — Clinical Note
Hi Dr. Morris, I called Brandy today to review her blood sugars. She has only been taking 36 units of Lantus. She did restart the Jardiance. I did tell her to increase her Lantus to 40 units as you had instructed on 11/25. I plan on calling her again next week to review her blood sugars. Please let me know if there is anything you want me to relay to her next week.Mirna Perez, Pharm.D, BCACPMedication Therapy Management Pharmacist

## 2019-12-03 NOTE — PROGRESS NOTES
SUBJECTIVE/OBJECTIVE:                           Brandy Leon is a 79 year old female called for an follow up visit for Medication Therapy Management.  She was referred to me from Cailin Ponce.    Chief Complaint:Follow up from 11/30/19. Blood sugars    Allergies/ADRs: Reviewed in Epic  Tobacco:  reports that she has never smoked. She has never used smokeless tobacco.  Alcohol: none  Caffeine: 2 cups/day of coffee  Activity: None  PMH: Reviewed in Epic    Diabetes:  Pt currently taking metformin 1000mg twice daily, Lantus 36 units daily (dose was increased to 40 units on 11/25 by Dr. Morris), Jardiance 10mg daily (restarted 11/25 by Dr. Morris). Pt is not experiencing side effects.  Patient feels the Jardiance works for her. She prefers oral medication over injectable.  SMBG: two times daily.   Ranges (patient reported):   Date FBG/ 2hours post Lunch/2hours post Dinner /2hours post    12/3 /373 (banana)      12/2 239      12/1 141      11/27 188      11/26 186      11/25 151      11/24 270      11/23 326        Patient is not experiencing hypoglycemia  Recent symptoms of high blood sugar? none  Eye exam: up to date  Foot exam: up to date  ACEi/ARB: Yes: irbesartan/HCTZ.   Urine Albumin:   Lab Results   Component Value Date    UMALCR 20.15 10/25/2019      Aspirin: Taking 81mg daily and denies side effects  Diet/Exercise: patient lives with her son and he does all of the cooking. Patient has met with a dietician in the past and knows what she is supposed to eat but has a hard time putting the plan into action.     UTI: patient was treated for UTI and was given nitrofurantoin and completed the course of therapy. Patient is complaining urinary frequency, no pain or fever. Patient is wondering if she still has an infection.    Today's Vitals: There were no vitals taken for this visit.-phone visit    ASSESSMENT:                            Medication Adherence: fair.    Diabetes: Needs improvement. Patient is  not meeting A1c goal of <8%. Patient would benefit from increasing Lantus to 40 units as recommended by Dr. Morris.    UTI: Needs improvement. Discussed with Atiya Ponce CNP and she would like patient to come into lab and leave a urine sample to ensure infection is cleared.    PLAN:                            Recommend increasing Lantus to 40 units daily as recommended by Dr. Morris.  Appt made for patient to come into lab tomorrow for urine culture.    I spent 15 minutes with this patient today. All changes were made via collaborative practice agreement with Cailin Ponce. A copy of the visit note was provided to the patient's primary care provider.    Will follow up in 1 week.    The patient was given a summary of these recommendations as an after visit summary.     Mirna Perez, Pharm.D, BCACP  Medication Therapy Management Pharmacist

## 2019-12-03 NOTE — PATIENT INSTRUCTIONS
Recommendations from today's MTM visit:                                                      Increase Lantus to 40 units daily per Dr. Morris's instructions.  Call clinic if your urinary symptoms worsen.     It was great to speak with you today.  I value your experience and would be very thankful for your time with providing feedback on our clinic survey. You may receive a survey via email or text message in the next few days.     Next MTM visit: December 10th at 9:30am    To schedule another MTM appointment, please call the clinic directly or you may call the MTM scheduling line at [phone] or toll-free at [phone].     My Clinical Pharmacist's contact information:                                                      It was a pleasure talking with you today!  Please feel free to contact me with any questions or concerns you have.      Mirna Perez, Pharm.D, BCACP  Medication Therapy Management Pharmacist

## 2019-12-04 DIAGNOSIS — N39.0 RECURRENT UTI: ICD-10-CM

## 2019-12-04 DIAGNOSIS — R82.90 ABNORMAL URINE FINDINGS: Primary | ICD-10-CM

## 2019-12-04 LAB
ALBUMIN UR-MCNC: 10 MG/DL
APPEARANCE UR: ABNORMAL
BACTERIA #/AREA URNS HPF: ABNORMAL /HPF
BILIRUB UR QL STRIP: NEGATIVE
COLOR UR AUTO: YELLOW
GLUCOSE UR STRIP-MCNC: >1000 MG/DL
HGB UR QL STRIP: ABNORMAL
KETONES UR STRIP-MCNC: NEGATIVE MG/DL
LEUKOCYTE ESTERASE UR QL STRIP: ABNORMAL
NITRATE UR QL: NEGATIVE
NON-SQ EPI CELLS #/AREA URNS LPF: ABNORMAL /LPF
PH UR STRIP: 6.5 PH (ref 5–7)
RBC #/AREA URNS AUTO: ABNORMAL /HPF
SOURCE: ABNORMAL
SP GR UR STRIP: 1.03 (ref 1–1.03)
UROBILINOGEN UR STRIP-MCNC: NORMAL MG/DL (ref 0–2)
WBC #/AREA URNS AUTO: ABNORMAL /HPF

## 2019-12-04 PROCEDURE — 81001 URINALYSIS AUTO W/SCOPE: CPT | Performed by: NURSE PRACTITIONER

## 2019-12-04 PROCEDURE — 87086 URINE CULTURE/COLONY COUNT: CPT | Performed by: NURSE PRACTITIONER

## 2019-12-05 DIAGNOSIS — N39.0 RECURRENT UTI: ICD-10-CM

## 2019-12-05 DIAGNOSIS — R35.0 URINARY FREQUENCY: Primary | ICD-10-CM

## 2019-12-05 DIAGNOSIS — N32.81 OVERACTIVE BLADDER: ICD-10-CM

## 2019-12-05 LAB
BACTERIA SPEC CULT: NORMAL
SPECIMEN SOURCE: NORMAL

## 2019-12-06 ENCOUNTER — TELEPHONE (OUTPATIENT)
Dept: PEDIATRICS | Facility: CLINIC | Age: 79
End: 2019-12-06

## 2019-12-06 NOTE — TELEPHONE ENCOUNTER
----- Message from MAGO Baker CNP sent at 12/5/2019  1:23 PM CST -----  Please call   Urine culture is negative   However with her symptoms I am thinking we should send her back to urology   Please call [phone] to make appointment  if you do not hear from referrals in the next few days.     Cailin Ponce, NP, APRN CNP

## 2019-12-06 NOTE — TELEPHONE ENCOUNTER
Attempt # 1    Was call answered?  No.  Left message on home number to return call to clinic.    Sharonda OGLESBY CMA

## 2019-12-09 NOTE — TELEPHONE ENCOUNTER
Pt notified per Atiya Ponce CNP, UC negative, should see uro. Referral dept. Should be in contact with her. If she doesn't hear from them instructed to call. Kassie Rabago LPN

## 2019-12-17 ENCOUNTER — TELEPHONE (OUTPATIENT)
Dept: PHARMACY | Facility: CLINIC | Age: 79
End: 2019-12-17

## 2019-12-17 NOTE — TELEPHONE ENCOUNTER
Called patient to review blood sugars. Patient is currently taking Lantus 40 units daily.    Blood sugars have been running around 170mg/dL in the morning. Patient is not having any low blood sugars. Dr. Morris had recommended increasing Lantus to 44 units if blood sugars are above 150mg/dL. Patient does not feel comfortable with this increase. Patient is more comfortable increasing her Lantus to 42 units.    Patient was in the pharmacy and she checked her blood pressure and it was 115/74mmHg pulse was 86.    Patient will increase Lantus to 42 units daily and will follow-up in 2 weeks.     Mirna Perez, Pharm.D, BCACP  Medication Therapy Management Pharmacist

## 2019-12-31 ENCOUNTER — ALLIED HEALTH/NURSE VISIT (OUTPATIENT)
Dept: PHARMACY | Facility: CLINIC | Age: 79
End: 2019-12-31
Payer: COMMERCIAL

## 2019-12-31 DIAGNOSIS — E11.65 TYPE 2 DIABETES MELLITUS WITH HYPERGLYCEMIA, WITH LONG-TERM CURRENT USE OF INSULIN (H): Primary | ICD-10-CM

## 2019-12-31 DIAGNOSIS — Z79.4 TYPE 2 DIABETES MELLITUS WITH HYPERGLYCEMIA, WITH LONG-TERM CURRENT USE OF INSULIN (H): Primary | ICD-10-CM

## 2019-12-31 PROCEDURE — 99606 MTMS BY PHARM EST 15 MIN: CPT | Performed by: PHARMACIST

## 2019-12-31 NOTE — PATIENT INSTRUCTIONS
Recommendations from today's MTM visit:                                                      No medication changes made today.    It was great to speak with you today.  I value your experience and would be very thankful for your time with providing feedback on our clinic survey. You may receive a survey via email or text message in the next few days.     Next MTM visit: after visit with endocrinology    To schedule another MTM appointment, please call the clinic directly or you may call the MTM scheduling line at [phone] or toll-free at [phone].     My Clinical Pharmacist's contact information:                                                      It was a pleasure talking with you today!  Please feel free to contact me with any questions or concerns you have.      Mirna Perez, Pharm.D, BCACP  Medication Therapy Management Pharmacist

## 2019-12-31 NOTE — PROGRESS NOTES
SUBJECTIVE/OBJECTIVE:                           Brandy Leon is a 79 year old female called for an follow up visit for Medication Therapy Management.  She was referred to me from Cailin Ponce.    Chief Complaint:Follow up from 12/3/19. Blood sugars  Patient had influenza and spent the last 4 days in bed.     Allergies/ADRs: Reviewed in Epic  Tobacco:  reports that she has never smoked. She has never used smokeless tobacco.  Alcohol: none  Caffeine: 2 cups/day of coffee  Activity: None  PMH: Reviewed in Epic    Diabetes:  Pt currently taking metformin 1000mg twice daily, Lantus 40 units daily (patient was feeling weak when she was taking 42 units; patient did not check her blood sugar when she was feeling this way; she decreased her dose to 40 units), Jardiance 10mg daily. Pt is not experiencing side effects.  Patient feels the Jardiance works for her. She prefers oral medication over injectable.  SMBG: two times daily.   Ranges (patient reported):   Date FBG/ 2hours post Lunch/2hours post Dinner /2hours post    12/31 151      12/30 117      12/29 12/28 286 (wasn't feeling well)      12/26 164      12/25 154                      Patient is not experiencing hypoglycemia  Recent symptoms of high blood sugar? none  Eye exam: up to date  Foot exam: up to date  ACEi/ARB: Yes: irbesartan/HCTZ.   Urine Albumin:   Lab Results   Component Value Date    UMALCR 20.15 10/25/2019      Aspirin: Taking 81mg daily and denies side effects  Diet/Exercise: patient lives with her son and he does all of the cooking. Patient has met with a dietician in the past and knows what she is supposed to eat but has a hard time putting the plan into action.   Patient has been losing weight and is hoping to decrease her medication needs.    Today's Vitals: There were no vitals taken for this visit.-phone visit    ASSESSMENT:                            Medication Adherence: fair.    Diabetes: Needs improvement. Patient is not  meeting A1c goal of <8%. Patient would benefit from increasing Lantus but patient is reluctant. Patient is following up with Endocrinology 1/13 with labs.     PLAN:                          No medication changes made today as patient is unable to tolerate increase in insulin.    I spent 15 minutes with this patient today. All changes were made via collaborative practice agreement with Cailin Ponce. A copy of the visit note was provided to the patient's primary care provider.    Will follow up after visit with endocrinology.    The patient was given a summary of these recommendations as an after visit summary.     Mirna Perez, Pharm.D, BCACP  Medication Therapy Management Pharmacist

## 2020-01-08 NOTE — PATIENT INSTRUCTIONS
[facility]  Jeannie Hathaway RN, Diabetes Educator: [phone]  Clinic Nurses KATHY Sánchez; CMA's: Suri [phone]  ANGELITA Colby : [phone]  Clinic Fax: [phone]  On-Call Endocrine at the [place] (after hours/weekends): [phone] option 4  Scheduling Line: [phone]    Appointment Reminders:  * Please bring meter with for staff to download  * If you are due ONLY for an A1C, it is scheduled with the nurse and will be done in clinic. You do not need to schedule a lab appointment. Fasting is not required for an A1C.  * Refill request should be submitted to your pharmacy. They will contact clinic for approval.    Stay on 40 units of insulin Glargine  cont Metformin  Cont Jaridiance  Start Victoza 0.6 mg x 1 week then 1.2 mg x 1 week then 1.8 mg daily  Victoza might cause mild nauea which should improve within a few days, going up on the dose slower is going to help the nausea, you can stay at 1.2 mg if higher dose is not tolerated  check blood sugars fasting and also before supper or at bedtime    Follow up in 3months

## 2020-01-09 DIAGNOSIS — N32.81 OVERACTIVE BLADDER: ICD-10-CM

## 2020-01-09 DIAGNOSIS — R35.0 URINARY FREQUENCY: ICD-10-CM

## 2020-01-09 RX ORDER — OXYBUTYNIN CHLORIDE 5 MG/1
TABLET, EXTENDED RELEASE ORAL
Qty: 180 TABLET | Refills: 1 | Status: SHIPPED | OUTPATIENT
Start: 2020-01-09 | End: 2020-03-13

## 2020-01-09 NOTE — TELEPHONE ENCOUNTER
Please refer to RN refill guidelines. Refilled per protocol.    Jacqueline Robertson RN   [facility], [facility]

## 2020-01-10 ENCOUNTER — TELEPHONE (OUTPATIENT)
Dept: ENDOCRINOLOGY | Facility: CLINIC | Age: 80
End: 2020-01-10

## 2020-01-10 DIAGNOSIS — E11.65 TYPE 2 DIABETES MELLITUS WITH HYPERGLYCEMIA, WITH LONG-TERM CURRENT USE OF INSULIN (H): Primary | Chronic | ICD-10-CM

## 2020-01-10 DIAGNOSIS — Z79.4 TYPE 2 DIABETES MELLITUS WITH HYPERGLYCEMIA, WITH LONG-TERM CURRENT USE OF INSULIN (H): Primary | Chronic | ICD-10-CM

## 2020-01-10 DIAGNOSIS — E11.65 TYPE 2 DIABETES MELLITUS WITH HYPERGLYCEMIA, WITH LONG-TERM CURRENT USE OF INSULIN (H): Chronic | ICD-10-CM

## 2020-01-10 DIAGNOSIS — Z79.4 TYPE 2 DIABETES MELLITUS WITH HYPERGLYCEMIA, WITH LONG-TERM CURRENT USE OF INSULIN (H): Chronic | ICD-10-CM

## 2020-01-10 LAB — HBA1C MFR BLD: 10.5 % (ref 0–5.6)

## 2020-01-10 PROCEDURE — 36415 COLL VENOUS BLD VENIPUNCTURE: CPT | Performed by: INTERNAL MEDICINE

## 2020-01-10 PROCEDURE — 83036 HEMOGLOBIN GLYCOSYLATED A1C: CPT | Performed by: INTERNAL MEDICINE

## 2020-01-10 NOTE — TELEPHONE ENCOUNTER
Received call from [place] at lab stating that patient is here today for lab but no orders in Epic.       Lab appointment note states:  Fasting labs for Dr. Worley.   (this was schedule 9/2019)    Patient saw Dr. Morris on 10/28/19 and had fasting labs for Dr. Worley on 10/25/19. Patient transferred care due to Dr. Worley leaving clinic.    Patient has appointment with Dr. Morris Monday and is scheduled for A1C in clinic prior (only labs needed/ordered at this time). Notified lab that A1C is every 3 months and patient is early for 3 month check. Patient states that her A1C is always covered and prefers to do A1C today while here for his visit on Monday.    Changed order for A1C to lab (not in clinic).      Princess Mace RN  Endocrine Care Coordinator  [facility]

## 2020-01-13 ENCOUNTER — OFFICE VISIT (OUTPATIENT)
Dept: ENDOCRINOLOGY | Facility: CLINIC | Age: 80
End: 2020-01-13
Payer: COMMERCIAL

## 2020-01-13 VITALS
WEIGHT: 178.57 LBS | SYSTOLIC BLOOD PRESSURE: 132 MMHG | HEART RATE: 82 BPM | BODY MASS INDEX: 30.49 KG/M2 | OXYGEN SATURATION: 98 % | HEIGHT: 64 IN | DIASTOLIC BLOOD PRESSURE: 65 MMHG

## 2020-01-13 DIAGNOSIS — Z79.4 TYPE 2 DIABETES MELLITUS WITH HYPERGLYCEMIA, WITH LONG-TERM CURRENT USE OF INSULIN (H): Primary | ICD-10-CM

## 2020-01-13 DIAGNOSIS — E11.65 TYPE 2 DIABETES MELLITUS WITH HYPERGLYCEMIA, WITH LONG-TERM CURRENT USE OF INSULIN (H): Primary | ICD-10-CM

## 2020-01-13 DIAGNOSIS — I10 HYPERTENSION GOAL BP (BLOOD PRESSURE) < 140/90: ICD-10-CM

## 2020-01-13 PROCEDURE — 99214 OFFICE O/P EST MOD 30 MIN: CPT | Performed by: INTERNAL MEDICINE

## 2020-01-13 RX ORDER — LIRAGLUTIDE 6 MG/ML
INJECTION SUBCUTANEOUS
Qty: 9 ML | Refills: 0 | Status: SHIPPED | OUTPATIENT
Start: 2020-01-13 | End: 2020-02-05

## 2020-01-13 RX ORDER — IRBESARTAN AND HYDROCHLOROTHIAZIDE 150; 12.5 MG/1; MG/1
TABLET, FILM COATED ORAL
Qty: 90 TABLET | Refills: 1 | Status: SHIPPED | OUTPATIENT
Start: 2020-01-13 | End: 2020-06-23

## 2020-01-13 ASSESSMENT — MIFFLIN-ST. JEOR: SCORE: 1275

## 2020-01-13 NOTE — LETTER
1/13/2020         RE: Brandy Leon  [address] [id number]        Dear Colleague,    Thank you for referring your patient, Brandy Leon, to the [facility]. Please see a copy of my visit note below.    Endocrinology and Diabetes Clinic    Subjective:   Brandy Leon is a 79 year old with T2DM here for follow up.   Pt has started Jardiance since the last visit. She has worked with pharmacist to increase lantus dose. She is currently taking 40 units once daily.  She brings in her glucometer.  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    BGs: check 1-2 times daily, mostly morning occ later on. Fasting BGs 80-130s ; later on mid 200s    Meds: Lantus 40 units once daily. Metformin. Jardiance    HPI: pt has T2DM since 1998  Complication of albuminuria and neuropathy    Medications:   Current Outpatient Medications   Medication Sig Dispense Refill     aspirin 81 MG EC tablet Take 1 tablet by mouth daily. 90 tablet 3     Aspirin-Diphenhydramine (VIJAY-SELTZER PM PO) Take 1 tablet by mouth daily as needed       Cranberry-Vitamin C-Vitamin E (CRANBERRY PLUS VITAMIN C) 4200-20-3 MG-MG-UNIT CAPS Take 1 capsule by mouth 2 times daily       DM-APAP-CPM (CORICIDIN HBP) -2 MG TABS Take 1 tablet by mouth daily as needed       empagliflozin (JARDIANCE) 10 MG TABS tablet Take 10 mg by mouth daily       fexofenadine (ALLEGRA) 180 MG tablet Take 180 mg by mouth daily       GLUCOSAMINE CHONDROITIN COMPLX OR 2 Daily       ibuprofen (ADVIL/MOTRIN) 200 MG capsule Take 600 mg by mouth 2 times daily       insulin glargine (LANTUS SOLOSTAR) 100 UNIT/ML pen Inject 35-50 Units Subcutaneous daily 25 mL 1     insulin pen needle (BD JUAN C U/F) 32G X 4 MM miscellaneous USE 1 DAILY AS DIRECTED. 100 each 3     irbesartan-hydrochlorothiazide (AVALIDE) 150-12.5 MG tablet TAKE 0.5 TABLET BY MOUTH DAILY 90 tablet 1     latanoprost (XALATAN) 0.005 % ophthalmic  "solution Place 1 drop into both eyes At Bedtime 3 Bottle 0     liraglutide (VICTOZA PEN) 18 MG/3ML solution Take 0.6 mg subcutaneous once daily x 7 days then, then 1.2 mg x 7 days then 1.8 mg dily 9 mL 0     metFORMIN (GLUCOPHAGE) 1000 MG tablet TAKE 1 TABLET (1,000 MG) BY MOUTH 2 TIMES DAILY (WITH MEALS) 180 tablet 3     MULTIVITAMIN OR 1 tablet daily       nystatin-triamcinolone (MYCOLOG II) 926457-8.1 UNIT/GM-% external cream Apply topically 2 times daily To feet and toenails. 60 g 2     Omega-3 Fatty Acids (OMEGA 3 PO) Take by mouth 2 times daily.        ONE TOUCH DELICA LANCETS MISC 1 each 2 times daily. One Touch Delica   100 each 12     ONETOUCH ULTRA test strip USE TO TEST TWICE A  strip 4     order for DME Glucometer covered by insurance. 1 each 0     oxybutynin ER (DITROPAN-XL) 5 MG 24 hr tablet TAKE 2 TABLETS (10 MG) BY MOUTH DAILY 180 tablet 1     rosuvastatin (CRESTOR) 5 MG tablet TAKE 1 TABLET BY MOUTH TWICE WEEKLY 90 tablet 3     SYNTHROID 137 MCG tablet TAKE 1 TABLET (137 MCG) BY MOUTH DAILY 90 tablet 3       Review of Systems: in addition to the stated above  GENERAL: Negative  SKIN: Negative  HENT: Negative   EYE: Negative  HEART: Negative  RESPIRATORY: Negative   GI: Negative  : Negative  MSK: Negative  BLOOD/LYMPH: Negative  NEUROLOGIC: Negative   PSYCH: Negative    Physical Examination:  Blood pressure 132/65, pulse 82, height 1.626 m (5' 4\"), weight 81 kg (178 lb 9.2 oz), SpO2 98 %, not currently breastfeeding.  Body mass index is 30.65 kg/m .    Wt Readings from Last 4 Encounters:   01/13/20 81 kg (178 lb 9.2 oz)   11/20/19 84.4 kg (186 lb)   11/19/19 84.5 kg (186 lb 3.2 oz)   10/28/19 85.4 kg (188 lb 4.8 oz)     General: Well appearing obese woman in no distress. Here with her daughter    Labs and Studies:   Recent Labs   Lab Test 01/10/20  0956 10/25/19  1005 10/25/19  0950  05/10/19  0825 05/10/19  0821  06/25/18  1434  12/22/17  1118   A1C 10.5*  --  11.1*   < >  --   --    < > " 10.4*   < >  --    TSH  --   --  2.78  --   --  2.61   < > 2.46  --  1.70   T4  --   --   --   --   --   --   --  1.17  --  1.16   LDL  --   --  91  --   --  114*  --  93  --  115*   HDL  --   --  57  --   --   --   --  53  --  54   TRIG  --   --  254*  --   --   --   --  258*  --  161*   CR  --   --  0.72  --   --  0.81   < > 0.68   < > 0.76   MICROL  --  16  --   --  50  --    < >  --   --   --     < > = values in this interval not displayed.        Assessment:  1. Elderly woman with T2DM with still high A1c on lantus, Jardiance and metformin. Cannot increase Lantus furhter because of morning BGs in good range. Discussed diet. Option to start meal time insulin or GLP-1 analogue.  Discussed that her  license cannot be renewed with BGs this high.   Discussed diet, which could be improved. Pt reluctantly took book about diabetic diet, declines seeing a dietitian.  2. Feet doing well  3. Cardiovascular prevention  Albumiuria: on ARB, blood pressure, well controlled, started on  Jardiance which has shown to decrease progression of renal disease    Lipids: on Rosuvastatin small dose, cont     Plan:   - Lantus 40 units dialy  - metformin  - jardiance  Start Victoza, titrate to 1.8 mg as tolerated  - diabetic diet: 45gram of carbs with each meal, one time snack of less than 20 grams of carbs between meals      This was a 25 min visit of which more than 23 minutes were spent in counseling in regards to diagnosis, clinical consequences and treatment indications and options of blood glucose control    Rachel Morris MD  Endocrinology and Diabetes  [address]  [facility] [address]  Tel [phone]    Again, thank you for allowing me to participate in the care of your patient.        Sincerely,        Rachel Morris MD

## 2020-01-13 NOTE — PROGRESS NOTES
Endocrinology and Diabetes Clinic    Subjective:   Brandy Leon is a 79 year old with T2DM here for follow up.   Pt has started Jardiance since the last visit. She has worked with pharmacist to increase lantus dose. She is currently taking 40 units once daily.  She brings in her glucometer.  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    BGs: check 1-2 times daily, mostly morning occ later on. Fasting BGs 80-130s ; later on mid 200s    Meds: Lantus 40 units once daily. Metformin. Jardiance    HPI: pt has T2DM since 1998  Complication of albuminuria and neuropathy    Medications:   Current Outpatient Medications   Medication Sig Dispense Refill     aspirin 81 MG EC tablet Take 1 tablet by mouth daily. 90 tablet 3     Aspirin-Diphenhydramine (VIJAY-SELTZER PM PO) Take 1 tablet by mouth daily as needed       Cranberry-Vitamin C-Vitamin E (CRANBERRY PLUS VITAMIN C) 4200-20-3 MG-MG-UNIT CAPS Take 1 capsule by mouth 2 times daily       DM-APAP-CPM (CORICIDIN HBP) -2 MG TABS Take 1 tablet by mouth daily as needed       empagliflozin (JARDIANCE) 10 MG TABS tablet Take 10 mg by mouth daily       fexofenadine (ALLEGRA) 180 MG tablet Take 180 mg by mouth daily       GLUCOSAMINE CHONDROITIN COMPLX OR 2 Daily       ibuprofen (ADVIL/MOTRIN) 200 MG capsule Take 600 mg by mouth 2 times daily       insulin glargine (LANTUS SOLOSTAR) 100 UNIT/ML pen Inject 35-50 Units Subcutaneous daily 25 mL 1     insulin pen needle (BD JUAN C U/F) 32G X 4 MM miscellaneous USE 1 DAILY AS DIRECTED. 100 each 3     irbesartan-hydrochlorothiazide (AVALIDE) 150-12.5 MG tablet TAKE 0.5 TABLET BY MOUTH DAILY 90 tablet 1     latanoprost (XALATAN) 0.005 % ophthalmic solution Place 1 drop into both eyes At Bedtime 3 Bottle 0     liraglutide (VICTOZA PEN) 18 MG/3ML solution Take 0.6 mg subcutaneous once daily x 7 days then, then 1.2 mg x 7 days then 1.8 mg dily 9 mL 0     metFORMIN (GLUCOPHAGE) 1000 MG tablet TAKE 1 TABLET (1,000 MG)  "BY MOUTH 2 TIMES DAILY (WITH MEALS) 180 tablet 3     MULTIVITAMIN OR 1 tablet daily       nystatin-triamcinolone (MYCOLOG II) 517038-7.1 UNIT/GM-% external cream Apply topically 2 times daily To feet and toenails. 60 g 2     Omega-3 Fatty Acids (OMEGA 3 PO) Take by mouth 2 times daily.        ONE TOUCH DELICA LANCETS MISC 1 each 2 times daily. One Touch Delica   100 each 12     ONETOUCH ULTRA test strip USE TO TEST TWICE A  strip 4     order for DME Glucometer covered by insurance. 1 each 0     oxybutynin ER (DITROPAN-XL) 5 MG 24 hr tablet TAKE 2 TABLETS (10 MG) BY MOUTH DAILY 180 tablet 1     rosuvastatin (CRESTOR) 5 MG tablet TAKE 1 TABLET BY MOUTH TWICE WEEKLY 90 tablet 3     SYNTHROID 137 MCG tablet TAKE 1 TABLET (137 MCG) BY MOUTH DAILY 90 tablet 3       Review of Systems: in addition to the stated above  GENERAL: Negative  SKIN: Negative  HENT: Negative   EYE: Negative  HEART: Negative  RESPIRATORY: Negative   GI: Negative  : Negative  MSK: Negative  BLOOD/LYMPH: Negative  NEUROLOGIC: Negative   PSYCH: Negative    Physical Examination:  Blood pressure 132/65, pulse 82, height 1.626 m (5' 4\"), weight 81 kg (178 lb 9.2 oz), SpO2 98 %, not currently breastfeeding.  Body mass index is 30.65 kg/m .    Wt Readings from Last 4 Encounters:   01/13/20 81 kg (178 lb 9.2 oz)   11/20/19 84.4 kg (186 lb)   11/19/19 84.5 kg (186 lb 3.2 oz)   10/28/19 85.4 kg (188 lb 4.8 oz)     General: Well appearing obese woman in no distress. Here with her daughter    Labs and Studies:   Recent Labs   Lab Test 01/10/20  0956 10/25/19  1005 10/25/19  0950  05/10/19  0825 05/10/19  0821  06/25/18  1434  12/22/17  1118   A1C 10.5*  --  11.1*   < >  --   --    < > 10.4*   < >  --    TSH  --   --  2.78  --   --  2.61   < > 2.46  --  1.70   T4  --   --   --   --   --   --   --  1.17  --  1.16   LDL  --   --  91  --   --  114*  --  93  --  115*   HDL  --   --  57  --   --   --   --  53  --  54   TRIG  --   --  254*  --   --   --   --  " 258*  --  161*   CR  --   --  0.72  --   --  0.81   < > 0.68   < > 0.76   MICROL  --  16  --   --  50  --    < >  --   --   --     < > = values in this interval not displayed.        Assessment:  1. Elderly woman with T2DM with still high A1c on lantus, Jardiance and metformin. Cannot increase Lantus furhter because of morning BGs in good range. Discussed diet. Option to start meal time insulin or GLP-1 analogue.  Discussed that her  license cannot be renewed with BGs this high.   Discussed diet, which could be improved. Pt reluctantly took book about diabetic diet, declines seeing a dietitian.  2. Feet doing well  3. Cardiovascular prevention  Albumiuria: on ARB, blood pressure, well controlled, started on  Jardiance which has shown to decrease progression of renal disease    Lipids: on Rosuvastatin small dose, cont     Plan:   - Lantus 40 units dialy  - metformin  - jardiance  Start Victoza, titrate to 1.8 mg as tolerated  - diabetic diet: 45gram of carbs with each meal, one time snack of less than 20 grams of carbs between meals      This was a 25 min visit of which more than 23 minutes were spent in counseling in regards to diagnosis, clinical consequences and treatment indications and options of blood glucose control    Rachel Morris MD  Endocrinology and Diabetes  [address]  [facility] [address]  Tel [phone]

## 2020-01-13 NOTE — NURSING NOTE
"Brandy Leon's goals for this visit include: Follow up Diabetes  She requests these members of her care team be copied on today's visit information: YES    PCP: Cailin Ponce    Referring Provider:  No referring provider defined for this encounter.    Ht 1.626 m (5' 4\")   Wt 81 kg (178 lb 9.2 oz)   BMI 30.65 kg/m      Do you need any medication refills at today's visit? NO    "

## 2020-01-14 DIAGNOSIS — H40.1132 PRIMARY OPEN ANGLE GLAUCOMA OF BOTH EYES, MODERATE STAGE: ICD-10-CM

## 2020-01-14 RX ORDER — LATANOPROST 50 UG/ML
1 SOLUTION/ DROPS OPHTHALMIC AT BEDTIME
Qty: 3 BOTTLE | Refills: 3 | Status: SHIPPED | OUTPATIENT
Start: 2020-01-14 | End: 2020-09-23

## 2020-01-24 ENCOUNTER — TELEPHONE (OUTPATIENT)
Dept: ENDOCRINOLOGY | Facility: CLINIC | Age: 80
End: 2020-01-24

## 2020-01-24 NOTE — TELEPHONE ENCOUNTER
Per 1/13/20 progress note from Dr. Morris:    Plan:   - Lantus 40 units dialy  - metformin  - jardiance  Start Victoza, titrate to 1.8 mg as tolerated  - diabetic diet: 45gram of carbs with each meal, one time snack of less than 20 grams of carbs between meals          Contacted patient to confirm current Victoza and Lantus dosing. Patient confirms that she is taking Victoza 1.2 mg daily and 40 units of Lantus at bedtime. Patient confirms that her blood sugar was 75 this morning and has been dropping each morning steadily since starting Victoza.        Will send to Dr. Morris to review for further recommendations. If Dr. Morris does not get back to writer before patient leaves for work at 12pm, patient is ok with nurse leaving detailed voicemail with instructions.         Princess Mace, RN  Endocrine Care Coordinator  [facility]

## 2020-01-24 NOTE — TELEPHONE ENCOUNTER
[facility] Call Center    Phone Message    May a detailed message be left on voicemail: yes    Reason for Call: Medication Question or concern regarding medication   Recently began taking Victoza and increasing dose. Now noticing blood sugars are coming up very low - took this morning and Blood Sugars at 75.    Patient leaving for work at 12 and wants a call back before then.      Action Taken: Message routed to:  Adult Clinics: Endocrinology p [phone]

## 2020-01-24 NOTE — TELEPHONE ENCOUNTER
Patient advised via voicemail per her request. Advised patient of on-call provider phone over the weekend and asked her to check in with clinic next week with update on blood sugars.         Princess Mace RN  Endocrine Care Coordinator  [facility]

## 2020-01-28 ENCOUNTER — TELEPHONE (OUTPATIENT)
Dept: PHARMACY | Facility: CLINIC | Age: 80
End: 2020-01-28

## 2020-01-28 NOTE — TELEPHONE ENCOUNTER
Patient called for MTM visit. No answer. Later patient called back. Patient is rescheduled for 2/5.    We briefly discussed her Lantus and Victoza.    Patient is currently taking Lantus 32 units daily (dose was decreased by Dr. Morris on 1/24), Victoza 1.2mg daily. Patient reports feeling sick the day after her increase of Victoza. She is supposed to increase to 1.8mg of Victoza but is afraid because she doesn't want to be sick.    Patient's blood sugars in the morning have been around 90mg/dL. Patient has not had any low blood sugars.     Recommend patient continue on 1.2mg of Victoza for another week and then we will see how she is feeling at our next visit. Did discuss if we can increase her dose of Victoza to 1.8mg we could minimize the amount of insulin she needs to take.    Will follow-up with patient on 2/5.  Mirna Perez, Pharm.D, BCACP  Medication Therapy Management Pharmacist

## 2020-02-04 ENCOUNTER — OFFICE VISIT (OUTPATIENT)
Dept: DERMATOLOGY | Facility: CLINIC | Age: 80
End: 2020-02-04
Payer: COMMERCIAL

## 2020-02-04 DIAGNOSIS — Z80.8 FAMILY HISTORY OF MELANOMA: ICD-10-CM

## 2020-02-04 DIAGNOSIS — L82.1 SK (SEBORRHEIC KERATOSIS): Primary | ICD-10-CM

## 2020-02-04 PROCEDURE — 99213 OFFICE O/P EST LOW 20 MIN: CPT | Performed by: DERMATOLOGY

## 2020-02-04 NOTE — PROGRESS NOTES
[facility] Dermatology Note      Dermatology Problem List:  1. Family history of melanoma, father  2. Actinic keratosis - resolved  -s/p cryotherapy  3. Seborrheic dermatitis, scalp  -s/p ketoconazole shampoo  -s/p Free & Clear Shampoo  -s/p hydroxyzine initiated 6/18/2013  -s/p Nicoral shampoo initiated 3/12/2013  -s/p fluocinonide solution initiated 3/12/2013    Encounter Date: Feb 4, 2020    CC:  Chief Complaint   Patient presents with     Derm Problem     skin check no spots of concern today          History of Present Illness:  This 79 year old female presents as a follow-up for spot check of actinic keratoses on the face. The patient was last seen 3/26/2019 when an AK on the left medial brow underwent cryotherapy. Today she reports a spot on the right cheek. She reports a spot on the buttocks has been there since she was a child. No fevers chills or night sweats. NO changes at home or work.     No other concerns.     Past Medical History:   Past Medical History:   Diagnosis Date     Actinic keratosis 3/12/2013     Degenerative joint disease      Diabetes (H)      Rheumatic fever 1957    x2 between the ages of 16-18     Seasonal allergies      Past Surgical History:   Procedure Laterality Date     C APPENDECTOMY       C ARTHROPLASTY TMJ       CATARACT IOL, RT/LT       COLONOSCOPY       COLONOSCOPY N/A 8/13/2015    Procedure: COLONOSCOPY;  Surgeon: Duane, William Charles, MD;  Location: [facility] OR     COLONOSCOPY WITH CO2 INSUFFLATION N/A 8/13/2015    Procedure: COLONOSCOPY WITH CO2 INSUFFLATION;  Surgeon: Duane, William Charles, MD;  Location: [facility] OR     PHACOEMULSIFICATION WITH STANDARD INTRAOCULAR LENS IMPLANT Left 10/25/2018    Procedure: LEFT PHACOEMULSIFICATION WITH STANDARD INTRAOCULAR LENS IMPLANT;  Surgeon: Thomas Serna MD;  Location: [facility] OR     PHACOEMULSIFICATION WITH STANDARD INTRAOCULAR LENS IMPLANT Right 11/8/2018    Procedure: RIGHT PHACOEMULSIFICATION WITH STANDARD  INTRAOCULAR LENS IMPLANT;  Surgeon: Thomas Serna MD;  Location: [facility] OR     THYROIDECTOMY          Social  History:  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       Family History:  The patient reports a family history of melanoma in her father.  unchanged    Medications:  Current Outpatient Medications   Medication Sig Dispense Refill     aspirin 81 MG EC tablet Take 1 tablet by mouth daily. 90 tablet 3     Aspirin-Diphenhydramine (VIJAY-SELTZER PM PO) Take 1 tablet by mouth daily as needed       Cranberry-Vitamin C-Vitamin E (CRANBERRY PLUS VITAMIN C) 4200-20-3 MG-MG-UNIT CAPS Take 1 capsule by mouth 2 times daily       DM-APAP-CPM (CORICIDIN HBP) -2 MG TABS Take 1 tablet by mouth daily as needed       empagliflozin (JARDIANCE) 10 MG TABS tablet Take 10 mg by mouth daily       fexofenadine (ALLEGRA) 180 MG tablet Take 180 mg by mouth daily       GLUCOSAMINE CHONDROITIN COMPLX OR 2 Daily       ibuprofen (ADVIL/MOTRIN) 200 MG capsule Take 600 mg by mouth 2 times daily       insulin glargine (LANTUS SOLOSTAR) 100 UNIT/ML pen Inject 35-50 Units Subcutaneous daily 25 mL 1     insulin pen needle (BD JUAN C U/F) 32G X 4 MM miscellaneous USE 1 DAILY AS DIRECTED. 100 each 3     irbesartan-hydrochlorothiazide (AVALIDE) 150-12.5 MG tablet TAKE 0.5 TABLET BY MOUTH DAILY 90 tablet 1     latanoprost (XALATAN) 0.005 % ophthalmic solution Place 1 drop into both eyes At Bedtime 3 Bottle 3     liraglutide (VICTOZA PEN) 18 MG/3ML solution Take 0.6 mg subcutaneous once daily x 7 days then, then 1.2 mg x 7 days then 1.8 mg dily 9 mL 0     metFORMIN (GLUCOPHAGE) 1000 MG tablet TAKE 1 TABLET (1,000 MG) BY MOUTH 2 TIMES DAILY (WITH MEALS) 180 tablet 3     MULTIVITAMIN OR 1 tablet daily       nystatin-triamcinolone (MYCOLOG II) 812632-9.1 UNIT/GM-% external cream Apply topically 2 times daily To feet and toenails. 60 g 2     Omega-3 Fatty Acids (OMEGA 3 PO) Take by mouth 2 times daily.        ONE TOUCH  DELICA LANCETS MISC 1 each 2 times daily. One Touch Delica   100 each 12     ONETOUCH ULTRA test strip USE TO TEST TWICE A  strip 4     order for DME Glucometer covered by insurance. 1 each 0     oxybutynin ER (DITROPAN-XL) 5 MG 24 hr tablet TAKE 2 TABLETS (10 MG) BY MOUTH DAILY 180 tablet 1     rosuvastatin (CRESTOR) 5 MG tablet TAKE 1 TABLET BY MOUTH TWICE WEEKLY 90 tablet 3     SYNTHROID 137 MCG tablet TAKE 1 TABLET (137 MCG) BY MOUTH DAILY 90 tablet 3     Allergies   Allergen Reactions     Estradiol Itching     Lipitor [Atorvastatin Calcium]      Weak and shaky     Sulfa Drugs      Rash       Zocor [Simvastatin]      Weak and shakey     Review of Systems:  -Const: Pt is ill - seeing primary care today.  reviewed  -Skin: As above in HPI. No additional skin concerns.    Physical exam:  There were no vitals taken for this visit.  GEN: This is a well developed, well-nourished female in no acute distress, in a pleasant mood.    SKIN: Total skin excluding the undergarment areas was performed. The exam included the head/face, neck, both arms, chest, back, abdomen, both legs, digits and/or nails.   -Scattered brown macules on sun exposed areas.  -No other lesions of concern on areas examined.     Impression/Plan:  1. Family history of melanoma    2. Actinic keratosis, left medial brow - resolved    3. Solar lentigines   -No further intervention needed at this time. Patient to report changes.    4. REcent fall, recommend PCP eval for walker or PT or cane. SHe does use her cane  Follow up in 1 year, earlier for new or changing lesions.       Staff Involved:  Scribe/Staff    Scribe Disclosure  I, Cas Choi, am serving as a scribe to document services personally performed by Dr. Lucita Jordan MD, based on data collection and the provider's statements to me.    Provider Disclosure:   The documentation recorded by the scribe accurately reflects the services I personally performed and the decisions made by  me.    Lucita Jordan MD    Department of Dermatology  [facility]: Phone: [phone], Fax:[phone]  [facility]: Phone: [phone], Fax: [phone]               Patient baseline mental status

## 2020-02-04 NOTE — LETTER
2/4/2020         RE: Brandy Leon  [address]  [place] [address]        Dear Colleague,    Thank you for referring your patient, Brandy Leon, to the [facility]. Please see a copy of my visit note below.    [facility] Dermatology Note      Dermatology Problem List:  1. Family history of melanoma, father  2. Actinic keratosis - resolved  -s/p cryotherapy  3. Seborrheic dermatitis, scalp  -s/p ketoconazole shampoo  -s/p Free & Clear Shampoo  -s/p hydroxyzine initiated 6/18/2013  -s/p Nicoral shampoo initiated 3/12/2013  -s/p fluocinonide solution initiated 3/12/2013    Encounter Date: Feb 4, 2020    CC:  Chief Complaint   Patient presents with     Derm Problem     skin check no spots of concern today          History of Present Illness:  This 79 year old female presents as a follow-up for spot check of actinic keratoses on the face. The patient was last seen 3/26/2019 when an AK on the left medial brow underwent cryotherapy. Today she reports a spot on the right cheek. She reports a spot on the buttocks has been there since she was a child. No fevers chills or night sweats. NO changes at home or work.     No other concerns.     Past Medical History:   Past Medical History:   Diagnosis Date     Actinic keratosis 3/12/2013     Degenerative joint disease      Diabetes (H)      Rheumatic fever 1957    x2 between the ages of 16-18     Seasonal allergies      Past Surgical History:   Procedure Laterality Date     C APPENDECTOMY       C ARTHROPLASTY TMJ       CATARACT IOL, RT/LT       COLONOSCOPY       COLONOSCOPY N/A 8/13/2015    Procedure: COLONOSCOPY;  Surgeon: Duane, William Charles, MD;  Location: [facility]     COLONOSCOPY WITH CO2 INSUFFLATION N/A 8/13/2015    Procedure: COLONOSCOPY WITH CO2 INSUFFLATION;  Surgeon: Duane, William Charles, MD;  [facility]: [facility]     PHACOEMULSIFICATION WITH STANDARD INTRAOCULAR LENS IMPLANT Left 10/25/2018    Procedure:  LEFT PHACOEMULSIFICATION WITH STANDARD INTRAOCULAR LENS IMPLANT;  Surgeon: Thomas Serna MD;  Location: [facility]     PHACOEMULSIFICATION WITH STANDARD INTRAOCULAR LENS IMPLANT Right 11/8/2018    Procedure: RIGHT PHACOEMULSIFICATION WITH STANDARD INTRAOCULAR LENS IMPLANT;  Surgeon: Thomas Serna MD;  Location: [facility]     THYROIDECTOMY          Social  History:  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       Family History:  The patient reports a family history of melanoma in her father.  unchanged    Medications:  Current Outpatient Medications   Medication Sig Dispense Refill     aspirin 81 MG EC tablet Take 1 tablet by mouth daily. 90 tablet 3     Aspirin-Diphenhydramine (VIJAY-SELTZER PM PO) Take 1 tablet by mouth daily as needed       Cranberry-Vitamin C-Vitamin E (CRANBERRY PLUS VITAMIN C) 4200-20-3 MG-MG-UNIT CAPS Take 1 capsule by mouth 2 times daily       DM-APAP-CPM (CORICIDIN HBP) -2 MG TABS Take 1 tablet by mouth daily as needed       empagliflozin (JARDIANCE) 10 MG TABS tablet Take 10 mg by mouth daily       fexofenadine (ALLEGRA) 180 MG tablet Take 180 mg by mouth daily       GLUCOSAMINE CHONDROITIN COMPLX OR 2 Daily       ibuprofen (ADVIL/MOTRIN) 200 MG capsule Take 600 mg by mouth 2 times daily       insulin glargine (LANTUS SOLOSTAR) 100 UNIT/ML pen Inject 35-50 Units Subcutaneous daily 25 mL 1     insulin pen needle (BD JUAN C U/F) 32G X 4 MM miscellaneous USE 1 DAILY AS DIRECTED. 100 each 3     irbesartan-hydrochlorothiazide (AVALIDE) 150-12.5 MG tablet TAKE 0.5 TABLET BY MOUTH DAILY 90 tablet 1     latanoprost (XALATAN) 0.005 % ophthalmic solution Place 1 drop into both eyes At Bedtime 3 Bottle 3     liraglutide (VICTOZA PEN) 18 MG/3ML solution Take 0.6 mg subcutaneous once daily x 7 days then, then 1.2 mg x 7 days then 1.8 mg dily 9 mL 0     metFORMIN (GLUCOPHAGE) 1000 MG tablet TAKE 1 TABLET (1,000 MG) BY MOUTH 2 TIMES DAILY (WITH MEALS) 180 tablet 3      MULTIVITAMIN OR 1 tablet daily       nystatin-triamcinolone (MYCOLOG II) 616288-7.1 UNIT/GM-% external cream Apply topically 2 times daily To feet and toenails. 60 g 2     Omega-3 Fatty Acids (OMEGA 3 PO) Take by mouth 2 times daily.        ONE TOUCH DELICA LANCETS MISC 1 each 2 times daily. One Touch Delica   100 each 12     ONETOUCH ULTRA test strip USE TO TEST TWICE A  strip 4     order for DME Glucometer covered by insurance. 1 each 0     oxybutynin ER (DITROPAN-XL) 5 MG 24 hr tablet TAKE 2 TABLETS (10 MG) BY MOUTH DAILY 180 tablet 1     rosuvastatin (CRESTOR) 5 MG tablet TAKE 1 TABLET BY MOUTH TWICE WEEKLY 90 tablet 3     SYNTHROID 137 MCG tablet TAKE 1 TABLET (137 MCG) BY MOUTH DAILY 90 tablet 3     Allergies   Allergen Reactions     Estradiol Itching     Lipitor [Atorvastatin Calcium]      Weak and shaky     Sulfa Drugs      Rash       Zocor [Simvastatin]      Weak and shakey     Review of Systems:  -Const: Pt is ill - seeing primary care today.  reviewed  -Skin: As above in HPI. No additional skin concerns.    Physical exam:  There were no vitals taken for this visit.  GEN: This is a well developed, well-nourished female in no acute distress, in a pleasant mood.    SKIN: Total skin excluding the undergarment areas was performed. The exam included the head/face, neck, both arms, chest, back, abdomen, both legs, digits and/or nails.   -Scattered brown macules on sun exposed areas.  -No other lesions of concern on areas examined.     Impression/Plan:  1. Family history of melanoma    2. Actinic keratosis, left medial brow - resolved    3. Solar lentigines   -No further intervention needed at this time. Patient to report changes.    4. REcent fall, recommend PCP eval for walker or PT or cane. SHe does use her cane  Follow up in 1 year, earlier for new or changing lesions.       Staff Involved:  Scribe/Staff    Scribe Disclosure  I, Cas Choi, am serving as a scribe to document services personally  performed by Dr. Lucita Jordan MD, based on data collection and the provider's statements to me.    Provider Disclosure:   The documentation recorded by the scribe accurately reflects the services I personally performed and the decisions made by me.    Lucita Jordan MD    Department of Dermatology  [facility]: Phone: [phone], Fax:[phone]  [facility]: Phone: [phone], Fax: [phone]                Again, thank you for allowing me to participate in the care of your patient.        Sincerely,        Lucita Jordan MD

## 2020-02-04 NOTE — NURSING NOTE
Brandy Leon's goals for this visit include:   Chief Complaint   Patient presents with     Derm Problem     skin check no spots of concern today        She requests these members of her care team be copied on today's visit information: yes    PCP: Cailin Ponce    Referring Provider:  No referring provider defined for this encounter.    There were no vitals taken for this visit.    Do you need any medication refills at today's visit? No     Amorrae JACOB Davila

## 2020-02-05 ENCOUNTER — ALLIED HEALTH/NURSE VISIT (OUTPATIENT)
Dept: PHARMACY | Facility: CLINIC | Age: 80
End: 2020-02-05
Payer: COMMERCIAL

## 2020-02-05 DIAGNOSIS — Z79.4 TYPE 2 DIABETES MELLITUS WITH HYPERGLYCEMIA, WITH LONG-TERM CURRENT USE OF INSULIN (H): ICD-10-CM

## 2020-02-05 DIAGNOSIS — I10 HYPERTENSION GOAL BP (BLOOD PRESSURE) < 140/90: Primary | ICD-10-CM

## 2020-02-05 DIAGNOSIS — J30.2 SEASONAL ALLERGIES: ICD-10-CM

## 2020-02-05 DIAGNOSIS — E11.65 TYPE 2 DIABETES MELLITUS WITH HYPERGLYCEMIA, WITH LONG-TERM CURRENT USE OF INSULIN (H): ICD-10-CM

## 2020-02-05 PROCEDURE — 99607 MTMS BY PHARM ADDL 15 MIN: CPT | Performed by: PHARMACIST

## 2020-02-05 PROCEDURE — 99605 MTMS BY PHARM NP 15 MIN: CPT | Performed by: PHARMACIST

## 2020-02-05 NOTE — Clinical Note
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  Would you consider increasing her Jardiance?Mirna Perez, Pharm.D, BCACPMedication Therapy Management Pharmacist

## 2020-02-05 NOTE — PATIENT INSTRUCTIONS
Recommendations from today's MTM visit:                                                      1. Removed Victoza from medication list. No other medication changes made today.   2. Recommended buying plain Hayley [place] in the future to avoid unnecessary adverse side effects from diphenhydramine in the PM version.   3. Will route/follow-up with Dr. Morris from Endocrinology about failing Victoza and suggesting a possible dose increase of the Jardiance in the future.     It was great to speak with you today.  I value your experience and would be very thankful for your time with providing feedback on our clinic survey. You may receive a survey via email or text message in the next few days.     Next MTM visit: After hearing back from Dr. Morris    To schedule another MTM appointment, please call the clinic directly or you may call the MTM scheduling line at [phone] or toll-free at [phone].     My Clinical Pharmacist's contact information:                                                      It was a pleasure talking with you today!  Please feel free to contact me with any questions or concerns you have.      Mirna Perez, Pharm.D, BCACP  Medication Therapy Management Pharmacist

## 2020-02-05 NOTE — PROGRESS NOTES
SUBJECTIVE/OBJECTIVE:                           Brandy Leon is a 79 year old female called for initial visit for Medication Therapy Management 2020.  She was referred to me from Cailin Ponce.    Chief Complaint: Follow up from 12/3/19. Plan to check in on blood sugars and new start of  Victoza, which was making her sick. Patient had influenza last week and spent the last 4 days in bed.     Allergies/ADRs: Reviewed in Epic  Tobacco:  reports that she has never smoked. She has never used smokeless tobacco.  Alcohol: none  Caffeine: 2 cups/day of coffee  Activity: None  PMH: Reviewed in Epic    Diabetes:  Patient is currently taking metformin 1000mg twice daily, Lantus 32 units daily, Jardiance 10mg daily. Patientt is not experiencing side effects; she has not had any urinary tract infections or yeast infections. On 1/13/20 Victoza was started and she reports she began throwing up shortly following initiation. Vomiting and nausea intermittently continued while taking Victoza, but was even worse last week when she had influenza. She stopped taking Victoza 1/29/2020.  SMBG: one-two times daily. Ranges (patient reported): 90-132mg/dL fasting in the am. Patient has not been checking blood sugars later in the day, but typically runs about 200 after lunch.   Patient is not experiencing hypoglycemia  Recent symptoms of high blood sugar? none  Eye exam: up to date  Foot exam: up to date  ACEi/ARB: Yes: irbesartan/HCTZ.   Urine Albumin:   Lab Results   Component Value Date    UMALCR 20.15 10/25/2019      Aspirin: Taking 81mg daily and denies side effects. Noncompliance intermittently.   Diet: Breakfast-yogurt vanilla with blueberries, 2 pieces of toast with cheese and whole milk in coffee, sometimes doesn't eat lunch might have crackers and cheese, banana or orange. Supper: her son does the cooking. Last night was 1 softshell taco. Doesn't snack much during the day.    Lab Results   Component Value Date    A1C  10.5 01/10/2020    A1C 11.1 10/25/2019    A1C 10.2 07/08/2019    A1C 12.6 03/29/2019    A1C 11.1 10/19/2018     Hypertension: Patient is currently taking Avalide 150/12.5mg 1/2 tablet daily. She monitors her blood pressure at home and reports her systolic ranges from 98-130mmHg. Her morning systolic blood pressure today was 132mmHg. No side effects reported.     Nausea: Patient sometimes will take Hayley [place] PM. Ran out at home and needs to get more.     Seasonal Allergies: Patient will take fexofenadine when she is sneezing and for cold-like symptoms. She does not report a certain season when needed more often.     Today's Vitals: There were no vitals taken for this visit.-phone visit    ASSESSMENT:                            Medication Adherence: Fair.     Diabetes: Needs improvement. Patient is not meeting A1c goal of <8%. Patient would benefit from increasing Lantus or maximizing the dose of Jardiance. Patient does not want to restart Victoza given intolerability.     Hypertension: Stable.     Nausea: Needs improvement. The Hayley [place] PM contains diphenhydramine. She would benefit from avoiding diphenhydramine given potential side effects (I.e. falling, confusion, etc.).      Seasonal Allergies: Stable.     PLAN:                          1. Removed Victoza from medication list. No other medication changes made today.   2. Recommended buying plain Hayley [place] in the future to avoid unnecessary adverse side effects from diphenhydramine in the PM version.   3. Will route/follow-up with Dr. Morris from Endocrinology about failing Victoza and suggesting a possible dose increase of the Jardiance in the future.     I spent 30 minutes with this patient today. All changes were made via collaborative practice agreement with Cailin Ponce. A copy of the visit note was provided to the patient's primary care provider.    Will follow up after visit with endocrinology.    The patient was given a summary of these  recommendations as an after visit summary.     Mer Lara, Pharmacy IV Student  Mirna Perez, Pharm.D, BCACP  Medication Therapy Management Pharmacist

## 2020-02-07 ENCOUNTER — OFFICE VISIT (OUTPATIENT)
Dept: PEDIATRICS | Facility: CLINIC | Age: 80
End: 2020-02-07
Payer: COMMERCIAL

## 2020-02-07 VITALS
SYSTOLIC BLOOD PRESSURE: 100 MMHG | WEIGHT: 175.5 LBS | BODY MASS INDEX: 30.12 KG/M2 | HEART RATE: 70 BPM | DIASTOLIC BLOOD PRESSURE: 50 MMHG | OXYGEN SATURATION: 98 % | TEMPERATURE: 97.6 F

## 2020-02-07 DIAGNOSIS — G91.2 NPH (NORMAL PRESSURE HYDROCEPHALUS) (H): ICD-10-CM

## 2020-02-07 DIAGNOSIS — E11.65 TYPE 2 DIABETES MELLITUS WITH HYPERGLYCEMIA, WITH LONG-TERM CURRENT USE OF INSULIN (H): Primary | Chronic | ICD-10-CM

## 2020-02-07 DIAGNOSIS — E03.9 HYPOTHYROIDISM, UNSPECIFIED TYPE: Chronic | ICD-10-CM

## 2020-02-07 DIAGNOSIS — R90.89 ABNORMAL FINDING ON MRI OF BRAIN: ICD-10-CM

## 2020-02-07 DIAGNOSIS — I10 HYPERTENSION GOAL BP (BLOOD PRESSURE) < 140/90: Chronic | ICD-10-CM

## 2020-02-07 DIAGNOSIS — R26.89 BALANCE PROBLEMS: ICD-10-CM

## 2020-02-07 DIAGNOSIS — Z79.4 TYPE 2 DIABETES MELLITUS WITH HYPERGLYCEMIA, WITH LONG-TERM CURRENT USE OF INSULIN (H): Primary | Chronic | ICD-10-CM

## 2020-02-07 LAB
ANION GAP SERPL CALCULATED.3IONS-SCNC: 4 MMOL/L (ref 3–14)
BUN SERPL-MCNC: 15 MG/DL (ref 7–30)
CALCIUM SERPL-MCNC: 9.9 MG/DL (ref 8.5–10.1)
CHLORIDE SERPL-SCNC: 100 MMOL/L (ref 94–109)
CO2 SERPL-SCNC: 31 MMOL/L (ref 20–32)
CREAT SERPL-MCNC: 0.82 MG/DL (ref 0.52–1.04)
ERYTHROCYTE [DISTWIDTH] IN BLOOD BY AUTOMATED COUNT: 13.3 % (ref 10–15)
GFR SERPL CREATININE-BSD FRML MDRD: 68 ML/MIN/{1.73_M2}
GLUCOSE SERPL-MCNC: 164 MG/DL (ref 70–99)
HCT VFR BLD AUTO: 47.3 % (ref 35–47)
HGB BLD-MCNC: 15.4 G/DL (ref 11.7–15.7)
MCH RBC QN AUTO: 29.3 PG (ref 26.5–33)
MCHC RBC AUTO-ENTMCNC: 32.6 G/DL (ref 31.5–36.5)
MCV RBC AUTO: 90 FL (ref 78–100)
PLATELET # BLD AUTO: 317 10E9/L (ref 150–450)
POTASSIUM SERPL-SCNC: 4.6 MMOL/L (ref 3.4–5.3)
RBC # BLD AUTO: 5.26 10E12/L (ref 3.8–5.2)
SODIUM SERPL-SCNC: 135 MMOL/L (ref 133–144)
WBC # BLD AUTO: 10.9 10E9/L (ref 4–11)

## 2020-02-07 PROCEDURE — 85027 COMPLETE CBC AUTOMATED: CPT | Performed by: NURSE PRACTITIONER

## 2020-02-07 PROCEDURE — 36415 COLL VENOUS BLD VENIPUNCTURE: CPT | Performed by: NURSE PRACTITIONER

## 2020-02-07 PROCEDURE — 99214 OFFICE O/P EST MOD 30 MIN: CPT | Performed by: NURSE PRACTITIONER

## 2020-02-07 PROCEDURE — 80048 BASIC METABOLIC PNL TOTAL CA: CPT | Performed by: NURSE PRACTITIONER

## 2020-02-07 NOTE — PATIENT INSTRUCTIONS
PLAN:   1.   Symptomatic therapy suggested: Continue current medication regimen unchanged.  2.  Orders Placed This Encounter   Procedures     MR Brain w/o Contrast     Basic metabolic panel     CBC with platelets       3. Patient needs to follow up in if no improvement,or sooner if worsening of symptoms or other symptoms develop.  FURTHER TESTING:       - MRI brain   CONSULTATION/REFERRAL to urology   Referral to physical therapy    Will follow up and/or notify patient of  results via My Chart to determine further need for followup

## 2020-02-07 NOTE — PROGRESS NOTES
Subjective     Brandy Leon is a 79 year old female who presents to clinic today for the following health issues:  Brandy Leon is accompanied today by daughter      HPI     1. Patient has had 2-3 falls within in the last year. Last one was 11/2019. Feels shaky when she stands up.  Before thanksgiving was walking and the was with daughter and had her cane  Then all of a sudden starting feeling like was going down a hill but was a flat surface  No chest pain   No shortness of breath   No heart palpitations   Had been walking around for 4 hours prior to the incident   Also had a spell where tripped     2.  Handicap sticker    3. Daughter would like to discuss urinary incontinence   Has a referral to urology   Is taking the Detrol     Diabetes Follow-up    How often are you checking your blood sugar? One time daily  What time of day are you checking your blood sugars (select all that apply)?  Before meals  Have you had any blood sugars above 200?  Yes only in the afternoons   Have you had any blood sugars below 70?  No    What symptoms do you notice when your blood sugar is low?  None    What concerns do you have today about your diabetes? Other: feeling fatigue     Do you have any of these symptoms? (Select all that apply)  No numbness or tingling in feet.  No redness, sores or blisters on feet.  No complaints of excessive thirst.  No reports of blurry vision.  No significant changes to weight.  Off victoza and on Jardiance at this time             Hyperlipidemia Follow-Up      Are you regularly taking any medication or supplement to lower your cholesterol?   Yes- daily    Are you having muscle aches or other side effects that you think could be caused by your cholesterol lowering medication?  Yes- had bodyaches last week    Hypertension Follow-up      Do you check your blood pressure regularly outside of the clinic? Yes     Are you following a low salt diet? No    Are your blood pressures ever more than  140 on the top number (systolic) OR more   than 90 on the bottom number (diastolic), for example 140/90? Yes    BP Readings from Last 2 Encounters:   02/07/20 100/50   01/13/20 132/65     Hemoglobin A1C (%)   Date Value   01/10/2020 10.5 (H)   10/25/2019 11.1 (H)     LDL Cholesterol Calculated (mg/dL)   Date Value   10/25/2019 91   06/25/2018 93     LDL Cholesterol Direct (mg/dL)   Date Value   05/10/2019 114 (H)         How many servings of fruits and vegetables do you eat daily?  2-3    On average, how many sweetened beverages do you drink each day (Examples: soda, juice, sweet tea, etc.  Do NOT count diet or artificially sweetened beverages)?   0    How many days per week do you exercise enough to make your heart beat faster? 3 or less    How many minutes a day do you exercise enough to make your heart beat faster? 9 or less    How many days per week do you miss taking your medication? 0      Patient Active Problem List   Diagnosis     Seasonal allergies     Hypothyroidism     Hyperlipidemia LDL goal <100     Fibromyalgia     Osteoarthritis     Advanced directives, counseling/discussion     Obesity     Type 2 diabetes mellitus with hyperglycemia, with long-term current use of insulin (H)     Hypertension goal BP (blood pressure) < 140/90     Overactive bladder     Recurrent UTI     Pseudophakia of both eyes     DINORA (obstructive sleep apnea)- severe (AHI 56)     Past Surgical History:   Procedure Laterality Date     C APPENDECTOMY       C ARTHROPLASTY TMJ       CATARACT IOL, RT/LT       COLONOSCOPY       COLONOSCOPY N/A 8/13/2015    Procedure: COLONOSCOPY;  Surgeon: Duane, William Charles, MD;  Location: [facility]     COLONOSCOPY WITH CO2 INSUFFLATION N/A 8/13/2015    Procedure: COLONOSCOPY WITH CO2 INSUFFLATION;  Surgeon: Duane, William Charles, MD;  Location: [facility]     PHACOEMULSIFICATION WITH STANDARD INTRAOCULAR LENS IMPLANT Left 10/25/2018    Procedure: LEFT PHACOEMULSIFICATION WITH STANDARD INTRAOCULAR LENS  IMPLANT;  Surgeon: Thomas Serna MD;  Location: [facility]     PHACOEMULSIFICATION WITH STANDARD INTRAOCULAR LENS IMPLANT Right 11/8/2018    Procedure: RIGHT PHACOEMULSIFICATION WITH STANDARD INTRAOCULAR LENS IMPLANT;  Surgeon: Thomas Serna MD;  Location: [facility]     THYROIDECTOMY          Social History     Tobacco Use     Smoking status: Never Smoker     Smokeless tobacco: Never Used     Tobacco comment: has had exposure to second hand smoke in the past from [place], no current exposure   Substance Use Topics     Alcohol use: No     Family History   Problem Relation Age of Onset     Cancer Father         skin and leukemia     Cerebrovascular Disease Maternal Grandfather      Cerebrovascular Disease Paternal Grandmother      Eye Disorder Paternal Grandmother         cataracts     Cerebrovascular Disease Paternal Grandfather      Heart Disease Paternal Grandfather      Cancer Sister         Breast Cancer     Eye Disorder Mother         cataracts     Eye Disorder Brother         cataract     Breast Cancer Daughter      Other Cancer Daughter         ovarian cancer     Glaucoma No family hx of      Macular Degeneration No family hx of          Current Outpatient Medications   Medication Sig Dispense Refill     aspirin 81 MG EC tablet Take 1 tablet by mouth daily. 90 tablet 3     Cranberry-Vitamin C-Vitamin E (CRANBERRY PLUS VITAMIN C) 4200-20-3 MG-MG-UNIT CAPS Take 1 capsule by mouth 2 times daily       DM-APAP-CPM (CORICIDIN HBP) -2 MG TABS Take 1 tablet by mouth daily as needed       empagliflozin (JARDIANCE) 10 MG TABS tablet Take 10 mg by mouth daily       fexofenadine (ALLEGRA) 180 MG tablet Take 180 mg by mouth daily       ibuprofen (ADVIL/MOTRIN) 200 MG capsule Take 600 mg by mouth every 4 hours as needed        insulin glargine (LANTUS SOLOSTAR) 100 UNIT/ML pen Inject 32 Units Subcutaneous daily       insulin pen needle (BD JUAN C U/F) 32G X 4 MM miscellaneous USE 1 DAILY AS DIRECTED. 100  each 3     irbesartan-hydrochlorothiazide (AVALIDE) 150-12.5 MG tablet TAKE 0.5 TABLET BY MOUTH DAILY 90 tablet 1     latanoprost (XALATAN) 0.005 % ophthalmic solution Place 1 drop into both eyes At Bedtime 3 Bottle 3     metFORMIN (GLUCOPHAGE) 1000 MG tablet TAKE 1 TABLET (1,000 MG) BY MOUTH 2 TIMES DAILY (WITH MEALS) 180 tablet 3     nystatin-triamcinolone (MYCOLOG II) 169869-4.1 UNIT/GM-% external cream Apply topically 2 times daily To feet and toenails. 60 g 2     Omega-3 Fatty Acids (OMEGA 3 PO) Take by mouth 2 times daily.        ONE TOUCH DELICA LANCETS MISC 1 each 2 times daily. One Touch Delica   100 each 12     ONETOUCH ULTRA test strip USE TO TEST TWICE A  strip 4     order for DME Glucometer covered by insurance. 1 each 0     oxybutynin ER (DITROPAN-XL) 5 MG 24 hr tablet TAKE 2 TABLETS (10 MG) BY MOUTH DAILY 180 tablet 1     rosuvastatin (CRESTOR) 5 MG tablet TAKE 1 TABLET BY MOUTH TWICE WEEKLY 90 tablet 3     SYNTHROID 137 MCG tablet TAKE 1 TABLET (137 MCG) BY MOUTH DAILY 90 tablet 3     GLUCOSAMINE CHONDROITIN COMPLX OR 2 Daily       MULTIVITAMIN OR 1 tablet daily       Allergies   Allergen Reactions     Estradiol Itching     Lipitor [Atorvastatin Calcium]      Weak and shaky     Sulfa Drugs      Rash       Zocor [Simvastatin]      Weak and shakey     Recent Labs   Lab Test 01/10/20  0956 10/25/19  0950 07/08/19  0935 05/10/19  0821  06/25/18  1434  12/22/17  1118   A1C 10.5* 11.1* 10.2*  --    < > 10.4*   < >  --    LDL  --  91  --  114*  --  93  --  115*   HDL  --  57  --   --   --  53  --  54   TRIG  --  254*  --   --   --  258*  --  161*   ALT  --  25  --  24  --  27  --  18   CR  --  0.72  --  0.81   < > 0.68   < > 0.76   GFRESTIMATED  --  80  --  70   < > 84   < > 74   GFRESTBLACK  --  >90  --  81   < > >90   < > 90   POTASSIUM  --  4.4  --  4.0   < > 4.3   < > 4.4   TSH  --  2.78  --  2.61   < > 2.46  --  1.70    < > = values in this interval not displayed.      BP Readings from Last 3  Encounters:   02/07/20 100/50   01/13/20 132/65   11/20/19 107/69    Wt Readings from Last 3 Encounters:   02/07/20 79.6 kg (175 lb 8 oz)   01/13/20 81 kg (178 lb 9.2 oz)   11/20/19 84.4 kg (186 lb)           Reviewed and updated as needed this visit by Provider         Review of Systems   ROS COMP: CONSTITUTIONAL:NEGATIVE for fever, chills, change in weight  ENT/MOUTH: NEGATIVE for ear, mouth and throat problems  RESP:NEGATIVE for significant cough or SOB  CV: NEGATIVE for chest pain, palpitations or peripheral edema  GI: NEGATIVE for nausea, abdominal pain, heartburn, or change in bowel habits  : POSITIVE for incontinence,urge and incontinence,stress and NEGATIVE for dysuria, frequency and hematuria  MUSCULOSKELETAL: POSITIVE  for arthralgias mild  and NEGATIVE for joint swelling  and joint warmth   NEURO: POSITIVE for Balance issues  and NEGATIVE for HX CVA, HX TIA, HX seizure D/O, involuntary movements, loss of consciousness, memory problems and syncope  has some balance issues with just standing and has to take her time getting up   ENDOCRINE: NEGATIVE for temperature intolerance, skin/hair changes and POSITIVE  for HX diabetes  PSYCHIATRIC: NEGATIVE for changes in mood or affect      Objective    /50 (BP Location: Right arm, Patient Position: Sitting, Cuff Size: Adult Regular)   Pulse 70   Temp 97.6  F (36.4  C) (Temporal)   Wt 79.6 kg (175 lb 8 oz)   SpO2 98%   Breastfeeding No   BMI 30.12 kg/m    Body mass index is 30.12 kg/m .  Physical Exam   GENERAL: Patient is well nourished, well developed,in no apparent distress, non-toxic, in no respiratory distress and acyanotic, alert, cooperative and well hydrated  EYES: Eyes grossly normal to inspection, PERRL, conjunctivae and sclerae normal and lids and lashes normal  HENT:ear canals and TM's normal and nose and mouth without ulcers or lesions   NECK:normal, supple and no adenopathy  CARDIAC:regular rates and rhythm, no murmur, click or rub and no  irregular beats  without LE edema bilaterally  RESP: normal respiratory rate and rhythm, lungs clear to auscultation  unlabored respirations, no intercostal retractions or accessory muscle use  ABD:soft, nontender  SKIN: Skin color, texture, turgor normal. No rashes or lesions.  MS: extremities normal- no gross deformities noted, gait normal and normal muscle tone  NEURO: Neurological exam reveals   negative findings: R handed, speech normal, mental status intact, Romberg negative, muscle tone normal, reflexes normal and symmetric     Coordination:  Finger/Nose:  Right:  normal  Left:  normal  Rapid Alternating Movements:  Right:  normal  Left:  normal  Gait:  Casual:  normal  Romberg:  Normal   Had difficulty with heel to toe      PSYCH: Alert, oriented, thought content appropriate,mentation appears normal., affect and mood normal    Diagnostic Test Results:  Labs reviewed in Epic  Results for orders placed or performed in visit on 02/07/20   Basic metabolic panel     Status: Abnormal   Result Value Ref Range    Sodium 135 133 - 144 mmol/L    Potassium 4.6 3.4 - 5.3 mmol/L    Chloride 100 94 - 109 mmol/L    Carbon Dioxide 31 20 - 32 mmol/L    Anion Gap 4 3 - 14 mmol/L    Glucose 164 (H) 70 - 99 mg/dL    Urea Nitrogen 15 7 - 30 mg/dL    Creatinine 0.82 0.52 - 1.04 mg/dL    GFR Estimate 68 >60 mL/min/[1.73_m2]    GFR Estimate If Black 79 >60 mL/min/[1.73_m2]    Calcium 9.9 8.5 - 10.1 mg/dL   CBC with platelets     Status: Abnormal   Result Value Ref Range    WBC 10.9 4.0 - 11.0 10e9/L    RBC Count 5.26 (H) 3.8 - 5.2 10e12/L    Hemoglobin 15.4 11.7 - 15.7 g/dL    Hematocrit 47.3 (H) 35.0 - 47.0 %    MCV 90 78 - 100 fl    MCH 29.3 26.5 - 33.0 pg    MCHC 32.6 31.5 - 36.5 g/dL    RDW 13.3 10.0 - 15.0 %    Platelet Count 317 150 - 450 10e9/L     Recent Results (from the past 744 hour(s))   MR Brain w/o Contrast    Narrative    MR BRAIN W/O CONTRAST 2/17/2020 8:30 AM    Provided History: Balance problems  ICD-10: Balance  problems    Comparison:  None     Technique: Sagittal T1-weighted and axial T2-weighted, turboFLAIR and  diffusion-weighted with ADC map images of the brain were obtained  without intravenous contrast.    Findings:     There is advanced parenchymal volume loss most pronounced in bilateral  frontal lobes and to lesser extent in bilateral medial temporal lobes.  There is also mild cerebellar volume loss. Mild chronic microvascular  ischemic changes involving the left parietotemporal periventricular  white matter. There is no evidence of intracranial hemorrhage, mass  effect or shift. No evidence of acute infarction.    There is moderate ventriculomegaly which could partly be due to  parenchymal volume loss however a component of communicating  hydrocephalus (normal pressure hydrocephalus) cannot be excluded.       Impression    IMPRESSION:   Severe cerebral volume loss most prominently involving frontal lobes  and to lesser extent bilateral medial temporal lobes.  Moderate ventriculomegaly, possibly partly related to parenchymal  volume loss however a component of communicating hydrocephalus (normal  pressure hydrocephalus) is suspected. Please clinically correlate.    I have personally reviewed the examination and initial interpretation  and I agree with the findings.    IRA MICHAELS MD             Assessment & Plan     Brandy was seen today for diabetes and fall.    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      See Patient Instructions  Patient Instructions     PLAN:   1.   Symptomatic therapy suggested: Continue current medication regimen unchanged.  2.  Orders Placed This Encounter   Procedures     MR Brain w/o Contrast     Basic metabolic panel     CBC with platelets       3. Patient needs to follow up in if no improvement,or sooner if worsening of symptoms or other symptoms develop.  FURTHER TESTING:       - MRI brain   CONSULTATION/REFERRAL to urology   Referral to physical therapy    Will follow up and/or notify patient of  results via My Chart to determine further need for followup      No follow-ups on file.    MAGO Baker CNP  [facility]

## 2020-02-07 NOTE — RESULT ENCOUNTER NOTE
Arnol Leon,    Attached are your test results.  -Normal red blood cell (hgb) levels, normal white blood cell count and normal platelet levels.  -Kidney function (GFR) is normal.  -Sodium is normal.  -Potassium is normal.  -Calcium is normal.  -Glucose is elevated due to your diabetes.   Please contact us if you have any questions.    Cailin Ponce, CNP

## 2020-02-07 NOTE — NURSING NOTE
"Chief Complaint   Patient presents with     Diabetes     side effects to [facility]     last fall was in 11/2019       Initial /50 (BP Location: Right arm, Patient Position: Sitting, Cuff Size: Adult Regular)   Pulse 70   Temp 97.6  F (36.4  C) (Temporal)   Wt 79.6 kg (175 lb 8 oz)   SpO2 98%   Breastfeeding No   BMI 30.12 kg/m   Estimated body mass index is 30.12 kg/m  as calculated from the following:    Height as of 1/13/20: 1.626 m (5' 4\").    Weight as of this encounter: 79.6 kg (175 lb 8 oz).  Medication Reconciliation: complete      KATINA Tucker      "

## 2020-02-11 DIAGNOSIS — Z79.4 TYPE 2 DIABETES MELLITUS WITH HYPERGLYCEMIA, WITH LONG-TERM CURRENT USE OF INSULIN (H): ICD-10-CM

## 2020-02-11 DIAGNOSIS — E11.65 TYPE 2 DIABETES MELLITUS WITH HYPERGLYCEMIA, WITH LONG-TERM CURRENT USE OF INSULIN (H): ICD-10-CM

## 2020-02-12 RX ORDER — LIRAGLUTIDE 6 MG/ML
INJECTION SUBCUTANEOUS
Qty: 9 ML | Refills: 0 | OUTPATIENT
Start: 2020-02-12

## 2020-02-12 NOTE — TELEPHONE ENCOUNTER
liraglutide (VICTOZA PEN) 18 MG/3ML solution      Last Written Prescription Date:  Not on current med list    Discontinued Stopped by Patient Mirna Perez, [facility] 2/5/20 0918   *[facility] clinic note  3. Will route/follow-up with Dr. Morris from Endocrinology about failing Victoza and suggesting a possible dose increase of the Jardiance in the future.       Last Office Visit : 1-13-20  Future Office visit:  4-24-19    Routing refill request to provider for review/approval because:  Drug not active on patient's medication list,   Confirm/agree  [facility] plan-

## 2020-02-17 ENCOUNTER — ANCILLARY PROCEDURE (OUTPATIENT)
Dept: MRI IMAGING | Facility: CLINIC | Age: 80
End: 2020-02-17
Attending: NURSE PRACTITIONER
Payer: COMMERCIAL

## 2020-02-17 DIAGNOSIS — R26.89 BALANCE PROBLEMS: ICD-10-CM

## 2020-02-17 PROCEDURE — 70551 MRI BRAIN STEM W/O DYE: CPT | Performed by: RADIOLOGY

## 2020-02-19 ENCOUNTER — OFFICE VISIT (OUTPATIENT)
Dept: PODIATRY | Facility: CLINIC | Age: 80
End: 2020-02-19
Payer: COMMERCIAL

## 2020-02-19 ENCOUNTER — ANCILLARY PROCEDURE (OUTPATIENT)
Dept: ULTRASOUND IMAGING | Facility: CLINIC | Age: 80
End: 2020-02-19
Attending: PODIATRIST
Payer: COMMERCIAL

## 2020-02-19 VITALS — OXYGEN SATURATION: 96 % | HEART RATE: 96 BPM | DIASTOLIC BLOOD PRESSURE: 74 MMHG | SYSTOLIC BLOOD PRESSURE: 138 MMHG

## 2020-02-19 DIAGNOSIS — E11.49 TYPE II OR UNSPECIFIED TYPE DIABETES MELLITUS WITH NEUROLOGICAL MANIFESTATIONS, NOT STATED AS UNCONTROLLED(250.60) (H): ICD-10-CM

## 2020-02-19 DIAGNOSIS — E11.59 TYPE 2 DIABETES MELLITUS WITH OTHER CIRCULATORY COMPLICATIONS (H): ICD-10-CM

## 2020-02-19 DIAGNOSIS — E11.51 DIABETES MELLITUS WITH PERIPHERAL VASCULAR DISEASE (H): ICD-10-CM

## 2020-02-19 DIAGNOSIS — B35.1 ONYCHOMYCOSIS: Primary | ICD-10-CM

## 2020-02-19 PROCEDURE — 99213 OFFICE O/P EST LOW 20 MIN: CPT | Performed by: PODIATRIST

## 2020-02-19 PROCEDURE — 93922 UPR/L XTREMITY ART 2 LEVELS: CPT | Performed by: RADIOLOGY

## 2020-02-19 NOTE — PROGRESS NOTES
Past Medical History:   Diagnosis Date     Actinic keratosis 3/12/2013     Degenerative joint disease      Diabetes (H)      Rheumatic fever 1957    x2 between the ages of 15-18     Seasonal allergies      Patient Active Problem List   Diagnosis     Seasonal allergies     Hypothyroidism     Hyperlipidemia LDL goal <100     Fibromyalgia     Osteoarthritis     Advanced directives, counseling/discussion     Obesity     Type 2 diabetes mellitus with hyperglycemia, with long-term current use of insulin (H)     Hypertension goal BP (blood pressure) < 140/90     Overactive bladder     Recurrent UTI     Pseudophakia of both eyes     DINORA (obstructive sleep apnea)- severe (AHI 56)     Past Surgical History:   Procedure Laterality Date     C APPENDECTOMY       C ARTHROPLASTY TMJ       CATARACT IOL, RT/LT       COLONOSCOPY       COLONOSCOPY N/A 8/13/2015    Procedure: COLONOSCOPY;  Surgeon: Duane, William Charles, MD;  Location: [facility]     COLONOSCOPY WITH CO2 INSUFFLATION N/A 8/13/2015    Procedure: COLONOSCOPY WITH CO2 INSUFFLATION;  Surgeon: Duane, William Charles, MD;  Location: [facility]     PHACOEMULSIFICATION WITH STANDARD INTRAOCULAR LENS IMPLANT Left 10/25/2018    Procedure: LEFT PHACOEMULSIFICATION WITH STANDARD INTRAOCULAR LENS IMPLANT;  Surgeon: Thomas Serna MD;  Location: [facility]     PHACOEMULSIFICATION WITH STANDARD INTRAOCULAR LENS IMPLANT Right 11/8/2018    Procedure: RIGHT PHACOEMULSIFICATION WITH STANDARD INTRAOCULAR LENS IMPLANT;  Surgeon: Thomas Serna MD;  Location: [facility]     THYROIDECTOMY        Social History     Socioeconomic History     Marital status:      Spouse name: Not on file     Number of children: Not on file     Years of education: Not on file     Highest education level: Not on file   Occupational History     Occupation:    Social Needs     Financial resource strain: Not on file     Food insecurity:     Worry: Not on file     Inability: Not on file      Transportation needs:     Medical: Not on file     Non-medical: Not on file   Tobacco Use     Smoking status: Never Smoker     Smokeless tobacco: Never Used     Tobacco comment: has had exposure to second hand smoke in the past from [employer], no current exposure   Substance and Sexual Activity     Alcohol use: No     Drug use: No     Sexual activity: Never   Lifestyle     Physical activity:     Days per week: Not on file     Minutes per session: Not on file     Stress: Not on file   Relationships     Social connections:     Talks on phone: Not on file     Gets together: Not on file     Attends Scientologist service: Not on file     Active member of club or organization: Not on file     Attends meetings of clubs or organizations: Not on file     Relationship status: Not on file     Intimate partner violence:     Fear of current or ex partner: Not on file     Emotionally abused: Not on file     Physically abused: Not on file     Forced sexual activity: Not on file   Other Topics Concern     Parent/sibling w/ CABG, MI or angioplasty before 65F 55M? 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     Hobby Hazards No     Sleep Concern Yes     Comment: difficulty staying asleep, up and down all night     Stress Concern No     Weight Concern Yes     Comment: would like to lose     Special Diet Yes     Comment: low card, low sugar diet     Back Care Yes     Comment: chiropratior     Exercise Yes     Comment: almost everyday     Bike Helmet No     Comment: does not ride bicycle any more     Seat Belt Yes     Self-Exams Yes   Social History Narrative     Not on file     Family History   Problem Relation Age of Onset     Cancer Father         skin and leukemia     Cerebrovascular Disease Maternal Grandfather      Cerebrovascular Disease Paternal  Grandmother      Eye Disorder Paternal Grandmother         cataracts     Cerebrovascular Disease Paternal Grandfather      Heart Disease Paternal Grandfather      Cancer Sister         Breast Cancer     Eye Disorder Mother         cataracts     Eye Disorder Brother         cataract     Breast Cancer Daughter      Other Cancer Daughter         ovarian cancer     Glaucoma No family hx of      Macular Degeneration No family hx of      Lab Results   Component Value Date    A1C 10.5 01/10/2020    A1C 11.1 10/25/2019    A1C 10.2 07/08/2019    A1C 12.6 03/29/2019    A1C 11.1 10/19/2018     Lab Results   Component Value Date    WBC 10.9 02/07/2020     Lab Results   Component Value Date    RBC 5.26 02/07/2020     Lab Results   Component Value Date    HGB 15.4 02/07/2020     Lab Results   Component Value Date    HCT 47.3 02/07/2020     No components found for: MCT  Lab Results   Component Value Date    MCV 90 02/07/2020     Lab Results   Component Value Date    MCH 29.3 02/07/2020     Lab Results   Component Value Date    MCHC 32.6 02/07/2020     Lab Results   Component Value Date    RDW 13.3 02/07/2020     Lab Results   Component Value Date     02/07/2020     Last Comprehensive Metabolic Panel:  Sodium   Date Value Ref Range Status   02/07/2020 135 133 - 144 mmol/L Final     Potassium   Date Value Ref Range Status   02/07/2020 4.6 3.4 - 5.3 mmol/L Final     Chloride   Date Value Ref Range Status   02/07/2020 100 94 - 109 mmol/L Final     Carbon Dioxide   Date Value Ref Range Status   02/07/2020 31 20 - 32 mmol/L Final     Anion Gap   Date Value Ref Range Status   02/07/2020 4 3 - 14 mmol/L Final     Glucose   Date Value Ref Range Status   02/07/2020 164 (H) 70 - 99 mg/dL Final     Urea Nitrogen   Date Value Ref Range Status   02/07/2020 15 7 - 30 mg/dL Final     Creatinine   Date Value Ref Range Status   02/07/2020 0.82 0.52 - 1.04 mg/dL Final     GFR Estimate   Date Value Ref Range Status   02/07/2020 68 >60  mL/min/[1.73_m2] Final     Comment:     Non  GFR Calc  Starting 12/18/2018, serum creatinine based estimated GFR (eGFR) will be   calculated using the Chronic Kidney Disease Epidemiology Collaboration   (CKD-EPI) equation.       Calcium   Date Value Ref Range Status   02/07/2020 9.9 8.5 - 10.1 mg/dL Final     SUBJECTIVE FINDINGS:  79-year-old female returns to clinic for onychomycosis.  Relates she is doing well.  She relates she does have numbness, tingling and neuropathy in her feet.  She is using lotion on her feet.  No ulcers or sores since I have seen her last.  She does not smoke.  Relates no injuries.  She does not wear diabetic shoes and does not want any diabetic shoes.        OBJECTIVE FINDINGS:  DP and PT are 2/4 bilaterally.  She has dorsally contracted digits bilaterally.  Deep tendon reflexes intact bilaterally.  No gross tendon voids bilaterally.  She has minimal peripheral edema bilaterally.  She does have some hemosiderin venous congestion and discoloration of the toes, right greater than left foot, that changes while she is in clinic.  There are some small eschar hyperkeratotic changes on the distal third and fourth toe on the right.  These are intact and padmini.  No erythema, no drainage, no odor, no calor bilaterally.  She has dystrophic, thick and brittle nails with subungual debris, dystrophy and discoloration bilaterally to differing degrees, mostly on the left hallux nail.  Sharp/dull decreased with 5.07 [place]-Yao monofilament bilaterally.  CFTs less than 3 seconds bilaterally.   She has decreased hair growth bilaterally.  No pain on palpation.  No pain on range of motion bilaterally.  Ellen reviewed as noted.     ASSESSMENT/PLAN:  Onychomycosis and onychauxis bilaterally.  She is diabetic with peripheral neuropathy.  She also has some peripheral vascular changes with arterial disease versus Raynaud disease or both.  Diagnosis and treatment options discussed with  her.  All the nails were debrided or reduced bilaterally upon consent.  Left hallux nail bled upon debridement.  Betadine and a Band-Aid applied and use discussed with her.  ABIs ordered and use discussed with her.  She will return to clinic and see me in 3 months.

## 2020-02-19 NOTE — LETTER
2/19/2020         RE: Brandy Leon  [address]  [place] [address]        Dear Colleague,    Thank you for referring your patient, Brandy Leon, to the [facility]. Please see a copy of my visit note below.    Past Medical History:   Diagnosis Date     Actinic keratosis 3/12/2013     Degenerative joint disease      Diabetes (H)      Rheumatic fever 1957    x2 between the ages of 15-18     Seasonal allergies      Patient Active Problem List   Diagnosis     Seasonal allergies     Hypothyroidism     Hyperlipidemia LDL goal <100     Fibromyalgia     Osteoarthritis     Advanced directives, counseling/discussion     Obesity     Type 2 diabetes mellitus with hyperglycemia, with long-term current use of insulin (H)     Hypertension goal BP (blood pressure) < 140/90     Overactive bladder     Recurrent UTI     Pseudophakia of both eyes     DINORA (obstructive sleep apnea)- severe (AHI 56)     Past Surgical History:   Procedure Laterality Date     C APPENDECTOMY       C ARTHROPLASTY TMJ       CATARACT IOL, RT/LT       COLONOSCOPY       COLONOSCOPY N/A 8/13/2015    Procedure: COLONOSCOPY;  Surgeon: Duane, William Charles, MD;  Location: [facility]     COLONOSCOPY WITH CO2 INSUFFLATION N/A 8/13/2015    Procedure: COLONOSCOPY WITH CO2 INSUFFLATION;  Surgeon: Duane, William Charles, MD;  Location: [facility]     PHACOEMULSIFICATION WITH STANDARD INTRAOCULAR LENS IMPLANT Left 10/25/2018    Procedure: LEFT PHACOEMULSIFICATION WITH STANDARD INTRAOCULAR LENS IMPLANT;  Surgeon: Thomas Serna MD;  Location: [facility]     PHACOEMULSIFICATION WITH STANDARD INTRAOCULAR LENS IMPLANT Right 11/8/2018    Procedure: RIGHT PHACOEMULSIFICATION WITH STANDARD INTRAOCULAR LENS IMPLANT;  Surgeon: Thomas Serna MD;  Location: [facility]     THYROIDECTOMY        Social History     Socioeconomic History     Marital status:      Spouse name: Not on file     Number of children: Not on file     Years  of education: Not on file     Highest education level: Not on file   Occupational History     Occupation:    Social Needs     Financial resource strain: Not on file     Food insecurity:     Worry: Not on file     Inability: Not on file     Transportation needs:     Medical: Not on file     Non-medical: Not on file   Tobacco Use     Smoking status: Never Smoker     Smokeless tobacco: Never Used     Tobacco comment: has had exposure to second hand smoke in the past from [employer], no current exposure   Substance and Sexual Activity     Alcohol use: No     Drug use: No     Sexual activity: Never   Lifestyle     Physical activity:     Days per week: Not on file     Minutes per session: Not on file     Stress: Not on file   Relationships     Social connections:     Talks on phone: Not on file     Gets together: Not on file     Attends Congregation service: Not on file     Active member of club or organization: Not on file     Attends meetings of clubs or organizations: Not on file     Relationship status: Not on file     Intimate partner violence:     Fear of current or ex partner: Not on file     Emotionally abused: Not on file     Physically abused: Not on file     Forced sexual activity: Not on file   Other Topics Concern     Parent/sibling w/ CABG, MI or angioplasty before 65F 55M? 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     Hobby Hazards No     Sleep Concern Yes     Comment: difficulty staying asleep, up and down all night     Stress Concern No     Weight Concern Yes     Comment: would like to lose     Special Diet Yes     Comment: low card, low sugar diet     Back Care Yes     Comment: chiropratior     Exercise Yes     Comment: almost everyday     Bike Helmet No     Comment: does not ride bicycle any more     Seat Belt  Yes     Self-Exams Yes   Social History Narrative     Not on file     Family History   Problem Relation Age of Onset     Cancer Father         skin and leukemia     Cerebrovascular Disease Maternal Grandfather      Cerebrovascular Disease Paternal Grandmother      Eye Disorder Paternal Grandmother         cataracts     Cerebrovascular Disease Paternal Grandfather      Heart Disease Paternal Grandfather      Cancer Sister         Breast Cancer     Eye Disorder Mother         cataracts     Eye Disorder Brother         cataract     Breast Cancer Daughter      Other Cancer Daughter         ovarian cancer     Glaucoma No family hx of      Macular Degeneration No family hx of      Lab Results   Component Value Date    A1C 10.5 01/10/2020    A1C 11.1 10/25/2019    A1C 10.2 07/08/2019    A1C 12.6 03/29/2019    A1C 11.1 10/19/2018     Lab Results   Component Value Date    WBC 10.9 02/07/2020     Lab Results   Component Value Date    RBC 5.26 02/07/2020     Lab Results   Component Value Date    HGB 15.4 02/07/2020     Lab Results   Component Value Date    HCT 47.3 02/07/2020     No components found for: MCT  Lab Results   Component Value Date    MCV 90 02/07/2020     Lab Results   Component Value Date    MCH 29.3 02/07/2020     Lab Results   Component Value Date    MCHC 32.6 02/07/2020     Lab Results   Component Value Date    RDW 13.3 02/07/2020     Lab Results   Component Value Date     02/07/2020     Last Comprehensive Metabolic Panel:  Sodium   Date Value Ref Range Status   02/07/2020 135 133 - 144 mmol/L Final     Potassium   Date Value Ref Range Status   02/07/2020 4.6 3.4 - 5.3 mmol/L Final     Chloride   Date Value Ref Range Status   02/07/2020 100 94 - 109 mmol/L Final     Carbon Dioxide   Date Value Ref Range Status   02/07/2020 31 20 - 32 mmol/L Final     Anion Gap   Date Value Ref Range Status   02/07/2020 4 3 - 14 mmol/L Final     Glucose   Date Value Ref Range Status   02/07/2020 164 (H) 70 - 99 mg/dL  Final     Urea Nitrogen   Date Value Ref Range Status   02/07/2020 15 7 - 30 mg/dL Final     Creatinine   Date Value Ref Range Status   02/07/2020 0.82 0.52 - 1.04 mg/dL Final     GFR Estimate   Date Value Ref Range Status   02/07/2020 68 >60 mL/min/[1.73_m2] Final     Comment:     Non  GFR Calc  Starting 12/18/2018, serum creatinine based estimated GFR (eGFR) will be   calculated using the Chronic Kidney Disease Epidemiology Collaboration   (CKD-EPI) equation.       Calcium   Date Value Ref Range Status   02/07/2020 9.9 8.5 - 10.1 mg/dL Final     SUBJECTIVE FINDINGS:  79-year-old female returns to clinic for onychomycosis.  Relates she is doing well.  She relates she does have numbness, tingling and neuropathy in her feet.  She is using lotion on her feet.  No ulcers or sores since I have seen her last.  She does not smoke.  Relates no injuries.  She does not wear diabetic shoes and does not want any diabetic shoes.        OBJECTIVE FINDINGS:  DP and PT are 2/4 bilaterally.  She has dorsally contracted digits bilaterally.  Deep tendon reflexes intact bilaterally.  No gross tendon voids bilaterally.  She has minimal peripheral edema bilaterally.  She does have some hemosiderin venous congestion and discoloration of the toes, right greater than left foot, that changes while she is in clinic.  There are some small eschar hyperkeratotic changes on the distal third and fourth toe on the right.  These are intact and padmini.  No erythema, no drainage, no odor, no calor bilaterally.  She has dystrophic, thick and brittle nails with subungual debris, dystrophy and discoloration bilaterally to differing degrees, mostly on the left hallux nail.  Sharp/dull decreased with 5.07 [place]-Yao monofilament bilaterally.  CFTs less than 3 seconds bilaterally.   She has decreased hair growth bilaterally.  No pain on palpation.  No pain on range of motion bilaterally.  Ellen reviewed as noted.      ASSESSMENT/PLAN:  Onychomycosis and onychauxis bilaterally.  She is diabetic with peripheral neuropathy.  She also has some peripheral vascular changes with arterial disease versus Raynaud disease or both.  Diagnosis and treatment options discussed with her.  All the nails were debrided or reduced bilaterally upon consent.  Left hallux nail bled upon debridement.  Betadine and a Band-Aid applied and use discussed with her.  ABIs ordered and use discussed with her.  She will return to clinic and see me in 3 months.         Again, thank you for allowing me to participate in the care of your patient.        Sincerely,        Ehsan Araya DPM

## 2020-02-21 ENCOUNTER — TELEPHONE (OUTPATIENT)
Dept: ORTHOPEDICS | Facility: CLINIC | Age: 80
End: 2020-02-21

## 2020-02-21 NOTE — TELEPHONE ENCOUNTER
Per Dr. Araya, call patient to let her know that her EHSAN results are normal.    Called and left message to call us back to relay above results.  Christelle Benedict RN

## 2020-02-24 ENCOUNTER — HEALTH MAINTENANCE LETTER (OUTPATIENT)
Age: 80
End: 2020-02-24

## 2020-03-02 ENCOUNTER — HOSPITAL ENCOUNTER (OUTPATIENT)
Dept: PHYSICAL THERAPY | Facility: CLINIC | Age: 80
Setting detail: THERAPIES SERIES
End: 2020-03-02
Attending: NURSE PRACTITIONER
Payer: OTHER MISCELLANEOUS

## 2020-03-02 DIAGNOSIS — R26.89 BALANCE PROBLEMS: ICD-10-CM

## 2020-03-02 PROCEDURE — 97116 GAIT TRAINING THERAPY: CPT | Mod: GP | Performed by: PHYSICAL THERAPIST

## 2020-03-02 PROCEDURE — 97161 PT EVAL LOW COMPLEX 20 MIN: CPT | Mod: GP | Performed by: PHYSICAL THERAPIST

## 2020-03-02 PROCEDURE — 97110 THERAPEUTIC EXERCISES: CPT | Mod: GP | Performed by: PHYSICAL THERAPIST

## 2020-03-02 ASSESSMENT — 6 MINUTE WALK TEST (6MWT): TOTAL DISTANCE WALKED (FT): 971

## 2020-03-02 NOTE — PROGRESS NOTES
Dynamic Gait Index (DGI):The DGI is a measure of balance during gait that is reliable and valid for the elderly and individuals with Parkinson's disease, MS, vestibular disorders, or s/p stroke. Gait assistive device used: None    Patient score: 15/24  Scores ?19/24 indicate an increased risk for falls according to Stacey et al 2000  Minimal Detectable Change = 2.9 in community dwelling elderly according to Sharath et al 2011    Assessment (rationale for performing, application to patient s function & care plan): Performed the following to assess for balance-- she scores at risk of falls as needs to move slower with each task. She will benefit from PT to improve her balance.   Minutes billed as physical performance test: 8             03/02/20 0900   Signing Clinician's Name / Credentials   Signing clinician's name / credentials Ayde Jones, PT, DPT   Dynamic Gait Index (Shay and [place], 1995)   Gait Level Surface 2   Change in Gait Speed 2   Gait and Horizontal Head Turns 1   Gait with Vertical Head Turns 2   Gait and Pivot Turns 2   Step Over Obstacle 2   Step Around Obstacles 2   Steps 2   Total Dynamic Gait Index Score  (A score of 19 or less has been correlated to an increased risk of falls in community dwelling older adults, patients with vestibular disorders, and patients with MS.)   Total Score (out of 24) 15

## 2020-03-02 NOTE — PROGRESS NOTES
[facility]        OUTPATIENT PHYSICAL THERAPY FUNCTIONAL EVALUATION  PLAN OF TREATMENT FOR OUTPATIENT REHABILITATION  (COMPLETE FOR INITIAL CLAIMS ONLY)  Patient's Last Name, First Name, M.I.  YOB: 1940  Brandy Leon     Provider's Name   [facility]   Medical Record No.  [id number]     Start of Care Date:  03/02/20   Onset Date:  02/07/20   Type:     _X__PT   ____OT  ____SLP Medical Diagnosis:  balance problem      PT Diagnosis:  balance impairments and weakness  Visits from SOC:  1                              __________________________________________________________________________________  Plan of Treatment/Functional Goals:  IADL retraining, balance training, gait training, neuromuscular re-education, stretching, strengthening, ROM, transfer training           GOALS  sit<>stand   Zoila will improve sit<> 30 secs from 5 reps to 9 or > to show improved LE strength and improved stability with this transfer to prevent falls.   04/30/20    6MWT  Zoila will improve her gait speed on 6MWT from .8 m/s to 1.0 m/s in order to better keep up with family members when walking and walking longer distance to and from her bus at work with less time needed.   04/30/20    DGI  Zoila will improve score on DGI from 15 to 19 or > in order to show decreased risk of falls and improved confidence with community ambulation.   04/30/20          Therapy Frequency:  1 time/week   Predicted Duration of Therapy Intervention:  up to 12 weeks and then decreasing to every other week as appropriate     Ayde Jones, PT                                    I CERTIFY THE NEED FOR THESE SERVICES FURNISHED UNDER        THIS PLAN OF TREATMENT AND WHILE UNDER MY CARE     (Physician co-signature of this document indicates review and certification of the therapy plan).                 Certification Date From:  03/02/20   Certification Date To:  05/30/20    Referring Provider:  Cailin Ponce CNP     Initial Assessment  See Epic Evaluation- Start of Care Date: 03/02/20

## 2020-03-02 NOTE — PROGRESS NOTES
"   03/02/20 0800   Quick Adds   Quick Adds Certification   Type of Visit Initial OP PT Evaluation   General Information   Start of Care Date 03/02/20   Referring Physician Cailin Ponce CNP    Orders Evaluate and Treat as Indicated   Order Date 02/07/20   Medical Diagnosis balance problem    Onset of illness/injury or Date of Surgery 02/07/20   Surgical/Medical history reviewed Yes   Pertinent history of current problem (include personal factors and/or comorbidities that impact the POC) Balance issues started a few years ago. Had at least 5-6 falls in the last couple years. has not had a fall since Oct. sometimes feels like they \"just happen\" all of a sudden. One that she remebers she felt like her feet with \"running\" forward but she could not stop and fell forward. Also feel looking up hanging at planter, tipped over. Feels more wobbly on her feet. Denies dizziness feeling. She has MRI that showed some changes in frontal corex, she is seeing neurology April 2020 for follow up.    Pertinent Visual History  had cataracts about a year ago, she feels vision is worse since then (cloudiness)    Prior level of function comment no formal exercises, working part time, does cleaning tasks around and cooking, likes to garden, does errands and outside of the house    Patient role/Employment history Employed  (PM  1 pm to 5pm )   [facility]   Home/Community Accessibility Comments basement steps, does laundry down there, lives with her son ( he is there four nights a week), daugher lives down the street    Assistive Devices Comments has used cane for longer distances, but not regularily.    Patient/Family Goals Statement improve her balance, decrease her falls    Fall Risk Screen   Fall screen completed by PT   Have you fallen 2 or more times in the past year? Yes   Have you fallen and had an injury in the past year? No   Timed Up and Go score (seconds) not tested    Is patient a fall " risk? Yes   Fall screen comments fall hx, DGI    Abuse Screen (yes response referral indicated)   Feels Unsafe at Home or Work/School no   Feels Threatened by Someone no   Does Anyone Try to Keep You From Having Contact with Others or Doing Things Outside Your Home? no   Physical Signs of Abuse Present no   Pain   Pain comments no pain today    Cognitive Status Examination   Orientation orientation to person, place and time   Level of Consciousness alert   Follows Commands and Answers Questions 100% of the time   Personal Safety and Judgment intact   Memory intact   Integumentary   Integumentary Comments no swelling in LEs    Posture   Posture Comments forward shoulder posture, slight R lateral lean in standing and with walking    Range of Motion (ROM)   ROM Comment HS tightness B (prefers to stand with slight bend in her knees)   Strength   Strength Comments L hip flexor 4/5 L, 4+/5 , DF 4/5 L, 4+/5 R    Bed Mobility   Bed Mobility Comments reports no issues   Transfer Skills   Transfer Comments prefers to use UEs on chair and then keep 1 UE support on their as she turns    Gait   Gait Comments decreased arm swing through R side, decreased step length, tends to veer laterally 25% of the time    Gait Special Tests   Gait Special Tests 25 FOOT TIMED WALK;FUNCTIONAL GAIT ASSESSMENT;SIX MINUTE WALK TEST;DYNAMIC GAIT INDEX   Gait Special Tests 25 Foot Timed Walk   Seconds 9.89   Steps 18 Steps   Gait Special Tests Dynamic Gait Index   Score out of 24 15   Comments see progress note    Gait Special Tests Six Minute Walk Test   Feet 971 Feet   Comments LLE fatigue, mild SOB; .8 m/s    Balance   Balance Comments prefers to have UE support on surface when standing; decreased sit<>stand for age and SLS    Balance Special Tests   Balance Special Tests Single leg stance right;Single leg stance left;Sit to stand reps   Balance Special Tests Single Leg Stance Right,   Right, seconds 2 Seconds   Balance Special Tests Single Leg  Stance Left   Left, seconds 0 Seconds   Balance Special Tests Sit to Stand Reps in 30 Seconds   Reps in 30 seconds 5   Height 18   Sensory Examination   Sensory Perception Comments tingling/numbess in hands and feet    Planned Therapy Interventions   Planned Therapy Interventions IADL retraining;balance training;gait training;neuromuscular re-education;stretching;strengthening;ROM;transfer training   Clinical Impression   Criteria for Skilled Therapeutic Interventions Met yes, treatment indicated   PT Diagnosis balance impairments and weakness    Influenced by the following impairments decreased strength, decreased ROM, decreased functional activity tolerance, balance impairments, gait instability    Functional limitations due to impairments hx of falls at home and in the community, challenges with job as , decreased confidence and safety with mobility at home and in the community    Clinical Presentation Stable/Uncomplicated   Clinical Presentation Rationale stable medically, PT impairments, clinical judgement    Clinical Decision Making (Complexity) Low complexity   Therapy Frequency 1 time/week   Predicted Duration of Therapy Intervention (days/wks) up to 12 weeks and then decreasing to every other week as appropriate    Risk & Benefits of therapy have been explained Yes   Patient, Family & other staff in agreement with plan of care Yes   Clinical Impression Comments Patient presents with balance impairments and weakness with hx of falls. She demos instability with gait and with balance tasks during PT session. She will benefit from skilled PT in order to progress her balance and strength to prevent future falls and get her moving with improved confidence.    GOALS   PT Eval Goals 1;2;3   Goal 1   Goal Identifier sit<>stand    Goal Description Zoila will improve sit<> 30 secs from 5 reps to 9 or > to show improved LE strength and improved stability with this transfer to prevent falls.     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    Target Date 04/30/20   Goal 3   Goal Identifier DGI   Goal Description Zoila will improve score on DGI from 15 to 19 or > in order to show decreased risk of falls and improved confidence with community ambulation.    Target Date 04/30/20   Total Evaluation Time   PT Donna, Low Complexity Minutes ([id number]) 33   Therapy Certification   Certification date from 03/02/20   Certification date to 05/30/20   Medical Diagnosis balance problem    Certification I certify the need for these services furnished under this plan of treatment and while under my care.  (Physician co-signature of this document indicates review and certification of the therapy plan).

## 2020-03-04 ENCOUNTER — ANCILLARY PROCEDURE (OUTPATIENT)
Dept: GENERAL RADIOLOGY | Facility: CLINIC | Age: 80
End: 2020-03-04
Attending: FAMILY MEDICINE
Payer: OTHER MISCELLANEOUS

## 2020-03-04 ENCOUNTER — OFFICE VISIT (OUTPATIENT)
Dept: PEDIATRICS | Facility: CLINIC | Age: 80
End: 2020-03-04
Payer: OTHER MISCELLANEOUS

## 2020-03-04 VITALS
TEMPERATURE: 97.4 F | OXYGEN SATURATION: 93 % | DIASTOLIC BLOOD PRESSURE: 64 MMHG | HEART RATE: 94 BPM | SYSTOLIC BLOOD PRESSURE: 110 MMHG
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
## 2020-03-04 DIAGNOSIS — S80.01XA CONTUSION OF RIGHT KNEE, INITIAL ENCOUNTER: ICD-10-CM

## 2020-03-04 DIAGNOSIS — S80.01XA CONTUSION OF RIGHT KNEE, INITIAL ENCOUNTER: Primary | ICD-10-CM

## 2020-03-04 DIAGNOSIS — M25.50 PAIN IN JOINT, MULTIPLE SITES: ICD-10-CM

## 2020-03-04 PROCEDURE — 73560 X-RAY EXAM OF KNEE 1 OR 2: CPT | Mod: RT | Performed by: RADIOLOGY

## 2020-03-04 PROCEDURE — 99213 OFFICE O/P EST LOW 20 MIN: CPT | Performed by: FAMILY MEDICINE

## 2020-03-04 NOTE — PROGRESS NOTES
Subjective     Brandy Leon is a 79 year old female who presents to clinic today for the following health issues:    HPI   Patient here with concerns for pain worse on her right knee, she has a mild head ache, some pain on her neck, upper back and left knee. 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 She had no loss of consiousness, was able to get up on her own, landing on right knee.      Patient Active Problem List   Diagnosis     Seasonal allergies     Hypothyroidism     Hyperlipidemia LDL goal <100     Fibromyalgia     Osteoarthritis     Advanced directives, counseling/discussion     Obesity     Type 2 diabetes mellitus with hyperglycemia, with long-term current use of insulin (H)     Hypertension goal BP (blood pressure) < 140/90     Overactive bladder     Recurrent UTI     Pseudophakia of both eyes     DINORA (obstructive sleep apnea)- severe (AHI 56)     Contusion of right knee     Work place accident     Past Surgical History:   Procedure Laterality Date     C APPENDECTOMY       C ARTHROPLASTY TMJ       CATARACT IOL, RT/LT       COLONOSCOPY       COLONOSCOPY N/A 8/13/2015    Procedure: COLONOSCOPY;  Surgeon: Duane, William Charles, MD;  Location: [facility]     COLONOSCOPY WITH CO2 INSUFFLATION N/A 8/13/2015    Procedure: COLONOSCOPY WITH CO2 INSUFFLATION;  Surgeon: Duane, William Charles, MD;  Location: [facility]     PHACOEMULSIFICATION WITH STANDARD INTRAOCULAR LENS IMPLANT Left 10/25/2018    Procedure: LEFT PHACOEMULSIFICATION WITH STANDARD INTRAOCULAR LENS IMPLANT;  Surgeon: Thomas Serna MD;  Location: [facility]     PHACOEMULSIFICATION WITH STANDARD INTRAOCULAR LENS IMPLANT Right 11/8/2018    Procedure: RIGHT PHACOEMULSIFICATION WITH STANDARD INTRAOCULAR LENS IMPLANT;  Surgeon: Thomas Serna MD;  Location: [facility]     THYROIDECTOMY          Social History     Tobacco Use     Smoking status: Never Smoker     Smokeless  tobacco: Never Used     Tobacco comment: [facility], no current exposure   Substance Use Topics     Alcohol use: No     Family History   Problem Relation Age of Onset     Cancer Father         skin and leukemia     Cerebrovascular Disease Maternal Grandfather      Cerebrovascular Disease Paternal Grandmother      Eye Disorder Paternal Grandmother         cataracts     Cerebrovascular Disease Paternal Grandfather      Heart Disease Paternal Grandfather      Cancer Sister         Breast Cancer     Eye Disorder Mother         cataracts     Eye Disorder Brother         cataract     Breast Cancer Daughter      Other Cancer Daughter         ovarian cancer     Glaucoma No family hx of      Macular Degeneration No family hx of          Current Outpatient Medications   Medication Sig Dispense Refill     aspirin 81 MG EC tablet Take 1 tablet by mouth daily. 90 tablet 3     Cranberry-Vitamin C-Vitamin E (CRANBERRY PLUS VITAMIN C) 4200-20-3 MG-MG-UNIT CAPS Take 1 capsule by mouth 2 times daily       DM-APAP-CPM (CORICIDIN HBP) -2 MG TABS Take 1 tablet by mouth daily as needed       empagliflozin (JARDIANCE) 10 MG TABS tablet Take 10 mg by mouth daily       fexofenadine (ALLEGRA) 180 MG tablet Take 180 mg by mouth daily       GLUCOSAMINE CHONDROITIN COMPLX OR 2 Daily       ibuprofen (ADVIL/MOTRIN) 200 MG capsule Take 600 mg by mouth every 4 hours as needed        insulin glargine (LANTUS SOLOSTAR) 100 UNIT/ML pen Inject 32 Units Subcutaneous daily       insulin pen needle (BD JUAN C U/F) 32G X 4 MM miscellaneous USE 1 DAILY AS DIRECTED. 100 each 3     irbesartan-hydrochlorothiazide (AVALIDE) 150-12.5 MG tablet TAKE 0.5 TABLET BY MOUTH DAILY 90 tablet 1     latanoprost (XALATAN) 0.005 % ophthalmic solution Place 1 drop into both eyes At Bedtime 3 Bottle 3     metFORMIN (GLUCOPHAGE) 1000 MG tablet TAKE 1 TABLET (1,000 MG) BY MOUTH 2 TIMES DAILY (WITH MEALS) 180 tablet 3      MULTIVITAMIN OR 1 tablet daily       Omega-3 Fatty Acids (OMEGA 3 PO) Take by mouth 2 times daily.        ONE TOUCH DELICA LANCETS MISC 1 each 2 times daily. One Touch Delica   100 each 12     ONETOUCH ULTRA test strip USE TO TEST TWICE A  strip 4     order for DME Glucometer covered by insurance. 1 each 0     oxybutynin ER (DITROPAN-XL) 5 MG 24 hr tablet TAKE 2 TABLETS (10 MG) BY MOUTH IN THE MORNING AND 1 TABLET (5 MG) BY MOUTH BEFORE BEDTIME 270 tablet 3     rosuvastatin (CRESTOR) 5 MG tablet TAKE 1 TABLET BY MOUTH TWICE WEEKLY 90 tablet 3     SYNTHROID 137 MCG tablet TAKE 1 TABLET (137 MCG) BY MOUTH DAILY 90 tablet 3     Allergies   Allergen Reactions     Estradiol Itching     Lipitor [Atorvastatin Calcium]      Weak and shaky     Sulfa Drugs      Rash       Zocor [Simvastatin]      Weak and juan carlos     Recent Labs   Lab Test 02/07/20  0937 01/10/20  0956 10/25/19  0950 07/08/19  0935 05/10/19  0821  06/25/18  1434  12/22/17  1118   A1C  --  10.5* 11.1* 10.2*  --    < > 10.4*   < >  --    LDL  --   --  91  --  114*  --  93  --  115*   HDL  --   --  57  --   --   --  53  --  54   TRIG  --   --  254*  --   --   --  258*  --  161*   ALT  --   --  25  --  24  --  27  --  18   CR 0.82  --  0.72  --  0.81   < > 0.68   < > 0.76   GFRESTIMATED 68  --  80  --  70   < > 84   < > 74   GFRESTBLACK 79  --  >90  --  81   < > >90   < > 90   POTASSIUM 4.6  --  4.4  --  4.0   < > 4.3   < > 4.4   TSH  --   --  2.78  --  2.61   < > 2.46  --  1.70    < > = values in this interval not displayed.      BP Readings from Last 3 Encounters:   03/13/20 127/76   03/04/20 110/64   02/19/20 138/74    Wt Readings from Last 3 Encounters:   03/13/20 79.4 kg (175 lb)   02/07/20 79.6 kg (175 lb 8 oz)   01/13/20 81 kg (178 lb 9.2 oz)                    Reviewed and updated as needed this visit by Provider         Review of Systems   ROS COMP: Constitutional, HEENT, cardiovascular, pulmonary, GI, , musculoskeletal, neuro, skin, endocrine  and psych systems are negative, except as otherwise noted.      Objective    /64   Pulse 94   Temp 97.4  F (36.3  C) (Oral)   SpO2 93%   There is no height or weight on file to calculate BMI.  Physical Exam  Musculoskeletal:      Right knee: She exhibits decreased range of motion, swelling and deformity. She exhibits no effusion, no laceration, no erythema, normal alignment, no LCL laxity, normal patellar mobility, no bony tenderness, normal meniscus and no MCL laxity. Tenderness found. No medial joint line, no lateral joint line, no MCL, no LCL and no patellar tendon tenderness noted.      Left knee: Normal.      Cervical back: Normal.      Thoracic back: Normal.        Legs:         GENERAL: healthy, alert and no distress  EYES: Eyes grossly normal to inspection, PERRL and conjunctivae and sclerae normal  HENT: ear canals and TM's normal, nose and mouth without ulcers or lesions  NECK: no adenopathy, no asymmetry, masses, or scars and thyroid normal to palpation  RESP: lungs clear to auscultation - no rales, rhonchi or wheezes  CV: regular rate and rhythm, normal S1 S2, no S3 or S4, no murmur, click or rub, no peripheral edema and peripheral pulses strong  ABDOMEN: soft, nontender, no hepatosplenomegaly, no masses and bowel sounds normal  NEURO: Normal strength and tone, mentation intact and speech normal  PSYCH: mentation appears normal, affect normal/bright          Results for orders placed or performed in visit on 03/04/20   XR Knee Right 1/2 Views     Status: None    Narrative    Exam: AP and lateral views of the right knee dated 3/4/2020.    COMPARISON: None.    CLINICAL HISTORY: Contusion of right knee.    FINDINGS: AP and lateral views of the right knee were obtained. Joint  space narrowing in the medial femorotibial and patellofemoral joint  compartments of the right knee. No large right knee joint effusion. No  displaced fractures. The bones are well aligned.      Impression    IMPRESSION: Joint  space narrowing in the right knee, greatest in the  medial femorotibial and patellofemoral joint compartments.    PARMINDER WHITTEN MD         Assessment & Plan     1. Contusion of right knee, initial encounter  Rest the affected painful area as much as possible.  Apply ice for 15-20 minutes intermittently as needed and especially after any offending activity. Daily stretching.  As pain recedes, begin normal activities slowly as tolerated.   - XR Knee Right 1/2 Views; Future    2. Fall, initial encounter  - PHYSICAL THERAPY REFERRAL; Future    3. Pain in joint, multiple sites  - PHYSICAL THERAPY REFERRAL; Future       Return in about 2 weeks (around 3/18/2020) for recheck .    Devin Acosta MD  [facility]

## 2020-03-04 NOTE — PATIENT INSTRUCTIONS
Physical therapy for work related injury  Take tylenol/ibuprofen for pain over the counter at appropriate doses  Ice face/nose/knees twice daily for 5-10 minutes for 3-4 days.

## 2020-03-09 ENCOUNTER — THERAPY VISIT (OUTPATIENT)
Dept: PHYSICAL THERAPY | Facility: CLINIC | Age: 80
End: 2020-03-09
Attending: FAMILY MEDICINE
Payer: OTHER MISCELLANEOUS

## 2020-03-09 ENCOUNTER — TELEPHONE (OUTPATIENT)
Dept: PEDIATRICS | Facility: CLINIC | Age: 80
End: 2020-03-09

## 2020-03-09 ENCOUNTER — HOSPITAL ENCOUNTER (OUTPATIENT)
Dept: PHYSICAL THERAPY | Facility: CLINIC | Age: 80
Setting detail: THERAPIES SERIES
End: 2020-03-09
Attending: NURSE PRACTITIONER
Payer: OTHER MISCELLANEOUS

## 2020-03-09 DIAGNOSIS — M25.50 PAIN IN JOINT, MULTIPLE SITES: ICD-10-CM
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
## 2020-03-09 DIAGNOSIS — R26.89 BALANCE PROBLEMS: Primary | ICD-10-CM

## 2020-03-09 DIAGNOSIS — S80.01XA CONTUSION OF RIGHT KNEE, INITIAL ENCOUNTER: ICD-10-CM
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
## 2020-03-09 DIAGNOSIS — R90.89 ABNORMAL FINDING ON MRI OF BRAIN: ICD-10-CM

## 2020-03-09 PROCEDURE — 97162 PT EVAL MOD COMPLEX 30 MIN: CPT | Mod: GP | Performed by: PHYSICAL THERAPIST

## 2020-03-09 PROCEDURE — 97116 GAIT TRAINING THERAPY: CPT | Mod: GP | Performed by: PHYSICAL THERAPIST

## 2020-03-09 PROCEDURE — 97110 THERAPEUTIC EXERCISES: CPT | Mod: GP | Performed by: PHYSICAL THERAPIST

## 2020-03-09 NOTE — TELEPHONE ENCOUNTER
[facility] Call Center    Phone Message    May a detailed message be left on voicemail: yes     Reason for Call: Other: Employee Work Status Form     Action Taken: Message routed to:  Primary Care p [phone]    Travel Screening: Not Applicable

## 2020-03-09 NOTE — PROGRESS NOTES
[facility] Initial Evaluation -- Lower Extremity    Evaluation Date: March 9, 2020  Brandy Leon is a 79 year old female with a trauma/bilateral knee condition.   Referral: Dr. Acosta  Work mechanical stresses: driving bus   Employment status: part time  Leisure mechanical stresses: not asked  Functional disability score: none completed  VAS score (0-10): 0/10  Patient goals/expectations:  Improve mobility to help her to RTW next week.    HISTORY:    Present symptoms: not having any pain.  Just feels like she is more stiff with moving and does not want to fall again.  She did start PT for balance last week on March 2, 2020.  (Had PT prior to going into work that day).    Pain quality (sharp/shooting/stabbing/aching/burning/cramping):  none    Present since (onset date):March 2, 2020    Symptoms (improving/unchanging/worsening):  improving.      Symptoms commenced as a result of: stepped over the gas hose while re-fueling her bus and landing on her face (heard her nose crack) and R knee > L knee.     Condition occurred in the following environment[place]     Symptoms at onset  skinned L knee and has black/blue R eye and nose    Paresthesia (yes/no):  Nothing new - does note some numbness and tingling of her hands/feet due to neuropathy  Spinal history: no     Cough/Sneeze (pos/neg):  Yes - produces R lower back/buttock pain    Constant symptoms: none  Intermittent symptoms: R lower back/buttock      Symptoms are worse with the following: Always Rising, Always First few steps, Always When still and Time of day - Always AM   Symptoms are better with the following: has used ice on her face and the R knee    Continued use makes the pain (better/worse/no effect): better    Disturbed night (yes/no): no      Pain at rest (yes/no):  no      Other questions (swelling/clicking/locking/giving way/falling):  Fear of falling again     Previous episodes: fell in November 2019  - seeing PT for balance    Specific Questions:  General health (excellent/good/fair/poor):  fair  Pertinent medical history includes: Diabetes, Synthroid, high cholesterol medication  Medications (nil/NSAIDS/analg/steroids/anticoag/other):  Other - Thyroid, high cholesterol and diabetes  Medical allergies:  Estradiol, Lipitor, Sulfa medication, Zocor  Imaging (none/Xray/MRI/other):  R knee - Joint  space narrowing in the medial femorotibial and patellofemoral joint  compartments of the right knee. No large right knee joint effusion. No  displaced fractures. The bones are well aligned.  Brain MRI 2/17/2020.  Recent or major surgery (yes/no):  no  Night pain (yes/no):  no  Accidents (yes/no):  Fall 11/2019 injured R Shoulder/arm/hand  Unexplained weight loss (yes/no):  no  Barriers at home: notes leans against the wall when walking down stairs in addition to holding onto the hand rail  Other red flags: none but patient is fearful of falling    Sites for physical examination (back/hip/knee/ankle/foot/other): back and knees    EXAMINATION    Posture:  Sitting (good/fair/poor): poor    Correction of Posture (better/worse/no effect/NA): no effect  Standing (good/fair/poor): poor  Other observations:  Standing in a left lateral shift.  Limping during ambulation.  She had decreased hip extension on the R.  Slow with rise from sitting     Neurological: (NA/motor/sensory/reflexes/dural):   symmetrical strength hip flexion, knee flexion, knee extension, slight decreased strength DF on the L, symmetrical great toe extension and did not assess calf raises   Did not assess sensory.    Reflexes were symmetrical   Did not assess dural tension.     Baselines (pain or functional activity): assessing ability to walk    Extremities (Hip / Knee / Ankle / Foot): not tested today due to time constraints    Movement Loss Hamilton Mod Min Nil Pain   Flexion        Extension        Abduction        Adduction        Internal Rotation         External Rotation        Dorsiflexion        Plantarflexion        Inversion        Eversion          Passive Movement (+/- over pressure)/(PDM/ERP):    Resisted Test Response (pain): painfree MMT  Other Tests: Patient standing in a left lateral shift    Spine:  Movement loss: major loss of R SGIS, min loss L SGIS, mod loss EIS, nil loss FIS but was fearful of losing her balance  Effect of repeated movements: repeated R SGIS - improved her EIS ROM but did not effect her gait initially.  Slight improved hip ext with walking after 2nd set.    Effect of static positioning: not tested  Spine testing (not relevant/relevant/secondary problem): relevant     Baseline Symptoms: not tested (late for evaluation and had another PT appointment after this appointment - thus will assess hips/knees at next session).  Repeated Tests Symptom Response Mechanical Response   Active/Passive movement, resisted test, functional test During -  Produce, Abolish, Increase, Decrease, NE After -  Better, Worse, NB, NW, NE Effect -   ? or ? ROM, strength or key functional test No   Effect                                       Effect of static positioning                  Provisional Classification (Extremity/Spine):  Extremity - Inconclusive/Other - Trauma/Recovering Trauma and possible back involvement.      Principle of Management:   Education:  Discussed purpose of assessing lower back.  Patient given precautions with assessing her lower back - discussed centralization/peripheralization.  If any production of LE symptoms then hold on the exercises    Equipment provided:  none  Exercise and dosage:  R SGIS 10 reps 6-8 times a day.    ASSESSMENT/PLAN:    Patient is a 79 year old female with right side knee complaints/trauma s/p fall with stiffness.    Patient has the following significant findings with corresponding treatment plan.                Diagnosis 1:  Trauma - stiffness lower body; hit R knee (R knee contusion  Pain -  manual  therapy, self management, education, directional preference exercise and home program  Decreased ROM/flexibility - manual therapy, therapeutic exercise and home program  Decreased joint mobility - manual therapy, therapeutic exercise and home program  Impaired gait - gait training, assistive devices, home program and recommended use of can temporarily as pt does feel her balance is off and notes feeling wobbly when walking  Decreased function - therapeutic activities and home program    Therapy Evaluation Codes:   1) History comprised of:   Personal factors that impact the plan of care:      having PT for balance issues.    Comorbidity factors that impact the plan of care are:      Diabetes and Numbness/tingling.     Medications impacting care: None.  2) Examination of Body Systems comprised of:   Body structures and functions that impact the plan of care:      Lumbar spine and will need to assess hips/knees .   Activity limitations that impact the plan of care are:      Bending, Dressing, Sitting, Walking and Working.  3) Clinical presentation characteristics are:   Stable/Uncomplicated.  4) Decision-Making    Low complexity using standardized patient assessment instrument and/or measureable assessment of functional outcome.  None completed but not able to leave blank  Cumulative Therapy Evaluation is: Moderate complexity.    Previous and current functional limitations:  (See Goal Flow Sheet for this information)    Short term and Long term goals: (See Goal Flow Sheet for this information)     Communication ability:  Patient appears to be able to clearly communicate and understand verbal and written communication and follow directions correctly.  Treatment Explanation - The following has been discussed with the patient:   RX ordered/plan of care  Anticipated outcomes  Possible risks and side effects  This patient would benefit from PT intervention to resume normal activities.   Rehab potential is  good.    Frequency:  2 X week, once daily  Duration:  for 4 weeks  Discharge Plan:  Achieve all LTG.  Independent in home treatment program.  Reach maximal therapeutic benefit.    Please refer to the daily flowsheet for treatment today, total treatment time and time spent performing 1:1 timed codes.

## 2020-03-09 NOTE — TELEPHONE ENCOUNTER
[facility] CLINICAL DOCUMENTATION    Form Documentation Form or Letter Request    Type or form/letter needing completion: Employee work Status  Provider: Atiya Ponce  Has provider seen patient for office visit related to reason for form request? Does not know  Date form needed: when completed  Once completed: Patient will  at .   Vale MASTERS CMA

## 2020-03-12 NOTE — TELEPHONE ENCOUNTER
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

## 2020-03-12 NOTE — PROGRESS NOTES
CC: Recurrent urinary tract infections.    HPI: It is a pleasure to see Ms. Brandy Leon, a very pleasant 79 year old female with PMH of DM2 (last A1C 10.5), DINORA, HTN, and HLD seen today in the urology clinic in consultation from MAGO James CNP for evaluation of recurrent urinary tract infections. The patient reports UTI's in October, November, and December 2019, but no infections in the past 3 months. Typical symptoms with infection include mild dysuria, thigh pain, frequency, suprapubic discomfort, and chills. She denies gross hematuria, flank pain, or fevers. She has no history of kidney stones.  She has never been hospitalized for a UTI.  With infection, Ms. Leon typically goes to [place] [facility] where cultures are performed revealing the following organisms: E. coli (10/18/19 and 11/20/19), mixed  erwin (12/4/19). She is usually given antibiotics and reports that symptoms do resolve appropriately with therapy. Most recently, she was treated with Macrobid in November, Ceftin the time before that. She is currently asymptomatic.     She is not immunosuppressed but her type 2 diabetes is not well controlled (last A1c 10.5 on 1/10/2020) which puts her at greater risk for infections. Of note, she takes Jardiance for her diabetes.     She was seen for recurrent UTI's in 2016 at which time she was started on vaginal Estrace cream as well as daily prophylaxis with trimethoprim. Per the patient, the trimethoprim worked very well for her and she did not have any further infections for a period of time. She felt better on this as well. At some point, she stopped taking trimethoprim but she is not sure when or why. She is not using the Estrace because she uses hydrocortisone cream instead for itch.     She is taking oxybutynin XL 5 mg once in the morning and once in the evening for urge incontinence. Her nighttime symptoms are well controlled - does not get up to void and no nighttime incontinence. Her  daytime symptoms are still bothersome. 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 As a result, she has frequent incontinence for which she wears a pad.     OBSTETRIC HISTORY:  The patient is .  The weight of her largest baby was 8 lbs 12 oz.  Babies were delivered vaginally.  Age at menopause 57 years, hx of HRT for a short time after menopause. This was d/c as patient did not tolerate side effects.  Denies a bulge in the vaginal area.     Past Medical History:   Diagnosis Date     Actinic keratosis 3/12/2013     Degenerative joint disease      Diabetes (H)      Rheumatic fever 1957    x2 between the ages of [age]     Seasonal allergies      Past Surgical History:   Procedure Laterality Date     C APPENDECTOMY       C ARTHROPLASTY TMJ       CATARACT IOL, RT/LT       COLONOSCOPY       COLONOSCOPY N/A 2015    Procedure: COLONOSCOPY;  Surgeon: Duane, William Charles, MD;  Location: [facility] OR     COLONOSCOPY WITH CO2 INSUFFLATION N/A 2015    Procedure: COLONOSCOPY WITH CO2 INSUFFLATION;  Surgeon: Duane, William Charles, MD;  Location: [facility]     PHACOEMULSIFICATION WITH STANDARD INTRAOCULAR LENS IMPLANT Left 10/25/2018    Procedure: LEFT PHACOEMULSIFICATION WITH STANDARD INTRAOCULAR LENS IMPLANT;  Surgeon: Thomas Serna MD;  Location: [facility]     PHACOEMULSIFICATION WITH STANDARD INTRAOCULAR LENS IMPLANT Right 2018    Procedure: RIGHT PHACOEMULSIFICATION WITH STANDARD INTRAOCULAR LENS IMPLANT;  Surgeon: Thomas Serna MD;  Location: [facility]     THYROIDECTOMY        Current Outpatient Medications   Medication Sig Dispense Refill     aspirin 81 MG EC tablet Take 1 tablet by mouth daily. 90 tablet 3     Cranberry-Vitamin C-Vitamin E (CRANBERRY PLUS VITAMIN C) 4200-20-3 MG-MG-UNIT CAPS Take 1 capsule by mouth 2 times daily       DM-APAP-CPM (CORICIDIN HBP) -2 MG TABS Take 1 tablet by mouth daily as needed       empagliflozin (JARDIANCE) 10 MG TABS tablet Take  "10 mg by mouth daily       fexofenadine (ALLEGRA) 180 MG tablet Take 180 mg by mouth daily       GLUCOSAMINE CHONDROITIN COMPLX OR 2 Daily       ibuprofen (ADVIL/MOTRIN) 200 MG capsule Take 600 mg by mouth every 4 hours as needed        insulin glargine (LANTUS SOLOSTAR) 100 UNIT/ML pen Inject 32 Units Subcutaneous daily       insulin pen needle (BD JUAN C U/F) 32G X 4 MM miscellaneous USE 1 DAILY AS DIRECTED. 100 each 3     irbesartan-hydrochlorothiazide (AVALIDE) 150-12.5 MG tablet TAKE 0.5 TABLET BY MOUTH DAILY 90 tablet 1     latanoprost (XALATAN) 0.005 % ophthalmic solution Place 1 drop into both eyes At Bedtime 3 Bottle 3     metFORMIN (GLUCOPHAGE) 1000 MG tablet TAKE 1 TABLET (1,000 MG) BY MOUTH 2 TIMES DAILY (WITH MEALS) 180 tablet 3     MULTIVITAMIN OR 1 tablet daily       Omega-3 Fatty Acids (OMEGA 3 PO) Take by mouth 2 times daily.        ONE TOUCH DELICA LANCETS MISC 1 each 2 times daily. One Touch Delica   100 each 12     ONETOUCH ULTRA test strip USE TO TEST TWICE A  strip 4     order for DME Glucometer covered by insurance. 1 each 0     oxybutynin ER (DITROPAN-XL) 5 MG 24 hr tablet TAKE 2 TABLETS (10 MG) BY MOUTH DAILY 180 tablet 1     rosuvastatin (CRESTOR) 5 MG tablet TAKE 1 TABLET BY MOUTH TWICE WEEKLY 90 tablet 3     SYNTHROID 137 MCG tablet TAKE 1 TABLET (137 MCG) BY MOUTH DAILY 90 tablet 3     Allergies   Allergen Reactions     Estradiol Itching     Lipitor [Atorvastatin Calcium]      Weak and shaky     Sulfa Drugs      Rash       Zocor [Simvastatin]      Weak and shakey     Family History: There is no h/o  carcinoma.  There is no h/o urolithiasis.    Social History: The patient does not smoke cigarettes, no EtOH and no illicit drug use.  She drives a school bus.    ROS: A comprehensive 14 point ROS was obtained and was negative except for that outlined above in the HPI.    PHYSICAL EXAM:   Blood pressure 127/76, pulse 82, height 1.626 m (5' 4\"), weight 79.4 kg (175 lb), SpO2 98 %, not " currently breastfeeding.   Body mass index is 30.04 kg/m .  GENERAL: Well groomed/well developed/well nourished female in NAD.  HEENT: EOMI, AT, NC.  SKIN: Warm to touch, dry.  No visible rashes or lesions.  RESP: No increased respiratory effort.  MS: Full ROM in extremities.  PELVIC: deferred  NEURO: Alert and oriented x 3.  PSYCH: Normal mood and affect, pleasant and agreeable during interview and exam.    IMAGING: none    PVR: Postvoid residual urine volume as measured by ultrasound was 32 mL.    ASSESSMENT/PLAN:  Ms. Brandy Leon is a very pleasant 79 year old female with a history of recurrent urinary tract infections and urge incontinence. In the past, she did well with daily prophylaxis with trimethoprim - not sure when/why this was stopped. Over the past year, she has had 2 culture confirmed UTI's (both E. Coli) in 10/2019 and 11/2019. Her poorly controlled DM2 and Jardiance are likely contributing to recurrent UTI's and irritative voiding symptoms. Therefore, will discuss with PCP and MTM to see if there may be a better alternative for her than Jardiance which causes increased urinary excretion of glucose.   -Message sent to PCP and MTM pharmacist about alternative DM2 medications.   -If continues to get UTI's despite changing DM2 meds (if feasible), then will consider resuming abx prophylaxis and will also proceed with further evaluation to include upper tract imaging and cystoscopy for intravesical examination.   -Increase oxybutynin to 10 mg in the morning, continue with 5 mg at bedtime.   -Limit bladder irritants.   -Continue to work on meticulous perineal hygiene, staying well hydrated.     Follow up in 3 months but notify us sooner if infections persist as would then schedule for CT urogram and cystoscopy.     I have enjoyed participating in the medical care of this very pleasant patient.  Please don't hesitate to contact me with any questions or concerns.     Taylor Lovell PA-C  Department of  Urology

## 2020-03-13 ENCOUNTER — OFFICE VISIT (OUTPATIENT)
Dept: UROLOGY | Facility: CLINIC | Age: 80
End: 2020-03-13
Payer: COMMERCIAL

## 2020-03-13 VITALS
BODY MASS INDEX: 29.88 KG/M2 | HEIGHT: 64 IN | OXYGEN SATURATION: 98 % | WEIGHT: 175 LBS | DIASTOLIC BLOOD PRESSURE: 76 MMHG | SYSTOLIC BLOOD PRESSURE: 127 MMHG | HEART RATE: 82 BPM

## 2020-03-13 DIAGNOSIS — N39.41 URGE INCONTINENCE: ICD-10-CM

## 2020-03-13 DIAGNOSIS — N39.0 RECURRENT UTI: Primary | ICD-10-CM

## 2020-03-13 PROCEDURE — 99213 OFFICE O/P EST LOW 20 MIN: CPT | Performed by: PHYSICIAN ASSISTANT

## 2020-03-13 RX ORDER — OXYBUTYNIN CHLORIDE 5 MG/1
TABLET, EXTENDED RELEASE ORAL
Qty: 270 TABLET | Refills: 3 | Status: SHIPPED | OUTPATIENT
Start: 2020-03-13 | End: 2020-07-07 | Stop reason: ALTCHOICE

## 2020-03-13 ASSESSMENT — MIFFLIN-ST. JEOR: SCORE: 1258.79

## 2020-03-13 ASSESSMENT — PAIN SCALES - GENERAL: PAINLEVEL: NO PAIN (0)

## 2020-03-13 NOTE — PATIENT INSTRUCTIONS
UROLOGY CLINIC VISIT PATIENT INSTRUCTIONS    Increase your morning dose of oxybutynin to 2 tablets (10 mg). Keep the nighttime dose of oxybutynin the same at 1 tablet (5 mg).    I will discuss with Laisha Perez and Atiya Ponce about the possibility of changing your diabetes medications to something that will not predispose you to UTI's. Someone will be in touch with further recommendations.    Follow up in 3 months to recheck.     If you develop symptoms of a UTI in the meantime, please call us at [phone].    If you have any issues, questions or concerns in the meantime, do not hesitate to contact us at [phone] or via [employer].     It was a pleasure meeting with you today.  Thank you for allowing me and my team the privilege of caring for you today.  YOU are the reason we are here, and I truly hope we provided you with the excellent service you deserve.  Please let us know if there is anything else we can do for you so that we can be sure you are leaving completely satisfied with your care experience.

## 2020-03-13 NOTE — NURSING NOTE
"Brandy Leon's goals for this visit include:   Chief Complaint   Patient presents with     UTI     Last UTI in December. No current symptoms       She requests these members of her care team be copied on today's visit information: yes    PCP: Cailin Ponce    Referring Provider:  Cailin Ponce, APRN CNP  [address]  [place] [address]    /76 (BP Location: Left arm, Patient Position: Sitting, Cuff Size: Adult Regular)   Pulse 82   Ht 1.626 m (5' 4\")   Wt 79.4 kg (175 lb)   SpO2 98%   BMI 30.04 kg/m      Do you need any medication refills at today's visit? No    PVR: 32mL    Michelle Colorado LPN      "

## 2020-03-13 NOTE — TELEPHONE ENCOUNTER
Pt has came to clinic to follow up on forms. Pt states no there is nothing else she can do there. She will need to have forms filled out to submit to [facility]. Pt will check back next week. Vale MASTERS CMA

## 2020-03-14 DIAGNOSIS — Z79.4 TYPE 2 DIABETES MELLITUS WITH HYPERGLYCEMIA, WITH LONG-TERM CURRENT USE OF INSULIN (H): ICD-10-CM

## 2020-03-14 DIAGNOSIS — E11.65 TYPE 2 DIABETES MELLITUS WITH HYPERGLYCEMIA, WITH LONG-TERM CURRENT USE OF INSULIN (H): ICD-10-CM

## 2020-03-14 NOTE — TELEPHONE ENCOUNTER
insulin glargine (LANTUS SOLOSTAR) 100 UNIT/ML pen        Last Written Prescription Date:  02/05/20  Last Fill Quantity: N/A,   # refills: N/A  Last Office Visit : 01/13/20  Future Office visit:  04/24/20    Routing refill request to provider for review/approval because:  Insulin - refilled per clinic

## 2020-03-18 ENCOUNTER — TELEPHONE (OUTPATIENT)
Dept: PEDIATRICS | Facility: CLINIC | Age: 80
End: 2020-03-18

## 2020-03-20 ENCOUNTER — TELEPHONE (OUTPATIENT)
Dept: PEDIATRICS | Facility: CLINIC | Age: 80
End: 2020-03-20

## 2020-03-20 NOTE — TELEPHONE ENCOUNTER
[facility] Call Center    Phone Message    May a detailed message be left on voicemail: yes     Reason for Call: Other: pt calling about paper work WC. please fax over if possible, fax number on paper, paper work must go to [employer] phone is [phone]     Action Taken: Message routed to:  Primary Care p [phone]    Travel Screening: Not Applicable

## 2020-04-01 ENCOUNTER — DOCUMENTATION ONLY (OUTPATIENT)
Dept: SLEEP MEDICINE | Facility: CLINIC | Age: 80
End: 2020-04-01

## 2020-04-01 DIAGNOSIS — G47.33 OSA (OBSTRUCTIVE SLEEP APNEA): ICD-10-CM

## 2020-04-01 NOTE — PROGRESS NOTES
Patient contacted regarding PAP usage that has either dropped off below an average of 20 minutes per night or device has stopped reporting into Epic or vendor site.    Diagnostic AHI: 56.1PSG    Patient still has device : Yes    Last date device called in 02/14/2020    Last usage date 02/14/2020     Data only recheck       Current reporting of device:      Action plan:  patient to follow up per provider request   Unable to leave message mailbox full

## 2020-04-02 ENCOUNTER — TELEPHONE (OUTPATIENT)
Dept: PHYSICAL THERAPY | Facility: CLINIC | Age: 80
End: 2020-04-02

## 2020-04-02 DIAGNOSIS — S80.01XA CONTUSION OF RIGHT KNEE, INITIAL ENCOUNTER: ICD-10-CM
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
## 2020-04-02 NOTE — TELEPHONE ENCOUNTER
Spoke with patient today.  Notes that her R knee has been feeling really good.  No issues.  Notes intermittent L knee pain.  Has been back to her normal activity at home without pain - vacuuming, cleaning, walking outdoors.  She has continued to perform the R SGIS in standing exercise, 1-2 times a day.  Not noticing if this made a difference in her symptoms, but better overall.  Will try standing lumbar extension to determine if any change in the L knee pain.  Requested leaving chart open so that she can come back in when the clinic opens as may need to have her L knee looked at.    Will talk with patient in 2 weeks, scheduled 4/15/2020.

## 2020-04-07 DIAGNOSIS — G47.33 OSA (OBSTRUCTIVE SLEEP APNEA): Primary | ICD-10-CM

## 2020-04-15 ENCOUNTER — VIRTUAL VISIT (OUTPATIENT)
Dept: PHYSICAL THERAPY | Facility: CLINIC | Age: 80
End: 2020-04-15
Payer: COMMERCIAL

## 2020-04-15 DIAGNOSIS — S80.01XD CONTUSION OF RIGHT KNEE, SUBSEQUENT ENCOUNTER: Primary | ICD-10-CM
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
## 2020-04-15 PROCEDURE — 99207 C NO CHARGE LOS: CPT | Mod: GP | Performed by: PHYSICAL THERAPIST

## 2020-04-15 NOTE — PROGRESS NOTES
Patient relates that she is not really having any issues resulting from her fall at work.  Has been able to sit in any chair and do her ADLs without pain.  Has continued with her exercises given in PT for both her balance and her knee pain.  Does want to return to PT when the clinic reopens to reassess that everything is ok. Thus, requests a call at that time.  She does have the clinic phone number and will call if any questions arise prior to the clinics reopening.

## 2020-05-05 DIAGNOSIS — E63.9 NUTRITIONAL AND METABOLIC CARDIOMYOPATHY (H): ICD-10-CM

## 2020-05-05 DIAGNOSIS — E11.65 TYPE 2 DIABETES MELLITUS WITH HYPERGLYCEMIA, WITH LONG-TERM CURRENT USE OF INSULIN (H): Chronic | ICD-10-CM

## 2020-05-05 DIAGNOSIS — I43 NUTRITIONAL AND METABOLIC CARDIOMYOPATHY (H): ICD-10-CM

## 2020-05-05 DIAGNOSIS — Z79.4 TYPE 2 DIABETES MELLITUS WITH HYPERGLYCEMIA, WITH LONG-TERM CURRENT USE OF INSULIN (H): Chronic | ICD-10-CM

## 2020-05-05 DIAGNOSIS — E88.9 NUTRITIONAL AND METABOLIC CARDIOMYOPATHY (H): ICD-10-CM

## 2020-05-05 DIAGNOSIS — I51.9 MYXEDEMA HEART DISEASE: ICD-10-CM

## 2020-05-05 DIAGNOSIS — E03.9 MYXEDEMA HEART DISEASE: ICD-10-CM

## 2020-05-06 RX ORDER — REPAGLINIDE 1 MG/1
1 TABLET ORAL
Qty: 180 TABLET | Refills: 3 | OUTPATIENT
Start: 2020-05-06

## 2020-05-06 NOTE — TELEPHONE ENCOUNTER
Discontinued  Not Effective  Cailin Ponce, APRN CNP  11/19/19 1021     Documented reason in Epic as noted, refused refill. Medication not active on list.

## 2020-05-07 RX ORDER — LEVOTHYROXINE SODIUM 137 MCG
137 TABLET ORAL DAILY
Qty: 90 TABLET | Refills: 3 | Status: SHIPPED | OUTPATIENT
Start: 2020-05-07 | End: 2021-05-04

## 2020-05-07 NOTE — TELEPHONE ENCOUNTER
SYNTHROID 137 MCG TABLET    Last Written Prescription Date:  5/14/2019  Last Fill Quantity: 90,   # refills: 3  Last Office Visit : 11/20/2019  Future Office visit:  None  90 Tabs, 3 Refills sent to pharm 5/7/2020    Nohemy Mercedes RN  Central Triage Red Flags/Med Refills

## 2020-05-18 ENCOUNTER — TELEPHONE (OUTPATIENT)
Dept: PODIATRY | Facility: CLINIC | Age: 80
End: 2020-05-18

## 2020-05-18 NOTE — TELEPHONE ENCOUNTER
Placed call to patient to discuss postponing appointment for toenail trim scheduled for 5/19/2020.  She is doing well and would like to postpone her appointment.  She has been rescheduled to 6/19/2020.

## 2020-05-26 NOTE — PROGRESS NOTES
"Brandy Leon is a 79 year old female who is being evaluated via a billable telephone visit.      The patient has been notified of following:     \"This telephone visit will be conducted via a call between you and your physician/provider. We have found that certain health care needs can be provided without the need for a physical exam.  This service lets us provide the care you need with a short phone conversation.  If a prescription is necessary we can send it directly to your pharmacy.  If lab work is needed we can place an order for that and you can then stop by our lab to have the test done at a later time.    Telephone visits are billed at different rates depending on your insurance coverage. During this emergency period, for some insurers they may be billed the same as an in-person visit.  Please reach out to your insurance provider with any questions.    If during the course of the call the physician/provider feels a telephone visit is not appropriate, you will not be charged for this service.\"    Patient has given verbal consent for Telephone visit?  Yes    What phone number would you like to be contacted at? [phone]    How would you like to obtain your AVS? [place]    Phone call duration: *** minutes    {signature options:[id number]}      [facility]    Interval History:  Brandy Leon is a 79 year old with T2DM here for follow up.   Pt has started Jardiance since the last visit. She has worked with pharmacist to increase lantus dose. She is currently taking 40 units once daily.  She brings in her glucometer.  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    BGs: check 1-2 times daily, mostly morning occ later on. Fasting BGs 80-130s ; later on mid 200s    Meds: Lantus 40 units once daily. Metformin. Jardiance    Assessment:  1. Elderly woman with T2DM with still high A1c on lantus, Jardiance and metformin. Cannot increase Lantus furhter because of morning BGs " in good range. Discussed diet. Option to start meal time insulin or GLP-1 analogue.  Discussed that her  license cannot be renewed with BGs this high.   Discussed diet, which could be improved. Pt reluctantly took book about diabetic diet, declines seeing a dietitian.  2. Feet doing well  3. Cardiovascular prevention  Albumiuria: on ARB, blood pressure, well controlled, started on  Jardiance which has shown to decrease progression of renal disease    Lipids: on Rosuvastatin small dose, cont     Plan:   - Lantus 40 units dialy  - metformin  - jardiance  Start Victoza, titrate to 1.8 mg as tolerated  - diabetic diet: 45gram of carbs with each meal, one time snack of less than 20 grams of carbs between meals      This was a 25 min visit of which more than 23 minutes were spent in counseling in regards to diagnosis, clinical consequences and treatment indications and options of blood glucose control    Rachel Morris MD  Endocrinology and Diabetes    Pager [phone]        Medications:   Current Outpatient Medications   Medication Sig Dispense Refill     aspirin 81 MG EC tablet Take 1 tablet by mouth daily. 90 tablet 3     Cranberry-Vitamin C-Vitamin E (CRANBERRY PLUS VITAMIN C) 4200-20-3 MG-MG-UNIT CAPS Take 1 capsule by mouth 2 times daily       DM-APAP-CPM (CORICIDIN HBP) -2 MG TABS Take 1 tablet by mouth daily as needed       empagliflozin (JARDIANCE) 10 MG TABS tablet Take 10 mg by mouth daily       fexofenadine (ALLEGRA) 180 MG tablet Take 180 mg by mouth daily       GLUCOSAMINE CHONDROITIN COMPLX OR 2 Daily       ibuprofen (ADVIL/MOTRIN) 200 MG capsule Take 600 mg by mouth every 4 hours as needed        insulin glargine (LANTUS SOLOSTAR) 100 UNIT/ML pen Take 40 units daily. Add 2 units to prime. 45 mL 1     insulin pen needle (BD JUAN C U/F) 32G X 4 MM miscellaneous USE 1 DAILY AS DIRECTED. 100 each 3     irbesartan-hydrochlorothiazide (AVALIDE) 150-12.5 MG tablet TAKE 0.5 TABLET BY  MOUTH DAILY 90 tablet 1     latanoprost (XALATAN) 0.005 % ophthalmic solution Place 1 drop into both eyes At Bedtime 3 Bottle 3     metFORMIN (GLUCOPHAGE) 1000 MG tablet TAKE 1 TABLET (1,000 MG) BY MOUTH 2 TIMES DAILY (WITH MEALS) 180 tablet 3     MULTIVITAMIN OR 1 tablet daily       Omega-3 Fatty Acids (OMEGA 3 PO) Take by mouth 2 times daily.        ONE TOUCH DELICA LANCETS MISC 1 each 2 times daily. One Touch Delica   100 each 12     ONETOUCH ULTRA test strip USE TO TEST TWICE A  strip 4     order for DME Glucometer covered by insurance. 1 each 0     oxybutynin ER (DITROPAN-XL) 5 MG 24 hr tablet TAKE 2 TABLETS (10 MG) BY MOUTH IN THE MORNING AND 1 TABLET (5 MG) BY MOUTH BEFORE BEDTIME 270 tablet 3     rosuvastatin (CRESTOR) 5 MG tablet TAKE 1 TABLET BY MOUTH TWICE WEEKLY 90 tablet 3     SYNTHROID 137 MCG tablet Take 1 tablet (137 mcg) by mouth daily 90 tablet 3       Review of Systems: in addition to the stated above  GENERAL: Negative  SKIN: Negative  HENT: Negative   EYE: Negative  HEART: Negative  RESPIRATORY: Negative   GI: Negative  : Negative  MSK: Negative  BLOOD/LYMPH: Negative  NEUROLOGIC: Negative   PSYCH: Negative    Physical Examination:      Wt Readings from Last 4 Encounters:   03/13/20 79.4 kg (175 lb)   02/07/20 79.6 kg (175 lb 8 oz)   01/13/20 81 kg (178 lb 9.2 oz)   11/20/19 84.4 kg (186 lb)        Reported vitals:  There were no vitals taken for this visit.   healthy, alert and no distress  PSYCH: Alert and oriented times 3; coherent speech, normal   rate and volume, able to articulate logical thoughts, able   to abstract reason, no tangential thoughts, no hallucinations   or delusions  Her affect is normal and pleasant  RESP: No cough, no audible wheezing, able to talk in full sentences  Remainder of exam unable to be completed due to telephone visits     Lab Results   Component Value Date     02/07/2020    CHLORIDE 100 02/07/2020    CO2 31 02/07/2020     (H) 02/07/2020     CR 0.82 02/07/2020    CR 0.72 10/25/2019    CR 0.81 05/10/2019    CR 0.83 10/19/2018    CR 0.68 06/25/2018    FREDY 9.9 02/07/2020    ALBUMIN 3.6 10/25/2019    ALKPHOS 77 06/25/2018    LDL 91 10/25/2019    HDL 57 10/25/2019    TRIG 254 (H) 10/25/2019     Lab Results   Component Value Date    MICROL 16 10/25/2019    MICROL 50 05/10/2019    MICROL 17 06/25/2018    MICROL 9 12/22/2017    MICROL 14 02/09/2017     Lab Results   Component Value Date    A1C 10.5 (H) 01/10/2020    A1C 11.1 (H) 10/25/2019    A1C 10.2 (H) 07/08/2019    A1C 12.6 (A) 03/29/2019    A1C 11.1 (H) 10/19/2018       Lab Results   Component Value Date    HGB 15.4 02/07/2020            HPI: pt has T2DM since 1998  Complication of neuropathy    This patient was a no show for this scheduled appointment.  This patient was a no show for this scheduled appointment.

## 2020-05-28 DIAGNOSIS — E11.65 TYPE 2 DIABETES MELLITUS WITH HYPERGLYCEMIA, WITH LONG-TERM CURRENT USE OF INSULIN (H): ICD-10-CM

## 2020-05-28 DIAGNOSIS — Z79.4 TYPE 2 DIABETES MELLITUS WITH HYPERGLYCEMIA, WITH LONG-TERM CURRENT USE OF INSULIN (H): ICD-10-CM

## 2020-05-28 NOTE — TELEPHONE ENCOUNTER
metFORMIN (GLUCOPHAGE) 1000 MG tablet   TAKE 1 TABLET (1,000 MG) BY MOUTH 2 TIMES DAILY (WITH MEALS)      Last Written Prescription Date:  5/31/19  Last Fill Quantity: 180,   # refills: 3  Last Office Visit : 1/13/20  Future Office visit:  5/29/20 (telephone visit)    90 day pended.     Routing refill request to provider for review/approval because:  Patient has documented A1c result of 10.5 outside the specified period of time.     If HgbA1C is 8 or greater, it needs to be on file within the past 3 months.  If less than 8, must be on file within the past 6 months.      Recent Labs   Lab Test 01/10/20  0956   A1C 10.5*

## 2020-05-29 ENCOUNTER — VIRTUAL VISIT (OUTPATIENT)
Dept: ENDOCRINOLOGY | Facility: CLINIC | Age: 80
End: 2020-05-29
Payer: COMMERCIAL

## 2020-05-29 DIAGNOSIS — Z53.9 NO SHOW: Primary | ICD-10-CM

## 2020-05-29 NOTE — LETTER
5/29/2020         RE: Brandy Leon  [address] [place] [address]        Dear Colleague,    Thank you for referring your patient, Brandy Leon, to the [facility]. Please see a copy of my visit note below.      This patient was a no show for this scheduled appointment.      Again, thank you for allowing me to participate in the care of your patient.        Sincerely,        Rachel Morris MD

## 2020-06-15 ENCOUNTER — TELEPHONE (OUTPATIENT)
Dept: PHARMACY | Facility: CLINIC | Age: 80
End: 2020-06-15

## 2020-06-15 NOTE — TELEPHONE ENCOUNTER
Called patient to schedule a follow up MTM visit. Patient is scheduled for 6/23.  Mirna Perez, Pharm.D, [facility]  Medication Therapy Management Pharmacist

## 2020-06-18 NOTE — PROGRESS NOTES
Do you have any of the following symptoms:  a)      Fever (or reported chills) No  b)      Shortness of Breath No  c)      Rash No  d)      Cough in the last 14 days No    If a patient reports yes to any of these symptoms, obtain direction from the provider and call the patient back to let them know if they can come in or not.  1.    Provider needs to determine if this patient should still be seen in clinic.  2.    If decision to not see in clinic, call patient back and refer them to COVID- 19 [email or web address] or schedule COVID-19 phone visit.  3.    Turn in-person visit into telephone visit (FOR RETURN PATIENTS ONLY)    Remind patients that visitors are not allowed on site. Only one legal guardian who screens negative to the above questions will be allowed to accompany patients. If a patient indicates that they will be bringing a legal guardian with them to the appointment please make sure to screen both the patient and the legal guardian for symptoms using the tool above.

## 2020-06-19 ENCOUNTER — VIRTUAL VISIT (OUTPATIENT)
Dept: UROLOGY | Facility: CLINIC | Age: 80
End: 2020-06-19
Payer: COMMERCIAL

## 2020-06-19 ENCOUNTER — OFFICE VISIT (OUTPATIENT)
Dept: PODIATRY | Facility: CLINIC | Age: 80
End: 2020-06-19
Payer: COMMERCIAL

## 2020-06-19 DIAGNOSIS — Z87.440 HISTORY OF RECURRENT UTIS: ICD-10-CM

## 2020-06-19 DIAGNOSIS — L60.2 ONYCHAUXIS: ICD-10-CM

## 2020-06-19 DIAGNOSIS — B35.1 ONYCHOMYCOSIS: Primary | ICD-10-CM

## 2020-06-19 DIAGNOSIS — E11.51 DIABETES MELLITUS WITH PERIPHERAL VASCULAR DISEASE (H): ICD-10-CM

## 2020-06-19 DIAGNOSIS — N39.41 URGE INCONTINENCE: Primary | ICD-10-CM

## 2020-06-19 DIAGNOSIS — E11.49 TYPE II OR UNSPECIFIED TYPE DIABETES MELLITUS WITH NEUROLOGICAL MANIFESTATIONS, NOT STATED AS UNCONTROLLED(250.60) (H): ICD-10-CM

## 2020-06-19 PROCEDURE — 99213 OFFICE O/P EST LOW 20 MIN: CPT | Performed by: PODIATRIST

## 2020-06-19 PROCEDURE — 99212 OFFICE O/P EST SF 10 MIN: CPT | Mod: 95 | Performed by: PHYSICIAN ASSISTANT

## 2020-06-19 NOTE — PROGRESS NOTES
"Brandy Leon is a 79 year old female who is being evaluated via a billable telephone visit.      The patient has been notified of following:     \"This telephone visit will be conducted via a call between you and your physician/provider. We have found that certain health care needs can be provided without the need for a physical exam.  This service lets us provide the care you need with a short phone conversation.  If a prescription is necessary we can send it directly to your pharmacy.  If lab work is needed we can place an order for that and you can then stop by our lab to have the test done at a later time.    Telephone visits are billed at different rates depending on your insurance coverage. During this emergency period, for some insurers they may be billed the same as an in-person visit.  Please reach out to your insurance provider with any questions.    If during the course of the call the physician/provider feels a telephone visit is not appropriate, you will not be charged for this service.\"    Patient has given verbal consent for Telephone visit?  Yes    What phone number would you like to be contacted at? [phone]    How would you like to obtain your AVS? Mail a copy    CC: follow up recurrent UTI's and urinary incontinence    HPI: Ms. Brandy Leon is a 79 year old female with PMH of DM2 (last A1C 10.5), DINORA, HTN, HLD, and recent new diagnosis of normal pressure hydrocephalus who is being evaluated via billable telephone visit today for follow up of recurrent UTI's and urinary incontinence. She had UTI's in Oct (E. Coli), Nov (E. Coli), and Dec 2019 (mixed  erwin), though no further infections since then. There was some question of whether her Jardiance medication may be contributing to infections; however, she had previously had issues with UTI's while taking Invokana and stopping this did not result in improvement in her UTI's. Therefore, she has continued on Jardiance. There was also " "question whether her new diagnosis of normal pressure hydrocephalus may be contributing to UTI's. She was scheduled to see neurology 4/1, but it is not clear whether she ever had this appointment. Regardless, she has not had any further UTI's since December 2019.    Regarding her urinary urgency incontinence, this is currently well controlled with oxybutynin 10 mg in the morning and oxybutynin 5 mg at bedtime. She no longer complains of daytime leakage or urgency. Feels to be emptying her bladder.    She does describe some feelings of \"weakness and shakiness,\" ongoing for a few months. She also seems mildly confused on the phone today.    Past Medical History:   Diagnosis Date     Actinic keratosis 3/12/2013     Degenerative joint disease      Diabetes (H)      Rheumatic fever 1957    x2 between the ages of 15-18     Seasonal allergies      Past Surgical History:   Procedure Laterality Date     C APPENDECTOMY       C ARTHROPLASTY TMJ       CATARACT IOL, RT/LT       COLONOSCOPY       COLONOSCOPY N/A 8/13/2015    Procedure: COLONOSCOPY;  Surgeon: Duane, William Charles, MD;  Location: [facility]     COLONOSCOPY WITH CO2 INSUFFLATION N/A 8/13/2015    Procedure: COLONOSCOPY WITH CO2 INSUFFLATION;  Surgeon: Duane, William Charles, MD;  Location: [facility]     PHACOEMULSIFICATION WITH STANDARD INTRAOCULAR LENS IMPLANT Left 10/25/2018    Procedure: LEFT PHACOEMULSIFICATION WITH STANDARD INTRAOCULAR LENS IMPLANT;  Surgeon: Thomas Serna MD;  Location: [facility]     PHACOEMULSIFICATION WITH STANDARD INTRAOCULAR LENS IMPLANT Right 11/8/2018    Procedure: RIGHT PHACOEMULSIFICATION WITH STANDARD INTRAOCULAR LENS IMPLANT;  Surgeon: Thomas Serna MD;  Location: [facility]     THYROIDECTOMY        Current Outpatient Medications   Medication Sig Dispense Refill     aspirin 81 MG EC tablet Take 1 tablet by mouth daily. 90 tablet 3     Cranberry-Vitamin C-Vitamin E (CRANBERRY PLUS VITAMIN C) 4200-20-3 MG-MG-UNIT CAPS Take 1 " capsule by mouth 2 times daily       empagliflozin (JARDIANCE) 10 MG TABS tablet Take 10 mg by mouth daily       fexofenadine (ALLEGRA) 180 MG tablet Take 180 mg by mouth daily       GLUCOSAMINE CHONDROITIN COMPLX OR 2 Daily       ibuprofen (ADVIL/MOTRIN) 200 MG capsule Take 600 mg by mouth every 4 hours as needed        insulin glargine (LANTUS SOLOSTAR) 100 UNIT/ML pen Take 40 units daily. Add 2 units to prime. 45 mL 1     insulin pen needle (BD JUAN C U/F) 32G X 4 MM miscellaneous USE 1 DAILY AS DIRECTED. 100 each 3     irbesartan-hydrochlorothiazide (AVALIDE) 150-12.5 MG tablet TAKE 0.5 TABLET BY MOUTH DAILY 90 tablet 1     latanoprost (XALATAN) 0.005 % ophthalmic solution Place 1 drop into both eyes At Bedtime 3 Bottle 3     metFORMIN (GLUCOPHAGE) 1000 MG tablet TAKE 1 TABLET (1,000 MG) BY MOUTH 2 TIMES DAILY (WITH MEALS) 180 tablet 0     MULTIVITAMIN OR 1 tablet daily       Omega-3 Fatty Acids (OMEGA 3 PO) Take by mouth 2 times daily.        ONE TOUCH DELICA LANCETS MISC 1 each 2 times daily. One Touch Delica   100 each 12     ONETOUCH ULTRA test strip USE TO TEST TWICE A  strip 4     order for DME Glucometer covered by insurance. 1 each 0     oxybutynin ER (DITROPAN-XL) 5 MG 24 hr tablet TAKE 2 TABLETS (10 MG) BY MOUTH IN THE MORNING AND 1 TABLET (5 MG) BY MOUTH BEFORE BEDTIME 270 tablet 3     rosuvastatin (CRESTOR) 5 MG tablet TAKE 1 TABLET BY MOUTH TWICE WEEKLY 90 tablet 3     SYNTHROID 137 MCG tablet Take 1 tablet (137 mcg) by mouth daily 90 tablet 3     Allergies   Allergen Reactions     Estradiol Itching     Lipitor [Atorvastatin Calcium]      Weak and shaky     Sulfa Drugs      Rash       Zocor [Simvastatin]      Weak and juan carlos       ASSESSMENT/PLAN:  79 year old female with a history of recurrent urinary tract infections and urge incontinence in the setting of poorly controlled DM and normal pressure hydrocephalus. She has had no further infections since December and urge incontinence is  controlled with oxybutynin 10 mg in the morning and 5 mg at bedtime. However, she does complain of generalized weakness and shakiness, and also seems mildly confused on the phone today. Discussed that oxybutynin may not be the best choice given her age, other comorbidities, and ongoing cognitive symptoms. Preferred alternative agents would be mirabegron or a quarternary amine such as trospium. However, patient is hesitant to switch from oxybutynin as it is working very well to control her urinary symptoms. She does have an upcoming [facility] appointment next week. Will route chart to [facility] pharmacist about discussing alternate options with the patient. Another consideration could be more formal urodynamics testing given her new diagnosis of normal pressure hydrocephalus to see if there are other treatment options she may benefit from other than medications. Patient was supposed to see neurology 4/1, but it is unclear whether she ever had this appointment. Will also route to PCP to advise about need for follow up with neurology. For now, will plan for the following:    -Continue oxybutynin 10 mg in the morning and 5 mg at bedtime.  -Continue to limit bladder irritants.  -Continue to practice meticulous perineal hygiene and stay well hydrated.  -Follow up with [facility] as scheduled. Consider alternatives to oxybutynin such as mirabegron or trospium.    Follow up with urology in 6 months, sooner if continues to have issues.     Phone call duration: 5 minutes    Taylor Lovell PA-C      ADDENDUM 6/23/2020:  Patient had telemedicine visit with [facility] pharmacist this morning. Per Mirna Perez, Pharm.D, patient amenable to try switching to mirabegron after she completes her 2 month supply of oxybutynin.   -Will send Rx for mirabegron 50 mg to see about insurance coverage.     Taylor Lovell PA-C

## 2020-06-19 NOTE — Clinical Note
Good morning,    I had a follow up telephone visit with [facility] this morning. Thankfully, she has had no further UTI's. She also feels that the oxybutynin is working well to control her urge incontinence. However, she notes feeling more weak and shaky over the past few months and also seemed mildly confused on the phone. I'd prefer to take her off oxybutynin and switch to something like mirabegron or trospium. However, she was hesitant because oxybutynin is working so well.     Laisha - she has an appointment with you next Tuesday. If you're able, could you talk to her about alternatives to oxybutynin again? She said she'd be willing to discuss further with you. If she's willing to make a change, let me know.     Also, she was supposed to see neurology 4/1 for new diagnosis of normal pressure hydrocephalus. I don't see that she ever had that appt. Just letting you both know.     Pending what we decide with her OAB meds, I'll otherwise plan to see her back in 6 months.    Thanks!  Taylor Lovell PA-C

## 2020-06-19 NOTE — PATIENT INSTRUCTIONS
Thanks for coming today.  [facility]  [address] [place] [address]    To schedule future appointments in Ortho Clinic, you may call [phone].    To schedule ordered imaging by your provider:   Call [facility] [phone]    To schedule an injection ordered by your provider:  Call [facility] Injection scheduling line: [phone]  DNA Guidehart available online at:  [email or web address]    Please call if any further questions or concerns ([phone]).  Clinic hours 8 am to 5 pm.    Return to clinic (call) if symptoms worsen or fail to improve.

## 2020-06-19 NOTE — PROGRESS NOTES
Past Medical History:   Diagnosis Date     Actinic keratosis 3/12/2013     Degenerative joint disease      Diabetes (H)      Rheumatic fever 1957    x2 between the ages of 15-18     Seasonal allergies      Patient Active Problem List   Diagnosis     Seasonal allergies     Hypothyroidism     Hyperlipidemia LDL goal <100     Fibromyalgia     Osteoarthritis     Advanced directives, counseling/discussion     Obesity     Type 2 diabetes mellitus with hyperglycemia, with long-term current use of insulin (H)     Hypertension goal BP (blood pressure) < 140/90     Overactive bladder     Recurrent UTI     Pseudophakia of both eyes     DINORA (obstructive sleep apnea)- severe (AHI 56)     Contusion of right knee     Work place accident     Past Surgical History:   Procedure Laterality Date     C APPENDECTOMY       C ARTHROPLASTY TMJ       CATARACT IOL, RT/LT       COLONOSCOPY       COLONOSCOPY N/A 8/13/2015    Procedure: COLONOSCOPY;  Surgeon: Duane, William Charles, MD;  [facility]: [facility]     COLONOSCOPY WITH CO2 INSUFFLATION N/A 8/13/2015    Procedure: COLONOSCOPY WITH CO2 INSUFFLATION;  Surgeon: Duane, William Charles, MD;  [facility]     PHACOEMULSIFICATION WITH STANDARD INTRAOCULAR LENS IMPLANT Left 10/25/2018    Procedure: LEFT PHACOEMULSIFICATION WITH STANDARD INTRAOCULAR LENS IMPLANT;  Surgeon: Thomas Serna MD;  [facility]     PHACOEMULSIFICATION WITH STANDARD INTRAOCULAR LENS IMPLANT Right 11/8/2018    Procedure: RIGHT PHACOEMULSIFICATION WITH STANDARD INTRAOCULAR LENS IMPLANT;  Surgeon: Thomas Serna MD;  [facility]: [facility]     THYROIDECTOMY        Social History     Socioeconomic History     Marital status:      Spouse name: Not on file     Number of children: Not on file     Years of education: Not on file     Highest education level: Not on file   Occupational History     Occupation:    Social Needs     Financial resource strain: Not on file     Food insecurity      Worry: Not on file     Inability: Not on file     Transportation needs     Medical: Not on file     Non-medical: Not on file   Tobacco Use     Smoking status: Never Smoker     Smokeless tobacco: Never Used     Tobacco comment: has had exposure to second hand smoke in the past from [employer], no current exposure   Substance and Sexual Activity     Alcohol use: No     Drug use: No     Sexual activity: Never   Lifestyle     Physical activity     Days per week: Not on file     Minutes per session: Not on file     Stress: Not on file   Relationships     Social connections     Talks on phone: Not on file     Gets together: Not on file     Attends Yazidi service: Not on file     Active member of club or organization: Not on file     Attends meetings of clubs or organizations: Not on file     Relationship status: Not on file     Intimate partner violence     Fear of current or ex partner: Not on file     Emotionally abused: Not on file     Physically abused: Not on file     Forced sexual activity: Not on file   Other Topics Concern     Parent/sibling w/ CABG, MI or angioplasty before 65F 55M? 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     Hobby Hazards No     Sleep Concern Yes     Comment: difficulty staying asleep, up and down all night     Stress Concern No     Weight Concern Yes     Comment: would like to lose     Special Diet Yes     Comment: low card, low sugar diet     Back Care Yes     Comment: chiropratior     Exercise Yes     Comment: almost everyday     Bike Helmet No     Comment: does not ride bicycle any more     Seat Belt Yes     Self-Exams Yes   Social History Narrative     Not on file     Family History   Problem Relation Age of Onset     Cancer Father         skin and leukemia     Cerebrovascular Disease Maternal Grandfather       Cerebrovascular Disease Paternal Grandmother      Eye Disorder Paternal Grandmother         cataracts     Cerebrovascular Disease Paternal Grandfather      Heart Disease Paternal Grandfather      Cancer Sister         Breast Cancer     Eye Disorder Mother         cataracts     Eye Disorder Brother         cataract     Breast Cancer Daughter      Other Cancer Daughter         ovarian cancer     Glaucoma No family hx of      Macular Degeneration No family hx of      Lab Results   Component Value Date    A1C 10.5 01/10/2020    A1C 11.1 10/25/2019    A1C 10.2 07/08/2019    A1C 12.6 03/29/2019    A1C 11.1 10/19/2018     SUBJECTIVE FINDINGS:  79-year-old female returns to clinic for onychomycosis.  Relates she is doing well.  She relates she does have numbness, tingling and neuropathy in her feet, she is using lotion on her feet, has had no ulcers or sores since I have seen her last, and she does not wear diabetic shoes and does not want any diabetic shoes.        OBJECTIVE FINDINGS:  DP and PT are 2/4 bilaterally.  She has dorsally contracted digits bilaterally.  She has minimal peripheral edema bilaterally.  No erythema, no drainage, no odor, no calor bilaterally.  She has dystrophic, thick and brittle nails with subungual debris, dystrophy and discoloration bilaterally to differing degrees, mostly on the left hallux nail.  She has decreased hair growth bilaterally.  No pain on palpation.     ASSESSMENT/PLAN:  Onychomycosis and onychauxis bilaterally.  She is diabetic with peripheral neuropathy.  She also has some peripheral vascular changes with arterial disease versus Raynaud disease or both.  Diagnosis and treatment options discussed with her.  All the nails were debrided or reduced bilaterally upon consent.  She will return to clinic and see me in 3 months.

## 2020-06-19 NOTE — PATIENT INSTRUCTIONS
UROLOGY CLINIC VISIT PATIENT INSTRUCTIONS    For now, continue oxybutynin 10 mg in the morning and 5 mg at bedtime. After your meeting with Laisha Perez ([facility] pharmacist) next Tuesday, we can consider switching you to a different medication which may have fewer side effects.     If you have any issues, questions or concerns in the meantime, do not hesitate to contact us at [phone] or via [facility].     It was a pleasure meeting with you today.  Thank you for allowing me and my team the privilege of caring for you today.  YOU are the reason we are here, and I truly hope we provided you with the excellent service you deserve.  Please let us know if there is anything else we can do for you so that we can be sure you are leaving completely satisfied with your care experience.

## 2020-06-19 NOTE — LETTER
6/19/2020         RE: Brandy Leon  [address] [place] [address]        Dear Colleague,    Thank you for referring your patient, Brandy Leon, to the [facility]. Please see a copy of my visit note below.    Do you have any of the following symptoms:  a)      Fever (or reported chills) No  b)      Shortness of Breath No  c)      Rash No  d)      Cough in the last 14 days No    If a patient reports yes to any of these symptoms, obtain direction from the provider and call the patient back to let them know if they can come in or not.  1.    Provider needs to determine if this patient should still be seen in clinic.  2.    If decision to not see in clinic, call patient back and refer them to COVID- 19 [email or web address] or schedule COVID-19 phone visit.  3.    Turn in-person visit into telephone visit (FOR RETURN PATIENTS ONLY)    Remind patients that visitors are not allowed on site. Only one legal guardian who screens negative to the above questions will be allowed to accompany patients. If a patient indicates that they will be bringing a legal guardian with them to the appointment please make sure to screen both the patient and the legal guardian for symptoms using the tool above.              Past Medical History:   Diagnosis Date     Actinic keratosis 3/12/2013     Degenerative joint disease      Diabetes (H)      Rheumatic fever 1957    x2 between the ages of 15-18     Seasonal allergies      Patient Active Problem List   Diagnosis     Seasonal allergies     Hypothyroidism     Hyperlipidemia LDL goal <100     Fibromyalgia     Osteoarthritis     Advanced directives, counseling/discussion     Obesity     Type 2 diabetes mellitus with hyperglycemia, with long-term current use of insulin (H)     Hypertension goal BP (blood pressure) < 140/90     Overactive bladder     Recurrent UTI     Pseudophakia of both eyes     DINORA (obstructive sleep apnea)- severe (AHI 56)     Contusion  of right knee     Work place accident     Past Surgical History:   Procedure Laterality Date     C APPENDECTOMY       C ARTHROPLASTY TMJ       CATARACT IOL, RT/LT       COLONOSCOPY       COLONOSCOPY N/A 8/13/2015    Procedure: COLONOSCOPY;  Surgeon: Duane, William Charles, MD;  [facility]: [facility]     COLONOSCOPY WITH CO2 INSUFFLATION N/A 8/13/2015    Procedure: COLONOSCOPY WITH CO2 INSUFFLATION;  Surgeon: Duane, William Charles, MD;  [facility]: [facility]     PHACOEMULSIFICATION WITH STANDARD INTRAOCULAR LENS IMPLANT Left 10/25/2018    Procedure: LEFT PHACOEMULSIFICATION WITH STANDARD INTRAOCULAR LENS IMPLANT;  Surgeon: Thomas Serna MD;  [facility]: [facility]     PHACOEMULSIFICATION WITH STANDARD INTRAOCULAR LENS IMPLANT Right 11/8/2018    Procedure: RIGHT PHACOEMULSIFICATION WITH STANDARD INTRAOCULAR LENS IMPLANT;  Surgeon: Thomas Serna MD;  Location: [facility]     THYROIDECTOMY        Social History     Socioeconomic History     Marital status:      Spouse name: Not on file     Number of children: Not on file     Years of education: Not on file     Highest education level: Not on file   Occupational History     Occupation:    Social Needs     Financial resource strain: Not on file     Food insecurity     Worry: Not on file     Inability: Not on file     Transportation needs     Medical: Not on file     Non-medical: Not on file   Tobacco Use     Smoking status: Never Smoker     Smokeless tobacco: Never Used     Tobacco comment: has had exposure to second hand smoke in the past from [employer], no current exposure   Substance and Sexual Activity     Alcohol use: No     Drug use: No     Sexual activity: Never   Lifestyle     Physical activity     Days per week: Not on file     Minutes per session: Not on file     Stress: Not on file   Relationships     Social connections     Talks on phone: Not on file     Gets together: Not on file     Attends Yazidi service: Not on file     Active member  of club or organization: Not on file     Attends meetings of clubs or organizations: Not on file     Relationship status: Not on file     Intimate partner violence     Fear of current or ex partner: Not on file     Emotionally abused: Not on file     Physically abused: Not on file     Forced sexual activity: Not on file   Other Topics Concern     Parent/sibling w/ CABG, MI or angioplasty before 65F 55M? 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     Hobby Hazards No     Sleep Concern Yes     Comment: difficulty staying asleep, up and down all night     Stress Concern No     Weight Concern Yes     Comment: would like to lose     Special Diet Yes     Comment: low card, low sugar diet     Back Care Yes     Comment: chiropratior     Exercise Yes     Comment: almost everyday     Bike Helmet No     Comment: does not ride bicycle any more     Seat Belt Yes     Self-Exams Yes   Social History Narrative     Not on file     Family History   Problem Relation Age of Onset     Cancer Father         skin and leukemia     Cerebrovascular Disease Maternal Grandfather      Cerebrovascular Disease Paternal Grandmother      Eye Disorder Paternal Grandmother         cataracts     Cerebrovascular Disease Paternal Grandfather      Heart Disease Paternal Grandfather      Cancer Sister         Breast Cancer     Eye Disorder Mother         cataracts     Eye Disorder Brother         cataract     Breast Cancer Daughter      Other Cancer Daughter         ovarian cancer     Glaucoma No family hx of      Macular Degeneration No family hx of      Lab Results   Component Value Date    A1C 10.5 01/10/2020    A1C 11.1 10/25/2019    A1C 10.2 07/08/2019    A1C 12.6 03/29/2019    A1C 11.1 10/19/2018     SUBJECTIVE FINDINGS:  79-year-old female returns to clinic for  onychomycosis.  Relates she is doing well.  She relates she does have numbness, tingling and neuropathy in her feet, she is using lotion on her feet, has had no ulcers or sores since I have seen her last, and she does not wear diabetic shoes and does not want any diabetic shoes.        OBJECTIVE FINDINGS:  DP and PT are 2/4 bilaterally.  She has dorsally contracted digits bilaterally.  She has minimal peripheral edema bilaterally.  No erythema, no drainage, no odor, no calor bilaterally.  She has dystrophic, thick and brittle nails with subungual debris, dystrophy and discoloration bilaterally to differing degrees, mostly on the left hallux nail.  She has decreased hair growth bilaterally.  No pain on palpation.     ASSESSMENT/PLAN:  Onychomycosis and onychauxis bilaterally.  She is diabetic with peripheral neuropathy.  She also has some peripheral vascular changes with arterial disease versus Raynaud disease or both.  Diagnosis and treatment options discussed with her.  All the nails were debrided or reduced bilaterally upon consent.  She will return to clinic and see me in 3 months.     Again, thank you for allowing me to participate in the care of your patient.        Sincerely,        Ehsan Araya DPM

## 2020-06-22 NOTE — PROGRESS NOTES
MTM ENCOUNTER  SUBJECTIVE/OBJECTIVE:                           Brandy Leon is a 79 year old female called for a follow-up visit. She was referred to me from Cailin Ponce.  Today's visit is a follow-up MTM visit from 2/5/2020     Patient consented to a telehealth visit: yes  Telemedicine Visit Details  Type of service:  Telephone visit  Start Time: 9:33 AM  End Time: 10:08 AM  Originating Location (pt. Location): Home  Distant Location (provider location):  [facility] MTM  Mode of Communication:  Telephone    Chief Complaint: Blood sugars, Oxybutynin alternative    Allergies/ADRs: Reviewed in Epic  Tobacco:  reports that she has never smoked. She has never used smokeless tobacco.  Alcohol: none  Caffeine: 2 cups/day of coffee  Activity: None  PMH: Reviewed in Epic    Diabetes:  Patient is currently taking metformin 1000mg twice daily, Lantus 33 units daily, Jardiance 10mg daily. Patientt is not experiencing side effects; she has not had any urinary tract infections or yeast infections.   SMBG: one-two times daily. Ranges (patient reported): 105 this am, 236 after breakfast yesterday. When reviewing her blood sugars she reports a majority of the readings are 100-110mg/dL.    Patient is not experiencing hypoglycemia  Recent symptoms of high blood sugar? none  Eye exam: up to date  Foot exam: up to date  ACEi/ARB: Yes: irbesartan/HCTZ.   Urine Albumin:   Lab Results   Component Value Date    UMALCR 20.15 10/25/2019      Aspirin: Taking 81mg daily and denies side effects.   Diet: Breakfast-instant oatmeal with some regular oatmeal with blueberries or eggs and toast.  oSmetimes doesn't eat lunch might have crackers and cheese, banana or orange. Supper: her son does the cooking. Has a bad habit of buying spiced gum drops.    Lab Results   Component Value Date    A1C 10.5 01/10/2020    A1C 11.1 10/25/2019    A1C 10.2 07/08/2019    A1C 12.6 03/29/2019    A1C 11.1 10/19/2018     Hypertension:  Patient is currently taking Avalide 150/12.5mg 1 tablet daily. She monitors her blood pressure and reports that it is within the normal range. No side effects reported.     Incontinence: current therapy includes oxybutynin XL 10mg in the morning and 5 mg before bed. Patient does complain of dry mouth. Patient does find this medication effective. Patient did meet with urology on 6/19. Initially, patient did not remember having this visit but then later she reported she saw that she had the visit on her piece of paper. Urology had suggested either myrbetriq or sanctura to minimize risk of side effects with oxybutynin. Patient does have 2 months left of oxybutynin.     Today's Vitals: There were no vitals taken for this visit.-phone visit    ASSESSMENT:                            Medication Adherence: Fair.     Diabetes: Needs improvement. Patient is not meeting A1c goal of <8%. Patient would benefit from updated A1c. Patient has a follow up with endo in August. Patient would benefit from limiting the amount of carbohydrates she is eating at breakfast and monitor blood sugars 2 hours after meals.    Hypertension: Stable.     Incontinence: Needs improvement. Patient may benefit from changing oxybutynin to myrbetriq.  Will discuss with urology. Patient would like to finish up her 2 month supply of oxybutynin first before switching. Also, need to make sure this is covered by insurance.     PLAN:                          Recommend updated A1c.  Could consider myrbetriq 25mg daily    I spent 30 minutes with this patient today. All changes were made via collaborative practice agreement with Cailin Ponce. A copy of the visit note was provided to the patient's primary care provider.    The patient was given a summary of these recommendations as an after visit summary via [employer].     Mirna Perez, Pharm.D, BCACP  Medication Therapy Management Pharmacist

## 2020-06-23 ENCOUNTER — ALLIED HEALTH/NURSE VISIT (OUTPATIENT)
Dept: PHARMACY | Facility: CLINIC | Age: 80
End: 2020-06-23
Payer: COMMERCIAL

## 2020-06-23 ENCOUNTER — TELEPHONE (OUTPATIENT)
Dept: PEDIATRICS | Facility: CLINIC | Age: 80
End: 2020-06-23

## 2020-06-23 DIAGNOSIS — Z79.4 TYPE 2 DIABETES MELLITUS WITH HYPERGLYCEMIA, WITH LONG-TERM CURRENT USE OF INSULIN (H): ICD-10-CM

## 2020-06-23 DIAGNOSIS — I10 HYPERTENSION GOAL BP (BLOOD PRESSURE) < 140/90: ICD-10-CM

## 2020-06-23 DIAGNOSIS — N32.81 OVERACTIVE BLADDER: Primary | ICD-10-CM

## 2020-06-23 DIAGNOSIS — E11.65 TYPE 2 DIABETES MELLITUS WITH HYPERGLYCEMIA, WITH LONG-TERM CURRENT USE OF INSULIN (H): ICD-10-CM

## 2020-06-23 PROCEDURE — 99606 MTMS BY PHARM EST 15 MIN: CPT | Performed by: PHARMACIST

## 2020-06-23 PROCEDURE — 99607 MTMS BY PHARM ADDL 15 MIN: CPT | Performed by: PHARMACIST

## 2020-06-23 RX ORDER — MIRABEGRON 50 MG/1
50 TABLET, EXTENDED RELEASE ORAL DAILY
Qty: 30 TABLET | Refills: 11 | Status: SHIPPED | OUTPATIENT
Start: 2020-06-23 | End: 2020-10-27 | Stop reason: ALTCHOICE

## 2020-06-23 RX ORDER — IRBESARTAN AND HYDROCHLOROTHIAZIDE 150; 12.5 MG/1; MG/1
TABLET, FILM COATED ORAL
Qty: 90 TABLET | Refills: 1
Start: 2020-06-23 | End: 2020-09-01

## 2020-06-23 NOTE — Clinical Note
Zoila Vazquez would be willing to try myrbetriq although she does have 2 months left of oxybutynin and would like to finish up this supply before switching. Would you want to send a prescription to the pharmacy to see if the medication is covered because I know that is a concern of hers? And if not covered we could start working on what financial help she might be eligible for. Let me know what you think. Thanks!  Laisha

## 2020-06-23 NOTE — PATIENT INSTRUCTIONS
Recommendations from today's MTM visit:                                                      Recommend updated A1c.  Could consider myrbetriq 25mg daily    It was great to speak with you today.  I value your experience and would be very thankful for your time with providing feedback on our clinic survey. You may receive a survey via email or text message in the next few days.     Next MTM visit: 1 month    To schedule another MTM appointment, please call the clinic directly or you may call the MTM scheduling line at [phone] or toll-free at [phone].     My Clinical Pharmacist's contact information:                                                      It was a pleasure talking with you today!  Please feel free to contact me with any questions or concerns you have.      Mirna Perez, Pharm.D, BCACP  Medication Therapy Management Pharmacist

## 2020-06-23 NOTE — TELEPHONE ENCOUNTER
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 Thank you.      Action Taken: Message routed to:  Primary Care p [phone]    Travel Screening: Not Applicable

## 2020-06-23 NOTE — TELEPHONE ENCOUNTER
I can not do that until she has her appointment with the neurologist and they evaluate her and give their recommendations related to her abnormal MRI of her brain

## 2020-06-30 ENCOUNTER — VIRTUAL VISIT (OUTPATIENT)
Dept: NEUROLOGY | Facility: CLINIC | Age: 80
End: 2020-06-30
Payer: COMMERCIAL

## 2020-06-30 DIAGNOSIS — R26.9 GAIT DISORDER: Primary | ICD-10-CM

## 2020-06-30 PROCEDURE — 99203 OFFICE O/P NEW LOW 30 MIN: CPT | Mod: 95 | Performed by: PSYCHIATRY & NEUROLOGY

## 2020-06-30 NOTE — PROGRESS NOTES
"Brandy Leon is a 79 year old female who is being evaluated via a billable telephone visit.      The patient has been notified of following:     \"This telephone visit will be conducted via a call between you and your physician/provider. We have found that certain health care needs can be provided without the need for a physical exam.  This service lets us provide the care you need with a short phone conversation.  If a prescription is necessary we can send it directly to your pharmacy.  If lab work is needed we can place an order for that and you can then stop by our lab to have the test done at a later time.    Telephone visits are billed at different rates depending on your insurance coverage. During this emergency period, for some insurers they may be billed the same as an in-person visit.  Please reach out to your insurance provider with any questions.    If during the course of the call the physician/provider feels a telephone visit is not appropriate, you will not be charged for this service.\"    Patient has given verbal consent for Telephone visit?  Yes    What phone number would you like to be contacted at? [phone]    How would you like to obtain your AVS? Mail a copy    Phone call duration: 30 minutes    Ed Brooks MD    "

## 2020-06-30 NOTE — PROGRESS NOTES
Visit Date:   06/30/2020      NEUROLOGY CONSULTATION      This is a telephone consultation.  Call initiated 9:30, call terminated time 10:00.      HISTORY OF PRESENT ILLNESS:  This patient is a 80-year-old right-handed woman seen for evaluation of weakness and shakiness.  Her daughter, Juliet, is on the phone call.  The patient reports that about a year ago, she was put on oxybutynin for urinary frequency.  She developed problems with leg cramps and imbalance.  A week or so ago, her medicine was switched to mirabegron.  This had an excellent effect on her symptoms.  She is feeling better since stopping the oxybutynin.  Her walking is good.  She was working in the garden the other day.  She had been on her knees for a while and had a difficult time getting up.  Her son had to come to her help.  She thinks that maybe she was dehydrated.  Her son does live with her.  She does not have urinary incontinence.  Her memory is good.  Her thinking is good.  She pays the bills for the household.  She does the inside chores.  She drives without difficulty.  She does the grocery shopping.  She denies headache.  She has no issues with hearing.  Her vision can at times be blurry.  She has no problem with speech or swallowing and no focal symptoms in her arms or legs.  She does have issues with her sleep.  She has sleep apnea and lately has not been using CPAP because it does not fit properly.  Her diet is good.  She does not exercise on a regular basis.      PAST MEDICAL HISTORY:  Significant for diabetes, high blood pressure, thyroid issues and elevated cholesterol.  She has not had a heart attack or stroke.  She does take a multivitamin.  Her medicines were reviewed.  She has not had pertinent surgery or trauma to the head or neck.  She does not drink or smoke.      SOCIAL HISTORY:  She is a .  She has 6 children.  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  She had fallen at work, tripped  over the gas hose.  There is a Workers' Compensation claim pending.  She is thinking about retiring.      FAMILY HISTORY:  Noncontributory.      PHYSICAL EXAMINATION:  Quite limited as this is a telephone visit.   NEUROLOGIC:  She scored of 21/22 on a bedside cognitive assessment.      I did review the MRI scan of the brain with her.  There is ventriculomegaly and parenchymal volume loss.  The question was raised of normal pressure hydrocephalus.      ASSESSMENT:   1.  MRI findings of doubtful significance.   2.  Symptoms of weakness and shakiness, likely related to the oxybutynin.   3.  Intermittent gait imbalance.      DISCUSSION:  The patient had a telephone consultation with the above problem list.  I suspect that the MRI findings are of doubtful clinical significance.  The patient does not have any findings or symptoms to suggest any of the main features of normal pressure hydrocephalus.  She was on oxybutynin, which was having side effects and those have resolved since she has discontinued the medicine.  I would not pursue additional investigations for her at this time.  I would not restrict her activities, but did suggest to her that with the virus epidemic, she probably should not be driving a school bus because of her risk for exposure.  I can see her in clinic for formal examination if that would be required.  Otherwise, she should have neurologic followup on an as-needed basis.         CHET HERRERA MD             D: 2020   T: 2020   MT: GRACE      Name:     PAUL LAST   MRN:      [id number]        Account:      [id number]   :      1940           Visit Date:   2020      Document: [id number]

## 2020-06-30 NOTE — LETTER
"    6/30/2020         RE: Brandy Leon  [address]  [place] [address]        Dear Colleague,    Thank you for referring your patient, Brandy Leon, to the [facility]. Please see a copy of my visit note below.    Brandy Leon is a 79 year old female who is being evaluated via a billable telephone visit.      The patient has been notified of following:     \"This telephone visit will be conducted via a call between you and your physician/provider. We have found that certain health care needs can be provided without the need for a physical exam.  This service lets us provide the care you need with a short phone conversation.  If a prescription is necessary we can send it directly to your pharmacy.  If lab work is needed we can place an order for that and you can then stop by our lab to have the test done at a later time.    Telephone visits are billed at different rates depending on your insurance coverage. During this emergency period, for some insurers they may be billed the same as an in-person visit.  Please reach out to your insurance provider with any questions.    If during the course of the call the physician/provider feels a telephone visit is not appropriate, you will not be charged for this service.\"    Patient has given verbal consent for Telephone visit?  Yes    What phone number would you like to be contacted at? [phone]    How would you like to obtain your AVS? Mail a copy    Phone call duration: 30 minutes    Ed Brooks MD      Visit Date:   06/30/2020      NEUROLOGY CONSULTATION      This is a telephone consultation.  Call initiated 9:30, call terminated time 10:00.      HISTORY OF PRESENT ILLNESS:  This patient is a 80-year-old right-handed woman seen for evaluation of weakness and shakiness.  Her daughter, Juliet, is on the phone call.  The patient reports that about a year ago, she was put on oxybutynin for urinary frequency.  She " developed problems with leg cramps and imbalance.  A week or so ago, her medicine was switched to mirabegron.  This had an excellent effect on her symptoms.  She is feeling better since stopping the oxybutynin.  Her walking is good.  She was working in the garden the other day.  She had been on her knees for a while and had a difficult time getting up.  Her son had to come to her help.  She thinks that maybe she was dehydrated.  Her son does live with her.  She does not have urinary incontinence.  Her memory is good.  Her thinking is good.  She pays the bills for the household.  She does the inside chores.  She drives without difficulty.  She does the grocery shopping.  She denies headache.  She has no issues with hearing.  Her vision can at times be blurry.  She has no problem with speech or swallowing and no focal symptoms in her arms or legs.  She does have issues with her sleep.  She has sleep apnea and lately has not been using CPAP because it does not fit properly.  Her diet is good.  She does not exercise on a regular basis.      PAST MEDICAL HISTORY:  Significant for diabetes, high blood pressure, thyroid issues and elevated cholesterol.  She has not had a heart attack or stroke.  She does take a multivitamin.  Her medicines were reviewed.  She has not had pertinent surgery or trauma to the head or neck.  She does not drink or smoke.      SOCIAL HISTORY:  She is a .  She has 6 children.  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  There is a Workers' Compensation claim pending.  She is thinking about retiring.      FAMILY HISTORY:  Noncontributory.      PHYSICAL EXAMINATION:  Quite limited as this is a telephone visit.   NEUROLOGIC:  She scored of 21/22 on a bedside cognitive assessment.      I did review the MRI scan of the brain with her.  There is ventriculomegaly and parenchymal volume loss.  The question was  raised of normal pressure hydrocephalus.      ASSESSMENT:   1.  MRI findings of doubtful significance.   2.  Symptoms of weakness and shakiness, likely related to the oxybutynin.   3.  Intermittent gait imbalance.      DISCUSSION:  The patient had a telephone consultation with the above problem list.  I suspect that the MRI findings are of doubtful clinical significance.  The patient does not have any findings or symptoms to suggest any of the main features of normal pressure hydrocephalus.  She was on oxybutynin, which was having side effects and those have resolved since she has discontinued the medicine.  I would not pursue additional investigations for her at this time.  I would not restrict her activities, but did suggest to her that with the virus epidemic, she probably should not be driving a school bus because of her risk for exposure.  I can see her in clinic for formal examination if that would be required.  Otherwise, she should have neurologic followup on an as-needed basis.         CHET BROOKS MD             D: 2020   T: 2020   MT: PK      Name:     PAUL LAST   MRN:      [id number]        Account:      [id number]   :      1940           Visit Date:   2020      Document: [id number]       Again, thank you for allowing me to participate in the care of your patient.        Sincerely,        Chet Brooks MD

## 2020-07-01 PROBLEM — R26.9 GAIT DISORDER: Status: ACTIVE | Noted: 2020-07-01

## 2020-07-07 ENCOUNTER — TELEPHONE (OUTPATIENT)
Dept: PHARMACY | Facility: CLINIC | Age: 80
End: 2020-07-07

## 2020-07-07 NOTE — TELEPHONE ENCOUNTER
Called patient to discuss cost of myrbetriq. Per patient's pharmacy cost $101.09. patient did  the prescription from the pharmacy. Patient stopped oxybutynin and started mybetriq. Per patient's neurology visit her symptoms (weakness and imbalance) have improved.     Recommend patient contact the [place] Prescription Assistance Program/Fund (Francesca Correa and Kaykay Salmon) at [phone] to see if she qualifies for assistance for myrbetriq.    Mirna Perez, Pharm.D, [facility]  Medication Therapy Management Pharmacist  .

## 2020-07-09 NOTE — PROGRESS NOTES
03/09/20 0800   Signing Clinician's Name / Credentials   Signing clinician's name / credentials Leia Abraham DPT NCS   Session Number   Session Number 2   Authorization status [employer] 20 visits    Progress Note/Recertification   Recertification Due Date 05/30/20   Goal 1   Goal Identifier sit<>stand    Goal Description Zoila will improve sit<> 30 secs from 5 reps to 9 or > to show improved LE strength and improved stability with this transfer to prevent falls.    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    Target Date 04/30/20   Goal 3   Goal Identifier DGI   Goal Description Zoila will improve score on DGI from 15 to 19 or > in order to show decreased risk of falls and improved confidence with community ambulation.    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 Right knee scab and face injured  (both eyes and right cheek black/green). Did not work the rest of last week after the fall (worked the day of the fall). 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 Has a para with her 99% of the time.    Objective Measure 1   Objective Measure 25fTW at her self selected speed 9.47 seconds and 17 steps.    Details 25fTW with can  11.37 seconds and 17 steps.  REpeat with tripod cance 11.19 seconds and 17 steps.    Objective Measure 2   Objective Measure has facial bruises -both eyes and right cheek-green/black bruises   Therapeutic Procedure/exercise   Therapeutic Procedures: strength, endurance, ROM, flexibillity minutes (94793) 12   Treatment Detail 1. She showed me sit to stand ex without hands 5 reps, knees were OK. 2. Heel raises with u/e support 3. marches with u/e support. Keep  doing these exs and exs by Timothy at [facility] for her back (appt today).    Progress she is doing the HEP   Neuromuscular Re-education   Neuromuscular re-ed of mvmt, balance, coord, kinesthetic sense, posture, proprioception minutes (29560) 6   Treatment Detail Demo of the sensory tripod for balance. WEnt over how her neuropathy affects her balance and how cane can help. Fall risk: darkness, uneven ground and head motion. Handout issued on this.    Gait Training   Gait Training Minutes, includes stair climbing (15951) 15   Patient Response Cane is helpful but she isnt loving it   Treatment Detail amb at self selected speed and faster. Right arm is not held close to body like last appt. It swings a little. Not much arm swing bilaterally when going fast. Amb with head motion she slows and weaves. Then amb with cane and tripod cane. Amb with cane and head motion adn she still weaves and slows but not as much as without cane. Multiple turns with cane. Explained that with neuropathy the cane can really be helpful when out of house to prevent a fall. Recommend she does not get back to work yet. needs to have form filled out by primary care clinic.    Education   Learner Patient   Readiness Acceptance   Method Explanation;Demonstration   Response Verbalizes Understanding;Demonstrates Understanding   Communication with other professionals   Communication with other professionals Messaging with Timothy Estrada at [facility], mekhi'beatriz pt today for right knee injury and back. NOT treating her balance.   Plan   Homework cane   Home program sideglides by [facility] PT for her back   Updates to plan of care she doesnt use night lights much, she turns the lights on.    Plan for next session balance work-stepping, standing head motion, add to HEP for strengthening.   Comments   Comments neuropathy in feet (DM) is a big factor in her balance problem. Needs to use a cane for community mobility at this time (recent fall with injury)   Total Session Time  "  Timed Code Treatment Minutes 33   Total Treatment Time (sum of timed and untimed services) 33   Discharge note: Pt was not seen after this second appt due to COVID 19 and \"stay at home \" orders. She was contacted on  by care coordinator and Zoila reported \"feeling better and doesn't need to come in\".     Leia Abraham DPT, MPT, NCS  Physical Therapist   Board Certified Neurologic Clinical Specialist     [facility]   [address] [place] [address]   [email or web address]   [phone]   Clinic: [phone] //   Fax: [phone]  "

## 2020-07-16 DIAGNOSIS — G47.33 OSA (OBSTRUCTIVE SLEEP APNEA): ICD-10-CM

## 2020-07-16 DIAGNOSIS — E11.65 TYPE 2 DIABETES MELLITUS WITH HYPERGLYCEMIA (H): ICD-10-CM

## 2020-07-20 DIAGNOSIS — Z79.4 TYPE 2 DIABETES MELLITUS WITH HYPERGLYCEMIA, WITH LONG-TERM CURRENT USE OF INSULIN (H): Primary | ICD-10-CM

## 2020-07-20 DIAGNOSIS — E11.65 TYPE 2 DIABETES MELLITUS WITH HYPERGLYCEMIA, WITH LONG-TERM CURRENT USE OF INSULIN (H): Primary | ICD-10-CM

## 2020-07-20 RX ORDER — PEN NEEDLE, DIABETIC 32GX 5/32"
NEEDLE, DISPOSABLE MISCELLANEOUS
Qty: 100 EACH | Refills: 2 | Status: SHIPPED | OUTPATIENT
Start: 2020-07-20 | End: 2021-09-29

## 2020-07-20 NOTE — TELEPHONE ENCOUNTER
insulin pen needle (BD JUAN C U/F) 32G X 4 MM miscellaneous   USE 1 DAILY AS DIRECTED     Last Written Prescription Date:  7/31/19  Last Fill Quantity: 100,   # refills: 3  Last Office Visit : 1/13/20  Future Office visit:  8/24/20    Refill to pharmacy.

## 2020-07-23 NOTE — TELEPHONE ENCOUNTER
" empagliflozin (JARDIANCE) 10 MG TABS tablet  Last Written Prescription Date:  10/30/2019  Last Fill Quantity: 90,   # refills: 3  Last Office Visit : 1/13/20  Future Office visit:  8/24/20    Routing refill request to provider for review/approval because:  A1C  - last 1/10/20=10.5  Medication is reported/historical.  Chart reviewed :  1.Discontinued 11/19/2019 by Cailin Ponce/ side effects.  2.Per 11//25/2019 telephone note:  Endocrine/ Arturo:    \"Plan: start Jardiance  increase Lantus to 40 units daily\"  3. Endocrine/ Arturo 1/13/20:    \"Plan:   - Lantus 40 units dialy  - metformin  - jardiance  Start Victoza, titrate to 1.8 mg as tolerated  - diabetic diet: 45gram of carbs with each meal, one time snack of less than 20 grams of carbs between meals\"  "

## 2020-07-28 ENCOUNTER — ALLIED HEALTH/NURSE VISIT (OUTPATIENT)
Dept: PHARMACY | Facility: CLINIC | Age: 80
End: 2020-07-28
Payer: COMMERCIAL

## 2020-07-28 DIAGNOSIS — Z79.4 TYPE 2 DIABETES MELLITUS WITH HYPERGLYCEMIA, WITH LONG-TERM CURRENT USE OF INSULIN (H): Primary | ICD-10-CM

## 2020-07-28 DIAGNOSIS — E11.65 TYPE 2 DIABETES MELLITUS WITH HYPERGLYCEMIA, WITH LONG-TERM CURRENT USE OF INSULIN (H): Primary | ICD-10-CM

## 2020-07-28 DIAGNOSIS — N32.81 OVERACTIVE BLADDER: ICD-10-CM

## 2020-07-28 PROCEDURE — 99606 MTMS BY PHARM EST 15 MIN: CPT | Performed by: PHARMACIST

## 2020-07-28 PROCEDURE — 99607 MTMS BY PHARM ADDL 15 MIN: CPT | Performed by: PHARMACIST

## 2020-07-28 NOTE — PROGRESS NOTES
MTM ENCOUNTER  SUBJECTIVE/OBJECTIVE:                           Brandy Leon is a 79 year old female called for a follow-up visit. She was referred to me from Cailin Ponce.  Today's visit is a follow-up MTM visit from 6/23/2020.     Patient consented to a telehealth visit: yes  Telemedicine Visit Details  Type of service:  Telephone visit  Start Time: 9:33 AM  End Time: 9:59 AM  Originating Location (pt. Location): Home  Distant Location (provider location):  [facility] MTM  Mode of Communication:  Telephone    Chief Complaint: Blood Sugars.    Allergies/ADRs: Reviewed in EHR  Tobacco:  reports that she has never smoked. She has never used smokeless tobacco.  Alcohol: none  Caffeine: 1 cups/day of coffee  PMH: Reviewed in EHR    Medication Adherence/Access: Myrbetriq is expensive, patient has not contacted prescription assistance program.     Diabetes:  Patient is currently taking metformin 1000mg twice daily, Lantus 33 units daily, Jardiance 10mg daily. Patientt is not experiencing side effects  SMBG: one-two times daily. Ranges (patient reported): 130this am, and later in the day they are around 200mg/dL. Patient did not have her meter near by to report other readings.  Patient is not experiencing hypoglycemia  Recent symptoms of high blood sugar? none  Eye exam: up to date  Foot exam: up to date  ACEi/ARB: Yes: irbesartan/HCTZ.   Urine Albumin:   Lab Results   Component Value Date    UMALCR 20.15 10/25/2019      Aspirin: Taking 81mg daily and denies side effects.   Diet: Breakfast-cereal or egg & sausage croissant Sometimes doesn't eat lunch might have crackers and cheese, banana or orange. Supper: her son does the cooking. Last night had steak and brat with piece of bread, salad (kale, lettuce, cranberries). May snack on crackers and cheese or popcorn.   Hemoglobin A1C   Date Value Ref Range Status   01/10/2020 10.5 (H) 0 - 5.6 % Final     Comment:     Normal <5.7% Prediabetes  5.7-6.4%  Diabetes 6.5% or higher - adopted from ADA   consensus guidelines.     10/25/2019 11.1 (H) 0 - 5.6 % Final     Comment:     Normal <5.7% Prediabetes 5.7-6.4%  Diabetes 6.5% or higher - adopted from ADA   consensus guidelines.     07/08/2019 10.2 (H) 0 - 5.6 % Final     Comment:     Normal <5.7% Prediabetes 5.7-6.4%  Diabetes 6.5% or higher - adopted from ADA   consensus guidelines.         Incontinence: current therapy includes Myrbetriq 50mg daily. Patient is not experiencing side effects. Patient has not noticed any improvement in her symptoms. They have not worsened either.    Today's Vitals: There were no vitals taken for this visit.-phone visit    ASSESSMENT:                            Medication Adherence: Fair.     Diabetes: Needs improvement. Patient is not meeting A1c goal of <8%. Patient would benefit from updated A1c. Patient has a follow up with endo in August. Patient would benefit from limiting the amount of carbohydrates she is eating at breakfast and monitor blood sugars 2 hours after meals.    Incontinence: Needs improvement. Educated patient that it does take time for Myrbetriq to work and to continue taking. Gave patient prescription assistance number to call.    PLAN:                          RecRecommend updated A1c.  Provided patient prescription assistance phone number.    I spent 26 minutes with this patient today. All changes were made via collaborative practice agreement with Cailin Ponce. A copy of the visit note was provided to the patient's primary care provider.    The patient was given a summary of these recommendations as an after visit summary via [employer].     Mirna Perez, Pharm.D, BCACP  Medication Therapy Management Pharmacist

## 2020-07-28 NOTE — PATIENT INSTRUCTIONS
Recommendations from today's MTM visit:                                                      Recommend updated A1c    Contact the [place] Prescription Assistance Program/Fund (Francesca Correa and Kaykay Salmon) at [phone].    It was great to speak with you today.  I value your experience and would be very thankful for your time with providing feedback on our clinic survey. You may receive a survey via email or text message in the next few days.     Next MTM visit: 1 month    To schedule another MTM appointment, please call the clinic directly or you may call the MTM scheduling line at [phone] or toll-free at [phone].     My Clinical Pharmacist's contact information:                                                      It was a pleasure talking with you today!  Please feel free to contact me with any questions or concerns you have.      Mirna Perez, Pharm.D, [facility]  Medication Therapy Management Pharmacist

## 2020-08-04 ENCOUNTER — TELEPHONE (OUTPATIENT)
Dept: PEDIATRICS | Facility: CLINIC | Age: 80
End: 2020-08-04

## 2020-08-04 NOTE — TELEPHONE ENCOUNTER
FYI~ Aug. 4, 2020 I spoke with Zoila she is in need of financial assistance for medication.    We reviewed the Prescription Assistance Program for manfacturer brand name assistance programs, gross income, insurance and Rx list.     assistance applications will be completed for:  Jardiance, Lantus Solostar, Myrbetriq & Synthroid.  When approved, Zoila will receive this medication at no cost for the remainder of 2020.    Francesca Correa  Prescription   Pharmacy Assistance  [phone]

## 2020-08-20 ENCOUNTER — ALLIED HEALTH/NURSE VISIT (OUTPATIENT)
Dept: NURSING | Facility: CLINIC | Age: 80
End: 2020-08-20
Payer: COMMERCIAL

## 2020-08-20 DIAGNOSIS — Z79.4 TYPE 2 DIABETES MELLITUS WITH HYPERGLYCEMIA, WITH LONG-TERM CURRENT USE OF INSULIN (H): Primary | ICD-10-CM

## 2020-08-20 DIAGNOSIS — E11.65 TYPE 2 DIABETES MELLITUS WITH HYPERGLYCEMIA, WITH LONG-TERM CURRENT USE OF INSULIN (H): Primary | ICD-10-CM

## 2020-08-20 DIAGNOSIS — E11.65 TYPE 2 DIABETES MELLITUS WITH HYPERGLYCEMIA, WITH LONG-TERM CURRENT USE OF INSULIN (H): ICD-10-CM

## 2020-08-20 DIAGNOSIS — Z79.4 TYPE 2 DIABETES MELLITUS WITH HYPERGLYCEMIA, WITH LONG-TERM CURRENT USE OF INSULIN (H): ICD-10-CM

## 2020-08-20 LAB — HBA1C MFR BLD: 10 % (ref 0–5.6)

## 2020-08-20 PROCEDURE — 99207 ZZC NO CHARGE NURSE ONLY: CPT

## 2020-08-20 PROCEDURE — 83036 HEMOGLOBIN GLYCOSYLATED A1C: CPT | Performed by: NURSE PRACTITIONER

## 2020-08-20 PROCEDURE — 36415 COLL VENOUS BLD VENIPUNCTURE: CPT | Performed by: NURSE PRACTITIONER

## 2020-08-21 NOTE — RESULT ENCOUNTER NOTE
Arnol Leon,    Attached are your test results.  Please keep appointment with Dr Morris as planned    Please contact us if you have any questions.    Cailin Ponce, CNP

## 2020-08-24 ENCOUNTER — VIRTUAL VISIT (OUTPATIENT)
Dept: ENDOCRINOLOGY | Facility: CLINIC | Age: 80
End: 2020-08-24
Payer: COMMERCIAL

## 2020-08-24 ENCOUNTER — TELEPHONE (OUTPATIENT)
Dept: UROLOGY | Facility: CLINIC | Age: 80
End: 2020-08-24

## 2020-08-24 DIAGNOSIS — E11.65 TYPE 2 DIABETES MELLITUS WITH HYPERGLYCEMIA, WITH LONG-TERM CURRENT USE OF INSULIN (H): ICD-10-CM

## 2020-08-24 DIAGNOSIS — Z79.4 TYPE 2 DIABETES MELLITUS WITH HYPERGLYCEMIA, WITH LONG-TERM CURRENT USE OF INSULIN (H): ICD-10-CM

## 2020-08-24 DIAGNOSIS — E78.5 DYSLIPIDEMIA, GOAL LDL BELOW 100: ICD-10-CM

## 2020-08-24 PROCEDURE — 99214 OFFICE O/P EST MOD 30 MIN: CPT | Mod: 95 | Performed by: INTERNAL MEDICINE

## 2020-08-24 RX ORDER — HYDROCHLOROTHIAZIDE 12.5 MG/1
TABLET ORAL
COMMUNITY
Start: 2020-06-08 | End: 2020-09-01

## 2020-08-24 RX ORDER — IRBESARTAN 75 MG/1
TABLET ORAL
COMMUNITY
Start: 2020-04-13 | End: 2020-09-01

## 2020-08-24 NOTE — PROGRESS NOTES
"Endocrinology and Diabetes Clinic    The patient has been notified of following:     \"This telephone visit will be conducted via a call between you and your physician/provider. We have found that certain health care needs can be provided without the need for a physical exam.  This service lets us provide the care you need with a short phone conversation.  If a prescription is necessary we can send it directly to your pharmacy.  If lab work is needed we can place an order for that and you can then stop by our lab to have the test done at a later time.    Telephone visits are billed at different rates depending on your insurance coverage. During this emergency period, for some insurers they may be billed the same as an in-person visit.  Please reach out to your insurance provider with any questions.    If during the course of the call the physician/provider feels a telephone visit is not appropriate, you will not be charged for this service.\"    Patient has given verbal consent for Telephone visit? Yes    What phone number would you like to be contacted at? [phone]    How would you like to obtain your AVS? vipin    Wants to discuss taking Myrbetriq. Also takes just irbesartan not the combination. Also is not taking any hydrochlorothiazide.     Phone call duration: 28 minutes    Interall history:   Brandy Leon is a 80 year old with T2DM here for follow up. Last seen on 1/20. Pt missed follow up appointments since. When I called her to check she noted that blood sugars are going well and she would not need to be seen at that point.  Has been doing ok  Medications Lantus , metformin and Lardiance  SMBGs; chechs only in the morning before breakfast. 130-160  Hypoglycemia none    Yeast infection resolved.    Meds: Lantus 33 units once daily. Metformin. Jardiance    Complications: neuropathy  Eye: has yearly eye exam in September, no diabetic eye disease    Symptoms: denies numbness in her " feet      Medications:   Current Outpatient Medications   Medication Sig Dispense Refill     aspirin 81 MG EC tablet Take 1 tablet by mouth daily. 90 tablet 3     Cranberry-Vitamin C-Vitamin E (CRANBERRY PLUS VITAMIN C) 4200-20-3 MG-MG-UNIT CAPS Take 1 capsule by mouth 2 times daily       empagliflozin (JARDIANCE) 10 MG TABS tablet Take 1 tablet (10 mg) by mouth daily 90 tablet 3     fexofenadine (ALLEGRA) 180 MG tablet Take 180 mg by mouth as needed        GLUCOSAMINE CHONDROITIN COMPLX OR 2 Daily       ibuprofen (ADVIL/MOTRIN) 200 MG capsule Take 600 mg by mouth every 4 hours as needed        insulin glargine (LANTUS SOLOSTAR) 100 UNIT/ML pen Take 33 units daily. Add 2 units to prime. 45 mL 1     irbesartan (AVAPRO) 75 MG tablet TAKE 1 TABLET BY MOUTH DAILY WITH HYDROCHLOROTHIAZIDE       latanoprost (XALATAN) 0.005 % ophthalmic solution Place 1 drop into both eyes At Bedtime 3 Bottle 3     metFORMIN (GLUCOPHAGE) 1000 MG tablet TAKE 1 TABLET (1,000 MG) BY MOUTH 2 TIMES DAILY (WITH MEALS) 180 tablet 0     mirabegron (MYRBETRIQ) 50 MG 24 hr tablet Take 1 tablet (50 mg) by mouth daily 30 tablet 11     MULTIVITAMIN OR 1 tablet daily       Omega-3 Fatty Acids (OMEGA 3 PO) Take by mouth 2 times daily.        rosuvastatin (CRESTOR) 5 MG tablet TAKE 1 TABLET BY MOUTH TWICE WEEKLY 90 tablet 3     SYNTHROID 137 MCG tablet Take 1 tablet (137 mcg) by mouth daily 90 tablet 3     hydrochlorothiazide (HYDRODIURIL) 12.5 MG tablet TAKE 1/2 TABLET BY MOUTH DAILY       insulin pen needle (BD JUAN C U/F) 32G X 4 MM miscellaneous USE 1 DAILY AS DIRECTED. 100 each 2     irbesartan-hydrochlorothiazide (AVALIDE) 150-12.5 MG tablet TAKE 1 TABLET BY MOUTH DAILY (Patient not taking: Reported on 8/24/2020) 90 tablet 1     ONE TOUCH DELICA LANCETS MISC 1 each 2 times daily. One Touch Delica   100 each 12     ONETOUCH ULTRA test strip USE TO TEST TWICE A  strip 4     order for DME Glucometer covered by insurance. 1 each 0       Review of  Systems: in addition to the stated above  GENERAL: Negative  SKIN: Negative  HENT: Negative   EYE: Negative  HEART: Negative  RESPIRATORY: Negative   GI: Negative  : Negative  MSK: Negative  BLOOD/LYMPH: Negative  NEUROLOGIC: Negative   PSYCH: Negative    Physical Examination:  not currently breastfeeding.  There is no height or weight on file to calculate BMI.    Wt Readings from Last 4 Encounters:   03/13/20 79.4 kg (175 lb)   02/07/20 79.6 kg (175 lb 8 oz)   01/13/20 81 kg (178 lb 9.2 oz)   11/20/19 84.4 kg (186 lb)        Reported vitals:  There were no vitals taken for this visit.   healthy, alert and no distress  PSYCH: Alert and oriented times 3; coherent speech, normal   rate and volume, able to articulate logical thoughts, able   to abstract reason, no tangential thoughts, no hallucinations   or delusions  Her affect is normal and pleasant  RESP: No cough, no audible wheezing, able to talk in full sentences  Remainder of exam unable to be completed due to telephone visits    Labs and Studies:   Lab Results   Component Value Date     02/07/2020    CHLORIDE 100 02/07/2020    CO2 31 02/07/2020     (H) 02/07/2020    CR 0.82 02/07/2020    CR 0.72 10/25/2019    CR 0.81 05/10/2019    CR 0.83 10/19/2018    CR 0.68 06/25/2018    FREDY 9.9 02/07/2020    ALBUMIN 3.6 10/25/2019    ALKPHOS 77 06/25/2018    LDL 91 10/25/2019    HDL 57 10/25/2019    TRIG 254 (H) 10/25/2019     Lab Results   Component Value Date    MICROL 16 10/25/2019    MICROL 50 05/10/2019    MICROL 17 06/25/2018    MICROL 9 12/22/2017    MICROL 14 02/09/2017     Lab Results   Component Value Date    A1C 10.0 (H) 08/20/2020    A1C 10.5 (H) 01/10/2020    A1C 11.1 (H) 10/25/2019    A1C 10.2 (H) 07/08/2019    A1C 12.6 (A) 03/29/2019       Lab Results   Component Value Date    HGB 15.4 02/07/2020        Assessment:  1. Elderly woman with T2DM with still high A1c on lantus, Jardiance and metformin. Poor glucose control with high A1c, mild  fasting hyperglycemia. Most likely post-prandial hyperglycemia after dinner. We have been trying to increase Lantus before however pt appears unable to do so.   Will try Glipizide again for meal time coverage. Discussed to call us if she has problems with hypglycemia. Tried to order  GLP-1 several times in the past,,  However was too expensive.    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 A1c needs to be below 8.5%    Discussed diet, which could be improved. Pt reluctantly took book about diabetic diet, declines seeing a dietitian.    2. Feet doing well  3. Cardiovascular prevention/HTN/Lipids; on ARB, blood pressure previously well controlled, on Jardiance, on small dose Rosuvastatin LDL 91     Plan:   - Lantus 40 units dialy  - metformin  - jardiance  Start Glipizide XL 10 once daily  - diabetic diet: 45gram of carbs with each meal, one time snack of less than 20 grams of carbs between meals    Needs to follow up in 3 m      This was a 25 min visit of which more than 23 minutes were spent in counseling in regards to diagnosis, clinical consequences and treatment indications and options of blood glucose control    Rachel Morris MD  Endocrinology and Diabetes  [facility]  [address]  [place] [address]  Tel [phone] Leon is a 80 year old female who is being evaluated via a billable telephone visit.        HPI: Elderly woman with  T2DM since 1998, longstanding poor control,   Complication of neuropathy

## 2020-08-24 NOTE — LETTER
"    8/24/2020         RE: Brandy Leon  [address]  [place] [address]        Dear Colleague,    Thank you for referring your patient, Brandy Leon, to the [facility]. Please see a copy of my visit note below.    Endocrinology and Diabetes Clinic    The patient has been notified of following:     \"This telephone visit will be conducted via a call between you and your physician/provider. We have found that certain health care needs can be provided without the need for a physical exam.  This service lets us provide the care you need with a short phone conversation.  If a prescription is necessary we can send it directly to your pharmacy.  If lab work is needed we can place an order for that and you can then stop by our lab to have the test done at a later time.    Telephone visits are billed at different rates depending on your insurance coverage. During this emergency period, for some insurers they may be billed the same as an in-person visit.  Please reach out to your insurance provider with any questions.    If during the course of the call the physician/provider feels a telephone visit is not appropriate, you will not be charged for this service.\"    Patient has given verbal consent for Telephone visit? Yes    What phone number would you like to be contacted at? [phone]    How would you like to obtain your AVS? levijendon    Wants to discuss taking Myrbetriq. Also takes just irbesartan not the combination. Also is not taking any hydrochlorothiazide.     Phone call duration: 28 minutes    Interall history:   Brandy Leon is a 80 year old with T2DM here for follow up. Last seen on 1/20. Pt missed follow up appointments since. When I called her to check she noted that blood sugars are going well and she would not need to be seen at that point.  Has been doing ok  Medications Lantus , metformin and Lardiance  SMBGs; chechs only in the morning before breakfast. " 130-160  Hypoglycemia none    Yeast infection resolved.    Meds: Lantus 33 units once daily. Metformin. Jardiance    Complications: neuropathy  Eye: has yearly eye exam in September, no diabetic eye disease    Symptoms: denies numbness in her feet      Medications:   Current Outpatient Medications   Medication Sig Dispense Refill     aspirin 81 MG EC tablet Take 1 tablet by mouth daily. 90 tablet 3     Cranberry-Vitamin C-Vitamin E (CRANBERRY PLUS VITAMIN C) 4200-20-3 MG-MG-UNIT CAPS Take 1 capsule by mouth 2 times daily       empagliflozin (JARDIANCE) 10 MG TABS tablet Take 1 tablet (10 mg) by mouth daily 90 tablet 3     fexofenadine (ALLEGRA) 180 MG tablet Take 180 mg by mouth as needed        GLUCOSAMINE CHONDROITIN COMPLX OR 2 Daily       ibuprofen (ADVIL/MOTRIN) 200 MG capsule Take 600 mg by mouth every 4 hours as needed        insulin glargine (LANTUS SOLOSTAR) 100 UNIT/ML pen Take 33 units daily. Add 2 units to prime. 45 mL 1     irbesartan (AVAPRO) 75 MG tablet TAKE 1 TABLET BY MOUTH DAILY WITH HYDROCHLOROTHIAZIDE       latanoprost (XALATAN) 0.005 % ophthalmic solution Place 1 drop into both eyes At Bedtime 3 Bottle 3     metFORMIN (GLUCOPHAGE) 1000 MG tablet TAKE 1 TABLET (1,000 MG) BY MOUTH 2 TIMES DAILY (WITH MEALS) 180 tablet 0     mirabegron (MYRBETRIQ) 50 MG 24 hr tablet Take 1 tablet (50 mg) by mouth daily 30 tablet 11     MULTIVITAMIN OR 1 tablet daily       Omega-3 Fatty Acids (OMEGA 3 PO) Take by mouth 2 times daily.        rosuvastatin (CRESTOR) 5 MG tablet TAKE 1 TABLET BY MOUTH TWICE WEEKLY 90 tablet 3     SYNTHROID 137 MCG tablet Take 1 tablet (137 mcg) by mouth daily 90 tablet 3     hydrochlorothiazide (HYDRODIURIL) 12.5 MG tablet TAKE 1/2 TABLET BY MOUTH DAILY       insulin pen needle (BD JUAN C U/F) 32G X 4 MM miscellaneous USE 1 DAILY AS DIRECTED. 100 each 2     irbesartan-hydrochlorothiazide (AVALIDE) 150-12.5 MG tablet TAKE 1 TABLET BY MOUTH DAILY (Patient not taking: Reported on 8/24/2020)  90 tablet 1     ONE TOUCH DELICA LANCETS MISC 1 each 2 times daily. One Touch Delica   100 each 12     ONETOUCH ULTRA test strip USE TO TEST TWICE A  strip 4     order for DME Glucometer covered by insurance. 1 each 0       Review of Systems: in addition to the stated above  GENERAL: Negative  SKIN: Negative  HENT: Negative   EYE: Negative  HEART: Negative  RESPIRATORY: Negative   GI: Negative  : Negative  MSK: Negative  BLOOD/LYMPH: Negative  NEUROLOGIC: Negative   PSYCH: Negative    Physical Examination:  not currently breastfeeding.  There is no height or weight on file to calculate BMI.    Wt Readings from Last 4 Encounters:   03/13/20 79.4 kg (175 lb)   02/07/20 79.6 kg (175 lb 8 oz)   01/13/20 81 kg (178 lb 9.2 oz)   11/20/19 84.4 kg (186 lb)        Reported vitals:  There were no vitals taken for this visit.   healthy, alert and no distress  PSYCH: Alert and oriented times 3; coherent speech, normal   rate and volume, able to articulate logical thoughts, able   to abstract reason, no tangential thoughts, no hallucinations   or delusions  Her affect is normal and pleasant  RESP: No cough, no audible wheezing, able to talk in full sentences  Remainder of exam unable to be completed due to telephone visits    Labs and Studies:   Lab Results   Component Value Date     02/07/2020    CHLORIDE 100 02/07/2020    CO2 31 02/07/2020     (H) 02/07/2020    CR 0.82 02/07/2020    CR 0.72 10/25/2019    CR 0.81 05/10/2019    CR 0.83 10/19/2018    CR 0.68 06/25/2018    FREDY 9.9 02/07/2020    ALBUMIN 3.6 10/25/2019    ALKPHOS 77 06/25/2018    LDL 91 10/25/2019    HDL 57 10/25/2019    TRIG 254 (H) 10/25/2019     Lab Results   Component Value Date    MICROL 16 10/25/2019    MICROL 50 05/10/2019    MICROL 17 06/25/2018    MICROL 9 12/22/2017    MICROL 14 02/09/2017     Lab Results   Component Value Date    A1C 10.0 (H) 08/20/2020    A1C 10.5 (H) 01/10/2020    A1C 11.1 (H) 10/25/2019    A1C 10.2 (H) 07/08/2019     A1C 12.6 (A) 03/29/2019       Lab Results   Component Value Date    HGB 15.4 02/07/2020        Assessment:  1. Elderly woman with T2DM with still high A1c on lantus, Jardiance and metformin. Poor glucose control with high A1c, mild fasting hyperglycemia. Most likely post-prandial hyperglycemia after dinner. We have been trying to increase Lantus before however pt appears unable to do so.   Will try Glipizide again for meal time coverage. Discussed to call us if she has problems with hypglycemia. Tried to order  GLP-1 several times in the past,,  However was too expensive.    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 A1c needs to be below 8.5%    Discussed diet, which could be improved. Pt reluctantly took book about diabetic diet, declines seeing a dietitian.    2. Feet doing well  3. Cardiovascular prevention/HTN/Lipids; on ARB, blood pressure previously well controlled, on Jardiance, on small dose Rosuvastatin LDL 91     Plan:   - Lantus 40 units dialy  - metformin  - jardiance  Start Glipizide XL 10 once daily  - diabetic diet: 45gram of carbs with each meal, one time snack of less than 20 grams of carbs between meals    Needs to follow up in 3 m      This was a 25 min visit of which more than 23 minutes were spent in counseling in regards to diagnosis, clinical consequences and treatment indications and options of blood glucose control    Rachel Morris MD  Endocrinology and Diabetes  [address]  [facility] [address]  Tel [phone] Leon is a 80 year old female who is being evaluated via a billable telephone visit.        HPI: Elderly woman with  T2DM since 1998, longstanding poor control,   Complication of neuropathy          Again, thank you for allowing me to participate in the care of your patient.        Sincerely,        Rachel Morris MD

## 2020-08-24 NOTE — PATIENT INSTRUCTIONS
Increase Lantus to 37 units once daily  Restart Glipizde ER 10 mg twice daily  Check blood sugars fasting, before dinner and at bedtime for one week prior to the next visit  in 6 weeks follow up    increase Avapro to 75 mg one full tablet

## 2020-08-24 NOTE — TELEPHONE ENCOUNTER
8/24 Provided phone number [phone] to schedule 6 months follow up around  9/19/20.     Mary Brothers   Procedure    Ortho/Sports Med/Pod/Ent/Eye/Surgical Specialties  [facility]   [phone]

## 2020-08-30 DIAGNOSIS — N32.81 OVERACTIVE BLADDER: ICD-10-CM

## 2020-08-30 DIAGNOSIS — R35.0 URINARY FREQUENCY: ICD-10-CM

## 2020-09-01 ENCOUNTER — ALLIED HEALTH/NURSE VISIT (OUTPATIENT)
Dept: PHARMACY | Facility: CLINIC | Age: 80
End: 2020-09-01
Payer: COMMERCIAL

## 2020-09-01 DIAGNOSIS — K59.00 CONSTIPATION, UNSPECIFIED CONSTIPATION TYPE: ICD-10-CM

## 2020-09-01 DIAGNOSIS — E11.65 TYPE 2 DIABETES MELLITUS WITH HYPERGLYCEMIA, WITH LONG-TERM CURRENT USE OF INSULIN (H): Primary | ICD-10-CM

## 2020-09-01 DIAGNOSIS — N32.81 OVERACTIVE BLADDER: ICD-10-CM

## 2020-09-01 DIAGNOSIS — Z79.4 TYPE 2 DIABETES MELLITUS WITH HYPERGLYCEMIA, WITH LONG-TERM CURRENT USE OF INSULIN (H): Primary | ICD-10-CM

## 2020-09-01 DIAGNOSIS — I10 HYPERTENSION GOAL BP (BLOOD PRESSURE) < 140/90: ICD-10-CM

## 2020-09-01 PROCEDURE — 99607 MTMS BY PHARM ADDL 15 MIN: CPT | Performed by: PHARMACIST

## 2020-09-01 PROCEDURE — 99606 MTMS BY PHARM EST 15 MIN: CPT | Performed by: PHARMACIST

## 2020-09-01 RX ORDER — IRBESARTAN AND HYDROCHLOROTHIAZIDE 150; 12.5 MG/1; MG/1
0.5 TABLET, FILM COATED ORAL DAILY
COMMUNITY
End: 2020-10-14

## 2020-09-01 NOTE — PROGRESS NOTES
"MTM ENCOUNTER  SUBJECTIVE/OBJECTIVE:                           Brandy Leon is a 79 year old female called for a follow-up visit. She was referred to me from Cailin Ponce.  Today's visit is a follow-up MTM visit from 7/28/2020.     Patient consented to a telehealth visit: yes  Telemedicine Visit Details  Type of service:  Telephone visit  Start Time:9:31 AM  End Time: 10:03AM  Originating Location (pt. Location): Home  Distant Location (provider location):  [facility] MTM  Mode of Communication:  Telephone    Chief Complaint: Blood Sugars.    Allergies/ADRs: Reviewed in EHR  Tobacco:  reports that she has never smoked. She has never used smokeless tobacco.  Alcohol: none  Caffeine: 1 cups/day of coffee  PMH: Reviewed in EHR    Medication Adherence/Access: Myrbetriq is expensive, patient has not contacted prescription assistance program.     Diabetes:  Patient is currently taking metformin 1000mg twice daily, Lantus 38 units daily, Jardiance 10mg daily, glipizide XL 10mg daily (started by endo 8.24). Patient is not experiencing side effects  SMBG: one-two times daily. Ranges (patient reported): 156mg/dl this am and yesterday (but most have been under 150mg/dL), yesterday her blood sugar later in the day was 128mg/dL.  Patient is not experiencing hypoglycemia  Recent symptoms of high blood sugar? None. If it is high she will feel achy and \"blah\"  Eye exam: up to date  Foot exam: up to date  ACEi/ARB: Yes: irbesartan/HCTZ.   Urine Albumin:   Lab Results   Component Value Date    UMALCR 20.15 10/25/2019      Aspirin: Taking 81mg daily and denies side effects.   Diet: Breakfast-cereal & toast or egg & sausage croissant Sometimes doesn't eat lunch might have crackers and cheese, banana or orange. Supper: her son does the cooking. May snack on crackers and cheese or popcorn.   Hemoglobin A1C   Date Value Ref Range Status   08/20/2020 10.0 (H) 0 - 5.6 % Final     Comment:     Normal <5.7% " Prediabetes 5.7-6.4%  Diabetes 6.5% or higher - adopted from ADA   consensus guidelines.     01/10/2020 10.5 (H) 0 - 5.6 % Final     Comment:     Normal <5.7% Prediabetes 5.7-6.4%  Diabetes 6.5% or higher - adopted from ADA   consensus guidelines.     10/25/2019 11.1 (H) 0 - 5.6 % Final     Comment:     Normal <5.7% Prediabetes 5.7-6.4%  Diabetes 6.5% or higher - adopted from ADA   consensus guidelines.       Incontinence: current therapy includes Myrbetriq 50mg daily. Patient is having constipation but this is not new for her. Patient has not noticed any improvement in her symptoms. They have not worsened either. Patient is working with patient     Constipation: patient has been having more issues with constipation. Patient will not have a bowel movement for 2-3 days. Patient has been drinking prune juice and that helps. Patient is drinking about 1-8 ounce glass a day. Patient reports she has noticed an increase in blood sugar.     Hypertension: current therapy includes irbesartan 150-12.5mg 1/2 tablet daily, irbesartan 75mg daily. Patient has irbesartan 150-12.5, irbesartan 75mg and hydrochlorothiazide 12.5g on her med list. Patient has both irbesartan 150-12.5 bottle and irbesartan 75mg bottle at home and has been taking both. Patient does not have a prescription bottle of hydrochlorothiazide. Patient is not experiencing any side effects. Patient does have a blood pressure monitor at home.    Today's Vitals: There were no vitals taken for this visit.-phone visit    ASSESSMENT:                            Medication Adherence: Fair.     Diabetes: Needs improvement. Patient is not meeting A1c goal of <8%. Patient was recently started on glipizide and Lantus dose was increased. Continue to monitor.    Incontinence: Stable.    Constipation: Stable. DId discuss prune juice can affect her blood sugars and if she is going to take she should buy the light prune juice or only have 4 ounces of juice and add 4 ounces of  water to it and monitor blood sugars. Patient declines a medication at this time and prefers to use the prune juice.    Hypertension: Needs improvement. Patient is taking both irbesartan and irebesartain-hydrochlorothiazide. Recommend patient discontinue irbesartan 75mg and continue on irbesartan-hydrochlorothiazide. Patient updated med boxes and will monitor blood pressure. Med list updated.    PLAN:                          Recommend discontinuing irbesartan 75mg tablets.  Continue taking irbesartan 150-12.5mg 1/2 tablet daily; Monitor blood pressures.  Recommend light prune juice or using 4 ounces of juice mixed with water.    I spent 32 minutes with this patient today. All changes were made via collaborative practice agreement with Cailin Ponce. A copy of the visit note was provided to the patient's primary care provider.    The patient was given a summary of these recommendations as an after visit summary via [employer].     Mirna Perez, Pharm.D, BCACP  Medication Therapy Management Pharmacist

## 2020-09-01 NOTE — PATIENT INSTRUCTIONS
Recommendations from today's MTM visit:                                                      Recommend discontinuing irbesartan 75mg tablets.  Continue taking irbesartan 150-12.5mg 1/2 tablet daily; Monitor blood pressures.  Recommend light prune juice or using 4 ounces of juice mixed with water.    It was great to speak with you today.  I value your experience and would be very thankful for your time with providing feedback on our clinic survey. You may receive a survey via email or text message in the next few days.     Next MTM visit: 1 week    To schedule another MTM appointment, please call the clinic directly or you may call the MTM scheduling line at [phone] or toll-free at [phone].     My Clinical Pharmacist's contact information:                                                      It was a pleasure talking with you today!  Please feel free to contact me with any questions or concerns you have.      Mirna Perez, Pharm.D, BCACP  Medication Therapy Management Pharmacist

## 2020-09-03 RX ORDER — OXYBUTYNIN CHLORIDE 5 MG/1
TABLET, EXTENDED RELEASE ORAL
Qty: 180 TABLET | Refills: 1 | OUTPATIENT
Start: 2020-09-03

## 2020-09-04 ENCOUNTER — TELEPHONE (OUTPATIENT)
Dept: PEDIATRICS | Facility: CLINIC | Age: 80
End: 2020-09-04

## 2020-09-04 DIAGNOSIS — E11.65 TYPE 2 DIABETES MELLITUS WITH HYPERGLYCEMIA, WITH LONG-TERM CURRENT USE OF INSULIN (H): ICD-10-CM

## 2020-09-04 DIAGNOSIS — Z79.4 TYPE 2 DIABETES MELLITUS WITH HYPERGLYCEMIA, WITH LONG-TERM CURRENT USE OF INSULIN (H): ICD-10-CM

## 2020-09-04 NOTE — TELEPHONE ENCOUNTER
I am in process of applying to the Jardiance assistance program through [employer].  BI requires a hand signed, brand name, hard copy script be submitted with their application.    Please hand sign a BRAND name, hard copy script for:       JARDIANCE    Please send the HARD COPY script to me via interoffice mail [employer] Francesca Malone or via [place] mail  at:      [facility]   Francesca Correa   [address] [place]  [address]    Thanks so much for your help!    Francesca Correa  Prescription   Pharmacy Assistance  [phone]

## 2020-09-08 ENCOUNTER — VIRTUAL VISIT (OUTPATIENT)
Dept: PHARMACY | Facility: CLINIC | Age: 80
End: 2020-09-08
Payer: COMMERCIAL

## 2020-09-08 DIAGNOSIS — I10 HYPERTENSION GOAL BP (BLOOD PRESSURE) < 140/90: ICD-10-CM

## 2020-09-08 DIAGNOSIS — N32.81 OVERACTIVE BLADDER: ICD-10-CM

## 2020-09-08 DIAGNOSIS — K59.00 CONSTIPATION, UNSPECIFIED CONSTIPATION TYPE: ICD-10-CM

## 2020-09-08 DIAGNOSIS — E11.65 TYPE 2 DIABETES MELLITUS WITH HYPERGLYCEMIA, WITH LONG-TERM CURRENT USE OF INSULIN (H): Primary | ICD-10-CM

## 2020-09-08 DIAGNOSIS — Z79.4 TYPE 2 DIABETES MELLITUS WITH HYPERGLYCEMIA, WITH LONG-TERM CURRENT USE OF INSULIN (H): Primary | ICD-10-CM
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
## 2020-09-08 PROCEDURE — 99606 MTMS BY PHARM EST 15 MIN: CPT | Mod: TEL | Performed by: PHARMACIST

## 2020-09-08 PROCEDURE — 99607 MTMS BY PHARM ADDL 15 MIN: CPT | Mod: TEL | Performed by: PHARMACIST

## 2020-09-08 NOTE — Clinical Note
Dennis Vazquez, it sounds like Zoila is having more issues with constipation in the last couple of months since starting myrbetriq. I asked her to discuss this with you at her upcoming appointment next week. I know you had also recommended trospium which I know can also be constipating. Wondering if you think a switch would be appropriate or a brief holding of myrbetriq to see if that is contributing? IRecommend checking blood sugars 2 hours after a meal.  Especially after using prune juice.  Could consider alternative to Myrbetriq.  Patient to discuss with urology at appointment next week.  I'm interested in your thoughts.  Thanks,  Laisha

## 2020-09-08 NOTE — PROGRESS NOTES
"MTM ENCOUNTER  SUBJECTIVE/OBJECTIVE:                           Brandy Leon is a 79 year old female called for a follow-up visit. She was referred to me from Cailin Ponce.  Today's visit is a follow-up MTM visit from 9/1/2020.     Patient consented to a telehealth visit: yes  Telemedicine Visit Details  Type of service:  Telephone visit  Start Time:9:02AM  End Time: 9:21AM  Originating Location (pt. Location): Home  Distant Location (provider location):  [facility] MTM  Mode of Communication:  Telephone    Chief Complaint: Blood Sugars.    Allergies/ADRs: Reviewed in EHR  Tobacco:  reports that she has never smoked. She has never used smokeless tobacco.  Alcohol: none  Caffeine: 1 cups/day of coffee  PMH: Reviewed in EHR    Medication Adherence/Access:     Diabetes:  Patient is currently taking metformin 1000mg twice daily, Lantus 38 units daily, Jardiance 10mg daily, glipizide XL 10mg daily (started by endo 8.24). Patient is not experiencing side effects  SMBG: one-two times daily. Ranges (patient reported): 138mg/dl this am and yesterday before bed it was 300mg/dL and she had BBQ ribs and mashed potatoes.  Patient is not experiencing hypoglycemia.  Recent symptoms of high blood sugar? None. If it is high she will feel achy and \"blah\"  Eye exam: up to date  Foot exam: up to date  ACEi/ARB: Yes: irbesartan/HCTZ.   Urine Albumin:   Lab Results   Component Value Date    UMALCR 20.15 10/25/2019      Aspirin: Taking 81mg daily and denies side effects.   Diet: Breakfast-cereal & toast or egg & sausage croissant Sometimes doesn't eat lunch might have crackers and cheese, banana or orange. Supper: her son does the cooking. May snack on crackers and cheese or popcorn.   Hemoglobin A1C   Date Value Ref Range Status   08/20/2020 10.0 (H) 0 - 5.6 % Final     Comment:     Normal <5.7% Prediabetes 5.7-6.4%  Diabetes 6.5% or higher - adopted from ADA   consensus guidelines.     01/10/2020 10.5 (H) " 0 - 5.6 % Final     Comment:     Normal <5.7% Prediabetes 5.7-6.4%  Diabetes 6.5% or higher - adopted from ADA   consensus guidelines.     10/25/2019 11.1 (H) 0 - 5.6 % Final     Comment:     Normal <5.7% Prediabetes 5.7-6.4%  Diabetes 6.5% or higher - adopted from ADA   consensus guidelines.       Incontinence: current therapy includes Myrbetriq 50mg daily. Patient is having constipation . Patient has not noticed any improvement in her symptoms. They have not worsened either. Patient is working with prescription assistance program for coverage for Myrbetriq.    Constipation: patient has been having more issues with constipation. Patient will not have a bowel movement for 2-3 days. Patient has tried diluting the prune juice and doesn't feel like this has been as effective. Patient does not like to take any laxatives such as MiraLAX or senna.  Patient does feel like constipation has worsened over the last several months and is wondering if it is related to any medication.    Hypertension: current therapy includes irbesartan 150-12.5mg 1/2 tablet daily.   Patient is not experiencing any side effects. Patient reports one of her blood pressures was 105/74mmHg.    Today's Vitals: There were no vitals taken for this visit.-phone visit    ASSESSMENT:                            Medication Adherence: Fair.     Diabetes: Needs improvement. Patient is not meeting A1c goal of <8%.  Patient would benefit from monitoring blood sugars 2 hours after eating.  Patient may benefit from CGM.  Will discuss at upcoming visit.    Incontinence: Stable.  Myrbetriq could be contributing to constipation.  Patient to discuss with urology at appointment next week.    Constipation: Needs improvement.  Patient would benefit from use of a laxative at this time but patient declines.  Patient will continue using prune juice and monitoring blood sugars.  Likely Myrbetriq could be contributing to constipation. Patient has a an appointment with urology  next week and will discuss further.    Hypertension: Stable.    PLAN:                          Recommend checking blood sugars 2 hours after a meal.  Especially after using prune juice.  Could consider alternative to Myrbetriq.  Patient to discuss with urology at appointment next week.    I spent 19 minutes with this patient today. All changes were made via collaborative practice agreement with Cailin Ponce. A copy of the visit note was provided to the patient's primary care provider.    The patient was given a summary of these recommendations as an after visit summary via [employer].     Will follow up in 2 weeks.    Mirna Perez, Pharm.D, BCACP  Medication Therapy Management Pharmacist

## 2020-09-08 NOTE — PATIENT INSTRUCTIONS
Recommendations from today's MTM visit:                                                      Recommend checking blood sugars 2 hours after a meal.  Especially after using prune juice.  Could consider alternative to Myrbetriq.  Discuss with urology at appointment next week.    It was great to speak with you today.  I value your experience and would be very thankful for your time with providing feedback on our clinic survey. You may receive a survey via email or text message in the next few days.     Next MTM visit: 2 weeks    To schedule another MTM appointment, please call the clinic directly or you may call the MTM scheduling line at [phone] or toll-free at [phone].     My Clinical Pharmacist's contact information:                                                      It was a pleasure talking with you today!  Please feel free to contact me with any questions or concerns you have.      Mirna Perez, Pharm.D, [facility]  Medication Therapy Management Pharmacist

## 2020-09-16 ENCOUNTER — TELEPHONE (OUTPATIENT)
Dept: PEDIATRICS | Facility: CLINIC | Age: 80
End: 2020-09-16

## 2020-09-16 NOTE — TELEPHONE ENCOUNTER
Zoila has been approved to the [employer] assistance program for Lantus Solostar through December 2020.  She will receive this medication at no cost through the enrollment period.    A 90 day supply of LANTUS SOLOSTAR PENS will be delivered to the [facility] within 7-10 business days. Zoila has been informed of this approval.  Please contact Zoila when this arrives to .    Thanks so much for your help!    Francesca Correa  Prescription   Pharmacy Assistance  [phone]     [Negative] : Endocrine [Fever] : no fever [Chills] : no chills [Recent Change In Weight] : ~T no recent weight change [Dysphagia] : no dysphagia [Hoarseness] : no hoarseness [Chest Pain] : no chest pain [Lower Ext Edema] : no lower extremity edema [Shortness Of Breath] : no shortness of breath [Wheezing] : no wheezing [Abdominal Pain] : no abdominal pain [Vomiting] : no vomiting [Constipation] : no constipation [Diarrhea] : no diarrhea [Reflux/Heartburn] : no reflux/ heartburn [Dysuria] : no dysuria [FreeTextEntry2] : weight stable since last visit.

## 2020-09-16 NOTE — PROGRESS NOTES
"Brandy Leon is a 80 year old female who is being evaluated via a billable telephone visit.      The patient has been notified of following:     \"This telephone visit will be conducted via a call between you and your physician/provider. We have found that certain health care needs can be provided without the need for a physical exam.  This service lets us provide the care you need with a short phone conversation.  If a prescription is necessary we can send it directly to your pharmacy.  If lab work is needed we can place an order for that and you can then stop by our lab to have the test done at a later time.    Telephone visits are billed at different rates depending on your insurance coverage. During this emergency period, for some insurers they may be billed the same as an in-person visit.  Please reach out to your insurance provider with any questions.    If during the course of the call the physician/provider feels a telephone visit is not appropriate, you will not be charged for this service.\"    Patient has given verbal consent for Telephone visit?  {YES-NO  Default Yes:4444::\"Yes\"}    What phone number would you like to be contacted at? ***    How would you like to obtain your AVS? {AVS Preference:[id number]}    Urology Telephone Visit - Follow-up    CC: follow up recurrent UTI's and urinary incontinence    HPI: Ms. Brandy Leon is a 80 year old female with PMH of DM2 (last A1C 10.0), DINORA, HTN, and HLD who is being evaluated via billable telephone visit today for follow up of recurrent UTI's and urinary incontinence. She had UTI's in Oct (E. Coli), Nov (E. Coli), and Dec 2019 (mixed  erwin), though no further infections since then. There was some question of whether her Jardiance medication may be contributing to infections; however, she had previously had issues with UTI's while taking Invokana and stopping this did not result in improvement in her UTI's. Therefore, she has continued on " Shamarance. There was also question of possible normal pressure hydrocephalus contributing to UTI's, though she saw neurology on 6/30/2020 who felt that she does not have NPH. As stated above, she has not had any further UTI's since 2019 and is doing well from this standpoint.     Regarding her urinary urgency incontinence, this had previously been well controlled with oxybutynin. However, she was having some cognitive and balance side effects which resolved after oxybutynin was discontinued. She was instead started on Myrbetriq 50 mg daily. She reports that this has not necessarily helped her urgency or incontinence symptoms and she feels it is causing constipation***.    Past Medical History:   Diagnosis Date     Actinic keratosis 3/12/2013     Degenerative joint disease      Diabetes (H)      Rheumatic fever 1957    x2 between the ages of 15-18     Seasonal allergies      Past Surgical History:   Procedure Laterality Date     C APPENDECTOMY       C ARTHROPLASTY TMJ       CATARACT IOL, RT/LT       COLONOSCOPY       COLONOSCOPY N/A 8/13/2015    Procedure: COLONOSCOPY;  Surgeon: Duane, William Charles, MD;  Location: [facility] OR     COLONOSCOPY WITH CO2 INSUFFLATION N/A 8/13/2015    Procedure: COLONOSCOPY WITH CO2 INSUFFLATION;  Surgeon: Duane, William Charles, MD;  Location: [facility] OR     PHACOEMULSIFICATION WITH STANDARD INTRAOCULAR LENS IMPLANT Left 10/25/2018    Procedure: LEFT PHACOEMULSIFICATION WITH STANDARD INTRAOCULAR LENS IMPLANT;  Surgeon: Thomas Serna MD;  Location: [facility] OR     PHACOEMULSIFICATION WITH STANDARD INTRAOCULAR LENS IMPLANT Right 11/8/2018    Procedure: RIGHT PHACOEMULSIFICATION WITH STANDARD INTRAOCULAR LENS IMPLANT;  Surgeon: Thomas Serna MD;  Location: [facility] OR     THYROIDECTOMY        Current Outpatient Medications   Medication Sig Dispense Refill     aspirin 81 MG EC tablet Take 1 tablet by mouth daily. 90 tablet 3     Cranberry-Vitamin C-Vitamin E (CRANBERRY PLUS VITAMIN C)  4200-20-3 MG-MG-UNIT CAPS Take 1 capsule by mouth 2 times daily       empagliflozin (JARDIANCE) 10 MG TABS tablet Take 1 tablet (10 mg) by mouth daily 90 tablet 3     fexofenadine (ALLEGRA) 180 MG tablet Take 180 mg by mouth as needed        GLUCOSAMINE CHONDROITIN COMPLX OR 2 Daily       ibuprofen (ADVIL/MOTRIN) 200 MG capsule Take 600 mg by mouth every 4 hours as needed        insulin glargine (LANTUS PEN) 100 UNIT/ML pen Inject 38 Units Subcutaneous At Bedtime       insulin pen needle (BD JUAN C U/F) 32G X 4 MM miscellaneous USE 1 DAILY AS DIRECTED. 100 each 2     irbesartan-hydrochlorothiazide (AVALIDE) 150-12.5 MG tablet Take 0.5 tablets by mouth daily       latanoprost (XALATAN) 0.005 % ophthalmic solution Place 1 drop into both eyes At Bedtime 3 Bottle 3     metFORMIN (GLUCOPHAGE) 1000 MG tablet TAKE 1 TABLET (1,000 MG) BY MOUTH 2 TIMES DAILY (WITH MEALS) 180 tablet 1     mirabegron (MYRBETRIQ) 50 MG 24 hr tablet Take 1 tablet (50 mg) by mouth daily 30 tablet 11     MULTIVITAMIN OR 1 tablet daily       Omega-3 Fatty Acids (OMEGA 3 PO) Take by mouth 2 times daily.        ONE TOUCH DELICA LANCETS MISC 1 each 2 times daily. One Touch Delica   100 each 12     ONETOUCH ULTRA test strip USE TO TEST TWICE A  strip 4     order for DME Glucometer covered by insurance. 1 each 0     rosuvastatin (CRESTOR) 5 MG tablet TAKE 1 TABLET BY MOUTH TWICE WEEKLY 90 tablet 3     SYNTHROID 137 MCG tablet Take 1 tablet (137 mcg) by mouth daily 90 tablet 3     Allergies   Allergen Reactions     Estradiol Itching     Lipitor [Atorvastatin Calcium]      Weak and shaky     Sulfa Drugs      Rash       Zocor [Simvastatin]      Weak and shakey       LABS:  Creatinine   Date Value Ref Range Status   02/07/2020 0.82 0.52 - 1.04 mg/dL Final       ASSESSMENT/PLAN:  80 year old female with a history of recurrent urinary tract infections and urge urinary incontinence in the setting of poorly controlled DM. No further infections since 2019 -  doing well from this standpoint. Her incontinence symptoms were previously well controlled with oxybutynin though she was experiencing cognitive and balance side effects which resolved after stopping the medication. Myrbetriq does not seem to be as effective and may be contributing to constipation. We discussed management options including referral to pelvic floor physical therapy versus further evaluation with urodynamics and cystoscopy prior to considering third line treatment options such as bladder Botox injections, PTNS, or SNM.  ***      Phone call duration: *** minutes    Taylor Lovell PA-C

## 2020-09-18 ENCOUNTER — VIRTUAL VISIT (OUTPATIENT)
Dept: UROLOGY | Facility: CLINIC | Age: 80
End: 2020-09-18
Payer: COMMERCIAL

## 2020-09-18 DIAGNOSIS — N39.41 URGE INCONTINENCE: Primary | ICD-10-CM

## 2020-09-18 PROCEDURE — 99212 OFFICE O/P EST SF 10 MIN: CPT | Mod: 95 | Performed by: PHYSICIAN ASSISTANT

## 2020-09-18 NOTE — Clinical Note
Dennis Interiano,  I had a telephone visit with Zoila today. She reports that the Myrbetriq has been helpful for her urinary symptoms (she is really not bothered by any urgency or incontinence while taking it). The problem is that she cannot afford it (she didn't bring up the issues with constipation today). I wonder if there are any assistance programs that may help her with the cost? If not, perhaps we could consider a cautious trial of trospium XR, though there is no guarantee that would be covered by insurance either. If we exhaust her medication options, I'll likely recommend urodynamics studies to see if she may benefit from other third line therapies such as bladder Botox, PTNS, or sacral neuromodulation.  Let me know your thoughts.  Thanks! Taylor Lovell PA-C  Urology

## 2020-09-18 NOTE — PROGRESS NOTES
"Brandy Leon is a 80 year old female who is being evaluated via a billable telephone visit.      The patient has been notified of following:     \"This telephone visit will be conducted via a call between you and your physician/provider. We have found that certain health care needs can be provided without the need for a physical exam.  This service lets us provide the care you need with a short phone conversation.  If a prescription is necessary we can send it directly to your pharmacy.  If lab work is needed we can place an order for that and you can then stop by our lab to have the test done at a later time.    Telephone visits are billed at different rates depending on your insurance coverage. During this emergency period, for some insurers they may be billed the same as an in-person visit.  Please reach out to your insurance provider with any questions.    If during the course of the call the physician/provider feels a telephone visit is not appropriate, you will not be charged for this service.\"    Patient has given verbal consent for Telephone visit?  Yes    What phone number would you like to be contacted at? [phone]    How would you like to obtain your AVS? Denise Vail LPN on 9/18/2020 at 8:16 AM    Urology Telephone Visit - Follow-up    CC: follow up recurrent UTI's and urinary incontinence    HPI: Ms. Brandy Leon is a 80 year old female with PMH of DM2 (last A1C 10.0), DINORA, HTN, and HLD who is being evaluated via billable telephone visit today for follow up of recurrent UTI's and urinary incontinence. She had UTI's in Oct (E. Coli), Nov (E. Coli), and Dec 2019 (mixed  erwin), though no further infections since then. There was some question of whether her Jardiance medication may be contributing to infections; however, she had previously had issues with UTI's while taking Invokana and stopping this did not result in improvement in her UTI's. Therefore, she has continued " on Jardiance. There was also question of possible normal pressure hydrocephalus contributing to UTI's, though she saw neurology on 6/30/2020 who felt that she does not have NPH. As stated above, she has not had any further UTI's since 2019 and is doing well from this standpoint.     Regarding her urinary urgency incontinence, this had previously been well controlled with oxybutynin. However, she was having some cognitive and balance side effects which resolved after oxybutynin was discontinued. She was instead started on Myrbetriq 50 mg daily. A message was then received from patient's [place] pharmacist on 9/8 stating that the Myrbetriq did not seem to be helping her urinary symptoms, and the patient thought it may be contributing to worsening constipation. Today, Zoila notes that the Myrbetriq has actually been quite helpful for her urinary urgency and incontinence. The biggest problem is the cost of the medication ($200+ for a 90 day supply which she cannot continue to afford, especially while she is not working at the moment).    Past Medical History:   Diagnosis Date     Actinic keratosis 3/12/2013     Degenerative joint disease      Diabetes (H)      Rheumatic fever 1957    x2 between the ages of 15-18     Seasonal allergies      Past Surgical History:   Procedure Laterality Date     C APPENDECTOMY       C ARTHROPLASTY TMJ       CATARACT IOL, RT/LT       COLONOSCOPY       COLONOSCOPY N/A 8/13/2015    Procedure: COLONOSCOPY;  Surgeon: Duane, William Charles, MD;  Location: [facility]     COLONOSCOPY WITH CO2 INSUFFLATION N/A 8/13/2015    Procedure: COLONOSCOPY WITH CO2 INSUFFLATION;  Surgeon: Duane, William Charles, MD;  Location: [facility]     PHACOEMULSIFICATION WITH STANDARD INTRAOCULAR LENS IMPLANT Left 10/25/2018    Procedure: LEFT PHACOEMULSIFICATION WITH STANDARD INTRAOCULAR LENS IMPLANT;  Surgeon: Thomas Serna MD;  Location: [facility]     PHACOEMULSIFICATION WITH STANDARD INTRAOCULAR LENS IMPLANT Right  11/8/2018    Procedure: RIGHT PHACOEMULSIFICATION WITH STANDARD INTRAOCULAR LENS IMPLANT;  Surgeon: Thomas Serna MD;  Location: [facility]     THYROIDECTOMY        Current Outpatient Medications   Medication Sig Dispense Refill     aspirin 81 MG EC tablet Take 1 tablet by mouth daily. 90 tablet 3     Cranberry-Vitamin C-Vitamin E (CRANBERRY PLUS VITAMIN C) 4200-20-3 MG-MG-UNIT CAPS Take 1 capsule by mouth 2 times daily       empagliflozin (JARDIANCE) 10 MG TABS tablet Take 1 tablet (10 mg) by mouth daily 90 tablet 3     fexofenadine (ALLEGRA) 180 MG tablet Take 180 mg by mouth as needed        GLUCOSAMINE CHONDROITIN COMPLX OR 2 Daily       ibuprofen (ADVIL/MOTRIN) 200 MG capsule Take 600 mg by mouth every 4 hours as needed        insulin glargine (LANTUS PEN) 100 UNIT/ML pen Inject 38 Units Subcutaneous At Bedtime       insulin pen needle (BD JUAN C U/F) 32G X 4 MM miscellaneous USE 1 DAILY AS DIRECTED. 100 each 2     irbesartan-hydrochlorothiazide (AVALIDE) 150-12.5 MG tablet Take 0.5 tablets by mouth daily       latanoprost (XALATAN) 0.005 % ophthalmic solution Place 1 drop into both eyes At Bedtime 3 Bottle 3     metFORMIN (GLUCOPHAGE) 1000 MG tablet TAKE 1 TABLET (1,000 MG) BY MOUTH 2 TIMES DAILY (WITH MEALS) 180 tablet 1     mirabegron (MYRBETRIQ) 50 MG 24 hr tablet Take 1 tablet (50 mg) by mouth daily 30 tablet 11     MULTIVITAMIN OR 1 tablet daily       Omega-3 Fatty Acids (OMEGA 3 PO) Take by mouth 2 times daily.        ONE TOUCH DELICA LANCETS MISC 1 each 2 times daily. One Touch Delica   100 each 12     ONETOUCH ULTRA test strip USE TO TEST TWICE A  strip 4     order for DME Glucometer covered by insurance. 1 each 0     rosuvastatin (CRESTOR) 5 MG tablet TAKE 1 TABLET BY MOUTH TWICE WEEKLY 90 tablet 3     SYNTHROID 137 MCG tablet Take 1 tablet (137 mcg) by mouth daily 90 tablet 3     Allergies   Allergen Reactions     Estradiol Itching     Lipitor [Atorvastatin Calcium]      Weak and shaky      Sulfa Drugs      Rash       Zocor [Simvastatin]      Weak and shakey       LABS:  Creatinine   Date Value Ref Range Status   02/07/2020 0.82 0.52 - 1.04 mg/dL Final       ASSESSMENT/PLAN:  80 year old female with a history of urinary tract infections and urge urinary incontinence in the setting of poorly controlled DM. No further infections since 2019 - doing well from this standpoint. Her incontinence symptoms were previously well controlled with oxybutynin though she was experiencing cognitive and balance side effects which resolved after stopping the medication. Recently switched to Myrbetriq which she feels works well to control her urinary symptoms, but it is cost-prohibitive and may be causing some side effects of constipation. We discussed management options including alternative medications, referral to pelvic floor physical therapy, versus further evaluation with urodynamics and cystoscopy prior to considering third line treatment options such as bladder Botox injections, PTNS, or SNM.  Zoila states that she has been through PFPT previously which did not help. She would like to consider another medication if possible, or get assistance with the cost of mirabegron. Could potentially consider a cautious trial of a quarternary amine such as trospium, though discussed with the patient that any anticholinergic medication has the potential to cause worsening constipation and/or cognitive side effects.   -Will route chart to [facility] pharmacist to check into any possible assistance programs for covering the cost of Myrbetriq. If nothing available, will also discuss the option of trying trospium XR 60 mg once daily in the morning on an empty stomach.   -She will continue the mirabegron for now.  -If she continues to have side effects from the medications and/or they are unaffordable, would recommend urodynamics as a next step to determine whether she may benefit from third line therapies as listed  above.    Phone call duration: 6 minutes    Taylor Lovell PA-C

## 2020-09-18 NOTE — PATIENT INSTRUCTIONS
UROLOGY CLINIC VISIT PATIENT INSTRUCTIONS    I will speak with [facility] pharmacist about your medication options and someone will call you with an update.     For now, continue taking the mirabegron (Myrbetriq) medication once daily.    If you have any issues, questions or concerns in the meantime, do not hesitate to contact us at [phone] or via [employer].     It was a pleasure meeting with you today.  Thank you for allowing me and my team the privilege of caring for you today.  YOU are the reason we are here, and I truly hope we provided you with the excellent service you deserve.  Please let us know if there is anything else we can do for you so that we can be sure you are leaving completely satisfied with your care experience.

## 2020-09-21 ENCOUNTER — TELEPHONE (OUTPATIENT)
Dept: PEDIATRICS | Facility: CLINIC | Age: 80
End: 2020-09-21

## 2020-09-21 ENCOUNTER — OFFICE VISIT (OUTPATIENT)
Dept: PODIATRY | Facility: CLINIC | Age: 80
End: 2020-09-21
Payer: COMMERCIAL

## 2020-09-21 ENCOUNTER — VIRTUAL VISIT (OUTPATIENT)
Dept: PHARMACY | Facility: CLINIC | Age: 80
End: 2020-09-21
Payer: COMMERCIAL

## 2020-09-21 VITALS — HEART RATE: 83 BPM | DIASTOLIC BLOOD PRESSURE: 70 MMHG | SYSTOLIC BLOOD PRESSURE: 112 MMHG | OXYGEN SATURATION: 95 %

## 2020-09-21 DIAGNOSIS — B35.1 ONYCHOMYCOSIS: Primary | ICD-10-CM

## 2020-09-21 DIAGNOSIS — E11.49 TYPE II OR UNSPECIFIED TYPE DIABETES MELLITUS WITH NEUROLOGICAL MANIFESTATIONS, NOT STATED AS UNCONTROLLED(250.60) (H): ICD-10-CM

## 2020-09-21 DIAGNOSIS — N32.81 OVERACTIVE BLADDER: ICD-10-CM

## 2020-09-21 DIAGNOSIS — Z79.4 TYPE 2 DIABETES MELLITUS WITH HYPERGLYCEMIA, WITH LONG-TERM CURRENT USE OF INSULIN (H): Primary | ICD-10-CM

## 2020-09-21 DIAGNOSIS — K59.00 CONSTIPATION, UNSPECIFIED CONSTIPATION TYPE: ICD-10-CM

## 2020-09-21 DIAGNOSIS — E11.51 DIABETES MELLITUS WITH PERIPHERAL VASCULAR DISEASE (H): ICD-10-CM

## 2020-09-21 DIAGNOSIS — E11.65 TYPE 2 DIABETES MELLITUS WITH HYPERGLYCEMIA, WITH LONG-TERM CURRENT USE OF INSULIN (H): Primary | ICD-10-CM

## 2020-09-21 DIAGNOSIS — R35.0 URINARY FREQUENCY: Primary | ICD-10-CM

## 2020-09-21 PROCEDURE — 99607 MTMS BY PHARM ADDL 15 MIN: CPT | Mod: TEL | Performed by: PHARMACIST

## 2020-09-21 PROCEDURE — 99213 OFFICE O/P EST LOW 20 MIN: CPT | Performed by: PODIATRIST

## 2020-09-21 PROCEDURE — 99606 MTMS BY PHARM EST 15 MIN: CPT | Mod: TEL | Performed by: PHARMACIST

## 2020-09-21 RX ORDER — GLIPIZIDE 10 MG/1
10 TABLET, FILM COATED, EXTENDED RELEASE ORAL DAILY
COMMUNITY
End: 2021-05-25

## 2020-09-21 NOTE — NURSING NOTE
Brandy Leon's chief complaint for this visit includes:  Chief Complaint   Patient presents with     RECHECK     toenail trim     PCP: Cailin Ponce    Referring Provider:  No referring provider defined for this encounter.    /70 (BP Location: Left arm, Patient Position: Sitting, Cuff Size: Adult Regular)   Pulse 83   SpO2 95%   Data Unavailable     Do you need any medication refills at today's visit? No    Atiya Holder CMA

## 2020-09-21 NOTE — PATIENT INSTRUCTIONS
Thanks for coming today.  [facility]  [address] [place] [address]    To schedule future appointments in Ortho Clinic, you may call [phone].    To schedule ordered imaging by your provider:   Call [facility] [phone]    To schedule an injection ordered by your provider:  Call [facility] Injection scheduling line: [phone]  [email or web address] available online at:  [email or web address]    Please call if any further questions or concerns ([phone]).  Clinic hours 8 am to 5 pm.    Return to clinic (call) if symptoms worsen or fail to improve.

## 2020-09-21 NOTE — TELEPHONE ENCOUNTER
Brandy has been approved to the [facility] assistance programs for Synthroid & Jardiance through December 31, 2020. She will receive this medication at no cost through the enrollment period.    A 90 day supply of Synthroid will be delivered to the [facility] by September 25th, 2020. She has been informed of this approval.    Please contact Brandy on arrival to .    A 90 day supply of Jardiance will be delivered to Brandy's home within 7-10 business days. She has been informed of this approval.    She will contact my office for refills as we must work directly with the .  I will note EPIC as each refill is requested.    Thank you,    Kaykay Bell  Prescription Assistance

## 2020-09-21 NOTE — TELEPHONE ENCOUNTER
Zoila has been denied to the [employer] assistance program for Myrbetriq..    Patients cannot have any insurance for medications to qualify for this program.    Francesca Correa  Prescription   Pharmacy Assistance  [phone]

## 2020-09-21 NOTE — PROGRESS NOTES
Past Medical History:   Diagnosis Date     Actinic keratosis 3/12/2013     Degenerative joint disease      Diabetes (H)      Rheumatic fever 1957    x2 between the ages of 15-18     Seasonal allergies      Patient Active Problem List   Diagnosis     Seasonal allergies     Hypothyroidism     Hyperlipidemia LDL goal <100     Fibromyalgia     Osteoarthritis     Advanced directives, counseling/discussion     Obesity     Type 2 diabetes mellitus with hyperglycemia, with long-term current use of insulin (H)     Hypertension goal BP (blood pressure) < 140/90     Overactive bladder     Recurrent UTI     Pseudophakia of both eyes     DINORA (obstructive sleep apnea)- severe (AHI 56)     Contusion of right knee     Gait disorder     Past Surgical History:   Procedure Laterality Date     C APPENDECTOMY       C ARTHROPLASTY TMJ       CATARACT IOL, RT/LT       COLONOSCOPY       COLONOSCOPY N/A 8/13/2015    Procedure: COLONOSCOPY;  Surgeon: Duane, William Charles, MD;  [facility]: [facility]     COLONOSCOPY WITH CO2 INSUFFLATION N/A 8/13/2015    Procedure: COLONOSCOPY WITH CO2 INSUFFLATION;  Surgeon: Duane, William Charles, MD;  [facility]     PHACOEMULSIFICATION WITH STANDARD INTRAOCULAR LENS IMPLANT Left 10/25/2018    Procedure: LEFT PHACOEMULSIFICATION WITH STANDARD INTRAOCULAR LENS IMPLANT;  Surgeon: Thomas Serna MD;  [facility]: [facility]     PHACOEMULSIFICATION WITH STANDARD INTRAOCULAR LENS IMPLANT Right 11/8/2018    Procedure: RIGHT PHACOEMULSIFICATION WITH STANDARD INTRAOCULAR LENS IMPLANT;  Surgeon: Thomas Serna MD;  Location: [facility]     THYROIDECTOMY        Social History     Socioeconomic History     Marital status:      Spouse name: Not on file     Number of children: Not on file     Years of education: Not on file     Highest education level: Not on file   Occupational History     Occupation:    Social Needs     Financial resource strain: Not on file     Food insecurity     Worry:  Not on file     Inability: Not on file     Transportation needs     Medical: Not on file     Non-medical: Not on file   Tobacco Use     Smoking status: Never Smoker     Smokeless tobacco: Never Used     Tobacco comment: has had exposure to second hand smoke in the past from [employer], no current exposure   Substance and Sexual Activity     Alcohol use: No     Drug use: No     Sexual activity: Never   Lifestyle     Physical activity     Days per week: Not on file     Minutes per session: Not on file     Stress: Not on file   Relationships     Social connections     Talks on phone: Not on file     Gets together: Not on file     Attends Islam service: Not on file     Active member of club or organization: Not on file     Attends meetings of clubs or organizations: Not on file     Relationship status: Not on file     Intimate partner violence     Fear of current or ex partner: Not on file     Emotionally abused: Not on file     Physically abused: Not on file     Forced sexual activity: Not on file   Other Topics Concern     Parent/sibling w/ CABG, MI or angioplasty before 65F 55M? 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     Hobby Hazards No     Sleep Concern Yes     Comment: difficulty staying asleep, up and down all night     Stress Concern No     Weight Concern Yes     Comment: would like to lose     Special Diet Yes     Comment: low card, low sugar diet     Back Care Yes     Comment: chiropratior     Exercise Yes     Comment: almost everyday     Bike Helmet No     Comment: does not ride bicycle any more     Seat Belt Yes     Self-Exams Yes   Social History Narrative     Not on file     Family History   Problem Relation Age of Onset     Cancer Father         skin and leukemia     Cerebrovascular Disease Maternal Grandfather       Cerebrovascular Disease Paternal Grandmother      Eye Disorder Paternal Grandmother         cataracts     Cerebrovascular Disease Paternal Grandfather      Heart Disease Paternal Grandfather      Cancer Sister         Breast Cancer     Eye Disorder Mother         cataracts     Eye Disorder Brother         cataract     Breast Cancer Daughter      Other Cancer Daughter         ovarian cancer     Glaucoma No family hx of      Macular Degeneration No family hx of      Lab Results   Component Value Date    A1C 10.0 08/20/2020    A1C 10.5 01/10/2020    A1C 11.1 10/25/2019    A1C 10.2 07/08/2019    A1C 12.6 03/29/2019     SUBJECTIVE FINDINGS:  80-year-old female returns to clinic for diabetic foot cares and onychomycosis and onychauxis.  She relates she is doing well.  She still gets numbness and tingling in her feet.  No ulcers or sores since I have seen her last.  She is using several different lotions.  She does have at least one antifungal cream.  She does not wear diabetic shoes.  She does not want to do that.  No ulcers or sores but she does bump her toes occasionally at night.      OBJECTIVE FINDINGS:  DP and PT are 2/4 bilaterally.  She has some dry, scaly skin bilaterally that is mild.  She has dystrophic, thick nails with subungual debris, dystrophy and discoloration to differing degrees 1-5 bilaterally.  She has some subungual dried blood under the left hallux and right fourth toenail.  No erythema, no drainage, no odor, no calor bilaterally.  No gross tendon voids bilaterally.      ASSESSMENT/PLAN:  Onychomycosis and onychauxis bilaterally.  Diabetes with peripheral neuropathy.  She has some tinea pedis changes as well.  Diagnosis and treatment options discussed with her.  All the nails were debrided or reduced bilaterally upon consent.  Advised her on using the antifungal cream.  She relates she does have some antifungal cream.  She is not sure of the name of it but she will start using that again.  MicroKlenz  dispensed and use discussed with her.  She will return to clinic and see me in 3 months.

## 2020-09-21 NOTE — LETTER
9/21/2020         RE: Brandy Leon  [address] [place] [address]        Dear Colleague,    Thank you for referring your patient, Brandy Leon, to the [facility]. Please see a copy of my visit note below.    Past Medical History:   Diagnosis Date     Actinic keratosis 3/12/2013     Degenerative joint disease      Diabetes (H)      Rheumatic fever 1957    x2 between the ages of 15-18     Seasonal allergies      Patient Active Problem List   Diagnosis     Seasonal allergies     Hypothyroidism     Hyperlipidemia LDL goal <100     Fibromyalgia     Osteoarthritis     Advanced directives, counseling/discussion     Obesity     Type 2 diabetes mellitus with hyperglycemia, with long-term current use of insulin (H)     Hypertension goal BP (blood pressure) < 140/90     Overactive bladder     Recurrent UTI     Pseudophakia of both eyes     DINORA (obstructive sleep apnea)- severe (AHI 56)     Contusion of right knee     Gait disorder     Past Surgical History:   Procedure Laterality Date     C APPENDECTOMY       C ARTHROPLASTY TMJ       CATARACT IOL, RT/LT       COLONOSCOPY       COLONOSCOPY N/A 8/13/2015    Procedure: COLONOSCOPY;  Surgeon: Duane, William Charles, MD;  [facility]: [facility]     COLONOSCOPY WITH CO2 INSUFFLATION N/A 8/13/2015    Procedure: COLONOSCOPY WITH CO2 INSUFFLATION;  Surgeon: Duane, William Charles, MD;  [facility]: [facility]     PHACOEMULSIFICATION WITH STANDARD INTRAOCULAR LENS IMPLANT Left 10/25/2018    Procedure: LEFT PHACOEMULSIFICATION WITH STANDARD INTRAOCULAR LENS IMPLANT;  Surgeon: Thomas Serna MD;  [facility]: [facility]     PHACOEMULSIFICATION WITH STANDARD INTRAOCULAR LENS IMPLANT Right 11/8/2018    Procedure: RIGHT PHACOEMULSIFICATION WITH STANDARD INTRAOCULAR LENS IMPLANT;  Surgeon: Thomas Serna MD;  Location: [facility]     THYROIDECTOMY        Social History     Socioeconomic History     Marital status:      Spouse name: Not on file      Number of children: Not on file     Years of education: Not on file     Highest education level: Not on file   Occupational History     Occupation:    Social Needs     Financial resource strain: Not on file     Food insecurity     Worry: Not on file     Inability: Not on file     Transportation needs     Medical: Not on file     Non-medical: Not on file   Tobacco Use     Smoking status: Never Smoker     Smokeless tobacco: Never Used     Tobacco comment: has [facility], no current exposure   Substance and Sexual Activity     Alcohol use: No     Drug use: No     Sexual activity: Never   Lifestyle     Physical activity     Days per week: Not on file     Minutes per session: Not on file     Stress: Not on file   Relationships     Social connections     Talks on phone: Not on file     Gets together: Not on file     Attends Hinduism service: Not on file     Active member of club or organization: Not on file     Attends meetings of clubs or organizations: Not on file     Relationship status: Not on file     Intimate partner violence     Fear of current or ex partner: Not on file     Emotionally abused: Not on file     Physically abused: Not on file     Forced sexual activity: Not on file   Other Topics Concern     Parent/sibling w/ CABG, MI or angioplasty before 65F 55M? 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     Hobby Hazards No     Sleep Concern Yes     Comment: difficulty staying asleep, up and down all night     Stress Concern No     Weight Concern Yes     Comment: would like to lose     Special Diet Yes     Comment: low card, low sugar diet     Back Care Yes     Comment: chiropratior     Exercise Yes     Comment: almost everyday     Bike Helmet No     Comment: does  not ride bicycle any more     Seat Belt Yes     Self-Exams Yes   Social History Narrative     Not on file     Family History   Problem Relation Age of Onset     Cancer Father         skin and leukemia     Cerebrovascular Disease Maternal Grandfather      Cerebrovascular Disease Paternal Grandmother      Eye Disorder Paternal Grandmother         cataracts     Cerebrovascular Disease Paternal Grandfather      Heart Disease Paternal Grandfather      Cancer Sister         Breast Cancer     Eye Disorder Mother         cataracts     Eye Disorder Brother         cataract     Breast Cancer Daughter      Other Cancer Daughter         ovarian cancer     Glaucoma No family hx of      Macular Degeneration No family hx of      Lab Results   Component Value Date    A1C 10.0 08/20/2020    A1C 10.5 01/10/2020    A1C 11.1 10/25/2019    A1C 10.2 07/08/2019    A1C 12.6 03/29/2019     SUBJECTIVE FINDINGS:  80-year-old female returns to clinic for diabetic foot cares and onychomycosis and onychauxis.  She relates she is doing well.  She still gets numbness and tingling in her feet.  No ulcers or sores since I have seen her last.  She is using several different lotions.  She does have at least one antifungal cream.  She does not wear diabetic shoes.  She does not want to do that.  No ulcers or sores but she does bump her toes occasionally at night.      OBJECTIVE FINDINGS:  DP and PT are 2/4 bilaterally.  She has some dry, scaly skin bilaterally that is mild.  She has dystrophic, thick nails with subungual debris, dystrophy and discoloration to differing degrees 1-5 bilaterally.  She has some subungual dried blood under the left hallux and right fourth toenail.  No erythema, no drainage, no odor, no calor bilaterally.  No gross tendon voids bilaterally.      ASSESSMENT/PLAN:  Onychomycosis and onychauxis bilaterally.  Diabetes with peripheral neuropathy.  She has some tinea pedis changes as well.  Diagnosis and treatment options  discussed with her.  All the nails were debrided or reduced bilaterally upon consent.  Advised her on using the antifungal cream.  She relates she does have some antifungal cream.  She is not sure of the name of it but she will start using that again.  MicroKlenz dispensed and use discussed with her.  She will return to clinic and see me in 3 months.           Again, thank you for allowing me to participate in the care of your patient.        Sincerely,        Ehsan Araya DPM

## 2020-09-21 NOTE — PROGRESS NOTES
"MT ENCOUNTER  SUBJECTIVE/OBJECTIVE:                           Brandy Leon is a 80year old female called for a follow-up visit. She was referred to me from Cailin Ponce.  Today's visit is a follow-up MTM visit from 9/8//2020.     Patient consented to a telehealth visit: yes  Telemedicine Visit Details  Type of service:  Telephone visit  Start Time:12:34PM  End Time: 12:56PM  Originating Location (pt. Location): Home  Distant Location (provider location):  [facility] [place]  Mode of Communication:  Telephone    Chief Complaint: Blood Sugars.    Allergies/ADRs: Reviewed in EHR  Tobacco:  reports that she has never smoked. She has never used smokeless tobacco.  Alcohol: none  Caffeine: 1 cups/day of coffee  PMH: Reviewed in EHR    Medication Adherence/Access:     Diabetes:  Patient is currently taking metformin 1000mg twice daily, Lantus 38 units daily, Jardiance 10mg daily, glipizide XL 10mg daily (patient has been taking this twice daily, patient has an old bottle that has the directions of take 1 tablet twice daily)(started by endo 8.24). patient is feeling shaky and her feet are really stiff. Patient reports this happens almost every day. Patient feels this has worsened since starting glpizide. Patient reports this happens in the morning when she gets up. She reports this doesn't last very long and once she gets up and moving around she feels better.   SMBG: one-two times daily. Ranges (patient reported): patient reports they are around 100-102mg/dL although this morning it was 220mg/dL because she had milk, chocolate covered peanuts and martha crackers. Patient reports her readings after lunch 110-120mg/dL.   Patient is not experiencing hypoglycemia.  Recent symptoms of high blood sugar? None. If it is high she will feel achy and \"blah\"  Eye exam: up to date  Foot exam: up to date  ACEi/ARB: Yes: irbesartan/HCTZ.   Urine Albumin:   Lab Results   Component Value Date    UMALCR 20.15 " 10/25/2019      Aspirin: Taking 81mg daily and denies side effects.   Diet: Breakfast-cereal & toast or egg & sausage croissant Sometimes doesn't eat lunch might have crackers and cheese, banana or orange. Supper: her son does the cooking. May snack on crackers and cheese or popcorn.   Hemoglobin A1C   Date Value Ref Range Status   08/20/2020 10.0 (H) 0 - 5.6 % Final     Comment:     Normal <5.7% Prediabetes 5.7-6.4%  Diabetes 6.5% or higher - adopted from ADA   consensus guidelines.     01/10/2020 10.5 (H) 0 - 5.6 % Final     Comment:     Normal <5.7% Prediabetes 5.7-6.4%  Diabetes 6.5% or higher - adopted from ADA   consensus guidelines.     10/25/2019 11.1 (H) 0 - 5.6 % Final     Comment:     Normal <5.7% Prediabetes 5.7-6.4%  Diabetes 6.5% or higher - adopted from ADA   consensus guidelines.       Incontinence: current therapy includes Myrbetriq 50mg daily. Patient is having constipation . Patient has not noticed any improvement in her symptoms. They have not worsened either. Patient was not approved for assistance for Myrbetriq through the prescription assistance program and Myrbetriq is cost prohibitive for the patient. Patient has been working with Taylor Lovell urology PA to explore options, including a cautious trial of trospium XR. Patient reports she has a bout 30 day supply. Patient reports the myrbetriq has been effective but is expensive.     Constipation: patient continues to have an issue with constipation. Patient has been having a bowel movement every 3 days but patient will take 8-10 prunes and add water and add to the microwave and eat the prunes drink the water. Patient will do this in the am for breakfast. With eating the prunes she has a bowel movement every 2 days.     Today's Vitals: There were no vitals taken for this visit.-phone visit    ASSESSMENT:                            Medication Adherence: Fair.     Diabetes: Needs improvement. Patient would benefit from taking glpizide as  previously prescribed. This may help with her shakiness. The stiff feet may be due to arthritis. Will continue to monitor as she decreases her glipizide dose.     Incontinence: Needs improvement. Could consider a trial of trospium XR daily as recommended by urology. Would recommend close monitoring of side effects (constiipation, dizziness, changes in cognition). Will connect with urology on this.    Constipation: Needs improvement.  This may improve with discontinuation of myrbetriq.    PLAN:                          Recommend decreasing glipizide to 10mg once daily in the morning as was previously recommended.    I spent 22 minutes with this patient today. All changes were made via collaborative practice agreement with Cailin Ponce. A copy of the visit note was provided to the patient's primary care provider.    The patient was given a summary of these recommendations as an after visit summary via [employer].     Will follow up in 2 weeks.    Mirna Perez, Pharm.D, BCACP  Medication Therapy Management Pharmacist

## 2020-09-21 NOTE — PATIENT INSTRUCTIONS
Recommendations from today's MTM visit:                                                      Recommend decreasing glipizide to 10mg once daily in the morning as was previously recommended.    It was great to speak with you today.  I value your experience and would be very thankful for your time with providing feedback on our clinic survey. You may receive a survey via email or text message in the next few days.     Next MTM visit: 1 week    To schedule another MTM appointment, please call the clinic directly or you may call the MTM scheduling line at [phone] or toll-free at [phone].     My Clinical Pharmacist's contact information:                                                      It was a pleasure talking with you today!  Please feel free to contact me with any questions or concerns you have.      Mirna Perez, Pharm.D, [facility]  Medication Therapy Management Pharmacist

## 2020-09-22 RX ORDER — TROSPIUM CHLORIDE ER 60 MG/1
60 CAPSULE ORAL EVERY MORNING
Qty: 30 CAPSULE | Refills: 11 | Status: SHIPPED | OUTPATIENT
Start: 2020-09-22 | End: 2021-08-17

## 2020-09-22 NOTE — TELEPHONE ENCOUNTER
Noted. Myrbetriq is cost-prohibitive and patient does not qualify for assistance program.    Will instead trial trospium XR 60 mg daily on an empty stomach. Patient cautioned to monitor for side effects of dry eyes, dry mouth, constipation, and cognitive/balance side effects similar to what she experienced with oxybutynin.     Taylor Lovell PA-C  Department of Urology

## 2020-09-22 NOTE — TELEPHONE ENCOUNTER
Received note below per Taylor Lovell PA-C.    Taylor Lovell PA-C  [facility] 2 hours ago (9:31 AM)       Myrbetriq is cost-prohibitive for patient and she does not qualify for assistance. I pended a prescription for trospium XR 60 mg daily. Please confirm her preferred pharmacy and send Rx. When her supply of Myrbetriq runs out, please have her start trospium in it's place. She should take this once daily in the morning on an empty stomach. Monitor for side effects of dry eyes, dry mouth, constipation, or balance/confusion issues. She should notify clinic if she has any of these bothersome side effects.   Please also have her follow up with me via virtual/telephone visit in 2 months to reassess.   Thanks!   Taylor Lovell PA-C          Relayed note and clarified patient prefers [facility] in [place]. Medication for RNCC to sign. Patient verbalized understanding. Also assisted patient in scheduling 2 month follow up with Taylor Lovell PA-C. Patient had no further questions.    Taylor Vail LPN

## 2020-09-23 DIAGNOSIS — H40.1132 PRIMARY OPEN ANGLE GLAUCOMA OF BOTH EYES, MODERATE STAGE: ICD-10-CM

## 2020-09-23 RX ORDER — LATANOPROST 50 UG/ML
1 SOLUTION/ DROPS OPHTHALMIC AT BEDTIME
Qty: 3 BOTTLE | Refills: 0 | Status: SHIPPED | OUTPATIENT
Start: 2020-09-23 | End: 2021-01-12

## 2020-09-23 NOTE — LETTER
Brandy Leon  [address]  [place] [address]      September 23, 2020        Dear Brandy Leon        Our records indicate that you are due for your 1 year glaucoma follow up and diabetic eye exam . The date of your last exam was 11/5/19 with Austin Serna MD.    Glaucoma is an eye disease which in most cases produces increased pressure within the eye. This elevated pressure is caused by a backup of fluid in the eye. Over time this pressure causes damage to the optic nerve. If not treated the optic nerve cells die. This can cause permanent vision loss.     Through early detection, diagnosis, tests, and treatment you and your doctor can help preserve your vision and prevent blindness.    If you have decided to consult with another ophthalmologist, please call our clinic so that we will be able to update this information.    Please call our office at [phone] to schedule this appointment.       [facility] Opthalmology Staff

## 2020-09-29 ENCOUNTER — VIRTUAL VISIT (OUTPATIENT)
Dept: PHARMACY | Facility: CLINIC | Age: 80
End: 2020-09-29
Payer: COMMERCIAL

## 2020-09-29 ENCOUNTER — TELEPHONE (OUTPATIENT)
Dept: PEDIATRICS | Facility: CLINIC | Age: 80
End: 2020-09-29

## 2020-09-29 DIAGNOSIS — K59.00 CONSTIPATION, UNSPECIFIED CONSTIPATION TYPE: ICD-10-CM

## 2020-09-29 DIAGNOSIS — N32.81 OVERACTIVE BLADDER: ICD-10-CM

## 2020-09-29 DIAGNOSIS — Z79.4 TYPE 2 DIABETES MELLITUS WITH HYPERGLYCEMIA, WITH LONG-TERM CURRENT USE OF INSULIN (H): Primary | ICD-10-CM

## 2020-09-29 DIAGNOSIS — E11.65 TYPE 2 DIABETES MELLITUS WITH HYPERGLYCEMIA, WITH LONG-TERM CURRENT USE OF INSULIN (H): Primary | ICD-10-CM

## 2020-09-29 PROCEDURE — 99607 MTMS BY PHARM ADDL 15 MIN: CPT | Mod: TEL | Performed by: PHARMACIST

## 2020-09-29 PROCEDURE — 99606 MTMS BY PHARM EST 15 MIN: CPT | Mod: TEL | Performed by: PHARMACIST

## 2020-09-29 NOTE — PATIENT INSTRUCTIONS
Recommendations from today's MTM visit:                                                       Lantus and Synthroid from [facility]  Recommend lifestyle changes and minimizing sugar intake and increasing vegetable intake.  When Myrbetriq is gone will change to trospium XR    It was great to speak with you today.  I value your experience and would be very thankful for your time with providing feedback on our clinic survey. You may receive a survey via email or text message in the next few days.     Next MTM visit: 4 weeks    To schedule another MTM appointment, please call the clinic directly or you may call the MTM scheduling line at [phone] or toll-free at [phone].     My Clinical Pharmacist's contact information:                                                      It was a pleasure talking with you today!  Please feel free to contact me with any questions or concerns you have.      Mirna Perez, Pharm.D, BCACP  Medication Therapy Management Pharmacist

## 2020-09-29 NOTE — PROGRESS NOTES
"MTM ENCOUNTER  SUBJECTIVE/OBJECTIVE:                           Brandy Leon is a 80 year old female called for a follow-up visit. She was referred to me from Cailin Ponce.  Today's visit is a follow-up MTM visit from 9/21/2020.     Patient consented to a telehealth visit: yes  Telemedicine Visit Details  Type of service:  Telephone visit  Start Time:9:00AM  End Time: 9:25AM  Originating Location (pt. Location): Home  Distant Location (provider location):  [facility] [place]  Mode of Communication:  Telephone    Chief Complaint: Blood Sugars.    Allergies/ADRs: Reviewed in EHR  Tobacco:  reports that she has never smoked. She has never used smokeless tobacco.  Alcohol: none  Caffeine: 1 cups/day of coffee  PMH: Reviewed in EHR    Medication Adherence/Access: patient went to pharmacy to  her Lantus at the pharmacy but it was $200.     Diabetes:  Patient is currently taking metformin 1000mg twice daily, Lantus 38 units daily, Jardiance 10mg daily, glipizide XL 10mg daily.   SMBG: one-two times daily. Ranges (patient reported): patient reports her blood sugar was high over the weekend because she made apple crisp (~140mg/dL). This am her blood sugar was 120mg/dL.  Patient is not experiencing hypoglycemia.  Recent symptoms of high blood sugar? None. If it is high she will feel achy and \"blah\"  Eye exam: up to date  Foot exam: up to date  ACEi/ARB: Yes: irbesartan/HCTZ.   Urine Albumin:   Lab Results   Component Value Date    UMALCR 20.15 10/25/2019      Aspirin: Taking 81mg daily and denies side effects.   Diet: Breakfast-cereal & toast or egg & sausage croissant Sometimes doesn't eat lunch might have crackers and cheese, banana or orange. Supper: her son does the cooking. May snack on crackers and cheese or popcorn.  Patient reports she has a hard time staying away from sugar. Patient has a jar of spice drops on her kitchen table.  Hemoglobin A1C   Date Value Ref Range Status "   08/20/2020 10.0 (H) 0 - 5.6 % Final     Comment:     Normal <5.7% Prediabetes 5.7-6.4%  Diabetes 6.5% or higher - adopted from ADA   consensus guidelines.     01/10/2020 10.5 (H) 0 - 5.6 % Final     Comment:     Normal <5.7% Prediabetes 5.7-6.4%  Diabetes 6.5% or higher - adopted from ADA   consensus guidelines.     10/25/2019 11.1 (H) 0 - 5.6 % Final     Comment:     Normal <5.7% Prediabetes 5.7-6.4%  Diabetes 6.5% or higher - adopted from ADA   consensus guidelines.       Constipation: patient reports this has been good. It does go up and down but is manageable at this time.     Incontinence: current therapy includes myrbetriq. Patient will be changing over to trospium XR after she finishes up her myrbetriq prescription. Will see if symptoms of constipation improve with change.     Today's Vitals: There were no vitals taken for this visit.-phone visit    ASSESSMENT:                            Medication Adherence: Fair.     Diabetes: patient has been approved for assistance for Lantus, Jardiance and Synthroid. Lantus and Synthroid are at the  clinic ready for patient to . The Jardiance was mailed to patient's home and she has received. Recommend filling plate with veggies and decreasing sugar intake. Discussed possibility of utilizing CGM, patient is not interested at this time.    Incontinence: patient has about a week left of Myrbetriq and then she will switch over to trospium.    Constipation: Stable.    PLAN:                           Lantus and Synthroid from  Clinic  Recommend lifestyle changes and minimizing sugar intake and increasing vegetable intake.  When Myrbetriq is gone will change to trospium XR    I spent 25 minutes with this patient today. All changes were made via collaborative practice agreement with Cailin Ponce. A copy of the visit note was provided to the patient's primary care provider.    The patient was given a summary of these recommendations as an after visit  summary via [employer].     Will follow up in 4 weeks.    Mirna Perez, Pharm.D, BCACP  Medication Therapy Management Pharmacist

## 2020-09-30 NOTE — TELEPHONE ENCOUNTER
Patient picked up Lantus pens and Levothyroxine 112 mcg from the clinic. These were sent by the patient assistance program.     Whit Rosado RN

## 2020-10-07 ENCOUNTER — MYC MEDICAL ADVICE (OUTPATIENT)
Dept: PEDIATRICS | Facility: CLINIC | Age: 80
End: 2020-10-07

## 2020-10-09 ENCOUNTER — VIRTUAL VISIT (OUTPATIENT)
Dept: ENDOCRINOLOGY | Facility: CLINIC | Age: 80
End: 2020-10-09
Payer: COMMERCIAL

## 2020-10-09 DIAGNOSIS — E03.9 ACQUIRED HYPOTHYROIDISM: Primary | Chronic | ICD-10-CM

## 2020-10-09 DIAGNOSIS — Z79.4 TYPE 2 DIABETES MELLITUS WITH HYPERGLYCEMIA, WITH LONG-TERM CURRENT USE OF INSULIN (H): Chronic | ICD-10-CM

## 2020-10-09 DIAGNOSIS — E11.65 TYPE 2 DIABETES MELLITUS WITH HYPERGLYCEMIA, WITH LONG-TERM CURRENT USE OF INSULIN (H): Chronic | ICD-10-CM

## 2020-10-09 DIAGNOSIS — E78.5 HYPERLIPIDEMIA LDL GOAL <100: ICD-10-CM

## 2020-10-09 PROCEDURE — 99214 OFFICE O/P EST MOD 30 MIN: CPT | Mod: 95 | Performed by: INTERNAL MEDICINE

## 2020-10-09 NOTE — LETTER
"    10/9/2020         RE: Brandy Leon  [address]  [place] [address]        Dear Colleague,    Thank you for referring your patient, Brandy Leon, to the [facility]. Please see a copy of my visit note below.    Blood sugar readings per patient    10/2- 124  10/3 -127  10/4- 87  10/5- 96  10/6 126, 278  10/7- 120  10/8- 131  10/9- 129    Brandy Leon is a 80 year old female who is being evaluated via a billable telephone visit.      The patient has been notified of following:     \"This telephone visit will be conducted via a call between you and your physician/provider. We have found that certain health care needs can be provided without the need for a physical exam.  This service lets us provide the care you need with a short phone conversation.  If a prescription is necessary we can send it directly to your pharmacy.  If lab work is needed we can place an order for that and you can then stop by our lab to have the test done at a later time.    Telephone visits are billed at different rates depending on your insurance coverage. During this emergency period, for some insurers they may be billed the same as an in-person visit.  Please reach out to your insurance provider with any questions.    If during the course of the call the physician/provider feels a telephone visit is not appropriate, you will not be charged for this service.\"    Patient has given verbal consent for Telephone visit?  Yes    What phone number would you like to be contacted at? [phone]    How would you like to obtain your AVS? Mail a copy    Phone call duration: 26 minutes    Rachel Franklin history:   Brandy Leon is a 80 year old with T2DM here for 6w follow up.   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  Has met with Mirna BECERRA PharmD to discuss med coverage; has Lantus for free til end of the year  Notes she ahs been taking " "medication as directed.  Diet: 3 meals daily, does not snack. Avoids high carb like pasta, otherwise \"eats like everybody else\"  Medications Lantus 38 units , metformin 1000 mg bid and Jardiance 10 mg daily, glipizide  SMBGs; chechs only in the morning before breakfast. 110-140s  Hypoglycemia none    Yeast infection resolved.    Meds: Lantus 33 units once daily. Metformin. Jardiance    Complications: neuropathy  Eye: has yearly eye exam in September, no diabetic eye disease    Symptoms: denies numbness in her feet      Medications:   Current Outpatient Medications   Medication Sig Dispense Refill     aspirin 81 MG EC tablet Take 1 tablet by mouth daily. 90 tablet 3     Cranberry-Vitamin C-Vitamin E (CRANBERRY PLUS VITAMIN C) 4200-20-3 MG-MG-UNIT CAPS Take 1 capsule by mouth 2 times daily       empagliflozin (JARDIANCE) 10 MG TABS tablet Take 1 tablet (10 mg) by mouth daily 90 tablet 3     fexofenadine (ALLEGRA) 180 MG tablet Take 180 mg by mouth as needed        glipiZIDE (GLUCOTROL XL) 10 MG 24 hr tablet Take 10 mg by mouth daily       GLUCOSAMINE CHONDROITIN COMPLX OR 2 Daily       ibuprofen (ADVIL/MOTRIN) 200 MG capsule Take 600 mg by mouth every 4 hours as needed        insulin glargine (LANTUS PEN) 100 UNIT/ML pen Inject 38 Units Subcutaneous At Bedtime       insulin pen needle (BD JUAN C U/F) 32G X 4 MM miscellaneous USE 1 DAILY AS DIRECTED. 100 each 2     irbesartan-hydrochlorothiazide (AVALIDE) 150-12.5 MG tablet Take 0.5 tablets by mouth daily       latanoprost (XALATAN) 0.005 % ophthalmic solution Place 1 drop into both eyes At Bedtime 3 Bottle 0     metFORMIN (GLUCOPHAGE) 1000 MG tablet TAKE 1 TABLET (1,000 MG) BY MOUTH 2 TIMES DAILY (WITH MEALS) 180 tablet 1     mirabegron (MYRBETRIQ) 50 MG 24 hr tablet Take 1 tablet (50 mg) by mouth daily 30 tablet 11     MULTIVITAMIN OR 1 tablet daily       Omega-3 Fatty Acids (OMEGA 3 PO) Take by mouth 2 times daily.        ONE TOUCH DELICA LANCETS MISC 1 each 2 times " daily. One Touch Delica   100 each 12     ONETOUCH ULTRA test strip USE TO TEST TWICE A  strip 4     order for DME Glucometer covered by insurance. 1 each 0     rosuvastatin (CRESTOR) 5 MG tablet TAKE 1 TABLET BY MOUTH TWICE WEEKLY 90 tablet 3     SYNTHROID 137 MCG tablet Take 1 tablet (137 mcg) by mouth daily 90 tablet 3     trospium (SANCTURA XR) 60 MG CP24 24 hr capsule Take 1 capsule (60 mg) by mouth every morning 30 capsule 11       Review of Systems: in addition to the stated above  GENERAL: Negative  SKIN: Negative  HENT: Negative   EYE: Negative  HEART: Negative  RESPIRATORY: Negative   GI: Negative  : Negative  MSK: Negative  BLOOD/LYMPH: Negative  NEUROLOGIC: Negative   PSYCH: Negative    Physical Examination:  not currently breastfeeding.  There is no height or weight on file to calculate BMI.    Wt Readings from Last 4 Encounters:   03/13/20 79.4 kg (175 lb)   02/07/20 79.6 kg (175 lb 8 oz)   01/13/20 81 kg (178 lb 9.2 oz)   11/20/19 84.4 kg (186 lb)        Reported vitals:  There were no vitals taken for this visit.   healthy, alert and no distress  PSYCH: Alert and oriented times 3; coherent speech, normal   rate and volume, able to articulate logical thoughts, able   to abstract reason, no tangential thoughts, no hallucinations   or delusions  Her affect is normal and pleasant  RESP: No cough, no audible wheezing, able to talk in full sentences  Remainder of exam unable to be completed due to telephone visits    Labs and Studies:   Lab Results   Component Value Date     02/07/2020    CHLORIDE 100 02/07/2020    CO2 31 02/07/2020     (H) 02/07/2020    CR 0.82 02/07/2020    CR 0.72 10/25/2019    CR 0.81 05/10/2019    CR 0.83 10/19/2018    CR 0.68 06/25/2018    FREDY 9.9 02/07/2020    ALBUMIN 3.6 10/25/2019    ALKPHOS 77 06/25/2018    LDL 91 10/25/2019    HDL 57 10/25/2019    TRIG 254 (H) 10/25/2019     Lab Results   Component Value Date    MICROL 16 10/25/2019    MICROL 50 05/10/2019     MICROL 17 06/25/2018    MICROL 9 12/22/2017    MICROL 14 02/09/2017     Lab Results   Component Value Date    A1C 10.0 (H) 08/20/2020    A1C 10.5 (H) 01/10/2020    A1C 11.1 (H) 10/25/2019    A1C 10.2 (H) 07/08/2019    A1C 12.6 (A) 03/29/2019       Lab Results   Component Value Date    HGB 15.4 02/07/2020        Assessment:   Elderly woman with T2DM with still high A1c on lantus, Jardiance, glipzide and metformin. Fasting BGs now in good range. Cannot further increase Lantus.  Only few postprandial blood sugars are available however these are quite high.  Discussed need to start mealtime insulin.  As patient does not snack opted to add mealtime insulin.  Will need to decrease Lantus accordingly.    Patient biggest concern is insurance coverage and her copays which were previously at times very high. Cont to work with [employer] Prescription Assistance program.  In consideration of getting coverage for her medications will try to find the best mealtime insulin.  Would like to avoid NPH out of concern for hypoglycemia.  The patient does not snack and I do not really want to encourage a lot of snacking missed insulin use.      Once mealtime insulin started stop glipizide.    A1c 8.0% considering pt commitment to checking BGs, diet and available insurance.    Offered seeing a dietitian in the past.    Plan  Decrease Lantus to 30 units once daily  Start meal time fast acting insulin: 7 units with each meal  Stop glipizide  Continue Metformin, Continue Jardiance  Check blood sugars twice daily, fasting and before dinner  2. Cardiovascular prevention/HTN/Lipids; on ARB, blood pressure previously well controlled, on Jardiance, on small dose Rosuvastatin   3. Urged pt to contact us if she would have hypoglycemia.    Follow up in 6 weeks      This was a 25 min visit of which more than 23 minutes were spent in counseling in regards to diagnosis, clinical consequences and treatment indications and options of blood glucose  control    Rachel Morris MD  Endocrinology and Diabetes  [address]  [facility] [address]  Tel [phone] Leon is a 80 year old female who is being evaluated via a billable telephone visit.        HPI: Elderly woman with  T2DM since 1998, longstanding poor control, - not currently  Complication of neuropathy              Again, thank you for allowing me to participate in the care of your patient.        Sincerely,        Rachel Morris MD

## 2020-10-09 NOTE — PROGRESS NOTES
"Blood sugar readings per patient    10/2- 124  10/3 -127  10/4- 87  10/5- 96  10/6 126, 278  10/7- 120  10/8- 131  10/9- 129    Brandy Leon is a 80 year old female who is being evaluated via a billable telephone visit.      The patient has been notified of following:     \"This telephone visit will be conducted via a call between you and your physician/provider. We have found that certain health care needs can be provided without the need for a physical exam.  This service lets us provide the care you need with a short phone conversation.  If a prescription is necessary we can send it directly to your pharmacy.  If lab work is needed we can place an order for that and you can then stop by our lab to have the test done at a later time.    Telephone visits are billed at different rates depending on your insurance coverage. During this emergency period, for some insurers they may be billed the same as an in-person visit.  Please reach out to your insurance provider with any questions.    If during the course of the call the physician/provider feels a telephone visit is not appropriate, you will not be charged for this service.\"    Patient has given verbal consent for Telephone visit?  Yes    What phone number would you like to be contacted at? [phone]    How would you like to obtain your AVS? Mail a copy    Phone call duration: 26 minutes    Rachel Franklin history:   Brandy Leon is a 80 year old with T2DM here for 6w follow up.   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  Has met with Mirna BECERRA PharmD to discuss med coverage; has Lantus for free til end of the year  Notes she ahs been taking medication as directed.  Diet: 3 meals daily, does not snack. Avoids high carb like pasta, otherwise \"eats like everybody else\"  Medications Lantus 38 units , metformin 1000 mg bid and Jardiance 10 mg daily, glipizide  SMBGs; chechs only in the morning before " breakfast. 110-140s  Hypoglycemia none    Yeast infection resolved.    Meds: Lantus 33 units once daily. Metformin. Jardiance    Complications: neuropathy  Eye: has yearly eye exam in September, no diabetic eye disease    Symptoms: denies numbness in her feet      Medications:   Current Outpatient Medications   Medication Sig Dispense Refill     aspirin 81 MG EC tablet Take 1 tablet by mouth daily. 90 tablet 3     Cranberry-Vitamin C-Vitamin E (CRANBERRY PLUS VITAMIN C) 4200-20-3 MG-MG-UNIT CAPS Take 1 capsule by mouth 2 times daily       empagliflozin (JARDIANCE) 10 MG TABS tablet Take 1 tablet (10 mg) by mouth daily 90 tablet 3     fexofenadine (ALLEGRA) 180 MG tablet Take 180 mg by mouth as needed        glipiZIDE (GLUCOTROL XL) 10 MG 24 hr tablet Take 10 mg by mouth daily       GLUCOSAMINE CHONDROITIN COMPLX OR 2 Daily       ibuprofen (ADVIL/MOTRIN) 200 MG capsule Take 600 mg by mouth every 4 hours as needed        insulin glargine (LANTUS PEN) 100 UNIT/ML pen Inject 38 Units Subcutaneous At Bedtime       insulin pen needle (BD JUAN C U/F) 32G X 4 MM miscellaneous USE 1 DAILY AS DIRECTED. 100 each 2     irbesartan-hydrochlorothiazide (AVALIDE) 150-12.5 MG tablet Take 0.5 tablets by mouth daily       latanoprost (XALATAN) 0.005 % ophthalmic solution Place 1 drop into both eyes At Bedtime 3 Bottle 0     metFORMIN (GLUCOPHAGE) 1000 MG tablet TAKE 1 TABLET (1,000 MG) BY MOUTH 2 TIMES DAILY (WITH MEALS) 180 tablet 1     mirabegron (MYRBETRIQ) 50 MG 24 hr tablet Take 1 tablet (50 mg) by mouth daily 30 tablet 11     MULTIVITAMIN OR 1 tablet daily       Omega-3 Fatty Acids (OMEGA 3 PO) Take by mouth 2 times daily.        ONE TOUCH DELICA LANCETS MISC 1 each 2 times daily. One Touch Delica   100 each 12     ONETOUCH ULTRA test strip USE TO TEST TWICE A  strip 4     order for DME Glucometer covered by insurance. 1 each 0     rosuvastatin (CRESTOR) 5 MG tablet TAKE 1 TABLET BY MOUTH TWICE WEEKLY 90 tablet 3      SYNTHROID 137 MCG tablet Take 1 tablet (137 mcg) by mouth daily 90 tablet 3     trospium (SANCTURA XR) 60 MG CP24 24 hr capsule Take 1 capsule (60 mg) by mouth every morning 30 capsule 11       Review of Systems: in addition to the stated above  GENERAL: Negative  SKIN: Negative  HENT: Negative   EYE: Negative  HEART: Negative  RESPIRATORY: Negative   GI: Negative  : Negative  MSK: Negative  BLOOD/LYMPH: Negative  NEUROLOGIC: Negative   PSYCH: Negative    Physical Examination:  not currently breastfeeding.  There is no height or weight on file to calculate BMI.    Wt Readings from Last 4 Encounters:   03/13/20 79.4 kg (175 lb)   02/07/20 79.6 kg (175 lb 8 oz)   01/13/20 81 kg (178 lb 9.2 oz)   11/20/19 84.4 kg (186 lb)        Reported vitals:  There were no vitals taken for this visit.   healthy, alert and no distress  PSYCH: Alert and oriented times 3; coherent speech, normal   rate and volume, able to articulate logical thoughts, able   to abstract reason, no tangential thoughts, no hallucinations   or delusions  Her affect is normal and pleasant  RESP: No cough, no audible wheezing, able to talk in full sentences  Remainder of exam unable to be completed due to telephone visits    Labs and Studies:   Lab Results   Component Value Date     02/07/2020    CHLORIDE 100 02/07/2020    CO2 31 02/07/2020     (H) 02/07/2020    CR 0.82 02/07/2020    CR 0.72 10/25/2019    CR 0.81 05/10/2019    CR 0.83 10/19/2018    CR 0.68 06/25/2018    FREDY 9.9 02/07/2020    ALBUMIN 3.6 10/25/2019    ALKPHOS 77 06/25/2018    LDL 91 10/25/2019    HDL 57 10/25/2019    TRIG 254 (H) 10/25/2019     Lab Results   Component Value Date    MICROL 16 10/25/2019    MICROL 50 05/10/2019    MICROL 17 06/25/2018    MICROL 9 12/22/2017    MICROL 14 02/09/2017     Lab Results   Component Value Date    A1C 10.0 (H) 08/20/2020    A1C 10.5 (H) 01/10/2020    A1C 11.1 (H) 10/25/2019    A1C 10.2 (H) 07/08/2019    A1C 12.6 (A) 03/29/2019       Lab  Results   Component Value Date    HGB 15.4 02/07/2020        Assessment:   Elderly woman with T2DM with still high A1c on lantus, Jardiance, glipzide and metformin. Fasting BGs now in good range. Cannot further increase Lantus.  Only few postprandial blood sugars are available however these are quite high.  Discussed need to start mealtime insulin.  As patient does not snack opted to add mealtime insulin.  Will need to decrease Lantus accordingly.    Patient biggest concern is insurance coverage and her copays which were previously at times very high. Cont to work with [employer] Prescription Assistance program.  In consideration of getting coverage for her medications will try to find the best mealtime insulin.  Would like to avoid NPH out of concern for hypoglycemia.  The patient does not snack and I do not really want to encourage a lot of snacking missed insulin use.      Once mealtime insulin started stop glipizide.    A1c 8.0% considering pt commitment to checking BGs, diet and available insurance.    Offered seeing a dietitian in the past.    Plan  Decrease Lantus to 30 units once daily  Start meal time fast acting insulin: 7 units with each meal  Stop glipizide  Continue Metformin, Continue Jardiance  Check blood sugars twice daily, fasting and before dinner  2. Cardiovascular prevention/HTN/Lipids; on ARB, blood pressure previously well controlled, on Jardiance, on small dose Rosuvastatin   3. Urged pt to contact us if she would have hypoglycemia.    Follow up in 6 weeks      This was a 25 min visit of which more than 23 minutes were spent in counseling in regards to diagnosis, clinical consequences and treatment indications and options of blood glucose control    Rachel Morris MD  Endocrinology and Diabetes  [address]  [facility] [address]  Tel [phone] Leon is a 80 year old female who is being evaluated via a billable telephone visit.        HPI:  Elderly woman with  T2DM since 1998, longstanding poor control, - not currently  Complication of neuropathy

## 2020-10-11 DIAGNOSIS — I10 HYPERTENSION GOAL BP (BLOOD PRESSURE) < 140/90: Primary | ICD-10-CM

## 2020-10-12 DIAGNOSIS — E11.65 TYPE 2 DIABETES MELLITUS WITH HYPERGLYCEMIA, WITH LONG-TERM CURRENT USE OF INSULIN (H): Primary | ICD-10-CM

## 2020-10-12 DIAGNOSIS — Z79.4 TYPE 2 DIABETES MELLITUS WITH HYPERGLYCEMIA, WITH LONG-TERM CURRENT USE OF INSULIN (H): Primary | ICD-10-CM

## 2020-10-14 DIAGNOSIS — E11.65 TYPE 2 DIABETES MELLITUS WITH HYPERGLYCEMIA, WITH LONG-TERM CURRENT USE OF INSULIN (H): ICD-10-CM

## 2020-10-14 DIAGNOSIS — Z79.4 TYPE 2 DIABETES MELLITUS WITH HYPERGLYCEMIA, WITH LONG-TERM CURRENT USE OF INSULIN (H): ICD-10-CM

## 2020-10-14 RX ORDER — IRBESARTAN AND HYDROCHLOROTHIAZIDE 150; 12.5 MG/1; MG/1
0.5 TABLET, FILM COATED ORAL DAILY
Qty: 45 TABLET | Refills: 1 | Status: SHIPPED | OUTPATIENT
Start: 2020-10-14 | End: 2021-03-18

## 2020-10-14 NOTE — TELEPHONE ENCOUNTER
IRBESARTAN-HCTZ 150-12.5 MG TB      Historical entry  Last Office Visit : 3/4/20  Future Office visit:  None scheduled    Routing refill request to provider for review/approval because:  Medication is reported/historical

## 2020-10-15 ENCOUNTER — TELEPHONE (OUTPATIENT)
Dept: PEDIATRICS | Facility: CLINIC | Age: 80
End: 2020-10-15

## 2020-10-15 NOTE — TELEPHONE ENCOUNTER
Central Prior Authorization Team   [phone]    PA Initiation    Medication: insulin glulisine (APIDRA PEN) 100 UNIT/ML soln  Insurance Company: [employer] - Phone [phone] Fax [phone]  Pharmacy Filling the Rx: [id number] [address] AT [facility]  Filling Pharmacy Phone: [phone]  Filling Pharmacy Fax: [phone]  Start Date: 10/15/2020

## 2020-10-16 NOTE — TELEPHONE ENCOUNTER
Rec'd request from insurance for additional info. I have completed and faxed back right away.

## 2020-10-16 NOTE — TELEPHONE ENCOUNTER
Prior Authorization Approval     Authorization Effective Date: 9/16/2020  Authorization Expiration Date: 10/16/2021  Medication: insulin glulisine (APIDRA PEN) 100 UNIT/ML soln  Approved Dose/Quantity:   Reference #: [id number]   Insurance Company: [employer] - Phone [phone] Fax [phone]  Which Pharmacy is filling the prescription (Not needed for infusion/clinic administered): [id number] - [address]  Pharmacy Notified: Yes - via voicemail  Patient Notified:

## 2020-10-20 DIAGNOSIS — E11.65 TYPE 2 DIABETES MELLITUS WITH HYPERGLYCEMIA, WITH LONG-TERM CURRENT USE OF INSULIN (H): ICD-10-CM

## 2020-10-20 DIAGNOSIS — Z79.4 TYPE 2 DIABETES MELLITUS WITH HYPERGLYCEMIA, WITH LONG-TERM CURRENT USE OF INSULIN (H): ICD-10-CM

## 2020-10-26 NOTE — PROGRESS NOTES
"MT ENCOUNTER  SUBJECTIVE/OBJECTIVE:                           Brandy Leon is a 80 year old female called for a follow-up visit. She was referred to me from Cailin Ponce.  Today's visit is a follow-up MTM visit from 9/29/2020.     Patient consented to a telehealth visit: yes  Telemedicine Visit Details  Type of service:  Telephone visit  Start Time 9:03:AM  End Time: 9:23AM  Originating Location (pt. Location): Home  Distant Location (provider location):  [facility] [place]  Mode of Communication:  Telephone    Chief Complaint: Blood Sugars. Apidra start    Allergies/ADRs: Reviewed in EHR  Tobacco:  reports that she has never smoked. She has never used smokeless tobacco.  Alcohol: none  Caffeine: 1 cups/day of coffee  PMH: Reviewed in EHR    Medication Adherence/Access:     Diabetes:  Patient is currently taking metformin 1000mg twice daily, Lantus 38 units daily, Jardiance 10mg daily, glipizide XL 10mg daily (patient has not been taking glipizide because she thought it was discontinued).   SMBG: one-two times daily. Ranges (patient reported): 147mg/dL this am after coffee with milk, other fasting readings 101, 135mg/dL and in the afternoon around 200mg/dL. Dr. Morris did start patient on Apidra at last visit and we are waiting for the paperwork to go through patient assistance program.  Patient is not experiencing hypoglycemia.  Recent symptoms of high blood sugar? None. If it is high she will feel achy and \"blah\"  Eye exam: up to date  Foot exam: up to date  ACEi/ARB: Yes: irbesartan/HCTZ.   Urine Albumin:   Lab Results   Component Value Date    UMALCR 20.15 10/25/2019      Aspirin: Taking 81mg daily and denies side effects.   Diet: Breakfast-cereal & toast or egg & sausage croissant Sometimes doesn't eat lunch might have crackers and cheese, banana or orange. Supper: her son does the cooking. May snack on crackers and cheese or popcorn.  Patient reports she has a hard time staying " away from sugar. Patient has a jar of spice drops on her kitchen table.  Hemoglobin A1C   Date Value Ref Range Status   08/20/2020 10.0 (H) 0 - 5.6 % Final     Comment:     Normal <5.7% Prediabetes 5.7-6.4%  Diabetes 6.5% or higher - adopted from ADA   consensus guidelines.     01/10/2020 10.5 (H) 0 - 5.6 % Final     Comment:     Normal <5.7% Prediabetes 5.7-6.4%  Diabetes 6.5% or higher - adopted from ADA   consensus guidelines.     10/25/2019 11.1 (H) 0 - 5.6 % Final     Comment:     Normal <5.7% Prediabetes 5.7-6.4%  Diabetes 6.5% or higher - adopted from ADA   consensus guidelines.       Constipation: patient reports this has been good. It does go up and down but is manageable at this time. Patient will drink prune juice a couple of times a week, which she finds helpful.    Incontinence: current therapy includes trospium XR 60mg daily. Patient started this a couple of weeks ago. Patient has not noticed any improvement or worsening of since starting medication. Patient does complain of dry mouth but this is not new since starting medication Patient denies worsening constipation, confusion, feeling woozy. Patient denies nocturia. Patient reports she goes to the bathroom 2-3 times a day.     Hypertension: Current medications include irbesartan/HCTZ 150-12.5mg 1/2 tablet daily.  Patient does self-monitor blood pressure. Home BP monitoring in range of 105-111's systolic over 59-60's diastolic.  Patient reports no current medication side effects.  BP Readings from Last 3 Encounters:   09/21/20 112/70   03/13/20 127/76   03/04/20 110/64     Today's Vitals: There were no vitals taken for this visit.-phone visit    ASSESSMENT:                            Medication Adherence: Fair.     Diabetes: Not at goal <8%. Patient will start Apidra when she receives her medication from patient assistance program. Dr. Morris had wanted glipizide to be discontinued when Apidra was started. Recommend restarting glipizide.      Incontinence: Stable.    Constipation: Stable.    Hypertension: Stable    PLAN:                          Recommend restarting glipizide. When you start taking Apidra stop glipizide.    I spent 23 minutes with this patient today. All changes were made via collaborative practice agreement with Cailin Ponce. A copy of the visit note was provided to the patient's primary care provider.    The patient was given a summary of these recommendations as an after visit summary via [employer].     Will follow up in 4 weeks.    Mirna Perez, Pharm.D, BCACP  Medication Therapy Management Pharmacist

## 2020-10-26 NOTE — TELEPHONE ENCOUNTER
I am in process of adding Apidra pens to [employer]  assistance program for Brandy.  [employer] requires a hand signed, brand name, hard copy script be submitted to add on to Mara' current enrollment    Please hand sign a BRAND name, hard copy script for:      APIDRA SOLOSTAR PEN    Please send the HARD COPY script to me via interoffice mail FPS Francesca Malone or via [place] mail  at:      [facility]   Francesca Correa   [address] [place]  [address]    Please send the script ASAP, time is short for our Medicare Part D patiens.    Thanks so much for your help!    Francesac Correa  Prescription   Pharmacy Assistance  [phone].    
I have not received the Apidra script yet.    Francesca Correa  Prescription   Pharmacy Assistance  [phone]  
I haven't received the script yet.  Hopefully this week.  I will watch for it & let you know if I still need it.    Thanks for letting me know.    Francesca  
I reprinted it   What is the fastest way to get it to you  
KAY Ma   Sent it interoffice last week in a folder   Can you check if you have it or a better way to get to you  Thanks   
Script interoffice to Francesca Correa.    KATINA Gee    
You could interoffice the Apidra script to;    Francesca Malone    Thanks so much!    Francesca Correa  Prescription   Pharmacy Assistance  [phone]  
no

## 2020-10-27 ENCOUNTER — VIRTUAL VISIT (OUTPATIENT)
Dept: PHARMACY | Facility: CLINIC | Age: 80
End: 2020-10-27
Payer: COMMERCIAL

## 2020-10-27 DIAGNOSIS — N32.81 OVERACTIVE BLADDER: ICD-10-CM

## 2020-10-27 DIAGNOSIS — E11.65 TYPE 2 DIABETES MELLITUS WITH HYPERGLYCEMIA, WITH LONG-TERM CURRENT USE OF INSULIN (H): Primary | ICD-10-CM

## 2020-10-27 DIAGNOSIS — I10 ESSENTIAL HYPERTENSION WITH GOAL BLOOD PRESSURE LESS THAN 140/90: ICD-10-CM

## 2020-10-27 DIAGNOSIS — K59.00 CONSTIPATION, UNSPECIFIED CONSTIPATION TYPE: ICD-10-CM

## 2020-10-27 DIAGNOSIS — Z79.4 TYPE 2 DIABETES MELLITUS WITH HYPERGLYCEMIA, WITH LONG-TERM CURRENT USE OF INSULIN (H): Primary | ICD-10-CM

## 2020-10-27 PROCEDURE — 99607 MTMS BY PHARM ADDL 15 MIN: CPT | Mod: TEL | Performed by: PHARMACIST

## 2020-10-27 PROCEDURE — 99606 MTMS BY PHARM EST 15 MIN: CPT | Mod: TEL | Performed by: PHARMACIST

## 2020-10-27 NOTE — PATIENT INSTRUCTIONS
Recommendations from today's MTM visit:                                                      Restart your glipizide. When you start taking Apidra, then stop taking glipizide.    It was great to speak with you today.  I value your experience and would be very thankful for your time with providing feedback on our clinic survey. You may receive a survey via email or text message in the next few days.     Next MTM visit: 4 weeks    To schedule another MTM appointment, please call the clinic directly or you may call the MTM scheduling line at [phone] or toll-free at [phone].     My Clinical Pharmacist's contact information:                                                      It was a pleasure talking with you today!  Please feel free to contact me with any questions or concerns you have.      Mirna Perez, Pharm.D, BCACP  Medication Therapy Management Pharmacist

## 2020-11-10 ENCOUNTER — DOCUMENTATION ONLY (OUTPATIENT)
Dept: LAB | Facility: CLINIC | Age: 80
End: 2020-11-10

## 2020-11-12 ENCOUNTER — TELEPHONE (OUTPATIENT)
Dept: PEDIATRICS | Facility: CLINIC | Age: 80
End: 2020-11-12

## 2020-11-12 ENCOUNTER — OFFICE VISIT (OUTPATIENT)
Dept: PEDIATRICS | Facility: CLINIC | Age: 80
End: 2020-11-12
Payer: COMMERCIAL

## 2020-11-12 ENCOUNTER — ANCILLARY PROCEDURE (OUTPATIENT)
Dept: GENERAL RADIOLOGY | Facility: CLINIC | Age: 80
End: 2020-11-12
Attending: NURSE PRACTITIONER
Payer: COMMERCIAL

## 2020-11-12 VITALS
DIASTOLIC BLOOD PRESSURE: 60 MMHG | TEMPERATURE: 96.8 F | SYSTOLIC BLOOD PRESSURE: 120 MMHG | WEIGHT: 180.6 LBS | HEIGHT: 64 IN | HEART RATE: 86 BPM | OXYGEN SATURATION: 93 % | BODY MASS INDEX: 30.83 KG/M2

## 2020-11-12 DIAGNOSIS — Z20.822 SUSPECTED COVID-19 VIRUS INFECTION: ICD-10-CM

## 2020-11-12 DIAGNOSIS — E78.5 HYPERLIPIDEMIA LDL GOAL <100: ICD-10-CM

## 2020-11-12 DIAGNOSIS — I10 HYPERTENSION GOAL BP (BLOOD PRESSURE) < 140/90: ICD-10-CM

## 2020-11-12 DIAGNOSIS — I35.0 AORTIC VALVE STENOSIS, ETIOLOGY OF CARDIAC VALVE DISEASE UNSPECIFIED: ICD-10-CM

## 2020-11-12 DIAGNOSIS — Z13.29 SCREENING FOR THYROID DISORDER: ICD-10-CM

## 2020-11-12 DIAGNOSIS — E11.65 TYPE 2 DIABETES MELLITUS WITH HYPERGLYCEMIA, WITH LONG-TERM CURRENT USE OF INSULIN (H): ICD-10-CM

## 2020-11-12 DIAGNOSIS — Z79.4 TYPE 2 DIABETES MELLITUS WITH HYPERGLYCEMIA, WITH LONG-TERM CURRENT USE OF INSULIN (H): ICD-10-CM

## 2020-11-12 DIAGNOSIS — E03.9 HYPOTHYROIDISM, UNSPECIFIED TYPE: ICD-10-CM

## 2020-11-12 DIAGNOSIS — Z00.00 ENCOUNTER FOR MEDICARE ANNUAL WELLNESS EXAM: ICD-10-CM

## 2020-11-12 DIAGNOSIS — I51.89 HYPERKINETIC HEART SYNDROME: ICD-10-CM

## 2020-11-12 DIAGNOSIS — S69.92XA WRIST INJURY, LEFT, INITIAL ENCOUNTER: ICD-10-CM

## 2020-11-12 DIAGNOSIS — Z00.00 ROUTINE GENERAL MEDICAL EXAMINATION AT A HEALTH CARE FACILITY: Primary | ICD-10-CM

## 2020-11-12 DIAGNOSIS — S69.92XA WRIST INJURY, LEFT, INITIAL ENCOUNTER: Primary | ICD-10-CM

## 2020-11-12 LAB
ALBUMIN SERPL-MCNC: 3.6 G/DL (ref 3.4–5)
ALP SERPL-CCNC: 73 U/L (ref 40–150)
ALT SERPL W P-5'-P-CCNC: 26 U/L (ref 0–50)
ANION GAP SERPL CALCULATED.3IONS-SCNC: 2 MMOL/L (ref 3–14)
AST SERPL W P-5'-P-CCNC: 20 U/L (ref 0–45)
BILIRUB SERPL-MCNC: 0.4 MG/DL (ref 0.2–1.3)
BUN SERPL-MCNC: 20 MG/DL (ref 7–30)
CALCIUM SERPL-MCNC: 9.6 MG/DL (ref 8.5–10.1)
CHLORIDE SERPL-SCNC: 99 MMOL/L (ref 94–109)
CHOLEST SERPL-MCNC: 198 MG/DL
CO2 SERPL-SCNC: 34 MMOL/L (ref 20–32)
CREAT SERPL-MCNC: 0.88 MG/DL (ref 0.52–1.04)
CREAT UR-MCNC: 75 MG/DL
GFR SERPL CREATININE-BSD FRML MDRD: 62 ML/MIN/{1.73_M2}
GLUCOSE SERPL-MCNC: 110 MG/DL (ref 70–99)
HBA1C MFR BLD: 8.7 % (ref 0–5.6)
HDLC SERPL-MCNC: 55 MG/DL
LDLC SERPL CALC-MCNC: 102 MG/DL
MICROALBUMIN UR-MCNC: 40 MG/L
MICROALBUMIN/CREAT UR: 53.32 MG/G CR (ref 0–25)
NONHDLC SERPL-MCNC: 143 MG/DL
POTASSIUM SERPL-SCNC: 4.3 MMOL/L (ref 3.4–5.3)
PROT SERPL-MCNC: 8.3 G/DL (ref 6.8–8.8)
SODIUM SERPL-SCNC: 135 MMOL/L (ref 133–144)
TRIGL SERPL-MCNC: 206 MG/DL
TSH SERPL DL<=0.005 MIU/L-ACNC: 3 MU/L (ref 0.4–4)

## 2020-11-12 PROCEDURE — G0439 PPPS, SUBSEQ VISIT: HCPCS | Performed by: NURSE PRACTITIONER

## 2020-11-12 PROCEDURE — 36415 COLL VENOUS BLD VENIPUNCTURE: CPT | Performed by: NURSE PRACTITIONER

## 2020-11-12 PROCEDURE — 80053 COMPREHEN METABOLIC PANEL: CPT | Performed by: NURSE PRACTITIONER

## 2020-11-12 PROCEDURE — 84443 ASSAY THYROID STIM HORMONE: CPT | Performed by: NURSE PRACTITIONER

## 2020-11-12 PROCEDURE — 80061 LIPID PANEL: CPT | Performed by: NURSE PRACTITIONER

## 2020-11-12 PROCEDURE — 73110 X-RAY EXAM OF WRIST: CPT | Mod: LT | Performed by: RADIOLOGY

## 2020-11-12 PROCEDURE — 83036 HEMOGLOBIN GLYCOSYLATED A1C: CPT | Performed by: NURSE PRACTITIONER

## 2020-11-12 PROCEDURE — 82043 UR ALBUMIN QUANTITATIVE: CPT | Performed by: NURSE PRACTITIONER

## 2020-11-12 ASSESSMENT — MIFFLIN-ST. JEOR: SCORE: 1279.2

## 2020-11-12 NOTE — PATIENT INSTRUCTIONS
PLAN:   1.   Symptomatic therapy suggested: Continue current medications as prescribed.   2.  Orders Placed This Encounter   Procedures     Lipid panel reflex to direct LDL Fasting     JUST IN CASE     Albumin Random Urine Quantitative with Creat Ratio     Comprehensive metabolic panel     Hemoglobin A1c     TSH with free T4 reflex     Basic metabolic panel     Hemoglobin A1c     COVID-19 Virus (Coronavirus) Antibody & Titer Reflex     Echocardiogram Complete     3. Patient needs to follow up in if no improvement,or sooner if worsening of symptoms or other symptoms develop.  FUTURE LABS:       - Schedule non-fasting labs in 3 months  Regular exercise  echocardiogram  Follow up in 6 months.  Colonoscopy follow up in the spring   Follow up office visit in one year for annual health maintenance exam, sooner PRN.  \  Patient Education   Personalized Prevention Plan  You are due for the preventive services outlined below.  Your care team is available to assist you in scheduling these services.  If you have already completed any of these items, please share that information with your care team to update in your medical record.     No orders of the defined types were placed in this encounter.

## 2020-11-12 NOTE — TELEPHONE ENCOUNTER
Zoila has been approved to the [employer] assistance program for Apidra Solostar through December 2020.  She will receive this medication at no cost through the enrollment period.    A 90 day supply of APIDRA SOLOSTAR will be delivered to the [facility] within 3-5 business days. Please contact Zoila when this arrives to . Zoila has been informed of this approval and delivery.    Thanks so much for your help!    Francesca Correa  Prescription   Pharmacy Assistance  [phone]

## 2020-11-12 NOTE — LETTER
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       Sincerely,        MAGO Baker CNP

## 2020-11-12 NOTE — PROGRESS NOTES
"  SUBJECTIVE:   Brandy Leon is a 80 year old female who presents for Preventive Visit.      Patient has been advised of split billing requirements and indicates understanding: Yes  Are you in the first 12 months of your Medicare Part B coverage?  No    Physical Health:    In general, how would you rate your overall physical health? fair    Outside of work, how many days during the week do you exercise? 2-3 days/week    Outside of work, approximately how many minutes a day do you exercise?15-30 minutes    If you drink alcohol do you typically have >3 drinks per day or >7 drinks per week? No    Do you usually eat at least 4 servings of fruit and vegetables a day, include whole grains & fiber and avoid regularly eating high fat or \"junk\" foods? Yes    Do you have any problems taking medications regularly?  No    Do you have any side effects from medications? none    Needs assistance for the following daily activities: no assistance needed    Which of the following safety concerns are present in your home?  none identified     Hearing impairment: No    In the past 6 months, have you been bothered by leaking of urine? yes    Mental Health:     In general, how would you rate your overall mental or emotional health? good  PHQ-2 Score: 0    Do you feel safe in your environment? Yes    Have you ever done Advance Care Planning? (For example, a Health Directive, POLST, or a discussion with a medical provider or your loved ones about your wishes): No, advance care planning information given to patient to review.  Patient declined advance care planning discussion at this time.    Additional concerns to address?  YES-would like to discuss paperwork about driving.  Hasn't worked since falling in March. 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     Fall risk:  Fallen 2 or more times in the past year?: No  Any fall with injury in the past year?: No    Cognitive Screenin) Repeat 3 items " (Leader, Season, Table)    2) Clock draw: ABNORMAL   3) 3 item recall: Recalls 2 objects   Results: ABNORMAL clock, 1-2 items recalled: PROBABLE COGNITIVE IMPAIRMENT, **INFORM PROVIDER**    Mini-CogTM Copyright [employer]. Licensed by the author for use in [facility]; reprinted with permission (aaliyah@[facility]). All rights reserved.      Do you have sleep apnea, excessive snoring or daytime drowsiness?: no        Reviewed and updated as needed this visit by clinical staff  Tobacco  Allergies  Meds   Med Hx  Surg Hx  Fam Hx  Soc Hx        Reviewed and updated as needed this visit by Provider                Social History     Tobacco Use     Smoking status: Never Smoker     Smokeless tobacco: Never Used     Tobacco comment: has had exposure to second hand smoke in the past from husban, no current exposure   Substance Use Topics     Alcohol use: No                           Current providers sharing in care for this patient include:   Patient Care Team:  Cailin Ponce APRN CNP as PCP - General (Family Practice)  Macie Davila RN as   Mirna Perez [facility] as Pharmacist (Pharmacist)  Cailin Ponce APRN CNP as Assigned PCP  Lucita Jordan MD as Assigned Surgical Provider  Ehsan Araya DPM as Assigned Musculoskeletal Provider  Ed Brooks MD as Assigned Neuroscience Provider  Rachel Morris MD as Assigned Endocrinology Provider    The following health maintenance items are reviewed in Epic and correct as of today:  Health Maintenance   Topic Date Due     HEPATITIS C SCREENING  08/18/1958     DTAP/TDAP/TD IMMUNIZATION (1 - Tdap) 08/18/1966     Pneumococcal Vaccine: 65+ Years (1 of 1 - PPSV23) 08/18/2006     ZOSTER IMMUNIZATION (2 of 3) 06/22/2012     COLORECTAL CANCER SCREENING  08/13/2020     INFLUENZA VACCINE (1) 09/01/2020     DIABETIC FOOT EXAM  10/28/2020     EYE EXAM  11/05/2020     FALL RISK ASSESSMENT  02/07/2021     A1C  05/12/2021     MEDICARE  ANNUAL WELLNESS VISIT  11/12/2021     BMP  11/12/2021     LIPID  11/12/2021     MICROALBUMIN  11/12/2021     ADVANCE CARE PLANNING  11/12/2025     DEXA  Completed     PHQ-2  Completed     Pneumococcal Vaccine: Pediatrics (0 to 5 Years) and At-Risk Patients (6 to 64 Years)  Aged Out     IPV IMMUNIZATION  Aged Out     MENINGITIS IMMUNIZATION  Aged Out     BP Readings from Last 3 Encounters:   11/12/20 120/60   09/21/20 112/70   03/13/20 127/76    Wt Readings from Last 3 Encounters:   11/12/20 81.9 kg (180 lb 9.6 oz)   03/13/20 79.4 kg (175 lb)   02/07/20 79.6 kg (175 lb 8 oz)              Patient Active Problem List   Diagnosis     Seasonal allergies     Hypothyroidism     Hyperlipidemia LDL goal <100     Fibromyalgia     Osteoarthritis     Advanced directives, counseling/discussion     Obesity     Type 2 diabetes mellitus with hyperglycemia, with long-term current use of insulin (H)     Hypertension goal BP (blood pressure) < 140/90     Overactive bladder     Recurrent UTI     Pseudophakia of both eyes     DINORA (obstructive sleep apnea)- severe (AHI 56)     Contusion of right knee     Gait disorder     Past Surgical History:   Procedure Laterality Date     C APPENDECTOMY       C ARTHROPLASTY TMJ       CATARACT IOL, RT/LT       COLONOSCOPY       COLONOSCOPY N/A 8/13/2015    Procedure: COLONOSCOPY;  Surgeon: Duane, William Charles, MD;  Location: [facility] OR     COLONOSCOPY WITH CO2 INSUFFLATION N/A 8/13/2015    Procedure: COLONOSCOPY WITH CO2 INSUFFLATION;  Surgeon: Duane, William Charles, MD;  Location: [facility] OR     PHACOEMULSIFICATION WITH STANDARD INTRAOCULAR LENS IMPLANT Left 10/25/2018    Procedure: LEFT PHACOEMULSIFICATION WITH STANDARD INTRAOCULAR LENS IMPLANT;  Surgeon: Thomas Serna MD;  Location: [facility] OR     PHACOEMULSIFICATION WITH STANDARD INTRAOCULAR LENS IMPLANT Right 11/8/2018    Procedure: RIGHT PHACOEMULSIFICATION WITH STANDARD INTRAOCULAR LENS IMPLANT;  Surgeon: Thomas Serna MD;  Location:  [facility] OR     THYROIDECTOMY          Social History     Tobacco Use     Smoking status: Never Smoker     Smokeless tobacco: Never Used     Tobacco comment: has had exposure to second hand smoke in the past from husban, no current exposure   Substance Use Topics     Alcohol use: No     Family History   Problem Relation Age of Onset     Cancer Father         skin and leukemia     Cerebrovascular Disease Maternal Grandfather      Cerebrovascular Disease Paternal Grandmother      Eye Disorder Paternal Grandmother         cataracts     Cerebrovascular Disease Paternal Grandfather      Heart Disease Paternal Grandfather      Cancer Sister         Breast Cancer     Eye Disorder Mother         cataracts     Eye Disorder Brother         cataract     Breast Cancer Daughter      Other Cancer Daughter         ovarian cancer     Glaucoma No family hx of      Macular Degeneration No family hx of          Current Outpatient Medications   Medication Sig Dispense Refill     aspirin 81 MG EC tablet Take 1 tablet by mouth daily. 90 tablet 3     Cranberry-Vitamin C-Vitamin E (CRANBERRY PLUS VITAMIN C) 4200-20-3 MG-MG-UNIT CAPS Take 1 capsule by mouth 2 times daily       empagliflozin (JARDIANCE) 10 MG TABS tablet Take 1 tablet (10 mg) by mouth daily 90 tablet 3     fexofenadine (ALLEGRA) 180 MG tablet Take 180 mg by mouth as needed        glipiZIDE (GLUCOTROL XL) 10 MG 24 hr tablet Take 10 mg by mouth daily       GLUCOSAMINE CHONDROITIN COMPLX OR 2 Daily       ibuprofen (ADVIL/MOTRIN) 200 MG capsule Take 600 mg by mouth every 4 hours as needed        insulin glargine (LANTUS PEN) 100 UNIT/ML pen Inject 38 Units Subcutaneous At Bedtime       insulin pen needle (BD JUAN C U/F) 32G X 4 MM miscellaneous USE 1 DAILY AS DIRECTED. 100 each 2     irbesartan-hydrochlorothiazide (AVALIDE) 150-12.5 MG tablet Take 0.5 tablets by mouth daily 45 tablet 1     latanoprost (XALATAN) 0.005 % ophthalmic solution Place 1 drop into both eyes At Bedtime 3 Bottle  0     metFORMIN (GLUCOPHAGE) 1000 MG tablet TAKE 1 TABLET (1,000 MG) BY MOUTH 2 TIMES DAILY (WITH MEALS) 180 tablet 1     MULTIVITAMIN OR 1 tablet daily       Omega-3 Fatty Acids (OMEGA 3 PO) Take by mouth 2 times daily.        ONE TOUCH DELICA LANCETS MISC 1 each 2 times daily. One Touch Delica   100 each 12     ONETOUCH ULTRA test strip USE TO TEST TWICE A  strip 4     order for DME Glucometer covered by insurance. 1 each 0     rosuvastatin (CRESTOR) 5 MG tablet TAKE 1 TABLET BY MOUTH TWICE WEEKLY 90 tablet 3     SYNTHROID 137 MCG tablet Take 1 tablet (137 mcg) by mouth daily 90 tablet 3     trospium (SANCTURA XR) 60 MG CP24 24 hr capsule Take 1 capsule (60 mg) by mouth every morning 30 capsule 11     insulin glulisine (APIDRA PEN) 100 UNIT/ML soln Inject 7 Units Subcutaneous 3 times daily (before meals) Max 25 units daily, allow for prime & adjustment (Patient not taking: Reported on 10/27/2020) 30 mL 2     Allergies   Allergen Reactions     Estradiol Itching     Lipitor [Atorvastatin Calcium]      Weak and shaky     Sulfa Drugs      Rash       Zocor [Simvastatin]      Weak and shakey     Pneumonia Vaccine:declined   Mammogram Screening: Patient over age 75, has elected to stop mammography screening.    ROS:  CONSTITUTIONAL:NEGATIVE for fever, chills, change in weight  INTEGUMENTARY/SKIN: NEGATIVE for changing lesions or rash and POSITIVE for patches of hyperpigmentation on bilateral foearms  EYES: POSITIVE for blurred vision bilateral, waiting to get in to eye doctor, has appt scheduled in 1 month; Hx cataract surgery and NEGATIVE for eye pain bilateral   ENT/MOUTH: NEGATIVE for current ear, mouth and throat problems;  Back in December had been ill with URI and now wondering if she may have had Covid and would like to test her antibody   RESP:POSITIVE for coughing up phlegm in the morning;  and NEGATIVE for SOB/dyspnea  BREAST: NEGATIVE for masses, tenderness or discharge  CV: NEGATIVE for chest pain,  "palpitations or peripheral edema  GI: POSITIVE for constipation- prune juice helps; and NEGATIVE for abdominal pain, heartburn or reflux and nausea   : POSITIVE for Hx of urinary leaking but this is better with Trospium medication; occasional vaginal itching- has ointment at home that helpsNEGATIVE for dysuria or retention     MUSCULOSKELETAL: POSITIVE for left wrist pain, tripped at home and braced herself with wrists-thinks she reaggravated a wrist injury from earlier this year   NEURO: POSITIVE for occasional dizziness upon standing, also can feel shaky at times--thinks it may be due to glipizide; numbness tingling hands/feet   ENDOCRINE: POSITIVE  for HX diabetes and NEGATIVE for cold intolerance and heat intolerance  HEME/ALLERGY/IMMUNE: NEGATIVE for bleeding problems  PSYCHIATRIC: NEGATIVE for changes in mood or affect    OBJECTIVE:   /60 (BP Location: Right arm, Patient Position: Sitting, Cuff Size: Adult Regular)   Pulse 86   Temp 96.8  F (36  C) (Temporal)   Ht 1.626 m (5' 4\")   Wt 81.9 kg (180 lb 9.6 oz)   SpO2 93%   Breastfeeding No   BMI 31.00 kg/m   Estimated body mass index is 30.04 kg/m  as calculated from the following:    Height as of 3/13/20: 1.626 m (5' 4\").    Weight as of 3/13/20: 79.4 kg (175 lb).  EXAM:   GENERAL: alert, no distress and elderly  EYES: Eyes grossly normal to inspection, PERRL, EOMI and conjunctivae and sclerae normal  HENT: normal cephalic/atraumatic, ear canals and TM's normal, oropharynx clear and oral mucous membranes moist  NECK: no asymmetry, masses, or scars, thyroid normal to palpation, trachea midline and normal to palpation and no carotid bruits  RESP: lungs clear to auscultation - no rales, rhonchi or wheezes  BREAST: deferred  CV: regular rates and rhythm, grade 2/6 murmur heard best over the upper right sternal border, radial pulses 2+ and no peripheral edema  ABDOMEN: soft, nontender, bowel sounds normal, no palpable or pulsatile masses and no scars, " striae, dilated veins, rashes, or lesions   (female): deferred  RECTAL (female): deferred  MS: bilateral bony enlargement of knee joint; LUE exam shows palpable lump on left forearm along radius, normal strength and muscle mass, no erythema, and full ROM of wrist  SKIN: no suspicious lesions or rashes  NEURO: mentation intact, speech normal, cranial nerves 2-12 intact, gait is slow and deliberate, and patellar DTR's abnormal: 0+ biilaterally  PSYCH: mentation appears normal, affect normal/bright, judgement and insight intact and appearance well groomed    Diagnostic Test Results:  Labs reviewed in Epic  Results for orders placed or performed in visit on 11/12/20 (from the past 24 hour(s))   TSH with free T4 reflex   Result Value Ref Range    TSH 3.00 0.40 - 4.00 mU/L   Lipid panel reflex to direct LDL Fasting   Result Value Ref Range    Cholesterol 198 <200 mg/dL    Triglycerides 206 (H) <150 mg/dL    HDL Cholesterol 55 >49 mg/dL    LDL Cholesterol Calculated 102 (H) <100 mg/dL    Non HDL Cholesterol 143 (H) <130 mg/dL   Albumin Random Urine Quantitative with Creat Ratio   Result Value Ref Range    Creatinine Urine 75 mg/dL    Albumin Urine mg/L 40 mg/L    Albumin Urine mg/g Cr 53.32 (H) 0 - 25 mg/g Cr   Comprehensive metabolic panel   Result Value Ref Range    Sodium 135 133 - 144 mmol/L    Potassium 4.3 3.4 - 5.3 mmol/L    Chloride 99 94 - 109 mmol/L    Carbon Dioxide 34 (H) 20 - 32 mmol/L    Anion Gap 2 (L) 3 - 14 mmol/L    Glucose 110 (H) 70 - 99 mg/dL    Urea Nitrogen 20 7 - 30 mg/dL    Creatinine 0.88 0.52 - 1.04 mg/dL    GFR Estimate 62 >60 mL/min/[1.73_m2]    GFR Estimate If Black 72 >60 mL/min/[1.73_m2]    Calcium 9.6 8.5 - 10.1 mg/dL    Bilirubin Total 0.4 0.2 - 1.3 mg/dL    Albumin 3.6 3.4 - 5.0 g/dL    Protein Total 8.3 6.8 - 8.8 g/dL    Alkaline Phosphatase 73 40 - 150 U/L    ALT 26 0 - 50 U/L    AST 20 0 - 45 U/L   Hemoglobin A1c   Result Value Ref Range    Hemoglobin A1C 8.7 (H) 0 - 5.6 %        ASSESSMENT / PLAN:   Brandy was seen today for wellness visit and physical.    Diagnoses and all orders for this visit:    Routine general medical examination at a health care facility  -     JUST IN CASE; Future    Type 2 diabetes mellitus with hyperglycemia, with long-term current use of insulin (H)  -     Albumin Random Urine Quantitative with Creat Ratio; Future  -     Comprehensive metabolic panel; Future  -     Hemoglobin A1c; Future  -     Basic metabolic panel; Future  -     Hemoglobin A1c; Future  -Labs obtained as above. A1C markedly improving. Continue current regimen. Follow up for labs in 3 months.   FOLLOW UP WITH SPECIALIST :Endocrinology    Hypothyroidism, unspecified type  The current medical regimen is effective.  Continue current medication regimen unchanged.    Hyperlipidemia LDL goal <100  -     Lipid panel reflex to direct LDL Fasting; Future  -     Comprehensive metabolic panel; Future  -     TSH with free T4 reflex; Future  -Labs obtained as above. No real change. No apparent adverse effects to medication. Continue current regimen. Follow up in 6 months.     Screening for thyroid disorder  -     TSH with free T4 reflex; Future    Hypertension goal BP (blood pressure) < 140/90  -     Albumin Random Urine Quantitative with Creat Ratio; Future  -     Comprehensive metabolic panel; Future  Albumin/Creatinine increased. BP well controlled. Improving control of diabetes may provide benefit. Continue current medication regimen. Follow up in 6 months.     Encounter for Medicare annual wellness exam    Suspected COVID-19 virus infection  -     -     COVID-19 Virus (Coronavirus) Antibody & Titer Reflex; Future    Aortic valve stenosis, etiology of cardiac valve disease unspecified  -     Echocardiogram Complete; Future  Will follow up and/or notify patient of  results via My Chart to determine further need for followup    PLAN:   The current medical regimen is effective.  Continue current  "medication regimen unchanged.   Patient needs to follow up in if no improvement,or sooner if worsening of symptoms or other symptoms develop.  FUTURE LABS:       - Schedule non-fasting labs in 3 months  Regular exercise  echocardiogram  Follow up in 6 months.  Colonoscopy follow up in the spring   Follow up office visit in one year for annual health maintenance exam, sooner PRN.        Patient has been advised of split billing requirements and indicates understanding: Yes    COUNSELING:  Reviewed preventive health counseling, as reflected in patient instructions  Special attention given to:       Vision screening       Bladder control       Fall risk prevention       Colon cancer screening    Estimated body mass index is 30.04 kg/m  as calculated from the following:    Height as of 3/13/20: 1.626 m (5' 4\").    Weight as of 3/13/20: 79.4 kg (175 lb).    Weight management plan: Discussed healthy diet and exercise guidelines    She reports that she has never smoked. She has never used smokeless tobacco.    Appropriate preventive services were discussed with this patient, including applicable screening as appropriate for cardiovascular disease, diabetes, osteopenia/osteoporosis, and glaucoma.  As appropriate for age/gender, discussed screening for colorectal cancer, prostate cancer, breast cancer, and cervical cancer. Checklist reviewing preventive services available has been given to the patient.    Reviewed patients plan of care and provided an AVS. The Intermediate Care Plan ( asthma action plan, low back pain action plan, and migraine action plan) for Brandy meets the Care Plan requirement. This Care Plan has been established and reviewed with the Patient.    Counseling Resources:  ATP IV Guidelines  Pooled Cohorts Equation Calculator  Breast Cancer Risk Calculator  BRCA-Related Cancer Risk Assessment: FHS-7 Tool  FRAX Risk Assessment  ICSI Preventive Guidelines  Dietary Guidelines for Americans, 2010  USDA's " MyPlate  ASA Prophylaxis  Lung CA Screening    Cailin Ponce, APRN CNP  [facility]    Amber Scheierl, AZE[employer] Student

## 2020-11-12 NOTE — RESULT ENCOUNTER NOTE
Arnol Leon,    Attached are your test results.  Xray is negative except for degenerative changes    Please contact us if you have any questions.    Cailin Ponce, CNP

## 2020-11-12 NOTE — NURSING NOTE
"Chief Complaint   Patient presents with     Wellness Visit     Physical       Initial /60 (BP Location: Right arm, Patient Position: Sitting, Cuff Size: Adult Regular)   Pulse 86   Temp 96.8  F (36  C) (Temporal)   Ht 1.626 m (5' 4\")   Wt 81.9 kg (180 lb 9.6 oz)   SpO2 93%   Breastfeeding No   BMI 31.00 kg/m   Estimated body mass index is 31 kg/m  as calculated from the following:    Height as of this encounter: 1.626 m (5' 4\").    Weight as of this encounter: 81.9 kg (180 lb 9.6 oz).  Medication Reconciliation: complete      KATINA Tucker      "

## 2020-11-13 NOTE — RESULT ENCOUNTER NOTE
Arnol Leon,    Attached are your test results.  Continue current medications as prescribed.   Will recheck you diabetes labs in 3 months and see you in 6 months    Please contact us if you have any questions.    Cailin Ponce, CNP

## 2020-11-19 ENCOUNTER — TELEPHONE (OUTPATIENT)
Dept: PEDIATRICS | Facility: CLINIC | Age: 80
End: 2020-11-19

## 2020-11-19 NOTE — TELEPHONE ENCOUNTER
Medication received on 11/17/2020 for Apidra solostar. Patient informed that the medication is here in clinic an should be able to .    KATINA Gee

## 2020-11-20 ENCOUNTER — ANCILLARY PROCEDURE (OUTPATIENT)
Dept: CARDIOLOGY | Facility: CLINIC | Age: 80
End: 2020-11-20
Attending: NURSE PRACTITIONER
Payer: COMMERCIAL

## 2020-11-20 DIAGNOSIS — I35.0 AORTIC VALVE STENOSIS, ETIOLOGY OF CARDIAC VALVE DISEASE UNSPECIFIED: ICD-10-CM

## 2020-11-20 PROCEDURE — 93306 TTE W/DOPPLER COMPLETE: CPT | Performed by: STUDENT IN AN ORGANIZED HEALTH CARE EDUCATION/TRAINING PROGRAM

## 2020-11-20 NOTE — TELEPHONE ENCOUNTER
Patient came to clinic and picked up medication Apidra Solostar Pens.  She state the box is different color than what she has previously been given.  She is calling the  to ensure same medication.    Mirna Melendrez RN, [facility]

## 2020-11-21 NOTE — RESULT ENCOUNTER NOTE
Arnol Leon,  Attached are your test results.  Your echo showed some changes that I would like cardiology to review in terms of how hard you heart is working.    Please call [phone] to make appointment  if you do not hear from referrals in the next few days.      Please contact us if you have any questions.    Cailin Ponce, CNP

## 2020-11-24 ENCOUNTER — TELEPHONE (OUTPATIENT)
Dept: PEDIATRICS | Facility: CLINIC | Age: 80
End: 2020-11-24

## 2020-11-24 ENCOUNTER — VIRTUAL VISIT (OUTPATIENT)
Dept: PHARMACY | Facility: CLINIC | Age: 80
End: 2020-11-24
Payer: COMMERCIAL

## 2020-11-24 DIAGNOSIS — Z79.4 TYPE 2 DIABETES MELLITUS WITH HYPERGLYCEMIA, WITH LONG-TERM CURRENT USE OF INSULIN (H): Primary | ICD-10-CM

## 2020-11-24 DIAGNOSIS — E11.65 TYPE 2 DIABETES MELLITUS WITH HYPERGLYCEMIA, WITH LONG-TERM CURRENT USE OF INSULIN (H): Primary | ICD-10-CM

## 2020-11-24 PROCEDURE — 99606 MTMS BY PHARM EST 15 MIN: CPT | Mod: TEL | Performed by: PHARMACIST

## 2020-11-24 PROCEDURE — 99607 MTMS BY PHARM ADDL 15 MIN: CPT | Mod: TEL | Performed by: PHARMACIST

## 2020-11-24 NOTE — PATIENT INSTRUCTIONS
Recommendations from today's MTM visit:                                                      Stop Glipizide  Start Apidra 7 units before meals. Continue with Lantus  Call [facility] to refill Lantus  Call pharmacy to have them take you off of automatic refills.     It was great to speak with you today.  I value your experience and would be very thankful for your time with providing feedback on our clinic survey. You may receive a survey via email or text message in the next few days.     Next MTM visit: 1 week    To schedule another MTM appointment, please call the clinic directly or you may call the MTM scheduling line at [phone] or toll-free at [phone].     My Clinical Pharmacist's contact information:                                                      It was a pleasure talking with you today!  Please feel free to contact me with any questions or concerns you have.      Mirna Perez, Pharm.D, BCACP  Medication Therapy Management Pharmacist

## 2020-11-24 NOTE — TELEPHONE ENCOUNTER
Lantus medication received. Called and left VM for patient that medication will be held and can  at check in C.    KATINA Gee

## 2020-11-24 NOTE — PROGRESS NOTES
"MTM ENCOUNTER  SUBJECTIVE/OBJECTIVE:                           Brandy Leon is a 80 year old female called for a follow-up visit. She was referred to me from Cailin Ponce.  Today's visit is a follow-up MTM visit from 10/27/2020     Reason for visit: Blood sugars.    Allergies/ADRs: Reviewed in chart  Tobacco: She reports that she has never smoked. She has never used smokeless tobacco.  Alcohol: none  Caffeine: 1 cups/day of coffee  Past Medical History: Reviewed in chart    Medication Adherence/Access: Patient is getting Synthroid, Lantus, Apidra and Jardiance through  programs. Jardiance is getting mailed to her home and Synthroid, Lantus and Apidra are getting mailed to the  clinic. Patient reports she only has 2 Lantus pens left and never received the Jardiance by mail. She picked up the Jardiance from her local pharmacy yesterday. She is on automatic refill program with the pharmacy.    Diabetes:  Patient is currently taking metformin 1000mg twice daily, Lantus 38 units daily, Jardiance 10mg daily, glipizide XL 10mg daily, Apidra 7 units three times daily before meals (patient has not started Apidra)  SMBG: one-two times daily. Ranges (patient reported): 82mg/dL this am and before bed last night 153mg/dL.   Patient is not experiencing hypoglycemia.  Recent symptoms of high blood sugar? None. If it is high she will feel achy and \"blah\"  Eye exam: up to date  Foot exam: up to date  ACEi/ARB: Yes: irbesartan/HCTZ.   Urine Albumin:   Lab Results   Component Value Date    UMALCR 20.15 10/25/2019      Aspirin: Taking 81mg daily and denies side effects.   Diet: Breakfast-cereal & toast or egg & sausage croissant Sometimes doesn't eat lunch might have crackers and cheese, banana or orange. Supper: her son does the cooking. May snack on crackers and cheese or popcorn.  Patient reports she has a hard time staying away from sugar. Patient has a jar of spice drops on her kitchen " table.  Hemoglobin A1C   Date Value Ref Range Status   08/20/2020 10.0 (H) 0 - 5.6 % Final     Comment:     Normal <5.7% Prediabetes 5.7-6.4%  Diabetes 6.5% or higher - adopted from ADA   consensus guidelines.     01/10/2020 10.5 (H) 0 - 5.6 % Final     Comment:     Normal <5.7% Prediabetes 5.7-6.4%  Diabetes 6.5% or higher - adopted from ADA   consensus guidelines.     10/25/2019 11.1 (H) 0 - 5.6 % Final     Comment:     Normal <5.7% Prediabetes 5.7-6.4%  Diabetes 6.5% or higher - adopted from ADA   consensus guidelines.       Today's Vitals: There were no vitals taken for this visit.    ASSESSMENT:                              Medication Adherence: No issues identified    Diabetes: patient is not meeting A1c goal of <8%. Patient has received the Apidra insulin but had not yet started. Patient would benefit from stopping glipizide and starting Apidra 7 units before meals three times daily as prescribed. Patient would benefit from refilling Lantus through  program. Patient would benefit from opting out of automatic refill program with her pharmacy since many of her medications are coming through  programs.    PLAN:                            Stop Glipizide  Start Apidra 7 units before meals. Continue with Lantus  Call Francesca/Kaykay to refill Lantus  Call pharmacy to have them take you off of automatic refills.     I spent 18 minutes with this patient today. All changes were made via collaborative practice agreement with Cailin Ponce. A copy of the visit note was provided to the patient's primary care provider.    Will follow up in 1 week.    The patient was sent via [email or web address] a summary of these recommendations.     Mirna Perez, Pharm.D, [facility]  Medication Therapy Management Pharmacist      Patient consented to a telehealth visit: yes  Telemedicine Visit Details  Type of service:  Telephone visit  Start Time: 9:11AM  End Time: 9:30AM  Originating Location (patient location):  Home  Distant Location (provider location):  [facility]  Mode of Communication:  Telephone

## 2020-11-25 ENCOUNTER — TELEPHONE (OUTPATIENT)
Dept: PEDIATRICS | Facility: CLINIC | Age: 80
End: 2020-11-25

## 2020-11-25 NOTE — TELEPHONE ENCOUNTER
FYI~ A refill has been made to the  assistance program for Lantus and Synthroid.    A 90 day supply of Synthroid will be delivered to the [facility] within 7-10 business days.    Please contact Zoila to .    Also thank you so much for letting her know to  her Lantus.       Thank you,    Kaykay Bell  Prescription Assistance

## 2020-11-30 NOTE — PROGRESS NOTES
"MTM ENCOUNTER  SUBJECTIVE/OBJECTIVE:                           Brandy Leon is a 80 year old female called for a follow-up visit. She was referred to me from Cailin Ponce.  Today's visit is a follow-up MTM visit from 11/24/2020     Reason for visit: Blood sugars  Patient stopped taking Apidra because she was not feeling good on it and was feeling shaky    Allergies/ADRs: Reviewed in chart  Tobacco: She reports that she has never smoked. She has never used smokeless tobacco.  Alcohol: none  Caffeine: 1 cups/day of coffee  Past Medical History: Reviewed in chart    Medication Adherence/Access: Patient is getting Synthroid, Lantus, Apidra and Jardiance through  programs. Jardiance is getting mailed to her home and Synthroid, Lantus and Apidra are getting mailed to the  clinic.     Diabetes:  Patient is currently taking metformin 1000mg twice daily, Lantus 38 units daily, Jardiance 10mg daily, glipizide XL 10mg daily, Apidra 7 units three times daily before meals. Patient has tried taking the Apidra before meals and towards the end of the day towards bedtime and she feels shaky. She has checked her blood sugars and it was around 100-130mg/dL. Patient took it for about 3 days. Patient stopped taking Apidra and started back on the glipizide.   SMBG: one-two times daily. Ranges (patient reported): 135mg/dL this am.  Patient is not experiencing hypoglycemia.  Recent symptoms of high blood sugar? None. If it is high she will feel achy and \"blah\"  Eye exam: up to date  Foot exam: up to date  ACEi/ARB: Yes: irbesartan/HCTZ.   Urine Albumin:   Lab Results   Component Value Date    UMALCR 20.15 10/25/2019      Aspirin: Taking 81mg daily and denies side effects.   Diet: Breakfast-cereal & toast or egg & sausage croissant Sometimes doesn't eat lunch might have crackers and cheese, banana or orange. Supper: her son does the cooking. May snack on crackers and cheese or popcorn.  Patient reports she has " a hard time staying away from sugar. Patient has a jar of spice drops on her kitchen table.  Hemoglobin A1C   Date Value Ref Range Status   08/20/2020 10.0 (H) 0 - 5.6 % Final     Comment:     Normal <5.7% Prediabetes 5.7-6.4%  Diabetes 6.5% or higher - adopted from ADA   consensus guidelines.     01/10/2020 10.5 (H) 0 - 5.6 % Final     Comment:     Normal <5.7% Prediabetes 5.7-6.4%  Diabetes 6.5% or higher - adopted from ADA   consensus guidelines.     10/25/2019 11.1 (H) 0 - 5.6 % Final     Comment:     Normal <5.7% Prediabetes 5.7-6.4%  Diabetes 6.5% or higher - adopted from ADA   consensus guidelines.       Today's Vitals: There were no vitals taken for this visit.    ASSESSMENT:                              Medication Adherence: No issues identified    Diabetes: patient is not meeting A1c goal of <8%. Patient stopped Apirdra due to feeling shaky. Blood sugars still in goal range and not low. Discussed with patient that this could be her body adjusting to lower blood sugars. Patient would be willing to try Apidra again but at a lower dose.     PLAN:                          Could consider decreasing Apidra dose and titrating up as appropriate. Will consult with Dr. Morris.    I spent 13 minutes with this patient today. All changes were made via collaborative practice agreement with Cailin Ponce. A copy of the visit note was provided to the patient's primary care provider.    Will follow up in 2 weeks.    The patient was sent via [employer] a summary of these recommendations.     Mirna Perez, Pharm.D, [facility]  Medication Therapy Management Pharmacist      Patient consented to a telehealth visit: yes  Telemedicine Visit Details  Type of service:  Telephone visit  Start Time: 9:01AM  End Time: 9:14AM  Originating Location (patient location): Home  Distant Location (provider location):  [facility]  Mode of Communication:  Telephone

## 2020-12-01 ENCOUNTER — VIRTUAL VISIT (OUTPATIENT)
Dept: PHARMACY | Facility: CLINIC | Age: 80
End: 2020-12-01
Payer: COMMERCIAL

## 2020-12-01 DIAGNOSIS — Z79.4 TYPE 2 DIABETES MELLITUS WITH HYPERGLYCEMIA, WITH LONG-TERM CURRENT USE OF INSULIN (H): Primary | ICD-10-CM

## 2020-12-01 DIAGNOSIS — E11.65 TYPE 2 DIABETES MELLITUS WITH HYPERGLYCEMIA, WITH LONG-TERM CURRENT USE OF INSULIN (H): Primary | ICD-10-CM

## 2020-12-01 PROCEDURE — 99606 MTMS BY PHARM EST 15 MIN: CPT | Mod: TEL | Performed by: PHARMACIST

## 2020-12-01 NOTE — Clinical Note
Zoila Montague Dr. tried Apidra for 3 days and didn't feel well on it, she was feeling shaky. She checked her blood sugars and she reports they were between 100-130 so she is not having low blood sugars. She doesn't want to take it because she doesn't like the way it makes her feel although we discussed it further and she would be willing to try it again at a lower dose. Would you consider decreasing her dose of the Apidra and titrating up as appropriate in the future? Thank you for considering,'Mirna Perez, Pharm.D, [facility]  Medication Therapy Management Pharmacist

## 2020-12-01 NOTE — PATIENT INSTRUCTIONS
Recommendations from today's MTM visit:                                                      Could consider decreasing Apidra dose and titrating up as appropriate. Will consult with Dr. Morris.    It was great to speak with you today.  I value your experience and would be very thankful for your time with providing feedback on our clinic survey. You may receive a survey via email or text message in the next few days.     Next MTM visit: 2 weeks    To schedule another MTM appointment, please call the clinic directly or you may call the MTM scheduling line at [phone] or toll-free at [phone].     My Clinical Pharmacist's contact information:                                                      It was a pleasure talking with you today!  Please feel free to contact me with any questions or concerns you have.      Mirna Perez, Pharm.D, BCACP  Medication Therapy Management Pharmacist

## 2020-12-07 ENCOUNTER — TELEPHONE (OUTPATIENT)
Dept: PEDIATRICS | Facility: CLINIC | Age: 80
End: 2020-12-07

## 2020-12-07 NOTE — TELEPHONE ENCOUNTER
FYI~ A refill has been made to the  assistance program for Jardiance.    A 90 day supply of  Jardiance will be delivered to Zoila's home within 7-10 business days.    Thank you,    Kaykay Bell  Prescription Assistance

## 2020-12-09 ENCOUNTER — TELEPHONE (OUTPATIENT)
Dept: PEDIATRICS | Facility: CLINIC | Age: 80
End: 2020-12-09

## 2020-12-09 NOTE — TELEPHONE ENCOUNTER
Patient contacted, states she has had plugged ears for some time.  Also has had nasal congestion/runny nose and a little bit of a sore throat and occasional cough. She reports that she has had these symptoms off and on since March.  Denies fever.  She has been taking Coricidin cold and flu intermittently since March, as well.  States it does help.   She also reports a history of ear wax build-up in the past, states it has been quite some time since they have been flushed.  States also that she had a recent fall in the parking lot at the bank.  She lost her balance and fell.  Reports no pain since the fall, reports that she skinned her knee in the fall.  Patient feels that she has balance issues due to ongoing congestion in her ears.    MAGO Clarke CNP is out the rest of the week.  Patient advised may try a decongestant and antihistamine to help to dry up nasal secretions/clear some congestion.      Will check with available provider to see if patient may be evaluated in clinic, or if virtual is needed due to symptoms.    Jayleen Rosa RN

## 2020-12-09 NOTE — TELEPHONE ENCOUNTER
[facility] Call Center    Phone Message    May a detailed message be left on voicemail: yes     Reason for Call: Other: pt calling and has plugged ears, she also fell on 12/7 in bank parking lot, didn't hit her head,, first appt not till Jan, please advise with her, she has had a few falls.     Action Taken: Message routed to:  Primary Care p [phone]    Travel Screening: Not Applicable

## 2020-12-09 NOTE — PROGRESS NOTES
Urology Telephone Visit - Follow-up    CC: follow up urinary incontinence    HPI: Ms. Brandy Leon is a 80 year old female with PMH of DM2 (last A1C 8.7), DINORA, HTN, and HLD who is being evaluated via billable telephone visit today for follow up of recurrent UTI's and urinary incontinence. She had UTI's in Oct (E. Coli), Nov (E. Coli), and Dec 2019 (mixed  erwin), though no further infections since then. There was some question of whether her Jardiance medication may be contributing to infections; however, she had previously had issues with UTI's while taking Invokana and stopping this did not result in improvement in her UTI's. Therefore, she has continued on Jardiance. There was also question of possible normal pressure hydrocephalus contributing to UTI's and urinary issues in general, though she saw neurology on 6/30/2020 who felt that she does not have NPH. As stated above, she has not had any further UTI's since 2019 and is doing well from this standpoint.     Regarding her urinary urgency incontinence, this had previously been well controlled with oxybutynin. However, she was having some cognitive and balance side effects which resolved after oxybutynin was discontinued. She was instead started on Myrbetriq 50 mg daily. While this was helpful for her urinary symptoms, she felt that it caused constipation and was also cost-prohibitive. As a result, she was switched to trospium XR 60 mg daily. This seems to work well and her urinary symptoms are currently stable. She denies obvious side effects like constipation, though does mention some issues with balance over the last month. Apparently fell in the parking lot of the bank last week without any preceding dizziness or warning. She has an appointment with primary care today due to ongoing sinus and ear congestion. She wonders if maybe that is contributing to the balance problems.     Past Medical History:   Diagnosis Date     Actinic keratosis 3/12/2013      Degenerative joint disease      Diabetes (H)      Rheumatic fever 1957    x2 between the ages of 15-18     Seasonal allergies      Past Surgical History:   Procedure Laterality Date     C APPENDECTOMY       C ARTHROPLASTY TMJ       CATARACT IOL, RT/LT       COLONOSCOPY       COLONOSCOPY N/A 8/13/2015    Procedure: COLONOSCOPY;  Surgeon: Duane, William Charles, MD;  Location: [facility] OR     COLONOSCOPY WITH CO2 INSUFFLATION N/A 8/13/2015    Procedure: COLONOSCOPY WITH CO2 INSUFFLATION;  Surgeon: Duane, William Charles, MD;  Location: [facility]     PHACOEMULSIFICATION WITH STANDARD INTRAOCULAR LENS IMPLANT Left 10/25/2018    Procedure: LEFT PHACOEMULSIFICATION WITH STANDARD INTRAOCULAR LENS IMPLANT;  Surgeon: Thomas Serna MD;  Location: [facility]     PHACOEMULSIFICATION WITH STANDARD INTRAOCULAR LENS IMPLANT Right 11/8/2018    Procedure: RIGHT PHACOEMULSIFICATION WITH STANDARD INTRAOCULAR LENS IMPLANT;  Surgeon: Thomas Serna MD;  Location: [facility] OR     THYROIDECTOMY        Current Outpatient Medications   Medication Sig Dispense Refill     aspirin 81 MG EC tablet Take 1 tablet by mouth daily. 90 tablet 3     Cranberry-Vitamin C-Vitamin E (CRANBERRY PLUS VITAMIN C) 4200-20-3 MG-MG-UNIT CAPS Take 1 capsule by mouth 2 times daily       empagliflozin (JARDIANCE) 10 MG TABS tablet Take 1 tablet (10 mg) by mouth daily 90 tablet 3     fexofenadine (ALLEGRA) 180 MG tablet Take 180 mg by mouth as needed        GLUCOSAMINE CHONDROITIN COMPLX OR 2 Daily       ibuprofen (ADVIL/MOTRIN) 200 MG capsule Take 600 mg by mouth every 4 hours as needed        insulin glargine (LANTUS PEN) 100 UNIT/ML pen Inject 38 Units Subcutaneous At Bedtime       insulin pen needle (BD JUAN C U/F) 32G X 4 MM miscellaneous USE 1 DAILY AS DIRECTED. 100 each 2     irbesartan-hydrochlorothiazide (AVALIDE) 150-12.5 MG tablet Take 0.5 tablets by mouth daily       latanoprost (XALATAN) 0.005 % ophthalmic solution Place 1 drop into both eyes At  Bedtime 3 Bottle 3     metFORMIN (GLUCOPHAGE) 1000 MG tablet TAKE 1 TABLET (1,000 MG) BY MOUTH 2 TIMES DAILY (WITH MEALS) 180 tablet 1     mirabegron (MYRBETRIQ) 50 MG 24 hr tablet Take 1 tablet (50 mg) by mouth daily 30 tablet 11     MULTIVITAMIN OR 1 tablet daily       Omega-3 Fatty Acids (OMEGA 3 PO) Take by mouth 2 times daily.        ONE TOUCH DELICA LANCETS MISC 1 each 2 times daily. One Touch Delica   100 each 12     ONETOUCH ULTRA test strip USE TO TEST TWICE A  strip 4     order for DME Glucometer covered by insurance. 1 each 0     rosuvastatin (CRESTOR) 5 MG tablet TAKE 1 TABLET BY MOUTH TWICE WEEKLY 90 tablet 3     SYNTHROID 137 MCG tablet Take 1 tablet (137 mcg) by mouth daily 90 tablet 3     Allergies   Allergen Reactions     Estradiol Itching     Lipitor [Atorvastatin Calcium]      Weak and shaky     Sulfa Drugs      Rash       Zocor [Simvastatin]      Weak and shakey       LABS:  Creatinine   Date Value Ref Range Status   11/12/2020 0.88 0.52 - 1.04 mg/dL Final       ASSESSMENT/PLAN:  80 year old female with a history of urinary tract infections and urge urinary incontinence in the setting of poorly controlled DM. No further infections since 2019 - doing well from this standpoint. Her incontinence symptoms were previously well controlled with oxybutynin though she was experiencing cognitive and balance side effects which resolved after stopping the medication.   Then switched to Myrbetriq which was helpful but caused possible side effect of constipation and was also cost prohibitive. Most recently switched to trospium XR 60 mg daily which seems to be working well with minimal side effects. She does report some issues with balance over the last month - unclear if side effect from the trospium or related to her sinus congestion. She has an appointment with primary care today to assess for possible sinus infection.     -Will plan to continue trospiuim XR 60 mg daily for now.  -Will connect with  MTM pharmacist about possible side effects from trospium as she has a telehealth visit next week. Consider stopping trospium if balance issues not improved with treating sinus/ear congestion.       Phone call duration: 5 minutes    Taylor Lovell PA-C

## 2020-12-10 NOTE — TELEPHONE ENCOUNTER
Medication received, will give to patient when she comes in for appointment on 12/11/2020.    KATINA Gee

## 2020-12-10 NOTE — TELEPHONE ENCOUNTER
Patient contacted, appointment scheduled with Dr. Caldera for Friday 12/11/2020.    Jayleen Rosa RN

## 2020-12-11 ENCOUNTER — OFFICE VISIT (OUTPATIENT)
Dept: PEDIATRICS | Facility: CLINIC | Age: 80
End: 2020-12-11
Payer: COMMERCIAL

## 2020-12-11 ENCOUNTER — VIRTUAL VISIT (OUTPATIENT)
Dept: UROLOGY | Facility: CLINIC | Age: 80
End: 2020-12-11
Payer: COMMERCIAL

## 2020-12-11 VITALS
SYSTOLIC BLOOD PRESSURE: 127 MMHG | BODY MASS INDEX: 31.24 KG/M2 | WEIGHT: 183 LBS | TEMPERATURE: 97.7 F | OXYGEN SATURATION: 95 % | HEART RATE: 84 BPM | DIASTOLIC BLOOD PRESSURE: 70 MMHG | HEIGHT: 64 IN

## 2020-12-11 DIAGNOSIS — G47.33 OSA (OBSTRUCTIVE SLEEP APNEA): Chronic | ICD-10-CM

## 2020-12-11 DIAGNOSIS — R26.9 GAIT DISORDER: ICD-10-CM

## 2020-12-11 DIAGNOSIS — H61.23 BILATERAL IMPACTED CERUMEN: Primary | ICD-10-CM

## 2020-12-11 DIAGNOSIS — K21.9 GASTROESOPHAGEAL REFLUX DISEASE WITHOUT ESOPHAGITIS: ICD-10-CM

## 2020-12-11 DIAGNOSIS — N39.41 URGE INCONTINENCE: Primary | ICD-10-CM

## 2020-12-11 DIAGNOSIS — R29.6 FALLS FREQUENTLY: ICD-10-CM

## 2020-12-11 DIAGNOSIS — R05.3 CHRONIC COUGH: ICD-10-CM

## 2020-12-11 PROCEDURE — 99214 OFFICE O/P EST MOD 30 MIN: CPT | Mod: 25 | Performed by: INTERNAL MEDICINE

## 2020-12-11 PROCEDURE — 99441 PR PHYSICIAN TELEPHONE EVALUATION 5-10 MIN: CPT | Mod: 95 | Performed by: PHYSICIAN ASSISTANT

## 2020-12-11 PROCEDURE — 69210 REMOVE IMPACTED EAR WAX UNI: CPT | Mod: RT | Performed by: INTERNAL MEDICINE

## 2020-12-11 ASSESSMENT — MIFFLIN-ST. JEOR: SCORE: 1290.08

## 2020-12-11 NOTE — LETTER
December 11, 2020      Brandy ZACARIAS Edward  [address] [address]        To Whom It May Concern:    Brandy Leon was seen in our clinic. Since her injury 01/    Sincerely,        Kyle Caldera MD

## 2020-12-11 NOTE — PATIENT INSTRUCTIONS
UROLOGY CLINIC VISIT PATIENT INSTRUCTIONS    Continue trospium for now. If your balance issues do not improve after treating your sinus/ear congestion, we may need to consider stopping trospium. Will connect with Laisha Perez before your telehealth visit next Tuesday.     If you have any issues, questions or concerns in the meantime, do not hesitate to contact us at [phone] or via [employer].     It was a pleasure meeting with you today.  Thank you for allowing me and my team the privilege of caring for you today.  YOU are the reason we are here, and I truly hope we provided you with the excellent service you deserve.  Please let us know if there is anything else we can do for you so that we can be sure you are leaving completely satisfied with your care experience.

## 2020-12-11 NOTE — PROGRESS NOTES
Subjective     Brandy Leon is a 80 year old female who presents to clinic today for the following health issues:    HPI         Both ears have been feeling very plugged over the last week.  Fell in March patient was going to the bank got out of her car and down she went, someone helped her up, She has abrasions on her left knee. she states she has been feeling shaky and wobbly ever since the fall.  Patient also fell at work in January and fell face first, states she did get medical help.  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   Since last January She has been coughing up a lot of clear, slightly yellow phlegm, denies shortness of breath or wheezing.     Fall Risk:  Timed up and go test 18.5 seconds.    The patient has fallen at least 5 times in the past 1 year.  November 2019 was her first fall where she lost balance.  Once at work feeling restless she tripped gas hose.  Her most recent fall was last Monday when she was going into a bank and lost balance and fell.  She did also consciousness dizziness or.  she uses a cane often.  She was referred to physical therapy by her provider but due to Covid did not follow through.    She complains of having a cough which is worst at night.  She is not short of breath.  She has poorly controlled diabetes.  Feels her sinuses always congested.  She has had this symptoms persistently since at least January 2020    Review of Systems   Constitutional, HEENT, cardiovascular, pulmonary, GI, , musculoskeletal, neuro, skin, endocrine and psych systems are negative, except as otherwise noted.      Objective    There were no vitals taken for this visit.  There is no  height or weight on file to calculate BMI.  Physical Exam   GENERAL: healthy, alert and no distress  HENT: normal cephalic/atraumatic, right ear: cerumen, left ear: occluded with wax, nose and mouth without ulcers or lesions, oropharynx clear and oral mucous membranes moist  NECK: no adenopathy, no asymmetry, masses, or scars and thyroid normal to palpation  RESP: lungs clear to auscultation - no rales, rhonchi or wheezes  CV: regular rate and rhythm, normal S1 S2, no S3 or S4, no murmur, click or rub, no peripheral edema and peripheral pulses strong  ABDOMEN: soft, nontender, no hepatosplenomegaly, no masses and bowel sounds normal  MS: no gross musculoskeletal defects noted, no edema  NEURO: Normal strength and tone, sensory exam grossly normal, mentation intact.  Patient's balance in the clinic was poor but more steady with a cane.        Assessment & Plan     1.  Bilateral impacted cerumen.  Had to irrigate and also use a curette.  Large amount of wax was removed continued from the right ear.  2.  Chronic cough which I think is most likely related to #3.  3.  Gastroesophageal reflux disease.  Advised to raise the head end of her bed by 4 to 6 inch block and placed on omeprazole 20 mg daily.  See if that will help her cough symptoms.  4.  Gait disorder with poor balance.  Encouraged her to use the cane more consistently.  5.  Frequent falls related to poor gait            Kyle Caldera MD  [facility]

## 2020-12-13 ENCOUNTER — HEALTH MAINTENANCE LETTER (OUTPATIENT)
Age: 80
End: 2020-12-13

## 2020-12-14 ENCOUNTER — OFFICE VISIT (OUTPATIENT)
Dept: OPHTHALMOLOGY | Facility: CLINIC | Age: 80
End: 2020-12-14
Payer: COMMERCIAL

## 2020-12-14 DIAGNOSIS — H40.1132 PRIMARY OPEN ANGLE GLAUCOMA OF BOTH EYES, MODERATE STAGE: Primary | ICD-10-CM

## 2020-12-14 PROCEDURE — 99207 PR NO BILLABLE SERVICE THIS VISIT: CPT | Performed by: OPHTHALMOLOGY

## 2020-12-14 RX ORDER — LATANOPROST 50 UG/ML
1 SOLUTION/ DROPS OPHTHALMIC DAILY
Qty: 1 BOTTLE | Refills: 11 | Status: SHIPPED | OUTPATIENT
Start: 2020-12-14 | End: 2021-11-02

## 2020-12-14 ASSESSMENT — REFRACTION_MANIFEST
OS_AXIS: 170
OD_SPHERE: -0.75
OS_SPHERE: -0.75
OD_AXIS: 030
OS_ADD: +2.50
OD_CYLINDER: +0.75
OS_CYLINDER: +1.25
OD_ADD: +2.50

## 2020-12-14 ASSESSMENT — VISUAL ACUITY
OD_SC+: -1
OS_SC: 20/40
OD_SC: 20/30
METHOD: SNELLEN - LINEAR

## 2020-12-14 ASSESSMENT — SLIT LAMP EXAM - LIDS
COMMENTS: 2+ DERMATOCHALASIS - UPPER LID
COMMENTS: 2+ DERMATOCHALASIS - UPPER LID

## 2020-12-14 ASSESSMENT — TONOMETRY
OS_IOP_MMHG: 15
IOP_METHOD: TONOPEN
OS_IOP_MMHG: 14
OD_IOP_MMHG: 15
OD_IOP_MMHG: 17

## 2020-12-14 ASSESSMENT — CUP TO DISC RATIO
OS_RATIO: 0.5
OD_RATIO: 0.55

## 2020-12-14 ASSESSMENT — EXTERNAL EXAM - RIGHT EYE: OD_EXAM: NORMAL

## 2020-12-14 ASSESSMENT — EXTERNAL EXAM - LEFT EYE: OS_EXAM: NORMAL

## 2020-12-14 NOTE — NURSING NOTE
Chief Complaints and History of Present Illnesses   Patient presents with     Diabetic Eye Exam Follow Up     Glaucoma      Chief Complaint(s) and History of Present Illness(es)     Diabetic Eye Exam Follow Up     Associated symptoms: Negative for blurred vision, tearing and redness              Glaucoma     Laterality: both eyes    Compliance with Treatment: always              Comments     Diabetic eye exam each eye   Says blood sugars are stable.  Lab Results       Component                Value               Date                       A1C                      8.7                 11/12/2020                 A1C                      10.0                08/20/2020                 A1C                      10.5                01/10/2020                 A1C                      11.1                10/25/2019                 A1C                      10.2                07/08/2019  Denies blurred vision either eye.  Says that her eyes feel like there's glue in them.  Eye meds: Latanaprost each eye last night gtts@ 9pm     ALFONSO Drummond 12/14/2020 9:29 AM

## 2020-12-15 ENCOUNTER — VIRTUAL VISIT (OUTPATIENT)
Dept: PHARMACY | Facility: CLINIC | Age: 80
End: 2020-12-15
Payer: COMMERCIAL

## 2020-12-15 DIAGNOSIS — Z79.4 TYPE 2 DIABETES MELLITUS WITH HYPERGLYCEMIA, WITH LONG-TERM CURRENT USE OF INSULIN (H): Primary | ICD-10-CM

## 2020-12-15 DIAGNOSIS — N32.81 OVERACTIVE BLADDER: ICD-10-CM

## 2020-12-15 DIAGNOSIS — E11.65 TYPE 2 DIABETES MELLITUS WITH HYPERGLYCEMIA, WITH LONG-TERM CURRENT USE OF INSULIN (H): Primary | ICD-10-CM

## 2020-12-15 PROCEDURE — 99606 MTMS BY PHARM EST 15 MIN: CPT | Mod: TEL | Performed by: PHARMACIST

## 2020-12-15 NOTE — PATIENT INSTRUCTIONS
Recommendations from today's MTM visit:                                                       Recommend checking blood sugars 2 hours after meals.  Recommend scheduling a follow-up appointment with Dr. Morris.    It was great to speak with you today.  I value your experience and would be very thankful for your time with providing feedback on our clinic survey. You may receive a survey via email or text message in the next few days.     Next MTM visit: 4 weeks    To schedule another MTM appointment, please call the clinic directly or you may call the MTM scheduling line at [phone] or toll-free at [phone].     My Clinical Pharmacist's contact information:                                                      It was a pleasure talking with you today!  Please feel free to contact me with any questions or concerns you have.      Mirna Perez, Pharm.D, BCACP  Medication Therapy Management Pharmacist

## 2020-12-15 NOTE — Clinical Note
Dennis Vazquez,   Here is an update on Zoila. She reported today that her balance is much better after she had cerumen removed from her ears and has had no further falls. Let me know if there is anything else I can do. I plan on following up with her again in a month.  Laisha

## 2020-12-15 NOTE — PROGRESS NOTES
"MTM ENCOUNTER  SUBJECTIVE/OBJECTIVE:                           Brandy Leon is a 80 year old female called for a follow-up visit. She was referred to me from Cailin Ponce.  Today's visit is a follow-up MTM visit from 12/1/2020     Reason for visit: Blood sugars    Allergies/ADRs: Reviewed in chart  Tobacco: She reports that she has never smoked. She has never used smokeless tobacco.  Alcohol: none  Caffeine: 1 cups/day of coffee  Past Medical History: Reviewed in chart    Medication Adherence/Access: Patient is getting Synthroid, Lantus, Apidra and Jardiance through  programs. Jardiance is getting mailed to her home and Synthroid, Lantus and Apidra are getting mailed to the  clinic.     Diabetes:  Patient is currently taking metformin 1000mg twice daily, Lantus 38 units daily, Jardiance 10mg daily, glipizide XL 10mg daily. She has checked her blood sugars and it was around 100-130mg/dL. Patient stopped taking Apidra and started back on the glipizide. Patient feels better when she doesn't have that much medication in her body.  SMBG: one-two times daily. Ranges (patient reported:) 92mg/dL this am, yesterday 102mg/dL. Patient has not been checking her blood sugar after eating consistently. She did check once and it was \"in the 200's\".   Patient is not experiencing hypoglycemia.  Recent symptoms of high blood sugar? None. If it is high she will feel achy and \"blah\"  Eye exam: up to date  Foot exam: up to date  ACEi/ARB: Yes: irbesartan/HCTZ.   Urine Albumin:   Lab Results   Component Value Date    UMALCR 20.15 10/25/2019      Aspirin: Taking 81mg daily and denies side effects.   Diet: Breakfast-cereal & toast or egg & sausage croissant Sometimes doesn't eat lunch might have crackers and cheese, banana or orange. Supper: her son does the cooking. May snack on crackers and cheese or popcorn.  Patient reports she has a hard time staying away from sugar. Patient has a jar of spice drops on her " kitchen table.  Lab Results   Component Value Date    A1C 8.7 11/12/2020    A1C 10.0 08/20/2020    A1C 10.5 01/10/2020    A1C 11.1 10/25/2019    A1C 10.2 07/08/2019       Incontinence: current therapy includes trospium XR 60mg daily. Patient recently saw urology and trospium has been working well for patient but patient has been having some balance issues but patient had also been experiencing ongoing sinus and ear congestion. At the time, trospium was continued and to monitor as congestion improves if balance issues also improve. As of today, patient feels her balance is better since the cerumen was removed from her ear. Patient will use a cane when she goes out.   Patient has not fallen since her fall at the bank.    Today's Vitals: There were no vitals taken for this visit.    ASSESSMENT:                            Medication Adherence: No issues identified    Diabetes: patient is not meeting A1c goal of <8%, although A1c has improved. Patient stopped Apirdra due to feeling shaky. Patient would benefit from checking blood sugars 2 hours after meals and following up with Dr. Morris.  Patient would also like to simplify her medication regimen and will discuss further with Dr. Morris.    Incontinence: Stable. Continue to monitor for side effects.    PLAN:                          Recommend checking blood sugars 2 hours after meals.  Recommend scheduling a follow-up appointment with Dr. Morris.    I spent 14 minutes with this patient today. All changes were made via collaborative practice agreement with Cailin Ponce. A copy of the visit note was provided to the patient's primary care provider.    Will follow up in 4 weeks.    The patient was sent via [employer] a summary of these recommendations.     Mirna Perez, Pharm.D, BCACP  Medication Therapy Management Pharmacist      Patient consented to a telehealth visit: yes  Telemedicine Visit Details  Type of service:  Telephone visit  Start Time: 8:42AM  End  Time: 8:56AM  Originating Location (patient location): Home  Distant Location (provider location):  [facility]  Mode of Communication:  Telephone

## 2020-12-23 DIAGNOSIS — I10 HYPERTENSION GOAL BP (BLOOD PRESSURE) < 140/90: ICD-10-CM

## 2020-12-27 RX ORDER — IRBESARTAN AND HYDROCHLOROTHIAZIDE 150; 12.5 MG/1; MG/1
0.5 TABLET, FILM COATED ORAL DAILY
Qty: 45 TABLET | Refills: 1 | OUTPATIENT
Start: 2020-12-27

## 2020-12-30 RX ORDER — HYDROCHLOROTHIAZIDE 12.5 MG/1
TABLET ORAL
OUTPATIENT
Start: 2020-12-30

## 2021-01-12 ENCOUNTER — VIRTUAL VISIT (OUTPATIENT)
Dept: PHARMACY | Facility: CLINIC | Age: 81
End: 2021-01-12
Payer: COMMERCIAL

## 2021-01-12 DIAGNOSIS — E11.65 TYPE 2 DIABETES MELLITUS WITH HYPERGLYCEMIA, WITH LONG-TERM CURRENT USE OF INSULIN (H): Primary | ICD-10-CM

## 2021-01-12 DIAGNOSIS — J30.2 SEASONAL ALLERGIES: ICD-10-CM

## 2021-01-12 DIAGNOSIS — R12 HEARTBURN: ICD-10-CM

## 2021-01-12 DIAGNOSIS — E63.9 NUTRITIONAL DEFICIENCY: ICD-10-CM

## 2021-01-12 DIAGNOSIS — I10 ESSENTIAL HYPERTENSION WITH GOAL BLOOD PRESSURE LESS THAN 140/90: ICD-10-CM

## 2021-01-12 DIAGNOSIS — E03.9 HYPOTHYROIDISM, UNSPECIFIED TYPE: ICD-10-CM

## 2021-01-12 DIAGNOSIS — E78.5 DYSLIPIDEMIA, GOAL LDL BELOW 100: ICD-10-CM

## 2021-01-12 DIAGNOSIS — H40.003 GLAUCOMA SUSPECT, BILATERAL: ICD-10-CM

## 2021-01-12 DIAGNOSIS — N32.81 OVERACTIVE BLADDER: ICD-10-CM

## 2021-01-12 DIAGNOSIS — Z79.4 TYPE 2 DIABETES MELLITUS WITH HYPERGLYCEMIA, WITH LONG-TERM CURRENT USE OF INSULIN (H): Primary | ICD-10-CM

## 2021-01-12 DIAGNOSIS — R52 PAIN: ICD-10-CM

## 2021-01-12 PROCEDURE — 99605 MTMS BY PHARM NP 15 MIN: CPT | Mod: TEL | Performed by: PHARMACIST

## 2021-01-12 PROCEDURE — 99607 MTMS BY PHARM ADDL 15 MIN: CPT | Mod: TEL | Performed by: PHARMACIST

## 2021-01-12 NOTE — PROGRESS NOTES
Medication Therapy Management (MTM) Encounter    ASSESSMENT/PLAN:                            Medication Adherence/Access: No issues identified    Diabetes: Not meeting A1c goal of less than 8%.  Would benefit from follow-up with endocrinology.  Could consider increasing Jardiance and discontinuing glipizide.  Patient will discuss with endocrinology.    Incontinence: Stable.    Hypertension: Stable. Meeting goal <140/90mmHg.    Hyperlipidemia: May benefit from increasing rosuvastatin; declines at this time.    GERD: Stable.    Hypothyroidism: Stable.    Glaucoma: Stable.    Supplements: Stable.    Allergic rhinitis: Stable.    Pain: Would benefit from limiting use of ibuprofen    PLAN:   Recommend scheduling an appointment with endocrinology.  Could consider increasing Jardiance and discontinuing glipizide  Recommend using acetaminophen in place of ibuprofen  Recommend completing paperwork for prescription assistance for 2021 year    Will follow up in 6 weeks.    SUBJECTIVE/OBJECTIVE:                           Brandy Leon is a 80 year old female called for an initial visit for 2021; follow up from 12/15/2020. She was referred to me from Cailin Ponce.     Reason for visit: Blood sugars    Allergies/ADRs: Reviewed in chart  Tobacco: She reports that she has never smoked. She has never used smokeless tobacco.    Alcohol: none  Caffeine: 2 cups/day of coffee with whole milk  Activity: up and down the stairs doing her laundry,   Past Medical History: Reviewed in chart    Medication Adherence/Access: Patient uses pill box(es). Patient uses medication assistance program. Patient is getting Synthroid, Lantus, and Jardiance through  programs. Jardiance is getting mailed to her home and Synthroid, Lantus come to the clinic.    Diabetes:  Patient is currently taking metformin 1000mg twice daily, Lantus 38 units daily, Jardiance 10mg daily, glipizide XL 10mg daily.   SMBG: one-two times daily. Ranges  "(patient reported:) Fasting blood sugars.86, 138,111,77, 89mg/dL. Post-prandial 137mg/dL, 244mg/dL.  Patient is not experiencing hypoglycemia.  Recent symptoms of high blood sugar? None. If it is high she will feel achy and \"blah\"  Eye exam: up to date  Foot exam: up to date  ACEi/ARB: Yes: irbesartan/HCTZ.   Urine Albumin:   Lab Results   Component Value Date    MICROL 40 11/12/2020     Aspirin: Taking 81mg daily and denies side effects.   Diet: Breakfast-cereal & toast or egg & sausage croissant Sometimes doesn't eat lunch might have crackers and cheese, banana or orange. Supper: her son does the cooking. May snack on crackers and cheese or popcorn.  Patient reports she has a hard time staying away from sugar. Patient has a jar of spice drops on her kitchen table.  Lab Results   Component Value Date    A1C 8.7 11/12/2020    A1C 10.0 08/20/2020    A1C 10.5 01/10/2020    A1C 11.1 10/25/2019    A1C 10.2 07/08/2019       Incontinence: current therapy includes trospium XR 60mg daily. Patient feels this has been effective. Patient does notice some balance issues after she has been sitting for awhile and goes to get up. Patient does not report any issues with memory.     Hypertension: Current medications include irbesartan/JOMR437-10.5mg 1/2 tablet daily.  Patient does not self-monitor blood pressure.  Patient reports no current medication side effects.  BP Readings from Last 3 Encounters:   12/11/20 127/70   11/12/20 120/60   09/21/20 112/70     GERD: Current medications include: Prilosec (omeprazole) 20mg daily as needed. Patient will take it when she eats something spicy. Patient was placed on this to see if this would help with her cough. Patient reports she no longer has symptoms.      Hyperlipidemia: Current therapy includes rousvastatin 5mg twice weekly.  Patient reports no significant myalgias or other side effects.  The 10-year ASCVD risk score (Qingjuju JACKMAN Jr., et al., 2013) is: 50.9%    Values used to calculate " the score:      Age: 80 years      Sex: Female      Is Non- : No      Diabetic: Yes      Tobacco smoker: No      Systolic Blood Pressure: 127 mmHg      Is BP treated: Yes      HDL Cholesterol: 55 mg/dL      Total Cholesterol: 198 mg/dL  Recent Labs   Lab Test 11/12/20  0906 10/25/19  0950 08/21/15  0825 08/21/15  0825 06/16/15  0941 06/16/15  0941   CHOL 198 199   < > 174  --  224*   HDL 55 57   < > 49*  --  47*   * 91   < > 86   < > 120   TRIG 206* 254*   < > 197*  --  283*   CHOLHDLRATIO  --   --   --  3.6  --  4.8    < > = values in this interval not displayed.     Hypothyroidism: Patient is taking levothyroxine 137 mcg daily. Takes in the morning Patient is having the following symptoms: none.   TSH   Date Value Ref Range Status   11/12/2020 3.00 0.40 - 4.00 mU/L Final     Glaucoma: Current medications include latanoprost 1 gtt each eye at bedtime. Patient reports there haven't been any changes.    Supplements: Currently taking  MVI daily, cranberry daily, glucosamine/chondrointin 1 twice daily, omega-3 1 tablet twice daily. No reported issues at this time.     Allergic Rhinitis: Current medications include fexofenadine 180mg once daily. Primary symptoms are runny nose and sneezing. Patient only takes in spring and fall.  Primary triggers are pollens. Patient feels that current therapy is effective.     Pain: Current medications include ibuprofen 400mg as needed. Hasn't taken any for a couple of months. Patient takes for general aches and pains. Patient also has acetaminophen that she takes off and on as well.     Today's Vitals: There were no vitals taken for this visit.    Medicare Part D topics discussed:OTC products and Recommendations to doctor    I spent 35 minutes with this patient today an additional 15 minutes was spent creating a MAP. All changes were made via collaborative practice agreement with Cailin Ponce. A copy of the visit note was provided to the  patient's primary care provider.    The patient was sent via [employer] a summary of these recommendations.     Mirna Perez, Pharm.D, [facility]  Medication Therapy Management Pharmacist    Telemedicine Visit Details  Type of service:  Telephone visit  Start Time: 9:07 AM  End Time: 9:42am  Originating Location (patient location): Home  Distant Location (provider location):  [facility] MTM      Medication Therapy Recommendations  Pain    Current Medication: ibuprofen (ADVIL/MOTRIN) 200 MG capsule   Rationale: Unsafe medication for the patient - Adverse medication event - Safety   Recommendation: Change Medication - Acetaminophen 8 Hour 650 MG Tbcr   Status: Patient Agreed - Adherence/Education         Type 2 diabetes mellitus with hyperglycemia, with long-term current use of insulin (H)    Current Medication: empagliflozin (JARDIANCE) 10 MG TABS tablet   Rationale: Dose too low - Dosage too low - Effectiveness   Recommendation: Increase Dose - and discontinue glipizide   Status: Contact Provider - Awaiting Response

## 2021-01-12 NOTE — LETTER
"        Date: 2021    Brandy Leon  [address]  [place] [address]    Dear Ms. Leon,    Thank you for talking with me on 2021 about your health and medications. Medicare s MTM (Medication Therapy Management) program helps you understand your medications and use them safely.      This letter includes an action plan (Medication Action Plan) and medication list (Personal Medication List). The action plan has steps you should take to help you get the best results from your medications. The medication list will help you keep track of your medications and how to use them the right way.       Have your action plan and medication list with you when you talk with your doctors, pharmacists, and other healthcare providers in your care team.     Ask your doctors, pharmacists, and other healthcare providers to update the action plan and medication list at every visit.     Take your medication list with you if you go to the hospital or emergency room.     Give a copy of the action plan and medication list to your family or caregivers.     If you want to talk about this letter or any of the papers with it, please call   [phone].We look forward to working with you, your doctors, and other healthcare providers to help you stay healthy through the [facility] MTM program.    Sincerely,  Mirna Perez [facility]    Enclosed: Medication Action Plan and Personal Medication List    MEDICATION ACTION PLAN FOR Brandy Leon,  1940     This action plan will help you get the best results from your medications if you:   1. Read \"What we talked about.\"   2. Take the steps listed in the \"What I need to do\" boxes.   3. Fill in \"What I did and when I did it.\"   4. Fill in \"My follow-up plan\" and \"Questions I want to ask.\"     Have this action plan with you when you talk with your doctors, pharmacists, and other healthcare providers in your care team. Share this with your family or caregivers " too.  DATE PREPARED: 2021  What we talked about: adjusting diabetes medications                                                  What I need to do: schedule an appointment with your endocrinologist and ask about increasing your Jardiance dose and discontinuing glipizide.       What I did and when I did it:                                              What we talked about: Ibuprofen can be hard on your kidneys                                                  What I need to do: Use acetaminophen (Tylenol) instead of ibuprofen       What I did and when I did it:                                                 My follow-up plan:                 Questions I want to ask:              If you have any questions about your action plan, call Mirna Perez [facility], Phone: [phone] , Monday-Friday 8-4:30pm.           PERSONAL MEDICATION LIST FOR Brandy LeonLINDSEY 1940     This medication list was made for you after we talked. We also used information from your doctor's chart.      Use blank rows to add new medications. Then fill in the dates you started using them.    Cross out medications when you no longer use them. Then write the date and why you stopped using them.    Ask your doctors, pharmacists, and other healthcare providers to update this list at every visit. Keep this list up-to-date with:       Prescription medications    Over the counter drugs     Herbals    Vitamins    Minerals      If you go to the hospital or emergency room, take this list with you. Share this with your family or caregivers too.     DATE PREPARED: 2021  Allergies or side effects: Estradiol, Lipitor [atorvastatin calcium], Sulfa drugs, and Zocor [simvastatin]     Medication:  ASPIRIN 81 MG PO TBEC      How I use it:  Take 1 tablet by mouth daily.      Why I use it: Type 2 diabetes, HbA1c goal < 7% (H)    Prescriber:  MAGO Buchanan CNP      Date I started using it:       Date I stopped using it:         Why  I stopped using it:            Medication:  BD PEN NEEDLE JUAN C U/F 32G X 4 MM MISC      How I use it:  USE 1 DAILY AS DIRECTED.      Why I use it: Type 2 diabetes mellitus with hyperglycemia (H)    Prescriber:  Rachel Morris MD      Date I started using it:       Date I stopped using it:         Why I stopped using it:            Medication:  CRANBERRY-VITAMIN C-VITAMIN E 4200-20-3 MG-MG-UNIT PO CAPS      How I use it:  Take 1 capsule by mouth 2 times daily      Why I use it: Prevent bladder infection      Prescriber:  Patient Reported      Date I started using it:       Date I stopped using it:         Why I stopped using it:            Medication:  EMPAGLIFLOZIN 10 MG PO TABS      How I use it:  Take 1 tablet (10 mg) by mouth daily      Why I use it: Type 2 diabetes mellitus with hyperglycemia, with long-term current use of insulin (H)    Prescriber:  MAGO Baker CNP      Date I started using it:       Date I stopped using it:         Why I stopped using it:            Medication:  FEXOFENADINE  MG PO TABS      How I use it:  Take 180 mg by mouth as needed       Why I use it: Allergies      Prescriber:  Patient Reported      Date I started using it:       Date I stopped using it:         Why I stopped using it:            Medication:  GLIPIZIDE ER 10 MG PO TB24      How I use it:  Take 10 mg by mouth daily      Why I use it: Diabetes      Prescriber:  Patient Reported      Date I started using it:       Date I stopped using it:         Why I stopped using it:            Medication:  GLUCOSAMINE CHONDROITIN COMPLX OR      How I use it:  2 Daily      Why I use it: Joint Pain      Prescriber:  Patient Reported      Date I started using it:       Date I stopped using it:         Why I stopped using it:            Medication:  IBUPROFEN 200 MG PO CAPS      How I use it:  Take 400 mg by mouth every 4 hours as needed       Why I use it: Pain      Prescriber:  Patient Reported      Date I started  using it:       Date I stopped using it:         Why I stopped using it:            Medication:  INSULIN GLARGINE  UNIT/ML SC SOPN      How I use it:  Inject 38 Units Subcutaneous At Bedtime      Why I use it:Diabetes      Prescriber:  Patient Reported      Date I started using it:       Date I stopped using it:         Why I stopped using it:            Medication:  IRBESARTAN-HYDROCHLOROTHIAZIDE 150-12.5 MG PO TABS      How I use it:  Take 0.5 tablets by mouth daily      Why I use it: Hypertension goal BP (blood pressure) < 140/90    Prescriber:  Cailin Ponce APRN CNP      Date I started using it:       Date I stopped using it:         Why I stopped using it:            Medication:  LATANOPROST 0.005 % OP SOLN      How I use it:  Place 1 drop into both eyes daily      Why I use it: Primary open angle glaucoma of both eyes, moderate stage    Prescriber:  Thomas Serna MD      Date I started using it:       Date I stopped using it:         Why I stopped using it:            Medication:  METFORMIN HCL 1000 MG PO TABS      How I use it:  TAKE 1 TABLET (1,000 MG) BY MOUTH 2 TIMES DAILY (WITH MEALS)      Why I use it: Type 2 diabetes mellitus with hyperglycemia, with long-term current use of insulin (H)    Prescriber:  Rachel Morris MD      Date I started using it:       Date I stopped using it:         Why I stopped using it:            Medication:  MULTIVITAMIN OR      How I use it:  1 tablet daily      Why I use it: General Health      Prescriber:  Patient Reported      Date I started using it:       Date I stopped using it:         Why I stopped using it:            Medication:  OMEGA 3 PO      How I use it:  Take by mouth 2 times daily.       Why I use it: General Health      Prescriber:  Patient Reported      Date I started using it:       Date I stopped using it:         Why I stopped using it:            Medication:  OMEPRAZOLE 20 MG PO CPDR      How I use it:  Take 1 capsule (20 mg)  by mouth daily      Why I use it: Gastroesophageal reflux disease without esophagitis    Prescriber:  Kyle Caldera MD      Date I started using it:       Date I stopped using it:         Why I stopped using it:            Medication:  ONETOUCH DELICA LANCETS MISC      How I use it:  1 each 2 times daily. One Touch Delica        Why I use it: Type 2 diabetes, HbA1c goal < 7% (H)    Prescriber:  MAGO Buchanan CNP      Date I started using it:       Date I stopped using it:         Why I stopped using it:            Medication:  ONETOUCH ULTRA BLUE VI STRP      How I use it:  USE TO TEST TWICE A DAY      Why I use it: Type 2 diabetes mellitus with hyperglycemia, with long-term current use of insulin (H)    Prescriber:  MAGO Baker CNP      Date I started using it:       Date I stopped using it:         Why I stopped using it:            Medication:  ORDER FOR DME (SET TO LOCAL PRINT)      How I use it:  Glucometer covered by insurance.      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; Nutritional and metabolic cardiomyopathy (H)    Prescriber:  Tara Keller MD PhD      Date I started using it:       Date I stopped using it:         Why I stopped using it:            Medication:  TROSPIUM CHLORIDE ER 60 MG PO CP24      How I use it:  Take 1 capsule (60 mg) by mouth every morning      Why I use it: Urinary frequency;  Overactive bladder    Prescriber:  Taylor Lovell PA-C      Date I started using it:       Date I stopped using it:         Why I stopped using it:            Medication:   Acetaminophen 500mg      How I use it:   1-2 tablets every 6 hours as needed      Why I use it: Pain      Prescriber:   OTC      Date I started using it:       Date I stopped using it:         Why I stopped using it:            Medication:         How I use it:         Why I use it:      Prescriber:         Date I started using it:       Date I stopped using it:         Why I stopped using it:            Medication:         How I use it:         Why I use it:      Prescriber:         Date I started using it:       Date I stopped using it:         Why I stopped using it:              Other Information:     If you have any questions about your medication list, call Minra Perez [facility], Phone: [phone] , Monday-Friday 8-4:30pm.    According to the Paperwork Reduction Act of 1995, no persons are required to respond to a collection of information unless it displays a valid OMB control number. The valid OMB number for this information collection is [id number]. The time required to complete this information collection is estimated to average 40 minutes per response, including the time to review instructions, searching existing data resources, gather the data needed, and complete and review the information collection. If you have any comments concerning the accuracy of the time estimate(s) or suggestions for improving this form, please write to: CMS, Attn: SUNNY Reports Clearance Officer, [address] [place] [id number].

## 2021-01-12 NOTE — PATIENT INSTRUCTIONS
Recommendations from today's MTM visit:                                                       Recommend scheduling an appointment with endocrinology.  Could consider increasing Jardiance and discontinuing glipizide  Recommend using acetaminophen in place of ibuprofen  Recommend completing paperwork for prescription assistance for 2021 year    It was great to speak with you today.  I value your experience and would be very thankful for your time with providing feedback on our clinic survey. You may receive a survey via email or text message in the next few days.     Next MTM visit: 6 weeks    To schedule another MTM appointment, please call the clinic directly or you may call the MTM scheduling line at [phone] or toll-free at [phone].     My Clinical Pharmacist's contact information:                                                      It was a pleasure talking with you today!  Please feel free to contact me with any questions or concerns you have.      Mirna Perez, Pharm.D, BCACP  Medication Therapy Management Pharmacist

## 2021-01-19 ENCOUNTER — OFFICE VISIT (OUTPATIENT)
Dept: PODIATRY | Facility: CLINIC | Age: 81
End: 2021-01-19
Payer: COMMERCIAL

## 2021-01-19 VITALS — SYSTOLIC BLOOD PRESSURE: 141 MMHG | OXYGEN SATURATION: 96 % | HEART RATE: 105 BPM | DIASTOLIC BLOOD PRESSURE: 76 MMHG

## 2021-01-19 DIAGNOSIS — L08.9 INFECTED ABRASION OF RIGHT LOWER EXTREMITY, INITIAL ENCOUNTER: ICD-10-CM

## 2021-01-19 DIAGNOSIS — S80.811A INFECTED ABRASION OF RIGHT LOWER EXTREMITY, INITIAL ENCOUNTER: ICD-10-CM

## 2021-01-19 DIAGNOSIS — E11.49 TYPE II OR UNSPECIFIED TYPE DIABETES MELLITUS WITH NEUROLOGICAL MANIFESTATIONS, NOT STATED AS UNCONTROLLED(250.60) (H): ICD-10-CM

## 2021-01-19 DIAGNOSIS — S90.812A ABRASION OF LEFT FOOT, INITIAL ENCOUNTER: ICD-10-CM

## 2021-01-19 DIAGNOSIS — S80.812A ABRASION OF LEFT LOWER EXTREMITY, INITIAL ENCOUNTER: ICD-10-CM

## 2021-01-19 DIAGNOSIS — E11.51 DIABETES MELLITUS WITH PERIPHERAL VASCULAR DISEASE (H): ICD-10-CM

## 2021-01-19 DIAGNOSIS — L60.2 ONYCHAUXIS: ICD-10-CM

## 2021-01-19 DIAGNOSIS — B35.1 ONYCHOMYCOSIS: Primary | ICD-10-CM

## 2021-01-19 PROCEDURE — 11720 DEBRIDE NAIL 1-5: CPT | Performed by: PODIATRIST

## 2021-01-19 PROCEDURE — 99214 OFFICE O/P EST MOD 30 MIN: CPT | Mod: 25 | Performed by: PODIATRIST

## 2021-01-19 NOTE — NURSING NOTE
Brandy Leon's chief complaint for this visit includes:  Chief Complaint   Patient presents with     RECHECK     toenail trim     PCP: Cailin Ponce    Referring Provider:  No referring provider defined for this encounter.    BP (!) 141/76 (BP Location: Left arm, Patient Position: Sitting, Cuff Size: Adult Regular)   Pulse 105   SpO2 96%   Data Unavailable     Do you need any medication refills at today's visit? No    Atiya Holder CMA

## 2021-01-19 NOTE — PROGRESS NOTES
Past Medical History:   Diagnosis Date     Actinic keratosis 3/12/2013     Degenerative joint disease      Diabetes (H)      Gastroesophageal reflux disease without esophagitis 12/11/2020     Rheumatic fever 1957    x2 between the ages of 15-18     Seasonal allergies      Patient Active Problem List   Diagnosis     Seasonal allergies     Hypothyroidism     Hyperlipidemia LDL goal <100     Fibromyalgia     Osteoarthritis     Advanced directives, counseling/discussion     Obesity     Type 2 diabetes mellitus with hyperglycemia, with long-term current use of insulin (H)     Hypertension goal BP (blood pressure) < 140/90     Overactive bladder     Recurrent UTI     Pseudophakia of both eyes     DINORA (obstructive sleep apnea)- severe (AHI 56)     Contusion of right knee     Gait disorder     Gastroesophageal reflux disease without esophagitis     Past Surgical History:   Procedure Laterality Date     C APPENDECTOMY       C ARTHROPLASTY TMJ       CATARACT IOL, RT/LT       COLONOSCOPY       COLONOSCOPY N/A 8/13/2015    Procedure: COLONOSCOPY;  Surgeon: Duane, William Charles, MD;  Location: [facility]     COLONOSCOPY WITH CO2 INSUFFLATION N/A 8/13/2015    Procedure: COLONOSCOPY WITH CO2 INSUFFLATION;  Surgeon: Duane, William Charles, MD;  Location: [facility]     PHACOEMULSIFICATION WITH STANDARD INTRAOCULAR LENS IMPLANT Left 10/25/2018    Procedure: LEFT PHACOEMULSIFICATION WITH STANDARD INTRAOCULAR LENS IMPLANT;  Surgeon: Thomas Serna MD;  Location: [facility]     PHACOEMULSIFICATION WITH STANDARD INTRAOCULAR LENS IMPLANT Right 11/8/2018    Procedure: RIGHT PHACOEMULSIFICATION WITH STANDARD INTRAOCULAR LENS IMPLANT;  Surgeon: Thomas Serna MD;  Location: [facility]     THYROIDECTOMY        Social History     Socioeconomic History     Marital status:      Spouse name: Not on file     Number of children: Not on file     Years of education: Not on file     Highest education level: Not on file   Occupational History      Occupation:    Social Needs     Financial resource strain: Not on file     Food insecurity     Worry: Not on file     Inability: Not on file     Transportation needs     Medical: Not on file     Non-medical: Not on file   Tobacco Use     Smoking status: Never Smoker     Smokeless tobacco: Never Used     Tobacco comment: has had exposure to second hand smoke in the past from [place], no current exposure   Substance and Sexual Activity     Alcohol use: No     Drug use: No     Sexual activity: Never   Lifestyle     Physical activity     Days per week: Not on file     Minutes per session: Not on file     Stress: Not on file   Relationships     Social connections     Talks on phone: Not on file     Gets together: Not on file     Attends Jewish service: Not on file     Active member of club or organization: Not on file     Attends meetings of clubs or organizations: Not on file     Relationship status: Not on file     Intimate partner violence     Fear of current or ex partner: Not on file     Emotionally abused: Not on file     Physically abused: Not on file     Forced sexual activity: Not on file   Other Topics Concern     Parent/sibling w/ CABG, MI or angioplasty before 65F 55M? 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     Hobby Hazards No     Sleep Concern Yes     Comment: difficulty staying asleep, up and down all night     Stress Concern No     Weight Concern Yes     Comment: would like to lose     Special Diet Yes     Comment: low card, low sugar diet     Back Care Yes     Comment: chiropratior     Exercise Yes     Comment: almost everyday     Bike Helmet No     Comment: does not ride bicycle any more     Seat Belt Yes     Self-Exams Yes   Social History Narrative     Not on file     Family History   Problem  Relation Age of Onset     Cancer Father         skin and leukemia     Cerebrovascular Disease Maternal Grandfather      Cerebrovascular Disease Paternal Grandmother      Eye Disorder Paternal Grandmother         cataracts     Cerebrovascular Disease Paternal Grandfather      Heart Disease Paternal Grandfather      Cancer Sister         Breast Cancer     Eye Disorder Mother         cataracts     Eye Disorder Brother         cataract     Breast Cancer Daughter      Other Cancer Daughter         ovarian cancer     Glaucoma No family hx of      Macular Degeneration No family hx of      Lab Results   Component Value Date    A1C 8.7 11/12/2020    A1C 10.0 08/20/2020    A1C 10.5 01/10/2020    A1C 11.1 10/25/2019    A1C 10.2 07/08/2019   Ellen           2/19/20          R-1.2      L-1.03    SUBJECTIVE FINDINGS:  A 80-year-old female returns to clinic for toenail care, diabetic foot cares.  She relates that she has numbness and tingling neuropathy in her feet.  She relates that she injured her left big toe and her leg.  She kind of fell a couple of weeks ago.  She has fallen twice.  She relates she does not lose consciousness, she just loses her balance and falls.  When she turns her head the wrong way, she just loses her balance and falls.  She relates she is using lotion on her feet.        OBJECTIVE FINDINGS:  DP and PT are 2/4 bilaterally.  She has some mild dry scaly skin bilaterally.  She has dystrophic, thickened nails with subungual debris, dystrophy and discoloration to differing degrees 1 through 5 bilaterally.  Sharp/dull is decreased with 5.07 [place] monofilament bilaterally.  Deep tendon reflexes are intact bilaterally.  She has dorsally contracted digits 2 through 5 bilaterally.  She has intact abrasions on the left hallux dorsally with some surrounding erythema, no odor, no calor, no drainage.  She has left anterior leg abrasions that are intact and padmini.  There is no erythema, no drainage, no  odor, no calor there.  She has a right anterior leg abrasion with some surrounding erythema, no odor, no calor, no drainage.  There are no gross tendon voids bilaterally.        ASSESSMENT AND PLAN:  Onychomycosis and onychauxis bilaterally.  She is diabetic with peripheral neuropathy and peripheral vascular disease.  She has got abrasions on her legs and on the left hallux.  Diagnosis and treatment were discussed with her.  I am going to have her clean these with MicroKlenz daily.  Prescription for Keflex given and use discussed with her.  We will get her set up with primary care to further evaluate her falls and reestablish a primary physician.  She relates she does not have one right now.  She will return to clinic and see me in 1 week.  Previous notes were reviewed.

## 2021-01-19 NOTE — PATIENT INSTRUCTIONS
Thanks for coming today.  [facility]  [address] [place] [address]    To schedule future appointments in Ortho Clinic, you may call [phone].    To schedule ordered imaging by your provider:   Call [facility] [phone]    To schedule an injection ordered by your provider:  Call [facility] Injection scheduling line: [phone]  [facility] available online at:  [email or web address]    Please call if any further questions or concerns ([phone]).  Clinic hours 8 am to 5 pm.    Return to clinic (call) if symptoms worsen or fail to improve.

## 2021-01-19 NOTE — LETTER
1/19/2021         RE: Brandy Leon  [address]  [place] [address]        Dear Colleague,    Thank you for referring your patient, Brandy Leon, to the [facility]. Please see a copy of my visit note below.    Past Medical History:   Diagnosis Date     Actinic keratosis 3/12/2013     Degenerative joint disease      Diabetes (H)      Gastroesophageal reflux disease without esophagitis 12/11/2020     Rheumatic fever 1957    x2 between the ages of 15-18     Seasonal allergies      Patient Active Problem List   Diagnosis     Seasonal allergies     Hypothyroidism     Hyperlipidemia LDL goal <100     Fibromyalgia     Osteoarthritis     Advanced directives, counseling/discussion     Obesity     Type 2 diabetes mellitus with hyperglycemia, with long-term current use of insulin (H)     Hypertension goal BP (blood pressure) < 140/90     Overactive bladder     Recurrent UTI     Pseudophakia of both eyes     DINORA (obstructive sleep apnea)- severe (AHI 56)     Contusion of right knee     Gait disorder     Gastroesophageal reflux disease without esophagitis     Past Surgical History:   Procedure Laterality Date     C APPENDECTOMY       C ARTHROPLASTY TMJ       CATARACT IOL, RT/LT       COLONOSCOPY       COLONOSCOPY N/A 8/13/2015    Procedure: COLONOSCOPY;  Surgeon: Duane, William Charles, MD;  Location: [facility]     COLONOSCOPY WITH CO2 INSUFFLATION N/A 8/13/2015    Procedure: COLONOSCOPY WITH CO2 INSUFFLATION;  Surgeon: Duane, William Charles, MD;  Location: [facility]     PHACOEMULSIFICATION WITH STANDARD INTRAOCULAR LENS IMPLANT Left 10/25/2018    Procedure: LEFT PHACOEMULSIFICATION WITH STANDARD INTRAOCULAR LENS IMPLANT;  Surgeon: Thomas Serna MD;  Location: [facility]     PHACOEMULSIFICATION WITH STANDARD INTRAOCULAR LENS IMPLANT Right 11/8/2018    Procedure: RIGHT PHACOEMULSIFICATION WITH STANDARD INTRAOCULAR LENS IMPLANT;  Surgeon: Thomas Serna MD;   Location: [facility]     THYROIDECTOMY        Social History     Socioeconomic History     Marital status:      Spouse name: Not on file     Number of children: Not on file     Years of education: Not on file     Highest education level: Not on file   Occupational History     Occupation:    Social Needs     Financial resource strain: Not on file     Food insecurity     Worry: Not on file     Inability: Not on file     Transportation needs     Medical: Not on file     Non-medical: Not on file   Tobacco Use     Smoking status: Never Smoker     Smokeless tobacco: Never Used     Tobacco comment: has had exposure to second hand smoke in the past from [place], no current exposure   Substance and Sexual Activity     Alcohol use: No     Drug use: No     Sexual activity: Never   Lifestyle     Physical activity     Days per week: Not on file     Minutes per session: Not on file     Stress: Not on file   Relationships     Social connections     Talks on phone: Not on file     Gets together: Not on file     Attends Baptism service: Not on file     Active member of club or organization: Not on file     Attends meetings of clubs or organizations: Not on file     Relationship status: Not on file     Intimate partner violence     Fear of current or ex partner: Not on file     Emotionally abused: Not on file     Physically abused: Not on file     Forced sexual activity: Not on file   Other Topics Concern     Parent/sibling w/ CABG, MI or angioplasty before 65F 55M? 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     Hobby Hazards No     Sleep Concern Yes     Comment: difficulty staying asleep, up and down all night     Stress Concern No     Weight Concern Yes     Comment: would like to lose     Special Diet Yes     Comment: low card, low  sugar diet     Back Care Yes     Comment: chiropratior     Exercise Yes     Comment: almost everyday     Bike Helmet No     Comment: does not ride bicycle any more     Seat Belt Yes     Self-Exams Yes   Social History Narrative     Not on file     Family History   Problem Relation Age of Onset     Cancer Father         skin and leukemia     Cerebrovascular Disease Maternal Grandfather      Cerebrovascular Disease Paternal Grandmother      Eye Disorder Paternal Grandmother         cataracts     Cerebrovascular Disease Paternal Grandfather      Heart Disease Paternal Grandfather      Cancer Sister         Breast Cancer     Eye Disorder Mother         cataracts     Eye Disorder Brother         cataract     Breast Cancer Daughter      Other Cancer Daughter         ovarian cancer     Glaucoma No family hx of      Macular Degeneration No family hx of      Lab Results   Component Value Date    A1C 8.7 11/12/2020    A1C 10.0 08/20/2020    A1C 10.5 01/10/2020    A1C 11.1 10/25/2019    A1C 10.2 07/08/2019   Ellen           2/19/20          R-1.2      L-1.03    SUBJECTIVE FINDINGS:  A 80-year-old female returns to clinic for toenail care, diabetic foot cares.  She relates that she has numbness and tingling neuropathy in her feet.  She relates that she injured her left big toe and her leg.  She kind of fell a couple of weeks ago.  She has fallen twice.  She relates she does not lose consciousness, she just loses her balance and falls.  When she turns her head the wrong way, she just loses her balance and falls.  She relates she is using lotion on her feet.        OBJECTIVE FINDINGS:  DP and PT are 2/4 bilaterally.  She has some mild dry scaly skin bilaterally.  She has dystrophic, thickened nails with subungual debris, dystrophy and discoloration to differing degrees 1 through 5 bilaterally.  Sharp/dull is decreased with 5.07 [place] monofilament bilaterally.  Deep tendon reflexes are intact bilaterally.  She has  dorsally contracted digits 2 through 5 bilaterally.  She has intact abrasions on the left hallux dorsally with some surrounding erythema, no odor, no calor, no drainage.  She has left anterior leg abrasions that are intact and padmini.  There is no erythema, no drainage, no odor, no calor there.  She has a right anterior leg abrasion with some surrounding erythema, no odor, no calor, no drainage.  There are no gross tendon voids bilaterally.        ASSESSMENT AND PLAN:  Onychomycosis and onychauxis bilaterally.  She is diabetic with peripheral neuropathy and peripheral vascular disease.  She has got abrasions on her legs and on the left hallux.  Diagnosis and treatment were discussed with her.  I am going to have her clean these with MicroKlenz daily.  Prescription for Keflex given and use discussed with her.  We will get her set up with primary care to further evaluate her falls and reestablish a primary physician.  She relates she does not have one right now.  She will return to clinic and see me in 1 week.  Previous notes were reviewed.           Again, thank you for allowing me to participate in the care of your patient.        Sincerely,        Ehsan Araya DPM

## 2021-01-20 RX ORDER — CEPHALEXIN 500 MG/1
500 CAPSULE ORAL 2 TIMES DAILY
Qty: 28 CAPSULE | Refills: 0 | Status: SHIPPED | OUTPATIENT
Start: 2021-01-20 | End: 2021-04-02

## 2021-01-20 NOTE — PROGRESS NOTES
Pseudophakia of both eyes  - Looks great     Return in 1 year for Annual Eye Exam, Glaucoma check, Visual field, OCT.

## 2021-01-26 ENCOUNTER — ANCILLARY PROCEDURE (OUTPATIENT)
Dept: GENERAL RADIOLOGY | Facility: CLINIC | Age: 81
End: 2021-01-26
Attending: NURSE PRACTITIONER
Payer: COMMERCIAL

## 2021-01-26 ENCOUNTER — OFFICE VISIT (OUTPATIENT)
Dept: FAMILY MEDICINE | Facility: CLINIC | Age: 81
End: 2021-01-26
Payer: COMMERCIAL

## 2021-01-26 VITALS
SYSTOLIC BLOOD PRESSURE: 134 MMHG | BODY MASS INDEX: 31.88 KG/M2 | OXYGEN SATURATION: 96 % | WEIGHT: 185.7 LBS | DIASTOLIC BLOOD PRESSURE: 74 MMHG | RESPIRATION RATE: 14 BRPM | HEART RATE: 94 BPM

## 2021-01-26 DIAGNOSIS — Z79.4 TYPE 2 DIABETES MELLITUS WITH HYPERGLYCEMIA, WITH LONG-TERM CURRENT USE OF INSULIN (H): ICD-10-CM

## 2021-01-26 DIAGNOSIS — R26.89 BALANCE PROBLEMS: ICD-10-CM

## 2021-01-26 DIAGNOSIS — M25.532 LEFT WRIST PAIN: ICD-10-CM

## 2021-01-26 DIAGNOSIS — R29.6 RECURRENT FALLS: Primary | ICD-10-CM

## 2021-01-26 DIAGNOSIS — E11.65 TYPE 2 DIABETES MELLITUS WITH HYPERGLYCEMIA, WITH LONG-TERM CURRENT USE OF INSULIN (H): ICD-10-CM

## 2021-01-26 DIAGNOSIS — R26.9 GAIT DISORDER: ICD-10-CM

## 2021-01-26 PROCEDURE — 99214 OFFICE O/P EST MOD 30 MIN: CPT | Performed by: NURSE PRACTITIONER

## 2021-01-26 PROCEDURE — 73110 X-RAY EXAM OF WRIST: CPT | Mod: LT | Performed by: RADIOLOGY

## 2021-01-26 NOTE — PATIENT INSTRUCTIONS
PLAN:   1.   Symptomatic therapy suggested: Continue current medications as prescribed.   2.  Orders Placed This Encounter   Procedures     XR Wrist Left G/E 3 Views     PHYSICAL THERAPY REFERRAL     Wrist/Arm/Hand Supplies Order for DME - ONLY FOR DME     3. Patient needs to follow up in if no improvement,or sooner if worsening of symptoms or other symptoms develop.  FURTHER TESTING:       - wrist xray   CONSULTATION/REFERRAL to physical therapy   Will follow up and/or notify patient of  results via My Chart to determine further need for followup  At your visit today, we discussed your risk for falls and preventive options.    Fall Prevention  Falls often occur due to slipping, tripping or losing your balance. Millions of people fall every year and injure themselves. Here are ways to reduce your risk of falling again.     Think about your fall, was there anything that caused your fall that can be fixed, removed, or replaced?    Make your home safe by keeping walkways clear of objects you may trip over, such as electric cords.    Use non-slip pads under rugs. Don't use area rugs or small throw rugs.    Use non-slip mats in bathtubs and showers.    Install handrails and lights on staircases. The handrails should be on both sides of the stairs.    Don't walk in poorly lit areas.    Don't stand on chairs or wobbly ladders.    Use caution when reaching overhead or looking upward. This position can cause a loss of balance.    Be sure your shoes fit properly, have non-slip bottoms and are in good condition.     Wear shoes both inside and out. Don't go barefoot or wear slippers.    Be cautious when going up and down stairs, curbs, and when walking on uneven sidewalks.    If your balance is poor, consider using a cane or walker.    If your fall was related to alcohol use, stop or limit alcohol intake.     If your fall was related to use of sleeping medicines, talk to your healthcare provider about this. You may need to  reduce your dosage at bedtime if you awaken during the night to go to the bathroom.      To reduce the need for nighttime bathroom trips:  ? Don't drink fluids for several hours before going to bed  ? Empty your bladder before going to bed  ? Men can keep a urinal at the bedside    Stay as active as you can. Balance, flexibility, strength, and endurance all come from exercise. They all play a role in preventing falls. Ask your healthcare provider which types of activity are right for you.    Get your vision checked on a regular basis.    If you have pets, know where they are before you stand up or walk so you don't trip over them.    Use night lights.    Go over all your medicines with a pharmacist or other healthcare provider to see if any of them could make you more likely to fall.  [employer] last reviewed this educational content on 4/1/2018 2000-2020 [employer]. [address] [place] [address], [place] [address]. All rights reserved. This information is not intended as a substitute for professional medical care. Always follow your healthcare professional's instructions.

## 2021-01-26 NOTE — PROGRESS NOTES
Taz Cummings is a 80 year old who presents to clinic today for the following health issues     HPI     Follow up too recent falls   States her feet will just go out from under her   For instance the other day going out to the mailbox and feet just went out from under her   She does have life line already   Not chest pain, Not shortness of breath   Also one time got up out of her chair and then got up   Also fell and reinjured her left wrist thinks a few weeks   She just feels her balance is not good and easy to lose her balance   Recently had a neurology workup due to abnormal MRI but determined not likely to be an acute issue for her   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  Diabetes Follow-up    How often are you checking your blood sugar? Two times daily  Blood sugar testing frequency justification:  Uncontrolled diabetes and Patient modifying lifestyle changes (diet, exercise) with blood sugars  What time of day are you checking your blood sugars (select all that apply)?  Before meals, After meals and At bedtime  Have you had any blood sugars above 200?  Yes but much better   Have you had any blood sugars below 70?  No    What symptoms do you notice when your blood sugar is low?  None    What concerns do you have today about your diabetes? None     Do you have any of these symptoms? (Select all that apply)  No numbness or tingling in feet.  No redness, sores or blisters on feet.  No complaints of excessive thirst.  No reports of blurry vision.  No significant changes to weight.  Diabetes has improved significantly and she is almost to goal  Hemoglobin A1C   Date Value Ref Range Status   11/12/2020 8.7 (H) 0 - 5.6 % Final     Comment:     Normal <5.7% Prediabetes 5.7-6.4%  Diabetes 6.5% or higher - adopted from ADA   consensus  guidelines.     08/20/2020 10.0 (H) 0 - 5.6 % Final     Comment:     Normal <5.7% Prediabetes 5.7-6.4%  Diabetes 6.5% or higher - adopted from ADA   consensus guidelines.     01/10/2020 10.5 (H) 0 - 5.6 % Final     Comment:     Normal <5.7% Prediabetes 5.7-6.4%  Diabetes 6.5% or higher - adopted from ADA   consensus guidelines.           BP Readings from Last 2 Encounters:   01/27/21 132/84   01/26/21 134/74     Hemoglobin A1C (%)   Date Value   11/12/2020 8.7 (H)   08/20/2020 10.0 (H)     LDL Cholesterol Calculated (mg/dL)   Date Value   11/12/2020 102 (H)   10/25/2019 91                   Labs reviewed in EPIC  BP Readings from Last 3 Encounters:   01/26/21 134/74   01/19/21 (!) 141/76   12/11/20 127/70    Wt Readings from Last 3 Encounters:   01/26/21 84.2 kg (185 lb 11.2 oz)   12/11/20 83 kg (183 lb)   11/12/20 81.9 kg (180 lb 9.6 oz)                  Patient Active Problem List   Diagnosis     Seasonal allergies     Hypothyroidism     Hyperlipidemia LDL goal <100     Fibromyalgia     Osteoarthritis     Advanced directives, counseling/discussion     Obesity     Type 2 diabetes mellitus with hyperglycemia, with long-term current use of insulin (H)     Hypertension goal BP (blood pressure) < 140/90     Overactive bladder     Recurrent UTI     Pseudophakia of both eyes     DINORA (obstructive sleep apnea)- severe (AHI 56)     Contusion of right knee     Gait disorder     Gastroesophageal reflux disease without esophagitis     Past Surgical History:   Procedure Laterality Date     C APPENDECTOMY       C ARTHROPLASTY TMJ       CATARACT IOL, RT/LT       COLONOSCOPY       COLONOSCOPY N/A 8/13/2015    Procedure: COLONOSCOPY;  Surgeon: Duane, William Charles, MD;  Location: [facility]     COLONOSCOPY WITH CO2 INSUFFLATION N/A 8/13/2015    Procedure: COLONOSCOPY WITH CO2 INSUFFLATION;  Surgeon: Duane, William Charles, MD;  Location: [facility]     PHACOEMULSIFICATION WITH STANDARD INTRAOCULAR LENS IMPLANT Left 10/25/2018    Procedure:  LEFT PHACOEMULSIFICATION WITH STANDARD INTRAOCULAR LENS IMPLANT;  Surgeon: Thomas Serna MD;  Location: [facility]     PHACOEMULSIFICATION WITH STANDARD INTRAOCULAR LENS IMPLANT Right 11/8/2018    Procedure: RIGHT PHACOEMULSIFICATION WITH STANDARD INTRAOCULAR LENS IMPLANT;  Surgeon: Thomas Serna MD;  Location: [facility]     THYROIDECTOMY          Social History     Tobacco Use     Smoking status: Never Smoker     Smokeless tobacco: Never Used     Tobacco comment: has had exposure to second hand smoke in the past from [place], no current exposure   Substance Use Topics     Alcohol use: No     Family History   Problem Relation Age of Onset     Cancer Father         skin and leukemia     Cerebrovascular Disease Maternal Grandfather      Cerebrovascular Disease Paternal Grandmother      Eye Disorder Paternal Grandmother         cataracts     Cerebrovascular Disease Paternal Grandfather      Heart Disease Paternal Grandfather      Cancer Sister         Breast Cancer     Eye Disorder Mother         cataracts     Eye Disorder Brother         cataract     Breast Cancer Daughter      Other Cancer Daughter         ovarian cancer     Glaucoma No family hx of      Macular Degeneration No family hx of          Current Outpatient Medications   Medication Sig Dispense Refill     aspirin 81 MG EC tablet Take 1 tablet by mouth daily. 90 tablet 3     cephALEXin (KEFLEX) 500 MG capsule Take 1 capsule (500 mg) by mouth 2 times daily 28 capsule 0     Cranberry-Vitamin C-Vitamin E (CRANBERRY PLUS VITAMIN C) 4200-20-3 MG-MG-UNIT CAPS Take 1 capsule by mouth 2 times daily       empagliflozin (JARDIANCE) 10 MG TABS tablet Take 1 tablet (10 mg) by mouth daily 90 tablet 3     fexofenadine (ALLEGRA) 180 MG tablet Take 180 mg by mouth as needed        glipiZIDE (GLUCOTROL XL) 10 MG 24 hr tablet Take 10 mg by mouth daily       GLUCOSAMINE CHONDROITIN COMPLX OR 2 Daily       ibuprofen (ADVIL/MOTRIN) 200 MG capsule Take 400 mg by  mouth every 4 hours as needed        insulin glargine (LANTUS PEN) 100 UNIT/ML pen Inject 38 Units Subcutaneous At Bedtime       insulin pen needle (BD JUAN C U/F) 32G X 4 MM miscellaneous USE 1 DAILY AS DIRECTED. 100 each 2     irbesartan-hydrochlorothiazide (AVALIDE) 150-12.5 MG tablet Take 0.5 tablets by mouth daily 45 tablet 1     latanoprost (XALATAN) 0.005 % ophthalmic solution Place 1 drop into both eyes daily 1 Bottle 11     metFORMIN (GLUCOPHAGE) 1000 MG tablet TAKE 1 TABLET (1,000 MG) BY MOUTH 2 TIMES DAILY (WITH MEALS) 180 tablet 1     MULTIVITAMIN OR 1 tablet daily       Omega-3 Fatty Acids (OMEGA 3 PO) Take by mouth 2 times daily.        omeprazole (PRILOSEC) 20 MG DR capsule Take 1 capsule (20 mg) by mouth daily 60 capsule 1     ONE TOUCH DELICA LANCETS MISC 1 each 2 times daily. One Touch Delica   100 each 12     ONETOUCH ULTRA test strip USE TO TEST TWICE A  strip 4     order for DME Glucometer covered by insurance. 1 each 0     rosuvastatin (CRESTOR) 5 MG tablet TAKE 1 TABLET BY MOUTH TWICE WEEKLY 90 tablet 3     SYNTHROID 137 MCG tablet Take 1 tablet (137 mcg) by mouth daily 90 tablet 3     trospium (SANCTURA XR) 60 MG CP24 24 hr capsule Take 1 capsule (60 mg) by mouth every morning 30 capsule 11     Allergies   Allergen Reactions     Estradiol Itching     Lipitor [Atorvastatin Calcium]      Weak and shaky     Sulfa Drugs      Rash       Zocor [Simvastatin]      Weak and shakey     Recent Labs   Lab Test 11/12/20  0906 08/20/20  0902 02/07/20  0937 01/10/20  0956 10/25/19  0950 05/10/19  0821 05/10/19  0821 06/25/18  1434 06/25/18  1434   A1C 8.7* 10.0*  --  10.5* 11.1*   < >  --    < > 10.4*   *  --   --   --  91  --  114*  --  93   HDL 55  --   --   --  57  --   --   --  53   TRIG 206*  --   --   --  254*  --   --   --  258*   ALT 26  --   --   --  25  --  24  --  27   CR 0.88  --  0.82  --  0.72  --  0.81   < > 0.68   GFRESTIMATED 62  --  68  --  80  --  70   < > 84   GFRESTBLACK 72   --  79  --  >90  --  81   < > >90   POTASSIUM 4.3  --  4.6  --  4.4  --  4.0   < > 4.3   TSH 3.00  --   --   --  2.78  --  2.61   < > 2.46    < > = values in this interval not displayed.          Review of Systems   CONSTITUTIONAL:NEGATIVE for fever, chills, change in weight  ENT/MOUTH: NEGATIVE for ear, mouth and throat problems  RESP:NEGATIVE for significant cough or SOB  CV: NEGATIVE for chest pain, palpitations or peripheral edema  GI: NEGATIVE for nausea, abdominal pain, heartburn, or change in bowel habits  MUSCULOSKELETAL: POSITIVE  for arthralgias  and NEGATIVE for joint swelling  and joint warmth   NEURO: POSITIVE for balance issues  and NEGATIVE for HX CVA, HX TIA, HX seizure D/O, involuntary movements, syncope and vertigo  ENDOCRINE: NEGATIVE for temperature intolerance, skin/hair changes and POSITIVE  for HX diabetes  HEME/ALLERGY/IMMUNE: NEGATIVE for bleeding problems      Objective    /74   Pulse 94   Resp 14   Wt 84.2 kg (185 lb 11.2 oz)   SpO2 96%   BMI 31.88 kg/m    Body mass index is 31.88 kg/m .  Physical Exam   GENERAL: Patient is well nourished, well developed,in no apparent distress, non-toxic, in no respiratory distress and acyanotic, alert, cooperative and well hydrated  EYES: Eyes grossly normal to inspection and conjunctivae and sclerae normal  HENT:ear canals and TM's normal and nose and mouth without ulcers or lesions   NECK:normal, supple and no adenopathy  CARDIAC:regular rates and rhythm, no murmur, click or rub and no irregular beats  without LE edema bilaterally  RESP: normal respiratory rate and rhythm, lungs clear to auscultation  unlabored respirations, no intercostal retractions or accessory muscle use  ABD:soft, nontender  SKIN: Skin color, texture, turgor normal. No rashes or lesions.  MS: extremities normal- no gross deformities noted, gait normal and normal muscle tone  WRIST:  Inspection: swelling left wrist   Palpation: Tender: diffusely around wrist  Non-tender:  carpals  Range of Motion: flexion:  full, painful, extension:  full, painful  Strength: no deficits  Special tests: negative Finkelstein's.    NEURO: mentation intact and speech normal  PSYCH: Alert, oriented, thought content appropriate,mentation appears normal., affect and mood normal          Results for orders placed or performed in visit on 01/26/21   XR Wrist Left G/E 3 Views     Status: None    Narrative    WRIST LEFT THREE OR MORE VIEWS   1/26/2021 2:18 PM     HISTORY: Left wrist pain    COMPARISON: 11/12/2020 x-ray.      Impression    IMPRESSION: Advanced first carpometacarpal degenerative changes. Mild  STT degenerative changes. No evidence of acute fracture. No  significant change.    YUAN MILLS MD               Assessment & Plan     Recurrent falls  Discussed physical therapy for strengthening and balance improvement and patient agrees to plan   - PHYSICAL THERAPY REFERRAL  Further workup and treatment could be done if symptoms persist, worsen or new related symptoms occur. The patient will call in that eventuality.    Gait disorder  - PHYSICAL THERAPY REFERRAL    Balance problems  - PHYSICAL THERAPY REFERRAL    Left wrist pain  Symptomatic therapy suggested: OTC acetaminophen and call prn if symptoms persist or worsen.  rest the injured area as much as practical, splint dispensed and applied, prescription for NSAID given  - XR Wrist Left G/E 3 Views  - Wrist/Arm/Hand Supplies Order for DME - ONLY FOR DME    Type 2 diabetes mellitus with hyperglycemia, with long-term current use of insulin (H)  foot care discussed and Podiatry visits discussed, annual eye examinations at Ophthalmology discussed, glycohemoglobin monitoring discussed and long term diabetic complications discussed  No changes in current regimen  Attempt to improve diet  Weight loss advised  Increased exercise advised  Continue folowup with MTM    Recheck in 3 months, sooner should new symptoms or   problems arise.      See Patient  Instructions  Patient Instructions     PLAN:   1.   Symptomatic therapy suggested: Continue current medications as prescribed.   2.  Orders Placed This Encounter   Procedures     XR Wrist Left G/E 3 Views     PHYSICAL THERAPY REFERRAL     Wrist/Arm/Hand Supplies Order for DME - ONLY FOR DME     3. Patient needs to follow up in if no improvement,or sooner if worsening of symptoms or other symptoms develop.  FURTHER TESTING:       - wrist xray   CONSULTATION/REFERRAL to physical therapy   Will follow up and/or notify patient of  results via My Chart to determine further need for followup  At your visit today, we discussed your risk for falls and preventive options.    Fall Prevention  Falls often occur due to slipping, tripping or losing your balance. Millions of people fall every year and injure themselves. Here are ways to reduce your risk of falling again.     Think about your fall, was there anything that caused your fall that can be fixed, removed, or replaced?    Make your home safe by keeping walkways clear of objects you may trip over, such as electric cords.    Use non-slip pads under rugs. Don't use area rugs or small throw rugs.    Use non-slip mats in bathtubs and showers.    Install handrails and lights on staircases. The handrails should be on both sides of the stairs.    Don't walk in poorly lit areas.    Don't stand on chairs or wobbly ladders.    Use caution when reaching overhead or looking upward. This position can cause a loss of balance.    Be sure your shoes fit properly, have non-slip bottoms and are in good condition.     Wear shoes both inside and out. Don't go barefoot or wear slippers.    Be cautious when going up and down stairs, curbs, and when walking on uneven sidewalks.    If your balance is poor, consider using a cane or walker.    If your fall was related to alcohol use, stop or limit alcohol intake.     If your fall was related to use of sleeping medicines, talk to your healthcare  provider about this. You may need to reduce your dosage at bedtime if you awaken during the night to go to the bathroom.      To reduce the need for nighttime bathroom trips:  ? Don't drink fluids for several hours before going to bed  ? Empty your bladder before going to bed  ? Men can keep a urinal at the bedside    Stay as active as you can. Balance, flexibility, strength, and endurance all come from exercise. They all play a role in preventing falls. Ask your healthcare provider which types of activity are right for you.    Get your vision checked on a regular basis.    If you have pets, know where they are before you stand up or walk so you don't trip over them.    Use night lights.    Go over all your medicines with a pharmacist or other healthcare provider to see if any of them could make you more likely to fall.  [employer] last reviewed this educational content on 4/1/2018 2000-2020 [employer]. [address] [place] [address], [place] [address]. All rights reserved. This information is not intended as a substitute for professional medical care. Always follow your healthcare professional's instructions.            No follow-ups on file.    MAGO Baker [facility]

## 2021-01-27 ENCOUNTER — OFFICE VISIT (OUTPATIENT)
Dept: PODIATRY | Facility: CLINIC | Age: 81
End: 2021-01-27
Payer: COMMERCIAL

## 2021-01-27 VITALS — HEART RATE: 89 BPM | SYSTOLIC BLOOD PRESSURE: 132 MMHG | DIASTOLIC BLOOD PRESSURE: 84 MMHG | OXYGEN SATURATION: 97 %

## 2021-01-27 DIAGNOSIS — S90.412D ABRASION OF LEFT GREAT TOE, SUBSEQUENT ENCOUNTER: Primary | ICD-10-CM

## 2021-01-27 DIAGNOSIS — S80.812D ABRASION OF LEFT LOWER LEG, SUBSEQUENT ENCOUNTER: ICD-10-CM

## 2021-01-27 DIAGNOSIS — S80.811D INFECTED ABRASION OF RIGHT LOWER EXTREMITY, SUBSEQUENT ENCOUNTER: ICD-10-CM

## 2021-01-27 DIAGNOSIS — L08.9 INFECTED ABRASION OF RIGHT LOWER EXTREMITY, SUBSEQUENT ENCOUNTER: ICD-10-CM

## 2021-01-27 PROCEDURE — 99213 OFFICE O/P EST LOW 20 MIN: CPT | Performed by: PODIATRIST

## 2021-01-27 NOTE — RESULT ENCOUNTER NOTE
Arnol Leon,    Attached are your test results.  Xray no breaks but a fair amount of arthritis   You can use the splint and see if I helps and if not then let me know and will refer you to orthopedics   Please contact us if you have any questions.    Cailin Ponce, CNP

## 2021-01-27 NOTE — PROGRESS NOTES
Past Medical History:   Diagnosis Date     Actinic keratosis 3/12/2013     Degenerative joint disease      Diabetes (H)      Gastroesophageal reflux disease without esophagitis 12/11/2020     Rheumatic fever 1957    x2 between the ages of 15-18     Seasonal allergies      Patient Active Problem List   Diagnosis     Seasonal allergies     Hypothyroidism     Hyperlipidemia LDL goal <100     Fibromyalgia     Osteoarthritis     Advanced directives, counseling/discussion     Obesity     Type 2 diabetes mellitus with hyperglycemia, with long-term current use of insulin (H)     Hypertension goal BP (blood pressure) < 140/90     Overactive bladder     Recurrent UTI     Pseudophakia of both eyes     DINORA (obstructive sleep apnea)- severe (AHI 56)     Contusion of right knee     Gait disorder     Gastroesophageal reflux disease without esophagitis     Past Surgical History:   Procedure Laterality Date     C APPENDECTOMY       C ARTHROPLASTY TMJ       CATARACT IOL, RT/LT       COLONOSCOPY       COLONOSCOPY N/A 8/13/2015    Procedure: COLONOSCOPY;  Surgeon: Duane, William Charles, MD;  Location: [facility]     COLONOSCOPY WITH CO2 INSUFFLATION N/A 8/13/2015    Procedure: COLONOSCOPY WITH CO2 INSUFFLATION;  Surgeon: Duane, William Charles, MD;  Location: [facility]     PHACOEMULSIFICATION WITH STANDARD INTRAOCULAR LENS IMPLANT Left 10/25/2018    Procedure: LEFT PHACOEMULSIFICATION WITH STANDARD INTRAOCULAR LENS IMPLANT;  Surgeon: Thomas Serna MD;  Location: [facility]     PHACOEMULSIFICATION WITH STANDARD INTRAOCULAR LENS IMPLANT Right 11/8/2018    Procedure: RIGHT PHACOEMULSIFICATION WITH STANDARD INTRAOCULAR LENS IMPLANT;  Surgeon: Thomas Serna MD;  Location: [facility]     THYROIDECTOMY        Social History     Socioeconomic History     Marital status:      Spouse name: Not on file     Number of children: Not on file     Years of education: Not on file     Highest education level: Not on file   Occupational History      Occupation:    Social Needs     Financial resource strain: Not on file     Food insecurity     Worry: Not on file     Inability: Not on file     Transportation needs     Medical: Not on file     Non-medical: Not on file   Tobacco Use     Smoking status: Never Smoker     Smokeless tobacco: Never Used     Tobacco comment: has had exposure to second hand smoke in the past from [place], no current exposure   Substance and Sexual Activity     Alcohol use: No     Drug use: No     Sexual activity: Never   Lifestyle     Physical activity     Days per week: Not on file     Minutes per session: Not on file     Stress: Not on file   Relationships     Social connections     Talks on phone: Not on file     Gets together: Not on file     Attends Restoration service: Not on file     Active member of club or organization: Not on file     Attends meetings of clubs or organizations: Not on file     Relationship status: Not on file     Intimate partner violence     Fear of current or ex partner: Not on file     Emotionally abused: Not on file     Physically abused: Not on file     Forced sexual activity: Not on file   Other Topics Concern     Parent/sibling w/ CABG, MI or angioplasty before 65F 55M? 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     Hobby Hazards No     Sleep Concern Yes     Comment: difficulty staying asleep, up and down all night     Stress Concern No     Weight Concern Yes     Comment: would like to lose     Special Diet Yes     Comment: low card, low sugar diet     Back Care Yes     Comment: chiropratior     Exercise Yes     Comment: almost everyday     Bike Helmet No     Comment: does not ride bicycle any more     Seat Belt Yes     Self-Exams Yes   Social History Narrative     Not on file     Family History   Problem  Relation Age of Onset     Cancer Father         skin and leukemia     Cerebrovascular Disease Maternal Grandfather      Cerebrovascular Disease Paternal Grandmother      Eye Disorder Paternal Grandmother         cataracts     Cerebrovascular Disease Paternal Grandfather      Heart Disease Paternal Grandfather      Cancer Sister         Breast Cancer     Eye Disorder Mother         cataracts     Eye Disorder Brother         cataract     Breast Cancer Daughter      Other Cancer Daughter         ovarian cancer     Glaucoma No family hx of      Macular Degeneration No family hx of      SUBJECTIVE FINDINGS:  A 80-year-old female returns to clinic for abrasions on left hallux, left and right legs.  She relates she is doing okay.  She is taking the Keflex with no problems and cleaning them with MicroKlenz.      OBJECTIVE FINDINGS:  Vascular status intact bilaterally.  She still has some small areas of eschar on the left hallux, left and right anterior legs that are padmini.  There is no erythema, no drainage, no odor, no calor.  Some of them have come off.  The underlying skin is intact.      ASSESSMENT AND PLAN:  Abrasions, left hallux, right and left leg.  She is diabetic with peripheral neuropathy and vascular disease.  Diagnosis and treatment options discussed with the patient.  She can d/c the MicroKlenz cleaning as tolerated.  Finish the Keflex.  Return to clinic and see me as scheduled for diabetic foot cares.

## 2021-01-27 NOTE — NURSING NOTE
Brandy Leon's chief complaint for this visit includes:  Chief Complaint   Patient presents with     RECHECK     abrasions bilateral lower legs and left hallux     PCP: Cailin Ponce    Referring Provider:  No referring provider defined for this encounter.    /84 (BP Location: Left arm, Patient Position: Sitting, Cuff Size: Adult Regular)   Pulse 89   SpO2 97%   Data Unavailable     Do you need any medication refills at today's visit? Alejandra Holder CMA

## 2021-01-27 NOTE — LETTER
1/27/2021         RE: Brandy Leon  [address]  [place] [address]        Dear Colleague,    Thank you for referring your patient, Brandy Leon, to the [facility]. Please see a copy of my visit note below.    Past Medical History:   Diagnosis Date     Actinic keratosis 3/12/2013     Degenerative joint disease      Diabetes (H)      Gastroesophageal reflux disease without esophagitis 12/11/2020     Rheumatic fever 1957    x2 between the ages of 15-18     Seasonal allergies      Patient Active Problem List   Diagnosis     Seasonal allergies     Hypothyroidism     Hyperlipidemia LDL goal <100     Fibromyalgia     Osteoarthritis     Advanced directives, counseling/discussion     Obesity     Type 2 diabetes mellitus with hyperglycemia, with long-term current use of insulin (H)     Hypertension goal BP (blood pressure) < 140/90     Overactive bladder     Recurrent UTI     Pseudophakia of both eyes     DINORA (obstructive sleep apnea)- severe (AHI 56)     Contusion of right knee     Gait disorder     Gastroesophageal reflux disease without esophagitis     Past Surgical History:   Procedure Laterality Date     C APPENDECTOMY       C ARTHROPLASTY TMJ       CATARACT IOL, RT/LT       COLONOSCOPY       COLONOSCOPY N/A 8/13/2015    Procedure: COLONOSCOPY;  Surgeon: Duane, William Charles, MD;  Location: [facility] OR     COLONOSCOPY WITH CO2 INSUFFLATION N/A 8/13/2015    Procedure: COLONOSCOPY WITH CO2 INSUFFLATION;  Surgeon: Duane, William Charles, MD;  Location: [facility] OR     PHACOEMULSIFICATION WITH STANDARD INTRAOCULAR LENS IMPLANT Left 10/25/2018    Procedure: LEFT PHACOEMULSIFICATION WITH STANDARD INTRAOCULAR LENS IMPLANT;  Surgeon: Thomas Serna MD;  Location: [facility] OR     PHACOEMULSIFICATION WITH STANDARD INTRAOCULAR LENS IMPLANT Right 11/8/2018    Procedure: RIGHT PHACOEMULSIFICATION WITH STANDARD INTRAOCULAR LENS IMPLANT;  Surgeon: Thomas Serna MD;   Location: [facility] OR     THYROIDECTOMY        Social History     Socioeconomic History     Marital status:      Spouse name: Not on file     Number of children: Not on file     Years of education: Not on file     Highest education level: Not on file   Occupational History     Occupation:    Social Needs     Financial resource strain: Not on file     Food insecurity     Worry: Not on file     Inability: Not on file     Transportation needs     Medical: Not on file     Non-medical: Not on file   Tobacco Use     Smoking status: Never Smoker     Smokeless tobacco: Never Used     Tobacco comment: has had exposure to second hand smoke in the past from [employer], no current exposure   Substance and Sexual Activity     Alcohol use: No     Drug use: No     Sexual activity: Never   Lifestyle     Physical activity     Days per week: Not on file     Minutes per session: Not on file     Stress: Not on file   Relationships     Social connections     Talks on phone: Not on file     Gets together: Not on file     Attends Yarsanism service: Not on file     Active member of club or organization: Not on file     Attends meetings of clubs or organizations: Not on file     Relationship status: Not on file     Intimate partner violence     Fear of current or ex partner: Not on file     Emotionally abused: Not on file     Physically abused: Not on file     Forced sexual activity: Not on file   Other Topics Concern     Parent/sibling w/ CABG, MI or angioplasty before 65F 55M? 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     Hobby Hazards No     Sleep Concern Yes     Comment: difficulty staying asleep, up and down all night     Stress Concern No     Weight Concern Yes     Comment: would like to lose     Special Diet Yes     Comment: low card, low  sugar diet     Back Care Yes     Comment: chiropratior     Exercise Yes     Comment: almost everyday     Bike Helmet No     Comment: does not ride bicycle any more     Seat Belt Yes     Self-Exams Yes   Social History Narrative     Not on file     Family History   Problem Relation Age of Onset     Cancer Father         skin and leukemia     Cerebrovascular Disease Maternal Grandfather      Cerebrovascular Disease Paternal Grandmother      Eye Disorder Paternal Grandmother         cataracts     Cerebrovascular Disease Paternal Grandfather      Heart Disease Paternal Grandfather      Cancer Sister         Breast Cancer     Eye Disorder Mother         cataracts     Eye Disorder Brother         cataract     Breast Cancer Daughter      Other Cancer Daughter         ovarian cancer     Glaucoma No family hx of      Macular Degeneration No family hx of      SUBJECTIVE FINDINGS:  A 80-year-old female returns to clinic for abrasions on left hallux, left and right legs.  She relates she is doing okay.  She is taking the Keflex with no problems and cleaning them with MicroKlenz.      OBJECTIVE FINDINGS:  Vascular status intact bilaterally.  She still has some small areas of eschar on the left hallux, left and right anterior legs that are padmini.  There is no erythema, no drainage, no odor, no calor.  Some of them have come off.  The underlying skin is intact.      ASSESSMENT AND PLAN:  Abrasions, left hallux, right and left leg.  She is diabetic with peripheral neuropathy and vascular disease.  Diagnosis and treatment options discussed with the patient.  She can d/c the MicroKlenz cleaning as tolerated.  Finish the Keflex.  Return to clinic and see me as scheduled for diabetic foot cares.           Again, thank you for allowing me to participate in the care of your patient.        Sincerely,        Ehsan Araya DPM

## 2021-01-27 NOTE — PATIENT INSTRUCTIONS
Thanks for coming today.  [facility]  [address] [place] [address]    To schedule future appointments in Ortho Clinic, you may call [phone].    To schedule ordered imaging by your provider:   Call Central Imaging [phone]    To schedule an injection ordered by your provider:  Call Central Imaging Injection scheduling line: [phone]  [employer] available online at:  [email or web address]    Please call if any further questions or concerns ([phone]).  Clinic hours 8 am to 5 pm.    Return to clinic (call) if symptoms worsen or fail to improve.

## 2021-02-02 ENCOUNTER — OFFICE VISIT (OUTPATIENT)
Dept: DERMATOLOGY | Facility: CLINIC | Age: 81
End: 2021-02-02
Payer: COMMERCIAL

## 2021-02-02 DIAGNOSIS — D48.5 NEOPLASM OF UNCERTAIN BEHAVIOR OF SKIN: ICD-10-CM
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
## 2021-02-02 DIAGNOSIS — Z80.8 FAMILY HISTORY OF MELANOMA: Primary | ICD-10-CM

## 2021-02-02 DIAGNOSIS — L57.0 AK (ACTINIC KERATOSIS): ICD-10-CM
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
## 2021-02-02 DIAGNOSIS — L30.9 DERMATITIS: ICD-10-CM

## 2021-02-02 PROCEDURE — 11102 TANGNTL BX SKIN SINGLE LES: CPT | Performed by: DERMATOLOGY

## 2021-02-02 PROCEDURE — 88305 TISSUE EXAM BY PATHOLOGIST: CPT | Performed by: PATHOLOGY

## 2021-02-02 PROCEDURE — 99213 OFFICE O/P EST LOW 20 MIN: CPT | Mod: 25 | Performed by: DERMATOLOGY

## 2021-02-02 PROCEDURE — 17000 DESTRUCT PREMALG LESION: CPT | Mod: 59 | Performed by: DERMATOLOGY

## 2021-02-02 PROCEDURE — 88342 IMHCHEM/IMCYTCHM 1ST ANTB: CPT | Performed by: PATHOLOGY

## 2021-02-02 RX ORDER — TRIAMCINOLONE ACETONIDE 1 MG/G
CREAM TOPICAL
Qty: 45 G | Refills: 0 | Status: SHIPPED | OUTPATIENT
Start: 2021-02-02 | End: 2022-03-02

## 2021-02-02 ASSESSMENT — PAIN SCALES - GENERAL: PAINLEVEL: NO PAIN (0)

## 2021-02-02 NOTE — NURSING NOTE
Drug Administration Record    Drug Name: Lidocaine with Epi  Dose: see MD note   Route administered: SQ  NDC #: 5342-5690-03      LOT #: -[id number]  SITE: see MD note   : [facility]  EXPIRATION DATE: 6/1/2021    VIRAL, MEDICAL ASSISTANT

## 2021-02-02 NOTE — LETTER
2/2/2021         RE: Brandy Leon  [address]  [place] [address]        Dear Colleague,    Thank you for referring your patient, Brandy Leon, to the [facility]. Please see a copy of my visit note below.    [facility] Dermatology Note  Encounter Date: Feb 2, 2021  Office Visit     Dermatology Problem List:  1. Family history of melanoma, father  2. Actinic keratosis   -s/p cryotherapy  3. Seborrheic dermatitis, scalp  - Previous tx: ketoconazole shampoo, Free & Clear Shampoo, hydroxyzine initiated 6/18/2013, Nicoral shampoo initiated 3/12/2013, fluocinonide solution initiated 3/12/2013  4. NUB, pending biopsy 2/2/2021    ____________________________________________    Assessment & Plan:  1. Family history of melanoma, father  - Recommend sunscreens SPF #30 or greater, protective clothing and avoidance of tanning beds.   - ABCD's of melanoma were reviewed with patient and handout provided.     2. AK, right cheek  - Cryotherapy procedure note: After verbal consent and discussion of risks and benefits including but no limited to dyspigmentation/scar, blister, and pain, 1 was treated with 1-2mm freeze border for 2 cycles with liquid nitrogen. Post cryotherapy instructions were provided.     3. Eczematous dermatitis- mid back  - Start triamcinolone apply twice daily for 2 weeks then phone visit    4. Fall, scalp appears normal on exam today, 1x week ago, pt oriented  - Follow up with primary care, reviewed warnings signs and seek care for vomiting nausea, dizziness, or headaches     5. NUB, 2 mm brown macule on the right lower back-dysplastic nevus or other  - Gave option to clinically monitor or shave, patient opted for a shave  - Shave biopsy: Location - right lower back.  After discussion of benefits and risks including but not limited to bleeding/bruising, pain/swelling, infection, scar, incomplete removal, nerve damage/numbness,  recurrence, and non-diagnostic biopsy, written consent, verbal consent and photographs were obtained. Time-out was performed. The area was cleaned with isopropyl alcohol. 0.5mL of 1% lidocaine with epinephrine was injected to obtain adequate anesthesia of each lesion. Shave biopsy was performed. Hemostasis was achieved with aluminium chloride. Vaseline and a sterile dressing were applied. The patient tolerated the procedure and no complications were noted. The patient was provided with verbal and written post care instructions.        Procedures Performed:   - See cryotherapy and shave biopsy note above.      Follow-up: 2 weeks phone for back, earlier for new or changing lesions.     Staff and Scribe:     Scribe Disclosure:   I, Avila, am serving as a scribe to document services personally performed by Dr. Lucita Jordan, based on data collection and the provider's statements to me.   [email or web address], MEDICAL ASSISTANT        Provider Disclosure:   The documentation recorded by the scribe accurately reflects the services I personally performed and the decisions made by me.    Lucita Jordan MD    Department of Dermatology  [facility]: Phone: [phone], Fax:[phone]  [facility]: Phone: [phone], Fax: [phone]       ____________________________________________    CC: Derm Problem (skin check hx of AK.)    HPI:  Ms. Brandy Leon is a(n) 80 year old female who presents today as a return patient for skin check with hx of AK. Patient reports an itchy and crusty spot on the back that has been present for ages. Nothing bleeding or changing. The patient also reports that she fell about a week ago and hit her head, not feeling nauseous.     Patient is otherwise feeling well, without additional concerns.    Labs:  NA    Physical Exam:  Vitals: There were no vitals taken for this visit.  SKIN:  Total skin excluding the undergarment areas was performed. The exam included the head/face, neck, both arms, chest, back, abdomen, both legs, digits and/or nails. Declines genital exam.   - Erythematous scaly patch on the mid back  - There is an erythematous macule with overyling adherent scale on the right cheek.   - Scalp appears normal after fall   - 2 mm brown macule on the right lower back, very dark  - No other lesions of concern on areas examined.     Medications:  Current Outpatient Medications   Medication     aspirin 81 MG EC tablet     cephALEXin (KEFLEX) 500 MG capsule     Cranberry-Vitamin C-Vitamin E (CRANBERRY PLUS VITAMIN C) 4200-20-3 MG-MG-UNIT CAPS     empagliflozin (JARDIANCE) 10 MG TABS tablet     fexofenadine (ALLEGRA) 180 MG tablet     glipiZIDE (GLUCOTROL XL) 10 MG 24 hr tablet     GLUCOSAMINE CHONDROITIN COMPLX OR     ibuprofen (ADVIL/MOTRIN) 200 MG capsule     insulin glargine (LANTUS PEN) 100 UNIT/ML pen     insulin pen needle (BD JUAN C U/F) 32G X 4 MM miscellaneous     irbesartan-hydrochlorothiazide (AVALIDE) 150-12.5 MG tablet     latanoprost (XALATAN) 0.005 % ophthalmic solution     metFORMIN (GLUCOPHAGE) 1000 MG tablet     MULTIVITAMIN OR     Omega-3 Fatty Acids (OMEGA 3 PO)     omeprazole (PRILOSEC) 20 MG DR capsule     ONE TOUCH DELICA LANCETS [facility]     ONETOUCH ULTRA test strip     order for DME     rosuvastatin (CRESTOR) 5 MG tablet     SYNTHROID 137 MCG tablet     trospium (SANCTURA XR) 60 MG CP24 24 hr capsule     No current facility-administered medications for this visit.       Past Medical History:   Patient Active Problem List   Diagnosis     Seasonal allergies     Hypothyroidism     Hyperlipidemia LDL goal <100     Fibromyalgia     Osteoarthritis     Advanced directives, counseling/discussion     Obesity     Type 2 diabetes mellitus with hyperglycemia, with long-term current use of insulin (H)     Hypertension goal BP (blood pressure) < 140/90     Overactive bladder      Recurrent UTI     Pseudophakia of both eyes     DINORA (obstructive sleep apnea)- severe (AHI 56)     Contusion of right knee     Gait disorder     Gastroesophageal reflux disease without esophagitis     Past Medical History:   Diagnosis Date     Actinic keratosis 3/12/2013     Degenerative joint disease      Diabetes (H)      Gastroesophageal reflux disease without esophagitis 12/11/2020     Rheumatic fever 1957    x2 between the ages of 15-18     Seasonal allergies         CC No referring provider defined for this encounter. on close of this encounter.        Again, thank you for allowing me to participate in the care of your patient.        Sincerely,        Lucita Jordan MD

## 2021-02-02 NOTE — PATIENT INSTRUCTIONS
[facility] Dermatology Visit    Thank you for allowing us to participate in your care. Your findings, instructions and follow-up plan are as follows:         When should I call my doctor?    If you are worsening or not improving, please, contact us or seek urgent care as noted below.     Who should I call with questions (adults)?    [facility] (adult and pediatric): [phone]     [facility] (adult): [phone]    For urgent needs outside of business hours call the [facility] at [phone] and ask for the dermatology resident on call    If this is a medical emergency and you are unable to reach an ER, Call 911      Who should I call with questions (pediatric)?  [facility]- Pediatric Dermatology  Dr. Starr Olvera, Dr. Kelli Rios, Dr. Earnestine Salgado, Mireya Forrest, PA  Dr. Carla Melgar, Dr. Laura Reyes & Dr. Jayce Zambrano  Non Urgent  Nurse Triage Line; [phone]- Xuan and Melina RN Care Coordinators   [facility] (/Complex ) [phone]    If you need a prescription refill, please contact your pharmacy. Refills are approved or denied by our Physicians during normal business hours, Monday through Fridays  Per office policy, refills will not be granted if you have not been seen within the past year (or sooner depending on your child's condition)    Scheduling Information:  Pediatric Appointment Scheduling and Call Center [phone]  Radiology Scheduling- [phone]  Sedation Unit Scheduling- [phone]  [place] Scheduling- General [phone]; Pediatric Dermatology [phone]  Main  Services: [phone]  Azeri: [phone]  Citizen of [place]: [phone]  Hmong/Romanian/Polish: [phone]  Preadmission Nursing Department Fax Number: [phone] (Fax all pre-operative paperwork to this number)    For urgent matters arising during evenings,  weekends, or holidays that cannot wait for normal business hours please call [phone] and ask for the Dermatology Resident On-Call to be paged.    Cryotherapy    What is it?    Use of a very cold liquid, such as liquid nitrogen, to freeze and destroy abnormal skin cells that need to be removed    What should I expect?    Tenderness and redness    A small blister that might grow and fill with dark purple blood. There may be crusting.    More than one treatment may be needed if the lesions do not go away.    How do I care for the treated area?    Gently wash the area with your hands when bathing.    Use a thin layer of Vaseline to help with healing. You may use a Band-Aid.     The area should heal within 7-10 days and may leave behind a pink or lighter color.     Do not use an antibiotic or Neosporin ointment.     You may take acetaminophen (Tylenol) for pain.     Call your Doctor if you have:    Severe pain    Signs of infection (warmth, redness, cloudy yellow drainage, and or a bad smell)    Questions or concerns    Who should I call with questions?       [facility]: [phone]       [facility]: [phone]       For urgent needs outside of business hours call the [facility] at [phone]        and ask for the dermatology resident on call    Wound Care After a Biopsy    What is a skin biopsy?  A skin biopsy allows the doctor to examine a very small piece of tissue under the microscope to determine the diagnosis and the best treatment for the skin condition. A local anesthetic (numbing medicine)  is injected with a very small needle into the skin area to be tested. A small piece of skin is taken from the area. Sometimes a suture (stitch) is used.     What are the risks of a skin biopsy?  I will experience scar, bleeding, swelling, pain, crusting and redness. I may experience incomplete removal or recurrence. Risks of this procedure are  excessive bleeding, bruising, infection, nerve damage, numbness, thick (hypertrophic or keloidal) scar and non-diagnostic biopsy.    How should I care for my wound for the first 24 hours?    Keep the wound dry and covered for 24 hours    If it bleeds, hold direct pressure on the area for 15 minutes. If bleeding does not stop then go to the emergency room    Avoid strenuous exercise the first 1-2 days or as your doctor instructs you    How should I care for the wound after 24 hours?    After 24 hours, remove the bandage    You may bathe or shower as normal    If you had a scalp biopsy, you can shampoo as usual and can use shower water to clean the biopsy site daily    Clean the wound twice a day with gentle soap and water    Do not scrub, be gentle    Apply white petroleum/Vaseline after cleaning the wound with a cotton swab or a clean finger, and keep the site covered with a Bandaid /bandage. Bandages are not necessary with a scalp biopsy    If you are unable to cover the site with a Bandaid /bandage, re-apply ointment 2-3 times a day to keep the site moist. Moisture will help with healing    Avoid strenuous activity for first 1-2 days    Avoid lakes, rivers, pools, and oceans until the stitches are removed or the site is healed    How do I clean my wound?    Wash hands thoroughly with soap or use hand  before all wound care    Clean the wound with gentle soap and water    Apply white petroleum/Vaseline  to wound after it is clean    Replace the Bandaid /bandage to keep the wound covered for the first few days or as instructed by your doctor    If you had a scalp biopsy, warm shower water to the area on a daily basis should suffice    What should I use to clean my wound?     Cotton-tipped applicators (Qtips )    White petroleum jelly (Vaseline ). Use a clean new container and use Q-tips to apply.    Bandaids   as needed    Gentle soap     How should I care for my wound long term?    Do not get your wound  dirty    Keep up with wound care for one week or until the area is healed.    A small scab will form and fall off by itself when the area is completely healed. The area will be red and will become pink in color as it heals. Sun protection is very important for how your scar will turn out. Sunscreen with an SPF 30 or greater is recommended once the area is healed.    You should have some soreness but it should be mild and slowly go away over several days. Talk to your doctor about using tylenol for pain,    When should I call my doctor?  If you have increased:     Pain or swelling    Pus or drainage (clear or slightly yellow drainage is ok)    Temperature over 100F    Spreading redness or warmth around wound    When will I hear about my results?  The biopsy results can take 2-3 weeks to come back. The clinic will call you with the results, send you a [employer] message, or have you schedule a follow-up clinic or phone time to discuss the results. Contact our clinics if you do not hear from us in 3 weeks.     Who should I call with questions?    [facility]: [phone]     [facility]: [phone]    For urgent needs outside of business hours call the [facility] at [phone] and ask for the dermatology resident on call

## 2021-02-02 NOTE — NURSING NOTE
Brandy Leon's goals for this visit include:   Chief Complaint   Patient presents with     Derm Problem     skin check hx of AK.       She requests these members of her care team be copied on today's visit information: Yes     PCP: Cailin Ponce    Referring Provider:  No referring provider defined for this encounter.    There were no vitals taken for this visit.    Do you need any medication refills at today's visit? No   [email or web address], MEDICAL ASSISTANT

## 2021-02-02 NOTE — PROGRESS NOTES
[facility] Dermatology Note  Encounter Date: Feb 2, 2021  Office Visit     Dermatology Problem List:  1. Family history of melanoma, father  2. Actinic keratosis   -s/p cryotherapy  3. Seborrheic dermatitis, scalp  - Previous tx: ketoconazole shampoo, Free & Clear Shampoo, hydroxyzine initiated 6/18/2013, Nicoral shampoo initiated 3/12/2013, fluocinonide solution initiated 3/12/2013  4. NUB, pending biopsy 2/2/2021    ____________________________________________    Assessment & Plan:  1. Family history of melanoma, father  - Recommend sunscreens SPF #30 or greater, protective clothing and avoidance of tanning beds.   - ABCD's of melanoma were reviewed with patient and handout provided.     2. AK, right cheek  - Cryotherapy procedure note: After verbal consent and discussion of risks and benefits including but no limited to dyspigmentation/scar, blister, and pain, 1 was treated with 1-2mm freeze border for 2 cycles with liquid nitrogen. Post cryotherapy instructions were provided.     3. Eczematous dermatitis- mid back  - Start triamcinolone apply twice daily for 2 weeks then phone visit    4. Fall, scalp appears normal on exam today, 1x week ago, pt oriented  - Follow up with primary care, reviewed warnings signs and seek care for vomiting nausea, dizziness, or headaches     5. NUB, 2 mm brown macule on the right lower back-dysplastic nevus or other  - Gave option to clinically monitor or shave, patient opted for a shave  - Shave biopsy: Location - right lower back.  After discussion of benefits and risks including but not limited to bleeding/bruising, pain/swelling, infection, scar, incomplete removal, nerve damage/numbness, recurrence, and non-diagnostic biopsy, written consent, verbal consent and photographs were obtained. Time-out was performed. The area was cleaned with isopropyl alcohol. 0.5mL of 1% lidocaine with epinephrine was injected to obtain adequate anesthesia of each lesion. Shave  biopsy was performed. Hemostasis was achieved with aluminium chloride. Vaseline and a sterile dressing were applied. The patient tolerated the procedure and no complications were noted. The patient was provided with verbal and written post care instructions.        Procedures Performed:   - See cryotherapy and shave biopsy note above.      Follow-up: 2 weeks phone for back, earlier for new or changing lesions.     Staff and Scribe:     Scribe Disclosure:   I, Gramajo, am serving as a scribe to document services personally performed by Dr. Lucita Jordan, based on data collection and the provider's statements to me.   [id number], MEDICAL ASSISTANT        Provider Disclosure:   The documentation recorded by the scribe accurately reflects the services I personally performed and the decisions made by me.    Lucita Jordan MD    Department of Dermatology  [facility]: Phone: [phone], Fax:[phone]  [facility]: Phone: [phone], Fax: [phone]       ____________________________________________    CC: Derm Problem (skin check hx of AK.)    HPI:  Ms. Brandy Leon is a(n) 80 year old female who presents today as a return patient for skin check with hx of AK. Patient reports an itchy and crusty spot on the back that has been present for ages. Nothing bleeding or changing. The patient also reports that she fell about a week ago and hit her head, not feeling nauseous.     Patient is otherwise feeling well, without additional concerns.    Labs:  NA    Physical Exam:  Vitals: There were no vitals taken for this visit.  SKIN: Total skin excluding the undergarment areas was performed. The exam included the head/face, neck, both arms, chest, back, abdomen, both legs, digits and/or nails. Declines genital exam.   - Erythematous scaly patch on the mid back  - There is an erythematous macule with  overyling adherent scale on the right cheek.   - Scalp appears normal after fall   - 2 mm brown macule on the right lower back, very dark  - No other lesions of concern on areas examined.     Medications:  Current Outpatient Medications   Medication     aspirin 81 MG EC tablet     cephALEXin (KEFLEX) 500 MG capsule     Cranberry-Vitamin C-Vitamin E (CRANBERRY PLUS VITAMIN C) 4200-20-3 MG-MG-UNIT CAPS     empagliflozin (JARDIANCE) 10 MG TABS tablet     fexofenadine (ALLEGRA) 180 MG tablet     glipiZIDE (GLUCOTROL XL) 10 MG 24 hr tablet     GLUCOSAMINE CHONDROITIN COMPLX OR     ibuprofen (ADVIL/MOTRIN) 200 MG capsule     insulin glargine (LANTUS PEN) 100 UNIT/ML pen     insulin pen needle (BD JUAN C U/F) 32G X 4 MM miscellaneous     irbesartan-hydrochlorothiazide (AVALIDE) 150-12.5 MG tablet     latanoprost (XALATAN) 0.005 % ophthalmic solution     metFORMIN (GLUCOPHAGE) 1000 MG tablet     MULTIVITAMIN OR     Omega-3 Fatty Acids (OMEGA 3 PO)     omeprazole (PRILOSEC) 20 MG DR capsule     ONE TOUCH DELICA LANCETS [facility]     ONETOUCH ULTRA test strip     order for DME     rosuvastatin (CRESTOR) 5 MG tablet     SYNTHROID 137 MCG tablet     trospium (SANCTURA XR) 60 MG CP24 24 hr capsule     No current facility-administered medications for this visit.       Past Medical History:   Patient Active Problem List   Diagnosis     Seasonal allergies     Hypothyroidism     Hyperlipidemia LDL goal <100     Fibromyalgia     Osteoarthritis     Advanced directives, counseling/discussion     Obesity     Type 2 diabetes mellitus with hyperglycemia, with long-term current use of insulin (H)     Hypertension goal BP (blood pressure) < 140/90     Overactive bladder     Recurrent UTI     Pseudophakia of both eyes     DINORA (obstructive sleep apnea)- severe (AHI 56)     Contusion of right knee     Gait disorder     Gastroesophageal reflux disease without esophagitis     Past Medical History:   Diagnosis Date     Actinic keratosis 3/12/2013      Degenerative joint disease      Diabetes (H)      Gastroesophageal reflux disease without esophagitis 12/11/2020     Rheumatic fever 1957    x2 between the ages of 15-18     Seasonal allergies         CC No referring provider defined for this encounter. on close of this encounter.

## 2021-02-04 DIAGNOSIS — K21.9 GASTROESOPHAGEAL REFLUX DISEASE WITHOUT ESOPHAGITIS: ICD-10-CM

## 2021-02-05 DIAGNOSIS — E78.5 DYSLIPIDEMIA, GOAL LDL BELOW 100: ICD-10-CM

## 2021-02-05 LAB — COPATH REPORT: NORMAL

## 2021-02-05 RX ORDER — ROSUVASTATIN CALCIUM 5 MG/1
TABLET, COATED ORAL
Qty: 90 TABLET | Refills: 3 | Status: SHIPPED | OUTPATIENT
Start: 2021-02-05 | End: 2022-03-01

## 2021-02-05 NOTE — TELEPHONE ENCOUNTER
Routing refill request to provider for review/approval because:    Allergies to atorvastatin and simvastatin warnings.    Mirna Melendrez RN, [facility]

## 2021-02-15 ENCOUNTER — TELEPHONE (OUTPATIENT)
Dept: DERMATOLOGY | Facility: CLINIC | Age: 81
End: 2021-02-15

## 2021-02-15 NOTE — TELEPHONE ENCOUNTER
"Associated Results    Dermatological path order and indications  Order: [id number]  Status:  Final result   Visible to patient:  Yes (MyChart) Dx:  Neoplasm of uncertain behavior of skin  Component 13d ago   Copath Report Patient Name: PAUL LAST   MR#: [id number]   Specimen #: [id number]   Collected: 2/2/2021   Received: 2/2/2021   Reported: 2/5/2021 16:37   Ordering Phy(s): CICI RANDALL     For improved result formatting, select 'View Enhanced Report Format' under    Linked Documents section.     SPECIMEN(S):   Skin, right lower back, shave     FINAL DIAGNOSIS:   Skin, right lower back, shave:   - Junctional dysplastic nevus with moderate atypia - (see description)     I have personally reviewed all specimens and/or slides, including the   listed special stains, and used them   with my medical judgement to determine or confirm the final diagnosis.     Electronically signed out by:     Matt Short M.D., PhD, Physicians     CLINICAL HISTORY:   The patient is a 80-year-old female     GROSS:   The specimen is received in formalin with proper patient identification,   labeled \"R lower back\" and the   specimen consists of a single, irregular skin shave measuring 0.5 x 0.3   cm.  The skin surface displays a 0.2 x   0.1 cm brown lesion.  The resection margin is inked black.  It is bisected    and entirely submitted in cassette   A1. (Dictated by: Florinda GARZON [facility] 2/2/2021 03:43 PM)     MICROSCOPIC:   The specimen exhibits a junctional melanocytic proliferation which is both    nested and single celled, with   moderate cytologic atypia and architectural disorder but cells largely   confined to the sides and bases of rete   ridges, above papillary dermal fibrosis and a mild superficial   perivascular lymphocytic infiltrate. Melan A   supports the above interpretation. The lesion focally extends to the   lateral margin.     The technical component of this testing was completed at the [facility]"    [facility], with the professional component performed    at the [facility], [address] [place] [address],   [place] [address] ([phone])     CPT Codes:   A: 29231-XJ5.P, 19848-CB5.T, 63386-ITLXY     COLLECTION SITE:   Client: [facility]   Location: [facility])            Raissa Estrada LPN

## 2021-02-16 ENCOUNTER — HOSPITAL ENCOUNTER (OUTPATIENT)
Dept: PHYSICAL THERAPY | Facility: CLINIC | Age: 81
Setting detail: THERAPIES SERIES
End: 2021-02-16
Attending: NURSE PRACTITIONER
Payer: COMMERCIAL

## 2021-02-16 DIAGNOSIS — R29.6 RECURRENT FALLS: ICD-10-CM

## 2021-02-16 DIAGNOSIS — E11.65 TYPE 2 DIABETES MELLITUS WITH HYPERGLYCEMIA, WITH LONG-TERM CURRENT USE OF INSULIN (H): ICD-10-CM

## 2021-02-16 DIAGNOSIS — R26.9 GAIT DISORDER: ICD-10-CM

## 2021-02-16 DIAGNOSIS — R26.89 BALANCE PROBLEMS: ICD-10-CM

## 2021-02-16 DIAGNOSIS — Z79.4 TYPE 2 DIABETES MELLITUS WITH HYPERGLYCEMIA, WITH LONG-TERM CURRENT USE OF INSULIN (H): ICD-10-CM

## 2021-02-16 LAB
ANION GAP SERPL CALCULATED.3IONS-SCNC: 4 MMOL/L (ref 3–14)
BUN SERPL-MCNC: 19 MG/DL (ref 7–30)
CALCIUM SERPL-MCNC: 9.9 MG/DL (ref 8.5–10.1)
CHLORIDE SERPL-SCNC: 102 MMOL/L (ref 94–109)
CO2 SERPL-SCNC: 32 MMOL/L (ref 20–32)
CREAT SERPL-MCNC: 0.86 MG/DL (ref 0.52–1.04)
GFR SERPL CREATININE-BSD FRML MDRD: 64 ML/MIN/{1.73_M2}
GLUCOSE SERPL-MCNC: 158 MG/DL (ref 70–99)
HBA1C MFR BLD: 9.2 % (ref 0–5.6)
POTASSIUM SERPL-SCNC: 4.4 MMOL/L (ref 3.4–5.3)
SODIUM SERPL-SCNC: 138 MMOL/L (ref 133–144)

## 2021-02-16 PROCEDURE — 83036 HEMOGLOBIN GLYCOSYLATED A1C: CPT | Performed by: NURSE PRACTITIONER

## 2021-02-16 PROCEDURE — 80048 BASIC METABOLIC PNL TOTAL CA: CPT | Performed by: NURSE PRACTITIONER

## 2021-02-16 PROCEDURE — 97530 THERAPEUTIC ACTIVITIES: CPT | Mod: GP | Performed by: PHYSICAL THERAPIST

## 2021-02-16 PROCEDURE — 36415 COLL VENOUS BLD VENIPUNCTURE: CPT | Performed by: NURSE PRACTITIONER

## 2021-02-16 PROCEDURE — 97110 THERAPEUTIC EXERCISES: CPT | Mod: GP | Performed by: PHYSICAL THERAPIST

## 2021-02-16 PROCEDURE — 97162 PT EVAL MOD COMPLEX 30 MIN: CPT | Mod: GP | Performed by: PHYSICAL THERAPIST

## 2021-02-16 NOTE — PROGRESS NOTES
02/16/21 1000   Quick Adds   Quick Adds Certification   Type of Visit Initial OP PT Evaluation   General Information   Start of Care Date 02/16/21   Referring Physician Cailin Ponce APRN CNP   Orders Evaluate and Treat as Indicated   Order Date 01/26/21   Medical Diagnosis Recurrent falls R29.6, Gait disorder R26.9, Balance problems R26.89   Onset of illness/injury or Date of Surgery 01/26/21  (Referral date used)   Precautions/Limitations fall precautions   Surgical/Medical history reviewed Yes   Pertinent history of current problem (include personal factors and/or comorbidities that impact the POC) Patient has a PMH of hyperlipidemia, fibromyalgia, OA, obesity, DM type 2, HTN, and recurrent UTI. She states her feet will just go out from under her, feels her balance is not good and she easily loses her balance. She is not aware of herself falling, just realizes she is on the ground. Patient reports that she fell last week and hit her head on the bread board, no symptoms. Patient denies dizziness, falls when she turns. Patient endorses intermittent weakness in her legs. Patient   Prior level of function comment Patient was previously IND with all ADLs and functional mobility, patient denies hobbies and exercise - currently she is afraid to leave the house because she is afraid of falling, unable to garden, has a hard time getting up from the ground - pulls herself up on the sink or her son helps her up. She fell on the ice a couple of weeks ago, her neighbor helped her up.   Current Community Support Family/friend caregiver  (Son stays with her during the week)   Patient role/Employment history Retired;Disabled  ()   Living environment [facility]   Home/Community Accessibility Comments Lives alone, 6 DEXTER through the back door - primary entrance, laundry is in the basement, carries a handful up in one arm and holds onto the railing with the other   Current Assistive Devices Standard Cane    Assistive Devices Comments Has a cane that she carries with her, sometimes uses, sometimes carries it. Does not use it in the house   Patient/Family Goals Statement Improve balance, stop falling, increase LE strength   Fall Risk Screen   Fall screen completed by PT   Have you fallen 2 or more times in the past year? Yes   Have you fallen and had an injury in the past year? No   Timed Up and Go score (seconds) NT, tessa Marcus   Is patient a fall risk? Yes;Department fall risk interventions implemented   Pain   Patient currently in pain No   Vitals Signs   Heart Rate 96   SpO2 95   Blood Pressure 112/64   Vital Signs Comments Seated, resting, left arm   Cognitive Status Examination   Orientation orientation to person, place and time   Level of Consciousness alert   Follows Commands and Answers Questions able to follow multistep instructions;75% of the time   Personal Safety and Judgment intact   Memory intact   Integumentary   Integumentary No deficits were identified   Posture   Posture Forward head position;Protracted shoulders   Strength   Strength Comments MMT performed in sitting, B LE grossly 4-/5   Bed Mobility   Bed Mobility Comments Difficulty, uses the headboard to pull herself up to sitting   Gait   Gait Comments Patient ambulates with decreased gait speed, wide KRISTINE, forward flexed trunk, downward gaze, path deviation, instability during turns   Balance   Balance Comments Patient notes LE fatigue and shakiness, defers additional testing during evaluation   Balance Special Tests   Balance Special Tests Marcus balance;Romberg;Sit to stand reps   Balance Special Tests Marcus Balance   Score out of 56 34   Comments <46 indicates increased risk for falls   Balance Special Tests Romberg   Seconds 30 Seconds   Comments Eyes closed: 2 sec   Balance Special Tests Sit to Stand Reps in 30 Seconds   Reps in 30 seconds 5   Height 18 inches   Comments With hands on her knees, impaired functional LE strength, increased risk  for falls   Sensory Examination   Sensory Perception Comments Numbness and tingling in her hands and left wrist, intermittent   Coordination   Coordination no deficits were identified   Muscle Tone   Muscle Tone no deficits were identified   Modality Interventions   Planned Modality Interventions Comments Per therapist discretion   Planned Therapy Interventions   Planned Therapy Interventions bed mobility training;balance training;gait training;motor coordination training;neuromuscular re-education;ROM;strengthening;stretching;transfer training   Clinical Impression   Criteria for Skilled Therapeutic Interventions Met yes, treatment indicated   PT Diagnosis Impaired functional strength, balance, and activity tolerance   Influenced by the following impairments Impaired sensation, balance, strength, endurance, fatigue, falls, LE instability   Functional limitations due to impairments Increased risk for falls per the Marcus and the 30 sec STS, impaired safety and independence with bed mobility, ADLs, transfers, and functional mobility   Clinical Presentation Evolving/Changing   Clinical Presentation Rationale Frequent falls with increased possibility of secondary injury    Clinical Decision Making (Complexity) Moderate complexity   Therapy Frequency 2 times/Week  (Decreasing in frequency as indicated)   Predicted Duration of Therapy Intervention (days/wks) 10 weeks   Risk & Benefits of therapy have been explained Yes   Patient, Family & other staff in agreement with plan of care Yes   Clinical Impression Comments Patient is a 80 year old female presenting to physical therapy with significantly limited activity tolerance and strength resulting in repeated falls. Patient is at an increased risk for falls per the Marcus and the 30 sec STS test, falling on average about 1x per month. Patient demonstrates significantly limited activity tolerance and LE strength, further increasing her risk for falls. Patient may benefit from  skilled physical therapy to decrease her risk for falls, increase her safety and independence with bed mobility, ADLs, transfers, and functional mobility.    GOALS   PT Eval Goals 1;2;3;4;5;6   Goal 1   Goal Identifier HEP   Goal Description Patient will demonstrate understanding and compliance to her HEP for continued wellbeing upon discharge from skilled physical therapy.   Target Date 04/27/21   Goal 2   Goal Identifier 30 sec STS   Goal Description Patient will complete 10 STS transfers with no UE assist from an 18 inch surface in 30 seconds to demonstrate improved LE strength for independent transfers from low surfaces.   Target Date 04/27/21   Goal 3   Goal Identifier Marcus   Goal Description Patient will complete the Marcus with a score of >46/56 to demonstrate improved balance and decreased risk for falls with daily activities.   Target Date 04/27/21   Goal 4   Goal Identifier 6 MWT   Goal Description Patient will ambulate 250 feet more than baseline during the 6 MWT with least restrictive AD to demonstrate improved activity tolerance for independent and safe community ambulation.   Target Date 04/27/21   Goal 5   Goal Identifier Bed mobility   Goal Description Patient will report or demonstrate independence and ease with supine<>sit transfers and bed mobility to demonstrate improved LE and core strength for independence with functional mobility and daily activities.   Target Date 04/27/21   Total Evaluation Time   PT Eval, Moderate Complexity Minutes (39872) 25   Therapy Certification   Certification date from 02/16/21   Certification date to 04/27/21   Medical Diagnosis Recurrent falls R29.6, Gait disorder R26.9, Balance problems R26.89   Certification I certify the need for these services furnished under this plan of treatment and while under my care.  (Physician co-signature of this document indicates review and certification of the therapy plan).

## 2021-02-18 ENCOUNTER — VIRTUAL VISIT (OUTPATIENT)
Dept: DERMATOLOGY | Facility: CLINIC | Age: 81
End: 2021-02-18
Payer: COMMERCIAL

## 2021-02-18 DIAGNOSIS — D23.9 DYSPLASTIC NEVUS: Primary | ICD-10-CM

## 2021-02-18 PROCEDURE — 99441 PR PHYSICIAN TELEPHONE EVALUATION 5-10 MIN: CPT | Mod: 95 | Performed by: DERMATOLOGY

## 2021-02-18 NOTE — PROGRESS NOTES
[facility] Dermatology Note  Encounter Date: Feb 18, 2021   Telephone ([phone]). Location of teledermatologist: [facility].  Start time: 8:43am. End time: 8:45am on phone but >5minutes of path review and prior record review    Dermatology Problem List:  1. Family history of melanoma, father  2. Actinic keratosis   -s/p cryotherapy  3. Seborrheic dermatitis, scalp  - Previous tx: ketoconazole shampoo, Free & Clear Shampoo, hydroxyzine initiated 6/18/2013, Nicoral shampoo initiated 3/12/2013, fluocinonide solution initiated 3/12/2013  4. R lower back dysplastic nevus 2/2/21    ____________________________________________    Assessment & Plan:   1. Family history of melanoma, father  - next skin 2/2022     2. AK hx of   -no change     3. Eczematous dermatitis- mid back  - Has not started triamcinolone apply twice daily for 2 weeks because difficult to apply, no worsening symptoms. Encouraged spatula or back applicator     4. R lower back dysplastic nevus 2/2/21  - Offered option of monitoring or excision. Reviewed nature. She would like recheck in 1 year.     Procedures Performed:    None    Follow-up: 1 year for skin check or sooner if concerning lesions     Staff and Resident:     Samanta Crain MD    Staff Physician Comments:   ILucita MD, was with the resident on the phone visit for the critical and key portions and agree with the resident's findings and plan of care as documented in the resident's note.    Lucita Jordan MD   Department of Dermatology  [facility]: Phone: [phone], Fax:[phone]  [facility]: Phone: [phone], Fax: [phone]  2/18/2021   ____________________________________________    CC: Derm Problem (follow up triamcinolone mid back Eczemous dermatitis. Pt reports that she has not started the triamcinolone as she cant  apply it herself and has no one to help her . Denies any worsening itching )    HPI:  Ms. Brandy Leon is a(n) 80 year old female who presents today as a return patient for mole discussion, dysplastic nevus was removed. Also has not started triamcinolone for rash.     Patient is otherwise feeling well, without additional concerns.    Labs:  Reviewed Dermpathology   Skin, right lower back, shave:   - Junctional dysplastic nevus with moderate atypia - (see description)    Hgb A1C: 9.2   BMP wnl except elevated glucose     Physical Exam:  Vitals: There were no vitals taken for this visit.  SKIN: There are no teledermatology photos available for this visit.     Medications:  Current Outpatient Medications   Medication     aspirin 81 MG EC tablet     cephALEXin (KEFLEX) 500 MG capsule     Cranberry-Vitamin C-Vitamin E (CRANBERRY PLUS VITAMIN C) 4200-20-3 MG-MG-UNIT CAPS     empagliflozin (JARDIANCE) 10 MG TABS tablet     fexofenadine (ALLEGRA) 180 MG tablet     glipiZIDE (GLUCOTROL XL) 10 MG 24 hr tablet     GLUCOSAMINE CHONDROITIN COMPLX OR     ibuprofen (ADVIL/MOTRIN) 200 MG capsule     insulin glargine (LANTUS PEN) 100 UNIT/ML pen     insulin pen needle (BD JUAN C U/F) 32G X 4 MM miscellaneous     irbesartan-hydrochlorothiazide (AVALIDE) 150-12.5 MG tablet     latanoprost (XALATAN) 0.005 % ophthalmic solution     metFORMIN (GLUCOPHAGE) 1000 MG tablet     MULTIVITAMIN OR     Omega-3 Fatty Acids (OMEGA 3 PO)     omeprazole (PRILOSEC) 20 MG DR capsule     ONE TOUCH DELICA LANCETS [facility]     ONETOUCH ULTRA test strip     order for DME     rosuvastatin (CRESTOR) 5 MG tablet     SYNTHROID 137 MCG tablet     triamcinolone (KENALOG) 0.1 % external cream     trospium (SANCTURA XR) 60 MG CP24 24 hr capsule     No current facility-administered medications for this visit.       Past Medical/Surgical History:   Patient Active Problem List   Diagnosis     Seasonal allergies     Hypothyroidism     Hyperlipidemia LDL goal <100      Fibromyalgia     Osteoarthritis     Advanced directives, counseling/discussion     Obesity     Type 2 diabetes mellitus with hyperglycemia, with long-term current use of insulin (H)     Hypertension goal BP (blood pressure) < 140/90     Overactive bladder     Recurrent UTI     Pseudophakia of both eyes     DINORA (obstructive sleep apnea)- severe (AHI 56)     Contusion of right knee     Gait disorder     Gastroesophageal reflux disease without esophagitis     Past Medical History:   Diagnosis Date     Actinic keratosis 3/12/2013     Degenerative joint disease      Diabetes (H)      Gastroesophageal reflux disease without esophagitis 12/11/2020     Rheumatic fever 1957    x2 between the ages of 15-18     Seasonal allergies        CC No referring provider defined for this encounter. on close of this encounter.

## 2021-02-18 NOTE — PATIENT INSTRUCTIONS
[facility] Dermatology Visit    Thank you for allowing us to participate in your care. Your findings, instructions and follow-up plan are as follows:     Call if rash on back not resolved      When should I call my doctor?    If you are worsening or not improving, please, contact us or seek urgent care as noted below.     Who should I call with questions (adults)?    [facility] (adult and pediatric): [phone]     [facility] (adult): [phone]    For urgent needs outside of business hours call the [facility] at [phone] and ask for the dermatology resident on call    If this is a medical emergency and you are unable to reach an ER, Call 911      Who should I call with questions (pediatric)?  [facility]- Pediatric Dermatology  Dr. Starr Olvera, Dr. Kelli Rios, Dr. Earnestine Salgado, Mireya Forrest, PA  Dr. Carla Melgar, Dr. Laura Reyes & Dr. Jayce Zambrano  Non Urgent  Nurse Triage Line; [phone]- Xuan and Melina RN Care Coordinators   [facility] (/Complex ) [phone]    If you need a prescription refill, please contact your pharmacy. Refills are approved or denied by our Physicians during normal business hours, Monday through Fridays  Per office policy, refills will not be granted if you have not been seen within the past year (or sooner depending on your child's condition)    Scheduling Information:  Pediatric Appointment Scheduling and Call Center [phone]  Radiology Scheduling- [phone]  Sedation Unit Scheduling- [phone]  [place] Scheduling- General [phone]; Pediatric Dermatology [phone]  Main  Services: [phone]  Greek: [phone]  Welsh: [phone]  Hmong/French/Rico: [phone]  Preadmission Nursing Department Fax Number: [phone] (Fax all pre-operative paperwork to this number)    For urgent  matters arising during evenings, weekends, or holidays that cannot wait for normal business hours please call [phone] and ask for the Dermatology Resident On-Call to be paged.

## 2021-02-18 NOTE — LETTER
2/18/2021         RE: Brandy Leon  [address]  [place] [address]        Dear Colleague,    Thank you for referring your patient, Brandy Leon, to the [facility]. Please see a copy of my visit note below.    [facility] Dermatology Note  Encounter Date: Feb 18, 2021   Telephone ([phone]). Location of teledermatologist: [facility].  Start time: 8:43am. End time: 8:45am on phone but >5minutes of path review and prior record review    Dermatology Problem List:  1. Family history of melanoma, father  2. Actinic keratosis   -s/p cryotherapy  3. Seborrheic dermatitis, scalp  - Previous tx: ketoconazole shampoo, Free & Clear Shampoo, hydroxyzine initiated 6/18/2013, Nicoral shampoo initiated 3/12/2013, fluocinonide solution initiated 3/12/2013  4. R lower back dysplastic nevus 2/2/21    ____________________________________________    Assessment & Plan:   1. Family history of melanoma, father  - next skin 2/2022     2. AK hx of   -no change     3. Eczematous dermatitis- mid back  - Has not started triamcinolone apply twice daily for 2 weeks because difficult to apply, no worsening symptoms. Encouraged spatula or back applicator     4. R lower back dysplastic nevus 2/2/21  - Offered option of monitoring or excision. Reviewed nature. She would like recheck in 1 year.     Procedures Performed:    None    Follow-up: 1 year for skin check or sooner if concerning lesions     Staff and Resident:     Samanta Crain MD    Staff Physician Comments:   I, Lucita Jordan MD, was with the resident on the phone visit for the critical and key portions and agree with the resident's findings and plan of care as documented in the resident's note.    Lucita Jordan MD   Department of Dermatology  [facility]: Phone: [phone], Fax:[phone]  [facility]: Phone: [phone], Fax: [phone]  2/18/2021   ____________________________________________    CC: Derm Problem (follow up triamcinolone mid back Eczemous dermatitis. Pt reports that she has not started the triamcinolone as she cant apply it herself and has no one to help her . Denies any worsening itching )    HPI:  Ms. Brandy Leon is a(n) 80 year old female who presents today as a return patient for mole discussion, dysplastic nevus was removed. Also has not started triamcinolone for rash.     Patient is otherwise feeling well, without additional concerns.    Labs:  Reviewed Dermpathology   Skin, right lower back, shave:   - Junctional dysplastic nevus with moderate atypia - (see description)    Hgb A1C: 9.2   BMP wnl except elevated glucose     Physical Exam:  Vitals: There were no vitals taken for this visit.  SKIN: There are no teledermatology photos available for this visit.     Medications:  Current Outpatient Medications   Medication     aspirin 81 MG EC tablet     cephALEXin (KEFLEX) 500 MG capsule     Cranberry-Vitamin C-Vitamin E (CRANBERRY PLUS VITAMIN C) 4200-20-3 MG-MG-UNIT CAPS     empagliflozin (JARDIANCE) 10 MG TABS tablet     fexofenadine (ALLEGRA) 180 MG tablet     glipiZIDE (GLUCOTROL XL) 10 MG 24 hr tablet     GLUCOSAMINE CHONDROITIN COMPLX OR     ibuprofen (ADVIL/MOTRIN) 200 MG capsule     insulin glargine (LANTUS PEN) 100 UNIT/ML pen     insulin pen needle (BD JUAN C U/F) 32G X 4 MM miscellaneous     irbesartan-hydrochlorothiazide (AVALIDE) 150-12.5 MG tablet     latanoprost (XALATAN) 0.005 % ophthalmic solution     metFORMIN (GLUCOPHAGE) 1000 MG tablet     MULTIVITAMIN OR     Omega-3 Fatty Acids (OMEGA 3 PO)     omeprazole (PRILOSEC) 20 MG DR capsule     ONE TOUCH DELICA LANCETS MISC     ONETOUCH ULTRA test strip     order for DME     rosuvastatin (CRESTOR) 5 MG tablet     SYNTHROID 137 MCG tablet     triamcinolone (KENALOG) 0.1 % external cream      trospium (SANCTURA XR) 60 MG CP24 24 hr capsule     No current facility-administered medications for this visit.       Past Medical/Surgical History:   Patient Active Problem List   Diagnosis     Seasonal allergies     Hypothyroidism     Hyperlipidemia LDL goal <100     Fibromyalgia     Osteoarthritis     Advanced directives, counseling/discussion     Obesity     Type 2 diabetes mellitus with hyperglycemia, with long-term current use of insulin (H)     Hypertension goal BP (blood pressure) < 140/90     Overactive bladder     Recurrent UTI     Pseudophakia of both eyes     DINORA (obstructive sleep apnea)- severe (AHI 56)     Contusion of right knee     Gait disorder     Gastroesophageal reflux disease without esophagitis     Past Medical History:   Diagnosis Date     Actinic keratosis 3/12/2013     Degenerative joint disease      Diabetes (H)      Gastroesophageal reflux disease without esophagitis 12/11/2020     Rheumatic fever 1957    x2 between the ages of 15-18     Seasonal allergies        CC No referring provider defined for this encounter. on close of this encounter.      Again, thank you for allowing me to participate in the care of your patient.        Sincerely,        Lucita Jordan MD

## 2021-02-18 NOTE — RESULT ENCOUNTER NOTE
Arnol Leon,    Attached are your test results.  -Kidney function (GFR) is normal.  -Sodium is normal.  -Potassium is normal.  -Calcium is normal.  -Glucose is elevated due to your diabetes.  -A1C (test of diabetes control the last 2-3 months) is slipping above your goal. Please keep appointment with Laisha Perez next week  Please recheck your A1C test in 3 months.    Please contact us if you have any questions.    Cailin Ponce, CNP

## 2021-02-18 NOTE — NURSING NOTE
Teledermatology Nurse Call Patients:     Are you  in the [facility] at the time of the encounter? yes    Today's visit will be billed to you and your insurance.    A teledermatology visit is not as thorough as an in-person visit and the quality of the photograph sent may not be of the same quality as that taken by the dermatology clinic.    Chief Complaint   Patient presents with     Derm Problem     follow up triamcinolone mid back Eczemous dermatitis. Pt reports that she has not started the triamcinolone as she cant apply it herself and has no one to help her . Denies any worsening itching

## 2021-02-19 ENCOUNTER — VIRTUAL VISIT (OUTPATIENT)
Dept: UROLOGY | Facility: CLINIC | Age: 81
End: 2021-02-19
Payer: COMMERCIAL

## 2021-02-19 DIAGNOSIS — N39.41 URGE INCONTINENCE: Primary | ICD-10-CM

## 2021-02-19 PROCEDURE — 99212 OFFICE O/P EST SF 10 MIN: CPT | Mod: 95 | Performed by: PHYSICIAN ASSISTANT

## 2021-02-19 NOTE — PATIENT INSTRUCTIONS
UROLOGY CLINIC VISIT PATIENT INSTRUCTIONS    Continue trospium (Sanctura XR) 60 mg once daily in the morning.    Follow up with urology in 6 months, sooner as needed.     If you have any issues, questions or concerns in the meantime, do not hesitate to contact us at [phone] or via [employer].     It was a pleasure meeting with you today.  Thank you for allowing me and my team the privilege of caring for you today.  YOU are the reason we are here, and I truly hope we provided you with the excellent service you deserve.  Please let us know if there is anything else we can do for you so that we can be sure you are leaving completely satisfied with your care experience.

## 2021-02-19 NOTE — PROGRESS NOTES
Zoila is a 80 year old who is being evaluated via a billable telephone visit.      What phone number would you like to be contacted at? [phone]  How would you like to obtain your AVS? Denise Parnell LPN on 2/19/21 at 8:14 AM      Urology Telephone Visit - Follow-up    CC: follow up urinary incontinence    HPI: Ms. Brandy Leon is a 80 year old female with PMH of DM2 (last A1C 9.2), DINORA, HTN, and HLD who is being evaluated via billable telephone visit today for follow up of recurrent UTI's and urinary incontinence. She had UTI's in Oct (E. Coli), Nov (E. Coli), and Dec 2019 (mixed  erwin), though no further infections since then. There was some question of whether her Jardiance medication may be contributing to infections; however, she had previously had issues with UTI's while taking Invokana and stopping this did not result in improvement in her UTI's. Therefore, she has continued on Jardiance. There was also question of possible normal pressure hydrocephalus contributing to UTI's and urinary issues in general, though she saw neurology on 6/30/2020 who felt that she does not have NPH. As stated above, she has not had any further UTI's since 2019 and is doing well from this standpoint.     Regarding her urinary urgency incontinence, this had previously been well controlled with oxybutynin. However, she was having some cognitive and balance side effects which resolved after oxybutynin was discontinued. She was instead started on Myrbetriq 50 mg daily. While this was helpful for her urinary symptoms, she felt that it caused constipation and was also cost-prohibitive. As a result, she was switched to trospium XR 60 mg daily. This seems to work well and her urinary symptoms are currently stable. She denies obvious side effects like constipation, dry eyes, dry mouth. Today, she reports that her urinary symptoms have been a non-issue. She continues to work with her PCP and neurology regarding ongoing  balance issues.    Past Medical History:   Diagnosis Date     Actinic keratosis 3/12/2013     Degenerative joint disease      Diabetes (H)      Rheumatic fever 1957    x2 between the ages of 15-18     Seasonal allergies      Past Surgical History:   Procedure Laterality Date     C APPENDECTOMY       C ARTHROPLASTY TMJ       CATARACT IOL, RT/LT       COLONOSCOPY       COLONOSCOPY N/A 8/13/2015    Procedure: COLONOSCOPY;  Surgeon: Duane, William Charles, MD;  Location: [facility] OR     COLONOSCOPY WITH CO2 INSUFFLATION N/A 8/13/2015    Procedure: COLONOSCOPY WITH CO2 INSUFFLATION;  Surgeon: Duane, William Charles, MD;  Location: [facility] OR     PHACOEMULSIFICATION WITH STANDARD INTRAOCULAR LENS IMPLANT Left 10/25/2018    Procedure: LEFT PHACOEMULSIFICATION WITH STANDARD INTRAOCULAR LENS IMPLANT;  Surgeon: Thomas Serna MD;  Location: [facility] OR     PHACOEMULSIFICATION WITH STANDARD INTRAOCULAR LENS IMPLANT Right 11/8/2018    Procedure: RIGHT PHACOEMULSIFICATION WITH STANDARD INTRAOCULAR LENS IMPLANT;  Surgeon: Thomas Serna MD;  Location: [facility] OR     THYROIDECTOMY        Current Outpatient Medications   Medication Sig Dispense Refill     aspirin 81 MG EC tablet Take 1 tablet by mouth daily. 90 tablet 3     Cranberry-Vitamin C-Vitamin E (CRANBERRY PLUS VITAMIN C) 4200-20-3 MG-MG-UNIT CAPS Take 1 capsule by mouth 2 times daily       empagliflozin (JARDIANCE) 10 MG TABS tablet Take 1 tablet (10 mg) by mouth daily 90 tablet 3     fexofenadine (ALLEGRA) 180 MG tablet Take 180 mg by mouth as needed        GLUCOSAMINE CHONDROITIN COMPLX OR 2 Daily       ibuprofen (ADVIL/MOTRIN) 200 MG capsule Take 600 mg by mouth every 4 hours as needed        insulin glargine (LANTUS PEN) 100 UNIT/ML pen Inject 38 Units Subcutaneous At Bedtime       insulin pen needle (BD JUAN C U/F) 32G X 4 MM miscellaneous USE 1 DAILY AS DIRECTED. 100 each 2     irbesartan-hydrochlorothiazide (AVALIDE) 150-12.5 MG tablet Take 0.5 tablets by mouth daily        latanoprost (XALATAN) 0.005 % ophthalmic solution Place 1 drop into both eyes At Bedtime 3 Bottle 3     metFORMIN (GLUCOPHAGE) 1000 MG tablet TAKE 1 TABLET (1,000 MG) BY MOUTH 2 TIMES DAILY (WITH MEALS) 180 tablet 1     mirabegron (MYRBETRIQ) 50 MG 24 hr tablet Take 1 tablet (50 mg) by mouth daily 30 tablet 11     MULTIVITAMIN OR 1 tablet daily       Omega-3 Fatty Acids (OMEGA 3 PO) Take by mouth 2 times daily.        ONE TOUCH DELICA LANCETS MISC 1 each 2 times daily. One Touch Delica   100 each 12     ONETOUCH ULTRA test strip USE TO TEST TWICE A  strip 4     order for DME Glucometer covered by insurance. 1 each 0     rosuvastatin (CRESTOR) 5 MG tablet TAKE 1 TABLET BY MOUTH TWICE WEEKLY 90 tablet 3     SYNTHROID 137 MCG tablet Take 1 tablet (137 mcg) by mouth daily 90 tablet 3     Allergies   Allergen Reactions     Estradiol Itching     Lipitor [Atorvastatin Calcium]      Weak and shaky     Sulfa Drugs      Rash       Zocor [Simvastatin]      Weak and shakey       LABS:  Creatinine   Date Value Ref Range Status   02/16/2021 0.86 0.52 - 1.04 mg/dL Final       ASSESSMENT/PLAN:  80 year old female with a history of urinary tract infections and urge urinary incontinence in the setting of poorly controlled DM. No further infections since 2019 - doing well from this standpoint. Her incontinence symptoms were previously well controlled with oxybutynin though she was experiencing cognitive and balance side effects which resolved after stopping the medication. Then switched to Myrbetriq which was helpful but caused possible side effect of constipation and was also cost prohibitive. Most recently switched to trospium XR 60 mg daily which seems to be working well with minimal side effects.    -Will plan to continue trospiuim XR 60 mg daily for now.  -Follow up in 6 months, sooner as needed.    Taylor Lovell PA-C      10 minutes spent on the date of the encounter, doing pre-visit chart review, interpreting  lab/imaging results, and documenting, in addition to 5 minutes spent on the phone with the patient.

## 2021-02-22 NOTE — PROGRESS NOTES
Medication Therapy Management (MTM) Encounter    ASSESSMENT/PLAN:                            Medication Adherence/Access: No issues identified    Diabetes: Not meeting A1c goal of less than 8%.  Would benefit from increasing Jardiance to 25mg daily and discontinuing glipizide. Will work the prescription assistance program to increase dose of Jardiance.     Incontinence: Stable.    PLAN:   Recommend increase Jardiance to 25mg daily. After patient receives Jardiance from  would recommend discontinuing glipizide.     Will follow up in 6 weeks.    SUBJECTIVE/OBJECTIVE:                           Brandy Leon is a 80 year old female called for a follow up visit. This is a follow up visit rom 1/12/2021. She was referred to me from Cailin Ponce.     Reason for visit: Blood sugars    Allergies/ADRs: Reviewed in chart  Tobacco: She reports that she has never smoked. She has never used smokeless tobacco.    Alcohol: none  Caffeine: 2 cups/day of coffee with whole milk  Activity: up and down the stairs doing her laundry,   Past Medical History: Reviewed in chart    Medication Adherence/Access: Patient uses pill box(es). Patient uses medication assistance program. Patient is getting Synthroid, Lantus, and Jardiance through  programs. Jardiance is getting mailed to her home and Synthroid, Lantus come to the clinic.  Patient has enough Lantus and Jardiance for the next couple of weeks. Patient has appointment scheduled with patient assistance program for 3/10.     Diabetes:  Patient is currently taking metformin 1000mg twice daily, Lantus 38 units daily, Jardiance 10mg daily, glipizide XL 10mg daily.   SMBG: one-two times daily. Ranges (patient reported:) Fasting blood sugars . Post-prandial   Date FBG/ 2hours post Lunch/2hours post Dinner /2hours post    2/23 138      2/1 132 /169     2/2 108      2/3 89 /113     2/4 135      2/5 142      2/6 99      2/7 121      2/8 155      2/10 156     "  2/12 150      2/13 213 (a cup of milk with martha crackers)      2/14 104      2/15 171 /196     2/16 158      2/18 122      2/21 139      2/22 141                      Patient is not experiencing hypoglycemia.  Recent symptoms of high blood sugar? None. If it is high she will feel achy and \"blah\"  Eye exam: up to date  Foot exam: up to date  ACEi/ARB: Yes: irbesartan/HCTZ.   Urine Albumin:   Lab Results   Component Value Date    MICROL 40 11/12/2020     Aspirin: Taking 81mg daily and denies side effects.   Diet: Breakfast-cereal & toast or egg & sausage croissant Sometimes doesn't eat lunch might have crackers and cheese, banana or orange. Supper: her son does the cooking. May snack on crackers and cheese or popcorn.  Patient reports she has a hard time staying away from sugar. Patient has a jar of spice drops on her kitchen table.  Lab Results   Component Value Date    A1C 9.2 02/16/2021    A1C 8.7 11/12/2020    A1C 10.0 08/20/2020    A1C 10.5 01/10/2020    A1C 11.1 10/25/2019     Incontinence: current therapy includes trospium XR 60mg daily. Patient feels this has been effective. Patient reports her balance has \"been pretty good\". Patient has been going to physical therapy to help with her balance.      Today's Vitals: There were no vitals taken for this visit.    I spent 18 minutes with this patient. All changes were made via collaborative practice agreement with Cailin Ponce. A copy of the visit note was provided to the patient's primary care provider.    The patient was sent via [employer] a summary of these recommendations.     Mirna Perez, Pharm.D, BCACP  Medication Therapy Management Pharmacist    Telemedicine Visit Details  Type of service:  Telephone visit  Start Time: 9:32AM  End Time: 9:50 AM  Originating Location (patient location): Home  Distant Location (provider location):  [facility] MTM      Medication Therapy Recommendations  No medication therapy recommendations " to display

## 2021-02-23 ENCOUNTER — VIRTUAL VISIT (OUTPATIENT)
Dept: PHARMACY | Facility: CLINIC | Age: 81
End: 2021-02-23
Payer: COMMERCIAL

## 2021-02-23 DIAGNOSIS — Z79.4 TYPE 2 DIABETES MELLITUS WITH HYPERGLYCEMIA, WITH LONG-TERM CURRENT USE OF INSULIN (H): Primary | ICD-10-CM

## 2021-02-23 DIAGNOSIS — E11.65 TYPE 2 DIABETES MELLITUS WITH HYPERGLYCEMIA, WITH LONG-TERM CURRENT USE OF INSULIN (H): Primary | ICD-10-CM

## 2021-02-23 DIAGNOSIS — N32.81 OVERACTIVE BLADDER: ICD-10-CM

## 2021-02-23 PROCEDURE — 99606 MTMS BY PHARM EST 15 MIN: CPT | Mod: TEL | Performed by: PHARMACIST

## 2021-02-23 PROCEDURE — 99607 MTMS BY PHARM ADDL 15 MIN: CPT | Mod: TEL | Performed by: PHARMACIST

## 2021-02-23 NOTE — PATIENT INSTRUCTIONS
Recommendations from today's MTM visit:                                                       No medication changes made today.    It was great to speak with you today.  I value your experience and would be very thankful for your time with providing feedback on our clinic survey. You may receive a survey via email or text message in the next few days.     Next MTM visit: 3/30/2021    To schedule another MTM appointment, please call the clinic directly or you may call the MTM scheduling line at [phone] or toll-free at [phone].     My Clinical Pharmacist's contact information:                                                      It was a pleasure talking with you today!  Please feel free to contact me with any questions or concerns you have.      Mirna Perez, Pharm.D, [facility]CP  Medication Therapy Management Pharmacist

## 2021-02-23 NOTE — Clinical Note
COURTNEY Hay we are increasing Ziola's Jardiance to 25mg daily so she will need this new dose from the . Let me know if you need anything from me. Thanks for your help with this!  Laisha

## 2021-03-01 ENCOUNTER — HOSPITAL ENCOUNTER (OUTPATIENT)
Dept: PHYSICAL THERAPY | Facility: CLINIC | Age: 81
Setting detail: THERAPIES SERIES
End: 2021-03-01
Attending: NURSE PRACTITIONER
Payer: OTHER MISCELLANEOUS

## 2021-03-01 PROCEDURE — 97112 NEUROMUSCULAR REEDUCATION: CPT | Mod: GP | Performed by: PHYSICAL THERAPIST

## 2021-03-01 PROCEDURE — 97116 GAIT TRAINING THERAPY: CPT | Mod: GP | Performed by: PHYSICAL THERAPIST

## 2021-03-05 ENCOUNTER — NURSE TRIAGE (OUTPATIENT)
Dept: FAMILY MEDICINE | Facility: CLINIC | Age: 81
End: 2021-03-05

## 2021-03-05 ENCOUNTER — APPOINTMENT (OUTPATIENT)
Dept: LAB | Facility: CLINIC | Age: 81
End: 2021-03-05
Payer: COMMERCIAL

## 2021-03-05 NOTE — TELEPHONE ENCOUNTER
"  Additional Information    Negative: Shock suspected (e.g., cold/pale/clammy skin, too weak to stand, low BP, rapid pulse)    Negative: Sounds like a life-threatening emergency to the triager    Negative: Unable to urinate (or only a few drops) and bladder feels very full    Negative: Vomiting    Negative: Patient sounds very sick or weak to the triager    Negative: SEVERE pain with urination    Negative: Fever > 100.5 F (38.1 C)    Negative: Side (flank) or lower back pain present    Negative: Taking antibiotic > 24 hours for UTI and fever persists    Negative: Taking antibiotic > 3 days for UTI and painful urination not improved    Negative: Unusual vaginal discharge    Negative: > 2 UTIs in last year    Negative: Patient is worried about sexually transmitted disease (STD)    Age > 50 years    Answer Assessment - Initial Assessment Questions  1. SEVERITY: \"How bad is the pain?\"  (e.g., Scale 1-10; mild, moderate, or severe)    - MILD (1-3): complains slightly about urination hurting    - MODERATE (4-7): interferes with normal activities      - SEVERE (8-10): excruciating, unwilling or unable to urinate because of the pain       mild  2. FREQUENCY: \"How many times have you had painful urination today?\"       All day today   3. PATTERN: \"Is pain present every time you urinate or just sometimes?\"       Yes, every time   4. ONSET: \"When did the painful urination start?\"       today  5. FEVER: \"Do you have a fever?\" If so, ask: \"What is your temperature, how was it measured, and when did it start?\"      no  6. PAST UTI: \"Have you had a urine infection before?\" If so, ask: \"When was the last time?\" and \"What happened that time?\"       yes  7. CAUSE: \"What do you think is causing the painful urination?\"  (e.g., UTI, scratch, Herpes sore)      UTI  8. OTHER SYMPTOMS: \"Do you have any other symptoms?\" (e.g., flank pain, vaginal discharge, genital sores, urgency, blood in urine)      none  9. PREGNANCY: \"Is there any " "chance you are pregnant?\" \"When was your last menstrual period?\"      NA    Protocols used: URINATION PAIN - FEMALE-A-OH    "

## 2021-03-06 DIAGNOSIS — K21.9 GASTROESOPHAGEAL REFLUX DISEASE WITHOUT ESOPHAGITIS: ICD-10-CM

## 2021-03-09 ENCOUNTER — TELEPHONE (OUTPATIENT)
Dept: PHARMACY | Facility: CLINIC | Age: 81
End: 2021-03-09

## 2021-03-09 DIAGNOSIS — Z79.4 TYPE 2 DIABETES MELLITUS WITH HYPERGLYCEMIA, WITH LONG-TERM CURRENT USE OF INSULIN (H): ICD-10-CM

## 2021-03-09 DIAGNOSIS — E11.65 TYPE 2 DIABETES MELLITUS WITH HYPERGLYCEMIA, WITH LONG-TERM CURRENT USE OF INSULIN (H): ICD-10-CM

## 2021-03-09 NOTE — TELEPHONE ENCOUNTER
Patient called requesting a refill of metformin. Refill sent to pharmacy.  Mirna Perez, Pharm.D, [facility]  Medication Therapy Management Pharmacist

## 2021-03-10 ENCOUNTER — TELEPHONE (OUTPATIENT)
Dept: FAMILY MEDICINE | Facility: CLINIC | Age: 81
End: 2021-03-10

## 2021-03-10 DIAGNOSIS — Z79.4 TYPE 2 DIABETES MELLITUS WITH HYPERGLYCEMIA, WITH LONG-TERM CURRENT USE OF INSULIN (H): Primary | ICD-10-CM

## 2021-03-10 DIAGNOSIS — E11.65 TYPE 2 DIABETES MELLITUS WITH HYPERGLYCEMIA, WITH LONG-TERM CURRENT USE OF INSULIN (H): Primary | ICD-10-CM

## 2021-03-10 NOTE — TELEPHONE ENCOUNTER
Thank so much for your work for Zoila     I will place an order in for the Basaglar and see if that will work for her

## 2021-03-10 NOTE — TELEPHONE ENCOUNTER
March 10, 2021 I spoke with Zoila, she is in need of financial assistance for medication.    We reviewed the Pharmacy Assistance Fund $500, the Prescription Assistance Program for [employer] assistance programs, gross income, insurance and Rx list.    Question- Lantus assistance requires out of pocket expense for their program.  [employer] program for Basaglar pens, does not require out of pocket expense to qualify.  If Basaglar would be an appropriate alternative, I do need the dose information for the application.    I will complete assistance applications for Lantus/Basaglar & Jardiance.    When approved, Zoila will receive this medication at no cost for the remainder of 2021.    Thanks so much for your help!    Francesca Correa  Prescription   Pharmacy Assistance  [phone]

## 2021-03-11 ENCOUNTER — HOSPITAL ENCOUNTER (OUTPATIENT)
Dept: PHYSICAL THERAPY | Facility: CLINIC | Age: 81
Setting detail: THERAPIES SERIES
End: 2021-03-11
Attending: NURSE PRACTITIONER
Payer: OTHER MISCELLANEOUS

## 2021-03-11 PROCEDURE — 97112 NEUROMUSCULAR REEDUCATION: CPT | Mod: GP | Performed by: PHYSICAL THERAPIST

## 2021-03-11 RX ORDER — INSULIN GLARGINE 100 [IU]/ML
38 INJECTION, SOLUTION SUBCUTANEOUS DAILY
Qty: 36 ML | Refills: 3 | Status: SHIPPED | OUTPATIENT
Start: 2021-03-11 | End: 2021-04-21

## 2021-03-16 ENCOUNTER — HOSPITAL ENCOUNTER (OUTPATIENT)
Dept: PHYSICAL THERAPY | Facility: CLINIC | Age: 81
Setting detail: THERAPIES SERIES
End: 2021-03-16
Attending: NURSE PRACTITIONER
Payer: OTHER MISCELLANEOUS

## 2021-03-16 ENCOUNTER — TELEPHONE (OUTPATIENT)
Dept: FAMILY MEDICINE | Facility: CLINIC | Age: 81
End: 2021-03-16

## 2021-03-16 ENCOUNTER — OFFICE VISIT (OUTPATIENT)
Dept: FAMILY MEDICINE | Facility: CLINIC | Age: 81
End: 2021-03-16
Payer: COMMERCIAL

## 2021-03-16 VITALS
BODY MASS INDEX: 32.13 KG/M2 | HEART RATE: 74 BPM | SYSTOLIC BLOOD PRESSURE: 132 MMHG | WEIGHT: 187.2 LBS | RESPIRATION RATE: 12 BRPM | DIASTOLIC BLOOD PRESSURE: 82 MMHG | OXYGEN SATURATION: 95 %

## 2021-03-16 DIAGNOSIS — M62.81 GENERALIZED MUSCLE WEAKNESS: ICD-10-CM

## 2021-03-16 DIAGNOSIS — N39.0 RECURRENT UTI: Primary | Chronic | ICD-10-CM

## 2021-03-16 DIAGNOSIS — I10 HYPERTENSION GOAL BP (BLOOD PRESSURE) < 140/90: ICD-10-CM

## 2021-03-16 DIAGNOSIS — R82.90 NONSPECIFIC FINDING ON EXAMINATION OF URINE: ICD-10-CM

## 2021-03-16 DIAGNOSIS — N30.00 ACUTE CYSTITIS WITHOUT HEMATURIA: Primary | ICD-10-CM

## 2021-03-16 DIAGNOSIS — D72.829 LEUKOCYTOSIS, UNSPECIFIED TYPE: ICD-10-CM

## 2021-03-16 DIAGNOSIS — N39.0 RECURRENT UTI: Chronic | ICD-10-CM

## 2021-03-16 LAB
ALBUMIN SERPL-MCNC: 3.6 G/DL (ref 3.4–5)
ALBUMIN UR-MCNC: NEGATIVE MG/DL
ALP SERPL-CCNC: 82 U/L (ref 40–150)
ALT SERPL W P-5'-P-CCNC: 28 U/L (ref 0–50)
ANION GAP SERPL CALCULATED.3IONS-SCNC: 3 MMOL/L (ref 3–14)
APPEARANCE UR: ABNORMAL
AST SERPL W P-5'-P-CCNC: 14 U/L (ref 0–45)
BACTERIA #/AREA URNS HPF: ABNORMAL /HPF
BILIRUB SERPL-MCNC: 0.3 MG/DL (ref 0.2–1.3)
BILIRUB UR QL STRIP: NEGATIVE
BUN SERPL-MCNC: 24 MG/DL (ref 7–30)
CALCIUM SERPL-MCNC: 9.3 MG/DL (ref 8.5–10.1)
CHLORIDE SERPL-SCNC: 102 MMOL/L (ref 94–109)
CO2 SERPL-SCNC: 31 MMOL/L (ref 20–32)
COLOR UR AUTO: YELLOW
CREAT SERPL-MCNC: 0.78 MG/DL (ref 0.52–1.04)
ERYTHROCYTE [DISTWIDTH] IN BLOOD BY AUTOMATED COUNT: 14.3 % (ref 10–15)
GFR SERPL CREATININE-BSD FRML MDRD: 73 ML/MIN/{1.73_M2}
GLUCOSE SERPL-MCNC: 180 MG/DL (ref 70–99)
GLUCOSE UR STRIP-MCNC: 100 MG/DL
HCT VFR BLD AUTO: 44.2 % (ref 35–47)
HGB BLD-MCNC: 14.4 G/DL (ref 11.7–15.7)
HGB UR QL STRIP: NEGATIVE
KETONES UR STRIP-MCNC: NEGATIVE MG/DL
LEUKOCYTE ESTERASE UR QL STRIP: ABNORMAL
MCH RBC QN AUTO: 29 PG (ref 26.5–33)
MCHC RBC AUTO-ENTMCNC: 32.6 G/DL (ref 31.5–36.5)
MCV RBC AUTO: 89 FL (ref 78–100)
NITRATE UR QL: POSITIVE
NON-SQ EPI CELLS #/AREA URNS LPF: ABNORMAL /LPF
PH UR STRIP: 6 PH (ref 5–7)
PLATELET # BLD AUTO: 282 10E9/L (ref 150–450)
POTASSIUM SERPL-SCNC: 4.9 MMOL/L (ref 3.4–5.3)
PROT SERPL-MCNC: 8 G/DL (ref 6.8–8.8)
RBC # BLD AUTO: 4.96 10E12/L (ref 3.8–5.2)
RBC #/AREA URNS AUTO: ABNORMAL /HPF
SODIUM SERPL-SCNC: 136 MMOL/L (ref 133–144)
SOURCE: ABNORMAL
SP GR UR STRIP: 1.02 (ref 1–1.03)
UROBILINOGEN UR STRIP-ACNC: 0.2 EU/DL (ref 0.2–1)
WBC # BLD AUTO: 12.6 10E9/L (ref 4–11)
WBC #/AREA URNS AUTO: ABNORMAL /HPF

## 2021-03-16 PROCEDURE — 87186 SC STD MICRODIL/AGAR DIL: CPT | Performed by: NURSE PRACTITIONER

## 2021-03-16 PROCEDURE — 36415 COLL VENOUS BLD VENIPUNCTURE: CPT | Performed by: NURSE PRACTITIONER

## 2021-03-16 PROCEDURE — 80053 COMPREHEN METABOLIC PANEL: CPT | Performed by: NURSE PRACTITIONER

## 2021-03-16 PROCEDURE — 99213 OFFICE O/P EST LOW 20 MIN: CPT | Performed by: NURSE PRACTITIONER

## 2021-03-16 PROCEDURE — 85027 COMPLETE CBC AUTOMATED: CPT | Performed by: NURSE PRACTITIONER

## 2021-03-16 PROCEDURE — 97110 THERAPEUTIC EXERCISES: CPT | Mod: GP | Performed by: PHYSICAL THERAPIST

## 2021-03-16 PROCEDURE — 87086 URINE CULTURE/COLONY COUNT: CPT | Performed by: NURSE PRACTITIONER

## 2021-03-16 PROCEDURE — 81001 URINALYSIS AUTO W/SCOPE: CPT | Performed by: NURSE PRACTITIONER

## 2021-03-16 PROCEDURE — 87088 URINE BACTERIA CULTURE: CPT | Performed by: NURSE PRACTITIONER

## 2021-03-16 PROCEDURE — 97112 NEUROMUSCULAR REEDUCATION: CPT | Mod: GP | Performed by: PHYSICAL THERAPIST

## 2021-03-16 RX ORDER — CEPHALEXIN 500 MG/1
500 CAPSULE ORAL 2 TIMES DAILY
Qty: 14 CAPSULE | Refills: 0 | Status: SHIPPED | OUTPATIENT
Start: 2021-03-16 | End: 2021-03-23

## 2021-03-16 NOTE — TELEPHONE ENCOUNTER
This writer attempted to contact [facility] on 03/16/21      Reason for call provider message and left detailed message notified Keflex sent to [facility]       If patient calls back:   Relay message from provider below, (read verbatim), document that pt called and close encounter      Lauren Serna RN, BSN, CMSRN  [facility]          Message from provider:   Please call   Urine is positive for infection and will be sending out antibiotics for her   Cailin Ponce, NP, APRN CNP

## 2021-03-16 NOTE — PROGRESS NOTES
Subjective   Zoila is a 80 year old who presents for the following health issues     HPI     Patient states that she is extremely shaky after exercise with PT ongoing been going on for a week   Legs do not hurt just like do not have any strength  Saw urgent care about a week ago        Genitourinary - Female  Onset/Duration: leg pains- patient is worried about a UTI   Description:   Painful urination (Dysuria): no           Frequency: no  Blood in urine (Hematuria): no  Delay in urine (Hesitency): no  Intensity: mild  Progression of Symptoms:  same  Accompanying Signs & Symptoms:  Fever/chills: no  Flank pain: no  Nausea and vomiting: no  Vaginal symptoms: none  Abdominal/Pelvic Pain: no  History:   History of frequent UTI s: YES  History of kidney stones: no  Sexually Active: no  Possibility of pregnancy: No  Precipitating or alleviating factors: None  Therapies tried and outcome: Increase fluid intake    Lab work is in process  Labs reviewed in EPIC  BP Readings from Last 3 Encounters:   03/16/21 132/82   01/27/21 132/84   01/26/21 134/74    Wt Readings from Last 3 Encounters:   03/16/21 84.9 kg (187 lb 3.2 oz)   01/26/21 84.2 kg (185 lb 11.2 oz)   12/11/20 83 kg (183 lb)                  Patient Active Problem List   Diagnosis     Seasonal allergies     Hypothyroidism     Hyperlipidemia LDL goal <100     Fibromyalgia     Osteoarthritis     Advanced directives, counseling/discussion     Obesity     Type 2 diabetes mellitus with hyperglycemia, with long-term current use of insulin (H)     Hypertension goal BP (blood pressure) < 140/90     Overactive bladder     Recurrent UTI     Pseudophakia of both eyes     DINORA (obstructive sleep apnea)- severe (AHI 56)     Contusion of right knee     Gait disorder     Gastroesophageal reflux disease without esophagitis     Past Surgical History:   Procedure Laterality Date     C APPENDECTOMY       C ARTHROPLASTY TMJ       CATARACT IOL, RT/LT       COLONOSCOPY       COLONOSCOPY  N/A 8/13/2015    Procedure: COLONOSCOPY;  Surgeon: Duane, William Charles, MD;  Location: [facility]     COLONOSCOPY WITH CO2 INSUFFLATION N/A 8/13/2015    Procedure: COLONOSCOPY WITH CO2 INSUFFLATION;  Surgeon: Duane, William Charles, MD;  Location: [facility]     PHACOEMULSIFICATION WITH STANDARD INTRAOCULAR LENS IMPLANT Left 10/25/2018    Procedure: LEFT PHACOEMULSIFICATION WITH STANDARD INTRAOCULAR LENS IMPLANT;  Surgeon: Thomas Serna MD;  Location: [facility]     PHACOEMULSIFICATION WITH STANDARD INTRAOCULAR LENS IMPLANT Right 11/8/2018    Procedure: RIGHT PHACOEMULSIFICATION WITH STANDARD INTRAOCULAR LENS IMPLANT;  Surgeon: Thomas Serna MD;  Location: [facility]     THYROIDECTOMY          Social History     Tobacco Use     Smoking status: Never Smoker     Smokeless tobacco: Never Used     Tobacco comment: has had exposure to second hand smoke in the past from [employer], no current exposure   Substance Use Topics     Alcohol use: No     Family History   Problem Relation Age of Onset     Cancer Father         skin and leukemia     Cerebrovascular Disease Maternal Grandfather      Cerebrovascular Disease Paternal Grandmother      Eye Disorder Paternal Grandmother         cataracts     Cerebrovascular Disease Paternal Grandfather      Heart Disease Paternal Grandfather      Cancer Sister         Breast Cancer     Eye Disorder Mother         cataracts     Eye Disorder Brother         cataract     Breast Cancer Daughter      Other Cancer Daughter         ovarian cancer     Glaucoma No family hx of      Macular Degeneration No family hx of          Current Outpatient Medications   Medication Sig Dispense Refill     aspirin 81 MG EC tablet Take 1 tablet by mouth daily. 90 tablet 3     cephALEXin (KEFLEX) 500 MG capsule Take 1 capsule (500 mg) by mouth 2 times daily 28 capsule 0     Cranberry-Vitamin C-Vitamin E (CRANBERRY PLUS VITAMIN C) 4200-20-3 MG-MG-UNIT CAPS Take 1 capsule by mouth 2 times daily        empagliflozin (JARDIANCE) 25 MG TABS tablet Take 1 tablet (25 mg) by mouth daily 90 tablet 1     fexofenadine (ALLEGRA) 180 MG tablet Take 180 mg by mouth as needed        glipiZIDE (GLUCOTROL XL) 10 MG 24 hr tablet Take 10 mg by mouth daily       GLUCOSAMINE CHONDROITIN COMPLX OR 2 Daily       ibuprofen (ADVIL/MOTRIN) 200 MG capsule Take 400 mg by mouth every 4 hours as needed        insulin glargine (BASAGLAR KWIKPEN) 100 UNIT/ML pen Inject 38 Units Subcutaneous daily 36 mL 3     insulin glargine (LANTUS PEN) 100 UNIT/ML pen Inject 38 Units Subcutaneous At Bedtime       insulin pen needle (BD JUAN C U/F) 32G X 4 MM miscellaneous USE 1 DAILY AS DIRECTED. 100 each 2     irbesartan-hydrochlorothiazide (AVALIDE) 150-12.5 MG tablet Take 0.5 tablets by mouth daily 45 tablet 1     latanoprost (XALATAN) 0.005 % ophthalmic solution Place 1 drop into both eyes daily 1 Bottle 11     metFORMIN (GLUCOPHAGE) 1000 MG tablet TAKE 1 TABLET (1,000 MG) BY MOUTH 2 TIMES DAILY (WITH MEALS) 180 tablet 1     MULTIVITAMIN OR 1 tablet daily       Omega-3 Fatty Acids (OMEGA 3 PO) Take by mouth 2 times daily.        omeprazole (PRILOSEC) 20 MG DR capsule TAKE 1 CAPSULE BY MOUTH EVERY DAY 30 capsule 1     ONE TOUCH DELICA LANCETS MISC 1 each 2 times daily. One Touch Delica   100 each 12     ONETOUCH ULTRA test strip USE TO TEST TWICE A  strip 4     order for DME Glucometer covered by insurance. 1 each 0     rosuvastatin (CRESTOR) 5 MG tablet TAKE 1 TABLET BY MOUTH TWICE WEEKLY 90 tablet 3     SYNTHROID 137 MCG tablet Take 1 tablet (137 mcg) by mouth daily 90 tablet 3     triamcinolone (KENALOG) 0.1 % external cream Apply twice daily for 2 weeks 45 g 0     trospium (SANCTURA XR) 60 MG CP24 24 hr capsule Take 1 capsule (60 mg) by mouth every morning 30 capsule 11     Allergies   Allergen Reactions     Estradiol Itching     Lipitor [Atorvastatin Calcium]      Weak and shaky     Sulfa Drugs      Rash       Zocor [Simvastatin]      Weak and  juan carlos     Recent Labs   Lab Test 02/16/21  0757 11/12/20  0906 08/20/20  0902 10/25/19  0950 10/25/19  0950 05/10/19  0821 05/10/19  0821 06/25/18  1434 06/25/18  1434   A1C 9.2* 8.7* 10.0*   < > 11.1*   < >  --    < > 10.4*   LDL  --  102*  --   --  91  --  114*  --  93   HDL  --  55  --   --  57  --   --   --  53   TRIG  --  206*  --   --  254*  --   --   --  258*   ALT  --  26  --   --  25  --  24  --  27   CR 0.86 0.88  --    < > 0.72  --  0.81   < > 0.68   GFRESTIMATED 64 62  --    < > 80  --  70   < > 84   GFRESTBLACK 74 72  --    < > >90  --  81   < > >90   POTASSIUM 4.4 4.3  --    < > 4.4  --  4.0   < > 4.3   TSH  --  3.00  --   --  2.78  --  2.61   < > 2.46    < > = values in this interval not displayed.        Review of Systems   CONSTITUTIONAL:POSITIVE  for weak  and NEGATIVE  for chills and fever   INTEGUMENTARY/SKIN: NEGATIVE for rash   ENT/MOUTH: POSITIVE for nasal congestion, postnasal drainage and rhinorrhea-clear and NEGATIVE for epistaxis and fever  RESP:NEGATIVE for significant cough or SOB  CV: NEGATIVE for chest pain, palpitations or peripheral edema  : urinary tract infection and urinary frequency   MUSCULOSKELETAL: POSITIVE  for arthralgias  and NEGATIVE for joint swelling  and joint warmth   NEURO: POSITIVE for weakness  and NEGATIVE for HX CVA, HX TIA, syncope and vertigo  ENDOCRINE: NEGATIVE for temperature intolerance, skin/hair changes  HEME/ALLERGY/IMMUNE: POSITIVE  for allergies and NEGATIVE for night sweats and swollen nodes      Objective    /82   Pulse 74   Resp 12   Wt 84.9 kg (187 lb 3.2 oz)   SpO2 95%   BMI 32.13 kg/m    Body mass index is 32.13 kg/m .  Physical Exam   GENERAL APPEARANCE: alert, active and no distress  NECK: no adenopathy  RESP: lungs clear to auscultation - no rales, rhonchi or wheezes  CV: regular rates and rhythm and no murmur, click or rub  ABDOMEN: soft, nontender, without hepatosplenomegaly or masses and bowel sounds normal  MS: extremities  normal- no gross deformities noted  SKIN: no suspicious lesions or rashes  NEURO: Normal strength and tone, mentation intact and speech normal  PSYCH: mentation appears normal and affect normal/bright  MENTAL STATUS EXAM:  Appearance/Behavior: No apparent distress, Casually groomed and Dressed appropriately for weather  Speech: Normal  Mood/Affect: normal affect  Insight: Adequate    Results for orders placed or performed in visit on 03/16/21   CBC with platelets     Status: Abnormal   Result Value Ref Range    WBC 12.6 (H) 4.0 - 11.0 10e9/L    RBC Count 4.96 3.8 - 5.2 10e12/L    Hemoglobin 14.4 11.7 - 15.7 g/dL    Hematocrit 44.2 35.0 - 47.0 %    MCV 89 78 - 100 fl    MCH 29.0 26.5 - 33.0 pg    MCHC 32.6 31.5 - 36.5 g/dL    RDW 14.3 10.0 - 15.0 %    Platelet Count 282 150 - 450 10e9/L   UA with Microscopic reflex to Culture     Status: Abnormal    Specimen: Midstream Urine   Result Value Ref Range    Color Urine Yellow     Appearance Urine Slightly Cloudy     Glucose Urine 100 (A) NEG^Negative mg/dL    Bilirubin Urine Negative NEG^Negative    Ketones Urine Negative NEG^Negative mg/dL    Specific Gravity Urine 1.020 1.003 - 1.035    pH Urine 6.0 5.0 - 7.0 pH    Protein Albumin Urine Negative NEG^Negative mg/dL    Urobilinogen Urine 0.2 0.2 - 1.0 EU/dL    Nitrite Urine Positive (A) NEG^Negative    Blood Urine Negative NEG^Negative    Leukocyte Esterase Urine Large (A) NEG^Negative    Source Midstream Urine     WBC Urine  (A) OTO5^0 - 5 /HPF    RBC Urine O - 2 OTO2^O - 2 /HPF    Squamous Epithelial /LPF Urine Few FEW^Few /LPF    Bacteria Urine Moderate (A) NEG^Negative /HPF   Comprehensive metabolic panel     Status: Abnormal   Result Value Ref Range    Sodium 136 133 - 144 mmol/L    Potassium 4.9 3.4 - 5.3 mmol/L    Chloride 102 94 - 109 mmol/L    Carbon Dioxide 31 20 - 32 mmol/L    Anion Gap 3 3 - 14 mmol/L    Glucose 180 (H) 70 - 99 mg/dL    Urea Nitrogen 24 7 - 30 mg/dL    Creatinine 0.78 0.52 - 1.04 mg/dL     GFR Estimate 73 >60 mL/min/[1.73_m2]    GFR Estimate If Black 84 >60 mL/min/[1.73_m2]    Calcium 9.3 8.5 - 10.1 mg/dL    Bilirubin Total 0.3 0.2 - 1.3 mg/dL    Albumin 3.6 3.4 - 5.0 g/dL    Protein Total 8.0 6.8 - 8.8 g/dL    Alkaline Phosphatase 82 40 - 150 U/L    ALT 28 0 - 50 U/L    AST 14 0 - 45 U/L   Urine Culture Aerobic Bacterial     Status: Abnormal    Specimen: Midstream Urine   Result Value Ref Range    Specimen Description Midstream Urine     Special Requests Specimen received in preservative     Culture Micro >100,000 colonies/mL  Escherichia coli   (A)        Susceptibility    Escherichia coli - XIMENA     AMPICILLIN 4 Sensitive ug/mL     CEFAZOLIN* <=4 Sensitive ug/mL      * Cefazolin XIMENA breakpoints are for the treatment of uncomplicated urinary tract infections.  For the treatment of systemic infections, please contact the laboratory for additional testing.     CEFOXITIN <=4 Sensitive ug/mL     CEFTAZIDIME <=1 Sensitive ug/mL     CEFTRIAXONE <=1 Sensitive ug/mL     CIPROFLOXACIN 0.5 Intermediate ug/mL     GENTAMICIN <=1 Sensitive ug/mL     LEVOFLOXACIN 1 Intermediate ug/mL     NITROFURANTOIN <=16 Sensitive ug/mL     TOBRAMYCIN <=1 Sensitive ug/mL     Trimethoprim/Sulfa <=1/19 Sensitive ug/mL     AMPICILLIN/SULBACTAM <=2 Sensitive ug/mL     Piperacillin/Tazo <=4 Sensitive ug/mL     CEFEPIME <=1 Sensitive ug/mL     Assessment & Plan     Acute cystitis without hematuria  Patient was instructed to drink plenty of fluids, urinate frequently and to contact the office promptly should she notice fever greater than 102, increase in discomfort, skin rash, lack of improvement after 2 days of treatment, or the appearance of new symptoms.  Will Start:  - cephALEXin (KEFLEX) 500 MG capsule  Dispense: 14 capsule; Refill: 0    Recurrent UTI  - UA with Microscopic reflex to Culture    Generalized muscle weakness  - CBC with platelets  - UA with Microscopic reflex to Culture  - Comprehensive metabolic  panel    Nonspecific finding on examination of urine  - Urine Culture Aerobic Bacterial    Leukocytosis, unspecified type  Will recheck   - CBC with platelets differential  6}     See Patient Instructions  Patient Instructions     PLAN:   1.   Symptomatic therapy suggested: Continue current medications as prescribed.   push fluids    2.  Orders Placed This Encounter   Procedures     CBC with platelets     UA with Microscopic reflex to Culture     Comprehensive metabolic panel     3. Patient needs to follow up in if no improvement,or sooner if worsening of symptoms or other symptoms develop.  Will follow up and/or notify patient of  results via My Chart to determine further need for followup    No follow-ups on file.    MAGO Baker [facility]

## 2021-03-16 NOTE — PATIENT INSTRUCTIONS
PLAN:   1.   Symptomatic therapy suggested: Continue current medications as prescribed.   push fluids    2.  Orders Placed This Encounter   Procedures     CBC with platelets     UA with Microscopic reflex to Culture     Comprehensive metabolic panel     3. Patient needs to follow up in if no improvement,or sooner if worsening of symptoms or other symptoms develop.  Will follow up and/or notify patient of  results via My Chart to determine further need for followup

## 2021-03-17 NOTE — RESULT ENCOUNTER NOTE
Arnol Leon,    Attached are your test results.  -Normal red blood cell (hgb) levels, high white blood cell count and normal platelet levels. ADVISE: recheck in 2 weeks after completing antibiotics   -Liver and gallbladder tests (ALT,AST, Alk phos,bilirubin) are normal.  -Kidney function (GFR) is normal.  -Sodium is normal.  -Potassium is normal.  -Calcium is normal.  -Glucose is elevated due to your diabetes.   Please contact us if you have any questions.    Cailin Ponce, CNP

## 2021-03-18 ENCOUNTER — HOSPITAL ENCOUNTER (OUTPATIENT)
Dept: PHYSICAL THERAPY | Facility: CLINIC | Age: 81
Setting detail: THERAPIES SERIES
End: 2021-03-18
Attending: NURSE PRACTITIONER
Payer: OTHER MISCELLANEOUS

## 2021-03-18 LAB
BACTERIA SPEC CULT: ABNORMAL
Lab: ABNORMAL
SPECIMEN SOURCE: ABNORMAL

## 2021-03-18 PROCEDURE — 97112 NEUROMUSCULAR REEDUCATION: CPT | Mod: GP | Performed by: PHYSICAL THERAPIST

## 2021-03-18 PROCEDURE — 97116 GAIT TRAINING THERAPY: CPT | Mod: GP | Performed by: PHYSICAL THERAPIST

## 2021-03-18 PROCEDURE — 97110 THERAPEUTIC EXERCISES: CPT | Mod: GP | Performed by: PHYSICAL THERAPIST

## 2021-03-18 RX ORDER — IRBESARTAN AND HYDROCHLOROTHIAZIDE 150; 12.5 MG/1; MG/1
TABLET, FILM COATED ORAL
Qty: 45 TABLET | Refills: 1 | Status: SHIPPED | OUTPATIENT
Start: 2021-03-18 | End: 2021-08-13

## 2021-03-18 NOTE — RESULT ENCOUNTER NOTE
Arnol Leon,    Attached are your test results.  -Urine culture is abnormal and grew out bacteria that are sensitive to the antibiotic you have been given.  Complete the medication as prescribed and if you experience new, worsening or persistent symptoms, you should call or return for a recheck.   Please contact us if you have any questions.    Cailin Ponce, CNP

## 2021-03-23 ENCOUNTER — HOSPITAL ENCOUNTER (OUTPATIENT)
Dept: PHYSICAL THERAPY | Facility: CLINIC | Age: 81
Setting detail: THERAPIES SERIES
End: 2021-03-23
Attending: NURSE PRACTITIONER
Payer: OTHER MISCELLANEOUS

## 2021-03-23 PROCEDURE — 97112 NEUROMUSCULAR REEDUCATION: CPT | Mod: GP | Performed by: PHYSICAL THERAPIST

## 2021-03-23 PROCEDURE — 97116 GAIT TRAINING THERAPY: CPT | Mod: GP | Performed by: PHYSICAL THERAPIST

## 2021-03-25 ENCOUNTER — HOSPITAL ENCOUNTER (OUTPATIENT)
Dept: PHYSICAL THERAPY | Facility: CLINIC | Age: 81
Setting detail: THERAPIES SERIES
End: 2021-03-25
Attending: NURSE PRACTITIONER
Payer: OTHER MISCELLANEOUS

## 2021-03-25 PROCEDURE — 97110 THERAPEUTIC EXERCISES: CPT | Mod: GP | Performed by: PHYSICAL THERAPIST

## 2021-03-29 NOTE — PROGRESS NOTES
Medication Therapy Management (MTM) Encounter    ASSESSMENT/PLAN:                            Medication Adherence/Access: No issues identified    Diabetes: Not meeting A1c goal of less than 8%.  Would benefit from increasing Jardiance to 25mg daily and discontinuing glipizide when she receives Jardiance from . Would benefit from taking 10mg of glipizide in the meantime in the morning. Recommend Shingrix, Tdap. Patient declines pneumonia 23, 13 and COVID-19 vaccinations.    PLAN:   Recommend increase Jardiance to 25mg daily. After patient receives Jardiance from  would recommend discontinuing glipizide.   Recommend taking glipizide XL 10mg in the morning.  Recommend Shingrix and Tdap    Will follow up in 4 weeks.    SUBJECTIVE/OBJECTIVE:                           Brandy Leon is a 80 year old female called for a follow up visit. This is a follow up visit rom 2/23/2021. She was referred to me from Cailin Ponce.     Reason for visit: Blood sugars; Jardiance increase?    Allergies/ADRs: Reviewed in chart  Tobacco: She reports that she has never smoked. She has never used smokeless tobacco.    Alcohol: none  Caffeine: 2 cups/day of coffee with whole milk  Activity: up and down the stairs doing her laundry,   Past Medical History: Reviewed in chart    Medication Adherence/Access: Patient uses pill box(es). Patient uses medication assistance program. Patient is getting Synthroid, Lantus, and Jardiance through  programs. Jardiance is getting mailed to her home and Synthroid, Lantus come to the clinic.  Patient is waiting to receive paperwork from patient assistance program. Lantus will be switched to Basaglar.    Diabetes:  Patient is currently taking metformin 1000mg twice daily, Lantus 36 units daily, Jardiance 10mg daily, glipizide XL 10mg once-twice daily. If her blood sugar is around 150mg/dL she will take 20mg of glipizide XL. Patient has been taking this at  "night.  SMBG: one-two times daily. Ranges (patient reported:) Fasting blood sugars . Post-prandial   Date FBG/ 2hours post Lunch/2hours post Dinner /2hours post    3/30 98      3/29 155      3/28 103      3/27 126  /167    3/24 156      3/23 126      3/22 198        Patient is not experiencing hypoglycemia.  Recent symptoms of high blood sugar? None. If it is high she will feel achy and \"blah\"  Eye exam: up to date  Foot exam: up to date  ACEi/ARB: Yes: irbesartan/HCTZ.   Urine Albumin:   Lab Results   Component Value Date    MICROL 40 11/12/2020     Aspirin: Taking 81mg daily and denies side effects.   Diet: Breakfast-cereal & toast or egg & sausage croissant Sometimes doesn't eat lunch might have crackers and cheese, banana or orange. Supper: her son does the cooking. May snack on crackers and cheese or popcorn.  Patient reports she has a hard time staying away from sugar. Patient has a jar of spice drops on her kitchen table.  Lab Results   Component Value Date    A1C 9.2 02/16/2021    A1C 8.7 11/12/2020    A1C 10.0 08/20/2020    A1C 10.5 01/10/2020    A1C 11.1 10/25/2019     Patient is not planning on getting COVID-19 vaccine. She just plans on stayin away from people. She doesn't want to take it if she doesn't have to.   Patient has not had Shingrix, Tdap.  Patient refuses to get the pneumonia shot because her  got pneumonia 7 times after getting the vaccination.      Today's Vitals: There were no vitals taken for this visit.    I spent 20 minutes with this patient. All changes were made via collaborative practice agreement with Cailin Ponce. A copy of the visit note was provided to the patient's primary care provider.    The patient was sent via [employer] a summary of these recommendations.     Mirna Perez, Pharm.D, [facility]  Medication Therapy Management Pharmacist    Telemedicine Visit Details  Type of service:  Telephone visit  Start Time: 11:35AM  End Time: 11:55AM  Originating Location " (patient location): Home  Distant Location (provider location):  [facility] MTM      Medication Therapy Recommendations  No medication therapy recommendations to display

## 2021-03-30 ENCOUNTER — HOSPITAL ENCOUNTER (OUTPATIENT)
Dept: PHYSICAL THERAPY | Facility: CLINIC | Age: 81
Setting detail: THERAPIES SERIES
End: 2021-03-30
Attending: NURSE PRACTITIONER
Payer: OTHER MISCELLANEOUS

## 2021-03-30 ENCOUNTER — VIRTUAL VISIT (OUTPATIENT)
Dept: PHARMACY | Facility: CLINIC | Age: 81
End: 2021-03-30
Payer: COMMERCIAL

## 2021-03-30 DIAGNOSIS — E11.65 TYPE 2 DIABETES MELLITUS WITH HYPERGLYCEMIA, WITH LONG-TERM CURRENT USE OF INSULIN (H): Primary | ICD-10-CM

## 2021-03-30 DIAGNOSIS — Z79.4 TYPE 2 DIABETES MELLITUS WITH HYPERGLYCEMIA, WITH LONG-TERM CURRENT USE OF INSULIN (H): Primary | ICD-10-CM

## 2021-03-30 PROCEDURE — 99606 MTMS BY PHARM EST 15 MIN: CPT | Mod: TEL | Performed by: PHARMACIST

## 2021-03-30 PROCEDURE — 97750 PHYSICAL PERFORMANCE TEST: CPT | Mod: GP | Performed by: PHYSICAL THERAPIST

## 2021-03-30 PROCEDURE — 97112 NEUROMUSCULAR REEDUCATION: CPT | Mod: GP | Performed by: PHYSICAL THERAPIST

## 2021-03-30 PROCEDURE — 99607 MTMS BY PHARM ADDL 15 MIN: CPT | Mod: TEL | Performed by: PHARMACIST

## 2021-03-30 NOTE — Clinical Note
Dennis Ma, can you provide an update on Zoila's application? Has it been submitted?  Thank you for working with her!  Mirna Perez, Pharm.D, BCACP  Medication Therapy Management Pharmacist

## 2021-03-30 NOTE — PROGRESS NOTES
03/30/21 0900   Signing Clinician's Name / Credentials   Signing clinician's name / credentials Lashon Raza, DPT   Marcus Balance Scale (WILLARD ARAIZA, JAYME S, ADRIAN SORIANO, YARITZA CARBAJAL: MEASURING BALANCE IN THE ELDERLY: VALIDATION OF AN INSTRUMENT. CAN. J. PUB. HEALTH, JULY/AUGUST SUPPLEMENT 2:S7-11, 1992.)   Sit To Stand 4   Standing Unsupported 4   Sitting Unsupported 4   Stand to Sit 4   Transfers 4   Standing with Eyes Closed 2  (4-7 seconds )   Standing Unsupported, Feet Together 4   Reach Forward With Outstretched Arm 2   Retrieve Object From Floor 2   Turning to Look Behind 2   Turn 360 Degrees 2   Placing Alternate Foot on Stool (4-6 inches) 1   Unsupported Tandem Stand (Demonstrate to Subject) 2   One Leg Stand 1   Total Score (A score of 45 or less has been correlated with an increased risk of falls)   Total Score (out of 56) 38     Marcus Balance Scale (BBS) Cutoff Scores: A score of < 45/56 indicates an increased risk for falls.   Patient Score: 38/56    The BBS is a measure of static and dynamic standing balance that has been validated in community dwelling elderly individuals and individuals who have Parkinson's Disease, MS, and those who are s/p CVA and TBI. The test is administered without an assistive device. Scores from the Marcus are used to determine the ~~`probability of falling based on the patient's previous history of falls and their test performance.     Minimal Detectable Change = 6.5   & Minimal Detectable Change (Parkinson's Disease) = 5 according to [employer] & Dickey 2008    Assessment (rationale for performing, application to patient s function & care plan): Marcus performed to assess balance and risk for falls as well as progress toward her goals. Patient demonstrates a mild improvement in balance, but she continues to be at an increased risk for falls and may benefit from continued skilled physical therapy.   Minutes billed as physical performance test: 8

## 2021-03-30 NOTE — PATIENT INSTRUCTIONS
Recommendations from today's MTM visit:                                                       Recommend increase Jardiance to 25mg daily. After patient receives Jardiance from  would recommend discontinuing glipizide.   Recommend taking glipizide XL 10mg in the morning.  Recommend Shingrix and Tdap      Next MTM visit: 4 weeks    It was great to speak with you today.  I value your experience and would be very thankful for your time with providing feedback on our clinic survey. You may receive a survey via email or text message in the next few days.     To schedule another MTM appointment, please call the clinic directly or you may call the MTM scheduling line at [phone] or toll-free at [phone].     My Clinical Pharmacist's contact information:                                                      Please feel free to contact me with any questions or concerns you have.      Mirna Perez, Pharm.D, BCACP  Medication Therapy Management Pharmacist

## 2021-04-02 ENCOUNTER — OFFICE VISIT (OUTPATIENT)
Dept: FAMILY MEDICINE | Facility: CLINIC | Age: 81
End: 2021-04-02
Payer: COMMERCIAL

## 2021-04-02 VITALS
WEIGHT: 187.9 LBS | OXYGEN SATURATION: 96 % | BODY MASS INDEX: 32.25 KG/M2 | TEMPERATURE: 98.4 F | RESPIRATION RATE: 18 BRPM | DIASTOLIC BLOOD PRESSURE: 81 MMHG | SYSTOLIC BLOOD PRESSURE: 127 MMHG | HEART RATE: 91 BPM

## 2021-04-02 DIAGNOSIS — D72.829 LEUKOCYTOSIS, UNSPECIFIED TYPE: ICD-10-CM

## 2021-04-02 DIAGNOSIS — Z79.4 TYPE 2 DIABETES MELLITUS WITH HYPERGLYCEMIA, WITH LONG-TERM CURRENT USE OF INSULIN (H): ICD-10-CM

## 2021-04-02 DIAGNOSIS — T73.3XXA FATIGUE DUE TO EXCESSIVE EXERTION, INITIAL ENCOUNTER: ICD-10-CM

## 2021-04-02 DIAGNOSIS — R06.09 OTHER FORMS OF DYSPNEA: ICD-10-CM

## 2021-04-02 DIAGNOSIS — E11.65 TYPE 2 DIABETES MELLITUS WITH HYPERGLYCEMIA, WITH LONG-TERM CURRENT USE OF INSULIN (H): ICD-10-CM

## 2021-04-02 DIAGNOSIS — M62.81 GENERALIZED MUSCLE WEAKNESS: ICD-10-CM

## 2021-04-02 DIAGNOSIS — R82.90 NONSPECIFIC FINDING ON EXAMINATION OF URINE: ICD-10-CM

## 2021-04-02 DIAGNOSIS — N39.0 RECURRENT UTI: Primary | Chronic | ICD-10-CM

## 2021-04-02 LAB
ALBUMIN UR-MCNC: NEGATIVE MG/DL
APPEARANCE UR: CLEAR
BACTERIA #/AREA URNS HPF: ABNORMAL /HPF
BASOPHILS # BLD AUTO: 0 10E9/L (ref 0–0.2)
BASOPHILS NFR BLD AUTO: 0.2 %
BILIRUB UR QL STRIP: NEGATIVE
COLOR UR AUTO: YELLOW
DIFFERENTIAL METHOD BLD: ABNORMAL
EOSINOPHIL # BLD AUTO: 0.7 10E9/L (ref 0–0.7)
EOSINOPHIL NFR BLD AUTO: 5.4 %
ERYTHROCYTE [DISTWIDTH] IN BLOOD BY AUTOMATED COUNT: 13.8 % (ref 10–15)
GLUCOSE UR STRIP-MCNC: NEGATIVE MG/DL
HCT VFR BLD AUTO: 44.1 % (ref 35–47)
HGB BLD-MCNC: 14.8 G/DL (ref 11.7–15.7)
HGB UR QL STRIP: ABNORMAL
KETONES UR STRIP-MCNC: NEGATIVE MG/DL
LEUKOCYTE ESTERASE UR QL STRIP: ABNORMAL
LYMPHOCYTES # BLD AUTO: 3.4 10E9/L (ref 0.8–5.3)
LYMPHOCYTES NFR BLD AUTO: 25.3 %
MCH RBC QN AUTO: 29.5 PG (ref 26.5–33)
MCHC RBC AUTO-ENTMCNC: 33.6 G/DL (ref 31.5–36.5)
MCV RBC AUTO: 88 FL (ref 78–100)
MONOCYTES # BLD AUTO: 1.3 10E9/L (ref 0–1.3)
MONOCYTES NFR BLD AUTO: 9.9 %
NEUTROPHILS # BLD AUTO: 7.8 10E9/L (ref 1.6–8.3)
NEUTROPHILS NFR BLD AUTO: 59.2 %
NITRATE UR QL: NEGATIVE
NON-SQ EPI CELLS #/AREA URNS LPF: ABNORMAL /LPF
PH UR STRIP: 6.5 PH (ref 5–7)
PLATELET # BLD AUTO: 286 10E9/L (ref 150–450)
RBC # BLD AUTO: 5.01 10E12/L (ref 3.8–5.2)
RBC #/AREA URNS AUTO: ABNORMAL /HPF
RENAL EPI CELLS #/AREA URNS HPF: ABNORMAL /HPF
SOURCE: ABNORMAL
SP GR UR STRIP: <=1.005 (ref 1–1.03)
UROBILINOGEN UR STRIP-ACNC: 0.2 EU/DL (ref 0.2–1)
WBC # BLD AUTO: 13.3 10E9/L (ref 4–11)
WBC #/AREA URNS AUTO: ABNORMAL /HPF
WBC CLUMPS #/AREA URNS HPF: PRESENT /HPF

## 2021-04-02 PROCEDURE — 36415 COLL VENOUS BLD VENIPUNCTURE: CPT | Performed by: NURSE PRACTITIONER

## 2021-04-02 PROCEDURE — 80048 BASIC METABOLIC PNL TOTAL CA: CPT | Performed by: NURSE PRACTITIONER

## 2021-04-02 PROCEDURE — 85025 COMPLETE CBC W/AUTO DIFF WBC: CPT | Performed by: NURSE PRACTITIONER

## 2021-04-02 PROCEDURE — 87086 URINE CULTURE/COLONY COUNT: CPT | Performed by: NURSE PRACTITIONER

## 2021-04-02 PROCEDURE — 81001 URINALYSIS AUTO W/SCOPE: CPT | Performed by: NURSE PRACTITIONER

## 2021-04-02 PROCEDURE — 99214 OFFICE O/P EST MOD 30 MIN: CPT | Performed by: NURSE PRACTITIONER

## 2021-04-02 RX ORDER — CIPROFLOXACIN 500 MG/1
500 TABLET, FILM COATED ORAL 2 TIMES DAILY
Qty: 14 TABLET | Refills: 0 | Status: SHIPPED | OUTPATIENT
Start: 2021-04-02 | End: 2021-04-09

## 2021-04-02 ASSESSMENT — PAIN SCALES - GENERAL: PAINLEVEL: EXTREME PAIN (8)

## 2021-04-02 NOTE — PROGRESS NOTES
Subjective   Zoila is a 80 year old who presents for the following health issues     HPI   Follow up UTI  -RT low back pain  -legs have been really weak  -off balance  Just feels weak and shaky   Having a difficult time in physical therapy with progression due to lack of stamina and weakness         Lab work is in process  Labs reviewed in EPIC  BP Readings from Last 3 Encounters:   04/02/21 127/81   03/16/21 132/82   01/27/21 132/84    Wt Readings from Last 3 Encounters:   04/02/21 85.2 kg (187 lb 14.4 oz)   03/16/21 84.9 kg (187 lb 3.2 oz)   01/26/21 84.2 kg (185 lb 11.2 oz)                  Patient Active Problem List   Diagnosis     Seasonal allergies     Hypothyroidism     Hyperlipidemia LDL goal <100     Fibromyalgia     Osteoarthritis     Advanced directives, counseling/discussion     Obesity     Type 2 diabetes mellitus with hyperglycemia, with long-term current use of insulin (H)     Hypertension goal BP (blood pressure) < 140/90     Overactive bladder     Recurrent UTI     Pseudophakia of both eyes     DINORA (obstructive sleep apnea)- severe (AHI 56)     Contusion of right knee     Gait disorder     Gastroesophageal reflux disease without esophagitis     Past Surgical History:   Procedure Laterality Date     C APPENDECTOMY       C ARTHROPLASTY TMJ       CATARACT IOL, RT/LT       COLONOSCOPY       COLONOSCOPY N/A 8/13/2015    Procedure: COLONOSCOPY;  Surgeon: Duane, William Charles, MD;  Location: [facility]     COLONOSCOPY WITH CO2 INSUFFLATION N/A 8/13/2015    Procedure: COLONOSCOPY WITH CO2 INSUFFLATION;  Surgeon: Duane, William Charles, MD;  Location: [facility]     PHACOEMULSIFICATION WITH STANDARD INTRAOCULAR LENS IMPLANT Left 10/25/2018    Procedure: LEFT PHACOEMULSIFICATION WITH STANDARD INTRAOCULAR LENS IMPLANT;  Surgeon: Thomas Serna MD;  Location: [facility]     PHACOEMULSIFICATION WITH STANDARD INTRAOCULAR LENS IMPLANT Right 11/8/2018    Procedure: RIGHT PHACOEMULSIFICATION WITH STANDARD  INTRAOCULAR LENS IMPLANT;  Surgeon: Thomas Serna MD;  Location: [facility]     THYROIDECTOMY          Social History     Tobacco Use     Smoking status: Never Smoker     Smokeless tobacco: Never Used     Tobacco comment: has had exposure to second hand smoke in the past from [place], no current exposure   Substance Use Topics     Alcohol use: No     Family History   Problem Relation Age of Onset     Cancer Father         skin and leukemia     Cerebrovascular Disease Maternal Grandfather      Cerebrovascular Disease Paternal Grandmother      Eye Disorder Paternal Grandmother         cataracts     Cerebrovascular Disease Paternal Grandfather      Heart Disease Paternal Grandfather      Cancer Sister         Breast Cancer     Eye Disorder Mother         cataracts     Eye Disorder Brother         cataract     Breast Cancer Daughter      Other Cancer Daughter         ovarian cancer     Glaucoma No family hx of      Macular Degeneration No family hx of          Current Outpatient Medications   Medication Sig Dispense Refill     aspirin 81 MG EC tablet Take 1 tablet by mouth daily. 90 tablet 3     Cranberry-Vitamin C-Vitamin E (CRANBERRY PLUS VITAMIN C) 4200-20-3 MG-MG-UNIT CAPS Take 1 capsule by mouth 2 times daily       empagliflozin (JARDIANCE) 25 MG TABS tablet Take 1 tablet (25 mg) by mouth daily 90 tablet 1     fexofenadine (ALLEGRA) 180 MG tablet Take 180 mg by mouth as needed        glipiZIDE (GLUCOTROL XL) 10 MG 24 hr tablet Take 10 mg by mouth daily       GLUCOSAMINE CHONDROITIN COMPLX OR 2 Daily       ibuprofen (ADVIL/MOTRIN) 200 MG capsule Take 400 mg by mouth every 4 hours as needed        insulin glargine (BASAGLAR KWIKPEN) 100 UNIT/ML pen Inject 38 Units Subcutaneous daily 36 mL 3     insulin glargine (LANTUS PEN) 100 UNIT/ML pen Inject 38 Units Subcutaneous At Bedtime       insulin pen needle (BD JUAN C U/F) 32G X 4 MM miscellaneous USE 1 DAILY AS DIRECTED. 100 each 2     irbesartan-hydrochlorothiazide  (AVALIDE) 150-12.5 MG tablet TAKE 1/2 TABLET BY MOUTH EVERY DAY 45 tablet 1     latanoprost (XALATAN) 0.005 % ophthalmic solution Place 1 drop into both eyes daily 1 Bottle 11     metFORMIN (GLUCOPHAGE) 1000 MG tablet TAKE 1 TABLET (1,000 MG) BY MOUTH 2 TIMES DAILY (WITH MEALS) 180 tablet 1     MULTIVITAMIN OR 1 tablet daily       Omega-3 Fatty Acids (OMEGA 3 PO) Take by mouth 2 times daily.        omeprazole (PRILOSEC) 20 MG DR capsule TAKE 1 CAPSULE BY MOUTH EVERY DAY 30 capsule 1     ONE TOUCH DELICA LANCETS MISC 1 each 2 times daily. One Touch Delica   100 each 12     ONETOUCH ULTRA test strip USE TO TEST TWICE A  strip 4     order for DME Glucometer covered by insurance. 1 each 0     rosuvastatin (CRESTOR) 5 MG tablet TAKE 1 TABLET BY MOUTH TWICE WEEKLY 90 tablet 3     SYNTHROID 137 MCG tablet Take 1 tablet (137 mcg) by mouth daily 90 tablet 3     triamcinolone (KENALOG) 0.1 % external cream Apply twice daily for 2 weeks 45 g 0     trospium (SANCTURA XR) 60 MG CP24 24 hr capsule Take 1 capsule (60 mg) by mouth every morning 30 capsule 11     Allergies   Allergen Reactions     Estradiol Itching     Lipitor [Atorvastatin Calcium]      Weak and shaky     Sulfa Drugs      Rash       Zocor [Simvastatin]      Weak and shakey     Recent Labs   Lab Test 03/16/21  1013 02/16/21  0757 11/12/20  0906 08/20/20  0902 10/25/19  0950 10/25/19  0950 05/10/19  0821 05/10/19  0821 06/25/18  1434 06/25/18  1434   A1C  --  9.2* 8.7* 10.0*   < > 11.1*   < >  --    < > 10.4*   LDL  --   --  102*  --   --  91  --  114*  --  93   HDL  --   --  55  --   --  57  --   --   --  53   TRIG  --   --  206*  --   --  254*  --   --   --  258*   ALT 28  --  26  --   --  25  --  24  --  27   CR 0.78 0.86 0.88  --    < > 0.72  --  0.81   < > 0.68   GFRESTIMATED 73 64 62  --    < > 80  --  70   < > 84   GFRESTBLACK 84 74 72  --    < > >90  --  81   < > >90   POTASSIUM 4.9 4.4 4.3  --    < > 4.4  --  4.0   < > 4.3   TSH  --   --  3.00  --    --  2.78  --  2.61   < > 2.46    < > = values in this interval not displayed.        Review of Systems   CONSTITUTIONAL:NEGATIVE for fever, chills, change in weight and POSITIVE  for fatigue overall   ENT/MOUTH: NEGATIVE for ear, mouth and throat problems  RESP:NEGATIVE for significant cough  CV: NEGATIVE for chest pain, palpitations or peripheral edema  GI: NEGATIVE for abdominal pain , nausea and vomiting  : frequency   MUSCULOSKELETAL: POSITIVE  for back pain  and NEGATIVE for joint stiffness  and joint swelling   NEURO: NEGATIVE for weakness, dizziness or paresthesias  ENDOCRINE: NEGATIVE for temperature intolerance, skin/hair changes and POSITIVE  for HX diabetes      Objective    /81 (BP Location: Right arm, Patient Position: Sitting, Cuff Size: Adult Large)   Pulse 91   Temp 98.4  F (36.9  C) (Oral)   Resp 18   Wt 85.2 kg (187 lb 14.4 oz)   SpO2 96%   BMI 32.25 kg/m    Body mass index is 32.25 kg/m .   Wt Readings from Last 4 Encounters:   04/02/21 85.2 kg (187 lb 14.4 oz)   03/16/21 84.9 kg (187 lb 3.2 oz)   01/26/21 84.2 kg (185 lb 11.2 oz)   12/11/20 83 kg (183 lb)       Physical Exam   GENERAL APPEARANCE: alert, active and no distress. Frail appearing   NECK: no adenopathy  RESP: lungs clear to auscultation - no rales, rhonchi or wheezes  CV: regular rates and rhythm, 2/6  Murmur, no click or rub and no irregular beats  ABDOMEN: soft, non-tender  MS: extremities normal- no gross deformities noted  SKIN: no suspicious lesions or rashes  NEURO: Normal strength and tone, mentation intact and speech normal  PSYCH: mentation appears normal, patient appearance-- and anxious  MENTAL STATUS EXAM:  Appearance/Behavior: No apparent distress, Casually groomed and Dressed appropriately for weather  Speech: Normal  Mood/Affect: normal affect  Insight: Adequate    Results for orders placed or performed in visit on 04/02/21   *UA reflex to Microscopic and Culture ([place] and [facility] ([facility]  [place] and [place])     Status: Abnormal    Specimen: Midstream Urine   Result Value Ref Range    Color Urine Yellow     Appearance Urine Clear     Glucose Urine Negative NEG^Negative mg/dL    Bilirubin Urine Negative NEG^Negative    Ketones Urine Negative NEG^Negative mg/dL    Specific Gravity Urine <=1.005 1.003 - 1.035    Blood Urine Trace (A) NEG^Negative    pH Urine 6.5 5.0 - 7.0 pH    Protein Albumin Urine Negative NEG^Negative mg/dL    Urobilinogen Urine 0.2 0.2 - 1.0 EU/dL    Nitrite Urine Negative NEG^Negative    Leukocyte Esterase Urine Moderate (A) NEG^Negative    Source Midstream Urine    Urine Microscopic     Status: Abnormal   Result Value Ref Range    WBC Urine 25-50 (A) OTO5^0 - 5 /HPF    RBC Urine O - 2 OTO2^O - 2 /HPF    WBC Clumps Present (A) NEG^Negative /HPF    Squamous Epithelial /LPF Urine Moderate (A) FEW^Few /LPF    Renal Tub Epi Few (A) NEG^Negative /HPF    Bacteria Urine Moderate (A) NEG^Negative /HPF   CBC with platelets differential     Status: Abnormal   Result Value Ref Range    WBC 13.3 (H) 4.0 - 11.0 10e9/L    RBC Count 5.01 3.8 - 5.2 10e12/L    Hemoglobin 14.8 11.7 - 15.7 g/dL    Hematocrit 44.1 35.0 - 47.0 %    MCV 88 78 - 100 fl    MCH 29.5 26.5 - 33.0 pg    MCHC 33.6 31.5 - 36.5 g/dL    RDW 13.8 10.0 - 15.0 %    Platelet Count 286 150 - 450 10e9/L    % Neutrophils 59.2 %    % Lymphocytes 25.3 %    % Monocytes 9.9 %    % Eosinophils 5.4 %    % Basophils 0.2 %    Absolute Neutrophil 7.8 1.6 - 8.3 10e9/L    Absolute Lymphocytes 3.4 0.8 - 5.3 10e9/L    Absolute Monocytes 1.3 0.0 - 1.3 10e9/L    Absolute Eosinophils 0.7 0.0 - 0.7 10e9/L    Absolute Basophils 0.0 0.0 - 0.2 10e9/L    Diff Method Automated Method    Basic metabolic panel     Status: Abnormal   Result Value Ref Range    Sodium 134 133 - 144 mmol/L    Potassium 4.1 3.4 - 5.3 mmol/L    Chloride 99 94 - 109 mmol/L    Carbon Dioxide 31 20 - 32 mmol/L    Anion Gap 4 3 - 14 mmol/L    Glucose 175 (H) 70 - 99 mg/dL    Urea  Nitrogen 16 7 - 30 mg/dL    Creatinine 0.76 0.52 - 1.04 mg/dL    GFR Estimate 74 >60 mL/min/[1.73_m2]    GFR Estimate If Black 86 >60 mL/min/[1.73_m2]    Calcium 9.7 8.5 - 10.1 mg/dL   Urine Culture Aerobic Bacterial     Status: None    Specimen: Midstream Urine   Result Value Ref Range    Specimen Description Midstream Urine     Special Requests Specimen received in preservative     Culture Micro       <10,000 colonies/mL  mixed urogenital erwin  Susceptibility testing not routinely done       Recent Results (from the past 744 hour(s))   XR Chest 2 Views    Narrative    Exam: XR CHEST 2 VW, 4/6/2021 2:44 PM    Indication: Leukocytosis, unspecified type    Comparison: 12/22/2017    Findings:   PA and lateral views of the chest. No new focal airspace  consolidation. No pleural effusion or pneumothorax. Cardiac silhouette  is within normal limits. Vascular calcifications of the aortic arch.  No acute findings in the upper abdomen. Degenerative changes of the  spine and shoulders. No acute osseous pathology.      Impression    Impression: No acute airspace disease.    I have personally reviewed the examination and initial interpretation  and I agree with the findings.    DO Brandy CROSS was seen today for follow up.    Diagnoses and all orders for this visit:    Recurrent UTI  -     Cancel: UA with Microscopic reflex to Culture  -     *UA reflex to Microscopic and Culture ([place] and [facility] (except [place] and [place])  -     Urine Microscopic  -     ciprofloxacin (CIPRO) 500 MG tablet; Take 1 tablet (500 mg) by mouth 2 times daily for 7 days    Nonspecific finding on examination of urine  -     Urine Culture Aerobic Bacterial    Leukocytosis, unspecified type  -     CBC with platelets differential  -     XR Chest 2 Views; Future    Type 2 diabetes mellitus with hyperglycemia, with long-term current use of insulin (H)  -     Basic metabolic panel    Generalized muscle weakness  -      Echocardiogram Dobutamine Stress; Future    Fatigue due to excessive exertion, initial encounter  -     Echocardiogram Dobutamine Stress; Future    Other forms of dyspnea   -     Echocardiogram Dobutamine Stress; Future      Patient Instructions     PLAN:   1.   Symptomatic therapy suggested: increase fluids and call prn if symptoms persist or worsen.  Will start on Cipro   2.  Orders Placed This Encounter   Medications     ciprofloxacin (CIPRO) 500 MG tablet     Sig: Take 1 tablet (500 mg) by mouth 2 times daily for 7 days     Dispense:  14 tablet     Refill:  0     Orders Placed This Encounter   Procedures     *UA reflex to Microscopic and Culture ([place] and [facility] (except [place] and [place])     Urine Microscopic     CBC with platelets differential     Basic metabolic panel       3. Patient needs to follow up in if no improvement,or sooner if worsening of symptoms or other symptoms develop.  Will follow up and/or notify patient of  results via My Chart to determine further need for followup

## 2021-04-02 NOTE — PATIENT INSTRUCTIONS
PLAN:   1.   Symptomatic therapy suggested: increase fluids and call prn if symptoms persist or worsen.  Will start on Cipro   2.  Orders Placed This Encounter   Medications     ciprofloxacin (CIPRO) 500 MG tablet     Sig: Take 1 tablet (500 mg) by mouth 2 times daily for 7 days     Dispense:  14 tablet     Refill:  0     Orders Placed This Encounter   Procedures     *UA reflex to Microscopic and Culture ([place] and [facility] (except [place] and [place])     Urine Microscopic     CBC with platelets differential     Basic metabolic panel       3. Patient needs to follow up in if no improvement,or sooner if worsening of symptoms or other symptoms develop.  Will follow up and/or notify patient of  results via My Chart to determine further need for followup

## 2021-04-03 LAB
BACTERIA SPEC CULT: NORMAL
Lab: NORMAL
SPECIMEN SOURCE: NORMAL

## 2021-04-05 LAB
ANION GAP SERPL CALCULATED.3IONS-SCNC: 4 MMOL/L (ref 3–14)
BUN SERPL-MCNC: 16 MG/DL (ref 7–30)
CALCIUM SERPL-MCNC: 9.7 MG/DL (ref 8.5–10.1)
CHLORIDE SERPL-SCNC: 99 MMOL/L (ref 94–109)
CO2 SERPL-SCNC: 31 MMOL/L (ref 20–32)
CREAT SERPL-MCNC: 0.76 MG/DL (ref 0.52–1.04)
GFR SERPL CREATININE-BSD FRML MDRD: 74 ML/MIN/{1.73_M2}
GLUCOSE SERPL-MCNC: 175 MG/DL (ref 70–99)
POTASSIUM SERPL-SCNC: 4.1 MMOL/L (ref 3.4–5.3)
SODIUM SERPL-SCNC: 134 MMOL/L (ref 133–144)

## 2021-04-05 NOTE — RESULT ENCOUNTER NOTE
Arnol Leon,    Attached are your test results.  Urine culture is negative but White count is still elevated   Lets get up a chest xray   I will place order. Please call [phone] to schedule.     Please contact us if you have any questions.    Cailin Ponce, CNP

## 2021-04-06 ENCOUNTER — HOSPITAL ENCOUNTER (OUTPATIENT)
Dept: PHYSICAL THERAPY | Facility: CLINIC | Age: 81
Setting detail: THERAPIES SERIES
End: 2021-04-06
Attending: NURSE PRACTITIONER
Payer: OTHER MISCELLANEOUS

## 2021-04-06 ENCOUNTER — ANCILLARY PROCEDURE (OUTPATIENT)
Dept: GENERAL RADIOLOGY | Facility: CLINIC | Age: 81
End: 2021-04-06
Attending: NURSE PRACTITIONER
Payer: COMMERCIAL

## 2021-04-06 DIAGNOSIS — D72.829 LEUKOCYTOSIS, UNSPECIFIED TYPE: ICD-10-CM

## 2021-04-06 PROCEDURE — 71046 X-RAY EXAM CHEST 2 VIEWS: CPT | Mod: GC | Performed by: RADIOLOGY

## 2021-04-06 PROCEDURE — 97112 NEUROMUSCULAR REEDUCATION: CPT | Mod: GP | Performed by: PHYSICAL THERAPIST

## 2021-04-07 ENCOUNTER — TELEPHONE (OUTPATIENT)
Dept: FAMILY MEDICINE | Facility: CLINIC | Age: 81
End: 2021-04-07

## 2021-04-07 NOTE — TELEPHONE ENCOUNTER
Patient called, returning a call regarding some results. She said it was probably about her x-ray. Gave patient provider's message as written from x-ray. She plans on making an appointment with the number provided for the stress test.    Patient offered no other complaints or questions.    Florencia Ponce, Cailin Mckenna, APRN CNP   4/7/2021  9:41 AM CDT      Please call   Xray is normal   However am going to order a stress test to check her heart due to her sense of weakness.   I will place order. Please call [phone] to schedule.     Cailin Ponce NP, APRN CNP

## 2021-04-08 NOTE — PROGRESS NOTES
Patient's  assistance application was mailed 3/30/21.  Mirna Perez, Pharm.D, [facility]  Medication Therapy Management Pharmacist

## 2021-04-09 ENCOUNTER — TELEPHONE (OUTPATIENT)
Dept: OPHTHALMOLOGY | Facility: CLINIC | Age: 81
End: 2021-04-09

## 2021-04-09 NOTE — TELEPHONE ENCOUNTER
[facility] Call Center    Phone Message    May a detailed message be left on voicemail: yes     Reason for Call: Symptoms or Concerns     If patient has red-flag symptoms, warm transfer to triage line    Current symptom or concern: Previous Dr. Serna Pt called to schedule an appointment with Dr. Gupta. Her 1st available appointment is not until 7/26/21.  Pt would like to discuss her symptoms with a nurse to see if there is any way we can get her in sooner.  She said that her vision has been blurry, matter collecting in corners of eyes and when her eyes act up she is sick to her stomach.  Thanks.    Action Taken: Message routed to:  Adult Clinics: Eye p [phone]    Travel Screening: Not Applicable

## 2021-04-12 NOTE — TELEPHONE ENCOUNTER
"Scheduled pt with Dr. Gupta for 4/19/21 (offered today 4/12 but patient declined). She states she has been dealing with these symptoms for \"forever\" and brought her concerns up at exam with Dr. Serna in December 2020. She was told everything looked fine and there was no reason for her eyes to feel how they are (itching, burning, sore). Let pt know that she may have to schedule her glaucoma exam for a different date in the future but she can certainly come in to have how her eyes are feeling addressed. Pt voiced understanding.     Betsey Chavez, COA, 12:16 PM 04/12/2021    "

## 2021-04-13 ENCOUNTER — HOSPITAL ENCOUNTER (OUTPATIENT)
Dept: PHYSICAL THERAPY | Facility: CLINIC | Age: 81
Setting detail: THERAPIES SERIES
End: 2021-04-13
Attending: NURSE PRACTITIONER
Payer: OTHER MISCELLANEOUS

## 2021-04-13 PROCEDURE — 97112 NEUROMUSCULAR REEDUCATION: CPT | Mod: GP | Performed by: PHYSICAL THERAPIST

## 2021-04-13 PROCEDURE — 97750 PHYSICAL PERFORMANCE TEST: CPT | Mod: GP | Performed by: PHYSICAL THERAPIST

## 2021-04-19 ENCOUNTER — OFFICE VISIT (OUTPATIENT)
Dept: OPHTHALMOLOGY | Facility: CLINIC | Age: 81
End: 2021-04-19
Payer: COMMERCIAL

## 2021-04-19 DIAGNOSIS — H40.1132 PRIMARY OPEN ANGLE GLAUCOMA OF BOTH EYES, MODERATE STAGE: ICD-10-CM

## 2021-04-19 DIAGNOSIS — H01.00B BLEPHARITIS OF UPPER AND LOWER EYELIDS OF BOTH EYES, UNSPECIFIED TYPE: Primary | ICD-10-CM

## 2021-04-19 DIAGNOSIS — Z96.1 PSEUDOPHAKIA OF BOTH EYES: ICD-10-CM

## 2021-04-19 DIAGNOSIS — H01.00A BLEPHARITIS OF UPPER AND LOWER EYELIDS OF BOTH EYES, UNSPECIFIED TYPE: Primary | ICD-10-CM

## 2021-04-19 DIAGNOSIS — H04.129 DRY EYE: ICD-10-CM

## 2021-04-19 PROCEDURE — 99214 OFFICE O/P EST MOD 30 MIN: CPT | Performed by: OPHTHALMOLOGY

## 2021-04-19 ASSESSMENT — VISUAL ACUITY
METHOD: SNELLEN - LINEAR
OS_CC: 20/40
OS_CC+: -2
OD_CC+: -2
OD_CC: 20/30

## 2021-04-19 ASSESSMENT — PACHYMETRY
OS_CT(UM): 557
OD_CT(UM): 538

## 2021-04-19 ASSESSMENT — TONOMETRY
IOP_METHOD: APPLANATION
OS_IOP_MMHG: 17
OD_IOP_MMHG: 14
OS_IOP_MMHG: 15
OD_IOP_MMHG: 15

## 2021-04-19 ASSESSMENT — CONF VISUAL FIELD
METHOD: COUNTING FINGERS
OS_NORMAL: 1
OD_NORMAL: 1

## 2021-04-19 ASSESSMENT — EXTERNAL EXAM - LEFT EYE: OS_EXAM: NORMAL

## 2021-04-19 ASSESSMENT — EXTERNAL EXAM - RIGHT EYE: OD_EXAM: NORMAL

## 2021-04-19 NOTE — PROGRESS NOTES
"HPI:  Brandy Leon is a 80 year old female presenting for bilateral eye discomfort since cataract surgery in 2018.    Last saw Dr. Serna 12/2020 with DFE.    Constantly feels eyes are tired and sore, sometimes like film over them. +burning sensation.  Wants to address eye symptoms and address glaucoma at a different visit.  Feels she has difficulty in bright light because everything looks too bright. Has had burning and eye symptoms for years and feels never addressed.    Using reading glasses, ATs prn (couple times a week), latanoprost at bedtime each eye (compliant with this drop)    Feels that ATs help but only briefly and then symptoms recur.  Has not been happy with vision after cataract surgery. Symptoms have been constant since surgery in autumn 2018.  No changes.    Past Ocular History:  Cataract surgery each eye 2018  POAG each eye    PMH:  DM2 - on insulin  Hypothyroid  HLD  Fibromyalgia  Osteoarthritis  HTN  DINORA  GERD    SH:  Never smoker.    FH:  No known family history of blindness, glaucoma, macular degeneration    ASSESSMENT and PLAN:  1. Blepharitis of upper and lower eyelids of both eyes, unspecified type  2. Dry eye  - longstanding eye irritation, burning, film over vision, briefly improves with ATs but no longterm relief  - Discussed blepharitis: inflammation of the eyelids causing ocular irritation, itching, redness, discharge, especially in the morning  - discussed dry eye    - warm compresses (uncooked rice in a clean sock, microwave for 20-25 seconds, leave on eyelids x 5min twice a day; or clean cloth with warm water and discussed rolling to retain heat longer)  - lid scrubs (Ocusoft) or baby shampoo both eyes - gently scrub eyelids 1-2x/day  - artificial tears (nothing that says \"get the red out\") as needed throughout the day 4-6x/day    - discussed given longstanding nature of disease and symptoms will likely be a longterm process to control symptoms; she understands    3. " Pseudophakia of both eyes  - s/p cataract surgery both eyes late 2018  - lenses in good position  - stable 20/30 and 20/40 vision  - monitor    4. Primary open angle glaucoma of both eyes, moderate stage  - diagnosed with POAG with Dr. Serna  - ludwin 538/557  - Tmax 30/25 (tonopen; just after stopping postop cataract drops, otherwise low 20s)  - on latanoprost at bedtime each eye since 8/2015 for concern for OCT RNFL thinning    HVF review  2019: right eye essentially full, left eye unreliable with scattered inf defects  2018: unreliable fields  2017: unreliable fields, right eye superior defect, left eye inferior hemifield loss crossing both midlines  2016: unreliable fields  2015: mod reliability, essentially full each eye    - IOP today 14/17; patient requests only addressing dry eyes today and address glaucoma concerns at another visit      Follow up in 2 months, no testing, no dilation or sooner PRN   - if improved can do gonio next visit; will need future visits with OCT RNFL, HVF, DFE     -----------------------------------------------------------------------------------    Attestation:  Complete documentation of historical and exam elements from today's encounter can be found in the full encounter summary report (not reduplicated in this progress note). I personally obtained the chief complaint(s) and history of present illness.  I confirmed and edited as necessary the review of systems, past medical/surgical history, family history, social history, and examination findings as documented by others; and I examined the patient myself. I personally reviewed the relevant tests, images, and reports as documented above.     I formulated and edited as necessary the assessment and plan and discussed the findings and management plan with the patient and family.      Devi Gupta MD

## 2021-04-19 NOTE — NURSING NOTE
Chief Complaints and History of Present Illnesses   Patient presents with     Eye Pain       Chief Complaint(s) and History of Present Illness(es)     Eye Pain     Laterality: In both eyes              Comments     Patient here to address discomfort of both eyes, ever since cataract surgery in 2018 with Dr. Serna. Pt states eyes feel tired and sore, sometimes seems like there is a film over them, symptoms cause her to feel sick to her stomach. Also notes burning feeling daily each eye. Using latanoprost at bedtime each eye, last dose 9:00pm 4/18/21. Also ATs prn.  Uses rx glasses for reading.                Betsey Chavez, COA

## 2021-04-19 NOTE — PATIENT INSTRUCTIONS
"Discussed blepharitis: inflammation of the eyelids causing ocular irritation, itching, redness, discharge, especially in the morning.   This can cause dry eyes which tend to get worse over the course of the day    - warm compresses (uncooked rice in a clean sock, microwave for 20-25 seconds, leave on eyelids x 5min twice a day; or clean cloth with warm water and discussed rolling to retain heat longer)  - lid scrubs (Ocusoft) or baby shampoo both eyes - gently scrub eyelids 1-2x/day  - artificial tears (nothing that says \"get the red out\") as needed throughout the day; up to 4-6 times a day    Follow up in 2 months for monitoring      "

## 2021-04-20 ENCOUNTER — HOSPITAL ENCOUNTER (OUTPATIENT)
Dept: PHYSICAL THERAPY | Facility: CLINIC | Age: 81
Setting detail: THERAPIES SERIES
End: 2021-04-20
Attending: NURSE PRACTITIONER
Payer: OTHER MISCELLANEOUS

## 2021-04-20 PROCEDURE — 97116 GAIT TRAINING THERAPY: CPT | Mod: GP | Performed by: PHYSICAL THERAPIST

## 2021-04-20 PROCEDURE — 97112 NEUROMUSCULAR REEDUCATION: CPT | Mod: GP | Performed by: PHYSICAL THERAPIST

## 2021-04-21 DIAGNOSIS — Z79.4 TYPE 2 DIABETES MELLITUS WITH HYPERGLYCEMIA, WITH LONG-TERM CURRENT USE OF INSULIN (H): ICD-10-CM

## 2021-04-21 DIAGNOSIS — E11.65 TYPE 2 DIABETES MELLITUS WITH HYPERGLYCEMIA, WITH LONG-TERM CURRENT USE OF INSULIN (H): ICD-10-CM

## 2021-04-21 NOTE — TELEPHONE ENCOUNTER
I am in process of applying to the  assistance program.  These programs require a hand signed, brand name, hard copy script be submitted with their application.    .Please hand sign a BRAND name, hard copy script for:      BASAGLAR KWIK PENS      JARDIANCE    Please send the HARD COPY script to me     via interoffice mail  [employer] Francesca Malone     or via [place] mail  at:   [facility]  Francesca Correa  [address] [place] [place]  [address]    Thanks so much for your help!    Francesca Correa  Prescription   Pharmacy Assistance  [phone]

## 2021-04-22 RX ORDER — INSULIN GLARGINE 100 [IU]/ML
38 INJECTION, SOLUTION SUBCUTANEOUS DAILY
Qty: 60 ML | Refills: 2 | Status: SHIPPED | OUTPATIENT
Start: 2021-04-22 | End: 2021-06-22 | Stop reason: DRUGHIGH

## 2021-04-27 ENCOUNTER — VIRTUAL VISIT (OUTPATIENT)
Dept: PHARMACY | Facility: CLINIC | Age: 81
End: 2021-04-27
Payer: COMMERCIAL

## 2021-04-27 DIAGNOSIS — E11.65 TYPE 2 DIABETES MELLITUS WITH HYPERGLYCEMIA, WITH LONG-TERM CURRENT USE OF INSULIN (H): Primary | ICD-10-CM

## 2021-04-27 DIAGNOSIS — Z79.4 TYPE 2 DIABETES MELLITUS WITH HYPERGLYCEMIA, WITH LONG-TERM CURRENT USE OF INSULIN (H): Primary | ICD-10-CM

## 2021-04-27 PROCEDURE — 99607 MTMS BY PHARM ADDL 15 MIN: CPT | Performed by: PHARMACIST

## 2021-04-27 PROCEDURE — 99606 MTMS BY PHARM EST 15 MIN: CPT | Performed by: PHARMACIST

## 2021-04-27 NOTE — PATIENT INSTRUCTIONS
Recommendations from today's MTM visit:                                                      Today we reviewed what your medicines are for, how to know if they are working, that your medicines are safe and how to make your medicine regimen as easy as possible.    Decrease juice to 4 ounces   Due for A1c after May 16th.    Follow-up: Return in about 4 weeks (around 5/25/2021) for Medication Therapy Management Visit, blood sugar check.    It was great to speak with you today.  I value your experience and would be very thankful for your time with providing feedback on our clinic survey. You may receive a survey via email or text message in the next few days.     To schedule another MTM appointment, please call the clinic directly or you may call the MTM scheduling line at [phone] or toll-free at [phone].     My Clinical Pharmacist's contact information:                                                      Please feel free to contact me with any questions or concerns you have.      Mirna Perez, Pharm.D, BCACP  Medication Therapy Management Pharmacist

## 2021-04-27 NOTE — PROGRESS NOTES
Medication Therapy Management (MTM) Encounter    ASSESSMENT/PLAN:                            Medication Adherence/Access: No issues identified    Diabetes: Not meeting A1c goal of less than 8%.  Would benefit from increasing Jardiance to 25mg daily and discontinuing glipizide when she receives Jardiance from . Waiting to receive prescriptions from patient assistance program.     PLAN:     Decrease juice to 4 ounces   Due for A1c after May 16th.    Will follow up in 4 weeks.    SUBJECTIVE/OBJECTIVE:                           Brandy Leon is a 80 year old female called for a follow up visit. This is a follow up visit rom 3/30/2021. She was referred to me from Cailin Ponce.     Reason for visit: Blood sugars;    Allergies/ADRs: Reviewed in chart  Tobacco: She reports that she has never smoked. She has never used smokeless tobacco.    Alcohol: none  Caffeine: 2 cups/day of coffee with whole milk  Activity: up and down the stairs doing her laundry, Can't walk very far she feels wobbly.  Past Medical History: Reviewed in chart    Medication Adherence/Access: Patient uses pill box(es). Patient uses medication assistance program. Patient is getting Synthroid, Lantus, and Jardiance through  programs. Jardiance is getting mailed to her home and Synthroid, Lantus come to the clinic.  Lantus will be switched to Basaglar. Patient is still waiting to hear back from the patient assistance program but she has submitted her paperwork.    Diabetes:  Patient is currently taking metformin 1000mg twice daily, Lantus 36 units daily (patient will be getting Basaglar from the patient assistance program but had to fill her Lantus at her local pharmacy), Jardiance 10mg daily, glipizide XL 10mg once daily.  SMBG: one-two times daily. Ranges (patient reported:) Fasting blood sugars . Post-prandials are usually around 200mg/dL  Date FBG/ 2hours post Lunch/2hours post Dinner /2hours post    4/26 121       4/25 141                                           Patient is not experiencing hypoglycemia.  Recent symptoms of high blood sugar? None.  Eye exam: up to date  Foot exam: up to date  ACEi/ARB: Yes: irbesartan/HCTZ.   Urine Albumin:   Lab Results   Component Value Date    MICROL 40 11/12/2020     Aspirin: Taking 81mg daily and denies side effects.   Diet: Breakfast-2 eggs and toast or 1/2 banana, 2 pieces of stoner, 8 ounces glass of juice in the am Sometimes doesn't eat lunch today she is going to have pea soup with ham, ,. Supper: her son does the cooking. [place] on Friday with fried potatoes, brussel sprouts. Snacks on popcorn. Usually drinks water  Lab Results   Component Value Date    A1C 9.2 02/16/2021    A1C 8.7 11/12/2020    A1C 10.0 08/20/2020    A1C 10.5 01/10/2020    A1C 11.1 10/25/2019     Patient is not sure if she will get COVID-19 vaccine. She just plans on stayin away from people. She doesn't want to take it if she doesn't have to.   Patient has not had Shingrix, Tdap. She is aware that she is due for these but hasn't been into the pharmacy to get these yet.   Patient refuses to get the pneumonia shot because her  got pneumonia 7 times after getting the vaccination.    Today's Vitals: There were no vitals taken for this visit.    I spent 18 minutes with this patient. All changes were made via collaborative practice agreement with Cailin Ponce. A copy of the visit note was provided to the patient's primary care provider.    The patient was sent via [place] a summary of these recommendations.     Mirna Perez, Pharm.D, BCACP  Medication Therapy Management Pharmacist    Telemedicine Visit Details  Type of service:  Telephone visit  Start Time: 10:06AM  End Time: 10:24AM  Originating Location (patient location): Home  Distant Location (provider location):  [facility] MTM      Medication Therapy Recommendations  No medication therapy recommendations to display

## 2021-04-28 ENCOUNTER — TELEPHONE (OUTPATIENT)
Dept: FAMILY MEDICINE | Facility: CLINIC | Age: 81
End: 2021-04-28

## 2021-04-28 NOTE — TELEPHONE ENCOUNTER
Zoila has been approved to the [facility] assistance program for Basaglar through December 2021. She will receive this medication at no cost through the enrollment period.     A 120 day supply of BASAGLAR will be delivered to Zoila's home. [facility] will contact Zoila to schedule delivery.  Zoila has been informed of this approval.     Zoila will contact my office for refills as we must work directly with the .  I will note EPIC as each refill is requested.    Thank you,    Kaykay Bell  Prescription Assistance

## 2021-04-29 ENCOUNTER — TELEPHONE (OUTPATIENT)
Dept: FAMILY MEDICINE | Facility: CLINIC | Age: 81
End: 2021-04-29

## 2021-04-29 NOTE — TELEPHONE ENCOUNTER
Zoila has been approved to the [facility] assistance program for Jardiance through December 31, 2021. She will receive this medication at no cost through the enrollment period.    A 90 day supply of Jardiance will be delivered to Zoila's Home within 7-10 business days. She  has been informed of this approval.    She will contact my office for refills as we must work directly with the .  I will note EPIC as each refill is requested.    Thank you,    Kaykay Bell  Prescription Assistance

## 2021-04-29 NOTE — TELEPHONE ENCOUNTER
Noted, thank you.  Laisha is out of the office today, but I'll make sure she sees this upon her return.  Rafy VillelaD, [facility]  Medication Therapy Management Provider  Pager: [phone]

## 2021-05-05 ENCOUNTER — ANCILLARY PROCEDURE (OUTPATIENT)
Dept: CARDIOLOGY | Facility: CLINIC | Age: 81
End: 2021-05-05
Attending: NURSE PRACTITIONER
Payer: COMMERCIAL

## 2021-05-05 DIAGNOSIS — T73.3XXA FATIGUE DUE TO EXCESSIVE EXERTION, INITIAL ENCOUNTER: ICD-10-CM

## 2021-05-05 DIAGNOSIS — R06.09 OTHER FORMS OF DYSPNEA: ICD-10-CM

## 2021-05-05 DIAGNOSIS — M62.81 GENERALIZED MUSCLE WEAKNESS: ICD-10-CM

## 2021-05-05 PROCEDURE — 93017 CV STRESS TEST TRACING ONLY: CPT | Performed by: INTERNAL MEDICINE

## 2021-05-05 PROCEDURE — 93018 CV STRESS TEST I&R ONLY: CPT | Performed by: INTERNAL MEDICINE

## 2021-05-05 PROCEDURE — 93321 DOPPLER ECHO F-UP/LMTD STD: CPT | Performed by: INTERNAL MEDICINE

## 2021-05-05 PROCEDURE — 93325 DOPPLER ECHO COLOR FLOW MAPG: CPT | Performed by: INTERNAL MEDICINE

## 2021-05-05 PROCEDURE — 93016 CV STRESS TEST SUPVJ ONLY: CPT | Performed by: STUDENT IN AN ORGANIZED HEALTH CARE EDUCATION/TRAINING PROGRAM

## 2021-05-05 PROCEDURE — 93352 ADMIN ECG CONTRAST AGENT: CPT | Performed by: INTERNAL MEDICINE

## 2021-05-05 PROCEDURE — 93350 STRESS TTE ONLY: CPT | Performed by: INTERNAL MEDICINE

## 2021-05-05 RX ORDER — METOPROLOL TARTRATE 1 MG/ML
2 INJECTION, SOLUTION INTRAVENOUS ONCE
Status: COMPLETED | OUTPATIENT
Start: 2021-05-05 | End: 2021-05-05

## 2021-05-05 RX ORDER — DOBUTAMINE HYDROCHLORIDE 200 MG/100ML
30 INJECTION INTRAVENOUS CONTINUOUS
Status: ACTIVE | OUTPATIENT
Start: 2021-05-05

## 2021-05-05 RX ADMIN — DOBUTAMINE HYDROCHLORIDE 30 MCG/KG/MIN: 200 INJECTION INTRAVENOUS at 14:13

## 2021-05-05 RX ADMIN — METOPROLOL TARTRATE 2 MG: 1 INJECTION, SOLUTION INTRAVENOUS at 14:13

## 2021-05-05 RX ADMIN — Medication 10 ML: at 14:17

## 2021-05-06 NOTE — RESULT ENCOUNTER NOTE
Arnol Leon,    Attached are your test results.  Stress test appears normal which is reassuring   Please contact us if you have any questions.    Cailin Ponce, CNP

## 2021-05-07 ENCOUNTER — TELEPHONE (OUTPATIENT)
Dept: FAMILY MEDICINE | Facility: CLINIC | Age: 81
End: 2021-05-07

## 2021-05-17 ENCOUNTER — MYC MEDICAL ADVICE (OUTPATIENT)
Dept: FAMILY MEDICINE | Facility: CLINIC | Age: 81
End: 2021-05-17

## 2021-05-17 DIAGNOSIS — R29.6 RECURRENT FALLS: ICD-10-CM

## 2021-05-17 DIAGNOSIS — E11.65 TYPE 2 DIABETES MELLITUS WITH HYPERGLYCEMIA, WITH LONG-TERM CURRENT USE OF INSULIN (H): ICD-10-CM

## 2021-05-17 DIAGNOSIS — D72.829 LEUKOCYTOSIS, UNSPECIFIED TYPE: Primary | ICD-10-CM

## 2021-05-17 DIAGNOSIS — Z79.4 TYPE 2 DIABETES MELLITUS WITH HYPERGLYCEMIA, WITH LONG-TERM CURRENT USE OF INSULIN (H): ICD-10-CM

## 2021-05-17 DIAGNOSIS — R26.9 GAIT DISORDER: ICD-10-CM

## 2021-05-17 LAB — HBA1C MFR BLD: 10.3 % (ref 0–5.6)

## 2021-05-17 PROCEDURE — 36415 COLL VENOUS BLD VENIPUNCTURE: CPT | Performed by: NURSE PRACTITIONER

## 2021-05-17 PROCEDURE — 83036 HEMOGLOBIN GLYCOSYLATED A1C: CPT | Performed by: NURSE PRACTITIONER

## 2021-05-17 NOTE — TELEPHONE ENCOUNTER
Called and spoke to Zoila, as requested from daughter in a mychart message.     There is also a consent to communicate with patients Daughter (Fernanda Cedeno) from 1/14/20.    Zoila was given the results from her stress test on 5/5 per providers result note.     Routing to provider to review and advise.    Provider: Zoila told RN that she still has all the symptoms of a urinary tract infection and that the medication she was put on never seem to have helped. Zoila also reported that she is still falling and wants an explanation. RN told Zoila that she should make an appointment to see her Provider, but wants the providers feedback first.    Florencia Bee RN

## 2021-05-17 NOTE — TELEPHONE ENCOUNTER
Please  Call    1. We are going to have her make a follow up appointment with neurology   Please call [phone] to make appointment  if you do not hear from referrals in the next few days.   In the meantime I am going to recheck the MRI of her brain   I will place order. Please call [phone] to schedule.    2. Her  A1c is very elevated yet and looks like should have a follow up appointment with Dr Morris the endocrinologist this could be causing some of  her urinary frequency     3. Her last urine culture was negative for infection so that is why the antibiotics did not do anything   Need to make follow up appointment with Dr Lovell her urologist as

## 2021-05-18 ENCOUNTER — TELEPHONE (OUTPATIENT)
Dept: NEUROLOGY | Facility: CLINIC | Age: 81
End: 2021-05-18

## 2021-05-18 NOTE — TELEPHONE ENCOUNTER
[facility] Call Center    Phone Message    May a detailed message be left on voicemail: yes     Reason for Call: Patient called needing to schedule an appointment for Recurrent falls [R29.6], Gait disorder [R26.9]. Protocols state to send a message. Please advise. Thank you.    Action Taken: Message routed to:  Adult Clinics: Neurology p [id number]    Travel Screening: Not Applicable

## 2021-05-18 NOTE — TELEPHONE ENCOUNTER
Spoke with patient.  I gave her left. JESUS ALBERTO Butler's instructions as per message below.  Patient wrote it down along with the phone numbers to schedule the appts.  She states understanding of the instructions.  Flori Paulson RN

## 2021-05-18 NOTE — TELEPHONE ENCOUNTER
Message left for patient to call us back to get an appt set up with  at his first available new patient slot. Prefer appt after her 6/8/21 MRI

## 2021-05-19 ENCOUNTER — OFFICE VISIT (OUTPATIENT)
Dept: PODIATRY | Facility: CLINIC | Age: 81
End: 2021-05-19
Payer: COMMERCIAL

## 2021-05-19 VITALS — SYSTOLIC BLOOD PRESSURE: 124 MMHG | OXYGEN SATURATION: 96 % | DIASTOLIC BLOOD PRESSURE: 77 MMHG | HEART RATE: 96 BPM

## 2021-05-19 DIAGNOSIS — B35.1 ONYCHOMYCOSIS: Primary | ICD-10-CM

## 2021-05-19 DIAGNOSIS — E11.51 DIABETES MELLITUS WITH PERIPHERAL VASCULAR DISEASE (H): ICD-10-CM

## 2021-05-19 DIAGNOSIS — E11.49 TYPE II OR UNSPECIFIED TYPE DIABETES MELLITUS WITH NEUROLOGICAL MANIFESTATIONS, NOT STATED AS UNCONTROLLED(250.60) (H): ICD-10-CM

## 2021-05-19 PROCEDURE — 99214 OFFICE O/P EST MOD 30 MIN: CPT | Performed by: PODIATRIST

## 2021-05-19 NOTE — TELEPHONE ENCOUNTER
After reviewing appts, pt has been set up to see Dr. Kenney on 7/15/21 after her 6/8 MRI.       Regla Conroy RNCC  Neurology

## 2021-05-19 NOTE — LETTER
5/19/2021         RE: Brandy Leon  [address] [place] [address]        Dear Colleague,    Thank you for referring your patient, Brandy Leon, to the [facility]. Please see a copy of my visit note below.    Past Medical History:   Diagnosis Date     Actinic keratosis 3/12/2013     Degenerative joint disease      Diabetes (H)      Gastroesophageal reflux disease without esophagitis 12/11/2020     Rheumatic fever 1957    x2 between the ages of 15-18     Seasonal allergies      Patient Active Problem List   Diagnosis     Seasonal allergies     Hypothyroidism     Hyperlipidemia LDL goal <100     Fibromyalgia     Osteoarthritis     Advanced directives, counseling/discussion     Obesity     Type 2 diabetes mellitus with hyperglycemia, with long-term current use of insulin (H)     Hypertension goal BP (blood pressure) < 140/90     Overactive bladder     Recurrent UTI     Pseudophakia of both eyes     DINORA (obstructive sleep apnea)- severe (AHI 56)     Contusion of right knee     Gait disorder     Gastroesophageal reflux disease without esophagitis     Past Surgical History:   Procedure Laterality Date     C APPENDECTOMY       C ARTHROPLASTY TMJ       CATARACT IOL, RT/LT       COLONOSCOPY       COLONOSCOPY N/A 8/13/2015    Procedure: COLONOSCOPY;  Surgeon: Duane, William Charles, MD;  Location: [facility] OR     COLONOSCOPY WITH CO2 INSUFFLATION N/A 8/13/2015    Procedure: COLONOSCOPY WITH CO2 INSUFFLATION;  Surgeon: Duane, William Charles, MD;  Location: [facility] OR     PHACOEMULSIFICATION WITH STANDARD INTRAOCULAR LENS IMPLANT Left 10/25/2018    Procedure: LEFT PHACOEMULSIFICATION WITH STANDARD INTRAOCULAR LENS IMPLANT;  Surgeon: Thomas Serna MD;  Location: [facility]     PHACOEMULSIFICATION WITH STANDARD INTRAOCULAR LENS IMPLANT Right 11/8/2018    Procedure: RIGHT PHACOEMULSIFICATION WITH STANDARD INTRAOCULAR LENS IMPLANT;  Surgeon: Thomas Serna MD;   Location: [facility]     THYROIDECTOMY        Social History     Socioeconomic History     Marital status:      Spouse name: Not on file     Number of children: Not on file     Years of education: Not on file     Highest education level: Not on file   Occupational History     Occupation:    Social Needs     Financial resource strain: Not on file     Food insecurity     Worry: Not on file     Inability: Not on file     Transportation needs     Medical: Not on file     Non-medical: Not on file   Tobacco Use     Smoking status: Never Smoker     Smokeless tobacco: Never Used     Tobacco comment: has had exposure to second hand smoke in the past from [place], no current exposure   Substance and Sexual Activity     Alcohol use: No     Drug use: No     Sexual activity: Never   Lifestyle     Physical activity     Days per week: Not on file     Minutes per session: Not on file     Stress: Not on file   Relationships     Social connections     Talks on phone: Not on file     Gets together: Not on file     Attends Evangelical service: Not on file     Active member of club or organization: Not on file     Attends meetings of clubs or organizations: Not on file     Relationship status: Not on file     Intimate partner violence     Fear of current or ex partner: Not on file     Emotionally abused: Not on file     Physically abused: Not on file     Forced sexual activity: Not on file   Other Topics Concern     Parent/sibling w/ CABG, MI or angioplasty before 65F 55M? 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     Hobby Hazards No     Sleep Concern Yes     Comment: difficulty staying asleep, up and down all night     Stress Concern No     Weight Concern Yes     Comment: would like to lose     Special Diet Yes     Comment: low card, low  sugar diet     Back Care Yes     Comment: chiropratior     Exercise Yes     Comment: almost everyday     Bike Helmet No     Comment: does not ride bicycle any more     Seat Belt Yes     Self-Exams Yes   Social History Narrative     Not on file     Family History   Problem Relation Age of Onset     Cancer Father         skin and leukemia     Cerebrovascular Disease Maternal Grandfather      Cerebrovascular Disease Paternal Grandmother      Eye Disorder Paternal Grandmother         cataracts     Cerebrovascular Disease Paternal Grandfather      Heart Disease Paternal Grandfather      Cancer Sister         Breast Cancer     Eye Disorder Mother         cataracts     Eye Disorder Brother         cataract     Breast Cancer Daughter      Other Cancer Daughter         ovarian cancer     Glaucoma No family hx of      Macular Degeneration No family hx of      Lab Results   Component Value Date    A1C 10.3 05/17/2021    A1C 9.2 02/16/2021    A1C 8.7 11/12/2020    A1C 10.0 08/20/2020    A1C 10.5 01/10/2020     Lab Results   Component Value Date    WBC 13.3 04/02/2021     Lab Results   Component Value Date    RBC 5.01 04/02/2021     Lab Results   Component Value Date    HGB 14.8 04/02/2021     Lab Results   Component Value Date    HCT 44.1 04/02/2021     No components found for: MCT  Lab Results   Component Value Date    MCV 88 04/02/2021     Lab Results   Component Value Date    MCH 29.5 04/02/2021     Lab Results   Component Value Date    MCHC 33.6 04/02/2021     Lab Results   Component Value Date    RDW 13.8 04/02/2021     Lab Results   Component Value Date     04/02/2021     Last Comprehensive Metabolic Panel:  Sodium   Date Value Ref Range Status   04/02/2021 134 133 - 144 mmol/L Final     Potassium   Date Value Ref Range Status   04/02/2021 4.1 3.4 - 5.3 mmol/L Final     Chloride   Date Value Ref Range Status   04/02/2021 99 94 - 109 mmol/L Final     Carbon Dioxide   Date Value Ref Range Status   04/02/2021 31 20 -  32 mmol/L Final     Anion Gap   Date Value Ref Range Status   04/02/2021 4 3 - 14 mmol/L Final     Glucose   Date Value Ref Range Status   04/02/2021 175 (H) 70 - 99 mg/dL Final     Comment:     Non Fasting     Urea Nitrogen   Date Value Ref Range Status   04/02/2021 16 7 - 30 mg/dL Final     Creatinine   Date Value Ref Range Status   04/02/2021 0.76 0.52 - 1.04 mg/dL Final     GFR Estimate   Date Value Ref Range Status   04/02/2021 74 >60 mL/min/[1.73_m2] Final     Comment:     Non  GFR Calc  Starting 12/18/2018, serum creatinine based estimated GFR (eGFR) will be   calculated using the Chronic Kidney Disease Epidemiology Collaboration   (CKD-EPI) equation.       Calcium   Date Value Ref Range Status   04/02/2021 9.7 8.5 - 10.1 mg/dL Final     SUBJECTIVE FINDINGS:  A 80-year-old female returns to clinic for toenail care, diabetic foot cares.  She relates that she has numbness and tingling neuropathy in her feet.  She relates that she did follow up with primary physician for falls.  Relates she is not wearing Diabetic shoes and does not want to get any.  She relates she is using lotion on her feet.        OBJECTIVE FINDINGS:  DP and PT are 2/4 bilaterally.  She has minimal dry scaly skin bilaterally.  She has dystrophic, thickened nails with subungual debris, dystrophy and discoloration to differing degrees 1 through 5 bilaterally.  She has dorsally contracted digits 2 through 5 bilaterally.  She has healed abrasion on left dorsal Hallux.  There is no erythema, no drainage, no odor, no calor bilaterally.        ASSESSMENT AND PLAN:  Onychomycosis and onychauxis bilaterally.  She is diabetic with peripheral neuropathy and peripheral vascular disease.  Diagnosis and treatment were discussed with her.  All the toenails were debrided or reduced bilaterally upon consent.  She relates she cleans her feet with Microklense and needs some of this, so her feet were rinsed with Microclense upon consent and this  was dispensed.  Return to clinic in 4 months.  Previous notes were reviewed.       Again, thank you for allowing me to participate in the care of your patient.        Sincerely,        Ehsan Araya DPM

## 2021-05-19 NOTE — NURSING NOTE
Brandy Leon's chief complaint for this visit includes:  Chief Complaint   Patient presents with     RECHECK     bilateral foot & toenail care     PCP: Cailin Ponce    Referring Provider:  No referring provider defined for this encounter.    /77 (BP Location: Left arm, Patient Position: Sitting, Cuff Size: Adult Regular)   Pulse 96   SpO2 96%   Data Unavailable     Do you need any medication refills at today's visit? No    Atiya Holder CMA

## 2021-05-19 NOTE — PROGRESS NOTES
Past Medical History:   Diagnosis Date     Actinic keratosis 3/12/2013     Degenerative joint disease      Diabetes (H)      Gastroesophageal reflux disease without esophagitis 12/11/2020     Rheumatic fever 1957    x2 between the ages of 15-18     Seasonal allergies      Patient Active Problem List   Diagnosis     Seasonal allergies     Hypothyroidism     Hyperlipidemia LDL goal <100     Fibromyalgia     Osteoarthritis     Advanced directives, counseling/discussion     Obesity     Type 2 diabetes mellitus with hyperglycemia, with long-term current use of insulin (H)     Hypertension goal BP (blood pressure) < 140/90     Overactive bladder     Recurrent UTI     Pseudophakia of both eyes     DINORA (obstructive sleep apnea)- severe (AHI 56)     Contusion of right knee     Gait disorder     Gastroesophageal reflux disease without esophagitis     Past Surgical History:   Procedure Laterality Date     C APPENDECTOMY       C ARTHROPLASTY TMJ       CATARACT IOL, RT/LT       COLONOSCOPY       COLONOSCOPY N/A 8/13/2015    Procedure: COLONOSCOPY;  Surgeon: Duane, William Charles, MD;  Location: [facility]     COLONOSCOPY WITH CO2 INSUFFLATION N/A 8/13/2015    Procedure: COLONOSCOPY WITH CO2 INSUFFLATION;  Surgeon: Duane, William Charles, MD;  Location: [facility]     PHACOEMULSIFICATION WITH STANDARD INTRAOCULAR LENS IMPLANT Left 10/25/2018    Procedure: LEFT PHACOEMULSIFICATION WITH STANDARD INTRAOCULAR LENS IMPLANT;  Surgeon: Thomas Serna MD;  Location: [facility]     PHACOEMULSIFICATION WITH STANDARD INTRAOCULAR LENS IMPLANT Right 11/8/2018    Procedure: RIGHT PHACOEMULSIFICATION WITH STANDARD INTRAOCULAR LENS IMPLANT;  Surgeon: Thomas Serna MD;  Location: [facility]     THYROIDECTOMY        Social History     Socioeconomic History     Marital status:      Spouse name: Not on file     Number of children: Not on file     Years of education: Not on file     Highest education level: Not on file   Occupational History      Occupation:    Social Needs     Financial resource strain: Not on file     Food insecurity     Worry: Not on file     Inability: Not on file     Transportation needs     Medical: Not on file     Non-medical: Not on file   Tobacco Use     Smoking status: Never Smoker     Smokeless tobacco: Never Used     Tobacco comment: has had exposure to second hand smoke in the past from [place], no current exposure   Substance and Sexual Activity     Alcohol use: No     Drug use: No     Sexual activity: Never   Lifestyle     Physical activity     Days per week: Not on file     Minutes per session: Not on file     Stress: Not on file   Relationships     Social connections     Talks on phone: Not on file     Gets together: Not on file     Attends Taoism service: Not on file     Active member of club or organization: Not on file     Attends meetings of clubs or organizations: Not on file     Relationship status: Not on file     Intimate partner violence     Fear of current or ex partner: Not on file     Emotionally abused: Not on file     Physically abused: Not on file     Forced sexual activity: Not on file   Other Topics Concern     Parent/sibling w/ CABG, MI or angioplasty before 65F 55M? 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     Hobby Hazards No     Sleep Concern Yes     Comment: difficulty staying asleep, up and down all night     Stress Concern No     Weight Concern Yes     Comment: would like to lose     Special Diet Yes     Comment: low card, low sugar diet     Back Care Yes     Comment: chiropratior     Exercise Yes     Comment: almost everyday     Bike Helmet No     Comment: does not ride bicycle any more     Seat Belt Yes     Self-Exams Yes   Social History Narrative     Not on file     Family History   Problem  Relation Age of Onset     Cancer Father         skin and leukemia     Cerebrovascular Disease Maternal Grandfather      Cerebrovascular Disease Paternal Grandmother      Eye Disorder Paternal Grandmother         cataracts     Cerebrovascular Disease Paternal Grandfather      Heart Disease Paternal Grandfather      Cancer Sister         Breast Cancer     Eye Disorder Mother         cataracts     Eye Disorder Brother         cataract     Breast Cancer Daughter      Other Cancer Daughter         ovarian cancer     Glaucoma No family hx of      Macular Degeneration No family hx of      Lab Results   Component Value Date    A1C 10.3 05/17/2021    A1C 9.2 02/16/2021    A1C 8.7 11/12/2020    A1C 10.0 08/20/2020    A1C 10.5 01/10/2020     Lab Results   Component Value Date    WBC 13.3 04/02/2021     Lab Results   Component Value Date    RBC 5.01 04/02/2021     Lab Results   Component Value Date    HGB 14.8 04/02/2021     Lab Results   Component Value Date    HCT 44.1 04/02/2021     No components found for: MCT  Lab Results   Component Value Date    MCV 88 04/02/2021     Lab Results   Component Value Date    MCH 29.5 04/02/2021     Lab Results   Component Value Date    MCHC 33.6 04/02/2021     Lab Results   Component Value Date    RDW 13.8 04/02/2021     Lab Results   Component Value Date     04/02/2021     Last Comprehensive Metabolic Panel:  Sodium   Date Value Ref Range Status   04/02/2021 134 133 - 144 mmol/L Final     Potassium   Date Value Ref Range Status   04/02/2021 4.1 3.4 - 5.3 mmol/L Final     Chloride   Date Value Ref Range Status   04/02/2021 99 94 - 109 mmol/L Final     Carbon Dioxide   Date Value Ref Range Status   04/02/2021 31 20 - 32 mmol/L Final     Anion Gap   Date Value Ref Range Status   04/02/2021 4 3 - 14 mmol/L Final     Glucose   Date Value Ref Range Status   04/02/2021 175 (H) 70 - 99 mg/dL Final     Comment:     Non Fasting     Urea Nitrogen   Date Value Ref Range Status   04/02/2021 16 7  - 30 mg/dL Final     Creatinine   Date Value Ref Range Status   04/02/2021 0.76 0.52 - 1.04 mg/dL Final     GFR Estimate   Date Value Ref Range Status   04/02/2021 74 >60 mL/min/[1.73_m2] Final     Comment:     Non  GFR Calc  Starting 12/18/2018, serum creatinine based estimated GFR (eGFR) will be   calculated using the Chronic Kidney Disease Epidemiology Collaboration   (CKD-EPI) equation.       Calcium   Date Value Ref Range Status   04/02/2021 9.7 8.5 - 10.1 mg/dL Final     SUBJECTIVE FINDINGS:  A 80-year-old female returns to clinic for toenail care, diabetic foot cares.  She relates that she has numbness and tingling neuropathy in her feet.  She relates that she did follow up with primary physician for falls.  Relates she is not wearing Diabetic shoes and does not want to get any.  She relates she is using lotion on her feet.        OBJECTIVE FINDINGS:  DP and PT are 2/4 bilaterally.  She has minimal dry scaly skin bilaterally.  She has dystrophic, thickened nails with subungual debris, dystrophy and discoloration to differing degrees 1 through 5 bilaterally.  She has dorsally contracted digits 2 through 5 bilaterally.  She has healed abrasion on left dorsal Hallux.  There is no erythema, no drainage, no odor, no calor bilaterally.        ASSESSMENT AND PLAN:  Onychomycosis and onychauxis bilaterally.  She is diabetic with peripheral neuropathy and peripheral vascular disease.  Diagnosis and treatment were discussed with her.  All the toenails were debrided or reduced bilaterally upon consent.  She relates she cleans her feet with Microklense and needs some of this, so her feet were rinsed with Microclense upon consent and this was dispensed.  Return to clinic in 4 months.  Previous notes were reviewed.

## 2021-05-19 NOTE — PATIENT INSTRUCTIONS
Thanks for coming today.  [facility]  [address] [place] [address]    To schedule future appointments in Ortho Clinic, you may call [phone].    To schedule ordered imaging by your provider:   Call [facility] [phone]    To schedule an injection ordered by your provider:  Call [facility] Injection scheduling line: [phone]  Actiancehart available online at:  [email or web address]    Please call if any further questions or concerns ([phone]).  Clinic hours 8 am to 5 pm.    Return to clinic (call) if symptoms worsen or fail to improve.

## 2021-05-24 NOTE — PROGRESS NOTES
Medication Therapy Management (MTM) Encounter    ASSESSMENT/PLAN:                            Medication Adherence/Access: No issues identified    Diabetes: Not meeting A1c goal of less than 8%.  Would benefit from increasing Jardiance to 20 mg daily until she receives the 25mg from the  and continue on glipizide for now. Would also benefit from checking blood sugars 2 hours after a meal.    PLAN:   Increase Jardiance to 2-10mg tablets daily until receive 25mg from   Recommend staying on glipizide for now.  Recommend checking blood sugars 2 hours after a meal.    Will follow up in 2 weeks.    SUBJECTIVE/OBJECTIVE:                           Brandy Leon is a 80 year old female called for a follow up visit. This is a follow up visit rom 4/27/2021. She was referred to me from Cailin Ponce.     Reason for visit: Blood sugars;    Allergies/ADRs: Reviewed in chart  Tobacco: She reports that she has never smoked. She has never used smokeless tobacco.    Alcohol: none  Caffeine: 2 cups/day of coffee with whole milk  Activity: up and down the stairs doing her laundry, Can't walk very far she feels wobbly.  Past Medical History: Reviewed in chart    Medication Adherence/Access: Patient uses pill box(es). Patient reports not missing any doses of medication. Patient uses medication assistance program. Patient is getting Synthroid, Lantus, and Jardiance through  programs. Jardiance is getting mailed to her home and Synthroid, Lantus come to the clinic.  Lantus will be switched to Basaglar.     Diabetes:  Patient is currently taking metformin 1000mg twice daily, Lantus 36 units daily (patient will be getting Basaglar from the patient assistance program but had to fill her Lantus at her local pharmacy), Jardiance 10mg daily, glipizide XL 10mg once daily.  SMBG: one-two times daily. Ranges (patient reported:) Fasting blood sugars . Post-prandials are usually around 200mg/dL  Date  FBG/ 2hours post Lunch/2hours post Dinner /2hours post    5/25 123      5/24 141      5/23 144      5/22 126      5/21 110      5/20 223      5/19 270        Patient is not experiencing hypoglycemia.  Recent symptoms of high blood sugar? None.  Eye exam: up to date  Foot exam: up to date  ACEi/ARB: Yes: irbesartan/HCTZ.   Urine Albumin:   Lab Results   Component Value Date    MICROL 40 11/12/2020     Aspirin: Taking 81mg daily and denies side effects.   Diet: Breakfast- today, breakfast brat, 1/2 orange, banana and cereal.Son cooks meals-can't recall what some of her meals are.   Lab Results   Component Value Date    A1C 10.3 05/17/2021    A1C 9.2 02/16/2021    A1C 8.7 11/12/2020    A1C 10.0 08/20/2020    A1C 10.5 01/10/2020       Patient is not sure if she will get COVID-19 vaccine. She just plans on stayin away from people. She doesn't want to take it if she doesn't have to.   Patient has not had Shingrix, Tdap. She is aware that she is due for these but hasn't been into the pharmacy to get these yet.   Patient refuses to get the pneumonia shot because her  got pneumonia 7 times after getting the vaccination.    Today's Vitals: There were no vitals taken for this visit.    I spent 20 minutes with this patient. All changes were made via collaborative practice agreement with Cailin Ponce. A copy of the visit note was provided to the patient's primary care provider.    The patient was sent via [employer] a summary of these recommendations.     Mirna Perez, Pharm.D, BCACP  Medication Therapy Management Pharmacist    Telemedicine Visit Details  Type of service:  Telephone visit  Start Time: 10:04AM  End Time: 10:24AM  Originating Location (patient location): Home  Distant Location (provider location):  [facility]        Medication Therapy Recommendations  Type 2 diabetes mellitus with hyperglycemia, with long-term current use of insulin (H)    Current Medication: ONETOUCH ULTRA  test strip   Rationale: Frequency inappropriate - Dosage too low - Effectiveness   Recommendation: Increase Frequency   Status: Patient Agreed - Adherence/Education

## 2021-05-25 ENCOUNTER — VIRTUAL VISIT (OUTPATIENT)
Dept: PHARMACY | Facility: CLINIC | Age: 81
End: 2021-05-25
Payer: COMMERCIAL

## 2021-05-25 DIAGNOSIS — Z79.4 TYPE 2 DIABETES MELLITUS WITH HYPERGLYCEMIA, WITH LONG-TERM CURRENT USE OF INSULIN (H): Primary | ICD-10-CM

## 2021-05-25 DIAGNOSIS — E11.65 TYPE 2 DIABETES MELLITUS WITH HYPERGLYCEMIA, WITH LONG-TERM CURRENT USE OF INSULIN (H): Primary | ICD-10-CM

## 2021-05-25 PROCEDURE — 99606 MTMS BY PHARM EST 15 MIN: CPT | Performed by: PHARMACIST

## 2021-05-25 PROCEDURE — 99607 MTMS BY PHARM ADDL 15 MIN: CPT | Performed by: PHARMACIST

## 2021-05-25 RX ORDER — GLIPIZIDE 10 MG/1
10 TABLET, FILM COATED, EXTENDED RELEASE ORAL DAILY
Qty: 30 TABLET | Refills: 1 | Status: SHIPPED | OUTPATIENT
Start: 2021-05-25 | End: 2021-06-22 | Stop reason: DRUGHIGH

## 2021-05-25 NOTE — Clinical Note
Zoila Hay reports that she received 10mg tablets of Jardiance and not 25mg tablets. Is there a way to verify what was sent to her?  Thank you,  Laisha

## 2021-05-25 NOTE — PATIENT INSTRUCTIONS
Recommendations from today's MTM visit:                                                      Today we reviewed what your medicines are for, how to know if they are working, that your medicines are safe and how to make your medicine regimen as easy as possible.      Increase Jardiance to 2-10mg tablets daily until receive 25mg from maufacturer  Recommend staying on glipizide for now.  Recommend checking blood sugars 2 hours after a meal.      Follow-up: Return in about 2 weeks (around 6/8/2021) for blood sugar check, Medication Therapy Management Visit.    It was great to speak with you today.  I value your experience and would be very thankful for your time with providing feedback on our clinic survey. You may receive a survey via email or text message in the next few days.     To schedule another MTM appointment, please call the clinic directly or you may call the MTM scheduling line at [phone] or toll-free at [phone].     My Clinical Pharmacist's contact information:                                                      Please feel free to contact me with any questions or concerns you have.      Mirna Perez, Pharm.D, BCACP  Medication Therapy Management Pharmacist

## 2021-06-02 NOTE — PROGRESS NOTES
Patient assistance program was contacted and they verified that Jardiance 25mg tablets were mailed to patient. Called patient and she reported finding 2 bottles of Jardiance 25mg tablets. She is going to use up her 10mg tablets first by taking 2 of them and then switch over to 25mg tablets.    Mirna Perez, Pharm.D, [facility]  Medication Therapy Management Pharmacist

## 2021-06-03 ENCOUNTER — OFFICE VISIT (OUTPATIENT)
Dept: FAMILY MEDICINE | Facility: CLINIC | Age: 81
End: 2021-06-03
Payer: COMMERCIAL

## 2021-06-03 VITALS
SYSTOLIC BLOOD PRESSURE: 126 MMHG | OXYGEN SATURATION: 96 % | BODY MASS INDEX: 31.91 KG/M2 | HEART RATE: 91 BPM | DIASTOLIC BLOOD PRESSURE: 76 MMHG | RESPIRATION RATE: 16 BRPM | TEMPERATURE: 97.3 F | WEIGHT: 185.9 LBS

## 2021-06-03 DIAGNOSIS — N30.00 ACUTE CYSTITIS WITHOUT HEMATURIA: ICD-10-CM

## 2021-06-03 DIAGNOSIS — R30.0 DYSURIA: Primary | ICD-10-CM

## 2021-06-03 DIAGNOSIS — B37.31 YEAST VAGINITIS: ICD-10-CM

## 2021-06-03 DIAGNOSIS — N89.8 VAGINAL DISCHARGE: ICD-10-CM

## 2021-06-03 DIAGNOSIS — R82.90 NONSPECIFIC FINDING ON EXAMINATION OF URINE: ICD-10-CM

## 2021-06-03 DIAGNOSIS — R14.0 ABDOMINAL BLOATING: ICD-10-CM

## 2021-06-03 DIAGNOSIS — N39.0 RECURRENT UTI: ICD-10-CM

## 2021-06-03 DIAGNOSIS — R10.9 FLANK PAIN: ICD-10-CM

## 2021-06-03 LAB
ALBUMIN UR-MCNC: ABNORMAL MG/DL
APPEARANCE UR: ABNORMAL
BACTERIA #/AREA URNS HPF: ABNORMAL /HPF
BILIRUB UR QL STRIP: NEGATIVE
COLOR UR AUTO: YELLOW
GLUCOSE UR STRIP-MCNC: >=1000 MG/DL
HGB UR QL STRIP: ABNORMAL
KETONES UR STRIP-MCNC: NEGATIVE MG/DL
LEUKOCYTE ESTERASE UR QL STRIP: ABNORMAL
NITRATE UR QL: POSITIVE
NON-SQ EPI CELLS #/AREA URNS LPF: ABNORMAL /LPF
PH UR STRIP: 5 PH (ref 5–7)
RBC #/AREA URNS AUTO: ABNORMAL /HPF
SOURCE: ABNORMAL
SP GR UR STRIP: 1.01 (ref 1–1.03)
SPECIMEN SOURCE: ABNORMAL
UROBILINOGEN UR STRIP-ACNC: 0.2 EU/DL (ref 0.2–1)
WBC #/AREA URNS AUTO: >100 /HPF
WBC CLUMPS #/AREA URNS HPF: PRESENT /HPF
WET PREP SPEC: ABNORMAL

## 2021-06-03 PROCEDURE — 87210 SMEAR WET MOUNT SALINE/INK: CPT | Performed by: FAMILY MEDICINE

## 2021-06-03 PROCEDURE — 87086 URINE CULTURE/COLONY COUNT: CPT | Performed by: FAMILY MEDICINE

## 2021-06-03 PROCEDURE — 81001 URINALYSIS AUTO W/SCOPE: CPT | Performed by: FAMILY MEDICINE

## 2021-06-03 PROCEDURE — 87186 SC STD MICRODIL/AGAR DIL: CPT | Performed by: FAMILY MEDICINE

## 2021-06-03 PROCEDURE — 87088 URINE BACTERIA CULTURE: CPT | Performed by: FAMILY MEDICINE

## 2021-06-03 PROCEDURE — 99214 OFFICE O/P EST MOD 30 MIN: CPT | Performed by: FAMILY MEDICINE

## 2021-06-03 RX ORDER — CEFDINIR 300 MG/1
300 CAPSULE ORAL 2 TIMES DAILY
Qty: 14 CAPSULE | Refills: 0 | Status: SHIPPED | OUTPATIENT
Start: 2021-06-03 | End: 2021-06-22

## 2021-06-03 NOTE — PATIENT INSTRUCTIONS
Begin Omnicef twice a day for 7 days.    Culture is pending and will be sent with any new recommendations when the results return.      Vaginal  testing today.      Ultrasound of the abdomen recommended    You can call [phone] to schedule this at the [facility] in [place]

## 2021-06-03 NOTE — LETTER
June 7, 2021      Brandy G Edward  [address] [place] [address]        Dear Ms.Edwrad,    Attached you will find a copy of your recent laboratory report.   Your urine culture does show an infection.  It is a different bacteria compared with the infection noted in March 2021.  The antibiotic you were given should be effective at resolving the infection.   Please call or MyChart message me if you have any questions.         Sincerely,         Fernanda Miller MD       Resulted Orders   *UA reflex to Microscopic and Culture ([place] [facility] (except [place] and [place])   Result Value Ref Range    Color Urine Yellow     Appearance Urine Slightly Cloudy     Glucose Urine >=1000 (A) NEG^Negative mg/dL    Bilirubin Urine Negative NEG^Negative    Ketones Urine Negative NEG^Negative mg/dL    Specific Gravity Urine 1.010 1.003 - 1.035    Blood Urine Trace (A) NEG^Negative    pH Urine 5.0 5.0 - 7.0 pH    Protein Albumin Urine Trace (A) NEG^Negative mg/dL    Urobilinogen Urine 0.2 0.2 - 1.0 EU/dL    Nitrite Urine Positive (A) NEG^Negative    Leukocyte Esterase Urine Small (A) NEG^Negative    Source Midstream Urine    Urine Microscopic   Result Value Ref Range    WBC Urine >100 (A) OTO5^0 - 5 /HPF    RBC Urine O - 2 OTO2^O - 2 /HPF    WBC Clumps Present (A) NEG^Negative /HPF    Squamous Epithelial /LPF Urine Few FEW^Few /LPF    Bacteria Urine Moderate (A) NEG^Negative /HPF   Urine Culture Aerobic Bacterial   Result Value Ref Range    Specimen Description Midstream Urine     Special Requests Specimen received in preservative     Culture Micro >100,000 colonies/mL  Klebsiella pneumoniae   (A)     Culture Micro (A)      >100,000 colonies/mL  Strain 2  Klebsiella pneumoniae     Wet prep   Result Value Ref Range    Specimen Description Vagina     Wet Prep No Trichomonas seen     Wet Prep No clue cells seen     Wet Prep Few  Yeast seen   (A)     Wet Prep Moderate  WBC'S seen

## 2021-06-03 NOTE — PROGRESS NOTES
"    Assessment & Plan     Dysuria  Probable UTI  - *UA reflex to Microscopic and Culture ([place] [facility] (except [place] and [place])  - Urine Microscopic    Nonspecific finding on examination of urine  Culture sent pending  - Urine Culture Aerobic Bacterial    Acute cystitis without hematuria  In review of her most recent urinary tract infection there was intermediate sensitivities to Cipro and Levaquin but sensitive to other antibiotics.  She has tolerated Omnicef in the past and that is sent to the pharmacy for her now.  - cefdinir (OMNICEF) 300 MG capsule; Take 1 capsule (300 mg) by mouth 2 times daily    Vaginal discharge  See below  - Wet prep    Recurrent UTI  Abdominal bloating  Flank pain  With current constellation of symptoms and recurrent bladder infection history, I have recommended ultrasound to evaluate for hydronephrosis or other cause of impaired renal drainage.  - US Abdomen Complete; Future    Yeast vaginitis  Treatment with Diflucan as noted we will do 1 dose now and 1 dose at completion of antibiotics.  Patient will be contacted as results returned after her exit from the clinic.  - fluconazole (DIFLUCAN) 150 MG tablet; Take 1 tablet (150 mg) by mouth once for 1 dose Now and repeat after antibiotic completion             BMI:   Estimated body mass index is 31.91 kg/m  as calculated from the following:    Height as of 12/11/20: 1.626 m (5' 4\").    Weight as of this encounter: 84.3 kg (185 lb 14.4 oz).   Weight management plan: Patient was referred to their PCP to discuss a diet and exercise plan.    Patient Instructions   Begin Omnicef twice a day for 7 days.    Culture is pending and will be sent with any new recommendations when the results return.      Vaginal  testing today.      Ultrasound of the abdomen recommended    You can call [phone] to schedule this at the [facility] in [place]            Return in about 4 weeks (around 7/1/2021) for as needed for " "persistent symptoms.    Fernanda Miller MD  [facility] BARBARA Cummings is a 80 year old who presents for the following health issues     HPI     Genitourinary - Female  Onset/Duration: x 1 week  Description:   Painful urination (Dysuria): YES           Frequency: YES  Blood in urine (Hematuria): no  Delay in urine (Hesitency): YES  Intensity: moderate  Progression of Symptoms:  worsening  Accompanying Signs & Symptoms:  Fever/chills: no  Flank pain: YES - mid back  Nausea and vomiting: no  Vaginal symptoms:  itching  Abdominal/Pelvic Pain: YES - bloated and sore last few days.  History:   History of frequent UTI s: YES  History of kidney stones: no  Sexually Active: no  Possibility of pregnancy: No  Precipitating or alleviating factors: None  Therapies tried and outcome:  Patient states she has tried ibuprofen for back pain and \"OTC medication with purple cap.\"    Worse flank pain on the right and right anterior lower abdominal bloating and pain is present as well.    Blood sugars higher this AM - 250, normally 120s  Chinese food yesterday.        Review of Systems   Constitutional, HEENT, cardiovascular, pulmonary, gi and gu systems are negative, except as otherwise noted.      Objective    /76   Pulse 91   Temp 97.3  F (36.3  C) (Oral)   Resp 16   Wt 84.3 kg (185 lb 14.4 oz)   SpO2 96%   BMI 31.91 kg/m    Body mass index is 31.91 kg/m .  Physical Exam   GENERAL: healthy, alert and no distress  NECK: no adenopathy, no asymmetry, masses, or scars and thyroid normal to palpation  RESP: lungs clear to auscultation - no rales, rhonchi or wheezes  CV: regular rate and rhythm, normal S1 S2, no S3 or S4, no murmur, click or rub, no peripheral edema and peripheral pulses strong  ABDOMEN: tenderness diffuse mild discomfort with palpation, greatest in the right lower abdominal area.  No rebound or guarding is noted. no organomegaly or masses and bowel sounds normal  MS: tenderness " to palpation paravertebral muscles lumbar spine.  CVA tenderness  : Erythema and mild discharge noted vaginally.  Swab was obtained.  PSYCH: mentation appears normal, affect normal/bright    Results for orders placed or performed in visit on 06/03/21   *UA reflex to Microscopic and Culture ([place] [facility] (except [place] and [place])     Status: Abnormal    Specimen: Midstream Urine   Result Value Ref Range    Color Urine Yellow     Appearance Urine Slightly Cloudy     Glucose Urine >=1000 (A) NEG^Negative mg/dL    Bilirubin Urine Negative NEG^Negative    Ketones Urine Negative NEG^Negative mg/dL    Specific Gravity Urine 1.010 1.003 - 1.035    Blood Urine Trace (A) NEG^Negative    pH Urine 5.0 5.0 - 7.0 pH    Protein Albumin Urine Trace (A) NEG^Negative mg/dL    Urobilinogen Urine 0.2 0.2 - 1.0 EU/dL    Nitrite Urine Positive (A) NEG^Negative    Leukocyte Esterase Urine Small (A) NEG^Negative    Source Midstream Urine    Urine Microscopic     Status: Abnormal   Result Value Ref Range    WBC Urine >100 (A) OTO5^0 - 5 /HPF    RBC Urine O - 2 OTO2^O - 2 /HPF    WBC Clumps Present (A) NEG^Negative /HPF    Squamous Epithelial /LPF Urine Few FEW^Few /LPF    Bacteria Urine Moderate (A) NEG^Negative /HPF   Urine Culture Aerobic Bacterial     Status: None (Preliminary result)    Specimen: Midstream Urine   Result Value Ref Range    Specimen Description Midstream Urine     Special Requests Specimen received in preservative     Culture Micro PENDING    Wet prep     Status: Abnormal    Specimen: Vagina   Result Value Ref Range    Specimen Description Vagina     Wet Prep No Trichomonas seen     Wet Prep No clue cells seen     Wet Prep Few  Yeast seen   (A)     Wet Prep Moderate  WBC'S seen              This chart was documented by provider using a voice activated software called Dragon in addition to manual typing. There may be vocabulary errors or other grammatical errors due to this.

## 2021-06-04 ENCOUNTER — MYC MEDICAL ADVICE (OUTPATIENT)
Dept: FAMILY MEDICINE | Facility: CLINIC | Age: 81
End: 2021-06-04

## 2021-06-04 RX ORDER — FLUCONAZOLE 150 MG/1
150 TABLET ORAL ONCE
Qty: 2 TABLET | Refills: 0 | Status: SHIPPED | OUTPATIENT
Start: 2021-06-04 | End: 2021-06-04

## 2021-06-05 LAB
BACTERIA SPEC CULT: ABNORMAL
BACTERIA SPEC CULT: ABNORMAL
Lab: ABNORMAL
SPECIMEN SOURCE: ABNORMAL

## 2021-06-07 NOTE — RESULT ENCOUNTER NOTE
Please print letter and attach results.  PSK    Attached you will find a copy of your recent laboratory report.  Your urine culture does show an infection.  It is a different bacteria compared with the infection noted in March 2021.  The antibiotic you were given should be effective at resolving the infection.   Please call or MyChart message me if you have any questions.        Sincerely,         Fernanda Miller MD     PSK

## 2021-06-08 ENCOUNTER — VIRTUAL VISIT (OUTPATIENT)
Dept: PHARMACY | Facility: CLINIC | Age: 81
End: 2021-06-08
Payer: COMMERCIAL

## 2021-06-08 ENCOUNTER — ANCILLARY PROCEDURE (OUTPATIENT)
Dept: MRI IMAGING | Facility: CLINIC | Age: 81
End: 2021-06-08
Attending: NURSE PRACTITIONER
Payer: COMMERCIAL

## 2021-06-08 DIAGNOSIS — E11.65 TYPE 2 DIABETES MELLITUS WITH HYPERGLYCEMIA, WITH LONG-TERM CURRENT USE OF INSULIN (H): Primary | ICD-10-CM

## 2021-06-08 DIAGNOSIS — Z79.4 TYPE 2 DIABETES MELLITUS WITH HYPERGLYCEMIA, WITH LONG-TERM CURRENT USE OF INSULIN (H): Primary | ICD-10-CM

## 2021-06-08 DIAGNOSIS — R29.6 RECURRENT FALLS: ICD-10-CM

## 2021-06-08 DIAGNOSIS — R26.9 GAIT DISORDER: ICD-10-CM

## 2021-06-08 PROCEDURE — 70551 MRI BRAIN STEM W/O DYE: CPT | Performed by: RADIOLOGY

## 2021-06-08 PROCEDURE — 99606 MTMS BY PHARM EST 15 MIN: CPT | Performed by: PHARMACIST

## 2021-06-08 NOTE — PROGRESS NOTES
Medication Therapy Management (MTM) Encounter    ASSESSMENT/PLAN:                            Medication Adherence/Access: No issues identified    Diabetes: Not meeting A1c goal of less than 8%.  Jardiance dose recently increased. Monitor for frequency of yeast and bladder infections. Recommend adequate hydration.     PLAN:   No medication changes recommended at this time.    Will follow up in 2 weeks.    SUBJECTIVE/OBJECTIVE:                           Brandy Leon is a 80 year old female called for a follow up visit. This is a follow up visit rom 5/25/2021. She was referred to me from Cailin Ponce.     Reason for visit: Blood sugars;    Allergies/ADRs: Reviewed in chart  Tobacco: She reports that she has never smoked. She has never used smokeless tobacco.    Alcohol: none  Caffeine: 2 cups/day of coffee with whole milk  Activity: up and down the stairs doing her laundry, Can't walk very far she feels wobbly.  Past Medical History: Reviewed in chart    Medication Adherence/Access: Patient uses pill box(es). Patient reports not missing any doses of medication. Patient uses medication assistance program. Patient is getting Synthroid, Lantus, and Jardiance through  programs. Jardiance is getting mailed to her home and Synthroid, Lantus come to the clinic.  Lantus will be switched to Basaglar.     Diabetes:  Patient is currently taking metformin 1000mg twice daily, Lantus 36 units daily, Jardiance 25mg daily (started about 2 weeks ago), glipizide XL 10mg once daily. Patient recently was diagnosed with yeast and bladder infection and is currently being treated with fluconazole and cefdinivir. Patient reports her symptoms have improved.  SMBG: one-two times daily. Ranges (patient reported:) Fasting blood sugars .  Date FBG/ 2hours post Lunch/2hours post Dinner /2hours post    6/8 137       161       159      6/3 129      5/29 211 /236                     Patient is not experiencing  hypoglycemia.  Recent symptoms of high blood sugar? None.  Eye exam: up to date  Foot exam: up to date  ACEi/ARB: Yes: irbesartan/HCTZ.   Urine Albumin:   Lab Results   Component Value Date    MICROL 40 11/12/2020     Aspirin: Taking 81mg daily and denies side effects.   Diet: Breakfast- today, breakfast brat, 1/2 orange, banana and cereal.Son cooks meals-can't recall what some of her meals are.   Lab Results   Component Value Date    A1C 10.3 05/17/2021    A1C 9.2 02/16/2021    A1C 8.7 11/12/2020    A1C 10.0 08/20/2020    A1C 10.5 01/10/2020     Patient is not sure if she will get COVID-19 vaccine. She just plans on stayin away from people. She doesn't want to take it if she doesn't have to.   Patient has not had Shingrix, Tdap. She is aware that she is due for these but hasn't been into the pharmacy to get these yet.   Patient refuses to get the pneumonia shot because her  got pneumonia 7 times after getting the vaccination.    Today's Vitals: There were no vitals taken for this visit.    I spent 8 minutes with this patient. All changes were made via collaborative practice agreement with Cailin Ponce. A copy of the visit note was provided to the patient's primary care provider.    The patient was sent via [employer] a summary of these recommendations.     Mirna Perez, Pharm.D, [facility]  Medication Therapy Management Pharmacist    Telemedicine Visit Details  Type of service:  Telephone visit  Start Time: 10:31AM  End Time: 10:39AM  Originating Location (patient location): Home  Distant Location (provider location):  [facility] MTM        Medication Therapy Recommendations  No medication therapy recommendations to display

## 2021-06-08 NOTE — PATIENT INSTRUCTIONS
Recommendations from today's MTM visit:                                                      Today we reviewed what your medicines are for, how to know if they are working, that your medicines are safe and how to make your medicine regimen as easy as possible.      No medication changes recommended at this time.    Follow-up: Return in about 2 weeks (around 6/22/2021) for Medication Therapy Management Visit, blood sugar check.    It was great to speak with you today.  I value your experience and would be very thankful for your time with providing feedback on our clinic survey. You may receive a survey via email or text message in the next few days.     To schedule another MTM appointment, please call the clinic directly or you may call the MTM scheduling line at [phone] or toll-free at [phone].     My Clinical Pharmacist's contact information:                                                      Please feel free to contact me with any questions or concerns you have.      Mirna Perez, Pharm.D, BCACP  Medication Therapy Management Pharmacist

## 2021-06-09 NOTE — RESULT ENCOUNTER NOTE
Arnol Leon,    Attached are your test results.  Your MRI does not show a lot of difference but with your symptoms I want you to keep the appointment with neurology as you have scheduled.    Please contact us if you have any questions.    Cailin Ponce, CNP

## 2021-06-14 ENCOUNTER — OFFICE VISIT (OUTPATIENT)
Dept: OPHTHALMOLOGY | Facility: CLINIC | Age: 81
End: 2021-06-14
Payer: COMMERCIAL

## 2021-06-14 DIAGNOSIS — H01.00A BLEPHARITIS OF UPPER AND LOWER EYELIDS OF BOTH EYES, UNSPECIFIED TYPE: Primary | ICD-10-CM

## 2021-06-14 DIAGNOSIS — Z96.1 PSEUDOPHAKIA OF BOTH EYES: ICD-10-CM

## 2021-06-14 DIAGNOSIS — H04.129 DRY EYE: ICD-10-CM

## 2021-06-14 DIAGNOSIS — H01.00B BLEPHARITIS OF UPPER AND LOWER EYELIDS OF BOTH EYES, UNSPECIFIED TYPE: Primary | ICD-10-CM

## 2021-06-14 DIAGNOSIS — H40.1132 PRIMARY OPEN ANGLE GLAUCOMA OF BOTH EYES, MODERATE STAGE: ICD-10-CM

## 2021-06-14 PROCEDURE — 99214 OFFICE O/P EST MOD 30 MIN: CPT | Performed by: OPHTHALMOLOGY

## 2021-06-14 PROCEDURE — 92020 GONIOSCOPY: CPT | Performed by: OPHTHALMOLOGY

## 2021-06-14 ASSESSMENT — GONIOSCOPY
OS_INFERIOR: OPEN TO CBB
OD_TEMPORAL: OPEN TO CBB
OS_SUPERIOR: OPEN TO CBB
OS_TEMPORAL: OPEN TO CBB
OD_NASAL: OPEN TO CBB
OS_NASAL: OPEN TO CBB
OD_SUPERIOR: OPEN TO CBB
OD_INFERIOR: OPEN TO CBB

## 2021-06-14 ASSESSMENT — VISUAL ACUITY
OS_SC: 20/30
OD_SC: 20/40
METHOD: SNELLEN - LINEAR

## 2021-06-14 ASSESSMENT — CONF VISUAL FIELD
METHOD: COUNTING FINGERS
OS_NORMAL: 1
OD_NORMAL: 1

## 2021-06-14 ASSESSMENT — EXTERNAL EXAM - LEFT EYE: OS_EXAM: NORMAL

## 2021-06-14 ASSESSMENT — TONOMETRY
OD_IOP_MMHG: 15
OS_IOP_MMHG: 18
OS_IOP_MMHG: 15
IOP_METHOD: APPLANATION
OD_IOP_MMHG: 19

## 2021-06-14 ASSESSMENT — EXTERNAL EXAM - RIGHT EYE: OD_EXAM: NORMAL

## 2021-06-14 NOTE — NURSING NOTE
Chief Complaints and History of Present Illnesses   Patient presents with     Dry Eye Syndrome Follow Up       Chief Complaint(s) and History of Present Illness(es)     Dry Eye Syndrome Follow Up     Laterality: both eyes    Associated symptoms: mattering, dryness and irritation.  Negative for burning and itching    Treatments tried: artificial tears and warm compresses    Response to treatment: no improvement              Comments     Patient here for a dry eye follow-up. Patient notes no improvement with her dry eyes. Has been using the warm compress almost every morning and using drops throughout the day and and night as needed. Towards the end of the day eyes feel sore and tired. Will wake up with white mucus in her eyes some mornings.     Also still using the Latanoprost in each eye, last dose was last night 9:00pm.                 Freeman Jimenez, Ophthalmic Assistant

## 2021-06-14 NOTE — PROGRESS NOTES
HPI:  Brandy Leon is a 80 year old female presenting for bilateral eye discomfort since cataract surgery in 2018.    Last saw Dr. Serna 12/2020 with DFE.  Last visit 4/19/2021 for dry eyes; started on blepharitis regimen with lid scrubs, warm compresses, ATs    Today, feels like eyes are about the same. Using warm compresses in the morning, which she states seems to help. Then using latanoprost at bedtime both eyes. Intermittently using tear drops but not regularly. Feels these help sometimes.  Allergies have been bad this spring, has been taking Allegra, which helps.    Past Ocular History:  Cataract surgery each eye 2018  POAG each eye  Dry eyes/blepharitis    PMH:  DM2 - on insulin  Hypothyroid  HLD  Fibromyalgia  Osteoarthritis  HTN  DINORA  GERD    SH:  Never smoker.    FH:  No known family history of blindness, glaucoma, macular degeneration    ASSESSMENT and PLAN:  1. Blepharitis of upper and lower eyelids of both eyes, unspecified type  2. Dry eye  - longstanding eye irritation, burning, film over vision, briefly improves with ATs but no longterm relief  - Again discussed blepharitis: inflammation of the eyelids causing ocular irritation, itching, redness, discharge, especially in the morning  - again discussed dry eye  - we discussed buildup of symptoms to get to this point and again discussed given longstanding nature of disease and symptoms will likely be a longterm process to control symptoms; she understands  - we discussed need for routine use of ATs and lid hygiene to manage symptoms and she understands    - increase warm compresses; discussed using gentle massage as well for collarettes  - start lid scrubs  - increase ATs to 4-6x/day    3. Pseudophakia of both eyes  - s/p cataract surgery both eyes late 2018  - lenses in good position  - stable vision  - monitor    4. Primary open angle glaucoma of both eyes, moderate stage  - diagnosed with POAG with Dr. Serna  - pachy 538/557  - gonfrances  open  - Tmax 30/25 (tonopen; just after stopping postop cataract drops, otherwise low 20s)  - on latanoprost at bedtime each eye since 8/2015 for concern for OCT RNFL thinning    HVF review  2019: right eye essentially full, left eye unreliable with scattered inf defects  2018: unreliable fields  2017: unreliable fields, right eye superior defect, left eye inferior hemifield loss crossing both midlines  2016: unreliable fields  2015: mod reliability, essentially full each eye    - IOP today 19/18; patient requests only addressing dry eyes today and address glaucoma concerns at another visit  --> continue latanoprost at bedtime each eye       Follow up in 3 months, HVF 24-2 OU, no dilation or sooner PRN   - will plan for DFE with OCT RNFL in Dec 2021     -----------------------------------------------------------------------------------    Attestation:  Complete documentation of historical and exam elements from today's encounter can be found in the full encounter summary report (not reduplicated in this progress note). I personally obtained the chief complaint(s) and history of present illness.  I confirmed and edited as necessary the review of systems, past medical/surgical history, family history, social history, and examination findings as documented by others; and I examined the patient myself. I personally reviewed the relevant tests, images, and reports as documented above.     I formulated and edited as necessary the assessment and plan and discussed the findings and management plan with the patient and family.      Devi Gupta MD

## 2021-06-21 NOTE — PROGRESS NOTES
Medication Therapy Management (MTM) Encounter    ASSESSMENT/PLAN:                            Medication Adherence/Access: No issues identified    Diabetes: Not meeting A1c goal of less than 8%.  Jardiance can increase risk of yeast, bladder infections.  Monitor for frequency of yeast and bladder infections. Recommend adequate hydration. May need higher dose of Basaglar than Lantus. Recommend taking consistent dose of 36 units every day and reassessing blood sugars.    PLAN:   No medication changes recommended at this time.  Take Basaglar 36 units daily consistently.    Will follow up in 1 week.    SUBJECTIVE/OBJECTIVE:                           Brandy Leon is a 80 year old female called for a follow up visit. This is a follow up visit rom 6/8/2021. She was referred to me from Cailin Ponce.     Reason for visit: Blood sugars;    Allergies/ADRs: Reviewed in chart  Tobacco: She reports that she has never smoked. She has never used smokeless tobacco.    Alcohol: none  Caffeine: 2 cups/day of coffee with whole milk  Activity: up and down the stairs doing her laundry, Can't walk very far she feels wobbly.  Past Medical History: Reviewed in chart    Medication Adherence/Access: Patient uses pill box(es). Patient reports not missing any doses of medication. Patient uses medication assistance program. Patient is getting Synthroid, Lantus, and Jardiance through  programs. Jardiance is getting mailed to her home and Synthroid,  come to the clinic.    Diabetes:  Patient is currently taking metformin 1000mg twice daily, Basaglar 36 units daily (patient feels her blood sugar is running higher on Basaglar than on Lantus); last night patient only took 34 units, Jardiance 25mg daily, glipizide XL 10mg once twice daily. Wondering if Jardiance could be causing her UTI, yeast infection.  SMBG: one-two times daily. Ranges (patient reported:)  Date FBG/ 2hours post Lunch/2hours post Dinner /2hours post     6/21 183  /212    6/20 163      6/18 164      6/17 166      6/15 130      6/14 195               Patient is not experiencing hypoglycemia.  Recent symptoms of high blood sugar? None.  Eye exam: up to date  Foot exam: up to date  ACEi/ARB: Yes: irbesartan/HCTZ.   Urine Albumin:   Lab Results   Component Value Date    MICROL 40 11/12/2020     Aspirin: Taking 81mg daily and denies side effects.   Diet: Son cooks meals-last night beef ribs with potato, carrots, blueberry cheesecake  Lab Results   Component Value Date    A1C 10.3 05/17/2021    A1C 9.2 02/16/2021    A1C 8.7 11/12/2020    A1C 10.0 08/20/2020    A1C 10.5 01/10/2020       Today's Vitals: There were no vitals taken for this visit.    I spent 15 minutes with this patient. All changes were made via collaborative practice agreement with Cailin Ponce. A copy of the visit note was provided to the patient's primary care provider.    The patient was sent via [employer] a summary of these recommendations.     Mirna Perez, Pharm.D, BCACP  Medication Therapy Management Pharmacist    Telemedicine Visit Details  Type of service:  Telephone visit  Start Time: 10:30AM  End Time: 10:15AM  Originating Location (patient location): Home  Distant Location (provider location):  [facility] MTM        Medication Therapy Recommendations  No medication therapy recommendations to display

## 2021-06-22 ENCOUNTER — VIRTUAL VISIT (OUTPATIENT)
Dept: PHARMACY | Facility: CLINIC | Age: 81
End: 2021-06-22
Payer: COMMERCIAL

## 2021-06-22 DIAGNOSIS — Z79.4 TYPE 2 DIABETES MELLITUS WITH HYPERGLYCEMIA, WITH LONG-TERM CURRENT USE OF INSULIN (H): Primary | ICD-10-CM

## 2021-06-22 DIAGNOSIS — E11.65 TYPE 2 DIABETES MELLITUS WITH HYPERGLYCEMIA, WITH LONG-TERM CURRENT USE OF INSULIN (H): Primary | ICD-10-CM

## 2021-06-22 PROCEDURE — 99606 MTMS BY PHARM EST 15 MIN: CPT | Performed by: PHARMACIST

## 2021-06-22 PROCEDURE — 99607 MTMS BY PHARM ADDL 15 MIN: CPT | Performed by: PHARMACIST

## 2021-06-22 RX ORDER — INSULIN GLARGINE 100 [IU]/ML
36 INJECTION, SOLUTION SUBCUTANEOUS DAILY
COMMUNITY
End: 2021-06-29

## 2021-06-22 RX ORDER — GLIPIZIDE 10 MG/1
10 TABLET, FILM COATED, EXTENDED RELEASE ORAL 2 TIMES DAILY
COMMUNITY
End: 2021-08-18

## 2021-06-22 NOTE — PATIENT INSTRUCTIONS
Recommendations from today's MTM visit:                                                      Today we reviewed what your medicines are for, how to know if they are working, that your medicines are safe and how to make your medicine regimen as easy as possible.      No medication changes recommended at this time.  Take Basaglar 36 units daily consistently.      Follow-up: Return in about 1 week (around 6/29/2021) for Medication Therapy Management Visit, blood sugar check.    It was great to speak with you today.  I value your experience and would be very thankful for your time with providing feedback on our clinic survey. You may receive a survey via email or text message in the next few days.     To schedule another MTM appointment, please call the clinic directly or you may call the MTM scheduling line at [phone] or toll-free at [phone].     My Clinical Pharmacist's contact information:                                                      Please feel free to contact me with any questions or concerns you have.      Mirna Perez, Pharm.D, BCACP  Medication Therapy Management Pharmacist

## 2021-06-28 NOTE — PROGRESS NOTES
"Medication Therapy Management (MTM) Encounter    ASSESSMENT/PLAN:                            Medication Adherence/Access: No issues identified    Diabetes: Not meeting A1c goal of less than 8%.  Jardiance can increase risk of yeast, bladder infections.  Monitor for frequency of yeast and bladder infections. Recommend adequate hydration. May need higher dose of Basaglar than Lantus. Recommend taking consistent dose of 36 units every day and reassessing blood sugars.    PLAN:   Increase Basaglar 38 units daily.  Recommend COVID-19 vaccine    Will follow up in 2 weeks.    SUBJECTIVE/OBJECTIVE:                           Brandy Leon is a 80 year old female called for a follow up visit. This is a follow up visit rom 6/22/2021. She was referred to me from Cailin Ponce.     Reason for visit: Blood sugars;    Allergies/ADRs: Reviewed in chart  Tobacco: She reports that she has never smoked. She has never used smokeless tobacco.    Alcohol: none  Caffeine: 2 cups/day of coffee with whole milk  Activity: up and down the stairs doing her laundry, Can't walk very far she feels wobbly.  Past Medical History: Reviewed in chart    Medication Adherence/Access: Patient uses pill box(es). Patient reports not missing any doses of medication. Patient uses medication assistance program. Patient is getting Synthroid, Lantus, and Jardiance through  programs. Jardiance is getting mailed to her home and Synthroid,  come to the clinic.    Diabetes:  Patient is currently taking metformin 1000mg twice daily, Basaglar 36 units daily (patient feels her blood sugar is running higher on Basaglar than on Lantus), Jardiance 25mg daily, glipizide XL 10mg once twice daily. Patient is not experiencing any UTI   SMBG: one-two times daily. Ranges (patient reported:) \"200's in the morning\"  Date FBG/ 2hours post Lunch/2hours post Dinner /2hours post    6/29 174                                                  Patient is not " experiencing hypoglycemia.  Recent symptoms of high blood sugar? None.  Eye exam: up to date  Foot exam: up to date  ACEi/ARB: Yes: irbesartan/HCTZ.   Urine Albumin:   Lab Results   Component Value Date    MICROL 40 11/12/2020     Aspirin: Taking 81mg daily and denies side effects.   Diet: Son cooks meals-last night beef ribs with potato, carrots, blueberry cheesecake  Lab Results   Component Value Date    A1C 10.3 05/17/2021    A1C 9.2 02/16/2021    A1C 8.7 11/12/2020    A1C 10.0 08/20/2020    A1C 10.5 01/10/2020     Debating if she should have COVID vaccine. [place]  Today's Vitals: There were no vitals taken for this visit.    I spent 12 minutes with this patient. All changes were made via collaborative practice agreement with Cailin Ponce. A copy of the visit note was provided to the patient's primary care provider.    The patient was sent via [employer] a summary of these recommendations.     Mirna Perez, Pharm.D, BCACP  Medication Therapy Management Pharmacist    Telemedicine Visit Details  Type of service:  Telephone visit  Start Time: 9:28AM  End Time: 9:40AM  Originating Location (patient location): Home  Distant Location (provider location):  [facility] MTM        Medication Therapy Recommendations  No medication therapy recommendations to display

## 2021-06-29 ENCOUNTER — VIRTUAL VISIT (OUTPATIENT)
Dept: PHARMACY | Facility: CLINIC | Age: 81
End: 2021-06-29
Payer: COMMERCIAL

## 2021-06-29 DIAGNOSIS — Z79.4 TYPE 2 DIABETES MELLITUS WITH HYPERGLYCEMIA, WITH LONG-TERM CURRENT USE OF INSULIN (H): Primary | ICD-10-CM

## 2021-06-29 DIAGNOSIS — E11.65 TYPE 2 DIABETES MELLITUS WITH HYPERGLYCEMIA, WITH LONG-TERM CURRENT USE OF INSULIN (H): Primary | ICD-10-CM

## 2021-06-29 PROCEDURE — 99607 MTMS BY PHARM ADDL 15 MIN: CPT | Performed by: PHARMACIST

## 2021-06-29 PROCEDURE — 99606 MTMS BY PHARM EST 15 MIN: CPT | Performed by: PHARMACIST

## 2021-06-29 RX ORDER — INSULIN GLARGINE 100 [IU]/ML
38 INJECTION, SOLUTION SUBCUTANEOUS DAILY
Qty: 15 ML | Refills: 0
Start: 2021-06-29 | End: 2021-11-17

## 2021-06-29 NOTE — PATIENT INSTRUCTIONS
Recommendations from today's MTM visit:                                                      Today we reviewed what your medicines are for, how to know if they are working, that your medicines are safe and how to make your medicine regimen as easy as possible.      Increase Basaglar 38 units daily.  Recommend COVID-19 vaccine      Follow-up: Return in about 2 weeks (around 7/13/2021) for Medication Therapy Management Visit, blood sugar check.    It was great to speak with you today.  I value your experience and would be very thankful for your time with providing feedback on our clinic survey. You may receive a survey via email or text message in the next few days.     To schedule another MTM appointment, please call the clinic directly or you may call the MTM scheduling line at [phone] or toll-free at [phone].     My Clinical Pharmacist's contact information:                                                      Please feel free to contact me with any questions or concerns you have.      Mirna Perez, Pharm.D, BCACP  Medication Therapy Management Pharmacist

## 2021-07-14 ENCOUNTER — VIRTUAL VISIT (OUTPATIENT)
Dept: PHARMACY | Facility: CLINIC | Age: 81
End: 2021-07-14
Payer: COMMERCIAL

## 2021-07-14 DIAGNOSIS — E11.65 TYPE 2 DIABETES MELLITUS WITH HYPERGLYCEMIA, WITH LONG-TERM CURRENT USE OF INSULIN (H): Primary | ICD-10-CM

## 2021-07-14 DIAGNOSIS — Z79.4 TYPE 2 DIABETES MELLITUS WITH HYPERGLYCEMIA, WITH LONG-TERM CURRENT USE OF INSULIN (H): Primary | ICD-10-CM

## 2021-07-14 PROCEDURE — 99607 MTMS BY PHARM ADDL 15 MIN: CPT | Performed by: PHARMACIST

## 2021-07-14 PROCEDURE — 99606 MTMS BY PHARM EST 15 MIN: CPT | Performed by: PHARMACIST

## 2021-07-14 NOTE — PROGRESS NOTES
"Medication Therapy Management (MTM) Encounter    ASSESSMENT/PLAN:                            Medication Adherence/Access: No issues identified    Diabetes: Not meeting A1c goal of less than 8%. Patient was still taking 36 units of Basaglar instead of the 38 units that was recommended last appointment. Continue to monitor for frequency of yeast and bladder infections.      PLAN:   Increase Basaglar 38 units daily.      Will follow up on Tuesday the 27th    SUBJECTIVE/OBJECTIVE:                           Brandy Leon is a 80 year old female called for a follow up visit. This is a follow up visit rom 6/29/2021. She was referred to me from Cailin Ponce.     Reason for visit: Basaglar increase    Allergies/ADRs: Reviewed in chart  Tobacco: She reports that she has never smoked. She has never used smokeless tobacco.    Alcohol: none  Caffeine: 2 cups/day of coffee with whole milk  Activity: up and down the stairs doing her laundry, Can't walk very far she feels wobbly.  Past Medical History: Reviewed in chart    Medication Adherence/Access: Patient uses pill box(es). Patient reports not missing any doses of medication. Patient uses medication assistance program. Patient is getting Synthroid, Lantus, and Jardiance through  programs. Jardiance is getting mailed to her home and Synthroid, come to the clinic.    Diabetes:  Patient is currently taking metformin 1000mg twice daily, Basaglar 36 units daily (Patient never increased dose to 38 units that was recommended at last visit), Jardiance 25mg daily, glipizide XL 10mg once twice daily. Patient is experiencing burning, itchy but no discharge. She has a DR appointment coming up to see if yeast infection is resolved.   SMBG: one-two times daily. Ranges (patient reported:) \"200's in the morning\"     Date FBG/ 2hours post Lunch/2hours post Dinner /2hours post   7/14 205     7/13 192     7/12 195     7/11 127     7/10 196     7/9 202 214    7/6 204   "     Patient is not experiencing hypoglycemia.  Recent symptoms of high blood sugar? None.  Eye exam: up to date  Foot exam: up to date  ACEi/ARB: Yes: irbesartan/HCTZ.   Urine Albumin:   Lab Results   Component Value Date    MICROL 40 11/12/2020     Aspirin: Taking 81mg daily and denies side effects.   Diet: Son cooks meals-last night beef ribs with potato, carrots, blueberry cheesecake  Lab Results   Component Value Date    A1C 10.3 05/17/2021    A1C 9.2 02/16/2021    A1C 8.7 11/12/2020    A1C 10.0 08/20/2020    A1C 10.5 01/10/2020     Debating if she should have COVID vaccine but doesn't have any questions regarding it.  Today's Vitals: There were no vitals taken for this visit.    I spent 12 minutes with this patient. All changes were made via collaborative practice agreement with Cailin Ponce. A copy of the visit note was provided to the patient's primary care provider.    The patient was sent via [employer] a summary of these recommendations.     Mirna Perez, Pharm.D, [facility]  Medication Therapy Management Pharmacist    Telemedicine Visit Details  Type of service:  Telephone visit  Start Time: 9:31 AM  End Time: 9:43 AM  Originating Location (patient location): Home  Distant Location (provider location):  [facility]        Medication Therapy Recommendations  Type 2 diabetes mellitus with hyperglycemia, with long-term current use of insulin (H)    Current Medication: insulin glargine (BASAGLAR KWIKPEN) 100 UNIT/ML pen   Rationale: Dose too low - Dosage too low - Effectiveness   Recommendation: Increase Dose   Status: Accepted per CPA

## 2021-07-14 NOTE — PATIENT INSTRUCTIONS
Recommendations from today's MTM visit:                                                      Today we reviewed what your medicines are for, how to know if they are working, that your medicines are safe and how to make your medicine regimen as easy as possible.      Increase Basaglar 38 units daily.    Follow-up: Tuesday July 27th    It was great to speak with you today.  I value your experience and would be very thankful for your time with providing feedback on our clinic survey. You may receive a survey via email or text message in the next few days.     To schedule another MTM appointment, please call the clinic directly or you may call the MTM scheduling line at [phone] or toll-free at [phone].     My Clinical Pharmacist's contact information:                                                      Please feel free to contact me with any questions or concerns you have.      Mirna Perez, Pharm.D, BCACP  Medication Therapy Management Pharmacist

## 2021-07-15 ENCOUNTER — OFFICE VISIT (OUTPATIENT)
Dept: NEUROLOGY | Facility: CLINIC | Age: 81
End: 2021-07-15
Attending: NURSE PRACTITIONER
Payer: COMMERCIAL

## 2021-07-15 VITALS
RESPIRATION RATE: 16 BRPM | BODY MASS INDEX: 31.24 KG/M2 | SYSTOLIC BLOOD PRESSURE: 132 MMHG | HEART RATE: 111 BPM | DIASTOLIC BLOOD PRESSURE: 79 MMHG | WEIGHT: 182 LBS

## 2021-07-15 DIAGNOSIS — G91.9 HYDROCEPHALUS, UNSPECIFIED TYPE (H): ICD-10-CM

## 2021-07-15 DIAGNOSIS — G62.9 NEUROPATHY: ICD-10-CM

## 2021-07-15 DIAGNOSIS — R26.9 GAIT DISORDER: Primary | ICD-10-CM

## 2021-07-15 LAB
CRP SERPL-MCNC: 6.2 MG/L (ref 0–8)
TOTAL PROTEIN SERUM FOR ELP: 8 G/DL (ref 6.8–8.8)
VIT B12 SERPL-MCNC: 468 PG/ML (ref 193–986)

## 2021-07-15 PROCEDURE — 99215 OFFICE O/P EST HI 40 MIN: CPT | Performed by: INTERNAL MEDICINE

## 2021-07-15 PROCEDURE — 86334 IMMUNOFIX E-PHORESIS SERUM: CPT | Performed by: PATHOLOGY

## 2021-07-15 PROCEDURE — 84155 ASSAY OF PROTEIN SERUM: CPT | Performed by: INTERNAL MEDICINE

## 2021-07-15 PROCEDURE — 82784 ASSAY IGA/IGD/IGG/IGM EACH: CPT | Performed by: PATHOLOGY

## 2021-07-15 PROCEDURE — 99417 PROLNG OP E/M EACH 15 MIN: CPT | Performed by: INTERNAL MEDICINE

## 2021-07-15 PROCEDURE — 36415 COLL VENOUS BLD VENIPUNCTURE: CPT | Performed by: INTERNAL MEDICINE

## 2021-07-15 PROCEDURE — 86140 C-REACTIVE PROTEIN: CPT | Performed by: INTERNAL MEDICINE

## 2021-07-15 PROCEDURE — 82607 VITAMIN B-12: CPT | Performed by: INTERNAL MEDICINE

## 2021-07-15 PROCEDURE — 85652 RBC SED RATE AUTOMATED: CPT | Performed by: INTERNAL MEDICINE

## 2021-07-15 PROCEDURE — 84165 PROTEIN E-PHORESIS SERUM: CPT | Performed by: PATHOLOGY

## 2021-07-15 NOTE — PROGRESS NOTES
[facility] Neurology Consultation    Brandy Leon MRN# [id number]   Age: 80 year old YOB: 1940     Requesting physician: Cailin Borrego     Reason for Consultation: balance changes      History of Presenting Symptoms:   Brandy Leon is a 80 year old female who presents today for evaluation of balance changes.     She first started noticing issues with the balance about 2 years ago. She had a fall about 1 year ago when putting fuel in her bus. She stepped over the hose and lost her balance. She hasn't had a fall since then, but has had a lot of near falls.     She has intermittent numbness/tingling in the bilateral hands, more in the left hand and throughout all 10 fingers. It doesn't wake her up at night. She has intermittent numbness in the feet as well. She denies any low back pain that shoots down the legs. She denies any tremor or shaking in the hands. She denies any history of acting out her dreams.     She hasn't worked since March 2020. She was a . She has not worked due to concerns of her ability to drive safely for others given walking impairments.    She denies any changes in her memory. She takes care of her own finances. Her son lives with her and does the yard work. She still drives and hasn't had any issues.     She has urinary frequency with intermittent incontinence if she is not able to make it to the bathroom. This has been going on for a few years. She is on trospium, which helps.       Past Medical History:     Patient Active Problem List   Diagnosis     Seasonal allergies     Hypothyroidism     Hyperlipidemia LDL goal <100     Fibromyalgia     Osteoarthritis     Advanced directives, counseling/discussion     Obesity     Type 2 diabetes mellitus with hyperglycemia, with long-term current use of insulin (H)     Hypertension goal BP (blood pressure) < 140/90     Overactive bladder     Recurrent UTI     Pseudophakia of both eyes     DINORA  (obstructive sleep apnea)- severe (AHI 56)     Contusion of right knee     Gait disorder     Gastroesophageal reflux disease without esophagitis     Past Medical History:   Diagnosis Date     Actinic keratosis 3/12/2013     Degenerative joint disease      Diabetes (H)      Gastroesophageal reflux disease without esophagitis 12/11/2020     Rheumatic fever 1957    x2 between the ages of 15-18     Seasonal allergies         Past Surgical History:     Past Surgical History:   Procedure Laterality Date     C APPENDECTOMY       C ARTHROPLASTY TMJ       CATARACT IOL, RT/LT       COLONOSCOPY       COLONOSCOPY N/A 8/13/2015    Procedure: COLONOSCOPY;  Surgeon: Duane, William Charles, MD;  Location: [facility]     COLONOSCOPY WITH CO2 INSUFFLATION N/A 8/13/2015    Procedure: COLONOSCOPY WITH CO2 INSUFFLATION;  Surgeon: Duane, William Charles, MD;  Location: [facility]     PHACOEMULSIFICATION WITH STANDARD INTRAOCULAR LENS IMPLANT Left 10/25/2018    Procedure: LEFT PHACOEMULSIFICATION WITH STANDARD INTRAOCULAR LENS IMPLANT;  Surgeon: Thomas Serna MD;  Location: [facility]     PHACOEMULSIFICATION WITH STANDARD INTRAOCULAR LENS IMPLANT Right 11/8/2018    Procedure: RIGHT PHACOEMULSIFICATION WITH STANDARD INTRAOCULAR LENS IMPLANT;  Surgeon: Thomas Serna MD;  Location: [facility]     THYROIDECTOMY           Social History:     Social History     Tobacco Use     Smoking status: Never Smoker     Smokeless tobacco: Never Used     Tobacco comment: has had exposure to second hand smoke in the past from [employer], no current exposure   Substance Use Topics     Alcohol use: No     Drug use: No        Family History:     Family History   Problem Relation Age of Onset     Cancer Father         skin and leukemia     Cerebrovascular Disease Maternal Grandfather      Cerebrovascular Disease Paternal Grandmother      Eye Disorder Paternal Grandmother         cataracts     Cerebrovascular Disease Paternal Grandfather      Heart Disease  Paternal Grandfather      Cancer Sister         Breast Cancer     Eye Disorder Mother         cataracts     Eye Disorder Brother         cataract     Breast Cancer Daughter      Other Cancer Daughter         ovarian cancer     Glaucoma No family hx of      Macular Degeneration No family hx of         Medications:     Current Outpatient Medications   Medication Sig     aspirin 81 MG EC tablet Take 1 tablet by mouth daily.     Cranberry-Vitamin C-Vitamin E (CRANBERRY PLUS VITAMIN C) 4200-20-3 MG-MG-UNIT CAPS Take 1 capsule by mouth 2 times daily     empagliflozin (JARDIANCE) 25 MG TABS tablet Take 1 tablet (25 mg) by mouth daily     fexofenadine (ALLEGRA) 180 MG tablet Take 180 mg by mouth as needed      glipiZIDE (GLUCOTROL XL) 10 MG 24 hr tablet Take 10 mg by mouth 2 times daily     GLUCOSAMINE CHONDROITIN COMPLX OR 2 Daily     ibuprofen (ADVIL/MOTRIN) 200 MG capsule Take 400 mg by mouth every 4 hours as needed      insulin glargine (BASAGLAR KWIKPEN) 100 UNIT/ML pen Inject 38 Units Subcutaneous daily     insulin pen needle (BD JUAN C U/F) 32G X 4 MM miscellaneous USE 1 DAILY AS DIRECTED.     irbesartan-hydrochlorothiazide (AVALIDE) 150-12.5 MG tablet TAKE 1/2 TABLET BY MOUTH EVERY DAY     latanoprost (XALATAN) 0.005 % ophthalmic solution Place 1 drop into both eyes daily     metFORMIN (GLUCOPHAGE) 1000 MG tablet TAKE 1 TABLET (1,000 MG) BY MOUTH 2 TIMES DAILY (WITH MEALS)     MULTIVITAMIN OR 1 tablet daily     Omega-3 Fatty Acids (OMEGA 3 PO) Take by mouth 2 times daily.      omeprazole (PRILOSEC) 20 MG DR capsule TAKE 1 CAPSULE BY MOUTH EVERY DAY     ONE TOUCH DELICA LANCETS MISC 1 each 2 times daily. One Touch Delica       ONETOUCH ULTRA test strip USE TO TEST TWICE A DAY     order for DME Glucometer covered by insurance.     rosuvastatin (CRESTOR) 5 MG tablet TAKE 1 TABLET BY MOUTH TWICE WEEKLY     SYNTHROID 137 MCG tablet TAKE 1 TABLET BY MOUTH EVERY DAY     triamcinolone (KENALOG) 0.1 % external cream Apply  twice daily for 2 weeks     trospium (SANCTURA XR) 60 MG CP24 24 hr capsule Take 1 capsule (60 mg) by mouth every morning     No current facility-administered medications for this visit.     Facility-Administered Medications Ordered in Other Visits   Medication     DOBUTamine 500 mg in dextrose 5% 250 mL (adult std conc) premix        Allergies:     Allergies   Allergen Reactions     Estradiol Itching     Lipitor [Atorvastatin Calcium]      Weak and shaky     Sulfa Drugs      Rash       Zocor [Simvastatin]      Weak and shakey        Review of Systems:   As above     Physical Exam:   Vitals: /79   Pulse 111   Resp 16   Wt 82.6 kg (182 lb)   BMI 31.24 kg/m     General: Seated comfortably in no acute distress.  HEENT: Optic discs sharp and vasculature normal on funduscopic exam.   Lungs: breathing comfortably  GI: Non tender  Extremities: no edema  Skin: No rashes  Neurologic:     Mental Status: MoCA 22/27 (-1 clock hands, -1 clock contour (slightly neglected right side), 1/1 trails, 1/1 cube copy, 2/2 digits, 1/1 letters, 3/3 serial 7s, 1/2 repeating sentence, 2/2 abstraction, 3/5 delayed recall, 6/6 orientation)     Cranial Nerves: Visual fields intact. PERRL. EOMI with normal smooth pursuit. Facial sensation intact/symmetric. Facial movements symmetric. Hearing not formally tested but intact to conversation. Palate elevation symmetric, uvula midline. No dysarthria. Shoulder shrug strong bilaterally. Tongue protrusion midline.     Motor: No tremors or other abnormal movements observed. Muscle tone normal throughout. Normal/symmetric rapid finger tapping. Strength 5/5 throughout upper and lower extremities. Unable to stand from seated position without hands.     Deep Tendon Reflexes: 2+/symmetric throughout upper extremities, 1+ patella, and trace ankle jerks. Toes downgoing bilaterally.     Sensory: Intact/symmetric to light touch throughout upper and lower extremities. Decreased temperature and pinprick  sensation in the feet compared to hands. Vibration is absent in the bilateral great toes, 8 seconds at ankles, normal in the hands. Proprioception is mildly decreased in bilateral great toes, otherwise intact. Negative Romberg.      Coordination: Finger-nose-finger and heel-shin intact without dysmetria. Rapid alternating movements intact/symmetric with normal speed and rhythm.     Gait: Mildly wide based gait with slight magnetic quality, shorter steps and shorter stride length. Takes extra steps on turns. Not able to do heel, toe, or tandem gait. Pull test positive, takes 3-4 steps.          Data: Pertinent prior to visit   Imaging:  MRI brain 6/8/2021  IMPRESSION:  No interval change since 2/17/2020. Severe advanced cerebral volume  loss. Unchanged mild ventriculomegaly. This might partly be due to  pressure hydrocephalus. Clinical correlation is advised.    Procedures:  None    Laboratory:  TSH normal (11/2020)  A1c 10.3 (2021)         Assessment and Plan:   Assessment:  Brandy Leon is a 80 year old female who presents today for evaluation of balance changes. Balance changes and urinary frequency/urgency has been occurring over the last several years. On examination she has mildly wide based, ataxic, and slightly magnetic quality to gait. She has significant postural instability. She denies any memory problems, but score 22/27 on abbreviated MoCA testing in the range of possible mild cognitive impairment. I suspect MRI brain reviewed and given changes NPH is a consideration. We discussed pursuing high volume lumbar puncture and pre and post PT testing. Patient also has changes on exam suggestive of polyneuropathy likely related to diabetes. I don't believe polyneuropathy explains all of gait changes. We will also check basic neuropathy labs today.      Plan:  - High volume LP, please remove 30 ml  - Pre and post LP physical therapy assessment  - Neuropathy labs; B12, ESR, CRP, ELP,  immunofixation    Follow up in Neurology clinic after NPH evaluation    Mitchel Kenney MD   of Neurology  [facility]      The total time of this encounter today amounted to 70 minutes. This time included time spent with the patient, prep work, ordering tests, and performing post visit documentation.

## 2021-07-15 NOTE — LETTER
7/15/2021         RE: Brandy Leon  [address] [place] [address]        Dear Colleague,    Thank you for referring your patient, Brandy Leon, to the [facility]OLOG[facility] [place]. Please see a copy of my visit note below.    [facility] Neurology Consultation    Brandy Leon MRN# [id number]   Age: 80 year old YOB: 1940     Requesting physician: Cailin Borrego     Reason for Consultation: balance changes      History of Presenting Symptoms:   Brandy Leon is a 80 year old female who presents today for evaluation of balance changes.     She first started noticing issues with the balance about 2 years ago. She had a fall about 1 year ago when putting fuel in her bus. She stepped over the hose and lost her balance. She hasn't had a fall since then, but has had a lot of near falls.     She has intermittent numbness/tingling in the bilateral hands, more in the left hand and throughout all 10 fingers. It doesn't wake her up at night. She has intermittent numbness in the feet as well. She denies any low back pain that shoots down the legs. She denies any tremor or shaking in the hands. She denies any history of acting out her dreams.     She hasn't worked since March 2020. She was a . She has not worked due to concerns of her ability to drive safely for others given walking impairments.    She denies any changes in her memory. She takes care of her own finances. Her son lives with her and does the yard work. She still drives and hasn't had any issues.     She has urinary frequency with intermittent incontinence if she is not able to make it to the bathroom. This has been going on for a few years. She is on trospium, which helps.       Past Medical History:     Patient Active Problem List   Diagnosis     Seasonal allergies     Hypothyroidism     Hyperlipidemia LDL goal <100     Fibromyalgia     Osteoarthritis      Advanced directives, counseling/discussion     Obesity     Type 2 diabetes mellitus with hyperglycemia, with long-term current use of insulin (H)     Hypertension goal BP (blood pressure) < 140/90     Overactive bladder     Recurrent UTI     Pseudophakia of both eyes     DINORA (obstructive sleep apnea)- severe (AHI 56)     Contusion of right knee     Gait disorder     Gastroesophageal reflux disease without esophagitis     Past Medical History:   Diagnosis Date     Actinic keratosis 3/12/2013     Degenerative joint disease      Diabetes (H)      Gastroesophageal reflux disease without esophagitis 12/11/2020     Rheumatic fever 1957    x2 between the ages of 16-18     Seasonal allergies         Past Surgical History:     Past Surgical History:   Procedure Laterality Date     C APPENDECTOMY       C ARTHROPLASTY TMJ       CATARACT IOL, RT/LT       COLONOSCOPY       COLONOSCOPY N/A 8/13/2015    Procedure: COLONOSCOPY;  Surgeon: Duane, William Charles, MD;  Location: [facility]     COLONOSCOPY WITH CO2 INSUFFLATION N/A 8/13/2015    Procedure: COLONOSCOPY WITH CO2 INSUFFLATION;  Surgeon: Duane, William Charles, MD;  Location: [facility]     PHACOEMULSIFICATION WITH STANDARD INTRAOCULAR LENS IMPLANT Left 10/25/2018    Procedure: LEFT PHACOEMULSIFICATION WITH STANDARD INTRAOCULAR LENS IMPLANT;  Surgeon: Thomas Serna MD;  Location: [facility]     PHACOEMULSIFICATION WITH STANDARD INTRAOCULAR LENS IMPLANT Right 11/8/2018    Procedure: RIGHT PHACOEMULSIFICATION WITH STANDARD INTRAOCULAR LENS IMPLANT;  Surgeon: Thomas Serna MD;  Location: [facility]     THYROIDECTOMY           Social History:     Social History     Tobacco Use     Smoking status: Never Smoker     Smokeless tobacco: Never Used     Tobacco comment: has had exposure to second hand smoke in the past from [facility], no current exposure   Substance Use Topics     Alcohol use: No     Drug use: No        Family History:     Family History   Problem Relation Age of Onset      Cancer Father         skin and leukemia     Cerebrovascular Disease Maternal Grandfather      Cerebrovascular Disease Paternal Grandmother      Eye Disorder Paternal Grandmother         cataracts     Cerebrovascular Disease Paternal Grandfather      Heart Disease Paternal Grandfather      Cancer Sister         Breast Cancer     Eye Disorder Mother         cataracts     Eye Disorder Brother         cataract     Breast Cancer Daughter      Other Cancer Daughter         ovarian cancer     Glaucoma No family hx of      Macular Degeneration No family hx of         Medications:     Current Outpatient Medications   Medication Sig     aspirin 81 MG EC tablet Take 1 tablet by mouth daily.     Cranberry-Vitamin C-Vitamin E (CRANBERRY PLUS VITAMIN C) 4200-20-3 MG-MG-UNIT CAPS Take 1 capsule by mouth 2 times daily     empagliflozin (JARDIANCE) 25 MG TABS tablet Take 1 tablet (25 mg) by mouth daily     fexofenadine (ALLEGRA) 180 MG tablet Take 180 mg by mouth as needed      glipiZIDE (GLUCOTROL XL) 10 MG 24 hr tablet Take 10 mg by mouth 2 times daily     GLUCOSAMINE CHONDROITIN COMPLX OR 2 Daily     ibuprofen (ADVIL/MOTRIN) 200 MG capsule Take 400 mg by mouth every 4 hours as needed      insulin glargine (BASAGLAR KWIKPEN) 100 UNIT/ML pen Inject 38 Units Subcutaneous daily     insulin pen needle (BD JUAN C U/F) 32G X 4 MM miscellaneous USE 1 DAILY AS DIRECTED.     irbesartan-hydrochlorothiazide (AVALIDE) 150-12.5 MG tablet TAKE 1/2 TABLET BY MOUTH EVERY DAY     latanoprost (XALATAN) 0.005 % ophthalmic solution Place 1 drop into both eyes daily     metFORMIN (GLUCOPHAGE) 1000 MG tablet TAKE 1 TABLET (1,000 MG) BY MOUTH 2 TIMES DAILY (WITH MEALS)     MULTIVITAMIN OR 1 tablet daily     Omega-3 Fatty Acids (OMEGA 3 PO) Take by mouth 2 times daily.      omeprazole (PRILOSEC) 20 MG DR capsule TAKE 1 CAPSULE BY MOUTH EVERY DAY     ONE TOUCH DELICA LANCETS MISC 1 each 2 times daily. One Touch Delica       ONETOUCH ULTRA test strip USE  TO TEST TWICE A DAY     order for DME Glucometer covered by insurance.     rosuvastatin (CRESTOR) 5 MG tablet TAKE 1 TABLET BY MOUTH TWICE WEEKLY     SYNTHROID 137 MCG tablet TAKE 1 TABLET BY MOUTH EVERY DAY     triamcinolone (KENALOG) 0.1 % external cream Apply twice daily for 2 weeks     trospium (SANCTURA XR) 60 MG CP24 24 hr capsule Take 1 capsule (60 mg) by mouth every morning     No current facility-administered medications for this visit.     Facility-Administered Medications Ordered in Other Visits   Medication     DOBUTamine 500 mg in dextrose 5% 250 mL (adult std conc) premix        Allergies:     Allergies   Allergen Reactions     Estradiol Itching     Lipitor [Atorvastatin Calcium]      Weak and shaky     Sulfa Drugs      Rash       Zocor [Simvastatin]      Weak and shakey        Review of Systems:   As above     Physical Exam:   Vitals: /79   Pulse 111   Resp 16   Wt 82.6 kg (182 lb)   BMI 31.24 kg/m     General: Seated comfortably in no acute distress.  HEENT: Optic discs sharp and vasculature normal on funduscopic exam.   Lungs: breathing comfortably  GI: Non tender  Extremities: no edema  Skin: No rashes  Neurologic:     Mental Status: MoCA 22/27 (-1 clock hands, -1 clock contour (slightly neglected right side), 1/1 trails, 1/1 cube copy, 2/2 digits, 1/1 letters, 3/3 serial 7s, 1/2 repeating sentence, 2/2 abstraction, 3/5 delayed recall, 6/6 orientation)     Cranial Nerves: Visual fields intact. PERRL. EOMI with normal smooth pursuit. Facial sensation intact/symmetric. Facial movements symmetric. Hearing not formally tested but intact to conversation. Palate elevation symmetric, uvula midline. No dysarthria. Shoulder shrug strong bilaterally. Tongue protrusion midline.     Motor: No tremors or other abnormal movements observed. Muscle tone normal throughout. Normal/symmetric rapid finger tapping. Strength 5/5 throughout upper and lower extremities. Unable to stand from seated position  without hands.     Deep Tendon Reflexes: 2+/symmetric throughout upper extremities, 1+ patella, and trace ankle jerks. Toes downgoing bilaterally.     Sensory: Intact/symmetric to light touch throughout upper and lower extremities. Decreased temperature and pinprick sensation in the feet compared to hands. Vibration is absent in the bilateral great toes, 8 seconds at ankles, normal in the hands. Proprioception is mildly decreased in bilateral great toes, otherwise intact. Negative Romberg.      Coordination: Finger-nose-finger and heel-shin intact without dysmetria. Rapid alternating movements intact/symmetric with normal speed and rhythm.     Gait: Mildly wide based gait with slight magnetic quality, shorter steps and shorter stride length. Takes extra steps on turns. Not able to do heel, toe, or tandem gait. Pull test positive, takes 3-4 steps.          Data: Pertinent prior to visit   Imaging:  MRI brain 6/8/2021  IMPRESSION:  No interval change since 2/17/2020. Severe advanced cerebral volume  loss. Unchanged mild ventriculomegaly. This might partly be due to  pressure hydrocephalus. Clinical correlation is advised.    Procedures:  None    Laboratory:  TSH normal (11/2020)  A1c 10.3 (2021)         Assessment and Plan:   Assessment:  Brandy Leon is a 80 year old female who presents today for evaluation of balance changes. Balance changes and urinary frequency/urgency has been occurring over the last several years. On examination she has mildly wide based, ataxic, and slightly magnetic quality to gait. She has significant postural instability. She denies any memory problems, but score 22/27 on abbreviated MoCA testing in the range of possible mild cognitive impairment. I suspect MRI brain reviewed and given changes NPH is a consideration. We discussed pursuing high volume lumbar puncture and pre and post PT testing. Patient also has changes on exam suggestive of polyneuropathy likely related to diabetes. I  don't believe polyneuropathy explains all of gait changes. We will also check basic neuropathy labs today.      Plan:  - High volume LP, please remove 30 ml  - Pre and post LP physical therapy assessment  - Neuropathy labs; B12, ESR, CRP, ELP, immunofixation    Follow up in Neurology clinic after NPH evaluation    Mitchel Kenney MD   of Neurology  [facility]      The total time of this encounter today amounted to 70 minutes. This time included time spent with the patient, prep work, ordering tests, and performing post visit documentation.        Again, thank you for allowing me to participate in the care of your patient.        Sincerely,        Mitchel Kenney MD

## 2021-07-16 LAB
ALBUMIN SERPL ELPH-MCNC: 4.2 G/DL (ref 3.7–5.1)
ALPHA1 GLOB SERPL ELPH-MCNC: 0.3 G/DL (ref 0.2–0.4)
ALPHA2 GLOB SERPL ELPH-MCNC: 1.1 G/DL (ref 0.5–0.9)
B-GLOBULIN SERPL ELPH-MCNC: 1 G/DL (ref 0.6–1)
ERYTHROCYTE [SEDIMENTATION RATE] IN BLOOD BY WESTERGREN METHOD: 10 MM/HR (ref 0–30)
GAMMA GLOB SERPL ELPH-MCNC: 1.4 G/DL (ref 0.7–1.6)
M PROTEIN SERPL ELPH-MCNC: 0 G/DL
PROT PATTERN SERPL ELPH-IMP: ABNORMAL

## 2021-07-19 ENCOUNTER — TELEPHONE (OUTPATIENT)
Dept: NEUROLOGY | Facility: CLINIC | Age: 81
End: 2021-07-19

## 2021-07-19 ENCOUNTER — OFFICE VISIT (OUTPATIENT)
Dept: FAMILY MEDICINE | Facility: CLINIC | Age: 81
End: 2021-07-19
Payer: COMMERCIAL

## 2021-07-19 VITALS
HEIGHT: 64 IN | WEIGHT: 182.5 LBS | DIASTOLIC BLOOD PRESSURE: 62 MMHG | RESPIRATION RATE: 12 BRPM | HEART RATE: 71 BPM | OXYGEN SATURATION: 97 % | SYSTOLIC BLOOD PRESSURE: 120 MMHG | BODY MASS INDEX: 31.16 KG/M2

## 2021-07-19 DIAGNOSIS — N30.00 ACUTE CYSTITIS WITHOUT HEMATURIA: ICD-10-CM

## 2021-07-19 DIAGNOSIS — R30.0 DYSURIA: Primary | ICD-10-CM

## 2021-07-19 DIAGNOSIS — R61 NIGHT SWEATS: ICD-10-CM

## 2021-07-19 DIAGNOSIS — N89.8 VAGINAL DISCHARGE: ICD-10-CM

## 2021-07-19 DIAGNOSIS — B37.31 YEAST VAGINITIS: ICD-10-CM

## 2021-07-19 LAB
ALBUMIN UR-MCNC: NEGATIVE MG/DL
APPEARANCE UR: CLEAR
BACTERIA #/AREA URNS HPF: ABNORMAL /HPF
BILIRUB UR QL STRIP: NEGATIVE
CLUE CELLS: ABNORMAL
COLOR UR AUTO: YELLOW
CRP SERPL-MCNC: 9 MG/L (ref 0–8)
ERYTHROCYTE [DISTWIDTH] IN BLOOD BY AUTOMATED COUNT: 14.1 % (ref 10–15)
GLUCOSE UR STRIP-MCNC: >=1000 MG/DL
HCT VFR BLD AUTO: 47.4 % (ref 35–47)
HGB BLD-MCNC: 15.4 G/DL (ref 11.7–15.7)
HGB UR QL STRIP: ABNORMAL
IGA SERPL-MCNC: 288 MG/DL (ref 84–499)
IGG SERPL-MCNC: 1421 MG/DL (ref 610–1616)
IGM SERPL-MCNC: 85 MG/DL (ref 35–242)
KETONES UR STRIP-MCNC: NEGATIVE MG/DL
LEUKOCYTE ESTERASE UR QL STRIP: ABNORMAL
MCH RBC QN AUTO: 28.9 PG (ref 26.5–33)
MCHC RBC AUTO-ENTMCNC: 32.5 G/DL (ref 31.5–36.5)
MCV RBC AUTO: 89 FL (ref 78–100)
NITRATE UR QL: POSITIVE
PH UR STRIP: 5 [PH] (ref 5–7)
PLATELET # BLD AUTO: 335 10E3/UL (ref 150–450)
PROT PATTERN SERPL IFE-IMP: NORMAL
RBC # BLD AUTO: 5.32 10E6/UL (ref 3.8–5.2)
RBC #/AREA URNS AUTO: ABNORMAL /HPF
SP GR UR STRIP: 1.01 (ref 1–1.03)
SQUAMOUS #/AREA URNS AUTO: ABNORMAL /LPF
TRICHOMONAS, WET PREP: ABNORMAL
UROBILINOGEN UR STRIP-ACNC: 0.2 E.U./DL
WBC # BLD AUTO: 13.4 10E3/UL (ref 4–11)
WBC #/AREA URNS AUTO: ABNORMAL /HPF
WBC CLUMPS #/AREA URNS HPF: PRESENT /HPF
WBC'S/HIGH POWER FIELD, WET PREP: ABNORMAL
YEAST, WET PREP: PRESENT

## 2021-07-19 PROCEDURE — 36415 COLL VENOUS BLD VENIPUNCTURE: CPT | Performed by: FAMILY MEDICINE

## 2021-07-19 PROCEDURE — 86140 C-REACTIVE PROTEIN: CPT | Performed by: FAMILY MEDICINE

## 2021-07-19 PROCEDURE — 81001 URINALYSIS AUTO W/SCOPE: CPT | Performed by: FAMILY MEDICINE

## 2021-07-19 PROCEDURE — 85027 COMPLETE CBC AUTOMATED: CPT | Performed by: FAMILY MEDICINE

## 2021-07-19 PROCEDURE — 87086 URINE CULTURE/COLONY COUNT: CPT | Performed by: FAMILY MEDICINE

## 2021-07-19 PROCEDURE — 87186 SC STD MICRODIL/AGAR DIL: CPT | Performed by: FAMILY MEDICINE

## 2021-07-19 PROCEDURE — 87210 SMEAR WET MOUNT SALINE/INK: CPT | Performed by: FAMILY MEDICINE

## 2021-07-19 PROCEDURE — 99214 OFFICE O/P EST MOD 30 MIN: CPT | Performed by: FAMILY MEDICINE

## 2021-07-19 ASSESSMENT — MIFFLIN-ST. JEOR: SCORE: 1287.81

## 2021-07-19 ASSESSMENT — PAIN SCALES - GENERAL: PAINLEVEL: NO PAIN (0)

## 2021-07-19 NOTE — TELEPHONE ENCOUNTER
7/19/2021 @ 11:21AM Left message for patient that her NPH testing was scheduled for Sept 7th at 9:00 AM. I left phone number [phone] for patient to call and get all the specific times for Sept 7th.    Janel Karla Procedure   Neurology & Neurosurgery Specialties  [facility]   [phone]                ----- Message from Shantell Bowers RN sent at 7/19/2021  9:09 AM CDT -----  Regarding: FW: NPH evaluation    ----- Message -----  From: Adelina Perez RN  Sent: 7/19/2021   8:43 AM CDT  To: Kiah Conroy RN, Mitchel Kenney MD, #  Subject: FW: NPH evaluation                               I got our last patient scheduled for her NPH testing.     Can you please let pt know her [place] [facility], PT on 4th floor.     Start time for pre-PT is 9:15am, check in for LP at 11am for lab work, LP start at 12p, post PT start 1:45p.     Will need follow up with Dr. Kenney 2 weeks post testing as well; please arrange. Thanks    Adelina CHAVEZ  ----- Message -----  From: Adelina Perez RN  Sent: 7/16/2021  10:54 AM CDT  To: Adelina Perez RN  Subject: RE: NPH evaluation                               Left voicemail with PT trying to get her in 9/7 spots at 9:15a and 1:45p.     ----- Message -----  From: Mitchel Kenney MD  Sent: 7/15/2021  12:32 PM CDT  To: Adelina Perez RN  Subject: NPH evaluation                                   Hi Adelina,     Here is the other patient I am also doing an NPH evaluation on. Thanks.     Mitchel

## 2021-07-19 NOTE — PATIENT INSTRUCTIONS
I will send the vaginal and blood testing results when available to [facility].    Urine culture is pending and will be sent when available with recommendations as well.

## 2021-07-19 NOTE — PROGRESS NOTES
Assessment & Plan     Dysuria  Symptoms are suggestive of urinary tract infection and preliminary culture results show E. coli.  The previous infection in June was Klebsiella so this does appear to be a new infection.  - UA Macro with Reflex to Micro and Culture - lab collect; Future  - UA Macro with Reflex to Micro and Culture - lab collect  - Urine Microscopic Exam  - Urine Culture    Vaginal discharge  Likely yeast on initial exam which was confirmed with wet prep.  Her use of the Jardiance may be complicating her ability to clear the yeast infection as that was present on her last evaluation as well.  - Wet prep - Clinic Collect    Night sweats  Elevated white blood cell count and CRP suggesting infection with risk for sepsis.  Awaiting final urine culture result but will begin Levaquin 250 mg daily for 7 days in attempt to get good blood levels of the antibiotic.  - CBC with platelets; Future  - CRP, inflammation; Future  - CRP, inflammation  - CBC with platelets    Acute cystitis without hematuria  As noted above treated with Levaquin awaiting final urine culture sensitivities.    Yeast vaginitis  1 dose of Diflucan now with a repeat at the end of the antibiotics are recommended.  She may require some topical treatment as well but will start with this treatment for the immediate symptoms.      Review of the result(s) of each unique test - Prior urine culture  Ordering of each unique test  Prescription drug management         Patient Instructions   I will send the vaginal and blood testing results when available to [facility].    Urine culture is pending and will be sent when available with recommendations as well.          Return in about 4 weeks (around 8/16/2021) for as needed for persistent symptoms.    Fernanda Miller MD  [facility] is a 80 year old who presents for the following health issues     UTI    History of Present Illness       Back Pain:  She  presents for follow up of back pain. Patient's back pain is a recurring problem.  Location of back pain:  Left side of waist  Description of back pain: burning and dull ache  Back pain spreads: left buttocks and left shoulder    Since patient first noticed back pain, pain is: always present, but gets better and worse  Does back pain interfere with her job:  No      Diabetes:   She presents for follow up of diabetes.  She is checking home blood glucose one time daily. She checks blood glucose before meals.  Blood glucose is never over 200 and never under 70. When her blood glucose is low, the patient is asymptomatic for confusion, blurred vision, lethargy and reports not feeling dizzy, shaky, or weak.  She has no concerns regarding her diabetes at this time.  She is having numbness in feet and burning in feet.         She eats 2-3 servings of fruits and vegetables daily.She consumes 0 sweetened beverage(s) daily.She exercises with enough effort to increase her heart rate 9 or less minutes per day.  She exercises with enough effort to increase her heart rate 3 or less days per week.   She is taking medications regularly.       Genitourinary - Female  Onset/Duration: patient states its been 4 week and is still not feeling better after recent UTI and yeast infection   Description:   Painful urination (Dysuria): no           Frequency: YES  Blood in urine (Hematuria): no  Delay in urine (Hesitency): no  Intensity: moderate  Progression of Symptoms:  same  Accompanying Signs & Symptoms:  Fever/chills: no  Flank pain: no  Nausea and vomiting: no  Vaginal symptoms: none and itching  Abdominal/Pelvic Pain: no  History:   History of frequent UTI s: YES  History of kidney stones: no  Sexually Active: no  Possibility of pregnancy: No  Precipitating or alleviating factors: None  Therapies tried and outcome:  none -- treated in June for UTI.           Review of Systems   Constitutional, HEENT, cardiovascular, pulmonary, gi and gu  "systems are negative, except as otherwise noted.      Objective    /62   Pulse 71   Resp 12   Ht 1.626 m (5' 4\")   Wt 82.8 kg (182 lb 8 oz)   SpO2 97%   BMI 31.33 kg/m    Body mass index is 31.33 kg/m .  Physical Exam   GENERAL: alert, no distress and over weight  NECK: no adenopathy, no asymmetry, masses, or scars and thyroid normal to palpation  RESP: lungs clear to auscultation - no rales, rhonchi or wheezes  CV: regular rate and rhythm, normal S1 S2, no S3 or S4, no murmur, click or rub, no peripheral edema and peripheral pulses strong  ABDOMEN: soft, nontender, no hepatosplenomegaly, no masses and bowel sounds normal   (female): normal urethral meatus , vaginal discharge - moderate, white, thick, vaginal mucosal atrophy and significant diffuse erythema across the perineum.  No open sores or lesions noted.  MS: no gross musculoskeletal defects noted, no edema  BACK: no CVA tenderness, no paralumbar tenderness    Results for orders placed or performed in visit on 07/19/21   UA Macro with Reflex to Micro and Culture - lab collect     Status: Abnormal    Specimen: Urine, Midstream   Result Value Ref Range    Color Urine Yellow Colorless, Straw, Light Yellow, Yellow    Appearance Urine Clear Clear    Glucose Urine >=1000 (A) Negative mg/dL    Bilirubin Urine Negative Negative    Ketones Urine Negative Negative mg/dL    Specific Gravity Urine 1.010 1.003 - 1.035    Blood Urine Trace (A) Negative    pH Urine 5.0 5.0 - 7.0    Protein Albumin Urine Negative Negative mg/dL    Urobilinogen Urine 0.2 0.2, 1.0 E.U./dL    Nitrite Urine Positive (A) Negative    Leukocyte Esterase Urine Trace (A) Negative   Urine Microscopic Exam     Status: Abnormal   Result Value Ref Range    Bacteria Urine Moderate (A) None Seen /HPF    RBC Urine 0-2 0-2 /HPF /HPF    WBC Urine 10-25 (A) 0-5 /HPF /HPF    Squamous Epithelials Urine Few (A) None Seen /LPF    WBC Clumps Urine Present (A) None Seen /HPF   CRP, inflammation     " Status: Abnormal   Result Value Ref Range    CRP Inflammation 9.0 (H) 0.0 - 8.0 mg/L   CBC with platelets     Status: Abnormal   Result Value Ref Range    WBC Count 13.4 (H) 4.0 - 11.0 10e3/uL    RBC Count 5.32 (H) 3.80 - 5.20 10e6/uL    Hemoglobin 15.4 11.7 - 15.7 g/dL    Hematocrit 47.4 (H) 35.0 - 47.0 %    MCV 89 78 - 100 fL    MCH 28.9 26.5 - 33.0 pg    MCHC 32.5 31.5 - 36.5 g/dL    RDW 14.1 10.0 - 15.0 %    Platelet Count 335 150 - 450 10e3/uL   Urine Culture     Status: Abnormal (Preliminary result)    Specimen: Urine, Midstream   Result Value Ref Range    Culture >100,000 CFU/mL Escherichia coli (A)    Wet prep - Clinic Collect     Status: Abnormal    Specimen: Vagina; Swab   Result Value Ref Range    Trichomonas Absent Absent    Yeast Present (A) Absent    Clue Cells Absent Absent    WBCs/high power field 4+ (A) None

## 2021-07-20 ENCOUNTER — IMMUNIZATION (OUTPATIENT)
Dept: NURSING | Facility: CLINIC | Age: 81
End: 2021-07-20
Payer: COMMERCIAL

## 2021-07-20 ENCOUNTER — MYC MEDICAL ADVICE (OUTPATIENT)
Dept: FAMILY MEDICINE | Facility: CLINIC | Age: 81
End: 2021-07-20

## 2021-07-20 DIAGNOSIS — Z11.59 ENCOUNTER FOR SCREENING FOR OTHER VIRAL DISEASES: ICD-10-CM

## 2021-07-20 DIAGNOSIS — B37.31 YEAST VAGINITIS: ICD-10-CM

## 2021-07-20 DIAGNOSIS — N30.00 ACUTE CYSTITIS WITHOUT HEMATURIA: Primary | ICD-10-CM

## 2021-07-20 PROCEDURE — 0001A PR COVID VAC PFIZER DIL RECON 30 MCG/0.3 ML IM: CPT

## 2021-07-20 PROCEDURE — 91300 PR COVID VAC PFIZER DIL RECON 30 MCG/0.3 ML IM: CPT

## 2021-07-20 RX ORDER — FLUCONAZOLE 150 MG/1
150 TABLET ORAL ONCE
Qty: 2 TABLET | Refills: 0 | Status: SHIPPED | OUTPATIENT
Start: 2021-07-20 | End: 2021-07-20

## 2021-07-20 RX ORDER — LEVOFLOXACIN 250 MG/1
250 TABLET, FILM COATED ORAL DAILY
Qty: 7 TABLET | Refills: 0 | Status: SHIPPED | OUTPATIENT
Start: 2021-07-20 | End: 2021-07-27

## 2021-07-20 NOTE — TELEPHONE ENCOUNTER
Please call patient: See my chart message that has not been read regarding urinary tract infection and yeast infection.    PSK

## 2021-07-20 NOTE — TELEPHONE ENCOUNTER
Called and spoke with patient. Relayed results and discussed treatment plan.  Reviewed medications and how to take each.  Patient voiced understanding and agreed with plans.  No questions at this time.    Betsey Hodges RN  [facility]

## 2021-07-21 LAB — BACTERIA UR CULT: ABNORMAL

## 2021-07-21 NOTE — RESULT ENCOUNTER NOTE
Your final urine culture indicates a different bacteria compared with the infection in June.  The antibiotic you were given will treat the infection that is present.    Please call or MyChart message me if you have any questions.       CK

## 2021-07-26 NOTE — PROGRESS NOTES
Medication Therapy Management (MTM) Encounter    ASSESSMENT/PLAN:                            Medication Adherence/Access: No issues identified    Diabetes: Not meeting A1c goal of less than 8%. Would benefit from increase in basaglar dose; patient defers at this time. Continue to monitor frequency of UTI and yeast infections with Jardiance use.    PLAN:  No medication changes made today.  Refill for test strips sent to pharmacy.      Will follow up on August 18th.    SUBJECTIVE/OBJECTIVE:                           Brandy Leon is a 80 year old female called for a follow up visit. This is a follow up visit rom 7/14/2021. She was referred to me from Cailin Ponce.     Reason for visit: Basaglar increase; needs refills on OT ultra test strips    Allergies/ADRs: Reviewed in chart  Tobacco: She reports that she has never smoked. She has never used smokeless tobacco.    Alcohol: none  Caffeine: 2 cups/day of coffee with whole milk  Activity: up and down the stairs doing her laundry, Can't walk very far she feels wobbly.  Past Medical History: Reviewed in chart    Medication Adherence/Access: Patient uses pill box(es). Patient reports not missing any doses of medication. Patient uses medication assistance program. Patient is getting Synthroid, Lantus, and Jardiance through  programs. Jardiance is getting mailed to her home and Synthroid, come to the clinic.    Diabetes:  Patient is currently taking metformin 1000mg twice daily, Basaglar 38 units daily, Jardiance 25mg daily, glipizide XL 10mg once twice daily. Patient is experiencing burning, itchy but no discharge. She has a DR appointment coming up to see if yeast infection is resolved. Patient was recently diagnosed with UTI and yeast infection and is being treated with levofloxacin and fluconazole. Jardiance may be contributing.   SMBG: one-two times daily. Ranges (patient reported:)      Date FBG/ 2hours post Lunch/2hours post Dinner /2hours  post   7/27 243     7/26 109     7/25 190     7/24 123     7/23 155     7/22 214     7/21 200     7/20 280     7/19 149     7/18 171     7/17 197     7/16 199       Patient is not experiencing hypoglycemia.  Recent symptoms of high blood sugar? None.  Eye exam: up to date  Foot exam: up to date  ACEi/ARB: Yes: irbesartan/HCTZ.   Urine Albumin:   Lab Results   Component Value Date    MICROL 40 11/12/2020     Aspirin: Taking 81mg daily and denies side effects.   Diet: Son cooks meals-last night beef ribs with potato, carrots, blueberry cheesecake  Lab Results   Component Value Date    A1C 10.3 05/17/2021    A1C 9.2 02/16/2021    A1C 8.7 11/12/2020    A1C 10.0 08/20/2020    A1C 10.5 01/10/2020     Had 1st COVID vaccine on Monday 7/19/2021.     Today's Vitals: There were no vitals taken for this visit.    I spent 16 minutes with this patient. All changes were made via collaborative practice agreement with Cailin Ponce. A copy of the visit note was provided to the patient's primary care provider.    The patient was sent via [employer] a summary of these recommendations.     Mirna Perez, Pharm.D, [facility]  Medication Therapy Management Pharmacist    Telemedicine Visit Details  Type of service:  Telephone visit  Start Time: 10:11AM  End Time: 10:27AM  Originating Location (patient location): Home  Distant Location (provider location):  [facility]        Medication Therapy Recommendations  Type 2 diabetes mellitus with hyperglycemia, with long-term current use of insulin (H)    Current Medication: insulin glargine (BASAGLAR KWIKPEN) 100 UNIT/ML pen   Rationale: Dose too low - Dosage too low - Effectiveness   Recommendation: Increase Dose   Status: Declined per Patient

## 2021-07-27 ENCOUNTER — VIRTUAL VISIT (OUTPATIENT)
Dept: PHARMACY | Facility: CLINIC | Age: 81
End: 2021-07-27
Payer: COMMERCIAL

## 2021-07-27 DIAGNOSIS — Z79.4 TYPE 2 DIABETES MELLITUS WITH HYPERGLYCEMIA, WITH LONG-TERM CURRENT USE OF INSULIN (H): ICD-10-CM

## 2021-07-27 DIAGNOSIS — E11.65 TYPE 2 DIABETES MELLITUS WITH HYPERGLYCEMIA, WITH LONG-TERM CURRENT USE OF INSULIN (H): ICD-10-CM

## 2021-07-27 PROCEDURE — 99607 MTMS BY PHARM ADDL 15 MIN: CPT | Performed by: PHARMACIST

## 2021-07-27 PROCEDURE — 99606 MTMS BY PHARM EST 15 MIN: CPT | Performed by: PHARMACIST

## 2021-07-27 NOTE — PATIENT INSTRUCTIONS
Recommendations from today's MTM visit:                                                      Today we reviewed what your medicines are for, how to know if they are working, that your medicines are safe and how to make your medicine regimen as easy as possible.      No medication changes made today. Refill for test strips sent to pharmacy.    Follow-up: Return in about 3 weeks (around 8/17/2021) for Medication Therapy Management Visit, blood sugar check.    It was great to speak with you today.  I value your experience and would be very thankful for your time with providing feedback on our clinic survey. You may receive a survey via email or text message in the next few days.     To schedule another MTM appointment, please call the clinic directly or you may call the MTM scheduling line at [phone] or toll-free at [phone].     My Clinical Pharmacist's contact information:                                                      Please feel free to contact me with any questions or concerns you have.      Mirna Perez, Pharm.D, BCACP  Medication Therapy Management Pharmacist

## 2021-08-10 ENCOUNTER — IMMUNIZATION (OUTPATIENT)
Dept: NURSING | Facility: CLINIC | Age: 81
End: 2021-08-10
Attending: FAMILY MEDICINE
Payer: COMMERCIAL

## 2021-08-10 PROCEDURE — 0002A PR COVID VAC PFIZER DIL RECON 30 MCG/0.3 ML IM: CPT

## 2021-08-10 PROCEDURE — 91300 PR COVID VAC PFIZER DIL RECON 30 MCG/0.3 ML IM: CPT

## 2021-08-17 DIAGNOSIS — R35.0 URINARY FREQUENCY: ICD-10-CM

## 2021-08-17 DIAGNOSIS — N32.81 OVERACTIVE BLADDER: ICD-10-CM

## 2021-08-17 RX ORDER — TROSPIUM CHLORIDE ER 60 MG/1
60 CAPSULE ORAL EVERY MORNING
Qty: 30 CAPSULE | Refills: 0 | Status: SHIPPED | OUTPATIENT
Start: 2021-08-17 | End: 2022-01-06

## 2021-08-18 ENCOUNTER — VIRTUAL VISIT (OUTPATIENT)
Dept: PHARMACY | Facility: CLINIC | Age: 81
End: 2021-08-18
Payer: COMMERCIAL

## 2021-08-18 DIAGNOSIS — E11.65 TYPE 2 DIABETES MELLITUS WITH HYPERGLYCEMIA, WITH LONG-TERM CURRENT USE OF INSULIN (H): Primary | ICD-10-CM

## 2021-08-18 DIAGNOSIS — Z79.4 TYPE 2 DIABETES MELLITUS WITH HYPERGLYCEMIA, WITH LONG-TERM CURRENT USE OF INSULIN (H): Primary | ICD-10-CM

## 2021-08-18 DIAGNOSIS — L29.9 ITCHING: ICD-10-CM

## 2021-08-18 PROCEDURE — 99607 MTMS BY PHARM ADDL 15 MIN: CPT | Performed by: PHARMACIST

## 2021-08-18 PROCEDURE — 99606 MTMS BY PHARM EST 15 MIN: CPT | Performed by: PHARMACIST

## 2021-08-18 RX ORDER — GLIPIZIDE 10 MG/1
10 TABLET, FILM COATED, EXTENDED RELEASE ORAL 2 TIMES DAILY
Qty: 180 TABLET | Refills: 0 | Status: SHIPPED | OUTPATIENT
Start: 2021-08-18 | End: 2021-11-10

## 2021-08-18 NOTE — PROGRESS NOTES
Medication Therapy Management (MTM) Encounter    ASSESSMENT/PLAN:                            Medication Adherence/Access: No issues identified    Diabetes: Not meeting A1c goal of less than 8%. Would benefit from restarting glipizide. Patient would prefer going back on glipizide and not adjusting any other of her medications. New rx sent to pharmacy. Would benefit from updated A1c.    Itching: Continue to monitor. Itching has been reported with COVID vaccinations but is not clear the length of time it takes to resolve.    PLAN:  Restart glipizide    Will follow up in 1 month.    SUBJECTIVE/OBJECTIVE:                           Brandy Leon is a 81 year old female called for a follow up visit. This is a follow up visit rom 7/27/2021. She was referred to me from Cailin Ponce.     Reason for visit:  due for A1c.    Allergies/ADRs: Reviewed in chart  Tobacco: She reports that she has never smoked. She has never used smokeless tobacco.    Alcohol: none  Caffeine: 2 cups/day of coffee with whole milk  Activity: up and down the stairs doing her laundry, Can't walk very far she feels wobbly.  Past Medical History: Reviewed in chart    Medication Adherence/Access: Patient uses pill box(es). Patient reports not missing any doses of medication. Patient uses medication assistance program. Patient is getting Synthroid, Lantus, and Jardiance through  programs. Jardiance is getting mailed to her home and Synthroid, come to the clinic.    Diabetes:  Patient is currently taking metformin 1000mg twice daily, Basaglar 36 units daily, Jardiance 25mg daily, glipizide XL 10mg once twice daily (hasn't been taking lately because pharmacy hasn't refilled it) Patient reports feeling better when she doesn't take the glipizide.    SMBG: one-two times daily. Ranges (patient reported:)      Date FBG/ 2hours post Lunch/2hours post Dinner /2hours post   8/1 218     8/2 157     8/3 180     8/5 212     8/7 198  186   8/14  280     8/10 156     8/11 289     8/12 168     8/13 204     8/15 260     8/16 225     8/18 167           Patient is not experiencing hypoglycemia.  Recent symptoms of high blood sugar? None.  Eye exam: up to date  Foot exam: up to date  ACEi/ARB: Yes: irbesartan/HCTZ.   Urine Albumin:   Lab Results   Component Value Date    MICROL 40 11/12/2020     Aspirin: Taking 81mg daily and denies side effects.   Diet: Son cooks meals-last night beef ribs with potato, carrots, blueberry cheesecake  Lab Results   Component Value Date    A1C 10.3 05/17/2021    A1C 9.2 02/16/2021    A1C 8.7 11/12/2020    A1C 10.0 08/20/2020    A1C 10.5 01/10/2020     Itching: patient has been having itching since her COVID vaccine. Patient reports this is over her whole body. Every day, mainly at night when she goes to bed. No rash visible. Patient had her second vaccination on 8/10/2021.     Today's Vitals: There were no vitals taken for this visit.    I spent 16 minutes with this patient. All changes were made via collaborative practice agreement with Cailin Ponce. A copy of the visit note was provided to the patient's primary care provider.    The patient was sent via [employer] a summary of these recommendations.     Mirna Perez, Pharm.D, [facility]  Medication Therapy Management Pharmacist    Telemedicine Visit Details  Type of service:  Telephone visit  Start Time: 10:34AM  End Time: 10:50AM  Originating Location (patient location): Home  Distant Location (provider location):  [facility]        Medication Therapy Recommendations  Type 2 diabetes mellitus with hyperglycemia, with long-term current use of insulin (H)    Current Medication: glipiZIDE (GLUCOTROL XL) 10 MG 24 hr tablet   Rationale: Medication product not available - Adherence - Adherence   Recommendation: Provide Education   Status: Patient Agreed - Adherence/Education

## 2021-08-18 NOTE — PATIENT INSTRUCTIONS
Recommendations from today's MTM visit:                                                      Today we reviewed what your medicines are for, how to know if they are working, that your medicines are safe and how to make your medicine regimen as easy as possible.      Restart gliipizide    Follow-up: 1 month    It was great to speak with you today.  I value your experience and would be very thankful for your time with providing feedback on our clinic survey. You may receive a survey via email or text message in the next few days.     To schedule another MTM appointment, please call the clinic directly or you may call the MTM scheduling line at [phone] or toll-free at [phone].     My Clinical Pharmacist's contact information:                                                      Please feel free to contact me with any questions or concerns you have.      Mirna Perez, Pharm.D, BCACP  Medication Therapy Management Pharmacist

## 2021-08-25 NOTE — PROGRESS NOTES
Urology Telephone Visit - Follow-up    CC: follow up urinary incontinence and recurrent UTIs    ASSESSMENT/PLAN:  81 year old female with a history of urinary tract infections and urge urinary incontinence in the setting of poorly controlled DM. She was UTI free for a 2 year period, but has now had 3-4 infections in the last 5 months. Also feels to have one currently, though her only symptom is worsening nocturia. Given the acute increase in frequency of UTIs, we discussed the need for further workup to include the following:  -UA/UC today.  -CT urogram next available.  -Cystoscopy with Dr. Ferreira next available.  -She will continue trospium XR 60 mg daily as this is working well for her urgency/UUI symptoms.    Taylor Lovell PA-C  August 27, 2021      HPI: Brandy Leon is an 81 year old female with PMH of DM2 (last A1C 10.3), DINORA, HTN, and HLD who is being evaluated via billable telephone visit today for follow up of recurrent UTI's and urinary incontinence. She had UTI's in Oct (E. Coli), Nov (E. Coli), and Dec 2019 (mixed  erwin), though then did well for a few years without further UTIs. There was some question of whether her Jardiance medication may be contributing to infections; however, she had previously had issues with UTI's while taking Invokana and stopping this did not result in improvement in her UTI's. Therefore, she has continued on Jardiance. There was also question of possible normal pressure hydrocephalus contributing to UTI's and urinary issues in general, though she saw neurology on 6/30/2020 who felt that she does not have NPH. Unfortunately, since our last visit in 2/2021, she has had 3-4 additional UTIs (see below). Also feels to have one now. Her only symptom is nocturia 4-5 times per night. She denies dysuria, fevers, chills, hematuria.    Regarding her urinary urgency incontinence, this had previously been well controlled with oxybutynin. However, she was having some cognitive  and balance side effects which resolved after oxybutynin was discontinued. She was instead started on Myrbetriq 50 mg daily. While this was helpful for her urinary symptoms, she felt that it caused constipation and was also cost-prohibitive. As a result, she was switched to trospium XR 60 mg daily. This seems to work well and her urinary symptoms are currently stable (aside from when she has a UTI). She denies obvious side effects like constipation, dry eyes, dry mouth.      Past Medical History:   Diagnosis Date     Actinic keratosis 3/12/2013     Degenerative joint disease      Diabetes (H)      Rheumatic fever 1957    x2 between the ages of 15-18     Seasonal allergies      Past Surgical History:   Procedure Laterality Date     C APPENDECTOMY       C ARTHROPLASTY TMJ       CATARACT IOL, RT/LT       COLONOSCOPY       COLONOSCOPY N/A 8/13/2015    Procedure: COLONOSCOPY;  Surgeon: Duane, William Charles, MD;  Location: [facility] OR     COLONOSCOPY WITH CO2 INSUFFLATION N/A 8/13/2015    Procedure: COLONOSCOPY WITH CO2 INSUFFLATION;  Surgeon: Duane, William Charles, MD;  Location: [facility] OR     PHACOEMULSIFICATION WITH STANDARD INTRAOCULAR LENS IMPLANT Left 10/25/2018    Procedure: LEFT PHACOEMULSIFICATION WITH STANDARD INTRAOCULAR LENS IMPLANT;  Surgeon: Thomas Serna MD;  Location: [facility] OR     PHACOEMULSIFICATION WITH STANDARD INTRAOCULAR LENS IMPLANT Right 11/8/2018    Procedure: RIGHT PHACOEMULSIFICATION WITH STANDARD INTRAOCULAR LENS IMPLANT;  Surgeon: Thomas Serna MD;  Location: [facility] OR     THYROIDECTOMY        Current Outpatient Medications   Medication Sig Dispense Refill     aspirin 81 MG EC tablet Take 1 tablet by mouth daily. 90 tablet 3     Cranberry-Vitamin C-Vitamin E (CRANBERRY PLUS VITAMIN C) 4200-20-3 MG-MG-UNIT CAPS Take 1 capsule by mouth 2 times daily       empagliflozin (JARDIANCE) 10 MG TABS tablet Take 1 tablet (10 mg) by mouth daily 90 tablet 3     fexofenadine (ALLEGRA) 180 MG tablet  Take 180 mg by mouth as needed        GLUCOSAMINE CHONDROITIN COMPLX OR 2 Daily       ibuprofen (ADVIL/MOTRIN) 200 MG capsule Take 600 mg by mouth every 4 hours as needed        insulin glargine (LANTUS PEN) 100 UNIT/ML pen Inject 38 Units Subcutaneous At Bedtime       insulin pen needle (BD JUAN C U/F) 32G X 4 MM miscellaneous USE 1 DAILY AS DIRECTED. 100 each 2     irbesartan-hydrochlorothiazide (AVALIDE) 150-12.5 MG tablet Take 0.5 tablets by mouth daily       latanoprost (XALATAN) 0.005 % ophthalmic solution Place 1 drop into both eyes At Bedtime 3 Bottle 3     metFORMIN (GLUCOPHAGE) 1000 MG tablet TAKE 1 TABLET (1,000 MG) BY MOUTH 2 TIMES DAILY (WITH MEALS) 180 tablet 1     mirabegron (MYRBETRIQ) 50 MG 24 hr tablet Take 1 tablet (50 mg) by mouth daily 30 tablet 11     MULTIVITAMIN OR 1 tablet daily       Omega-3 Fatty Acids (OMEGA 3 PO) Take by mouth 2 times daily.        ONE TOUCH DELICA LANCETS MISC 1 each 2 times daily. One Touch Delica   100 each 12     ONETOUCH ULTRA test strip USE TO TEST TWICE A  strip 4     order for DME Glucometer covered by insurance. 1 each 0     rosuvastatin (CRESTOR) 5 MG tablet TAKE 1 TABLET BY MOUTH TWICE WEEKLY 90 tablet 3     SYNTHROID 137 MCG tablet Take 1 tablet (137 mcg) by mouth daily 90 tablet 3     Allergies   Allergen Reactions     Estradiol Itching     Lipitor [Atorvastatin Calcium]      Weak and shaky     Sulfa Drugs      Rash       Zocor [Simvastatin]      Weak and shakey       LABS:  Creatinine   Date Value Ref Range Status   04/02/2021 0.76 0.52 - 1.04 mg/dL Final       7/19/21 - UC: >100K E coli  6/3/21 - UC: >100K Klebsiella pneumoniae x 2 strains  4/2/21 - UA: moderate LE, trace blood, 25-50 WBC, 0-2 RBC, WBC clumps --> UC: <10K mixed  erwin  3/16/21 - UC: >100K E coli    Lab Results   Component Value Date    A1C 10.3 05/17/2021    A1C 9.2 02/16/2021    A1C 8.7 11/12/2020    A1C 10.0 08/20/2020    A1C 10.5 01/10/2020         20 minutes spent on the date of  the encounter, doing pre-visit chart review, interpreting lab/imaging results, ordering labs/tests, and documenting, in addition to 7 minutes spent on the phone with the patient.

## 2021-08-27 ENCOUNTER — TELEPHONE (OUTPATIENT)
Dept: UROLOGY | Facility: CLINIC | Age: 81
End: 2021-08-27

## 2021-08-27 ENCOUNTER — LAB (OUTPATIENT)
Dept: LAB | Facility: CLINIC | Age: 81
End: 2021-08-27
Payer: COMMERCIAL

## 2021-08-27 ENCOUNTER — VIRTUAL VISIT (OUTPATIENT)
Dept: UROLOGY | Facility: CLINIC | Age: 81
End: 2021-08-27
Payer: COMMERCIAL

## 2021-08-27 DIAGNOSIS — R35.0 URINARY FREQUENCY: ICD-10-CM

## 2021-08-27 DIAGNOSIS — N39.0 RECURRENT UTI: ICD-10-CM

## 2021-08-27 DIAGNOSIS — N30.00 ACUTE CYSTITIS WITHOUT HEMATURIA: Primary | ICD-10-CM

## 2021-08-27 DIAGNOSIS — N39.0 RECURRENT UTI: Primary | ICD-10-CM

## 2021-08-27 LAB
ALBUMIN UR-MCNC: NEGATIVE MG/DL
APPEARANCE UR: ABNORMAL
BACTERIA #/AREA URNS HPF: ABNORMAL /HPF
BILIRUB UR QL STRIP: NEGATIVE
COLOR UR AUTO: ABNORMAL
GLUCOSE UR STRIP-MCNC: >1000 MG/DL
HGB UR QL STRIP: NEGATIVE
KETONES UR STRIP-MCNC: NEGATIVE MG/DL
LEUKOCYTE ESTERASE UR QL STRIP: ABNORMAL
NITRATE UR QL: POSITIVE
PH UR STRIP: 6 [PH] (ref 5–7)
RBC #/AREA URNS AUTO: ABNORMAL /HPF
SKIP: ABNORMAL
SP GR UR STRIP: 1.03 (ref 1–1.03)
UROBILINOGEN UR STRIP-MCNC: NORMAL MG/DL
WBC #/AREA URNS AUTO: ABNORMAL /HPF
WBC CLUMPS #/AREA URNS HPF: PRESENT /HPF

## 2021-08-27 PROCEDURE — 87186 SC STD MICRODIL/AGAR DIL: CPT

## 2021-08-27 PROCEDURE — 81001 URINALYSIS AUTO W/SCOPE: CPT

## 2021-08-27 PROCEDURE — 99213 OFFICE O/P EST LOW 20 MIN: CPT | Mod: 95 | Performed by: PHYSICIAN ASSISTANT

## 2021-08-27 PROCEDURE — 87086 URINE CULTURE/COLONY COUNT: CPT

## 2021-08-27 PROCEDURE — 87088 URINE BACTERIA CULTURE: CPT

## 2021-08-27 RX ORDER — CEPHALEXIN 500 MG/1
500 CAPSULE ORAL 2 TIMES DAILY
Qty: 10 CAPSULE | Refills: 0 | Status: SHIPPED | OUTPATIENT
Start: 2021-08-27 | End: 2021-09-01

## 2021-08-27 NOTE — TELEPHONE ENCOUNTER
----- Message from Taylor Lovell PA-C sent at 8/27/2021 12:39 PM CDT -----  UA looks concerning for possible UTI. Please send Keflex 500 mg BID x 5 days while awaiting culture results.  Thanks!  Taylor Lovell PA-C

## 2021-08-27 NOTE — PROGRESS NOTES
Zoila is a 81 year old who is being evaluated via a billable telephone visit.      What phone number would you like to be contacted at? [phone]    How would you like to obtain your AVS? [employer]

## 2021-08-27 NOTE — PATIENT INSTRUCTIONS
UROLOGY CLINIC VISIT PATIENT INSTRUCTIONS    Go to the lab today at 12:00 PM to provide a urine sample to check for urinary tract infection.    Follow up for a CT urogram and cystoscopy with Dr. Ferreira next available to hopefully determine why you are having frequent UTIs.    Continue trospium XR 60 mg once daily for now.    CYSTOSCOPY    What is a Cystoscopy?  This is a procedure done to check for problems inside the bladder.  Problems may include polyps (growths), tumors, inflammation (swelling and redness) and other concerns.    The doctor inserts a thin tube (called a cystoscope) into the bladder.  The tube is about the size of a pencil.  We will give you numbing medicine to reduce the pain or discomfort you may feel.    The tube allows the doctor to:  The doctor will be able to see inside the bladder by filling the bladder with water.  The water makes it easier to see any problems that may be present.    If needed, the doctor may use the tube to:  The doctor is able to take tissue samples (biopsies).  Samples are sent to the lab for testing.  The doctor can also burn off any small growths or tumors that are found.  This is call fulguration.    How should I get ready for the exam?  To prepare, stop taking any medications as instructed. Ask whether you should avoid eating or drinking anything after midnight before the procedure. Follow any other instructions your doctor gives you.    If you are having this procedure done at the clinic, you will be there for up to an hour.  You will receive care before and after the procedure.    Please tell your doctor if:  - You have a history of urinary tract infections.  - You know that you have a tumor in your bladder.  - You have bleeding problems.  - You have any allergies.  - You are or may be pregnant.      What happens after the exam?  You may go back to your normal diet and activity as you feel ready, unless your doctor tells you not to.    For the next two days, you may  notice:  - Some blood in your urine.  - Some burning when you urinate (use the toilet).  - An urge to urinate more often.  - Bladder spasms.    These are normal after the procedure. They should go away on their own after a day or two.      - You can help to relieve the above listed symptoms by:  - Drinking 6 to 8 large glasses of water each day (includes drinks at meals).  This will help clear the urine.  - Take warm baths to relieve pain and bladder spasms.  Do not add anything to the bath water.  - Your doctor may prescribe pain medicine.  You may also take Tylenol (acetaminophen) for pain.    When should I call my doctor?  - A fever over 100.0 F (38 C) for more than a day.  (Before you call the doctor, check your temperature under your tongue.)  - Chills.  - Failure to urinate: No urine comes out when you try to use the toilet.  (Try soaking in a bathtub full of warm water.  If still no urine, call your doctor.)  - A lot of blood in the urine or blood clots larger than a nickel.  - Pain in the back or abdomen (belly / stomach area).  - Pain or spasms that are not relieved by warm tub baths and pain medicine.  - Severe pain, burning or other problems while passing urine.  - Pain that gets worse after two days.        If you have any issues, questions or concerns in the meantime, do not hesitate to contact us at [phone] or via [employer].     It was a pleasure meeting with you today.  Thank you for allowing me and my team the privilege of caring for you today.  YOU are the reason we are here, and I truly hope we provided you with the excellent service you deserve.  Please let us know if there is anything else we can do for you so that we can be sure you are leaving completely satisfied with your care experience.

## 2021-08-27 NOTE — TELEPHONE ENCOUNTER
Received result note below per Taylor Lovell PAC. Relayed to patient who verbalized understanding and would like medication sent to [facility] in [place]. Medication pended for provider review.    Taylor Vail LPN on 8/27/2021 at 3:59 PM

## 2021-08-29 LAB — BACTERIA UR CULT: ABNORMAL

## 2021-08-30 NOTE — TELEPHONE ENCOUNTER
Chart reviewed. 8/27/21 urine culture results available and keflex appropriate to treat UTI.     Called and spoke to patient who is aware of the above information. Patient reports that she is starting to feel better. Patient plans to follow up with CT scan and cystoscopy as scheduled. Informed patient to call with any questions or concerns in the meantime.    Shantell Trujillo RN, BSN

## 2021-09-03 ENCOUNTER — LAB (OUTPATIENT)
Dept: LAB | Facility: CLINIC | Age: 81
End: 2021-09-03
Payer: COMMERCIAL

## 2021-09-03 DIAGNOSIS — Z11.59 ENCOUNTER FOR SCREENING FOR OTHER VIRAL DISEASES: ICD-10-CM

## 2021-09-03 PROCEDURE — U0005 INFEC AGEN DETEC AMPLI PROBE: HCPCS

## 2021-09-03 PROCEDURE — U0003 INFECTIOUS AGENT DETECTION BY NUCLEIC ACID (DNA OR RNA); SEVERE ACUTE RESPIRATORY SYNDROME CORONAVIRUS 2 (SARS-COV-2) (CORONAVIRUS DISEASE [COVID-19]), AMPLIFIED PROBE TECHNIQUE, MAKING USE OF HIGH THROUGHPUT TECHNOLOGIES AS DESCRIBED BY CMS-2020-01-R: HCPCS

## 2021-09-04 LAB — SARS-COV-2 RNA RESP QL NAA+PROBE: NEGATIVE

## 2021-09-07 ENCOUNTER — ANCILLARY PROCEDURE (OUTPATIENT)
Dept: GENERAL RADIOLOGY | Facility: CLINIC | Age: 81
End: 2021-09-07
Attending: INTERNAL MEDICINE
Payer: COMMERCIAL

## 2021-09-07 ENCOUNTER — THERAPY VISIT (OUTPATIENT)
Dept: PHYSICAL THERAPY | Facility: CLINIC | Age: 81
End: 2021-09-07
Payer: COMMERCIAL

## 2021-09-07 VITALS
DIASTOLIC BLOOD PRESSURE: 86 MMHG | RESPIRATION RATE: 18 BRPM | SYSTOLIC BLOOD PRESSURE: 133 MMHG | OXYGEN SATURATION: 94 % | HEART RATE: 93 BPM | TEMPERATURE: 98.5 F

## 2021-09-07 DIAGNOSIS — G91.2 NPH (NORMAL PRESSURE HYDROCEPHALUS) (H): Primary | ICD-10-CM

## 2021-09-07 DIAGNOSIS — R26.9 GAIT DISORDER: ICD-10-CM

## 2021-09-07 DIAGNOSIS — G62.9 NEUROPATHY: ICD-10-CM

## 2021-09-07 DIAGNOSIS — G91.9 HYDROCEPHALUS, UNSPECIFIED TYPE (H): ICD-10-CM

## 2021-09-07 LAB
APPEARANCE CSF: CLEAR
COLOR CSF: COLORLESS
HOLD SPECIMEN: NORMAL
INR BLD: 1 (ref 2–3)
PROT CSF-MCNC: 127 MG/DL (ref 15–60)
RBC # CSF MANUAL: 1 /UL (ref 0–2)
TUBE # CSF: 4
WBC # CSF MANUAL: 0 /UL (ref 0–5)

## 2021-09-07 PROCEDURE — 89050 BODY FLUID CELL COUNT: CPT | Mod: 90 | Performed by: PATHOLOGY

## 2021-09-07 PROCEDURE — 84157 ASSAY OF PROTEIN OTHER: CPT | Mod: 90 | Performed by: PATHOLOGY

## 2021-09-07 PROCEDURE — 97750 PHYSICAL PERFORMANCE TEST: CPT | Mod: GP | Performed by: PHYSICAL THERAPIST

## 2021-09-07 PROCEDURE — 62328 DX LMBR SPI PNXR W/FLUOR/CT: CPT | Performed by: PHYSICIAN ASSISTANT

## 2021-09-07 PROCEDURE — 85610 PROTHROMBIN TIME: CPT | Performed by: PATHOLOGY

## 2021-09-07 RX ORDER — LIDOCAINE HYDROCHLORIDE 10 MG/ML
30 INJECTION, SOLUTION EPIDURAL; INFILTRATION; INTRACAUDAL; PERINEURAL ONCE
Status: COMPLETED | OUTPATIENT
Start: 2021-09-07 | End: 2021-09-07

## 2021-09-07 RX ADMIN — LIDOCAINE HYDROCHLORIDE 5 ML: 10 INJECTION, SOLUTION EPIDURAL; INFILTRATION; INTRACAUDAL; PERINEURAL at 10:58

## 2021-09-07 NOTE — DISCHARGE INSTRUCTIONS
[facility]                                        Radiology Discharge instructions Lumbar Puncture         AFTER YOU GO HOME      Relax and take it easy for 24 hours    You may resume normal activity tomorrow    You may remove the bandage on your back in the evening or next morning    You may resume bathing the next day    Drink at least 4 glasses of extra fluid today if not on a fluid restriction    DO NOT drive or operate machinery at home or at work for at least 24 hours    If you develop a headache you can take over the counter medicines and lay down.  You can try drinking caffeine-coffee, soda.                   CALL YOU PRIMARY PHYSICIAN IF:    If you start to leak a large amount of fluid from the puncture site, lie down flat on your back.     You develop a severe headache    You develop nausea or vomiting     You develop a temperature of 101 degrees or greater

## 2021-09-07 NOTE — PROGRESS NOTES
Physical Therapy Assessment for Pre and Post Lumbar Puncture Procedure    Pre Lumbar Puncture    Time: 0922    Subjective: No falls recently, staying home most of the time-not going out. Patient uses [employer] for outside mobility.     Gait assessment: Patient ambulates with a wide based gait, very minimal heel strike and pushoff, mild left trunk lean, reduced arm swing and head motions    Gait speed:      25 foot walk test: 10.12 seconds, 18 steps    Number of steps for 180 degree turn: 9 steps to the right, 7 steps to the left    Number of steps for 360 degree turn: 11 steps to the left, 13 steps to the right    Total timed minutes: 20      Post Lumbar Puncture    Time: 1340    Subjective: No headache, but I tired and weak.    Gait assessment: Patient ambulates with reduction in gait base support, significant improvement in bilateral heel strike and pushoff with only very mild left trunk lean.  Patient does demonstrate occasional step out balance reactions more right compared to left.    Gait speed:      25 foot walk test: 8.94 seconds, 16 steps    Number of steps for 180 degree turn: 7 steps to the right, 6 steps to the left    Number of steps for 360 degree turn: 10 steps both ways    Total timed minutes: 20      Constanza Estrada PT, DPT  Physical Therapist  [facility] [address]  [address] [place] [address]  [email or web address]  Office: [phone]

## 2021-09-07 NOTE — PROGRESS NOTES
Physical Therapy Assessment for Pre and Post Lumbar Puncture Procedure    Pre Lumbar Puncture    Time: 0922    Subjective: No falls recently, staying home most of the time-not going out. Patient uses [employer] for outside mobility.     Gait assessment: Patient ambulates with a wide based gait, very minimal heel strike and pushoff, mild left trunk lean, reduced arm swing and head motions    Gait speed:      25 foot walk test: 10.12 seconds, 18 steps    Number of steps for 180 degree turn: 9 steps to the right, 7 steps to the left    Number of steps for 360 degree turn: 11 steps to the left, 13 steps to the right    Total timed minutes: 20        Post Lumbar Puncture    Time: 1340    Subjective: No headache, but I tired and weak.    Gait assessment: Patient ambulates with reduction in gait base support, significant improvement in bilateral heel strike and pushoff with only very mild left trunk lean.  Patient does demonstrate occasional step out balance reactions more right compared to left.    Gait speed:      25 foot walk test: 8.94 seconds, 16 steps    Number of steps for 180 degree turn: 7 steps to the right, 6 steps to the left    Number of steps for 360 degree turn: 10 steps both ways    Total timed minutes: 20          Constanza Estrada PT, DPT  Physical Therapist  [facility] [address]  [address]  [place] [address]  [email or web address]  Office: [phone]

## 2021-09-08 ENCOUNTER — TELEPHONE (OUTPATIENT)
Dept: NEUROLOGY | Facility: CLINIC | Age: 81
End: 2021-09-08
Payer: COMMERCIAL

## 2021-09-08 NOTE — RESULT ENCOUNTER NOTE
Hi All,     Can we make sure patient gets follow-up scheduled to go through lumbar puncture results? Ideally she'd be seen within the next month. Thanks.  Mitchel

## 2021-09-08 NOTE — TELEPHONE ENCOUNTER
Dr Anne Marie ROGERS scheduled patient for Tuesday 9/14 @ 9:30.      Janel Acosta Procedure   Neurology & Neurosurgery Specialties  [facility]   [phone]

## 2021-09-08 NOTE — TELEPHONE ENCOUNTER
----- Message from Mitchel Kenney MD sent at 9/8/2021  8:10 AM CDT -----  Hi All,     Can we make sure patient gets follow-up scheduled to go through lumbar puncture results? Ideally she'd be seen within the next month. Thanks.  Mitchel

## 2021-09-13 ENCOUNTER — MYC MEDICAL ADVICE (OUTPATIENT)
Dept: FAMILY MEDICINE | Facility: CLINIC | Age: 81
End: 2021-09-13

## 2021-09-14 ENCOUNTER — OFFICE VISIT (OUTPATIENT)
Dept: NEUROLOGY | Facility: CLINIC | Age: 81
End: 2021-09-14
Payer: COMMERCIAL

## 2021-09-14 VITALS
BODY MASS INDEX: 30.85 KG/M2 | SYSTOLIC BLOOD PRESSURE: 116 MMHG | WEIGHT: 179.7 LBS | DIASTOLIC BLOOD PRESSURE: 74 MMHG | HEART RATE: 101 BPM

## 2021-09-14 DIAGNOSIS — G62.9 NEUROPATHY: ICD-10-CM

## 2021-09-14 DIAGNOSIS — R26.9 GAIT DISORDER: Primary | ICD-10-CM

## 2021-09-14 DIAGNOSIS — G91.9 HYDROCEPHALUS, UNSPECIFIED TYPE (H): ICD-10-CM

## 2021-09-14 PROCEDURE — 99215 OFFICE O/P EST HI 40 MIN: CPT | Performed by: INTERNAL MEDICINE

## 2021-09-14 ASSESSMENT — PAIN SCALES - GENERAL: PAINLEVEL: NO PAIN (0)

## 2021-09-14 NOTE — LETTER
9/14/2021         RE: Brandy Leon  [address] [place] [address]        Dear Colleague,    Thank you for referring your patient, Brandy Leon, to the [facility] [place]. Please see a copy of my visit note below.    [facility] Neurology Follow Up Visit    Brandy Leon MRN# [id number]   Age: 81 year old YOB: 1940     Brief history of symptoms: The patient was initially seen in neurologic consultation on 7/15/2021 for evaluation of imbalance. Please see the comprehensive neurologic consultation note from that date in the Epic records for details.     Interval history:   Patient is here to discuss results of lumbar puncture and PT testing. She has had several falls since last visit.    She has had recent issues with constipation. She denies any recent problems with urination and she thinks her memory is doing ok.    She continues to have numbness in bilateral feet. She had EMG at [facility] in 2006.      Past Medical History:     Patient Active Problem List   Diagnosis     Seasonal allergies     Hypothyroidism     Hyperlipidemia LDL goal <100     Fibromyalgia     Osteoarthritis     Advanced directives, counseling/discussion     Obesity     Type 2 diabetes mellitus with hyperglycemia, with long-term current use of insulin (H)     Hypertension goal BP (blood pressure) < 140/90     Overactive bladder     Recurrent UTI     Pseudophakia of both eyes     DINORA (obstructive sleep apnea)- severe (AHI 56)     Contusion of right knee     Gait disorder     Gastroesophageal reflux disease without esophagitis     Past Medical History:   Diagnosis Date     Actinic keratosis 3/12/2013     Degenerative joint disease      Diabetes (H)      Gastroesophageal reflux disease without esophagitis 12/11/2020     Rheumatic fever 1957    x2 between the ages of 16-18     Seasonal allergies         Past Surgical History:     Past Surgical History:   Procedure  Laterality Date     C APPENDECTOMY       C ARTHROPLASTY TMJ       CATARACT IOL, RT/LT       COLONOSCOPY       COLONOSCOPY N/A 8/13/2015    Procedure: COLONOSCOPY;  Surgeon: Duane, William Charles, MD;  Location: [facility] OR     COLONOSCOPY WITH CO2 INSUFFLATION N/A 8/13/2015    Procedure: COLONOSCOPY WITH CO2 INSUFFLATION;  Surgeon: Duane, William Charles, MD;  Location: [facility] OR     PHACOEMULSIFICATION WITH STANDARD INTRAOCULAR LENS IMPLANT Left 10/25/2018    Procedure: LEFT PHACOEMULSIFICATION WITH STANDARD INTRAOCULAR LENS IMPLANT;  Surgeon: Thomas Serna MD;  Location: [facility] OR     PHACOEMULSIFICATION WITH STANDARD INTRAOCULAR LENS IMPLANT Right 11/8/2018    Procedure: RIGHT PHACOEMULSIFICATION WITH STANDARD INTRAOCULAR LENS IMPLANT;  Surgeon: Thomas Serna MD;  Location: [facility] OR     THYROIDECTOMY           Social History:     Social History     Tobacco Use     Smoking status: Never Smoker     Smokeless tobacco: Never Used     Tobacco comment: [facility], no current exposure   Substance Use Topics     Alcohol use: No     Drug use: No        Family History:     Family History   Problem Relation Age of Onset     Cancer Father         skin and leukemia     Cerebrovascular Disease Maternal Grandfather      Cerebrovascular Disease Paternal Grandmother      Eye Disorder Paternal Grandmother         cataracts     Cerebrovascular Disease Paternal Grandfather      Heart Disease Paternal Grandfather      Cancer Sister         Breast Cancer     Eye Disorder Mother         cataracts     Eye Disorder Brother         cataract     Breast Cancer Daughter      Other Cancer Daughter         ovarian cancer     Glaucoma No family hx of      Macular Degeneration No family hx of         Medications:     Current Outpatient Medications   Medication Sig     aspirin 81 MG EC tablet Take 1 tablet by mouth daily.     blood glucose (ONETOUCH ULTRA) test strip USE TO TEST TWICE A DAY      Cranberry-Vitamin C-Vitamin E (CRANBERRY PLUS VITAMIN C) 4200-20-3 MG-MG-UNIT CAPS Take 1 capsule by mouth 2 times daily     empagliflozin (JARDIANCE) 25 MG TABS tablet Take 1 tablet (25 mg) by mouth daily     fexofenadine (ALLEGRA) 180 MG tablet Take 180 mg by mouth as needed      glipiZIDE (GLUCOTROL XL) 10 MG 24 hr tablet Take 1 tablet (10 mg) by mouth 2 times daily     GLUCOSAMINE CHONDROITIN COMPLX OR 2 Daily     ibuprofen (ADVIL/MOTRIN) 200 MG capsule Take 400 mg by mouth every 4 hours as needed      insulin glargine (BASAGLAR KWIKPEN) 100 UNIT/ML pen Inject 38 Units Subcutaneous daily     insulin pen needle (BD JUAN C U/F) 32G X 4 MM miscellaneous USE 1 DAILY AS DIRECTED.     irbesartan-hydrochlorothiazide (AVALIDE) 150-12.5 MG tablet TAKE 1/2 TABLET BY MOUTH EVERY DAY     latanoprost (XALATAN) 0.005 % ophthalmic solution Place 1 drop into both eyes daily     metFORMIN (GLUCOPHAGE) 1000 MG tablet TAKE 1 TABLET BY MOUTH TWICE A DAY WITH MEALS     MULTIVITAMIN OR 1 tablet daily     Omega-3 Fatty Acids (OMEGA 3 PO) Take by mouth 2 times daily.      omeprazole (PRILOSEC) 20 MG DR capsule TAKE 1 CAPSULE BY MOUTH EVERY DAY     ONE TOUCH DELICA LANCETS MISC 1 each 2 times daily. One Touch Delica       order for DME Glucometer covered by insurance.     rosuvastatin (CRESTOR) 5 MG tablet TAKE 1 TABLET BY MOUTH TWICE WEEKLY     SYNTHROID 137 MCG tablet TAKE 1 TABLET BY MOUTH EVERY DAY     triamcinolone (KENALOG) 0.1 % external cream Apply twice daily for 2 weeks     trospium (SANCTURA XR) 60 MG CP24 24 hr capsule Take 1 capsule (60 mg) by mouth every morning     No current facility-administered medications for this visit.     Facility-Administered Medications Ordered in Other Visits   Medication     DOBUTamine 500 mg in dextrose 5% 250 mL (adult std conc) premix        Allergies:     Allergies   Allergen Reactions     Estradiol Itching     Lipitor [Atorvastatin Calcium]      Weak and shaky     Sulfa Drugs      Rash        Zocor [Simvastatin]      Weak and shakey        Review of Systems:   A comprehensive 10 point review of systems (constitutional, ENT, cardiac, peripheral vascular, lymphatic, respiratory, GI, , Musculoskeletal, skin, Neurological) was performed and found to be negative except as described in this note.      Physical Exam:   Vitals: /74 (BP Location: Right arm, Patient Position: Sitting, Cuff Size: Adult Regular)   Pulse 101   Wt 81.5 kg (179 lb 11.2 oz)   BMI 30.85 kg/m     General: Seated comfortably in no acute distress.  Lungs: breathing comfortably  Extremities: no edema  Skin: No rashes  Neurologic:     Mental Status: Fully alert, attentive. Normal fund of knowledge. Language normal, speech clear and fluent, no paraphasic errors.      Cranial Nerves: EOMI with normal smooth pursuit. No dysarthria.     Motor: No tremors or other abnormal movements observed. Mild rigidity in left upper extremity compared to right. Very mild bradykinesia in bilateral rapid finger tapping. Strength 5/5 throughout upper and lower extremities.     Deep Tendon Reflexes: 2+/symmetric throughout upper extremities, 1+ patella, and trace ankle jerks. No clonus. Toes downgoing bilaterally.     Sensory: Sensation to light touch, pinprick, and temperature is decreased in the feet compared to hands. Vibration is absent in the bilateral great toes, 5-6 seconds in the bilateral ankles, 9 seconds in bilateral thumbs. Decrease proprioceptive sense in the bilateral great toes, otherwise intact. Positive Romberg.      Coordination: Finger-nose-finger and heel-shin intact without dysmetria. Rapid alternating movements slightly bradykinetic.     Gait: Wide based magnetic quality to gait with slight dragging of left leg. Not able to do heel, toe, or tandem gait.     Data reviewed on previous visits    Imaging:  MRI brain 6/8/2021  IMPRESSION:  No interval change since 2/17/2020. Severe advanced cerebral volume  loss. Unchanged mild  ventriculomegaly. This might partly be due to  pressure hydrocephalus. Clinical correlation is advised.     Laboratory:  TSH normal (11/2020)  A1c 10.3 (2021)      Pertinent Investigations since last visit:   7/2021 -  B12, ESR, CRP, ELP, immunofixation unremarkable  9/2021 - CSF protein 127, normal cell count and glucose    Pre and post LP PT assessment  Pre Lumbar puncture  Gait speed:  25 foot walk test: 10.12 seconds, 18 steps  Number of steps for 180 degree turn: 9 steps to the right, 7 steps to the left  Number of steps for 360 degree turn: 11 steps to the left, 13 steps to the right  Post Lumbar Puncture  Gait assessment: Patient ambulates with reduction in gait base support, significant improvement in bilateral heel strike and pushoff with only very mild left trunk lean. Patient does demonstrate occasional step out balance reactions more right compared to left.  Gait speed:  25 foot walk test: 8.94 seconds, 16 steps  Number of steps for 180 degree turn: 7 steps to the right, 6 steps to the left  Number of steps for 360 degree turn: 10 steps both ways         Assessment and Plan:   Assessment:  Brandy Leon is a 80 year old female who presents today for follow-up of balance changes. Balance changes and urinary frequency/urgency has been occurring over the last several years. On examination she has mildly wide based, ataxic, and slightly magnetic quality to gait. She has significant postural instability, positive romberg, hypoflexia and length dependent sensory changes. She denies any significant memory problems. Prior abbreviated MoCA was 22/27. Patient recently had pre-post lumbar puncture physical therapy assessment for work-up of possible NPH. She had mild improvement in gait following lumbar puncture. We discussed pursuing EMG to better define extent of diabetic polyneuropathy, as this is also contributing to imbalance. Pending this, will consider additional work-up for NPH. Given MRI brain  appearance, vascular parkinsonism is another possible contributor.    Plan:  - EMG to define severity of polyneuropathy (1 arm and leg)    Follow up in Neurology clinic after EMG to review results    Mitchel Kenney MD   of Neurology  [facility]    The total time of this encounter today amounted to 45 minutes. This time included time spent with the patient, prep work, ordering tests, and performing post visit documentation.        Again, thank you for allowing me to participate in the care of your patient.        Sincerely,        Mitchel Kenney MD

## 2021-09-14 NOTE — PROGRESS NOTES
[facility] Neurology Follow Up Visit    Brandy Leon MRN# [id number]   Age: 81 year old YOB: 1940     Brief history of symptoms: The patient was initially seen in neurologic consultation on 7/15/2021 for evaluation of imbalance. Please see the comprehensive neurologic consultation note from that date in the Epic records for details.     Interval history:   Patient is here to discuss results of lumbar puncture and PT testing. She has had several falls since last visit.    She has had recent issues with constipation. She denies any recent problems with urination and she thinks her memory is doing ok.    She continues to have numbness in bilateral feet. She had EMG at [facility] in 2006.      Past Medical History:     Patient Active Problem List   Diagnosis     Seasonal allergies     Hypothyroidism     Hyperlipidemia LDL goal <100     Fibromyalgia     Osteoarthritis     Advanced directives, counseling/discussion     Obesity     Type 2 diabetes mellitus with hyperglycemia, with long-term current use of insulin (H)     Hypertension goal BP (blood pressure) < 140/90     Overactive bladder     Recurrent UTI     Pseudophakia of both eyes     DINORA (obstructive sleep apnea)- severe (AHI 56)     Contusion of right knee     Gait disorder     Gastroesophageal reflux disease without esophagitis     Past Medical History:   Diagnosis Date     Actinic keratosis 3/12/2013     Degenerative joint disease      Diabetes (H)      Gastroesophageal reflux disease without esophagitis 12/11/2020     Rheumatic fever 1957    x2 between the ages of 15-18     Seasonal allergies         Past Surgical History:     Past Surgical History:   Procedure Laterality Date     C APPENDECTOMY       C ARTHROPLASTY TMJ       CATARACT IOL, RT/LT       COLONOSCOPY       COLONOSCOPY N/A 8/13/2015    Procedure: COLONOSCOPY;  Surgeon: Duane, William Charles, MD;  Location: [facility] OR     COLONOSCOPY WITH CO2 INSUFFLATION N/A 8/13/2015    Procedure:  COLONOSCOPY WITH CO2 INSUFFLATION;  Surgeon: Duane, William Charles, MD;  Location: [facility]     PHACOEMULSIFICATION WITH STANDARD INTRAOCULAR LENS IMPLANT Left 10/25/2018    Procedure: LEFT PHACOEMULSIFICATION WITH STANDARD INTRAOCULAR LENS IMPLANT;  Surgeon: Thomas Serna MD;  Location: [facility]     PHACOEMULSIFICATION WITH STANDARD INTRAOCULAR LENS IMPLANT Right 11/8/2018    Procedure: RIGHT PHACOEMULSIFICATION WITH STANDARD INTRAOCULAR LENS IMPLANT;  Surgeon: Thomas Serna MD;  Location: [facility] OR     THYROIDECTOMY           Social History:     Social History     Tobacco Use     Smoking status: Never Smoker     Smokeless tobacco: Never Used     Tobacco comment: has had exposure to second hand smoke in the past from [employer], no current exposure   Substance Use Topics     Alcohol use: No     Drug use: No        Family History:     Family History   Problem Relation Age of Onset     Cancer Father         skin and leukemia     Cerebrovascular Disease Maternal Grandfather      Cerebrovascular Disease Paternal Grandmother      Eye Disorder Paternal Grandmother         cataracts     Cerebrovascular Disease Paternal Grandfather      Heart Disease Paternal Grandfather      Cancer Sister         Breast Cancer     Eye Disorder Mother         cataracts     Eye Disorder Brother         cataract     Breast Cancer Daughter      Other Cancer Daughter         ovarian cancer     Glaucoma No family hx of      Macular Degeneration No family hx of         Medications:     Current Outpatient Medications   Medication Sig     aspirin 81 MG EC tablet Take 1 tablet by mouth daily.     blood glucose (ONETOUCH ULTRA) test strip USE TO TEST TWICE A DAY     Cranberry-Vitamin C-Vitamin E (CRANBERRY PLUS VITAMIN C) 4200-20-3 MG-MG-UNIT CAPS Take 1 capsule by mouth 2 times daily     empagliflozin (JARDIANCE) 25 MG TABS tablet Take 1 tablet (25 mg) by mouth daily     fexofenadine (ALLEGRA) 180 MG tablet Take 180 mg by mouth as needed       glipiZIDE (GLUCOTROL XL) 10 MG 24 hr tablet Take 1 tablet (10 mg) by mouth 2 times daily     GLUCOSAMINE CHONDROITIN COMPLX OR 2 Daily     ibuprofen (ADVIL/MOTRIN) 200 MG capsule Take 400 mg by mouth every 4 hours as needed      insulin glargine (BASAGLAR KWIKPEN) 100 UNIT/ML pen Inject 38 Units Subcutaneous daily     insulin pen needle (BD JUAN C U/F) 32G X 4 MM miscellaneous USE 1 DAILY AS DIRECTED.     irbesartan-hydrochlorothiazide (AVALIDE) 150-12.5 MG tablet TAKE 1/2 TABLET BY MOUTH EVERY DAY     latanoprost (XALATAN) 0.005 % ophthalmic solution Place 1 drop into both eyes daily     metFORMIN (GLUCOPHAGE) 1000 MG tablet TAKE 1 TABLET BY MOUTH TWICE A DAY WITH MEALS     MULTIVITAMIN OR 1 tablet daily     Omega-3 Fatty Acids (OMEGA 3 PO) Take by mouth 2 times daily.      omeprazole (PRILOSEC) 20 MG DR capsule TAKE 1 CAPSULE BY MOUTH EVERY DAY     ONE TOUCH DELICA LANCETS MISC 1 each 2 times daily. One Touch Delica       order for DME Glucometer covered by insurance.     rosuvastatin (CRESTOR) 5 MG tablet TAKE 1 TABLET BY MOUTH TWICE WEEKLY     SYNTHROID 137 MCG tablet TAKE 1 TABLET BY MOUTH EVERY DAY     triamcinolone (KENALOG) 0.1 % external cream Apply twice daily for 2 weeks     trospium (SANCTURA XR) 60 MG CP24 24 hr capsule Take 1 capsule (60 mg) by mouth every morning     No current facility-administered medications for this visit.     Facility-Administered Medications Ordered in Other Visits   Medication     DOBUTamine 500 mg in dextrose 5% 250 mL (adult std conc) premix        Allergies:     Allergies   Allergen Reactions     Estradiol Itching     Lipitor [Atorvastatin Calcium]      Weak and shaky     Sulfa Drugs      Rash       Zocor [Simvastatin]      Weak and shakey        Review of Systems:   A comprehensive 10 point review of systems (constitutional, ENT, cardiac, peripheral vascular, lymphatic, respiratory, GI, , Musculoskeletal, skin, Neurological) was performed and found to be negative except  as described in this note.      Physical Exam:   Vitals: /74 (BP Location: Right arm, Patient Position: Sitting, Cuff Size: Adult Regular)   Pulse 101   Wt 81.5 kg (179 lb 11.2 oz)   BMI 30.85 kg/m     General: Seated comfortably in no acute distress.  Lungs: breathing comfortably  Extremities: no edema  Skin: No rashes  Neurologic:     Mental Status: Fully alert, attentive. Normal fund of knowledge. Language normal, speech clear and fluent, no paraphasic errors.      Cranial Nerves: EOMI with normal smooth pursuit. No dysarthria.     Motor: No tremors or other abnormal movements observed. Mild rigidity in left upper extremity compared to right. Very mild bradykinesia in bilateral rapid finger tapping. Strength 5/5 throughout upper and lower extremities.     Deep Tendon Reflexes: 2+/symmetric throughout upper extremities, 1+ patella, and trace ankle jerks. No clonus. Toes downgoing bilaterally.     Sensory: Sensation to light touch, pinprick, and temperature is decreased in the feet compared to hands. Vibration is absent in the bilateral great toes, 5-6 seconds in the bilateral ankles, 9 seconds in bilateral thumbs. Decrease proprioceptive sense in the bilateral great toes, otherwise intact. Positive Romberg.      Coordination: Finger-nose-finger and heel-shin intact without dysmetria. Rapid alternating movements slightly bradykinetic.     Gait: Wide based magnetic quality to gait with slight dragging of left leg. Not able to do heel, toe, or tandem gait.     Data reviewed on previous visits    Imaging:  MRI brain 6/8/2021  IMPRESSION:  No interval change since 2/17/2020. Severe advanced cerebral volume  loss. Unchanged mild ventriculomegaly. This might partly be due to  pressure hydrocephalus. Clinical correlation is advised.     Laboratory:  TSH normal (11/2020)  A1c 10.3 (2021)      Pertinent Investigations since last visit:   7/2021 -  B12, ESR, CRP, ELP, immunofixation unremarkable  9/2021 - CSF  protein 127, normal cell count and glucose    Pre and post LP PT assessment  Pre Lumbar puncture  Gait speed:  25 foot walk test: 10.12 seconds, 18 steps  Number of steps for 180 degree turn: 9 steps to the right, 7 steps to the left  Number of steps for 360 degree turn: 11 steps to the left, 13 steps to the right  Post Lumbar Puncture  Gait assessment: Patient ambulates with reduction in gait base support, significant improvement in bilateral heel strike and pushoff with only very mild left trunk lean. Patient does demonstrate occasional step out balance reactions more right compared to left.  Gait speed:  25 foot walk test: 8.94 seconds, 16 steps  Number of steps for 180 degree turn: 7 steps to the right, 6 steps to the left  Number of steps for 360 degree turn: 10 steps both ways         Assessment and Plan:   Assessment:  Brandy Leon is a 80 year old female who presents today for follow-up of balance changes. Balance changes and urinary frequency/urgency has been occurring over the last several years. On examination she has mildly wide based, ataxic, and slightly magnetic quality to gait. She has significant postural instability, positive romberg, hypoflexia and length dependent sensory changes. She denies any significant memory problems. Prior abbreviated MoCA was 22/27. Patient recently had pre-post lumbar puncture physical therapy assessment for work-up of possible NPH. She had mild improvement in gait following lumbar puncture. We discussed pursuing EMG to better define extent of diabetic polyneuropathy, as this is also contributing to imbalance. Pending this, will consider additional work-up for NPH. Given MRI brain appearance, vascular parkinsonism is another possible contributor.    Plan:  - EMG to define severity of polyneuropathy (1 arm and leg)    Follow up in Neurology clinic after EMG to review results    Mitchel Kenney MD   of Neurology  [facility]    The total  time of this encounter today amounted to 45 minutes. This time included time spent with the patient, prep work, ordering tests, and performing post visit documentation.

## 2021-09-15 ENCOUNTER — VIRTUAL VISIT (OUTPATIENT)
Dept: PHARMACY | Facility: CLINIC | Age: 81
End: 2021-09-15
Payer: COMMERCIAL

## 2021-09-15 DIAGNOSIS — E11.65 TYPE 2 DIABETES MELLITUS WITH HYPERGLYCEMIA, WITH LONG-TERM CURRENT USE OF INSULIN (H): Primary | ICD-10-CM

## 2021-09-15 DIAGNOSIS — Z79.4 TYPE 2 DIABETES MELLITUS WITH HYPERGLYCEMIA, WITH LONG-TERM CURRENT USE OF INSULIN (H): Primary | ICD-10-CM

## 2021-09-15 PROCEDURE — 99607 MTMS BY PHARM ADDL 15 MIN: CPT | Performed by: PHARMACIST

## 2021-09-15 PROCEDURE — 99606 MTMS BY PHARM EST 15 MIN: CPT | Performed by: PHARMACIST

## 2021-09-15 NOTE — PROGRESS NOTES
Medication Therapy Management (MTM) Encounter    ASSESSMENT/PLAN:                            Medication Adherence/Access: No issues identified    Diabetes: Not meeting A1c goal of less than 8%. Would benefit from updated A1c. Jardiance may be contributing to urologic symptoms although in the past patient has been on invokana with similar side effects and when invokana was discontinued the urologic symptoms continued.  Reported incidence of UTIs on Jardiance 25mg is 7.6% vs placebo 7.6% vs Jardiance 10mg 9.3%  Reported incidence of genital mycotic infections on Jardiance 25mg is 6.4% vs placebo 1.5% vs Jardiance 10mg 5.4%  Has upcoming CT urogram and appointment with urology. Will reach out to urology regarding Jardiance and side effects. Patient does have a long standing history of recurrent UTIs and yeast infections.     PLAN:  Recommend updated A1c.   Recommend over the counter monistat for vaginal itching    Will follow up in 1 month.    SUBJECTIVE/OBJECTIVE:                           Brandy Leon is a 81 year old female called for a follow up visit. This is a follow up visit rom 8/16/2021. She was referred to me from Cailin Ponce.     Reason for visit: blood sugars, due for A1c.    Allergies/ADRs: Reviewed in chart  Tobacco: She reports that she has never smoked. She has never used smokeless tobacco.  Alcohol: none  Caffeine: 2 cups/day of coffee with whole milk  Activity: up and down the stairs doing her laundry, Can't walk very far she feels wobbly.  Past Medical History: Reviewed in chart    Medication Adherence/Access: Patient uses pill box(es). Patient reports not missing any doses of medication. Patient uses medication assistance program. Patient is getting Synthroid, Lantus, and Jardiance through  programs. Jardiance is getting mailed to her home and Synthroid, come to the clinic.    Diabetes:  Patient is currently taking metformin 1000mg twice daily, Basaglar 36 units daily,  Jardiance 25mg daily, glipizide XL 10mg once twice daily.     Per urology visit 8/27/20201-She was UTI free for a 2 year period, but has now had 3-4 infections in the last 5 months. Also feels to have one currently, though her only symptom is worsening nocturia    Patient does report mild vaginal itching at night and uses cortisone 10 and this helps relieve the itching.    Jardiance dose increased to 25mg on 5/25/2021.     SMBG: one-two times daily. Ranges (patient reported:)      Date FBG/ 2hours post Lunch/2hours post Dinner /2hours post   9/15 105     9/14 114     9/13 141     9/9 173     9/8 168     9/7 144     9/6 205     9/5 74     9/3 128     9/4 149     9/1 149     Patient is not experiencing hypoglycemia.  Recent symptoms of high blood sugar? None.  Eye exam: up to date  Foot exam: up to date  ACEi/ARB: Yes: irbesartan/HCTZ.   Urine Albumin:   Lab Results   Component Value Date    MICROL 40 11/12/2020     Aspirin: Taking 81mg daily and denies side effects.   Diet: Son cooks meals-last night beef ribs with potato, carrots, blueberry cheesecake  Lab Results   Component Value Date    A1C 10.3 05/17/2021    A1C 9.2 02/16/2021    A1C 8.7 11/12/2020    A1C 10.0 08/20/2020    A1C 10.5 01/10/2020     Today's Vitals: There were no vitals taken for this visit.    I spent 16 minutes with this patient. All changes were made via collaborative practice agreement with Cailin Ponce. A copy of the visit note was provided to the patient's primary care provider.    The patient was sent via [employer] a summary of these recommendations.     Mirna Perez, Pharm.D, BCACP  Medication Therapy Management Pharmacist    Telemedicine Visit Details  Type of service:  Telephone visit  Start Time: 10:34AM  End Time: 10:50AM  Originating Location (patient location): Home  Distant Location (provider location):  [facility] MTM        Medication Therapy Recommendations  Type 2 diabetes mellitus with  hyperglycemia, with long-term current use of insulin (H)    Current Medication: insulin glargine (BASAGLAR KWIKPEN) 100 UNIT/ML pen   Rationale: Medication requires monitoring - Needs additional monitoring   Recommendation: Order Lab   Status: Accepted per CPA          Rationale: More effective medication available - Ineffective medication - Effectiveness   Recommendation: Change Medication - Monistat 3 4 % Crea   Status: Accepted - no CPA Needed

## 2021-09-15 NOTE — Clinical Note
Dennis Vazquez,  I spoke with Zoila today and we have been going back and forth on whether or not Jardiance is contributing to her UTIs and yeast infections. I see that she has an upcoming CT and visit with Dr. Ferreira. Wondering what your thoughts are on continuing the Jardiance until then or should we discontinue it?    Laisha

## 2021-09-15 NOTE — PATIENT INSTRUCTIONS
Recommendations from today's MTM visit:                                                      Today we reviewed what your medicines are for, how to know if they are working, that your medicines are safe and how to make your medicine regimen as easy as possible.      Recommend updated A1c.   Recommend over the counter monistat for vaginal itching    Follow-up: 1 month    It was great to speak with you today.  I value your experience and would be very thankful for your time with providing feedback on our clinic survey. You may receive a survey via email or text message in the next few days.     To schedule another MTM appointment, please call the clinic directly or you may call the MTM scheduling line at [phone] or toll-free at [phone].     My Clinical Pharmacist's contact information:                                                      Please feel free to contact me with any questions or concerns you have.      Mirna Perez, Pharm.D, BCACP  Medication Therapy Management Pharmacist

## 2021-09-20 ENCOUNTER — OFFICE VISIT (OUTPATIENT)
Dept: OPHTHALMOLOGY | Facility: CLINIC | Age: 81
End: 2021-09-20
Payer: COMMERCIAL

## 2021-09-20 ENCOUNTER — OFFICE VISIT (OUTPATIENT)
Dept: PODIATRY | Facility: CLINIC | Age: 81
End: 2021-09-20
Payer: COMMERCIAL

## 2021-09-20 ENCOUNTER — LAB (OUTPATIENT)
Dept: LAB | Facility: CLINIC | Age: 81
End: 2021-09-20
Payer: COMMERCIAL

## 2021-09-20 VITALS — SYSTOLIC BLOOD PRESSURE: 120 MMHG | HEART RATE: 97 BPM | DIASTOLIC BLOOD PRESSURE: 75 MMHG | OXYGEN SATURATION: 94 %

## 2021-09-20 DIAGNOSIS — H04.129 DRY EYE: ICD-10-CM

## 2021-09-20 DIAGNOSIS — H02.831 DERMATOCHALASIS OF BOTH UPPER EYELIDS: ICD-10-CM

## 2021-09-20 DIAGNOSIS — H01.00B BLEPHARITIS OF UPPER AND LOWER EYELIDS OF BOTH EYES, UNSPECIFIED TYPE: Primary | ICD-10-CM

## 2021-09-20 DIAGNOSIS — E11.51 DIABETES MELLITUS WITH PERIPHERAL VASCULAR DISEASE (H): ICD-10-CM

## 2021-09-20 DIAGNOSIS — B35.1 ONYCHOMYCOSIS: Primary | ICD-10-CM

## 2021-09-20 DIAGNOSIS — H02.834 DERMATOCHALASIS OF BOTH UPPER EYELIDS: ICD-10-CM

## 2021-09-20 DIAGNOSIS — H01.00A BLEPHARITIS OF UPPER AND LOWER EYELIDS OF BOTH EYES, UNSPECIFIED TYPE: Primary | ICD-10-CM

## 2021-09-20 DIAGNOSIS — E11.65 TYPE 2 DIABETES MELLITUS WITH HYPERGLYCEMIA, WITH LONG-TERM CURRENT USE OF INSULIN (H): ICD-10-CM

## 2021-09-20 DIAGNOSIS — Z96.1 PSEUDOPHAKIA OF BOTH EYES: ICD-10-CM

## 2021-09-20 DIAGNOSIS — Z79.4 TYPE 2 DIABETES MELLITUS WITH HYPERGLYCEMIA, WITH LONG-TERM CURRENT USE OF INSULIN (H): ICD-10-CM

## 2021-09-20 DIAGNOSIS — E11.49 TYPE II OR UNSPECIFIED TYPE DIABETES MELLITUS WITH NEUROLOGICAL MANIFESTATIONS, NOT STATED AS UNCONTROLLED(250.60) (H): ICD-10-CM

## 2021-09-20 DIAGNOSIS — H40.1132 PRIMARY OPEN ANGLE GLAUCOMA OF BOTH EYES, MODERATE STAGE: ICD-10-CM

## 2021-09-20 LAB — HBA1C MFR BLD: 9.8 % (ref 0–5.6)

## 2021-09-20 PROCEDURE — 92083 EXTENDED VISUAL FIELD XM: CPT | Performed by: OPHTHALMOLOGY

## 2021-09-20 PROCEDURE — 11720 DEBRIDE NAIL 1-5: CPT | Performed by: PODIATRIST

## 2021-09-20 PROCEDURE — 99214 OFFICE O/P EST MOD 30 MIN: CPT | Mod: 25 | Performed by: PODIATRIST

## 2021-09-20 PROCEDURE — 99214 OFFICE O/P EST MOD 30 MIN: CPT | Performed by: OPHTHALMOLOGY

## 2021-09-20 PROCEDURE — 83036 HEMOGLOBIN GLYCOSYLATED A1C: CPT

## 2021-09-20 PROCEDURE — 36415 COLL VENOUS BLD VENIPUNCTURE: CPT

## 2021-09-20 ASSESSMENT — TONOMETRY
OD_IOP_MMHG: 13
OS_IOP_MMHG: 13
IOP_METHOD: TONOPEN
OD_IOP_MMHG: 15
OS_IOP_MMHG: 13
OS_IOP_MMHG: 15
IOP_METHOD: APPLANATION
OD_IOP_MMHG: 14

## 2021-09-20 ASSESSMENT — EXTERNAL EXAM - RIGHT EYE: OD_EXAM: NORMAL

## 2021-09-20 ASSESSMENT — CONF VISUAL FIELD: COMMENTS: HVF 24-2 PERFORMED TODAY

## 2021-09-20 ASSESSMENT — VISUAL ACUITY
METHOD: SNELLEN - LINEAR
OS_SC+: +1
OS_SC: 20/30
OD_SC: 20/25
OD_SC+: +2

## 2021-09-20 ASSESSMENT — EXTERNAL EXAM - LEFT EYE: OS_EXAM: NORMAL

## 2021-09-20 NOTE — NURSING NOTE
"Chief Complaints and History of Present Illnesses   Patient presents with     Glaucoma Follow Up     3 Month follow up, IOP check & HVF 24-2       Chief Complaint(s) and History of Present Illness(es)     Glaucoma Follow Up     Laterality: both eyes    Comments: 3 Month follow up, IOP check & HVF 24-2              Comments     Patient here for 3 Month follow up, IOP check & HVF 24-2  States she still has a film over her eyes since her cataract surgery, says the cataract surgery was the 'worst thing I've ever done\"   Latanoprost drops, each eye, last dose 9:00pm on 09/19/21  No Pain, No Flashes, No Floaters                Freeman Jimenez, Ophthalmic Assistant   "

## 2021-09-20 NOTE — PROGRESS NOTES
Past Medical History:   Diagnosis Date     Actinic keratosis 3/12/2013     Degenerative joint disease      Diabetes (H)      Gastroesophageal reflux disease without esophagitis 12/11/2020     Rheumatic fever 1957    x2 between the ages of 15-18     Seasonal allergies      Patient Active Problem List   Diagnosis     Seasonal allergies     Hypothyroidism     Hyperlipidemia LDL goal <100     Fibromyalgia     Osteoarthritis     Advanced directives, counseling/discussion     Obesity     Type 2 diabetes mellitus with hyperglycemia, with long-term current use of insulin (H)     Hypertension goal BP (blood pressure) < 140/90     Overactive bladder     Recurrent UTI     Pseudophakia of both eyes     DINORA (obstructive sleep apnea)- severe (AHI 56)     Contusion of right knee     Gait disorder     Gastroesophageal reflux disease without esophagitis     Past Surgical History:   Procedure Laterality Date     C APPENDECTOMY       C ARTHROPLASTY TMJ       CATARACT IOL, RT/LT       COLONOSCOPY       COLONOSCOPY N/A 8/13/2015    Procedure: COLONOSCOPY;  Surgeon: Duane, William Charles, MD;  Location: [facility]     COLONOSCOPY WITH CO2 INSUFFLATION N/A 8/13/2015    Procedure: COLONOSCOPY WITH CO2 INSUFFLATION;  Surgeon: Duane, William Charles, MD;  Location: [facility]     PHACOEMULSIFICATION WITH STANDARD INTRAOCULAR LENS IMPLANT Left 10/25/2018    Procedure: LEFT PHACOEMULSIFICATION WITH STANDARD INTRAOCULAR LENS IMPLANT;  Surgeon: Thomas Serna MD;  Location: [facility]     PHACOEMULSIFICATION WITH STANDARD INTRAOCULAR LENS IMPLANT Right 11/8/2018    Procedure: RIGHT PHACOEMULSIFICATION WITH STANDARD INTRAOCULAR LENS IMPLANT;  Surgeon: Thomas Serna MD;  Location: [facility] OR     THYROIDECTOMY        Social History     Socioeconomic History     Marital status:      Spouse name: Not on file     Number of children: Not on file     Years of education: Not on file     Highest education level: Not on file   Occupational History      Occupation:    Tobacco Use     Smoking status: Never Smoker     Smokeless tobacco: Never Used     Tobacco comment: has had exposure to second hand smoke in the past from [employer], no current exposure   Substance and Sexual Activity     Alcohol use: No     Drug use: No     Sexual activity: Never   Other Topics Concern     Parent/sibling w/ CABG, MI or angioplasty before 65F 55M? 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     Hobby Hazards No     Sleep Concern Yes     Comment: difficulty staying asleep, up and down all night     Stress Concern No     Weight Concern Yes     Comment: would like to lose     Special Diet Yes     Comment: low card, low sugar diet     Back Care Yes     Comment: chiropratior     Exercise Yes     Comment: almost everyday     Bike Helmet No     Comment: does not ride bicycle any more     Seat Belt Yes     Self-Exams Yes   Social History Narrative     Not on file     Social Determinants of Health     Financial Resource Strain:      Difficulty of Paying Living Expenses:    Food Insecurity:      Worried About Running Out of Food in the Last Year:      Ran Out of Food in the Last Year:    Transportation Needs:      Lack of Transportation (Medical):      Lack of Transportation (Non-Medical):    Physical Activity:      Days of Exercise per Week:      Minutes of Exercise per Session:    Stress:      Feeling of Stress :    Social Connections:      Frequency of Communication with Friends and Family:      Frequency of Social Gatherings with Friends and Family:      Attends [employer] Services:      Active Member of Clubs or Organizations:      Attends Club or Organization Meetings:      Marital Status:    Intimate Partner Violence:      Fear of Current or Ex-Partner:      Emotionally Abused:       Physically Abused:      Sexually Abused:      Family History   Problem Relation Age of Onset     Cancer Father         skin and leukemia     Cerebrovascular Disease Maternal Grandfather      Cerebrovascular Disease Paternal Grandmother      Eye Disorder Paternal Grandmother         cataracts     Cerebrovascular Disease Paternal Grandfather      Heart Disease Paternal Grandfather      Cancer Sister         Breast Cancer     Eye Disorder Mother         cataracts     Eye Disorder Brother         cataract     Breast Cancer Daughter      Other Cancer Daughter         ovarian cancer     Glaucoma No family hx of      Macular Degeneration No family hx of      Lab Results   Component Value Date    A1C 10.3 05/17/2021    A1C 9.2 02/16/2021    A1C 8.7 11/12/2020    A1C 10.0 08/20/2020    A1C 10.5 01/10/2020     SUBJECTIVE FINDINGS:  A 81-year-old female returns to clinic for toenail care, diabetic foot cares.  She relates that she has numbness and tingling neuropathy in her feet.  Relates she is not wearing Diabetic shoes and does not want to get any.  She relates she is intermittently using lotion on her feet.        OBJECTIVE FINDINGS:  DP and PT are 2/4 bilaterally.  She has minimal dry scaly skin bilaterally.  She has dystrophic, thickened nails with subungual debris, dystrophy and discoloration to differing degrees 1 through 5 bilaterally.  She has dorsally contracted digits 2 through 5 bilaterally.  There is no erythema, no drainage, no odor, no calor bilaterally.  Sharp/dull is decreased with 5.07 Semmmes weinstien monofilament bilaterally.  She has plastic product hook stuck on bottom of right foot, no ulceration present, no debris in shoes.        ASSESSMENT AND PLAN:  Onychomycosis and onychauxis bilaterally.  She is diabetic with peripheral neuropathy and peripheral vascular disease.  Diagnosis and treatment were discussed with her.  All the toenails were debrided or reduced bilaterally upon consent.  She relates  she cleans her feet with [employer] and needs some of this, this was dispensed.  Return to clinic in 3-4 months.  Previous notes were reviewed.

## 2021-09-20 NOTE — NURSING NOTE
Brandy Leon's chief complaint for this visit includes:  Chief Complaint   Patient presents with     RECHECK     Diabetic foot care/onychomycosis/onychauxis     PCP: Cailin Ponce    Referring Provider:  No referring provider defined for this encounter.    /75   Pulse 97   SpO2 94%   Data Unavailable     Do you need any medication refills at today's visit? No    Atiya Holder CMA

## 2021-09-20 NOTE — PATIENT INSTRUCTIONS
Thanks for coming today.  Or[facility] Medicine Clinic  [address] [place] [address]    To schedule future appointments in Ortho Clinic, you may call [phone].    To schedule ordered imaging by your provider:   Call Central Imaging [phone]    To schedule an injection ordered by your provider:  Call [facility] Injection scheduling line: [phone]  [email or web address] available online at:  [email or web address]    Please call if any further questions or concerns ([phone]).  Clinic hours 8 am to 5 pm.    Return to clinic (call) if symptoms worsen or fail to improve.

## 2021-09-20 NOTE — PROGRESS NOTES
HPI:  Brandy Leon is a 81 year old female presenting for visual leon    Last saw Dr. Serna 12/2020 with DFE.  Visit 4/19/2021 for dry eyes with bilateral eye irritation since 2018; started on blepharitis regimen with lid scrubs, warm compresses, ATs  Last visit 6/14/21 with plan to address glaucoma diagnosis but patient refused glaucoma testing and wanted to address dry eyes only; improvement with warm compresses and not compliant with ATs; discussed regimen compliance    She notes that she has been doing warm compresses and the morning and these are helpful. Using latanoprost at bedtime and very good about compliance, never misses. Using ATs prn and they help when she uses them.    Past Ocular History:  Cataract surgery each eye 2018  POAG each eye  Dry eyes/blepharitis    PMH:  DM2 - on insulin  Hypothyroid  HLD  Fibromyalgia  Osteoarthritis  HTN  DINORA  GERD    SH:  Never smoker.    FH:  No known family history of blindness, glaucoma, macular degeneration    ASSESSMENT and PLAN:  1. Blepharitis of upper and lower eyelids of both eyes, unspecified type  2. Dry eye  - longstanding eye irritation, burning, film over vision  - improving with blepharitis regimen; using WCs in the morning and ATs only  - Again discussed blepharitis and dry eye  - discussed increasing warm compresses, lid scrubs, increasing ATs    3. Pseudophakia of both eyes  - s/p cataract surgery both eyes late 2018  - lenses in good position  - stable vision  - monitor    4. Primary open angle glaucoma of both eyes, moderate stage  - diagnosed with POAG with Dr. Serna  - pachy 538/557  - gonio open  - Tmax 30/25 (tonopen; just after stopping postop cataract drops, otherwise low 20s)  - on latanoprost at bedtime each eye since 8/2015 for concern for OCT RNFL thinning    HVF review  2019: right eye essentially full, left eye unreliable with scattered inf defects  2018: unreliable fields  2017: unreliable fields, right eye superior  defect, left eye inferior hemifield loss crossing both midlines  2016: unreliable fields  2015: mod reliability, essentially full each eye    HVF 9/20/21  Right Eye: low reliability (8/13 FL, 39% FP, 30% FN), MD -3.21, scattered nonspecific defects  Left Eye: low reliability (6/11 FL, 7% FP, 20% FN), MD -5.29, scattered defects    - IOP today 14/13  --> continue latanoprost at bedtime each eye     5. Dermatochalasis  - asymptomtaic  - offered oculoplastics referral and she defers for now  - monitor      Follow up in 3 months with OCT RNFL and dilation or sooner PRN      -----------------------------------------------------------------------------------    Attestation:  Complete documentation of historical and exam elements from today's encounter can be found in the full encounter summary report (not reduplicated in this progress note). I personally obtained the chief complaint(s) and history of present illness.  I confirmed and edited as necessary the review of systems, past medical/surgical history, family history, social history, and examination findings as documented by others; and I examined the patient myself. I personally reviewed the relevant tests, images, and reports as documented above.     I formulated and edited as necessary the assessment and plan and discussed the findings and management plan with the patient and family.      Devi Gupta MD

## 2021-09-23 NOTE — RESULT ENCOUNTER NOTE
Your long term blood sugar testing is improved compared with previous but remains elevated.  I would recommend a follow up appointment in the office to discuss possible adjustments in medication to attempt to better control the blood sugars for you.  Please call or MyChart message me if you have any questions.      CK

## 2021-09-26 ENCOUNTER — HEALTH MAINTENANCE LETTER (OUTPATIENT)
Age: 81
End: 2021-09-26

## 2021-09-28 ENCOUNTER — ANCILLARY PROCEDURE (OUTPATIENT)
Dept: CT IMAGING | Facility: CLINIC | Age: 81
End: 2021-09-28
Attending: PHYSICIAN ASSISTANT
Payer: COMMERCIAL

## 2021-09-28 DIAGNOSIS — N39.0 RECURRENT UTI: ICD-10-CM

## 2021-09-28 LAB
CREAT BLD-MCNC: 0.9 MG/DL
GFR SERPL CREATININE-BSD FRML MDRD: >60 ML/MIN/1.73M2

## 2021-09-28 PROCEDURE — 82565 ASSAY OF CREATININE: CPT | Performed by: RADIOLOGY

## 2021-09-28 PROCEDURE — 74178 CT ABD&PLV WO CNTR FLWD CNTR: CPT | Performed by: RADIOLOGY

## 2021-09-28 RX ORDER — IOPAMIDOL 755 MG/ML
109 INJECTION, SOLUTION INTRAVASCULAR ONCE
Status: COMPLETED | OUTPATIENT
Start: 2021-09-28 | End: 2021-09-28

## 2021-09-28 RX ADMIN — IOPAMIDOL 109 ML: 755 INJECTION, SOLUTION INTRAVASCULAR at 10:09

## 2021-09-29 ENCOUNTER — TELEPHONE (OUTPATIENT)
Dept: FAMILY MEDICINE | Facility: CLINIC | Age: 81
End: 2021-09-29

## 2021-09-29 DIAGNOSIS — E11.65 TYPE 2 DIABETES MELLITUS WITH HYPERGLYCEMIA (H): ICD-10-CM

## 2021-09-29 RX ORDER — PEN NEEDLE, DIABETIC 32GX 5/32"
NEEDLE, DISPOSABLE MISCELLANEOUS
Qty: 100 EACH | Refills: 2 | Status: SHIPPED | OUTPATIENT
Start: 2021-09-29 | End: 2022-07-26

## 2021-09-29 NOTE — TELEPHONE ENCOUNTER
FYI~ A refill has been made to the  assistance program for Jardiance.     A 90 day [facility]    Thank you,    Kaykay Bell  Prescription Assistance

## 2021-09-29 NOTE — TELEPHONE ENCOUNTER
Patient called [facility] over a month ago to get her Ultra fine pin needles refilled. She is unsure of any other name they go by, but says provider will understand what she means.     She needs these CALLED in to pharmacy asap. As pharmacy said they will need to be called in by provider    [facility]/pharmacy #[id number] - [place], [place] - [address] AT [address] [facility] [address]    [phone]    Lien Manzanares,   [facility]     17-Oct-2002

## 2021-09-29 NOTE — TELEPHONE ENCOUNTER
Pt notified, No refill request seen in chart.  Pt notified refill now sent to pharmacy.  Sonia CURRYN, RN

## 2021-10-04 ENCOUNTER — TELEPHONE (OUTPATIENT)
Dept: UROLOGY | Facility: CLINIC | Age: 81
End: 2021-10-04

## 2021-10-04 ENCOUNTER — OFFICE VISIT (OUTPATIENT)
Dept: UROLOGY | Facility: CLINIC | Age: 81
End: 2021-10-04
Payer: COMMERCIAL

## 2021-10-04 DIAGNOSIS — N32.81 OVERACTIVE BLADDER: ICD-10-CM

## 2021-10-04 DIAGNOSIS — N39.0 RECURRENT UTI: Primary | ICD-10-CM

## 2021-10-04 DIAGNOSIS — N39.41 URGE INCONTINENCE: ICD-10-CM

## 2021-10-04 LAB
ALBUMIN UR-MCNC: NEGATIVE MG/DL
APPEARANCE UR: ABNORMAL
BACTERIA #/AREA URNS HPF: ABNORMAL /HPF
BILIRUB UR QL STRIP: NEGATIVE
COLOR UR AUTO: ABNORMAL
GLUCOSE UR STRIP-MCNC: >1000 MG/DL
HGB UR QL STRIP: ABNORMAL
KETONES UR STRIP-MCNC: NEGATIVE MG/DL
LEUKOCYTE ESTERASE UR QL STRIP: ABNORMAL
NITRATE UR QL: NEGATIVE
PH UR STRIP: 5 [PH] (ref 5–7)
RBC #/AREA URNS AUTO: ABNORMAL /HPF
SKIP: ABNORMAL
SP GR UR STRIP: 1.02 (ref 1–1.03)
SQUAMOUS #/AREA URNS AUTO: ABNORMAL /LPF
UROBILINOGEN UR STRIP-MCNC: NORMAL MG/DL
WBC #/AREA URNS AUTO: >100 /HPF
WBC CLUMPS #/AREA URNS HPF: PRESENT /HPF

## 2021-10-04 PROCEDURE — 81001 URINALYSIS AUTO W/SCOPE: CPT | Performed by: UROLOGY

## 2021-10-04 PROCEDURE — 52000 CYSTOURETHROSCOPY: CPT | Performed by: UROLOGY

## 2021-10-04 RX ORDER — NITROFURANTOIN 25; 75 MG/1; MG/1
100 CAPSULE ORAL 2 TIMES DAILY
Qty: 10 CAPSULE | Refills: 0 | Status: SHIPPED | OUTPATIENT
Start: 2021-10-04 | End: 2021-10-13

## 2021-10-04 RX ORDER — METHENAMINE HIPPURATE 1000 MG/1
1 TABLET ORAL 2 TIMES DAILY
Qty: 30 TABLET | Refills: 11 | Status: SHIPPED | OUTPATIENT
Start: 2021-10-04 | End: 2021-12-08

## 2021-10-04 NOTE — TELEPHONE ENCOUNTER
Patient returned call and is aware of the 10/4/21 UA results and that the urine culture is still in process. macrobid 100mg po BID for 5 days ordered per protocol. Prescription sent to [facility] in [place] per patient request. Patient aware to hold her new medication of hiprex with vitamin C while taking the macrobid. Patient to start hiprex and vitamin C once the macrobid is complete. Informed patient that we will watch for the final urine culture results and contact her once available. Patient was comfortable with plan.    Shantell Trujillo RN, BSN

## 2021-10-04 NOTE — TELEPHONE ENCOUNTER
Murphy Ferreira MD  [facility] [place]  Please treat UTI   Thank you     Received the 10/4/21 UA results and the above result note from Dr. Ferreira. Will plan to treat with macrobid 100mg po BID for 5 days per protocol and have patient hold hiprex and vitamin C while taking macrobid.     Attempted to reach patient at home number and mobile number, but no answer. Left generic message on home number with request to return call to clinic. Voicemail box of mobile number is full and unable to leave message.    Shantell Trujillo RN, BSN

## 2021-10-04 NOTE — PROGRESS NOTES
Reason for Visit:  Cystoscopy    Clinical Data: Ms. Brandy Leon is a 81 year old female with a hx of recurrent utis and overactive bladder doing well from that standpoint with Trospium.    CT urogram on 9/28/21:  IMPRESSION:  1.  No urinary tract calculus nor hydronephrosis.  2.  No suspicious renal lesion.  3.  Small polypoid bladder mucosal lesions near the trigone posterior bladder base. These could be further characterized with cystoscopy.  4.  Mild hepatic steatosis and benign right hepatic cyst.    Cystoscopy procedure:  Pt. Was consented and placed in the lithotomy position.  She was cleaned and preparred in the usual fashion.  Lidocain gel was inserted into the urethra and given time to take effect.  A 16 fr flexible cystoscope was then inserted through the urethra and into the bladder.  The urethra was wnl.  The bladder was with 1+ trabeculation.  No tumors, diverticulae, or stones.  However there was a fair bit of purulent debris in the bladder on the floor and adherent to the bladder walls.  Bilateral u/o's were effluxing clear urine.  The cystoscope was then withdrawn.  The pt. Tolerated the procedure well.    A/P:  81 year old female with recurrent utis and overactive bladder.  After removal of the scope she had an uninhibited bladder contraction that resulted in emptying of urine.  We discussed starting prophylaxis with methenamine and vit c as well as UDS to further evaluate her incontinence as her perineum was red and irritated from the incontinence.  -methenamine 1 grm nightly with vit C  -schedule UDS  -f/u to review, may be virtual    Thank you for allowing me to participate in the care of  Ms. Brandy Leon and I will keep you updated on her progress.    Murphy Ferreira MD

## 2021-10-04 NOTE — PATIENT INSTRUCTIONS
"After Your Cystoscopy    What happens after the exam?    You may go back to your normal diet and activity as you feel ready, unless your doctor tells you not to.    For the next two days, you may notice:    Some blood in your urine  Some burning when you urinate (use the toilet)  An urge to urinate more often  Bladder spasms    These are normal after the procedure and should go away after a day or two.  To relieve these problems drink 6 to 8 large glasses of water each day (includes drinks at meals) as this will help clear the urine.  Take warm baths to relieve pain and bladder spasms.  Do not add anything to the bath water.  You may also take Tylenol (acetaminophen) for pain if needed.    When should I call my doctor?    A fever over 100F (38C) for more than a day. (Before you call the doctor, check your temperature under your tongue)  Chills  Failure to urinate: No urine comes out when you try to use the toilet. (Try soaking in a bathtub full of warm water. If still no urine, call your doctor)  A lot of blood in the urine, or blood clots larger than a nickel  Pain in the back or belly area (abdomen)  Pain or spasms that are not relieved by warm tub baths and pain medicine  Severe pain, burning or other problems while passing urine  Pain that gets worse after two days            AFTER YOUR CYSTOSCOPY        You have just completed a cystoscopy, or \"cysto\", which allowed your physician to learn more about your bladder (or to remove a stent placed after surgery). We suggest that you continue to avoid caffeine, fruit juice, and alcohol for the next 24 hours, however, you are encouraged to return to your normal activities.         A few things that are considered normal after your cystoscopy:     * Small amount of bleeding (or spotting) that clears within the next 24 hours     * Slight burning sensation with urination     * Sensation to of needing to avoid more frequently     * The feeling of \"air\" in your urine     * " Mild discomfort that is relieved with Tylenol        Please contact our office promptly if you:     * Develop a fever above 101 degrees     * Are unable to urinate     * Develop bright red blood that does not stop     * Severe pain or swelling         Please contact our office with any concerns or questions @[facility].    -methenamine 1 grm nightly with vit C  -schedule UDS  -f/u to review, may be virtual

## 2021-10-07 NOTE — TELEPHONE ENCOUNTER
Chart reviewed. 10/4/21 urine culture in process at this time. Will continue to watch for results and contact patient once available.    Shantell Trujillo RN, BSN

## 2021-10-08 ENCOUNTER — TELEPHONE (OUTPATIENT)
Dept: FAMILY MEDICINE | Facility: CLINIC | Age: 81
End: 2021-10-08

## 2021-10-08 NOTE — TELEPHONE ENCOUNTER
"Per chart review, \"lab accident, testing not performed\", so there is no culture for this urine sample. Called [facility] at [phone], explained the sample was misplaced so susceptibility was not done. Message sent to Dr. Ferreira notifying her of no final culture result.    Taylor Vail LPN on 10/8/2021 at 1:52 PM    "

## 2021-10-08 NOTE — TELEPHONE ENCOUNTER
FYI~ A refill has been made to the  assistance program for Basaglar.    A 120 day supply of Basaglar will be delivered to [facility] within 7-10 business days.    Thank you,    Kaykay Bell  Prescription Assistance

## 2021-10-08 NOTE — TELEPHONE ENCOUNTER
Per Dr. Ferreira, this nurse is to follow up with patient to see if symptoms have resolved since starting Macrobid. Called patient and notified her that the lab was unable to complete susceptibility testing.  Patient stated he is not having any UTI symptoms but is aware to call clinic back or go to urgent care if she gets chills, a fever above 100.4.    Taylor Vail LPN on 10/8/2021 at 3:28 PM

## 2021-10-13 ENCOUNTER — VIRTUAL VISIT (OUTPATIENT)
Dept: PHARMACY | Facility: CLINIC | Age: 81
End: 2021-10-13
Payer: COMMERCIAL

## 2021-10-13 DIAGNOSIS — Z79.4 TYPE 2 DIABETES MELLITUS WITH HYPERGLYCEMIA, WITH LONG-TERM CURRENT USE OF INSULIN (H): Primary | ICD-10-CM

## 2021-10-13 DIAGNOSIS — N39.0 RECURRENT UTI: ICD-10-CM

## 2021-10-13 DIAGNOSIS — E11.65 TYPE 2 DIABETES MELLITUS WITH HYPERGLYCEMIA, WITH LONG-TERM CURRENT USE OF INSULIN (H): Primary | ICD-10-CM

## 2021-10-13 PROCEDURE — 99606 MTMS BY PHARM EST 15 MIN: CPT | Performed by: PHARMACIST

## 2021-10-13 PROCEDURE — 99607 MTMS BY PHARM ADDL 15 MIN: CPT | Performed by: PHARMACIST

## 2021-10-13 NOTE — PROGRESS NOTES
Medication Therapy Management (MTM) Encounter    ASSESSMENT/PLAN:                            Medication Adherence/Access: No issues identified    Diabetes: Not meeting A1c goal of less than 8%. Jardiance may be contributing to urologic symptoms although in the past patient has been on invokana with similar side effects and when invokana was discontinued the urologic symptoms continued. Discussed alternate therapy including meal time insulin and GLP-1. Patient declines any further injections. Could consider oral semiglutide but expensive and would need to wait to 2022 to apply for  assistance. Would benefit from minimizing sweet treats and carbohydrates with meals. Will continue on Jardiance for now and consider oral semiglutide in 2022.    Recurrent UTIs: will follow up with urology regarding ongoing symptoms.    PLAN:  Continue current medications.  Will follow up with urology regarding ongoing urinary symptoms.  Recommend decreasing sweets and carbohydrate intake    Could consider Rybelsus in 2022.    Will follow up in 1 month.    SUBJECTIVE/OBJECTIVE:                           Brandy Leon is a 81 year old female called for a follow up visit. This is a follow up visit rom 8/16/2021. She was referred to me from Cailin Ponce.     Reason for visit: blood sugars    Allergies/ADRs: Reviewed in chart  Tobacco: She reports that she has never smoked. She has never used smokeless tobacco.  Alcohol: none  Caffeine: 2 cups/day of coffee with whole milk  Activity: up and down the stairs doing her laundry, Can't walk very far she feels wobbly.  Past Medical History: Reviewed in chart    Medication Adherence/Access: Patient uses pill box(es). Patient reports not missing any doses of medication. Patient uses medication assistance program. Patient is getting Synthroid, Lantus, and Jardiance through  programs. Jardiance is getting mailed to her home and Synthroid, come to the  clinic.    Diabetes:  Patient is currently taking metformin 1000mg twice daily, Basaglar 36 units daily, Jardiance 25mg daily, glipizide XL 10mg once twice daily.     Patient doesn't think she should be on Jardiance because she doesn't like the way it makes her body feel (achy and wondering if this is causing her frequent bladder infections)     SMBG: one-two times daily. Ranges (patient reported:)      Date FBG/ 2hours post Lunch/2hours post Dinner /2hours post   10/1 99     10/3 138     10/6 113     10/11   /278   10/12   /314   Patient is not experiencing hypoglycemia.  Recent symptoms of high blood sugar? None.  Eye exam: up to date  Foot exam: up to date  ACEi/ARB: Yes: irbesartan/HCTZ.   Urine Albumin:   Lab Results   Component Value Date    MICROL 40 11/12/2020     Aspirin: Taking 81mg daily and denies side effects.   Diet: Son cooks meals; has been eating Halloween candy. Last night had tuna hotdish    Lab Results   Component Value Date    A1C 9.8 09/20/2021    A1C 10.3 05/17/2021    A1C 9.2 02/16/2021    A1C 8.7 11/12/2020    A1C 10.0 08/20/2020    A1C 10.5 01/10/2020     Recurrent UTIs: Patient was treated with macrobid 10/4 for bladder infection. Continues to have frequent urination, denies fever or chills. Patient has started taking methenamine 1gm two times daily and Vitamin C and has been on this for about a week. Patient denies side effects. Had visit with urology 2 weeks ago for cystoscopy and was started on above medications.      Today's Vitals: There were no vitals taken for this visit.    I spent 24 minutes with this patient. All changes were made via collaborative practice agreement with Cailin Ponce. A copy of the visit note was provided to the patient's primary care provider.    The patient was sent via [employer] a summary of these recommendations.     Mirna Perez, Pharm.D, BCACP  Medication Therapy Management Pharmacist    Telemedicine Visit Details  Type of service:  Telephone  visit  Start Time: 10:30AM  End Time: 10:54AM  Originating Location (patient location): Home  Distant Location (provider location):  [facility]        Medication Therapy Recommendations  No medication therapy recommendations to display

## 2021-10-13 NOTE — PATIENT INSTRUCTIONS
Recommendations from today's MTM visit:                                                      Today we reviewed what your medicines are for, how to know if they are working, that your medicines are safe and how to make your medicine regimen as easy as possible.      Continue current medications.  Will follow up with urology regarding ongoing urinary symptoms.  Recommend decreasing sweets and carbohydrate intake    Follow-up: 1 month    It was great to speak with you today.  I value your experience and would be very thankful for your time with providing feedback on our clinic survey. You may receive a survey via email or text message in the next few days.     To schedule another MTM appointment, please call the clinic directly or you may call the MTM scheduling line at [phone] or toll-free at [phone].     My Clinical Pharmacist's contact information:                                                      Please feel free to contact me with any questions or concerns you have.      Mirna Perez, Pharm.D, BCACP  Medication Therapy Management Pharmacist

## 2021-10-14 ENCOUNTER — TELEPHONE (OUTPATIENT)
Dept: UROLOGY | Facility: CLINIC | Age: 81
End: 2021-10-14

## 2021-10-14 ENCOUNTER — LAB (OUTPATIENT)
Dept: LAB | Facility: CLINIC | Age: 81
End: 2021-10-14
Payer: COMMERCIAL

## 2021-10-14 DIAGNOSIS — R35.0 URINARY FREQUENCY: ICD-10-CM

## 2021-10-14 DIAGNOSIS — R35.0 URINARY FREQUENCY: Primary | ICD-10-CM

## 2021-10-14 LAB
ALBUMIN UR-MCNC: NEGATIVE MG/DL
APPEARANCE UR: CLEAR
BACTERIA #/AREA URNS HPF: ABNORMAL /HPF
BILIRUB UR QL STRIP: NEGATIVE
COLOR UR AUTO: ABNORMAL
GLUCOSE UR STRIP-MCNC: >1000 MG/DL
HGB UR QL STRIP: NEGATIVE
KETONES UR STRIP-MCNC: NEGATIVE MG/DL
LEUKOCYTE ESTERASE UR QL STRIP: ABNORMAL
NITRATE UR QL: NEGATIVE
PH UR STRIP: 6 [PH] (ref 5–7)
RBC #/AREA URNS AUTO: ABNORMAL /HPF
SKIP: ABNORMAL
SP GR UR STRIP: 1.02 (ref 1–1.03)
SQUAMOUS #/AREA URNS AUTO: ABNORMAL /LPF
UROBILINOGEN UR STRIP-MCNC: NORMAL MG/DL
WBC #/AREA URNS AUTO: ABNORMAL /HPF
WBC CLUMPS #/AREA URNS HPF: PRESENT /HPF

## 2021-10-14 PROCEDURE — 87086 URINE CULTURE/COLONY COUNT: CPT

## 2021-10-14 PROCEDURE — 87186 SC STD MICRODIL/AGAR DIL: CPT

## 2021-10-14 PROCEDURE — 81001 URINALYSIS AUTO W/SCOPE: CPT

## 2021-10-14 PROCEDURE — 87088 URINE BACTERIA CULTURE: CPT

## 2021-10-14 NOTE — TELEPHONE ENCOUNTER
Mirna Perez, [facility]  Shantell Trujillo, RN  Hi Shantell,   I spoke with [facility] today and she continues to have frequent urination and does not think the macrobid cleared up her infection. Are you able to follow up with her and Dr. Ferreira for next steps?     Thanks,   Mirna Perez, Pharm.D, [facility]   Medication Therapy Management Pharmacist       Received the above message. Called and spoke to patient who reports ongoing urinary frequency. Per patient, she has to urinate every 4 hours during the day and night. Patient denies urinary urgency, burning with urination, temperature greater than 100.4, and back pain. Per patient, she is able to void without difficulty. Patient reports that she does not feel like the macrobid that she took recently was helpful for her UTI. Informed patient that per chart review the urine culture was not able to be completed on 10/4/21. Informed patient that it is recommended for her to complete another urine sample for urinalysis and urine culture. Patient verbalized understanding. Lab only appointment scheduled for 10/14/21 at 3:00pm. Informed patient that we will watch for results and contact her once available.    Shantell Trujillo RN, BSN

## 2021-10-15 RX ORDER — CEPHALEXIN 500 MG/1
500 CAPSULE ORAL 2 TIMES DAILY
Qty: 10 CAPSULE | Refills: 0 | Status: SHIPPED | OUTPATIENT
Start: 2021-10-15 | End: 2021-10-20

## 2021-10-15 NOTE — TELEPHONE ENCOUNTER
Chart reviewed. 10/14/21 UA results abnormal, urine culture in process. Patient was recently treated with macrobid however urine culture was not able to be completed at that time. Patient has listed allergy of sulfa drugs and ciprofloxacin is contraindicated with patient's glipizide medication. Message sent to Taylor Lovell PA-C who is in the office today with request to review and advise on antibiotic.    Shantell Trujillo RN, BSN

## 2021-10-16 LAB — BACTERIA UR CULT: ABNORMAL

## 2021-10-18 NOTE — TELEPHONE ENCOUNTER
Chart reviewed. 10/14/21 urine culture results available and keflex antibiotic should be appropriate to treat UTI.     Called and spoke to patient who is aware of the above information. Patient reports that she may have some improvement. Patient aware to start the hiprex with vitamin C after completion of the keflex antibiotic. Patient reports that her skin is itchy in certain areas which has been ongoing for some time. Patient denies rash, swelling of the lips, face, mouth, or tongue. Per patient, it is mostly itchy in the mamie area. Per patient, there is no redness and no vaginal discharge. Patient reports that she has been using aveeno skin relief which seems to help. Encouraged patient to call or contact PCP if no improvement. Informed patient to contact the urology clinic with any questions or concerns. Patient was comfortable with plan.    Shantell Trujillo RN, BSN

## 2021-11-02 ENCOUNTER — TELEPHONE (OUTPATIENT)
Dept: NEUROLOGY | Facility: CLINIC | Age: 81
End: 2021-11-02

## 2021-11-02 DIAGNOSIS — H40.1132 PRIMARY OPEN ANGLE GLAUCOMA OF BOTH EYES, MODERATE STAGE: ICD-10-CM

## 2021-11-02 RX ORDER — LATANOPROST 50 UG/ML
1 SOLUTION/ DROPS OPHTHALMIC DAILY
Qty: 2.5 ML | Refills: 10 | Status: SHIPPED | OUTPATIENT
Start: 2021-11-02 | End: 2022-09-12

## 2021-11-02 NOTE — TELEPHONE ENCOUNTER
Called patient to find out why she didn't got to [facility] yesterday. Patient said she forgot to write it down, so she didn't know that she had an appointment. We rescheduled the [facility] and the follow up appointment with Dr. Kenney.

## 2021-11-02 NOTE — TELEPHONE ENCOUNTER
Refill request for latanoprost received from [facility]. Last appointment 9/20/21.  Refilled as requested.  Whit Solis RN

## 2021-11-10 ENCOUNTER — VIRTUAL VISIT (OUTPATIENT)
Dept: PHARMACY | Facility: CLINIC | Age: 81
End: 2021-11-10
Payer: COMMERCIAL

## 2021-11-10 DIAGNOSIS — E11.65 TYPE 2 DIABETES MELLITUS WITH HYPERGLYCEMIA, WITH LONG-TERM CURRENT USE OF INSULIN (H): Primary | ICD-10-CM

## 2021-11-10 DIAGNOSIS — N39.0 RECURRENT UTI: ICD-10-CM

## 2021-11-10 DIAGNOSIS — Z79.4 TYPE 2 DIABETES MELLITUS WITH HYPERGLYCEMIA, WITH LONG-TERM CURRENT USE OF INSULIN (H): Primary | ICD-10-CM

## 2021-11-10 DIAGNOSIS — E03.9 ACQUIRED HYPOTHYROIDISM: ICD-10-CM

## 2021-11-10 DIAGNOSIS — Z79.4 TYPE 2 DIABETES MELLITUS WITH HYPERGLYCEMIA, WITH LONG-TERM CURRENT USE OF INSULIN (H): ICD-10-CM

## 2021-11-10 DIAGNOSIS — L29.9 ITCHING: ICD-10-CM

## 2021-11-10 DIAGNOSIS — E11.65 TYPE 2 DIABETES MELLITUS WITH HYPERGLYCEMIA, WITH LONG-TERM CURRENT USE OF INSULIN (H): ICD-10-CM

## 2021-11-10 DIAGNOSIS — E78.5 HYPERLIPIDEMIA LDL GOAL <100: ICD-10-CM

## 2021-11-10 PROCEDURE — 99607 MTMS BY PHARM ADDL 15 MIN: CPT | Performed by: PHARMACIST

## 2021-11-10 PROCEDURE — 99606 MTMS BY PHARM EST 15 MIN: CPT | Performed by: PHARMACIST

## 2021-11-10 RX ORDER — MULTIVIT WITH MINERALS/LUTEIN
1000 TABLET ORAL 2 TIMES DAILY
COMMUNITY
Start: 2021-09-03 | End: 2021-12-08

## 2021-11-10 RX ORDER — GLIPIZIDE 10 MG/1
TABLET, FILM COATED, EXTENDED RELEASE ORAL
Qty: 180 TABLET | Refills: 0 | Status: SHIPPED | OUTPATIENT
Start: 2021-11-10 | End: 2022-03-01

## 2021-11-10 NOTE — PATIENT INSTRUCTIONS
Recommendations from today's MTM visit:                                                      Today we reviewed what your medicines are for, how to know if they are working, that your medicines are safe and how to make your medicine regimen as easy as possible.      Recommend discontinuing Jardiance  Recommend refilling methenamine and Vitamin C at pharmacy  Recommend updated thyroid labs and is due for lipids, microalbumin and CMP      Follow-up: 1 week    It was great to speak with you today.  I value your experience and would be very thankful for your time with providing feedback on our clinic survey. You may receive a survey via email or text message in the next few days.     To schedule another MTM appointment, please call the clinic directly or you may call the MTM scheduling line at [phone] or toll-free at [phone].     My Clinical Pharmacist's contact information:                                                      Please feel free to contact me with any questions or concerns you have.      Mirna Perez, Pharm.D, BCACP  Medication Therapy Management Pharmacist

## 2021-11-10 NOTE — PROGRESS NOTES
Medication Therapy Management (MTM) Encounter    ASSESSMENT/PLAN:                            Medication Adherence/Access: No issues identified    Diabetes: Not meeting A1c goal of less than 8%. Would benefit from minimizing carbohydrate intake. Will need to monitor blood sugars closely with discontinuing Jardiance. Patient utilizes the Prescription Assistance Program and could consider program for Rybelsus in 2022.    Recurrent UTIs: Would benefit from maintenance therapy with methenamine and Vitamin C. Would benefit from refilling at pharmacy.    Itchy Spots: There have been some post-marketing reports of itching with Jardiance. May benefit from trial off of the medication.    Hypothyroidism: May benefit from updated thyroid labs.     PLAN:  Recommend discontinuing Jardiance  Recommend refilling methenamine and Vitamin C at pharmacy  Recommend updated thyroid labs and is due for lipids, microalbumin and CMP    Will follow up in 1 week.    SUBJECTIVE/OBJECTIVE:                           Brandy Leon is a 81 year old female called for a follow up visit. This is a follow up visit rom 10/13/2021. She was referred to me from Cailin Ponce.     Reason for visit: blood sugars    Allergies/ADRs: Reviewed in chart  Tobacco: She reports that she has never smoked. She has never used smokeless tobacco.  Alcohol: none  Caffeine: 2 cups/day of coffee with whole milk  Activity: up and down the stairs doing her laundry, Can't walk very far she feels wobbly.  Past Medical History: Reviewed in chart    Medication Adherence/Access: Patient uses pill box(es). Patient reports not missing any doses of medication. Patient uses medication assistance program. Patient is getting Synthroid, Lantus, and Jardiance through  programs. Jardiance is getting mailed to her home and Synthroid, come to the clinic.    Diabetes:  Patient is currently taking metformin 1000mg twice daily, Basaglar 36 units daily, Jardiance 25mg  daily, glipizide XL 10mg once twice daily.     Patient doesn't think she should be on Jardiance because she doesn't like the way it makes her body feel (achy and wondering if this is causing her frequent bladder infections as well as skin itching)     SMBG: one-two times daily. Ranges (patient reported:)      Date FBG/ 2hours post Lunch/2hours post Dinner /2hours post   11/10 114     11/9 131     11/8 234     11/4 104     11/3 101     11/2 141     Patient is not experiencing hypoglycemia.  Recent symptoms of high blood sugar? None.  Eye exam: up to date  Foot exam: up to date  ACEi/ARB: Yes: irbesartan/HCTZ.   Urine Albumin:   Lab Results   Component Value Date    MICROL 40 11/12/2020     Aspirin: Taking 81mg daily and denies side effects.   Diet: Son cooks meals; has been trying to avoid pasta-notices that her blood sugar really increases after pasta.     Lab Results   Component Value Date    A1C 9.8 09/20/2021    A1C 10.3 05/17/2021    A1C 9.2 02/16/2021    A1C 8.7 11/12/2020    A1C 10.0 08/20/2020    A1C 10.5 01/10/2020     Recurrent UTIs:  Current therapy includes methenamine 1gm two times daily and Vitamin C. Patient has not taken in about a week because she ran out of the medication and didn't know she was supposed to refill it. Denies side effects.    Itchy Spots: all over body. No visible rash, redness. Denies flakiness, shortness of breath, trouble breathing. Few hours after taking medication she is itchy. Occurs every day.  Notices the most before bedtime. Takes metformin, cranberry, irbesartan-hydrochlorothiazide at night and twice a week take rosuvastatin (Monday & Friday). Reports that this has been going on for a year. Patient has been using rubbing alcohol on the spots and that seems to help.  Patient is wondering if this could be the Jardiance.     Hypothyroidism: Patient is taking levothyroxine 137 mcg daily. Patient is having the following symptoms: hypothyroidism -hair loss; patient noticed losing  more hair about a month ago.  TSH   Date Value Ref Range Status   11/12/2020 3.00 0.40 - 4.00 mU/L Final     Today's Vitals: There were no vitals taken for this visit.    I spent 22 minutes with this patient. All changes were made via collaborative practice agreement with Cailin Ponce. A copy of the visit note was provided to the patient's primary care provider.    The patient was sent via [employer] a summary of these recommendations.     Mirna Perez, Pharm.D, [facility]  Medication Therapy Management Pharmacist    Telemedicine Visit Details  Type of service:  Telephone visit  Start Time: 10:00AM  End Time: 10:22AM  Originating Location (patient location): Home  Distant Location (provider location):  [facility]        Medication Therapy Recommendations  Hypothyroidism    Current Medication: SYNTHROID 137 MCG tablet   Rationale: Medication requires monitoring - Needs additional monitoring   Recommendation: Order Lab   Status: Accepted per CPA         Recurrent UTI    Current Medication: methenamine (HIPREX) 1 g tablet   Rationale: Does not understand instructions - Adherence - Adherence   Recommendation: Provide Education   Status: Patient Agreed - Adherence/Education         Type 2 diabetes mellitus with hyperglycemia, with long-term current use of insulin (H)    Current Medication: empagliflozin (JARDIANCE) 25 MG TABS tablet (Discontinued)   Rationale: Undesirable effect - Adverse medication event - Safety   Recommendation: Discontinue Medication   Status: Accepted per CPA

## 2021-11-10 NOTE — TELEPHONE ENCOUNTER
Routing refill request to provider for review/approval because:  Labs out of range:  hgb a1c  Sonia Matta BSN, RN

## 2021-11-16 ENCOUNTER — LAB (OUTPATIENT)
Dept: LAB | Facility: CLINIC | Age: 81
End: 2021-11-16
Payer: COMMERCIAL

## 2021-11-16 DIAGNOSIS — E03.9 ACQUIRED HYPOTHYROIDISM: ICD-10-CM

## 2021-11-16 DIAGNOSIS — Z79.4 TYPE 2 DIABETES MELLITUS WITH HYPERGLYCEMIA, WITH LONG-TERM CURRENT USE OF INSULIN (H): ICD-10-CM

## 2021-11-16 DIAGNOSIS — E11.65 TYPE 2 DIABETES MELLITUS WITH HYPERGLYCEMIA, WITH LONG-TERM CURRENT USE OF INSULIN (H): ICD-10-CM

## 2021-11-16 DIAGNOSIS — E78.5 HYPERLIPIDEMIA LDL GOAL <100: ICD-10-CM

## 2021-11-16 LAB
ALBUMIN SERPL-MCNC: 3.6 G/DL (ref 3.4–5)
ALP SERPL-CCNC: 85 U/L (ref 40–150)
ALT SERPL W P-5'-P-CCNC: 24 U/L (ref 0–50)
ANION GAP SERPL CALCULATED.3IONS-SCNC: 4 MMOL/L (ref 3–14)
AST SERPL W P-5'-P-CCNC: 21 U/L (ref 0–45)
BILIRUB SERPL-MCNC: 0.4 MG/DL (ref 0.2–1.3)
BUN SERPL-MCNC: 17 MG/DL (ref 7–30)
CALCIUM SERPL-MCNC: 9.4 MG/DL (ref 8.5–10.1)
CHLORIDE BLD-SCNC: 101 MMOL/L (ref 94–109)
CHOLEST SERPL-MCNC: 203 MG/DL
CO2 SERPL-SCNC: 30 MMOL/L (ref 20–32)
CREAT SERPL-MCNC: 0.76 MG/DL (ref 0.52–1.04)
CREAT UR-MCNC: 85 MG/DL
FASTING STATUS PATIENT QL REPORTED: YES
GFR SERPL CREATININE-BSD FRML MDRD: 74 ML/MIN/1.73M2
GLUCOSE BLD-MCNC: 162 MG/DL (ref 70–99)
HDLC SERPL-MCNC: 52 MG/DL
LDLC SERPL CALC-MCNC: 97 MG/DL
MICROALBUMIN UR-MCNC: 34 MG/L
MICROALBUMIN/CREAT UR: 40 MG/G CR (ref 0–25)
NONHDLC SERPL-MCNC: 151 MG/DL
POTASSIUM BLD-SCNC: 4.3 MMOL/L (ref 3.4–5.3)
PROT SERPL-MCNC: 8.7 G/DL (ref 6.8–8.8)
SODIUM SERPL-SCNC: 135 MMOL/L (ref 133–144)
TRIGL SERPL-MCNC: 270 MG/DL
TSH SERPL DL<=0.005 MIU/L-ACNC: 1.96 MU/L (ref 0.4–4)

## 2021-11-16 PROCEDURE — 36415 COLL VENOUS BLD VENIPUNCTURE: CPT

## 2021-11-16 PROCEDURE — 84443 ASSAY THYROID STIM HORMONE: CPT

## 2021-11-16 PROCEDURE — 82043 UR ALBUMIN QUANTITATIVE: CPT

## 2021-11-16 PROCEDURE — 80061 LIPID PANEL: CPT

## 2021-11-16 PROCEDURE — 80053 COMPREHEN METABOLIC PANEL: CPT

## 2021-11-16 NOTE — RESULT ENCOUNTER NOTE
Brandy,  - your urine protein level is mildly elevated, similar to last check.   - the thyroid test is normal.   - the kidney, liver and electrolyte panel was normal except for the glucose that was 162 at the time of the blood draw.   - Your cholesterol shows the triglycerides were high and the LDL (bad cholesterol) was above the goal of < 70.   I would recommend follow-up with your primary doctor to review these labs in more detail and adjust your medication. You may benefit from increasing the Crestor and adjusting the irbesartan dose to help your cholesterol and kidneys.  Please MyChart or call if you have any concerns or questions.   Sincerely,  Nohemy Harmon MD  (for Dr. Miller who is out of the office today)

## 2021-11-16 NOTE — LETTER
November 23, 2021      Brandy Leon  [address] [facility] [place] [address]        Dear ,    - your urine protein level is mildly elevated, similar to last check.   - the thyroid test is normal.   - the kidney, liver and electrolyte panel was normal except for the glucose that was 162 at the time of the blood draw.   - Your cholesterol shows the triglycerides were high and the LDL (bad cholesterol) was above the goal of < 70.   I would recommend follow-up with your primary doctor to review these labs in more detail and adjust your medication. You may benefit from increasing the Crestor and adjusting the irbesartan dose to help your cholesterol and kidneys.  Please MyChart or call if you have any concerns or questions.     Sincerely,      Nohemy Harmon MD  (for Dr. Miller who is out of the office today)       Resulted Orders   Lipid panel reflex to direct LDL Fasting   Result Value Ref Range    Cholesterol 203 (H) <200 mg/dL    Triglycerides 270 (H) <150 mg/dL    Direct Measure HDL 52 >=50 mg/dL    LDL Cholesterol Calculated 97 <=100 mg/dL    Non HDL Cholesterol 151 (H) <130 mg/dL    Patient Fasting > 8hrs? Yes     Narrative    Cholesterol  Desirable:  <200 mg/dL    Triglycerides  Normal:  Less than 150 mg/dL  Borderline High:  150-199 mg/dL  High:  200-499 mg/dL  Very High:  Greater than or equal to 500 mg/dL    Direct Measure HDL  Female:  Greater than or equal to 50 mg/dL   Male:  Greater than or equal to 40 mg/dL    LDL Cholesterol  Desirable:  <100mg/dL  Above Desirable:  100-129 mg/dL   Borderline High:  130-159 mg/dL   High:  160-189 mg/dL   Very High:  >= 190 mg/dL    Non HDL Cholesterol  Desirable:  130 mg/dL  Above Desirable:  130-159 mg/dL  Borderline High:  160-189 mg/dL  High:  190-219 mg/dL  Very High:  Greater than or equal to 220 mg/dL   TSH with free T4 reflex   Result Value Ref Range    TSH 1.96 0.40 - 4.00 mU/L   Albumin Random Urine Quantitative with Creat Ratio    Result Value Ref Range    Creatinine Urine mg/dL 85 mg/dL    Albumin Urine mg/L 34 mg/L    Albumin Urine mg/g Cr 40.00 (H) 0.00 - 25.00 mg/g Cr   Comprehensive metabolic panel   Result Value Ref Range    Sodium 135 133 - 144 mmol/L    Potassium 4.3 3.4 - 5.3 mmol/L      Comment:      Specimen slightly hemolyzed, potassium may be falsely elevated.    Chloride 101 94 - 109 mmol/L    Carbon Dioxide (CO2) 30 20 - 32 mmol/L    Anion Gap 4 3 - 14 mmol/L    Urea Nitrogen 17 7 - 30 mg/dL    Creatinine 0.76 0.52 - 1.04 mg/dL    Calcium 9.4 8.5 - 10.1 mg/dL    Glucose 162 (H) 70 - 99 mg/dL    Alkaline Phosphatase 85 40 - 150 U/L    AST 21 0 - 45 U/L      Comment:      Specimen is hemolyzed which can falsely elevate AST. Analysis of a non-hemolyzed specimen may result in a lower value.    ALT 24 0 - 50 U/L    Protein Total 8.7 6.8 - 8.8 g/dL    Albumin 3.6 3.4 - 5.0 g/dL    Bilirubin Total 0.4 0.2 - 1.3 mg/dL    GFR Estimate 74 >60 mL/min/1.73m2      Comment:      As of July 11, 2021, eGFR is calculated by the CKD-EPI creatinine equation, without race adjustment. eGFR can be influenced by muscle mass, exercise, and diet. The reported eGFR is an estimation only and is only applicable if the renal function is stable.

## 2021-11-17 ENCOUNTER — VIRTUAL VISIT (OUTPATIENT)
Dept: PHARMACY | Facility: CLINIC | Age: 81
End: 2021-11-17
Payer: COMMERCIAL

## 2021-11-17 DIAGNOSIS — N39.0 RECURRENT UTI: ICD-10-CM

## 2021-11-17 DIAGNOSIS — E11.65 TYPE 2 DIABETES MELLITUS WITH HYPERGLYCEMIA, WITH LONG-TERM CURRENT USE OF INSULIN (H): Primary | ICD-10-CM

## 2021-11-17 DIAGNOSIS — L29.9 ITCHING: ICD-10-CM

## 2021-11-17 DIAGNOSIS — Z79.4 TYPE 2 DIABETES MELLITUS WITH HYPERGLYCEMIA, WITH LONG-TERM CURRENT USE OF INSULIN (H): Primary | ICD-10-CM

## 2021-11-17 DIAGNOSIS — E03.9 ACQUIRED HYPOTHYROIDISM: ICD-10-CM

## 2021-11-17 PROCEDURE — 99606 MTMS BY PHARM EST 15 MIN: CPT | Performed by: PHARMACIST

## 2021-11-17 RX ORDER — INSULIN GLARGINE 100 [IU]/ML
36 INJECTION, SOLUTION SUBCUTANEOUS DAILY
Qty: 15 ML | Refills: 0
Start: 2021-11-17 | End: 2022-09-20 | Stop reason: ALTCHOICE

## 2021-11-17 NOTE — PROGRESS NOTES
Medication Therapy Management (MTM) Encounter    ASSESSMENT/PLAN:                            Medication Adherence/Access: No issues identified    Diabetes: Not meeting A1c goal of less than 8%. Would benefit from minimizing carbohydrate intake. Will need to monitor blood sugars closely with discontinuing Jardiance. Patient utilizes the Prescription Assistance Program and could consider program for Rybelsus in 2022. Declines additional therapy at this time.    Recurrent UTIs: Stable    Itchy Spots: Continue to monitor; some improvement off of Jardiance.    Hypothyroidism: Stable.     PLAN:  Recommend 1/2 cup of Raisin Bran and 1/2 cup of frosted shredded wheat to keep carbohydrates at 45gm at breakfast  Recommend omitting instant oatmeal and substitute with old fashion oats with blueberries.    Will follow up in 3 weeks.    SUBJECTIVE/OBJECTIVE:                           Brandy Leon is a 81 year old female called for a follow up visit. This is a follow up visit rom 11/10/2021. She was referred to me from Cailin Ponce.     Reason for visit: blood sugars    Allergies/ADRs: Reviewed in chart  Tobacco: She reports that she has never smoked. She has never used smokeless tobacco.  Alcohol: none  Caffeine: 2 cups/day of coffee with whole milk  Activity: up and down the stairs doing her laundry, Can't walk very far she feels wobbly.  Past Medical History: Reviewed in chart    Medication Adherence/Access: Patient uses pill box(es). Patient reports not missing any doses of medication. Patient uses medication assistance program. Patient is getting Synthroid, Lantus, and Jardiance through  programs. Jardiance is getting mailed to her home and Synthroid, come to the clinic.    Diabetes:  Patient is currently taking metformin 1000mg twice daily, Basaglar 36 units daily, glipizide XL 10mg once twice daily. Stopped Jardiance after our last visit (11/10/2021). Feels better since stopping Jardiance. Her  body isn't as achy as it was, itching is a little better.     SMBG: one-two times daily. Ranges (patient reported:)      Date FBG/ 2hours post Lunch/2hours post Dinner /2hours post   11/17 73     11/16 134      110     11/14 105 /218    11/10  /265    11/9 114     11/7 234(pasta the night before)     Patient is not experiencing hypoglycemia.  Recent symptoms of high blood sugar? None.  Eye exam: up to date  Foot exam: up to date  ACEi/ARB: Yes: irbesartan/HCTZ.   Urine Albumin:   Lab Results   Component Value Date    MICROL 40 11/12/2020     Aspirin: Taking 81mg daily and denies side effects.   Diet: Son cooks meals; has been trying to avoid pasta-notices that her blood sugar really increases after pasta.   Supper:Roast beef and potatoes, carrots   Breakfast: oatmeal (1 package of instant (brown sugar) + regular oats) with blueberries, usually cereal (Raisin Bran, frosted shredded wheat) Mixes them together.  Doesn't usually eat lunch or will eat brownschweiger with cream cheese & crackers    Lab Results   Component Value Date    A1C 9.8 09/20/2021    A1C 10.3 05/17/2021    A1C 9.2 02/16/2021    A1C 8.7 11/12/2020    A1C 10.0 08/20/2020    A1C 10.5 01/10/2020     Recurrent UTIs:  Current therapy includes methenamine 1gm two times daily and Vitamin C. Patient picked up from pharmacy and started taking again.  Denies symptoms of UTI. Denies side effects.    Itchy Spots: all over body. No visible rash, redness. Denies flakiness, shortness of breath, trouble breathing. Few hours after taking medication she is itchy. Occurs every day.  Notices the most before bedtime. Takes metformin, cranberry, irbesartan-hydrochlorothiazide at night and twice a week take rosuvastatin (Monday & Friday). Reports that this has been going on for a year. Patient has been using rubbing alcohol on the spots and that seems to help.  Patient reports this has improved a little since discontinuing Jardiance.    Hypothyroidism: Patient is taking  levothyroxine 137 mcg daily. Patient is having the following symptoms: hypothyroidism -hair loss; when she brushes her hair gets a whole handful out.   TSH   Date Value Ref Range Status   11/16/2021 1.96 0.40 - 4.00 mU/L Final   11/12/2020 3.00 0.40 - 4.00 mU/L Final     Today's Vitals: There were no vitals taken for this visit.    I spent 17 minutes with this patient. All changes were made via collaborative practice agreement with Cailin Ponce. A copy of the visit note was provided to the patient's primary care provider.    The patient was sent via [employer] a summary of these recommendations.     Mirna Perez, Pharm.D, [facility]  Medication Therapy Management Pharmacist    Telemedicine Visit Details  Type of service:  Telephone visit  Start Time: 9:59AM  End Time: 10:16AM  Originating Location (patient location): Home  Distant Location (provider location):  [facility]TM        Medication Therapy Recommendations  No medication therapy recommendations to display

## 2021-11-17 NOTE — PATIENT INSTRUCTIONS
Recommendations from today's MTM visit:                                                      Today we reviewed what your medicines are for, how to know if they are working, that your medicines are safe and how to make your medicine regimen as easy as possible.      Recommend 1/2 cup of Raisin Bran and 1/2 cup of frosted shredded wheat to keep carbohydrates at 45gm at breakfast  Recommend omitting instant oatmeal and substitute with old fashion oats with blueberries.      Follow-up: 3 weeks    It was great to speak with you today.  I value your experience and would be very thankful for your time with providing feedback on our clinic survey. You may receive a survey via email or text message in the next few days.     To schedule another MTM appointment, please call the clinic directly or you may call the MTM scheduling line at [phone] or toll-free at [phone].     My Clinical Pharmacist's contact information:                                                      Please feel free to contact me with any questions or concerns you have.      Mirna Perez, Pharm.D, BCACP  Medication Therapy Management Pharmacist

## 2021-12-01 DIAGNOSIS — Z79.4 TYPE 2 DIABETES MELLITUS WITH HYPERGLYCEMIA, WITH LONG-TERM CURRENT USE OF INSULIN (H): ICD-10-CM

## 2021-12-01 DIAGNOSIS — E11.65 TYPE 2 DIABETES MELLITUS WITH HYPERGLYCEMIA, WITH LONG-TERM CURRENT USE OF INSULIN (H): ICD-10-CM

## 2021-12-01 RX ORDER — GLIPIZIDE 10 MG/1
TABLET, FILM COATED, EXTENDED RELEASE ORAL
Qty: 180 TABLET | Refills: 0 | OUTPATIENT
Start: 2021-12-01

## 2021-12-08 ENCOUNTER — VIRTUAL VISIT (OUTPATIENT)
Dept: PHARMACY | Facility: CLINIC | Age: 81
End: 2021-12-08
Payer: COMMERCIAL

## 2021-12-08 DIAGNOSIS — R05.9 COUGH: ICD-10-CM

## 2021-12-08 DIAGNOSIS — E03.9 ACQUIRED HYPOTHYROIDISM: ICD-10-CM

## 2021-12-08 DIAGNOSIS — L29.9 ITCHING: ICD-10-CM

## 2021-12-08 DIAGNOSIS — N39.0 RECURRENT UTI: ICD-10-CM

## 2021-12-08 DIAGNOSIS — N32.81 OVERACTIVE BLADDER: ICD-10-CM

## 2021-12-08 DIAGNOSIS — K59.00 CONSTIPATION, UNSPECIFIED CONSTIPATION TYPE: ICD-10-CM

## 2021-12-08 DIAGNOSIS — E11.65 TYPE 2 DIABETES MELLITUS WITH HYPERGLYCEMIA, WITH LONG-TERM CURRENT USE OF INSULIN (H): Primary | ICD-10-CM

## 2021-12-08 DIAGNOSIS — Z79.4 TYPE 2 DIABETES MELLITUS WITH HYPERGLYCEMIA, WITH LONG-TERM CURRENT USE OF INSULIN (H): Primary | ICD-10-CM

## 2021-12-08 DIAGNOSIS — M62.81 GENERALIZED MUSCLE WEAKNESS: ICD-10-CM

## 2021-12-08 PROCEDURE — 99607 MTMS BY PHARM ADDL 15 MIN: CPT | Performed by: PHARMACIST

## 2021-12-08 PROCEDURE — 99606 MTMS BY PHARM EST 15 MIN: CPT | Performed by: PHARMACIST

## 2021-12-08 RX ORDER — SENNOSIDES A AND B 8.6 MG/1
1 TABLET, FILM COATED ORAL DAILY PRN
COMMUNITY
End: 2022-01-05

## 2021-12-08 NOTE — Clinical Note
Dr. Ferreira,  Just letting you know she stopped taking her methenamine and Vitamin C due to upset stomach/nausea from the methenamine. Please let me know if you have any questions. Thanks!

## 2021-12-08 NOTE — PATIENT INSTRUCTIONS
Recommendations from today's MTM visit:                                                      Today we reviewed what your medicines are for, how to know if they are working, that your medicines are safe and how to make your medicine regimen as easy as possible.      Recommend restarting Jardiance. A1C lab appointment is now scheduled for 12/20/21   Recommend omitting instant oatmeal and substitute with old fashion oats with fruit. Continue to avoid pasta for supper.  Consider taking over-the-counter senna today if prune juice/metamucil don't help produce a bowel movement. Visit urgent care or emergency room if no bowel movement today or tomorrow.   Consider a hypoallergenic detergent to see if this helps with itching.   Recommend patient call PCP clinic to schedule appointment to discuss constipation, body aches, and itching.  I will reach out to Dr. Ferreira with urology to provide update on methenamine.      Follow-up: 2 weeks    It was great to speak with you today.  I value your experience and would be very thankful for your time with providing feedback on our clinic survey. You may receive a survey via email or text message in the next few days.     To schedule another MTM appointment, please call the clinic directly or you may call the MTM scheduling line at [phone] or toll-free at [phone].     My Clinical Pharmacist's contact information:                                                      Please feel free to contact me with any questions or concerns you have.      Mirna Perez, Pharm.D, BCACP  Medication Therapy Management Pharmacist  \

## 2021-12-08 NOTE — PROGRESS NOTES
Medication Therapy Management (MTM) Encounter    ASSESSMENT:                            Medication Adherence/Access: Patient may benefit from reaching out or re-applying for the patient assistance program for Synthroid if desired in the future.    Diabetes:  Not meeting A1c goal of <8%. Would benefit from minimizing carbohydrate intake. Since there was no change in urologic symptoms, aches, or itching while off of Jardiance, she would benefit from re-starting Jardiance. Of note, alternate therapy options have been discussed with provider in the past, including meal-time insulin and GLP-1 (such as oral Rybelsus). Rybelsus is expensive and would need to wait to 2022 to apply for  assistance. Could also consider Soliqua in 2022 with assistance. Consider A1c check at the end of December.    Recurrent UTIs/Overactive Bladder: Not adherent to methenamine & Vitamin C regimen due to upset stomach/nausea. Will reach out to Dr. Ferreira with urology to update.      Constipation: Patient would benefit from an over-the-counter fast-acting constipation relief medication if the prune juice doesn't help. Also would benefit from a visit to the urgent care/emergency room if no bowel movement in next few days. Would also benefit from an appointment with PCP to address constipation, as this has been a chronic issue.    Itchy Spots: Continue to monitor. May benefit from switching to a hypoallergenic detergent and further discussion with PCP.     Hypothyroidism: Per the American Thy[employer] Association, it is appropriate to consider a TSH goal of 4-6 mU/L in older adults to prevent adverse effects of having excess thyroid hormone such as atrial fibrillation and osteoporotic fractures. May benefit from a discussion with PCP about potentially decreasing levothyroxine dose. Patient did report sweating during the night which could be related to hypoglycemic episodes.    Supplements: Patient had a Vitamin B12 level checked about 5 moths  ago and it was within normal limits. Could likely hold off on a re-check until recommended again by neurology.    Productive Cough: Would benefit from further discussion about this at follow-up MTM visit or with PCP. Possible causes may include GERD, post-nasal drip, among others.     Generalized weakness/Imbalance: Neurology is monitoring. May benefit from a discussion about body aches/weakness with PCP as well.    PLAN:                          Recommend restarting Jardiance. A1C lab appointment is now scheduled for 12/20/21   Recommend omitting instant oatmeal and substitute with old fashion oats with fruit. Continue to avoid pasta for supper.  Consider taking over-the-counter senna today if prune juice/metamucil don't help produce a bowel movement. Visit urgent care or emergency room if no bowel movement within next couple of days  Consider a hypoallergenic detergent to see if this helps with itching.   Recommend patient call PCP clinic to schedule appointment to discuss constipation, body aches, cough and itching.  I will reach out to Dr. Ferreira with urology to provide update on methenamine.  Could consider decreasing levothyroxine dose at a future appointment.  Follow with neurology for EMG on 12/31.    Follow-up: 2 weeks, telephone    SUBJECTIVE/OBJECTIVE:                          Brandy Leon is a 81 year old female called for a follow up visit. This is a follow up visit rom 11/17/21. She was referred to me from Cailin Ponce.             Reason for visit: blood sugars     Allergies/ADRs: Reviewed in chart  Tobacco: She reports that she has never smoked. She has never used smokeless tobacco.  Alcohol: none  Caffeine: 2 cups/day of coffee with whole milk  Activity: up and down the stairs doing her laundry, Can't walk very far she feels wobbly.  Past Medical History: Reviewed in chart     Medication Adherence/Access: Patient uses pill box(es). Patient reports not missing any doses of medication.  Patient is getting Basaglar and Jardiance through  patient assistance programs (not getting Synthroid through the program anymore because they coudn't find her application in their system).    Diabetes:  Patient is currently taking metformin 1000mg twice daily, Basaglar 36 units daily at bedtime, glipizide XL 10mg twice daily. Stopped Jardiance on 11/10/2021. Has not been feeling better since stopping Jardiance (this is contrary to what was said at the last visit). Body feels achy and itching continues. Willing to re-start Jardiance.     SMBG: one-two times daily. Ranges (patient reported:)   Date FBG/ 2hours post Lunch/2hours post Dinner /2hours post   12/8 165       12/7 147       12/5 196       12/4 269      12/1 163      11/19 151       11/20   195 289   11/21 347     11/22 108     11/23 291     Patient is not experiencing hypoglycemia. No symptoms of low blood sugar.  Recent symptoms of high blood sugar? None.  Eye exam: up to date  Foot exam: up to date  ACEi/ARB: Yes: irbesartan/HCTZ.   Statin: rosuvastatin 5mg three times weekly  Urine Albumin:   Lab Results   Component Value Date    MICROL 34 11/16/2021    MICROL 40 11/12/2020   Aspirin: Taking 81mg daily and denies side effects.   Diet: Trying to avoid pasta-notices that her blood sugar really increases after pasta.   Supper (son cooks and eat around 4pm): salad, last night lasagna  Breakfast: last couple of days she has been having 2 eggs, 2 pieces of toast, and [employer] Chex. Has not had oatmeal recently. When she does, she uses an instant oatmeal packet with old fashion oats on top.   Lunch: Doesn't usually eat lunch or will eat herring and crackers as a snack          Lab Results   Component Value Date     A1C 9.8 09/20/2021     A1C 10.3 05/17/2021     A1C 9.2 02/16/2021     A1C 8.7 11/12/2020     A1C 10.0 08/20/2020     A1C 10.5 01/10/2020      LDL Cholesterol Calculated   Date Value Ref Range Status   11/16/2021 97 <=100 mg/dL Final    11/12/2020 102 (H) <100 mg/dL Final     Comment:     Above desirable:  100-129 mg/dl  Borderline High:  130-159 mg/dL  High:             160-189 mg/dL  Very high:       >189 mg/dl       Recurrent UTIs/Overactive Bladder:  Current therapy for UTI prevention cranberry-Vit C-Vit E tablets twice daily. Denies symptoms of UTI. Denies side effects. Was started on methenamine and Vitamin C by urology but patient has discontinued this. Patient reports she gets sick from methenamine. Was taking for a couple of weeks and started causing upset stomach/nausea so she stopped taking it. Also stopped the Vitamin C at the same time. Patient reports having a UTI about 3-4 weeks ago but no urine culture has been done since October. Also using Sanctura 60mg once daily for overactive bladder. Goes to the bathroom about every 3.5 hours.      Constipation: has not had a bowel movement in 3-4 days. Patient started taking metamucil yesterday and today. Has not had a bowel movement yet. Prune juice typically helps and reports she will drink some today. She does report feeling nauseous and having back pain. Is planning on making an appointment with Dr. Miller, since this has happened in the past.    Itchy Spots: all over body. When she lays down at night, within 30 minutes she starts itching. It wakes her up.  No visible rash, redness. This has been going on for about a year. She uses a humidifier and her skin is not dry. Has been using Dreft detergent. Is able to fall asleep.      Hypothyroidism: Patient is taking levothyroxine 137 mcg daily. Patient is having the following symptoms: hair loss has slowed down and is not as significant as it was before. Reports being sensitive to cold weather but this is not new. Denies weight gain, feelings of anxiousness. Wakes up in the morning, sweating-this is new within the past couple of weeks.            TSH   Date Value Ref Range Status   11/16/2021 1.96 0.40 - 4.00 mU/L Final   11/12/2020 3.00  0.40 - 4.00 mU/L Final      Supplements: Wondering if she is deficient in Vitamin B12 and B6. [place] about low B levels and would like to have her B12 level checked. Last Vitamin B12 level was checked on 7/15/21 and was 468 pg/ml.    Productive Cough: Coughing up phlegm for at least a year. Has not gotten worse or better. Phlegm is clear. Doesn't recall anything that makes it better or worse. Uses cough drops which seem to help. It is ok at night but notices it more in the morning or if she has been talking too long.     Generalized weakness/Imbalance: has been feeling weak and reports aches all of her body. Denies fever. This has been going on for weeks. Also feels unsteady on her feet. Patient is followed by Dr. Kenney with [facility] neurology and has EMG panel scheduled for 12/31/21. Depending on results, they are considering possible work-up for Normal Pressure Hydrocephalus and vascular parkinsonism.    Today's Vitals: There were no vitals taken for this visit.  ----------------    I spent 46 minutes with this patient today. All changes were made via collaborative practice agreement with Fernanda Miller A copy of the visit note was provided to the patient's primary care provider.    The patient was given a summary of these recommendations.     Mirna Perez, Pharm.D, [facility]  Medication Therapy Management Pharmacist      Telemedicine Visit Details  Type of service:  Telephone visit  Start Time: 10:03 AM  End Time: 10:49 AM  Originating Location (patient location): Home  Distant Location (provider location):  [facility]     Medication Therapy Recommendations  Constipation, unspecified constipation type    Rationale: Synergistic therapy - Needs additional medication therapy - Indication   Recommendation: Start Medication - sennosides 8.6 MG tablet   Status: Accepted - no CPA Needed         Type 2 diabetes mellitus with hyperglycemia, with long-term current use of insulin (H)    Rationale:  Synergistic therapy - Needs additional medication therapy - Indication   Recommendation: Start Medication - Jardiance 25 MG Tabs   Status: Accepted per CPA

## 2021-12-09 ENCOUNTER — TELEPHONE (OUTPATIENT)
Dept: FAMILY MEDICINE | Facility: CLINIC | Age: 81
End: 2021-12-09
Payer: COMMERCIAL

## 2021-12-09 NOTE — TELEPHONE ENCOUNTER
Received message from Mirna Perez, Pharm D asking this writer to assist in scheduling with patient's primary per her MTM visit with the patient on 12/10/21.  Due to discussion with at the Virtual Visit yesterday, 12/8/21.    Called patient and assisted in scheduling with Dr. Miller on 12/13/21 to discuss her symptoms of fatigue and body aches ongoing for several months.    Mirna Melendrez RN, [facility]

## 2021-12-12 NOTE — PROGRESS NOTES
"  Assessment & Plan     Hypertension goal BP (blood pressure) < 140/90  Blood pressure under good control.  Continue current medication refill was updated.  - irbesartan-hydrochlorothiazide (AVALIDE) 150-12.5 MG tablet; Take 0.5 tablets by mouth daily    Irregular heart beat  Irregular heartbeat noted on physical exam today.  With her recent fatigue an EKG was done to verify if she is in sinus or alternative rhythm.  Sinus rhythm is noted with frequent PACs.  - EKG 12-lead complete w/read - Clinics    Type 2 diabetes mellitus with hyperglycemia, with long-term current use of insulin (H)  Elevated A1c is noted on recent testing.  She continues on the Metformin and the glargine insulin as well as glipizide.  Had been off of Jardiance and I would agree with restarting that.  She can monitor symptoms in the perineal area and follow-up if those occur.  - metFORMIN (GLUCOPHAGE) 1000 MG tablet; TAKE 1 TABLET BY MOUTH TWICE A DAY WITH MEALS    Chronic kidney disease, stage 2 (mild)  Elevated microalbumin with normal GFR.  We will continue to monitor this.  Improve control of diabetes and continue control blood sugar will hopefully prevent any worsening/declining kidney function.    Acquired hypothyroidism  Thyroid testing is up-to-date and in the normal range.  With her chronic fatigue and I continue that at this point but see information below and we may be able to decrease that dose of levothyroxine in the future to allow the TSH to drift higher into the normal range.    Urge incontinence  Care with Dr. Ferreira.  Has not tolerated the vitamin C and methenamine.  Follow-up with Dr. Ferreira is planned.    DINORA (obstructive sleep apnea)- severe (AHI 56)  Reports that she has not been using her CPAP.  States she is in bed and \"sleeping\" for up to 12 hours but not feeling rested upon arising in the morning.  I have recommended she resume the CPAP which she states she has functional at home.  After 2 to 3 weeks of using this we can " "reassess her fatigue symptoms.      Chronic fatigue  Begin CPAP use as noted above.  Reassess this fatigue after regular use of this.  In addition she has a lab draw planned for next week and I have added orders for vitamin D, B6, and hemoglobin A1c testing.    Encounter for vitamin deficiency screening  Lab testing planned next week  - Vitamin D Deficiency; Future  - Vitamin B6; Future    Other obesity due to excess calories   Labs next week  - Vitamin D Deficiency; Future        Review of prior external note(s) from - [facility]where information from [facility]  reviewed  Review of the result(s) of each unique test - Lipid panel, CMP, A1C, microalb  Ordering of each unique test  Prescription drug management         BMI:   Estimated body mass index is 31.92 kg/m  as calculated from the following:    Height as of this encounter: 1.6 m (5' 3\").    Weight as of this encounter: 81.7 kg (180 lb 3.2 oz).   Weight management plan: Discussed healthy diet and exercise guidelines    Patient Instructions   Resuming the CPAP is recommended to prevent apnea and treat the daytime sedation and aching symptoms.    Please update me on the pain and fatigue/weakness symptoms after using the CPAP regularly for 1-2 weeks.    Labs are ordered for when you have the appointment next week.  These will test for vitamin deficiencies that may cause your symptoms.      They are doing the EMG testing with Dr. Grajeda on 12/31/21 which will be informational regarding the hand numbness.            Return in about 3 months (around 3/13/2022) for chronic health problems.    Fernanda Miller MD  [facility] is a 81 year old who presents for the following health issues     HPI   Constipation:   Using metamucil with glass of prune juice.  Every other week.   Using med sporadically.  Stools hard.  Reports  drinking a lot of water.  Cereal for regular use.    Diabetes Follow-up      BP Readings from Last 2 " "Encounters:   12/13/21 126/62   09/20/21 120/75     Hemoglobin A1C POCT (%)   Date Value   05/17/2021 10.3 (H)   02/16/2021 9.2 (H)     Hemoglobin A1C (%)   Date Value   09/20/2021 9.8 (H)     LDL Cholesterol Calculated (mg/dL)   Date Value   11/16/2021 97   11/12/2020 102 (H)   10/25/2019 91         Hypertension Follow-up      Do you check your blood pressure regularly outside of the clinic? No     Are you following a low salt diet? Yes    Are your blood pressures ever more than 140 on the top number (systolic) OR more   than 90 on the bottom number (diastolic), for example 140/90? No    Hypothyroidism Follow-up      Since last visit, patient describes the following symptoms: Weight stable, no hair loss, no skin changes, no constipation, no loose stools      DINORA - CPAP not being used.  Bed 8-830  -8    Aching diffusely - whole body -  Wobbly, no dizziness. Lack of energy.        Review of Systems   Constitutional, HEENT, cardiovascular, pulmonary, gi and gu systems are negative, except as otherwise noted.      Objective    /62   Pulse 57   Temp 97.5  F (36.4  C) (Oral)   Resp 18   Ht 1.6 m (5' 3\")   Wt 81.7 kg (180 lb 3.2 oz)   SpO2 97%   BMI 31.92 kg/m    Body mass index is 31.92 kg/m .  Physical Exam   GENERAL: alert, no distress, obese, elderly and fatigued  NECK: no adenopathy, no asymmetry, masses, or scars and thyroid normal to palpation  RESP: lungs clear to auscultation - no rales, rhonchi or wheezes  CV: irregular rhythm, normal rate,  normal S1 S2, no S3 or S4, no murmur, click or rub, no peripheral edema and peripheral pulses strong  MS: no gross musculoskeletal defects noted, no edema  NEURO: Normal strength and tone, mentation intact and speech normal  BACK: no CVA tenderness, no paralumbar tenderness  PSYCH: mentation appears normal, affect normal/bright    Lab on 11/16/2021   Component Date Value Ref Range Status     Cholesterol 11/16/2021 203* <200 mg/dL Final     Triglycerides " 11/16/2021 270* <150 mg/dL Final     Direct Measure HDL 11/16/2021 52  >=50 mg/dL Final     LDL Cholesterol Calculated 11/16/2021 97  <=100 mg/dL Final     Non HDL Cholesterol 11/16/2021 151* <130 mg/dL Final     Patient Fasting > 8hrs? 11/16/2021 Yes   Final     TSH 11/16/2021 1.96  0.40 - 4.00 mU/L Final     Creatinine Urine mg/dL 11/16/2021 85  mg/dL Final     Albumin Urine mg/L 11/16/2021 34  mg/L Final     Albumin Urine mg/g Cr 11/16/2021 40.00* 0.00 - 25.00 mg/g Cr Final     Sodium 11/16/2021 135  133 - 144 mmol/L Final     Potassium 11/16/2021 4.3  3.4 - 5.3 mmol/L Final    Specimen slightly hemolyzed, potassium may be falsely elevated.     Chloride 11/16/2021 101  94 - 109 mmol/L Final     Carbon Dioxide (CO2) 11/16/2021 30  20 - 32 mmol/L Final     Anion Gap 11/16/2021 4  3 - 14 mmol/L Final     Urea Nitrogen 11/16/2021 17  7 - 30 mg/dL Final     Creatinine 11/16/2021 0.76  0.52 - 1.04 mg/dL Final     Calcium 11/16/2021 9.4  8.5 - 10.1 mg/dL Final     Glucose 11/16/2021 162* 70 - 99 mg/dL Final     Alkaline Phosphatase 11/16/2021 85  40 - 150 U/L Final     AST 11/16/2021 21  0 - 45 U/L Final    Specimen is hemolyzed which can falsely elevate AST. Analysis of a non-hemolyzed specimen may result in a lower value.     ALT 11/16/2021 24  0 - 50 U/L Final     Protein Total 11/16/2021 8.7  6.8 - 8.8 g/dL Final     Albumin 11/16/2021 3.6  3.4 - 5.0 g/dL Final     Bilirubin Total 11/16/2021 0.4  0.2 - 1.3 mg/dL Final     GFR Estimate 11/16/2021 74  >60 mL/min/1.73m2 Final    As of July 11, 2021, eGFR is calculated by the CKD-EPI creatinine equation, without race adjustment. eGFR can be influenced by muscle mass, exercise, and diet. The reported eGFR is an estimation only and is only applicable if the renal function is stable.           EKG:  Sinus rhythm, no acute ST or T wave changes noted.  PACs present.

## 2021-12-13 ENCOUNTER — OFFICE VISIT (OUTPATIENT)
Dept: FAMILY MEDICINE | Facility: CLINIC | Age: 81
End: 2021-12-13
Payer: COMMERCIAL

## 2021-12-13 VITALS
TEMPERATURE: 97.5 F | OXYGEN SATURATION: 97 % | SYSTOLIC BLOOD PRESSURE: 126 MMHG | RESPIRATION RATE: 18 BRPM | BODY MASS INDEX: 31.93 KG/M2 | DIASTOLIC BLOOD PRESSURE: 62 MMHG | HEIGHT: 63 IN | WEIGHT: 180.2 LBS | HEART RATE: 57 BPM

## 2021-12-13 DIAGNOSIS — N18.2 CHRONIC KIDNEY DISEASE, STAGE 2 (MILD): ICD-10-CM

## 2021-12-13 DIAGNOSIS — N39.41 URGE INCONTINENCE: ICD-10-CM

## 2021-12-13 DIAGNOSIS — R53.82 CHRONIC FATIGUE: ICD-10-CM

## 2021-12-13 DIAGNOSIS — E03.9 ACQUIRED HYPOTHYROIDISM: ICD-10-CM

## 2021-12-13 DIAGNOSIS — E11.65 TYPE 2 DIABETES MELLITUS WITH HYPERGLYCEMIA, WITH LONG-TERM CURRENT USE OF INSULIN (H): ICD-10-CM

## 2021-12-13 DIAGNOSIS — I49.9 IRREGULAR HEART BEAT: ICD-10-CM

## 2021-12-13 DIAGNOSIS — Z79.4 TYPE 2 DIABETES MELLITUS WITH HYPERGLYCEMIA, WITH LONG-TERM CURRENT USE OF INSULIN (H): ICD-10-CM

## 2021-12-13 DIAGNOSIS — G47.33 OSA (OBSTRUCTIVE SLEEP APNEA): ICD-10-CM

## 2021-12-13 DIAGNOSIS — Z13.21 ENCOUNTER FOR VITAMIN DEFICIENCY SCREENING: ICD-10-CM

## 2021-12-13 DIAGNOSIS — I10 HYPERTENSION GOAL BP (BLOOD PRESSURE) < 140/90: Primary | ICD-10-CM

## 2021-12-13 DIAGNOSIS — E66.09 OTHER OBESITY DUE TO EXCESS CALORIES: ICD-10-CM

## 2021-12-13 PROCEDURE — 93000 ELECTROCARDIOGRAM COMPLETE: CPT | Performed by: FAMILY MEDICINE

## 2021-12-13 PROCEDURE — 99214 OFFICE O/P EST MOD 30 MIN: CPT | Performed by: FAMILY MEDICINE

## 2021-12-13 ASSESSMENT — MIFFLIN-ST. JEOR: SCORE: 1256.51

## 2021-12-13 ASSESSMENT — PAIN SCALES - GENERAL: PAINLEVEL: NO PAIN (0)

## 2021-12-13 NOTE — PATIENT INSTRUCTIONS
Resuming the CPAP is recommended to prevent apnea and treat the daytime sedation and aching symptoms.    Please update me on the pain and fatigue/weakness symptoms after using the CPAP regularly for 1-2 weeks.    Labs are ordered for when you have the appointment next week.  These will test for vitamin deficiencies that may cause your symptoms.      They are doing the EMG testing with Dr. Grajeda on 12/31/21 which will be informational regarding the hand numbness.

## 2021-12-15 RX ORDER — LEVOTHYROXINE SODIUM 137 MCG
137 TABLET ORAL DAILY
Qty: 90 TABLET | Refills: 1 | Status: SHIPPED | OUTPATIENT
Start: 2021-12-15 | End: 2022-06-10

## 2021-12-15 RX ORDER — IRBESARTAN AND HYDROCHLOROTHIAZIDE 150; 12.5 MG/1; MG/1
0.5 TABLET, FILM COATED ORAL DAILY
Qty: 45 TABLET | Refills: 1 | Status: SHIPPED | OUTPATIENT
Start: 2021-12-15 | End: 2022-01-06 | Stop reason: ALTCHOICE

## 2021-12-20 ENCOUNTER — TELEPHONE (OUTPATIENT)
Dept: DERMATOLOGY | Facility: CLINIC | Age: 81
End: 2021-12-20

## 2021-12-20 ENCOUNTER — LAB (OUTPATIENT)
Dept: LAB | Facility: CLINIC | Age: 81
End: 2021-12-20
Payer: COMMERCIAL

## 2021-12-20 ENCOUNTER — OFFICE VISIT (OUTPATIENT)
Dept: PODIATRY | Facility: CLINIC | Age: 81
End: 2021-12-20
Payer: COMMERCIAL

## 2021-12-20 VITALS — SYSTOLIC BLOOD PRESSURE: 133 MMHG | HEART RATE: 103 BPM | DIASTOLIC BLOOD PRESSURE: 73 MMHG | OXYGEN SATURATION: 96 %

## 2021-12-20 DIAGNOSIS — B35.1 ONYCHOMYCOSIS: Primary | ICD-10-CM

## 2021-12-20 DIAGNOSIS — M72.2 PLANTAR FASCIITIS OF LEFT FOOT: ICD-10-CM

## 2021-12-20 DIAGNOSIS — E66.09 OTHER OBESITY DUE TO EXCESS CALORIES: ICD-10-CM

## 2021-12-20 DIAGNOSIS — Z79.4 TYPE 2 DIABETES MELLITUS WITH HYPERGLYCEMIA, WITH LONG-TERM CURRENT USE OF INSULIN (H): ICD-10-CM

## 2021-12-20 DIAGNOSIS — E11.51 DIABETES MELLITUS WITH PERIPHERAL VASCULAR DISEASE (H): ICD-10-CM

## 2021-12-20 DIAGNOSIS — E11.65 TYPE 2 DIABETES MELLITUS WITH HYPERGLYCEMIA, WITH LONG-TERM CURRENT USE OF INSULIN (H): ICD-10-CM

## 2021-12-20 DIAGNOSIS — E11.49 TYPE II OR UNSPECIFIED TYPE DIABETES MELLITUS WITH NEUROLOGICAL MANIFESTATIONS, NOT STATED AS UNCONTROLLED(250.60) (H): ICD-10-CM

## 2021-12-20 DIAGNOSIS — Z13.21 ENCOUNTER FOR VITAMIN DEFICIENCY SCREENING: ICD-10-CM

## 2021-12-20 LAB
DEPRECATED CALCIDIOL+CALCIFEROL SERPL-MC: 53 UG/L (ref 20–75)
HBA1C MFR BLD: 9.5 % (ref 0–5.6)
HOLD SPECIMEN: NORMAL

## 2021-12-20 PROCEDURE — 99000 SPECIMEN HANDLING OFFICE-LAB: CPT

## 2021-12-20 PROCEDURE — 82306 VITAMIN D 25 HYDROXY: CPT

## 2021-12-20 PROCEDURE — 83036 HEMOGLOBIN GLYCOSYLATED A1C: CPT

## 2021-12-20 PROCEDURE — 84207 ASSAY OF VITAMIN B-6: CPT | Mod: 90

## 2021-12-20 PROCEDURE — 36415 COLL VENOUS BLD VENIPUNCTURE: CPT

## 2021-12-20 PROCEDURE — 99214 OFFICE O/P EST MOD 30 MIN: CPT | Performed by: PODIATRIST

## 2021-12-20 ASSESSMENT — PAIN SCALES - GENERAL: PAINLEVEL: NO PAIN (0)

## 2021-12-20 NOTE — LETTER
12/20/2021         RE: Brandy Leon  [address] [place] [address]        Dear Colleague,    Thank you for referring your patient, Brandy Leon, to the [facility]. Please see a copy of my visit note below.    Past Medical History:   Diagnosis Date     Actinic keratosis 3/12/2013     Degenerative joint disease      Diabetes (H)      Gastroesophageal reflux disease without esophagitis 12/11/2020     Rheumatic fever 1957    x2 between the ages of 15-18     Seasonal allergies      Patient Active Problem List   Diagnosis     Seasonal allergies     Hypothyroidism     Hyperlipidemia LDL goal <100     Fibromyalgia     Osteoarthritis     Advanced directives, counseling/discussion     Obesity     Type 2 diabetes mellitus with hyperglycemia, with long-term current use of insulin (H)     Hypertension goal BP (blood pressure) < 140/90     Overactive bladder     Recurrent UTI     Pseudophakia of both eyes     DINORA (obstructive sleep apnea)- severe (AHI 56)     Contusion of right knee     Gait disorder     Gastroesophageal reflux disease without esophagitis     Urge incontinence     Chronic kidney disease, stage 2 (mild)     Past Surgical History:   Procedure Laterality Date     C APPENDECTOMY       C ARTHROPLASTY TMJ       CATARACT IOL, RT/LT       COLONOSCOPY       COLONOSCOPY N/A 8/13/2015    Procedure: COLONOSCOPY;  Surgeon: Duane, William Charles, MD;  Location: [facility]     COLONOSCOPY WITH CO2 INSUFFLATION N/A 8/13/2015    Procedure: COLONOSCOPY WITH CO2 INSUFFLATION;  Surgeon: Duane, William Charles, MD;  Location: [facility]     PHACOEMULSIFICATION WITH STANDARD INTRAOCULAR LENS IMPLANT Left 10/25/2018    Procedure: LEFT PHACOEMULSIFICATION WITH STANDARD INTRAOCULAR LENS IMPLANT;  Surgeon: Thomas Serna MD;  Location: [facility]     PHACOEMULSIFICATION WITH STANDARD INTRAOCULAR LENS IMPLANT Right 11/8/2018    Procedure: RIGHT PHACOEMULSIFICATION WITH STANDARD  INTRAOCULAR LENS IMPLANT;  Surgeon: Thomas Serna MD;  Location: [facility]     THYROIDECTOMY        Social History     Socioeconomic History     Marital status:      Spouse name: Not on file     Number of children: Not on file     Years of education: Not on file     Highest education level: Not on file   Occupational History     Occupation:    Tobacco Use     Smoking status: Never Smoker     Smokeless tobacco: Never Used     Tobacco comment: has had exposure to second hand smoke in the past from [employer], no current exposure   Substance and Sexual Activity     Alcohol use: No     Drug use: No     Sexual activity: Never   Other Topics Concern     Parent/sibling w/ CABG, MI or angioplasty before 65F 55M? 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     Hobby Hazards No     Sleep Concern Yes     Comment: difficulty staying asleep, up and down all night     Stress Concern No     Weight Concern Yes     Comment: would like to lose     Special Diet Yes     Comment: low card, low sugar diet     Back Care Yes     Comment: chiropratior     Exercise Yes     Comment: almost everyday     Bike Helmet No     Comment: does not ride bicycle any more     Seat Belt Yes     Self-Exams Yes   Social History Narrative     Not on file     Social Determinants of Health     Financial Resource Strain: Not on file   Food Insecurity: Not on file   Transportation Needs: Not on file   Physical Activity: Not on file   Stress: Not on file   Social Connections: Not on file   Intimate Partner Violence: Not on file   Housing Stability: Not on file     Family History   Problem Relation Age of Onset     Cancer Father         skin and leukemia     Cerebrovascular Disease Maternal Grandfather      Cerebrovascular Disease Paternal Grandmother       Eye Disorder Paternal Grandmother         cataracts     Cerebrovascular Disease Paternal Grandfather      Heart Disease Paternal Grandfather      Cancer Sister         Breast Cancer     Eye Disorder Mother         cataracts     Eye Disorder Brother         cataract     Breast Cancer Daughter      Other Cancer Daughter         ovarian cancer     Glaucoma No family hx of      Macular Degeneration No family hx of      Lab Results   Component Value Date    A1C 9.8 09/20/2021    A1C 10.3 05/17/2021    A1C 9.2 02/16/2021    A1C 8.7 11/12/2020    A1C 10.0 08/20/2020    A1C 10.5 01/10/2020     Last Comprehensive Metabolic Panel:  Sodium   Date Value Ref Range Status   11/16/2021 135 133 - 144 mmol/L Final   04/02/2021 134 133 - 144 mmol/L Final     Potassium   Date Value Ref Range Status   11/16/2021 4.3 3.4 - 5.3 mmol/L Final     Comment:     Specimen slightly hemolyzed, potassium may be falsely elevated.   04/02/2021 4.1 3.4 - 5.3 mmol/L Final     Chloride   Date Value Ref Range Status   11/16/2021 101 94 - 109 mmol/L Final   04/02/2021 99 94 - 109 mmol/L Final     Carbon Dioxide   Date Value Ref Range Status   04/02/2021 31 20 - 32 mmol/L Final     Carbon Dioxide (CO2)   Date Value Ref Range Status   11/16/2021 30 20 - 32 mmol/L Final     Anion Gap   Date Value Ref Range Status   11/16/2021 4 3 - 14 mmol/L Final   04/02/2021 4 3 - 14 mmol/L Final     Glucose   Date Value Ref Range Status   11/16/2021 162 (H) 70 - 99 mg/dL Final   04/02/2021 175 (H) 70 - 99 mg/dL Final     Comment:     Non Fasting     Urea Nitrogen   Date Value Ref Range Status   11/16/2021 17 7 - 30 mg/dL Final   04/02/2021 16 7 - 30 mg/dL Final     Creatinine   Date Value Ref Range Status   11/16/2021 0.76 0.52 - 1.04 mg/dL Final   04/02/2021 0.76 0.52 - 1.04 mg/dL Final     GFR Estimate   Date Value Ref Range Status   11/16/2021 74 >60 mL/min/1.73m2 Final     Comment:     As of July 11, 2021, eGFR is calculated by the CKD-EPI creatinine equation,  without race adjustment. eGFR can be influenced by muscle mass, exercise, and diet. The reported eGFR is an estimation only and is only applicable if the renal function is stable.   04/02/2021 74 >60 mL/min/[1.73_m2] Final     Comment:     Non  GFR Calc  Starting 12/18/2018, serum creatinine based estimated GFR (eGFR) will be   calculated using the Chronic Kidney Disease Epidemiology Collaboration   (CKD-EPI) equation.       GFR, ESTIMATED POCT   Date Value Ref Range Status   09/28/2021 >60 >60 mL/min/1.73m2 Final     Calcium   Date Value Ref Range Status   11/16/2021 9.4 8.5 - 10.1 mg/dL Final   04/02/2021 9.7 8.5 - 10.1 mg/dL Final     Lab Results   Component Value Date    AST 21 11/16/2021    AST 14 03/16/2021     Lab Results   Component Value Date    ALT 24 11/16/2021    ALT 28 03/16/2021     No results found for: BILICONJ   Lab Results   Component Value Date    BILITOTAL 0.4 11/16/2021    BILITOTAL 0.3 03/16/2021     Lab Results   Component Value Date    ALBUMIN 3.6 11/16/2021    ALBUMIN 3.6 03/16/2021     Lab Results   Component Value Date    PROTTOTAL 8.7 11/16/2021    PROTTOTAL 8.0 03/16/2021      Lab Results   Component Value Date    ALKPHOS 85 11/16/2021    ALKPHOS 82 03/16/2021       SUBJECTIVE FINDINGS:  81-year-old female returns to clinic for diabetic foot cares.  She relates her nails have not grown much.  She relates she does not wear diabetic shoes.  She does not want those.  She relates no ulcers or sores since we have seen her last.  She feels that she gets some pain in her heel at times. It feels like when she steps down there is something in there.  Relates no injuries.  No specific relieving or aggravating factors.  She relates she does have lotion at home she has been using.    OBJECTIVE FINDINGS:  DP and PT are 2/4 bilaterally.  She has minimal dry, scaly skin bilaterally.  She has dystrophic, thickened nails with subungual debris, dystrophy and discoloration to differing  degrees bilaterally.  She has a small eschar abrasion on the left dorsal foot that is intact and padmini.  There is no erythema, no drainage, no odor, no calor bilaterally.  She has hyperkeratotic tissue buildup, plantar left heel.  Again, this is the small. There is no pain on palpation bilaterally.  She relates the pain is in the plantar heel when it occurs.  It feels like she is stepping on something.  There are no gross tendon voids bilaterally.    ASSESSMENT AND PLAN:  Onychomycosis and onychauxis bilaterally.  Diabetes with peripheral neuropathy.  Diabetes with peripheral vascular disease.  Plantar fasciitis, left.  Porokeratosis, left.  She also has 1 small abrasion.  She feels that may be from her scratching at night with her other foot.  We will observe this.  I advised her on stretching for the plantar fasciitis.  I debrided the porokeratosis on the left heel upon consent and use discussed with her.  All the nails were debrided or reduced bilaterally upon consent.  Dispensed MicroKlenz.  She can use this as needed to clean her feet as well which she usually does.  Return to clinic and see me in 3-4 months.  Previous notes reviewed.  Diagnosis and treatment options discussed with her.  I also advised her on over-the-counter insoles or arch supports for the plantar fasciitis if she wants to get those and that she should be wearing her shoes in the house.        Moderate level of medical decision making with Number and Complexity of  Problems Addressed-moderate, Amount and/or Complexity of Data to be Reviewed  and Analyzed-moderate, and Risk of Complications and/or  Morbidity or Mortality of  Patient Management-moderate.      Again, thank you for allowing me to participate in the care of your patient.        Sincerely,        Ehsan Araya DPM

## 2021-12-20 NOTE — PROGRESS NOTES
Past Medical History:   Diagnosis Date     Actinic keratosis 3/12/2013     Degenerative joint disease      Diabetes (H)      Gastroesophageal reflux disease without esophagitis 12/11/2020     Rheumatic fever 1957    x2 between the ages of 15-18     Seasonal allergies      Patient Active Problem List   Diagnosis     Seasonal allergies     Hypothyroidism     Hyperlipidemia LDL goal <100     Fibromyalgia     Osteoarthritis     Advanced directives, counseling/discussion     Obesity     Type 2 diabetes mellitus with hyperglycemia, with long-term current use of insulin (H)     Hypertension goal BP (blood pressure) < 140/90     Overactive bladder     Recurrent UTI     Pseudophakia of both eyes     DINORA (obstructive sleep apnea)- severe (AHI 56)     Contusion of right knee     Gait disorder     Gastroesophageal reflux disease without esophagitis     Urge incontinence     Chronic kidney disease, stage 2 (mild)     Past Surgical History:   Procedure Laterality Date     C APPENDECTOMY       C ARTHROPLASTY TMJ       CATARACT IOL, RT/LT       COLONOSCOPY       COLONOSCOPY N/A 8/13/2015    Procedure: COLONOSCOPY;  Surgeon: Duane, William Charles, MD;  Location: [facility]     COLONOSCOPY WITH CO2 INSUFFLATION N/A 8/13/2015    Procedure: COLONOSCOPY WITH CO2 INSUFFLATION;  Surgeon: Duane, William Charles, MD;  Location: [facility]     PHACOEMULSIFICATION WITH STANDARD INTRAOCULAR LENS IMPLANT Left 10/25/2018    Procedure: LEFT PHACOEMULSIFICATION WITH STANDARD INTRAOCULAR LENS IMPLANT;  Surgeon: Thomas Serna MD;  Location: [facility]     PHACOEMULSIFICATION WITH STANDARD INTRAOCULAR LENS IMPLANT Right 11/8/2018    Procedure: RIGHT PHACOEMULSIFICATION WITH STANDARD INTRAOCULAR LENS IMPLANT;  Surgeon: Thomas Serna MD;  Location: [facility]     THYROIDECTOMY        Social History     Socioeconomic History     Marital status:      Spouse name: Not on file     Number of children: Not on file     Years of education: Not on  file     Highest education level: Not on file   Occupational History     Occupation:    Tobacco Use     Smoking status: Never Smoker     Smokeless tobacco: Never Used     Tobacco comment: has had exposure to second hand smoke in the past from [employer], no current exposure   Substance and Sexual Activity     Alcohol use: No     Drug use: No     Sexual activity: Never   Other Topics Concern     Parent/sibling w/ CABG, MI or angioplasty before 65F 55M? 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     Hobby Hazards No     Sleep Concern Yes     Comment: difficulty staying asleep, up and down all night     Stress Concern No     Weight Concern Yes     Comment: would like to lose     Special Diet Yes     Comment: low card, low sugar diet     Back Care Yes     Comment: chiropratior     Exercise Yes     Comment: almost everyday     Bike Helmet No     Comment: does not ride bicycle any more     Seat Belt Yes     Self-Exams Yes   Social History Narrative     Not on file     Social Determinants of Health     Financial Resource Strain: Not on file   Food Insecurity: Not on file   Transportation Needs: Not on file   Physical Activity: Not on file   Stress: Not on file   Social Connections: Not on file   Intimate Partner Violence: Not on file   Housing Stability: Not on file     Family History   Problem Relation Age of Onset     Cancer Father         skin and leukemia     Cerebrovascular Disease Maternal Grandfather      Cerebrovascular Disease Paternal Grandmother      Eye Disorder Paternal Grandmother         cataracts     Cerebrovascular Disease Paternal Grandfather      Heart Disease Paternal Grandfather      Cancer Sister         Breast Cancer     Eye Disorder Mother         cataracts     Eye Disorder Brother         cataract      Breast Cancer Daughter      Other Cancer Daughter         ovarian cancer     Glaucoma No family hx of      Macular Degeneration No family hx of      Lab Results   Component Value Date    A1C 9.8 09/20/2021    A1C 10.3 05/17/2021    A1C 9.2 02/16/2021    A1C 8.7 11/12/2020    A1C 10.0 08/20/2020    A1C 10.5 01/10/2020     Last Comprehensive Metabolic Panel:  Sodium   Date Value Ref Range Status   11/16/2021 135 133 - 144 mmol/L Final   04/02/2021 134 133 - 144 mmol/L Final     Potassium   Date Value Ref Range Status   11/16/2021 4.3 3.4 - 5.3 mmol/L Final     Comment:     Specimen slightly hemolyzed, potassium may be falsely elevated.   04/02/2021 4.1 3.4 - 5.3 mmol/L Final     Chloride   Date Value Ref Range Status   11/16/2021 101 94 - 109 mmol/L Final   04/02/2021 99 94 - 109 mmol/L Final     Carbon Dioxide   Date Value Ref Range Status   04/02/2021 31 20 - 32 mmol/L Final     Carbon Dioxide (CO2)   Date Value Ref Range Status   11/16/2021 30 20 - 32 mmol/L Final     Anion Gap   Date Value Ref Range Status   11/16/2021 4 3 - 14 mmol/L Final   04/02/2021 4 3 - 14 mmol/L Final     Glucose   Date Value Ref Range Status   11/16/2021 162 (H) 70 - 99 mg/dL Final   04/02/2021 175 (H) 70 - 99 mg/dL Final     Comment:     Non Fasting     Urea Nitrogen   Date Value Ref Range Status   11/16/2021 17 7 - 30 mg/dL Final   04/02/2021 16 7 - 30 mg/dL Final     Creatinine   Date Value Ref Range Status   11/16/2021 0.76 0.52 - 1.04 mg/dL Final   04/02/2021 0.76 0.52 - 1.04 mg/dL Final     GFR Estimate   Date Value Ref Range Status   11/16/2021 74 >60 mL/min/1.73m2 Final     Comment:     As of July 11, 2021, eGFR is calculated by the CKD-EPI creatinine equation, without race adjustment. eGFR can be influenced by muscle mass, exercise, and diet. The reported eGFR is an estimation only and is only applicable if the renal function is stable.   04/02/2021 74 >60 mL/min/[1.73_m2] Final     Comment:     Non  GFR  Calc  Starting 12/18/2018, serum creatinine based estimated GFR (eGFR) will be   calculated using the Chronic Kidney Disease Epidemiology Collaboration   (CKD-EPI) equation.       GFR, ESTIMATED POCT   Date Value Ref Range Status   09/28/2021 >60 >60 mL/min/1.73m2 Final     Calcium   Date Value Ref Range Status   11/16/2021 9.4 8.5 - 10.1 mg/dL Final   04/02/2021 9.7 8.5 - 10.1 mg/dL Final     Lab Results   Component Value Date    AST 21 11/16/2021    AST 14 03/16/2021     Lab Results   Component Value Date    ALT 24 11/16/2021    ALT 28 03/16/2021     No results found for: BILICONJ   Lab Results   Component Value Date    BILITOTAL 0.4 11/16/2021    BILITOTAL 0.3 03/16/2021     Lab Results   Component Value Date    ALBUMIN 3.6 11/16/2021    ALBUMIN 3.6 03/16/2021     Lab Results   Component Value Date    PROTTOTAL 8.7 11/16/2021    PROTTOTAL 8.0 03/16/2021      Lab Results   Component Value Date    ALKPHOS 85 11/16/2021    ALKPHOS 82 03/16/2021       SUBJECTIVE FINDINGS:  81-year-old female returns to clinic for diabetic foot cares.  She relates her nails have not grown much.  She relates she does not wear diabetic shoes.  She does not want those.  She relates no ulcers or sores since we have seen her last.  She feels that she gets some pain in her heel at times. It feels like when she steps down there is something in there.  Relates no injuries.  No specific relieving or aggravating factors.  She relates she does have lotion at home she has been using.    OBJECTIVE FINDINGS:  DP and PT are 2/4 bilaterally.  She has minimal dry, scaly skin bilaterally.  She has dystrophic, thickened nails with subungual debris, dystrophy and discoloration to differing degrees bilaterally.  She has a small eschar abrasion on the left dorsal foot that is intact and padmini.  There is no erythema, no drainage, no odor, no calor bilaterally.  She has hyperkeratotic tissue buildup, plantar left heel.  Again, this is the small. There  is no pain on palpation bilaterally.  She relates the pain is in the plantar heel when it occurs.  It feels like she is stepping on something.  There are no gross tendon voids bilaterally.    ASSESSMENT AND PLAN:  Onychomycosis and onychauxis bilaterally.  Diabetes with peripheral neuropathy.  Diabetes with peripheral vascular disease.  Plantar fasciitis, left.  Porokeratosis, left.  She also has 1 small abrasion.  She feels that may be from her scratching at night with her other foot.  We will observe this.  I advised her on stretching for the plantar fasciitis.  I debrided the porokeratosis on the left heel upon consent and use discussed with her.  All the nails were debrided or reduced bilaterally upon consent.  Dispensed MicroKlenz.  She can use this as needed to clean her feet as well which she usually does.  Return to clinic and see me in 3-4 months.  Previous notes reviewed.  Diagnosis and treatment options discussed with her.  I also advised her on over-the-counter insoles or arch supports for the plantar fasciitis if she wants to get those and that she should be wearing her shoes in the house.        Moderate level of medical decision making with Number and Complexity of  Problems Addressed-moderate, Amount and/or Complexity of Data to be Reviewed  and Analyzed-moderate, and Risk of Complications and/or  Morbidity or Mortality of  Patient Management-moderate.

## 2021-12-20 NOTE — NURSING NOTE
Brandy Leon's chief complaint for this visit includes:  Chief Complaint   Patient presents with     Right Foot - RECHECK     PCP: Fernanda Miller    Referring Provider:  No referring provider defined for this encounter.    /73 (BP Location: Left arm, Patient Position: Sitting, Cuff Size: Adult Regular)   Pulse 103   SpO2 96%   No Pain (0)     Do you need any medication refills at today's visit? NO    Allergies   Allergen Reactions     Ace Inhibitors Cough     Estradiol Itching     Lipitor [Atorvastatin Calcium]      Weak and shaky     Sulfa Drugs      Rash       Zocor [Simvastatin]      Weak and shakey     Triamterene Dermatitis     Possible photosensitivity dermatitis, mild.  (changed to hctz alone 6/4/07, will see if this helps)       Donald Galvez MA

## 2021-12-20 NOTE — TELEPHONE ENCOUNTER
12/20 2nd attempt. Provided phone number [phone] to schedule follow up in about 1 year (around 2/18/2022).    Mary moran Procedure   Orthopedics, Podiatry, Sports Medicine, ENT/Eye Specialties  [facility]   [phone]

## 2021-12-22 ENCOUNTER — VIRTUAL VISIT (OUTPATIENT)
Dept: PHARMACY | Facility: CLINIC | Age: 81
End: 2021-12-22
Payer: COMMERCIAL

## 2021-12-22 DIAGNOSIS — K59.00 CONSTIPATION, UNSPECIFIED CONSTIPATION TYPE: ICD-10-CM

## 2021-12-22 DIAGNOSIS — Z79.4 TYPE 2 DIABETES MELLITUS WITH HYPERGLYCEMIA, WITH LONG-TERM CURRENT USE OF INSULIN (H): Primary | ICD-10-CM

## 2021-12-22 DIAGNOSIS — L29.9 ITCHING: ICD-10-CM

## 2021-12-22 DIAGNOSIS — M62.81 GENERALIZED MUSCLE WEAKNESS: ICD-10-CM

## 2021-12-22 DIAGNOSIS — R05.9 COUGH: ICD-10-CM

## 2021-12-22 DIAGNOSIS — E11.65 TYPE 2 DIABETES MELLITUS WITH HYPERGLYCEMIA, WITH LONG-TERM CURRENT USE OF INSULIN (H): Primary | ICD-10-CM

## 2021-12-22 PROCEDURE — 99606 MTMS BY PHARM EST 15 MIN: CPT | Performed by: PHARMACIST

## 2021-12-22 NOTE — PROGRESS NOTES
Medication Therapy Management (MTM) Encounter    ASSESSMENT:                            Medication Adherence/Access: Patient may benefit from reaching out or re-applying for the patient assistance program for Synthroid if desired in the future.    Diabetes:  Not meeting A1c goal of <8%. Would benefit from minimizing carbohydrate intake. Unable to tolerate Jardiance and other medications not affordable at this time. Will focus on making dietary changes and then start  assistance programs in 2022. Of note, alternate therapy options have been discussed with provider in the past, including meal-time insulin and GLP-1 (such as oral Rybelsus). Rybelsus is expensive and would need to wait to 2022 to apply for  assistance. Could also consider Soliqua in 2022 with assistance.     Constipation: Stable    Itchy Spots: Recommend consistent use of Cerave.    Productive Cough: Needs further evaluation.    Generalized weakness/Imbalance: Neurology is monitoring. Was also recommended patient use CPAP consistently.    PLAN:                          Recommend decreasing carbohydrates at breakfast.  Recommend using CPAP machine consistently.  Follow up with PCP regarding chronic cough.    Follow-up: 2 weeks    SUBJECTIVE/OBJECTIVE:                          Brandy Leon is a 81 year old female called for a follow up visit. This is a follow up visit rom 12/8/21. She was referred to me from Cailin Ponce.             Reason for visit: blood sugars, Jardiance restart     Allergies/ADRs: Reviewed in chart  Tobacco: She reports that she has never smoked. She has never used smokeless tobacco.  Alcohol: none  Caffeine: 2 cups/day of coffee with whole milk  Activity: up and down the stairs doing her laundry, Can't walk very far she feels wobbly.  Past Medical History: Reviewed in chart     Medication Adherence/Access: Patient uses pill box(es). Patient reports not missing any doses of medication. Patient  is getting Basaglar and Jardiance through  patient assistance programs (not getting Synthroid through the program anymore because they coudn't find her application in their system).    Diabetes:  Patient is currently taking metformin 1000mg twice daily, Basaglar 36 units daily at bedtime, glipizide XL 10mg twice daily. Stopped Jardiance on 11/10/2021. Has not been feeling better since stopping Jardiance (this is contrary to what was said at the last visit). Body feels achy and itching continues. Tried restarting Jardiance and felt shaky and weak, feels better when she doesn't take it.      SMBG: one-two times daily. Ranges (patient reported:)   Date FBG/ 2hours post Lunch/2hours post Dinner /2hours post   12/22 129     12/21 108 /249    12/19 192     12/18 154     12/16  /195    12/12 182                             Patient is not experiencing hypoglycemia. No symptoms of low blood sugar.  Recent symptoms of high blood sugar? None.  Eye exam: up to date  Foot exam: up to date  ACEi/ARB: Yes: irbesartan/HCTZ.   Statin: rosuvastatin 5mg three times weekly  Urine Albumin:   Lab Results   Component Value Date    MICROL 34 11/16/2021    MICROL 40 11/12/2020   Aspirin: Taking 81mg daily and denies side effects.   Diet: Trying to avoid pasta-notices that her blood sugar really increases after pasta.   Supper (son cooks and eat around 4pm): salad, last night lasagna  Breakfast: last couple of days she has been having 2 eggs, 2 pieces of toast, and [employer] Chex. Has not had oatmeal recently. When she does, she uses an instant oatmeal packet with old fashion oats on top.   Lunch: Doesn't usually eat lunch or will eat herring and crackers as a snack    Lab Results   Component Value Date    A1C 9.5 12/20/2021    A1C 9.8 09/20/2021    A1C 10.3 05/17/2021    A1C 9.2 02/16/2021    A1C 8.7 11/12/2020    A1C 10.0 08/20/2020    A1C 10.5 01/10/2020      Constipation: this has improved and has no concerns.     Itchy  Spots: all over body. When she lays down at night, within 30 minutes she starts itching. It wakes her up.  No visible rash, redness. This has been going on for about a year. She reports today that her skin is dry. Has been using Aveeno Daily lotion and Cerave. She      Productive Cough: Coughing up phlegm for at least a year. Hasn't been too bad today. . Phlegm is clear. Doesn't recall anything that makes it better or worse. Uses cough drops which seem to help. It is ok at night but notices it more in the morning or if she has been talking too long. Doesn't feel there is drainage in the back of her throat.     Generalized weakness/Imbalance: has been feeling weak and reports aches all of her body.This has been going on for weeks. Also feels unsteady on her feet. Patient is followed by Dr. Kenney with [facility] neurology and has EMG panel scheduled for 12/31/21. Depending on results, they are considering possible work-up for Normal Pressure Hydrocephalus and vascular parkinsonism. Saw Dr. Miller and recommended she start using her CPAP regularly but she hasn't started using this yet.     Today's Vitals: There were no vitals taken for this visit.  ----------------    I spent 18 minutes with this patient today. All changes were made via collaborative practice agreement with Fernanda Miller A copy of the visit note was provided to the patient's primary care provider.    The patient was given a summary of these recommendations.     Mirna Perez, Pharm.D, [facility]  Medication Therapy Management Pharmacist      Telemedicine Visit Details  Type of service:  Telephone visit  Start Time: 10:09AM  End Time: 10:27AM  Originating Location (patient location): Home  Distant Location (provider location):  [facility]     Medication Therapy Recommendations  No medication therapy recommendations to display

## 2021-12-22 NOTE — PATIENT INSTRUCTIONS
Recommendations from today's MTM visit:                                                      Today we reviewed what your medicines are for, how to know if they are working, that your medicines are safe and how to make your medicine regimen as easy as possible.      Recommend decreasing carbohydrates at breakfast.  Recommend using CPAP machine consistently.  Follow up with PCP regarding chronic cough.    Follow-up: 2 weeks    It was great to speak with you today.  I value your experience and would be very thankful for your time with providing feedback on our clinic survey. You may receive a survey via email or text message in the next few days.     To schedule another MTM appointment, please call the clinic directly or you may call the MTM scheduling line at [phone] or toll-free at [phone].     My Clinical Pharmacist's contact information:                                                      Please feel free to contact me with any questions or concerns you have.      Mirna Perez, Pharm.D, BCACP  Medication Therapy Management Pharmacist

## 2021-12-23 LAB — PYRIDOXAL PHOS SERPL-SCNC: 34.9 NMOL/L

## 2021-12-26 NOTE — RESULT ENCOUNTER NOTE
Your vitamin B6 level is normal.  Your vitamin D level is normal.  Continue your current vitamin D intake in diet and supplements.    Your long term blood sugar testings is slightly lower than previous testing but remains elevated. I know you have been talking with Mirna Perez, Pharm.D regarding possible medication available through assistance programs in 2022.  Please call or MyChart message me if you have any questions.    MARISSAK

## 2021-12-31 ENCOUNTER — OFFICE VISIT (OUTPATIENT)
Dept: NEUROLOGY | Facility: CLINIC | Age: 81
End: 2021-12-31
Payer: COMMERCIAL

## 2021-12-31 DIAGNOSIS — G62.9 NEUROPATHY: Primary | ICD-10-CM

## 2021-12-31 PROCEDURE — 95911 NRV CNDJ TEST 9-10 STUDIES: CPT | Performed by: PSYCHIATRY & NEUROLOGY

## 2021-12-31 PROCEDURE — 95885 MUSC TST DONE W/NERV TST LIM: CPT | Performed by: PSYCHIATRY & NEUROLOGY

## 2021-12-31 NOTE — LETTER
12/31/2021         RE: Brandy Leon  [address] [address]        Dear Colleague,    Thank you for referring your patient, Brandy Leon, to the [facility] [place]. Please see a copy of my visit note below.    [facility]   EMG Laboratory      Nerve Conduction & EMG Report          Patient:       Zoila Leon  Patient ID:    [id number]  Gender:        Female  YOB: 1940  Age:           81 Years 4 Months      History and Examination:  Zoila Leon is an 81 year old woman with a gait disturbance. Examination demonstrates reduced sensation in the distal lower limbs. She is referred for evaluation of suspected polyneuropathy.    Techniques:  Motor conduction studies were done with surface recording electrodes. Sensory conduction studies were performed with surface electrodes, unless indicated otherwise by (n), designating the use of subdermal recording electrodes. Temperature was monitored and recorded throughout the study. Upper extremities were maintained at a temperature of 32 degrees Centigrade or higher.  Lower extremities were maintained at a temperature of 31 degrees Centigrade or higher. EMG was done with a concentric needle electrode.     Results:  Bilateral sural and right median sensory nerve action potentials were absent. A right radial sensory conduction study demonstrated moderately severe attenuation of amplitude and mild slowing of conduction. A right ulnar sensory conduction study demonstrated marked attenuation of amplitude. Bilateral peroneal and right tibial compound muscle action potentials were moderately attenuated and demonstrated moderate conduction slowing in the calf. There was a > 50% segmental amplitude reduction in right peroneal compound muscle action potential amplitude, but with concomitant temporal dispersion that may be physiologic. Right median motor conduction studies demonstrated marked  prolongation of distal latency, moderately severe attenuation of amplitude, and mild slowing of conduction. A right ulnar motor conduction study was normal. Screening electromyography of the right lower limb demonstrated features of mild chronic motor unit remodeling in the tibialis anterior.     Interpretation:  This is an abnormal study, demonstrating electrophysiologic evidence of the followin. Moderately severe sensorimotor axonal polyneuropathy.  2. 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         Jayce Grajeda MD        Sensory NCS      Nerve / Sites Rec. Site Onset Peak NP Amp Ref. PP Amp Dist Keny Ref. Temp     ms ms  V  V  V cm m/s m/s  C   R MEDIAN - Dig II      Dig II Wrist NR NR NR 10.0 NR 14 NR 48.0 32   R ULNAR - Dig V      Dig V Wrist 2.55 3.28 1.3 8.0 1.5 12.5 49.0 48.0 33.1   R RADIAL - Snuff      Forearm Snuff 2.81 3.44 4.5 15.0 2.8 12.5 44.4 48.0 32.6   R SURAL - Lat Mall 60      Calf Ankle NR NR NR 5.0 NR 14 NR  31.9   L SURAL - Lat Mall 60      Calf Ankle NR NR NR 5.0 NR 14 NR  30.8       Motor NCS      Nerve / Sites Rec. Site Lat Ref. Amp Ref. Rel Amp Dist Keny Ref. Dur. Area Temp.     ms ms mV mV % cm m/s m/s ms %  C   R MEDIAN - APB      Wrist APB 9.17 4.40 2.2 5.0 100 8   7.86 100 33.3      Elbow APB 14.11  1.9  86.8 22.5 45.5 48.0 9.48 82.5 33.3   R ULNAR - ADM      Wrist ADM 3.18 3.50 5.9 5.0 100 8   4.84 100 32.1      B.Elbow ADM 6.98  5.6  94.7 18.3 48.1 48.0 5.36 93.9 32      A.Elbow ADM 9.48  5.3  90.7 12.5 50.0 48.0 5.78 88.8 32   L DEEP PERONEAL - EDB 60      Ankle EDB 4.38 6.00 1.0 2.0 100 8   4.27 100 24.3   R TIBIAL - AH      Ankle AH 5.63 6.00 0.6 4.0 100 8   7.34 100 32.5      Pop Fos AH 16.93  0.6  88.6 36.5 32.3 38.0 10.26 1150 32.4   R MEDIAN - II Lumb      Median II Lumb 7.40  0.8  100 10   6.88 100 32.7      Ulnar Palm Int 2.92  1.5  180 10   5.78 130 32.7   R PERONEAL - EDB      Ankle EDB 4.74  1.3  100    5.89 100 34.1      Fib Head EDB 14.53  0.5  41.7 31 31.7  7.19  146 32.7      Pop fossa EDB 16.67  0.5  36.7 8.9 41.7  7.66 151 32.7       EMG Summary Table     Spontaneous MUAP Recruitment    IA Fib PSW Fasc H.F. Amp Dur. PPP Pattern   R. TIB ANTERIOR N None None None None 1+ 1+ N Mildly Reduced   R. VAST LATERALIS N None None None None N N N N                                     Again, thank you for allowing me to participate in the care of your patient.        Sincerely,        Jayce Grajeda MD

## 2021-12-31 NOTE — PROGRESS NOTES
[facility]   EMG Laboratory      Nerve Conduction & EMG Report          Patient:       Zoila Leon  Patient ID:    [id number]  Gender:        Female  YOB: 1940  Age:           81 Years 4 Months      History and Examination:  Zoila Leon is an 81 year old woman with a gait disturbance. Examination demonstrates reduced sensation in the distal lower limbs. She is referred for evaluation of suspected polyneuropathy.    Techniques:  Motor conduction studies were done with surface recording electrodes. Sensory conduction studies were performed with surface electrodes, unless indicated otherwise by (n), designating the use of subdermal recording electrodes. Temperature was monitored and recorded throughout the study. Upper extremities were maintained at a temperature of 32 degrees Centigrade or higher.  Lower extremities were maintained at a temperature of 31 degrees Centigrade or higher. EMG was done with a concentric needle electrode.     Results:  Bilateral sural and right median sensory nerve action potentials were absent. A right radial sensory conduction study demonstrated moderately severe attenuation of amplitude and mild slowing of conduction. A right ulnar sensory conduction study demonstrated marked attenuation of amplitude. Bilateral peroneal and right tibial compound muscle action potentials were moderately attenuated and demonstrated moderate conduction slowing in the calf. There was a > 50% segmental amplitude reduction in right peroneal compound muscle action potential amplitude, but with concomitant temporal dispersion that may be physiologic. Right median motor conduction studies demonstrated marked prolongation of distal latency, moderately severe attenuation of amplitude, and mild slowing of conduction. A right ulnar motor conduction study was normal. Screening electromyography of the right lower limb demonstrated features of mild chronic motor unit remodeling in the  tibialis anterior.     Interpretation:  This is an abnormal study, demonstrating electrophysiologic evidence of the followin. Moderately severe sensorimotor axonal polyneuropathy.  2. 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         Jayce Grajeda MD        Sensory NCS      Nerve / Sites Rec. Site Onset Peak NP Amp Ref. PP Amp Dist Keny Ref. Temp     ms ms  V  V  V cm m/s m/s  C   R MEDIAN - Dig II      Dig II Wrist NR NR NR 10.0 NR 14 NR 48.0 32   R ULNAR - Dig V      Dig V Wrist 2.55 3.28 1.3 8.0 1.5 12.5 49.0 48.0 33.1   R RADIAL - Snuff      Forearm Snuff 2.81 3.44 4.5 15.0 2.8 12.5 44.4 48.0 32.6   R SURAL - Lat Mall 60      Calf Ankle NR NR NR 5.0 NR 14 NR  31.9   L SURAL - Lat Mall 60      Calf Ankle NR NR NR 5.0 NR 14 NR  30.8       Motor NCS      Nerve / Sites Rec. Site Lat Ref. Amp Ref. Rel Amp Dist Keny Ref. Dur. Area Temp.     ms ms mV mV % cm m/s m/s ms %  C   R MEDIAN - APB      Wrist APB 9.17 4.40 2.2 5.0 100 8   7.86 100 33.3      Elbow APB 14.11  1.9  86.8 22.5 45.5 48.0 9.48 82.5 33.3   R ULNAR - ADM      Wrist ADM 3.18 3.50 5.9 5.0 100 8   4.84 100 32.1      B.Elbow ADM 6.98  5.6  94.7 18.3 48.1 48.0 5.36 93.9 32      A.Elbow ADM 9.48  5.3  90.7 12.5 50.0 48.0 5.78 88.8 32   L DEEP PERONEAL - EDB 60      Ankle EDB 4.38 6.00 1.0 2.0 100 8   4.27 100 24.3   R TIBIAL - AH      Ankle AH 5.63 6.00 0.6 4.0 100 8   7.34 100 32.5      Pop Fos AH 16.93  0.6  88.6 36.5 32.3 38.0 10.26 1150 32.4   R MEDIAN - II Lumb      Median II Lumb 7.40  0.8  100 10   6.88 100 32.7      Ulnar Palm Int 2.92  1.5  180 10   5.78 130 32.7   R PERONEAL - EDB      Ankle EDB 4.74  1.3  100    5.89 100 34.1      Fib Head EDB 14.53  0.5  41.7 31 31.7  7.19 146 32.7      Pop fossa EDB 16.67  0.5  36.7 8.9 41.7  7.66 151 32.7       EMG Summary Table     Spontaneous MUAP Recruitment    IA Fib PSW Fasc H.F. Amp Dur. PPP Pattern   R. TIB ANTERIOR N None None None None 1+ 1+ N Mildly Reduced   R. VAST LATERALIS N None None None  None N N N N

## 2022-01-04 NOTE — PROGRESS NOTES
Medication Therapy Management (MTM) Encounter    ASSESSMENT:                            Medication Adherence/Access: Patient plans to call and get Synthroid and Soliqua through  patient assistance programs.    Diabetes:  Not meeting A1c goal of <8%. Would benefit from minimizing carbohydrate intake. Will focus on making dietary changes and renew  assistance programs. Patient may benefit from discontinuing basaglar and staring Soliuqa.    Hyperlipidemia: Stable.    Hypertension: Stable.    Overactive bladder: Patient reports urinating every 4 hours. Consider replacing irbesartan/HCTZ with irbesartan and refilling trospium.    Constipation: Stable.    Itchy Spots: Recommend consistent use of Cerave.    Productive Cough: Needs further evaluation. Coricidin contains antihistamine chlorpheniramine which is recommended to be avoided in people over the age of 60.  Reflux could potentially be causing and could consider a trial of PPI in the future.    Generalized weakness/Imbalance: Neurology is monitoring. Discussed arm pain with neurology and did not think it was due to EMG. Recommend patient to continue to monitor and contact clinic if no improvement. Was also recommended patient use CPAP consistently.    Diarrhea: Stable    Back Pain: Stable    PLAN:                            1. Apply for  assistance programs for Soliqua and Synthroid. Recommend discontinuing Basaglar and start Soliqua 30 units daily (will continue Basaglar until Soliqua can be obtained from prescription assistance program)  2. Start irbesartan 150mg tablet by mouth once daily. Discontinue irbesartan-hydrochlorothiazide  3.  refill for trospium  4. Recommend using CPAP consistently  5. Recommend following up with clinic if arm pain does not improve.    Future Considerations: PPI trial    Follow-up: 2 weeks    SUBJECTIVE/OBJECTIVE:                          Brandy Leon is a 81 year old female called for  a follow up visit. This is a follow up visit from 21. She was referred to me from Cailin Ponce.             Reason for visit: Follow-up on plan from previous visit: Arm hurts from recent EMG and increased urination.     Allergies/ADRs: Reviewed in chart  Tobacco: She reports that she has never smoked. She has never used smokeless tobacco.  Alcohol: none  Caffeine: 2 cups/day of coffee with whole milk  Activity: up and down the stairs doing her laundry, Can't walk very far; she feels wobbly.  Past Medical History: Reviewed in chart     Medication Adherence/Access: Patient uses pill box(es). Patient reports not missing any doses of medication. Patient is getting Basaglar through [employer] patient assistance programs. She will reapply for Synthroid and Soliqua through [employer] patient assistance programs.    Diabetes:  Patient is currently taking metformin 1000mg twice daily, Basaglar 36 units daily at bedtime, glipizide XL 10mg twice daily.   Medication hx:  Unable to tolerate Jardiance and other medications not affordable at this time. Stopped on 11/10/2021. She stopped and then restarted Jardiance but felt shaky and weak, feels better when she doesn't take it.     SMBG: one-two times daily. Ranges (patient reported:)   Date FBG/ 2hours post Lunch/2hours post Dinner /2hours post    141        190    138      162      152      120     Patient is not experiencing hypoglycemia. No symptoms of low blood sugar.  Recent symptoms of high blood sugar? None.  Eye exam: up to date  Foot exam: up to date  ACEi/ARB: Yes: irbesartan/HCTZ 150-12.5m.5 tablets daily  Statin: rosuvastatin 5mg three times weekly  Aspirin: Taking 81mg daily and denies side effects.   Urine Albumin:   Lab Results   Component Value Date    MICROL 34 2021    MICROL 40 2020     Diet: Trying to avoid pasta-notices that her blood sugar really increases after pasta.   Supper (son cooks and eat  around 4pm): beef roast tonight, last night ate macaroni with peas and chicken  Breakfast: 2 pieces of toast with PB & jelly this morning.  Lunch: leftovers from the night before    Lab Results   Component Value Date    A1C 9.5 2021    A1C 9.8 2021    A1C 10.3 2021    A1C 9.2 2021    A1C 8.7 2020    A1C 10.0 2020    A1C 10.5 01/10/2020      Hypertension:  Irbesartan/HCTZ 150-12.5m.5 tablets daily. Has blood pressure monitor at home but is not regularly monitoring. Does check her blood pressure when she stops by the pharmacy. Reports last time she checked it was 180/70mmHg, but reports feeling stressed at that time. Can tell if her blood pressure if high, she gets a throbbing headache and reports she hasn't had any of these recently.     BP Readings from Last 3 Encounters:   21 133/73   21 126/62   21 120/75     Hyperlipidemia:  Rosuvastatin 5mg three times weekly    Recent Labs   Lab Test 21  0935 20  0906 12/22/15  1131 08/21/15  0825 07/21/15  1620 06/16/15  0941   CHOL 203* 198   < > 174  --  224*   HDL 52 55   < > 49*  --  47*   LDL 97 102*   < > 86   < > 120   TRIG 270* 206*   < > 197*  --  283*   CHOLHDLRATIO  --   --   --  3.6  --  4.8    < > = values in this interval not displayed.     Constipation: This has improved and has no concerns. Hasn't had to use Senna since last visit.     Itchy Spots: All over body. When she lays down at night, within 30 minutes she starts itching. It wakes her up.  No visible rash, redness. This has been going on for about a year.  Has been using Aveeno Daily lotion and Cerave and feels this has been working.      Productive Cough: Coughing up phlegm for at least a year. Phlegm is clear. Doesn't recall anything that makes it better or worse. Uses cough drops and coricidin which seem to help. Fexofenadine doesn't help. It is ok at night but notices it more in the morning or if she has been talking too long.  Doesn't feel there is drainage in the back of her throat.     Generalized weakness/Imbalance: has been feeling weak and reports aches all of her body.This has been going on for weeks. Also feels unsteady on her feet. Patient is followed by Dr. Kenney with [facility] neurology and had EMG on 12/31/21. Depending on results, they are considering possible work-up for Normal Pressure Hydrocephalus and vascular parkinsonism. Has not been using CPAP machine regularly because she needs a new nose piece and she hasn't had a chance to call yet. Reports right arm is really bothering her after her EMG.    Hypothyroidism:  Synthroid 137mcg daily. Would like to get this medication through prescription assistance program.  TSH   Date Value Ref Range Status   11/16/2021 1.96 0.40 - 4.00 mU/L Final   11/12/2020 3.00 0.40 - 4.00 mU/L Final     Overactive Bladder:  Trospium 60mg capsule every morning. Recently been going the bathroom every 4 hours and also notes that she has been out of the trospium.    Allergic Rhinitis: Current medications include fexofenadine 180mg once daily. Patient feels that current therapy is effective. Has been taking Coricidin Maximum Strength daily recently for sneezing and this has been helping.     Diarrhea: current medications include Imodium as needed. Took a dose last night when she had loose stools and this was effective. Thinks this was related to the bean soup she had to eat yesterday.    Back Pain: took 600mg of ibuprofen in the middle of the night last night for some back pain. Back pain resolved with ibuprofen use. Feels better this morning.        Today's Vitals: There were no vitals taken for this visit.  ----------------    I spent 45 minutes with this patient today (an extra 15 minutes was spent creating the Medication Action Plan). All changes were made via collaborative practice agreement with Fernanda Miller A copy of the visit note was provided to the patient's primary care  provider.    Tiffany Isaacs, Pharm.D. Student  Mirna Perez, Pharm.D, BCACP  Medication Therapy Management Pharmacist      Telemedicine Visit Details  Type of service:  Telephone visit  Start Time: 10:00AM  End Time: 10:45AM  Originating Location (patient location): Home  Distant Location (provider location):  [facility]     Medication Therapy Recommendations  No medication therapy recommendations to display

## 2022-01-05 ENCOUNTER — VIRTUAL VISIT (OUTPATIENT)
Dept: PHARMACY | Facility: CLINIC | Age: 82
End: 2022-01-05
Payer: COMMERCIAL

## 2022-01-05 DIAGNOSIS — E11.65 TYPE 2 DIABETES MELLITUS WITH HYPERGLYCEMIA, WITH LONG-TERM CURRENT USE OF INSULIN (H): Primary | ICD-10-CM

## 2022-01-05 DIAGNOSIS — N39.0 RECURRENT UTI: ICD-10-CM

## 2022-01-05 DIAGNOSIS — R05.9 COUGH: ICD-10-CM

## 2022-01-05 DIAGNOSIS — M54.9 BACK PAIN: ICD-10-CM

## 2022-01-05 DIAGNOSIS — M62.81 GENERALIZED MUSCLE WEAKNESS: ICD-10-CM

## 2022-01-05 DIAGNOSIS — K59.00 CONSTIPATION, UNSPECIFIED CONSTIPATION TYPE: ICD-10-CM

## 2022-01-05 DIAGNOSIS — M54.9 BACK PAIN, UNSPECIFIED BACK LOCATION, UNSPECIFIED BACK PAIN LATERALITY, UNSPECIFIED CHRONICITY: ICD-10-CM

## 2022-01-05 DIAGNOSIS — R19.7 DIARRHEA, UNSPECIFIED TYPE: ICD-10-CM

## 2022-01-05 DIAGNOSIS — I10 ESSENTIAL HYPERTENSION WITH GOAL BLOOD PRESSURE LESS THAN 140/90: ICD-10-CM

## 2022-01-05 DIAGNOSIS — N32.81 OVERACTIVE BLADDER: ICD-10-CM

## 2022-01-05 DIAGNOSIS — Z79.4 TYPE 2 DIABETES MELLITUS WITH HYPERGLYCEMIA, WITH LONG-TERM CURRENT USE OF INSULIN (H): Primary | ICD-10-CM

## 2022-01-05 DIAGNOSIS — R35.0 URINARY FREQUENCY: ICD-10-CM

## 2022-01-05 DIAGNOSIS — L29.9 ITCHING: ICD-10-CM

## 2022-01-05 DIAGNOSIS — E78.5 HYPERLIPIDEMIA LDL GOAL <100: ICD-10-CM

## 2022-01-05 DIAGNOSIS — E03.9 ACQUIRED HYPOTHYROIDISM: ICD-10-CM

## 2022-01-05 PROCEDURE — 99607 MTMS BY PHARM ADDL 15 MIN: CPT | Performed by: PHARMACIST

## 2022-01-05 PROCEDURE — 99605 MTMS BY PHARM NP 15 MIN: CPT | Performed by: PHARMACIST

## 2022-01-05 NOTE — LETTER
"Recommended To-Do List      Prepared on: 1/6/2022     You can get the best results from your medications by completing the items on this \"To-Do List.\"      Bring your To-Do List when you go to your doctor. And, share it with your family or caregivers.    My To-Do List:  What we talked about: What I should do:   An issue with your medication    Change the medication you are taking from irbesartan-hydrochlorothiazide (AVALIDE) to irbesartan 150mg daily (a full tablet)          What we talked about: What I should do:   The cost of your medication(s)    Change the medication you are taking from insulin glargine to Soliqua (work with Francesca at patient assistance program) continue basaglar until you receive the Soliqua          What we talked about: What I should do:                     "

## 2022-01-05 NOTE — LETTER
January 6, 2022  Brandy Leon  [address] [place] [address]    Dear Ms. Leon, [facility]        Thank you for talking with me on Jan 5, 2022 about your health and medications. As a follow-up to our conversation, I have included two documents:      1. Your Recommended To-Do List has steps you should take to get the best results from your medications.  2. Your Medication List will help you keep track of your medications and how to take them.    If you want to talk about these documents, please call Mirna Perez RPH at phone: [phone], Monday-Friday 8-4:30pm.    I look forward to working with you and your doctors to make sure your medications work well for you.    Sincerely,    Mirna Perez RPH

## 2022-01-05 NOTE — Clinical Note
COURTNEY Hay as you and Zoila are working on her applications for this year. We would like to change her basaglar to Javon.  Thanks,  Laisha

## 2022-01-05 NOTE — LETTER
_  Medication List        Prepared on: 1/6/2022     Bring your Medication List when you go to the doctor, hospital, or   emergency room. And, share it with your family or caregivers.     Note any changes to how you take your medications.  Cross out medications when you no longer use them.    Medication How I take it Why I use it Prescriber   aspirin 81 MG EC tablet Take 1 tablet by mouth daily. Type 2 Diabetes, HbA1c Goal < 7% (H) MAGO Buchanan CNP   blood glucose (ONETOUCH ULTRA) test strip USE TO TEST TWICE A DAY Type 2 diabetes mellitus with hyperglycemia, with long-term current use of insulin (H) Fernanda Miller MD   Cranberry-Vitamin C-Vitamin E (CRANBERRY PLUS VITAMIN C) 4200-20-3 MG-MG-UNIT CAPS Take 1 capsule by mouth 2 times daily General Health  Over the Counter   fexofenadine (ALLEGRA) 180 MG tablet Take 180 mg by mouth as needed  Allerties Over the Counter   glipiZIDE (GLUCOTROL XL) 10 MG 24 hr tablet TAKE 1 TABLET BY MOUTH TWICE A DAY Type 2 diabetes mellitus with hyperglycemia, with long-term current use of insulin (H) MAGO Baker CNP   GLUCOSAMINE CHONDROITIN COMPLX OR 2 Daily General Health  Over the Counter   ibuprofen (ADVIL/MOTRIN) 200 MG capsule Take 400 mg by mouth every 4 hours as needed  Pain Over the Counter   insulin glargine (BASAGLAR KWIKPEN) 100 UNIT/ML pen Inject 36 Units Subcutaneous daily Type 2 diabetes mellitus with hyperglycemia, with long-term current use of insulin (H) Fernanda Miller MD   insulin glargine-lixisenatide (SOLIQUA) pen Inject 30 Units Subcutaneous every morning (before breakfast) Type 2 diabetes mellitus with hyperglycemia, with long-term current use of insulin (H) Fernanda Miller MD   insulin pen needle (BD JUAN C U/F) 32G X 4 MM miscellaneous USE 1 DAILY AS DIRECTED. Type 2 diabetes mellitus with hyperglycemia (H) MAGO Baker CNP   irbesartan (AVAPRO) 150 MG tablet Take 1 tablet (150 mg) by mouth At Bedtime Essential  hypertension with goal blood pressure less than 140/90 Fernanda Miller MD   latanoprost (XALATAN) 0.005 % ophthalmic solution Place 1 drop into both eyes daily Primary open angle glaucoma of both eyes, Moderate stage Devi Gupta MD   metFORMIN (GLUCOPHAGE) 1000 MG tablet TAKE 1 TABLET BY MOUTH TWICE A DAY WITH MEALS Type 2 diabetes mellitus with hyperglycemia, with long-term current use of insulin (H) Fernanda Miller MD   MULTIVITAMIN OR 1 tablet daily General Health  Over the Counter   Omega-3 Fatty Acids (OMEGA 3 PO) Take by mouth 2 times daily.  General Health  Over the Counter   ONE TOUCH DELICA LANCETS MISC 1 each 2 times daily. One Touch Delica   Type 2 Diabetes, HbA1c Goal < 7% (H) MAGO Buchanan CNP   rosuvastatin (CRESTOR) 5 MG tablet TAKE 1 TABLET BY MOUTH TWICE WEEKLY Dyslipidemia, goal LDL below 100 MAGO Baker CNP   SYNTHROID 137 MCG tablet Take 1 tablet (137 mcg) by mouth daily Acquired Hypothyroidism Fernanda Miller MD   triamcinolone (KENALOG) 0.1 % external cream Apply twice daily for 2 weeks Dermatitis Lucita Jordan MD   trospium (SANCTURA XR) 60 MG CP24 24 hr capsule Take 1 capsule (60 mg) by mouth every morning Urinary Frequency; Overactive Bladder Fernanda Miller MD         Add new medications, over-the-counter drugs, herbals, vitamins, or  minerals in the blank rows below.    Medication How I take it Why I use it Prescriber                          Allergies:      ace inhibitors; estradiol; lipitor [atorvastatin calcium]; sulfa drugs; zocor [simvastatin]; triamterene        Side effects I have had:              Other Information:             My notes and questions:

## 2022-01-06 RX ORDER — IRBESARTAN 150 MG/1
150 TABLET ORAL AT BEDTIME
Qty: 90 TABLET | Refills: 3 | Status: SHIPPED | OUTPATIENT
Start: 2022-01-06 | End: 2022-02-02

## 2022-01-06 RX ORDER — TROSPIUM CHLORIDE ER 60 MG/1
60 CAPSULE ORAL EVERY MORNING
Qty: 30 CAPSULE | Refills: 3 | Status: SHIPPED | OUTPATIENT
Start: 2022-01-06 | End: 2022-04-28

## 2022-01-06 RX ORDER — INSULIN GLARGINE AND LIXISENATIDE 100; 33 U/ML; UG/ML
30 INJECTION, SOLUTION SUBCUTANEOUS
Qty: 15 ML | Refills: 11
Start: 2022-01-06 | End: 2022-03-28

## 2022-01-06 NOTE — PATIENT INSTRUCTIONS
Recommendations from today's MTM visit:                                                      Today we reviewed what your medicines are for, how to know if they are working, that your medicines are safe and how to make your medicine regimen as easy as possible.      1. Apply for  assistance programs for Soliqua and Synthroid. Recommend discontinuing Basaglar and start Soliqua 30 units daily (will continue Basaglar until Soliqua can be obtained from prescription assistance program)  2. Start irbesartan 150mg tablet by mouth once daily. Discontinue irbesartan-hydrochlorothiazide  3.  refill for trospium  4. Recommend using CPAP consistently  5. Recommend following up with clinic if arm pain does not improve.      Follow-up: 2 weeks    It was great to speak with you today.  I value your experience and would be very thankful for your time with providing feedback on our clinic survey. You may receive a survey via email or text message in the next few days.     To schedule another MTM appointment, please call the clinic directly or you may call the MTM scheduling line at [phone] or toll-free at [phone].     My Clinical Pharmacist's contact information:                                                      Please feel free to contact me with any questions or concerns you have.      Mirna Perez, Pharm.D, BCACP  Medication Therapy Management Pharmacist

## 2022-01-16 ENCOUNTER — HEALTH MAINTENANCE LETTER (OUTPATIENT)
Age: 82
End: 2022-01-16

## 2022-01-19 ENCOUNTER — VIRTUAL VISIT (OUTPATIENT)
Dept: PHARMACY | Facility: CLINIC | Age: 82
End: 2022-01-19
Payer: COMMERCIAL

## 2022-01-19 DIAGNOSIS — E11.65 TYPE 2 DIABETES MELLITUS WITH HYPERGLYCEMIA, WITH LONG-TERM CURRENT USE OF INSULIN (H): Primary | ICD-10-CM

## 2022-01-19 DIAGNOSIS — I10 ESSENTIAL HYPERTENSION WITH GOAL BLOOD PRESSURE LESS THAN 140/90: ICD-10-CM

## 2022-01-19 DIAGNOSIS — Z79.4 TYPE 2 DIABETES MELLITUS WITH HYPERGLYCEMIA, WITH LONG-TERM CURRENT USE OF INSULIN (H): Primary | ICD-10-CM

## 2022-01-19 PROCEDURE — 99607 MTMS BY PHARM ADDL 15 MIN: CPT | Performed by: PHARMACIST

## 2022-01-19 PROCEDURE — 99606 MTMS BY PHARM EST 15 MIN: CPT | Performed by: PHARMACIST

## 2022-01-19 NOTE — PROGRESS NOTES
Medication Therapy Management (MTM) Encounter    ASSESSMENT:                            Medication Adherence/Access: Patient plans to call and get Synthroid and Soliqua through  patient assistance programs.    Diabetes:  Not meeting A1c goal of <8%. Not meeting self-monitoring goal of fasting 80-130mg/dL. Would benefit from minimizing carbohydrate intake. Will focus on making dietary changes and renew  assistance programs. Patient may benefit from discontinuing basaglar and staring Soliuqa when approved through assistance program..    Hypertension: Meeting goal of blood pressure <140/90mmHg. Patient will take Irbesartan in the morning to minimize insomnia side effect. Reeducated patient on the need to discontinue irbesartan/hctz and to just continue on irbesartan.    PLAN:                            1. Take Irbesartan 150mg daily in the morning.   2. Call [employer] to apply for  assistance programs for Soliqua and Synthroid. Recommend discontinuing Basaglar and start Soliqua 30 units daily (will continue Basaglar until Soliqua can be obtained from prescription assistance program)    Follow-up: 2 weeks on 2/2/2022 at 10AM    SUBJECTIVE/OBJECTIVE:                          Brandy Leon is a 81 year old female called for a follow up visit. This is a follow up visit from 1/5/2022. She was referred to me from Cailin Ponce.             Reason for visit: Hypertension and diabetes check-up.     Allergies/ADRs: Reviewed in chart  Tobacco: She reports that she has never smoked. She has never used smokeless tobacco.  Alcohol: none  Caffeine: 2 cups/day of coffee with whole milk  Activity: up and down the stairs doing her laundry, Can't walk very far; she feels wobbly.  Past Medical History: Reviewed in chart     Medication Adherence/Access: Patient uses pill box(es). Patient reports not missing any doses of medication. Patient is getting Basaglar through  patient  assistance programs. She will reapply for Synthroid and Soliqua through  patient assistance programs.    Diabetes:  Patient is currently taking metformin 1000mg twice daily, Basaglar 36 units daily at bedtime, glipizide XL 10mg twice daily.     Medication hx:  Unable to tolerate Jardiance and other medications not affordable at this time. Stopped on 11/10/2021. She stopped and then restarted Jardiance but felt shaky and weak, feels better when she doesn't take it.     SMBG: one-two times daily. Ranges (patient reported:)   Date FBG/ 2hours post Lunch/2hours post Dinner /2hours post   1/17 113     1/16 145  271   1/5 141     1/4   190   1/1 138     12/30 162     12/29 152     12/27 120     Recent symptoms of low blood sugar? None.  Recent symptoms of high blood sugar? None.  Eye exam: up to date  Foot exam: up to date    Diet: Trying to avoid pasta-notices that her blood sugar really increases after pasta.   Supper (son cooks and eat around 4pm): beef roast tonight, last night ate macaroni with peas and chicken  Breakfast: 2 pieces of toast with PB & jelly this morning.  Lunch: leftovers from the night before    ACEi/ARB: Yes: irbesartan 150 daily  Statin: rosuvastatin 5mg three times weekly  Aspirin: Taking 81mg daily and denies side effects.   Urine Albumin:   Lab Results   Component Value Date    MICROL 34 11/16/2021    MICROL 40 11/12/2020     Lab Results   Component Value Date    A1C 9.5 12/20/2021    A1C 9.8 09/20/2021    A1C 10.3 05/17/2021    A1C 9.2 02/16/2021    A1C 8.7 11/12/2020    A1C 10.0 08/20/2020    A1C 10.5 01/10/2020      Hypertension:  Irbesartan 150 daily  Started a couple days ago, patient reports taking Irbesartan at night and is unable to sleep.  Has blood pressure monitor at home but is not regularly monitoring. Does check her blood pressure when she stops by the pharmacy. Can tell if her blood pressure if high, she gets a throbbing headache and reports she hasn't had any of these  recently.  Patient reports she has been taking both irbesartan/hctz (discontinued at last visit) and irbesartan.    BP Readings from Last 3 Encounters:   12/20/21 133/73   12/13/21 126/62   09/20/21 120/75       Today's Vitals: There were no vitals taken for this visit.  ----------------    I spent 19 minutes with this patient today. All changes were made via collaborative practice agreement with Fernanda Miller A copy of the visit note was provided to the patient's primary care provider.    Tiffany Isaacs, Pharm.D. Student  Mirna Perez, Pharm.D, [facility]  Medication Therapy Management Pharmacist      Telemedicine Visit Details  Type of service:  Telephone visit  Start Time: 10:15AM  End Time: 10:34AM  Originating Location (patient location): [place]  Distant Location (provider location):  [facility]     Medication Therapy Recommendations  Hypertension goal BP (blood pressure) < 140/90    Current Medication: irbesartan (AVAPRO) 150 MG tablet   Rationale: Undesirable effect - Adverse medication event - Safety   Recommendation: Change Administration Time   Status: Patient Agreed - Adherence/Education

## 2022-01-19 NOTE — PATIENT INSTRUCTIONS
Recommendations from today's MTM visit:                                                      Today we reviewed what your medicines are for, how to know if they are working, that your medicines are safe and how to make your medicine regimen as easy as possible.    1. Take Irbesartan 150mg daily in the morning.   2. Call [employer] to apply for  assistance programs for Soliqua and Synthroid. Recommend discontinuing Basaglar and start Soliqua 30 units daily (will continue Basaglar until Soliqua can be obtained from prescription assistance program)    Follow-up: 2 weeks    It was great to speak with you today.  I value your experience and would be very thankful for your time with providing feedback on our clinic survey. You may receive a survey via email or text message in the next few days.     To schedule another MTM appointment, please call the clinic directly or you may call the MTM scheduling line at [phone] or toll-free at [phone].     My Clinical Pharmacist's contact information:                                                      Please feel free to contact me with any questions or concerns you have.      Mirna Perez, Pharm.D, BCACP  Medication Therapy Management Pharmacist

## 2022-02-02 ENCOUNTER — VIRTUAL VISIT (OUTPATIENT)
Dept: PHARMACY | Facility: CLINIC | Age: 82
End: 2022-02-02
Payer: COMMERCIAL

## 2022-02-02 DIAGNOSIS — E11.9 DIABETES MELLITUS, TYPE 2 (H): Primary | ICD-10-CM

## 2022-02-02 DIAGNOSIS — Z79.4 TYPE 2 DIABETES MELLITUS WITH HYPERGLYCEMIA, WITH LONG-TERM CURRENT USE OF INSULIN (H): Chronic | ICD-10-CM

## 2022-02-02 DIAGNOSIS — I10 ESSENTIAL HYPERTENSION: ICD-10-CM

## 2022-02-02 DIAGNOSIS — E11.65 TYPE 2 DIABETES MELLITUS WITH HYPERGLYCEMIA, WITH LONG-TERM CURRENT USE OF INSULIN (H): Chronic | ICD-10-CM

## 2022-02-02 PROCEDURE — 99606 MTMS BY PHARM EST 15 MIN: CPT | Performed by: PHARMACIST

## 2022-02-02 PROCEDURE — 99607 MTMS BY PHARM ADDL 15 MIN: CPT | Performed by: PHARMACIST

## 2022-02-02 RX ORDER — IRBESARTAN 150 MG/1
75 TABLET ORAL AT BEDTIME
COMMUNITY
End: 2023-04-24

## 2022-02-02 NOTE — PROGRESS NOTES
Medication Therapy Management (MTM) Encounter    ASSESSMENT:                            Medication Adherence/Access: Patient plans to call and get Synthroid and Soliqua through  patient assistance programs.    Diabetes:  Not meeting A1c goal of <8%.  Would benefit from minimizing carbohydrate intake. Will focus on making dietary changes and renew  assistance programs. Patient may benefit from discontinuing basaglar and staring Soliuqa when approved through assistance program..    Hypertension: Meeting goal of blood pressure <140/90mmHg. Patient may be experiencing low blood pressure from irbesartan but may also have had falls due to other conditions. May benefit from decreasing irbesartan dose to 75mg daily.     PLAN:                            1. Take Irbesartan 75mg (one-half tablet) daily in the morning.     2. Contact the [place] [facility] Assistance [facility]/[employer] (Francesca Salmon) at [phone].    Follow-up: Wednesday 02/09/22    SUBJECTIVE/OBJECTIVE:                          Brandy Leon is a 81 year old female called for a follow up visit. This is a follow up visit from 1/19/2022. She was referred to me from Cailin Ponce.             Reason for visit: Hypertension and diabetes check-up.     Allergies/ADRs: Reviewed in chart  Tobacco: She reports that she has never smoked. She has never used smokeless tobacco.  Alcohol: none  Caffeine: 2 cups/day of coffee with whole milk  Past Medical History: Reviewed in chart      Medication Adherence/Access: Patient uses pill box(es). Patient reports not missing any doses of medication. Patient is getting Basaglar through  patient assistance programs. She plans to reach out to Bernardo at the [place] [facility] Assistance Program/Fund.  Reports her daughter is coming over this week to review her medications with her.    Diabetes:  Patient is currently taking metformin 1000mg twice daily  (2000 mg total), Basaglar 36 units daily at bedtime, glipizide XL 10mg twice daily (20 mg total).     Patient reports blood sugars have been running pretty good. 100 at night and in the morning and about 80 in the middle of the day.     Recent symptoms of low blood sugar? None.  Recent symptoms of high blood sugar? None.  Eye exam: has appointment for 2/4/22  Foot exam: up to date    ACEi/ARB: Yes: irbesartan 150 daily  Statin: rosuvastatin 5mg two times weekly  Aspirin: Taking 81mg daily and denies side effects.   Urine Albumin:   Lab Results   Component Value Date    MICROL 34 11/16/2021    MICROL 40 11/12/2020     Lab Results   Component Value Date    A1C 9.5 12/20/2021    A1C 9.8 09/20/2021    A1C 10.3 05/17/2021    A1C 9.2 02/16/2021    A1C 8.7 11/12/2020    A1C 10.0 08/20/2020    A1C 10.5 01/10/2020     Did not get flu shot this year and does not plan to - says she gets sick  Knows that she needs shingles and tetanus shot  Plans to get covid booster soon     Hypertension:  Fell on Saturday 1/29/22 and was down for about 20 hours before her son stopped in. Fell when she was getting into bed, hadn't eaten anything recently. Blood sugar wasn't low at the time. [place] too weak to get herself up. Let her know that it is important to talk with her Dr about the incident. Wasn't dizzy at all when the fall happened. Her bed is high off the floor so she has to be careful. She states that she cannot take the irbesartan 150 mg- every time that she takes it she states that she has fallen. Patient reports falling when she takes the irbesartan but doesn't seem to have other symptoms such as dizziness leading up to falls (she says she just slides out of bed and can't get the strength to get back up). She has been on irbesartan 150 mg since 2012 (as part of a combination pill with hydrochlorothiazide). Open to idea of trying a half tablet daily. Has not been taking the irbesartan/hctz anymore.    Has not been checking her blood  pressures at home. She states that she can always tell if her blood pressure is too high.    BP Readings from Last 3 Encounters:   12/20/21 133/73   12/13/21 126/62   09/20/21 120/75       Today's Vitals: There were no vitals taken for this visit.  ----------------    I spent 18 minutes with this patient today. All changes were made via collaborative practice agreement with Fernanda Miller A copy of the visit note was provided to the patient's primary care provider.    Gurjit Meza, Pharm.D. Student  Mirna Perez, Pharm.D, [facility]  Medication Therapy Management Pharmacist      Telemedicine Visit Details  Type of service:  Telephone visit  Start Time: 10:12AM  End Time: 10:30AM  Originating Location (patient location): Home  Distant Location (provider location):  [facility]     Medication Therapy Recommendations  Essential hypertension    Current Medication: irbesartan (AVAPRO) 150 MG tablet (Discontinued)   Rationale: Dose too high - Dosage too high - Safety   Recommendation: Decrease Dose - irbesartan 150 MG tablet - Take one-half tablet by mouth once daily   Status: Accepted per CPA   Note: Patient reported experiencing falls from her bed when she took her irbesartan 150 mg and would not take it anymore. Patient is comfortable with taking one-half tablet and seeing if that works for her.

## 2022-02-02 NOTE — PATIENT INSTRUCTIONS
Recommendations from today's MTM visit:                                                      Today we reviewed what your medicines are for, how to know if they are working, that your medicines are safe and how to make your medicine regimen as easy as possible.    Contact the [place] Prescription Assistance Program/Fund (Francesca Correa and Kaykay Salmon) at [phone].    Follow-up: 2/9/2022    It was great to speak with you today.  I value your experience and would be very thankful for your time with providing feedback on our clinic survey. You may receive a survey via email or text message in the next few days.     To schedule another MTM appointment, please call the clinic directly or you may call the MTM scheduling line at [phone] or toll-free at [phone].     My Clinical Pharmacist's contact information:                                                      Please feel free to contact me with any questions or concerns you have.      Mirna Perez, Pharm.D, BCACP  Medication Therapy Management Pharmacist

## 2022-02-04 ENCOUNTER — OFFICE VISIT (OUTPATIENT)
Dept: OPTOMETRY | Facility: CLINIC | Age: 82
End: 2022-02-04
Payer: COMMERCIAL

## 2022-02-04 DIAGNOSIS — H02.831 DERMATOCHALASIS OF BOTH UPPER EYELIDS: ICD-10-CM

## 2022-02-04 DIAGNOSIS — H40.1131 PRIMARY OPEN ANGLE GLAUCOMA (POAG) OF BOTH EYES, MILD STAGE: Primary | ICD-10-CM

## 2022-02-04 DIAGNOSIS — H02.834 DERMATOCHALASIS OF BOTH UPPER EYELIDS: ICD-10-CM

## 2022-02-04 PROCEDURE — 92133 CPTRZD OPH DX IMG PST SGM ON: CPT | Performed by: OPTOMETRIST

## 2022-02-04 PROCEDURE — 92002 INTRM OPH EXAM NEW PATIENT: CPT | Performed by: OPTOMETRIST

## 2022-02-04 RX ORDER — CALCIUM CARBONATE 500 MG/1
1 TABLET, CHEWABLE ORAL DAILY PRN
COMMUNITY
End: 2023-04-24

## 2022-02-04 ASSESSMENT — VISUAL ACUITY
OS_SC+: -1
METHOD: SNELLEN - LINEAR
OS_SC: 20/30
OD_SC: 20/25

## 2022-02-04 ASSESSMENT — TONOMETRY
OD_IOP_MMHG: 16
OD_IOP_MMHG: 15
OS_IOP_MMHG: 15
IOP_METHOD: APPLANATION

## 2022-02-04 ASSESSMENT — CONF VISUAL FIELD
OD_NORMAL: 1
OS_NORMAL: 1
METHOD: COUNTING FINGERS

## 2022-02-04 NOTE — PROGRESS NOTES
"Assessment/Plan  (H40.1107) Primary open angle glaucoma (POAG) of both eyes, mild stage  (primary encounter diagnosis)  Comment:   - diagnosed with POAG with Dr. Serna  - haliy 538/557  - gonio open  - Tmax 30/25 (tonopen; just after stopping postop cataract drops, otherwise low 20s)  - on latanoprost at bedtime each eye since 8/2015 for concern for OCT RNFL thinning  - OCT today suggests possible progression right eye  Plan: OCT Optic Nerve RNFL Optovue OU (both eyes)   -Recommend return to clinic in 3 months for HVF 24-2 with IOP check. Patient may also want refraction rechecked at that time. No indication to adjust medications for now. Continue with latanoprost at bedtime in both eyes. Consider additional medication if changes are noted on VF.         (H02.831,  H02.834) Dermatochalasis of both upper eyelids  Comment: Patient experiences \"gluey\" eyes   Plan: Recommend oculoplastics referral to discuss if symptoms may be related to drooping lids and what appropriate treatments might be.       Complete documentation of historical and exam elements from today's encounter can  be found in the full encounter summary report (not reduplicated in this progress  note). I personally obtained the chief complaint(s) and history of present illness. I  confirmed and edited as necessary the review of systems, past medical/surgical  history, family history, social history, and examination findings as documented by  others; and I examined the patient myself. I personally reviewed the relevant tests,  images, and reports as documented above. I formulated and edited as necessary the  assessment and plan and discussed the findings and management plan with the  patient and family.    Gurjit Cardoso, OD   "

## 2022-02-08 ENCOUNTER — OFFICE VISIT (OUTPATIENT)
Dept: NEUROLOGY | Facility: CLINIC | Age: 82
End: 2022-02-08
Payer: COMMERCIAL

## 2022-02-08 VITALS — SYSTOLIC BLOOD PRESSURE: 122 MMHG | DIASTOLIC BLOOD PRESSURE: 75 MMHG | HEART RATE: 94 BPM

## 2022-02-08 DIAGNOSIS — G62.9 NEUROPATHY: Primary | ICD-10-CM

## 2022-02-08 DIAGNOSIS — R26.9 GAIT DISORDER: ICD-10-CM

## 2022-02-08 PROCEDURE — 99215 OFFICE O/P EST HI 40 MIN: CPT | Performed by: INTERNAL MEDICINE

## 2022-02-08 NOTE — PROGRESS NOTES
[facility] Neurology Follow Up Visit    Brandy Leon MRN# [id number]   Age: 81 year old YOB: 1940     Brief history of symptoms: The patient was initially seen in neurologic consultation on 7/15/2021 for evaluation of imbalance. Please see the comprehensive neurologic consultation note from that date in the Epic records for details.     Interval history:   Patient continues to fall every few months, which is similar to prior visit. Her walking hasn't improved or worsened since last visit.     Recently she had a fall at home and was stuck in the same spot for approximately 20 hours before family found her. She now has a life alert monitor.     She continues to have numbness in the feet and throughout all fingers of the hands. Since needle EMG she has experienced some right arm pain. She doesn't have significant pain in the hands.       Past Medical History:     Patient Active Problem List   Diagnosis     Seasonal allergies     Hypothyroidism     Hyperlipidemia LDL goal <100     Fibromyalgia     Osteoarthritis     Advanced directives, counseling/discussion     Obesity     Type 2 diabetes mellitus with hyperglycemia, with long-term current use of insulin (H)     Hypertension goal BP (blood pressure) < 140/90     Overactive bladder     Recurrent UTI     Pseudophakia of both eyes     DINORA (obstructive sleep apnea)- severe (AHI 56)     Contusion of right knee     Gait disorder     Gastroesophageal reflux disease without esophagitis     Urge incontinence     Chronic kidney disease, stage 2 (mild)     Past Medical History:   Diagnosis Date     Actinic keratosis 3/12/2013     Degenerative joint disease      Diabetes (H)      Gastroesophageal reflux disease without esophagitis 12/11/2020     Rheumatic fever 1957    x2 between the ages of 15-18     Seasonal allergies         Past Surgical History:     Past Surgical History:   Procedure Laterality Date     CATARACT IOL, RT/LT       COLONOSCOPY       COLONOSCOPY  N/A 8/13/2015    Procedure: COLONOSCOPY;  Surgeon: Duane, William Charles, MD;  Location: [facility]     COLONOSCOPY WITH CO2 INSUFFLATION N/A 8/13/2015    Procedure: COLONOSCOPY WITH CO2 INSUFFLATION;  Surgeon: Duane, William Charles, MD;  Location: [facility]     PHACOEMULSIFICATION WITH STANDARD INTRAOCULAR LENS IMPLANT Left 10/25/2018    Procedure: LEFT PHACOEMULSIFICATION WITH STANDARD INTRAOCULAR LENS IMPLANT;  Surgeon: Thomas Serna MD;  Location: [facility]     PHACOEMULSIFICATION WITH STANDARD INTRAOCULAR LENS IMPLANT Right 11/8/2018    Procedure: RIGHT PHACOEMULSIFICATION WITH STANDARD INTRAOCULAR LENS IMPLANT;  Surgeon: Thomas Serna MD;  Location: [facility]     THYROIDECTOMY        ZZC APPENDECTOMY       ZZC ARTHROPLASTY TMJ          Social History:     Social History     Tobacco Use     Smoking status: Never Smoker     Smokeless tobacco: Never Used     Tobacco comment: has had exposure to second hand smoke in the past from [place], no current exposure   Substance Use Topics     Alcohol use: No     Drug use: No        Family History:     Family History   Problem Relation Age of Onset     Cancer Father         skin and leukemia     Cerebrovascular Disease Maternal Grandfather      Cerebrovascular Disease Paternal Grandmother      Eye Disorder Paternal Grandmother         cataracts     Cerebrovascular Disease Paternal Grandfather      Heart Disease Paternal Grandfather      Cancer Sister         Breast Cancer     Eye Disorder Mother         cataracts     Eye Disorder Brother         cataract     Breast Cancer Daughter      Other Cancer Daughter         ovarian cancer     Glaucoma No family hx of      Macular Degeneration No family hx of         Medications:     Current Outpatient Medications   Medication Sig     aspirin 81 MG EC tablet Take 1 tablet by mouth daily.     blood glucose (ONETOUCH ULTRA) test strip USE TO TEST TWICE A DAY     calcium carbonate (TUMS) 500 MG chewable tablet Take 1 chew tab by  mouth daily as needed for heartburn     Cranberry-Vitamin C-Vitamin E (CRANBERRY PLUS VITAMIN C) 4200-20-3 MG-MG-UNIT CAPS Take 1 capsule by mouth 2 times daily     DM-APAP-CPM (CORICIDIN HBP PO) Take by mouth daily as needed     fexofenadine (ALLEGRA) 180 MG tablet Take 180 mg by mouth as needed      glipiZIDE (GLUCOTROL XL) 10 MG 24 hr tablet TAKE 1 TABLET BY MOUTH TWICE A DAY     GLUCOSAMINE CHONDROITIN COMPLX OR 2 Daily     ibuprofen (ADVIL/MOTRIN) 200 MG capsule Take 400 mg by mouth every 4 hours as needed      insulin glargine (BASAGLAR KWIKPEN) 100 UNIT/ML pen Inject 36 Units Subcutaneous daily     insulin glargine-lixisenatide (SOLIQUA) pen Inject 30 Units Subcutaneous every morning (before breakfast) (Patient not taking: Reported on 1/19/2022)     insulin pen needle (BD JUAN C U/F) 32G X 4 MM miscellaneous USE 1 DAILY AS DIRECTED.     irbesartan (AVAPRO) 150 MG tablet Take 75 mg by mouth At Bedtime     latanoprost (XALATAN) 0.005 % ophthalmic solution Place 1 drop into both eyes daily     metFORMIN (GLUCOPHAGE) 1000 MG tablet TAKE 1 TABLET BY MOUTH TWICE A DAY WITH MEALS     MULTIVITAMIN OR 1 tablet daily     Omega-3 Fatty Acids (OMEGA 3 PO) Take by mouth 2 times daily.      ONE TOUCH DELICA LANCETS MISC 1 each 2 times daily. One Touch Delica       order for DME Glucometer covered by insurance.     rosuvastatin (CRESTOR) 5 MG tablet TAKE 1 TABLET BY MOUTH TWICE WEEKLY     SYNTHROID 137 MCG tablet Take 1 tablet (137 mcg) by mouth daily     triamcinolone (KENALOG) 0.1 % external cream Apply twice daily for 2 weeks     trospium (SANCTURA XR) 60 MG CP24 24 hr capsule Take 1 capsule (60 mg) by mouth every morning     No current facility-administered medications for this visit.     Facility-Administered Medications Ordered in Other Visits   Medication     DOBUTamine 500 mg in dextrose 5% 250 mL (adult std conc) premix        Allergies:     Allergies   Allergen Reactions     Ace Inhibitors Cough     Estradiol  Itching     Lipitor [Atorvastatin Calcium]      Weak and shaky     Sulfa Drugs      Rash       Zocor [Simvastatin]      Weak and shakey     Triamterene Dermatitis     Possible photosensitivity dermatitis, mild.  (changed to hctz alone 6/4/07, will see if this helps)        Review of Systems:   As above     Physical Exam:   Vitals: /75   Pulse 94    General: Seated comfortably in no acute distress.  Lungs: breathing comfortably  Extremities: no edema  Skin: No rashes  Neurologic:     Mental Status: Fully alert, attentive. Normal fund of knowledge. Language normal, speech clear and fluent, no paraphasic errors.      Cranial Nerves: EOMI with normal smooth pursuit. No dysarthria.     Motor: No tremors or other abnormal movements observed. Mild paratonia in the arms. Very mild bradykinesia in bilateral rapid finger tapping. Strength 5/5 throughout upper extremities. Possible leg weakness in bilateral hip flexion and knee flexion strength 4+/5, otherwise 5/5 in the lower extremities.      Deep Tendon Reflexes: 2+/symmetric throughout upper extremities, 1+ patella, and trace ankle jerks. No clonus. Toes downgoing bilaterally.     Sensory: Vibration is absent in the bilateral great toes, 5-6 seconds in the bilateral ankles, normal in the hands. Decrease proprioceptive sense in the bilateral great toes, otherwise intact. Sways with Romberg, but doesn't fall.      Coordination: Finger-nose-finger and heel-shin intact without dysmetria. Rapid alternating movements slightly bradykinetic.     Gait: Wide based magnetic quality to gait with slight dragging of left leg. Not able to do heel, toe, or tandem gait.     Data reviewed on previous visits    Imaging:  MRI brain 6/8/2021  IMPRESSION:  No interval change since 2/17/2020. Severe advanced cerebral volume  loss. Unchanged mild ventriculomegaly. This might partly be due to  pressure hydrocephalus. Clinical correlation is advised.     Laboratory:  TSH normal  (2020)  A1c 10.3 ()  2021 -  B12, ESR, CRP, ELP, immunofixation unremarkable  2021 - CSF protein 127, normal cell count and glucose    Pre and post LP PT assessment  Pre Lumbar puncture  Gait speed:  25 foot walk test: 10.12 seconds, 18 steps  Number of steps for 180 degree turn: 9 steps to the right, 7 steps to the left  Number of steps for 360 degree turn: 11 steps to the left, 13 steps to the right  Post Lumbar Puncture  Gait assessment: Patient ambulates with reduction in gait base support, significant improvement in bilateral heel strike and pushoff with only very mild left trunk lean. Patient does demonstrate occasional step out balance reactions more right compared to left.  Gait speed:  25 foot walk test: 8.94 seconds, 16 steps  Number of steps for 180 degree turn: 7 steps to the right, 6 steps to the left  Number of steps for 360 degree turn: 10 steps both ways    Pertinent Investigations since last visit:   EMG 2021  Interpretation:  This is an abnormal study, demonstrating electrophysiologic evidence of the followin. Moderately severe sensorimotor axonal polyneuropathy.  2. 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          Assessment and Plan:   Assessment:  Brandy Leon is a 80 year old female who presents today for follow-up of balance changes. Balance changes and urinary frequency/urgency has been occurring over the last several years. On examination she has wide based, ataxic, and slightly magnetic quality to gait. She has significant postural instability, decreased reflexes and length dependent sensory changes. She has possible mild leg weakness as before, which previously was not appreciated. She denies any significant memory problems. Prior abbreviated MoCA was 22/. Patient previously had pre-post lumbar puncture physical therapy assessment for work-up of possible NPH. She had mild improvement in gait following lumbar puncture. NPH is still a consideration and  additional work-up for this could be considered. Given MRI brain appearance, vascular parkinsonism is another possible contributor. EMG showed moderately severe sensorimotor axonal polyneuropathy, likely related to diabetes. MRI cervical and thoracic ordered to rule out myelopathy as contributing factor to imbalance, particularly in light of possible leg weakness.     Home physical and occupational therapy referral was placed today. Due to patient's imbalance she is unable to independently and safely leave home.    Plan:  - MRI cervical and thoracic without contrast  - Home PT/OT referral    Follow up in Neurology clinic in 4 months or sooner if new issues arise    Mitchel Kenney MD   of Neurology  [facility]    The total time of this encounter today amounted to 45 minutes. This time included time spent with the patient, prep work, ordering tests, and performing post visit documentation.

## 2022-02-08 NOTE — LETTER
2/8/2022         RE: Brandy Leon  [address] [place] [address]        Dear Colleague,    Thank you for referring your patient, Brandy Leon, to the [facility] [place]. Please see a copy of my visit note below.    [facility] Neurology Follow Up Visit    Brandy Leon MRN# [id number]   Age: 81 year old YOB: 1940     Brief history of symptoms: The patient was initially seen in neurologic consultation on 7/15/2021 for evaluation of imbalance. Please see the comprehensive neurologic consultation note from that date in the Epic records for details.     Interval history:   Patient continues to fall every few months, which is similar to prior visit. Her walking hasn't improved or worsened since last visit.     Recently she had a fall at home and was stuck in the same spot for approximately 20 hours before family found her. She now has a life alert monitor.     She continues to have numbness in the feet and throughout all fingers of the hands. Since needle EMG she has experienced some right arm pain. She doesn't have significant pain in the hands.       Past Medical History:     Patient Active Problem List   Diagnosis     Seasonal allergies     Hypothyroidism     Hyperlipidemia LDL goal <100     Fibromyalgia     Osteoarthritis     Advanced directives, counseling/discussion     Obesity     Type 2 diabetes mellitus with hyperglycemia, with long-term current use of insulin (H)     Hypertension goal BP (blood pressure) < 140/90     Overactive bladder     Recurrent UTI     Pseudophakia of both eyes     DINORA (obstructive sleep apnea)- severe (AHI 56)     Contusion of right knee     Gait disorder     Gastroesophageal reflux disease without esophagitis     Urge incontinence     Chronic kidney disease, stage 2 (mild)     Past Medical History:   Diagnosis Date     Actinic keratosis 3/12/2013     Degenerative joint disease      Diabetes (H)       Gastroesophageal reflux disease without esophagitis 12/11/2020     Rheumatic fever 1957    x2 between the ages of 16-18     Seasonal allergies         Past Surgical History:     Past Surgical History:   Procedure Laterality Date     CATARACT IOL, RT/LT       COLONOSCOPY       COLONOSCOPY N/A 8/13/2015    Procedure: COLONOSCOPY;  Surgeon: Duane, William Charles, MD;  Location: [facility]     COLONOSCOPY WITH CO2 INSUFFLATION N/A 8/13/2015    Procedure: COLONOSCOPY WITH CO2 INSUFFLATION;  Surgeon: Duane, William Charles, MD;  Location: [facility]     PHACOEMULSIFICATION WITH STANDARD INTRAOCULAR LENS IMPLANT Left 10/25/2018    Procedure: LEFT PHACOEMULSIFICATION WITH STANDARD INTRAOCULAR LENS IMPLANT;  Surgeon: Thomas Serna MD;  Location: [facility]     PHACOEMULSIFICATION WITH STANDARD INTRAOCULAR LENS IMPLANT Right 11/8/2018    Procedure: RIGHT PHACOEMULSIFICATION WITH STANDARD INTRAOCULAR LENS IMPLANT;  Surgeon: Thomas Serna MD;  Location: [facility] OR     THYROIDECTOMY        ZZC APPENDECTOMY       ZZC ARTHROPLASTY TMJ          Social History:     Social History     Tobacco Use     Smoking status: Never Smoker     Smokeless tobacco: Never Used     Tobacco comment: has had exposure to second hand smoke in the past from [place], no current exposure   Substance Use Topics     Alcohol use: No     Drug use: No        Family History:     Family History   Problem Relation Age of Onset     Cancer Father         skin and leukemia     Cerebrovascular Disease Maternal Grandfather      Cerebrovascular Disease Paternal Grandmother      Eye Disorder Paternal Grandmother         cataracts     Cerebrovascular Disease Paternal Grandfather      Heart Disease Paternal Grandfather      Cancer Sister         Breast Cancer     Eye Disorder Mother         cataracts     Eye Disorder Brother         cataract     Breast Cancer Daughter      Other Cancer Daughter         ovarian cancer     Glaucoma No family hx of      Macular Degeneration  No family hx of         Medications:     Current Outpatient Medications   Medication Sig     aspirin 81 MG EC tablet Take 1 tablet by mouth daily.     blood glucose (ONETOUCH ULTRA) test strip USE TO TEST TWICE A DAY     calcium carbonate (TUMS) 500 MG chewable tablet Take 1 chew tab by mouth daily as needed for heartburn     Cranberry-Vitamin C-Vitamin E (CRANBERRY PLUS VITAMIN C) 4200-20-3 MG-MG-UNIT CAPS Take 1 capsule by mouth 2 times daily     DM-APAP-CPM (CORICIDIN HBP PO) Take by mouth daily as needed     fexofenadine (ALLEGRA) 180 MG tablet Take 180 mg by mouth as needed      glipiZIDE (GLUCOTROL XL) 10 MG 24 hr tablet TAKE 1 TABLET BY MOUTH TWICE A DAY     GLUCOSAMINE CHONDROITIN COMPLX OR 2 Daily     ibuprofen (ADVIL/MOTRIN) 200 MG capsule Take 400 mg by mouth every 4 hours as needed      insulin glargine (BASAGLAR KWIKPEN) 100 UNIT/ML pen Inject 36 Units Subcutaneous daily     insulin glargine-lixisenatide (SOLIQUA) pen Inject 30 Units Subcutaneous every morning (before breakfast) (Patient not taking: Reported on 1/19/2022)     insulin pen needle (BD JUAN C U/F) 32G X 4 MM miscellaneous USE 1 DAILY AS DIRECTED.     irbesartan (AVAPRO) 150 MG tablet Take 75 mg by mouth At Bedtime     latanoprost (XALATAN) 0.005 % ophthalmic solution Place 1 drop into both eyes daily     metFORMIN (GLUCOPHAGE) 1000 MG tablet TAKE 1 TABLET BY MOUTH TWICE A DAY WITH MEALS     MULTIVITAMIN OR 1 tablet daily     Omega-3 Fatty Acids (OMEGA 3 PO) Take by mouth 2 times daily.      ONE TOUCH DELICA LANCETS MISC 1 each 2 times daily. One Touch Delica       order for DME Glucometer covered by insurance.     rosuvastatin (CRESTOR) 5 MG tablet TAKE 1 TABLET BY MOUTH TWICE WEEKLY     SYNTHROID 137 MCG tablet Take 1 tablet (137 mcg) by mouth daily     triamcinolone (KENALOG) 0.1 % external cream Apply twice daily for 2 weeks     trospium (SANCTURA XR) 60 MG CP24 24 hr capsule Take 1 capsule (60 mg) by mouth every morning     No current  facility-administered medications for this visit.     Facility-Administered Medications Ordered in Other Visits   Medication     DOBUTamine 500 mg in dextrose 5% 250 mL (adult std conc) premix        Allergies:     Allergies   Allergen Reactions     Ace Inhibitors Cough     Estradiol Itching     Lipitor [Atorvastatin Calcium]      Weak and shaky     Sulfa Drugs      Rash       Zocor [Simvastatin]      Weak and shakey     Triamterene Dermatitis     Possible photosensitivity dermatitis, mild.  (changed to hctz alone 6/4/07, will see if this helps)        Review of Systems:   As above     Physical Exam:   Vitals: /75   Pulse 94    General: Seated comfortably in no acute distress.  Lungs: breathing comfortably  Extremities: no edema  Skin: No rashes  Neurologic:     Mental Status: Fully alert, attentive. Normal fund of knowledge. Language normal, speech clear and fluent, no paraphasic errors.      Cranial Nerves: EOMI with normal smooth pursuit. No dysarthria.     Motor: No tremors or other abnormal movements observed. Mild paratonia in the arms. Very mild bradykinesia in bilateral rapid finger tapping. Strength 5/5 throughout upper extremities. Possible leg weakness in bilateral hip flexion and knee flexion strength 4+/5, otherwise 5/5 in the lower extremities.      Deep Tendon Reflexes: 2+/symmetric throughout upper extremities, 1+ patella, and trace ankle jerks. No clonus. Toes downgoing bilaterally.     Sensory: Vibration is absent in the bilateral great toes, 5-6 seconds in the bilateral ankles, normal in the hands. Decrease proprioceptive sense in the bilateral great toes, otherwise intact. Sways with Romberg, but doesn't fall.      Coordination: Finger-nose-finger and heel-shin intact without dysmetria. Rapid alternating movements slightly bradykinetic.     Gait: Wide based magnetic quality to gait with slight dragging of left leg. Not able to do heel, toe, or tandem gait.     Data reviewed on previous  visits    Imaging:  MRI brain 2021  IMPRESSION:  No interval change since 2020. Severe advanced cerebral volume  loss. Unchanged mild ventriculomegaly. This might partly be due to  pressure hydrocephalus. Clinical correlation is advised.     Laboratory:  TSH normal (2020)  A1c 10.3 ()  2021 -  B12, ESR, CRP, ELP, immunofixation unremarkable  2021 - CSF protein 127, normal cell count and glucose    Pre and post LP PT assessment  Pre Lumbar puncture  Gait speed:  25 foot walk test: 10.12 seconds, 18 steps  Number of steps for 180 degree turn: 9 steps to the right, 7 steps to the left  Number of steps for 360 degree turn: 11 steps to the left, 13 steps to the right  Post Lumbar Puncture  Gait assessment: Patient ambulates with reduction in gait base support, significant improvement in bilateral heel strike and pushoff with only very mild left trunk lean. Patient does demonstrate occasional step out balance reactions more right compared to left.  Gait speed:  25 foot walk test: 8.94 seconds, 16 steps  Number of steps for 180 degree turn: 7 steps to the right, 6 steps to the left  Number of steps for 360 degree turn: 10 steps both ways    Pertinent Investigations since last visit:   EMG 2021  Interpretation:  This is an abnormal study, demonstrating electrophysiologic evidence of the followin. Moderately severe sensorimotor axonal polyneuropathy.  2. 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          Assessment and Plan:   Assessment:  Brandy Leon is a 80 year old female who presents today for follow-up of balance changes. Balance changes and urinary frequency/urgency has been occurring over the last several years. On examination she has wide based, ataxic, and slightly magnetic quality to gait. She has significant postural instability, decreased reflexes and length dependent sensory changes. She has possible mild leg weakness as before, which previously was not appreciated. She  denies any significant memory problems. Prior abbreviated MoCA was 22/27. Patient previously had pre-post lumbar puncture physical therapy assessment for work-up of possible NPH. She had mild improvement in gait following lumbar puncture. NPH is still a consideration and additional work-up for this could be considered. Given MRI brain appearance, vascular parkinsonism is another possible contributor. EMG showed moderately severe sensorimotor axonal polyneuropathy, likely related to diabetes. MRI cervical and thoracic ordered to rule out myelopathy as contributing factor to imbalance, particularly in light of possible leg weakness.     Home physical and occupational therapy referral was placed today. Due to patient's imbalance she is unable to independently and safely leave home.    Plan:  - MRI cervical and thoracic without contrast  - Home PT/OT referral    Follow up in Neurology clinic in 4 months or sooner if new issues arise    Mitchel Kenney MD   of Neurology  [facility]    The total time of this encounter today amounted to 45 minutes. This time included time spent with the patient, prep work, ordering tests, and performing post visit documentation.        Again, thank you for allowing me to participate in the care of your patient.        Sincerely,        Mitchel Kenney MD

## 2022-02-08 NOTE — PROGRESS NOTES
Medication Therapy Management (MTM) Encounter    ASSESSMENT:                            Medication Adherence/Access: Patient plans to call and get Synthroid and Soliqua through  patient assistance programs.    Diabetes:  Not meeting A1c goal of <8%.  Would benefit from minimizing carbohydrate intake. Will focus on making dietary changes and renew  assistance programs. Patient may benefit from discontinuing basaglar and staring Soliuqa when approved through assistance program..    Hypertension: Stable. Meeting goal of blood pressure <140/90mmHg. Side effects have subsided.    PLAN:                            Contact the [place] [facility] EHR (Francesca Correa and Kaykay Salmon) at [phone].    Follow-up: Wednesday 3/9/2022    SUBJECTIVE/OBJECTIVE:                          Brandy Leon is a 81 year old female called for a follow up visit. This is a follow up visit from 2/2/2022. She was referred to me from Cailin Ponce.             Reason for visit: Hypertension and diabetes check-up.     Allergies/ADRs: Reviewed in chart  Tobacco: She reports that she has never smoked. She has never used smokeless tobacco.  Alcohol: none  Caffeine: 2 cups/day of coffee with whole milk  Past Medical History: Reviewed in chart      Medication Adherence/Access: Patient uses pill box(es). Patient reports not missing any doses of medication. Patient is getting Basaglar through  patient assistance programs. She plans to reach out to Bernardo at the [place] [facility].  Met with her daughter to go through her medications and they didn't come up with any questions.     Diabetes:  Patient is currently taking metformin 1000mg twice daily (2000 mg total), Basaglar 36 units daily at bedtime, glipizide XL 10mg twice daily (20 mg total).     Patient reports blood sugars. This am it was 66mg/dL (had meatloaf, scalloped potatoes) - she  "states that she wasn't having any symptoms of low blood sugar at the time. \"This is the lowest her blood sugar has been in years\". Occasionally checks before noon and bed.  Around noon blood sugars are around 90mg/dL and around bedtime 120mg/dL.     Recent symptoms of low blood sugar? None.  Recent symptoms of high blood sugar? None.  Eye exam: due  Foot exam: up to date  ACEi/ARB: Yes: irbesartan 75 daily  Statin: rosuvastatin 5mg two times weekly  Aspirin: Taking 81mg daily and denies side effects.   Urine Albumin:   Lab Results   Component Value Date    MICROL 34 11/16/2021    MICROL 40 11/12/2020     Lab Results   Component Value Date    A1C 9.5 12/20/2021    A1C 9.8 09/20/2021    A1C 10.3 05/17/2021    A1C 9.2 02/16/2021    A1C 8.7 11/12/2020    A1C 10.0 08/20/2020    A1C 10.5 01/10/2020     Did not get flu shot this year and does not plan to - says she gets sick  Knows that she needs shingles and tetanus shot  Plans to get covid booster soon     Hypertension: current therapy includes irbesartan 75mg daily. The dose reduction has been going \"so-so\". Has not had a fall in the last week.     BP Readings from Last 3 Encounters:   02/08/22 122/75   12/20/21 133/73   12/13/21 126/62       Today's Vitals: There were no vitals taken for this visit.  ----------------    I spent 9 minutes with this patient today. All changes were made via collaborative practice agreement with Fernanda Miller A copy of the visit note was provided to the patient's primary care provider.    Gurjit Meza, Pharm.D. Student  Mirna Perez, Pharm.D, [facility]  Medication Therapy Management Pharmacist      Telemedicine Visit Details  Type of service:  Telephone visit  Start Time: 10:35AM  End Time: 10:44AM  Originating Location (patient location): [place]  Distant Location (provider location):  [facility]     Medication Therapy Recommendations  No medication therapy recommendations to display   "

## 2022-02-09 ENCOUNTER — TELEPHONE (OUTPATIENT)
Dept: NEUROLOGY | Facility: CLINIC | Age: 82
End: 2022-02-09
Payer: COMMERCIAL

## 2022-02-09 ENCOUNTER — VIRTUAL VISIT (OUTPATIENT)
Dept: PHARMACY | Facility: CLINIC | Age: 82
End: 2022-02-09
Payer: COMMERCIAL

## 2022-02-09 DIAGNOSIS — E11.65 TYPE 2 DIABETES MELLITUS WITH HYPERGLYCEMIA, WITH LONG-TERM CURRENT USE OF INSULIN (H): Primary | ICD-10-CM

## 2022-02-09 DIAGNOSIS — Z79.4 TYPE 2 DIABETES MELLITUS WITH HYPERGLYCEMIA, WITH LONG-TERM CURRENT USE OF INSULIN (H): Primary | ICD-10-CM

## 2022-02-09 DIAGNOSIS — I10 HYPERTENSION GOAL BP (BLOOD PRESSURE) < 140/90: ICD-10-CM

## 2022-02-09 PROCEDURE — 99606 MTMS BY PHARM EST 15 MIN: CPT | Performed by: PHARMACIST

## 2022-02-09 NOTE — TELEPHONE ENCOUNTER
Please call the pt to let her know that [facility] ([facility]) is unable to accommodate her at this time. She will have to call her insurance and find out what other home care companies are in-network for her and we can then fax them an order.     Shantell Bowers RN   Neurology Care Coordinator

## 2022-02-09 NOTE — PATIENT INSTRUCTIONS
Recommendations from today's MTM visit:                                                    Today we reviewed what your medicines are for, how to know if they are working, that your medicines are safe and how to make your medicine regimen as easy as possible.      Contact the [place] [facility]/Fund (Francesca Correa and Kaykayemiliana Salmon) at [phone].    Follow-up: Wednesday 3/9/2022 at 10:30 AM    It was great to speak with you today.  I value your experience and would be very thankful for your time with providing feedback on our clinic survey. You may receive a survey via email or text message in the next few days.     To schedule another MTM appointment, please call the clinic directly or you may call the MTM scheduling line at [phone] or toll-free at [phone].     My Clinical Pharmacist's contact information:                                                      Please feel free to contact me with any questions or concerns you have.      Mirna Perez, Pharm.D, BCACP  Medication Therapy Management Pharmacist    Gurjit Meza  Pharm -D4 student   Mirna Perez, Pharm.D, BCACP  Medication Therapy Management Pharmacist

## 2022-02-09 NOTE — TELEPHONE ENCOUNTER
[facility] Call Center    Phone Message    May a detailed message be left on voicemail: yes     Reason for Call: Other: [facility] is calling to notify that they are declining the request for home health care due to limited capacity. Any questions to please contact Esmer.    Action Taken: Message routed to:  [facility] neurology    Travel Screening: Not Applicable

## 2022-02-10 NOTE — TELEPHONE ENCOUNTER
I called patient and let her know that we [facility] will not be able to serve her needs. I explained that she will have to call her Insurance and see who they will cover and call us back so we can send them orders.  Pt verbalized understanding.    Erin Caldwell LPN

## 2022-02-23 ENCOUNTER — OFFICE VISIT (OUTPATIENT)
Dept: OPHTHALMOLOGY | Facility: CLINIC | Age: 82
End: 2022-02-23
Attending: OPTOMETRIST
Payer: COMMERCIAL

## 2022-02-23 DIAGNOSIS — Z11.59 ENCOUNTER FOR SCREENING FOR OTHER VIRAL DISEASES: Primary | ICD-10-CM

## 2022-02-23 DIAGNOSIS — H57.813 BROW PTOSIS, BILATERAL: ICD-10-CM

## 2022-02-23 DIAGNOSIS — H02.834 DERMATOCHALASIS OF BOTH UPPER EYELIDS: Primary | ICD-10-CM

## 2022-02-23 DIAGNOSIS — H02.831 DERMATOCHALASIS OF BOTH UPPER EYELIDS: Primary | ICD-10-CM

## 2022-02-23 PROCEDURE — 92081 LIMITED VISUAL FIELD XM: CPT | Performed by: OPHTHALMOLOGY

## 2022-02-23 PROCEDURE — 92285 EXTERNAL OCULAR PHOTOGRAPHY: CPT | Performed by: OPHTHALMOLOGY

## 2022-02-23 PROCEDURE — 99214 OFFICE O/P EST MOD 30 MIN: CPT | Performed by: OPHTHALMOLOGY

## 2022-02-23 ASSESSMENT — TONOMETRY
OD_IOP_MMHG: 15
OS_IOP_MMHG: 15
IOP_METHOD: ICARE
OS_IOP_MMHG: 18
OD_IOP_MMHG: 21

## 2022-02-23 ASSESSMENT — VISUAL ACUITY
METHOD: SNELLEN - LINEAR
OD_SC: 20/30
OD_SC+: -1
OS_SC: 20/40

## 2022-02-23 ASSESSMENT — EXTERNAL EXAM - RIGHT EYE: OD_EXAM: BROW PTOSIS, WITH FRONTALIS RELAXED, BROW IS BELOW SUPERIOR ORBITAL RIM AND LATERALLY INLINE WITH UPPER LASHES

## 2022-02-23 ASSESSMENT — EXTERNAL EXAM - LEFT EYE: OS_EXAM: BROW PTOSIS, WITH FRONTALIS RELAXED, BROW IS BELOW SUPERIOR ORBITAL RIM AND LATERALLY INLINE WITH UPPER LASHES

## 2022-02-23 ASSESSMENT — CONF VISUAL FIELD: COMMENTS: TANGENT VISUAL FIELD PERFORMED TODAY.

## 2022-02-23 NOTE — NURSING NOTE
Chief Complaints and History of Present Illnesses   Patient presents with     Droopy Eye Lid Evaluation       Chief Complaint(s) and History of Present Illness(es)     Droopy Eye Lid Evaluation     Laterality: right upper lid and left upper lid              Comments     Patient referred by Dr. Cardoso for dermatochalasis evaluation. Her lids have been droopy for several years. States multiple eye doctors in the past have recommended she get them looked at. Was not ready at the time to do anything about it. It has gradually gotten worse in the past few years. She states her eyes feel like they have glue on them. Her lids feel heavy and tired all the time. She has also noticed a decrease in her vision over the past few years, needing to raise her brows up and lift her eyes open more to see things.     Diabetic, Type 2  Lab Results       Component                Value               Date                       A1C                      9.5                 12/20/2021                 A1C                      9.8                 09/20/2021                 A1C                      10.3                05/17/2021                 A1C                      9.2                 02/16/2021                 A1C                      8.7                 11/12/2020                 A1C                      10.0                08/20/2020                 A1C                      10.5                01/10/2020                          Freeman Jimenez, Ophthalmic Assistant

## 2022-02-23 NOTE — PROGRESS NOTES
[facility] New Patient    Patient: Brandy Leon MRN# [id number]   YOB: 1940 Age: 81 year old   Date of Visit: Feb 23, 2022    CC: Droopy eyelids obstructing vision.              HPI:     Chief Complaint(s) and History of Present Illness(es)     Droopy Eye Lid Evaluation     Laterality: right upper lid and left upper lid              Comments     Patient referred by Dr. Cardoso for dermatochalasis evaluation. Her lids   have been droopy for several years. States multiple eye doctors in the   past have recommended she get them looked at. Was not ready at the time to   do anything about it. It has gradually gotten worse in the past few years.   She states her eyes feel like they have glue on them. Her lids feel heavy   and tired all the time. She has also noticed a decrease in her vision over   the past few years, needing to raise her brows up and lift her eyes open   more to see things.     Diabetic, Type 2  Lab Results       Component                Value               Date                       A1C                      9.5                 12/20/2021                 A1C                      9.8                 09/20/2021                 A1C                      10.3                05/17/2021                 A1C                      9.2                 02/16/2021                 A1C                      8.7                 11/12/2020                 A1C                      10.0                08/20/2020                 A1C                      10.5                01/10/2020                Brandy Leon is a 81 year old female who has noted gradual onset of droopy eyelids over the past years. The droopy eyelid is interfering with activities of daily living including driving, and reading. The patient denies double vision, variability of the eyelid position, or dry eye symptoms.     EXAM:     MRD1: 2mm right eye and 1.5 mm os  Dermatochalasis with excess skin touching eyelashes   Brow  ptosis with brow resting below superior orbital rim   Mild aponeurotic ptosis      VISUAL FIELD (tangent as unable to get to to screen :  Right eye untaped:10 degrees Right eye taped:40 degrees  Left eye untaped:5 degrees Left eye taped:40 degrees    Assessment & Plan     Brandy Leon is a 81 year old female with the following diagnoses:   1. Dermatochalasis of both upper eyelids    2. Brow ptosis, bilateral         Both upper eyelid blepharoplasty (skin, orbic, NF, IBP a bit more on left side) 35423    ANTICOAGULATION:    Aspirin  Omega 3         PHOTOS DEMONSTRATE:    Significant dermatochalasis with lids resting on eyelashes and obstructing visual axis  Brow ptosis with thicker brow skin and hairs below the lateral superior orbital rim worse on the left    Attending Physician Attestation: Complete documentation of historical and exam elements from today's encounter can be found in the full encounter summary report (not reduplicated in this progress note). I personally obtained the chief complaint(s) and history of present illness. I confirmed and edited as necessary the review of systems, past medical/surgical history, family history, social history, and examination findings as documented by others; and I examined the patient myself. I personally reviewed the relevant tests, images, and reports as documented above. I formulated and edited as necessary the assessment and plan and discussed the findings and management plan with the patient. Fidelia Moran MD      Today with Brandy Leon and her daughter Juliet I reviewed the indications, risks, benefits, and alternatives of the proposed surgical procedure including, but not limited to, failure obtain the desired result  and need for additional surgery, bleeding, infection, loss of vision, loss of the eye, and the remote possibility of permanent damage to any organ system or death with the use of anesthesia.  I provided multiple opportunities for the  questions, answered all questions to the best of my ability, and confirmed that my answers and my discussion were understood.

## 2022-02-24 ENCOUNTER — ANCILLARY PROCEDURE (OUTPATIENT)
Dept: MRI IMAGING | Facility: CLINIC | Age: 82
End: 2022-02-24
Attending: INTERNAL MEDICINE
Payer: COMMERCIAL

## 2022-02-24 DIAGNOSIS — R26.9 GAIT DISORDER: ICD-10-CM

## 2022-02-24 PROCEDURE — 72141 MRI NECK SPINE W/O DYE: CPT | Performed by: RADIOLOGY

## 2022-02-24 PROCEDURE — 72146 MRI CHEST SPINE W/O DYE: CPT | Performed by: RADIOLOGY

## 2022-02-27 DIAGNOSIS — E11.65 TYPE 2 DIABETES MELLITUS WITH HYPERGLYCEMIA, WITH LONG-TERM CURRENT USE OF INSULIN (H): ICD-10-CM

## 2022-02-27 DIAGNOSIS — Z79.4 TYPE 2 DIABETES MELLITUS WITH HYPERGLYCEMIA, WITH LONG-TERM CURRENT USE OF INSULIN (H): ICD-10-CM

## 2022-02-27 DIAGNOSIS — E78.5 DYSLIPIDEMIA, GOAL LDL BELOW 100: ICD-10-CM

## 2022-03-01 RX ORDER — ROSUVASTATIN CALCIUM 5 MG/1
TABLET, COATED ORAL
Qty: 24 TABLET | Refills: 14 | Status: SHIPPED | OUTPATIENT
Start: 2022-03-01 | End: 2023-03-07

## 2022-03-01 RX ORDER — GLIPIZIDE 10 MG/1
TABLET, FILM COATED, EXTENDED RELEASE ORAL
Qty: 60 TABLET | Refills: 0 | Status: SHIPPED | OUTPATIENT
Start: 2022-03-01 | End: 2022-03-30

## 2022-03-01 NOTE — TELEPHONE ENCOUNTER
Short term fill for glipizide, upcoming appointment with ANUPAMA Velarde to discuss diabetes    Routing refill request to provider for review/approval because:  Allergies/contraindication warning for this medication.    Mirna Melendrez RN, [facility]

## 2022-03-06 ENCOUNTER — MYC MEDICAL ADVICE (OUTPATIENT)
Dept: FAMILY MEDICINE | Facility: CLINIC | Age: 82
End: 2022-03-06
Payer: COMMERCIAL

## 2022-03-07 NOTE — TELEPHONE ENCOUNTER
Routing to provider to review and advise. Pt has virtual pre op on 3/17/22.    Emily Cedeno RN, BSN  [facility]

## 2022-03-10 NOTE — TELEPHONE ENCOUNTER
Italia Pelletst response sent to patient daughter but not read.  Please call her to change appointment to in person as noted in my [facility] message.      CK

## 2022-03-21 ENCOUNTER — NURSE TRIAGE (OUTPATIENT)
Dept: FAMILY MEDICINE | Facility: CLINIC | Age: 82
End: 2022-03-21
Payer: COMMERCIAL

## 2022-03-21 NOTE — TELEPHONE ENCOUNTER
This past Saturday 3/21/2022 she was at [facility] and she fell in the parking lot. She feel flat on her face flat. Her eyes and nose are all banged up.  She believes her keys got her ribs.  If she coughs or sneezes the pain is so bad she feels like she going to go to the ceiling.  She is not on blood thinners. She got a really good nose bleed.  Her worse symptom today is her rib cage. There is bruising around her eyes and nose and the is a sore on the middle of her nose. The nose appears straight, just very sore. The white of her eyes are red with blood, per patient. There is bilateral knee pain with abrasions. Left elbow pain, no abrasion.     Denies: HA, SOB, chest pain, jaw/shoulder pain, confusion, weakness, drainage of clear fluid from her nose or ears, neck pain, LOC    Nursing advice:  Due to her age and the severity of her multiple injuries she needs to be assessed now in the E.R. Patient was given signs and symptoms to 911. If she can't get a ride she was advised to call 911 for transport. Patient verbalizes good understanding, agrees with plan and states she needs no further support. Minda Medina R.N.    Reason for Disposition    Large swelling or bruise > 2 inches (5 cm)    Dangerous injury (e.g., MVA, diving, trampoline, contact sports, fall > 10 feet or 3 meters) or severe blow from hard object (e.g., golf club or baseball bat)    Additional Information    Negative: ACUTE NEUROLOGIC SYMPTOM and symptom present now    Negative: Knocked out (unconscious) > 1 minute    Negative: Seizure (convulsion) occurred (Exception: prior history of seizures and now alert and without Acute Neurologic Symptoms)    Negative: Neck pain after dangerous injury (e.g., MVA, diving, trampoline, contact sports, fall > 10 feet or 3 meters) (Exception: neck pain began > 1 hour after injury)    Negative: Major bleeding (actively dripping or spurting) that can't be stopped    Negative: Penetrating head injury (e.g., knife, gunshot wound,  metal object)    Negative: Sounds like a life-threatening emergency to the triager    Negative: Can't remember what happened (amnesia)    Negative: Vomiting once or more    Negative: Watery or blood-tinged fluid dripping from the nose or ears    Negative: ACUTE NEUROLOGIC SYMPTOM and now fine    Negative: Knocked out (unconscious) < 1 minute and now fine    Negative: Severe headache    Protocols used: HEAD INJURY-A-OH

## 2022-03-27 DIAGNOSIS — Z79.4 TYPE 2 DIABETES MELLITUS WITH HYPERGLYCEMIA, WITH LONG-TERM CURRENT USE OF INSULIN (H): ICD-10-CM

## 2022-03-27 DIAGNOSIS — E11.65 TYPE 2 DIABETES MELLITUS WITH HYPERGLYCEMIA, WITH LONG-TERM CURRENT USE OF INSULIN (H): ICD-10-CM

## 2022-03-28 ENCOUNTER — OFFICE VISIT (OUTPATIENT)
Dept: FAMILY MEDICINE | Facility: CLINIC | Age: 82
End: 2022-03-28
Payer: COMMERCIAL

## 2022-03-28 VITALS
HEART RATE: 101 BPM | OXYGEN SATURATION: 96 % | WEIGHT: 181.6 LBS | BODY MASS INDEX: 32.17 KG/M2 | RESPIRATION RATE: 20 BRPM | SYSTOLIC BLOOD PRESSURE: 131 MMHG | TEMPERATURE: 97.5 F | DIASTOLIC BLOOD PRESSURE: 79 MMHG

## 2022-03-28 DIAGNOSIS — N18.2 CHRONIC KIDNEY DISEASE, STAGE 2 (MILD): ICD-10-CM

## 2022-03-28 DIAGNOSIS — Z79.4 TYPE 2 DIABETES MELLITUS WITH HYPERGLYCEMIA, WITH LONG-TERM CURRENT USE OF INSULIN (H): ICD-10-CM

## 2022-03-28 DIAGNOSIS — E11.65 TYPE 2 DIABETES MELLITUS WITH HYPERGLYCEMIA, WITH LONG-TERM CURRENT USE OF INSULIN (H): ICD-10-CM

## 2022-03-28 DIAGNOSIS — H02.831 DERMATOCHALASIS OF BOTH UPPER EYELIDS: ICD-10-CM

## 2022-03-28 DIAGNOSIS — I10 HYPERTENSION GOAL BP (BLOOD PRESSURE) < 140/90: ICD-10-CM

## 2022-03-28 DIAGNOSIS — G91.9 HYDROCEPHALUS, UNSPECIFIED TYPE (H): ICD-10-CM

## 2022-03-28 DIAGNOSIS — E78.5 HYPERLIPIDEMIA LDL GOAL <100: ICD-10-CM

## 2022-03-28 DIAGNOSIS — Z01.818 PREOPERATIVE EXAMINATION: Primary | ICD-10-CM

## 2022-03-28 DIAGNOSIS — H57.819 PTOSIS OF EYEBROW: ICD-10-CM

## 2022-03-28 DIAGNOSIS — E03.9 ACQUIRED HYPOTHYROIDISM: ICD-10-CM

## 2022-03-28 DIAGNOSIS — Z23 NEED FOR TD VACCINE: ICD-10-CM

## 2022-03-28 DIAGNOSIS — E11.51 DIABETES MELLITUS WITH PERIPHERAL VASCULAR DISEASE (H): ICD-10-CM

## 2022-03-28 DIAGNOSIS — G47.33 OSA (OBSTRUCTIVE SLEEP APNEA): ICD-10-CM

## 2022-03-28 DIAGNOSIS — H02.834 DERMATOCHALASIS OF BOTH UPPER EYELIDS: ICD-10-CM

## 2022-03-28 LAB
ALBUMIN SERPL-MCNC: 3.5 G/DL (ref 3.4–5)
ALP SERPL-CCNC: 100 U/L (ref 40–150)
ALT SERPL W P-5'-P-CCNC: 25 U/L (ref 0–50)
ANION GAP SERPL CALCULATED.3IONS-SCNC: 10 MMOL/L (ref 3–14)
AST SERPL W P-5'-P-CCNC: 13 U/L (ref 0–45)
BILIRUB SERPL-MCNC: 0.2 MG/DL (ref 0.2–1.3)
BUN SERPL-MCNC: 23 MG/DL (ref 7–30)
CALCIUM SERPL-MCNC: 9.4 MG/DL (ref 8.5–10.1)
CHLORIDE BLD-SCNC: 98 MMOL/L (ref 94–109)
CO2 SERPL-SCNC: 26 MMOL/L (ref 20–32)
CREAT SERPL-MCNC: 0.77 MG/DL (ref 0.52–1.04)
ERYTHROCYTE [DISTWIDTH] IN BLOOD BY AUTOMATED COUNT: 13.4 % (ref 10–15)
GFR SERPL CREATININE-BSD FRML MDRD: 78 ML/MIN/1.73M2
GLUCOSE BLD-MCNC: 371 MG/DL (ref 70–99)
HBA1C MFR BLD: 9.6 % (ref 0–5.6)
HCT VFR BLD AUTO: 45.4 % (ref 35–47)
HGB BLD-MCNC: 14.6 G/DL (ref 11.7–15.7)
MCH RBC QN AUTO: 28.8 PG (ref 26.5–33)
MCHC RBC AUTO-ENTMCNC: 32.2 G/DL (ref 31.5–36.5)
MCV RBC AUTO: 90 FL (ref 78–100)
PLATELET # BLD AUTO: 316 10E3/UL (ref 150–450)
POTASSIUM BLD-SCNC: 4.3 MMOL/L (ref 3.4–5.3)
PROT SERPL-MCNC: 8.3 G/DL (ref 6.8–8.8)
RBC # BLD AUTO: 5.07 10E6/UL (ref 3.8–5.2)
SODIUM SERPL-SCNC: 134 MMOL/L (ref 133–144)
WBC # BLD AUTO: 11.4 10E3/UL (ref 4–11)

## 2022-03-28 PROCEDURE — 90714 TD VACC NO PRESV 7 YRS+ IM: CPT | Performed by: FAMILY MEDICINE

## 2022-03-28 PROCEDURE — 83036 HEMOGLOBIN GLYCOSYLATED A1C: CPT | Performed by: FAMILY MEDICINE

## 2022-03-28 PROCEDURE — 93000 ELECTROCARDIOGRAM COMPLETE: CPT | Performed by: FAMILY MEDICINE

## 2022-03-28 PROCEDURE — 85027 COMPLETE CBC AUTOMATED: CPT | Performed by: FAMILY MEDICINE

## 2022-03-28 PROCEDURE — 90471 IMMUNIZATION ADMIN: CPT | Performed by: FAMILY MEDICINE

## 2022-03-28 PROCEDURE — 99214 OFFICE O/P EST MOD 30 MIN: CPT | Mod: 25 | Performed by: FAMILY MEDICINE

## 2022-03-28 PROCEDURE — 80053 COMPREHEN METABOLIC PANEL: CPT | Performed by: FAMILY MEDICINE

## 2022-03-28 PROCEDURE — 36415 COLL VENOUS BLD VENIPUNCTURE: CPT | Performed by: FAMILY MEDICINE

## 2022-03-28 ASSESSMENT — PAIN SCALES - GENERAL: PAINLEVEL: MODERATE PAIN (4)

## 2022-03-28 NOTE — PROGRESS NOTES
[address] [address]  Phone: [phone]  Primary Provider: Alicia Bhatti  Pre-op Performing Provider: ALICIA BHATTI      PREOPERATIVE EVALUATION:  Today's date: 3/28/2022    Brandy Leon is a 81 year old female who presents for a preoperative evaluation.    Surgical Information:  Surgery/Procedure: Both upper eyelid blepharoplasty  Surgery Location: [facility]    Surgeon: Fidelia Moran MD  Surgery Date: 4/1/22  Time of Surgery: 9:35A  Where patient plans to recover: At home with family  Fax number for surgical facility: Note does not need to be faxed, will be available electronically in Epic.    Type of Anesthesia Anticipated: General    Assessment & Plan     The proposed surgical procedure is considered INTERMEDIATE risk.    Preoperative examination  Proceed with surgery as planned.   - EKG 12-lead complete w/read - Clinics    Dermatochalasis of both upper eyelids  Ptosis of eyebrow  Surgical repair as planned.     Chronic kidney disease, stage 2 (mild)  Stable GFR.  Improving microalbuminuria on last testing.      Hyperlipidemia LDL goal <100  Taking twice weekly rosuvastatin    Hypertension goal BP (blood pressure) < 140/90  Blood pressure well controlled on 75 mg of irbesartan    Acquired hypothyroidism  Lab Results   Component Value Date    TSH 1.96 11/16/2021    TSH 3.00 11/12/2020   Euthyroid on current testing      Type 2 diabetes mellitus with hyperglycemia, with long-term current use of insulin (H)  Diabetes mellitus with peripheral vascular disease (H)  Diabetes is not adequately controlled.  Additional medication is planned with either GLP-1 or mealtime insulin.  Prior authorization may be needed for either of these medications.  - Hemoglobin A1c; Future  - Comprehensive metabolic panel (BMP + Alb, Alk Phos, ALT, AST, Total. Bili, TP); Future  - CBC with platelets; Future  - Hemoglobin A1c  -  Comprehensive metabolic panel (BMP + Alb, Alk Phos, ALT, AST, Total. Bili, TP)  - CBC with platelets      DINORA (obstructive sleep apnea)- severe (AHI 56)  Continue treatment of sleep apnea.  She is requiring new supplies and referral back to the sleep clinic is made.  - Adult Sleep Eval & Management  Referral; Future    Hydrocephalus, unspecified type (H)  Monitored with neurology.  MRI in February is unchanged from previous    Need for Td vaccine  Vaccine today  - TD PRESERV FREE, IM (7+ YRS) (DECAVAC/TENIVAC)         Risks and Recommendations:  The patient has the following additional risks and recommendations for perioperative complications:  Diabetes:  - Patient is on insulin therapy; diabetic NPO guidelines provided and discussed.  Obstructive Sleep Apnea:   Ongoing use of CPAP    Medication Instructions:  Patient is to take all scheduled medications on the day of surgery EXCEPT for modifications listed below:   - ACE/ARB: May be continued on the day of surgery.    - Long acting insulin (e.g. glargine, detemir): Take 80% of the usual evening or morning dose before surgery.   - metformin: HOLD day of surgery.   - sulfonylurea (e.g. glyburide, glipizide): HOLD day of surgery   - ibuprofen (Advil, Motrin): HOLD 1 day before surgery.     RECOMMENDATION:  APPROVAL GIVEN to proceed with proposed procedure, without further diagnostic evaluation.    Review of prior external note(s) from - Outside records from Ophthalmology, [facility]                  Subjective     HPI related to upcoming procedure: 81 year old with dermatochalasis and brow ptosis resulting in decrease in visual field and interference with activities of daily living - driving and reading.      Preop Questions 3/28/2022   1. Have you ever had a heart attack or stroke? No   2. Have you ever had surgery on your heart or blood vessels, such as a stent placement, a coronary artery bypass, or surgery on an artery in your head, neck, heart, or legs? No   3.  Do you have chest pain with activity? No   4. Do you have a history of  heart failure? No   5. Do you currently have a cold, bronchitis or symptoms of other infection? No   6. Do you have a cough, shortness of breath, or wheezing? No   7. Do you or anyone in your family have previous history of blood clots? No   8. Do you or does anyone in your family have a serious bleeding problem such as prolonged bleeding following surgeries or cuts? No   9. Have you ever had problems with anemia or been told to take iron pills? No   10. Have you had any abnormal blood loss such as black, tarry or bloody stools, or abnormal vaginal bleeding? No   11. Have you ever had a blood transfusion? No   12. Are you willing to have a blood transfusion if it is medically needed before, during, or after your surgery? Yes   13. Have you or any of your relatives ever had problems with anesthesia? No   14. Do you have sleep apnea, excessive snoring or daytime drowsiness? YES - uses CPAP, needs new mask or canula.   14a. Do you have a CPAP machine? Yes   15. Do you have any artifical heart valves or other implanted medical devices like a pacemaker, defibrillator, or continuous glucose monitor? No   16. Do you have artificial joints? No   17. Are you allergic to latex? No     Health Care Directive:  Has directive on file.  Patient has a Health Care Directive on file      Preoperative Review of :   reviewed - no record of controlled substances prescribed.      Status of Chronic Conditions:  DIABETES - Patient has a longstanding history of DiabetesType Type II . Patient is being treated with diet, oral agents, SMBG and insulin injections and denies significant side effects. Control has been poor. Complicating factors include but are not limited to: hypertension and hyperlipidemia.     HYPERTENSION - Patient has longstanding history of HTN , currently denies any symptoms referable to elevated blood pressure. Specifically denies chest pain,  palpitations, dyspnea, orthopnea, PND or peripheral edema. Blood pressure readings have been in normal range. Current medication regimen is as listed below. Patient denies any side effects of medication.        Hydrocephalus, unspecified type (H)  Management with Neurology.  MRI stable in Feb 2022.              Review of Systems  CONSTITUTIONAL: NEGATIVE for fever, chills, change in weight  INTEGUMENTARY/SKIN: NEGATIVE for worrisome rashes, moles or lesions  ENT/MOUTH: NEGATIVE for ear, mouth and throat problems  RESP: NEGATIVE for significant cough or SOB  CV: NEGATIVE for chest pain, palpitations or peripheral edema  GI: NEGATIVE for nausea, abdominal pain, heartburn, or change in bowel habits  : NEGATIVE for frequency, dysuria, or hematuria  MUSCULOSKELETAL: NEGATIVE for significant arthralgias or myalgia  NEURO: balance unsteady gait.  Numbness hands intermittently - known right carpal tunnel syndrome.  ENDOCRINE: NEGATIVE for temperature intolerance, skin/hair changes  HEME: NEGATIVE for bleeding problems  PSYCHIATRIC: NEGATIVE for changes in mood or affect    Patient Active Problem List    Diagnosis Date Noted     Chronic kidney disease, stage 2 (mild) 12/13/2021     Priority: Medium     Urge incontinence 10/04/2021     Priority: Medium     Gastroesophageal reflux disease without esophagitis 12/11/2020     Priority: Medium     Gait disorder 07/01/2020     Priority: Medium     Contusion of right knee 03/09/2020     Priority: Medium     DINORA (obstructive sleep apnea)- severe (AHI 56) 12/27/2018     Priority: Medium     12/26/2018 [facility] (189.0 lbs) - AHI 56.1, RDI 77.8, Supine AHI 56.1, REM AHI 35.3, Low O2 48.6%, Time Spent ?88% 15.4 minutes / Time Spent ?89% 20.4 minutes.       Pseudophakia of both eyes 12/05/2018     Priority: Medium     Recurrent UTI 06/29/2016     Priority: Medium     Overactive bladder 12/22/2015     Priority: Medium     Obesity 10/23/2015     Priority: Medium      "Type 2 diabetes mellitus with hyperglycemia, with long-term current use of insulin (H) 10/23/2015     Priority: Medium     Hypertension goal BP (blood pressure) < 140/90 10/23/2015     Priority: Medium     Advanced directives, counseling/discussion 10/18/2013     Priority: Medium     Advance Care Planning:   ACP Review and Resources Provided:  Reviewed chart for advance care plan.  Brandy Leon has no plan or code status on file. Mailed available resources and provided with information. Confirmed code status reflects current choices pending further ACP discussions.  Confirmed/documented designated decision maker(s). See permanent comments section of demographics in clinical tab.   Added by Josefina Pagan on 5/7/2015  Patient does not have an Advance/Health Care Directive (HCD), given \"What is Advance Care Planning?\" flyer and requests blank HCD form.  Rocio Sparks  October 18, 2013       Osteoarthritis 12/04/2009     Priority: Medium     Per Dr. Ya 2009       Fibromyalgia 05/22/2009     Priority: Medium     Hyperlipidemia LDL goal <100 04/29/2009     Priority: Medium     Seasonal allergies      Priority: Medium     Hypothyroidism      Priority: Medium     s/p subtotal thyroidectomy         Past Medical History:   Diagnosis Date     Actinic keratosis 3/12/2013     Degenerative joint disease      Diabetes (H)      Gastroesophageal reflux disease without esophagitis 12/11/2020     Rheumatic fever 1957    x2 between the ages of 15-18     Seasonal allergies      Past Surgical History:   Procedure Laterality Date     CATARACT IOL, RT/LT       COLONOSCOPY       COLONOSCOPY N/A 8/13/2015    Procedure: COLONOSCOPY;  Surgeon: Duane, William Charles, MD;  Location: [facility] OR     COLONOSCOPY WITH CO2 INSUFFLATION N/A 8/13/2015    Procedure: COLONOSCOPY WITH CO2 INSUFFLATION;  Surgeon: Duane, William Charles, MD;  Location: [facility] OR     PHACOEMULSIFICATION WITH STANDARD INTRAOCULAR LENS IMPLANT Left 10/25/2018    " Procedure: LEFT PHACOEMULSIFICATION WITH STANDARD INTRAOCULAR LENS IMPLANT;  Surgeon: Thomas Serna MD;  Location: [facility] OR     PHACOEMULSIFICATION WITH STANDARD INTRAOCULAR LENS IMPLANT Right 11/8/2018    Procedure: RIGHT PHACOEMULSIFICATION WITH STANDARD INTRAOCULAR LENS IMPLANT;  Surgeon: Thomas Serna MD;  Location: [facility] OR     THYROIDECTOMY        ZZC APPENDECTOMY       ZZC ARTHROPLASTY TMJ       Current Outpatient Medications   Medication Sig Dispense Refill     aspirin 81 MG EC tablet Take 1 tablet by mouth daily. 90 tablet 3     blood glucose (ONETOUCH ULTRA) test strip USE TO TEST TWICE A  strip 4     calcium carbonate (TUMS) 500 MG chewable tablet Take 1 chew tab by mouth daily as needed for heartburn       Cranberry-Vitamin C-Vitamin E (CRANBERRY PLUS VITAMIN C) 4200-20-3 MG-MG-UNIT CAPS Take 1 capsule by mouth 2 times daily       DM-APAP-CPM (CORICIDIN HBP PO) Take by mouth daily as needed       fexofenadine (ALLEGRA) 180 MG tablet Take 180 mg by mouth as needed        glipiZIDE (GLUCOTROL XL) 10 MG 24 hr tablet TAKE 1 TABLET BY MOUTH TWICE A DAY 60 tablet 0     GLUCOSAMINE CHONDROITIN COMPLX OR 2 Daily       ibuprofen (ADVIL/MOTRIN) 200 MG capsule Take 400 mg by mouth every 4 hours as needed        insulin glargine (BASAGLAR KWIKPEN) 100 UNIT/ML pen Inject 36 Units Subcutaneous daily 15 mL 0     insulin pen needle (BD JUAN C U/F) 32G X 4 MM miscellaneous USE 1 DAILY AS DIRECTED. 100 each 2     irbesartan (AVAPRO) 150 MG tablet Take 75 mg by mouth At Bedtime       latanoprost (XALATAN) 0.005 % ophthalmic solution Place 1 drop into both eyes daily 2.5 mL 10     metFORMIN (GLUCOPHAGE) 1000 MG tablet TAKE 1 TABLET BY MOUTH TWICE A DAY WITH MEALS 180 tablet 1     MULTIVITAMIN OR 1 tablet daily       ONE TOUCH DELICA LANCETS MISC 1 each 2 times daily. One Touch Delica   100 each 12     order for DME Glucometer covered by insurance. 1 each 0     rosuvastatin (CRESTOR) 5 MG tablet TAKE 1  TABLET BY MOUTH TWICE WEEKLY 24 tablet 14     SYNTHROID 137 MCG tablet Take 1 tablet (137 mcg) by mouth daily 90 tablet 1     triamcinolone (KENALOG) 0.1 % external cream APPLY TO AFFECTED AREA TWICE A DAY FOR 2 WEEKS 45 g 0     trospium (SANCTURA XR) 60 MG CP24 24 hr capsule Take 1 capsule (60 mg) by mouth every morning 30 capsule 3       Allergies   Allergen Reactions     Ace Inhibitors Cough     Estradiol Itching     Lipitor [Atorvastatin Calcium]      Weak and shaky     Sulfa Drugs      Rash       Zocor [Simvastatin]      Weak and shakey     Triamterene Dermatitis     Possible photosensitivity dermatitis, mild.  (changed to hctz alone 6/4/07, will see if this helps)        Social History     Tobacco Use     Smoking status: Never Smoker     Smokeless tobacco: Never Used     Tobacco comment: has had exposure to second hand smoke in the past from [facility], no current exposure   Substance Use Topics     Alcohol use: No     Family History   Problem Relation Age of Onset     Eye Disorder Mother         cataracts     Cancer Father         skin and leukemia     Cancer Sister         Breast Cancer     Eye Disorder Brother         cataract     Cerebrovascular Disease Maternal Grandfather      Cerebrovascular Disease Paternal Grandmother      Eye Disorder Paternal Grandmother         cataracts     Cerebrovascular Disease Paternal Grandfather      Heart Disease Paternal Grandfather      Breast Cancer Daughter      Endometrial Cancer Daughter      Glaucoma No family hx of      Macular Degeneration No family hx of      History   Drug Use No         Objective     /79 (BP Location: Right arm, Patient Position: Sitting, Cuff Size: Adult Large)   Pulse 101   Temp 97.5  F (36.4  C) (Oral)   Resp 20   Wt 82.4 kg (181 lb 9.6 oz)   SpO2 96%   BMI 32.17 kg/m      Physical Exam    GENERAL APPEARANCE: alert, active, no distress, cooperative and over weight     EYES: PERRL and eyelids- ptosis OU     HENT: ear canals and TM's  normal and nose and mouth without ulcers or lesions     NECK: no adenopathy, no asymmetry, masses, or scars and thyroid normal to palpation     RESP: lungs clear to auscultation - no rales, rhonchi or wheezes     CV: regular rates and rhythm, normal S1 S2, no S3 or S4 and no murmur, click or rub     ABDOMEN:  soft, nontender, no HSM or masses and bowel sounds normal     MS: extremities normal- no gross deformities noted, no evidence of inflammation in joints, FROM in all extremities.     SKIN: no suspicious lesions or rashes     NEURO: Normal strength and tone, sensory exam grossly normal, mentation intact and speech normal     PSYCH: mentation appears normal. and affect normal/bright     LYMPHATICS: No cervical adenopathy    Recent Labs   Lab Test 12/20/21  0919 11/16/21  0935 09/28/21  1001 09/20/21  1120 09/07/21  1054 07/19/21  1204 05/17/21  0857 04/02/21  1652   HGB  --   --   --   --   --  15.4  --  14.8   PLT  --   --   --   --   --  335  --  286   INR  --   --   --   --  1.0*  --   --   --    NA  --  135  --   --   --   --   --  134   POTASSIUM  --  4.3  --   --   --   --   --  4.1   CR  --  0.76 0.9  --   --   --   --  0.76   A1C 9.5*  --   --  9.8*  --   --    < >  --     < > = values in this interval not displayed.        Diagnostics:  Recent Results (from the past 48 hour(s))   Hemoglobin A1c    Collection Time: 03/28/22  3:22 PM   Result Value Ref Range    Hemoglobin A1C 9.6 (H) 0.0 - 5.6 %   Comprehensive metabolic panel (BMP + Alb, Alk Phos, ALT, AST, Total. Bili, TP)    Collection Time: 03/28/22  3:22 PM   Result Value Ref Range    Sodium 134 133 - 144 mmol/L    Potassium 4.3 3.4 - 5.3 mmol/L    Chloride 98 94 - 109 mmol/L    Carbon Dioxide (CO2) 26 20 - 32 mmol/L    Anion Gap 10 3 - 14 mmol/L    Urea Nitrogen 23 7 - 30 mg/dL    Creatinine 0.77 0.52 - 1.04 mg/dL    Calcium 9.4 8.5 - 10.1 mg/dL    Glucose 371 (H) 70 - 99 mg/dL    Alkaline Phosphatase 100 40 - 150 U/L    AST 13 0 - 45 U/L    ALT 25 0  - 50 U/L    Protein Total 8.3 6.8 - 8.8 g/dL    Albumin 3.5 3.4 - 5.0 g/dL    Bilirubin Total 0.2 0.2 - 1.3 mg/dL    GFR Estimate 78 >60 mL/min/1.73m2   CBC with platelets    Collection Time: 03/28/22  3:22 PM   Result Value Ref Range    WBC Count 11.4 (H) 4.0 - 11.0 10e3/uL    RBC Count 5.07 3.80 - 5.20 10e6/uL    Hemoglobin 14.6 11.7 - 15.7 g/dL    Hematocrit 45.4 35.0 - 47.0 %    MCV 90 78 - 100 fL    MCH 28.8 26.5 - 33.0 pg    MCHC 32.2 31.5 - 36.5 g/dL    RDW 13.4 10.0 - 15.0 %    Platelet Count 316 150 - 450 10e3/uL      EKG: appears normal, NSR, borderline tachycardia, Q wave anteriorly - unchanged., normal axis, normal intervals, no acute ST/T changes c/w ischemia, no LVH by voltage criteria, unchanged from previous tracings    Revised Cardiac Risk Index (RCRI):  The patient has the following serious cardiovascular risks for perioperative complications:   - Coronary Artery Disease (MI, positive stress test, angina, Qs on EKG) = 1 point   - Diabetes Mellitus (on Insulin) = 1 point     RCRI Interpretation: 2 points: Class III (moderate risk - 6.6% complication rate)     Estimated Functional Capacity: unable to exercise due to balance/neurologic symptoms.           Signed Electronically by: Fernanda Miller MD  Copy of this evaluation report is provided to requesting physician.

## 2022-03-28 NOTE — PATIENT INSTRUCTIONS
Remain off the omega-3 or fish oil in anticipation of surgery.  Let me know if you decide to restart the medication in the future.    Remain off the Aspirin until after procedure.  Do not take the Ibuprofen until procedure is completed.    For the evening prior to surgery - take Insulin 28 units.  Do not take the Metformin or Glipizide on the AM of surgery but may take these after you return home from procedure.  Normal insulin doses the night of procedure.        Preparing for Your Surgery  Getting started  A nurse will call you to review your health history and instructions. They will give you an arrival time based on your scheduled surgery time. Please be ready to share:    Your doctor's clinic name and phone number    Your medical, surgical and anesthesia history    A list of allergies and sensitivities    A list of medicines, including herbal treatments and over-the-counter drugs    Whether the patient has a legal guardian (ask how to send us the papers in advance)  Please tell us if you're pregnant--or if there's any chance you might be pregnant. Some surgeries may injure a fetus (unborn baby), so they require a pregnancy test. Surgeries that are safe for a fetus don't always need a test, and you can choose whether to have one.   If you have a child who's having surgery, please ask for a copy of Preparing for Your Child's Surgery.    Preparing for surgery    Within 30 days of surgery: Have a pre-op exam (sometimes called an H&P, or History and Physical). This can be done at a clinic or pre-operative center.  ? If you're having a , you may not need this exam. Talk to your care team.    At your pre-op exam, talk to your care team about all medicines you take. If you need to stop any medicines before surgery, ask when to start taking them again.  ? We do this for your safety. Many medicines can make you bleed too much during surgery. Some change how well surgery (anesthesia) drugs work.    Call your  insurance company to let them know you're having surgery. (If you don't have insurance, call [phone].)    Call your clinic if there's any change in your health. This includes signs of a cold or flu (sore throat, runny nose, cough, rash, fever). It also includes a scrape or scratch near the surgery site.    If you have questions on the day of surgery, call your hospital or surgery center.  COVID testing  You may need to be tested for COVID-19 before having surgery. If so, your surgical team will give you instructions for scheduling this test, separate from your preoperative history and physical.  Eating and drinking guidelines  For your safety: Unless your surgeon tells you otherwise, follow the guidelines below.    Eat and drink as usual until 8 hours before surgery. After that, no food or milk.    Drink clear liquids until 2 hours before surgery. These are liquids you can see through, like water, Gatorade and Propel Water. You may also have black coffee and tea (no cream or milk).    Nothing by mouth within 2 hours of surgery. This includes gum, candy and breath mints.    If you drink alcohol: Stop drinking it the night before surgery.    If your care team tells you to take medicine on the morning of surgery, it's okay to take it with a sip of water.  Preventing infection    Shower or bathe the night before and morning of your surgery. Follow the instructions your clinic gave you. (If no instructions, use regular soap.)    Don't shave or clip hair near your surgery site. We'll remove the hair if needed.    Don't smoke or vape the morning of surgery. You may chew nicotine gum up to 2 hours before surgery. A nicotine patch is okay.  ? Note: Some surgeries require you to completely quit smoking and nicotine. Check with your surgeon.    Your care team will make every effort to keep you safe from infection. We will:  ? Clean our hands often with soap and water (or an alcohol-based hand rub).  ? Clean the skin at  your surgery site with a special soap that kills germs.  ? Give you a special gown to keep you warm. (Cold raises the risk of infection.)  ? Wear special hair covers, masks, gowns and gloves during surgery.  ? Give antibiotic medicine, if prescribed. Not all surgeries need antibiotics.  What to bring on the day of surgery    Photo ID and insurance card    Copy of your health care directive, if you have one    Glasses and hearing aides (bring cases)  ? You can't wear contacts during surgery    Inhaler and eye drops, if you use them (tell us about these when you arrive)    CPAP machine or breathing device, if you use them    A few personal items, if spending the night    If you have . . .  ? A pacemaker, ICD (cardiac defibrillator) or other implant: Bring the ID card.  ? An implanted stimulator: Bring the remote control.  ? A legal guardian: Bring a copy of the certified (court-stamped) guardianship papers.  Please remove any jewelry, including body piercings. Leave jewelry and other valuables at home.  If you're going home the day of surgery    You must have a responsible adult drive you home. They should stay with you overnight as well.    If you don't have someone to stay with you, and you aren't safe to go home alone, we may keep you overnight. Insurance often won't pay for this.  After surgery  If it's hard to control your pain or you need more pain medicine, please call your surgeon's office.  Questions?   If you have any questions for your care team, list them here: _________________________________________________________________________________________________________________________________________________________________________ ____________________________________ ____________________________________ ____________________________________  For informational purposes only. Not to replace the advice of your health care provider. Copyright   2003, 2019 [facility]. All rights reserved. Clinically  reviewed by Sarah Mack MD. [employer] [id number] - REV 07/21.

## 2022-03-28 NOTE — H&P (VIEW-ONLY)
[address] [address]  Phone: [phone]  Primary Provider: Alicia Bhatti  Pre-op Performing Provider: ALICIA BHATTI      PREOPERATIVE EVALUATION:  Today's date: 3/28/2022    Brandy Leon is a 81 year old female who presents for a preoperative evaluation.    Surgical Information:  Surgery/Procedure: Both upper eyelid blepharoplasty  Surgery Location: [facility]    Surgeon: Fidelia Moran MD  Surgery Date: 4/1/22  Time of Surgery: 9:35A  Where patient plans to recover: At home with family  Fax number for surgical facility: Note does not need to be faxed, will be available electronically in Epic.    Type of Anesthesia Anticipated: General    Assessment & Plan     The proposed surgical procedure is considered INTERMEDIATE risk.    Preoperative examination  Proceed with surgery as planned.   - EKG 12-lead complete w/read - Clinics    Dermatochalasis of both upper eyelids  Ptosis of eyebrow  Surgical repair as planned.     Chronic kidney disease, stage 2 (mild)  Stable GFR.  Improving microalbuminuria on last testing.      Hyperlipidemia LDL goal <100  Taking twice weekly rosuvastatin    Hypertension goal BP (blood pressure) < 140/90  Blood pressure well controlled on 75 mg of irbesartan    Acquired hypothyroidism  Lab Results   Component Value Date    TSH 1.96 11/16/2021    TSH 3.00 11/12/2020   Euthyroid on current testing      Type 2 diabetes mellitus with hyperglycemia, with long-term current use of insulin (H)  Diabetes mellitus with peripheral vascular disease (H)  Diabetes is not adequately controlled.  Additional medication is planned with either GLP-1 or mealtime insulin.  Prior authorization may be needed for either of these medications.  - Hemoglobin A1c; Future  - Comprehensive metabolic panel (BMP + Alb, Alk Phos, ALT, AST, Total. Bili, TP); Future  - CBC with platelets; Future  - Hemoglobin A1c  -  Comprehensive metabolic panel (BMP + Alb, Alk Phos, ALT, AST, Total. Bili, TP)  - CBC with platelets      DINORA (obstructive sleep apnea)- severe (AHI 56)  Continue treatment of sleep apnea.  She is requiring new supplies and referral back to the sleep clinic is made.  - Adult Sleep Eval & Management  Referral; Future    Hydrocephalus, unspecified type (H)  Monitored with neurology.  MRI in February is unchanged from previous    Need for Td vaccine  Vaccine today  - TD PRESERV FREE, IM (7+ YRS) (DECAVAC/TENIVAC)         Risks and Recommendations:  The patient has the following additional risks and recommendations for perioperative complications:  Diabetes:  - Patient is on insulin therapy; diabetic NPO guidelines provided and discussed.  Obstructive Sleep Apnea:   Ongoing use of CPAP    Medication Instructions:  Patient is to take all scheduled medications on the day of surgery EXCEPT for modifications listed below:   - ACE/ARB: May be continued on the day of surgery.    - Long acting insulin (e.g. glargine, detemir): Take 80% of the usual evening or morning dose before surgery.   - metformin: HOLD day of surgery.   - sulfonylurea (e.g. glyburide, glipizide): HOLD day of surgery   - ibuprofen (Advil, Motrin): HOLD 1 day before surgery.     RECOMMENDATION:  APPROVAL GIVEN to proceed with proposed procedure, without further diagnostic evaluation.    Review of prior external note(s) from - Outside records from Ophthalmology, [facility]                  Subjective     HPI related to upcoming procedure: 81 year old with dermatochalasis and brow ptosis resulting in decrease in visual field and interference with activities of daily living - driving and reading.      Preop Questions 3/28/2022   1. Have you ever had a heart attack or stroke? No   2. Have you ever had surgery on your heart or blood vessels, such as a stent placement, a coronary artery bypass, or surgery on an artery in your head, neck, heart, or legs? No   3.  Do you have chest pain with activity? No   4. Do you have a history of  heart failure? No   5. Do you currently have a cold, bronchitis or symptoms of other infection? No   6. Do you have a cough, shortness of breath, or wheezing? No   7. Do you or anyone in your family have previous history of blood clots? No   8. Do you or does anyone in your family have a serious bleeding problem such as prolonged bleeding following surgeries or cuts? No   9. Have you ever had problems with anemia or been told to take iron pills? No   10. Have you had any abnormal blood loss such as black, tarry or bloody stools, or abnormal vaginal bleeding? No   11. Have you ever had a blood transfusion? No   12. Are you willing to have a blood transfusion if it is medically needed before, during, or after your surgery? Yes   13. Have you or any of your relatives ever had problems with anesthesia? No   14. Do you have sleep apnea, excessive snoring or daytime drowsiness? YES - uses CPAP, needs new mask or canula.   14a. Do you have a CPAP machine? Yes   15. Do you have any artifical heart valves or other implanted medical devices like a pacemaker, defibrillator, or continuous glucose monitor? No   16. Do you have artificial joints? No   17. Are you allergic to latex? No     Health Care Directive:  Has directive on file.  Patient has a Health Care Directive on file      Preoperative Review of :   reviewed - no record of controlled substances prescribed.      Status of Chronic Conditions:  DIABETES - Patient has a longstanding history of DiabetesType Type II . Patient is being treated with diet, oral agents, SMBG and insulin injections and denies significant side effects. Control has been poor. Complicating factors include but are not limited to: hypertension and hyperlipidemia.     HYPERTENSION - Patient has longstanding history of HTN , currently denies any symptoms referable to elevated blood pressure. Specifically denies chest pain,  palpitations, dyspnea, orthopnea, PND or peripheral edema. Blood pressure readings have been in normal range. Current medication regimen is as listed below. Patient denies any side effects of medication.        Hydrocephalus, unspecified type (H)  Management with Neurology.  MRI stable in Feb 2022.              Review of Systems  CONSTITUTIONAL: NEGATIVE for fever, chills, change in weight  INTEGUMENTARY/SKIN: NEGATIVE for worrisome rashes, moles or lesions  ENT/MOUTH: NEGATIVE for ear, mouth and throat problems  RESP: NEGATIVE for significant cough or SOB  CV: NEGATIVE for chest pain, palpitations or peripheral edema  GI: NEGATIVE for nausea, abdominal pain, heartburn, or change in bowel habits  : NEGATIVE for frequency, dysuria, or hematuria  MUSCULOSKELETAL: NEGATIVE for significant arthralgias or myalgia  NEURO: balance unsteady gait.  Numbness hands intermittently - known right carpal tunnel syndrome.  ENDOCRINE: NEGATIVE for temperature intolerance, skin/hair changes  HEME: NEGATIVE for bleeding problems  PSYCHIATRIC: NEGATIVE for changes in mood or affect    Patient Active Problem List    Diagnosis Date Noted     Chronic kidney disease, stage 2 (mild) 12/13/2021     Priority: Medium     Urge incontinence 10/04/2021     Priority: Medium     Gastroesophageal reflux disease without esophagitis 12/11/2020     Priority: Medium     Gait disorder 07/01/2020     Priority: Medium     Contusion of right knee 03/09/2020     Priority: Medium     DINORA (obstructive sleep apnea)- severe (AHI 56) 12/27/2018     Priority: Medium     12/26/2018 [facility] Study (189.0 lbs) - AHI 56.1, RDI 77.8, Supine AHI 56.1, REM AHI 35.3, Low O2 48.6%, Time Spent ?88% 15.4 minutes / Time Spent ?89% 20.4 minutes.       Pseudophakia of both eyes 12/05/2018     Priority: Medium     Recurrent UTI 06/29/2016     Priority: Medium     Overactive bladder 12/22/2015     Priority: Medium     Obesity 10/23/2015     Priority: Medium      "Type 2 diabetes mellitus with hyperglycemia, with long-term current use of insulin (H) 10/23/2015     Priority: Medium     Hypertension goal BP (blood pressure) < 140/90 10/23/2015     Priority: Medium     Advanced directives, counseling/discussion 10/18/2013     Priority: Medium     Advance Care Planning:   ACP Review and Resources Provided:  Reviewed chart for advance care plan.  Brandy Leon has no plan or code status on file. Mailed available resources and provided with information. Confirmed code status reflects current choices pending further ACP discussions.  Confirmed/documented designated decision maker(s). See permanent comments section of demographics in clinical tab.   Added by Josefina Pagan on 5/7/2015  Patient does not have an Advance/Health Care Directive (HCD), given \"What is Advance Care Planning?\" flyer and requests blank HCD form.  Rocio Sparks  October 18, 2013       Osteoarthritis 12/04/2009     Priority: Medium     Per Dr. Ya 2009       Fibromyalgia 05/22/2009     Priority: Medium     Hyperlipidemia LDL goal <100 04/29/2009     Priority: Medium     Seasonal allergies      Priority: Medium     Hypothyroidism      Priority: Medium     s/p subtotal thyroidectomy         Past Medical History:   Diagnosis Date     Actinic keratosis 3/12/2013     Degenerative joint disease      Diabetes (H)      Gastroesophageal reflux disease without esophagitis 12/11/2020     Rheumatic fever 1957    x2 between the ages of 15-18     Seasonal allergies      Past Surgical History:   Procedure Laterality Date     CATARACT IOL, RT/LT       COLONOSCOPY       COLONOSCOPY N/A 8/13/2015    Procedure: COLONOSCOPY;  Surgeon: Duane, William Charles, MD;  Location: [facility] OR     COLONOSCOPY WITH CO2 INSUFFLATION N/A 8/13/2015    Procedure: COLONOSCOPY WITH CO2 INSUFFLATION;  Surgeon: Duane, William Charles, MD;  Location: [facility] OR     PHACOEMULSIFICATION WITH STANDARD INTRAOCULAR LENS IMPLANT Left 10/25/2018    " Procedure: LEFT PHACOEMULSIFICATION WITH STANDARD INTRAOCULAR LENS IMPLANT;  Surgeon: Thomas Serna MD;  Location: [facility] OR     PHACOEMULSIFICATION WITH STANDARD INTRAOCULAR LENS IMPLANT Right 11/8/2018    Procedure: RIGHT PHACOEMULSIFICATION WITH STANDARD INTRAOCULAR LENS IMPLANT;  Surgeon: Thomas Serna MD;  Location: [facility] OR     THYROIDECTOMY        ZZC APPENDECTOMY       ZZC ARTHROPLASTY TMJ       Current Outpatient Medications   Medication Sig Dispense Refill     aspirin 81 MG EC tablet Take 1 tablet by mouth daily. 90 tablet 3     blood glucose (ONETOUCH ULTRA) test strip USE TO TEST TWICE A  strip 4     calcium carbonate (TUMS) 500 MG chewable tablet Take 1 chew tab by mouth daily as needed for heartburn       Cranberry-Vitamin C-Vitamin E (CRANBERRY PLUS VITAMIN C) 4200-20-3 MG-MG-UNIT CAPS Take 1 capsule by mouth 2 times daily       DM-APAP-CPM (CORICIDIN HBP PO) Take by mouth daily as needed       fexofenadine (ALLEGRA) 180 MG tablet Take 180 mg by mouth as needed        glipiZIDE (GLUCOTROL XL) 10 MG 24 hr tablet TAKE 1 TABLET BY MOUTH TWICE A DAY 60 tablet 0     GLUCOSAMINE CHONDROITIN COMPLX OR 2 Daily       ibuprofen (ADVIL/MOTRIN) 200 MG capsule Take 400 mg by mouth every 4 hours as needed        insulin glargine (BASAGLAR KWIKPEN) 100 UNIT/ML pen Inject 36 Units Subcutaneous daily 15 mL 0     insulin pen needle (BD JUAN C U/F) 32G X 4 MM miscellaneous USE 1 DAILY AS DIRECTED. 100 each 2     irbesartan (AVAPRO) 150 MG tablet Take 75 mg by mouth At Bedtime       latanoprost (XALATAN) 0.005 % ophthalmic solution Place 1 drop into both eyes daily 2.5 mL 10     metFORMIN (GLUCOPHAGE) 1000 MG tablet TAKE 1 TABLET BY MOUTH TWICE A DAY WITH MEALS 180 tablet 1     MULTIVITAMIN OR 1 tablet daily       ONE TOUCH DELICA LANCETS MISC 1 each 2 times daily. One Touch Delica   100 each 12     order for DME Glucometer covered by insurance. 1 each 0     rosuvastatin (CRESTOR) 5 MG tablet TAKE 1  TABLET BY MOUTH TWICE WEEKLY 24 tablet 14     SYNTHROID 137 MCG tablet Take 1 tablet (137 mcg) by mouth daily 90 tablet 1     triamcinolone (KENALOG) 0.1 % external cream APPLY TO AFFECTED AREA TWICE A DAY FOR 2 WEEKS 45 g 0     trospium (SANCTURA XR) 60 MG CP24 24 hr capsule Take 1 capsule (60 mg) by mouth every morning 30 capsule 3       Allergies   Allergen Reactions     Ace Inhibitors Cough     Estradiol Itching     Lipitor [Atorvastatin Calcium]      Weak and shaky     Sulfa Drugs      Rash       Zocor [Simvastatin]      Weak and shakey     Triamterene Dermatitis     Possible photosensitivity dermatitis, mild.  (changed to hctz alone 6/4/07, will see if this helps)        Social History     Tobacco Use     Smoking status: Never Smoker     Smokeless tobacco: Never Used     Tobacco comment: has had exposure to second hand smoke in the past from [facility], no current exposure   Substance Use Topics     Alcohol use: No     Family History   Problem Relation Age of Onset     Eye Disorder Mother         cataracts     Cancer Father         skin and leukemia     Cancer Sister         Breast Cancer     Eye Disorder Brother         cataract     Cerebrovascular Disease Maternal Grandfather      Cerebrovascular Disease Paternal Grandmother      Eye Disorder Paternal Grandmother         cataracts     Cerebrovascular Disease Paternal Grandfather      Heart Disease Paternal Grandfather      Breast Cancer Daughter      Endometrial Cancer Daughter      Glaucoma No family hx of      Macular Degeneration No family hx of      History   Drug Use No         Objective     /79 (BP Location: Right arm, Patient Position: Sitting, Cuff Size: Adult Large)   Pulse 101   Temp 97.5  F (36.4  C) (Oral)   Resp 20   Wt 82.4 kg (181 lb 9.6 oz)   SpO2 96%   BMI 32.17 kg/m      Physical Exam    GENERAL APPEARANCE: alert, active, no distress, cooperative and over weight     EYES: PERRL and eyelids- ptosis OU     HENT: ear canals and TM's  normal and nose and mouth without ulcers or lesions     NECK: no adenopathy, no asymmetry, masses, or scars and thyroid normal to palpation     RESP: lungs clear to auscultation - no rales, rhonchi or wheezes     CV: regular rates and rhythm, normal S1 S2, no S3 or S4 and no murmur, click or rub     ABDOMEN:  soft, nontender, no HSM or masses and bowel sounds normal     MS: extremities normal- no gross deformities noted, no evidence of inflammation in joints, FROM in all extremities.     SKIN: no suspicious lesions or rashes     NEURO: Normal strength and tone, sensory exam grossly normal, mentation intact and speech normal     PSYCH: mentation appears normal. and affect normal/bright     LYMPHATICS: No cervical adenopathy    Recent Labs   Lab Test 12/20/21  0919 11/16/21  0935 09/28/21  1001 09/20/21  1120 09/07/21  1054 07/19/21  1204 05/17/21  0857 04/02/21  1652   HGB  --   --   --   --   --  15.4  --  14.8   PLT  --   --   --   --   --  335  --  286   INR  --   --   --   --  1.0*  --   --   --    NA  --  135  --   --   --   --   --  134   POTASSIUM  --  4.3  --   --   --   --   --  4.1   CR  --  0.76 0.9  --   --   --   --  0.76   A1C 9.5*  --   --  9.8*  --   --    < >  --     < > = values in this interval not displayed.        Diagnostics:  Recent Results (from the past 48 hour(s))   Hemoglobin A1c    Collection Time: 03/28/22  3:22 PM   Result Value Ref Range    Hemoglobin A1C 9.6 (H) 0.0 - 5.6 %   Comprehensive metabolic panel (BMP + Alb, Alk Phos, ALT, AST, Total. Bili, TP)    Collection Time: 03/28/22  3:22 PM   Result Value Ref Range    Sodium 134 133 - 144 mmol/L    Potassium 4.3 3.4 - 5.3 mmol/L    Chloride 98 94 - 109 mmol/L    Carbon Dioxide (CO2) 26 20 - 32 mmol/L    Anion Gap 10 3 - 14 mmol/L    Urea Nitrogen 23 7 - 30 mg/dL    Creatinine 0.77 0.52 - 1.04 mg/dL    Calcium 9.4 8.5 - 10.1 mg/dL    Glucose 371 (H) 70 - 99 mg/dL    Alkaline Phosphatase 100 40 - 150 U/L    AST 13 0 - 45 U/L    ALT 25 0  - 50 U/L    Protein Total 8.3 6.8 - 8.8 g/dL    Albumin 3.5 3.4 - 5.0 g/dL    Bilirubin Total 0.2 0.2 - 1.3 mg/dL    GFR Estimate 78 >60 mL/min/1.73m2   CBC with platelets    Collection Time: 03/28/22  3:22 PM   Result Value Ref Range    WBC Count 11.4 (H) 4.0 - 11.0 10e3/uL    RBC Count 5.07 3.80 - 5.20 10e6/uL    Hemoglobin 14.6 11.7 - 15.7 g/dL    Hematocrit 45.4 35.0 - 47.0 %    MCV 90 78 - 100 fL    MCH 28.8 26.5 - 33.0 pg    MCHC 32.2 31.5 - 36.5 g/dL    RDW 13.4 10.0 - 15.0 %    Platelet Count 316 150 - 450 10e3/uL      EKG: appears normal, NSR, borderline tachycardia, Q wave anteriorly - unchanged., normal axis, normal intervals, no acute ST/T changes c/w ischemia, no LVH by voltage criteria, unchanged from previous tracings    Revised Cardiac Risk Index (RCRI):  The patient has the following serious cardiovascular risks for perioperative complications:   - Coronary Artery Disease (MI, positive stress test, angina, Qs on EKG) = 1 point   - Diabetes Mellitus (on Insulin) = 1 point     RCRI Interpretation: 2 points: Class III (moderate risk - 6.6% complication rate)     Estimated Functional Capacity: unable to exercise due to balance/neurologic symptoms.           Signed Electronically by: Fernanda Miller MD  Copy of this evaluation report is provided to requesting physician.

## 2022-03-29 ENCOUNTER — TELEPHONE (OUTPATIENT)
Dept: FAMILY MEDICINE | Facility: CLINIC | Age: 82
End: 2022-03-29

## 2022-03-29 ENCOUNTER — LAB (OUTPATIENT)
Dept: LAB | Facility: CLINIC | Age: 82
End: 2022-03-29
Payer: COMMERCIAL

## 2022-03-29 ENCOUNTER — DOCUMENTATION ONLY (OUTPATIENT)
Dept: OTHER | Facility: CLINIC | Age: 82
End: 2022-03-29

## 2022-03-29 DIAGNOSIS — Z11.59 ENCOUNTER FOR SCREENING FOR OTHER VIRAL DISEASES: ICD-10-CM

## 2022-03-29 PROBLEM — R56.01: Status: ACTIVE | Noted: 2022-03-29

## 2022-03-29 PROBLEM — G91.9 HYDROCEPHALUS, UNSPECIFIED TYPE (H): Status: ACTIVE | Noted: 2022-03-29

## 2022-03-29 PROBLEM — E11.51 DIABETES MELLITUS WITH PERIPHERAL VASCULAR DISEASE (H): Status: ACTIVE | Noted: 2022-03-29

## 2022-03-29 PROBLEM — R56.01: Status: RESOLVED | Noted: 2022-03-29 | Resolved: 2022-03-29

## 2022-03-29 PROCEDURE — U0003 INFECTIOUS AGENT DETECTION BY NUCLEIC ACID (DNA OR RNA); SEVERE ACUTE RESPIRATORY SYNDROME CORONAVIRUS 2 (SARS-COV-2) (CORONAVIRUS DISEASE [COVID-19]), AMPLIFIED PROBE TECHNIQUE, MAKING USE OF HIGH THROUGHPUT TECHNOLOGIES AS DESCRIBED BY CMS-2020-01-R: HCPCS

## 2022-03-29 PROCEDURE — U0005 INFEC AGEN DETEC AMPLI PROBE: HCPCS

## 2022-03-29 NOTE — TELEPHONE ENCOUNTER
Called patient regarding result note below.     She does not remember having any side effects from trulicity. She would like to try this.     She does not want to do the insulin.    Routing to provider to review.     No further questions at this time.    Emily Cedeno RN, BSN  [facility]

## 2022-03-29 NOTE — TELEPHONE ENCOUNTER
Routing refill request to provider for review/approval because:  Labs out of range:    Lab Results   Component Value Date    A1C 9.6 03/28/2022    A1C 9.5 12/20/2021    A1C 9.8 09/20/2021    A1C 10.3 05/17/2021    A1C 9.2 02/16/2021    A1C 8.7 11/12/2020    A1C 10.0 08/20/2020    A1C 10.5 01/10/2020     Mirna Melendrez RN, [facility]

## 2022-03-29 NOTE — TELEPHONE ENCOUNTER
Please call patient re:  results.  See below  I would recommend adding additional medication for control of sugars.  Options include trulicity - she has taken this back in 2018.  I do not see notation of any negative side effects.  Victoza was used in the past - side effects of vomiting noted.    She does not tolerate Invokana/Jardiance per records.    The other option she would have is to use short acting insulin with each of her meals along with the once daily long acting insulin.    I would recommend the Trulicity but will may require prior authorization.  If she would prefer to use the insulin shots short acting 3 times daily that is a good option as well.    PSK      Your kidney and liver testing are normal.  Your hemoglobin is normal.  White blood cell count has been elevated over the last year but it is near normal now.  Your long term blood sugar testing is similar to previous.  We will be contacting you regarding possible medication adjustments to better control the diabetes for you.    Please call or MyChart message me if you have any questions.     PSK

## 2022-03-30 LAB — SARS-COV-2 RNA RESP QL NAA+PROBE: NEGATIVE

## 2022-03-30 RX ORDER — GLIPIZIDE 10 MG/1
TABLET, FILM COATED, EXTENDED RELEASE ORAL
Qty: 180 TABLET | Refills: 0 | Status: SHIPPED | OUTPATIENT
Start: 2022-03-30 | End: 2022-04-28

## 2022-03-31 NOTE — TELEPHONE ENCOUNTER
Called patient and relayed on providers message. Patient voiced her understanding and will check with [facility] prior to  to make sure that it will be covered. If it is not I let her know to call us back for us to sent Trulicity instead.    Lea Rangel RN [facility]

## 2022-03-31 NOTE — TELEPHONE ENCOUNTER
Please call patient :  I communicated with Laisha the [facility] pharmacist as well and she was going to check into getting a medication approved through the patient assistance program.  I will check on this in a few days and if it is not an option, we will go with the trulicity.    CK

## 2022-04-01 ENCOUNTER — HOSPITAL ENCOUNTER (OUTPATIENT)
Facility: CLINIC | Age: 82
Discharge: HOME | End: 2022-04-01
Attending: OPHTHALMOLOGY | Admitting: OPHTHALMOLOGY
Payer: COMMERCIAL

## 2022-04-01 ENCOUNTER — ANESTHESIA EVENT (OUTPATIENT)
Dept: SURGERY | Facility: CLINIC | Age: 82
End: 2022-04-01
Payer: COMMERCIAL

## 2022-04-01 ENCOUNTER — ANESTHESIA (OUTPATIENT)
Dept: SURGERY | Facility: CLINIC | Age: 82
End: 2022-04-01
Payer: COMMERCIAL

## 2022-04-01 VITALS
BODY MASS INDEX: 30.59 KG/M2 | OXYGEN SATURATION: 94 % | RESPIRATION RATE: 18 BRPM | SYSTOLIC BLOOD PRESSURE: 118 MMHG | HEART RATE: 83 BPM | TEMPERATURE: 97.6 F | WEIGHT: 179.2 LBS | HEIGHT: 64 IN | DIASTOLIC BLOOD PRESSURE: 98 MMHG

## 2022-04-01 DIAGNOSIS — Z98.890 POSTOPERATIVE EYE STATE: Primary | ICD-10-CM

## 2022-04-01 LAB — GLUCOSE BLDC GLUCOMTR-MCNC: 166 MG/DL (ref 70–99)

## 2022-04-01 PROCEDURE — 999N000141 HC STATISTIC PRE-PROCEDURE NURSING ASSESSMENT: Performed by: OPHTHALMOLOGY

## 2022-04-01 PROCEDURE — 250N000011 HC RX IP 250 OP 636: Performed by: NURSE ANESTHETIST, CERTIFIED REGISTERED

## 2022-04-01 PROCEDURE — 250N000009 HC RX 250: Performed by: OPHTHALMOLOGY

## 2022-04-01 PROCEDURE — 370N000017 HC ANESTHESIA TECHNICAL FEE, PER MIN: Performed by: OPHTHALMOLOGY

## 2022-04-01 PROCEDURE — 272N000001 HC OR GENERAL SUPPLY STERILE: Performed by: OPHTHALMOLOGY

## 2022-04-01 PROCEDURE — 360N000076 HC SURGERY LEVEL 3, PER MIN: Performed by: OPHTHALMOLOGY

## 2022-04-01 PROCEDURE — 710N000009 HC RECOVERY PHASE 1, LEVEL 1, PER MIN: Performed by: OPHTHALMOLOGY

## 2022-04-01 PROCEDURE — 15823 BLEPHARP UPR EYELID XCSV SKN: CPT | Mod: 50 | Performed by: OPHTHALMOLOGY

## 2022-04-01 PROCEDURE — 82962 GLUCOSE BLOOD TEST: CPT

## 2022-04-01 PROCEDURE — 250N000009 HC RX 250: Performed by: NURSE ANESTHETIST, CERTIFIED REGISTERED

## 2022-04-01 PROCEDURE — 710N000012 HC RECOVERY PHASE 2, PER MINUTE: Performed by: OPHTHALMOLOGY

## 2022-04-01 PROCEDURE — 258N000003 HC RX IP 258 OP 636: Performed by: NURSE ANESTHETIST, CERTIFIED REGISTERED

## 2022-04-01 RX ORDER — HYDROMORPHONE HCL IN WATER/PF 6 MG/30 ML
0.4 PATIENT CONTROLLED ANALGESIA SYRINGE INTRAVENOUS EVERY 5 MIN PRN
Status: DISCONTINUED | OUTPATIENT
Start: 2022-04-01 | End: 2022-04-01 | Stop reason: HOSPADM

## 2022-04-01 RX ORDER — ERYTHROMYCIN 5 MG/G
OINTMENT OPHTHALMIC
Qty: 3.5 G | Refills: 0 | Status: SHIPPED | OUTPATIENT
Start: 2022-04-01 | End: 2022-09-09

## 2022-04-01 RX ORDER — OXYCODONE HYDROCHLORIDE 5 MG/1
5 TABLET ORAL EVERY 4 HOURS PRN
Status: DISCONTINUED | OUTPATIENT
Start: 2022-04-01 | End: 2022-04-01 | Stop reason: HOSPADM

## 2022-04-01 RX ORDER — MEPERIDINE HYDROCHLORIDE 25 MG/ML
12.5 INJECTION INTRAMUSCULAR; INTRAVENOUS; SUBCUTANEOUS
Status: DISCONTINUED | OUTPATIENT
Start: 2022-04-01 | End: 2022-04-01 | Stop reason: HOSPADM

## 2022-04-01 RX ORDER — ONDANSETRON 2 MG/ML
INJECTION INTRAMUSCULAR; INTRAVENOUS PRN
Status: DISCONTINUED | OUTPATIENT
Start: 2022-04-01 | End: 2022-04-01

## 2022-04-01 RX ORDER — BUPIVACAINE HYDROCHLORIDE 5 MG/ML
INJECTION, SOLUTION EPIDURAL; INTRACAUDAL
Status: DISCONTINUED
Start: 2022-04-01 | End: 2022-04-01 | Stop reason: HOSPADM

## 2022-04-01 RX ORDER — FENTANYL CITRATE 0.05 MG/ML
50 INJECTION, SOLUTION INTRAMUSCULAR; INTRAVENOUS EVERY 5 MIN PRN
Status: DISCONTINUED | OUTPATIENT
Start: 2022-04-01 | End: 2022-04-01 | Stop reason: HOSPADM

## 2022-04-01 RX ORDER — ONDANSETRON 4 MG/1
4 TABLET, ORALLY DISINTEGRATING ORAL EVERY 30 MIN PRN
Status: DISCONTINUED | OUTPATIENT
Start: 2022-04-01 | End: 2022-04-01 | Stop reason: HOSPADM

## 2022-04-01 RX ORDER — PROPOFOL 10 MG/ML
INJECTION, EMULSION INTRAVENOUS PRN
Status: DISCONTINUED | OUTPATIENT
Start: 2022-04-01 | End: 2022-04-01

## 2022-04-01 RX ORDER — LIDOCAINE HYDROCHLORIDE 20 MG/ML
INJECTION, SOLUTION INFILTRATION; PERINEURAL PRN
Status: DISCONTINUED | OUTPATIENT
Start: 2022-04-01 | End: 2022-04-01

## 2022-04-01 RX ORDER — TETRACAINE HYDROCHLORIDE 5 MG/ML
SOLUTION OPHTHALMIC
Status: DISCONTINUED
Start: 2022-04-01 | End: 2022-04-01 | Stop reason: HOSPADM

## 2022-04-01 RX ORDER — ERYTHROMYCIN 5 MG/G
OINTMENT OPHTHALMIC
Status: DISCONTINUED
Start: 2022-04-01 | End: 2022-04-01 | Stop reason: HOSPADM

## 2022-04-01 RX ORDER — FENTANYL CITRATE 0.05 MG/ML
25 INJECTION, SOLUTION INTRAMUSCULAR; INTRAVENOUS
Status: DISCONTINUED | OUTPATIENT
Start: 2022-04-01 | End: 2022-04-01 | Stop reason: HOSPADM

## 2022-04-01 RX ORDER — ONDANSETRON 2 MG/ML
4 INJECTION INTRAMUSCULAR; INTRAVENOUS EVERY 30 MIN PRN
Status: DISCONTINUED | OUTPATIENT
Start: 2022-04-01 | End: 2022-04-01 | Stop reason: HOSPADM

## 2022-04-01 RX ORDER — BUPIVACAINE HYDROCHLORIDE 5 MG/ML
INJECTION, SOLUTION PERINEURAL PRN
Status: DISCONTINUED | OUTPATIENT
Start: 2022-04-01 | End: 2022-04-01 | Stop reason: HOSPADM

## 2022-04-01 RX ORDER — ERYTHROMYCIN 5 MG/G
OINTMENT OPHTHALMIC PRN
Status: DISCONTINUED | OUTPATIENT
Start: 2022-04-01 | End: 2022-04-01 | Stop reason: HOSPADM

## 2022-04-01 RX ORDER — LIDOCAINE HCL/EPINEPHRINE/PF 2%-1:200K
VIAL (ML) INJECTION PRN
Status: DISCONTINUED | OUTPATIENT
Start: 2022-04-01 | End: 2022-04-01 | Stop reason: HOSPADM

## 2022-04-01 RX ORDER — SODIUM CHLORIDE, SODIUM LACTATE, POTASSIUM CHLORIDE, CALCIUM CHLORIDE 600; 310; 30; 20 MG/100ML; MG/100ML; MG/100ML; MG/100ML
INJECTION, SOLUTION INTRAVENOUS CONTINUOUS
Status: DISCONTINUED | OUTPATIENT
Start: 2022-04-01 | End: 2022-04-01 | Stop reason: HOSPADM

## 2022-04-01 RX ORDER — SODIUM CHLORIDE, SODIUM LACTATE, POTASSIUM CHLORIDE, CALCIUM CHLORIDE 600; 310; 30; 20 MG/100ML; MG/100ML; MG/100ML; MG/100ML
INJECTION, SOLUTION INTRAVENOUS CONTINUOUS PRN
Status: DISCONTINUED | OUTPATIENT
Start: 2022-04-01 | End: 2022-04-01

## 2022-04-01 RX ADMIN — LIDOCAINE HYDROCHLORIDE 40 MG: 20 INJECTION, SOLUTION INFILTRATION; PERINEURAL at 09:45

## 2022-04-01 RX ADMIN — PROPOFOL 10 MG: 10 INJECTION, EMULSION INTRAVENOUS at 09:46

## 2022-04-01 RX ADMIN — PROPOFOL 10 MG: 10 INJECTION, EMULSION INTRAVENOUS at 09:48

## 2022-04-01 RX ADMIN — PROPOFOL 30 MG: 10 INJECTION, EMULSION INTRAVENOUS at 09:45

## 2022-04-01 RX ADMIN — SODIUM CHLORIDE, POTASSIUM CHLORIDE, SODIUM LACTATE AND CALCIUM CHLORIDE: 600; 310; 30; 20 INJECTION, SOLUTION INTRAVENOUS at 09:35

## 2022-04-01 RX ADMIN — PROPOFOL 10 MG: 10 INJECTION, EMULSION INTRAVENOUS at 09:47

## 2022-04-01 RX ADMIN — ONDANSETRON 4 MG: 2 INJECTION INTRAMUSCULAR; INTRAVENOUS at 09:53

## 2022-04-01 ASSESSMENT — LIFESTYLE VARIABLES: TOBACCO_USE: 0

## 2022-04-01 ASSESSMENT — ENCOUNTER SYMPTOMS
SEIZURES: 0
DYSRHYTHMIAS: 0

## 2022-04-01 NOTE — ANESTHESIA POSTPROCEDURE EVALUATION
Patient: Brandy Leon    Procedure: Procedure(s):  Bilateral upper eyelid blepharoplasty       Anesthesia Type:  MAC    Note:  Disposition: Outpatient   Postop Pain Control: Uneventful            Sign Out: Well controlled pain   PONV: No   Neuro/Psych: Uneventful            Sign Out: Acceptable/Baseline neuro status   Airway/Respiratory: Uneventful            Sign Out: Acceptable/Baseline resp. status   CV/Hemodynamics: Uneventful            Sign Out: Acceptable CV status   Other NRE: NONE   DID A NON-ROUTINE EVENT OCCUR? No           Last vitals:  Vitals Value Taken Time   /71 04/01/22 1045   Temp 36.4  C (97.6  F) 04/01/22 1025   Pulse 79 04/01/22 1049   Resp 13 04/01/22 1049   SpO2 96 % 04/01/22 1049   Vitals shown include unvalidated device data.    Electronically Signed By: Esvin Vogt MD  April 1, 2022  12:56 PM

## 2022-04-01 NOTE — OP NOTE
PREOPERATIVE DIAGNOSES:   1. Bilateral upper eyelid dermatochalasis.     POSTOPERATIVE DIAGNOSES:   1. Bilateral upper eyelid dermatochalasis.     PROCEDURE PERFORMED:   1. Bilateral upper eyelid blepharoplasty.     SURGEON: Fidelia Moran MD, MD    ASSISTANT: None    ANESTHESIA: Monitored with local infiltration, 50/50 mixture of 2% lidocaine with epinephrine and 0.5% Marcaine.     COMPLICATIONS: None.     ESTIMATED BLOOD LOSS: Less than 5 mL.     HISTORY: Brandy Leon  presented with upper lid drooping interfering with superior visual field and activities of daily living. After the risks, benefits and alternatives to the proposed procedure were explained, informed consent was obtained.     DESCRIPTION OF PROCEDURE: Brandy Leon  was brought to the operating room and placed supine on the operating table. IV sedation was given. The upper lid crease and excess upper eyelid skin was marked on each upper eyelid, and the eyelids infiltrated with local anesthetic. The area was prepped and draped in the typical sterile fashion for oculoplastic surgery. Attention was directed to the right side. Skin was incised following marked lines. Skin and orbicularis muscle flap were excised with high temperature cautery. Orbital septum was opened horizontally. The nasal and central fat pads were debulked with the high temperature cautery. Again, hemostasis was obtained.Dissection was then carried into the suborbicularis plane superiorly over the orbital rim.  5-0 Monocryl suture was passed through the brow at the marking made at the inferior brow hairs.  This suture was then passed through the frontal periosteum 1 cm above the orbital rim nicely securing the tail of the brow in an improved position.  It was then passed through the orbicularis in line with the marking stitch. The suture was pulled through and tied in a permanent fashion. The  upper eyelid was closed with running 6-0 plain gut suture.  Attention was  directed to the left side where the same procedure was performed. Antibiotic ointment was applied. The patient tolerated the procedure well. She was taken to the recovery room in stable condition.    Fidelia Moran MD

## 2022-04-01 NOTE — ANESTHESIA CARE TRANSFER NOTE
Patient: Brandy Leon    Procedure: Procedure(s):  Bilateral upper eyelid blepharoplasty       Diagnosis: Dermatochalasis of both upper eyelids [H02.831, H02.834]  Brow ptosis, bilateral [H57.813]  Diagnosis Additional Information: No value filed.    Anesthesia Type:   MAC     Note:    Oropharynx: oropharynx clear of all foreign objects and spontaneously breathing  Level of Consciousness: awake  Oxygen Supplementation: room air    Independent Airway: airway patency satisfactory and stable  Dentition: dentition unchanged  Vital Signs Stable: post-procedure vital signs reviewed and stable  Report to RN Given: handoff report given  Patient transferred to: PACU  Comments: Pt to PACU on room air, airway patent, VSS. Report to RN.  Handoff Report: Identifed the Patient, Identified the Reponsible Provider, Reviewed the pertinent medical history, Discussed the surgical course, Reviewed Intra-OP anesthesia mangement and issues during anesthesia, Set expectations for post-procedure period and Allowed opportunity for questions and acknowledgement of understanding      Vitals:  Vitals Value Taken Time   /70 04/01/22 1025   Temp     Pulse 81 04/01/22 1027   Resp 22 04/01/22 1027   SpO2 94 % 04/01/22 1027   Vitals shown include unvalidated device data.    Electronically Signed By: MAGO Qureshi CRNA  April 1, 2022  10:29 AM

## 2022-04-01 NOTE — DISCHARGE INSTRUCTIONS
Post-operative Instructions  Ophthalmic Plastic and Reconstructive Surgery    Fidelia Moran M.D.     All instructions apply to the operated eye(s) or eyelid(s).    Wound care and personal care  ? Apply ice compresses 15 minutes of every hour while awake for 2 days. As long as there is no further bleeding, after two days, switch to warm water compresses for five minutes, four times a day until seen by your physician.   ? You may shower or wash your hair the day after surgery. Do not go swimming for at least 2 weeks to prevent contamination of your wounds.  ? You may go for walks and light activity is ok, but no heavy (over 15 pounds) lifting, bending or excessive straining for one week.   ? Do not apply make-up to the eyes or eyelids for 2 weeks after surgery.  ? Expect some swelling, bruising, black eye (even into the lower eyelids and cheeks). Also expect serum caking, crusting and discharge from the eye and/or incisions. A small amount of surface bleeding, and depending on the type of surgery, bleeding from the inside of the eyelid, is normal for the first 48 hours.  ? Avoid straining, bending at the waist, or lifting more than 15 pounds for 1 week. Sleeping with your head elevated, such as in a recliner, for the first several days can help swelling resolve more quickly.   ? Do continue to ambulate (walk) as you normally would - being sedentary after surgery can cause blood clots.   ? Your eye(s) and eyelid(s) may be painful and tender. This is normal after surgery.      Contact information and follow-up  ? Return to the Eye Clinic for a follow-up appointment with your physician as scheduled. If no appointment has been scheduled:   - [facility] eye clinic: [phone] for an appointment with Dr. Moran within 2 to 3 weeks from your date of surgery.      -  [facility] eye clinic: [phone] for an appointment with Dr. Moran within 2 to 3 weeks from your date of surgery.      ? For severe pain, bleeding, or loss of vision, call the [facility] at [phone] or [facility] at [phone].     After hours or on weekends and holidays, call [phone] and ask to speak with the ophthalmologist on call.    An on call person can be reached after hours for concerns. The on call doctor should not call in medication refill requests after hours or on weekends, so please plan accordingly. An effort has been made to provide adequate pain medications following every surgery, and refills will not be provided in most instances.     Medications  ? Restart all regular home medications and eye drops. If you take Plavix or Aspirin on a regular basis, wait for 72 hours after your surgery before restarting these in order to decrease the risk of bleeding complications.  ? Avoid aspirin and aspirin-like medications (Motrin, Aleve, Ibuprofen, Hayley-Pearson etc) for 72 hours to reduce the risk of bleeding. You may take Tylenol (acetaminophen) for pain.  ? In addition to your home medications, take the following post-operative medications as prescribed by your physician.    ? Apply antibiotic ointment to all sutures three times a day, and into the operated eye(s) at night.    Same Day Surgery Discharge Instructions for  Sedation and General Anesthesia       It's not unusual to feel dizzy, light-headed or faint for up to 24 hours after surgery or while taking pain medication.  If you have these symptoms: sit for a few minutes before standing and have someone assist you when you get up to walk or use the bathroom.      You should rest and relax for the next 24 hours. We recommend you make arrangements to have an adult stay with you for at least 24 hours after your discharge.  Avoid hazardous and strenuous activity.      DO NOT DRIVE any vehicle or operate mechanical equipment for 24 hours following the end of your surgery.  Even though you may feel normal, your reactions  may be affected by the medication you have received.      Do not drink alcoholic beverages for 24 hours following surgery.       Slowly progress to your regular diet as you feel able. It's not unusual to feel nauseated and/or vomit after receiving anesthesia.  If you develop these symptoms, drink clear liquids (apple juice, ginger ale, broth, 7-up, etc. ) until you feel better.  If your nausea and vomiting persists for 24 hours, please notify your surgeon.        All narcotic pain medications, along with inactivity and anesthesia, can cause constipation. Drinking plenty of liquids and increasing fiber intake will help.      For any questions of a medical nature, call your surgeon.      Do not make important decisions for 24 hours.      If you had general anesthesia, you may have a sore throat for a couple of days related to the breathing tube used during surgery.  You may use Cepacol lozenges to help with this discomfort.  If it worsens or if you develop a fever, contact your surgeon.       If you feel your pain is not well managed with the pain medications prescribed by your surgeon, please contact your surgeon's office to let them know so they can address your concerns.       CoVid 19 Information    We want to give you information regarding Covid. Please consult your primary care provider with any questions you might have.     Patient who have symptoms (cough, fever, or shortness of breath), need to isolate for 7 days from when symptoms started OR 72 hours after fever resolves (without fever reducing medications) AND improvement of respiratory symptoms (whichever is longer).      Isolate yourself at home (in own room/own bathroom if possible)    Do Not allow any visitors    Do Not go to work or school    Do Not go to [employer],  centers, shopping, or other public places.    Do Not shake hands.    Avoid close and intimate contact with others (hugging, kissing).    Follow CDC recommendations for household  cleaning of frequently touched services.     After the initial 7 days, continue to isolate yourself from household members as much as possible. To continue decrease the risk of community spread and exposure, you and any members of your household should limit activities in public for 14 days after starting home isolation.     You can reference the following CDC link for helpful home isolation/care tips:  https://www.cdc.gov/coronavirus/2019-ncov/downloads/10Things.pdf    Protect Others:    Cover Your Mouth and Nose with a mask, disposable tissue or wash cloth to avoid spreading germs to others.    Wash your hands and face frequently with soap and water    Call Your Primary Doctor If: Breathing difficulty develops or you become worse.    For more information about COVID19 and options for caring for yourself at home, please visit the CDC website at https://www.cdc.gov/coronavirus/2019-ncov/about/steps-when-sick.html  For more options for care at [facility], please visit our website at [email or web address]          **If you have questions or concerns about your procedure,   call Dr. Moran at [phone] [facility] and [phone] [place]**

## 2022-04-01 NOTE — ANESTHESIA PREPROCEDURE EVALUATION
Anesthesia Pre-Procedure Evaluation    Patient: Brandy Leon   MRN: [id number] : 1940        Procedure : Procedure(s):  Both upper eyelid blepharoplasty          Past Medical History:   Diagnosis Date     Actinic keratosis 3/12/2013     Degenerative joint disease      Diabetes (H)      Gastroesophageal reflux disease without esophagitis 2020     Rheumatic fever 1957    x2 between the ages of 15-18     Seasonal allergies       Past Surgical History:   Procedure Laterality Date     CATARACT IOL, RT/LT       COLONOSCOPY       COLONOSCOPY N/A 2015    Procedure: COLONOSCOPY;  Surgeon: Duane, William Charles, MD;  Location: [facility] OR     COLONOSCOPY WITH CO2 INSUFFLATION N/A 2015    Procedure: COLONOSCOPY WITH CO2 INSUFFLATION;  Surgeon: Duane, William Charles, MD;  Location: [facility] OR     PHACOEMULSIFICATION WITH STANDARD INTRAOCULAR LENS IMPLANT Left 10/25/2018    Procedure: LEFT PHACOEMULSIFICATION WITH STANDARD INTRAOCULAR LENS IMPLANT;  Surgeon: Thomas Serna MD;  Location: [facility] OR     PHACOEMULSIFICATION WITH STANDARD INTRAOCULAR LENS IMPLANT Right 2018    Procedure: RIGHT PHACOEMULSIFICATION WITH STANDARD INTRAOCULAR LENS IMPLANT;  Surgeon: Thomas Serna MD;  Location: [facility] OR     THYROIDECTOMY        ZZC APPENDECTOMY       ZZC ARTHROPLASTY TMJ        Allergies   Allergen Reactions     Ace Inhibitors Cough     Estradiol Itching     Lipitor [Atorvastatin Calcium]      Weak and shaky     Sulfa Drugs      Rash       Zocor [Simvastatin]      Weak and shakey     Triamterene Dermatitis     Possible photosensitivity dermatitis, mild.  (changed to hctz alone 07, will see if this helps)      Social History     Tobacco Use     Smoking status: Never Smoker     Smokeless tobacco: Never Used     Tobacco comment: has [facility], no current exposure   Substance Use Topics     Alcohol use: No      Wt Readings from Last 1 Encounters:    04/01/22 81.3 kg (179 lb 3.2 oz)        Anesthesia Evaluation   Pt has had prior anesthetic. Type: General.    No history of anesthetic complications       ROS/MED HX  ENT/Pulmonary:     (+) sleep apnea, uses CPAP,  (-) tobacco use and asthma   Neurologic: Comment: Balance problems, LE weakness for past couple years    (+) no peripheral neuropathy - hands - CTS.  (-) no seizures and no CVA   Cardiovascular:     (+) Dyslipidemia hypertension----- (-) CAD and arrhythmias   METS/Exercise Tolerance:     Hematologic:       Musculoskeletal:       GI/Hepatic:     (+) GERD,     Renal/Genitourinary:    (-) renal disease   Endo:     (+) type II DM, Using insulin, thyroid problem, hypothyroidism, Obesity,     Psychiatric/Substance Use:       Infectious Disease:    (-) Recent Fever   Malignancy:       Other:            Physical Exam    Airway  airway exam normal      Mallampati: II   TM distance: > 3 FB   Neck ROM: full   Mouth opening: > 3 cm    Respiratory Devices and Support         Dental  no notable dental history         Cardiovascular   cardiovascular exam normal          Pulmonary   pulmonary exam normal                OUTSIDE LABS:  CBC:   Lab Results   Component Value Date    WBC 11.4 (H) 03/28/2022    WBC 13.4 (H) 07/19/2021    HGB 14.6 03/28/2022    HGB 15.4 07/19/2021    HCT 45.4 03/28/2022    HCT 47.4 (H) 07/19/2021     03/28/2022     07/19/2021     BMP:   Lab Results   Component Value Date     03/28/2022     11/16/2021    POTASSIUM 4.3 03/28/2022    POTASSIUM 4.3 11/16/2021    CHLORIDE 98 03/28/2022    CHLORIDE 101 11/16/2021    CO2 26 03/28/2022    CO2 30 11/16/2021    BUN 23 03/28/2022    BUN 17 11/16/2021    CR 0.77 03/28/2022    CR 0.76 11/16/2021     (H) 03/28/2022     (H) 11/16/2021     COAGS:   Lab Results   Component Value Date    INR 1.0 (L) 09/07/2021     POC:   Lab Results   Component Value Date     (H) 11/08/2018     HEPATIC:   Lab Results   Component  Value Date    ALBUMIN 3.5 03/28/2022    PROTTOTAL 8.3 03/28/2022    ALT 25 03/28/2022    AST 13 03/28/2022    ALKPHOS 100 03/28/2022    BILITOTAL 0.2 03/28/2022     OTHER:   Lab Results   Component Value Date    A1C 9.6 (H) 03/28/2022    FREDY 9.4 03/28/2022    PHOS 3.6 10/25/2019    TSH 1.96 11/16/2021    T4 1.17 06/25/2018    CRP 9.0 (H) 07/19/2021    SED 10 07/15/2021       Anesthesia Plan    ASA Status:  3   NPO Status:  NPO Appropriate    Anesthesia Type: MAC.              Consents    Anesthesia Plan(s) and associated risks, benefits, and realistic alternatives discussed. Questions answered and patient/representative(s) expressed understanding.    - Discussed:     - Discussed with:  Patient      - Specific Concerns: Specific risks discussed (but not limited to): Possibility of intraoperative awareness..        Postoperative Care    Pain management: IV analgesics, Oral pain medications.   PONV prophylaxis: Ondansetron (or other 5HT-3), Dexamethasone or Solumedrol     Comments:    Other Comments: Glu 166 preop    No versed            Esvin Vogt MD

## 2022-04-07 NOTE — NURSING NOTE
Brandy Leon's chief complaint for this visit includes:  Chief Complaint   Patient presents with     RECHECK     toenail trim     PCP: Cailin Ponce    Referring Provider:  No referring provider defined for this encounter.    /74 (BP Location: Right arm, Patient Position: Sitting, Cuff Size: Adult Regular)   Pulse 96   SpO2 96%   Data Unavailable     Do you need any medication refills at today's visit? No    Atiya Holder CMA        
clear

## 2022-04-20 ENCOUNTER — OFFICE VISIT (OUTPATIENT)
Dept: PODIATRY | Facility: CLINIC | Age: 82
End: 2022-04-20
Payer: COMMERCIAL

## 2022-04-20 ENCOUNTER — OFFICE VISIT (OUTPATIENT)
Dept: OPHTHALMOLOGY | Facility: CLINIC | Age: 82
End: 2022-04-20
Payer: COMMERCIAL

## 2022-04-20 DIAGNOSIS — B35.1 ONYCHOMYCOSIS: Primary | ICD-10-CM

## 2022-04-20 DIAGNOSIS — H02.834 DERMATOCHALASIS OF BOTH UPPER EYELIDS: Primary | ICD-10-CM

## 2022-04-20 DIAGNOSIS — E11.51 DIABETES MELLITUS WITH PERIPHERAL VASCULAR DISEASE (H): ICD-10-CM

## 2022-04-20 DIAGNOSIS — H02.831 DERMATOCHALASIS OF BOTH UPPER EYELIDS: Primary | ICD-10-CM

## 2022-04-20 DIAGNOSIS — E11.49 TYPE II OR UNSPECIFIED TYPE DIABETES MELLITUS WITH NEUROLOGICAL MANIFESTATIONS, NOT STATED AS UNCONTROLLED(250.60) (H): ICD-10-CM

## 2022-04-20 PROCEDURE — 99214 OFFICE O/P EST MOD 30 MIN: CPT | Mod: 24 | Performed by: PODIATRIST

## 2022-04-20 PROCEDURE — 99024 POSTOP FOLLOW-UP VISIT: CPT | Performed by: OPHTHALMOLOGY

## 2022-04-20 ASSESSMENT — TONOMETRY
OD_IOP_MMHG: 15
OS_IOP_MMHG: 19
OD_IOP_MMHG: 21
IOP_METHOD: ICARE
OS_IOP_MMHG: 15

## 2022-04-20 ASSESSMENT — VISUAL ACUITY
OD_SC+: -2
OS_SC+: -2
OD_SC: 20/25
METHOD: SNELLEN - LINEAR
OS_SC: 20/30

## 2022-04-20 ASSESSMENT — CONF VISUAL FIELD
METHOD: COUNTING FINGERS
OS_NORMAL: 1
OD_NORMAL: 1

## 2022-04-20 NOTE — NURSING NOTE
Chief Complaints and History of Present Illnesses   Patient presents with     Post Op (Ophthalmology) Right Eye     Chief Complaint(s) and History of Present Illness(es)     Post Op (Ophthalmology) Right Eye     Laterality: both eyes    Course: rapidly improving    Associated symptoms: Negative for eye pain    Treatments tried: ointment    Pain scale: 0/10              Comments     POW#3 S/p bilateral upper lid blepharoplasty (4/1/22).  Pt states BE are doing well since her surgery.  Denies any pain or irritation.  Pt notes she is done with the EES sheryl Oliveros OT 3:03 PM April 20, 2022

## 2022-04-20 NOTE — LETTER
4/20/2022         RE: Brandy Leon  [address] [place] [address]        Dear Colleague,    Thank you for referring your patient, Brandy Leon, to the [facility]. Please see a copy of my visit note below.    Past Medical History:   Diagnosis Date     Actinic keratosis 3/12/2013     Degenerative joint disease      Diabetes (H)      Gastroesophageal reflux disease without esophagitis 12/11/2020     Rheumatic fever 1957    x2 between the ages of 15-18     Seasonal allergies      Patient Active Problem List   Diagnosis     Seasonal allergies     Hypothyroidism     Hyperlipidemia LDL goal <100     Fibromyalgia     Osteoarthritis     Obesity     Type 2 diabetes mellitus with hyperglycemia, with long-term current use of insulin (H)     Hypertension goal BP (blood pressure) < 140/90     Overactive bladder     Recurrent UTI     Pseudophakia of both eyes     DINORA (obstructive sleep apnea)- severe (AHI 56)     Contusion of right knee     Gait disorder     Gastroesophageal reflux disease without esophagitis     Urge incontinence     Chronic kidney disease, stage 2 (mild)     Hydrocephalus, unspecified type (H)     Diabetes mellitus with peripheral vascular disease (H)     Past Surgical History:   Procedure Laterality Date     BLEPHAROPLASTY Bilateral 4/1/2022    Procedure: Bilateral upper eyelid blepharoplasty;  Surgeon: Fidelia Moran MD;  Location: [facility] OR     CATARACT IOL, RT/LT       COLONOSCOPY       COLONOSCOPY N/A 8/13/2015    Procedure: COLONOSCOPY;  Surgeon: Duane, William Charles, MD;  Location: [facility] OR     COLONOSCOPY WITH CO2 INSUFFLATION N/A 8/13/2015    Procedure: COLONOSCOPY WITH CO2 INSUFFLATION;  Surgeon: Duane, William Charles, MD;  Location: [facility] OR     PHACOEMULSIFICATION WITH STANDARD INTRAOCULAR LENS IMPLANT Left 10/25/2018    Procedure: LEFT PHACOEMULSIFICATION WITH STANDARD INTRAOCULAR LENS IMPLANT;  Surgeon: Thomas Serna MD;   Location: [facility]     PHACOEMULSIFICATION WITH STANDARD INTRAOCULAR LENS IMPLANT Right 11/8/2018    Procedure: RIGHT PHACOEMULSIFICATION WITH STANDARD INTRAOCULAR LENS IMPLANT;  Surgeon: Thomas Serna MD;  Location: [facility] OR     THYROIDECTOMY        ZZC APPENDECTOMY       ZZC ARTHROPLASTY TMJ       Social History     Socioeconomic History     Marital status:      Spouse name: Not on file     Number of children: Not on file     Years of education: Not on file     Highest education level: Not on file   Occupational History     Occupation:    Tobacco Use     Smoking status: Never Smoker     Smokeless tobacco: Never Used     Tobacco comment: has had exposure to second hand smoke in the past from [employer], no current exposure   Substance and Sexual Activity     Alcohol use: No     Drug use: No     Sexual activity: Never   Other Topics Concern     Parent/sibling w/ CABG, MI or angioplasty before 65F 55M? 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     Hobby Hazards No     Sleep Concern Yes     Comment: difficulty staying asleep, up and down all night     Stress Concern No     Weight Concern Yes     Comment: would like to lose     Special Diet Yes     Comment: low card, low sugar diet     Back Care Yes     Comment: chiropratior     Exercise Yes     Comment: almost everyday     Bike Helmet No     Comment: does not ride bicycle any more     Seat Belt Yes     Self-Exams Yes   Social History Narrative     Not on file     Social Determinants of Health     Financial Resource Strain: Not on file   Food Insecurity: Not on file   Transportation Needs: Not on file   Physical Activity: Not on file   Stress: Not on file   Social Connections: Not on file   Intimate Partner Violence: Not on file   Housing Stability: Not on file      Family History   Problem Relation Age of Onset     Eye Disorder Mother         cataracts     Cancer Father         skin and leukemia     Cancer Sister         Breast Cancer     Eye Disorder Brother         cataract     Cerebrovascular Disease Maternal Grandfather      Cerebrovascular Disease Paternal Grandmother      Eye Disorder Paternal Grandmother         cataracts     Cerebrovascular Disease Paternal Grandfather      Heart Disease Paternal Grandfather      Breast Cancer Daughter      Endometrial Cancer Daughter      Glaucoma No family hx of      Macular Degeneration No family hx of      Lab Results   Component Value Date    A1C 9.6 03/28/2022    A1C 9.5 12/20/2021    A1C 9.8 09/20/2021    A1C 10.3 05/17/2021    A1C 9.2 02/16/2021    A1C 8.7 11/12/2020    A1C 10.0 08/20/2020    A1C 10.5 01/10/2020     Lab Results   Component Value Date    WBC 11.4 03/28/2022    WBC 13.3 04/02/2021     Lab Results   Component Value Date    RBC 5.07 03/28/2022    RBC 5.01 04/02/2021     Lab Results   Component Value Date    HGB 14.6 03/28/2022    HGB 14.8 04/02/2021     Lab Results   Component Value Date    HCT 45.4 03/28/2022    HCT 44.1 04/02/2021     No components found for: MCT  Lab Results   Component Value Date    MCV 90 03/28/2022    MCV 88 04/02/2021     Lab Results   Component Value Date    MCH 28.8 03/28/2022    MCH 29.5 04/02/2021     Lab Results   Component Value Date    MCHC 32.2 03/28/2022    MCHC 33.6 04/02/2021     Lab Results   Component Value Date    RDW 13.4 03/28/2022    RDW 13.8 04/02/2021     Lab Results   Component Value Date     03/28/2022     04/02/2021     Last Comprehensive Metabolic Panel:  Sodium   Date Value Ref Range Status   03/28/2022 134 133 - 144 mmol/L Final   04/02/2021 134 133 - 144 mmol/L Final     Potassium   Date Value Ref Range Status   03/28/2022 4.3 3.4 - 5.3 mmol/L Final   04/02/2021 4.1 3.4 - 5.3 mmol/L Final     Chloride   Date Value Ref Range Status   03/28/2022 98  94 - 109 mmol/L Final   04/02/2021 99 94 - 109 mmol/L Final     Carbon Dioxide   Date Value Ref Range Status   04/02/2021 31 20 - 32 mmol/L Final     Carbon Dioxide (CO2)   Date Value Ref Range Status   03/28/2022 26 20 - 32 mmol/L Final     Anion Gap   Date Value Ref Range Status   03/28/2022 10 3 - 14 mmol/L Final   04/02/2021 4 3 - 14 mmol/L Final     Glucose   Date Value Ref Range Status   03/28/2022 371 (H) 70 - 99 mg/dL Final   04/02/2021 175 (H) 70 - 99 mg/dL Final     Comment:     Non Fasting     GLUCOSE BY METER POCT   Date Value Ref Range Status   04/01/2022 166 (H) 70 - 99 mg/dL Final     Urea Nitrogen   Date Value Ref Range Status   03/28/2022 23 7 - 30 mg/dL Final   04/02/2021 16 7 - 30 mg/dL Final     Creatinine   Date Value Ref Range Status   03/28/2022 0.77 0.52 - 1.04 mg/dL Final   04/02/2021 0.76 0.52 - 1.04 mg/dL Final     GFR Estimate   Date Value Ref Range Status   03/28/2022 78 >60 mL/min/1.73m2 Final     Comment:     Effective December 21, 2021 eGFRcr in adults is calculated using the 2021 CKD-EPI creatinine equation which includes age and gender (Adriana et al., [place], DOI: 10.1056/HXCAzl7274720)   04/02/2021 74 >60 mL/min/[1.73_m2] Final     Comment:     Non  GFR Calc  Starting 12/18/2018, serum creatinine based estimated GFR (eGFR) will be   calculated using the Chronic Kidney Disease Epidemiology Collaboration   (CKD-EPI) equation.       GFR, ESTIMATED POCT   Date Value Ref Range Status   09/28/2021 >60 >60 mL/min/1.73m2 Final     Calcium   Date Value Ref Range Status   03/28/2022 9.4 8.5 - 10.1 mg/dL Final   04/02/2021 9.7 8.5 - 10.1 mg/dL Final     Lab Results   Component Value Date    AST 13 03/28/2022    AST 14 03/16/2021     Lab Results   Component Value Date    ALT 25 03/28/2022    ALT 28 03/16/2021     No results found for: BILICONJ   Lab Results   Component Value Date    BILITOTAL 0.2 03/28/2022    BILITOTAL 0.3 03/16/2021     Lab Results   Component Value Date     ALBUMIN 3.5 03/28/2022    ALBUMIN 3.6 03/16/2021     Lab Results   Component Value Date    PROTTOTAL 8.3 03/28/2022    PROTTOTAL 8.0 03/16/2021      Lab Results   Component Value Date    ALKPHOS 100 03/28/2022    ALKPHOS 82 03/16/2021     SUBJECTIVE FINDINGS:  An 81-year-old returns clinic with daughter for onychomycosis and diabetic foot cares.  She relates that she is doing well.  She is not wearing diabetic shoes.  She does not want to get any diabetic shoes.  She relates to no numbness, tingling or neuropathy in her feet.  Relates no ulcers or sores since we have seen her last.  Relates she does not wear shoes in the house.  She has moisturizing lotion.  She just has not been using it.    OBJECTIVE FINDINGS:  DP and PT are 2/4 bilaterally.  She has dorsally contracted digits 2 through 5 bilaterally.  She has scabbed abrasions on the dorsal left foot, left second toe, right dorsal foot, right lateral ankle.  These are small and padmini.  There is no erythema, no drainage, no odor, no calor bilaterally.  She has dry skin bilaterally.  She has incurvated nails with some dystrophy, thickening and subungual debris to differing degrees bilaterally, 1 through 5.  Sharp/dull is decreased with 5.07 [place] monofilament bilaterally.  Deep tendon reflexes are intact bilaterally.  She has decreased ankle joint dorsiflexion bilaterally.    ASSESSMENT AND PLAN:  Onychomycosis and onychauxis bilaterally, diabetes with peripheral neuropathy.  She has dry skin.  Diagnosis and treatment options discussed with her.  She has new abrasions on her feet bilaterally.  Betadine dispensed and use discussed with her.  I also dispensed some MicroKlenz.  She can clean her feet daily with MicroKlenz.  I advised her to start using her moisturizing lotion.  She relates she will do this.  She relates she does not need a prescription for any more.  I advised her to wear her shoes in the house for protection.  Previous notes  reviewed.  All the nails were debrided or reduced bilaterally upon consent.  Return to clinic and see me in 2 months.          Again, thank you for allowing me to participate in the care of your patient.        Sincerely,        Ehsan Araya DPM

## 2022-04-20 NOTE — PROGRESS NOTES
Chief Complaint(s) and History of Present Illness(es)     Post Op (Ophthalmology) Right Eye     Laterality: both eyes    Course: rapidly improving    Associated symptoms: Negative for eye pain    Treatments tried: ointment    Pain scale: 0/10      Comments    POW#3 S/p bilateral upper lid blepharoplasty (4/1/22).  Pt states BE are doing well since her surgery.  Denies any pain or irritation.  Pt notes she is done with the EES sheryl Oliveros OT 3:03 PM April 20, 2022            Doing well postop bilateral upper eyelid blepharoplasty . Happy with outcome.    - Vaseline to incision qhs  - continue warm packs  Return to clinic as needed seeing Dr. Cardoso in May.     Attending Physician Attestation: Complete documentation of historical and exam elements from today's encounter can be found in the full encounter summary report (not reduplicated in this progress note). I personally obtained the chief complaint(s) and history of present illness. I confirmed and edited as necessary the review of systems, past medical/surgical history, family history, social history, and examination findings as documented by others; and I examined the patient myself. I personally reviewed the relevant tests, images, and reports as documented above. I formulated and edited as necessary the assessment and plan and discussed the findings and management plan with the patient and family. I personally reviewed the ophthalmic test(s) associated with this encounter, agree with the interpretation(s) as documented by the resident/fellow, and have edited the corresponding report(s) as necessary. Fidelia Moran MD

## 2022-04-20 NOTE — NURSING NOTE
Brandy Leon's chief complaint for this visit includes:  Chief Complaint   Patient presents with     Follow Up     Diabetic foot cares     PCP: Fernanda Miller    Referring Provider:  No referring provider defined for this encounter.    There were no vitals taken for this visit.  Data Unavailable        Allergies   Allergen Reactions     Ace Inhibitors Cough     Estradiol Itching     Lipitor [Atorvastatin Calcium]      Weak and shaky     Sulfa Drugs      Rash       Zocor [Simvastatin]      Weak and shakey     Triamterene Dermatitis     Possible photosensitivity dermatitis, mild.  (changed to hctz alone 6/4/07, will see if this helps)         Do you need any medication refills at today's visit?

## 2022-04-20 NOTE — PROGRESS NOTES
Past Medical History:   Diagnosis Date     Actinic keratosis 3/12/2013     Degenerative joint disease      Diabetes (H)      Gastroesophageal reflux disease without esophagitis 12/11/2020     Rheumatic fever 1957    x2 between the ages of 15-18     Seasonal allergies      Patient Active Problem List   Diagnosis     Seasonal allergies     Hypothyroidism     Hyperlipidemia LDL goal <100     Fibromyalgia     Osteoarthritis     Obesity     Type 2 diabetes mellitus with hyperglycemia, with long-term current use of insulin (H)     Hypertension goal BP (blood pressure) < 140/90     Overactive bladder     Recurrent UTI     Pseudophakia of both eyes     DINORA (obstructive sleep apnea)- severe (AHI 56)     Contusion of right knee     Gait disorder     Gastroesophageal reflux disease without esophagitis     Urge incontinence     Chronic kidney disease, stage 2 (mild)     Hydrocephalus, unspecified type (H)     Diabetes mellitus with peripheral vascular disease (H)     Past Surgical History:   Procedure Laterality Date     BLEPHAROPLASTY Bilateral 4/1/2022    Procedure: Bilateral upper eyelid blepharoplasty;  Surgeon: Fidelia Moran MD;  Location: [facility]     CATARACT IOL, RT/LT       COLONOSCOPY       COLONOSCOPY N/A 8/13/2015    Procedure: COLONOSCOPY;  Surgeon: Duane, William Charles, MD;  Location: [facility]     COLONOSCOPY WITH CO2 INSUFFLATION N/A 8/13/2015    Procedure: COLONOSCOPY WITH CO2 INSUFFLATION;  Surgeon: Duane, William Charles, MD;  Location: [facility]     PHACOEMULSIFICATION WITH STANDARD INTRAOCULAR LENS IMPLANT Left 10/25/2018    Procedure: LEFT PHACOEMULSIFICATION WITH STANDARD INTRAOCULAR LENS IMPLANT;  Surgeon: Thomas Serna MD;  Location: [facility]     PHACOEMULSIFICATION WITH STANDARD INTRAOCULAR LENS IMPLANT Right 11/8/2018    Procedure: RIGHT PHACOEMULSIFICATION WITH STANDARD INTRAOCULAR LENS IMPLANT;  Surgeon: Thomas Serna MD;  Location: [facility]     THYROIDECTOMY        ZZC APPENDECTOMY        ZZC ARTHROPLASTY TMJ       Social History     Socioeconomic History     Marital status:      Spouse name: Not on file     Number of children: Not on file     Years of education: Not on file     Highest education level: Not on file   Occupational History     Occupation:    Tobacco Use     Smoking status: Never Smoker     Smokeless tobacco: Never Used     Tobacco comment: has had exposure to second hand smoke in the past from [employer], no current exposure   Substance and Sexual Activity     Alcohol use: No     Drug use: No     Sexual activity: Never   Other Topics Concern     Parent/sibling w/ CABG, MI or angioplasty before 65F 55M? 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     Hobby Hazards No     Sleep Concern Yes     Comment: difficulty staying asleep, up and down all night     Stress Concern No     Weight Concern Yes     Comment: would like to lose     Special Diet Yes     Comment: low card, low sugar diet     Back Care Yes     Comment: chiropratior     Exercise Yes     Comment: almost everyday     Bike Helmet No     Comment: does not ride bicycle any more     Seat Belt Yes     Self-Exams Yes   Social History Narrative     Not on file     Social Determinants of Health     Financial Resource Strain: Not on file   Food Insecurity: Not on file   Transportation Needs: Not on file   Physical Activity: Not on file   Stress: Not on file   Social Connections: Not on file   Intimate Partner Violence: Not on file   Housing Stability: Not on file     Family History   Problem Relation Age of Onset     Eye Disorder Mother         cataracts     Cancer Father         skin and leukemia     Cancer Sister         Breast Cancer     Eye Disorder Brother         cataract     Cerebrovascular Disease Maternal Grandfather       Cerebrovascular Disease Paternal Grandmother      Eye Disorder Paternal Grandmother         cataracts     Cerebrovascular Disease Paternal Grandfather      Heart Disease Paternal Grandfather      Breast Cancer Daughter      Endometrial Cancer Daughter      Glaucoma No family hx of      Macular Degeneration No family hx of      Lab Results   Component Value Date    A1C 9.6 03/28/2022    A1C 9.5 12/20/2021    A1C 9.8 09/20/2021    A1C 10.3 05/17/2021    A1C 9.2 02/16/2021    A1C 8.7 11/12/2020    A1C 10.0 08/20/2020    A1C 10.5 01/10/2020     Lab Results   Component Value Date    WBC 11.4 03/28/2022    WBC 13.3 04/02/2021     Lab Results   Component Value Date    RBC 5.07 03/28/2022    RBC 5.01 04/02/2021     Lab Results   Component Value Date    HGB 14.6 03/28/2022    HGB 14.8 04/02/2021     Lab Results   Component Value Date    HCT 45.4 03/28/2022    HCT 44.1 04/02/2021     No components found for: MCT  Lab Results   Component Value Date    MCV 90 03/28/2022    MCV 88 04/02/2021     Lab Results   Component Value Date    MCH 28.8 03/28/2022    MCH 29.5 04/02/2021     Lab Results   Component Value Date    MCHC 32.2 03/28/2022    MCHC 33.6 04/02/2021     Lab Results   Component Value Date    RDW 13.4 03/28/2022    RDW 13.8 04/02/2021     Lab Results   Component Value Date     03/28/2022     04/02/2021     Last Comprehensive Metabolic Panel:  Sodium   Date Value Ref Range Status   03/28/2022 134 133 - 144 mmol/L Final   04/02/2021 134 133 - 144 mmol/L Final     Potassium   Date Value Ref Range Status   03/28/2022 4.3 3.4 - 5.3 mmol/L Final   04/02/2021 4.1 3.4 - 5.3 mmol/L Final     Chloride   Date Value Ref Range Status   03/28/2022 98 94 - 109 mmol/L Final   04/02/2021 99 94 - 109 mmol/L Final     Carbon Dioxide   Date Value Ref Range Status   04/02/2021 31 20 - 32 mmol/L Final     Carbon Dioxide (CO2)   Date Value Ref Range Status   03/28/2022 26 20 - 32 mmol/L Final     Anion Gap   Date Value Ref Range  Status   03/28/2022 10 3 - 14 mmol/L Final   04/02/2021 4 3 - 14 mmol/L Final     Glucose   Date Value Ref Range Status   03/28/2022 371 (H) 70 - 99 mg/dL Final   04/02/2021 175 (H) 70 - 99 mg/dL Final     Comment:     Non Fasting     GLUCOSE BY METER POCT   Date Value Ref Range Status   04/01/2022 166 (H) 70 - 99 mg/dL Final     Urea Nitrogen   Date Value Ref Range Status   03/28/2022 23 7 - 30 mg/dL Final   04/02/2021 16 7 - 30 mg/dL Final     Creatinine   Date Value Ref Range Status   03/28/2022 0.77 0.52 - 1.04 mg/dL Final   04/02/2021 0.76 0.52 - 1.04 mg/dL Final     GFR Estimate   Date Value Ref Range Status   03/28/2022 78 >60 mL/min/1.73m2 Final     Comment:     Effective December 21, 2021 eGFRcr in adults is calculated using the 2021 CKD-EPI creatinine equation which includes age and gender (Adriana colon al., NEJM, DOI: 10.1056/IWEGiu7043858)   04/02/2021 74 >60 mL/min/[1.73_m2] Final     Comment:     Non  GFR Calc  Starting 12/18/2018, serum creatinine based estimated GFR (eGFR) will be   calculated using the Chronic Kidney Disease Epidemiology Collaboration   (CKD-EPI) equation.       GFR, ESTIMATED POCT   Date Value Ref Range Status   09/28/2021 >60 >60 mL/min/1.73m2 Final     Calcium   Date Value Ref Range Status   03/28/2022 9.4 8.5 - 10.1 mg/dL Final   04/02/2021 9.7 8.5 - 10.1 mg/dL Final     Lab Results   Component Value Date    AST 13 03/28/2022    AST 14 03/16/2021     Lab Results   Component Value Date    ALT 25 03/28/2022    ALT 28 03/16/2021     No results found for: BILICONJ   Lab Results   Component Value Date    BILITOTAL 0.2 03/28/2022    BILITOTAL 0.3 03/16/2021     Lab Results   Component Value Date    ALBUMIN 3.5 03/28/2022    ALBUMIN 3.6 03/16/2021     Lab Results   Component Value Date    PROTTOTAL 8.3 03/28/2022    PROTTOTAL 8.0 03/16/2021      Lab Results   Component Value Date    ALKPHOS 100 03/28/2022    ALKPHOS 82 03/16/2021     SUBJECTIVE FINDINGS:  An 81-year-old  returns clinic with daughter for onychomycosis and diabetic foot cares.  She relates that she is doing well.  She is not wearing diabetic shoes.  She does not want to get any diabetic shoes.  She relates to no numbness, tingling or neuropathy in her feet.  Relates no ulcers or sores since we have seen her last.  Relates she does not wear shoes in the house.  She has moisturizing lotion.  She just has not been using it.    OBJECTIVE FINDINGS:  DP and PT are 2/4 bilaterally.  She has dorsally contracted digits 2 through 5 bilaterally.  She has scabbed abrasions on the dorsal left foot, left second toe, right dorsal foot, right lateral ankle.  These are small and padmini.  There is no erythema, no drainage, no odor, no calor bilaterally.  She has dry skin bilaterally.  She has incurvated nails with some dystrophy, thickening and subungual debris to differing degrees bilaterally, 1 through 5.  Sharp/dull is decreased with 5.07 [place] monofilament bilaterally.  Deep tendon reflexes are intact bilaterally.  She has decreased ankle joint dorsiflexion bilaterally.    ASSESSMENT AND PLAN:  Onychomycosis and onychauxis bilaterally, diabetes with peripheral neuropathy.  She has dry skin.  Diagnosis and treatment options discussed with her.  She has new abrasions on her feet bilaterally.  Betadine dispensed and use discussed with her.  I also dispensed some MicroKlenz.  She can clean her feet daily with MicroKlenz.  I advised her to start using her moisturizing lotion.  She relates she will do this.  She relates she does not need a prescription for any more.  I advised her to wear her shoes in the house for protection.  Previous notes reviewed.  All the nails were debrided or reduced bilaterally upon consent.  Return to clinic and see me in 2 months.

## 2022-04-26 DIAGNOSIS — L30.9 DERMATITIS: ICD-10-CM

## 2022-04-28 RX ORDER — TRIAMCINOLONE ACETONIDE 1 MG/G
CREAM TOPICAL
Qty: 45 G | Refills: 0 | Status: SHIPPED | OUTPATIENT
Start: 2022-04-28 | End: 2022-06-13

## 2022-04-28 NOTE — TELEPHONE ENCOUNTER
TRIAMCINOLONE 0.1% CREAM   Last Written Prescription Date:  3/3/2022  Last Fill Quantity: 45,   # refills: 0  Last Office Visit :  2/18/2021  Future Office visit:  None    Routing refill request to provider for review/approval because:  Second Request  Over due office visit  Refer to clinic/provider for review       Nohemy Mercedes RN  Central Triage Red Flags/Med Refills

## 2022-04-28 NOTE — TELEPHONE ENCOUNTER
Received refill request as DAREK. Chart (including notes and pertinent labs) reviewed, courtesy 30-day refill provided. Clinic pool and attending Dr. Jordan CC'd as FYI.     Diana Low MD (PGY4)  Dermatology Resident

## 2022-05-06 ENCOUNTER — TRANSFERRED RECORDS (OUTPATIENT)
Dept: HEALTH INFORMATION MANAGEMENT | Facility: CLINIC | Age: 82
End: 2022-05-06

## 2022-05-06 ENCOUNTER — OFFICE VISIT (OUTPATIENT)
Dept: OPTOMETRY | Facility: CLINIC | Age: 82
End: 2022-05-06
Payer: COMMERCIAL

## 2022-05-06 DIAGNOSIS — H40.1131 PRIMARY OPEN ANGLE GLAUCOMA (POAG) OF BOTH EYES, MILD STAGE: Primary | ICD-10-CM

## 2022-05-06 LAB — RETINOPATHY: NORMAL

## 2022-05-06 PROCEDURE — 92012 INTRM OPH EXAM EST PATIENT: CPT | Performed by: OPTOMETRIST

## 2022-05-06 PROCEDURE — 92083 EXTENDED VISUAL FIELD XM: CPT | Performed by: OPTOMETRIST

## 2022-05-06 ASSESSMENT — TONOMETRY
OD_IOP_MMHG: 15
OS_IOP_MMHG: 20
IOP_METHOD: APPLANATION
OS_IOP_MMHG: 15
OD_IOP_MMHG: 21

## 2022-05-06 ASSESSMENT — SLIT LAMP EXAM - LIDS
COMMENTS: NORMAL
COMMENTS: NORMAL

## 2022-05-06 ASSESSMENT — VISUAL ACUITY
METHOD: SNELLEN - LINEAR
OS_SC: 20/40
OD_SC: 20/30

## 2022-05-06 ASSESSMENT — CUP TO DISC RATIO
OS_RATIO: 0.5
OD_RATIO: 0.5

## 2022-05-06 ASSESSMENT — EXTERNAL EXAM - RIGHT EYE: OD_EXAM: NORMAL

## 2022-05-06 ASSESSMENT — EXTERNAL EXAM - LEFT EYE: OS_EXAM: NORMAL

## 2022-05-06 NOTE — NURSING NOTE
Chief Complaints and History of Present Illnesses   Patient presents with     Glaucoma Follow-Up       Chief Complaint(s) and History of Present Illness(es)     Glaucoma Follow-Up               Comments     She notes her vision has been stable in both eyes.   She denies pain/burning/tearing/redness. Some itching due to seasonal allergies.   She is using Latanoprost at bedtime in both eyes. Used last night around 9pm.                   MANDO Vásquez  1:06 PM 05/06/2022

## 2022-05-06 NOTE — PROGRESS NOTES
Assessment/Plan  (H40.9810) Primary open angle glaucoma (POAG) of both eyes, mild stage  (primary encounter diagnosis)  Comment:   - diagnosed with POAG with Dr. Serna  - haliy 538/557  - gonio open  - Tmax 30/25 (tonopen; just after stopping postop cataract drops, otherwise low 20s). IOP today 21/20  - on latanoprost at bedtime each eye since 8/2015 for concern for OCT RNFL thinning  - Last OCT 2/2022 suggested possible progression right eye  - Historically unreliable visual fields. HVF 24-2 unreliable today  Plan: With borderline IOP and unreliable HVF, may be best to monitor with DFE and RNFL OCT instead. Plan on monitoring again in 4 months with complete exam and RNFL OCT. If progression is noted, plan on adjusting medications. Continue with latanoprost nightly for now.       Complete documentation of historical and exam elements from today's encounter can  be found in the full encounter summary report (not reduplicated in this progress  note). I personally obtained the chief complaint(s) and history of present illness. I  confirmed and edited as necessary the review of systems, past medical/surgical  history, family history, social history, and examination findings as documented by  others; and I examined the patient myself. I personally reviewed the relevant tests,  images, and reports as documented above. I formulated and edited as necessary the  assessment and plan and discussed the findings and management plan with the  patient and family.    Gurjit Cardoso, GINO

## 2022-05-22 NOTE — OP NOTE
PATIENT NAME:  Brandy Leon    :  1940    PATIENT NUMBER:  [id number]    DATE OF SURGERY:  10/25/2018    SURGEON:  Thomas Serna M.D.    PREOPERATIVE DIAGNOSIS: Cataract left eye.    POSTOPERATIVE DIAGNOSIS:  Same    PROCEDURE PERFORMED:    1. Phacoemulsification with posterior chamber intraocular lens left eye.    ANESTHESIA:  Topical/MAC    COMPLICATIONS:  None    PROCEDURE: Following adequate preoperative dilation the patient was given topical anesthesia consisting of Proparacaine.  The patient was brought to the operative suite where the eye was prepped and draped in the usual sterile fashion.  A lid speculum was applied. A super sharp blade was used to create a paracentesis, through which 1% preservative free Lidocaine was injected.  Visoelastic was then used to inflate the anterior chamber.  A biplanar incision at the clear cornea limbus was created with a keratome.  A continuous curvilinear capsulorrhexis was started with a cystitome and completed using Utrata forceps.  The lens was hydrodissected and hydro delineated using BSS on a cannula.  The lens nucleus was removed using phacoemulsification.  Remaining cortex was removed using irrigation and aspiration.  Viscoelastic was injected to inflate the capsular bag and a 22.5 D ZCB00 IOL was inserted into the capsular bag without difficulty.  Residual viscoelastic and provisc material was removed with irrigation and aspiration.  BSS was used to hydrate the corneal incision and paracentesis sites which were checked and noted to be watertight.  A drop of Vigamox was applied to the eye and a clear plastic shield was placed.  The patient tolerated the procedure well and left the operative suite in stable condition.    Thomas Serna M.D.      
no

## 2022-06-08 ENCOUNTER — VIRTUAL VISIT (OUTPATIENT)
Dept: PHARMACY | Facility: CLINIC | Age: 82
End: 2022-06-08
Payer: COMMERCIAL

## 2022-06-08 ENCOUNTER — TELEPHONE (OUTPATIENT)
Dept: NEUROLOGY | Facility: CLINIC | Age: 82
End: 2022-06-08
Payer: COMMERCIAL

## 2022-06-08 DIAGNOSIS — R26.9 GAIT DISORDER: Primary | ICD-10-CM

## 2022-06-08 DIAGNOSIS — E11.65 TYPE 2 DIABETES MELLITUS WITH HYPERGLYCEMIA, WITH LONG-TERM CURRENT USE OF INSULIN (H): Primary | ICD-10-CM

## 2022-06-08 DIAGNOSIS — R29.6 RECURRENT FALLS: ICD-10-CM

## 2022-06-08 DIAGNOSIS — R52 PAIN: ICD-10-CM

## 2022-06-08 DIAGNOSIS — I10 HYPERTENSION GOAL BP (BLOOD PRESSURE) < 140/90: ICD-10-CM

## 2022-06-08 DIAGNOSIS — Z79.4 TYPE 2 DIABETES MELLITUS WITH HYPERGLYCEMIA, WITH LONG-TERM CURRENT USE OF INSULIN (H): Primary | ICD-10-CM

## 2022-06-08 PROCEDURE — 99607 MTMS BY PHARM ADDL 15 MIN: CPT | Performed by: PHARMACIST

## 2022-06-08 PROCEDURE — 99606 MTMS BY PHARM EST 15 MIN: CPT | Performed by: PHARMACIST

## 2022-06-08 NOTE — PATIENT INSTRUCTIONS
"Recommendations from today's MTM visit:                                                       Increase Basaglar to 38 units daily     Will call [place] Prescription Assistance Program for information on Soliqua, Synthroid, and Basaglar.      Try using the walker when you stand up to help with balance and falls.      Will contact neurology about about at home physical therapy.     Monitor at home blood pressure.     Continue to take ibuprofen with food and full glass of water.     Follow-up: By phone Monday 6/13 at 2:00PM    It was great speaking with you today.  I value your experience and would be very thankful for your time in providing feedback in our clinic survey. In the next few days, you may receive an email or text message from [employer] with a link to a survey related to your  clinical pharmacist.\"     To schedule another MTM appointment, please call the clinic directly or you may call the MTM scheduling line at [phone] or toll-free at [phone].     My Clinical Pharmacist's contact information:                                                      Please feel free to contact me with any questions or concerns you have.   Mirna Perez, Pharm.D, BCACP  Medication Therapy Management Pharmacist      "

## 2022-06-08 NOTE — PROGRESS NOTES
Medication Therapy Management (MTM) Encounter    ASSESSMENT:                            Medication Adherence/Access: Need to contact the [place] Prescription Assistance Program/Fund (Francesca Correa - [phone]) for information about Soliqua, Synthroid.    Type 2 Diabetes: Patient is due for A1c check at the end of June. Not meeting A1c goal of <8%. Not meeting post prandial glucose goal of <180 mmHg. Would benefit from starting Soliqua and discontinuing Basaglar once approved through patient assistance program. Based off of most recent A1c an Increase in Basaglar would be beneficial until approved for Soliqua.    Falls: Medications unlikely the cause of falls due to absence of dizziness upon standing. Using a walker when standing up will help with stability. Physical therapy at home would improve strength and mobility. Referral was placed in February by neurology for home care physical & occupational therapy. Unclear if patient received home care services.     Hypertension: Monitoring blood pressure at home would be helpful.    Pain: Educated about possibility of ulcers from ibuprofen use and how taking with food and water can decrease risk.      PLAN:                            Increase Basaglar to 38 units daily. Due for A1c at end of June.    Will call [employer] Prescription Assistance Program for information on Soliqua, Synthroid.     Try using the walker when you stand up to help with balance and falls.     Neurology RN to reach out to home care regarding physical & occupational therapy for patient.    Monitor at home blood pressure.    Continue to take ibuprofen with food and full glass of water.    Follow-up: By phone Monday 6/13 at 2:00PM    SUBJECTIVE/OBJECTIVE:                          Brandy Leon is a 81 year old female contacted via phone call for a follow-up visit.  Today's visit is a follow-up MTM visit from 2/9/2022.     Reason for visit: Diabetes, wondering about all of the medications she is  taking, doesn't feel they are right because she is still falling.    Allergies/ADRs: Reviewed in chart  Past Medical History: Reviewed in chart  Tobacco: She reports that she has never smoked. She has never used smokeless tobacco.  Alcohol: none    Medication Adherence/Access: Uses patient assistance program for Basaglar and Synthroid.     Type 2 Diabetes:  Currently taking Basaglar 36 units at bedtime, glipizide XL 10 mg twice daily, metformin 1000 mg twice daily. Patient is not experiencing side effects.  Has not gotten Soliqua. Hasn't heard from the patient assistance program. Has 15 Basaglar pens at home and will need refill eventually without Soliqua.  Blood sugar monitorin-2 time(s) daily. In the afternoon blood sugars can be in the 200's.   Date FBG/ 2hours post Lunch/2hours post Dinner /2hours post    6/8 107      Symptoms of low blood sugar? none  Symptoms of high blood sugar? None. Feels better when blood sugar is higher.  Eye exam: Up to date  Foot exam: Up to date  Diet: Breakfast was 2 pieces of toast with PB & jelly, yesterday had 2 eggs, reports eating cereal often.  Eats bananas, oranges, and brussel sprouts frequently.  Aspirin: Taking 81 mg daily    Statin: Yes: Rosuvastatin   ACEi/ARB: Yes: Irbesartan.   Urine Albumin:   Lab Results   Component Value Date    UMALCR 40.00 (H) 2021      Lab Results   Component Value Date    A1C 9.6 2022    A1C 9.5 2021    A1C 9.8 2021    A1C 10.3 2021    A1C 9.2 2021    A1C 8.7 2020    A1C 10.0 2020    A1C 10.5 01/10/2020     Falls: Has been falling after sitting too long and stands up. Does not report dizziness. Doesn't have any warning that she is going to fall. Tips over forward. Does have a walker that she uses to walk but doesn't use the walker to help her stand up.    Hypertension: Current medications include irbesartan 75 mg daily. Patient does not self-monitor blood pressure. Patient reports no current  medication side effects.  BP Readings from Last 3 Encounters:   04/01/22 (!) 118/98   03/28/22 131/79   02/08/22 122/75     Pain: Current therapy includes ibuprofen 800 mg twice daily as needed. Has been taking regularly because of pain from her falls. Has been taking ibuprofen with food. Acetaminophen does not work for her.     Today's Vitals: There were no vitals taken for this visit.  ----------------    I spent 23 minutes with this patient today. All changes were made via collaborative practice agreement with Fernanda Miller MD. A copy of the visit note was provided to the patient's provider(s).    The patient was sent via [employer] a summary of these recommendations.     Tavon Whitmore  Fourth Year PharmD Student  Medication Therapy Management  Mirna Perez, Pharm.D, BCACP  Medication Therapy Management Pharmacist      Telemedicine Visit Details  Type of service:  Telephone visit  Start Time: 10:31 AM  End Time: 10:54 AM  Originating Location (patient location): [place]  Distant Location (provider location):  [facility]     Medication Therapy Recommendations  Type 2 diabetes mellitus with hyperglycemia, with long-term current use of insulin (H)    Current Medication: insulin glargine (BASAGLAR KWIKPEN) 100 UNIT/ML pen   Rationale: Dose too low - Dosage too low - Effectiveness   Recommendation: Increase Dose   Status: Accepted per CPA   Note: Increase to 38 units daily from 36 units.

## 2022-06-08 NOTE — TELEPHONE ENCOUNTER
Pt reported to pharmacist during virtual visit that she has had some falls recently and has not seen PT. Per notes, [facility] could not accept pt for home PT/OT back in February and and pt was notified. She was instructed to call her insurance to determine if there was a home care agency who would accept her insurance so a referral could be sent there. Pt likely forgot about this and did not follow-up with clinic. Pt reports she fell at home about 1 month ago with no serious injury. Denied any head injury. Writer will request that provider place another HC referral for patient.       Regla Conroy, RNCC  Neurology/Neurosurgery/PM&R

## 2022-06-09 DIAGNOSIS — E03.9 ACQUIRED HYPOTHYROIDISM: ICD-10-CM

## 2022-06-09 DIAGNOSIS — L30.9 DERMATITIS: ICD-10-CM

## 2022-06-10 RX ORDER — LEVOTHYROXINE SODIUM 137 MCG
TABLET ORAL
Qty: 90 TABLET | Refills: 1 | Status: SHIPPED | OUTPATIENT
Start: 2022-06-10 | End: 2023-02-06

## 2022-06-13 ENCOUNTER — TELEPHONE (OUTPATIENT)
Dept: NEUROLOGY | Facility: CLINIC | Age: 82
End: 2022-06-13
Payer: COMMERCIAL

## 2022-06-13 RX ORDER — TRIAMCINOLONE ACETONIDE 1 MG/G
CREAM TOPICAL
Qty: 45 G | Refills: 0 | Status: SHIPPED | OUTPATIENT
Start: 2022-06-13 | End: 2022-09-12

## 2022-06-13 NOTE — TELEPHONE ENCOUNTER
Pt informed that [facility] was unable to accept pt for home PT/OT referral but [facility] should be able to accept pt at this time. Per [facility], their agency should be able to retreive the referral and other necessary information via Epic. They will contact pt to schedule appt and patient verbalized understanding.       Regla Conroy, RNCC  Neurology/Neurosurgery/PM&R

## 2022-06-13 NOTE — TELEPHONE ENCOUNTER
triamcinolone (KENALOG) 0.1 % external cream  Last Written Prescription Date:  4/28/22  Last Fill Quantity: 45g,   # refills: 0  Last Office Visit : 2/18/21  Future Office visit:  9/28/22    Process 4

## 2022-06-13 NOTE — TELEPHONE ENCOUNTER
[facility] Call Center    Phone Message    May a detailed message be left on voicemail: yes     Reason for Call: [facility] unable to take home health referral due to capacity. Call back number [phone].      Action Taken: Message routed to:  Adult Clinics: Neurology p [phone]    Travel Screening: Not Applicable

## 2022-06-14 ENCOUNTER — OFFICE VISIT (OUTPATIENT)
Dept: NEUROLOGY | Facility: CLINIC | Age: 82
End: 2022-06-14
Payer: COMMERCIAL

## 2022-06-14 VITALS
WEIGHT: 183 LBS | DIASTOLIC BLOOD PRESSURE: 75 MMHG | HEART RATE: 101 BPM | SYSTOLIC BLOOD PRESSURE: 122 MMHG | BODY MASS INDEX: 31.41 KG/M2

## 2022-06-14 DIAGNOSIS — G91.9 HYDROCEPHALUS, UNSPECIFIED TYPE (H): ICD-10-CM

## 2022-06-14 DIAGNOSIS — G62.9 NEUROPATHY: ICD-10-CM

## 2022-06-14 DIAGNOSIS — R26.9 GAIT DISORDER: Primary | ICD-10-CM

## 2022-06-14 PROCEDURE — 99214 OFFICE O/P EST MOD 30 MIN: CPT | Performed by: INTERNAL MEDICINE

## 2022-06-14 NOTE — LETTER
6/14/2022         RE: Brandy Leon  [address] [place] [address]        Dear Colleague,    Thank you for referring your patient, Brandy Leon, to the [facility] [place]. Please see a copy of my visit note below.    [facility] Neurology Follow Up Visit    Brandy Leon MRN# [id number]   Age: 81 year old YOB: 1940     Brief history of symptoms: The patient was initially seen in neurologic consultation on 7/15/2021 for evaluation of imbalance. Please see the comprehensive neurologic consultation note from that date in the Epic records for details.     Interval history:   Patient has continued to have a few falls since last visit. Patient went grocery shopping and fell broke her nose. She had a fall a few weeks ago while she was at the  and landed on her tail bone.     She denies any urinary incontinence.     She had eyelid surgery in the interim since last visit.     She has a stair lift in the basement to help her transport floors.       Past Medical History:     Patient Active Problem List   Diagnosis     Seasonal allergies     Hypothyroidism     Hyperlipidemia LDL goal <100     Fibromyalgia     Osteoarthritis     Obesity     Type 2 diabetes mellitus with hyperglycemia, with long-term current use of insulin (H)     Hypertension goal BP (blood pressure) < 140/90     Overactive bladder     Recurrent UTI     Pseudophakia of both eyes     DINORA (obstructive sleep apnea)- severe (AHI 56)     Contusion of right knee     Gait disorder     Gastroesophageal reflux disease without esophagitis     Urge incontinence     Chronic kidney disease, stage 2 (mild)     Hydrocephalus, unspecified type (H)     Diabetes mellitus with peripheral vascular disease (H)     Past Medical History:   Diagnosis Date     Actinic keratosis 3/12/2013     Degenerative joint disease      Diabetes (H)      Gastroesophageal reflux disease without esophagitis  12/11/2020     Rheumatic fever 1957    x2 between the ages of 15-18     Seasonal allergies         Past Surgical History:     Past Surgical History:   Procedure Laterality Date     BLEPHAROPLASTY Bilateral 4/1/2022    Procedure: Bilateral upper eyelid blepharoplasty;  Surgeon: Fidelia Moran MD;  Location: [facility] OR     CATARACT IOL, RT/LT       COLONOSCOPY       COLONOSCOPY N/A 8/13/2015    Procedure: COLONOSCOPY;  Surgeon: Duane, William Charles, MD;  Location: [facility] OR     COLONOSCOPY WITH CO2 INSUFFLATION N/A 8/13/2015    Procedure: COLONOSCOPY WITH CO2 INSUFFLATION;  Surgeon: Duane, William Charles, MD;  Location: [facility] OR     PHACOEMULSIFICATION WITH STANDARD INTRAOCULAR LENS IMPLANT Left 10/25/2018    Procedure: LEFT PHACOEMULSIFICATION WITH STANDARD INTRAOCULAR LENS IMPLANT;  Surgeon: Thomas Serna MD;  Location: [facility] OR     PHACOEMULSIFICATION WITH STANDARD INTRAOCULAR LENS IMPLANT Right 11/8/2018    Procedure: RIGHT PHACOEMULSIFICATION WITH STANDARD INTRAOCULAR LENS IMPLANT;  Surgeon: Thomas Serna MD;  Location: [facility] OR     THYROIDECTOMY        ZZC APPENDECTOMY       ZZC ARTHROPLASTY TMJ          Social History:     Social History     Tobacco Use     Smoking status: Never Smoker     Smokeless tobacco: Never Used     Tobacco comment: has had exposure to [facility], no current exposure   Substance Use Topics     Alcohol use: No     Drug use: No        Family History:     Family History   Problem Relation Age of Onset     Eye Disorder Mother         cataracts     Cancer Father         skin and leukemia     Cancer Sister         Breast Cancer     Eye Disorder Brother         cataract     Cerebrovascular Disease Maternal Grandfather      Cerebrovascular Disease Paternal Grandmother      Eye Disorder Paternal Grandmother         cataracts     Cerebrovascular Disease Paternal Grandfather      Heart Disease Paternal Grandfather      Breast Cancer Daughter       "Endometrial Cancer Daughter      Glaucoma No family hx of      Macular Degeneration No family hx of         Medications:     Current Outpatient Medications   Medication Sig     blood glucose (ONETOUCH ULTRA) test strip USE TO TEST TWICE A DAY     calcium carbonate (TUMS) 500 MG chewable tablet Take 1 chew tab by mouth daily as needed for heartburn     Cranberry-Vitamin C-Vitamin E 4200-20-3 MG-MG-UNIT CAPS Take 1 capsule by mouth 2 times daily     DM-APAP-CPM (CORICIDIN HBP PO) Take by mouth daily as needed     erythromycin (ROMYCIN) 5 MG/GM ophthalmic ointment Apply small amount to incision sites three times daily and apply a 1/2\" strip into each eye at bedtime.     fexofenadine (ALLEGRA) 180 MG tablet Take 180 mg by mouth as needed      glipiZIDE (GLUCOTROL XL) 10 MG 24 hr tablet TAKE 1 TABLET BY MOUTH TWICE A DAY     GLUCOSAMINE CHONDROITIN COMPLX OR 2 Daily     ibuprofen (ADVIL/MOTRIN) 200 MG capsule Take 400 mg by mouth every 4 hours as needed      insulin glargine (BASAGLAR KWIKPEN) 100 UNIT/ML pen Inject 36 Units Subcutaneous daily     insulin pen needle (BD JUAN C U/F) 32G X 4 MM miscellaneous USE 1 DAILY AS DIRECTED.     irbesartan (AVAPRO) 150 MG tablet Take 75 mg by mouth At Bedtime     latanoprost (XALATAN) 0.005 % ophthalmic solution Place 1 drop into both eyes daily     metFORMIN (GLUCOPHAGE) 1000 MG tablet TAKE 1 TABLET BY MOUTH TWICE A DAY WITH MEALS     ONE TOUCH DELICA LANCETS MISC 1 each 2 times daily. One Touch Delica       order for DME Glucometer covered by insurance.     rosuvastatin (CRESTOR) 5 MG tablet TAKE 1 TABLET BY MOUTH TWICE WEEKLY     SYNTHROID 137 MCG tablet TAKE 1 TABLET BY MOUTH EVERY DAY     triamcinolone (KENALOG) 0.1 % external cream APPLY TO AFFECTED AREA TWICE A DAY FOR 2 WEEKS     trospium (SANCTURA XR) 60 MG CP24 24 hr capsule TAKE 1 CAPSULE BY MOUTH EVERY MORNING.     No current facility-administered medications for this visit.     Facility-Administered Medications Ordered " in Other Visits   Medication     DOBUTamine 500 mg in dextrose 5% 250 mL (adult std conc) premix        Allergies:     Allergies   Allergen Reactions     Ace Inhibitors Cough     Estradiol Itching     Lipitor [Atorvastatin Calcium]      Weak and shaky     Sulfa Drugs      Rash       Zocor [Simvastatin]      Weak and shakey     Triamterene Dermatitis     Possible photosensitivity dermatitis, mild.  (changed to hctz alone 6/4/07, will see if this helps)        Review of Systems:   As above     Physical Exam:   Vitals: /75 (BP Location: Right arm, Patient Position: Sitting, Cuff Size: Adult Regular)   Pulse 101   Wt 83 kg (183 lb)   BMI 31.41 kg/m     General: Seated comfortably in no acute distress.  Lungs: breathing comfortably  Extremities: no edema  Skin: No rashes  Neurologic:     Mental Status: Fully alert, attentive. Normal fund of knowledge. Language normal, speech clear and fluent, no paraphasic errors.      Cranial Nerves: EOMI with normal smooth pursuit. No dysarthria.     Motor: No tremors or other abnormal movements observed. Normal tone in the extremities. Very mild bradykinesia in bilateral rapid finger tapping. Strength 5/5 throughout upper extremities with exception of 4+/5 bilateral APB.      Deep Tendon Reflexes: 2+/symmetric throughout upper extremities, 1+ patella, and trace ankle jerks. No clonus. Toes downgoing bilaterally.     Sensory: Vibration is absent in the bilateral great toes, 5-6 seconds in the bilateral ankles, normal in the hands. Decrease proprioceptive sense in the bilateral great toes, otherwise intact. Sways with Romberg, but doesn't fall.      Coordination: Finger-nose-finger and heel-shin intact without dysmetria. Rapid alternating movements slightly bradykinetic.     Gait: Wide based slowed magnetic quality to gait. Not able to do heel, toe, or tandem gait.     Data reviewed on previous visits    Imaging:  MRI brain 6/8/2021  IMPRESSION:  No interval change since  2020. Severe advanced cerebral volume  loss. Unchanged mild ventriculomegaly. This might partly be due to  pressure hydrocephalus. Clinical correlation is advised.     Laboratory:  TSH normal (2020)  A1c 10.3 ()  2021 -  B12, ESR, CRP, ELP, immunofixation unremarkable  2021 - CSF protein 127, normal cell count and glucose    Pre and post LP PT assessment  Pre Lumbar puncture  Gait speed:  25 foot walk test: 10.12 seconds, 18 steps  Number of steps for 180 degree turn: 9 steps to the right, 7 steps to the left  Number of steps for 360 degree turn: 11 steps to the left, 13 steps to the right  Post Lumbar Puncture  Gait assessment: Patient ambulates with reduction in gait base support, significant improvement in bilateral heel strike and pushoff with only very mild left trunk lean. Patient does demonstrate occasional step out balance reactions more right compared to left.  Gait speed:  25 foot walk test: 8.94 seconds, 16 steps  Number of steps for 180 degree turn: 7 steps to the right, 6 steps to the left  Number of steps for 360 degree turn: 10 steps both ways    EMG 2021  Interpretation:  This is an abnormal study, demonstrating electrophysiologic evidence of the followin. Moderately severe sensorimotor axonal polyneuropathy.  2. 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     Pertinent Investigations since last visit:   None         Assessment and Plan:   Assessment:  Brandy Leon is a 80 year old female who presents today for follow-up of balance changes. Balance changes have been occurring over the last several years. She also has mild urinary frequency/urgency. On examination she has wide based, ataxic, and slightly magnetic quality to gait. She has significant postural instability, decreased reflexes and length dependent sensory changes. She denies any significant memory problems. Prior abbreviated MoCA was 22. MRI brain shows mild ventriculomegaly. Patient previously had  pre-post lumbar puncture physical therapy assessment for work-up of possible NPH. She had mild improvement in gait following lumbar puncture. NPH is still a consideration and we will refer patient to neurosurgery today to get opinion on symptoms. Given MRI brain appearance, vascular parkinsonism is another possible contributor. EMG showed moderately severe sensorimotor axonal polyneuropathy, likely related to diabetes, which is contributing to imbalance at least in part. MRI cervical and thoracic did not show clear evidence of myelopathy contributing to symptoms.     Home physical therapy referral was placed today. Due to patient's imbalance she is unable to independently and safely leave home.    Plan:  - Neurosurgery referral for opinion on NPH  - Home PT referral    Follow up in Neurology clinic in 6 months or sooner if new issues arise    Mitchel Kenney MD   of Neurology  [facility]    The total time of this encounter today amounted to 34 minutes. This time included time spent with the patient, prep work, ordering tests, and performing post visit documentation.        Again, thank you for allowing me to participate in the care of your patient.        Sincerely,        Mitchel Kenney MD

## 2022-06-14 NOTE — PROGRESS NOTES
[facility] Neurology Follow Up Visit    Brandy Leon MRN# [id number]   Age: 81 year old YOB: 1940     Brief history of symptoms: The patient was initially seen in neurologic consultation on 7/15/2021 for evaluation of imbalance. Please see the comprehensive neurologic consultation note from that date in the Epic records for details.     Interval history:   Patient has continued to have a few falls since last visit. Patient went grocery shopping and fell broke her nose. She had a fall a few weeks ago while she was at the  and landed on her tail bone.     She denies any urinary incontinence.     She had eyelid surgery in the interim since last visit.     She has a stair lift in the basement to help her transport floors.       Past Medical History:     Patient Active Problem List   Diagnosis     Seasonal allergies     Hypothyroidism     Hyperlipidemia LDL goal <100     Fibromyalgia     Osteoarthritis     Obesity     Type 2 diabetes mellitus with hyperglycemia, with long-term current use of insulin (H)     Hypertension goal BP (blood pressure) < 140/90     Overactive bladder     Recurrent UTI     Pseudophakia of both eyes     DINORA (obstructive sleep apnea)- severe (AHI 56)     Contusion of right knee     Gait disorder     Gastroesophageal reflux disease without esophagitis     Urge incontinence     Chronic kidney disease, stage 2 (mild)     Hydrocephalus, unspecified type (H)     Diabetes mellitus with peripheral vascular disease (H)     Past Medical History:   Diagnosis Date     Actinic keratosis 3/12/2013     Degenerative joint disease      Diabetes (H)      Gastroesophageal reflux disease without esophagitis 12/11/2020     Rheumatic fever 1957    x2 between the ages of 15-18     Seasonal allergies         Past Surgical History:     Past Surgical History:   Procedure Laterality Date     BLEPHAROPLASTY Bilateral 4/1/2022    Procedure: Bilateral upper eyelid blepharoplasty;  Surgeon:  Fidelia Moran MD;  Location: [facility] OR     CATARACT IOL, RT/LT       COLONOSCOPY       COLONOSCOPY N/A 8/13/2015    Procedure: COLONOSCOPY;  Surgeon: Duane, William Charles, MD;  Location: [facility] OR     COLONOSCOPY WITH CO2 INSUFFLATION N/A 8/13/2015    Procedure: COLONOSCOPY WITH CO2 INSUFFLATION;  Surgeon: Duane, William Charles, MD;  Location: [facility] OR     PHACOEMULSIFICATION WITH STANDARD INTRAOCULAR LENS IMPLANT Left 10/25/2018    Procedure: LEFT PHACOEMULSIFICATION WITH STANDARD INTRAOCULAR LENS IMPLANT;  Surgeon: Thomas Serna MD;  Location: [facility] OR     PHACOEMULSIFICATION WITH STANDARD INTRAOCULAR LENS IMPLANT Right 11/8/2018    Procedure: RIGHT PHACOEMULSIFICATION WITH STANDARD INTRAOCULAR LENS IMPLANT;  Surgeon: Thomas Serna MD;  Location: [facility] OR     THYROIDECTOMY        ZZC APPENDECTOMY       ZZC ARTHROPLASTY TMJ          Social History:     Social History     Tobacco Use     Smoking status: Never Smoker     Smokeless tobacco: Never Used     Tobacco comment: has had exposure to second hand smoke in the past from [facility], no current exposure   Substance Use Topics     Alcohol use: No     Drug use: No        Family History:     Family History   Problem Relation Age of Onset     Eye Disorder Mother         cataracts     Cancer Father         skin and leukemia     Cancer Sister         Breast Cancer     Eye Disorder Brother         cataract     Cerebrovascular Disease Maternal Grandfather      Cerebrovascular Disease Paternal Grandmother      Eye Disorder Paternal Grandmother         cataracts     Cerebrovascular Disease Paternal Grandfather      Heart Disease Paternal Grandfather      Breast Cancer Daughter      Endometrial Cancer Daughter      Glaucoma No family hx of      Macular Degeneration No family hx of         Medications:     Current Outpatient Medications   Medication Sig     blood glucose (ONETOUCH ULTRA) test strip USE TO TEST TWICE A DAY     calcium carbonate (TUMS) 500 MG chewable  "tablet Take 1 chew tab by mouth daily as needed for heartburn     Cranberry-Vitamin C-Vitamin E 4200-20-3 MG-MG-UNIT CAPS Take 1 capsule by mouth 2 times daily     DM-APAP-CPM (CORICIDIN HBP PO) Take by mouth daily as needed     erythromycin (ROMYCIN) 5 MG/GM ophthalmic ointment Apply small amount to incision sites three times daily and apply a 1/2\" strip into each eye at bedtime.     fexofenadine (ALLEGRA) 180 MG tablet Take 180 mg by mouth as needed      glipiZIDE (GLUCOTROL XL) 10 MG 24 hr tablet TAKE 1 TABLET BY MOUTH TWICE A DAY     GLUCOSAMINE CHONDROITIN COMPLX OR 2 Daily     ibuprofen (ADVIL/MOTRIN) 200 MG capsule Take 400 mg by mouth every 4 hours as needed      insulin glargine (BASAGLAR KWIKPEN) 100 UNIT/ML pen Inject 36 Units Subcutaneous daily     insulin pen needle (BD JUAN C U/F) 32G X 4 MM miscellaneous USE 1 DAILY AS DIRECTED.     irbesartan (AVAPRO) 150 MG tablet Take 75 mg by mouth At Bedtime     latanoprost (XALATAN) 0.005 % ophthalmic solution Place 1 drop into both eyes daily     metFORMIN (GLUCOPHAGE) 1000 MG tablet TAKE 1 TABLET BY MOUTH TWICE A DAY WITH MEALS     ONE TOUCH DELICA LANCETS MISC 1 each 2 times daily. One Touch Delica       order for DME Glucometer covered by insurance.     rosuvastatin (CRESTOR) 5 MG tablet TAKE 1 TABLET BY MOUTH TWICE WEEKLY     SYNTHROID 137 MCG tablet TAKE 1 TABLET BY MOUTH EVERY DAY     triamcinolone (KENALOG) 0.1 % external cream APPLY TO AFFECTED AREA TWICE A DAY FOR 2 WEEKS     trospium (SANCTURA XR) 60 MG CP24 24 hr capsule TAKE 1 CAPSULE BY MOUTH EVERY MORNING.     No current facility-administered medications for this visit.     Facility-Administered Medications Ordered in Other Visits   Medication     DOBUTamine 500 mg in dextrose 5% 250 mL (adult std conc) premix        Allergies:     Allergies   Allergen Reactions     Ace Inhibitors Cough     Estradiol Itching     Lipitor [Atorvastatin Calcium]      Weak and shaky     Sulfa Drugs      Rash       Zocor " [Simvastatin]      Weak and shakey     Triamterene Dermatitis     Possible photosensitivity dermatitis, mild.  (changed to hctz alone 6/4/07, will see if this helps)        Review of Systems:   As above     Physical Exam:   Vitals: /75 (BP Location: Right arm, Patient Position: Sitting, Cuff Size: Adult Regular)   Pulse 101   Wt 83 kg (183 lb)   BMI 31.41 kg/m     General: Seated comfortably in no acute distress.  Lungs: breathing comfortably  Extremities: no edema  Skin: No rashes  Neurologic:     Mental Status: Fully alert, attentive. Normal fund of knowledge. Language normal, speech clear and fluent, no paraphasic errors.      Cranial Nerves: EOMI with normal smooth pursuit. No dysarthria.     Motor: No tremors or other abnormal movements observed. Normal tone in the extremities. Very mild bradykinesia in bilateral rapid finger tapping. Strength 5/5 throughout upper extremities with exception of 4+/5 bilateral APB.      Deep Tendon Reflexes: 2+/symmetric throughout upper extremities, 1+ patella, and trace ankle jerks. No clonus. Toes downgoing bilaterally.     Sensory: Vibration is absent in the bilateral great toes, 5-6 seconds in the bilateral ankles, normal in the hands. Decrease proprioceptive sense in the bilateral great toes, otherwise intact. Sways with Romberg, but doesn't fall.      Coordination: Finger-nose-finger and heel-shin intact without dysmetria. Rapid alternating movements slightly bradykinetic.     Gait: Wide based slowed magnetic quality to gait. Not able to do heel, toe, or tandem gait.     Data reviewed on previous visits    Imaging:  MRI brain 6/8/2021  IMPRESSION:  No interval change since 2/17/2020. Severe advanced cerebral volume  loss. Unchanged mild ventriculomegaly. This might partly be due to  pressure hydrocephalus. Clinical correlation is advised.     Laboratory:  TSH normal (11/2020)  A1c 10.3 (2021)  7/2021 -  B12, ESR, CRP, ELP, immunofixation unremarkable  9/2021 - CSF  protein 127, normal cell count and glucose    Pre and post LP PT assessment  Pre Lumbar puncture  Gait speed:  25 foot walk test: 10.12 seconds, 18 steps  Number of steps for 180 degree turn: 9 steps to the right, 7 steps to the left  Number of steps for 360 degree turn: 11 steps to the left, 13 steps to the right  Post Lumbar Puncture  Gait assessment: Patient ambulates with reduction in gait base support, significant improvement in bilateral heel strike and pushoff with only very mild left trunk lean. Patient does demonstrate occasional step out balance reactions more right compared to left.  Gait speed:  25 foot walk test: 8.94 seconds, 16 steps  Number of steps for 180 degree turn: 7 steps to the right, 6 steps to the left  Number of steps for 360 degree turn: 10 steps both ways    EMG 2021  Interpretation:  This is an abnormal study, demonstrating electrophysiologic evidence of the followin. Moderately severe sensorimotor axonal polyneuropathy.  2. 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     Pertinent Investigations since last visit:   None         Assessment and Plan:   Assessment:  Brandy Leon is a 80 year old female who presents today for follow-up of balance changes. Balance changes have been occurring over the last several years. She also has mild urinary frequency/urgency. On examination she has wide based, ataxic, and slightly magnetic quality to gait. She has significant postural instability, decreased reflexes and length dependent sensory changes. She denies any significant memory problems. Prior abbreviated MoCA was 22/27. MRI brain shows mild ventriculomegaly. Patient previously had pre-post lumbar puncture physical therapy assessment for work-up of possible NPH. She had mild improvement in gait following lumbar puncture. NPH is still a consideration and we will refer patient to neurosurgery today to get opinion on symptoms. Given MRI brain appearance, vascular parkinsonism is  another possible contributor. EMG showed moderately severe sensorimotor axonal polyneuropathy, likely related to diabetes, which is contributing to imbalance at least in part. MRI cervical and thoracic did not show clear evidence of myelopathy contributing to symptoms.     Home physical therapy referral was placed today. Due to patient's imbalance she is unable to independently and safely leave home.    Plan:  - Neurosurgery referral for opinion on NPH  - Home PT referral    Follow up in Neurology clinic in 6 months or sooner if new issues arise    Mitchel Kenney MD   of Neurology  [facility]    The total time of this encounter today amounted to 34 minutes. This time included time spent with the patient, prep work, ordering tests, and performing post visit documentation.

## 2022-06-15 NOTE — TELEPHONE ENCOUNTER
SPINE PATIENTS - NEW PROTOCOL PREVISIT    RECORDS RECEIVED FROM: Internal   REASON FOR VISIT: Hydrocephalus   Date of Appt: 06/23/2022   NOTES (FOR ALL VISITS) STATUS DETAILS   OFFICE NOTE from referring provider Internal 06/14/2022 Dr Anne Marie STORE[facility]    OFFICE NOTE from other specialist N/A    DISCHARGE SUMMARY from hospital N/A    DISCHARGE REPORT from ER N/A    EMG REPORT Internal 12/31/2021 Dr Grajeda [facility]    MEDICATION LIST N/A    IMAGING  (FOR ALL VISITS)     MRI (HEAD, NECK, SPINE) Internal 02/24/2022 thoracic cervical spine  06/08/2021 brain   XRAY (SPINE) *NEUROSURGERY* Internal 09/07/2021 lumbar puncture   CT (HEAD, NECK, SPINE) N/A

## 2022-06-16 ENCOUNTER — TELEPHONE (OUTPATIENT)
Dept: NEUROSURGERY | Facility: CLINIC | Age: 82
End: 2022-06-16
Payer: COMMERCIAL

## 2022-06-16 ENCOUNTER — TELEPHONE (OUTPATIENT)
Dept: NEUROLOGY | Facility: CLINIC | Age: 82
End: 2022-06-16
Payer: COMMERCIAL

## 2022-06-16 NOTE — TELEPHONE ENCOUNTER
I called patient to reschedule her Neurosurgical consult. She is currently scheduled with Dr. Cuello at [place].  Pt requested that I call her daughter Fernanda to reschedule. I called Fernanda and transferred her to Neurosurgery scheduling line [phone]. They were advised that appt with Dr. Cuello was cancelled.    Erin Caldwell LPN

## 2022-06-16 NOTE — TELEPHONE ENCOUNTER
[facility] Call Center    Phone Message    May a detailed message be left on voicemail: yes     Reason for Call: Appointment Intake    Referring Provider Name: Dr. Kenney  Diagnosis and/or Symptoms: Hydrocephalus, unspecified type     Patients daughter Fernanda calling to schedule appt for Neurosurgery. Patient was scheduled with Dr. Cuello in [place] and advised patient be seen at the [place] Location.     Please call Fernanda at [phone] to schedule.    Action Taken: Message routed to:  [facility] Neurosurgery    Travel Screening: Not Applicable

## 2022-06-16 NOTE — TELEPHONE ENCOUNTER
----- Message from Mitchel Kenney MD sent at 6/16/2022  9:52 AM CDT -----  Regarding: neurosurgery referral  Hello,     Patient is currently scheduled with Dr Cuello. I discussed the case with him and he thinks the patient would be better suited seeing one of the neurosurgery providers at the [facility]. Can we cancel the appointment with Dr Cuello and reschedule with someone at the [facility]?     Thanks!  Mitchel

## 2022-06-17 NOTE — TELEPHONE ENCOUNTER
RECORDS RECEIVED FROM: internal   REASON FOR VISIT: Second opinion/Gait disorder,Hydrocephalus   Date of Appt: 6/21/22   NOTES (FOR ALL VISITS) STATUS DETAILS   OFFICE NOTE from referring provider Internal Dr Mitchel Kenney @ [facility] [place] [facility]:  6/14/22  2/8/22  9/14/21  7/15/21   MEDICATION LIST Internal    IMAGING  (FOR ALL VISITS)     EMG Internal [facility]:  12/31/21   LUMBAR PUNCTURE Internal [facility]:  9/7/21   MRI (HEAD, NECK, SPINE) Internal [facility]:  MRI Brain 6/8/21  MRI Brain 2/17/20   CT (HEAD, NECK, SPINE) Internal [facility]:  CT Maxillofacial 3/8/18

## 2022-06-21 ENCOUNTER — OFFICE VISIT (OUTPATIENT)
Dept: NEUROSURGERY | Facility: CLINIC | Age: 82
End: 2022-06-21
Payer: COMMERCIAL

## 2022-06-21 ENCOUNTER — PRE VISIT (OUTPATIENT)
Dept: NEUROSURGERY | Facility: CLINIC | Age: 82
End: 2022-06-21
Payer: COMMERCIAL

## 2022-06-21 VITALS
RESPIRATION RATE: 16 BRPM | OXYGEN SATURATION: 96 % | HEART RATE: 102 BPM | WEIGHT: 184 LBS | BODY MASS INDEX: 31.58 KG/M2 | SYSTOLIC BLOOD PRESSURE: 134 MMHG | DIASTOLIC BLOOD PRESSURE: 84 MMHG

## 2022-06-21 DIAGNOSIS — R26.9 GAIT DISORDER: ICD-10-CM

## 2022-06-21 DIAGNOSIS — G91.9 HYDROCEPHALUS, UNSPECIFIED TYPE (H): ICD-10-CM

## 2022-06-21 PROCEDURE — 99204 OFFICE O/P NEW MOD 45 MIN: CPT | Performed by: NEUROLOGICAL SURGERY

## 2022-06-21 ASSESSMENT — PAIN SCALES - GENERAL: PAINLEVEL: NO PAIN (0)

## 2022-06-21 NOTE — LETTER
2022       RE: Brandy Leon  [address] [address]     Dear Colleague,    Thank you for referring your patient, Brandy Leon, to the [facility] NEUROSURGERY CLINIC [place] [facility]. Please see a copy of my visit note below.    Service Date: 2022    Fernanda Miller MD  [address] [address]    RE:      Brandy Leon  MRN:  [id number]  :   1940    Dear Dr. Miller:    I had the opportunity to see Ms. Leon today in my clinic.  As you know, she is an 81-year-old woman who has been having the last several years increasing difficulty with her walking.  She uses a walker, as well as a cane.  She has had multiple falls including one that resulted in a fracture of her nose.  Workup previously had included an MRI x2, as well as a lumbar puncture.  She has excellent memory.  She does not have any incontinence except occasional stress incontinence, but she does have difficulty with stairs and uses a stair lift in order to get up and down at home.    On examination, she is normocephalic.  Vision is grossly normal.  Extraocular muscles are normal.  Hearing is grossly intact.  Strength is 5/5.  Rapid alternating movements were fine.  It does take her a while to stand up.  Her gait is slow.  She is slightly tilted forward and she feels unsteady on station.  Reflexes are 0/4 in the knee and the ankle.  The MRI on my viewing demonstrated that she has severe cerebral atrophy.  This appears to be constant in the last 2 years.  She does have slight ventriculomegaly and maybe some periventricular signal, but this is mild.    It is clear that Ms. Leon has volume loss in her brain that could be responsible for some of her symptoms.  Her ventriculomegaly is mild and her lumbar puncture had not resulted in any improvement in her walking.  I think that even if a shunt  was to be placed, it would actually put her at a higher risk, given the amount of volume loss that she has now.  The bridging veins then be under more tension and she would be subjected to potentially higher risk for subdural hematomas.  For that reason, I had told her that I do not feel that she is a candidate for a ventriculoperitoneal shunt.  I also looked at the thoracic and cervical MRI and there does not appear to be any compressive lesion that could be responsible on those 2 locations either for her gait difficulties.  I understand that she will continue to see Dr. Kenney for continued workup for her MRI findings.  From my standpoint, there would be no further need to see me, and I appreciate the opportunity to see this very pleasant patient and daughter.    Sincerely,    Blayne Duncan MD        D: 2022   T: 2022   MT: al    Name:     PAUL LAST  MRN:      [id number]        Account:      [id number]   :      1940           Service Date: 2022       Document: [id number]

## 2022-06-21 NOTE — PROGRESS NOTES
Service Date: 2022    [place] [address]    RE:      Brandy Leon  MRN:  [id number]  :   1940    Dear Dr. Miller:    I had the opportunity to see Ms. Leon today in my clinic.  As you know, she is an 81-year-old woman who has been having the last several years increasing difficulty with her walking.  She uses a walker, as well as a cane.  She has had multiple falls including one that resulted in a fracture of her nose.  Workup previously had included an MRI x2, as well as a lumbar puncture.  She has excellent memory.  She does not have any incontinence except occasional stress incontinence, but she does have difficulty with stairs and uses a stair lift in order to get up and down at home.    On examination, she is normocephalic.  Vision is grossly normal.  Extraocular muscles are normal.  Hearing is grossly intact.  Strength is 5/5.  Rapid alternating movements were fine.  It does take her a while to stand up.  Her gait is slow.  She is slightly tilted forward and she feels unsteady on station.  Reflexes are 0/4 in the knee and the ankle.  The MRI on my viewing demonstrated that she has severe cerebral atrophy.  This appears to be constant in the last 2 years.  She does have slight ventriculomegaly and maybe some periventricular signal, but this is mild.    It is clear that Ms. Leon has volume loss in her brain that could be responsible for some of her symptoms.  Her ventriculomegaly is mild and her lumbar puncture had not resulted in any improvement in her walking.  I think that even if a shunt was to be placed, it would actually put her at a higher risk, given the amount of volume loss that she has now.  The bridging veins then be under more tension and she would be subjected to potentially higher risk for subdural hematomas.  For that reason, I had told her that I do not feel that she is a candidate for a  ventriculoperitoneal shunt.  I also looked at the thoracic and cervical MRI and there does not appear to be any compressive lesion that could be responsible on those 2 locations either for her gait difficulties.  I understand that she will continue to see Dr. Kenney for continued workup for her MRI findings.  From my standpoint, there would be no further need to see me, and I appreciate the opportunity to see this very pleasant patient and daughter.    Sincerely,          Blayne Duncan MD        D: 2022   T: 2022   MT: al    Name:     PAUL LAST  MRN:      [id number]        Account:      [id number]   :      1940           Service Date: 2022       Document: [id number]

## 2022-06-22 ENCOUNTER — TELEPHONE (OUTPATIENT)
Dept: NEUROLOGY | Facility: CLINIC | Age: 82
End: 2022-06-22

## 2022-06-22 ENCOUNTER — MEDICAL CORRESPONDENCE (OUTPATIENT)
Dept: HEALTH INFORMATION MANAGEMENT | Facility: CLINIC | Age: 82
End: 2022-06-22

## 2022-06-22 NOTE — TELEPHONE ENCOUNTER
Health Call Center    Phone Message    May a detailed message be left on voicemail: yes     Reason for Call: Order(s): Home Care Orders: Physical Therapy (PT): 1x a week for 1 week, 3x a week for 1 week, 2x a week for 4 weeks, 1x a week for 1 week and Occupational Therapy (OT): 1x a week for 2 weeks and 2x a week for 3 weeks     Laura from [facility] is calling for request verbal orders for PT and OT. Please contact Laura at Ph: [phone]    Action Taken: Message routed to:  [facility] Neurology    Travel Screening: Not Applicable

## 2022-06-23 ENCOUNTER — PRE VISIT (OUTPATIENT)
Dept: NEUROSURGERY | Facility: CLINIC | Age: 82
End: 2022-06-23
Payer: COMMERCIAL

## 2022-06-23 NOTE — TELEPHONE ENCOUNTER
"I returned call to Laura to let her know Dr. Kenney reviewed PT/OT orders and is okay to give verbal order as requested for \"Home Care Orders: Physical Therapy (PT): 1x a week for 1 week, 3x a week for 1 week, 2x a week for 4 weeks, 1x a week for 1 week and Occupational Therapy (OT): 1x a week for 2 weeks and 2x a week for 3 weeks \"    Adelina CHAVEZ  "

## 2022-07-06 ENCOUNTER — LAB (OUTPATIENT)
Dept: LAB | Facility: CLINIC | Age: 82
End: 2022-07-06
Payer: COMMERCIAL

## 2022-07-06 ENCOUNTER — OFFICE VISIT (OUTPATIENT)
Dept: PODIATRY | Facility: CLINIC | Age: 82
End: 2022-07-06
Payer: COMMERCIAL

## 2022-07-06 DIAGNOSIS — B35.1 ONYCHOMYCOSIS: Primary | ICD-10-CM

## 2022-07-06 DIAGNOSIS — Z79.4 TYPE 2 DIABETES MELLITUS WITH HYPERGLYCEMIA, WITH LONG-TERM CURRENT USE OF INSULIN (H): ICD-10-CM

## 2022-07-06 DIAGNOSIS — E11.51 DIABETES MELLITUS WITH PERIPHERAL VASCULAR DISEASE (H): ICD-10-CM

## 2022-07-06 DIAGNOSIS — E11.65 TYPE 2 DIABETES MELLITUS WITH HYPERGLYCEMIA, WITH LONG-TERM CURRENT USE OF INSULIN (H): ICD-10-CM

## 2022-07-06 DIAGNOSIS — E11.49 TYPE II OR UNSPECIFIED TYPE DIABETES MELLITUS WITH NEUROLOGICAL MANIFESTATIONS, NOT STATED AS UNCONTROLLED(250.60) (H): ICD-10-CM

## 2022-07-06 LAB — HBA1C MFR BLD: 11.1 % (ref 0–5.6)

## 2022-07-06 PROCEDURE — 11720 DEBRIDE NAIL 1-5: CPT | Performed by: PODIATRIST

## 2022-07-06 PROCEDURE — 99214 OFFICE O/P EST MOD 30 MIN: CPT | Mod: 25 | Performed by: PODIATRIST

## 2022-07-06 PROCEDURE — 83036 HEMOGLOBIN GLYCOSYLATED A1C: CPT

## 2022-07-06 PROCEDURE — 36415 COLL VENOUS BLD VENIPUNCTURE: CPT

## 2022-07-06 NOTE — LETTER
7/6/2022         RE: Brandy Leon  [address] [place] [address]        Dear Colleague,    Thank you for referring your patient, Brandy Leon, to the [facility]. Please see a copy of my visit note below.    Past Medical History:   Diagnosis Date     Actinic keratosis 3/12/2013     Degenerative joint disease      Diabetes (H)      Gastroesophageal reflux disease without esophagitis 12/11/2020     Rheumatic fever 1957    x2 between the ages of 15-18     Seasonal allergies      Patient Active Problem List   Diagnosis     Seasonal allergies     Hypothyroidism     Hyperlipidemia LDL goal <100     Fibromyalgia     Osteoarthritis     Obesity     Type 2 diabetes mellitus with hyperglycemia, with long-term current use of insulin (H)     Hypertension goal BP (blood pressure) < 140/90     Overactive bladder     Recurrent UTI     Pseudophakia of both eyes     DINORA (obstructive sleep apnea)- severe (AHI 56)     Contusion of right knee     Gait disorder     Gastroesophageal reflux disease without esophagitis     Urge incontinence     Chronic kidney disease, stage 2 (mild)     Hydrocephalus, unspecified type (H)     Diabetes mellitus with peripheral vascular disease (H)     Past Surgical History:   Procedure Laterality Date     BLEPHAROPLASTY Bilateral 4/1/2022    Procedure: Bilateral upper eyelid blepharoplasty;  Surgeon: Fidelia Moran MD;  Location: [facility] OR     CATARACT IOL, RT/LT       COLONOSCOPY       COLONOSCOPY N/A 8/13/2015    Procedure: COLONOSCOPY;  Surgeon: Duane, William Charles, MD;  Location: [facility] OR     COLONOSCOPY WITH CO2 INSUFFLATION N/A 8/13/2015    Procedure: COLONOSCOPY WITH CO2 INSUFFLATION;  Surgeon: Duane, William Charles, MD;  Location: [facility]     PHACOEMULSIFICATION WITH STANDARD INTRAOCULAR LENS IMPLANT Left 10/25/2018    Procedure: LEFT PHACOEMULSIFICATION WITH STANDARD INTRAOCULAR LENS IMPLANT;  Surgeon: Thomas Serna MD;  Location:  [facility] OR     PHACOEMULSIFICATION WITH STANDARD INTRAOCULAR LENS IMPLANT Right 11/8/2018    Procedure: RIGHT PHACOEMULSIFICATION WITH STANDARD INTRAOCULAR LENS IMPLANT;  Surgeon: Thomas Serna MD;  Location: [facility] OR     THYROIDECTOMY        ZZC APPENDECTOMY       ZZC ARTHROPLASTY TMJ       Social History     Socioeconomic History     Marital status:      Spouse name: Not on file     Number of children: Not on file     Years of education: Not on file     Highest education level: Not on file   Occupational History     Occupation:    Tobacco Use     Smoking status: Never Smoker     Smokeless tobacco: Never Used     Tobacco comment: has had exposure to second hand smoke in the past from [employer], no current exposure   Substance and Sexual Activity     Alcohol use: No     Drug use: No     Sexual activity: Never   Other Topics Concern     Parent/sibling w/ CABG, MI or angioplasty before 65F 55M? 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     Hobby Hazards No     Sleep Concern Yes     Comment: difficulty staying asleep, up and down all night     Stress Concern No     Weight Concern Yes     Comment: would like to lose     Special Diet Yes     Comment: low card, low sugar diet     Back Care Yes     Comment: chiropratior     Exercise Yes     Comment: almost everyday     Bike Helmet No     Comment: does not ride bicycle any more     Seat Belt Yes     Self-Exams Yes   Social History Narrative     Not on file     Social Determinants of Health     Financial Resource Strain: Not on file   Food Insecurity: Not on file   Transportation Needs: Not on file   Physical Activity: Not on file   Stress: Not on file   Social Connections: Not on file   Intimate Partner Violence: Not on file   Housing Stability: Not on file     Family  History   Problem Relation Age of Onset     Eye Disorder Mother         cataracts     Cancer Father         skin and leukemia     Cancer Sister         Breast Cancer     Eye Disorder Brother         cataract     Cerebrovascular Disease Maternal Grandfather      Cerebrovascular Disease Paternal Grandmother      Eye Disorder Paternal Grandmother         cataracts     Cerebrovascular Disease Paternal Grandfather      Heart Disease Paternal Grandfather      Breast Cancer Daughter      Endometrial Cancer Daughter      Glaucoma No family hx of      Macular Degeneration No family hx of      Lab Results   Component Value Date    A1C 9.6 03/28/2022    A1C 9.5 12/20/2021    A1C 9.8 09/20/2021    A1C 10.3 05/17/2021    A1C 9.2 02/16/2021    A1C 8.7 11/12/2020    A1C 10.0 08/20/2020    A1C 10.5 01/10/2020     SUBJECTIVE FINDINGS:  An 81-year-old returns clinic with daughter for Onychomycosis and Diabetic foot cares.  She relates that she is doing well.  She is not wearing Diabetic shoes.  She does not want to get any Diabetic shoes.  She relates to numbness, tingling and neuropathy in her feet.  Relates no ulcers or sores since we have seen her last.  Relates she does not wear shoes in the house.  She has moisturizing lotion that she intermittently uses.     OBJECTIVE FINDINGS:  DP and PT are 2/4 bilaterally.  She has mild peripheral edema bilaterally.  She has dorsally contracted digits 2 through 5 bilaterally.  There is no erythema, no drainage, no odor, no calor bilaterally.  She has mild dry skin bilaterally.  She has incurvated nails with some dystrophy, thickening and subungual debris to differing degrees bilaterally, 1 through 5.     ASSESSMENT AND PLAN:  Onychomycosis and onychauxis bilaterally, diabetes with peripheral neuropathy and vascular disease.  Diagnosis and treatment options discussed with her.  I dispensed some MicroKlenz to use as needed.  She can clean her feet with MicroKlenz.  I advised her to start using  her moisturizing lotion.  She relates she will do this.  She relates she does not need a prescription for any more.  I advised her to wear her shoes in the house for protection.  Previous notes reviewed.  All the nails were debrided or reduced bilaterally upon consent.  Return to clinic and see me in 2-3 months.          Moderate level of medical decision making.           Again, thank you for allowing me to participate in the care of your patient.        Sincerely,        Ehsan Araya DPM

## 2022-07-06 NOTE — PROGRESS NOTES
Past Medical History:   Diagnosis Date     Actinic keratosis 3/12/2013     Degenerative joint disease      Diabetes (H)      Gastroesophageal reflux disease without esophagitis 12/11/2020     Rheumatic fever 1957    x2 between the ages of 15-18     Seasonal allergies      Patient Active Problem List   Diagnosis     Seasonal allergies     Hypothyroidism     Hyperlipidemia LDL goal <100     Fibromyalgia     Osteoarthritis     Obesity     Type 2 diabetes mellitus with hyperglycemia, with long-term current use of insulin (H)     Hypertension goal BP (blood pressure) < 140/90     Overactive bladder     Recurrent UTI     Pseudophakia of both eyes     DINORA (obstructive sleep apnea)- severe (AHI 56)     Contusion of right knee     Gait disorder     Gastroesophageal reflux disease without esophagitis     Urge incontinence     Chronic kidney disease, stage 2 (mild)     Hydrocephalus, unspecified type (H)     Diabetes mellitus with peripheral vascular disease (H)     Past Surgical History:   Procedure Laterality Date     BLEPHAROPLASTY Bilateral 4/1/2022    Procedure: Bilateral upper eyelid blepharoplasty;  Surgeon: Fidelia Moran MD;  Location: [facility] OR     CATARACT IOL, RT/LT       COLONOSCOPY       COLONOSCOPY N/A 8/13/2015    Procedure: COLONOSCOPY;  Surgeon: Duane, William Charles, MD;  [facility]: [facility]     COLONOSCOPY WITH CO2 INSUFFLATION N/A 8/13/2015    Procedure: COLONOSCOPY WITH CO2 INSUFFLATION;  Surgeon: Duane, William Charles, MD;  Location: [facility]     PHACOEMULSIFICATION WITH STANDARD INTRAOCULAR LENS IMPLANT Left 10/25/2018    Procedure: LEFT PHACOEMULSIFICATION WITH STANDARD INTRAOCULAR LENS IMPLANT;  Surgeon: Thomas Serna MD;  [facility]: [facility]     PHACOEMULSIFICATION WITH STANDARD INTRAOCULAR LENS IMPLANT Right 11/8/2018    Procedure: RIGHT PHACOEMULSIFICATION WITH STANDARD INTRAOCULAR LENS IMPLANT;  Surgeon: Thomas Serna MD;  Location: [facility]     THYROIDECTOMY        ZZC APPENDECTOMY        ZZC ARTHROPLASTY TMJ       Social History     Socioeconomic History     Marital status:      Spouse name: Not on file     Number of children: Not on file     Years of education: Not on file     Highest education level: Not on file   Occupational History     Occupation:    Tobacco Use     Smoking status: Never Smoker     Smokeless tobacco: Never Used     Tobacco comment: has had exposure to second hand smoke in the past from [employer], no current exposure   Substance and Sexual Activity     Alcohol use: No     Drug use: No     Sexual activity: Never   Other Topics Concern     Parent/sibling w/ CABG, MI or angioplasty before 65F 55M? 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     Hobby Hazards No     Sleep Concern Yes     Comment: difficulty staying asleep, up and down all night     Stress Concern No     Weight Concern Yes     Comment: would like to lose     Special Diet Yes     Comment: low card, low sugar diet     Back Care Yes     Comment: chiropratior     Exercise Yes     Comment: almost everyday     Bike Helmet No     Comment: does not ride bicycle any more     Seat Belt Yes     Self-Exams Yes   Social History Narrative     Not on file     Social Determinants of Health     Financial Resource Strain: Not on file   Food Insecurity: Not on file   Transportation Needs: Not on file   Physical Activity: Not on file   Stress: Not on file   Social Connections: Not on file   Intimate Partner Violence: Not on file   Housing Stability: Not on file     Family History   Problem Relation Age of Onset     Eye Disorder Mother         cataracts     Cancer Father         skin and leukemia     Cancer Sister         Breast Cancer     Eye Disorder Brother         cataract     Cerebrovascular Disease Maternal Grandfather       Cerebrovascular Disease Paternal Grandmother      Eye Disorder Paternal Grandmother         cataracts     Cerebrovascular Disease Paternal Grandfather      Heart Disease Paternal Grandfather      Breast Cancer Daughter      Endometrial Cancer Daughter      Glaucoma No family hx of      Macular Degeneration No family hx of      Lab Results   Component Value Date    A1C 9.6 03/28/2022    A1C 9.5 12/20/2021    A1C 9.8 09/20/2021    A1C 10.3 05/17/2021    A1C 9.2 02/16/2021    A1C 8.7 11/12/2020    A1C 10.0 08/20/2020    A1C 10.5 01/10/2020     SUBJECTIVE FINDINGS:  An 81-year-old returns clinic with daughter for Onychomycosis and Diabetic foot cares.  She relates that she is doing well.  She is not wearing Diabetic shoes.  She does not want to get any Diabetic shoes.  She relates to numbness, tingling and neuropathy in her feet.  Relates no ulcers or sores since we have seen her last.  Relates she does not wear shoes in the house.  She has moisturizing lotion that she intermittently uses.     OBJECTIVE FINDINGS:  DP and PT are 2/4 bilaterally.  She has mild peripheral edema bilaterally.  She has dorsally contracted digits 2 through 5 bilaterally.  There is no erythema, no drainage, no odor, no calor bilaterally.  She has mild dry skin bilaterally.  She has incurvated nails with some dystrophy, thickening and subungual debris to differing degrees bilaterally, 1 through 5.     ASSESSMENT AND PLAN:  Onychomycosis and onychauxis bilaterally, diabetes with peripheral neuropathy and vascular disease.  Diagnosis and treatment options discussed with her.  I dispensed some MicroKlenz to use as needed.  She can clean her feet with MicroKlenz.  I advised her to start using her moisturizing lotion.  She relates she will do this.  She relates she does not need a prescription for any more.  I advised her to wear her shoes in the house for protection.  Previous notes reviewed.  All the nails were debrided or reduced bilaterally upon  consent.  Return to clinic and see me in 2-3 months.          Moderate level of medical decision making.

## 2022-07-06 NOTE — NURSING NOTE
Brandy Leon's chief complaint for this visit includes:  Chief Complaint   Patient presents with     Follow Up     Diabetic foot check and toe nail trim     PCP: Fernanda Miller    Referring Provider:  No referring provider defined for this encounter.    There were no vitals taken for this visit.  Data Unavailable        Allergies   Allergen Reactions     Ace Inhibitors Cough     Estradiol Itching     Lipitor [Atorvastatin Calcium]      Weak and shaky     Sulfa Drugs      Rash       Zocor [Simvastatin]      Weak and shakey     Triamterene Dermatitis     Possible photosensitivity dermatitis, mild.  (changed to hctz alone 6/4/07, will see if this helps)         Do you need any medication refills at today's visit?

## 2022-07-08 ENCOUNTER — NURSE TRIAGE (OUTPATIENT)
Dept: NURSING | Facility: CLINIC | Age: 82
End: 2022-07-08

## 2022-07-08 ENCOUNTER — MEDICAL CORRESPONDENCE (OUTPATIENT)
Dept: HEALTH INFORMATION MANAGEMENT | Facility: CLINIC | Age: 82
End: 2022-07-08

## 2022-07-08 NOTE — TELEPHONE ENCOUNTER
I called and spoke to daughter, Juliet.  She is instructed to give the Lantus now and then the next couple days move the dose out by a couple hours.   Ex. Take the Lantus 38 units now, tomorrow evening, have her take her dose at 5:30 pm. Sun evening have her take the Lantus at 7:30 and then on Monday night she will be back on her regular schedule.  A1C done two days ago is 11.1%.  Juliet said her mom takes her own medications and as far as she can tell she is consistently taking her glipizide and lantus as Juliet picks up her mom's prescriptions on a monthly basis.  Brandy's WBC is also elevated so I encouraged Juliet to get her in to see her primary doctor or go to urgent care. She may have a UTI.  Brandy was last seen by Dr.Susanne Morris in Oct 2020.   Juliet is encouraged to call our clinic to get her in to see Dr.Susanne Morris or have her primary address the blood sugars. Most likely she will need meal time insulin.    Latesha Collazo RN, [facility]

## 2022-07-08 NOTE — TELEPHONE ENCOUNTER
OT from [facility] ([place]) calling with concerns about;    Reports Pt received 2nd Singles Vaccination on 7/6/22 and has not been feeling well since.  Extremely fatigued  Forgot to take her 38 U of glargine last PM  Today, OT reports Elevated BS of 451  States Pt had witnessed fall this AM - No injuries    BP :117/69  P :   101 ( laying down )    Daughter, Juliet is asking if Pt should take her insulin now or wait until usual time of 9:30 PM to keep it on schedule.    Message routed to PCP/care team to advise Pt within the hour.    Lindsay France RN, Nurse Advisor 2:10 PM 7/8/2022    Reason for Disposition    Blood glucose > 400 mg/dL (22.2 mmol/L)    Additional Information    Negative: Fever > 100.4 F (38.0 C)    Negative: Caller has URGENT medication or insulin pump question and triager unable to answer question    Negative: Blood glucose > 500 mg/dL (27.8 mmol/L)    Negative: Blood glucose > 240 mg/dL (13.3 mmol/L) AND urine ketones moderate-large (or more than 1+)    Negative: Blood glucose > 240 mg/dL (13.3 mmol/L) and blood ketones > 1.4 mmol/L    Negative: Blood glucose > 240 mg/dL (13.3 mmol/L) AND vomiting AND unable to check for ketones (in blood or urine)    Negative: Vomiting lasting > 4 hours    Negative: Patient sounds very sick or weak to the triager    Negative: Vomiting and signs of dehydration (e.g., very dry mouth, lightheaded, dark urine)    Negative: Blood glucose > 240 mg/dL (13.3 mmol/L) and rapid breathing    Negative: Unconscious or difficult to awaken    Negative: Acting confused (e.g., disoriented, slurred speech)    Negative: Very weak (can't stand)    Negative: Sounds like a life-threatening emergency to the triager    Protocols used: DIABETES - HIGH BLOOD SUGAR-A-OH

## 2022-07-11 ENCOUNTER — TELEPHONE (OUTPATIENT)
Dept: FAMILY MEDICINE | Facility: CLINIC | Age: 82
End: 2022-07-11

## 2022-07-11 NOTE — CONFIDENTIAL NOTE
Please call patient/family -  Hospital follow up within week.  Ok to use same day slot on Monday 7/18 - 3:40 pm.       MARISSAK

## 2022-07-12 ENCOUNTER — TELEPHONE (OUTPATIENT)
Dept: FAMILY MEDICINE | Facility: CLINIC | Age: 82
End: 2022-07-12

## 2022-07-12 NOTE — TELEPHONE ENCOUNTER
Left message for pt informing her that her provider will like to see her for hospital follow up appointment within a week.  I provided her with clinic number to return call.

## 2022-07-14 ENCOUNTER — TELEPHONE (OUTPATIENT)
Dept: FAMILY MEDICINE | Facility: CLINIC | Age: 82
End: 2022-07-14

## 2022-07-14 ENCOUNTER — MEDICAL CORRESPONDENCE (OUTPATIENT)
Dept: FAMILY MEDICINE | Facility: CLINIC | Age: 82
End: 2022-07-14

## 2022-07-14 NOTE — TELEPHONE ENCOUNTER
Form received from [employer]  Placed on Dr. Miller's desk for signature.

## 2022-07-18 ENCOUNTER — OFFICE VISIT (OUTPATIENT)
Dept: FAMILY MEDICINE | Facility: CLINIC | Age: 82
End: 2022-07-18
Payer: COMMERCIAL

## 2022-07-18 VITALS
HEART RATE: 106 BPM | RESPIRATION RATE: 20 BRPM | OXYGEN SATURATION: 96 % | TEMPERATURE: 97.5 F | SYSTOLIC BLOOD PRESSURE: 132 MMHG | WEIGHT: 180 LBS | BODY MASS INDEX: 30.73 KG/M2 | DIASTOLIC BLOOD PRESSURE: 70 MMHG | HEIGHT: 64 IN

## 2022-07-18 DIAGNOSIS — R74.8 ELEVATED CK: ICD-10-CM

## 2022-07-18 DIAGNOSIS — E11.65 TYPE 2 DIABETES MELLITUS WITH HYPERGLYCEMIA, WITH LONG-TERM CURRENT USE OF INSULIN (H): ICD-10-CM

## 2022-07-18 DIAGNOSIS — N39.41 URGE INCONTINENCE: ICD-10-CM

## 2022-07-18 DIAGNOSIS — Z12.31 ENCOUNTER FOR SCREENING MAMMOGRAM FOR BREAST CANCER: ICD-10-CM

## 2022-07-18 DIAGNOSIS — I10 HYPERTENSION GOAL BP (BLOOD PRESSURE) < 140/90: Primary | ICD-10-CM

## 2022-07-18 DIAGNOSIS — Z79.4 TYPE 2 DIABETES MELLITUS WITH HYPERGLYCEMIA, WITH LONG-TERM CURRENT USE OF INSULIN (H): ICD-10-CM

## 2022-07-18 DIAGNOSIS — M79.10 MUSCLE PAIN: ICD-10-CM

## 2022-07-18 LAB
ALBUMIN UR-MCNC: NEGATIVE MG/DL
APPEARANCE UR: CLEAR
BILIRUB UR QL STRIP: NEGATIVE
COLOR UR AUTO: YELLOW
ERYTHROCYTE [SEDIMENTATION RATE] IN BLOOD BY WESTERGREN METHOD: 17 MM/HR (ref 0–30)
GLUCOSE UR STRIP-MCNC: >=1000 MG/DL
HGB UR QL STRIP: NEGATIVE
KETONES UR STRIP-MCNC: NEGATIVE MG/DL
LEUKOCYTE ESTERASE UR QL STRIP: NEGATIVE
NITRATE UR QL: NEGATIVE
PH UR STRIP: 5.5 [PH] (ref 5–7)
SP GR UR STRIP: 1.01 (ref 1–1.03)
UROBILINOGEN UR STRIP-ACNC: 0.2 E.U./DL

## 2022-07-18 PROCEDURE — 85652 RBC SED RATE AUTOMATED: CPT | Performed by: FAMILY MEDICINE

## 2022-07-18 PROCEDURE — 86140 C-REACTIVE PROTEIN: CPT | Performed by: FAMILY MEDICINE

## 2022-07-18 PROCEDURE — 82550 ASSAY OF CK (CPK): CPT | Performed by: FAMILY MEDICINE

## 2022-07-18 PROCEDURE — 81003 URINALYSIS AUTO W/O SCOPE: CPT | Performed by: FAMILY MEDICINE

## 2022-07-18 PROCEDURE — 36415 COLL VENOUS BLD VENIPUNCTURE: CPT | Performed by: FAMILY MEDICINE

## 2022-07-18 PROCEDURE — 99215 OFFICE O/P EST HI 40 MIN: CPT | Performed by: FAMILY MEDICINE

## 2022-07-18 ASSESSMENT — PAIN SCALES - GENERAL: PAINLEVEL: NO PAIN (0)

## 2022-07-18 NOTE — PROGRESS NOTES
Assessment & Plan     Hypertension goal BP (blood pressure) < 140/90  Blood pressure is under good control.  Continue current treatment.    Type 2 diabetes mellitus with hyperglycemia, with long-term current use of insulin (H)  Continued elevated A1c on testing.  She has had difficulty obtaining some of the medication that has worked for her in the past.  Attempts to assist through the patient prescription assistance program by [employer] have been made.  She has had a  in the patient assistance program and I have given her the phone number and I have a patient or her daughter will try to contact them.  We have been attempting to get a combination of insulin glargine along with lixisenatide .  Referral endocrinology and diabetic education as planned.  - Adult Endocrinology  Referral; Future  - [facility] Adult Diabetes Educator Referral; Future    Urge incontinence  Urine testing is normal at this time.  She did have a UTI while in the hospital.  Currently with glucosuria related to her elevated blood sugar but no sign of infection noted.  - UA Macro with Reflex to Micro and Culture - lab collect; Future  - UA Macro with Reflex to Micro and Culture - lab collect    Muscle pain  Repeat testing for inflammation as well as muscle enzyme testing performed.  These are all normal today.  The elevated CK at her hospitalization was likely due to the prolonged confinement to her chair as well as her fall.  - CK total; Future  - ESR: Erythrocyte sedimentation rate; Future  - CRP, inflammation; Future  - CK total  - ESR: Erythrocyte sedimentation rate  - CRP, inflammation    Elevated CK  As above  - CK total; Future  - ESR: Erythrocyte sedimentation rate; Future  - CRP, inflammation; Future  - CK total  - ESR: Erythrocyte sedimentation rate  - CRP, inflammation    Encounter for screening mammogram for breast cancer  Discussed mammogram and patient is interested in pursuing this.  Orders placed  - *MA Screening  "Digital Bilateral; Future    Review of prior external note(s) from - CareEverywhere information from [facility] reviewed  Review of the result(s) of each unique test - Urinalysis, CK, CBC  Ordering of each unique test  Prescription drug management  45 minutes spent on the date of the encounter doing chart review, review of outside records, review of test results, interpretation of tests, patient visit, documentation and discussion with family        BMI:   Estimated body mass index is 30.9 kg/m  as calculated from the following:    Height as of this encounter: 1.626 m (5' 4\").    Weight as of this encounter: 81.6 kg (180 lb).   Weight management plan: Discussed healthy diet and exercise guidelines    Patient Instructions   Medication Adherence/Access: Need to contact the [place] Prescription Assistance Program/Fund (Francesca Correa - [phone]) for information about Soliqua, Synthroid.    Follow up labs to recheck the muscle testing  Repeat urine testing.    Referral to Endocrinology and to diabetic education planned.      Mammogram recommended.   You can call [phone] to schedule this at the [facility] building in [facility] in about 3 months (around 10/18/2022) for wellness exam.    Fernanda Miller MD  [facility] BARBARA Cummings is a 81 year old accompanied by her daughter, presenting for the following health issues:  Hospital F/U      [facility]       Hospital Follow-up Visit:    Hospital/Nursing Home/IP Rehab Facility: [facility]  Date of Admission: 7/08/22  Date of Discharge: 7/11/22  Reason(s) for Admission: fall      Was your hospitalization related to COVID-19? No   Problems taking medications regularly:  None  Medication changes since discharge: None  Problems adhering to non-medication therapy:  None    Summary of hospitalization:  CareEverywhere information obtained and reviewed  Diagnostic Tests/Treatments reviewed.  Follow up needed: none  Other " Healthcare Providers Involved in Patient s Care:         Homecare, Physical Therapy and MTM  Update since discharge: stable. Post Discharge Medication Reconciliation: discharge medications reconciled, continue medications without change.  Plan of care communicated with patient and family          Diabetes Follow-up    How often are you checking your blood sugar? One time daily  What time of day are you checking your blood sugars (select all that apply)?  Before meals  Have you had any blood sugars above 200?  Yes   Have you had any blood sugars below 70?  No    What symptoms do you notice when your blood sugar is low?  None    What concerns do you have today about your diabetes?  Blood sugar is often over 200     Do you have any of these symptoms? (Select all that apply)  Numbness in feet    Have you had a diabetic eye exam in the last 12 months? Yes- Date of last eye exam: 5/6/22,  Location: Dr. Cardoso, [facility]        BP Readings from Last 2 Encounters:   07/18/22 132/70   06/21/22 134/84     Hemoglobin A1C POCT (%)   Date Value   05/17/2021 10.3 (H)   02/16/2021 9.2 (H)     Hemoglobin A1C (%)   Date Value   07/06/2022 11.1 (H)   03/28/2022 9.6 (H)     LDL Cholesterol Calculated (mg/dL)   Date Value   11/16/2021 97   11/12/2020 102 (H)   10/25/2019 91         Hypertension Follow-up      Do you check your blood pressure regularly outside of the clinic? Yes     Are you following a low salt diet? Yes    Are your blood pressures ever more than 140 on the top number (systolic) OR more   than 90 on the bottom number (diastolic), for example 140/90? No      Events prior to her hospitalization included having received the shingles vaccine on Wednesday.  Then on Thursday felt fatigued.  Later that evening she was unable to get up out of chair so she remained in her recliner all night.  She did not get insulin in evening.  The following morning she called her daughter who came to help her.  She was able to get Zoila to the  "bathroom but she was fatigued from being up all night.  When attempting to get up from the toilet she fell in bathroom.  Her daughter was unable to get her up and called her son to come to help her up.          Over weekend arms and thighs pain -  Elevated CK in the hospital but levels decreasing.        Review of Systems   Constitutional, HEENT, cardiovascular, pulmonary, gi and gu systems are negative, except as otherwise noted.      Objective    /70 (BP Location: Right arm, Patient Position: Sitting, Cuff Size: Adult Regular)   Pulse 106   Temp 97.5  F (36.4  C) (Oral)   Resp 20   Ht 1.626 m (5' 4\")   Wt 81.6 kg (180 lb)   SpO2 96%   BMI 30.90 kg/m    Body mass index is 30.9 kg/m .  Physical Exam   GENERAL: alert, no distress, obese, elderly and fatigued  NECK: no adenopathy, no asymmetry, masses, or scars and thyroid normal to palpation  RESP: lungs clear to auscultation - no rales, rhonchi or wheezes  CV: regular rate and rhythm, normal S1 S2, no S3 or S4, no murmur, click or rub, no peripheral edema and peripheral pulses strong  MS: no gross musculoskeletal defects noted, no edema  SKIN: no suspicious lesions or rashes  BACK: no CVA tenderness, no paralumbar tenderness  PSYCH: mentation appears normal, affect normal/bright    Lab on 07/06/2022   Component Date Value Ref Range Status     Hemoglobin A1C 07/06/2022 11.1 (A) 0.0 - 5.6 % Final    Normal <5.7%   Prediabetes 5.7-6.4%    Diabetes 6.5% or higher     Note: Adopted from ADA consensus guidelines.     Results for orders placed or performed in visit on 07/18/22   CK total     Status: Normal   Result Value Ref Range     30 - 225 U/L   ESR: Erythrocyte sedimentation rate     Status: Normal   Result Value Ref Range    Erythrocyte Sedimentation Rate 17 0 - 30 mm/hr   CRP, inflammation     Status: Normal   Result Value Ref Range    CRP Inflammation 5.5 0.0 - 8.0 mg/L   UA Macro with Reflex to Micro and Culture - lab collect     Status: " Abnormal    Specimen: Urine, Midstream   Result Value Ref Range    Color Urine Yellow Colorless, Straw, Light Yellow, Yellow    Appearance Urine Clear Clear    Glucose Urine >=1000 (A) Negative mg/dL    Bilirubin Urine Negative Negative    Ketones Urine Negative Negative mg/dL    Specific Gravity Urine 1.010 1.003 - 1.035    Blood Urine Negative Negative    pH Urine 5.5 5.0 - 7.0    Protein Albumin Urine Negative Negative mg/dL    Urobilinogen Urine 0.2 0.2, 1.0 E.U./dL    Nitrite Urine Negative Negative    Leukocyte Esterase Urine Negative Negative    Narrative    Microscopic not indicated               This chart was documented by provider using a voice activated software called Dragon in addition to manual typing. There may be vocabulary errors or other grammatical errors due to this.               .  ..

## 2022-07-18 NOTE — PATIENT INSTRUCTIONS
Medication Adherence/Access: Need to contact the [place] Prescription Assistance Program/Fund (Francesca Correa - [phone]) for information about Soliqua, Synthroid.    Follow up labs to recheck the muscle testing  Repeat urine testing.    Referral to Endocrinology and to diabetic education planned.      Mammogram recommended.   You can call [phone] to schedule this at the [facility] building in [place]

## 2022-07-19 ENCOUNTER — MEDICAL CORRESPONDENCE (OUTPATIENT)
Dept: FAMILY MEDICINE | Facility: CLINIC | Age: 82
End: 2022-07-19

## 2022-07-19 LAB
CK SERPL-CCNC: 138 U/L (ref 30–225)
CRP SERPL-MCNC: 5.5 MG/L (ref 0–8)

## 2022-07-22 NOTE — RESULT ENCOUNTER NOTE
Your urine testing does not show any infection present.  There is elevated sugar present which is related to the higher blood sugars.  Testing for inflammatory markers and muscle enzymes are all normal.  The muscle enzyme test (CK) is much improved compared with the results found during your hospitalization.  Please call or MyChart message me if you have any questions.      CK

## 2022-07-25 DIAGNOSIS — E11.65 TYPE 2 DIABETES MELLITUS WITH HYPERGLYCEMIA (H): ICD-10-CM

## 2022-07-26 RX ORDER — PEN NEEDLE, DIABETIC 32GX 5/32"
NEEDLE, DISPOSABLE MISCELLANEOUS
Qty: 100 EACH | Refills: 2 | Status: SHIPPED | OUTPATIENT
Start: 2022-07-26 | End: 2023-04-10

## 2022-07-27 ENCOUNTER — TELEPHONE (OUTPATIENT)
Dept: FAMILY MEDICINE | Facility: CLINIC | Age: 82
End: 2022-07-27

## 2022-07-28 ENCOUNTER — MEDICAL CORRESPONDENCE (OUTPATIENT)
Dept: HEALTH INFORMATION MANAGEMENT | Facility: CLINIC | Age: 82
End: 2022-07-28

## 2022-08-01 ENCOUNTER — TELEPHONE (OUTPATIENT)
Dept: FAMILY MEDICINE | Facility: CLINIC | Age: 82
End: 2022-08-01

## 2022-08-08 ENCOUNTER — TELEPHONE (OUTPATIENT)
Dept: NEUROLOGY | Facility: CLINIC | Age: 82
End: 2022-08-08

## 2022-08-08 ENCOUNTER — TELEPHONE (OUTPATIENT)
Dept: FAMILY MEDICINE | Facility: CLINIC | Age: 82
End: 2022-08-08

## 2022-08-08 NOTE — TELEPHONE ENCOUNTER
Our goal is to have forms completed within 72 hours, however some forms may require a visit or additional information.    What clinic location was the form placed at [facility]    Who is the form from? [facility]  Where did the form come from? Faxed to clinic   The form was placed in the inbox of Dr. Miller    Please fax to [phone]    Additional comments:

## 2022-08-08 NOTE — TELEPHONE ENCOUNTER
[facility] Call Center    Phone Message    May a detailed message be left on voicemail: yes     Reason for Call:     JACOB Sanchez from [facility] called requesting for verbal orders PT    PT once a week for 5 weeks     Please call back at [phone] Laura     Action Taken: Message routed to:  [facility]    Travel Screening: Not Applicable

## 2022-08-09 ENCOUNTER — TELEPHONE (OUTPATIENT)
Dept: FAMILY MEDICINE | Facility: CLINIC | Age: 82
End: 2022-08-09

## 2022-08-09 NOTE — TELEPHONE ENCOUNTER
Our goal is to have forms completed within 72 hours, however some forms may require a visit or additional information.    What clinic location was the form placed at [facility]    [facility]  Where did the form come from? Faxed to clinic   The form was placed in the inbox of Dr. Miller    Please fax to [phone]    Additional comments: plan of care

## 2022-08-10 ENCOUNTER — MEDICAL CORRESPONDENCE (OUTPATIENT)
Dept: FAMILY MEDICINE | Facility: CLINIC | Age: 82
End: 2022-08-10

## 2022-08-10 ENCOUNTER — TELEPHONE (OUTPATIENT)
Dept: FAMILY MEDICINE | Facility: CLINIC | Age: 82
End: 2022-08-10

## 2022-08-10 NOTE — TELEPHONE ENCOUNTER
Our goal is to have forms completed within 72 hours, however some forms may require a visit or additional information.    What clinic location was the form placed at [facility]    [facility]  Where did the form come from? Faxed to clinic   The form was placed in the inbox of Dr. Miller    Please fax to [phone]    Additional comments: plan of care

## 2022-08-11 ENCOUNTER — VIRTUAL VISIT (OUTPATIENT)
Dept: EDUCATION SERVICES | Facility: CLINIC | Age: 82
End: 2022-08-11
Attending: FAMILY MEDICINE
Payer: COMMERCIAL

## 2022-08-11 DIAGNOSIS — E11.65 TYPE 2 DIABETES MELLITUS WITH HYPERGLYCEMIA, WITH LONG-TERM CURRENT USE OF INSULIN (H): ICD-10-CM

## 2022-08-11 DIAGNOSIS — Z79.4 TYPE 2 DIABETES MELLITUS WITH HYPERGLYCEMIA, WITH LONG-TERM CURRENT USE OF INSULIN (H): ICD-10-CM

## 2022-08-11 PROCEDURE — G0108 DIAB MANAGE TRN  PER INDIV: HCPCS | Mod: 95 | Performed by: DIETITIAN, REGISTERED

## 2022-08-11 RX ORDER — BLOOD SUGAR DIAGNOSTIC
STRIP MISCELLANEOUS
Qty: 100 STRIP | Refills: 4 | Status: SHIPPED | OUTPATIENT
Start: 2022-08-11 | End: 2023-02-06

## 2022-08-11 RX ORDER — LANCETS 30 GAUGE
1 EACH MISCELLANEOUS 3 TIMES DAILY
Qty: 100 EACH | Refills: 11 | Status: SHIPPED | OUTPATIENT
Start: 2022-08-11

## 2022-08-11 NOTE — LETTER
"    8/11/2022         RE: Brandy Leon  [address]  [place] [address]        Dear Colleague,    Thank you for referring your patient, Brandy Leon, to the [facility] DIABETES EDUCATION [place]. Please see a copy of my visit note below.    Diabetes Self-Management Education & Support    Presents for: Individual review  Type of service:  Video Visit  Originating Location (pt. Location): Home  Distant Location (provider location): Home  Mode of Communication:  Video Conference via babberly  Start: 08/11/2022 12:03 pm  Stop: 08/11/2022 01:06 pm  How would patient like to obtain AVS? MyChart    SUBJECTIVE/OBJECTIVE:  Presents for: Individual review  Accompanied by: Self  Diabetes education in the past 24mo: No  Focus of Visit: Other, Reducing Risks, Taking Medication  Diabetes type: Type 2  Other concerns:: None  Cultural Influences/Ethnic Background:  Choose not to answer    Diabetes Symptoms & Complications:  Not assessed at this visit     Patient Problem List and Family Medical History reviewed for relevant medical history, current medical status, and diabetes risk factors.    Vitals:  There were no vitals taken for this visit.  Estimated body mass index is 30.9 kg/m  as calculated from the following:    Height as of 7/18/22: 1.626 m (5' 4\").    Weight as of 7/18/22: 81.6 kg (180 lb).   Last 3 BP:   BP Readings from Last 3 Encounters:   07/18/22 132/70   06/21/22 134/84   06/14/22 122/75       History   Smoking Status     Never Smoker   Smokeless Tobacco     Never Used     Comment: has had exposure to second hand smoke in the past from [place], no current exposure       Labs:  Lab Results   Component Value Date    A1C 11.1 07/06/2022    A1C 10.3 05/17/2021     Lab Results   Component Value Date     04/01/2022     03/28/2022     04/02/2021     Lab Results   Component Value Date    LDL 97 11/16/2021     11/12/2020     HDL Cholesterol   Date Value Ref " Range Status   11/12/2020 55 >49 mg/dL Final     Direct Measure HDL   Date Value Ref Range Status   11/16/2021 52 >=50 mg/dL Final   ]  GFR Estimate   Date Value Ref Range Status   03/28/2022 78 >60 mL/min/1.73m2 Final     Comment:     Effective December 21, 2021 eGFRcr in adults is calculated using the 2021 CKD-EPI creatinine equation which includes age and gender (Adriana colon al., [place], DOI: 10.1056/SHSFim9524223)   04/02/2021 74 >60 mL/min/[1.73_m2] Final     Comment:     Non  GFR Calc  Starting 12/18/2018, serum creatinine based estimated GFR (eGFR) will be   calculated using the Chronic Kidney Disease Epidemiology Collaboration   (CKD-EPI) equation.       GFR, ESTIMATED POCT   Date Value Ref Range Status   09/28/2021 >60 >60 mL/min/1.73m2 Final     GFR Estimate If Black   Date Value Ref Range Status   04/02/2021 86 >60 mL/min/[1.73_m2] Final     Comment:      GFR Calc  Starting 12/18/2018, serum creatinine based estimated GFR (eGFR) will be   calculated using the Chronic Kidney Disease Epidemiology Collaboration   (CKD-EPI) equation.       Lab Results   Component Value Date    CR 0.77 03/28/2022    CR 0.76 04/02/2021     No results found for: MICROALBUMIN    Healthy Eating:  Healthy Eating Assessed Today: Yes  Breakfast: raisin toast with cheese OR eggs  +coffee and prune juice (8oz).  Could try whole prunes, whole wheat high fiber toast, make sure to get protein.  Sometimes donuts.  Coffeee with whole milk.   Usually skips lunch - no snacks.  Once in awhile popcorn  / small snacks   Dinner 4pm: starts cooking right away.   [place], peas, potatoes.  Working on salads   Sometimes Arbys or Gryos,   Loves shrimp fried rice   Drinks lots of water       Being Active:  Being Active Assessed Today: Yes  Exercise:: Yes    Monitoring:  Monitoring Assessed Today: Yes  Blood Glucose Meter: One Touch  Times checking blood sugar at home (number): 1  Times checking blood sugar at home (per):  Day  Didn't take blood sugar this morning - doesn't do everyday  180s before bed.  Can't remember, maybe 85 to 125     Taking Medications:  Diabetes Medication(s)     Biguanides       metFORMIN (GLUCOPHAGE) 1000 MG tablet    TAKE 1 TABLET BY MOUTH TWICE A DAY WITH MEALS    Insulin       insulin glargine (BASAGLAR KWIKPEN) 100 UNIT/ML pen    Inject 36 Units Subcutaneous daily    Sulfonylureas       glipiZIDE (GLUCOTROL XL) 10 MG 24 hr tablet    TAKE 1 TABLET BY MOUTH TWICE A DAY            Taking Medication Assessed Today: Yes    Problem Solving:  Not assessed at this visit     Reducing Risks:  Reducing Risks Assessed Today: No    Healthy Coping:  Healthy Coping Assessed Today: Yes  Emotional response to diabetes: Ready to learn  Stage of change: ACTION (Actively working towards change)  Patient Activation Measure Survey Score:  JACIEL Score (Last Two) 2/21/2011   JACIEL Raw Score 39   Activation Score 56.4   JACIEL Level 3     Diabetes knowledge and skills assessment:   Patient is knowledgeable in diabetes management concepts related to: Healthy Eating, Being Active, Monitoring and Taking Medication  Patient needs further education on the following diabetes management concepts: Monitoring, Taking Medication, Problem Solving and Reducing Risks  Based on learning assessment above, most appropriate setting for further diabetes education would be: Individual setting.    INTERVENTIONS:  Education provided today on:  AADE Self-Care Behaviors:  Diabetes Pathophysiology  Healthy Eating: carbohydrate counting and consistency in amount, composition, and timing of food intake  Being Active: relationship to blood glucose  Monitoring: log and interpret results, individual blood glucose targets and frequency of monitoring  Taking Medication: action of prescribed medication  Problem Solving: high blood glucose - causes, signs/symptoms, treatment and prevention, low blood glucose - causes, signs/symptoms, treatment and prevention, carrying  a carbohydrate source at all times and when to call health care provider    Opportunities for ongoing education and support in diabetes-self management were discussed.  Pt verbalized understanding of concepts discussed and recommendations provided today.       ASSESSMENT:  Spoke with Zoila and her daughter on video.  Zoila is working with MTM and has not worked with Diabetes ed in the past.  Writer reviewed how MTM, PCP and Diabetes ed can work as a team and it is good to have a multidisciplinary approach which they agree with.     Reviewed medications, insulin (basal versus rapid), GLP1s,  GLP1 insulin combos, and patient assistance programs (likely qualifies income based for [employer] or [employer]).  If qualifying for one of these programs, could consider a once weekly GLP1 (over soliqua as previously mentioned) so that there is more control of adjustment of doses being separate and also discussed role of mealtime insulin.  Dtr and patient are currently unclear on medication costs and writer recommended looking at an EOB to see where they are at (? If insulin is capped at 35$ etc).  Zoila's dtr works at [employer] and has a good understanding of insurance and will look into these things.  Would still need a patient assistance program for a GLP even if insulin cost was capped.      Unable to make medication adjustments today as only fasting blood sugar is being checked and this sounds to be in target, but not in line with where last A1c was.  Based on A1c would then suspect there is possibly a significant post prandial rises after meals.  Discussed when to check later in the day and rotate.  Has an older ultra meter and sent in a new prescription for a verio for more accurate results.  Sent in for 3 strips/day per medicare part B guidelines, dtr will check if strips are through medicare B or [facility] (could possibly get more than 3/day if through [facility]).   Zoila has no interest in a personal continuous glucose monitor and would  much prefer to do finger sticks.       Future consideration - could look at changing the basal insulin timing to morning due to possible overnight drop with higher day time blood sugars.  This likely wouldn't bring blood sugars into target, but may help with lowering slightly and could minimize overnight hypoglycemia risk possibly allowing for higher basal dosing. Currently on 0.46units/kg.     Discussed experimenting with food, different meals and post prandial checks.  Does want to continue pleasure foods nor be too restrictive given age and eating for quality of life.  Reviewed small realistic changes to try.     Activity; physical therapy and occupational therapy sessions which are helping.        Patient's most recent   Lab Results   Component Value Date    A1C 11.1 07/06/2022    A1C 10.3 05/17/2021    is not meeting goal of <8.0    PLAN  Continue with positive diet & lifestyle changes   Increase blood sugar checks from 1/day to 2-3/day with post prandial checks, new meter when able   [place] with balancing macronutrients at meals, make sure to get healthy proteins/fats and breakfast and watch the carbohydrate amount.   9/23 Diabetes ed follow up, melindat before then with any questions  / blood sugar updates  Chars dtr will look at insurance coverage, EOB, insulin caps, drug costs etc.     Stephanie Carrillo RD, LD, [facility]  Diabetes Education    Time Spent: 63 minutes  Encounter Type: Individual      A copy of this encounter was shared with the provider.

## 2022-08-11 NOTE — PATIENT INSTRUCTIONS
Increase blood sugar checks to 2-3 times daily.    2 hours post meal or before the next meal.  Clean hands for accuracy.     Switch from Ultra to verio - are we limited to 3 strips/day per medicare part B OR are these run through [facility] which may allow more strips.     Increase blood sugar checks - this is an experiment!  Try different foods and different times of day     Goal - < 200 two hours after a meal.    Try to get back to < 150 before dinner     Try 4-5 whole prunes + glass of water instead of prune juice     Review EOB, ? Phase 3 (donut hole).  Are insulin costs capped at 35$ throughout the year?      Patient assistance programs - income based and can get on if < 400% FPL (could get free Trulicity or Ozempic and insulin)     Keep up the great work!!     Stephanie Carrillo RD, LD, [facility]  Diabetes Education

## 2022-08-11 NOTE — PROGRESS NOTES
"Diabetes Self-Management Education & Support    Presents for: Individual review  Type of service:  Video Visit  Originating Location (pt. Location): Home  Distant Location (provider location): Home  Mode of Communication:  Video Conference via JasonDB  Start: 08/11/2022 12:03 pm  Stop: 08/11/2022 01:06 pm  How would patient like to obtain AVS? Denise    SUBJECTIVE/OBJECTIVE:  Presents for: Individual review  Accompanied by: Self  Diabetes education in the past 24mo: No  Focus of Visit: Other, Reducing Risks, Taking Medication  Diabetes type: Type 2  Other concerns:: None  Cultural Influences/Ethnic Background:  Choose not to answer    Diabetes Symptoms & Complications:  Not assessed at this visit     Patient Problem List and Family Medical History reviewed for relevant medical history, current medical status, and diabetes risk factors.    Vitals:  There were no vitals taken for this visit.  Estimated body mass index is 30.9 kg/m  as calculated from the following:    Height as of 7/18/22: 1.626 m (5' 4\").    Weight as of 7/18/22: 81.6 kg (180 lb).   Last 3 BP:   BP Readings from Last 3 Encounters:   07/18/22 132/70   06/21/22 134/84   06/14/22 122/75       History   Smoking Status     Never Smoker   Smokeless Tobacco     Never Used     Comment: has had exposure to second hand smoke in the past from [place], no current exposure       Labs:  Lab Results   Component Value Date    A1C 11.1 07/06/2022    A1C 10.3 05/17/2021     Lab Results   Component Value Date     04/01/2022     03/28/2022     04/02/2021     Lab Results   Component Value Date    LDL 97 11/16/2021     11/12/2020     HDL Cholesterol   Date Value Ref Range Status   11/12/2020 55 >49 mg/dL Final     Direct Measure HDL   Date Value Ref Range Status   11/16/2021 52 >=50 mg/dL Final   ]  GFR Estimate   Date Value Ref Range Status   03/28/2022 78 >60 mL/min/1.73m2 Final     Comment:     Effective December 21, 2021 eGFRcr in " adults is calculated using the 2021 CKD-EPI creatinine equation which includes age and gender (Adriana colon al., [place], DOI: 10.1056/UAWKdy4589717)   04/02/2021 74 >60 mL/min/[1.73_m2] Final     Comment:     Non  GFR Calc  Starting 12/18/2018, serum creatinine based estimated GFR (eGFR) will be   calculated using the Chronic Kidney Disease Epidemiology Collaboration   (CKD-EPI) equation.       GFR, ESTIMATED POCT   Date Value Ref Range Status   09/28/2021 >60 >60 mL/min/1.73m2 Final     GFR Estimate If Black   Date Value Ref Range Status   04/02/2021 86 >60 mL/min/[1.73_m2] Final     Comment:      GFR Calc  Starting 12/18/2018, serum creatinine based estimated GFR (eGFR) will be   calculated using the Chronic Kidney Disease Epidemiology Collaboration   (CKD-EPI) equation.       Lab Results   Component Value Date    CR 0.77 03/28/2022    CR 0.76 04/02/2021     No results found for: MICROALBUMIN    Healthy Eating:  Healthy Eating Assessed Today: Yes  Breakfast: raisin toast with cheese OR eggs  +coffee and prune juice (8oz).  Could try whole prunes, whole wheat high fiber toast, make sure to get protein.  Sometimes donuts.  Coffeee with whole milk.   Usually skips lunch - no snacks.  Once in awhile popcorn  / small snacks   Dinner 4pm: starts cooking right away.   [place], peas, potatoes.  Working on salads   Sometimes Arbys or Gryos,   Loves shrimp fried rice   Drinks lots of water       Being Active:  Being Active Assessed Today: Yes  Exercise:: Yes    Monitoring:  Monitoring Assessed Today: Yes  Blood Glucose Meter: One Touch  Times checking blood sugar at home (number): 1  Times checking blood sugar at home (per): Day  Didn't take blood sugar this morning - doesn't do everyday  180s before bed.  Can't remember, maybe 85 to 125     Taking Medications:  Diabetes Medication(s)     Biguanides       metFORMIN (GLUCOPHAGE) 1000 MG tablet    TAKE 1 TABLET BY MOUTH TWICE A DAY WITH MEALS     Insulin       insulin glargine (BASAGLAR KWIKPEN) 100 UNIT/ML pen    Inject 36 Units Subcutaneous daily    Sulfonylureas       glipiZIDE (GLUCOTROL XL) 10 MG 24 hr tablet    TAKE 1 TABLET BY MOUTH TWICE A DAY            Taking Medication Assessed Today: Yes    Problem Solving:  Not assessed at this visit     Reducing Risks:  Reducing Risks Assessed Today: No    Healthy Coping:  Healthy Coping Assessed Today: Yes  Emotional response to diabetes: Ready to learn  Stage of change: ACTION (Actively working towards change)  Patient Activation Measure Survey Score:  JACIEL Score (Last Two) 2/21/2011   JACIEL Raw Score 39   Activation Score 56.4   JACIEL Level 3     Diabetes knowledge and skills assessment:   Patient is knowledgeable in diabetes management concepts related to: Healthy Eating, Being Active, Monitoring and Taking Medication  Patient needs further education on the following diabetes management concepts: Monitoring, Taking Medication, Problem Solving and Reducing Risks  Based on learning assessment above, most appropriate setting for further diabetes education would be: Individual setting.    INTERVENTIONS:  Education provided today on:  AADE Self-Care Behaviors:  Diabetes Pathophysiology  Healthy Eating: carbohydrate counting and consistency in amount, composition, and timing of food intake  Being Active: relationship to blood glucose  Monitoring: log and interpret results, individual blood glucose targets and frequency of monitoring  Taking Medication: action of prescribed medication  Problem Solving: high blood glucose - causes, signs/symptoms, treatment and prevention, low blood glucose - causes, signs/symptoms, treatment and prevention, carrying a carbohydrate source at all times and when to call health care provider    Opportunities for ongoing education and support in diabetes-self management were discussed.  Pt verbalized understanding of concepts discussed and recommendations provided today.        ASSESSMENT:  Spoke with Zoila and her daughter on video.  Zoila is working with MTM and has not worked with Diabetes ed in the past.  Writer reviewed how MTM, PCP and Diabetes ed can work as a team and it is good to have a multidisciplinary approach which they agree with.     Reviewed medications, insulin (basal versus rapid), GLP1s,  GLP1 insulin combos, and patient assistance programs (likely qualifies income based for [employer] or [employer]).  If qualifying for one of these programs, could consider a once weekly GLP1 (over soliqua as previously mentioned) so that there is more control of adjustment of doses being separate and also discussed role of mealtime insulin.  Dtr and patient are currently unclear on medication costs and writer recommended looking at an EOB to see where they are at (? If insulin is capped at 35$ etc).  Zoila's dtr works at [employer] and has a good understanding of insurance and will look into these things.  Would still need a patient assistance program for a GLP even if insulin cost was capped.      Unable to make medication adjustments today as only fasting blood sugar is being checked and this sounds to be in target, but not in line with where last A1c was.  Based on A1c would then suspect there is possibly a significant post prandial rises after meals.  Discussed when to check later in the day and rotate.  Has an older ultra meter and sent in a new prescription for a verio for more accurate results.  Sent in for 3 strips/day per medicare part B guidelines, dtr will check if strips are through medicare B or [facility] (could possibly get more than 3/day if through [facility]).   Zoila has no interest in a personal continuous glucose monitor and would much prefer to do finger sticks.       Future consideration - could look at changing the basal insulin timing to morning due to possible overnight drop with higher day time blood sugars.  This likely wouldn't bring blood sugars into target, but may help with  lowering slightly and could minimize overnight hypoglycemia risk possibly allowing for higher basal dosing. Currently on 0.46units/kg.     Discussed experimenting with food, different meals and post prandial checks.  Does want to continue pleasure foods nor be too restrictive given age and eating for quality of life.  Reviewed small realistic changes to try; whole prunes, less prune juice, whole wheat bread/ english muffins, high fiber, adding protein / fats etc.     Activity; physical therapy and occupational therapy sessions which are helping.        Patient's most recent   Lab Results   Component Value Date    A1C 11.1 07/06/2022    A1C 10.3 05/17/2021    is not meeting goal of <8.0    PLAN  Continue with positive diet & lifestyle changes   Increase blood sugar checks from 1/day to 2-3/day with post prandial checks, new meter when able   [place] with balancing macronutrients at meals, make sure to get healthy proteins/fats and breakfast and watch the carbohydrate amount.   9/23 Diabetes ed follow up, mychart before then with any questions  / blood sugar updates  Chars dtr will look at insurance coverage, EOB, insulin caps, drug costs etc.     Stephanie Carrillo RD, LD, [facility]  Diabetes Education    Time Spent: 63 minutes  Encounter Type: Individual      A copy of this encounter was shared with the provider.

## 2022-08-15 ENCOUNTER — TELEPHONE (OUTPATIENT)
Dept: FAMILY MEDICINE | Facility: CLINIC | Age: 82
End: 2022-08-15

## 2022-08-22 ENCOUNTER — ANCILLARY PROCEDURE (OUTPATIENT)
Dept: MAMMOGRAPHY | Facility: CLINIC | Age: 82
End: 2022-08-22
Attending: FAMILY MEDICINE
Payer: COMMERCIAL

## 2022-08-22 ENCOUNTER — MEDICAL CORRESPONDENCE (OUTPATIENT)
Dept: HEALTH INFORMATION MANAGEMENT | Facility: CLINIC | Age: 82
End: 2022-08-22

## 2022-08-22 ENCOUNTER — TELEPHONE (OUTPATIENT)
Dept: FAMILY MEDICINE | Facility: CLINIC | Age: 82
End: 2022-08-22

## 2022-08-22 DIAGNOSIS — Z12.31 ENCOUNTER FOR SCREENING MAMMOGRAM FOR BREAST CANCER: ICD-10-CM

## 2022-08-22 PROCEDURE — 77067 SCR MAMMO BI INCL CAD: CPT

## 2022-08-22 NOTE — TELEPHONE ENCOUNTER
Forms/Letter    Type of form/letter:  Form received from [employer] [facility], placed on Dr. Miller desk for signature    Have you been seen for this request:     Do we have the form/letter:     When is form/letter needed by:     How would you like the form/letter returned:     Patient Notified form requests are processed in 3-5 business days:    Could we send this information to you in [employer] or would you prefer to receive a phone call?:

## 2022-08-23 NOTE — TELEPHONE ENCOUNTER
Forms    Type of form/letter: Completed [employer] form faxed to [phone]    Have you been seen for this request:     Do we have the form/letter:   When is form/letter needed by:     How would you like the form/letter returned:     Patient Notified form requests are processed in 3-5 business days:    Could we send this information to you in [employer] or would you prefer to receive a phone call?:

## 2022-08-29 ENCOUNTER — TELEPHONE (OUTPATIENT)
Dept: PHARMACY | Facility: CLINIC | Age: 82
End: 2022-08-29

## 2022-08-30 ENCOUNTER — TELEPHONE (OUTPATIENT)
Dept: ENDOCRINOLOGY | Facility: CLINIC | Age: 82
End: 2022-08-30

## 2022-08-30 ENCOUNTER — TELEPHONE (OUTPATIENT)
Dept: EDUCATION SERVICES | Facility: CLINIC | Age: 82
End: 2022-08-30

## 2022-08-30 NOTE — TELEPHONE ENCOUNTER
[facility] Call Center    Phone Message    May a detailed message be left on voicemail: yes     Reason for Call: Medication Refill Request    Has the patient contacted the pharmacy for the refill? Yes   Name of medication being requested: insulin glargine (BASAGLAR KWIKPEN) 100 UNIT/ML pen  Provider who prescribed the medication: Mirna Perez [facility]  Pharmacy: Gets it from distributor, no Pharmacy listed.  Date medication is needed: As soon as possible.    Action Taken: Other: Endo    Travel Screening: Not Applicable

## 2022-08-30 NOTE — TELEPHONE ENCOUNTER
I called and spoke to Fernanda. She said to disregard this message.  She had reached to [employer] who is taking care of her patient assistance forms and will reorder the Basaglar.    Latesha Collazo RN, [facility]

## 2022-08-31 ENCOUNTER — TELEPHONE (OUTPATIENT)
Dept: FAMILY MEDICINE | Facility: CLINIC | Age: 82
End: 2022-08-31

## 2022-09-07 NOTE — PROGRESS NOTES
Outcome for 09/07/22 1:21 PM: [employer] message sent requesting blood sugar readings.  Suri Maher, [facility]  Adult Endocrinology  [facility] [place]    Follow-up for type 2 diabetes    [place] history:   Brandy Leon is a 82 year old with T2DM here for a 2-year follow-up    Patient has been struggling with ventriculomegaly which might relate to her unsteadiness.  She has seen neurosurgery for this in the past.  No additional treatment can be offered.    She has seen the diabetes educator in August.    Medications Lantus 38 units , metformin 1000 mg bid glipizide ER 10 mg in a.m.  SMBGs; chechs only in the morning before breakfast. 110-140s, 2-300s after dinner    Hypoglycemia none    Complications: neuropathy  Eye: has yearly eye exam in September, no diabetic eye disease    Symptoms: denies numbness in her feet  Followed by Dr Araya Podiatrist every 3 months      Medications:   Current Outpatient Medications   Medication Sig Dispense Refill     BD PEN NEEDLE JUAN C 2ND GEN 32G X 4 MM miscellaneous USE 1 DAILY AS DIRECTED 100 each 2     blood glucose (ONETOUCH ULTRA) test strip USE TO TEST TWICE A  strip 4     blood glucose (ONETOUCH VERIO IQ) test strip Use to test blood sugars 3 times daily, on insulin  (OK to substitute alternate brand per insurance formulary.  If One Touch only give Verio not Ultra) 100 strip 4     blood glucose monitoring (ONETOUCH VERIO IQ SYSTEM) meter device kit Use to test blood sugar 3 times daily  (OK to substitute alternate brand per insurance formulary.  If One Touch only give Verio not Ultra) 1 kit 0     calcium carbonate (TUMS) 500 MG chewable tablet Take 1 chew tab by mouth daily as needed for heartburn       Cranberry-Vitamin C-Vitamin E 4200-20-3 MG-MG-UNIT CAPS Take 1 capsule by mouth 2 times daily       DM-APAP-CPM (CORICIDIN HBP PO) Take by mouth daily as needed       erythromycin (ROMYCIN) 5 MG/GM ophthalmic ointment Apply small amount to incision sites three  "times daily and apply a 1/2\" strip into each eye at bedtime. 3.5 g 0     fexofenadine (ALLEGRA) 180 MG tablet Take 180 mg by mouth as needed        glipiZIDE (GLUCOTROL XL) 10 MG 24 hr tablet TAKE 1 TABLET BY MOUTH TWICE A  tablet 1     GLUCOSAMINE CHONDROITIN COMPLX OR 2 Daily       ibuprofen (ADVIL/MOTRIN) 200 MG capsule Take 400 mg by mouth every 4 hours as needed        insulin glargine (BASAGLAR KWIKPEN) 100 UNIT/ML pen Inject 36 Units Subcutaneous daily 15 mL 0     irbesartan (AVAPRO) 150 MG tablet Take 75 mg by mouth At Bedtime       latanoprost (XALATAN) 0.005 % ophthalmic solution Place 1 drop into both eyes daily 2.5 mL 10     metFORMIN (GLUCOPHAGE) 1000 MG tablet TAKE 1 TABLET BY MOUTH TWICE A DAY WITH MEALS 180 tablet 1     ONE TOUCH DELICA LANCETS MISC 1 each 2 times daily. One Touch Delica   100 each 12     OneTouch Delica Lancets 30G MISC 1 lancet 3 times daily (OK to substitute alternate brand per insurance formulary.  If One Touch only give Verio not Ultra) 100 each 11     order for DME Glucometer covered by insurance. 1 each 0     rosuvastatin (CRESTOR) 5 MG tablet TAKE 1 TABLET BY MOUTH TWICE WEEKLY 24 tablet 14     SYNTHROID 137 MCG tablet TAKE 1 TABLET BY MOUTH EVERY DAY 90 tablet 1     triamcinolone (KENALOG) 0.1 % external cream APPLY TO AFFECTED AREA TWICE A DAY FOR 2 WEEKS 45 g 0     trospium (SANCTURA XR) 60 MG CP24 24 hr capsule TAKE 1 CAPSULE BY MOUTH EVERY MORNING. 90 capsule 1       Physical Examination:  /70 (BP Location: Left arm, Patient Position: Sitting, Cuff Size: Adult Regular)   Pulse 100   Wt 82.3 kg (181 lb 6.4 oz)   SpO2 95%   BMI 31.14 kg/m    Wt Readings from Last 4 Encounters:   09/09/22 82.3 kg (181 lb 6.4 oz)   07/18/22 81.6 kg (180 lb)   06/21/22 83.5 kg (184 lb)   06/14/22 83 kg (183 lb)       General: Well appearing man in no distress.  Psych: good eye contact, no pressured speech        Labs and Studies:   Lab Results   Component Value Date     " 03/28/2022    CHLORIDE 98 03/28/2022    CO2 26 03/28/2022     (H) 04/01/2022    CR 0.77 03/28/2022    CR 0.76 11/16/2021    CR 0.9 09/28/2021    CR 0.76 04/02/2021    CR 0.78 03/16/2021    FREDY 9.4 03/28/2022    ALBUMIN 3.5 03/28/2022    ALKPHOS 100 03/28/2022    LDL 97 11/16/2021    HDL 52 11/16/2021    TRIG 270 (H) 11/16/2021     Lab Results   Component Value Date    MICROL 34 11/16/2021    MICROL 40 11/12/2020    MICROL 16 10/25/2019    MICROL 50 05/10/2019    MICROL 17 06/25/2018     Lab Results   Component Value Date    A1C 11.1 (H) 07/06/2022    A1C 9.6 (H) 03/28/2022    A1C 9.5 (H) 12/20/2021    A1C 9.8 (H) 09/20/2021    A1C 10.3 (H) 05/17/2021       Lab Results   Component Value Date    HGB 14.6 03/28/2022        Assessment:   Elderly woman with T2DM with long high A1c on Basaglar insulin, metformin and glipizide.   Blood glucose control considering postprandial blood sugars and recent A1c is considered very poor.  Significant comorbidity of ventricle no megaly is unsteady gait    Blood glucose control  Patient has postprandial hyperglycemia on current regiment.  We will try to add GLP-1 analog.  We will try to obtain free medication.  We will try to apply for Soliqua.  Continue metformin.  Continue glipizide until blood sugars are improved.    Blood pressure  Today low normal on half tablet of irbesartan, follow    Dyslipidemia  Doing well on 5 mg of rosuvastatin with LDL at goal    Hypothyroidism  On Synthroid 137 mcg daily, last TSH in November 2021 normal      Plan  Switch to Soliqua, started 30 units daily  Continue metformin  Continue glipizide until blood sugars are better controlled  Stop Basaglar insulin upon Soliqua start  Patient will send in blood sugars every 10 days which need to be checked fasting and 2 hours after dinner  Patient will continue to work with diabetes educator for dietary changes    40 minutes spent on the date of the encounter doing chart review, review of test results,  interpretation of tests, patient visit and documentation     This note was generated using computer recognized voice recognition. This might result in some expected imperfection.    Rachel Morris MD  Endocrinology and Diabetes  Telephone contact:  [facility] [place] [phone]  [facility] [phone]       Rachel Morris MD  Endocrinology and Diabetes  [facility]  [address]  [place] [address]  Tel [phone] Leon is a 80 year old female who is being evaluated via a billable telephone visit.        HPI: Elderly woman with  T2DM since 1998, longstanding poor control, - not currently  Complication of neuropathy

## 2022-09-09 ENCOUNTER — OFFICE VISIT (OUTPATIENT)
Dept: ENDOCRINOLOGY | Facility: CLINIC | Age: 82
End: 2022-09-09
Attending: FAMILY MEDICINE
Payer: COMMERCIAL

## 2022-09-09 ENCOUNTER — OFFICE VISIT (OUTPATIENT)
Dept: OPTOMETRY | Facility: CLINIC | Age: 82
End: 2022-09-09

## 2022-09-09 VITALS
SYSTOLIC BLOOD PRESSURE: 108 MMHG | DIASTOLIC BLOOD PRESSURE: 70 MMHG | WEIGHT: 181.4 LBS | OXYGEN SATURATION: 95 % | HEART RATE: 100 BPM | BODY MASS INDEX: 31.14 KG/M2

## 2022-09-09 DIAGNOSIS — Z96.1 PSEUDOPHAKIA OF BOTH EYES: ICD-10-CM

## 2022-09-09 DIAGNOSIS — Z79.4 TYPE 2 DIABETES MELLITUS WITH HYPERGLYCEMIA, WITH LONG-TERM CURRENT USE OF INSULIN (H): ICD-10-CM

## 2022-09-09 DIAGNOSIS — H04.123 DRY EYES: ICD-10-CM

## 2022-09-09 DIAGNOSIS — H40.1131 PRIMARY OPEN ANGLE GLAUCOMA (POAG) OF BOTH EYES, MILD STAGE: Primary | ICD-10-CM

## 2022-09-09 DIAGNOSIS — E11.9 TYPE 2 DIABETES MELLITUS WITHOUT RETINOPATHY (H): ICD-10-CM

## 2022-09-09 DIAGNOSIS — H52.4 PRESBYOPIA: ICD-10-CM

## 2022-09-09 DIAGNOSIS — E11.65 TYPE 2 DIABETES MELLITUS WITH HYPERGLYCEMIA, WITH LONG-TERM CURRENT USE OF INSULIN (H): ICD-10-CM

## 2022-09-09 PROCEDURE — 99215 OFFICE O/P EST HI 40 MIN: CPT | Performed by: INTERNAL MEDICINE

## 2022-09-09 PROCEDURE — 92014 COMPRE OPH EXAM EST PT 1/>: CPT | Performed by: OPTOMETRIST

## 2022-09-09 PROCEDURE — 92133 CPTRZD OPH DX IMG PST SGM ON: CPT | Performed by: OPTOMETRIST

## 2022-09-09 ASSESSMENT — REFRACTION_MANIFEST
OD_AXIS: 035
OD_SPHERE: -1.00
OS_ADD: +2.50
OS_CYLINDER: +1.25
OS_AXIS: 164
OD_ADD: +2.50
OD_CYLINDER: +0.75
OS_SPHERE: -1.00

## 2022-09-09 ASSESSMENT — TONOMETRY
OS_IOP_MMHG: 15
OD_IOP_MMHG: 21
OD_IOP_MMHG: 15
OS_IOP_MMHG: 19
IOP_METHOD: APPLANATION

## 2022-09-09 ASSESSMENT — VISUAL ACUITY
OD_SC: 20/25
OS_SC+: +2
OD_SC+: -2
OS_PH_SC+: +1
OS_PH_SC: 20/30
OS_SC: 20/60
METHOD: SNELLEN - LINEAR

## 2022-09-09 ASSESSMENT — CONF VISUAL FIELD
OS_NORMAL: 1
METHOD: COUNTING FINGERS
OD_NORMAL: 1

## 2022-09-09 ASSESSMENT — CUP TO DISC RATIO
OS_RATIO: 0.5
OD_RATIO: 0.5

## 2022-09-09 ASSESSMENT — EXTERNAL EXAM - LEFT EYE: OS_EXAM: NORMAL

## 2022-09-09 ASSESSMENT — SLIT LAMP EXAM - LIDS
COMMENTS: NORMAL
COMMENTS: NORMAL

## 2022-09-09 ASSESSMENT — EXTERNAL EXAM - RIGHT EYE: OD_EXAM: NORMAL

## 2022-09-09 NOTE — PROGRESS NOTES
"Assessment/Plan  (H40.1131) Primary open angle glaucoma (POAG) of both eyes, mild stage  (primary encounter diagnosis)  Comment:   - diagnosed with POAG with Dr. Serna  - pachy 538/557  - gonio open  - Tmax 30/25 (tonopen; just after stopping postop cataract drops, otherwise low 20s). IOP today 21/19  - on latanoprost at bedtime each eye since 8/2015 for concern for OCT RNFL thinning  - Historically unreliable visual fields. HVF 24-2 unreliable today  Plan: With borderline IOP and unreliable HVF, may be best to continue monitoring regularly with DFE and RNFL OCT instead. Plan on monitoring again in 4 months with IOP check. Testing today suggests stability.     (E11.9) Type 2 diabetes mellitus without retinopathy (H)  Plan: Discussed findings with patient. Encouraged continued blood glucose, pressure, and lipid control. Patient should continue following recommendations of primary care provider. Patient should plan on returning to clinic annually for a dilated eye exam but was encouraged to return to clinic sooner with new flashes/floaters or other vision changes.     (H04.123) Dry eyes  Comment: \"stuck together eyes\" in the AM  Plan: Recommend using a lubricating drop in both eyes before bedtime (spacing out from latanoprost by ~5 mins) and in the morning when she wakes up.    (Z96.1) Pseudophakia of both eyes  Plan: Monitor     (H52.4) Presbyopia  Plan: Monitor      Complete documentation of historical and exam elements from today's encounter can  be found in the full encounter summary report (not reduplicated in this progress  note). I personally obtained the chief complaint(s) and history of present illness. I  confirmed and edited as necessary the review of systems, past medical/surgical  history, family history, social history, and examination findings as documented by  others; and I examined the patient myself. I personally reviewed the relevant tests,  images, and reports as documented above. I formulated " and edited as necessary the  assessment and plan and discussed the findings and management plan with the  patient and family.    Gurjit Cardoso OD

## 2022-09-09 NOTE — PATIENT INSTRUCTIONS
[facility]  Diabetes Educators:   Jeannie Hathaway, RN and Latesha Collazo RN  Clinic Nurse: KATHY Sánchez  CMA's: Abbie   Scheduling/Clinic phone number : [phone]   Clinic Fax: [phone]  On-Call Endocrine at the [place] (after hours/weekends): [phone] option 4    Please call the number below to schedule your labs.      Continue metformin  If possible start Soliqua (insulin combination) at 30 units daily  Check blood glucose before breakfast and 2 hours after dinner  Send to the office via [employer] 10 days after start of Soliqua  Stop Basagalr when starting Soliqua  Contin eu Glipzide for now      Appointment Reminders:  * Please bring meter with for staff to download  * If you are due ONLY for an A1C, it is scheduled with the nurse and will be done in clinic. You do not need to schedule a lab appointment. Fasting is not required for an A1C.  * Refill request should be submitted to your pharmacy. They will contact clinic for approval.

## 2022-09-09 NOTE — NURSING NOTE
Chief Complaints and History of Present Illnesses   Patient presents with     Glaucoma Follow-Up       Glaucoma follow up POAG.      Chief Complaint(s) and History of Present Illness(es)     Glaucoma Follow-Up     Laterality: both eyes    Associated symptoms: dryness.  Negative for eye pain, redness, tearing, flashes and floaters    Pain scale: 0/10    Comments:   Glaucoma follow up POAG.              Comments     Denies vision changes either eye.  Lab Results       Component                Value               Date                       A1C                      11.1                07/06/2022                 A1C                      9.6                 03/28/2022                 A1C                      9.5                 12/20/2021                 A1C                      9.8                 09/20/2021                 A1C                      10.3                05/17/2021                 A1C                      9.2                 02/16/2021                 A1C                      8.7                 11/12/2020                 A1C                      10.0                08/20/2020                 A1C                      10.5                01/10/2020            BS about 90 daily  Eye meds: latanaprost each eye at bedtime 9-10pm every night  ALFONSO Drummond 9/9/2022 10:38 AM

## 2022-09-09 NOTE — NURSING NOTE
Brandy Leon's goals for this visit include:   Chief Complaint   Patient presents with     Consult     She requests these members of her care team be copied on today's visit information: Yes    PCP: Fernanda Miller    Referring Provider:  Fernanda Miller MD  [address]  [facility] [place] [address]    /70 (BP Location: Left arm, Patient Position: Sitting, Cuff Size: Adult Regular)   Pulse 100   Wt 82.3 kg (181 lb 6.4 oz)   SpO2 95%   BMI 31.14 kg/m      Do you need any medication refills at today's visit? No

## 2022-09-09 NOTE — LETTER
9/9/2022         RE: Brandy Leon  [address] [place] [address]        Dear Colleague,    Thank you for referring your patient, Brandy Leon, to the [facility]. Please see a copy of my visit note below.    Outcome for 09/07/22 1:21 PM: Model [employer] message sent requesting blood sugar readings.  Suri Maher [facility]  Adult Endocrinology  [facility] [place]    Follow-up for type 2 diabetes    [place] history:   Brandy Leon is a 82 year old with T2DM here for a 2-year follow-up    Patient has been struggling with ventriculomegaly which might relate to her unsteadiness.  She has seen neurosurgery for this in the past.  No additional treatment can be offered.    She has seen the diabetes educator in August.    Medications Lantus 38 units , metformin 1000 mg bid glipizide ER 10 mg in a.m.  SMBGs; chechs only in the morning before breakfast. 110-140s, 2-300s after dinner    Hypoglycemia none    Complications: neuropathy  Eye: has yearly eye exam in September, no diabetic eye disease    Symptoms: denies numbness in her feet  Followed by Dr Araya Podiatrist every 3 months      Medications:   Current Outpatient Medications   Medication Sig Dispense Refill     BD PEN NEEDLE JUAN C 2ND GEN 32G X 4 MM miscellaneous USE 1 DAILY AS DIRECTED 100 each 2     blood glucose (ONETOUCH ULTRA) test strip USE TO TEST TWICE A  strip 4     blood glucose (ONETOUCH VERIO IQ) test strip Use to test blood sugars 3 times daily, on insulin  (OK to substitute alternate brand per insurance formulary.  If One Touch only give Verio not Ultra) 100 strip 4     blood glucose monitoring (ONETOUCH VERIO IQ SYSTEM) meter device kit Use to test blood sugar 3 times daily  (OK to substitute alternate brand per insurance formulary.  If One Touch only give Verio not Ultra) 1 kit 0     calcium carbonate (TUMS) 500 MG chewable tablet Take 1 chew tab by mouth daily as needed for heartburn    "    Cranberry-Vitamin C-Vitamin E 4200-20-3 MG-MG-UNIT CAPS Take 1 capsule by mouth 2 times daily       DM-APAP-CPM (CORICIDIN HBP PO) Take by mouth daily as needed       erythromycin (ROMYCIN) 5 MG/GM ophthalmic ointment Apply small amount to incision sites three times daily and apply a 1/2\" strip into each eye at bedtime. 3.5 g 0     fexofenadine (ALLEGRA) 180 MG tablet Take 180 mg by mouth as needed        glipiZIDE (GLUCOTROL XL) 10 MG 24 hr tablet TAKE 1 TABLET BY MOUTH TWICE A  tablet 1     GLUCOSAMINE CHONDROITIN COMPLX OR 2 Daily       ibuprofen (ADVIL/MOTRIN) 200 MG capsule Take 400 mg by mouth every 4 hours as needed        insulin glargine (BASAGLAR KWIKPEN) 100 UNIT/ML pen Inject 36 Units Subcutaneous daily 15 mL 0     irbesartan (AVAPRO) 150 MG tablet Take 75 mg by mouth At Bedtime       latanoprost (XALATAN) 0.005 % ophthalmic solution Place 1 drop into both eyes daily 2.5 mL 10     metFORMIN (GLUCOPHAGE) 1000 MG tablet TAKE 1 TABLET BY MOUTH TWICE A DAY WITH MEALS 180 tablet 1     ONE TOUCH DELICA LANCETS MISC 1 each 2 times daily. One Touch Delica   100 each 12     OneTouch Delica Lancets 30G MISC 1 lancet 3 times daily (OK to substitute alternate brand per insurance formulary.  If One Touch only give Verio not Ultra) 100 each 11     order for DME Glucometer covered by insurance. 1 each 0     rosuvastatin (CRESTOR) 5 MG tablet TAKE 1 TABLET BY MOUTH TWICE WEEKLY 24 tablet 14     SYNTHROID 137 MCG tablet TAKE 1 TABLET BY MOUTH EVERY DAY 90 tablet 1     triamcinolone (KENALOG) 0.1 % external cream APPLY TO AFFECTED AREA TWICE A DAY FOR 2 WEEKS 45 g 0     trospium (SANCTURA XR) 60 MG CP24 24 hr capsule TAKE 1 CAPSULE BY MOUTH EVERY MORNING. 90 capsule 1       Physical Examination:  /70 (BP Location: Left arm, Patient Position: Sitting, Cuff Size: Adult Regular)   Pulse 100   Wt 82.3 kg (181 lb 6.4 oz)   SpO2 95%   BMI 31.14 kg/m    Wt Readings from Last 4 Encounters:   09/09/22 82.3 kg " (181 lb 6.4 oz)   07/18/22 81.6 kg (180 lb)   06/21/22 83.5 kg (184 lb)   06/14/22 83 kg (183 lb)       General: Well appearing man in no distress.  Psych: good eye contact, no pressured speech        Labs and Studies:   Lab Results   Component Value Date     03/28/2022    CHLORIDE 98 03/28/2022    CO2 26 03/28/2022     (H) 04/01/2022    CR 0.77 03/28/2022    CR 0.76 11/16/2021    CR 0.9 09/28/2021    CR 0.76 04/02/2021    CR 0.78 03/16/2021    FREDY 9.4 03/28/2022    ALBUMIN 3.5 03/28/2022    ALKPHOS 100 03/28/2022    LDL 97 11/16/2021    HDL 52 11/16/2021    TRIG 270 (H) 11/16/2021     Lab Results   Component Value Date    MICROL 34 11/16/2021    MICROL 40 11/12/2020    MICROL 16 10/25/2019    MICROL 50 05/10/2019    MICROL 17 06/25/2018     Lab Results   Component Value Date    A1C 11.1 (H) 07/06/2022    A1C 9.6 (H) 03/28/2022    A1C 9.5 (H) 12/20/2021    A1C 9.8 (H) 09/20/2021    A1C 10.3 (H) 05/17/2021       Lab Results   Component Value Date    HGB 14.6 03/28/2022        Assessment:   Elderly woman with T2DM with long high A1c on Basaglar insulin, metformin and glipizide.   Blood glucose control considering postprandial blood sugars and recent A1c is considered very poor.  Significant comorbidity of ventricle no megaly is unsteady gait    Blood glucose control  Patient has postprandial hyperglycemia on current regiment.  We will try to add GLP-1 analog.  We will try to obtain free medication.  We will try to apply for Soliqua.  Continue metformin.  Continue glipizide until blood sugars are improved.    Blood pressure  Today low normal on half tablet of irbesartan, follow    Dyslipidemia  Doing well on 5 mg of rosuvastatin with LDL at goal    Hypothyroidism  On Synthroid 137 mcg daily, last TSH in November 2021 normal      Plan  Switch to Soliqua, started 30 units daily  Continue metformin  Continue glipizide until blood sugars are better controlled  Stop Basaglar insulin upon Soliqua start  Patient  will send in blood sugars every 10 days which need to be checked fasting and 2 hours after dinner  Patient will continue to work with diabetes educator for dietary changes    40 minutes spent on the date of the encounter doing chart review, review of test results, interpretation of tests, patient visit and documentation     This note was generated using computer recognized voice recognition. This might result in some expected imperfection.    Rachel Morris MD  Endocrinology and Diabetes  Telephone contact:  [facility] [phone]  [facility] [phone]       Rachel Morris MD  Endocrinology and Diabetes  [address]  [facility] [address]  Tel [phone] Leon is a 80 year old female who is being evaluated via a billable telephone visit.        HPI: Elderly woman with  T2DM since 1998, longstanding poor control, - not currently  Complication of neuropathy              Again, thank you for allowing me to participate in the care of your patient.        Sincerely,        Rachel Morris MD

## 2022-09-12 DIAGNOSIS — H40.1132 PRIMARY OPEN ANGLE GLAUCOMA OF BOTH EYES, MODERATE STAGE: ICD-10-CM

## 2022-09-12 RX ORDER — LATANOPROST 50 UG/ML
1 SOLUTION/ DROPS OPHTHALMIC DAILY
Qty: 2.5 ML | Refills: 10 | Status: SHIPPED | OUTPATIENT
Start: 2022-09-12 | End: 2023-04-24 | Stop reason: ALTCHOICE

## 2022-09-12 NOTE — PROGRESS NOTES
Refill request for latanoprost received from [facility].  Last appointment with Dr Cardoso 9/9/22.  Medication refilled as requested. Whit Solis RN

## 2022-09-13 ENCOUNTER — MYC MEDICAL ADVICE (OUTPATIENT)
Dept: PHARMACY | Facility: CLINIC | Age: 82
End: 2022-09-13

## 2022-09-13 ENCOUNTER — MYC MEDICAL ADVICE (OUTPATIENT)
Dept: FAMILY MEDICINE | Facility: CLINIC | Age: 82
End: 2022-09-13

## 2022-09-16 ENCOUNTER — VIRTUAL VISIT (OUTPATIENT)
Dept: FAMILY MEDICINE | Facility: CLINIC | Age: 82
End: 2022-09-16

## 2022-09-16 ENCOUNTER — LAB (OUTPATIENT)
Dept: LAB | Facility: CLINIC | Age: 82
End: 2022-09-16
Payer: COMMERCIAL

## 2022-09-16 DIAGNOSIS — N39.0 URINARY TRACT INFECTION WITHOUT HEMATURIA, SITE UNSPECIFIED: ICD-10-CM

## 2022-09-16 DIAGNOSIS — R30.0 DYSURIA: ICD-10-CM

## 2022-09-16 DIAGNOSIS — R30.0 DYSURIA: Primary | ICD-10-CM

## 2022-09-16 LAB
ALBUMIN UR-MCNC: NEGATIVE MG/DL
APPEARANCE UR: CLEAR
BACTERIA #/AREA URNS HPF: ABNORMAL /HPF
BILIRUB UR QL STRIP: NEGATIVE
COLOR UR AUTO: ABNORMAL
GLUCOSE UR STRIP-MCNC: >1000 MG/DL
HGB UR QL STRIP: NEGATIVE
KETONES UR STRIP-MCNC: NEGATIVE MG/DL
LEUKOCYTE ESTERASE UR QL STRIP: ABNORMAL
NITRATE UR QL: POSITIVE
PH UR STRIP: 5 [PH] (ref 5–7)
RBC #/AREA URNS AUTO: ABNORMAL /HPF
SKIP: ABNORMAL
SP GR UR STRIP: 1.03 (ref 1–1.03)
SQUAMOUS #/AREA URNS AUTO: ABNORMAL /LPF
TRANS CELLS #/AREA URNS HPF: ABNORMAL /HPF
UROBILINOGEN UR STRIP-MCNC: NORMAL MG/DL
WBC #/AREA URNS AUTO: ABNORMAL /HPF
WBC CLUMPS #/AREA URNS HPF: PRESENT /HPF

## 2022-09-16 PROCEDURE — 99442 PR PHYSICIAN TELEPHONE EVALUATION 11-20 MIN: CPT | Mod: 95 | Performed by: FAMILY MEDICINE

## 2022-09-16 PROCEDURE — 81001 URINALYSIS AUTO W/SCOPE: CPT

## 2022-09-16 PROCEDURE — 87086 URINE CULTURE/COLONY COUNT: CPT

## 2022-09-16 PROCEDURE — 87186 SC STD MICRODIL/AGAR DIL: CPT

## 2022-09-16 RX ORDER — CEPHALEXIN 500 MG/1
500 CAPSULE ORAL 2 TIMES DAILY
Qty: 20 CAPSULE | Refills: 0 | Status: SHIPPED | OUTPATIENT
Start: 2022-09-16 | End: 2022-09-26

## 2022-09-16 NOTE — PROGRESS NOTES
Zoila is a 82 year old who is being evaluated via a billable telephone visit.      What phone number would you like to be contacted at? [phone]  How would you like to obtain your AVS? MyChart  (Pts daughter is going to bring pt in around 130 for a UA and would like a Mychart sent after the results are back FYI)     Assessment & Plan     Dysuria  Evaluate with   - UA Macro with Reflex to Micro and Culture - lab collect    Urinary tract infection without hematuria, site unspecified  Results came in later in the ay - patient informed, treat with  - cephALEXin (KEFLEX) 500 MG capsule  Dispense: 20 capsule; Refill: 0    Also - general fatigue (prior to above) and last A1C 11.1 in July  - discussed important to Follow up with PCP or Medical Therapy Management  to review medications    Return if symptoms worsen or fail to improve.    Susan Drew MD  [facility]   Zoila is a 82 year old accompanied by her daughter, presenting for the following health issues:  UTI and Frequency      HPI     Car has had pain and frequent urination for a week now. She has been putting off being seen as she hoped this would go away   [place]ona of putting off     - no fever   -  no vaginal discharge   - no new body products   - no pain in back   - Right shoulder pain  - no BM for a couple of day - metamucil helps    Zoila also notes that she is   generally fatigued (not new since above symptoms ). She has diabetes - says her blood sugars run between  . Blood sugars  have gone up on average with these UTI symptoms. She takes glipizide, metformin and both meal time and basal insulin.  I do note poor diabetes control based on last A1c     Hemoglobin A1C   Date Value Ref Range Status   07/06/2022 11.1 (H) 0.0 - 5.6 % Final     Comment:     Normal <5.7%   Prediabetes 5.7-6.4%    Diabetes 6.5% or higher     Note: Adopted from ADA consensus guidelines.   05/17/2021 10.3 (H) 0 - 5.6 % Final      Comment:     Normal <5.7% Prediabetes 5.7-6.4%  Diabetes 6.5% or higher - adopted from ADA   consensus guidelines.       Last Comprehensive Metabolic Panel:  Sodium   Date Value Ref Range Status   03/28/2022 134 133 - 144 mmol/L Final   04/02/2021 134 133 - 144 mmol/L Final     Potassium   Date Value Ref Range Status   03/28/2022 4.3 3.4 - 5.3 mmol/L Final   04/02/2021 4.1 3.4 - 5.3 mmol/L Final     Chloride   Date Value Ref Range Status   03/28/2022 98 94 - 109 mmol/L Final   04/02/2021 99 94 - 109 mmol/L Final     Carbon Dioxide   Date Value Ref Range Status   04/02/2021 31 20 - 32 mmol/L Final     Carbon Dioxide (CO2)   Date Value Ref Range Status   03/28/2022 26 20 - 32 mmol/L Final     Anion Gap   Date Value Ref Range Status   03/28/2022 10 3 - 14 mmol/L Final   04/02/2021 4 3 - 14 mmol/L Final     Glucose   Date Value Ref Range Status   03/28/2022 371 (H) 70 - 99 mg/dL Final   04/02/2021 175 (H) 70 - 99 mg/dL Final     Comment:     Non Fasting     GLUCOSE BY METER POCT   Date Value Ref Range Status   04/01/2022 166 (H) 70 - 99 mg/dL Final     Urea Nitrogen   Date Value Ref Range Status   03/28/2022 23 7 - 30 mg/dL Final   04/02/2021 16 7 - 30 mg/dL Final     Creatinine   Date Value Ref Range Status   03/28/2022 0.77 0.52 - 1.04 mg/dL Final   04/02/2021 0.76 0.52 - 1.04 mg/dL Final     GFR Estimate   Date Value Ref Range Status   03/28/2022 78 >60 mL/min/1.73m2 Final     Comment:     Effective December 21, 2021 eGFRcr in adults is calculated using the 2021 CKD-EPI creatinine equation which includes age and gender (Adriana colon al., NEJM, DOI: 10.1056/YFULdj7294100)   04/02/2021 74 >60 mL/min/[1.73_m2] Final     Comment:     Non  GFR Calc  Starting 12/18/2018, serum creatinine based estimated GFR (eGFR) will be   calculated using the Chronic Kidney Disease Epidemiology Collaboration   (CKD-EPI) equation.       GFR, ESTIMATED POCT   Date Value Ref Range Status   09/28/2021 >60 >60 mL/min/1.73m2 Final      Calcium   Date Value Ref Range Status   03/28/2022 9.4 8.5 - 10.1 mg/dL Final   04/02/2021 9.7 8.5 - 10.1 mg/dL Final       Objective         Vitals:  No vitals were obtained today due to virtual visit.    Physical Exam     PSYCH: Alert and oriented times 3; coherent speech, normal   rate and volume, able to articulate logical thoughts, able   to abstract reason, no tangential thoughts, no hallucinations   or delusions  Her affect is normal  RESP: No cough, no audible wheezing, able to talk in full sentences  Remainder of exam unable to be completed due to telephone visits          Phone call duration: 13 minutes  12:22 - 12:35

## 2022-09-18 LAB — BACTERIA UR CULT: ABNORMAL

## 2022-09-19 NOTE — PROGRESS NOTES
Medication Therapy Management (MTM) Encounter    ASSESSMENT:                            Medication Adherence/Access: No issues identified    Type 2 Diabetes:  Not meeting A1c goal of <8%. Not meeting post prandial glucose goal of <180 mmHg. Soliqua started ~3 days ago. Can adjust dose every week if needed. Will evaluate blood sugars next week. Jardiance was discontinued 2021 by patient. Recommend to continue off for now due to intolerance in the past.    PLAN:                          No medication changes recommended today.    Follow-up: 1 week via [employer].    SUBJECTIVE/OBJECTIVE:                          Brandy Leon is a 82 year old female contacted via phone call for a follow-up visit.  Today's visit is a follow-up MTM visit from 2022.     Reason for visit: blood sugars, wondering if she is still supposed to be taking Jardiance    Allergies/ADRs: Reviewed in chart  Past Medical History: Reviewed in chart  Tobacco: She reports that she has never smoked. She has never used smokeless tobacco.  Alcohol: none    Medication Adherence/Access: Uses patient assistance program for Soliuqa and Synthroid.     Type 2 Diabetes:  Currently taking Soliqua 33 units in the morning (started 3-4 days ago), glipizide XL 10 mg twice daily, metformin 1000 mg twice daily. Patient had been taking Jardiance and just finished her bottle this week-feels better not taking. Patient is not experiencing side effects.  Blood sugar monitorin-2 time(s) daily.   Date FBG/ 2hours post Lunch/2hours post Dinner /2hours post     195 141 3pm     9/15 235  130    /16 214       212 /163      180 /244 /204     191      Symptoms of low blood sugar? none  Symptoms of high blood sugar? None. Feels better when blood sugar is higher.  Eye exam: Up to date  Foot exam: Up to date  Diet: Breakfast reports eating cereal often with a banana or orange  Lunch-cheese & crackers  Tonight-egg salad sandwich  Hasn't been drinking milk,  drinks water throughout the day.  Son cooks for her.   Aspirin: Taking 81 mg daily    Statin: Yes: Rosuvastatin   ACEi/ARB: Yes: Irbesartan.   Urine Albumin:   Lab Results   Component Value Date    UMALCR 40.00 (H) 11/16/2021      Lab Results   Component Value Date    A1C 11.1 07/06/2022    A1C 9.6 03/28/2022    A1C 9.5 12/20/2021    A1C 9.8 09/20/2021    A1C 10.3 05/17/2021    A1C 9.2 02/16/2021    A1C 8.7 11/12/2020    A1C 10.0 08/20/2020    A1C 10.5 01/10/2020       Today's Vitals: There were no vitals taken for this visit.  ----------------    I spent 16 minutes with this patient today. All changes were made via collaborative practice agreement with Fernanda Miller MD. A copy of the visit note was provided to the patient's provider(s).    The patient was sent via [employer] a summary of these recommendations.     Medication Therapy Management  Mirna Perez, Pharm.D, BCACP  Medication Therapy Management Pharmacist      Telemedicine Visit Details  Type of service:  Telephone visit  Start Time: 3:40PM  End Time: 3:56PM  Originating Location (patient location): [place]  Distant Location (provider location):  [facility]     Medication Therapy Recommendations  No medication therapy recommendations to display

## 2022-09-20 ENCOUNTER — VIRTUAL VISIT (OUTPATIENT)
Dept: PHARMACY | Facility: CLINIC | Age: 82
End: 2022-09-20
Payer: COMMERCIAL

## 2022-09-20 DIAGNOSIS — E11.65 TYPE 2 DIABETES MELLITUS WITH HYPERGLYCEMIA, WITH LONG-TERM CURRENT USE OF INSULIN (H): Primary | ICD-10-CM

## 2022-09-20 DIAGNOSIS — Z79.4 TYPE 2 DIABETES MELLITUS WITH HYPERGLYCEMIA, WITH LONG-TERM CURRENT USE OF INSULIN (H): Primary | ICD-10-CM

## 2022-09-20 PROCEDURE — 99606 MTMS BY PHARM EST 15 MIN: CPT | Performed by: PHARMACIST

## 2022-09-20 NOTE — PATIENT INSTRUCTIONS
"Recommendations from today's MTM visit:                                                       Continue off of Jardiance      Follow-up: 1 week via [employer]    It was great speaking with you today.  I value your experience and would be very thankful for your time in providing feedback in our clinic survey. In the next few days, you may receive an email or text message from [facility] [employer] with a link to a survey related to your  clinical pharmacist.\"     To schedule another MTM appointment, please call the clinic directly or you may call the MTM scheduling line at [phone] or toll-free at [phone].     My Clinical Pharmacist's contact information:                                                      Please feel free to contact me with any questions or concerns you have.     Mirna Perez, Pharm.D, BCACP  Medication Therapy Management Pharmacist    "

## 2022-09-22 NOTE — TELEPHONE ENCOUNTER
Forms filled out to the best of writer's ability and emailed to provider on 9/20.  Awaiting signature.     Ju Nogueira on 9/22/2022 at 2:59 PM

## 2022-09-23 ENCOUNTER — VIRTUAL VISIT (OUTPATIENT)
Dept: EDUCATION SERVICES | Facility: CLINIC | Age: 82
End: 2022-09-23
Payer: COMMERCIAL

## 2022-09-23 DIAGNOSIS — Z79.4 TYPE 2 DIABETES MELLITUS WITH HYPERGLYCEMIA, WITH LONG-TERM CURRENT USE OF INSULIN (H): Primary | ICD-10-CM

## 2022-09-23 DIAGNOSIS — E11.65 TYPE 2 DIABETES MELLITUS WITH HYPERGLYCEMIA, WITH LONG-TERM CURRENT USE OF INSULIN (H): Primary | ICD-10-CM

## 2022-09-23 PROCEDURE — G0108 DIAB MANAGE TRN  PER INDIV: HCPCS | Mod: 95 | Performed by: DIETITIAN, REGISTERED

## 2022-09-23 NOTE — PROGRESS NOTES
Diabetes Self-Management Education & Support    Presents for: Individual review    Type of service:  Video Visit  Originating Location (pt. Location): Home  Distant Location (provider location): Home  Mode of Communication:  Video Conference via [employer]  Video Start Time: 1200  Video End Time (time video stopped): 1230    ASSESSMENT:  Taught Verio meter today. Zoila explains that the One Touch Ultra stopped working yesterday.  Checked blood sugars today and was 420 post prandial after breakfast.  Due to significantly elevated blood sugar, ER can be recommended in this situation.  Patient declined the ER today; notes she feels just fine with no signs/symptoms of hyperglycemia  and will check blood sugar again in 2 hours.  This is a post prandial number and therefor do expect it to reduce (soliqua was taken after breakfast and she missed the metformin and Glipizide this morning).    Reviewed directions for Soliqua and how this medication works.      9:  151   9/20: 145, 141 3pm   9: 212 10:30am, 183   9: 238 10:45 meter quit working   These strips were not  and were brand new   The new higher reading today was on a new meter & strips    Patient's most recent   Lab Results   Component Value Date    A1C 11.1 2022    A1C 10.3 2021     is not meeting goal of <8.0    Diabetes knowledge and skills assessment:   Patient is knowledgeable in diabetes management concepts related to: Healthy Eating, Being Active, Monitoring, Taking Medication, Problem Solving, Reducing Risks and Healthy Coping  Continue education with the following diabetes management concepts: Monitoring, Taking Medication, Problem Solving and Reducing Risks  Based on learning assessment above, most appropriate setting for further diabetes education would be: Individual setting.      PLAN  Begin using new glucometer - One Touch verio.  Wash hands before checking everytime   Increase blood sugar checks - pre meals/bedtime and  "rotate   Update endocrinologist with blood sugars next week per plan had been made at endocrinology visit 9/9  ER if not feeling well due to blood sugars over 400 or if blood sugar does not come down   Try recommended diet changes   Take Soliqua 30-60 minutes before breakfast, not after  Take metformin / Glipizide in the morning and be consistent with medication times       See Care Plan for co-developed, patient-state behavior change goals.  AVS provided for patient today.    SUBJECTIVE/OBJECTIVE:  Presents for: Individual review  Accompanied by: Self, Daughter  Diabetes education in the past 24mo: No  Focus of Visit: Other, Reducing Risks, Taking Medication  Diabetes type: Type 2  Other concerns:: None  Cultural Influences/Ethnic Background:  Not  or     Diabetes Symptoms & Complications:          Patient Problem List and Family Medical History reviewed for relevant medical history, current medical status, and diabetes risk factors.    Vitals:  There were no vitals taken for this visit.  Estimated body mass index is 31.14 kg/m  as calculated from the following:    Height as of 7/18/22: 1.626 m (5' 4\").    Weight as of 9/9/22: 82.3 kg (181 lb 6.4 oz).   Last 3 BP:   BP Readings from Last 3 Encounters:   09/09/22 108/70   07/18/22 132/70   06/21/22 134/84       History   Smoking Status     Never Smoker   Smokeless Tobacco     Never Used     Comment: has had exposure to second hand smoke in the past from [employer], no current exposure       Labs:  Lab Results   Component Value Date    A1C 11.1 07/06/2022    A1C 10.3 05/17/2021     Lab Results   Component Value Date     04/01/2022     03/28/2022     04/02/2021     Lab Results   Component Value Date    LDL 97 11/16/2021     11/12/2020     HDL Cholesterol   Date Value Ref Range Status   11/12/2020 55 >49 mg/dL Final     Direct Measure HDL   Date Value Ref Range Status   11/16/2021 52 >=50 mg/dL Final   ]  GFR Estimate   Date Value " Ref Range Status   03/28/2022 78 >60 mL/min/1.73m2 Final     Comment:     Effective December 21, 2021 eGFRcr in adults is calculated using the 2021 CKD-EPI creatinine equation which includes age and gender (Adriana colon al., NEJM, DOI: 10.1056/DUPKgl6224845)   04/02/2021 74 >60 mL/min/[1.73_m2] Final     Comment:     Non  GFR Calc  Starting 12/18/2018, serum creatinine based estimated GFR (eGFR) will be   calculated using the Chronic Kidney Disease Epidemiology Collaboration   (CKD-EPI) equation.       GFR, ESTIMATED POCT   Date Value Ref Range Status   09/28/2021 >60 >60 mL/min/1.73m2 Final     GFR Estimate If Black   Date Value Ref Range Status   04/02/2021 86 >60 mL/min/[1.73_m2] Final     Comment:      GFR Calc  Starting 12/18/2018, serum creatinine based estimated GFR (eGFR) will be   calculated using the Chronic Kidney Disease Epidemiology Collaboration   (CKD-EPI) equation.       Lab Results   Component Value Date    CR 0.77 03/28/2022    CR 0.76 04/02/2021     No results found for: MICROALBUMIN    Healthy Eating:  Healthy Eating Assessed Today: Yes  Not much of an appetite   2 slices toast with cheese and jelly today.    Cinnamon raisin bread, not often whole wheat.    Still choosing prune juice over whole prunes  Candy - chocolate occasionally   Fish sandwich for diner   Big glass of orange juice yesterday - not common   Fruit cups - NSA      Being Active:  Being Active Assessed Today: Yes  Exercise:: Yes    Monitoring:  Monitoring Assessed Today: Yes  Blood Glucose Meter: One Touch  Times checking blood sugar at home (number): 1  Times checking blood sugar at home (per): Day    Taking Medications:  Diabetes Medication(s)     Biguanides       metFORMIN (GLUCOPHAGE) 1000 MG tablet    TAKE 1 TABLET BY MOUTH TWICE A DAY WITH MEALS    Sulfonylureas       glipiZIDE (GLUCOTROL XL) 10 MG 24 hr tablet    TAKE 1 TABLET BY MOUTH TWICE A DAY    Antidiabetic Combinations       insulin  glargine-lixisenatide (SOLIQUA 100/33) pen    Use 30-45 units daily          Taking Medication Assessed Today: Yes    Problem Solving:  yes hyperglycemia signs/symptoms, treatment and precautions     Reducing Risks:  Reducing Risks Assessed Today: No    Healthy Coping:  Healthy Coping Assessed Today: Yes  Emotional response to diabetes: Ready to learn  Stage of change: ACTION (Actively working towards change)  Patient Activation Measure Survey Score:  JACIEL Score (Last Two) 2/21/2011   JACIEL Raw Score 39   Activation Score 56.4   JACIEL Level 3     Care Plan and Education Provided:  Care Plan: Diabetes   Updates made by Stephanie Carrillo RD since 9/26/2022 12:00 AM      Problem: HbA1C Not In Goal       Goal: Establish Regular Follow-Ups with PCP       Task: Discuss schedule for PCP visits with patient Completed 9/26/2022   Responsible User: Stephanie Carrillo RD      Goal: Get HbA1C Level in Goal       Task: Educate patient on diabetes education self-management topics Completed 9/26/2022   Responsible User: Stephanie Carrillo RD      Problem: Diabetes Self-Management Education Needed to Optimize Self-Care Behaviors       Goal: Healthy Eating - follow a healthy eating pattern for diabetes       Task: Provide education on managing carbohydrate intake (carbohydrate counting, plate planning method, etc.) Completed 9/26/2022   Responsible User: Stephanie Carrillo RD      Goal: Monitoring - monitor glucose and ketones as directed       Task: Provide education on blood glucose monitoring (purpose, proper technique, frequency, glucose targets, interpreting results, when to use glucose control solution, sharps disposal) Completed 9/26/2022   Responsible User: Stephanie Carrillo RD      Task: Provide education on continuous glucose monitoring (sensor placement, use of cindy or /reader, understanding glucose trends, alerts and alarms, differences between sensor glucose and blood glucose)    Responsible User:  Stephanie Carrillo RD      Goal: Taking Medication - patient is consistently taking medications as directed       Task: Provide education on action of prescribed medication, including when to take and possible side effects Completed 9/26/2022   Responsible User: Stephanie Carrillo RD      Task: Provide education on insulin and injectable diabetes medications, including administration, storage, site selection and rotation for injection sites Completed 9/26/2022   Responsible User: Stephanie Carrillo RD      Goal: Problem Solving - know how to prevent and manage short-term diabetes complications       Task: Provide education on high blood glucose - causes, signs/symptoms, prevention and treatment Completed 9/26/2022   Responsible User: Stephanie Carrillo RD      Goal: Reducing Risks - know how to prevent and treat long-term diabetes complications       Task: Provide education on major complications of diabetes, prevention, early diagnostic measures and treatment of complications Completed 9/26/2022   Responsible User: Stephanie Carrillo RD Elizabeth Swartout RD, LD, [facility]  Diabetes Education    Time Spent: 30 minutes  Encounter Type: Individual    A copy of this encounter was shared with the provider.

## 2022-09-27 ENCOUNTER — MYC MEDICAL ADVICE (OUTPATIENT)
Dept: PHARMACY | Facility: CLINIC | Age: 82
End: 2022-09-27

## 2022-09-28 ENCOUNTER — OFFICE VISIT (OUTPATIENT)
Dept: DERMATOLOGY | Facility: CLINIC | Age: 82
End: 2022-09-28
Payer: COMMERCIAL

## 2022-09-28 DIAGNOSIS — L21.9 DERMATITIS, SEBORRHEIC: ICD-10-CM

## 2022-09-28 DIAGNOSIS — L30.8 OTHER ECZEMA: Primary | ICD-10-CM

## 2022-09-28 PROCEDURE — 99213 OFFICE O/P EST LOW 20 MIN: CPT | Performed by: PHYSICIAN ASSISTANT

## 2022-09-28 RX ORDER — FLUOCINOLONE ACETONIDE 0.11 MG/ML
OIL TOPICAL 2 TIMES DAILY
Qty: 118.28 ML | Refills: 0 | Status: SHIPPED | OUTPATIENT
Start: 2022-09-28 | End: 2023-03-09

## 2022-09-28 ASSESSMENT — PAIN SCALES - GENERAL: PAINLEVEL: MODERATE PAIN (5)

## 2022-09-28 NOTE — NURSING NOTE
Brandy Leon's chief complaint for this visit includes:  Chief Complaint   Patient presents with     RECHECK     Eczematous dermatitis- mid back. Has been using the triamcinolone cream. Has helped a little bit. Also would like red spots looked at on neck.      PCP: Fernanda Miller    Referring Provider:  Referred Erlin, MD  No address on file    There were no vitals taken for this visit.  Moderate Pain (5)        Allergies   Allergen Reactions     Ace Inhibitors Cough     Estradiol Itching     Lipitor [Atorvastatin Calcium]      Weak and shaky     Sulfa Drugs      Rash       Zocor [Simvastatin]      Weak and shakey     Triamterene Dermatitis     Possible photosensitivity dermatitis, mild.  (changed to hctz alone 6/4/07, will see if this helps)         Do you need any medication refills at today's visit? No    Gaye Zhu LPN

## 2022-09-28 NOTE — PROGRESS NOTES
[facility] Dermatology Note  Encounter Date: Sep 28, 2022  Office Visit     Dermatology Problem List:  1. Family history of melanoma, father  2. Actinic keratosis   -s/p cryotherapy  3. Seborrheic dermatitis, scalp  - Previous tx: ketoconazole shampoo, Free & Clear Shampoo, hydroxyzine initiated 6/18/2013, Nicoral shampoo initiated 3/12/2013, fluocinonide solution initiated 3/12/2013  4. Junctional dysplastic nevus with moderate atypia, right lower back s/p bx 2/2/21  5. Eczematous dermatitis- mid back  ____________________________________________     Assessment & Plan:     # Eczematous dermatitis on the mid back - no rash present on today's exam. Review her back is dry and dryness can cause itching.   - After showering, apply moisturizer daily. Recommended use of Vanicream, Cetaphil, and CeraVe products.   - Continue triamcinolone to the affected areas prn as needed for flares.   - Recommend applying moisturizer using a spatula.  -discussed need for more frequent moisturizing with patient's daughter who primarily does her bathing    # Seborrheic dermatitis - counseled patient on treatment options including hydrocortisone cream and derma smooth oil.   - Recommend head&shoulder shampoo. Leave it on for 5 minutes for rinsing.   - Start dermasmooth oil once daily prn as needed to the posterior scalp and hairline. Steroid edu given.    Procedures Performed:   None     Follow-up: prn for new or changing lesions    Staff and Scribe:     Scribe Disclosure:   Ronald ROGERS, am serving as a scribe to document services personally performed by Clarissa Lucio PA-C, based on data collection and the provider's statements to me.  Provider Disclosure:   The documentation recorded by the scribe accurately reflects the services I personally performed and the decisions made by me.    All risks, benefits and alternatives were discussed with patient.  Patient is in agreement and understands the assessment and  plan.  All questions were answered.    Clarissa Lucio PA-C, MPAS  [facility]: Phone: [phone], Fax: [phone]  [facility]: Phone: [phone],  Fax: [phone]  [facility]: Phone: [phone], Fax: [phone]  ____________________________________________    CC: RECHECK (Eczematous dermatitis- mid back. Has been using the triamcinolone cream. Has helped a little bit. Also would like red spots looked at on neck. )     HPI:  Ms. Brandy Leon is a(n) 82 year old female who presents today as a return patient for a spot check. Present with daughter.    Last seen on 2/18/21 virtually with Dr. Jordan for DN.     Today, patient notes minimal improvement with rash on the mid back. Has been present for years. Notes it does itch. Had tried prescribed medications with no improvement.     Also notes concern on the neck. Patient washes her hair once a week. Notes a decrease in itch after washing.     Patient is otherwise feeling well, without additional concerns.    Labs:  NA    Physical Exam:  Vitals: There were no vitals taken for this visit.  SKIN: Focused examination of neck and back was performed.  - mild erythema and scaling on the posterior scalp   - No other lesions of concern on areas examined.     Medications:  Current Outpatient Medications   Medication     BD PEN NEEDLE JUAN C 2ND GEN 32G X 4 MM miscellaneous     blood glucose (ONETOUCH VERIO IQ) test strip     blood glucose monitoring (ONETOUCH VERIO IQ SYSTEM) meter device kit     calcium carbonate (TUMS) 500 MG chewable tablet     Cranberry-Vitamin C-Vitamin E 4200-20-3 MG-MG-UNIT CAPS     fexofenadine (ALLEGRA) 180 MG tablet     glipiZIDE (GLUCOTROL XL) 10 MG 24 hr tablet     GLUCOSAMINE CHONDROITIN COMPLX OR     ibuprofen (ADVIL/MOTRIN) 200 MG capsule     insulin glargine-lixisenatide (SOLIQUA 100/33) pen     irbesartan (AVAPRO) 150 MG  tablet     latanoprost (XALATAN) 0.005 % ophthalmic solution     metFORMIN (GLUCOPHAGE) 1000 MG tablet     OneTouch Delica Lancets 30G MISC     rosuvastatin (CRESTOR) 5 MG tablet     SYNTHROID 137 MCG tablet     triamcinolone (KENALOG) 0.1 % external cream     trospium (SANCTURA XR) 60 MG CP24 24 hr capsule     No current facility-administered medications for this visit.     Facility-Administered Medications Ordered in Other Visits   Medication     DOBUTamine 500 mg in dextrose 5% 250 mL (adult std conc) premix      Past Medical History:   Patient Active Problem List   Diagnosis     Seasonal allergies     Hypothyroidism     Hyperlipidemia LDL goal <100     Fibromyalgia     Osteoarthritis     Obesity     Type 2 diabetes mellitus with hyperglycemia, with long-term current use of insulin (H)     Hypertension goal BP (blood pressure) < 140/90     Overactive bladder     Recurrent UTI     Pseudophakia of both eyes     DINORA (obstructive sleep apnea)- severe (AHI 56)     Contusion of right knee     Gait disorder     Gastroesophageal reflux disease without esophagitis     Urge incontinence     Chronic kidney disease, stage 2 (mild)     Hydrocephalus, unspecified type (H)     Diabetes mellitus with peripheral vascular disease (H)     Past Medical History:   Diagnosis Date     Actinic keratosis 3/12/2013     Degenerative joint disease      Diabetes (H)      Gastroesophageal reflux disease without esophagitis 12/11/2020     Rheumatic fever 1957    x2 between the ages of 15-18     Seasonal allergies

## 2022-09-28 NOTE — LETTER
9/28/2022         RE: Brandy Leon  [address] [facility] [place] [address]        Dear Colleague,    Thank you for referring your patient, Brandy Leon, to the [facility]. Please see a copy of my visit note below.    [facility] Dermatology Note  Encounter Date: Sep 28, 2022  Office Visit     Dermatology Problem List:  1. Family history of melanoma, father  2. Actinic keratosis   -s/p cryotherapy  3. Seborrheic dermatitis, scalp  - Previous tx: ketoconazole shampoo, Free & Clear Shampoo, hydroxyzine initiated 6/18/2013, Nicoral shampoo initiated 3/12/2013, fluocinonide solution initiated 3/12/2013  4. Junctional dysplastic nevus with moderate atypia, right lower back s/p bx 2/2/21  5. Eczematous dermatitis- mid back  ____________________________________________     Assessment & Plan:     # Eczematous dermatitis on the mid back - no rash present on today's exam. Review her back is dry and dryness can cause itching.   - After showering, apply moisturizer daily. Recommended use of Vanicream, Cetaphil, and CeraVe products.   - Continue triamcinolone to the affected areas prn as needed for flares.   - Recommend applying moisturizer using a spatula.  -discussed need for more frequent moisturizing with patient's daughter who primarily does her bathing    # Seborrheic dermatitis - counseled patient on treatment options including hydrocortisone cream and derma smooth oil.   - Recommend head&shoulder shampoo. Leave it on for 5 minutes for rinsing.   - Start dermasmooth oil once daily prn as needed to the posterior scalp and hairline. Steroid edu given.    Procedures Performed:   None     Follow-up: prn for new or changing lesions    Staff and Scribe:     Scribe Disclosure:   Ronald ROGERS, am serving as a scribe to document services personally performed by Clarissa Lucio PA-C, based on data collection and the provider's statements to me.  Provider  Disclosure:   The documentation recorded by the scribe accurately reflects the services I personally performed and the decisions made by me.    All risks, benefits and alternatives were discussed with patient.  Patient is in agreement and understands the assessment and plan.  All questions were answered.    Clarissa Lucio PA-C, MPAS  [facility]: Phone: [phone], Fax: [phone]  [facility]: Phone: [phone],  Fax: [phone]  [facility]: Phone: [phone], Fax: [phone]  ____________________________________________    CC: RECHECK (Eczematous dermatitis- mid back. Has been using the triamcinolone cream. Has helped a little bit. Also would like red spots looked at on neck. )     HPI:  Ms. Brandy Leon is a(n) 82 year old female who presents today as a return patient for a spot check. Present with daughter.    Last seen on 2/18/21 virtually with Dr. Jordan for DN.     Today, patient notes minimal improvement with rash on the mid back. Has been present for years. Notes it does itch. Had tried prescribed medications with no improvement.     Also notes concern on the neck. Patient washes her hair once a week. Notes a decrease in itch after washing.     Patient is otherwise feeling well, without additional concerns.    Labs:  NA    Physical Exam:  Vitals: There were no vitals taken for this visit.  SKIN: Focused examination of neck and back was performed.  - mild erythema and scaling on the posterior scalp   - No other lesions of concern on areas examined.     Medications:  Current Outpatient Medications   Medication     BD PEN NEEDLE JUAN C 2ND GEN 32G X 4 MM miscellaneous     blood glucose (ONETOUCH VERIO IQ) test strip     blood glucose monitoring (ONETOUCH VERIO IQ SYSTEM) meter device kit     calcium carbonate (TUMS) 500 MG chewable tablet     Cranberry-Vitamin C-Vitamin E 4200-20-3  MG-MG-UNIT CAPS     fexofenadine (ALLEGRA) 180 MG tablet     glipiZIDE (GLUCOTROL XL) 10 MG 24 hr tablet     GLUCOSAMINE CHONDROITIN COMPLX OR     ibuprofen (ADVIL/MOTRIN) 200 MG capsule     insulin glargine-lixisenatide (SOLIQUA 100/33) pen     irbesartan (AVAPRO) 150 MG tablet     latanoprost (XALATAN) 0.005 % ophthalmic solution     metFORMIN (GLUCOPHAGE) 1000 MG tablet     OneTouch Delica Lancets 30G MISC     rosuvastatin (CRESTOR) 5 MG tablet     SYNTHROID 137 MCG tablet     triamcinolone (KENALOG) 0.1 % external cream     trospium (SANCTURA XR) 60 MG CP24 24 hr capsule     No current facility-administered medications for this visit.     Facility-Administered Medications Ordered in Other Visits   Medication     DOBUTamine 500 mg in dextrose 5% 250 mL (adult std conc) premix      Past Medical History:   Patient Active Problem List   Diagnosis     Seasonal allergies     Hypothyroidism     Hyperlipidemia LDL goal <100     Fibromyalgia     Osteoarthritis     Obesity     Type 2 diabetes mellitus with hyperglycemia, with long-term current use of insulin (H)     Hypertension goal BP (blood pressure) < 140/90     Overactive bladder     Recurrent UTI     Pseudophakia of both eyes     DINORA (obstructive sleep apnea)- severe (AHI 56)     Contusion of right knee     Gait disorder     Gastroesophageal reflux disease without esophagitis     Urge incontinence     Chronic kidney disease, stage 2 (mild)     Hydrocephalus, unspecified type (H)     Diabetes mellitus with peripheral vascular disease (H)     Past Medical History:   Diagnosis Date     Actinic keratosis 3/12/2013     Degenerative joint disease      Diabetes (H)      Gastroesophageal reflux disease without esophagitis 12/11/2020     Rheumatic fever 1957    x2 between the ages of 15-18     Seasonal allergies                 Again, thank you for allowing me to participate in the care of your patient.        Sincerely,        Clarissa Lucio PA-C

## 2022-09-30 DIAGNOSIS — Z79.4 TYPE 2 DIABETES MELLITUS WITH HYPERGLYCEMIA, WITH LONG-TERM CURRENT USE OF INSULIN (H): ICD-10-CM

## 2022-09-30 DIAGNOSIS — E11.65 TYPE 2 DIABETES MELLITUS WITH HYPERGLYCEMIA, WITH LONG-TERM CURRENT USE OF INSULIN (H): ICD-10-CM

## 2022-10-07 ENCOUNTER — OFFICE VISIT (OUTPATIENT)
Dept: PODIATRY | Facility: CLINIC | Age: 82
End: 2022-10-07
Payer: COMMERCIAL

## 2022-10-07 DIAGNOSIS — E11.49 TYPE II OR UNSPECIFIED TYPE DIABETES MELLITUS WITH NEUROLOGICAL MANIFESTATIONS, NOT STATED AS UNCONTROLLED(250.60) (H): ICD-10-CM

## 2022-10-07 DIAGNOSIS — E11.51 DIABETES MELLITUS WITH PERIPHERAL VASCULAR DISEASE (H): ICD-10-CM

## 2022-10-07 DIAGNOSIS — B35.1 ONYCHOMYCOSIS: Primary | ICD-10-CM

## 2022-10-07 PROCEDURE — 99214 OFFICE O/P EST MOD 30 MIN: CPT | Performed by: PODIATRIST

## 2022-10-07 NOTE — NURSING NOTE
Brandy Leon's chief complaint for this visit includes:  Chief Complaint   Patient presents with     Follow Up     Diabetic foot check     PCP: Fernanda Miller    Referring Provider:  No referring provider defined for this encounter.    There were no vitals taken for this visit.  Data Unavailable        Allergies   Allergen Reactions     Ace Inhibitors Cough     Estradiol Itching     Lipitor [Atorvastatin Calcium]      Weak and shaky     Sulfa Drugs      Rash       Zocor [Simvastatin]      Weak and shakey     Triamterene Dermatitis     Possible photosensitivity dermatitis, mild.  (changed to hctz alone 6/4/07, will see if this helps)         Do you need any medication refills at today's visit?

## 2022-10-07 NOTE — PROGRESS NOTES
Past Medical History:   Diagnosis Date     Actinic keratosis 3/12/2013     Degenerative joint disease      Diabetes (H)      Gastroesophageal reflux disease without esophagitis 12/11/2020     Rheumatic fever 1957    x2 between the ages of 15-18     Seasonal allergies      Patient Active Problem List   Diagnosis     Seasonal allergies     Hypothyroidism     Hyperlipidemia LDL goal <100     Fibromyalgia     Osteoarthritis     Obesity     Type 2 diabetes mellitus with hyperglycemia, with long-term current use of insulin (H)     Hypertension goal BP (blood pressure) < 140/90     Overactive bladder     Recurrent UTI     Pseudophakia of both eyes     DINORA (obstructive sleep apnea)- severe (AHI 56)     Contusion of right knee     Gait disorder     Gastroesophageal reflux disease without esophagitis     Urge incontinence     Chronic kidney disease, stage 2 (mild)     Hydrocephalus, unspecified type (H)     Diabetes mellitus with peripheral vascular disease (H)     Past Surgical History:   Procedure Laterality Date     BLEPHAROPLASTY Bilateral 4/1/2022    Procedure: Bilateral upper eyelid blepharoplasty;  Surgeon: Fidelia Moran MD;  Location: [facility]     CATARACT IOL, RT/LT       COLONOSCOPY       COLONOSCOPY N/A 8/13/2015    Procedure: COLONOSCOPY;  Surgeon: Duane, William Charles, MD;  Location: [facility]     COLONOSCOPY WITH CO2 INSUFFLATION N/A 8/13/2015    Procedure: COLONOSCOPY WITH CO2 INSUFFLATION;  Surgeon: Duane, William Charles, MD;  Location: [facility]     PHACOEMULSIFICATION WITH STANDARD INTRAOCULAR LENS IMPLANT Left 10/25/2018    Procedure: LEFT PHACOEMULSIFICATION WITH STANDARD INTRAOCULAR LENS IMPLANT;  Surgeon: Thomas Serna MD;  Location: [facility]     PHACOEMULSIFICATION WITH STANDARD INTRAOCULAR LENS IMPLANT Right 11/8/2018    Procedure: RIGHT PHACOEMULSIFICATION WITH STANDARD INTRAOCULAR LENS IMPLANT;  Surgeon: Thomas Serna MD;  Location: [facility]     THYROIDECTOMY        ZZC APPENDECTOMY        ZZC ARTHROPLASTY TMJ       Social History     Socioeconomic History     Marital status:      Spouse name: Not on file     Number of children: Not on file     Years of education: Not on file     Highest education level: Not on file   Occupational History     Occupation:    Tobacco Use     Smoking status: Never Smoker     Smokeless tobacco: Never Used     Tobacco comment: has had exposure to second hand smoke in the past from [employer], no current exposure   Vaping Use     Vaping Use: Never used   Substance and Sexual Activity     Alcohol use: No     Drug use: No     Sexual activity: Never   Other Topics Concern     Parent/sibling w/ CABG, MI or angioplasty before 65F 55M? 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     Hobby Hazards No     Sleep Concern Yes     Comment: difficulty staying asleep, up and down all night     Stress Concern No     Weight Concern Yes     Comment: would like to lose     Special Diet Yes     Comment: low card, low sugar diet     Back Care Yes     Comment: chiropratior     Exercise Yes     Comment: almost everyday     Bike Helmet No     Comment: does not ride bicycle any more     Seat Belt Yes     Self-Exams Yes   Social History Narrative     Not on file     Social Determinants of Health     Financial Resource Strain: Not on file   Food Insecurity: Not on file   Transportation Needs: Not on file   Physical Activity: Not on file   Stress: Not on file   Social Connections: Not on file   Intimate Partner Violence: Not on file   Housing Stability: Not on file     Family History   Problem Relation Age of Onset     Eye Disorder Mother         cataracts     Cancer Father         skin and leukemia     Cancer Sister         Breast Cancer     Eye Disorder Brother         cataract      Cerebrovascular Disease Maternal Grandfather      Cerebrovascular Disease Paternal Grandmother      Eye Disorder Paternal Grandmother         cataracts     Cerebrovascular Disease Paternal Grandfather      Heart Disease Paternal Grandfather      Breast Cancer Daughter      Endometrial Cancer Daughter      Glaucoma No family hx of      Macular Degeneration No family hx of      Lab Results   Component Value Date    A1C 11.1 07/06/2022    A1C 9.6 03/28/2022    A1C 9.5 12/20/2021    A1C 9.8 09/20/2021    A1C 10.3 05/17/2021    A1C 9.2 02/16/2021    A1C 8.7 11/12/2020    A1C 10.0 08/20/2020    A1C 10.5 01/10/2020     Lab Results   Component Value Date    SED 17 07/18/2022    SED 12 06/25/2018     Lab Results   Component Value Date    CRP 5.5 07/18/2022    CRP 8.3 06/25/2018     SUBJECTIVE FINDINGS:  An 82-year-old with diabetes and peripheral neuropathy returns to clinic for diabetic foot cares.  She relates her toenails are growing and they could be cut.  She relates she does get intermittent numbness, tingling and neuropathy in her feet.  Relates no injuries since we have seen her last.  Relates to no ulcers or sores since we have seen her last.  Relates she does not have diabetic shoes and she does not want to get those.  She relates she does have moisturizing lotion and antifungal lotion at home.    OBJECTIVE FINDINGS:  DP and PT are 2/4 bilaterally.  She has decreased ankle joint dorsiflexion bilaterally.  She has mild peripheral edema bilaterally.  There is no erythema, no drainage, no odor, no calor bilaterally.  She does have dry skin with some mild scaliness in a moccasin pattern bilaterally.  She has dystrophic, incurvated, thickened nails with subungual debris, dystrophy and discoloration to differing degrees, 1 through 5 bilaterally.  There is no erythema, no drainage, no odor, no calor bilaterally.  No gross tendon voids bilaterally.    ASSESSMENT AND PLAN:  Onychomycosis and onychauxis bilaterally.   Diabetes with peripheral neuropathy.  Diabetes with peripheral vascular disease.  She has dry skin with some mild tinea pedis changes.  Diagnosis and treatment options discussed with her.  I advised her on stretching.  I advised her on moisturizing and antifungal cream use.  All of the nails were debrided or reduced bilaterally upon consent.  Advised her on vinegar-water soaks.  Return to clinic and see me in 3-4 months.  Previous notes reviewed.            Moderate level of medical decision making.

## 2022-10-07 NOTE — LETTER
10/7/2022         RE: Brandy Leon  [address] [facility] [place] [address]        Dear Colleague,    Thank you for referring your patient, Brandy Leon, to the [facility]. Please see a copy of my visit note below.    Past Medical History:   Diagnosis Date     Actinic keratosis 3/12/2013     Degenerative joint disease      Diabetes (H)      Gastroesophageal reflux disease without esophagitis 12/11/2020     Rheumatic fever 1957    x2 between the ages of 15-18     Seasonal allergies      Patient Active Problem List   Diagnosis     Seasonal allergies     Hypothyroidism     Hyperlipidemia LDL goal <100     Fibromyalgia     Osteoarthritis     Obesity     Type 2 diabetes mellitus with hyperglycemia, with long-term current use of insulin (H)     Hypertension goal BP (blood pressure) < 140/90     Overactive bladder     Recurrent UTI     Pseudophakia of both eyes     DINORA (obstructive sleep apnea)- severe (AHI 56)     Contusion of right knee     Gait disorder     Gastroesophageal reflux disease without esophagitis     Urge incontinence     Chronic kidney disease, stage 2 (mild)     Hydrocephalus, unspecified type (H)     Diabetes mellitus with peripheral vascular disease (H)     Past Surgical History:   Procedure Laterality Date     BLEPHAROPLASTY Bilateral 4/1/2022    Procedure: Bilateral upper eyelid blepharoplasty;  Surgeon: Fidelia Moran MD;  Location: [facility]     CATARACT IOL, RT/LT       COLONOSCOPY       COLONOSCOPY N/A 8/13/2015    Procedure: COLONOSCOPY;  Surgeon: Duane, William Charles, MD;  [facility]: [facility]     COLONOSCOPY WITH CO2 INSUFFLATION N/A 8/13/2015    Procedure: COLONOSCOPY WITH CO2 INSUFFLATION;  Surgeon: Duane, William Charles, MD;  [facility]: [facility]     PHACOEMULSIFICATION WITH STANDARD INTRAOCULAR LENS IMPLANT Left 10/25/2018    Procedure: LEFT PHACOEMULSIFICATION WITH STANDARD INTRAOCULAR LENS IMPLANT;  Surgeon: Thomas Serna MD;   [facility]     PHACOEMULSIFICATION WITH STANDARD INTRAOCULAR LENS IMPLANT Right 11/8/2018    Procedure: RIGHT PHACOEMULSIFICATION WITH STANDARD INTRAOCULAR LENS IMPLANT;  Surgeon: Thomas Serna MD;  Location: [facility]     THYROIDECTOMY        ZZC APPENDECTOMY       ZZC ARTHROPLASTY TMJ       Social History     Socioeconomic History     Marital status:      Spouse name: Not on file     Number of children: Not on file     Years of education: Not on file     Highest education level: Not on file   Occupational History     Occupation:    Tobacco Use     Smoking status: Never Smoker     Smokeless tobacco: Never Used     Tobacco comment: has had exposure to second hand smoke in the past from [employer], no current exposure   Vaping Use     Vaping Use: Never used   Substance and Sexual Activity     Alcohol use: No     Drug use: No     Sexual activity: Never   Other Topics Concern     Parent/sibling w/ CABG, MI or angioplasty before 65F 55M? 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     Hobby Hazards No     Sleep Concern Yes     Comment: difficulty staying asleep, up and down all night     Stress Concern No     Weight Concern Yes     Comment: would like to lose     Special Diet Yes     Comment: low card, low sugar diet     Back Care Yes     Comment: chiropratior     Exercise Yes     Comment: almost everyday     Bike Helmet No     Comment: does not ride bicycle any more     Seat Belt Yes     Self-Exams Yes   Social History Narrative     Not on file     Social Determinants of Health     Financial Resource Strain: Not on file   Food Insecurity: Not on file   Transportation Needs: Not on file   Physical Activity: Not on file   Stress: Not on file   Social Connections: Not on file   Intimate Partner Violence: Not on  file   Housing Stability: Not on file     Family History   Problem Relation Age of Onset     Eye Disorder Mother         cataracts     Cancer Father         skin and leukemia     Cancer Sister         Breast Cancer     Eye Disorder Brother         cataract     Cerebrovascular Disease Maternal Grandfather      Cerebrovascular Disease Paternal Grandmother      Eye Disorder Paternal Grandmother         cataracts     Cerebrovascular Disease Paternal Grandfather      Heart Disease Paternal Grandfather      Breast Cancer Daughter      Endometrial Cancer Daughter      Glaucoma No family hx of      Macular Degeneration No family hx of      Lab Results   Component Value Date    A1C 11.1 07/06/2022    A1C 9.6 03/28/2022    A1C 9.5 12/20/2021    A1C 9.8 09/20/2021    A1C 10.3 05/17/2021    A1C 9.2 02/16/2021    A1C 8.7 11/12/2020    A1C 10.0 08/20/2020    A1C 10.5 01/10/2020     Lab Results   Component Value Date    SED 17 07/18/2022    SED 12 06/25/2018     Lab Results   Component Value Date    CRP 5.5 07/18/2022    CRP 8.3 06/25/2018     SUBJECTIVE FINDINGS:  An 82-year-old with diabetes and peripheral neuropathy returns to clinic for diabetic foot cares.  She relates her toenails are growing and they could be cut.  She relates she does get intermittent numbness, tingling and neuropathy in her feet.  Relates no injuries since we have seen her last.  Relates to no ulcers or sores since we have seen her last.  Relates she does not have diabetic shoes and she does not want to get those.  She relates she does have moisturizing lotion and antifungal lotion at home.    OBJECTIVE FINDINGS:  DP and PT are 2/4 bilaterally.  She has decreased ankle joint dorsiflexion bilaterally.  She has mild peripheral edema bilaterally.  There is no erythema, no drainage, no odor, no calor bilaterally.  She does have dry skin with some mild scaliness in a moccasin pattern bilaterally.  She has dystrophic, incurvated, thickened nails with subungual  debris, dystrophy and discoloration to differing degrees, 1 through 5 bilaterally.  There is no erythema, no drainage, no odor, no calor bilaterally.  No gross tendon voids bilaterally.    ASSESSMENT AND PLAN:  Onychomycosis and onychauxis bilaterally.  Diabetes with peripheral neuropathy.  Diabetes with peripheral vascular disease.  She has dry skin with some mild tinea pedis changes.  Diagnosis and treatment options discussed with her.  I advised her on stretching.  I advised her on moisturizing and antifungal cream use.  All of the nails were debrided or reduced bilaterally upon consent.  Advised her on vinegar-water soaks.  Return to clinic and see me in 3-4 months.  Previous notes reviewed.            Moderate level of medical decision making.      Again, thank you for allowing me to participate in the care of your patient.        Sincerely,        Ehsan Araya DPM

## 2022-10-21 ENCOUNTER — TELEPHONE (OUTPATIENT)
Dept: EDUCATION SERVICES | Facility: CLINIC | Age: 82
End: 2022-10-21

## 2022-10-21 ENCOUNTER — VIRTUAL VISIT (OUTPATIENT)
Dept: EDUCATION SERVICES | Facility: CLINIC | Age: 82
End: 2022-10-21
Payer: COMMERCIAL

## 2022-10-21 DIAGNOSIS — E11.65 TYPE 2 DIABETES MELLITUS WITH HYPERGLYCEMIA, WITH LONG-TERM CURRENT USE OF INSULIN (H): Primary | ICD-10-CM

## 2022-10-21 DIAGNOSIS — Z79.4 TYPE 2 DIABETES MELLITUS WITH HYPERGLYCEMIA, WITH LONG-TERM CURRENT USE OF INSULIN (H): Primary | ICD-10-CM

## 2022-10-21 PROCEDURE — G0108 DIAB MANAGE TRN  PER INDIV: HCPCS | Mod: 95 | Performed by: DIETITIAN, REGISTERED

## 2022-10-21 NOTE — PROGRESS NOTES
Diabetes Self-Management Education & Support    Presents for:  Diabetes ed follow up   Type of service:  Video Visit  Originating Location (pt. Location): Home  Distant Location (provider location): Home  Mode of Communication:  Video Conference via [employer]  Video Start Time: 1200  Video End Time (time video stopped): 1235    How would patient like to obtain AVS? MyChart    ASSESSMENT:  Patient is working with [facility] and endocrinology; whom recently started patient on [place], however Zoila self stopped it about a week ago due to itching.  Notes she feels much better off of it, but blood sugars are considerably elevated no longer being on basal insulin.  Has Basaglar in the refrigerator and writer will send a note to PCP about re-starting this.  Blood sugars the last two days have been high 300s and Zoila notes some HI readings on meter.  Notes she feels well and declines going to the ER.   Discussed long term planning - further changes via diet will not improve glucose control enough.  Chart is set in a food routine and happy with what she eats.  Likely will need more aggressive medication management.  Work up the basaglar and consider rapid acting (0.46units/kg of basal at this time). Recommend doing a professional continuous glucose monitor at [facility] in November to better assess abiltiy to increase basal and need for mealtime.  Discussed adding in an additional blood finger stick either pre dinner or bedtime for right now for further basaglar adjustment.       Patient's most recent   Lab Results   Component Value Date    A1C 11.1 07/06/2022    A1C 10.3 05/17/2021     is not meeting goal of <8.0    Diabetes knowledge and skills assessment:   Patient is knowledgeable in diabetes management concepts related to: Monitoring and Taking Medication  Continue education with the following diabetes management concepts: Healthy Eating, Being Active, Monitoring, Taking Medication, Problem Solving, Reducing Risks and  "Healthy Coping  Based on learning assessment above, most appropriate setting for further diabetes education would be: Individual setting.      PLAN  Telephone encounter to PCP about resuming Basaglar   Continue with positive diet & lifestyle changes   Professional continuous glucose monitor appointments set up  Weekly mychart updates for basal insulin titration       See Care Plan for co-developed, patient-state behavior change goals.  AVS provided for patient today.    SUBJECTIVE/OBJECTIVE:     Cultural Influences/Ethnic Background:  Not  or     Diabetes Symptoms & Complications:          Patient Problem List and Family Medical History reviewed for relevant medical history, current medical status, and diabetes risk factors.    Vitals:  There were no vitals taken for this visit.  Estimated body mass index is 31.14 kg/m  as calculated from the following:    Height as of 7/18/22: 1.626 m (5' 4\").    Weight as of 9/9/22: 82.3 kg (181 lb 6.4 oz).   Last 3 BP:   BP Readings from Last 3 Encounters:   09/09/22 108/70   07/18/22 132/70   06/21/22 134/84       History   Smoking Status     Never   Smokeless Tobacco     Never     Comment: has had exposure to second hand smoke in the past from [employer], no current exposure       Labs:  Lab Results   Component Value Date    A1C 11.1 07/06/2022    A1C 10.3 05/17/2021     Lab Results   Component Value Date     04/01/2022     03/28/2022     04/02/2021     Lab Results   Component Value Date    LDL 97 11/16/2021     11/12/2020     HDL Cholesterol   Date Value Ref Range Status   11/12/2020 55 >49 mg/dL Final     Direct Measure HDL   Date Value Ref Range Status   11/16/2021 52 >=50 mg/dL Final   ]  GFR Estimate   Date Value Ref Range Status   03/28/2022 78 >60 mL/min/1.73m2 Final     Comment:     Effective December 21, 2021 eGFRcr in adults is calculated using the 2021 CKD-EPI creatinine equation which includes age and gender (Adriana et al., NEJM, " DOI: 10.1056/MPWYzk8354485)   2021 74 >60 mL/min/[1.73_m2] Final     Comment:     Non  GFR Calc  Starting 2018, serum creatinine based estimated GFR (eGFR) will be   calculated using the Chronic Kidney Disease Epidemiology Collaboration   (CKD-EPI) equation.       GFR, ESTIMATED POCT   Date Value Ref Range Status   2021 >60 >60 mL/min/1.73m2 Final     GFR Estimate If Black   Date Value Ref Range Status   2021 86 >60 mL/min/[1.73_m2] Final     Comment:      GFR Calc  Starting 2018, serum creatinine based estimated GFR (eGFR) will be   calculated using the Chronic Kidney Disease Epidemiology Collaboration   (CKD-EPI) equation.       Lab Results   Component Value Date    CR 0.77 2022    CR 0.76 2021     No results found for: MICROALBUMIN    Healthy Eating:  Not assessed at this visit     Being Active:   Not assessed at this visit     Monitorin blood sugar yesterday, 388 before bed       Taking Medications:  Diabetes Medication(s)     Biguanides       metFORMIN (GLUCOPHAGE) 1000 MG tablet    TAKE 1 TABLET BY MOUTH TWICE A DAY WITH MEALS    Sulfonylureas       glipiZIDE (GLUCOTROL XL) 10 MG 24 hr tablet    TAKE 1 TABLET BY MOUTH TWICE A DAY    Antidiabetic Combinations       insulin glargine-lixisenatide (SOLIQUA 100/33) pen    Use 30-45 units daily          Problem Solving:   yes hyperglycemia an hypoglycemia signs/symptoms and treatment   Medication consistency   What to do if not feeling well       Reducing Risks:   yes - reviewed chronic hyperglycemia and need to lower blood sugars   Risk of Hyperosmolar hyperglycemic state in type 2 with prolonged elevated blood sugars     Healthy Coping:  Pre- Contemplation      Patient Activation Measure Survey Score:  JACIEL Score (Last Two) 2011   JACIEL Raw Score 39   Activation Score 56.4   JACIEL Level 3       Care Plan and Education Provided:  Care Plan: Diabetes   Updates made by Lorena  BELKIS Brown since 10/24/2022 12:00 AM      Problem: HbA1C Not In Goal       Goal: Get HbA1C Level in Goal       Task: Educate patient on benefits of regular glucose monitoring Completed 10/24/2022   Responsible User: Stephanie Carrillo RD      Problem: Diabetes Self-Management Education Needed to Optimize Self-Care Behaviors       Goal: Understand diabetes pathophysiology and disease progression       Task: Provide education on diabetes pathophysiology and disease progression specfic to patient's diabetes type Completed 10/24/2022   Responsible User: Stephanie Carrillo RD      Goal: Being Active - get regular physical activity, working up to at least 150 minutes per week       Task: Provide education on relationship of activity to glucose and precautions to take if at risk for low glucose Completed 10/24/2022   Responsible User: Stephanie Carrillo RD      Goal: Monitoring - monitor glucose and ketones as directed       Task: Provide education on continuous glucose monitoring (sensor placement, use of cindy or /reader, understanding glucose trends, alerts and alarms, differences between sensor glucose and blood glucose) Completed 10/24/2022   Responsible User: Stephanie Carrillo RD      Goal: Problem Solving - know how to prevent and manage short-term diabetes complications       Task: Provide education on low blood glucose - causes, signs/symptoms, prevention, treatment, carrying a carbohydrate source at all times, and medical identification Completed 10/24/2022   Responsible User: Stephanie Carrillo RD Elizabeth Swartout RD, LD, [facility]  Diabetes Education      Time Spent: 30 minutes  Encounter Type: Individual

## 2022-10-21 NOTE — TELEPHONE ENCOUNTER
Patient and daughter updated via [employer] as planned. They have extra Basaglar at home.  Unsure if this prescription will be run through the [employer] patient assistance program or go to a pharmacy.  Will send through a new prescription once the location is determined for filling it    Stephanie Carrillo RD, LD, [facility]  Diabetes Education

## 2022-10-21 NOTE — TELEPHONE ENCOUNTER
Hi  Dr. Morris & Dr. Miller (or covering team)    Please note if you approve of this plan or indicate an alternate plan.       Patient was on Soliqa 34 units (which is 34u lantus + a GLP1 combo) for about 5 weeks, but felt itchy and self stopped it 1 week ago and did not update.  Blood sugars are high 300s right now, last A1c 11.1.  Notes meter reads HI at times (>600) and declines ER.         Has Basaglar at home in fridge from previous regimen that she was on in September.  Resume Basaglar at previous dose of 38 units once daily & titrate up.      Has annual wellness visit with PCP Dr. Miller this coming Monday.         Thanks!  Mana Carrillo RD, LD, [facility]  Diabetes Education

## 2022-10-24 ENCOUNTER — OFFICE VISIT (OUTPATIENT)
Dept: FAMILY MEDICINE | Facility: CLINIC | Age: 82
End: 2022-10-24
Payer: COMMERCIAL

## 2022-10-24 VITALS
RESPIRATION RATE: 20 BRPM | WEIGHT: 182.7 LBS | BODY MASS INDEX: 30.44 KG/M2 | SYSTOLIC BLOOD PRESSURE: 131 MMHG | HEIGHT: 65 IN | DIASTOLIC BLOOD PRESSURE: 72 MMHG | OXYGEN SATURATION: 95 % | TEMPERATURE: 98.4 F | HEART RATE: 50 BPM

## 2022-10-24 DIAGNOSIS — N18.2 CHRONIC KIDNEY DISEASE, STAGE 2 (MILD): ICD-10-CM

## 2022-10-24 DIAGNOSIS — E78.5 HYPERLIPIDEMIA LDL GOAL <100: ICD-10-CM

## 2022-10-24 DIAGNOSIS — Z00.00 ENCOUNTER FOR MEDICARE ANNUAL WELLNESS EXAM: Primary | ICD-10-CM

## 2022-10-24 DIAGNOSIS — I10 HYPERTENSION GOAL BP (BLOOD PRESSURE) < 140/90: Chronic | ICD-10-CM

## 2022-10-24 DIAGNOSIS — R35.0 URINARY FREQUENCY: ICD-10-CM

## 2022-10-24 DIAGNOSIS — K21.9 GASTROESOPHAGEAL REFLUX DISEASE WITHOUT ESOPHAGITIS: ICD-10-CM

## 2022-10-24 DIAGNOSIS — Z23 HIGH PRIORITY FOR 2019-NCOV VACCINE: ICD-10-CM

## 2022-10-24 DIAGNOSIS — E11.51 DIABETES MELLITUS WITH PERIPHERAL VASCULAR DISEASE (H): ICD-10-CM

## 2022-10-24 LAB
ALBUMIN UR-MCNC: NEGATIVE MG/DL
APPEARANCE UR: CLEAR
BACTERIA #/AREA URNS HPF: ABNORMAL /HPF
BILIRUB UR QL STRIP: NEGATIVE
CHOLEST SERPL-MCNC: 242 MG/DL
COLOR UR AUTO: YELLOW
CREAT UR-MCNC: 38 MG/DL
FASTING STATUS PATIENT QL REPORTED: YES
GLUCOSE UR STRIP-MCNC: >=1000 MG/DL
HDLC SERPL-MCNC: 56 MG/DL
HGB UR QL STRIP: NEGATIVE
KETONES UR STRIP-MCNC: NEGATIVE MG/DL
LDLC SERPL CALC-MCNC: 116 MG/DL
LEUKOCYTE ESTERASE UR QL STRIP: ABNORMAL
MICROALBUMIN UR-MCNC: 10 MG/L
MICROALBUMIN/CREAT UR: 26.32 MG/G CR (ref 0–25)
NITRATE UR QL: NEGATIVE
NONHDLC SERPL-MCNC: 186 MG/DL
PH UR STRIP: 5 [PH] (ref 5–7)
RBC #/AREA URNS AUTO: ABNORMAL /HPF
RENAL EPI CELLS #/AREA URNS HPF: ABNORMAL /HPF
SP GR UR STRIP: 1.01 (ref 1–1.03)
SQUAMOUS #/AREA URNS AUTO: ABNORMAL /LPF
TRIGL SERPL-MCNC: 348 MG/DL
UROBILINOGEN UR STRIP-ACNC: 0.2 E.U./DL
WBC #/AREA URNS AUTO: ABNORMAL /HPF

## 2022-10-24 PROCEDURE — 91312 COVID-19,PF,PFIZER BOOSTER BIVALENT: CPT | Performed by: FAMILY MEDICINE

## 2022-10-24 PROCEDURE — 81001 URINALYSIS AUTO W/SCOPE: CPT | Performed by: FAMILY MEDICINE

## 2022-10-24 PROCEDURE — 99207 PR FOOT EXAM NO CHARGE: CPT | Performed by: FAMILY MEDICINE

## 2022-10-24 PROCEDURE — 36415 COLL VENOUS BLD VENIPUNCTURE: CPT | Performed by: FAMILY MEDICINE

## 2022-10-24 PROCEDURE — 82043 UR ALBUMIN QUANTITATIVE: CPT | Performed by: FAMILY MEDICINE

## 2022-10-24 PROCEDURE — 99214 OFFICE O/P EST MOD 30 MIN: CPT | Mod: 25 | Performed by: FAMILY MEDICINE

## 2022-10-24 PROCEDURE — 0124A COVID-19,PF,PFIZER BOOSTER BIVALENT: CPT | Performed by: FAMILY MEDICINE

## 2022-10-24 PROCEDURE — 80061 LIPID PANEL: CPT | Performed by: FAMILY MEDICINE

## 2022-10-24 PROCEDURE — G0439 PPPS, SUBSEQ VISIT: HCPCS | Performed by: FAMILY MEDICINE

## 2022-10-24 PROCEDURE — 87086 URINE CULTURE/COLONY COUNT: CPT | Performed by: FAMILY MEDICINE

## 2022-10-24 RX ORDER — DEXTROMETHORPHN/ACETAMINOPH/CP 10-325-2MG
1 TABLET ORAL DAILY PRN
COMMUNITY
Start: 2022-10-24 | End: 2023-04-24

## 2022-10-24 RX ORDER — INSULIN GLARGINE 100 [IU]/ML
38 INJECTION, SOLUTION SUBCUTANEOUS DAILY
Qty: 15 ML | COMMUNITY
Start: 2022-10-24 | End: 2022-10-27

## 2022-10-24 RX ORDER — INSULIN GLARGINE 100 [IU]/ML
INJECTION, SOLUTION SUBCUTANEOUS DAILY
COMMUNITY
End: 2022-10-24

## 2022-10-24 ASSESSMENT — PAIN SCALES - GENERAL: PAINLEVEL: NO PAIN (0)

## 2022-10-24 ASSESSMENT — ENCOUNTER SYMPTOMS
CHILLS: 0
DYSURIA: 0
SHORTNESS OF BREATH: 0
HEADACHES: 0
DIARRHEA: 0
FEVER: 0
PALPITATIONS: 0
PARESTHESIAS: 1
HEMATURIA: 0
MYALGIAS: 1
SORE THROAT: 0
NAUSEA: 0
CONSTIPATION: 1
HEARTBURN: 0
HEMATOCHEZIA: 0
ABDOMINAL PAIN: 0
COUGH: 1
NERVOUS/ANXIOUS: 0
JOINT SWELLING: 0
EYE PAIN: 0
DIZZINESS: 0
BREAST MASS: 0
ARTHRALGIAS: 0
FREQUENCY: 1
WEAKNESS: 1

## 2022-10-24 ASSESSMENT — PATIENT HEALTH QUESTIONNAIRE - PHQ9
10. IF YOU CHECKED OFF ANY PROBLEMS, HOW DIFFICULT HAVE THESE PROBLEMS MADE IT FOR YOU TO DO YOUR WORK, TAKE CARE OF THINGS AT HOME, OR GET ALONG WITH OTHER PEOPLE: NOT DIFFICULT AT ALL
SUM OF ALL RESPONSES TO PHQ QUESTIONS 1-9: 12
SUM OF ALL RESPONSES TO PHQ QUESTIONS 1-9: 12

## 2022-10-24 ASSESSMENT — ACTIVITIES OF DAILY LIVING (ADL): CURRENT_FUNCTION: NO ASSISTANCE NEEDED

## 2022-10-24 NOTE — PROGRESS NOTES
"SUBJECTIVE:   Zoila is a 82 year old who presents for Preventive Visit with daughter    {  Patient has been advised of split billing requirements and indicates understanding: Yes  Are you in the first 12 months of your Medicare coverage?  No    Healthy Habits:     In general, how would you rate your overall health?  Fair    Frequency of exercise:  2-3 days/week    Duration of exercise:  Less than 15 minutes    Do you usually eat at least 4 servings of fruit and vegetables a day, include whole grains    & fiber and avoid regularly eating high fat or \"junk\" foods?  Yes    Taking medications regularly:  Yes    Medication side effects:  Muscle aches    Ability to successfully perform activities of daily living:  No assistance needed    Home Safety:  No safety concerns identified    Hearing Impairment:  No hearing concerns    In the past 6 months, have you been bothered by leaking of urine? Yes    In general, how would you rate your overall mental or emotional health?  Good      PHQ-2 Total Score: 3    Additional concerns today:  No  general movement - tolerated well.     Do you feel safe in your environment? YES    Have you ever done Advance Care Planning? (For example, a Health Directive, POLST, or a discussion with a medical provider or your loved ones about your wishes): Yes, patient states has an Advance Care Planning document and will bring a copy to the clinic.      Fall risk  Fallen 2 or more times in the past year?: No  Any fall with injury in the past year?: Yes    Cognitive Screening   1) Repeat 3 items (Leader, Season, Table)    2) Clock draw: ABNORMAL time  3) 3 item recall: Recalls 1 object   Results: ABNORMAL clock, 1-2 items recalled: PROBABLE COGNITIVE IMPAIRMENT, **INFORM PROVIDER**    Mini-CogTM Copyright [employer]. Licensed by the author for use in [facility]; reprinted with permission ([email or web address]@.[email or web address]). All rights reserved.      Do you have sleep apnea, excessive snoring or daytime " drowsiness?: yes    Reviewed and updated as needed this visit by clinical staff   Tobacco  Allergies  Meds   Med Hx  Surg Hx  Fam Hx  Soc Hx        Reviewed and updated as needed this visit by Provider   Tobacco  Allergies  Meds   Med Hx  Surg Hx  Fam Hx  Soc Hx       Social History     Tobacco Use     Smoking status: Never     Smokeless tobacco: Never     Tobacco comments:     has had exposure to second hand smoke in the past from [place], no current exposure   Substance Use Topics     Alcohol use: No     If you drink alcohol do you typically have >3 drinks per day or >7 drinks per week? No    Alcohol Use 10/24/2022   Prescreen: >3 drinks/day or >7 drinks/week? Not Applicable   Prescreen: >3 drinks/day or >7 drinks/week? -   No flowsheet data found.    Diabetes Follow-up  Was taking Soliqua but had symptoms of rash and itching with the medication and discontinued it.  She was not taking any insulin for about a week until 10/21/2022 when she had an appointment with diabetic education and this was discovered.  She did have Basaglar at home and I was in communication with the diabetic educator to restart this at her previous dose.  There is a planned interaction between patient/daughter through [employer] and diabetic education on Thursday of this week to review blood sugars after getting back on the Basaglar.  We reviewed the last 3 days with the restart of the insulin and we are definitely seeing blood sugars coming down.  She had frequent blood sugars over 300-500 off the medication and is currently in the mid 200 range.  How often are you checking your blood sugar? One time daily  What time of day are you checking your blood sugars (select all that apply)?  Before meals  Have you had any blood sugars above 200?  Yes frequently  Have you had any blood sugars below 70?  No    What symptoms do you notice when your blood sugar is low?  None    What concerns do you have today about your diabetes? None and Blood  sugar is often over 200     Do you have any of these symptoms? (Select all that apply)  Numbness in feet        Hyperlipidemia Follow-Up      Are you regularly taking any medication or supplement to lower your cholesterol?   Yes- Rosuvastatin twice weekly    Are you having muscle aches or other side effects that you think could be caused by your cholesterol lowering medication?  No -not at this dosage    Hypertension Follow-up      Do you check your blood pressure regularly outside of the clinic? No     Are you following a low salt diet? Yes    Are your blood pressures ever more than 140 on the top number (systolic) OR more   than 90 on the bottom number (diastolic), for example 140/90? No    BP Readings from Last 2 Encounters:   10/24/22 131/72   09/09/22 108/70     Hemoglobin A1C (%)   Date Value   07/06/2022 11.1 (H)   03/28/2022 9.6 (H)   05/17/2021 10.3 (H)   02/16/2021 9.2 (H)     LDL Cholesterol Calculated (mg/dL)   Date Value   10/24/2022 116 (H)   11/16/2021 97   11/12/2020 102 (H)   10/25/2019 91       Chronic Kidney Disease Follow-up      Do you take any over the counter pain medicine?: No    GERD: As needed Tums used.  Denies ongoing symptoms.  Symptoms occur following intake of spicy food primarily.    Urinary frequency: History of recent UTI treated through urgent care in September.  She has had a history of recurrent UTIs for which she was placed on prophylactic Hiprex treatment.  She had negative reaction to this with itching and discontinued medication.  No current dysuria but urinary frequency is noted.        Current providers sharing in care for this patient include:   Patient Care Team:  Fernanda Miller MD as PCP - General (Family Medicine)  Macie Davila, RN as   Mirna Perez [facility] as Pharmacist (Pharmacist)  Ehsan Araya DPM as Assigned Musculoskeletal Provider  Mitchel Kenney MD as Assigned Neuroscience Provider  Fernanda Miller MD as Assigned PCP  Trost,  MD Rachel as Assigned Endocrinology Provider  Gurjit Cardoso OD as Assigned Surgical Provider  Stephanie Carrillo RD as Diabetes Educator (Dietitian, Registered)  Mirna Perez RPH as Assigned MTM Pharmacist    The following health maintenance items are reviewed in Epic and correct as of today:  Health Maintenance   Topic Date Due     MEDICARE ANNUAL WELLNESS VISIT  11/12/2021     COVID-19 Vaccine (4 - Booster for Pfizer series) 04/21/2022     INFLUENZA VACCINE (1) 09/01/2022     LIPID  11/16/2022     MICROALBUMIN  11/16/2022     ANNUAL REVIEW OF HM ORDERS  12/13/2022     DIABETIC FOOT EXAM  12/21/2022     A1C  01/06/2023     BMP  03/28/2023     EYE EXAM  09/09/2023     FALL RISK ASSESSMENT  10/24/2023     ADVANCE CARE PLANNING  10/24/2027     DEXA  08/18/2030     DTAP/TDAP/TD IMMUNIZATION (2 - Td or Tdap) 03/28/2032     PHQ-2 (once per calendar year)  Completed     URINALYSIS  Completed     ZOSTER IMMUNIZATION  Completed     Pneumococcal Vaccine: 65+ Years  Addressed     IPV IMMUNIZATION  Aged Out     MENINGITIS IMMUNIZATION  Aged Out     COLORECTAL CANCER SCREENING  Discontinued     BP Readings from Last 3 Encounters:   10/24/22 131/72   09/09/22 108/70   07/18/22 132/70    Wt Readings from Last 3 Encounters:   10/24/22 82.9 kg (182 lb 11.2 oz)   09/09/22 82.3 kg (181 lb 6.4 oz)   07/18/22 81.6 kg (180 lb)                  Pneumonia Vaccine:  Declined pneumonia shot.  Mammogram Screening: Mammogram Screening - Patient over age 75, has elected to continue with screening.      Pertinent mammograms are reviewed under the imaging tab.    Review of Systems   Constitutional: Negative for chills and fever.   HENT: Negative for congestion, ear pain, hearing loss and sore throat.    Eyes: Negative for pain and visual disturbance.   Respiratory: Positive for cough. Negative for shortness of breath.    Cardiovascular: Negative for chest pain, palpitations and peripheral edema.   Gastrointestinal: Positive  "for constipation. Negative for abdominal pain, diarrhea, heartburn, hematochezia and nausea.   Breasts:  Negative for tenderness, breast mass and discharge.   Genitourinary: Positive for frequency and urgency. Negative for dysuria, genital sores, hematuria, pelvic pain, vaginal bleeding and vaginal discharge.   Musculoskeletal: Positive for myalgias. Negative for arthralgias and joint swelling.   Skin: Positive for rash.   Neurological: Positive for weakness and paresthesias. Negative for dizziness and headaches.   Psychiatric/Behavioral: Negative for mood changes. The patient is not nervous/anxious.    took metamucil - not effective yet.    CTS right hand - currently left worse than right.  Worse at night.        OBJECTIVE:   /72 (BP Location: Right arm, Patient Position: Sitting, Cuff Size: Adult Regular)   Pulse 50   Temp 98.4  F (36.9  C) (Oral)   Resp 20   Ht 1.64 m (5' 4.57\")   Wt 82.9 kg (182 lb 11.2 oz)   SpO2 95%   BMI 30.81 kg/m   Estimated body mass index is 30.81 kg/m  as calculated from the following:    Height as of this encounter: 1.64 m (5' 4.57\").    Weight as of this encounter: 82.9 kg (182 lb 11.2 oz).  Physical Exam  GENERAL APPEARANCE: alert, no distress and obese  EYES: Eyes grossly normal to inspection, PERRL and conjunctivae and sclerae normal  HENT: ear canals and TM's normal, nose and mouth without ulcers or lesions, oropharynx clear and oral mucous membranes moist  NECK: no adenopathy, no asymmetry, masses, or scars and thyroid normal to palpation  RESP: lungs clear to auscultation - no rales, rhonchi or wheezes  CV: regular rate and rhythm, normal S1 S2, no S3 or S4, no murmur, click or rub, no peripheral edema and peripheral pulses strong  ABDOMEN: soft, nontender, no hepatosplenomegaly, no masses and bowel sounds normal  MS: no musculoskeletal defects are noted and gait is age appropriate without ataxia  SKIN: no suspicious lesions or rashes  NEURO: Normal strength and " tone, sensory exam grossly normal, mentation intact and speech normal  DIABETIC FOOT EXAM: normal DP and PT pulses, no trophic changes or ulcerative lesions, normal sensory exam and abnormal monofilament exam with absent sensation on all points tested        PSYCH: mentation appears normal and affect normal/bright    Diagnostic Test Results:  Labs reviewed in Epic  Results for orders placed or performed in visit on 10/24/22   Lipid panel reflex to direct LDL Non-fasting     Status: Abnormal   Result Value Ref Range    Cholesterol 242 (H) <200 mg/dL    Triglycerides 348 (H) <150 mg/dL    Direct Measure HDL 56 >=50 mg/dL    LDL Cholesterol Calculated 116 (H) <=100 mg/dL    Non HDL Cholesterol 186 (H) <130 mg/dL    Patient Fasting > 8hrs? Yes     Narrative    Cholesterol  Desirable:  <200 mg/dL    Triglycerides  Normal:  Less than 150 mg/dL  Borderline High:  150-199 mg/dL  High:  200-499 mg/dL  Very High:  Greater than or equal to 500 mg/dL    Direct Measure HDL  Female:  Greater than or equal to 50 mg/dL   Male:  Greater than or equal to 40 mg/dL    LDL Cholesterol  Desirable:  <100mg/dL  Above Desirable:  100-129 mg/dL   Borderline High:  130-159 mg/dL   High:  160-189 mg/dL   Very High:  >= 190 mg/dL    Non HDL Cholesterol  Desirable:  130 mg/dL  Above Desirable:  130-159 mg/dL  Borderline High:  160-189 mg/dL  High:  190-219 mg/dL  Very High:  Greater than or equal to 220 mg/dL   Albumin Random Urine Quantitative with Creat Ratio     Status: Abnormal   Result Value Ref Range    Creatinine Urine mg/dL 38 mg/dL    Albumin Urine mg/L 10 mg/L    Albumin Urine mg/g Cr 26.32 (H) 0.00 - 25.00 mg/g Cr   UA Macro with Reflex to Micro and Culture - lab collect     Status: Abnormal    Specimen: Urine, Clean Catch   Result Value Ref Range    Color Urine Yellow Colorless, Straw, Light Yellow, Yellow    Appearance Urine Clear Clear    Glucose Urine >=1000 (A) Negative, 1000 , >=2000 mg/dL    Bilirubin Urine Negative Negative     Ketones Urine Negative Negative, 160  mg/dL    Specific Gravity Urine 1.010 1.003 - 1.035    Blood Urine Negative Negative    pH Urine 5.0 5.0 - 7.0    Protein Albumin Urine Negative Negative, 300 , >=2000 mg/dL    Urobilinogen Urine 0.2 0.2, 1.0 E.U./dL    Nitrite Urine Negative Negative    Leukocyte Esterase Urine Moderate (A) Negative   Urine Microscopic Exam     Status: Abnormal   Result Value Ref Range    Bacteria Urine Few (A) None Seen /HPF    RBC Urine 0-2 0-2 /HPF /HPF    WBC Urine 5-10 (A) 0-5 /HPF /HPF    Squamous Epithelials Urine Few (A) None Seen /LPF    Renal Tubular Epithelials Urine Few (A) None Seen /HPF   Urine Culture     Status: None (Preliminary result)    Specimen: Urine, Clean Catch   Result Value Ref Range    Culture Culture in progress        ASSESSMENT / PLAN:   (Z00.00) Encounter for Medicare annual wellness exam  (primary encounter diagnosis)  Comment: Nonfasting  Plan: Screening and preventive care discussed.  Influenza vaccine declined    (E11.51) Diabetes mellitus with peripheral vascular disease (H)  Comment: Uncontrolled diabetes recently off insulin  Plan: Lipid panel reflex to direct LDL Non-fasting,         FOOT EXAM        She is now resume the insulin at her previous doses.  She did not tolerate the combination insulin and GLP-1 injection.  She will have follow-up over [facility] with diabetic education regarding her recent readings since restart of insulin later this week.    (N18.2) Chronic kidney disease, stage 2 (mild)  Comment: elevated protein with normal GFR  Plan: Albumin Random Urine Quantitative with Creat         Ratio        Continues on Avapro as previous.  BP controlled.    (R35.0) Urinary frequency  Comment: recent UTI  Plan: UA Macro with Reflex to Micro and Culture - lab        collect, Urine Microscopic Exam, Urine Culture        Reassess for residual vs new UTI.  Has had prophylactic treatment in past but did not tolerate this - consider reassessment with  "urology vs trial of low dose nitrofuantoin    (E78.5) Hyperlipidemia LDL goal <100  Comment: fasting  Plan: Lipid panel - higher TG related to elevated BS.  Twice weekly crestor continues.      (K21.9) Gastroesophageal reflux disease without esophagitis  Comment: episodic symptoms.  Plan: prn antacids.    (I10) Hypertension goal BP (blood pressure) < 140/90  Comment: well controlled.  Plan: continue Avapro    (Z23) High priority for 2019-nCoV vaccine  Comment: booster due  Plan: COVID-19,PF,PFIZER BOOSTER BIVALENT 12+Yrs              Patient has been advised of split billing requirements and indicates understanding: Yes      COUNSELING:  Reviewed preventive health counseling, as reflected in patient instructions       Regular exercise       Healthy diet/nutrition       Vision screening       Hearing screening       Dental care       Bladder control       Fall risk prevention       Immunizations    Vaccinated for: COVID and Declined: Influenza and Pneumococcal due to Concerns about side effects/safety               Osteoporosis prevention/bone health    Estimated body mass index is 30.81 kg/m  as calculated from the following:    Height as of this encounter: 1.64 m (5' 4.57\").    Weight as of this encounter: 82.9 kg (182 lb 11.2 oz).    Weight management plan: Discussed healthy diet and exercise guidelines    She reports that she has never smoked. She has never used smokeless tobacco.      Appropriate preventive services were discussed with this patient, including applicable screening as appropriate for cardiovascular disease, diabetes, osteopenia/osteoporosis, and glaucoma.  As appropriate for age/gender, discussed screening for colorectal cancer, prostate cancer, breast cancer, and cervical cancer. Checklist reviewing preventive services available has been given to the patient.    Reviewed patients plan of care and provided an AVS. The Basic Care Plan (routine screening as documented in Health Maintenance) for " Brandy meets the Care Plan requirement. This Care Plan has been established and reviewed with the Patient and daughter.    Counseling Resources:  ATP IV Guidelines  Pooled Cohorts Equation Calculator  Breast Cancer Risk Calculator  Breast Cancer: Medication to Reduce Risk  FRAX Risk Assessment  ICSI Preventive Guidelines  Dietary Guidelines for Americans, 2010  [employer] MyPlate  ASA Prophylaxis  Lung CA Screening    Fernanda Miller MD  [facility]    Identified Health Risks:  Answers for HPI/ROS submitted by the patient on 10/24/2022  If you checked off any problems, how difficult have these problems made it for you to do your work, take care of things at home, or get along with other people?: Not difficult at all  PHQ9 TOTAL SCORE: 12

## 2022-10-24 NOTE — PATIENT INSTRUCTIONS
Labs today - urine testing.  If no infection noted will monitor for next infection before considering alternative preventative treatment.  If recurrent/residual infection noted, either follow up with Urology or alternative prevention would be recommended after treatment of that infection    Consider trial of Miralax 1 scoop daily as needed to keep bowel movements regular.  May noted improvement as sugars decrease and urine output decreases with this.     Trial of wrist splint for the numbness symptoms.    COVID booster.        Patient Education   Personalized Prevention Plan  You are due for the preventive services outlined below.  Your care team is available to assist you in scheduling these services.  If you have already completed any of these items, please share that information with your care team to update in your medical record.  Health Maintenance Due   Topic Date Due    Annual Wellness Visit  11/12/2021    FALL RISK ASSESSMENT  01/26/2022    COVID-19 Vaccine (4 - Booster for Pfizer series) 04/21/2022    Flu Vaccine (1) 09/01/2022    Cholesterol Lab  11/16/2022    Kidney Microalbumin Urine Test  11/16/2022

## 2022-10-24 NOTE — LETTER
October 25, 2022      Zoila DEANN Edward  [address] [place] [address]              Dear MsBlanca,  Attached you will find a copy of your recent laboratory report.   Your urine testing is improved compared with previous but there is possibly a residual infection - the culture is pending and will be forwarded with recommendations when the results return.   The protein in the urine continues to gradually decrease since 2020.  It remains borderline high.  Treatment of this with your current BP medication and control of blood sugars, help to protect kidney function.   Your cholesterol is slightly higher than previous.  You are currently taking the rosuvastatin twice per week. I would recommend you continue that.  The triglyceride level is elevated - this is likely due to the higher blood sugars recently.     Please call or MyChart message me if you have any questions.     Sincerely,         Fernanda Miller MD     Resulted Orders   Lipid panel reflex to direct LDL Non-fasting   Result Value Ref Range    Cholesterol 242 (H) <200 mg/dL    Triglycerides 348 (H) <150 mg/dL    Direct Measure HDL 56 >=50 mg/dL    LDL Cholesterol Calculated 116 (H) <=100 mg/dL    Non HDL Cholesterol 186 (H) <130 mg/dL    Patient Fasting > 8hrs? Yes     Narrative    Cholesterol  Desirable:  <200 mg/dL    Triglycerides  Normal:  Less than 150 mg/dL  Borderline High:  150-199 mg/dL  High:  200-499 mg/dL  Very High:  Greater than or equal to 500 mg/dL    Direct Measure HDL  Female:  Greater than or equal to 50 mg/dL   Male:  Greater than or equal to 40 mg/dL    LDL Cholesterol  Desirable:  <100mg/dL  Above Desirable:  100-129 mg/dL   Borderline High:  130-159 mg/dL   High:  160-189 mg/dL   Very High:  >= 190 mg/dL    Non HDL Cholesterol  Desirable:  130 mg/dL  Above Desirable:  130-159 mg/dL  Borderline High:  160-189 mg/dL  High:  190-219 mg/dL  Very High:  Greater than or equal to 220 mg/dL   Albumin Random Urine Quantitative  with Creat Ratio   Result Value Ref Range    Creatinine Urine mg/dL 38 mg/dL    Albumin Urine mg/L 10 mg/L    Albumin Urine mg/g Cr 26.32 (H) 0.00 - 25.00 mg/g Cr   UA Macro with Reflex to Micro and Culture - lab collect   Result Value Ref Range    Color Urine Yellow Colorless, Straw, Light Yellow, Yellow    Appearance Urine Clear Clear    Glucose Urine >=1000 (A) Negative, 1000 , >=2000 mg/dL    Bilirubin Urine Negative Negative    Ketones Urine Negative Negative, 160  mg/dL    Specific Gravity Urine 1.010 1.003 - 1.035    Blood Urine Negative Negative    pH Urine 5.0 5.0 - 7.0    Protein Albumin Urine Negative Negative, 300 , >=2000 mg/dL    Urobilinogen Urine 0.2 0.2, 1.0 E.U./dL    Nitrite Urine Negative Negative    Leukocyte Esterase Urine Moderate (A) Negative   Urine Microscopic Exam   Result Value Ref Range    Bacteria Urine Few (A) None Seen /HPF    RBC Urine 0-2 0-2 /HPF /HPF    WBC Urine 5-10 (A) 0-5 /HPF /HPF    Squamous Epithelials Urine Few (A) None Seen /LPF    Renal Tubular Epithelials Urine Few (A) None Seen /HPF   Urine Culture   Result Value Ref Range    Culture Culture in progress

## 2022-10-25 NOTE — RESULT ENCOUNTER NOTE
Please print letter and attach results.  PSK      Attached you will find a copy of your recent laboratory report.  Your urine testing is improved compared with previous but there is possibly a residual infection - the culture is pending and will be forwarded with recommendations when the results return.  The protein in the urine continues to gradually decrease since 2020.  It remains borderline high.  Treatment of this with your current BP medication and control of blood sugars, help to protect kidney function.  Your cholesterol is slightly higher than previous.  You are currently taking the rosuvastatin twice per week. I would recommend you continue that.  The triglyceride level is elevated - this is likely due to the higher blood sugars recently.    Please call or MyChart message me if you have any questions.    Sincerely,         Fernanda Miller MD

## 2022-10-26 ENCOUNTER — TELEPHONE (OUTPATIENT)
Dept: ENDOCRINOLOGY | Facility: CLINIC | Age: 82
End: 2022-10-26

## 2022-10-26 LAB — BACTERIA UR CULT: NORMAL

## 2022-10-26 NOTE — TELEPHONE ENCOUNTER
[employer] forms received from patient. Spoke with patient's daughter and it sounds like the patient had an allergic reaction to this medication and is no longer taking.  Will cancel [employer] process for Soliqua.      Ju Nogueira on 10/26/2022 at 10:15 AM

## 2022-10-26 NOTE — RESULT ENCOUNTER NOTE
Your current urine culture does not show any infection.    No additional treatment is needed at this time.  Please call or MyChart message me if you have any questions.      PSK

## 2022-11-07 ENCOUNTER — MYC MEDICAL ADVICE (OUTPATIENT)
Dept: FAMILY MEDICINE | Facility: CLINIC | Age: 82
End: 2022-11-07

## 2022-11-07 NOTE — TELEPHONE ENCOUNTER
Please schedule for ER follow-up visit, okay for virtual,  within 7 to 10 days.  PCP is Dr. edgar but may need to see other provider if she does not have openings.  Same-day appointment is appropriate

## 2022-11-16 ENCOUNTER — TELEPHONE (OUTPATIENT)
Dept: FAMILY MEDICINE | Facility: CLINIC | Age: 82
End: 2022-11-16

## 2022-11-16 NOTE — TELEPHONE ENCOUNTER
Returned call to AMERICA Hughes and left voicemail approving requested orders.  Advised to call clinic back if any questions.     Estela CURRYN, RN

## 2022-11-16 NOTE — TELEPHONE ENCOUNTER
Okay to give verbal for requested services.     Ok to change the senna to PRN constipation.   I do not see an order for Metamucil - are they meaning Miralax?  Either way PRN use is fine.      PSK

## 2022-11-16 NOTE — TELEPHONE ENCOUNTER
Reason for Call:  Other call back    Detailed comments: Ellen from [facility] called and needs verbal orders from Angelica Atwood at [facility] today.    1)  Skilled nursing 2 x a week for 2 weeks; then 1 x a week for 1 week; then 2 PRN visits.  2)  PT order 2 x a week for 6 weeks; then 1 x a week for 1 week  3)  OT order for 1 x a week for 4 weeks;   4)  Medication review for Senna for constipation.  They would like PRN medication instead.  5)  Medication review for Metamucil.  They would like PRN for medication instead.  6)  Home care order verbal    Please contact Ellen patterson today.  Thank you.    Phone Number Patient can be reached at: Other phone number:  [phone]*    Best Time: any    Can we leave a detailed message on this number? YES    Call taken on 11/16/2022 at 10:17 AM by Jossie Steve

## 2022-11-17 ENCOUNTER — TELEPHONE (OUTPATIENT)
Dept: FAMILY MEDICINE | Facility: CLINIC | Age: 82
End: 2022-11-17

## 2022-11-17 DIAGNOSIS — Z79.4 TYPE 2 DIABETES MELLITUS WITH HYPERGLYCEMIA, WITH LONG-TERM CURRENT USE OF INSULIN (H): Primary | Chronic | ICD-10-CM

## 2022-11-17 DIAGNOSIS — E11.65 TYPE 2 DIABETES MELLITUS WITH HYPERGLYCEMIA, WITH LONG-TERM CURRENT USE OF INSULIN (H): Primary | Chronic | ICD-10-CM

## 2022-11-17 NOTE — TELEPHONE ENCOUNTER
Forms/Letter Request    Type of form/letter: forms recieved from [facility]   placed on providers desk for response     Have you been seen for this request:  Do we have the form/letter:  When is form/letter needed by:    How would you like the form/letter returned: fax to [phone]  Patient Notified form requests are processed in 3-5 business days    Could we send this information to you in [employer] or would you prefer to receive a phone call?:

## 2022-11-17 NOTE — TELEPHONE ENCOUNTER
Forms/Letter Request    Type of form/letter: forms recieved from [employer]   placed on providers desk for response     Have you been seen for this request:  Do we have the form/letter:  When is form/letter needed by:    How would you like the form/letter returned: fax to [phone]  Patient Notified form requests are processed in 3-5 business days    Could we send this information to you in [facility] or would you prefer to receive a phone call?:

## 2022-11-21 ENCOUNTER — TELEPHONE (OUTPATIENT)
Dept: FAMILY MEDICINE | Facility: CLINIC | Age: 82
End: 2022-11-21

## 2022-11-21 NOTE — TELEPHONE ENCOUNTER
Type of form/letter: forms recieved from [employer]   placed on providers desk for response      Have you been seen for this request:  Do we have the form/letter:  When is form/letter needed by:     How would you like the form/letter returned: fax to [phone]  Patient Notified form requests are processed in 3-5 business days     Could we send this information to you in [facility] or would you prefer to receive a phone call?:

## 2022-11-22 ENCOUNTER — MEDICAL CORRESPONDENCE (OUTPATIENT)
Dept: FAMILY MEDICINE | Facility: CLINIC | Age: 82
End: 2022-11-22

## 2022-11-22 ENCOUNTER — LAB (OUTPATIENT)
Dept: LAB | Facility: CLINIC | Age: 82
End: 2022-11-22
Payer: COMMERCIAL

## 2022-11-22 ENCOUNTER — ALLIED HEALTH/NURSE VISIT (OUTPATIENT)
Dept: EDUCATION SERVICES | Facility: CLINIC | Age: 82
End: 2022-11-22
Payer: COMMERCIAL

## 2022-11-22 ENCOUNTER — NURSE TRIAGE (OUTPATIENT)
Dept: FAMILY MEDICINE | Facility: CLINIC | Age: 82
End: 2022-11-22

## 2022-11-22 ENCOUNTER — TELEPHONE (OUTPATIENT)
Dept: EDUCATION SERVICES | Facility: CLINIC | Age: 82
End: 2022-11-22

## 2022-11-22 DIAGNOSIS — E11.65 TYPE 2 DIABETES MELLITUS WITH HYPERGLYCEMIA, WITH LONG-TERM CURRENT USE OF INSULIN (H): Primary | Chronic | ICD-10-CM

## 2022-11-22 DIAGNOSIS — R30.0 DYSURIA: ICD-10-CM

## 2022-11-22 DIAGNOSIS — Z79.4 TYPE 2 DIABETES MELLITUS WITH HYPERGLYCEMIA, WITH LONG-TERM CURRENT USE OF INSULIN (H): Primary | Chronic | ICD-10-CM

## 2022-11-22 DIAGNOSIS — R30.0 DYSURIA: Primary | ICD-10-CM

## 2022-11-22 LAB
ALBUMIN UR-MCNC: NEGATIVE MG/DL
APPEARANCE UR: CLEAR
BACTERIA #/AREA URNS HPF: ABNORMAL /HPF
BILIRUB UR QL STRIP: NEGATIVE
COLOR UR AUTO: ABNORMAL
GLUCOSE UR STRIP-MCNC: >1000 MG/DL
HGB UR QL STRIP: NEGATIVE
KETONES UR STRIP-MCNC: NEGATIVE MG/DL
LEUKOCYTE ESTERASE UR QL STRIP: ABNORMAL
NITRATE UR QL: NEGATIVE
PH UR STRIP: 5.5 [PH] (ref 5–7)
RBC #/AREA URNS AUTO: ABNORMAL /HPF
SKIP: ABNORMAL
SP GR UR STRIP: 1.02 (ref 1–1.03)
SQUAMOUS #/AREA URNS AUTO: ABNORMAL /LPF
UROBILINOGEN UR STRIP-MCNC: NORMAL MG/DL
WBC #/AREA URNS AUTO: ABNORMAL /HPF

## 2022-11-22 PROCEDURE — 95250 CONT GLUC MNTR PHYS/QHP EQP: CPT

## 2022-11-22 PROCEDURE — 81001 URINALYSIS AUTO W/SCOPE: CPT

## 2022-11-22 NOTE — TELEPHONE ENCOUNTER
RN called patient's daughter, CTC on file, and relayed provider advisement below. Patient's daughter verbalized understanding and agrees to plan.    No further questions or concerns at this time.     Adore Bermudez RN    [facility]

## 2022-11-22 NOTE — TELEPHONE ENCOUNTER
Hi Dr. Miller,    I have Zoila in with me to place a sensor and they are concerned she may have a UTI- blood glucose are higher and having some pain while urinating. Are you able to order labs to have this assessed so they can try to get labs done before leaving or do they need to see you?    Thanks,   Elysia Davila,  BELKISN, LD, [facility]

## 2022-11-22 NOTE — TELEPHONE ENCOUNTER
[facility] for Aaron and Fernanda.     Aaron Burch, patient's son calling in to request if FMLA paperwork can be filled out for taking time off every Monday to take care of his mom. Aaron requesting to drop off paperwork to clinic today.    Writer informed that for FMLA paper work to be filled out  visit is needed.    Fernanda, patient's daughter got on phone with writer and states forms just needs a signature from provider. Daughter requesting writer to ask provider.     Routing to provider to review and advise if visit needed or forms can be accommodated without visit. Forms needed by next week.     Please call Aaron back at [phone].    KATHY Solano  [facility]

## 2022-11-22 NOTE — PATIENT INSTRUCTIONS
If blood glucose continue to be high, ok to go up to 50 units of Basaglar before bed until feeling better.    Sensor:  1. Plan to wear the LibrePro sensor for 14 days. It is okay to shower, bathe, and swim (up to 3 feet deep for 30 minutes)    2. Continue with your usual diabetes care plan - check blood sugars and take medicines, as prescribed.    3. Keep a log of what you eat and drink, when you take your medications and how much you take, and exercise you do while you are wearing the sensor.    4. Do not cover the sensor with extra adhesive (the small hole in the center of the sensor must remain uncovered)    5. Use a little extra care, especially when getting dressed or exercising, to avoid accidentally loosening or removing the sensor.     6. Remove the sensor if you need to have an MRI or CT scan.     If the LibrePro sensor comes off early, place it in a plastic bag or envelope and call your diabetes educator or bring it with you to your follow-up visit.       Follow-up appointment: In person follow up at [facility] on Tuesday, December 6th at 11am.    Elysia Davila RDN, LD, [facility]   [phone]    [place] [facility] for the [facility] Area:  For Your Diabetes Education and Nutrition Appointments Call:  [phone]   For Diabetes Education or Nutrition Related Questions:   Phone: [phone]  Send Wave[employer] Message   If you need a medication refill please contact your pharmacy. Please allow 3 business days for your refills to be completed.

## 2022-11-22 NOTE — TELEPHONE ENCOUNTER
Patient was notified.    Elysia Davila RDN, LD, [facility]

## 2022-11-22 NOTE — PROGRESS NOTES
Diabetes Self-Management Education & Support  Professional Continuous Glucose Monitor Insertion    SUBJECTIVE/OBJECTIVE:     Brandy Leon presents for professional Continuous Glucose Monitor Insertion.     Patient comments/concerns: Says she thinks she has a UTI - seeing elevated blood glucose recently. Was in the hospital for Covid from 11/6-11/11/22.  Says she had good control in the hospital.     Blood glucose:  11/11: -, -, 267  11/12: -, -, -, 161  11/13: 72  11/14: 180, -, 144  11/15: -, 242, -, 262  11/16: -/209, -, -, 278  11/17: 103, 229, -, 354  11/18: 128  11/19: 100, -, -, 185  11/20: -  11/21: -, -, 147  11/22: 308/347    Lab Results:  Lab Results   Component Value Date    A1C 11.1 07/06/2022    A1C 10.3 05/17/2021      Lab Results   Component Value Date     04/01/2022     03/28/2022     04/02/2021       Medication:  Diabetes Medication(s)     Biguanides       metFORMIN (GLUCOPHAGE) 1000 MG tablet    TAKE 1 TABLET BY MOUTH TWICE A DAY WITH MEALS    Insulin       insulin glargine (BASAGLAR KWIKPEN) 100 UNIT/ML pen    Inject 46 Units Subcutaneous daily    Sulfonylureas       glipiZIDE (GLUCOTROL XL) 10 MG 24 hr tablet    TAKE 1 TABLET BY MOUTH TWICE A DAY          ASSESSMENT:    CGM study indicated for: Unexplained fluctuations in glucose values     INTERVENTION:   Sensor started today.     Sensor Type: LibrePro  Lot #: [id number]  Serial #: [id number]  Expiration Date: 12/31/22  Diabetes management related comments/concerns: Says prior to hospital stay, tried Soliqua but had an allergy to it.  Says Basaglar raised from 36 to 46 units.  In the hospital, saw good BG.  Just in the past couple of days, BG was high. Not sure if has UTI.    Sensor was inserted with no resistance or bleeding at insertion site.      Pt will plan to wear the sensor through 12/6/22.    WRITTEN AND VERBAL INFORMATION GIVEN TO SUPPORT UNDERSTANDING OF:  LibrePro CGM: Sensor insertion, intention of  monitoring for 14 days. Keep records of BG, food intake, exercise, and medication dosing during wear.       Patient verbalizes understanding of how to remove sensor, if needed, and all instructions provided.     Educational and other materials:  Food/exercise/medication log sheets  Contact information    PLAN:  Pt was given instructions for tracking BG, medications, food intake and activity.  Patient to return all items associated with the professional Continuous Glucose Monitor System.  See Patient Instructions, AVS printed and provided to patient today.    Told patient ok to increase Basaglar by 10% due to illness, up to 50 units, until feeling better, if blood glucose continue to be high. Sent telephone encounter to PCP, per patient request, to see if labs could be ordered to check for UTI but did not hear back before appointment ended so Juliet and Zoila plan to reach out to her later; says already spoke to care team regarding elevated blood glucose.     Follow-up:    Follow up on 12/6/22.    Elysia Davila RDN, LD, [facility]   Time spent in DSMT: 25 minutes   Time spent in CGM insertion: 20 minutes, in addition to time spent in DSMT  Encounter Type: Individual

## 2022-11-22 NOTE — TELEPHONE ENCOUNTER
"Sruthi (Home care RN) - [phone]. Calling to reports blood sugars >300 per protocol.  Patient is seeing diabetic educator at 11:00AM today.      Per RN, patient completely asymptomatic, no signs of infection, no confusion, no UTI symptoms, no nausea/vomiting.  Pt reports having a cup of coffee with milk this AM before taking her blood sugar.     RN sees this patient 2x/week this week and next. Down to 1x per week after that. Just going there to help with wound cares but daughter usually takes care of everything else.     Please call daughter with any advice as she is the primary caregiver.      Reason for Disposition    Blood glucose > 300 mg/dL (16.7 mmol/L) AND two or more times in a row    Answer Assessment - Initial Assessment Questions  1. BLOOD GLUCOSE: \"What is your blood glucose level?\"       308 (this AM), recheck at 915 was 347.   2. ONSET: \"When did you check the blood glucose?\"      This morning   3. USUAL RANGE: \"What is your glucose level usually?\" (e.g., usual fasting morning value, usual evening value)      Home nurse needs to call > 300  4. KETONES: \"Do you check for ketones (urine or blood test strips)?\" If yes, ask: \"What does the test show now?\"       NO  5. TYPE 1 or 2:  \"Do you know what type of diabetes you have?\"  (e.g., Type 1, Type 2, Gestational; doesn't know)       Type 2  6. INSULIN: \"Do you take insulin?\" \"What type of insulin(s) do you use? What is the mode of delivery? (syringe, pen; injection or pump)?\"       Took her insulin last night   7. DIABETES PILLS: \"Do you take any pills for your diabetes?\" If yes, ask: \"Have you missed taking any pills recently?\"      Yes she took   8. OTHER SYMPTOMS: \"Do you have any symptoms?\" (e.g., fever, frequent urination, difficulty breathing, dizziness, weakness, vomiting)      Open wounds left foot, no signs of infection. Vitals WNL.   9. PREGNANCY: \"Is there any chance you are pregnant?\" \"When was your last menstrual period?\"      " NO    Protocols used: DIABETES - HIGH BLOOD SUGAR-A-OH    Umm Diego RN on 11/22/2022 at 9:56 AM

## 2022-11-22 NOTE — TELEPHONE ENCOUNTER
No additional intervention needed at this time.  She is seeing diabetic educator as noted.  Medications are being adjusted through diabetes Ed in conjunction with endocrinology and myself.    Some component of elevation is likely related to not being fasting.    CK

## 2022-11-25 ENCOUNTER — TELEPHONE (OUTPATIENT)
Dept: FAMILY MEDICINE | Facility: CLINIC | Age: 82
End: 2022-11-25

## 2022-11-25 NOTE — TELEPHONE ENCOUNTER
Forms/Letter Request    Type of form/letter: [facility] incident report forms need to be signed placed on desk.     Have you been seen for this request: N/A    Do we have the form/letter: Yes:     When is form/letter needed by: asap    How would you like the form/letter returned: Fax - [phone]

## 2022-11-29 ENCOUNTER — TELEPHONE (OUTPATIENT)
Dept: FAMILY MEDICINE | Facility: CLINIC | Age: 82
End: 2022-11-29

## 2022-11-29 ENCOUNTER — MEDICAL CORRESPONDENCE (OUTPATIENT)
Dept: HEALTH INFORMATION MANAGEMENT | Facility: CLINIC | Age: 82
End: 2022-11-29

## 2022-11-29 NOTE — TELEPHONE ENCOUNTER
Only recommendations are for more consistent dietary management.  Continuous glucose monitor information will be available at visit.    MARISSAK

## 2022-11-29 NOTE — TELEPHONE ENCOUNTER
Sruthi home care RN calling.  States that he is currently with pt and her blood sugar was 406. After drinking some water glucose went down to 392.  According to pt she had some candy before the home care RN saw her.   RN states that vitals are stable and pt is asymptomatic.     States that pt is forgetful at times and does not check her glucose in the AM.  RN notice this spike in glucose last week.    Pt did see a diabetic educator on 11/22 and they placed a continuous glucose monitor.     Pt has an appointment with her PCP this week in 12/1/22.    Sruthi wants to  Know if pt should be doing something in the mean time.     Call home care RN with any updates.  Okay to leave a voicemail.      Routing to provider.      Haven Cornelius RN  [facility]

## 2022-12-01 ENCOUNTER — OFFICE VISIT (OUTPATIENT)
Dept: FAMILY MEDICINE | Facility: CLINIC | Age: 82
End: 2022-12-01
Payer: COMMERCIAL

## 2022-12-01 VITALS
OXYGEN SATURATION: 98 % | HEART RATE: 105 BPM | HEIGHT: 64 IN | BODY MASS INDEX: 30.77 KG/M2 | DIASTOLIC BLOOD PRESSURE: 60 MMHG | SYSTOLIC BLOOD PRESSURE: 128 MMHG | WEIGHT: 180.2 LBS | RESPIRATION RATE: 18 BRPM | TEMPERATURE: 99.2 F

## 2022-12-01 DIAGNOSIS — S97.82XA CRUSHING INJURY OF LEFT FOOT, INITIAL ENCOUNTER: Primary | ICD-10-CM

## 2022-12-01 DIAGNOSIS — I10 HYPERTENSION GOAL BP (BLOOD PRESSURE) < 140/90: Chronic | ICD-10-CM

## 2022-12-01 DIAGNOSIS — E11.65 TYPE 2 DIABETES MELLITUS WITH HYPERGLYCEMIA, WITH LONG-TERM CURRENT USE OF INSULIN (H): Chronic | ICD-10-CM

## 2022-12-01 DIAGNOSIS — Z79.4 TYPE 2 DIABETES MELLITUS WITH HYPERGLYCEMIA, WITH LONG-TERM CURRENT USE OF INSULIN (H): Chronic | ICD-10-CM

## 2022-12-01 LAB
ANION GAP SERPL CALCULATED.3IONS-SCNC: 7 MMOL/L (ref 3–14)
BUN SERPL-MCNC: 24 MG/DL (ref 7–30)
CALCIUM SERPL-MCNC: 10 MG/DL (ref 8.5–10.1)
CHLORIDE BLD-SCNC: 99 MMOL/L (ref 94–109)
CO2 SERPL-SCNC: 28 MMOL/L (ref 20–32)
CREAT SERPL-MCNC: 0.87 MG/DL (ref 0.52–1.04)
ERYTHROCYTE [DISTWIDTH] IN BLOOD BY AUTOMATED COUNT: 12.8 % (ref 10–15)
GFR SERPL CREATININE-BSD FRML MDRD: 67 ML/MIN/1.73M2
GLUCOSE BLD-MCNC: 328 MG/DL (ref 70–99)
HCT VFR BLD AUTO: 43.1 % (ref 35–47)
HGB BLD-MCNC: 13.8 G/DL (ref 11.7–15.7)
MCH RBC QN AUTO: 29.2 PG (ref 26.5–33)
MCHC RBC AUTO-ENTMCNC: 32 G/DL (ref 31.5–36.5)
MCV RBC AUTO: 91 FL (ref 78–100)
PLATELET # BLD AUTO: 352 10E3/UL (ref 150–450)
POTASSIUM BLD-SCNC: 4.9 MMOL/L (ref 3.4–5.3)
RBC # BLD AUTO: 4.73 10E6/UL (ref 3.8–5.2)
SODIUM SERPL-SCNC: 134 MMOL/L (ref 133–144)
WBC # BLD AUTO: 12.9 10E3/UL (ref 4–11)

## 2022-12-01 PROCEDURE — 80048 BASIC METABOLIC PNL TOTAL CA: CPT | Performed by: FAMILY MEDICINE

## 2022-12-01 PROCEDURE — 85027 COMPLETE CBC AUTOMATED: CPT | Performed by: FAMILY MEDICINE

## 2022-12-01 PROCEDURE — 99215 OFFICE O/P EST HI 40 MIN: CPT | Performed by: FAMILY MEDICINE

## 2022-12-01 PROCEDURE — 36415 COLL VENOUS BLD VENIPUNCTURE: CPT | Performed by: FAMILY MEDICINE

## 2022-12-01 ASSESSMENT — PAIN SCALES - GENERAL: PAINLEVEL: NO PAIN (0)

## 2022-12-01 NOTE — PATIENT INSTRUCTIONS
Continue home wound care.     Follow up with Diabetic education and nutrition as planned.    I will [place] the [place] for [place] and son.    Have Aaron sign his portion and send to me over [employer].  I will then complete and fax for him.    If Juliet can send me a fax # the forms for her will be faxed.

## 2022-12-01 NOTE — PROGRESS NOTES
Assessment & Plan     Crushing injury of left foot, initial encounter  Improvement of the wounds noted.  She is ambulating without difficulty.  We discussed measures to prevent future falls.  They have gotten a new mattress which is to use year.  The patient is to get in and out of bed with.  Avoid things that can result in her becoming wedged between pieces of furniture.  Use of Lifeline on her person recommended.    Hypertension goal BP (blood pressure) < 140/90  Blood pressure under good control.  Continue current treatment with Avapro.  - Basic metabolic panel  (Ca, Cl, CO2, Creat, Gluc, K, Na, BUN); Future  - CBC with platelets; Future  - Basic metabolic panel  (Ca, Cl, CO2, Creat, Gluc, K, Na, BUN)  - CBC with platelets    Type 2 diabetes mellitus with hyperglycemia, with long-term current use of insulin (H)  Undergoing a 2-week continuous glucose monitoring evaluation with diabetic education.  Follow-up with them for analysis of that and recommendations for medication adjustments.  - Basic metabolic panel  (Ca, Cl, CO2, Creat, Gluc, K, Na, BUN); Future  - Basic metabolic panel  (Ca, Cl, CO2, Creat, Gluc, K, Na, BUN)    Review of prior external note(s) from - Outside records from [facility]  Review of the result(s) of each unique test - Blood sugar, serial CK, kidney function, CBC  Ordering of each unique test  Prescription drug management  42 minutes spent on the date of the encounter doing chart review, review of outside records, review of test results, patient visit, documentation, discussion with family and Completion of [place] paperwork for son and daughter      MED REC REQUIRED  Post Medication Reconciliation Status: discharge medications reconciled, continue medications without change  Patient Instructions   Continue home wound care.     Follow up with Diabetic education and nutrition as planned.    I will complete the FMLA papers for daughter and son.    Have  Aaron sign his portion and send to me over [employer].  I will then complete and fax for him.    If Juliet can send me a fax # the forms for her will be faxed.        No follow-ups on file.    Fernanda Miller MD  [facility] BARBARA Cummings is a 82 year old accompanied by her daughter, presenting for the following health issues:  Hospital F/U      [facility]       Hospital Follow-up Visit:    Hospital/Nursing Home/ Rehab Facility: [facility]  Date of Admission: 11/7/22  Date of Discharge: 11/11/22  Reason(s) for Admission: Crush injury of left foot  Admitted at [facility] on Sunday after fall - 11/6/22.    Was your hospitalization related to COVID-19? No   Problems taking medications regularly:  None  Medication changes since discharge: None  Problems adhering to non-medication therapy:  None    Summary of hospitalization:  CareEverywhere information obtained and reviewed  Diagnostic Tests/Treatments reviewed.  Follow up needed: none  Other Healthcare Providers Involved in Patient s Care:         None  Update since discharge: improved. sol doing wound care 5 days a week and then 2 days for nurse changing dressings.    Plan of care communicated with patient and family     Diabetes Follow-up    How often are you checking your blood sugar? Continuous glucose monitor -just started through diabetic education.  Will be returning after 2 weeks for analysis  What time of day are you checking your blood sugars (select all that apply)?  Before meals with manual assessment/fingerstick  Have you had any blood sugars above 200?  Yes frequently  Have you had any blood sugars below 70?  No    What symptoms do you notice when your blood sugar is low?  None    What concerns do you have today about your diabetes? None and Blood sugar is often over 200     Do you have any of these symptoms? (Select all that apply)  Numbness in feet      BP Readings from Last 2 Encounters:   12/01/22 128/60   10/24/22  "131/72     Hemoglobin A1C (%)   Date Value   07/06/2022 11.1 (H)   03/28/2022 9.6 (H)   05/17/2021 10.3 (H)   02/16/2021 9.2 (H)     LDL Cholesterol Calculated (mg/dL)   Date Value   10/24/2022 116 (H)   11/16/2021 97   11/12/2020 102 (H)   10/25/2019 91         Hypertension Follow-up      Do you check your blood pressure regularly outside of the clinic? Yes     Are you following a low salt diet? No    Are your blood pressures ever more than 140 on the top number (systolic) OR more   than 90 on the bottom number (diastolic), for example 140/90? No        Review of Systems   Constitutional, HEENT, cardiovascular, pulmonary, gi and gu systems are negative, except as otherwise noted.      Objective    /60   Pulse 105   Temp 99.2  F (37.3  C) (Temporal)   Resp 18   Ht 1.632 m (5' 4.25\")   Wt 81.7 kg (180 lb 3.2 oz)   SpO2 98%   BMI 30.69 kg/m    Body mass index is 30.69 kg/m .  Physical Exam   GENERAL: healthy, alert and no distress  NECK: no adenopathy, no asymmetry, masses, or scars and thyroid normal to palpation  RESP: lungs clear to auscultation - no rales, rhonchi or wheezes  CV: regular rate and rhythm, normal S1 S2, no S3 or S4, no murmur, click or rub, no peripheral edema and peripheral pulses strong  MS: extremities normal- no gross deformities noted  SKIN: Medial and anterior left distal lower extremity with linear wounds that are superficial with mild erythema.  No surrounding erythema is noted.  Superficial eschars present.  Lateral malleolus on the left is noted to have wound that is packed currently with gauze.  There is no surrounding erythema and no drainage noted.  Daughter does wound care 5 days a week and the nurse sees her twice a week.  Reporting the depth of the wound is less than upon discharge.  NEURO: Normal strength and tone, mentation intact and speech normal  BACK: no CVA tenderness, no paralumbar tenderness  PSYCH: mentation appears normal, affect normal/bright    Results for " orders placed or performed in visit on 12/01/22   Basic metabolic panel  (Ca, Cl, CO2, Creat, Gluc, K, Na, BUN)     Status: Abnormal   Result Value Ref Range    Sodium 134 133 - 144 mmol/L    Potassium 4.9 3.4 - 5.3 mmol/L    Chloride 99 94 - 109 mmol/L    Carbon Dioxide (CO2) 28 20 - 32 mmol/L    Anion Gap 7 3 - 14 mmol/L    Urea Nitrogen 24 7 - 30 mg/dL    Creatinine 0.87 0.52 - 1.04 mg/dL    Calcium 10.0 8.5 - 10.1 mg/dL    Glucose 328 (H) 70 - 99 mg/dL    GFR Estimate 67 >60 mL/min/1.73m2   CBC with platelets     Status: Abnormal   Result Value Ref Range    WBC Count 12.9 (H) 4.0 - 11.0 10e3/uL    RBC Count 4.73 3.80 - 5.20 10e6/uL    Hemoglobin 13.8 11.7 - 15.7 g/dL    Hematocrit 43.1 35.0 - 47.0 %    MCV 91 78 - 100 fL    MCH 29.2 26.5 - 33.0 pg    MCHC 32.0 31.5 - 36.5 g/dL    RDW 12.8 10.0 - 15.0 %    Platelet Count 352 150 - 450 10e3/uL               This chart was documented by provider using a voice activated software called Dragon in addition to manual typing. There may be vocabulary errors or other grammatical errors due to this.

## 2022-12-02 DIAGNOSIS — E11.65 TYPE 2 DIABETES MELLITUS WITH HYPERGLYCEMIA, WITH LONG-TERM CURRENT USE OF INSULIN (H): ICD-10-CM

## 2022-12-02 DIAGNOSIS — Z79.4 TYPE 2 DIABETES MELLITUS WITH HYPERGLYCEMIA, WITH LONG-TERM CURRENT USE OF INSULIN (H): ICD-10-CM

## 2022-12-03 ENCOUNTER — TELEPHONE (OUTPATIENT)
Dept: FAMILY MEDICINE | Facility: CLINIC | Age: 82
End: 2022-12-03

## 2022-12-03 RX ORDER — GLIPIZIDE 10 MG/1
TABLET, FILM COATED, EXTENDED RELEASE ORAL
Qty: 180 TABLET | Refills: 1 | Status: SHIPPED | OUTPATIENT
Start: 2022-12-03 | End: 2023-01-03 | Stop reason: ALTCHOICE

## 2022-12-03 NOTE — TELEPHONE ENCOUNTER
There are 2 sets of [facility] paperwork completed.       fax Fernanda Cedeno's [place] papers by 12/14/22 to:  Attn: Frida Jackson   Confidential Fax [phone]        Aaron Coe's [facility] papers  Fax [phone]            MARISSAK

## 2022-12-03 NOTE — RESULT ENCOUNTER NOTE
Kidney function is normal.  Your blood cell counts are normal with the exception of a borderline elevated white blood cell count.  This has been elevated in on previous testing over the last year and is similar currently.  There are a variety of reasons why this can occur.  No immediate reevaluation is needed but we will continue to monitor periodically.  No current sign of infection is noted on exam.  Please call or MyChart message me if you have any questions.      MARISSAK

## 2022-12-05 ENCOUNTER — TELEPHONE (OUTPATIENT)
Dept: FAMILY MEDICINE | Facility: CLINIC | Age: 82
End: 2022-12-05

## 2022-12-05 NOTE — TELEPHONE ENCOUNTER
Forms/Letter Request    Type of form/letter: forms recieved from [facility] [facility]   placed on providers desk for response     Have you been seen for this request:  Do we have the form/letter:  When is form/letter needed by:    How would you like the form/letter returned: fax to [phone]  Patient Notified form requests are processed in 3-5 business days    Could we send this information to you in [employer] or would you prefer to receive a phone call?:

## 2022-12-05 NOTE — TELEPHONE ENCOUNTER
Forms/Letter Request    Type of form/letter: [facility] forms signed and faxed to [phone]    Have you been seen for this request: N/A    Do we have the form/letter: Yes:     When is form/letter needed by: asap    How would you like the form/letter returned: Fax

## 2022-12-06 ENCOUNTER — ALLIED HEALTH/NURSE VISIT (OUTPATIENT)
Dept: NUTRITION | Facility: CLINIC | Age: 82
End: 2022-12-06
Payer: COMMERCIAL

## 2022-12-06 ENCOUNTER — TELEPHONE (OUTPATIENT)
Dept: EDUCATION SERVICES | Facility: CLINIC | Age: 82
End: 2022-12-06

## 2022-12-06 DIAGNOSIS — E11.65 TYPE 2 DIABETES MELLITUS WITH HYPERGLYCEMIA, WITH LONG-TERM CURRENT USE OF INSULIN (H): Chronic | ICD-10-CM

## 2022-12-06 DIAGNOSIS — E11.65 TYPE 2 DIABETES MELLITUS WITH HYPERGLYCEMIA, WITH LONG-TERM CURRENT USE OF INSULIN (H): Primary | Chronic | ICD-10-CM

## 2022-12-06 DIAGNOSIS — Z79.4 TYPE 2 DIABETES MELLITUS WITH HYPERGLYCEMIA, WITH LONG-TERM CURRENT USE OF INSULIN (H): Primary | Chronic | ICD-10-CM

## 2022-12-06 DIAGNOSIS — Z79.4 TYPE 2 DIABETES MELLITUS WITH HYPERGLYCEMIA, WITH LONG-TERM CURRENT USE OF INSULIN (H): Chronic | ICD-10-CM

## 2022-12-06 PROCEDURE — 97802 MEDICAL NUTRITION INDIV IN: CPT

## 2022-12-06 RX ORDER — INSULIN GLARGINE 300 U/ML
30 INJECTION, SOLUTION SUBCUTANEOUS AT BEDTIME
Qty: 9 ML | Refills: 1 | Status: SHIPPED | OUTPATIENT
Start: 2022-12-06 | End: 2023-01-03

## 2022-12-06 NOTE — TELEPHONE ENCOUNTER
Hi Dr. Miller,    After reviewing Zoila's CGM report, suspect meal time insulin would be helpful as sometimes see a lower of blood glucose overnight from Basaglar and worried only increasing this, could cause some hypoglycemia. When sensor was placed on 11/22, prior week had some fasting blood glucose in lower 100's and one in the 70's.    Per IDC recommendations, it is encouraged to increase insulin by 0.1 unit(s)/kg CBW (8 units) when A1C >9% or time in target <40%. Zoila is currently taking 50 units of Basaglar so this would bring her TDD to 58 units.    Would recommend dividing this between basal/bolus insulin as close to 50/50 (29 units). Would recommend to lower Basaglar to 30 units and start with taking 9 units of Novolog (or Humalog if preferred) with each meals that contain carbohydrates.       ould also recommend stopping Glipizide with start of meal time insulin, and see if can get Juany 2 for patient to help with managing blood glucose and alert to hypoglycemia with treatment changes.    Would you agree with these recommendations? If so, I have pended the insulin changes (she also needs a refill for Basaglar; system prompted change to another insulin so selected Toujeo since will last full 24 hours) and Juany 2 prescription. Please let me know if any modifications are needed and I am happy to relay to patient once approved.    Thanks,  Elysia Davila,  HIMA, ELSA, [facility][note 3]ES

## 2022-12-06 NOTE — TELEPHONE ENCOUNTER
Thank you!    We did go over during the visit with her and her daughter, Juliet, that if she did not eat lunch or dinner or only eats a string cheese (ltittle or no carbohydrate) she would not take any Novolog.  I checked if she was ok with this and verified she was so hoping it will work ok.    We had discussed the mixed insulin option but then would need to eat 3 meals a day and eat pretty routinely. She liked the option to have more flexibility.    Elysia Davila RDN, LD, [facility]

## 2022-12-06 NOTE — TELEPHONE ENCOUNTER
I would agree with plan as outlined for her insulin changes and discontinue of Glipizide as noted.  Hopefully she will agree to multiple insulin doses per day.  Verify her meal schedule - I was thinking she did not always eat 3 meals per day        Thanks, PSK

## 2022-12-06 NOTE — PROGRESS NOTES
Medical Nutrition Therapy for Diabetes  Visit Type:Initial assessment and intervention    Brandy Leon presents today for MNT and education related to type 2 diabetes.   She is accompanied by daughter, Juliet.     ASSESSMENT:   Patient comments/concerns relating to nutrition: Says ate a lot of carbohydrates yesterday. Says then nurse is concerned about her high blood glucose.     Says increased Basaglar to 50 units last Tuesday.  Taking diabetes medications?   yes:     Diabetes Medication(s)     Biguanides       metFORMIN (GLUCOPHAGE) 1000 MG tablet    TAKE 1 TABLET BY MOUTH TWICE A DAY WITH MEALS    Insulin       insulin glargine (BASAGLAR KWIKPEN) 100 UNIT/ML pen    Inject 46 Units Subcutaneous daily    Sulfonylureas       glipiZIDE (GLUCOTROL XL) 10 MG 24 hr tablet    TAKE 1 TABLET BY MOUTH TWICE A DAY          NUTRITION HISTORY:    Breakfast: 9:30-10am  Lunch: 12:30-1pm  Dinner: 4:30pm  Snacks: cheese stick and beef stick  Beverages: Milk 1-2 cups/day, Coffee 1x/day and Water all day    Food record:            Misses meals? none  Eats out:  Not assessed     Previous diet education:  Yes     Food allergies/intolerances: none    Diet is high in: fruits  Diet is low in: calcium, fiber and vegetables    EXERCISE: Walking 10 minutes, 3x/week.    SOCIO/ECONOMIC:   Lives with: daughter    BLOOD GLUCOSE MONITORING:  Patient glucose self monitoring as follows: 1-2 times daily.   BG meter: One Touch Verio Flex meter  BG results:         CGM Results:                     BG values are: Not in goal  Patient's most recent   Lab Results   Component Value Date    A1C 11.1 07/06/2022    A1C 10.3 05/17/2021    is not meeting goal of <8.0    MEDICATIONS:  Current Outpatient Medications   Medication     BD PEN NEEDLE JUAN C 2ND GEN 32G X 4 MM miscellaneous     blood glucose (ONETOUCH VERIO IQ) test strip     Blood Glucose Monitoring Suppl (ONETOUCH VERIO FLEX SYSTEM) w/Device KIT     calcium carbonate (TUMS) 500 MG chewable  tablet     Cranberry-Vitamin C-Vitamin E 4200-20-3 MG-MG-UNIT CAPS     DM-APAP-CPM (CORICIDIN HBP) -2 MG TABS     fexofenadine (ALLEGRA) 180 MG tablet     fluocinolone acetonide (DERMA SMOOTHE/FS BODY) 0.01 % external oil     glipiZIDE (GLUCOTROL XL) 10 MG 24 hr tablet     GLUCOSAMINE CHONDROITIN COMPLX OR     ibuprofen (ADVIL/MOTRIN) 200 MG capsule     insulin glargine (BASAGLAR KWIKPEN) 100 UNIT/ML pen     irbesartan (AVAPRO) 150 MG tablet     latanoprost (XALATAN) 0.005 % ophthalmic solution     metFORMIN (GLUCOPHAGE) 1000 MG tablet     OneTouch Delica Lancets 30G MISC     rosuvastatin (CRESTOR) 5 MG tablet     SYNTHROID 137 MCG tablet     triamcinolone (KENALOG) 0.1 % external cream     trospium (SANCTURA XR) 60 MG CP24 24 hr capsule     No current facility-administered medications for this visit.     Facility-Administered Medications Ordered in Other Visits   Medication     DOBUTamine 500 mg in dextrose 5% 250 mL (adult std conc) premix       LABS:  Lab Results   Component Value Date    A1C 11.1 07/06/2022    A1C 10.3 05/17/2021     Lab Results   Component Value Date     12/01/2022     04/02/2021     Lab Results   Component Value Date     10/24/2022     11/12/2020     HDL Cholesterol   Date Value Ref Range Status   11/12/2020 55 >49 mg/dL Final     Direct Measure HDL   Date Value Ref Range Status   10/24/2022 56 >=50 mg/dL Final   ]  GFR Estimate   Date Value Ref Range Status   12/01/2022 67 >60 mL/min/1.73m2 Final     Comment:     Effective December 21, 2021 eGFRcr in adults is calculated using the 2021 CKD-EPI creatinine equation which includes age and gender (Adriana colon al., NEJM, DOI: 10.1056/GTGPkq6567100)   04/02/2021 74 >60 mL/min/[1.73_m2] Final     Comment:     Non  GFR Calc  Starting 12/18/2018, serum creatinine based estimated GFR (eGFR) will be   calculated using the Chronic Kidney Disease Epidemiology Collaboration   (CKD-EPI) equation.       GFR,  "ESTIMATED POCT   Date Value Ref Range Status   09/28/2021 >60 >60 mL/min/1.73m2 Final     GFR Estimate If Black   Date Value Ref Range Status   04/02/2021 86 >60 mL/min/[1.73_m2] Final     Comment:      GFR Calc  Starting 12/18/2018, serum creatinine based estimated GFR (eGFR) will be   calculated using the Chronic Kidney Disease Epidemiology Collaboration   (CKD-EPI) equation.       Lab Results   Component Value Date    CR 0.87 12/01/2022    CR 0.76 04/02/2021     No results found for: MICROALBUMIN    ANTHROPOMETRICS:  Vitals: There were no vitals taken for this visit.  Estimated body mass index is 30.69 kg/m  as calculated from the following:    Height as of 12/1/22: 1.632 m (5' 4.25\").    Weight as of 12/1/22: 81.7 kg (180 lb 3.2 oz).       Wt Readings from Last 5 Encounters:   12/01/22 81.7 kg (180 lb 3.2 oz)   10/24/22 82.9 kg (182 lb 11.2 oz)   09/09/22 82.3 kg (181 lb 6.4 oz)   07/18/22 81.6 kg (180 lb)   06/21/22 83.5 kg (184 lb)       Weight Change: lost 2.5 lbs in the past 1.5 months per clinic weight on 12/1/22    ESTIMATED KCAL REQUIREMENTS:  1534 kcal per day (based on last clinic weight from 12/1/22)    NUTRITION DIAGNOSIS: Altered nutrition-related laboratory values related to diabetes progression and reduced insulin production as evidenced by elevated blood glucose with current medications, even though carbohydrate intake is not excessive.    NUTRITION INTERVENTION:  Nutrition Prescription: Carbohydrate Intake: 30-60 grams/meal and 0-15 grams/snacks  Education given to support: general nutrition guidelines, consistent meals, free foods, carb counting, behavior modification, portion control and adding meal-time insulin  Education Materials Provided: Copy CGM report  Motivational Interviewing    Discussion: Uploaded report in hopes to find some food related changes patient could make to improve her blood glucose control but unfortunately she is seeing high blood glucose that occurs " after her first meal and lasts all day; she does not see much improvement in blood glucose even when lunch meal is low/no carbohydrate. Blood glucose sometimes lower overnight when dinner was skipped (11/23 &11/24).     Not seeing many food patterns/trends other than seeing higher blood glucose on most days she eats cereal (11/22, 11/26-27, 11/29-12/1) without much fruit. Suggested eating good fiber source (fruit, whole grain cereal) and/or protein/healthy fat such as cheese or spoonful peanut butter as she is not big on yogurt, nuts or eggs. She does not appear excessive in carbohydrate intake in the afternoon nor evening. Encouraged her to continue to include a good fiber source at meals such as fruits and vegetables or whole grains. More than 50% of time today discussing food and food records. Patient feel comfortable in knowing which foods contain carbohydrates.    Suspect since <20% time in target and 45% of the time blood glucose >250 mg/dL, that meal time insulin would be helpful.  Per IDC recommendations, it is encouraged to increase insulin by 0.1 unit(s)/kg CBW (8 units) when A1C >9% or time in target <40%. Zoila is currently taking 50 units of Basaglar so this would bring her TDD to 58 units.    Would recommend dividing this between basal/bolus insulin as close to 50/50 (29 units). Would recommend to lower Basaglar to 30 units and start with taking 9 units of Novolog (or Humalog if preferred) with each meals that contain carbohydrates.   Would also recommend stopping Glipizide with start of meal time insulin, and see if can get Juany 2 for patient to help with managing blood glucose and alert to hypoglycemia with treatment changes. Will route to PCP to see if she aggress with this plan or if changes recommended (see telephone encounter from 12/6/22; will send SupportSpace message once know if plan approved or changes needed).     Patient already on basal insulin so familiar with use but reminded about rotating  sites, new needle each use, sharps and storage. Discussed taking Novolog about 15 minute before a meal for it to work better. Asked about comfort with placing Juany 2 and Juliet feels like she knows how to use it after watching placement for professional CGM. Pt verbalized understanding of concepts discussed and recommendations provided.         PATIENT'S BEHAVIOR CHANGE GOALS:   See Patient Instructions for patient stated behavior change goals. AVS was printed and given to patient at today's appointment.    MONITOR / EVALUATE:  RD will monitor/evaluate:  Blood Glucose / A1c  Food / Beverage / Nutrient intake   Pertinent Labs  Progress toward meeting stated nutrition-related goals  Weight change    FOLLOW-UP:  Call RD with questions/concerns.   Follow-up appointment scheduled on 12/21/22 for MNT follow up.     Elysia Davila RDN, LD, [facility]   Time spent in minutes: 60 minutes  Encounter: Individual

## 2022-12-06 NOTE — PATIENT INSTRUCTIONS
"Will order Juany 2 sensor and reader to [facility].    2. Will call once hear back but will plan to change insulin to both Basaglar and Novolog (or Humalog)    3. Take 9 units of Novolog before each meal if you plan to eat carbohydrates (bread, cereal, fruit, milk)  -Try to take Novolog about 15 minutes before eating if possible.    4. Reduce Basaglar to 30 units before bed.      Taking Insulin:    1. Take NovoLog - 9 units at breakfast, lunch, and supper.     - Rotate injection sites, keeping at least 1 inch apart from last injection site and 2 inches away from belly button or surgical scars.    2. Pen - Use a new pen needle for each injection. Always remove pen needle from the insulin pen after use and do not store insulin pens with the needle on the pen. Do a 2 unit \"prime\" before each injection, be sure a drop or stream of insulin comes out of the needle before you give your injection. After you inject, hold the needle under the skin to the count of 10 to be sure all of the insulin goes in.      3. Store insulin you are not using in the refrigerator (do not freeze). Take new insulin out of the refrigerator a few hours prior to use to bring to room temperature.     4. Once opened Novolog can be kept at room temperature for 28 days.. Do not use the opened insulin after this time has passed, even if there is still medicine inside.     5. Always carry your blood sugar meter and a sugar source like glucose tablets with you in case of a low blood sugar. Treat a low blood sugar (less than 70) with 15 grams of carbohydrate (1 carb choice). Wait 15 minutes, recheck blood sugar. If blood sugar is still below 70, repeat the treatment.    6. Wear Medical ID or carry a wallet card stating you have Diabetes.    7. Call your doctor or diabetes educator if you begin having low blood sugars or if you have questions or concerns.     Instructions:  1. The first hour after you place the sensor is the warm up period " (black out period).  You will need to carry your blood glucose meter and check blood glucose during this time.    2. Check blood sugar if feeling symptoms of hypoglycemia but meter is not displaying a low number- may be some variability between sensor and meter at times.    3. Change sensor every 14 days.    4. Read/scan blood sugars every 8 hours to ensure entirety of data is available.  Tip: Try to scan minimum before each meal and bed.    5. Watch trend arrows- will let you know if blood sugars are rising, falling or stable at the time you check.    6. Juany 2 has alarms. You can adjust both the high and low blood glucose alarm (when they will go off) or turn off high blood glucose alarm if alarming too much.  You cannot turn off the critical low blood glucose alarm.    7. It is okay to shower, bathe, and swim (up to 3 feet deep for 30 minutes) with sensor. Do not get reader wet.    8. Remove the sensor if you need to have an MRI or CT scan.    9. Do not cover the sensor with extra adhesive (the small hole in the center of the sensor must remain uncovered).  If you have trouble with sensor falling off, these are approved products to help with sensor adhesion: Torbot Skin Tac, SKIN-PREP Protective Barrier Wipe and Mastisol Liquid Adhesive    10. Check the dose if you take a multivitamin or Vitamin C (ascorbic acid). You want to limit daily intake of ascorbic acid to 500 mg/day or less, or it may falsely raise sensor glucose readings. This is more of a concern if you are on insulin or medication that can cause low blood glucose as you may miss a severe low glucose event.    Support:   If you have any trouble with use or if the sensor falls off early, call the customer service number for Juany located on the sensor's box. They can often help troubleshoot or replace sensor if needed.  [employer] Customer Service: [phone]    Software:    [employer] ([email or web address].LibreView.com)    Apps:  Juany 2 (Lets you scan blood glucose with  your phone)  LibreLinkUp (Lets you share blood glucose with others)      FOLLOW UP: MailLifthart message blood glucose weekly for adjustments, sooner for any low blood glucose.    NEXT APPOINTMENT: In person follow up on Tuesday, January 3rd at 1:30pm at [facility]    Elysia Davila RDN, LD, [facility]   [phone]

## 2022-12-07 NOTE — TELEPHONE ENCOUNTER
Since not see that ePatientFinderhart message had been read, called to let Juliet know that Novolog was approved and to lower Basaglar to 30 units at bedtime when Novolog is started and stop Glipizide.  Not see if Juany 2 has been filled- no prompt for PA seen yet.  Juliet verbalized understanding of concepts discussed and recommendations provided.       Elysia Davila RDN, LD, CDCES

## 2022-12-08 ENCOUNTER — TELEPHONE (OUTPATIENT)
Dept: FAMILY MEDICINE | Facility: CLINIC | Age: 82
End: 2022-12-08

## 2022-12-08 ENCOUNTER — MEDICAL CORRESPONDENCE (OUTPATIENT)
Dept: HEALTH INFORMATION MANAGEMENT | Facility: CLINIC | Age: 82
End: 2022-12-08

## 2022-12-08 NOTE — TELEPHONE ENCOUNTER
Reason for Call:  Home Health Care    Tavon with [facility] called regarding (reason for call): OT discharge    Orders are needed for this patient. OT    PT:     OT: OT Discharge, eval only, patient declining future OT visits    Skilled Nursing:     Pt Provider: Angela    Phone Number Homecare Nurse can be reached at: [phone]    Can we leave a detailed message on this number? YES    Phone number patient can be reached at: Cell number on file:    Telephone Information:   Mobile [phone]       Best Time: Any    Call taken on 12/8/2022 at 2:16 PM by Trey Galvez

## 2022-12-09 ENCOUNTER — TELEPHONE (OUTPATIENT)
Dept: FAMILY MEDICINE | Facility: CLINIC | Age: 82
End: 2022-12-09

## 2022-12-09 NOTE — TELEPHONE ENCOUNTER
Forms/Letter Request    Type of form/letter: forms recieved from [employer]   placed on providers desk for response     Have you been seen for this request:  Do we have the form/letter:  When is form/letter needed by:    How would you like the form/letter returned: fax to [phone]  Patient Notified form requests are processed in 3-5 business days    Could we send this information to you in [place] or would you prefer to receive a phone call?:

## 2022-12-11 DIAGNOSIS — R35.0 URINARY FREQUENCY: ICD-10-CM

## 2022-12-11 DIAGNOSIS — Z79.4 TYPE 2 DIABETES MELLITUS WITH HYPERGLYCEMIA, WITH LONG-TERM CURRENT USE OF INSULIN (H): ICD-10-CM

## 2022-12-11 DIAGNOSIS — N32.81 OVERACTIVE BLADDER: ICD-10-CM

## 2022-12-11 DIAGNOSIS — E11.65 TYPE 2 DIABETES MELLITUS WITH HYPERGLYCEMIA, WITH LONG-TERM CURRENT USE OF INSULIN (H): ICD-10-CM

## 2022-12-12 ENCOUNTER — DOCUMENTATION ONLY (OUTPATIENT)
Dept: LAB | Facility: CLINIC | Age: 82
End: 2022-12-12

## 2022-12-12 ENCOUNTER — TELEPHONE (OUTPATIENT)
Dept: FAMILY MEDICINE | Facility: CLINIC | Age: 82
End: 2022-12-12

## 2022-12-12 RX ORDER — TROSPIUM CHLORIDE ER 60 MG/1
CAPSULE ORAL
Qty: 90 CAPSULE | Refills: 0 | Status: SHIPPED | OUTPATIENT
Start: 2022-12-12 | End: 2023-04-24

## 2022-12-12 NOTE — TELEPHONE ENCOUNTER
Forms/Letter Request    Type of form/letter: [facility], incident report    Have you been seen for this request: N/A    Do we have the form/letter: Yes: faxed to us    When is form/letter needed by: asap    How would you like the form/letter returned: fax to [phone]

## 2022-12-13 ENCOUNTER — OFFICE VISIT (OUTPATIENT)
Dept: NEUROLOGY | Facility: CLINIC | Age: 82
End: 2022-12-13
Payer: COMMERCIAL

## 2022-12-13 ENCOUNTER — LAB (OUTPATIENT)
Dept: LAB | Facility: CLINIC | Age: 82
End: 2022-12-13
Payer: COMMERCIAL

## 2022-12-13 VITALS
DIASTOLIC BLOOD PRESSURE: 68 MMHG | WEIGHT: 180 LBS | HEART RATE: 110 BPM | BODY MASS INDEX: 30.66 KG/M2 | SYSTOLIC BLOOD PRESSURE: 116 MMHG

## 2022-12-13 DIAGNOSIS — R26.9 GAIT DISORDER: Primary | ICD-10-CM

## 2022-12-13 DIAGNOSIS — G62.9 NEUROPATHY: ICD-10-CM

## 2022-12-13 DIAGNOSIS — E11.51 DIABETES MELLITUS WITH PERIPHERAL VASCULAR DISEASE (H): Primary | ICD-10-CM

## 2022-12-13 LAB — HBA1C MFR BLD: 10.6 % (ref 0–5.6)

## 2022-12-13 PROCEDURE — 36415 COLL VENOUS BLD VENIPUNCTURE: CPT

## 2022-12-13 PROCEDURE — 83036 HEMOGLOBIN GLYCOSYLATED A1C: CPT

## 2022-12-13 PROCEDURE — 99214 OFFICE O/P EST MOD 30 MIN: CPT | Performed by: INTERNAL MEDICINE

## 2022-12-13 NOTE — TELEPHONE ENCOUNTER
Faxed forms to [facility]  [phone]  12/13/2022  Sent to scanning.  Mariana Leon CMA

## 2022-12-13 NOTE — PROGRESS NOTES
[facility] Neurology Follow Up Visit    Brandy Leon MRN# [id number]   Age: 82 year old YOB: 1940     Brief history of symptoms: The patient was initially seen in neurologic consultation on 7/15/2021 for evaluation of imbalance. Please see the comprehensive neurologic consultation note from that date in the Epic records for details.     Interval history:   She has a wound in the left foot due to a fall in November. She got out of bed at night to go to the bathroom and fell into the dresser and got stuck. Daughter found her and she was brought to the hospital. The wound has been been slowly healing with time. She hasn't had any additional falls since then. She is very careful to use the walker at all times when she needs to get up. Home physical therapy has been helping.     She continues to have the numbness in the feet due to the neuropathy.       Past Medical History:     Patient Active Problem List   Diagnosis     Seasonal allergies     Hypothyroidism     Hyperlipidemia LDL goal <100     Fibromyalgia     Osteoarthritis     Obesity     Type 2 diabetes mellitus with hyperglycemia, with long-term current use of insulin (H)     Hypertension goal BP (blood pressure) < 140/90     Overactive bladder     Recurrent UTI     Pseudophakia of both eyes     DINORA (obstructive sleep apnea)- severe (AHI 56)     Contusion of right knee     Gait disorder     Gastroesophageal reflux disease without esophagitis     Urge incontinence     Chronic kidney disease, stage 2 (mild)     Hydrocephalus, unspecified type (H)     Diabetes mellitus with peripheral vascular disease (H)     Past Medical History:   Diagnosis Date     Actinic keratosis 3/12/2013     Degenerative joint disease      Diabetes (H)      Gastroesophageal reflux disease without esophagitis 12/11/2020     Rheumatic fever 1957    x2 between the ages of 15-18     Seasonal allergies         Past Surgical History:     Past Surgical History:   Procedure Laterality  Date     BLEPHAROPLASTY Bilateral 4/1/2022    Procedure: Bilateral upper eyelid blepharoplasty;  Surgeon: Fidelia Moran MD;  Location: [facility] OR     CATARACT IOL, RT/LT       COLONOSCOPY       COLONOSCOPY N/A 8/13/2015    Procedure: COLONOSCOPY;  Surgeon: Duane, William Charles, MD;  Location: [facility]     COLONOSCOPY WITH CO2 INSUFFLATION N/A 8/13/2015    Procedure: COLONOSCOPY WITH CO2 INSUFFLATION;  Surgeon: Duane, William Charles, MD;  Location: [facility]     PHACOEMULSIFICATION WITH STANDARD INTRAOCULAR LENS IMPLANT Left 10/25/2018    Procedure: LEFT PHACOEMULSIFICATION WITH STANDARD INTRAOCULAR LENS IMPLANT;  Surgeon: Thomas Serna MD;  Location: [facility]     PHACOEMULSIFICATION WITH STANDARD INTRAOCULAR LENS IMPLANT Right 11/8/2018    Procedure: RIGHT PHACOEMULSIFICATION WITH STANDARD INTRAOCULAR LENS IMPLANT;  Surgeon: Thomas Serna MD;  Location: [facility]     THYROIDECTOMY        ZZC APPENDECTOMY       ZZC ARTHROPLASTY TMJ          Social History:     Social History     Tobacco Use     Smoking status: Never     Passive exposure: Never     Smokeless tobacco: Never     Tobacco comments:     has had exposure to second hand smoke in the past from [employer], no current exposure   Vaping Use     Vaping Use: Never used   Substance Use Topics     Alcohol use: No     Drug use: No        Family History:     Family History   Problem Relation Age of Onset     Eye Disorder Mother         cataracts     Cancer Father         skin and leukemia     Cancer Sister         Breast Cancer     Eye Disorder Brother         cataract     Cerebrovascular Disease Maternal Grandfather      Cerebrovascular Disease Paternal Grandmother      Eye Disorder Paternal Grandmother         cataracts     Cerebrovascular Disease Paternal Grandfather      Heart Disease Paternal Grandfather      Breast Cancer Daughter      Endometrial Cancer Daughter      Glaucoma No family hx of      Macular Degeneration No family hx of         Medications:      Current Outpatient Medications   Medication Sig     BD PEN NEEDLE JUAN C 2ND GEN 32G X 4 MM miscellaneous USE 1 DAILY AS DIRECTED     Blood Glucose Monitoring Suppl (ONETOUCH VERIO FLEX SYSTEM) w/Device KIT USE TO TEST BLOOD SUGAR 3 TIMES DAILY (OK TO SUBSTITUTE ALTERNATE BRAND PER INSURANCE FORMULARY. IF ONE TOUCH ONLY GIVE VERIO NOT ULTRA)     calcium carbonate (TUMS) 500 MG chewable tablet Take 1 chew tab by mouth daily as needed for heartburn     Continuous Blood Gluc Sensor (FREESTYLE FREDDY 2 SENSOR) MISC 1 each every 14 days Use 1 sensor every 14 days. Use to read blood sugars per 's instructions.     Cranberry-Vitamin C-Vitamin E 4200-20-3 MG-MG-UNIT CAPS Take 1 capsule by mouth 2 times daily     DM-APAP-CPM (CORICIDIN HBP) -2 MG TABS Take 1 tablet by mouth daily as needed For congestion     fexofenadine (ALLEGRA) 180 MG tablet Take 180 mg by mouth as needed  (Patient not taking: Reported on 12/1/2022)     fluocinolone acetonide (DERMA SMOOTHE/FS BODY) 0.01 % external oil Apply topically 2 times daily (Patient not taking: Reported on 12/1/2022)     glipiZIDE (GLUCOTROL XL) 10 MG 24 hr tablet TAKE 1 TABLET BY MOUTH TWICE A DAY     GLUCOSAMINE CHONDROITIN COMPLX OR 2 Daily     ibuprofen (ADVIL/MOTRIN) 200 MG capsule Take 400 mg by mouth every 4 hours as needed      insulin aspart (NOVOLOG PEN) 100 UNIT/ML pen Inject 9 Units Subcutaneous 3 times daily (with meals) Take about 15 minutes before eating. Do not take if not eating or eating <15 gm carbohydrates.     insulin glargine U-300 (TOUJEO SOLOSTAR) 300 UNIT/ML (1 units dial) pen Inject 30 Units Subcutaneous At Bedtime     irbesartan (AVAPRO) 150 MG tablet Take 75 mg by mouth At Bedtime     latanoprost (XALATAN) 0.005 % ophthalmic solution Place 1 drop into both eyes daily     metFORMIN (GLUCOPHAGE) 1000 MG tablet TAKE 1 TABLET BY MOUTH TWICE A DAY WITH MEALS     OneTouch Delica Lancets 30G MISC 1 lancet 3 times daily (OK to substitute  alternate brand per insurance formulary.  If One Touch only give Verio not Ultra)     rosuvastatin (CRESTOR) 5 MG tablet TAKE 1 TABLET BY MOUTH TWICE WEEKLY     SYNTHROID 137 MCG tablet TAKE 1 TABLET BY MOUTH EVERY DAY     triamcinolone (KENALOG) 0.1 % external cream APPLY TO AFFECTED AREA TWICE A DAY FOR 2 WEEKS.  Please keep 9-28-22 clinic appt for refills     trospium (SANCTURA XR) 60 MG CP24 24 hr capsule TAKE 1 CAPSULE BY MOUTH EVERY DAY IN THE MORNING     No current facility-administered medications for this visit.     Facility-Administered Medications Ordered in Other Visits   Medication     DOBUTamine 500 mg in dextrose 5% 250 mL (adult std conc) premix        Allergies:     Allergies   Allergen Reactions     New Medication Allergy Rash     Soliqua      Ace Inhibitors Cough     Estradiol Itching     Lipitor [Atorvastatin Calcium]      Weak and shaky     Sulfa Drugs      Rash       Zocor [Simvastatin]      Weak and shakey     Triamterene Dermatitis     Possible photosensitivity dermatitis, mild.  (changed to hctz alone 6/4/07, will see if this helps)        Review of Systems:   As above     Physical Exam:   Vitals: /68 (BP Location: Right arm, Patient Position: Sitting, Cuff Size: Adult Regular)   Pulse 110   Wt 81.6 kg (180 lb)   BMI 30.66 kg/m     General: Seated comfortably in no acute distress.  Lungs: breathing comfortably  Neurologic:     Mental Status: Fully alert, attentive. Language normal, speech clear and fluent, no paraphasic errors.      Cranial Nerves: EOMI with normal smooth pursuit. Visual fields intact. No dysarthria.     Motor: No tremors or other abnormal movements observed. Normal tone in the extremities. Very mild bradykinesia in bilateral rapid finger tapping (left > right). Strength 5/5 throughout upper extremities with exception of 5-/5 bilateral APB.      Deep Tendon Reflexes: 2+/symmetric throughout upper extremities, trace patella, and trace ankle jerks. No clonus.       Sensory: Sways with Romberg, but doesn't fall.      Coordination: Finger-nose-finger without dysmetria.      Gait: Wide based slowed magnetic quality to gait, difficult to initiate initial steps and increased steps on turns. Not able to do heel, toe, or tandem gait.     Data reviewed on previous visits    Imaging:  MRI brain 2021  IMPRESSION:  No interval change since 2020. Severe advanced cerebral volume  loss. Unchanged mild ventriculomegaly. This might partly be due to  pressure hydrocephalus. Clinical correlation is advised.     Laboratory:  TSH normal (2020)  A1c 10.3 ()  2021 -  B12, ESR, CRP, ELP, immunofixation unremarkable  2021 - CSF protein 127, normal cell count and glucose    Pre and post LP PT assessment  Pre Lumbar puncture  Gait speed:  25 foot walk test: 10.12 seconds, 18 steps  Number of steps for 180 degree turn: 9 steps to the right, 7 steps to the left  Number of steps for 360 degree turn: 11 steps to the left, 13 steps to the right  Post Lumbar Puncture  Gait assessment: Patient ambulates with reduction in gait base support, significant improvement in bilateral heel strike and pushoff with only very mild left trunk lean. Patient does demonstrate occasional step out balance reactions more right compared to left.  Gait speed:  25 foot walk test: 8.94 seconds, 16 steps  Number of steps for 180 degree turn: 7 steps to the right, 6 steps to the left  Number of steps for 360 degree turn: 10 steps both ways    EMG 2021  Interpretation:  This is an abnormal study, demonstrating electrophysiologic evidence of the followin. Moderately severe sensorimotor axonal polyneuropathy.  2. 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     Pertinent Investigations since last visit:   None         Assessment and Plan:   Brandy Leon is a 82 year old female who presents today for follow-up of several years of balance changes. Her last fall was one month ago and she has been more  careful lately to use the walker at all times. EMG showed moderately severe sensorimotor axonal polyneuropathy, likely related to diabetes, which is contributing to imbalance at least in part. Continued control of diabetes will be important for her balance low term. She had prior work-up for NPH and after consulting with neurosurgery it was decided risks of shunt outweighed potential benefits. Given MRI brain appearance, vascular parkinsonism is another possible contributor. Sinemet trial could be considered, but is likely low yield and patient would like to hold off on this. MRI cervical and thoracic did not show evidence of myelopathy contributing to symptoms. We discussed continued reliance on using the walker whenever she is ambulating. Daughter has made the house more accommodating for patient's needs. We discussed following up in the worker if there is significant worsening in symptoms.      Follow up in Neurology clinic as needed if new issues arise    Mitchel Kenney MD   of Neurology  [facility]

## 2022-12-13 NOTE — LETTER
12/13/2022         RE: Brandy Leon  [address] [place] [address]        Dear Colleague,    Thank you for referring your patient, Brandy Leon, to the [facility] [place]. Please see a copy of my visit note below.    [facility] Neurology Follow Up Visit    Brandy Leon MRN# [id number]   Age: 82 year old YOB: 1940     Brief history of symptoms: The patient was initially seen in neurologic consultation on 7/15/2021 for evaluation of imbalance. Please see the comprehensive neurologic consultation note from that date in the Epic records for details.     Interval history:   She has a wound in the left foot due to a fall in November. She got out of bed at night to go to the bathroom and fell into the dresser and got stuck. Daughter found her and she was brought to the hospital. The wound has been been slowly healing with time. She hasn't had any additional falls since then. She is very careful to use the walker at all times when she needs to get up. Home physical therapy has been helping.     She continues to have the numbness in the feet due to the neuropathy.       Past Medical History:     Patient Active Problem List   Diagnosis     Seasonal allergies     Hypothyroidism     Hyperlipidemia LDL goal <100     Fibromyalgia     Osteoarthritis     Obesity     Type 2 diabetes mellitus with hyperglycemia, with long-term current use of insulin (H)     Hypertension goal BP (blood pressure) < 140/90     Overactive bladder     Recurrent UTI     Pseudophakia of both eyes     DINORA (obstructive sleep apnea)- severe (AHI 56)     Contusion of right knee     Gait disorder     Gastroesophageal reflux disease without esophagitis     Urge incontinence     Chronic kidney disease, stage 2 (mild)     Hydrocephalus, unspecified type (H)     Diabetes mellitus with peripheral vascular disease (H)     Past Medical History:   Diagnosis Date     Actinic keratosis 3/12/2013      Degenerative joint disease      Diabetes (H)      Gastroesophageal reflux disease without esophagitis 12/11/2020     Rheumatic fever 1957    x2 between the ages of 15-18     Seasonal allergies         Past Surgical History:     Past Surgical History:   Procedure Laterality Date     BLEPHAROPLASTY Bilateral 4/1/2022    Procedure: Bilateral upper eyelid blepharoplasty;  Surgeon: Fidelia Moran MD;  Location: [facility] OR     CATARACT IOL, RT/LT       COLONOSCOPY       COLONOSCOPY N/A 8/13/2015    Procedure: COLONOSCOPY;  Surgeon: Duane, William Charles, MD;  Location: [facility] OR     COLONOSCOPY WITH CO2 INSUFFLATION N/A 8/13/2015    Procedure: COLONOSCOPY WITH CO2 INSUFFLATION;  Surgeon: Duane, William Charles, MD;  Location: [facility] OR     PHACOEMULSIFICATION WITH STANDARD INTRAOCULAR LENS IMPLANT Left 10/25/2018    Procedure: LEFT PHACOEMULSIFICATION WITH STANDARD INTRAOCULAR LENS IMPLANT;  Surgeon: Thomas Serna MD;  Location: [facility] OR     PHACOEMULSIFICATION WITH STANDARD INTRAOCULAR LENS IMPLANT Right 11/8/2018    Procedure: RIGHT PHACOEMULSIFICATION WITH STANDARD INTRAOCULAR LENS IMPLANT;  Surgeon: Thomas Serna MD;  Location: [facility] OR     THYROIDECTOMY        ZZC APPENDECTOMY       ZZC ARTHROPLASTY TMJ          Social History:     Social History     Tobacco Use     Smoking status: Never     Passive exposure: Never     Smokeless tobacco: Never     Tobacco comments:     has had exposure to second hand smoke in the past from [employer], no current exposure   Vaping Use     Vaping Use: Never used   Substance Use Topics     Alcohol use: No     Drug use: No        Family History:     Family History   Problem Relation Age of Onset     Eye Disorder Mother         cataracts     Cancer Father         skin and leukemia     Cancer Sister         Breast Cancer     Eye Disorder Brother         cataract     Cerebrovascular Disease Maternal Grandfather      Cerebrovascular Disease Paternal Grandmother      Eye Disorder  Paternal Grandmother         cataracts     Cerebrovascular Disease Paternal Grandfather      Heart Disease Paternal Grandfather      Breast Cancer Daughter      Endometrial Cancer Daughter      Glaucoma No family hx of      Macular Degeneration No family hx of         Medications:     Current Outpatient Medications   Medication Sig     BD PEN NEEDLE JUAN C 2ND GEN 32G X 4 MM miscellaneous USE 1 DAILY AS DIRECTED     Blood Glucose Monitoring Suppl (ONETOUCH VERIO FLEX SYSTEM) w/Device KIT USE TO TEST BLOOD SUGAR 3 TIMES DAILY (OK TO SUBSTITUTE ALTERNATE BRAND PER INSURANCE FORMULARY. IF ONE TOUCH ONLY GIVE VERIO NOT ULTRA)     calcium carbonate (TUMS) 500 MG chewable tablet Take 1 chew tab by mouth daily as needed for heartburn     Continuous Blood Gluc Sensor (FREESTYLE FREDDY 2 SENSOR) MISC 1 each every 14 days Use 1 sensor every 14 days. Use to read blood sugars per 's instructions.     Cranberry-Vitamin C-Vitamin E 4200-20-3 MG-MG-UNIT CAPS Take 1 capsule by mouth 2 times daily     DM-APAP-CPM (CORICIDIN HBP) -2 MG TABS Take 1 tablet by mouth daily as needed For congestion     fexofenadine (ALLEGRA) 180 MG tablet Take 180 mg by mouth as needed  (Patient not taking: Reported on 12/1/2022)     fluocinolone acetonide (DERMA SMOOTHE/FS BODY) 0.01 % external oil Apply topically 2 times daily (Patient not taking: Reported on 12/1/2022)     glipiZIDE (GLUCOTROL XL) 10 MG 24 hr tablet TAKE 1 TABLET BY MOUTH TWICE A DAY     GLUCOSAMINE CHONDROITIN COMPLX OR 2 Daily     ibuprofen (ADVIL/MOTRIN) 200 MG capsule Take 400 mg by mouth every 4 hours as needed      insulin aspart (NOVOLOG PEN) 100 UNIT/ML pen Inject 9 Units Subcutaneous 3 times daily (with meals) Take about 15 minutes before eating. Do not take if not eating or eating <15 gm carbohydrates.     insulin glargine U-300 (TOUJEO SOLOSTAR) 300 UNIT/ML (1 units dial) pen Inject 30 Units Subcutaneous At Bedtime     irbesartan (AVAPRO) 150 MG tablet Take 75  mg by mouth At Bedtime     latanoprost (XALATAN) 0.005 % ophthalmic solution Place 1 drop into both eyes daily     metFORMIN (GLUCOPHAGE) 1000 MG tablet TAKE 1 TABLET BY MOUTH TWICE A DAY WITH MEALS     OneTouch Delica Lancets 30G MISC 1 lancet 3 times daily (OK to substitute alternate brand per insurance formulary.  If One Touch only give Verio not Ultra)     rosuvastatin (CRESTOR) 5 MG tablet TAKE 1 TABLET BY MOUTH TWICE WEEKLY     SYNTHROID 137 MCG tablet TAKE 1 TABLET BY MOUTH EVERY DAY     triamcinolone (KENALOG) 0.1 % external cream APPLY TO AFFECTED AREA TWICE A DAY FOR 2 WEEKS.  Please keep 9-28-22 clinic appt for refills     trospium (SANCTURA XR) 60 MG CP24 24 hr capsule TAKE 1 CAPSULE BY MOUTH EVERY DAY IN THE MORNING     No current facility-administered medications for this visit.     Facility-Administered Medications Ordered in Other Visits   Medication     DOBUTamine 500 mg in dextrose 5% 250 mL (adult std conc) premix        Allergies:     Allergies   Allergen Reactions     New Medication Allergy Rash     Soliqua      Ace Inhibitors Cough     Estradiol Itching     Lipitor [Atorvastatin Calcium]      Weak and shaky     Sulfa Drugs      Rash       Zocor [Simvastatin]      Weak and shakey     Triamterene Dermatitis     Possible photosensitivity dermatitis, mild.  (changed to hctz alone 6/4/07, will see if this helps)        Review of Systems:   As above     Physical Exam:   Vitals: /68 (BP Location: Right arm, Patient Position: Sitting, Cuff Size: Adult Regular)   Pulse 110   Wt 81.6 kg (180 lb)   BMI 30.66 kg/m     General: Seated comfortably in no acute distress.  Lungs: breathing comfortably  Neurologic:     Mental Status: Fully alert, attentive. Language normal, speech clear and fluent, no paraphasic errors.      Cranial Nerves: EOMI with normal smooth pursuit. Visual fields intact. No dysarthria.     Motor: No tremors or other abnormal movements observed. Normal tone in the extremities.  Very mild bradykinesia in bilateral rapid finger tapping (left > right). Strength 5/5 throughout upper extremities with exception of 5-/5 bilateral APB.      Deep Tendon Reflexes: 2+/symmetric throughout upper extremities, trace patella, and trace ankle jerks. No clonus.      Sensory: Sways with Romberg, but doesn't fall.      Coordination: Finger-nose-finger without dysmetria.      Gait: Wide based slowed magnetic quality to gait, difficult to initiate initial steps and increased steps on turns. Not able to do heel, toe, or tandem gait.     Data reviewed on previous visits    Imaging:  MRI brain 2021  IMPRESSION:  No interval change since 2020. Severe advanced cerebral volume  loss. Unchanged mild ventriculomegaly. This might partly be due to  pressure hydrocephalus. Clinical correlation is advised.     Laboratory:  TSH normal (2020)  A1c 10.3 ()  2021 -  B12, ESR, CRP, ELP, immunofixation unremarkable  2021 - CSF protein 127, normal cell count and glucose    Pre and post LP PT assessment  Pre Lumbar puncture  Gait speed:  25 foot walk test: 10.12 seconds, 18 steps  Number of steps for 180 degree turn: 9 steps to the right, 7 steps to the left  Number of steps for 360 degree turn: 11 steps to the left, 13 steps to the right  Post Lumbar Puncture  Gait assessment: Patient ambulates with reduction in gait base support, significant improvement in bilateral heel strike and pushoff with only very mild left trunk lean. Patient does demonstrate occasional step out balance reactions more right compared to left.  Gait speed:  25 foot walk test: 8.94 seconds, 16 steps  Number of steps for 180 degree turn: 7 steps to the right, 6 steps to the left  Number of steps for 360 degree turn: 10 steps both ways    EMG 2021  Interpretation:  This is an abnormal study, demonstrating electrophysiologic evidence of the followin. Moderately severe sensorimotor axonal polyneuropathy.  2. Severe right median  neuropathy at the wrist.     Pertinent Investigations since last visit:   None         Assessment and Plan:   Brandy Leon is a 82 year old female who presents today for follow-up of several years of balance changes. Her last fall was one month ago and she has been more careful lately to use the walker at all times. EMG showed moderately severe sensorimotor axonal polyneuropathy, likely related to diabetes, which is contributing to imbalance at least in part. Continued control of diabetes will be important for her balance low term. She had prior work-up for NPH and after consulting with neurosurgery it was decided risks of shunt outweighed potential benefits. Given MRI brain appearance, vascular parkinsonism is another possible contributor. Sinemet trial could be considered, but is likely low yield and patient would like to hold off on this. MRI cervical and thoracic did not show evidence of myelopathy contributing to symptoms. We discussed continued reliance on using the walker whenever she is ambulating. Daughter has made the house more accommodating for patient's needs. We discussed following up in the worker if there is significant worsening in symptoms.      Follow up in Neurology clinic as needed if new issues arise    Mitchel Kenney MD   of Neurology  [facility]      Again, thank you for allowing me to participate in the care of your patient.        Sincerely,        Mitchel Kenney MD

## 2022-12-14 ENCOUNTER — TELEPHONE (OUTPATIENT)
Dept: FAMILY MEDICINE | Facility: CLINIC | Age: 82
End: 2022-12-14

## 2022-12-14 NOTE — TELEPHONE ENCOUNTER
Forms/Letter Request    Type of form/letter: [facility]    Have you been seen for this request: N/A    Do we have the form/letter: Yes: placed forms in [employer] forms in basket for review    When is form/letter needed by: ASAP    How would you like the form/letter returned: Fax

## 2022-12-15 ENCOUNTER — MEDICAL CORRESPONDENCE (OUTPATIENT)
Dept: HEALTH INFORMATION MANAGEMENT | Facility: CLINIC | Age: 82
End: 2022-12-15

## 2022-12-15 NOTE — TELEPHONE ENCOUNTER
Forms/Letter Request    Type of form/letter: [facility] forms signed will be faxed.     Have you been seen for this request: N/A    Do we have the form/letter: Yes:     When is form/letter needed by: asap    How would you like the form/letter returned: Fax number- [phone]

## 2022-12-15 NOTE — TELEPHONE ENCOUNTER
Forms/Letter Request    Type of form/letter: [facility] forms signed will be faxed.     Have you been seen for this request: N/A    Do we have the form/letter: Yes:     When is form/letter needed by: asap    How would you like the form/letter returned: Fax number- [phone]

## 2022-12-15 NOTE — TELEPHONE ENCOUNTER
Forms/Letter Request    Type of form/letter: [facility]    Have you been seen for this request: N/A    Do we have the form/letter: Yes: placed forms in [employer] forms inbasket for review    When is form/letter needed by: ASAP    How would you like the form/letter returned: Fax  Mariana Leon CMA

## 2022-12-18 NOTE — RESULT ENCOUNTER NOTE
Your long term blood sugar testing is slightly improved compared with previous testing.  Hopefully you are tolerating the mealtime insulin and we will see additional improvements in the weeks to come.  Please call or MyChart message me if you have any questions.      PSK

## 2022-12-20 ENCOUNTER — TELEPHONE (OUTPATIENT)
Dept: FAMILY MEDICINE | Facility: CLINIC | Age: 82
End: 2022-12-20

## 2022-12-20 NOTE — TELEPHONE ENCOUNTER
Forms/Letter Request    Type of form/letter: forms recieved from [facility]   placed on providers desk for response     Have you been seen for this request:  Do we have the form/letter:  When is form/letter needed by:    How would you like the form/letter returned: fax to [phone]  Patient Notified form requests are processed in 3-5 business days    Could we send this information to you in [facility] or would you prefer to receive a phone call?:

## 2022-12-21 ENCOUNTER — TELEPHONE (OUTPATIENT)
Dept: FAMILY MEDICINE | Facility: CLINIC | Age: 82
End: 2022-12-21

## 2022-12-21 NOTE — TELEPHONE ENCOUNTER
Forms/Letter Request     Type of form/letter: [facility] from [facility]   placed on providers desk for response      Have you been seen for this request:  Do we have the form/letter:  When is form/letter needed by:     How would you like the form/letter returned: fax to [phone]  Patient Notified form requests are processed in 3-5 business days     Could we send this information to you in [facility] or would you prefer to receive a phone call?:

## 2022-12-21 NOTE — TELEPHONE ENCOUNTER
Order/Referral Request    Who is requesting: Genny from [facility] from [facility]       Orders being requested:   an extension of skilled nursing 1 x  Per week for 3 weeks with 2 prn visits    Also requesting verbal order to send in ref to [facility] wound an Ostomy    When are orders needed by: ASAP    Has this been discussed with Provider: n/a        Where to send orders: Call in orders to home care

## 2022-12-22 ENCOUNTER — MEDICAL CORRESPONDENCE (OUTPATIENT)
Dept: HEALTH INFORMATION MANAGEMENT | Facility: CLINIC | Age: 82
End: 2022-12-22

## 2022-12-22 NOTE — TELEPHONE ENCOUNTER
Called Genny with [facility] to relay provider message below for authorization for requested/recommended services given.     KATHY Solano  [facility]

## 2022-12-22 NOTE — TELEPHONE ENCOUNTER
Forms/Letter Request    Type of form/letter: [facility]    Have you been seen for this request: N/A    Do we have the form/letter: Yes: placed in Fernanda Miller  Forms in basket for review    When is form/letter needed by: ASAP    How would you like the form/letter returned: Fax and [phone]

## 2022-12-23 ENCOUNTER — TELEPHONE (OUTPATIENT)
Dept: FAMILY MEDICINE | Facility: CLINIC | Age: 82
End: 2022-12-23

## 2022-12-23 NOTE — TELEPHONE ENCOUNTER
We have not been able to reach the patient by telephone to set up a delivery date. If your office speaks with the patient, please ask the patient to call us at [phone] or [phone].    Thank you,  Taylor Daniel  Pharmacy Technician II

## 2022-12-23 NOTE — TELEPHONE ENCOUNTER
Forms/Letter Request    Type of form/letter: [facility] forms need to be signed placed on providers desk for response.     Have you been seen for this request: N/A    Do we have the form/letter: Yes:     When is form/letter needed by: asap    How would you like the form/letter returned: Fax number- [phone]

## 2022-12-23 NOTE — TELEPHONE ENCOUNTER
[employer] message sent.  Her daughter manages her [employer] account and will be able to help arrange delivery date.CK

## 2022-12-26 NOTE — TELEPHONE ENCOUNTER
Forms signed. Please scan into chart then fax back to number on top sheet.  Thank you,  MAGO Brunner, NP-C  [facility]

## 2022-12-28 ENCOUNTER — TELEPHONE (OUTPATIENT)
Dept: FAMILY MEDICINE | Facility: CLINIC | Age: 82
End: 2022-12-28

## 2022-12-28 NOTE — TELEPHONE ENCOUNTER
Forms signed. Please scan into chart then fax back to number on form.  Thank you,  MAGO Brunner, NP-C  [facility]

## 2022-12-28 NOTE — TELEPHONE ENCOUNTER
Forms/Letter Request    Type of form/letter: forms [facility]ie[facility] from [facility]    placed on providers desk for response     Have you been seen for this request:  Do we have the form/letter:  When is form/letter needed by:    How would you like the form/letter returned: fax to [phone]  Patient Notified form requests are processed in 3-5 business days    Could we send this information to you in [facility] or would you prefer to receive a phone call?:

## 2022-12-28 NOTE — TELEPHONE ENCOUNTER
Forms signed. Please scan into chart then fax back to number on form ASAP.  Thank you,  MAGO Brunner, NP-C  [facility]

## 2022-12-28 NOTE — TELEPHONE ENCOUNTER
Forms/Letter Request    Type of form/letter: forms recieved from [facility]   placed on providers desk for response     Have you been seen for this request:  Do we have the form/letter:  When is form/letter needed by:    How would you like the form/letter returned: fax to [phone]  Patient Notified form requests are processed in 3-5 business days    Could we send this information to you in [facility] or would you prefer to receive a phone call?:

## 2023-01-02 ENCOUNTER — TELEPHONE (OUTPATIENT)
Dept: FAMILY MEDICINE | Facility: CLINIC | Age: 83
End: 2023-01-02

## 2023-01-02 NOTE — TELEPHONE ENCOUNTER
Forms/Letter Request    Type of form/letter: forms recieved from [facility]   placed on providers desk for response     Have you been seen for this request:  Do we have the form/letter:  When is form/letter needed by:    How would you like the form/letter returned: fax to [phone]  Patient Notified form requests are processed in 3-5 business days    Could we send this information to you in [employer] [facility] or would you prefer to receive a phone call?:

## 2023-01-02 NOTE — TELEPHONE ENCOUNTER
Forms/Letter Request    Type of form/letter: forms recieved from [facility]   placed on providers desk for response     Have you been seen for this request:  Do we have the form/letter:  When is form/letter needed by:    How would you like the form/letter returned: fax to [phone]  Patient Notified form requests are processed in 3-5 business days    Could we send this information to you in [facility] or would you prefer to receive a phone call?:

## 2023-01-03 ENCOUNTER — VIRTUAL VISIT (OUTPATIENT)
Dept: NUTRITION | Facility: CLINIC | Age: 83
End: 2023-01-03
Payer: COMMERCIAL

## 2023-01-03 ENCOUNTER — TELEPHONE (OUTPATIENT)
Dept: FAMILY MEDICINE | Facility: CLINIC | Age: 83
End: 2023-01-03

## 2023-01-03 DIAGNOSIS — Z79.4 TYPE 2 DIABETES MELLITUS WITH HYPERGLYCEMIA, WITH LONG-TERM CURRENT USE OF INSULIN (H): Primary | Chronic | ICD-10-CM

## 2023-01-03 DIAGNOSIS — E11.65 TYPE 2 DIABETES MELLITUS WITH HYPERGLYCEMIA, WITH LONG-TERM CURRENT USE OF INSULIN (H): Primary | Chronic | ICD-10-CM

## 2023-01-03 DIAGNOSIS — E66.9 OBESITY: Chronic | ICD-10-CM

## 2023-01-03 PROCEDURE — 97803 MED NUTRITION INDIV SUBSEQ: CPT | Mod: 95

## 2023-01-03 RX ORDER — INSULIN GLARGINE 300 U/ML
34 INJECTION, SOLUTION SUBCUTANEOUS AT BEDTIME
Qty: 9 ML | Refills: 1 | OUTPATIENT
Start: 2023-01-03 | End: 2023-02-07

## 2023-01-03 NOTE — PATIENT INSTRUCTIONS
Increase Basaglar/Toujeo to 34 units before bed (4 unit increase)    2. Novolog Changes:  11 units before breakfast (2 unit increase)  9 units before lunch (no change)  11 units before dinner (2 unit increase)    3. Continue Metformin and no Glipizide (will remove Glipizide from your medication list)    4. Continue to take Novolog 10-15 minutes before eating.     5. Target blood glucose before meals: 100-150 mg/dL.    6. Try to keep carbohydrates consistent at most meals.    -If we find that sometimes you have a larger meal or something sweet that is raising blood glucose, can adjust the Novolog to have a dose to cover your normal meal and a larger meal if needed. We can keep an eye on it.  Was helpful to have you track food for a 3 days like you did today!    FOLLOW UP: Send a [place] message when you get the Juany and also send your blood glucose weekly. Let me know what email address is linked to your account and I can send you an invite to share.    NEXT APPOINTMENT: Video visit follow up on Tuesday, February 7th at 1:30pm.    Elysia Davila RDN, LD, [facility]   [phone]

## 2023-01-03 NOTE — PROGRESS NOTES
Type of service:  Video Visit    If the video visit is dropped, the video visit invitation should be resent by: Text to cell phone: [phone]    Originating Location (pt. Location): Home  Distant Location (provider location): Home  Mode of Communication:  Video Conference via aitainment    Video Start Time: 1:33pm  Video End Time (time video stopped): 2:13pm    How would patient like to obtain AVS? MyChart    Medical Nutrition Therapy for Diabetes  Visit Type:Reassessment and intervention    Brandy Leon presents today for MNT and education related to type 2 diabetes and weight management.   She is accompanied by self and daughter.     ASSESSMENT:   Patient comments/concerns relating to nutrition: Says no issues with health since last visit. Says blood glucose not really good.  Says she is not recording food but recorded the last 3 days. Says not good with writing down the numbers.    Says not start the Novolog right away. Started Novolog on 12/29.  Says she is trying to see if she can get assistance for her insulin- had worked with Francesca Correa in the past. Says she was itchy and sick to her stomach with Soliqua.     Says wound is healing well but still frustrated that her blood glucose is still running high. Stopped Glipizide when Novolog was started.    Taking diabetes medications?   yes:     Diabetes Medication(s)     Biguanides       metFORMIN (GLUCOPHAGE) 1000 MG tablet    TAKE 1 TABLET BY MOUTH TWICE A DAY WITH MEALS    Insulin       insulin aspart (NOVOLOG PEN) 100 UNIT/ML pen    Inject 9 Units Subcutaneous 3 times daily (with meals) Take about 15 minutes before eating. Do not take if not eating or eating <15 gm carbohydrates.     insulin glargine U-300 (TOUJEO SOLOSTAR) 300 UNIT/ML (1 units dial) pen    Inject 30 Units Subcutaneous At Bedtime    Sulfonylureas       glipiZIDE (GLUCOTROL XL) 10 MG 24 hr tablet    TAKE 1 TABLET BY MOUTH TWICE A DAY            NUTRITION HISTORY:    Breakfast: 10am:  eggs, waffles, stoner, fried potatoes with butter and coffee and water OR cereal with milk and coffee OR coffee, plain Greek yogurt and granola Raisin bran  AM: 2 chocolate cherries  Lunch: none  Dinner:5pm: Leftover chow mein and 1 egg roll   Snacks: caramel corn OR cheese sticks OR beef stick and 1 cup fruit   Beverages: Coffee 1x/day and Water all day    Misses meals? Lunch   Eats out:  Not reassessed today     Previous diet education:  Yes     Food allergies/intolerances: none    Diet is high in: carbs at some sittings  Diet is low in: calcium, fiber, fruits and vegetables    EXERCISE: Walking 10 minutes, 3x/week    SOCIO/ECONOMIC:   Lives with: daughter    BLOOD GLUCOSE MONITORING:  Patient glucose self monitoring as follows: 1-3 times daily.   BG meter: One Touch Verio Flex meter  BG results: 10am = fasting. Before meals   12/26: 219, 319   12/27: 229, 352, 161  12/28: 305, 239, 270  **Started Novolog : 9 unit(s)/meal and 30 unit(s) of Basaglar**  12/29: 341, 339, -  12/30: 184, 213, 255  12/31: -, 332, 132  1/1: 253,   1/2: 191, -, 529 (at chocolate candy and caramel corn)  1/3: 280    BG values are: Not in goal  Patient's most recent   Lab Results   Component Value Date    A1C 10.6 12/13/2022    A1C 10.3 05/17/2021    is not meeting goal of <8.0    MEDICATIONS:  Current Outpatient Medications   Medication     BD PEN NEEDLE JUAN C 2ND GEN 32G X 4 MM miscellaneous     Blood Glucose Monitoring Suppl (ONETOUCH VERIO FLEX SYSTEM) w/Device KIT     calcium carbonate (TUMS) 500 MG chewable tablet     Continuous Blood Gluc Sensor (FREESTYLE FREDDY 2 SENSOR) MISC     Cranberry-Vitamin C-Vitamin E 4200-20-3 MG-MG-UNIT CAPS     DM-APAP-CPM (CORICIDIN HBP) -2 MG TABS     fexofenadine (ALLEGRA) 180 MG tablet     fluocinolone acetonide (DERMA SMOOTHE/FS BODY) 0.01 % external oil     glipiZIDE (GLUCOTROL XL) 10 MG 24 hr tablet     GLUCOSAMINE CHONDROITIN COMPLX OR     ibuprofen (ADVIL/MOTRIN) 200 MG capsule     insulin  aspart (NOVOLOG PEN) 100 UNIT/ML pen     insulin glargine U-300 (TOUJEO SOLOSTAR) 300 UNIT/ML (1 units dial) pen     irbesartan (AVAPRO) 150 MG tablet     latanoprost (XALATAN) 0.005 % ophthalmic solution     metFORMIN (GLUCOPHAGE) 1000 MG tablet     OneTouch Delica Lancets 30G MISC     rosuvastatin (CRESTOR) 5 MG tablet     SYNTHROID 137 MCG tablet     triamcinolone (KENALOG) 0.1 % external cream     trospium (SANCTURA XR) 60 MG CP24 24 hr capsule     No current facility-administered medications for this visit.     Facility-Administered Medications Ordered in Other Visits   Medication     DOBUTamine 500 mg in dextrose 5% 250 mL (adult std conc) premix       LABS:  Lab Results   Component Value Date    A1C 10.6 12/13/2022    A1C 10.3 05/17/2021     Lab Results   Component Value Date     12/01/2022     04/02/2021     Lab Results   Component Value Date     10/24/2022     11/12/2020     HDL Cholesterol   Date Value Ref Range Status   11/12/2020 55 >49 mg/dL Final     Direct Measure HDL   Date Value Ref Range Status   10/24/2022 56 >=50 mg/dL Final   ]  GFR Estimate   Date Value Ref Range Status   12/01/2022 67 >60 mL/min/1.73m2 Final     Comment:     Effective December 21, 2021 eGFRcr in adults is calculated using the 2021 CKD-EPI creatinine equation which includes age and gender (Adriana colon al., [place], DOI: 10.1056/XTELso4606766)   04/02/2021 74 >60 mL/min/[1.73_m2] Final     Comment:     Non  GFR Calc  Starting 12/18/2018, serum creatinine based estimated GFR (eGFR) will be   calculated using the Chronic Kidney Disease Epidemiology Collaboration   (CKD-EPI) equation.       GFR, ESTIMATED POCT   Date Value Ref Range Status   09/28/2021 >60 >60 mL/min/1.73m2 Final     GFR Estimate If Black   Date Value Ref Range Status   04/02/2021 86 >60 mL/min/[1.73_m2] Final     Comment:      GFR Calc  Starting 12/18/2018, serum creatinine based estimated GFR (eGFR) will be  "  calculated using the Chronic Kidney Disease Epidemiology Collaboration   (CKD-EPI) equation.       Lab Results   Component Value Date    CR 0.87 12/01/2022    CR 0.76 04/02/2021     No results found for: MICROALBUMIN    ANTHROPOMETRICS:  Vitals: There were no vitals taken for this visit.  Estimated body mass index is 30.66 kg/m  as calculated from the following:    Height as of 12/1/22: 1.632 m (5' 4.25\").    Weight as of 12/13/22: 81.6 kg (180 lb).       Wt Readings from Last 5 Encounters:   12/13/22 81.6 kg (180 lb)   12/01/22 81.7 kg (180 lb 3.2 oz)   10/24/22 82.9 kg (182 lb 11.2 oz)   09/09/22 82.3 kg (181 lb 6.4 oz)   07/18/22 81.6 kg (180 lb)       Weight Change: unable to assess- no new weight    ESTIMATED KCAL REQUIREMENTS:  1533 kcal per day (based on last clinic weight from 12/13/22)    NUTRITION DIAGNOSIS: Impaired nutrient utilization related to diabetes as evidenced by seeing high blood glucose when eating sweets and needing insulin to manage diabetes.    NUTRITION INTERVENTION:  Nutrition Prescription: Carbohydrate Intake: 30-60 grams/meal and 0-15 grams/snacks  Education given to support: general nutrition guidelines, weight reduction, consistent meals, free foods, carb counting, labeling, dining out/special occasions, behavior modification and portion control  Motivational Interviewing    Discussion: Zoila was not able to start Novolog until end of December. She continues to eat 2 main meals a day and denies any issues taking Novolog before eating- usually 10 minutes before a meal. Per diet recall, had a larger breakfast and cereal, which may be why blood glucose higher before lunch. Had something sweet (caramel corn) on day blood glucose was 529 mg/dL before dinner. Reviewed how eating even a little more carbohydrates than usual can raise blood glucose if not covered by insulin.  Informed Zoila able to try to cover snacks with insulin if needed or wants to eat something sweet. Reassured her ok to " add another unit of Novolog when blood glucose elevated before a meal, to help correct and lower blood glucose.    Based on blood glucose and seeing >50% above 200 mg/dL (73%), will increase total insulin by 8 units (0.1 unit(s)/kg CBW), per IDC Guidelines and divide between basal and bolus insulin. She often misses lunch so will keep Novolog 9 units for days it is eaten but increase breakfast and dinner to 11 units (2 unit increase).  Will also have her increase Basaglar/Toujeo to 34 units (4 unit increase) at bedtime. She is trying to get assistance with cost of insulin and looks like Juany 2 is ready to send. Patient and daughter encouraged to send in blood glucose next week, along with update if Juany 2 started (will e-mail invite to share data) or if help is needed. Pt verbalized understanding of concepts discussed and recommendations provided.       PATIENT'S BEHAVIOR CHANGE GOALS:   See Patient Instructions for patient stated behavior change goals. AVS was sent by [employer].    MONITOR / EVALUATE:  RD will monitor/evaluate:  Blood Glucose / A1c  Food / Beverage / Nutrient intake   Pertinent Labs  Progress toward meeting stated nutrition-related goals  Weight change    FOLLOW-UP:  Call RD with questions/concerns.   Follow-up appointment scheduled on 2/7/23.     Elysia Davila RDN, ELSA, ERICK   Time spent in minutes: 40 minutes  Encounter: Individual    Any diabetes medication dose changes were made via the CDE Protocol and Collaborative Practice Agreement with the patient's referring provider. A copy of this encounter was shared with the provider.

## 2023-01-03 NOTE — TELEPHONE ENCOUNTER
Received message regarding pap. Patient/patient's daughter read message on 01/01 regarding how to proceed with filling out pap forms and sending them into the appropriate companies. Also left message for pt's daughter to discuss questions.

## 2023-01-09 ENCOUNTER — TELEPHONE (OUTPATIENT)
Dept: FAMILY MEDICINE | Facility: CLINIC | Age: 83
End: 2023-01-09

## 2023-01-11 ENCOUNTER — TELEPHONE (OUTPATIENT)
Dept: FAMILY MEDICINE | Facility: CLINIC | Age: 83
End: 2023-01-11

## 2023-01-11 NOTE — TELEPHONE ENCOUNTER
[facility] called for orders.    Skilled nursing 1 time a week for 5 weeks and 4 PRN visits.    Called was also requesting current insulin doses.   Relayed current insulin prescription.     No other questions at this time.      insulin aspart (NOVOLOG PEN) 100 UNIT/ML pen 30 mL 1 1/3/2023  No   Sig: Inject 11 units Subcutaneous before breakfast, 9 units before lunch and 11 units before dinner. Take about 15 minutes before eating. Do not take if not eating or eating      insulin glargine U-300 (TOUJEO SOLOSTAR) 300 UNIT/ML (1 units dial) pen 9 mL 1 1/3/2023  No   Sig - Route: Inject 34 Units Subcutaneous At Bedtime - Subcutaneous         Haven Cornelius RN  [facility]

## 2023-01-13 ENCOUNTER — OFFICE VISIT (OUTPATIENT)
Dept: OPTOMETRY | Facility: CLINIC | Age: 83
End: 2023-01-13
Payer: COMMERCIAL

## 2023-01-13 DIAGNOSIS — H40.1131 PRIMARY OPEN ANGLE GLAUCOMA (POAG) OF BOTH EYES, MILD STAGE: Primary | ICD-10-CM

## 2023-01-13 PROCEDURE — 92012 INTRM OPH EXAM EST PATIENT: CPT | Performed by: OPTOMETRIST

## 2023-01-13 RX ORDER — TRAVOPROST OPHTHALMIC SOLUTION 0.04 MG/ML
1 SOLUTION OPHTHALMIC AT BEDTIME
Qty: 2.5 ML | Refills: 11 | Status: SHIPPED | OUTPATIENT
Start: 2023-01-13 | End: 2024-01-17

## 2023-01-13 ASSESSMENT — VISUAL ACUITY
OS_SC+: -2
OS_SC: 20/30
OD_SC: 20/25
METHOD: SNELLEN - LINEAR
OD_SC+: -1

## 2023-01-13 ASSESSMENT — CONF VISUAL FIELD
OS_INFERIOR_TEMPORAL_RESTRICTION: 0
OS_INFERIOR_NASAL_RESTRICTION: 0
OD_SUPERIOR_TEMPORAL_RESTRICTION: 0
OS_SUPERIOR_TEMPORAL_RESTRICTION: 0
OS_NORMAL: 1
OD_INFERIOR_TEMPORAL_RESTRICTION: 0
OD_INFERIOR_NASAL_RESTRICTION: 0
OS_SUPERIOR_NASAL_RESTRICTION: 0
METHOD: COUNTING FINGERS
OD_NORMAL: 1
OD_SUPERIOR_NASAL_RESTRICTION: 0

## 2023-01-13 ASSESSMENT — CUP TO DISC RATIO
OS_RATIO: 0.5
OD_RATIO: 0.5

## 2023-01-13 ASSESSMENT — TONOMETRY
OS_IOP_MMHG: 15
OD_IOP_MMHG: 15
OS_IOP_MMHG: 19
OD_IOP_MMHG: 22
IOP_METHOD: TONOPEN

## 2023-01-13 ASSESSMENT — EXTERNAL EXAM - RIGHT EYE: OD_EXAM: NORMAL

## 2023-01-13 ASSESSMENT — EXTERNAL EXAM - LEFT EYE: OS_EXAM: NORMAL

## 2023-01-13 ASSESSMENT — SLIT LAMP EXAM - LIDS
COMMENTS: NORMAL
COMMENTS: NORMAL

## 2023-01-13 NOTE — NURSING NOTE
"Chief Complaints and History of Present Illnesses   Patient presents with     Glaucoma Follow Up       Chief Complaint(s) and History of Present Illness(es)     Glaucoma Follow Up            Laterality: both eyes          Comments    Patient returning for IOP check.   Drops: Latanoprost, each eye daily. Last dose: 9:00 pm last night   Eyes have been feeling like they have \"glue\" in them. Uses Systane drops 1-2 times a day. Helps a little with the sticky feeling her eyes get.                    Freeman Jimenez, Ophthalmic Assistant   "

## 2023-01-13 NOTE — PROGRESS NOTES
Assessment/Plan  (H40.5716) Primary open angle glaucoma (POAG) of both eyes, mild stage  (primary encounter diagnosis)  Comment:   - diagnosed with POAG with Dr. Serna  - ludwin 538/557  - gonio open  - Tmax 30/25 (tonopen; just after stopping postop cataract drops, otherwise low 20s). IOP today 22/19  - on latanoprost at bedtime each eye since 8/2015 for concern for OCT RNFL thinning  - Historically unreliable visual fields. HVF 24-2 unreliable today  Plan: Change to Travatan Z given ocular surface irritation and only modest improvement in IOP. Follow up in 2 months with IOP check. If there is trouble getting Travatan covered, could also consider a switch to Cosopt.       Complete documentation of historical and exam elements from today's encounter can  be found in the full encounter summary report (not reduplicated in this progress  note). I personally obtained the chief complaint(s) and history of present illness. I  confirmed and edited as necessary the review of systems, past medical/surgical  history, family history, social history, and examination findings as documented by  others; and I examined the patient myself. I personally reviewed the relevant tests,  images, and reports as documented above. I formulated and edited as necessary the  assessment and plan and discussed the findings and management plan with the  patient and family.    Gurjit Cardoso OD

## 2023-01-25 ENCOUNTER — TELEPHONE (OUTPATIENT)
Dept: FAMILY MEDICINE | Facility: CLINIC | Age: 83
End: 2023-01-25
Payer: COMMERCIAL

## 2023-01-26 ENCOUNTER — MEDICAL CORRESPONDENCE (OUTPATIENT)
Dept: HEALTH INFORMATION MANAGEMENT | Facility: CLINIC | Age: 83
End: 2023-01-26

## 2023-01-26 ENCOUNTER — TELEPHONE (OUTPATIENT)
Dept: FAMILY MEDICINE | Facility: CLINIC | Age: 83
End: 2023-01-26
Payer: COMMERCIAL

## 2023-01-26 NOTE — TELEPHONE ENCOUNTER
Forms/Letter Request    Type of form/letter: [facility] forms need to be signed placed on providers desk for response.     Have you been seen for this request: N/A    Do we have the form/letter: Yes:     When is form/letter needed by: asap    How would you like the form/letter returned: Fax number- [phone]

## 2023-01-26 NOTE — TELEPHONE ENCOUNTER
Forms/Letter Request    Type of form/letter: [facility] forms signed will fax.     Have you been seen for this request: N/A    Do we have the form/letter: Yes:     When is form/letter needed by: asap    How would you like the form/letter returned: Fax number- [phone]      
Fax form PSK   
Forms/Letter Request    Type of form/letter: [facility]   placed on providers desk for response     Have you been seen for this request:  Do we have the form/letter:  When is form/letter needed by:    How would you like the form/letter returned: fax to [phone]  Patient Notified form requests are processed in 3-5 business days    Could we send this information to you in [employer] or would you prefer to receive a phone call?:      
How Did The Scalp Condition Occur?: sudden in onset (over a period of weeks to a few months)
How Severe Is The Scalp Condition?: moderate

## 2023-02-01 ENCOUNTER — TELEPHONE (OUTPATIENT)
Dept: FAMILY MEDICINE | Facility: CLINIC | Age: 83
End: 2023-02-01
Payer: COMMERCIAL

## 2023-02-01 NOTE — TELEPHONE ENCOUNTER
Forms/Letter Request    Type of form/letter: forms recie[facility] from [facility]    placed on providers desk for response     Have you been seen for this request:  Do we have the form/letter:  When is form/letter needed by:    How would you like the form/letter returned: fax to [phone]  Patient Notified form requests are processed in 3-5 business days    Could we send this information to you in [employer] or would you prefer to receive a phone call?:

## 2023-02-02 ENCOUNTER — MEDICAL CORRESPONDENCE (OUTPATIENT)
Dept: HEALTH INFORMATION MANAGEMENT | Facility: CLINIC | Age: 83
End: 2023-02-02

## 2023-02-02 NOTE — TELEPHONE ENCOUNTER
Forms/Letter Request    Type of form/letter: [facility] forms signed will fax.     Have you been seen for this request: N/A    Do we have the form/letter: Yes:     When is form/letter needed by: asap    How would you like the form/letter returned: Fax number- [phone]

## 2023-02-07 ENCOUNTER — VIRTUAL VISIT (OUTPATIENT)
Dept: EDUCATION SERVICES | Facility: CLINIC | Age: 83
End: 2023-02-07
Payer: COMMERCIAL

## 2023-02-07 ENCOUNTER — TELEPHONE (OUTPATIENT)
Dept: EDUCATION SERVICES | Facility: CLINIC | Age: 83
End: 2023-02-07

## 2023-02-07 DIAGNOSIS — E11.65 TYPE 2 DIABETES MELLITUS WITH HYPERGLYCEMIA, WITH LONG-TERM CURRENT USE OF INSULIN (H): Primary | ICD-10-CM

## 2023-02-07 DIAGNOSIS — Z79.4 TYPE 2 DIABETES MELLITUS WITH HYPERGLYCEMIA, WITH LONG-TERM CURRENT USE OF INSULIN (H): Primary | ICD-10-CM

## 2023-02-07 DIAGNOSIS — Z79.4 TYPE 2 DIABETES MELLITUS WITH HYPERGLYCEMIA, WITH LONG-TERM CURRENT USE OF INSULIN (H): Chronic | ICD-10-CM

## 2023-02-07 DIAGNOSIS — E11.65 TYPE 2 DIABETES MELLITUS WITH HYPERGLYCEMIA, WITH LONG-TERM CURRENT USE OF INSULIN (H): Chronic | ICD-10-CM

## 2023-02-07 PROCEDURE — G0108 DIAB MANAGE TRN  PER INDIV: HCPCS | Mod: 95

## 2023-02-07 RX ORDER — INSULIN GLARGINE 300 U/ML
38 INJECTION, SOLUTION SUBCUTANEOUS AT BEDTIME
Qty: 9 ML | Refills: 1 | Status: SHIPPED | OUTPATIENT
Start: 2023-02-07 | End: 2023-04-24

## 2023-02-07 NOTE — TELEPHONE ENCOUNTER
Hi Dr. Miller,    When I spoke to Zoila today, she seemed uncertain why Jardiance was stopped. Looked like she may have had issues tolerating it per MTM note due to feeling shaky and weak but did not have any hypoglycemia. Zoila is willing to try it again, although uncertain if cost may be a barrier. She is seeing high blood glucose since not really taking Novolog with meals and thought this may help lower her post-prandial rise in blood glucose and be something she would likely be more consistent in taking.    If you agree with trying Jardiance again, I have the 10 mg prescription pending. Only did one month in case not feel well on it.    Otherwise, until we know if it would work to add this medication, had recommended long-acting insulin be increased to 38 units (up from 34 units) and Novolog be increased from 11-11-11-0 -->13-12-12-0 unit(s).    Thanks,  Elysia Davila,  BELKISN, LD, [facility]

## 2023-02-07 NOTE — PATIENT INSTRUCTIONS
Increase Basaglar/Toujeo to 38 units (4 unit increase)     2. Changes to Novolo units before breakfast (2 unit increase)  12 units before lunch (1 unit increase)  12 units before dinner (1 unit increase)    3. Work to take Novolog before each meal.   -Great idea to have a spreadsheet to check off that insulin was taken.  -Ideal to take 5-10 minutes before eating but ok to take right before the meal. Think of insulin as you appetizer- comes before your meal.  -Some people have the insulin pen on the plate or a blank post-it note as a friendly reminder to take the insulin. See what works for you to help with remembering to take it!    4. Will let you know about trying Jardiance again.    5. Go to [employer].  See if you can make a username and password (need email address) and see if you can upload the codetag reader. If you can, let me know and I can send you a password, usually by email, to share your data with us so I can better see your blood glucose data and numbers (not have to record blood glucose if I can see your results and you can upload before our follow ups!)      FOLLOW UP: Send me a [employer] message with blood glucose on .    NEXT APPOINTMENT: Video visit follow up on  at 10am    Elysia Davila RDN, ELSA, [facility]   [phone]

## 2023-02-07 NOTE — PROGRESS NOTES
Diabetes Self-Management Education & Support    Presents for: CGM Review    Type of service:  Video Visit    If the video visit is dropped, the video visit invitation should be resent by: Text to cell phone: [phone]    Originating Location (pt. Location): Home  Distant Location (provider location): [facility]  Mode of Communication:  Video Conference via [employer]    Video Start Time: 1:36pm  Video End Time (time video stopped): 2:26pm    How would patient like to obtain AVS? MyChart      Assessment Type:   ASSESSMENT:  Zoila is seeing high blood glucose but uncertain if it is from not taking Novolog very consistently vs needing more insulin. Zoila is not able to give a reason why not taking insulin but daughter thinks it may be due to hunger- wants to eat right away. Says when she tries to remind her mom, Zoila gets upset and tells her not to worry about it. Reviewed symptoms of elevated blood glucose and trying to lower and keep blood glucose more stable. Tried to problem solve ways to help remind Zoila to take Novolog before meals and suggested either using a reminder (put insulin pen on plate or use post-it) or create chart to check off when taking insulin to see if something works better for her.  Also discussed trying InPen as a reminder or can look to simplify to a mixed insulin regimen but worried that not provide as much flexibility and still not helpful if Zoila not taking before breakfast and dinner.    When thinking about other options to better lower blood glucose until Zoila gets better with taking Novolog, asked if they tried Jardiance or Farxiga. Zoila seems to think Jardiance sounds familiar but says not sure why that was stopped. She is open to try it again if her doctor thinks it would be a good idea.  After visit, CDE found MTM notes that implied patient was not feeling well on it but at follow up visit, not seem like symptoms improved without it.  Will see what PCP  thinks and get back to patient. If restarted, would recommend starting at 10 mg dose daily (see telephone encounter from 23).    Lastly, in regard to insulin increase, recommended going up 8 units (0.1 unit(s)/kg CBW) since >50% blood glucose >200 mg/dL.  Patient aware may be high from not consistently taking Novolog so may need less if blood glucose improve with taking more regularly. In the meantime, recommended increasing Basaglar/Toujeo (or other long acting insulin; was using up her prescription of Basaglar before changing to new insulin) by 4 units and since patient taking 11-11-11-0 units Novolog, recommended increasing to 13-12-12-0 unit(s) as she eats more carbohydrates in the morning. Pt verbalized understanding of concepts discussed and recommendations provided.  Will plan a check in for blood glucose in 1 week and Juliet will see if she can figure out Libreview and how to upload the reader.      Patient's most recent   Lab Results   Component Value Date    A1C 10.6 2022    A1C 10.3 2021     is not meeting goal of <8.0    Diabetes knowledge and skills assessment:   Patient is knowledgeable in diabetes management concepts related to: Monitoring, Taking Medication, Problem Solving and Reducing Risks    Continue education with the following diabetes management concepts: Healthy Eating, Being Active, Taking Medication, Problem Solving and Healthy Coping    Based on learning assessment above, most appropriate setting for further diabetes education would be: Individual setting.      PLAN    -Work to take insulin more consistently before each meal with carbohydrates.  -Changes to insulin:  Basaglar/Toujeo: 0-0-0-34 --> 0-0-0-38 unit(s)  Novolo-11-11-0 --> 13-12-12-0 unit(s)     Topics to cover at upcoming visits: Healthy Eating, Being Active, Taking Medication and Problem Solving    Follow-up: 3/7/23    See Care Plan for co-developed, patient-state behavior change goals.  AVS provided for  "patient today.    Education Materials Provided:  No new materials provided today      SUBJECTIVE/OBJECTIVE:  Presents for: CGM Review  Accompanied by: Self, Daughter (Juliet)  Diabetes education in the past 24mo: Yes  Focus of Visit: CGM  Type of CGM visit: Personal CGM Follow-up  Diabetes type: Type 2  Date of diagnosis: 2002  Disease course: Improving  How confident are you filling out medical forms by yourself:: Extremely  Diabetes management related comments/concerns: Per marcos, BG has been high recently. Says Zoila has been forgetting to take Novolog.  Daughter is home but busy with work.    Taking 34 units Basaglar and 11 units of Novolog before each meal. On Jardiance in the past but unsure why it was stopped    Transportation concerns: Yes  Difficulty affording diabetes medication?: No  Difficulty affording diabetes testing supplies?: No  Other concerns:: Physical impairment (Uses walker)  Cultural Influences/Ethnic Background:  Not  or       Diabetes Symptoms & Complications:  Fatigue: Yes  Neuropathy: No  Polydipsia: No  Polyphagia: No  Polyuria: Yes  Visual change: No  Slow healing wounds: Yes  Complications assessed today?: Yes  Autonomic neuropathy: No  CVA: No  Heart disease: No  Nephropathy: Yes  Peripheral neuropathy: Yes  Peripheral Vascular Disease: Yes  Retinopathy: No  Sexual dysfunction: No    Patient Problem List and Family Medical History reviewed for relevant medical history, current medical status, and diabetes risk factors.    Vitals:  There were no vitals taken for this visit.  Estimated body mass index is 30.66 kg/m  as calculated from the following:    Height as of 12/1/22: 1.632 m (5' 4.25\").    Weight as of 12/13/22: 81.6 kg (180 lb).   Last 3 BP:   BP Readings from Last 3 Encounters:   12/13/22 116/68   12/01/22 128/60   10/24/22 131/72       History   Smoking Status     Never   Smokeless Tobacco     Never       Labs:  Lab Results   Component Value Date    A1C 10.6 " 12/13/2022    A1C 10.3 05/17/2021     Lab Results   Component Value Date     12/01/2022     04/02/2021     Lab Results   Component Value Date     10/24/2022     11/12/2020     HDL Cholesterol   Date Value Ref Range Status   11/12/2020 55 >49 mg/dL Final     Direct Measure HDL   Date Value Ref Range Status   10/24/2022 56 >=50 mg/dL Final   ]  GFR Estimate   Date Value Ref Range Status   12/01/2022 67 >60 mL/min/1.73m2 Final     Comment:     Effective December 21, 2021 eGFRcr in adults is calculated using the 2021 CKD-EPI creatinine equation which includes age and gender (Adriana et al., NEJM, DOI: 10.1056/YASKlw6945658)   04/02/2021 74 >60 mL/min/[1.73_m2] Final     Comment:     Non  GFR Calc  Starting 12/18/2018, serum creatinine based estimated GFR (eGFR) will be   calculated using the Chronic Kidney Disease Epidemiology Collaboration   (CKD-EPI) equation.       GFR, ESTIMATED POCT   Date Value Ref Range Status   09/28/2021 >60 >60 mL/min/1.73m2 Final     GFR Estimate If Black   Date Value Ref Range Status   04/02/2021 86 >60 mL/min/[1.73_m2] Final     Comment:      GFR Calc  Starting 12/18/2018, serum creatinine based estimated GFR (eGFR) will be   calculated using the Chronic Kidney Disease Epidemiology Collaboration   (CKD-EPI) equation.       Lab Results   Component Value Date    CR 0.87 12/01/2022    CR 0.76 04/02/2021     No results found for: MICROALBUMIN    Healthy Eating:  Healthy Eating Assessed Today: Yes  Cultural/Pentecostal diet restrictions?: No  Meal planning/habits: Avoiding sweets, Carb counting, Low carb, Low salt, Smaller portions  How many times a week on average do you eat food made away from home (restaurant/take-out)?: 5+  Meals include: Breakfast, Dinner, Lunch, Afternoon Snack  Breakfast: 10:30-11am: 2 slices toast with cheese OR shredded wheat with bran/raisin bran, banana/melon  Lunch: 1pm: tuna sandwich OR egg salad sandwich OR  beef stick and snacks- melon and cheese stick  Dinner: 4:30-5pm; 2 slices pizza and small chocolate lava cake OR ham and pea soup with ham and swiss cheese sandwich OR seafood salad with veggies OR salads with cucumbers and ranch dressing OR Cod, green beans and potato wedges  Snacks: popcorn OR cheese stick/string cheese  Beverages: Water, Coffee, Milk  Has patient met with a dietitian in the past?: No    Being Active:  Being Active Assessed Today: Yes  Exercise:: Yes  Days per week of moderate to strenuous exercise (like a brisk walk): 3  On average, minutes per day of exercise at this level: 10  How intense was your typical exercise? : Light (like stretching or slow walking)  Exercise Minutes per Week: 30  Barrier to exercise: Physical limitation (Does physical therapy)    Monitoring:  Monitoring Assessed Today: Yes  Did patient bring glucose meter to appointment? : Yes  Blood Glucose Meter: One Touch, CGM (Juany)  Times checking blood sugar at home (number): 2  Times checking blood sugar at home (per): Day  Blood glucose trend: Increasing    Blood glucose:   1/24: 243/194, -,  247/244, 258  1/25: -, -, 296/387, 372  1/26: 350, -, >350  1/27: >350 all day  1/28: 307, >350,     Says recently, blood glucose has been high. Not seeing any blood glucose >150 mg/dL.   2/2: >350  2/3: 342, -, 314,   2/4: 284, >350, 309, 276  2/5: -, 355  2/6: 330  2/7: 292    Taking Medications:  Diabetes Medication(s)     Biguanides       metFORMIN (GLUCOPHAGE) 1000 MG tablet    TAKE 1 TABLET BY MOUTH TWICE A DAY WITH MEALS    Insulin       insulin aspart (NOVOLOG PEN) 100 UNIT/ML pen    Inject 11 units Subcutaneous before breakfast, 9 units before lunch and 11 units before dinner. Take about 15 minutes before eating. Do not take if not eating or eating <15 gm carbohydrates.     insulin glargine U-300 (TOUJEO SOLOSTAR) 300 UNIT/ML (1 units dial) pen    Inject 34 Units Subcutaneous At Bedtime          Taking Medication Assessed Today:  Yes  Current Treatments: Insulin Injections, Oral Medication (taken by mouth)  Dose schedule: At bedtime, Pre-breakfast, Pre-lunch, Pre-dinner  Given by: Patient  Injection/Infusion sites: Abdomen  Problems taking diabetes medications regularly?: Yes (Missing Novolog 1-3 times a day.)  Diabetes medication side effects?: No    Problem Solving:  Problem Solving Assessed Today: No         Reducing Risks:  Reducing Risks Assessed Today: No  Diabetes Risks: Age over 45 years  CAD Risks: Diabetes Mellitus, Dyslipidemia, Obesity, Sedentary lifestyle  Has dilated eye exam at least once a year?: Yes  Sees dentist every 6 months?: Yes  Feet checked by healthcare provider in the last year?: Yes    Healthy Coping:  Healthy Coping Assessed Today: No  Emotional response to diabetes: Ready to learn  Informal Support system:: Children, Minnie based  Patient Activation Measure Survey Score:  JACIEL Score (Last Two) 2/21/2011   JACIEL Raw Score 39   Activation Score 56.4   JACIEL Level 3         Care Plan and Education Provided:  Care Plan: Diabetes   Updates made by Elysia Davila RD since 2/7/2023 12:00 AM      Problem: Diabetes Self-Management Education Needed to Optimize Self-Care Behaviors       Goal: Healthy Eating - follow a healthy eating pattern for diabetes       Task: Provide education on portion control and consistency in amount, composition and timing of food intake Completed 2/7/2023   Responsible User: Stephanie Carrillo RD      Goal: Taking Medication - patient is consistently taking medications as directed    This Visit's Progress: 30%   Note:    Work to take Novolog before each meal.     Task: Discuss barriers to medication adherence with patient and provide management technique ideas as appropriate Completed 2/7/2023   Responsible User: Stephanie Carrillo RD      Goal: Healthy Coping - use available resources to cope with the challenges of managing diabetes       Task: Provide education on the benefits of making  appropriate lifestyle changes Completed 2/7/2023   Responsible User: Stephanie Carrillo RD      Task: Provide education on benefits of utilizing support systems    Responsible User: Stephanie Carrillo RD Kaydee Brown, RDN, LD, [facility]     Time Spent: 50 minutes  Encounter Type: Individual    Any diabetes medication dose changes were made via the CDE Protocol per the patient's referring provider. A copy of this encounter was shared with the provider.

## 2023-02-08 NOTE — TELEPHONE ENCOUNTER
Called Juliet and informed her that Jardiance was approved and sent to [facility]. Informed her cost may be higher for this medication so they have Francesca Blackwellin's number to call if more than they can afford.     Wanted to know what to do with old Soliqua medication and recommended dropping that, and other medications, off at a local police department or some pharmacies may dispose of old medication. She verbalized understanding of concepts discussed and recommendations provided.       Elysia Davila RDN, LD, [facility]

## 2023-02-10 ENCOUNTER — OFFICE VISIT (OUTPATIENT)
Dept: PODIATRY | Facility: CLINIC | Age: 83
End: 2023-02-10
Payer: COMMERCIAL

## 2023-02-10 ENCOUNTER — LAB (OUTPATIENT)
Dept: LAB | Facility: CLINIC | Age: 83
End: 2023-02-10
Payer: COMMERCIAL

## 2023-02-10 DIAGNOSIS — B35.1 ONYCHOMYCOSIS: ICD-10-CM

## 2023-02-10 DIAGNOSIS — E11.49 TYPE II OR UNSPECIFIED TYPE DIABETES MELLITUS WITH NEUROLOGICAL MANIFESTATIONS, NOT STATED AS UNCONTROLLED(250.60) (H): Primary | ICD-10-CM

## 2023-02-10 DIAGNOSIS — L97.322 SKIN ULCER OF LEFT ANKLE WITH FAT LAYER EXPOSED (H): ICD-10-CM

## 2023-02-10 DIAGNOSIS — E03.9 ACQUIRED HYPOTHYROIDISM: ICD-10-CM

## 2023-02-10 DIAGNOSIS — E11.51 DIABETES MELLITUS WITH PERIPHERAL VASCULAR DISEASE (H): ICD-10-CM

## 2023-02-10 LAB
T4 FREE SERPL-MCNC: 0.91 NG/DL (ref 0.76–1.46)
TSH SERPL DL<=0.005 MIU/L-ACNC: 7.29 MU/L (ref 0.4–4)

## 2023-02-10 PROCEDURE — 99214 OFFICE O/P EST MOD 30 MIN: CPT | Performed by: PODIATRIST

## 2023-02-10 PROCEDURE — 84443 ASSAY THYROID STIM HORMONE: CPT

## 2023-02-10 PROCEDURE — 84439 ASSAY OF FREE THYROXINE: CPT

## 2023-02-10 PROCEDURE — 36415 COLL VENOUS BLD VENIPUNCTURE: CPT

## 2023-02-10 RX ORDER — CEPHALEXIN 500 MG/1
500 CAPSULE ORAL 2 TIMES DAILY
Qty: 28 CAPSULE | Refills: 0 | Status: SHIPPED | OUTPATIENT
Start: 2023-02-10 | End: 2023-04-15

## 2023-02-10 ASSESSMENT — PAIN SCALES - GENERAL: PAINLEVEL: MODERATE PAIN (5)

## 2023-02-10 NOTE — NURSING NOTE
Chief Complaint   Patient presents with     Left Foot - WOUND CARE, Follow Up     Right Foot - Follow Up       There were no vitals filed for this visit.    There is no height or weight on file to calculate BMI.      CAMRON Guzman NREMT

## 2023-02-10 NOTE — PROGRESS NOTES
Past Medical History:   Diagnosis Date     Actinic keratosis 3/12/2013     Degenerative joint disease      Diabetes (H)      Gastroesophageal reflux disease without esophagitis 12/11/2020     Rheumatic fever 1957    x2 between the ages of 15-18     Seasonal allergies      Patient Active Problem List   Diagnosis     Seasonal allergies     Hypothyroidism     Hyperlipidemia LDL goal <100     Fibromyalgia     Osteoarthritis     Obesity     Type 2 diabetes mellitus with hyperglycemia, with long-term current use of insulin (H)     Hypertension goal BP (blood pressure) < 140/90     Overactive bladder     Recurrent UTI     Pseudophakia of both eyes     DINORA (obstructive sleep apnea)- severe (AHI 56)     Contusion of right knee     Gait disorder     Gastroesophageal reflux disease without esophagitis     Urge incontinence     Chronic kidney disease, stage 2 (mild)     Hydrocephalus, unspecified type (H)     Diabetes mellitus with peripheral vascular disease (H)     Past Surgical History:   Procedure Laterality Date     BLEPHAROPLASTY Bilateral 4/1/2022    Procedure: Bilateral upper eyelid blepharoplasty;  Surgeon: Fidelia Moran MD;  Location: [facility] OR     CATARACT IOL, RT/LT       COLONOSCOPY       COLONOSCOPY N/A 8/13/2015    Procedure: COLONOSCOPY;  Surgeon: Duane, William Charles, MD;  Location: [facility] OR     COLONOSCOPY WITH CO2 INSUFFLATION N/A 8/13/2015    Procedure: COLONOSCOPY WITH CO2 INSUFFLATION;  Surgeon: Duane, William Charles, MD;  Location: [facility] OR     PHACOEMULSIFICATION WITH STANDARD INTRAOCULAR LENS IMPLANT Left 10/25/2018    Procedure: LEFT PHACOEMULSIFICATION WITH STANDARD INTRAOCULAR LENS IMPLANT;  Surgeon: Thomas Serna MD;  Location: [facility] OR     PHACOEMULSIFICATION WITH STANDARD INTRAOCULAR LENS IMPLANT Right 11/8/2018    Procedure: RIGHT PHACOEMULSIFICATION WITH STANDARD INTRAOCULAR LENS IMPLANT;  Surgeon: Thomas Serna MD;  Location: [facility] OR     THYROIDECTOMY        ZZC APPENDECTOMY        ZZC ARTHROPLASTY TMJ       Social History     Socioeconomic History     Marital status:      Spouse name: Not on file     Number of children: Not on file     Years of education: Not on file     Highest education level: Not on file   Occupational History     Occupation:    Tobacco Use     Smoking status: Never     Passive exposure: Never     Smokeless tobacco: Never     Tobacco comments:     has had exposure to second hand smoke in the past from [employer], no current exposure   Vaping Use     Vaping Use: Never used   Substance and Sexual Activity     Alcohol use: No     Drug use: No     Sexual activity: Never   Other Topics Concern     Parent/sibling w/ CABG, MI or angioplasty before 65F 55M? 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     Hobby Hazards No     Sleep Concern Yes     Comment: difficulty staying asleep, up and down all night     Stress Concern No     Weight Concern Yes     Comment: would like to lose     Special Diet Yes     Comment: low card, low sugar diet     Back Care Yes     Comment: chiropratior     Exercise Yes     Comment: almost everyday     Bike Helmet No     Comment: does not ride bicycle any more     Seat Belt Yes     Self-Exams Yes   Social History Narrative     Not on file     Social Determinants of Health     Financial Resource Strain: Not on file   Food Insecurity: Not on file   Transportation Needs: Not on file   Physical Activity: Not on file   Stress: Not on file   Social Connections: Not on file   Intimate Partner Violence: Not on file   Housing Stability: Not on file     Family History   Problem Relation Age of Onset     Eye Disorder Mother         cataracts     Cancer Father         skin and leukemia     Cancer Sister         Breast Cancer     Eye Disorder Brother          cataract     Cerebrovascular Disease Maternal Grandfather      Cerebrovascular Disease Paternal Grandmother      Eye Disorder Paternal Grandmother         cataracts     Cerebrovascular Disease Paternal Grandfather      Heart Disease Paternal Grandfather      Breast Cancer Daughter      Endometrial Cancer Daughter      Glaucoma No family hx of      Macular Degeneration No family hx of      Lab Results   Component Value Date    A1C 10.6 12/13/2022    A1C 11.1 07/06/2022    A1C 9.6 03/28/2022    A1C 9.5 12/20/2021    A1C 9.8 09/20/2021    A1C 10.3 05/17/2021    A1C 9.2 02/16/2021    A1C 8.7 11/12/2020    A1C 10.0 08/20/2020    A1C 10.5 01/10/2020     Last Comprehensive Metabolic Panel:  Sodium   Date Value Ref Range Status   12/01/2022 134 133 - 144 mmol/L Final   04/02/2021 134 133 - 144 mmol/L Final     Potassium   Date Value Ref Range Status   12/01/2022 4.9 3.4 - 5.3 mmol/L Final   04/02/2021 4.1 3.4 - 5.3 mmol/L Final     Chloride   Date Value Ref Range Status   12/01/2022 99 94 - 109 mmol/L Final   04/02/2021 99 94 - 109 mmol/L Final     Carbon Dioxide   Date Value Ref Range Status   04/02/2021 31 20 - 32 mmol/L Final     Carbon Dioxide (CO2)   Date Value Ref Range Status   12/01/2022 28 20 - 32 mmol/L Final     Anion Gap   Date Value Ref Range Status   12/01/2022 7 3 - 14 mmol/L Final   04/02/2021 4 3 - 14 mmol/L Final     Glucose   Date Value Ref Range Status   12/01/2022 328 (H) 70 - 99 mg/dL Final   04/02/2021 175 (H) 70 - 99 mg/dL Final     Comment:     Non Fasting     Urea Nitrogen   Date Value Ref Range Status   12/01/2022 24 7 - 30 mg/dL Final   04/02/2021 16 7 - 30 mg/dL Final     Creatinine   Date Value Ref Range Status   12/01/2022 0.87 0.52 - 1.04 mg/dL Final   04/02/2021 0.76 0.52 - 1.04 mg/dL Final     GFR Estimate   Date Value Ref Range Status   12/01/2022 67 >60 mL/min/1.73m2 Final     Comment:     Effective December 21, 2021 eGFRcr in adults is calculated using the 2021 CKD-EPI creatinine  equation which includes age and gender (Adriana colon al., NE, DOI: 10.1056/BQMZrh0673480)   04/02/2021 74 >60 mL/min/[1.73_m2] Final     Comment:     Non  GFR Calc  Starting 12/18/2018, serum creatinine based estimated GFR (eGFR) will be   calculated using the Chronic Kidney Disease Epidemiology Collaboration   (CKD-EPI) equation.       GFR, ESTIMATED POCT   Date Value Ref Range Status   09/28/2021 >60 >60 mL/min/1.73m2 Final     Calcium   Date Value Ref Range Status   12/01/2022 10.0 8.5 - 10.1 mg/dL Final   04/02/2021 9.7 8.5 - 10.1 mg/dL Final     Lab Results   Component Value Date    WBC 12.9 12/01/2022    WBC 13.3 04/02/2021     Lab Results   Component Value Date    RBC 4.73 12/01/2022    RBC 5.01 04/02/2021     Lab Results   Component Value Date    HGB 13.8 12/01/2022    HGB 14.8 04/02/2021     Lab Results   Component Value Date    HCT 43.1 12/01/2022    HCT 44.1 04/02/2021     No components found for: MCT  Lab Results   Component Value Date    MCV 91 12/01/2022    MCV 88 04/02/2021     Lab Results   Component Value Date    MCH 29.2 12/01/2022    MCH 29.5 04/02/2021     Lab Results   Component Value Date    MCHC 32.0 12/01/2022    MCHC 33.6 04/02/2021     Lab Results   Component Value Date    RDW 12.8 12/01/2022    RDW 13.8 04/02/2021     Lab Results   Component Value Date     12/01/2022     04/02/2021               SUBJECTIVE FINDINGS:  An 82-year-old presents with daughter for diabetic foot cares.  She relates she needs her toenails cut.  She relates, since I had seen her last in November, she fell and caught her foot between her bed and the floor and sat there for 8 hours.  She went to [facility], had surgery on her foot and ankle and the foot is healed up but the ankle is still persisting.  She relates she has nursing coming out once a week.  Was doing well until they put an ABD pad and a Band-Aid on it and she has new areas of irritation from the Band-AIDS.  Relates she is  not currently on any antibiotics.  She relates it does hurt and at times, it hurts quite a bit.  She is not sure if she has been lying on this at night or not.  She is diabetic.  She had relates she does have peripheral neuropathy, numbness and tingling in her feet.  I did review previous labs in Care Everywhere as well as x-ray report from 11/06/2022 and hospital notes from [facility] from 11/06/2022 admission and treatment.    OBJECTIVE FINDINGS:  DP and PT are 2/4 bilaterally.  She has incurvated nails, 1 through 5 bilaterally, to differing degrees.  Deep tendon reflexes are decreased bilaterally.  Sharp/dull is decreased with 5.07 [employer] monofilament bilaterally.  She has decreased range of motion bilaterally.  Proprioception is decreased on the right, intact on the left.  She has a left lateral ankle ulceration that is through the dermis into the subcutaneous tissues.  There is local erythema, edema and pain on palpation.  She has some surrounding areas of abrasions.  No odor, no calor.  She has some venous stasis with peripheral edema present as well, left more so than the right leg.    ASSESSMENT AND PLAN:  Ulcer, left lateral ankle with signs of infection present.  She is diabetic with peripheral neuropathy and peripheral vascular disease.  I reviewed her ABIs from 02/19/2020.  It was 1.2 on the right and 1.03 on the left with triphasic waveforms.  She has onychauxis bilaterally.  All the toenails were reduced bilaterally upon consent.  Diagnosis and treatment options discussed with the patient and daughter.  We cleaned the left leg and ulcer with Wound Vashe, applied Hydrofera Blue to the ulcer site, applied a total contact multilayered compression wrap with Unna boot to the left lower extremity with light compression upon consent applied.  Prescription for Keflex given and use discussed with them.  Nursing can check this when they come out but I want her to return to clinic next Tuesday or  Wednesday for followup.  Previous notes reviewed.  Her daughter had pictures of the wounds and lateral ankle ulcer.  It appears that the wound has improved over time.          High level of medical decision making.

## 2023-02-10 NOTE — LETTER
2/10/2023         RE: Brandy Leon  [address] [facility] [place] [address]        Dear Colleague,    Thank you for referring your patient, Brandy Leon, to the [facility]. Please see a copy of my visit note below.    Past Medical History:   Diagnosis Date     Actinic keratosis 3/12/2013     Degenerative joint disease      Diabetes (H)      Gastroesophageal reflux disease without esophagitis 12/11/2020     Rheumatic fever 1957    x2 between the ages of 15-18     Seasonal allergies      Patient Active Problem List   Diagnosis     Seasonal allergies     Hypothyroidism     Hyperlipidemia LDL goal <100     Fibromyalgia     Osteoarthritis     Obesity     Type 2 diabetes mellitus with hyperglycemia, with long-term current use of insulin (H)     Hypertension goal BP (blood pressure) < 140/90     Overactive bladder     Recurrent UTI     Pseudophakia of both eyes     DINORA (obstructive sleep apnea)- severe (AHI 56)     Contusion of right knee     Gait disorder     Gastroesophageal reflux disease without esophagitis     Urge incontinence     Chronic kidney disease, stage 2 (mild)     Hydrocephalus, unspecified type (H)     Diabetes mellitus with peripheral vascular disease (H)     Past Surgical History:   Procedure Laterality Date     BLEPHAROPLASTY Bilateral 4/1/2022    Procedure: Bilateral upper eyelid blepharoplasty;  Surgeon: Fidelia Moran MD;  Location: [facility]     CATARACT IOL, RT/LT       COLONOSCOPY       COLONOSCOPY N/A 8/13/2015    Procedure: COLONOSCOPY;  Surgeon: Duane, William Charles, MD;  [facility]: [facility]     COLONOSCOPY WITH CO2 INSUFFLATION N/A 8/13/2015    Procedure: COLONOSCOPY WITH CO2 INSUFFLATION;  Surgeon: Duane, William Charles, MD;  [facility]     PHACOEMULSIFICATION WITH STANDARD INTRAOCULAR LENS IMPLANT Left 10/25/2018    Procedure: LEFT PHACOEMULSIFICATION WITH STANDARD INTRAOCULAR LENS IMPLANT;  Surgeon: Thomas Serna MD;   [facility]     PHACOEMULSIFICATION WITH STANDARD INTRAOCULAR LENS IMPLANT Right 11/8/2018    Procedure: RIGHT PHACOEMULSIFICATION WITH STANDARD INTRAOCULAR LENS IMPLANT;  Surgeon: Thomas Serna MD;  [facility]     THYROIDECTOMY        ZZC APPENDECTOMY       ZZC ARTHROPLASTY TMJ       Social History     Socioeconomic History     Marital status:      Spouse name: Not on file     Number of children: Not on file     Years of education: Not on file     Highest education level: Not on file   Occupational History     Occupation:    Tobacco Use     Smoking status: Never     Passive exposure: Never     Smokeless tobacco: Never     Tobacco comments:     has had exposure to second hand smoke in the past from [employer], no current exposure   Vaping Use     Vaping Use: Never used   Substance and Sexual Activity     Alcohol use: No     Drug use: No     Sexual activity: Never   Other Topics Concern     Parent/sibling w/ CABG, MI or angioplasty before 65F 55M? 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     Hobby Hazards No     Sleep Concern Yes     Comment: difficulty staying asleep, up and down all night     Stress Concern No     Weight Concern Yes     Comment: would like to lose     Special Diet Yes     Comment: low card, low sugar diet     Back Care Yes     Comment: chiropratior     Exercise Yes     Comment: almost everyday     Bike Helmet No     Comment: does not ride bicycle any more     Seat Belt Yes     Self-Exams Yes   Social History Narrative     Not on file     Social Determinants of Health     Financial Resource Strain: Not on file   Food Insecurity: Not on file   Transportation Needs: Not on file   Physical Activity: Not on file   Stress: Not on file   Social Connections: Not on file   Intimate  Partner Violence: Not on file   Housing Stability: Not on file     Family History   Problem Relation Age of Onset     Eye Disorder Mother         cataracts     Cancer Father         skin and leukemia     Cancer Sister         Breast Cancer     Eye Disorder Brother         cataract     Cerebrovascular Disease Maternal Grandfather      Cerebrovascular Disease Paternal Grandmother      Eye Disorder Paternal Grandmother         cataracts     Cerebrovascular Disease Paternal Grandfather      Heart Disease Paternal Grandfather      Breast Cancer Daughter      Endometrial Cancer Daughter      Glaucoma No family hx of      Macular Degeneration No family hx of      Lab Results   Component Value Date    A1C 10.6 12/13/2022    A1C 11.1 07/06/2022    A1C 9.6 03/28/2022    A1C 9.5 12/20/2021    A1C 9.8 09/20/2021    A1C 10.3 05/17/2021    A1C 9.2 02/16/2021    A1C 8.7 11/12/2020    A1C 10.0 08/20/2020    A1C 10.5 01/10/2020     Last Comprehensive Metabolic Panel:  Sodium   Date Value Ref Range Status   12/01/2022 134 133 - 144 mmol/L Final   04/02/2021 134 133 - 144 mmol/L Final     Potassium   Date Value Ref Range Status   12/01/2022 4.9 3.4 - 5.3 mmol/L Final   04/02/2021 4.1 3.4 - 5.3 mmol/L Final     Chloride   Date Value Ref Range Status   12/01/2022 99 94 - 109 mmol/L Final   04/02/2021 99 94 - 109 mmol/L Final     Carbon Dioxide   Date Value Ref Range Status   04/02/2021 31 20 - 32 mmol/L Final     Carbon Dioxide (CO2)   Date Value Ref Range Status   12/01/2022 28 20 - 32 mmol/L Final     Anion Gap   Date Value Ref Range Status   12/01/2022 7 3 - 14 mmol/L Final   04/02/2021 4 3 - 14 mmol/L Final     Glucose   Date Value Ref Range Status   12/01/2022 328 (H) 70 - 99 mg/dL Final   04/02/2021 175 (H) 70 - 99 mg/dL Final     Comment:     Non Fasting     Urea Nitrogen   Date Value Ref Range Status   12/01/2022 24 7 - 30 mg/dL Final   04/02/2021 16 7 - 30 mg/dL Final     Creatinine   Date Value Ref Range Status   12/01/2022  0.87 0.52 - 1.04 mg/dL Final   04/02/2021 0.76 0.52 - 1.04 mg/dL Final     GFR Estimate   Date Value Ref Range Status   12/01/2022 67 >60 mL/min/1.73m2 Final     Comment:     Effective December 21, 2021 eGFRcr in adults is calculated using the 2021 CKD-EPI creatinine equation which includes age and gender (Adriana colon al., [place], DOI: 10.1056/GZABqk0087889)   04/02/2021 74 >60 mL/min/[1.73_m2] Final     Comment:     Non  GFR Calc  Starting 12/18/2018, serum creatinine based estimated GFR (eGFR) will be   calculated using the Chronic Kidney Disease Epidemiology Collaboration   (CKD-EPI) equation.       GFR, ESTIMATED POCT   Date Value Ref Range Status   09/28/2021 >60 >60 mL/min/1.73m2 Final     Calcium   Date Value Ref Range Status   12/01/2022 10.0 8.5 - 10.1 mg/dL Final   04/02/2021 9.7 8.5 - 10.1 mg/dL Final     Lab Results   Component Value Date    WBC 12.9 12/01/2022    WBC 13.3 04/02/2021     Lab Results   Component Value Date    RBC 4.73 12/01/2022    RBC 5.01 04/02/2021     Lab Results   Component Value Date    HGB 13.8 12/01/2022    HGB 14.8 04/02/2021     Lab Results   Component Value Date    HCT 43.1 12/01/2022    HCT 44.1 04/02/2021     No components found for: MCT  Lab Results   Component Value Date    MCV 91 12/01/2022    MCV 88 04/02/2021     Lab Results   Component Value Date    MCH 29.2 12/01/2022    MCH 29.5 04/02/2021     Lab Results   Component Value Date    MCHC 32.0 12/01/2022    MCHC 33.6 04/02/2021     Lab Results   Component Value Date    RDW 12.8 12/01/2022    RDW 13.8 04/02/2021     Lab Results   Component Value Date     12/01/2022     04/02/2021               SUBJECTIVE FINDINGS:  An 82-year-old presents with daughter for diabetic foot cares.  She relates she needs her toenails cut.  She relates, since I had seen her last in November, she fell and caught her foot between her bed and the floor and sat there for 8 hours.  She went to [facility], had surgery on  her foot and ankle and the foot is healed up but the ankle is still persisting.  She relates she has nursing coming out once a week.  Was doing well until they put an ABD pad and a Band-Aid on it and she has new areas of irritation from the Band-AIDS.  Relates she is not currently on any antibiotics.  She relates it does hurt and at times, it hurts quite a bit.  She is not sure if she has been lying on this at night or not.  She is diabetic.  She had relates she does have peripheral neuropathy, numbness and tingling in her feet.  I did review previous labs in Care Everywhere as well as x-ray report from 11/06/2022 and hospital notes from [facility] from 11/06/2022 admission and treatment.    OBJECTIVE FINDINGS:  DP and PT are 2/4 bilaterally.  She has incurvated nails, 1 through 5 bilaterally, to differing degrees.  Deep tendon reflexes are decreased bilaterally.  Sharp/dull is decreased with 5.07 Crozet-Yao monofilament bilaterally.  She has decreased range of motion bilaterally.  Proprioception is decreased on the right, intact on the left.  She has a left lateral ankle ulceration that is through the dermis into the subcutaneous tissues.  There is local erythema, edema and pain on palpation.  She has some surrounding areas of abrasions.  No odor, no calor.  She has some venous stasis with peripheral edema present as well, left more so than the right leg.    ASSESSMENT AND PLAN:  Ulcer, left lateral ankle with signs of infection present.  She is diabetic with peripheral neuropathy and peripheral vascular disease.  I reviewed her ABIs from 02/19/2020.  It was 1.2 on the right and 1.03 on the left with triphasic waveforms.  She has onychauxis bilaterally.  All the toenails were reduced bilaterally upon consent.  Diagnosis and treatment options discussed with the patient and daughter.  We cleaned the left leg and ulcer with Wound Vashe, applied Hydrofera Blue to the ulcer site, applied a total contact  multilayered compression wrap with Unna boot to the left lower extremity with light compression upon consent applied.  Prescription for Keflex given and use discussed with them.  Nursing can check this when they come out but I want her to return to clinic next Tuesday or Wednesday for followup.  Previous notes reviewed.  Her daughter had pictures of the wounds and lateral ankle ulcer.  It appears that the wound has improved over time.          High level of medical decision making.      Again, thank you for allowing me to participate in the care of your patient.        Sincerely,        Ehsan Araya DPM

## 2023-02-15 ENCOUNTER — OFFICE VISIT (OUTPATIENT)
Dept: PODIATRY | Facility: CLINIC | Age: 83
End: 2023-02-15
Payer: COMMERCIAL

## 2023-02-15 DIAGNOSIS — L97.322 SKIN ULCER OF LEFT ANKLE WITH FAT LAYER EXPOSED (H): ICD-10-CM

## 2023-02-15 DIAGNOSIS — E11.49 TYPE II OR UNSPECIFIED TYPE DIABETES MELLITUS WITH NEUROLOGICAL MANIFESTATIONS, NOT STATED AS UNCONTROLLED(250.60) (H): Primary | ICD-10-CM

## 2023-02-15 DIAGNOSIS — E11.51 DIABETES MELLITUS WITH PERIPHERAL VASCULAR DISEASE (H): ICD-10-CM

## 2023-02-15 DIAGNOSIS — E03.9 ACQUIRED HYPOTHYROIDISM: ICD-10-CM

## 2023-02-15 PROCEDURE — 99214 OFFICE O/P EST MOD 30 MIN: CPT | Performed by: PODIATRIST

## 2023-02-15 RX ORDER — LEVOTHYROXINE SODIUM 137 MCG
137 TABLET ORAL DAILY
Qty: 90 TABLET | Refills: 3 | Status: SHIPPED | OUTPATIENT
Start: 2023-02-15 | End: 2024-01-26

## 2023-02-15 NOTE — NURSING NOTE
Brandy Leon's chief complaint for this visit includes:  Chief Complaint   Patient presents with     Follow Up     Left leg wound and unna boot and diabetic foot check     PCP: Fernanda Miller    Referring Provider:  No referring provider defined for this encounter.    There were no vitals taken for this visit.  Data Unavailable        Allergies   Allergen Reactions     New Medication Allergy Rash     Soliqua      Ace Inhibitors Cough     Estradiol Itching     Lipitor [Atorvastatin Calcium]      Weak and shaky     Sulfa Drugs      Rash       Zocor [Simvastatin]      Weak and shakey     Triamterene Dermatitis     Possible photosensitivity dermatitis, mild.  (changed to hctz alone 6/4/07, will see if this helps)         Do you need any medication refills at today's visit?

## 2023-02-15 NOTE — PROGRESS NOTES
Past Medical History:   Diagnosis Date     Actinic keratosis 3/12/2013     Degenerative joint disease      Diabetes (H)      Gastroesophageal reflux disease without esophagitis 12/11/2020     Rheumatic fever 1957    x2 between the ages of 15-18     Seasonal allergies      Patient Active Problem List   Diagnosis     Seasonal allergies     Hypothyroidism     Hyperlipidemia LDL goal <100     Fibromyalgia     Osteoarthritis     Obesity     Type 2 diabetes mellitus with hyperglycemia, with long-term current use of insulin (H)     Hypertension goal BP (blood pressure) < 140/90     Overactive bladder     Recurrent UTI     Pseudophakia of both eyes     DINORA (obstructive sleep apnea)- severe (AHI 56)     Contusion of right knee     Gait disorder     Gastroesophageal reflux disease without esophagitis     Urge incontinence     Chronic kidney disease, stage 2 (mild)     Hydrocephalus, unspecified type (H)     Diabetes mellitus with peripheral vascular disease (H)     Past Surgical History:   Procedure Laterality Date     BLEPHAROPLASTY Bilateral 4/1/2022    Procedure: Bilateral upper eyelid blepharoplasty;  Surgeon: Fidelia Moran MD;  Location: [facility]     CATARACT IOL, RT/LT       COLONOSCOPY       COLONOSCOPY N/A 8/13/2015    Procedure: COLONOSCOPY;  Surgeon: Duane, William Charles, MD;  Location: [facility]     COLONOSCOPY WITH CO2 INSUFFLATION N/A 8/13/2015    Procedure: COLONOSCOPY WITH CO2 INSUFFLATION;  Surgeon: Duane, William Charles, MD;  Location: [facility]     PHACOEMULSIFICATION WITH STANDARD INTRAOCULAR LENS IMPLANT Left 10/25/2018    Procedure: LEFT PHACOEMULSIFICATION WITH STANDARD INTRAOCULAR LENS IMPLANT;  Surgeon: Thomas Serna MD;  Location: [facility]     PHACOEMULSIFICATION WITH STANDARD INTRAOCULAR LENS IMPLANT Right 11/8/2018    Procedure: RIGHT PHACOEMULSIFICATION WITH STANDARD INTRAOCULAR LENS IMPLANT;  Surgeon: Thomas Serna MD;  Location: [facility]     THYROIDECTOMY        ZZC APPENDECTOMY        ZZC ARTHROPLASTY TMJ       Social History     Socioeconomic History     Marital status:      Spouse name: Not on file     Number of children: Not on file     Years of education: Not on file     Highest education level: Not on file   Occupational History     Occupation:    Tobacco Use     Smoking status: Never     Passive exposure: Never     Smokeless tobacco: Never     Tobacco comments:     has had exposure to second hand smoke in the past from [employer], no current exposure   Vaping Use     Vaping Use: Never used   Substance and Sexual Activity     Alcohol use: No     Drug use: No     Sexual activity: Never   Other Topics Concern     Parent/sibling w/ CABG, MI or angioplasty before 65F 55M? 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     Hobby Hazards No     Sleep Concern Yes     Comment: difficulty staying asleep, up and down all night     Stress Concern No     Weight Concern Yes     Comment: would like to lose     Special Diet Yes     Comment: low card, low sugar diet     Back Care Yes     Comment: chiropratior     Exercise Yes     Comment: almost everyday     Bike Helmet No     Comment: does not ride bicycle any more     Seat Belt Yes     Self-Exams Yes   Social History Narrative     Not on file     Social Determinants of Health     Financial Resource Strain: Not on file   Food Insecurity: Not on file   Transportation Needs: Not on file   Physical Activity: Not on file   Stress: Not on file   Social Connections: Not on file   Intimate Partner Violence: Not on file   Housing Stability: Not on file     Family History   Problem Relation Age of Onset     Eye Disorder Mother         cataracts     Cancer Father         skin and leukemia     Cancer Sister         Breast Cancer     Eye Disorder Brother          cataract     Cerebrovascular Disease Maternal Grandfather      Cerebrovascular Disease Paternal Grandmother      Eye Disorder Paternal Grandmother         cataracts     Cerebrovascular Disease Paternal Grandfather      Heart Disease Paternal Grandfather      Breast Cancer Daughter      Endometrial Cancer Daughter      Glaucoma No family hx of      Macular Degeneration No family hx of      Lab Results   Component Value Date    A1C 10.6 12/13/2022    A1C 11.1 07/06/2022    A1C 9.6 03/28/2022    A1C 9.5 12/20/2021    A1C 9.8 09/20/2021    A1C 10.3 05/17/2021    A1C 9.2 02/16/2021    A1C 8.7 11/12/2020    A1C 10.0 08/20/2020    A1C 10.5 01/10/2020               SUBJECTIVE FINDINGS:  An 82-year-old returns to clinic with daughter for ulcer, left lateral ankle, with infection.  She relates it is doing okay.  She is wearing the Unna boot with no problems.  They have been having nursing come out.  Nursing did not come out since our last visit.    OBJECTIVE FINDINGS:  DP and PT are 2/4, left.  She has a left lateral ankle ulcer that is through the dermis into the subcutaneous tissues.  There is decreased erythema, decreased edema, positive serosanguineous drainage.  No odor, no calor.  There is no pain on palpation.    ASSESSMENT AND PLAN:  Ulcer, left lateral ankle, with infection.  She is diabetic with peripheral neuropathy and vascular disease.  Diagnosis and treatment options discussed with them.  I advised an Unna boot but they related they would like to go back to dressing changes.  I am going to have them clean this twice a week and as needed for drainage or problems.  Clean this with Wound Vashe, apply Hydrofera Blue, wrap with sterile Kerlix and light Ace wrap.  Advised to have nursing continue to come out.  Finish the Keflex.  Return to clinic and see me in 2 weeks.  Previous notes reviewed.          Moderate level of medical decision making.

## 2023-02-15 NOTE — LETTER
2/15/2023         RE: Brandy Leon  [address] [place] [address]        Dear Colleague,    Thank you for referring your patient, Brandy Leon, to the [facility]. Please see a copy of my visit note below.    Past Medical History:   Diagnosis Date     Actinic keratosis 3/12/2013     Degenerative joint disease      Diabetes (H)      Gastroesophageal reflux disease without esophagitis 12/11/2020     Rheumatic fever 1957    x2 between the ages of 15-18     Seasonal allergies      Patient Active Problem List   Diagnosis     Seasonal allergies     Hypothyroidism     Hyperlipidemia LDL goal <100     Fibromyalgia     Osteoarthritis     Obesity     Type 2 diabetes mellitus with hyperglycemia, with long-term current use of insulin (H)     Hypertension goal BP (blood pressure) < 140/90     Overactive bladder     Recurrent UTI     Pseudophakia of both eyes     DINORA (obstructive sleep apnea)- severe (AHI 56)     Contusion of right knee     Gait disorder     Gastroesophageal reflux disease without esophagitis     Urge incontinence     Chronic kidney disease, stage 2 (mild)     Hydrocephalus, unspecified type (H)     Diabetes mellitus with peripheral vascular disease (H)     Past Surgical History:   Procedure Laterality Date     BLEPHAROPLASTY Bilateral 4/1/2022    Procedure: Bilateral upper eyelid blepharoplasty;  Surgeon: Fidelia Moran MD;  Location: [facility]     CATARACT IOL, RT/LT       COLONOSCOPY       COLONOSCOPY N/A 8/13/2015    Procedure: COLONOSCOPY;  Surgeon: Duane, William Charles, MD;  [facility]: [facility]     COLONOSCOPY WITH CO2 INSUFFLATION N/A 8/13/2015    Procedure: COLONOSCOPY WITH CO2 INSUFFLATION;  Surgeon: Duane, William Charles, MD;  Location: [facility]     PHACOEMULSIFICATION WITH STANDARD INTRAOCULAR LENS IMPLANT Left 10/25/2018    Procedure: LEFT PHACOEMULSIFICATION WITH STANDARD INTRAOCULAR LENS IMPLANT;  Surgeon: Thomas Serna MD;   Location: [facility]     PHACOEMULSIFICATION WITH STANDARD INTRAOCULAR LENS IMPLANT Right 11/8/2018    Procedure: RIGHT PHACOEMULSIFICATION WITH STANDARD INTRAOCULAR LENS IMPLANT;  Surgeon: Thomas Serna MD;  Location: [facility]     THYROIDECTOMY        ZZC APPENDECTOMY       ZZC ARTHROPLASTY TMJ       Social History     Socioeconomic History     Marital status:      Spouse name: Not on file     Number of children: Not on file     Years of education: Not on file     Highest education level: Not on file   Occupational History     Occupation:    Tobacco Use     Smoking status: Never     Passive exposure: Never     Smokeless tobacco: Never     Tobacco comments:     has had exposure to second hand smoke in the past from [employer], no current exposure   Vaping Use     Vaping Use: Never used   Substance and Sexual Activity     Alcohol use: No     Drug use: No     Sexual activity: Never   Other Topics Concern     Parent/sibling w/ CABG, MI or angioplasty before 65F 55M? 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     Hobby Hazards No     Sleep Concern Yes     Comment: difficulty staying asleep, up and down all night     Stress Concern No     Weight Concern Yes     Comment: would like to lose     Special Diet Yes     Comment: low card, low sugar diet     Back Care Yes     Comment: chiropratior     Exercise Yes     Comment: almost everyday     Bike Helmet No     Comment: does not ride bicycle any more     Seat Belt Yes     Self-Exams Yes   Social History Narrative     Not on file     Social Determinants of Health     Financial Resource Strain: Not on file   Food Insecurity: Not on file   Transportation Needs: Not on file   Physical Activity: Not on file   Stress: Not on file   Social Connections: Not on file   Intimate  Partner Violence: Not on file   Housing Stability: Not on file     Family History   Problem Relation Age of Onset     Eye Disorder Mother         cataracts     Cancer Father         skin and leukemia     Cancer Sister         Breast Cancer     Eye Disorder Brother         cataract     Cerebrovascular Disease Maternal Grandfather      Cerebrovascular Disease Paternal Grandmother      Eye Disorder Paternal Grandmother         cataracts     Cerebrovascular Disease Paternal Grandfather      Heart Disease Paternal Grandfather      Breast Cancer Daughter      Endometrial Cancer Daughter      Glaucoma No family hx of      Macular Degeneration No family hx of      Lab Results   Component Value Date    A1C 10.6 12/13/2022    A1C 11.1 07/06/2022    A1C 9.6 03/28/2022    A1C 9.5 12/20/2021    A1C 9.8 09/20/2021    A1C 10.3 05/17/2021    A1C 9.2 02/16/2021    A1C 8.7 11/12/2020    A1C 10.0 08/20/2020    A1C 10.5 01/10/2020               SUBJECTIVE FINDINGS:  An 82-year-old returns to clinic with daughter for ulcer, left lateral ankle, with infection.  She relates it is doing okay.  She is wearing the Unna boot with no problems.  They have been having nursing come out.  Nursing did not come out since our last visit.    OBJECTIVE FINDINGS:  DP and PT are 2/4, left.  She has a left lateral ankle ulcer that is through the dermis into the subcutaneous tissues.  There is decreased erythema, decreased edema, positive serosanguineous drainage.  No odor, no calor.  There is no pain on palpation.    ASSESSMENT AND PLAN:  Ulcer, left lateral ankle, with infection.  She is diabetic with peripheral neuropathy and vascular disease.  Diagnosis and treatment options discussed with them.  I advised an Unna boot but they related they would like to go back to dressing changes.  I am going to have them clean this twice a week and as needed for drainage or problems.  Clean this with Wound Vashe, apply Hydrofera Blue, wrap with sterile Kerlix and  light Ace wrap.  Advised to have nursing continue to come out.  Finish the Keflex.  Return to clinic and see me in 2 weeks.  Previous notes reviewed.          Moderate level of medical decision making.        Again, thank you for allowing me to participate in the care of your patient.        Sincerely,        Ehsan Araya DPM

## 2023-02-16 NOTE — RESULT ENCOUNTER NOTE
Your thyroid testing indicates normal active thyroid hormone.  I have sent additional refills of your synthroid.  Please call or [employer] message me if you have any questions.      MARISSAK

## 2023-02-24 ENCOUNTER — TELEPHONE (OUTPATIENT)
Dept: FAMILY MEDICINE | Facility: CLINIC | Age: 83
End: 2023-02-24
Payer: COMMERCIAL

## 2023-02-24 NOTE — TELEPHONE ENCOUNTER
Forms/Letter Request    Type of form/letter: [facility]    Have you been seen for this request: N/A    Do we have the form/letter: Yes: placed in Fernanda Miller forms in basket for review    When is form/letter needed by: ASAP    How would you like the form/letter returned: Fax  [phone]

## 2023-03-02 ENCOUNTER — OFFICE VISIT (OUTPATIENT)
Dept: PODIATRY | Facility: CLINIC | Age: 83
End: 2023-03-02
Payer: COMMERCIAL

## 2023-03-02 DIAGNOSIS — E11.51 DIABETES MELLITUS WITH PERIPHERAL VASCULAR DISEASE (H): ICD-10-CM

## 2023-03-02 DIAGNOSIS — E11.49 TYPE II OR UNSPECIFIED TYPE DIABETES MELLITUS WITH NEUROLOGICAL MANIFESTATIONS, NOT STATED AS UNCONTROLLED(250.60) (H): Primary | ICD-10-CM

## 2023-03-02 DIAGNOSIS — S90.851S FOREIGN BODY IN RIGHT FOOT, SEQUELA: ICD-10-CM

## 2023-03-02 DIAGNOSIS — L97.322 SKIN ULCER OF LEFT ANKLE WITH FAT LAYER EXPOSED (H): ICD-10-CM

## 2023-03-02 PROCEDURE — 99214 OFFICE O/P EST MOD 30 MIN: CPT | Mod: 25 | Performed by: PODIATRIST

## 2023-03-02 PROCEDURE — 97597 DBRDMT OPN WND 1ST 20 CM/<: CPT | Performed by: PODIATRIST

## 2023-03-02 RX ORDER — CEPHALEXIN 500 MG/1
500 CAPSULE ORAL 2 TIMES DAILY
Qty: 14 CAPSULE | Refills: 0 | Status: SHIPPED | OUTPATIENT
Start: 2023-03-02 | End: 2023-04-15

## 2023-03-02 NOTE — LETTER
3/2/2023         RE: Brandy Leon  [address] [facility] [place] [address]        Dear Colleague,    Thank you for referring your patient, Brandy Leon, to the [facility]. Please see a copy of my visit note below.    Past Medical History:   Diagnosis Date     Actinic keratosis 3/12/2013     Degenerative joint disease      Diabetes (H)      Gastroesophageal reflux disease without esophagitis 12/11/2020     Rheumatic fever 1957    x2 between the ages of 15-18     Seasonal allergies      Patient Active Problem List   Diagnosis     Seasonal allergies     Hypothyroidism     Hyperlipidemia LDL goal <100     Fibromyalgia     Osteoarthritis     Obesity     Type 2 diabetes mellitus with hyperglycemia, with long-term current use of insulin (H)     Hypertension goal BP (blood pressure) < 140/90     Overactive bladder     Recurrent UTI     Pseudophakia of both eyes     DINORA (obstructive sleep apnea)- severe (AHI 56)     Contusion of right knee     Gait disorder     Gastroesophageal reflux disease without esophagitis     Urge incontinence     Chronic kidney disease, stage 2 (mild)     Hydrocephalus, unspecified type (H)     Diabetes mellitus with peripheral vascular disease (H)     Past Surgical History:   Procedure Laterality Date     BLEPHAROPLASTY Bilateral 4/1/2022    Procedure: Bilateral upper eyelid blepharoplasty;  Surgeon: Fidelia Moran MD;  Location: [facility]     CATARACT IOL, RT/LT       COLONOSCOPY       COLONOSCOPY N/A 8/13/2015    Procedure: COLONOSCOPY;  Surgeon: Duane, William Charles, MD;  Location: [facility]     COLONOSCOPY WITH CO2 INSUFFLATION N/A 8/13/2015    Procedure: COLONOSCOPY WITH CO2 INSUFFLATION;  Surgeon: Duane, William Charles, MD;  Location: [facility]     PHACOEMULSIFICATION WITH STANDARD INTRAOCULAR LENS IMPLANT Left 10/25/2018    Procedure: LEFT PHACOEMULSIFICATION WITH STANDARD INTRAOCULAR LENS IMPLANT;  Surgeon: Thomas Serna MD;  Location:  [facility]     PHACOEMULSIFICATION WITH STANDARD INTRAOCULAR LENS IMPLANT Right 11/8/2018    Procedure: RIGHT PHACOEMULSIFICATION WITH STANDARD INTRAOCULAR LENS IMPLANT;  Surgeon: Thomas Serna MD;  Location: [facility]     THYROIDECTOMY        ZZC APPENDECTOMY       ZZC ARTHROPLASTY TMJ       Social History     Socioeconomic History     Marital status:      Spouse name: Not on file     Number of children: Not on file     Years of education: Not on file     Highest education level: Not on file   Occupational History     Occupation:    Tobacco Use     Smoking status: Never     Passive exposure: Never     Smokeless tobacco: Never     Tobacco comments:     has had exposure to second hand smoke in the past from [employer], no current exposure   Vaping Use     Vaping Use: Never used   Substance and Sexual Activity     Alcohol use: No     Drug use: No     Sexual activity: Never   Other Topics Concern     Parent/sibling w/ CABG, MI or angioplasty before 65F 55M? 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     Hobby Hazards No     Sleep Concern Yes     Comment: difficulty staying asleep, up and down all night     Stress Concern No     Weight Concern Yes     Comment: would like to lose     Special Diet Yes     Comment: low card, low sugar diet     Back Care Yes     Comment: chiropratior     Exercise Yes     Comment: almost everyday     Bike Helmet No     Comment: does not ride bicycle any more     Seat Belt Yes     Self-Exams Yes   Social History Narrative     Not on file     Social Determinants of Health     Financial Resource Strain: Not on file   Food Insecurity: Not on file   Transportation Needs: Not on file   Physical Activity: Not on file   Stress: Not on file   Social Connections: Not on file   Intimate Partner  Violence: Not on file   Housing Stability: Not on file     Family History   Problem Relation Age of Onset     Eye Disorder Mother         cataracts     Cancer Father         skin and leukemia     Cancer Sister         Breast Cancer     Eye Disorder Brother         cataract     Cerebrovascular Disease Maternal Grandfather      Cerebrovascular Disease Paternal Grandmother      Eye Disorder Paternal Grandmother         cataracts     Cerebrovascular Disease Paternal Grandfather      Heart Disease Paternal Grandfather      Breast Cancer Daughter      Endometrial Cancer Daughter      Glaucoma No family hx of      Macular Degeneration No family hx of      Lab Results   Component Value Date    A1C 10.6 12/13/2022    A1C 11.1 07/06/2022    A1C 9.6 03/28/2022    A1C 9.5 12/20/2021    A1C 9.8 09/20/2021    A1C 10.3 05/17/2021    A1C 9.2 02/16/2021    A1C 8.7 11/12/2020    A1C 10.0 08/20/2020    A1C 10.5 01/10/2020     Last Comprehensive Metabolic Panel:  Lab Results   Component Value Date     12/01/2022    POTASSIUM 4.9 12/01/2022    CHLORIDE 99 12/01/2022    CO2 28 12/01/2022    ANIONGAP 7 12/01/2022     (H) 12/01/2022    BUN 24 12/01/2022    CR 0.87 12/01/2022    GFRESTIMATED 67 12/01/2022    FREDY 10.0 12/01/2022                           SUBJECTIVE FINDINGS:  An 82-year-old returns to clinic for ulcer, left lateral ankle, with infection.  She relates it is doing better.  She presents with her daughter.  Relates she is doing the wound cares with Hydrofera Blue and gauze wraps and Ace wraps and wound cleanser.  She relates she is using CeraVe lotion on her feet as well.  She says she has a lesion on the right plantar heel.  She is not sure how long that has been there.  She has peripheral Neuropathy.  She goes stocking-footed in the house.    OBJECTIVE FINDINGS:  DP and PT are 2/4 bilaterally.  She has left lateral ankle eschar.  There is decreased edema.  No gross erythema.  Some venous congestion.  No odor, no  calor.  This is eschared.  She had a right plantar lateral heel foreign body.  There is some serosanguineous drainage upon debridement.  We removed what appears to be either wood or a thorn.  It appears to be a plant thorn.  Her daughter took a picture of it.  It fell on the floor mat before I could get a picture of it.  There is no odor, no calor, no gross erythema.  She does not have any pain on palpation but she has neuropathy.    ASSESSMENT AND PLAN:  Ulcer, left ankle, with signs of infection.  That is improved.  She had a foreign body, right plantar lateral heel.  She is diabetic with peripheral neuropathy, diabetic with peripheral vascular disease.  Diagnosis and treatment options discussed with her.  For the left lateral ankle, I am just going to have her clean this daily with wound cleanser, apply Primapore and Ace wrap.  We can discontinue the gauze wrap and the Hydrofera Blue.  She relates she had finished the Keflex.  She has had no problems with that.  Right plantar heel lesion was sharp debrided with a #10 blade.  No anesthesia needed.  Foreign body removed and local wound care done upon consent today.  I am going to have her clean this daily with a wound cleanser.  We cleaned it with Wound Vashe today.  Applied bacitracin and a Primapore.  They relate they have Neosporin.  They will apply Neosporin and a Primapore daily.  She relates she is up to date on her tetanus.  Dressings and Wound Vashe dispensed and use discussed with them.  I am going to put her back on Keflex for a week.  Prescription given and use discussed with them.  Return to clinic and see me in 1 week.  Previous notes reviewed.    She relates she does not have diabetic shoes and she did not want to get those.  I advised her to wear shoes in the house to protect her feet.  I reviewed her immunizations.  Looks like she had a TD on 03/28/2022.              High level of medical decision making.        Again, thank you for allowing me to  participate in the care of your patient.        Sincerely,        Ehsan Araya DPM

## 2023-03-02 NOTE — PROGRESS NOTES
Past Medical History:   Diagnosis Date     Actinic keratosis 3/12/2013     Degenerative joint disease      Diabetes (H)      Gastroesophageal reflux disease without esophagitis 12/11/2020     Rheumatic fever 1957    x2 between the ages of 15-18     Seasonal allergies      Patient Active Problem List   Diagnosis     Seasonal allergies     Hypothyroidism     Hyperlipidemia LDL goal <100     Fibromyalgia     Osteoarthritis     Obesity     Type 2 diabetes mellitus with hyperglycemia, with long-term current use of insulin (H)     Hypertension goal BP (blood pressure) < 140/90     Overactive bladder     Recurrent UTI     Pseudophakia of both eyes     DINORA (obstructive sleep apnea)- severe (AHI 56)     Contusion of right knee     Gait disorder     Gastroesophageal reflux disease without esophagitis     Urge incontinence     Chronic kidney disease, stage 2 (mild)     Hydrocephalus, unspecified type (H)     Diabetes mellitus with peripheral vascular disease (H)     Past Surgical History:   Procedure Laterality Date     BLEPHAROPLASTY Bilateral 4/1/2022    Procedure: Bilateral upper eyelid blepharoplasty;  Surgeon: Fidelia Moran MD;  Location: [facility]     CATARACT IOL, RT/LT       COLONOSCOPY       COLONOSCOPY N/A 8/13/2015    Procedure: COLONOSCOPY;  Surgeon: Duane, William Charles, MD;  [facility]     COLONOSCOPY WITH CO2 INSUFFLATION N/A 8/13/2015    Procedure: COLONOSCOPY WITH CO2 INSUFFLATION;  Surgeon: Duane, William Charles, MD;  [facility]     PHACOEMULSIFICATION WITH STANDARD INTRAOCULAR LENS IMPLANT Left 10/25/2018    Procedure: LEFT PHACOEMULSIFICATION WITH STANDARD INTRAOCULAR LENS IMPLANT;  Surgeon: Thomas Serna MD;  [facility]     PHACOEMULSIFICATION WITH STANDARD INTRAOCULAR LENS IMPLANT Right 11/8/2018    Procedure: RIGHT PHACOEMULSIFICATION WITH STANDARD INTRAOCULAR LENS IMPLANT;  Surgeon: Thomas Serna MD;  Location: [facility]     THYROIDECTOMY        ZZC APPENDECTOMY        ZZC ARTHROPLASTY TMJ       Social History     Socioeconomic History     Marital status:      Spouse name: Not on file     Number of children: Not on file     Years of education: Not on file     Highest education level: Not on file   Occupational History     Occupation:    Tobacco Use     Smoking status: Never     Passive exposure: Never     Smokeless tobacco: Never     Tobacco comments:     has had exposure to second hand smoke in the past from [employer], no current exposure   Vaping Use     Vaping Use: Never used   Substance and Sexual Activity     Alcohol use: No     Drug use: No     Sexual activity: Never   Other Topics Concern     Parent/sibling w/ CABG, MI or angioplasty before 65F 55M? 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     Hobby Hazards No     Sleep Concern Yes     Comment: difficulty staying asleep, up and down all night     Stress Concern No     Weight Concern Yes     Comment: would like to lose     Special Diet Yes     Comment: low card, low sugar diet     Back Care Yes     Comment: chiropratior     Exercise Yes     Comment: almost everyday     Bike Helmet No     Comment: does not ride bicycle any more     Seat Belt Yes     Self-Exams Yes   Social History Narrative     Not on file     Social Determinants of Health     Financial Resource Strain: Not on file   Food Insecurity: Not on file   Transportation Needs: Not on file   Physical Activity: Not on file   Stress: Not on file   Social Connections: Not on file   Intimate Partner Violence: Not on file   Housing Stability: Not on file     Family History   Problem Relation Age of Onset     Eye Disorder Mother         cataracts     Cancer Father         skin and leukemia     Cancer Sister         Breast Cancer     Eye Disorder Brother          cataract     Cerebrovascular Disease Maternal Grandfather      Cerebrovascular Disease Paternal Grandmother      Eye Disorder Paternal Grandmother         cataracts     Cerebrovascular Disease Paternal Grandfather      Heart Disease Paternal Grandfather      Breast Cancer Daughter      Endometrial Cancer Daughter      Glaucoma No family hx of      Macular Degeneration No family hx of      Lab Results   Component Value Date    A1C 10.6 12/13/2022    A1C 11.1 07/06/2022    A1C 9.6 03/28/2022    A1C 9.5 12/20/2021    A1C 9.8 09/20/2021    A1C 10.3 05/17/2021    A1C 9.2 02/16/2021    A1C 8.7 11/12/2020    A1C 10.0 08/20/2020    A1C 10.5 01/10/2020     Last Comprehensive Metabolic Panel:  Lab Results   Component Value Date     12/01/2022    POTASSIUM 4.9 12/01/2022    CHLORIDE 99 12/01/2022    CO2 28 12/01/2022    ANIONGAP 7 12/01/2022     (H) 12/01/2022    BUN 24 12/01/2022    CR 0.87 12/01/2022    GFRESTIMATED 67 12/01/2022    FREDY 10.0 12/01/2022                           SUBJECTIVE FINDINGS:  An 82-year-old returns to clinic for ulcer, left lateral ankle, with infection.  She relates it is doing better.  She presents with her daughter.  Relates she is doing the wound cares with Hydrofera Blue and gauze wraps and Ace wraps and wound cleanser.  She relates she is using CeraVe lotion on her feet as well.  She says she has a lesion on the right plantar heel.  She is not sure how long that has been there.  She has peripheral Neuropathy.  She goes stocking-footed in the house.    OBJECTIVE FINDINGS:  DP and PT are 2/4 bilaterally.  She has left lateral ankle eschar.  There is decreased edema.  No gross erythema.  Some venous congestion.  No odor, no calor.  This is eschared.  She had a right plantar lateral heel foreign body.  There is some serosanguineous drainage upon debridement.  We removed what appears to be either wood or a thorn.  It appears to be a plant thorn.  Her daughter took a picture of it.  It  fell on the floor mat before I could get a picture of it.  There is no odor, no calor, no gross erythema.  She does not have any pain on palpation but she has neuropathy.    ASSESSMENT AND PLAN:  Ulcer, left ankle, with signs of infection.  That is improved.  She had a foreign body, right plantar lateral heel.  She is diabetic with peripheral neuropathy, diabetic with peripheral vascular disease.  Diagnosis and treatment options discussed with her.  For the left lateral ankle, I am just going to have her clean this daily with wound cleanser, apply Primapore and Ace wrap.  We can discontinue the gauze wrap and the Hydrofera Blue.  She relates she had finished the Keflex.  She has had no problems with that.  Right plantar heel lesion was sharp debrided with a #10 blade.  No anesthesia needed.  Foreign body removed and local wound care done upon consent today.  I am going to have her clean this daily with a wound cleanser.  We cleaned it with Wound Vashe today.  Applied bacitracin and a Primapore.  They relate they have Neosporin.  They will apply Neosporin and a Primapore daily.  She relates she is up to date on her tetanus.  Dressings and Wound Vashe dispensed and use discussed with them.  I am going to put her back on Keflex for a week.  Prescription given and use discussed with them.  Return to clinic and see me in 1 week.  Previous notes reviewed.    She relates she does not have diabetic shoes and she did not want to get those.  I advised her to wear shoes in the house to protect her feet.  I reviewed her immunizations.  Looks like she had a TD on 03/28/2022.              High level of medical decision making.

## 2023-03-02 NOTE — NURSING NOTE
Brandy Leon's chief complaint for this visit includes:  Chief Complaint   Patient presents with     Follow Up     Left ankle wound and unna boot     PCP: Fernanda Miller    Referring Provider:  No referring provider defined for this encounter.    There were no vitals taken for this visit.  Data Unavailable        Allergies   Allergen Reactions     New Medication Allergy Rash     Soliqua      Ace Inhibitors Cough     Estradiol Itching     Lipitor [Atorvastatin Calcium]      Weak and shaky     Sulfa Drugs      Rash       Zocor [Simvastatin]      Weak and shakey     Triamterene Dermatitis     Possible photosensitivity dermatitis, mild.  (changed to hctz alone 6/4/07, will see if this helps)         Do you need any medication refills at today's visit?

## 2023-03-07 DIAGNOSIS — L21.9 DERMATITIS, SEBORRHEIC: ICD-10-CM

## 2023-03-09 ENCOUNTER — MEDICAL CORRESPONDENCE (OUTPATIENT)
Dept: HEALTH INFORMATION MANAGEMENT | Facility: CLINIC | Age: 83
End: 2023-03-09

## 2023-03-09 RX ORDER — FLUOCINOLONE ACETONIDE 0.11 MG/ML
OIL TOPICAL
Qty: 118.28 ML | Refills: 3 | Status: SHIPPED | OUTPATIENT
Start: 2023-03-09 | End: 2023-04-11

## 2023-03-09 NOTE — TELEPHONE ENCOUNTER
FLUOCINOLONE 0.01% BODY OIL  Last Written Prescription Date:   9/28/2022  Last Fill Quantity: 118.28,   # refills: 0  Last Office Visit :  9/28/2022  Future Office visit:  None    Routing refill request to provider for review/approval because:  Gaps in refills  Refer to Provider for review and refills       Nohemy Mercedes RN  Central Triage Red Flags/Med Refills

## 2023-03-10 ENCOUNTER — OFFICE VISIT (OUTPATIENT)
Dept: PODIATRY | Facility: CLINIC | Age: 83
End: 2023-03-10
Payer: COMMERCIAL

## 2023-03-10 DIAGNOSIS — L97.322 SKIN ULCER OF LEFT ANKLE WITH FAT LAYER EXPOSED (H): ICD-10-CM

## 2023-03-10 DIAGNOSIS — S90.851S FOREIGN BODY IN RIGHT FOOT, SEQUELA: ICD-10-CM

## 2023-03-10 DIAGNOSIS — E11.49 TYPE II OR UNSPECIFIED TYPE DIABETES MELLITUS WITH NEUROLOGICAL MANIFESTATIONS, NOT STATED AS UNCONTROLLED(250.60) (H): Primary | ICD-10-CM

## 2023-03-10 DIAGNOSIS — E11.51 DIABETES MELLITUS WITH PERIPHERAL VASCULAR DISEASE (H): ICD-10-CM

## 2023-03-10 PROCEDURE — 99214 OFFICE O/P EST MOD 30 MIN: CPT | Performed by: PODIATRIST

## 2023-03-10 NOTE — LETTER
3/10/2023         RE: Brandy Leon  [address] [place] [address]        Dear Colleague,    Thank you for referring your patient, Brandy Leon, to the [facility]. Please see a copy of my visit note below.    Past Medical History:   Diagnosis Date     Actinic keratosis 3/12/2013     Degenerative joint disease      Diabetes (H)      Gastroesophageal reflux disease without esophagitis 12/11/2020     Rheumatic fever 1957    x2 between the ages of 15-18     Seasonal allergies      Patient Active Problem List   Diagnosis     Seasonal allergies     Hypothyroidism     Hyperlipidemia LDL goal <100     Fibromyalgia     Osteoarthritis     Obesity     Type 2 diabetes mellitus with hyperglycemia, with long-term current use of insulin (H)     Hypertension goal BP (blood pressure) < 140/90     Overactive bladder     Recurrent UTI     Pseudophakia of both eyes     DINORA (obstructive sleep apnea)- severe (AHI 56)     Contusion of right knee     Gait disorder     Gastroesophageal reflux disease without esophagitis     Urge incontinence     Chronic kidney disease, stage 2 (mild)     Hydrocephalus, unspecified type (H)     Diabetes mellitus with peripheral vascular disease (H)     Past Surgical History:   Procedure Laterality Date     BLEPHAROPLASTY Bilateral 4/1/2022    Procedure: Bilateral upper eyelid blepharoplasty;  Surgeon: Fidelia Moran MD;  Location: [facility]     CATARACT IOL, RT/LT       COLONOSCOPY       COLONOSCOPY N/A 8/13/2015    Procedure: COLONOSCOPY;  Surgeon: Duane, William Charles, MD;  Location: [facility]     COLONOSCOPY WITH CO2 INSUFFLATION N/A 8/13/2015    Procedure: COLONOSCOPY WITH CO2 INSUFFLATION;  Surgeon: Duane, William Charles, MD;  Location: [facility]     PHACOEMULSIFICATION WITH STANDARD INTRAOCULAR LENS IMPLANT Left 10/25/2018    Procedure: LEFT PHACOEMULSIFICATION WITH STANDARD INTRAOCULAR LENS IMPLANT;  Surgeon: Thomas Serna MD;   Location: [facility]     PHACOEMULSIFICATION WITH STANDARD INTRAOCULAR LENS IMPLANT Right 11/8/2018    Procedure: RIGHT PHACOEMULSIFICATION WITH STANDARD INTRAOCULAR LENS IMPLANT;  Surgeon: Thomas Serna MD;  Location: [facility]     THYROIDECTOMY        ZZC APPENDECTOMY       ZZC ARTHROPLASTY TMJ       Social History     Socioeconomic History     Marital status:      Spouse name: Not on file     Number of children: Not on file     Years of education: Not on file     Highest education level: Not on file   Occupational History     Occupation:    Tobacco Use     Smoking status: Never     Passive exposure: Never     Smokeless tobacco: Never     Tobacco comments:     has had exposure to second hand smoke in the past from [employer], no current exposure   Vaping Use     Vaping Use: Never used   Substance and Sexual Activity     Alcohol use: No     Drug use: No     Sexual activity: Never   Other Topics Concern     Parent/sibling w/ CABG, MI or angioplasty before 65F 55M? 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     Hobby Hazards No     Sleep Concern Yes     Comment: difficulty staying asleep, up and down all night     Stress Concern No     Weight Concern Yes     Comment: would like to lose     Special Diet Yes     Comment: low card, low sugar diet     Back Care Yes     Comment: chiropratior     Exercise Yes     Comment: almost everyday     Bike Helmet No     Comment: does not ride bicycle any more     Seat Belt Yes     Self-Exams Yes   Social History Narrative     Not on file     Social Determinants of Health     Financial Resource Strain: Not on file   Food Insecurity: Not on file   Transportation Needs: Not on file   Physical Activity: Not on file   Stress: Not on file   Social Connections: Not on file   Intimate  Partner Violence: Not on file   Housing Stability: Not on file     Family History   Problem Relation Age of Onset     Eye Disorder Mother         cataracts     Cancer Father         skin and leukemia     Cancer Sister         Breast Cancer     Eye Disorder Brother         cataract     Cerebrovascular Disease Maternal Grandfather      Cerebrovascular Disease Paternal Grandmother      Eye Disorder Paternal Grandmother         cataracts     Cerebrovascular Disease Paternal Grandfather      Heart Disease Paternal Grandfather      Breast Cancer Daughter      Endometrial Cancer Daughter      Glaucoma No family hx of      Macular Degeneration No family hx of      Lab Results   Component Value Date    A1C 10.6 12/13/2022    A1C 11.1 07/06/2022    A1C 9.6 03/28/2022    A1C 9.5 12/20/2021    A1C 9.8 09/20/2021    A1C 10.3 05/17/2021    A1C 9.2 02/16/2021    A1C 8.7 11/12/2020    A1C 10.0 08/20/2020    A1C 10.5 01/10/2020     SUBJECTIVE FINDINGS:  An 82-year-old returns to clinic with daughter for left lateral ankle ulcer and right heel foreign body.  Relates it is doing well.  No new problems.  She is using an Ace wrap on the left with a Primapore and lotion and MicroKlenz.    OBJECTIVE FINDINGS:  DP and PT are 2/4 bilaterally.  Right plantar heel:  There are no open lesions.  The area of the foreign body is healed.  There is no erythema, no drainage, no odor, no calor.  She has left lateral ankle ulcer, loosening eschar.  There is minimal edema.  No erythema, no drainage, no odor, no calor.  Eschar is padmini.    ASSESSMENT AND PLAN:  Ulcer, left ankle.  This is improved.  Her signs of infection have resolved.  Foreign body, right plantar heel:  That has also resolved.  She is diabetic with peripheral neuropathy, diabetic with peripheral vascular disease.  Diagnosis and treatment options were discussed with the patient and daughter.  The right heel:  We can discontinue wound cares.  The left lateral ankle:  I do not  think we need any more Keflex.  I am just going to have them clean this with wound cleanser and apply a Primapore daily or every other day if there is no drainage.  They can discontinue the Ace wrap.  Return to clinic and see me in 1 month.  Previous notes reviewed.              Moderate level of medical decision making.        Again, thank you for allowing me to participate in the care of your patient.        Sincerely,        Ehsan Araya DPM

## 2023-03-10 NOTE — PROGRESS NOTES
Past Medical History:   Diagnosis Date     Actinic keratosis 3/12/2013     Degenerative joint disease      Diabetes (H)      Gastroesophageal reflux disease without esophagitis 12/11/2020     Rheumatic fever 1957    x2 between the ages of 15-18     Seasonal allergies      Patient Active Problem List   Diagnosis     Seasonal allergies     Hypothyroidism     Hyperlipidemia LDL goal <100     Fibromyalgia     Osteoarthritis     Obesity     Type 2 diabetes mellitus with hyperglycemia, with long-term current use of insulin (H)     Hypertension goal BP (blood pressure) < 140/90     Overactive bladder     Recurrent UTI     Pseudophakia of both eyes     DINORA (obstructive sleep apnea)- severe (AHI 56)     Contusion of right knee     Gait disorder     Gastroesophageal reflux disease without esophagitis     Urge incontinence     Chronic kidney disease, stage 2 (mild)     Hydrocephalus, unspecified type (H)     Diabetes mellitus with peripheral vascular disease (H)     Past Surgical History:   Procedure Laterality Date     BLEPHAROPLASTY Bilateral 4/1/2022    Procedure: Bilateral upper eyelid blepharoplasty;  Surgeon: Fidelia Moran MD;  Location: [facility]     CATARACT IOL, RT/LT       COLONOSCOPY       COLONOSCOPY N/A 8/13/2015    Procedure: COLONOSCOPY;  Surgeon: Duane, William Charles, MD;  [facility]     COLONOSCOPY WITH CO2 INSUFFLATION N/A 8/13/2015    Procedure: COLONOSCOPY WITH CO2 INSUFFLATION;  Surgeon: Duane, William Charles, MD;  [facility]     PHACOEMULSIFICATION WITH STANDARD INTRAOCULAR LENS IMPLANT Left 10/25/2018    Procedure: LEFT PHACOEMULSIFICATION WITH STANDARD INTRAOCULAR LENS IMPLANT;  Surgeon: Thomas Serna MD;  [facility]     PHACOEMULSIFICATION WITH STANDARD INTRAOCULAR LENS IMPLANT Right 11/8/2018    Procedure: RIGHT PHACOEMULSIFICATION WITH STANDARD INTRAOCULAR LENS IMPLANT;  Surgeon: Thomas Serna MD;  [facility]     THYROIDECTOMY        ZZC APPENDECTOMY        ZZC ARTHROPLASTY TMJ       Social History     Socioeconomic History     Marital status:      Spouse name: Not on file     Number of children: Not on file     Years of education: Not on file     Highest education level: Not on file   Occupational History     Occupation:    Tobacco Use     Smoking status: Never     Passive exposure: Never     Smokeless tobacco: Never     Tobacco comments:     has had exposure to second hand smoke in the past from [employer], no current exposure   Vaping Use     Vaping Use: Never used   Substance and Sexual Activity     Alcohol use: No     Drug use: No     Sexual activity: Never   Other Topics Concern     Parent/sibling w/ CABG, MI or angioplasty before 65F 55M? 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     Hobby Hazards No     Sleep Concern Yes     Comment: difficulty staying asleep, up and down all night     Stress Concern No     Weight Concern Yes     Comment: would like to lose     Special Diet Yes     Comment: low card, low sugar diet     Back Care Yes     Comment: chiropratior     Exercise Yes     Comment: almost everyday     Bike Helmet No     Comment: does not ride bicycle any more     Seat Belt Yes     Self-Exams Yes   Social History Narrative     Not on file     Social Determinants of Health     Financial Resource Strain: Not on file   Food Insecurity: Not on file   Transportation Needs: Not on file   Physical Activity: Not on file   Stress: Not on file   Social Connections: Not on file   Intimate Partner Violence: Not on file   Housing Stability: Not on file     Family History   Problem Relation Age of Onset     Eye Disorder Mother         cataracts     Cancer Father         skin and leukemia     Cancer Sister         Breast Cancer     Eye Disorder Brother          cataract     Cerebrovascular Disease Maternal Grandfather      Cerebrovascular Disease Paternal Grandmother      Eye Disorder Paternal Grandmother         cataracts     Cerebrovascular Disease Paternal Grandfather      Heart Disease Paternal Grandfather      Breast Cancer Daughter      Endometrial Cancer Daughter      Glaucoma No family hx of      Macular Degeneration No family hx of      Lab Results   Component Value Date    A1C 10.6 12/13/2022    A1C 11.1 07/06/2022    A1C 9.6 03/28/2022    A1C 9.5 12/20/2021    A1C 9.8 09/20/2021    A1C 10.3 05/17/2021    A1C 9.2 02/16/2021    A1C 8.7 11/12/2020    A1C 10.0 08/20/2020    A1C 10.5 01/10/2020     SUBJECTIVE FINDINGS:  An 82-year-old returns to clinic with daughter for left lateral ankle ulcer and right heel foreign body.  Relates it is doing well.  No new problems.  She is using an Ace wrap on the left with a Primapore and lotion and MicroKlenz.    OBJECTIVE FINDINGS:  DP and PT are 2/4 bilaterally.  Right plantar heel:  There are no open lesions.  The area of the foreign body is healed.  There is no erythema, no drainage, no odor, no calor.  She has left lateral ankle ulcer, loosening eschar.  There is minimal edema.  No erythema, no drainage, no odor, no calor.  Eschar is padmini.    ASSESSMENT AND PLAN:  Ulcer, left ankle.  This is improved.  Her signs of infection have resolved.  Foreign body, right plantar heel:  That has also resolved.  She is diabetic with peripheral neuropathy, diabetic with peripheral vascular disease.  Diagnosis and treatment options were discussed with the patient and daughter.  The right heel:  We can discontinue wound cares.  The left lateral ankle:  I do not think we need any more Keflex.  I am just going to have them clean this with wound cleanser and apply a Primapore daily or every other day if there is no drainage.  They can discontinue the Ace wrap.  Return to clinic and see me in 1 month.  Previous notes  reviewed.              Moderate level of medical decision making.

## 2023-03-17 ENCOUNTER — OFFICE VISIT (OUTPATIENT)
Dept: OPTOMETRY | Facility: CLINIC | Age: 83
End: 2023-03-17
Payer: COMMERCIAL

## 2023-03-17 DIAGNOSIS — H40.1131 PRIMARY OPEN ANGLE GLAUCOMA (POAG) OF BOTH EYES, MILD STAGE: Primary | ICD-10-CM

## 2023-03-17 PROCEDURE — 92012 INTRM OPH EXAM EST PATIENT: CPT | Performed by: OPTOMETRIST

## 2023-03-17 ASSESSMENT — CONF VISUAL FIELD
OD_INFERIOR_NASAL_RESTRICTION: 0
OS_SUPERIOR_NASAL_RESTRICTION: 0
OD_NORMAL: 1
METHOD: COUNTING FINGERS
OS_SUPERIOR_TEMPORAL_RESTRICTION: 0
OD_SUPERIOR_NASAL_RESTRICTION: 0
OS_INFERIOR_NASAL_RESTRICTION: 0
OS_INFERIOR_TEMPORAL_RESTRICTION: 0
OD_SUPERIOR_TEMPORAL_RESTRICTION: 0
OS_NORMAL: 1
OD_INFERIOR_TEMPORAL_RESTRICTION: 0

## 2023-03-17 ASSESSMENT — TONOMETRY
OD_IOP_MMHG: 26
OS_IOP_MMHG: 15
OS_IOP_MMHG: 18
IOP_METHOD: APPLANATION
OD_IOP_MMHG: 15

## 2023-03-17 ASSESSMENT — VISUAL ACUITY
OS_PH_SC: 20/40
OD_SC+: -1
METHOD: SNELLEN - LINEAR
OD_SC: 20/25
OS_SC: 20/60
OS_SC+: -2

## 2023-03-17 ASSESSMENT — CUP TO DISC RATIO
OS_RATIO: 0.5
OD_RATIO: 0.5

## 2023-03-17 ASSESSMENT — SLIT LAMP EXAM - LIDS
COMMENTS: NORMAL
COMMENTS: NORMAL

## 2023-03-17 ASSESSMENT — EXTERNAL EXAM - LEFT EYE: OS_EXAM: NORMAL

## 2023-03-17 ASSESSMENT — EXTERNAL EXAM - RIGHT EYE: OD_EXAM: NORMAL

## 2023-03-17 NOTE — NURSING NOTE
Chief Complaints and History of Present Illnesses   Patient presents with     Glaucoma Follow-Up     Chief Complaint(s) and History of Present Illness(es)     Glaucoma Follow-Up            Laterality: both eyes    Quality: blurred and fluctuating    Associated symptoms: Negative for eye pain, flashes and floaters    Compliance with Treatment: always          Comments    Here for POAG follow up. Vision has been fluctuating and blurry since last visit - worse in the evening. Compliant with drops. No eye pain. No flashes or floaters.    Haroon TURNER 11:12 AM March 17, 2023

## 2023-03-17 NOTE — PROGRESS NOTES
Assessment/Plan  (H40.5819) Primary open angle glaucoma (POAG) of both eyes, mild stage  (primary encounter diagnosis)  Comment:   - diagnosed with POAG with Dr. Serna  - haliy 538/557  - gonio open  - Tmax 30/25 (tonopen; just after stopping postop cataract drops, otherwise low 20s). IOP today 26/18 after changing to Travatan- patient has been taking latanoprost as well during the day  - on latanoprost at bedtime each eye since 8/2015 for concern for OCT RNFL thinning  - Historically unreliable visual fields   Plan: Continue using Travatan Z at bedtime. STOP latanoprost during the day due to ocular surface concerns and duplicate therapy. Recommend consultation for SLT vs alternate glaucoma therapy. Continue using Systane regularly (spaced out at least 5 minutes from other drops).     Complete documentation of historical and exam elements from today's encounter can  be found in the full encounter summary report (not reduplicated in this progress  note). I personally obtained the chief complaint(s) and history of present illness. I  confirmed and edited as necessary the review of systems, past medical/surgical  history, family history, social history, and examination findings as documented by  others; and I examined the patient myself. I personally reviewed the relevant tests,  images, and reports as documented above. I formulated and edited as necessary the  assessment and plan and discussed the findings and management plan with the  patient and family.    Gurjit Cardoso, GINO

## 2023-03-29 ENCOUNTER — TELEPHONE (OUTPATIENT)
Dept: OPHTHALMOLOGY | Facility: CLINIC | Age: 83
End: 2023-03-29
Payer: COMMERCIAL

## 2023-03-29 NOTE — TELEPHONE ENCOUNTER
Spoke with patient's daughter regarding scheduling patient with Dr. Ceja from referral per Dr. Cardoso. Patient scheduled accordingly and sent reminder letter with map to address in chart.

## 2023-04-08 ENCOUNTER — MYC MEDICAL ADVICE (OUTPATIENT)
Dept: FAMILY MEDICINE | Facility: CLINIC | Age: 83
End: 2023-04-08
Payer: COMMERCIAL

## 2023-04-08 DIAGNOSIS — Z79.4 TYPE 2 DIABETES MELLITUS WITH HYPERGLYCEMIA, WITH LONG-TERM CURRENT USE OF INSULIN (H): ICD-10-CM

## 2023-04-08 DIAGNOSIS — E11.65 TYPE 2 DIABETES MELLITUS WITH HYPERGLYCEMIA, WITH LONG-TERM CURRENT USE OF INSULIN (H): ICD-10-CM
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
## 2023-04-10 NOTE — TELEPHONE ENCOUNTER
Please assist with scheduling appointment here - ok for same day on Thursday - with me.   See my message.  If pain is not worse - can do a lab for U/A - could be done with her appointments on Friday or here before then - assist with scheduling here or [facility].        MARISSAK

## 2023-04-10 NOTE — TELEPHONE ENCOUNTER
Routing to provider to review and advise, and if appropriate to schedule same day appt to f/u on fall and bruising.    Lizzette Leblanc RN  [facility]

## 2023-04-11 RX ORDER — PEN NEEDLE, DIABETIC 32GX 5/32"
NEEDLE, DISPOSABLE MISCELLANEOUS
Qty: 400 EACH | Refills: 2 | Status: SHIPPED | OUTPATIENT
Start: 2023-04-11 | End: 2024-04-24

## 2023-04-11 NOTE — TELEPHONE ENCOUNTER
Refill sent.  Follow up needed for additional refills - message on script.   A1C is due.  MARISSAK

## 2023-04-13 NOTE — PROGRESS NOTES
Outcome for 04/13/23 11:27 AM: Left Jennifer   JACOB Winston  Adult Endocrinology  [facility]        Endocrinology and Diabetes Clinic        Follow up for T2DM, hypothyroidism        Assessment:  Elderly woman with T2DM, peripheral neuropathy, hypothyroidsm, obesity, with long high A1c on Basaglar insulin, metformin and glipizide. Jardiance    Blood glucose control  Blood glucose considering A1c and falsely elevated glucose levels at home is poor.  Patient is noncompliant with NovoLog as she forgets, also quite suboptimal diet   Patient has seen dietitian diabetes educator, however still at times for insight e.g. drinking regular pop    If patient would have problems with genital infections, Jardiance might need to be discontinued at least until blood sugars are improved    Plan  Increase long-acting insulin to 48 units  NovoLog 16 units with meals    Continue metformin.  Continue glipizide until blood sugars are improved.    Blood pressure suboptimal today, however has been variable, on 75 mg of losartan    Dyslipidemia  Doing well on 5 mg of rosuvastatin with LDL at goal    Hypothyroidism  On Synthroid 137 mcg daily, last TSH in November 2021 normal    Allergic to cgm      40 minutes spent on the date of the encounter doing chart review, review of test results, interpretation of tests, patient visit and documentation     This note was generated using computer recognized voice recognition. This might result in some expected imperfection.    Rachel Morris MD  Endocrinology and Diabetes  Telephone contact:  [facility] [place] [phone]  [facility] [phone]           [place] history:   6m follow up for T2DM here for a 2-year follow-up  Had fall in 11/22 which resulted in a wound in her left foot, seen by Neuro, EMG showing polyneuroapthy which at last partly is related to DM, also has NPH, she was deemed too high risk for surgery,  has been using walker    Has been using walker    Seen dietitian in 1/23 and 12/22. In 1/23 increase basal insulin Basaglar/Toujeo to 34 units, increase Novolog with meals to 11 units with breakfast, 9 units with lunch, 11 units with dinner  Carb consistent diet  Missed video visit in Feb 22    She had seen the diabetes educator in August. 2022    Medications Lantus 38 units , metformin 1000 mg bid glipizide ER 10 mg in a.m.    SMBGs; chechs only in the morning before breakfast.  Mostly in 200-300 range     Hypoglycemia none    Complications: neuropathy  Eye: has yearly eye exam in September 2022, no DR    Symptoms: denies numbness in her feet  Followed by Dr Araya Podiatrist every 3 months      Medications:   Current Outpatient Medications   Medication Sig Dispense Refill     Blood Glucose Monitoring Suppl (ONETOUCH VERIO FLEX SYSTEM) w/Device KIT USE TO TEST BLOOD SUGAR 3 TIMES DAILY (OK TO SUBSTITUTE ALTERNATE BRAND PER INSURANCE FORMULARY. IF ONE TOUCH ONLY GIVE VERIO NOT ULTRA) 1 kit 1     calcium carbonate (TUMS) 500 MG chewable tablet Take 1 chew tab by mouth daily as needed for heartburn       cephALEXin (KEFLEX) 500 MG capsule Take 1 capsule (500 mg) by mouth 2 times daily 14 capsule 0     cephALEXin (KEFLEX) 500 MG capsule Take 1 capsule (500 mg) by mouth 2 times daily 28 capsule 0     Cranberry-Vitamin C-Vitamin E 4200-20-3 MG-MG-UNIT CAPS Take 1 capsule by mouth 2 times daily       DM-APAP-CPM (CORICIDIN HBP) -2 MG TABS Take 1 tablet by mouth daily as needed For congestion       empagliflozin (JARDIANCE) 10 MG TABS tablet Take 1 tablet (10 mg) by mouth every morning +++ appointment needed for additional refills +++ 30 tablet 0     fexofenadine (ALLEGRA) 180 MG tablet Take 180 mg by mouth as needed       GLUCOSAMINE CHONDROITIN COMPLX OR 2 Daily       ibuprofen (ADVIL/MOTRIN) 200 MG capsule Take 400 mg by mouth every 4 hours as needed        insulin aspart (NOVOLOG PEN) 100 UNIT/ML pen Inject 13  units Subcutaneous before breakfast, 12 units before lunch and 12 units before dinner. Take about 15 minutes before eating. Do not take if not eating or eating <15 gm carbohydrates. 30 mL 1     insulin glargine U-300 (TOUJEO SOLOSTAR) 300 UNIT/ML (1 units dial) pen Inject 38 Units Subcutaneous At Bedtime 9 mL 1     insulin pen needle (BD PEN NEEDLE JUAN C 2ND GEN) 32G X 4 MM miscellaneous Use 4 pen needles daily or as directed. 400 each 2     irbesartan (AVAPRO) 150 MG tablet Take 75 mg by mouth At Bedtime       latanoprost (XALATAN) 0.005 % ophthalmic solution Place 1 drop into both eyes daily 2.5 mL 10     metFORMIN (GLUCOPHAGE) 1000 MG tablet TAKE 1 TABLET BY MOUTH TWICE A DAY WITH MEALS 180 tablet 1     OneTouch Delica Lancets 30G MISC 1 lancet 3 times daily (OK to substitute alternate brand per insurance formulary.  If One Touch only give Verio not Ultra) 100 each 11     ONETOUCH VERIO IQ test strip USE TO TEST BLOOD SUGARS 3 TIMES DAILY, ON INSULIN (OK TO SUBSTITUTE ALTERNATE BRAND PER INSURANCE FORMULARY. IF ONE TOUCH ONLY GIVE VERIO NOT ULTRA) 100 strip 4     rosuvastatin (CRESTOR) 5 MG tablet TAKE 1 TABLET BY MOUTH TWICE WEEKLY 24 tablet 2     SYNTHROID 137 MCG tablet Take 1 tablet (137 mcg) by mouth daily 90 tablet 3     travoprost SIXTO FREE (TRAVATAN Z) 0.004 % ophthalmic solution Place 1 drop into both eyes At Bedtime 2.5 mL 11     triamcinolone (KENALOG) 0.1 % external cream APPLY TO AFFECTED AREA TWICE A DAY FOR 2 WEEKS.  Please keep 9-28-22 clinic appt for refills 45 g 0     trospium (SANCTURA XR) 60 MG CP24 24 hr capsule TAKE 1 CAPSULE BY MOUTH EVERY DAY IN THE MORNING 90 capsule 0       Physical Examination:  There were no vitals taken for this visit.  Wt Readings from Last 4 Encounters:   12/13/22 81.6 kg (180 lb)   12/01/22 81.7 kg (180 lb 3.2 oz)   10/24/22 82.9 kg (182 lb 11.2 oz)   09/09/22 82.3 kg (181 lb 6.4 oz)     General: tired appearing woman in no distress, here with her daughter.  Psych:  good eye contact, no pressured speech      Labs and Studies:   Lab Results   Component Value Date     12/01/2022    CHLORIDE 99 12/01/2022    CO2 28 12/01/2022     (H) 12/01/2022    CR 0.87 12/01/2022    CR 0.77 03/28/2022    CR 0.76 11/16/2021    CR 0.9 09/28/2021    CR 0.76 04/02/2021    FREDY 10.0 12/01/2022    ALBUMIN 3.5 03/28/2022    ALKPHOS 100 03/28/2022     (H) 10/24/2022    HDL 56 10/24/2022    TRIG 348 (H) 10/24/2022     Lab Results   Component Value Date    MICROL 10 10/24/2022    MICROL 34 11/16/2021    MICROL 40 11/12/2020    MICROL 16 10/25/2019    MICROL 50 05/10/2019     Lab Results   Component Value Date    A1C 10.6 (H) 12/13/2022    A1C 11.1 (H) 07/06/2022    A1C 9.6 (H) 03/28/2022    A1C 9.5 (H) 12/20/2021    A1C 9.8 (H) 09/20/2021       Lab Results   Component Value Date    HGB 13.8 12/01/2022      Lab Results   Component Value Date    TSH 7.29 (H) 02/10/2023       HPI: Elderly woman with  T2DM since 1998, longstanding poor control, - not currently  Complication of neuropathy

## 2023-04-14 ENCOUNTER — OFFICE VISIT (OUTPATIENT)
Dept: ENDOCRINOLOGY | Facility: CLINIC | Age: 83
End: 2023-04-14
Payer: COMMERCIAL

## 2023-04-14 ENCOUNTER — LAB (OUTPATIENT)
Dept: LAB | Facility: CLINIC | Age: 83
End: 2023-04-14
Payer: COMMERCIAL

## 2023-04-14 ENCOUNTER — OFFICE VISIT (OUTPATIENT)
Dept: PODIATRY | Facility: CLINIC | Age: 83
End: 2023-04-14
Payer: COMMERCIAL

## 2023-04-14 VITALS
BODY MASS INDEX: 29.81 KG/M2 | DIASTOLIC BLOOD PRESSURE: 76 MMHG | WEIGHT: 175 LBS | SYSTOLIC BLOOD PRESSURE: 149 MMHG | OXYGEN SATURATION: 94 % | HEART RATE: 95 BPM

## 2023-04-14 DIAGNOSIS — E11.51 DIABETES MELLITUS WITH PERIPHERAL VASCULAR DISEASE (H): ICD-10-CM
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
## 2023-04-14 DIAGNOSIS — Z79.4 TYPE 2 DIABETES MELLITUS WITH HYPERGLYCEMIA, WITH LONG-TERM CURRENT USE OF INSULIN (H): Primary | Chronic | ICD-10-CM

## 2023-04-14 DIAGNOSIS — E11.65 TYPE 2 DIABETES MELLITUS WITH HYPERGLYCEMIA, WITH LONG-TERM CURRENT USE OF INSULIN (H): Primary | Chronic | ICD-10-CM

## 2023-04-14 DIAGNOSIS — E11.49 TYPE II OR UNSPECIFIED TYPE DIABETES MELLITUS WITH NEUROLOGICAL MANIFESTATIONS, NOT STATED AS UNCONTROLLED(250.60) (H): Primary | ICD-10-CM

## 2023-04-14 DIAGNOSIS — Z87.2 HISTORY OF SKIN ULCER: ICD-10-CM

## 2023-04-14 LAB
ALBUMIN UR-MCNC: NEGATIVE MG/DL
APPEARANCE UR: CLEAR
BACTERIA #/AREA URNS HPF: ABNORMAL /HPF
BILIRUB UR QL STRIP: NEGATIVE
COLOR UR AUTO: COLORLESS
GLUCOSE UR STRIP-MCNC: >1000 MG/DL
HBA1C MFR BLD: 12.5 % (ref 4.3–?)
HGB UR QL STRIP: NEGATIVE
KETONES UR STRIP-MCNC: NEGATIVE MG/DL
LEUKOCYTE ESTERASE UR QL STRIP: ABNORMAL
NITRATE UR QL: NEGATIVE
PH UR STRIP: 5 [PH] (ref 5–7)
RBC #/AREA URNS AUTO: ABNORMAL /HPF
SKIP: ABNORMAL
SP GR UR STRIP: 1.03 (ref 1–1.03)
SQUAMOUS #/AREA URNS AUTO: ABNORMAL /LPF
UROBILINOGEN UR STRIP-MCNC: NORMAL MG/DL
WBC #/AREA URNS AUTO: ABNORMAL /HPF

## 2023-04-14 PROCEDURE — 87086 URINE CULTURE/COLONY COUNT: CPT

## 2023-04-14 PROCEDURE — 99214 OFFICE O/P EST MOD 30 MIN: CPT | Performed by: PODIATRIST

## 2023-04-14 PROCEDURE — 81001 URINALYSIS AUTO W/SCOPE: CPT

## 2023-04-14 PROCEDURE — 83036 HEMOGLOBIN GLYCOSYLATED A1C: CPT | Performed by: INTERNAL MEDICINE

## 2023-04-14 PROCEDURE — 99215 OFFICE O/P EST HI 40 MIN: CPT | Performed by: INTERNAL MEDICINE

## 2023-04-14 NOTE — LETTER
4/14/2023         RE: Brandy Leon  [address] [place] [address]        Dear Colleague,    Thank you for referring your patient, Brandy Leon, to the [facility]. Please see a copy of my visit note below.    Past Medical History:   Diagnosis Date     Actinic keratosis 3/12/2013     Degenerative joint disease      Diabetes (H)      Gastroesophageal reflux disease without esophagitis 12/11/2020     Rheumatic fever 1957    x2 between the ages of 15-18     Seasonal allergies      Patient Active Problem List   Diagnosis     Seasonal allergies     Hypothyroidism     Hyperlipidemia LDL goal <100     Fibromyalgia     Osteoarthritis     Obesity     Type 2 diabetes mellitus with hyperglycemia, with long-term current use of insulin (H)     Hypertension goal BP (blood pressure) < 140/90     Overactive bladder     Recurrent UTI     Pseudophakia of both eyes     DINORA (obstructive sleep apnea)- severe (AHI 56)     Contusion of right knee     Gait disorder     Gastroesophageal reflux disease without esophagitis     Urge incontinence     Chronic kidney disease, stage 2 (mild)     Hydrocephalus, unspecified type (H)     Diabetes mellitus with peripheral vascular disease (H)     Past Surgical History:   Procedure Laterality Date     BLEPHAROPLASTY Bilateral 4/1/2022    Procedure: Bilateral upper eyelid blepharoplasty;  Surgeon: Fidelia Moran MD;  Location: [facility] OR     CATARACT IOL, RT/LT       COLONOSCOPY       COLONOSCOPY N/A 8/13/2015    Procedure: COLONOSCOPY;  Surgeon: Duane, William Charles, MD;  Location: [facility] OR     COLONOSCOPY WITH CO2 INSUFFLATION N/A 8/13/2015    Procedure: COLONOSCOPY WITH CO2 INSUFFLATION;  Surgeon: Duane, William Charles, MD;  Location: [facility]     PHACOEMULSIFICATION WITH STANDARD INTRAOCULAR LENS IMPLANT Left 10/25/2018    Procedure: LEFT PHACOEMULSIFICATION WITH STANDARD INTRAOCULAR LENS IMPLANT;  Surgeon: Thomas Serna MD;   Location: [facility]     PHACOEMULSIFICATION WITH STANDARD INTRAOCULAR LENS IMPLANT Right 11/8/2018    Procedure: RIGHT PHACOEMULSIFICATION WITH STANDARD INTRAOCULAR LENS IMPLANT;  Surgeon: Thomas Serna MD;  Location: [facility]     THYROIDECTOMY        ZZC APPENDECTOMY       ZZC ARTHROPLASTY TMJ       Social History     Socioeconomic History     Marital status:      Spouse name: Not on file     Number of children: Not on file     Years of education: Not on file     Highest education level: Not on file   Occupational History     Occupation:    Tobacco Use     Smoking status: Never     Passive exposure: Never     Smokeless tobacco: Never     Tobacco comments:     has had exposure to second hand smoke in the past from [employer], no current exposure   Vaping Use     Vaping status: Never Used   Substance and Sexual Activity     Alcohol use: No     Drug use: No     Sexual activity: Never   Other Topics Concern     Parent/sibling w/ CABG, MI or angioplasty before 65F 55M? 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     Hobby Hazards No     Sleep Concern Yes     Comment: difficulty staying asleep, up and down all night     Stress Concern No     Weight Concern Yes     Comment: would like to lose     Special Diet Yes     Comment: low card, low sugar diet     Back Care Yes     Comment: chiropratior     Exercise Yes     Comment: almost everyday     Bike Helmet No     Comment: does not ride bicycle any more     Seat Belt Yes     Self-Exams Yes   Social History Narrative     Not on file     Social Determinants of Health     Financial Resource Strain: Not on file   Food Insecurity: Not on file   Transportation Needs: Not on file   Physical Activity: Not on file   Stress: Not on file   Social Connections: Not on file   Intimate  Partner Violence: Not on file   Housing Stability: Not on file     Family History   Problem Relation Age of Onset     Eye Disorder Mother         cataracts     Cancer Father         skin and leukemia     Cancer Sister         Breast Cancer     Eye Disorder Brother         cataract     Cerebrovascular Disease Maternal Grandfather      Cerebrovascular Disease Paternal Grandmother      Eye Disorder Paternal Grandmother         cataracts     Cerebrovascular Disease Paternal Grandfather      Heart Disease Paternal Grandfather      Breast Cancer Daughter      Endometrial Cancer Daughter      Glaucoma No family hx of      Macular Degeneration No family hx of      Lab Results   Component Value Date    A1C 10.6 12/13/2022    A1C 11.1 07/06/2022    A1C 9.6 03/28/2022    A1C 9.5 12/20/2021    A1C 9.8 09/20/2021    A1C 10.3 05/17/2021    A1C 9.2 02/16/2021    A1C 8.7 11/12/2020    A1C 10.0 08/20/2020    A1C 10.5 01/10/2020                 SUBJECTIVE FINDINGS:  An 82-year-old returns to clinic for ulcer, left ankle and foreign body, right plantar heel.  She relates she is doing well.  No new problems.  She did have some pain a couple of times in the lateral ankle, but it has been doing well.    OBJECTIVE FINDINGS:  DP and PT are 02/04/2023 bilaterally.  She has a right plantar heel area where the glass was that healed.  There is no erythema, no drainage, no odor, no calor bilaterally.  She has a left lateral ankle eschar that is loose.  The underlying skin is intact.  There is no erythema, no drainage, no odor, no calor.  She has mild peripheral edema bilaterally.    ASSESSMENT AND PLAN:  Diabetes with peripheral Neuropathy and vascular disease.  Foreign body glass right heel.  Diagnosis and treatment discussed with the patient.  I removed the loose hyperkeratotic eschar today upon consent and the underlying skin is intact.  I cleaned this with Wound Vashe and applied Primapore.  I still want her to clean this with Wound Vashe  and use Primapore for at least the next 3 days to a week.  Then, she can discontinue that as tolerated and just use the Primapore as needed for protection.  Return to clinic and see me in 6 weeks.            Moderate level of medical decision making.            Again, thank you for allowing me to participate in the care of your patient.        Sincerely,        Ehsan Araya DPM

## 2023-04-14 NOTE — NURSING NOTE
Brandy Leon's goals for this visit include:   Chief Complaint   Patient presents with     RECHECK     Diabetes     She requests these members of her care team be copied on today's visit information: yes    PCP: Fernanda Miller    Referring Provider:  Fernanda Miller MD  [address] [address]    BP (!) 149/76 (BP Location: Right arm, Patient Position: Sitting, Cuff Size: Adult Large)   Pulse 95   Wt 79.4 kg (175 lb)   SpO2 94%   BMI 29.81 kg/m      Do you need any medication refills at today's visit? yes    JACOB Winston  Adult Endocrinology  [facility]

## 2023-04-14 NOTE — NURSING NOTE
Brandy Leon's chief complaint for this visit includes:  Chief Complaint   Patient presents with     RECHECK     Left ankle., right foot recheck      PCP: Fernanda Miller    Referring Provider:  No referring provider defined for this encounter.    There were no vitals taken for this visit.  Data Unavailable        Allergies   Allergen Reactions     New Medication Allergy Rash     Soliqua      Ace Inhibitors Cough     Estradiol Itching     Lipitor [Atorvastatin Calcium]      Weak and shaky     Sulfa Drugs      Rash       Zocor [Simvastatin]      Weak and shakey     Triamterene Dermatitis     Possible photosensitivity dermatitis, mild.  (changed to hctz alone 6/4/07, will see if this helps)         Do you need any medication refills at today's visit?

## 2023-04-14 NOTE — PATIENT INSTRUCTIONS
[facility]-Department of Endocrinology  Diabetes Educators:   Earnestine Kinsey, RN and Latesha Collazo RN  Clinic Nurse: KATHY Sánchez  CMA's: Abbie   LPN: Ju  Scheduling/Clinic phone number : [phone]   Clinic Fax: [phone]  On-Call Endocrine at the [place] (after hours/weekends): [phone] option 4    Please call the number below to schedule your labs.      Increase Basaglar/Lantus/Toujeo to 48 units daily    Novlog to 16 units with meals    STOP JUICE, STOP POP/SODAS    Follow up each months      Meals 40-60 grams of carbs, snack less than 20 grams            Lab    [facility] [phone]   [facility] [phone]   [place] [phone]   [facility]  [phone]   [facility] [phone]   [place] [phone]   [facility]) [phone]   [facility] Walk-In Only   [place] [phone]   [place] [phone]   [place] [phone]   [place] [phone]     Please reach out to the following centers to schedule your imaging appointment:       Imaging (DEXA, CT, MRI, XRAY)    [facility] ([facility], [facility], [place]) [phone]   [facility] ([place], [place]) [phone]   [facility] ([facility]) [phone]   [facility] ([facility]) [phone]     Appointment Reminders:  * Please bring meter with for staff to download  * If you are due ONLY for an A1C, it is scheduled with the nurse and will be done in clinic. You do not need to schedule a lab appointment. Fasting is not required for an A1C.  * Refill request should be submitted to your pharmacy. They will contact clinic for approval.

## 2023-04-14 NOTE — LETTER
4/14/2023         RE: Brandy Leon  [address] [address]        Dear Colleague,    Thank you for referring your patient, Brandy Leon, to the [facility]. Please see a copy of my visit note below.    Outcome for 04/13/23 11:27 AM: Left Jennifer Winston CMA  Adult Endocrinology  [facility]        Endocrinology and Diabetes Clinic        Follow up for T2DM, hypothyroidism        Assessment:  Elderly woman with T2DM, peripheral neuropathy, hypothyroidsm, obesity, with long high A1c on Basaglar insulin, metformin and glipizide. Jardiance    Blood glucose control  Blood glucose considering A1c and falsely elevated glucose levels at home is poor.  Patient is noncompliant with NovoLog as she forgets, also quite suboptimal diet   Patient has seen dietitian diabetes educator, however still at times for insight e.g. drinking regular pop    If patient would have problems with genital infections, Jardiance might need to be discontinued at least until blood sugars are improved    Plan  Increase long-acting insulin to 48 units  NovoLog 16 units with meals    Continue metformin.  Continue glipizide until blood sugars are improved.    Blood pressure suboptimal today, however has been variable, on 75 mg of losartan    Dyslipidemia  Doing well on 5 mg of rosuvastatin with LDL at goal    Hypothyroidism  On Synthroid 137 mcg daily, last TSH in November 2021 normal    Allergic to cgm      40 minutes spent on the date of the encounter doing chart review, review of test results, interpretation of tests, patient visit and documentation     This note was generated using computer recognized voice recognition. This might result in some expected imperfection.    Rachel Morris MD  Endocrinology and Diabetes  Telephone contact:  [facility] [place] [phone]  [facility] [phone]            [place] history:   6m follow up for T2DM here for a 2-year follow-up  Had fall in 11/22 which resulted in a wound in her left foot, seen by Neuro, EMG showing polyneuroapthy which at last partly is related to DM, also has NPH, she was deemed too high risk for surgery, has been using walker    Has been using walker    Seen dietitian in 1/23 and 12/22. In 1/23 increase basal insulin Basaglar/Toujeo to 34 units, increase Novolog with meals to 11 units with breakfast, 9 units with lunch, 11 units with dinner  Carb consistent diet  Missed video visit in Feb 22    She had seen the diabetes educator in August. 2022    Medications Lantus 38 units , metformin 1000 mg bid glipizide ER 10 mg in a.m.    SMBGs; chechs only in the morning before breakfast.  Mostly in 200-300 range     Hypoglycemia none    Complications: neuropathy  Eye: has yearly eye exam in September 2022, no DR    Symptoms: denies numbness in her feet  Followed by Dr Araya Podiatrist every 3 months      Medications:   Current Outpatient Medications   Medication Sig Dispense Refill     Blood Glucose Monitoring Suppl (ONETOUCH VERIO FLEX SYSTEM) w/Device KIT USE TO TEST BLOOD SUGAR 3 TIMES DAILY (OK TO SUBSTITUTE ALTERNATE BRAND PER INSURANCE FORMULARY. IF ONE TOUCH ONLY GIVE VERIO NOT ULTRA) 1 kit 1     calcium carbonate (TUMS) 500 MG chewable tablet Take 1 chew tab by mouth daily as needed for heartburn       cephALEXin (KEFLEX) 500 MG capsule Take 1 capsule (500 mg) by mouth 2 times daily 14 capsule 0     cephALEXin (KEFLEX) 500 MG capsule Take 1 capsule (500 mg) by mouth 2 times daily 28 capsule 0     Cranberry-Vitamin C-Vitamin E 4200-20-3 MG-MG-UNIT CAPS Take 1 capsule by mouth 2 times daily       DM-APAP-CPM (CORICIDIN HBP) -2 MG TABS Take 1 tablet by mouth daily as needed For congestion       empagliflozin (JARDIANCE) 10 MG TABS tablet Take 1 tablet (10 mg) by mouth every morning +++ appointment needed for additional refills +++ 30  tablet 0     fexofenadine (ALLEGRA) 180 MG tablet Take 180 mg by mouth as needed       GLUCOSAMINE CHONDROITIN COMPLX OR 2 Daily       ibuprofen (ADVIL/MOTRIN) 200 MG capsule Take 400 mg by mouth every 4 hours as needed        insulin aspart (NOVOLOG PEN) 100 UNIT/ML pen Inject 13 units Subcutaneous before breakfast, 12 units before lunch and 12 units before dinner. Take about 15 minutes before eating. Do not take if not eating or eating <15 gm carbohydrates. 30 mL 1     insulin glargine U-300 (TOUJEO SOLOSTAR) 300 UNIT/ML (1 units dial) pen Inject 38 Units Subcutaneous At Bedtime 9 mL 1     insulin pen needle (BD PEN NEEDLE JUAN C 2ND GEN) 32G X 4 MM miscellaneous Use 4 pen needles daily or as directed. 400 each 2     irbesartan (AVAPRO) 150 MG tablet Take 75 mg by mouth At Bedtime       latanoprost (XALATAN) 0.005 % ophthalmic solution Place 1 drop into both eyes daily 2.5 mL 10     metFORMIN (GLUCOPHAGE) 1000 MG tablet TAKE 1 TABLET BY MOUTH TWICE A DAY WITH MEALS 180 tablet 1     OneTouch Delica Lancets 30G MISC 1 lancet 3 times daily (OK to substitute alternate brand per insurance formulary.  If One Touch only give Verio not Ultra) 100 each 11     ONETOUCH VERIO IQ test strip USE TO TEST BLOOD SUGARS 3 TIMES DAILY, ON INSULIN (OK TO SUBSTITUTE ALTERNATE BRAND PER INSURANCE FORMULARY. IF ONE TOUCH ONLY GIVE VERIO NOT ULTRA) 100 strip 4     rosuvastatin (CRESTOR) 5 MG tablet TAKE 1 TABLET BY MOUTH TWICE WEEKLY 24 tablet 2     SYNTHROID 137 MCG tablet Take 1 tablet (137 mcg) by mouth daily 90 tablet 3     travoprost BAK FREE (TRAVATAN Z) 0.004 % ophthalmic solution Place 1 drop into both eyes At Bedtime 2.5 mL 11     triamcinolone (KENALOG) 0.1 % external cream APPLY TO AFFECTED AREA TWICE A DAY FOR 2 WEEKS.  Please keep 9-28-22 clinic appt for refills 45 g 0     trospium (SANCTURA XR) 60 MG CP24 24 hr capsule TAKE 1 CAPSULE BY MOUTH EVERY DAY IN THE MORNING 90 capsule 0       Physical Examination:  There were no  vitals taken for this visit.  Wt Readings from Last 4 Encounters:   12/13/22 81.6 kg (180 lb)   12/01/22 81.7 kg (180 lb 3.2 oz)   10/24/22 82.9 kg (182 lb 11.2 oz)   09/09/22 82.3 kg (181 lb 6.4 oz)     General: tired appearing woman in no distress, here with her daughter.  Psych: good eye contact, no pressured speech      Labs and Studies:   Lab Results   Component Value Date     12/01/2022    CHLORIDE 99 12/01/2022    CO2 28 12/01/2022     (H) 12/01/2022    CR 0.87 12/01/2022    CR 0.77 03/28/2022    CR 0.76 11/16/2021    CR 0.9 09/28/2021    CR 0.76 04/02/2021    FREDY 10.0 12/01/2022    ALBUMIN 3.5 03/28/2022    ALKPHOS 100 03/28/2022     (H) 10/24/2022    HDL 56 10/24/2022    TRIG 348 (H) 10/24/2022     Lab Results   Component Value Date    MICROL 10 10/24/2022    MICROL 34 11/16/2021    MICROL 40 11/12/2020    MICROL 16 10/25/2019    MICROL 50 05/10/2019     Lab Results   Component Value Date    A1C 10.6 (H) 12/13/2022    A1C 11.1 (H) 07/06/2022    A1C 9.6 (H) 03/28/2022    A1C 9.5 (H) 12/20/2021    A1C 9.8 (H) 09/20/2021       Lab Results   Component Value Date    HGB 13.8 12/01/2022      Lab Results   Component Value Date    TSH 7.29 (H) 02/10/2023       HPI: Elderly woman with  T2DM since 1998, longstanding poor control, - not currently  Complication of neuropathy            Again, thank you for allowing me to participate in the care of your patient.        Sincerely,        Rachel Morris MD

## 2023-04-14 NOTE — PROGRESS NOTES
Past Medical History:   Diagnosis Date     Actinic keratosis 3/12/2013     Degenerative joint disease      Diabetes (H)      Gastroesophageal reflux disease without esophagitis 12/11/2020     Rheumatic fever 1957    x2 between the ages of 15-18     Seasonal allergies      Patient Active Problem List   Diagnosis     Seasonal allergies     Hypothyroidism     Hyperlipidemia LDL goal <100     Fibromyalgia     Osteoarthritis     Obesity     Type 2 diabetes mellitus with hyperglycemia, with long-term current use of insulin (H)     Hypertension goal BP (blood pressure) < 140/90     Overactive bladder     Recurrent UTI     Pseudophakia of both eyes     DINORA (obstructive sleep apnea)- severe (AHI 56)     Contusion of right knee     Gait disorder     Gastroesophageal reflux disease without esophagitis     Urge incontinence     Chronic kidney disease, stage 2 (mild)     Hydrocephalus, unspecified type (H)     Diabetes mellitus with peripheral vascular disease (H)     Past Surgical History:   Procedure Laterality Date     BLEPHAROPLASTY Bilateral 4/1/2022    Procedure: Bilateral upper eyelid blepharoplasty;  Surgeon: Fidelia Moran MD;  Location: [facility] OR     CATARACT IOL, RT/LT       COLONOSCOPY       COLONOSCOPY N/A 8/13/2015    Procedure: COLONOSCOPY;  Surgeon: Duane, William Charles, MD;  Location: [facility] OR     COLONOSCOPY WITH CO2 INSUFFLATION N/A 8/13/2015    Procedure: COLONOSCOPY WITH CO2 INSUFFLATION;  Surgeon: Duane, William Charles, MD;  Location: [facility] OR     PHACOEMULSIFICATION WITH STANDARD INTRAOCULAR LENS IMPLANT Left 10/25/2018    Procedure: LEFT PHACOEMULSIFICATION WITH STANDARD INTRAOCULAR LENS IMPLANT;  Surgeon: Thomas Serna MD;  Location: [facility] OR     PHACOEMULSIFICATION WITH STANDARD INTRAOCULAR LENS IMPLANT Right 11/8/2018    Procedure: RIGHT PHACOEMULSIFICATION WITH STANDARD INTRAOCULAR LENS IMPLANT;  Surgeon: Thomas Serna MD;  Location: [facility] OR     THYROIDECTOMY        ZZC APPENDECTOMY        ZZC ARTHROPLASTY TMJ       Social History     Socioeconomic History     Marital status:      Spouse name: Not on file     Number of children: Not on file     Years of education: Not on file     Highest education level: Not on file   Occupational History     Occupation:    Tobacco Use     Smoking status: Never     Passive exposure: Never     Smokeless tobacco: Never     Tobacco comments:     has had exposure to second hand smoke in the past from [employer], no current exposure   Vaping Use     Vaping status: Never Used   Substance and Sexual Activity     Alcohol use: No     Drug use: No     Sexual activity: Never   Other Topics Concern     Parent/sibling w/ CABG, MI or angioplasty before 65F 55M? 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     Hobby Hazards No     Sleep Concern Yes     Comment: difficulty staying asleep, up and down all night     Stress Concern No     Weight Concern Yes     Comment: would like to lose     Special Diet Yes     Comment: low card, low sugar diet     Back Care Yes     Comment: chiropratior     Exercise Yes     Comment: almost everyday     Bike Helmet No     Comment: does not ride bicycle any more     Seat Belt Yes     Self-Exams Yes   Social History Narrative     Not on file     Social Determinants of Health     Financial Resource Strain: Not on file   Food Insecurity: Not on file   Transportation Needs: Not on file   Physical Activity: Not on file   Stress: Not on file   Social Connections: Not on file   Intimate Partner Violence: Not on file   Housing Stability: Not on file     Family History   Problem Relation Age of Onset     Eye Disorder Mother         cataracts     Cancer Father         skin and leukemia     Cancer Sister         Breast Cancer     Eye Disorder Brother          cataract     Cerebrovascular Disease Maternal Grandfather      Cerebrovascular Disease Paternal Grandmother      Eye Disorder Paternal Grandmother         cataracts     Cerebrovascular Disease Paternal Grandfather      Heart Disease Paternal Grandfather      Breast Cancer Daughter      Endometrial Cancer Daughter      Glaucoma No family hx of      Macular Degeneration No family hx of      Lab Results   Component Value Date    A1C 10.6 12/13/2022    A1C 11.1 07/06/2022    A1C 9.6 03/28/2022    A1C 9.5 12/20/2021    A1C 9.8 09/20/2021    A1C 10.3 05/17/2021    A1C 9.2 02/16/2021    A1C 8.7 11/12/2020    A1C 10.0 08/20/2020    A1C 10.5 01/10/2020                 SUBJECTIVE FINDINGS:  An 82-year-old returns to clinic for ulcer, left ankle and foreign body, right plantar heel.  She relates she is doing well.  No new problems.  She did have some pain a couple of times in the lateral ankle, but it has been doing well.    OBJECTIVE FINDINGS:  DP and PT are 02/04/2023 bilaterally.  She has a right plantar heel area where the glass was that healed.  There is no erythema, no drainage, no odor, no calor bilaterally.  She has a left lateral ankle eschar that is loose.  The underlying skin is intact.  There is no erythema, no drainage, no odor, no calor.  She has mild peripheral edema bilaterally.    ASSESSMENT AND PLAN:  Diabetes with peripheral Neuropathy and vascular disease.  Foreign body glass right heel.  Diagnosis and treatment discussed with the patient.  I removed the loose hyperkeratotic eschar today upon consent and the underlying skin is intact.  I cleaned this with Wound Vashe and applied Primapore.  I still want her to clean this with Wound Vashe and use Primapore for at least the next 3 days to a week.  Then, she can discontinue that as tolerated and just use the Primapore as needed for protection.  Return to clinic and see me in 6 weeks.            Moderate level of medical decision making.

## 2023-04-15 DIAGNOSIS — N30.00 ACUTE CYSTITIS WITHOUT HEMATURIA: ICD-10-CM

## 2023-04-15 RX ORDER — CIPROFLOXACIN 250 MG/1
250 TABLET, FILM COATED ORAL 2 TIMES DAILY
Qty: 6 TABLET | Refills: 0 | Status: SHIPPED | OUTPATIENT
Start: 2023-04-15 | End: 2023-04-24

## 2023-04-15 NOTE — RESULT ENCOUNTER NOTE
Your preliminary urine testing indicates a possible urinary tract infection.  The culture is pending.  I would recommend we begin Cipro twice a day now.   I will be in contact with final urine culture results and any change in treatment when that is available.  Please call or MyChart message me if you have any questions.      CK

## 2023-04-16 LAB — BACTERIA UR CULT: NORMAL

## 2023-04-23 ENCOUNTER — HEALTH MAINTENANCE LETTER (OUTPATIENT)
Age: 83
End: 2023-04-23

## 2023-04-24 ENCOUNTER — VIRTUAL VISIT (OUTPATIENT)
Dept: PHARMACY | Facility: CLINIC | Age: 83
End: 2023-04-24
Payer: COMMERCIAL

## 2023-04-24 DIAGNOSIS — Z79.4 TYPE 2 DIABETES MELLITUS WITH HYPERGLYCEMIA, WITH LONG-TERM CURRENT USE OF INSULIN (H): ICD-10-CM

## 2023-04-24 DIAGNOSIS — E11.65 TYPE 2 DIABETES MELLITUS WITH HYPERGLYCEMIA, WITH LONG-TERM CURRENT USE OF INSULIN (H): ICD-10-CM

## 2023-04-24 DIAGNOSIS — E78.5 DYSLIPIDEMIA, GOAL LDL BELOW 100: ICD-10-CM

## 2023-04-24 DIAGNOSIS — J30.9 ALLERGIC RHINITIS, UNSPECIFIED SEASONALITY, UNSPECIFIED TRIGGER: ICD-10-CM

## 2023-04-24 DIAGNOSIS — H40.9 GLAUCOMA: ICD-10-CM

## 2023-04-24 DIAGNOSIS — I10 HYPERTENSION GOAL BP (BLOOD PRESSURE) < 140/90: Primary | ICD-10-CM

## 2023-04-24 DIAGNOSIS — H40.10X0 OPEN-ANGLE GLAUCOMA OF BOTH EYES, UNSPECIFIED GLAUCOMA STAGE, UNSPECIFIED OPEN-ANGLE GLAUCOMA TYPE: ICD-10-CM

## 2023-04-24 DIAGNOSIS — R52 PAIN: ICD-10-CM

## 2023-04-24 DIAGNOSIS — E03.9 ACQUIRED HYPOTHYROIDISM: ICD-10-CM

## 2023-04-24 PROCEDURE — 99607 MTMS BY PHARM ADDL 15 MIN: CPT | Mod: VID | Performed by: PHARMACIST

## 2023-04-24 PROCEDURE — 99605 MTMS BY PHARM NP 15 MIN: CPT | Mod: VID | Performed by: PHARMACIST

## 2023-04-24 RX ORDER — INSULIN GLARGINE 300 U/ML
48 INJECTION, SOLUTION SUBCUTANEOUS AT BEDTIME
Qty: 9 ML | Refills: 1
Start: 2023-04-24 | End: 2023-05-22

## 2023-04-24 RX ORDER — IRBESARTAN 75 MG/1
75 TABLET ORAL AT BEDTIME
Qty: 90 TABLET | Refills: 1 | Status: SHIPPED | OUTPATIENT
Start: 2023-04-24 | End: 2023-07-21

## 2023-04-24 RX ORDER — GLIPIZIDE 10 MG/1
1 TABLET, FILM COATED, EXTENDED RELEASE ORAL DAILY
COMMUNITY
Start: 2022-03-01 | End: 2023-05-22 | Stop reason: ALTCHOICE

## 2023-04-24 NOTE — LETTER
_  Medication List        Prepared on: Apr 24, 2023     Bring your Medication List when you go to the doctor, hospital, or   emergency room. And, share it with your family or caregivers.     Note any changes to how you take your medications.  Cross out medications when you no longer use them.    Medication How I take it Why I use it Prescriber   fexofenadine (ALLEGRA) 180 MG tablet Take 180 mg by mouth as needed Allergies Over-the-counter   glipiZIDE (GLUCOTROL XL) 10 MG 24 hr tablet Take 1 tablet by mouth daily Diabetes Jazmyn Morris MD   ibuprofen (ADVIL/MOTRIN) 200 MG capsule Take 400 mg by mouth every 4 hours as needed  Pain Over-the-counter   insulin aspart (NOVOLOG PEN) 100 UNIT/ML pen Inject 16 units before meals. Take about 15 minutes before eating. Do not take if not eating or eating <15 gm carbohydrates. Type 2 diabetes mellitus with hyperglycemia, with long-term current use of insulin (H) Fernanda Miller MD   insulin glargine U-300 (TOUJEO SOLOSTAR) 300 UNIT/ML (1 units dial) pen Inject 48 Units Subcutaneous At Bedtime Type 2 diabetes mellitus with hyperglycemia, with long-term current use of insulin (H) Fernanda Miller MD   irbesartan (AVAPRO) 75 MG tablet Take 1 tablet (75 mg) by mouth At Bedtime Hypertension Goal BP (Blood Pressure) < 140/90 Fernanda Miller MD   metFORMIN (GLUCOPHAGE) 1000 MG tablet TAKE 1 TABLET BY MOUTH TWICE A DAY WITH MEALS Type 2 diabetes mellitus with hyperglycemia, with long-term current use of insulin (H) Fernanda Miller MD   rosuvastatin (CRESTOR) 5 MG tablet TAKE 1 TABLET BY MOUTH TWICE WEEKLY Dyslipidemia, goal LDL below 100 Fernanda Miller MD   SYNTHROID 137 MCG tablet Take 1 tablet (137 mcg) by mouth daily Acquired Hypothyroidism Fernanda Miller MD   travoprost BAK FREE (TRAVATAN Z) 0.004 % ophthalmic solution Place 1 drop into both eyes At Bedtime Primary open angle glaucoma (POAG) of both eyes, mild stage Gurjit Cardoso, GINO   triamcinolone (KENALOG) 0.1 %  external cream APPLY TO AFFECTED AREA TWICE A DAY FOR 2 WEEKS.  Please keep 9-28-22 clinic appt for refills Dermatitis Gregory Salas MD         Add new medications, over-the-counter drugs, herbals, vitamins, or  minerals in the blank rows below.    Medication How I take it Why I use it Prescriber                                      Allergies:      new medication allergy; ace inhibitors; estradiol; lipitor [atorvastatin calcium]; sulfa drugs; zocor [simvastatin]; triamterene        Side effects I have had:               Other Information:              My notes and questions:

## 2023-04-24 NOTE — PATIENT INSTRUCTIONS
"Recommendations from today's MTM visit:                                                       1.  Recommend restarting irbesartan 75 mg daily.  New prescription sent to pharmacy.  2.  Recommend checking at home for glipizide and resume taking as well as recommended by Dr. Morris.  3.  Recommend taking NovoLog 16 units before meals.  Discussed ways that may be helpful as reminder to take NovoLog.    Follow-up: 1 month    It was great speaking with you today.  I value your experience and would be very thankful for your time in providing feedback in our clinic survey. In the next few days, you may receive an email or text message from [employer] with a link to a survey related to your  clinical pharmacist.\"     To schedule another MTM appointment, please call the clinic directly or you may call the MTM scheduling line at [phone] or toll-free at [phone].     My Clinical Pharmacist's contact information:                                                      Please feel free to contact me with any questions or concerns you have.      Mirna Perez, Pharm.D, BCACP  Medication Therapy Management Pharmacist      "

## 2023-04-24 NOTE — PROGRESS NOTES
Medication Therapy Management (MTM) Encounter    ASSESSMENT:                            Medication Adherence/Access: No issues identified    Type 2 Diabetes:  Not meeting A1c goal of <8%. Not meeting post prandial glucose goal of <180 mmHg. Soliqua started ~3 days ago. Can adjust dose every week if needed. Will evaluate blood sugars next week. Jardiance was discontinued 11/2021 by patient. Recommend to continue off for now due to intolerance in the past.    Hyperlipidemia: Stable.    Hypertension: Would benefit from restarting irbesartan.    Hypothyroidism: Stable.     Glaucoma: Stable.    Pain: Stable.    Allergic rhinitis: Stable.    PLAN:                          1.  Recommend restarting irbesartan 75 mg daily.  New prescription sent to pharmacy.  2.  Recommend checking at home for glipizide and resume taking as well as recommended by Dr. Morris.  3.  Recommend taking NovoLog 16 units before meals.  Discussed ways that may be helpful as reminder to take NovoLog.    Follow-up: 1 month    SUBJECTIVE/OBJECTIVE:                          Brandy Leon is a 82 year old female contacted via secure video for a an initial visit for 2023.  Today's visit is a follow-up MTM visit from 9/20/2022.     Reason for visit: medication review    Allergies/ADRs: Reviewed in chart  Past Medical History: Reviewed in chart  Tobacco: She reports that she has never smoked. She has never been exposed to tobacco smoke. She has never used smokeless tobacco.  Alcohol: none  Caffeine: 2-3 cups coffee/day  Activity:None; daughter asks her to get up and walk around the house with walker  Has fallen 3 times in the last month or so. Dr. Miller is aware    Medication Adherence/Access:   Has not missed any doses of medications in the last week. Patient sets up her own medications in pill boxes  Uses [facility] in [place]  Patient was at daughter's house today and did not have her medications in front of her and was unsure of dosing of her medications  and what she is actually taking.  Patient's daughter does say that they set up the pillboxes together.    Type 2 Diabetes:  Currently taking Toujeo 48 units daily, Novolog 14 units before breakfast, 14 units before lunch and 14 units before supper (usually doesn't take, she forgets),  metformin 1000 mg twice daily, Patient is not experiencing side effects.  Blood sugar monitorin-2 time(s) daily (forgetting to check blood sugars).   Did try Juany but it made her whole body itch.  Patient reports forgetting to take NovoLog before meals.  Does not have a watch or phone to set an alarm.  Patient's daughter doubts that the NovoLog pen out where patient will be eating but patient forgets to take.  Patient's daughter also reminds patient to take NovoLog but then patient gets upset with daughter for reminding her.  Patient has not been taking glipizide as well as recommended in endocrinology visit from .  Patient also was not aware to increase her NovoLog to 16 units with meals.    From Endocrinology Visit on   Increase long-acting insulin to 48 units  NovoLog 16 units with meals     Continue metformin.  Continue glipizide until blood sugars are improved.     Blood pressure suboptimal today, however has been variable, on 75 mg of losartan     Dyslipidemia  Doing well on 5 mg of rosuvastatin with LDL at goal     Hypothyroidism  On Synthroid 137 mcg daily, last TSH in 2021 normal  Doesn't have her blood sugar reading with her. Fasting blood sugars improving since increasing Toujeo.  Date FBG/ 2hours post Lunch/2hours post Dinner /2hours post     120                                         Symptoms of low blood sugar? none  Symptoms of high blood sugar? None. Feels better when blood sugar is higher.  Eye exam: Up to date  Foot exam: Up to date  Diet:   Aspirin: No   Statin: Yes: Rosuvastatin   ACEi/ARB: Yes: Irbesartan.   Urine Albumin:   Lab Results   Component Value Date    UMALCR 26.32 (H)  10/24/2022      Lab Results   Component Value Date    A1C 10.6 12/13/2022    A1C 11.1 07/06/2022    A1C 9.6 03/28/2022    A1C 9.5 12/20/2021    A1C 9.8 09/20/2021    A1C 10.3 05/17/2021    A1C 9.2 02/16/2021    A1C 8.7 11/12/2020    A1C 10.0 08/20/2020    A1C 10.5 01/10/2020     Hyperlipidemia: Current therapy includes rosuvastatin 5mg twice weekly.  Patient reports no significant myalgias or other side effects.  The ASCVD Risk score (Laurence DK, et al., 2019) failed to calculate for the following reasons:    The 2019 ASCVD risk score is only valid for ages 40 to 79  Recent Labs   Lab Test 10/24/22  1417 11/16/21  0935 12/22/15  1131 08/21/15  0825 07/21/15  1620 06/16/15  0941   CHOL 242* 203*   < > 174  --  224*   HDL 56 52   < > 49*  --  47*   * 97   < > 86   < > 120   TRIG 348* 270*   < > 197*  --  283*   CHOLHDLRATIO  --   --   --  3.6  --  4.8    < > = values in this interval not displayed.     Hypertension: Current medications include irbesartan 75mg daily. Last time it was filled was 7/7/2022.  Patient has not been taking.  Patient does not self-monitor blood pressure.   BP Readings from Last 3 Encounters:   04/14/23 (!) 149/76   12/13/22 116/68   12/01/22 128/60     Hypothyroidism: Patient is taking Levothyroxine 137 mcg daily. Patient is having the following symptoms: none.   TSH   Date Value Ref Range Status   02/10/2023 7.29 (H) 0.40 - 4.00 mU/L Final   11/12/2020 3.00 0.40 - 4.00 mU/L Final     Lab Results   Component Value Date    T4 0.91 02/10/2023     Glaucoma: Current medications includes travoprost 0.004% 1 drop into both eyes at bedtime. Has an appointment coming up with her eye doctor.     Pain: Ibuprofen 600mg at night before bedtime 2-3 nights a week for back pain. This is effective.    Allergic Rhinitis: Current medications include fexofenadine 180mg once daily. Patient feels that current therapy is effective.      Today's Vitals: There were no vitals taken for this  visit.  ----------------    I spent 35 minutes with this patient today. All changes were made via collaborative practice agreement with Fernanda Miller MD. A copy of the visit note was provided to the patient's provider(s).    The patient was sent via [employer] a summary of these recommendations.     Medication Therapy Management  Mirna Perez, Pharm.D, [facility]  Medication Therapy Management Pharmacist      Telemedicine Visit Details  Type of service:  Telephone visit  Start Time: 3:40PM  End Time: 4:15PM     Medication Therapy Recommendations  Hypertension goal BP (blood pressure) < 140/90    Current Medication: irbesartan (AVAPRO) 75 MG tablet   Rationale: Untreated condition - Needs additional medication therapy - Indication   Recommendation: Start Medication   Status: Accepted per CPA         Type 2 diabetes mellitus with hyperglycemia, with long-term current use of insulin (H)    Current Medication: glipiZIDE (GLUCOTROL XL) 10 MG 24 hr tablet   Rationale: Does not understand instructions - Adherence - Adherence   Recommendation: Start Medication   Status: Patient Agreed - Adherence/Education          Current Medication: insulin aspart (NOVOLOG PEN) 100 UNIT/ML pen (Discontinued)   Rationale: Does not understand instructions - Adherence - Adherence   Recommendation: Provide Education   Status: Patient Agreed - Adherence/Education

## 2023-04-24 NOTE — LETTER
April 24, 2023  Brandy Leon  [address] [place] [address]    Dear Ms. Leon, JANELLE [facility] [place]     Thank you for talking with me on Apr 24, 2023 about your health and medications. As a follow-up to our conversation, I have included two documents:      1. Your Recommended To-Do List has steps you should take to get the best results from your medications.  2. Your Medication List will help you keep track of your medications and how to take them.    If you want to talk about these documents, please call Mirna Perez RPH at phone: [phone], Monday-Friday 8-4:30pm.    I look forward to working with you and your doctors to make sure your medications work well for you.    Sincerely,  Mirna Perez RPH  [facility] Pharmacist, [facility]

## 2023-04-24 NOTE — LETTER
"Recommended To-Do List      Prepared on: Apr 24, 2023       You can get the best results from your medications by completing the items on this \"To-Do List.\"      Bring your To-Do List when you go to your doctor. And, share it with your family or caregivers.    My To-Do List:  What we talked about: What I should do:   A new medication that may be of benefit to you    Start taking irbesartan          What we talked about: What I should do:   The importance of taking your medication as intended    Start taking glipizide          What we talked about: What I should do:   The importance of taking your medication as intended    Education: NovoLog 16 units before each meal          What we talked about: What I should do:                     "

## 2023-04-28 ENCOUNTER — TELEPHONE (OUTPATIENT)
Dept: FAMILY MEDICINE | Facility: CLINIC | Age: 83
End: 2023-04-28
Payer: COMMERCIAL

## 2023-04-28 NOTE — TELEPHONE ENCOUNTER
Pt is not on this medication.  She does not need it refilled and does not need any medication refilled.  Sonia Matta BSN, RN

## 2023-05-22 ENCOUNTER — VIRTUAL VISIT (OUTPATIENT)
Dept: PHARMACY | Facility: CLINIC | Age: 83
End: 2023-05-22
Payer: COMMERCIAL

## 2023-05-22 DIAGNOSIS — Z79.4 TYPE 2 DIABETES MELLITUS WITH HYPERGLYCEMIA, WITH LONG-TERM CURRENT USE OF INSULIN (H): ICD-10-CM

## 2023-05-22 DIAGNOSIS — I10 HYPERTENSION GOAL BP (BLOOD PRESSURE) < 140/90: Primary | ICD-10-CM

## 2023-05-22 DIAGNOSIS — E11.65 TYPE 2 DIABETES MELLITUS WITH HYPERGLYCEMIA, WITH LONG-TERM CURRENT USE OF INSULIN (H): ICD-10-CM

## 2023-05-22 PROCEDURE — 99607 MTMS BY PHARM ADDL 15 MIN: CPT | Mod: VID | Performed by: PHARMACIST

## 2023-05-22 PROCEDURE — 99606 MTMS BY PHARM EST 15 MIN: CPT | Mod: VID | Performed by: PHARMACIST

## 2023-05-22 RX ORDER — INSULIN GLARGINE 300 U/ML
48 INJECTION, SOLUTION SUBCUTANEOUS AT BEDTIME
Qty: 9 ML | Refills: 1 | Status: SHIPPED | OUTPATIENT
Start: 2023-05-22 | End: 2023-11-03

## 2023-05-22 NOTE — PROGRESS NOTES
Medication Therapy Management (MTM) Encounter    ASSESSMENT:                            Medication Adherence/Access: No issues identified    Type 2 Diabetes:  Not meeting A1c goal of <8%. Would benefit from taking 16 units of Novolog before each meal as recommended by endocrinology.    Hypertension: At home blood pressure meeting goal <140/90mmHg and would benefit from restarting irbesartan for kidney protection..    PLAN:                          1.  Recommend restarting irbesartan 75 mg daily. Patient to follow up with pharmacy to get this filled  2.  Refill for Toujeo sent to pharmacy.  3.  Recommend taking NovoLog 16 units before meals as previously recommended by endocrinology.    Follow-up: 3 months with MTM, has monthly endocrinology visits scheduled June, July, August    SUBJECTIVE/OBJECTIVE:                          Brandy Leon is a 82 year old female contacted via secure video for a follow up visit.  Today's visit is a follow-up MTM visit from 4/24/2023. Patient's daughter, Juliet was also present for the visit.    Reason for visit: blood sugars, blood pressure    Allergies/ADRs: Reviewed in chart  Past Medical History: Reviewed in chart  Tobacco: She reports that she has never smoked. She has never been exposed to tobacco smoke. She has never used smokeless tobacco.  Alcohol: none  Caffeine: 2-3 cups coffee/day  Activity:None; daughter asks her to get up and walk around the house with walker  Has not fallen since our last visit 4/24/2023.    Medication Adherence/Access:   Has not missed any doses of medications in the last week. Patient sets up her own medications in pill boxes  Uses [facility] in [place]    Type 2 Diabetes:  Currently taking Basaglar 48 units daily (patient still has some basaglar left and has been taking 48 units daily and then will switch over to Toujeo), Novolog 14 units before breakfast, 14 units before lunch and 14 units before supper (reports she has been taking consistently),   metformin 1000 mg twice daily.  Patient is not experiencing side effects.  Needs a refill for Toujeo.   Blood sugar monitorin-2 time(s) daily (forgetting to check blood sugars). 100-227mg/dL. Most blood sugar readings are above 200 in the morning per patient report.   Did try Juany but it made her whole body itch.  Patient also was not aware to increase her NovoLog to 16 units with meals.  Symptoms of low blood sugar? none  Symptoms of high blood sugar? None. Feels better when blood sugar is higher.  Eye exam: Up to date  Foot exam: Up to date  Diet: Breakfast-raisin bran or wheat bran with banana Lunch-shrimp, fruit Supper-cream of chicken soup, corn on cob, sausage  Snack-rice cakes  Aspirin: No   Statin: Yes: Rosuvastatin   ACEi/ARB: Yes: Irbesartan.   Urine Albumin:   Lab Results   Component Value Date    UMALCR 26.32 (H) 10/24/2022      A1c 12.5 on 23.     Hypertension: Current medications include irbesartan 75mg daily. Patient reports she is not taking this and has not picked up from the pharmacy. A prescription was sent to the pharmacy after our last visit.  Blood pressure checked at home during  139/78 P73  BP Readings from Last 3 Encounters:   23 (!) 149/76   22 116/68   22 128/60     Today's Vitals: There were no vitals taken for this visit.  ----------------    I spent 27 minutes with this patient today. All changes were made via collaborative practice agreement with Fernanda Miller MD. A copy of the visit note was provided to the patient's provider(s).    The patient was sent via [employer] a summary of these recommendations.     Medication Therapy Management  Mirna Perez, Pharm.D, [facility]  Medication Therapy Management Pharmacist      Telemedicine Visit Details  Type of service:  Telephone visit  Start Time: 12:33PM  End Time: 1:00PM     Medication Therapy Recommendations  Hypertension goal BP (blood pressure) < 140/90    Current Medication: irbesartan (AVAPRO) 75 MG  tablet   Rationale: Does not understand instructions - Adherence - Adherence   Recommendation: Provide Education   Status: Patient Agreed - Adherence/Education         Type 2 diabetes mellitus with hyperglycemia, with long-term current use of insulin (H)    Current Medication: insulin aspart (NOVOLOG FLEXPEN) 100 UNIT/ML pen   Rationale: Does not understand instructions - Adherence - Adherence   Recommendation: Provide Education   Status: Patient Agreed - Adherence/Education

## 2023-05-22 NOTE — PATIENT INSTRUCTIONS
"Recommendations from today's MTM visit:                                                         1.  Recommend restarting irbesartan 75 mg daily. Patient to follow up with pharmacy to get this filled  2.  Refill for Toujeo sent to pharmacy.  3.  Recommend taking NovoLog 16 units before meals as previously recommended by endocrinology.    Follow-up: 3 months    It was great speaking with you today.  I value your experience and would be very thankful for your time in providing feedback in our clinic survey. In the next few days, you may receive an email or text message from [employer] with a link to a survey related to your  clinical pharmacist.\"     To schedule another MTM appointment, please call the clinic directly or you may call the MTM scheduling line at [phone] or toll-free at [phone].     My Clinical Pharmacist's contact information:                                                      Please feel free to contact me with any questions or concerns you have.      Mirna Perez, Pharm.D, BCACP  Medication Therapy Management Pharmacist      "

## 2023-05-26 NOTE — PROGRESS NOTES
[facility] - Glaucoma clinic    Chief Complaint/Presenting Concern: Glaucoma evaluation     History of Present Illness:   Brandy Leon is a 82 year old patient who presents for evaluation of POAG.   Patient was dx due to rNFL thinning in 2015 by Dr Austin Serna. Patient has been on latanoprost at bedtime ever since. Had CE/IOL in late 2018. Had BULB with Dr Moran in 2022. Re-established care with Dr Gurjit Cardoso 03/17/2023 during which time IOP was 26 and 18 on intermittent latanoprost due to drop intolerance. Switched to Travatan Z.     Today, 05/26/2023, patient states stable vision since last visit.     Relevant Past Medical/Family/Social History:  Past Medical History:   Diagnosis Date     Actinic keratosis 3/12/2013     Degenerative joint disease      Diabetes (H)      Gastroesophageal reflux disease without esophagitis 12/11/2020     Rheumatic fever 1957    x2 between the ages of 15-18     Seasonal allergies        Relevant Review of Systems:  None are relevant     1. Diagnosis: Primary open angle glaucoma   ICD-10 stages Mild or early-stage glaucoma - pre-perimetric glaucoma (with a normal visual field)  Year diagnosis: 2018  Previous glaucoma surgery/laser:    Both eyes: CE/IOL 2018 by Dr Austin Serna   Maximum intraocular pressure: 30/25  Current ophthalmic medications:    Both eyes: Travatan Z   Family history of any glaucoma: positive  CCT (um) 5/27/23: 546/552  Gonioscopy 2021   Right eye: open 360   Left eye: open 360  Refractive status: Almost emmetropic prior to CE/IOL  Trauma history: negative  Steroid exposure: positive  Vasospastic disease: Migrane/Raynaud phenomenon: negative  A past hemodynamic crisis or Low BP: negative  Meds AEs/intolerance:   Sulfa antibiotics   Preservatives in topical eye medications  Focused PMHx:  Asthma and respiratory problems: positive (DINORA)  Cardiovascular disease: positive (HTN, IDDM2)  Renal disease: negative  Nephrolithiasis:  negative  Sulfa allergies: positive  Anticoagulants: negative     Prior testing  HVF's (Dr Austin Serna)  2019: right eye essentially full, left eye unreliable with scattered inf defects  2018: unreliable fields  2017: unreliable fields, right eye superior defect, left eye inferior hemifield loss crossing both midlines  2016: unreliable fields  2015: mod reliability, essentially full each eye     HVF 9/20/21  Right Eye: low reliability (8/13 FL, 39% FP, 30% FN), MD -3.21, scattered nonspecific defects  Left Eye: low reliability (6/11 FL, 7% FP, 20% FN), MD -5.29, scattered defects    Today's testing:   IOP: 20/18 mmHg by applanation  Visual field 5/30/23:   Right eye - nasal step, superior arcuate defect, reliable  Left eye - paracentral and peripheral defects  OCT Optic Nerve RNFL Spectralis 5/30/23  Right eye: IT RNFL thinning, worsening   Left eye: IT RNFL thinning, poor tracing     Additional Ocular History:     2. Pseudophakia, each eye   - Monitor    Plan/Recommendations:    Discussed findings with patient.    Patient has POAG mild with prior progressive rNFL thinning and elevated IOP, thus Dr Serna began IOP lowering therapy in 08/2015.    On today's testing, there is right eye progression     IOP goal 15-18 both eyes     Discussed options: continue present management, add PF options (eg. Cosopt, no significant contraindications, DINORA is relative only), SLT, MIGS (eg. iStent Infinite). Patient agrees on SLT.     Continue current medications:  o Travatan Z at bedtime each eye     Schedule SLT right eye     Physician Attestation     Attending Physician Attestation:  Complete documentation of historical and exam elements from today's encounter can be found in the full encounter summary report (not reduplicated in this progress note). I personally obtained the chief complaint(s) and history of present illness. I confirmed and edited as necessary the review of systems, past medical/surgical history, family  history, social history, and examination findings as documented by others; and I examined the patient myself. I personally reviewed the relevant tests, images, and reports as documented above. I personally reviewed the ophthalmic test(s) associated with this encounter. I formulated and edited as necessary the assessment and plan and discussed the findings and management plan with the patient and any family members present at the time of the visit.  Radha Ceja M.D., Glaucoma, 5/30/2023

## 2023-05-30 ENCOUNTER — OFFICE VISIT (OUTPATIENT)
Dept: OPHTHALMOLOGY | Facility: CLINIC | Age: 83
End: 2023-05-30
Attending: OPHTHALMOLOGY
Payer: COMMERCIAL

## 2023-05-30 DIAGNOSIS — H40.1131 PRIMARY OPEN ANGLE GLAUCOMA OF BOTH EYES, MILD STAGE: ICD-10-CM

## 2023-05-30 DIAGNOSIS — H40.1131 PRIMARY OPEN ANGLE GLAUCOMA OF BOTH EYES, MILD STAGE: Primary | ICD-10-CM

## 2023-05-30 PROCEDURE — 92083 EXTENDED VISUAL FIELD XM: CPT | Performed by: OPHTHALMOLOGY

## 2023-05-30 PROCEDURE — 92133 CPTRZD OPH DX IMG PST SGM ON: CPT | Performed by: OPHTHALMOLOGY

## 2023-05-30 PROCEDURE — 99214 OFFICE O/P EST MOD 30 MIN: CPT | Performed by: OPHTHALMOLOGY

## 2023-05-30 PROCEDURE — G0463 HOSPITAL OUTPT CLINIC VISIT: HCPCS | Performed by: OPHTHALMOLOGY

## 2023-05-30 PROCEDURE — 76514 ECHO EXAM OF EYE THICKNESS: CPT | Performed by: OPHTHALMOLOGY

## 2023-05-30 ASSESSMENT — TONOMETRY
OS_IOP_MMHG: 15
OD_IOP_MMHG: 15
IOP_METHOD: APPLANATION
OD_IOP_MMHG: 20
OS_IOP_MMHG: 18

## 2023-05-30 ASSESSMENT — CONF VISUAL FIELD
OS_SUPERIOR_NASAL_RESTRICTION: 0
OS_INFERIOR_TEMPORAL_RESTRICTION: 0
OD_INFERIOR_TEMPORAL_RESTRICTION: 0
OD_SUPERIOR_TEMPORAL_RESTRICTION: 0
OD_SUPERIOR_NASAL_RESTRICTION: 0
OS_NORMAL: 1
OD_NORMAL: 1
OS_INFERIOR_NASAL_RESTRICTION: 0
OD_INFERIOR_NASAL_RESTRICTION: 0
OS_SUPERIOR_TEMPORAL_RESTRICTION: 0

## 2023-05-30 ASSESSMENT — VISUAL ACUITY
OD_SC: 20/20
OS_SC: 20/40
METHOD: SNELLEN - LINEAR
OD_SC+: -3
OS_PH_SC: 20/30
OS_PH_SC+: +3

## 2023-05-30 ASSESSMENT — REFRACTION_MANIFEST
OD_CYLINDER: +0.75
OS_CYLINDER: +1.25
OS_AXIS: 165
OD_AXIS: 035
OD_SPHERE: -1.00
OS_SPHERE: -1.50

## 2023-05-30 ASSESSMENT — SLIT LAMP EXAM - LIDS
COMMENTS: NORMAL
COMMENTS: NORMAL

## 2023-05-30 ASSESSMENT — EXTERNAL EXAM - LEFT EYE: OS_EXAM: NORMAL

## 2023-05-30 ASSESSMENT — CUP TO DISC RATIO
OS_RATIO: 0.5
OD_RATIO: 0.5

## 2023-05-30 ASSESSMENT — PACHYMETRY
OD_CT(UM): 546
OS_CT(UM): 552

## 2023-05-30 ASSESSMENT — EXTERNAL EXAM - RIGHT EYE: OD_EXAM: NORMAL

## 2023-05-30 NOTE — NURSING NOTE
Chief Complaints and History of Present Illnesses   Patient presents with     Glaucoma Evaluation     New patient glaucoma evaluation.  History of : POAG each eye mild stage      Chief Complaint(s) and History of Present Illness(es)     Glaucoma Evaluation            Laterality: both eyes    Associated symptoms: dryness and photophobia.  Negative for eye pain, redness, tearing, flashes and floaters    Pain scale: 0/10    Comments: New patient glaucoma evaluation.  History of : POAG each eye mild stage          Comments    Eye meds: travatan Z at bedtime, systane each eye as needed  Allergy eyes today, feels sticky.  Says light sensitivity, blinks eyes to clear them.  Struggle to see with left eye.    ALFONSO Drummond 5/30/2023 1:27 PM

## 2023-06-07 NOTE — PROGRESS NOTES
Outcome for 06/07/23 9:16 AM: GOSO message sent  Shantell Valerio MA  Outcome for 06/14/23 12:06 PM: obtained via diabetes education appointment  Ellen Leary MA   Outcome for 06/14/23 12:22 PM: Glucose readings sent via NIshadi Valerio MA           Date Fasting AM Before Bed   06/13 393     06/12 344     06/10 228     06/09 231     06/07 288  421   06/05 217     06/03   279   06/02 232           Patient is showing 2/5 MNCM met. BP out range, A1c not in range and Aspirin not prescribed   Ellen Leary MA

## 2023-06-13 ENCOUNTER — VIRTUAL VISIT (OUTPATIENT)
Dept: EDUCATION SERVICES | Facility: CLINIC | Age: 83
End: 2023-06-13
Payer: COMMERCIAL

## 2023-06-13 DIAGNOSIS — Z79.4 TYPE 2 DIABETES MELLITUS WITH HYPERGLYCEMIA, WITH LONG-TERM CURRENT USE OF INSULIN (H): Primary | Chronic | ICD-10-CM

## 2023-06-13 DIAGNOSIS — E11.65 TYPE 2 DIABETES MELLITUS WITH HYPERGLYCEMIA, WITH LONG-TERM CURRENT USE OF INSULIN (H): Primary | Chronic | ICD-10-CM

## 2023-06-13 PROCEDURE — G0108 DIAB MANAGE TRN  PER INDIV: HCPCS | Mod: 95

## 2023-06-13 RX ORDER — ACYCLOVIR 400 MG/1
TABLET ORAL
Qty: 1 EACH | Refills: 0 | Status: SHIPPED | OUTPATIENT
Start: 2023-06-13

## 2023-06-13 RX ORDER — ACYCLOVIR 400 MG/1
TABLET ORAL
Qty: 3 EACH | Refills: 5 | Status: SHIPPED | OUTPATIENT
Start: 2023-06-13 | End: 2023-10-06

## 2023-06-13 NOTE — PATIENT INSTRUCTIONS
PLAN:  *Restart Glipizide 10 mg before breakfast and dinner  *Increase Basaglar to 50 units daily  *Start correction with Novolog before meals (at minimum with breakfast) of 1 unit per 50>150  150-200=1 units   201-250=2 units  251-300=3 units  301-350=4 units  *Let me know if it works to start the Dexcom and we can schedule a visit to set it up    FOLLOW-UP:    *6/16 with Whit Arrieta PA-C

## 2023-06-13 NOTE — PROGRESS NOTES
Video-Visit Details  Type of service:  Video Visit  Patient consented to video visit  Video Start Time:  12:06PM  Video End Time:   1:16 PM  Originating Location (pt. Location): Home  Distant Location (provider location):   [facility] [place] [facility]AB[facility] EDUC[facility] [place]   Platform used for Video Visit: [employer] Mobile    Diabetes Self-Management Education & Support  Brandy Leon presents today for education related to Type 2 diabetes    Patient is being treated with:  oral agents and insulin  She is accompanied by daughter, Juliet    Year of diagnosis: 2015  Referring provider:  Dr. Morris  Living Situation: Home, daughter/son split time at house helping  Employment: Retired    PATIENT CONCERNS RELATED TO DIABETES SELF MANAGEMENT:   No concerns by patient, daughter frustrated when patient forgets to write down BG and if took insulin/meds      ASSESSMENT:    Taking Medication:   Current Diabetes Management per Patient:  Taking diabetes medications? Yes    Basaglar 48 units in PM (will transition to U300)  Novolog 16 units before meals, misses at least one dose per day. No correction. TDD 32 units  Metformin 1000 mg BID    Monitoring  Patient glucose self monitoring as follows: once daily  BG meter: One Touch Verio  BG results: Fasting in morning and before bed-11 am  Date Pre-B Post-B Pre-L Post-L Pre-D Post-D  HS/NOC   6/13 393         6/12 344         6/10 228         6/9 231         6/7 288    421     6/5 217         6/3     279     6/2        232    Patient's most recent   Lab Results   Component Value Date    A1C 10.6 12/13/2022    A1C 10.3 05/17/2021      Patient's A1C goal: <8.0    Activity: no regular exercise program, fear of falling    Healthy Eating:  Patient currently eats 3 meals 1 snacks per day   Breakfast: Cereal, wheat bread with cheese, banana, yogurt, coffee with whole milk  Lunch: Beef stick, cheese, popcorn  Dinner: Fish, leftovers, roast, burgers, potatoes, rarely has pasta, pizza with salad/ranch  dressing, water  Snacks: Popcorn  Water throughout day, no regular soda    Problem Solving:    Patient is at risk of hypoglycemia?: NO  Hospitalizations for hyper or hypoglycemia: No    Healthy Coping and Stress Management:   Sources of stress identified by patient  Other (please specify):  Not assessed  Coping mechanisms identified by patient:  Other (please specify):  Not assessed      EDUCATION and INSTRUCTION PROVIDED AT THIS VISIT:    T2 diagnosed in 2015 with most recent A1C 12.5 seen to review BG data. She is accompanied by daughter, Juliet. She has been working with BELKIS/ERICK Davila and ANUPAMA. Checks BG once daily in morning. Fasting BG not at goal. Current regimen:Basaglar 48 units in PM (will transition to U300, 2 Basaglar pens left).Novolog 16 units before meals, misses at least one dose per day. No correction. Total Novolog daily average: 32 units. Metformin 1000 mg BID. She misses lunch dose daily however doesn't always eat lunch. She did not add Glipizide 10 mg BID back into regimen as advised by Pharm D at 5/22 visit. I think she'd benefit from CGM. She was not able to tolerate Juany 2 sensor due to skin allergy. Dexcom G7 ordered today. Increased Basaglar to 50 units and added standard correction with meals, at minimum with breakfast.She has monthly visits with Whit Arrieta PA-C through summer. Consider adding GLP.    Patient-stated goal written and given to Brandy Leon.  Verbalized and demonstrated understanding of instructions.   See patient instructions  AVS printed and given to patient      PLAN:  *Restart Glipizide 10 mg before breakfast and dinner  *Increase Basaglar to 50 units daily  *Start correction with Novolog before meals (at minimum with breakfast) of 1 unit per 50>150  150-200=1 units   201-250=2 units  251-300=3 units  301-350=4 units  *Let me know if it works to start the Dexcom and we can schedule a visit to set it up    FOLLOW-UP:    *6/16 with Whit Arrieta PA-C      Time  spent with patient at today's visit was 70 minutes.      Any diabetes medication dose changes were made via the CDE Protocol and Collaborative Practice Agreement with [facility].  A copy of this encounter was provided to patient's referring provider-Whit Arrieta.

## 2023-06-13 NOTE — LETTER
6/13/2023         RE: Brandy Leon  [address] [place] [address]        Dear Colleague,    Thank you for referring your patient, Brandy Leon, to the [facility]. Please see a copy of my visit note below.    Video-Visit Details  Type of service:  Video Visit  Patient consented to video visit  Video Start Time:  12:06PM  Video End Time:   1:16 PM  Originating Location (pt. Location): Home  Distant Location (provider location):  [facility] DIABETES EDUCATION [place]   Platform used for Video Visit: [employer] IP    Diabetes Self-Management Education & Support  Brandy Leon presents today for education related to Type 2 diabetes    Patient is being treated with:  oral agents and insulin  She is accompanied by daughter, Juliet    Year of diagnosis: 2015  Referring provider:  Dr. Morris  Living Situation: Home, daughter/son split time at house helping  Employment: Retired    PATIENT CONCERNS RELATED TO DIABETES SELF MANAGEMENT:   No concerns by patient, daughter frustrated when patient forgets to write down BG and if took insulin/meds      ASSESSMENT:    Taking Medication:   Current Diabetes Management per Patient:  Taking diabetes medications? Yes    Basaglar 48 units in PM (will transition to U300)  Novolog 16 units before meals, misses at least one dose per day. No correction. TDD 32 units  Metformin 1000 mg BID    Monitoring  Patient glucose self monitoring as follows: once daily  BG meter: One Touch Verio  BG results: Fasting in morning and before bed-11 am  Date Pre-B Post-B Pre-L Post-L Pre-D Post-D  HS/NOC   6/13 393         6/12 344         6/10 228         6/9 231         6/7 288    421     6/5 217         6/3     279     6/2        232    Patient's most recent   Lab Results   Component Value Date    A1C 10.6 12/13/2022    A1C 10.3 05/17/2021      Patient's A1C goal: <8.0    Activity: no regular exercise program, fear of falling    Healthy  Eating:  Patient currently eats 3 meals 1 snacks per day   Breakfast: Cereal, wheat bread with cheese, banana, yogurt, coffee with whole milk  Lunch: Beef stick, cheese, popcorn  Dinner: Fish, leftovers, roast, burgers, potatoes, rarely has pasta, pizza with salad/ranch dressing, water  Snacks: Popcorn  Water throughout day, no regular soda    Problem Solving:    Patient is at risk of hypoglycemia?: NO  Hospitalizations for hyper or hypoglycemia: No    Healthy Coping and Stress Management:   Sources of stress identified by patient  Other (please specify):  Not assessed  Coping mechanisms identified by patient:  Other (please specify):  Not assessed      EDUCATION and INSTRUCTION PROVIDED AT THIS VISIT:    T2 diagnosed in 2015 with most recent A1C 12.5 seen to review BG data. She is accompanied by daughter, Juliet. She has been working with BELKIS/ERICK Davila and ANUPAMA. Checks BG once daily in morning. Fasting BG not at goal. Current regimen:Basaglar 48 units in PM (will transition to U300, 2 Basaglar pens left).Novolog 16 units before meals, misses at least one dose per day. No correction. Total Novolog daily average: 32 units. Metformin 1000 mg BID. She misses lunch dose daily however doesn't always eat lunch. She did not add Glipizide 10 mg BID back into regimen as advised by Pharm D at 5/22 visit. I think she'd benefit from CGM. She was not able to tolerate Juany 2 sensor due to skin allergy. Dexcom G7 ordered today. Increased Basaglar to 50 units and added standard correction with meals, at minimum with breakfast.She has monthly visits with Whit Arrieta PA-C through summer. Consider adding GLP.    Patient-stated goal written and given to Brandy Leon.  Verbalized and demonstrated understanding of instructions.   See patient instructions  AVS printed and given to patient      PLAN:  *Restart Glipizide 10 mg before breakfast and dinner  *Increase Basaglar to 50 units daily  *Start correction with Novolog  before meals (at minimum with breakfast) of 1 unit per 50>150  150-200=1 units   201-250=2 units  251-300=3 units  301-350=4 units  *Let me know if it works to start the Dexcom and we can schedule a visit to set it up    FOLLOW-UP:    *6/16 with Whit Arrieta PA-C      Time spent with patient at today's visit was 70 minutes.      Any diabetes medication dose changes were made via the CDE Protocol and Collaborative Practice Agreement with [facility].  A copy of this encounter was provided to patient's referring provider-Whit Arrieta.        Again, thank you for allowing me to participate in the care of your patient.        Sincerely,        Earnestine Kinsey RN

## 2023-06-16 ENCOUNTER — VIRTUAL VISIT (OUTPATIENT)
Dept: ENDOCRINOLOGY | Facility: CLINIC | Age: 83
End: 2023-06-16
Payer: COMMERCIAL

## 2023-06-16 DIAGNOSIS — R30.0 DYSURIA: ICD-10-CM

## 2023-06-16 DIAGNOSIS — E11.65 TYPE 2 DIABETES MELLITUS WITH HYPERGLYCEMIA, WITH LONG-TERM CURRENT USE OF INSULIN (H): Primary | Chronic | ICD-10-CM

## 2023-06-16 DIAGNOSIS — Z79.4 TYPE 2 DIABETES MELLITUS WITH HYPERGLYCEMIA, WITH LONG-TERM CURRENT USE OF INSULIN (H): Primary | Chronic | ICD-10-CM

## 2023-06-16 PROCEDURE — 99213 OFFICE O/P EST LOW 20 MIN: CPT | Mod: VID | Performed by: PHYSICIAN ASSISTANT

## 2023-06-16 ASSESSMENT — ENCOUNTER SYMPTOMS
DIFFICULTY URINATING: 0
EYE PAIN: 1
DYSURIA: 0
EYE WATERING: 0
ARTHRALGIAS: 1
MUSCLE WEAKNESS: 1
MYALGIAS: 1
BACK PAIN: 0
HEMATURIA: 0
JOINT SWELLING: 1
EYE REDNESS: 0
EYE IRRITATION: 1
DOUBLE VISION: 0
STIFFNESS: 0
NECK PAIN: 0

## 2023-06-16 NOTE — PROGRESS NOTES
"Assessment/Plan :   1. Type 2 DM. I reviewed Zoila's current medications and the plan moving forward. Her blood sugars already seem to be improving. We discussed other medication options but decided to stick with the current plan until our next follow-up in 1 month. I am hopeful that they can get the Dexcom G7 set up. I think that this will really help us to get better glucose control. I also mentioned that her daughter should be able to set up a connection on her phone, so she will be able to monitor her mother's blood sugars, as well.    If they have any problems, they are to contact my office. We will follow-up in 1 month.    2. Dysuria. Zoila has been getting up to go to the bathroom more often and her daughter states that she seems \"wobbly.\" Usually this occurs when she has a urinary infection. I will place an order for a UA today. I will contact them with the results.      Due to the COVID 19 pandemic this visit was a telephone/video visit in order to help prevent spread of infection in this patient and the general population. The patient gave verbal consent for the visit today. I have independently reviewed and interpreted labs, imaging as indicated.       Distant Location (provider location):  On-site  Mode of Communication:  Video Conference via "[facility]"  Chart review/prep time 5  Joined the call at 6/16/2023, 2:40:44 pm.  Left the call at 6/16/2023, 2:57:58 pm.  You were on the call for 17 minutes 13 seconds .  26 minutes spent on the date of the encounter doing chart review, history and exam, documentation and further activities as noted above.      Chief complaint:  Brandy is a 82 year old female who returns for follow-up of Type 2 DM with hyperglycemia.    I have reviewed Care Everywhere including [facility], [facility], [facility], [facility], [facility], [facility], [facility] , , [place] lab reports, imaging reports and provider notes as indicated.      HISTORY OF PRESENT ILLNESS  Zoila " has had diabetes since around 2015. She has taken both oral medications and insulin over the years. Recently, she has been struggling with persistent hyperglycemia. There is a concern regarding her memory and her daughter is concerned that Zoila often forgets to administer insulin as directed. Zoila is currently taking metformin 1000 mg BID, glipizide 10 mg BID, Basaglar 50 unit(s) daily and Novolog 16 unit(s) plus a correction factor before meals. Her correction factor is as follows:     1 unit per 50>150  150-200=1 units   201-250=2 units  251-300=3 units  301-350=4 units    She will transition to Toujeo from Basaglar in about a week, once she has finished her last Basaglar pen. She increase the Basaglar to 50 unit(s) last night and she started the additional correction factor before meals, today. She also restarted glipizide a couple days ago. Today's morning blood sugar was at 152 mg/dl, which is a significant improvement.    She has not had any problems with hypoglycemia and she states that she can feel when her blood sugars are dropping. She is currently monitoring her blood sugars with fingerstick testing. She had tried the FreeStyle Juany in the past but she experienced some skin irritation. They are currently working towards getting the Dexcom G7. Zoila struggles to use a standard glucometer due to neuropathy in her fingers. She states that she can't feel the test strips.     Endocrine relevant labs are as follows:   Latest Reference Range & Units 04/14/23 11:30   Hemoglobin A1C POCT 4.3 - <5.7 % 12.5 !   !: Data is abnormal    REVIEW OF SYSTEMS  Answers for HPI/ROS submitted by the patient on 6/16/2023  General Symptoms: No  Skin Symptoms: No  HENT Symptoms: No  EYE SYMPTOMS: Yes  HEART SYMPTOMS: No  LUNG SYMPTOMS: No  INTESTINAL SYMPTOMS: No  URINARY SYMPTOMS: Yes  GYNECOLOGIC SYMPTOMS: No  BREAST SYMPTOMS: No  SKELETAL SYMPTOMS: Yes  BLOOD SYMPTOMS: No  NERVOUS SYSTEM SYMPTOMS: Yes  MENTAL HEALTH SYMPTOMS:  No  Eye pain: Yes  Vision loss: Yes  Dry eyes: Yes  Watery eyes: No  Eye bulging: No  Double vision: No  Flashing of lights: No  Spots: No  Floaters: No  Redness: No  Crossed eyes: No  Tunnel Vision: No  Yellowing of eyes: No  Eye irritation: Yes  Trouble holding urine or incontinence: Yes  Pain or burning: No  Trouble starting or stopping: No  Increased frequency of urination: Yes  Blood in urine: No  Decreased frequency of urination: No  Frequent nighttime urination: No  Difficulty emptying bladder: No  Back pain: No  Muscle aches: Yes  Neck pain: No  Swollen joints: Yes  Joint pain: Yes  Bone pain: No  Muscle weakness: Yes  Joint stiffness: No  Bone fracture: No    Endocrine: positive for diabetes  Skin: negative  Eyes: negative for, visual blurring  Ears/Nose/Throat: negative  Respiratory: No shortness of breath, dyspnea on exertion, cough, or hemoptysis  Cardiovascular: negative for, chest pain and exercise intolerance  Gastrointestinal: negative for, nausea, vomiting, constipation and diarrhea  Genitourinary: positive for nocturia, dysuria and frequency, negative for and hematuria  Musculoskeletal: negative  Neurologic: positive for memory problems, and numbness or tingling of hands  Psychiatric: negative  Hematologic/Lymphatic/Immunologic: negative    Past Medical History  Past Medical History:   Diagnosis Date     Actinic keratosis 3/12/2013     Degenerative joint disease      Diabetes (H)      Gastroesophageal reflux disease without esophagitis 12/11/2020     Rheumatic fever 1957    x2 between the ages of 15-18     Seasonal allergies        Medications  Current Outpatient Medications   Medication Sig Dispense Refill     Blood Glucose Monitoring Suppl (ONETOUCH VERIO FLEX SYSTEM) w/Device KIT USE TO TEST BLOOD SUGAR 3 TIMES DAILY (OK TO SUBSTITUTE ALTERNATE BRAND PER INSURANCE FORMULARY. IF ONE TOUCH ONLY GIVE VERIO NOT ULTRA) 1 kit 1     Continuous Blood Gluc  (DEXCOM G7 ) SANTANA Use to read  blood sugars as per 's instructions. 1 each 0     Continuous Blood Gluc Sensor (DEXCOM G7 SENSOR) MISC Change every 10 days. 3 each 5     fexofenadine (ALLEGRA) 180 MG tablet Take 180 mg by mouth as needed       ibuprofen (ADVIL/MOTRIN) 200 MG capsule Take 400 mg by mouth every 4 hours as needed        insulin aspart (NOVOLOG FLEXPEN) 100 UNIT/ML pen Inject 16 units Subcutaneous before breakfast, lunch and dinner. Take about 15 minutes before eating. Do not take if not eating or eating <15 gm carbohydrates. 30 mL 1     insulin glargine U-300 (TOUJEO SOLOSTAR) 300 UNIT/ML (1 units dial) pen Inject 48 Units Subcutaneous At Bedtime 9 mL 1     insulin pen needle (BD PEN NEEDLE JUAN C 2ND GEN) 32G X 4 MM miscellaneous Use 4 pen needles daily or as directed. 400 each 2     irbesartan (AVAPRO) 75 MG tablet Take 1 tablet (75 mg) by mouth At Bedtime 90 tablet 1     metFORMIN (GLUCOPHAGE) 1000 MG tablet TAKE 1 TABLET BY MOUTH TWICE A DAY WITH MEALS 180 tablet 1     OneTouch Delica Lancets 30G MISC 1 lancet 3 times daily (OK to substitute alternate brand per insurance formulary.  If One Touch only give Verio not Ultra) 100 each 11     ONETOUCH VERIO IQ test strip USE TO TEST BLOOD SUGARS 3 TIMES DAILY, ON INSULIN (OK TO SUBSTITUTE ALTERNATE BRAND PER INSURANCE FORMULARY. IF ONE TOUCH ONLY GIVE VERIO NOT ULTRA) 100 strip 4     rosuvastatin (CRESTOR) 5 MG tablet TAKE 1 TABLET BY MOUTH TWICE WEEKLY 24 tablet 2     SYNTHROID 137 MCG tablet Take 1 tablet (137 mcg) by mouth daily 90 tablet 3     travoprost BAK FREE (TRAVATAN Z) 0.004 % ophthalmic solution Place 1 drop into both eyes At Bedtime 2.5 mL 11     triamcinolone (KENALOG) 0.1 % external cream APPLY TO AFFECTED AREA TWICE A DAY FOR 2 WEEKS.  Please keep 9-28-22 clinic appt for refills 45 g 0       Allergies  Allergies   Allergen Reactions     New Medication Allergy Rash     Soliqua      Ace Inhibitors Cough     Estradiol Itching     Lipitor [Atorvastatin Calcium]       Weak and shaky     Sulfa Antibiotics      Rash       Zocor [Simvastatin]      Weak and shakey     Triamterene Dermatitis     Possible photosensitivity dermatitis, mild.  (changed to hctz alone 6/4/07, will see if this helps)       Family History  family history includes Breast Cancer in her daughter; Cancer in her father and sister; Cerebrovascular Disease in her maternal grandfather, paternal grandfather, and paternal grandmother; Endometrial Cancer in her daughter; Eye Disorder in her brother, mother, and paternal grandmother; Heart Disease in her paternal grandfather.    Social History  Social History     Tobacco Use     Smoking status: Never     Passive exposure: Never     Smokeless tobacco: Never     Tobacco comments:     has had exposure to second hand smoke in the past from [employer], no current exposure   Vaping Use     Vaping status: Never Used   Substance Use Topics     Alcohol use: No     Drug use: No       Physical Exam  There is no height or weight on file to calculate BMI.  GENERAL: no distress. She is accompanied by her daughter  SKIN: Visible skin clear. No significant rash, abnormal pigmentation or lesions.  EYES: Eyes grossly normal to inspection.  No discharge or erythema, or obvious scleral/conjunctival abnormalities.  NECK: visible goiter is not present; no visible neck masses  RESP: No audible wheeze, cough, or visible cyanosis.  No visible retractions or increased work of breathing.    NEURO: Awake, alert, responds appropriately to questions.  Mentation and speech fluent.  PSYCH:affect normal and appearance well-groomed.      DATA REVIEW  Please see attached glucose logs

## 2023-06-16 NOTE — NURSING NOTE
Is the patient currently in the state of [place]? YES    Visit mode:VIDEO    If the visit is dropped, the patient can be reconnected by: Joined through [employer]- daughter wanted to go through [employer] for visit and had to complete e-check in questions before it would allow her in.     Will anyone else be joining the visit? Not asked, unable to complete rooming process fully as patient and provider got connected before rooming process was complete.      How would you like to obtain your AVS? MyChart    Are changes needed to the allergy or medication list? Unable to review full list at this time due to time constraint as daughter answered right at appointment time and had issues with getting to the video right away. Daughter notes medications were recently reviewed.  Reason for visit: RECHECK

## 2023-06-16 NOTE — LETTER
"    6/16/2023         RE: Brandy Leon  [address] [facility] [place] [address]        Dear Colleague,    Thank you for referring your patient, Brandy Leon, to the [facility]. Please see a copy of my visit note below.    Outcome for 06/07/23 9:16 AM: Tech[employer] message sent  Shantell Valerio MA  Outcome for 06/14/23 12:06 PM: obtained via diabetes education appointment  Ellen Leary MA   Outcome for 06/14/23 12:22 PM: Glucose readings sent via Tech[employer]  Shantell Valerio MA           Date Fasting AM Before Bed   06/13 393     06/12 344     06/10 228     06/09 231     06/07 288  421   06/05 217     06/03   279   06/02 232           Patient is showing 2/5 MNCM met. BP out range, A1c not in range and Aspirin not prescribed   Ellen Leary MA      Assessment/Plan :   1. Type 2 DM. I reviewed Zoila's current medications and the plan moving forward. Her blood sugars already seem to be improving. We discussed other medication options but decided to stick with the current plan until our next follow-up in 1 month. I am hopeful that they can get the Dexcom G7 set up. I think that this will really help us to get better glucose control. I also mentioned that her daughter should be able to set up a connection on her phone, so she will be able to monitor her mother's blood sugars, as well.    If they have any problems, they are to contact my office. We will follow-up in 1 month.    2. Dysuria. Zoila has been getting up to go to the bathroom more often and her daughter states that she seems \"wobbly.\" Usually this occurs when she has a urinary infection. I will place an order for a UA today. I will contact them with the results.      Due to the COVID 19 pandemic this visit was a telephone/video visit in order to help prevent spread of infection in this patient and the general population. The patient gave verbal consent for the visit today. I have independently reviewed and " interpreted labs, imaging as indicated.       Distant Location (provider location):  On-site  Mode of Communication:  Video Conference via [facility]  Chart review/prep time 5  Joined the call at 6/16/2023, 2:40:44 pm.  Left the call at 6/16/2023, 2:57:58 pm.  You were on the call for 17 minutes 13 seconds .  26 minutes spent on the date of the encounter doing chart review, history and exam, documentation and further activities as noted above.      Chief complaint:  Brandy is a 82 year old female who returns for follow-up of Type 2 DM with hyperglycemia.    I have reviewed Care Everywhere including [facility], [facility], [facility], [facility], [place], [facility], [facility] , [facility], [place] lab reports, imaging reports and provider notes as indicated.      HISTORY OF PRESENT ILLNESS  Zoila has had diabetes since around 2015. She has taken both oral medications and insulin over the years. Recently, she has been struggling with persistent hyperglycemia. There is a concern regarding her memory and her daughter is concerned that Zoila often forgets to administer insulin as directed. Zoila is currently taking metformin 1000 mg BID, glipizide 10 mg BID, Basaglar 50 unit(s) daily and Novolog 16 unit(s) plus a correction factor before meals. Her correction factor is as follows:     1 unit per 50>150  150-200=1 units   201-250=2 units  251-300=3 units  301-350=4 units    She will transition to Toujeo from Basaglar in about a week, once she has finished her last Basaglar pen. She increase the Basaglar to 50 unit(s) last night and she started the additional correction factor before meals, today. She also restarted glipizide a couple days ago. Today's morning blood sugar was at 152 mg/dl, which is a significant improvement.    She has not had any problems with hypoglycemia and she states that she can feel when her blood sugars are dropping. She is currently monitoring her blood sugars with fingerstick testing. She  had tried the FreeStyle Juany in the past but she experienced some skin irritation. 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 She states that she can't feel the test strips.     Endocrine relevant labs are as follows:   Latest Reference Range & Units 04/14/23 11:30   Hemoglobin A1C POCT 4.3 - <5.7 % 12.5 !   !: Data is abnormal    REVIEW OF SYSTEMS  Answers for HPI/ROS submitted by the patient on 6/16/2023  General Symptoms: No  Skin Symptoms: No  HENT Symptoms: No  EYE SYMPTOMS: Yes  HEART SYMPTOMS: No  LUNG SYMPTOMS: No  INTESTINAL SYMPTOMS: No  URINARY SYMPTOMS: Yes  GYNECOLOGIC SYMPTOMS: No  BREAST SYMPTOMS: No  SKELETAL SYMPTOMS: Yes  BLOOD SYMPTOMS: No  NERVOUS SYSTEM SYMPTOMS: Yes  MENTAL HEALTH SYMPTOMS: No  Eye pain: Yes  Vision loss: Yes  Dry eyes: Yes  Watery eyes: No  Eye bulging: No  Double vision: No  Flashing of lights: No  Spots: No  Floaters: No  Redness: No  Crossed eyes: No  Tunnel Vision: No  Yellowing of eyes: No  Eye irritation: Yes  Trouble holding urine or incontinence: Yes  Pain or burning: No  Trouble starting or stopping: No  Increased frequency of urination: Yes  Blood in urine: No  Decreased frequency of urination: No  Frequent nighttime urination: No  Difficulty emptying bladder: No  Back pain: No  Muscle aches: Yes  Neck pain: No  Swollen joints: Yes  Joint pain: Yes  Bone pain: No  Muscle weakness: Yes  Joint stiffness: No  Bone fracture: No    Endocrine: positive for diabetes  Skin: negative  Eyes: negative for, visual blurring  Ears/Nose/Throat: negative  Respiratory: No shortness of breath, dyspnea on exertion, cough, or hemoptysis  Cardiovascular: negative for, chest pain and exercise intolerance  Gastrointestinal: negative for, nausea, vomiting, constipation and diarrhea  Genitourinary: positive for nocturia, dysuria and frequency, negative for and hematuria  Musculoskeletal:  negative  Neurologic: positive for memory problems, and numbness or tingling of hands  Psychiatric: negative  Hematologic/Lymphatic/Immunologic: negative    Past Medical History  Past Medical History:   Diagnosis Date     Actinic keratosis 3/12/2013     Degenerative joint disease      Diabetes (H)      Gastroesophageal reflux disease without esophagitis 12/11/2020     Rheumatic fever 1957    x2 between the ages of 15-18     Seasonal allergies        Medications  Current Outpatient Medications   Medication Sig Dispense Refill     Blood Glucose Monitoring Suppl (ONETOUCH VERIO FLEX SYSTEM) w/Device KIT USE TO TEST BLOOD SUGAR 3 TIMES DAILY (OK TO SUBSTITUTE ALTERNATE BRAND PER INSURANCE FORMULARY. IF ONE TOUCH ONLY GIVE VERIO NOT ULTRA) 1 kit 1     Continuous Blood Gluc  (DEXCOM G7 ) SANTANA Use to read blood sugars as per 's instructions. 1 each 0     Continuous Blood Gluc Sensor (DEXCOM G7 SENSOR) MISC Change every 10 days. 3 each 5     fexofenadine (ALLEGRA) 180 MG tablet Take 180 mg by mouth as needed       ibuprofen (ADVIL/MOTRIN) 200 MG capsule Take 400 mg by mouth every 4 hours as needed        insulin aspart (NOVOLOG FLEXPEN) 100 UNIT/ML pen Inject 16 units Subcutaneous before breakfast, lunch and dinner. Take about 15 minutes before eating. Do not take if not eating or eating <15 gm carbohydrates. 30 mL 1     insulin glargine U-300 (TOUJEO SOLOSTAR) 300 UNIT/ML (1 units dial) pen Inject 48 Units Subcutaneous At Bedtime 9 mL 1     insulin pen needle (BD PEN NEEDLE JUAN C 2ND GEN) 32G X 4 MM miscellaneous Use 4 pen needles daily or as directed. 400 each 2     irbesartan (AVAPRO) 75 MG tablet Take 1 tablet (75 mg) by mouth At Bedtime 90 tablet 1     metFORMIN (GLUCOPHAGE) 1000 MG tablet TAKE 1 TABLET BY MOUTH TWICE A DAY WITH MEALS 180 tablet 1     OneTouch Delica Lancets 30G MISC 1 lancet 3 times daily (OK to substitute alternate brand per insurance formulary.  If One Touch only give  Verio not Ultra) 100 each 11     ONETOUCH VERIO IQ test strip USE TO TEST BLOOD SUGARS 3 TIMES DAILY, ON INSULIN (OK TO SUBSTITUTE ALTERNATE BRAND PER INSURANCE FORMULARY. IF ONE TOUCH ONLY GIVE VERIO NOT ULTRA) 100 strip 4     rosuvastatin (CRESTOR) 5 MG tablet TAKE 1 TABLET BY MOUTH TWICE WEEKLY 24 tablet 2     SYNTHROID 137 MCG tablet Take 1 tablet (137 mcg) by mouth daily 90 tablet 3     travoprost SIXTO FREE (TRAVATAN Z) 0.004 % ophthalmic solution Place 1 drop into both eyes At Bedtime 2.5 mL 11     triamcinolone (KENALOG) 0.1 % external cream APPLY TO AFFECTED AREA TWICE A DAY FOR 2 WEEKS.  Please keep 9-28-22 clinic appt for refills 45 g 0       Allergies  Allergies   Allergen Reactions     New Medication Allergy Rash     Soliqua      Ace Inhibitors Cough     Estradiol Itching     Lipitor [Atorvastatin Calcium]      Weak and shaky     Sulfa Antibiotics      Rash       Zocor [Simvastatin]      Weak and shakey     Triamterene Dermatitis     Possible photosensitivity dermatitis, mild.  (changed to hctz alone 6/4/07, will see if this helps)       Family History  family history includes Breast Cancer in her daughter; Cancer in her father and sister; Cerebrovascular Disease in her maternal grandfather, paternal grandfather, and paternal grandmother; Endometrial Cancer in her daughter; Eye Disorder in her brother, mother, and paternal grandmother; Heart Disease in her paternal grandfather.    Social History  Social History     Tobacco Use     Smoking status: Never     Passive exposure: Never     Smokeless tobacco: Never     Tobacco comments:     has had exposure to second hand smoke in the past from [facility], no current exposure   Vaping Use     Vaping status: Never Used   Substance Use Topics     Alcohol use: No     Drug use: No       Physical Exam  There is no height or weight on file to calculate BMI.  GENERAL: no distress. She is accompanied by her daughter  SKIN: Visible skin clear. No significant rash, abnormal  pigmentation or lesions.  EYES: Eyes grossly normal to inspection.  No discharge or erythema, or obvious scleral/conjunctival abnormalities.  NECK: visible goiter is not present; no visible neck masses  RESP: No audible wheeze, cough, or visible cyanosis.  No visible retractions or increased work of breathing.    NEURO: Awake, alert, responds appropriately to questions.  Mentation and speech fluent.  PSYCH:affect normal and appearance well-groomed.      DATA REVIEW  Please see attached glucose logs            Again, thank you for allowing me to participate in the care of your patient.        Sincerely,        Whit Arrieta PA-C

## 2023-06-19 ENCOUNTER — TELEPHONE (OUTPATIENT)
Dept: ENDOCRINOLOGY | Facility: CLINIC | Age: 83
End: 2023-06-19
Payer: COMMERCIAL

## 2023-06-19 NOTE — TELEPHONE ENCOUNTER
"If they have any problems, they are to contact my office. We will follow-up in 1 month.     2. Dysuria. Zoila has been getting up to go to the bathroom more often and her daughter states that she seems \"wobbly.\" Usually this occurs when she has a urinary infection. I will place an order for a UA today. I will contact them with the results.  ------------------------------  Writer called patient's daughter to schedule lab follow up per provider note above. Name and  of patient verified during call. Follow up lab scheduled with her. Patient already has follow up scheduled with Whit Arrieta, so just lab follow up scheduled during call. Daughter was in agreement with time/date of appointment and did not have any further questions at this time.   Taylor MERINO, Virtual Visit Facilitator 2:06 PM 2023        "

## 2023-06-21 ENCOUNTER — LAB (OUTPATIENT)
Dept: LAB | Facility: CLINIC | Age: 83
End: 2023-06-21
Payer: COMMERCIAL

## 2023-06-21 DIAGNOSIS — R30.0 DYSURIA: ICD-10-CM

## 2023-06-21 LAB
ALBUMIN UR-MCNC: NEGATIVE MG/DL
APPEARANCE UR: CLEAR
BACTERIA #/AREA URNS HPF: ABNORMAL /HPF
BILIRUB UR QL STRIP: NEGATIVE
COLOR UR AUTO: YELLOW
GLUCOSE UR STRIP-MCNC: >=1000 MG/DL
HGB UR QL STRIP: NEGATIVE
KETONES UR STRIP-MCNC: NEGATIVE MG/DL
LEUKOCYTE ESTERASE UR QL STRIP: NEGATIVE
NITRATE UR QL: NEGATIVE
PH UR STRIP: 6 [PH] (ref 5–7)
RBC #/AREA URNS AUTO: ABNORMAL /HPF
SP GR UR STRIP: 1.01 (ref 1–1.03)
SQUAMOUS #/AREA URNS AUTO: ABNORMAL /LPF
UROBILINOGEN UR STRIP-ACNC: 0.2 E.U./DL
WBC #/AREA URNS AUTO: ABNORMAL /HPF

## 2023-06-21 PROCEDURE — 81001 URINALYSIS AUTO W/SCOPE: CPT

## 2023-06-23 ENCOUNTER — OFFICE VISIT (OUTPATIENT)
Dept: OPHTHALMOLOGY | Facility: CLINIC | Age: 83
End: 2023-06-23
Attending: OPHTHALMOLOGY
Payer: COMMERCIAL

## 2023-06-23 ENCOUNTER — TELEPHONE (OUTPATIENT)
Dept: ENDOCRINOLOGY | Facility: CLINIC | Age: 83
End: 2023-06-23
Payer: COMMERCIAL

## 2023-06-23 DIAGNOSIS — H40.1131 PRIMARY OPEN ANGLE GLAUCOMA OF BOTH EYES, MILD STAGE: Primary | ICD-10-CM

## 2023-06-23 PROCEDURE — 65855 TRABECULOPLASTY LASER SURG: CPT | Mod: RT | Performed by: OPHTHALMOLOGY

## 2023-06-23 PROCEDURE — 99207 PR DROP WITH A PROCEDURE: CPT | Performed by: OPHTHALMOLOGY

## 2023-06-23 ASSESSMENT — TONOMETRY
OS_IOP_MMHG: 18
IOP_METHOD: TONOPEN
OD_IOP_MMHG: 20
OD_IOP_MMHG: 22
OD_IOP_MMHG: 17
IOP_METHOD: APPLANATION TT
OD_IOP_MMHG: 15
IOP_METHOD: TONOPEN
OS_IOP_MMHG: 17
OS_IOP_MMHG: 15

## 2023-06-23 ASSESSMENT — CONF VISUAL FIELD
OD_SUPERIOR_NASAL_RESTRICTION: 0
OS_INFERIOR_TEMPORAL_RESTRICTION: 0
OD_NORMAL: 1
OS_NORMAL: 1
OS_SUPERIOR_NASAL_RESTRICTION: 0
OD_SUPERIOR_TEMPORAL_RESTRICTION: 0
OD_INFERIOR_TEMPORAL_RESTRICTION: 0
OS_SUPERIOR_TEMPORAL_RESTRICTION: 0
OS_INFERIOR_NASAL_RESTRICTION: 0
OD_INFERIOR_NASAL_RESTRICTION: 0

## 2023-06-23 ASSESSMENT — SLIT LAMP EXAM - LIDS
COMMENTS: NORMAL
COMMENTS: NORMAL

## 2023-06-23 ASSESSMENT — EXTERNAL EXAM - LEFT EYE: OS_EXAM: NORMAL

## 2023-06-23 ASSESSMENT — VISUAL ACUITY
OS_SC: 20/50
METHOD: SNELLEN - LINEAR
OS_PH_SC: 20/40
OS_SC+: -2
OD_SC: 20/25
OD_SC+: -2

## 2023-06-23 ASSESSMENT — EXTERNAL EXAM - RIGHT EYE: OD_EXAM: NORMAL

## 2023-06-23 NOTE — NURSING NOTE
Chief Complaints and History of Present Illnesses   Patient presents with     Follow Up     POAG follow up  BE mattering  Travatan ARACELI at bedtime each eye  Dimple GILMORE 10:46 AM June 23, 2023         Chief Complaint(s) and History of Present Illness(es)     Follow Up            Laterality: right eye    Associated symptoms: Negative for eye pain, pain with eye movement, flashes and floaters    Treatments tried: eye drops    Pain scale: 0/10    Comments: POAG follow up  BE mattering  Travatava CHARLES at bedtime each eye  Dimple GILMORE 10:46 AM June 23, 2023

## 2023-06-23 NOTE — TELEPHONE ENCOUNTER
----- Message from Whit Arrieta PA-C sent at 6/23/2023  7:06 AM CDT -----  Zoila,  There is no sign of infection. Stay well hydrated and let me know if you develop any back pain or spike a fever.  Thanks,  Whit

## 2023-06-23 NOTE — PROGRESS NOTES
[facility] - Glaucoma clinic    Chief Complaint/Presenting Concern: Glaucoma evaluation     History of Present Illness:   Brandy Leon is a 82 year old patient who presents for evaluation of POAG.   Patient was dx due to rNFL thinning in 2015 by Dr Austin Serna. Patient has been on latanoprost at bedtime ever since. Had CE/IOL in late 2018. Had BULB with Dr Moran in 2022. Re-established care with Dr Gurjit Cardoso 03/17/2023 during which time IOP was 26 and 18 on intermittent latanoprost due to drop intolerance. Switched to Travatan Z.     Today, 06/23/2023, patient states stable vision since last visit. Ready for procedure.     Relevant Past Medical/Family/Social History:  Past Medical History:   Diagnosis Date     Actinic keratosis 3/12/2013     Degenerative joint disease      Diabetes (H)      Gastroesophageal reflux disease without esophagitis 12/11/2020     Rheumatic fever 1957    x2 between the ages of 15-18     Seasonal allergies        Relevant Review of Systems:  None are relevant     1. Diagnosis: Primary open angle glaucoma   ICD-10 stages Mild or early-stage glaucoma - pre-perimetric glaucoma (with a normal visual field)  Year diagnosis: 2018  Previous glaucoma surgery/laser:    Both eyes: CE/IOL 2018 by Dr Austin Serna   Maximum intraocular pressure: 30/25  Current ophthalmic medications:    Both eyes: Travatan Z   Family history of any glaucoma: positive  CCT (um) 5/27/23: 546/552  Gonioscopy 2021   Right eye: open 360   Left eye: open 360  Refractive status: Almost emmetropic prior to CE/IOL  Trauma history: negative  Steroid exposure: positive  Vasospastic disease: Migrane/Raynaud phenomenon: negative  A past hemodynamic crisis or Low BP: negative  Meds AEs/intolerance:   Sulfa antibiotics   Preservatives in topical eye medications  Focused PMHx:  Asthma and respiratory problems: positive (DINORA)  Cardiovascular disease: positive (HTN, IDDM2)  Renal disease:  negative  Nephrolithiasis: negative  Sulfa allergies: positive  Anticoagulants: negative     Prior testing  HVF's (Dr Austin Serna)  2019: right eye essentially full, left eye unreliable with scattered inf defects  2018: unreliable fields  2017: unreliable fields, right eye superior defect, left eye inferior hemifield loss crossing both midlines  2016: unreliable fields  2015: mod reliability, essentially full each eye     HVF 9/20/21  Right Eye: low reliability (8/13 FL, 39% FP, 30% FN), MD -3.21, scattered nonspecific defects  Left Eye: low reliability (6/11 FL, 7% FP, 20% FN), MD -5.29, scattered defects    Visual field 5/30/23:   Right eye - nasal step, superior arcuate defect, reliable  Left eye - paracentral and peripheral defects  OCT Optic Nerve RNFL Spectralis 5/30/23  Right eye: IT RNFL thinning, worsening   Left eye: IT RNFL thinning, poor tracing     Today's testing:   IOP: 17/18 mmHg by applanation  Post-SLT: 22 mmHg by tonopen     Additional Ocular History:     2. Pseudophakia, each eye   - Monitor    Plan/Recommendations:    Discussed findings with patient.    Patient has POAG mild with prior progressive rNFL thinning and elevated IOP, thus Dr Serna began IOP lowering therapy in 08/2015.    On today's testing, there is right eye progression     IOP goal 15-18 both eyes     Discussed options: continue present management, add PF options (eg. Cosopt, no significant contraindications, DINORA is relative only), SLT, MIGS (eg. iStent Infinite). Patient agrees on SLT.   Risks and benefits of selective laser trabeculoplasty (SLT) of the RIGHT eye, including transient elevation in intraocular pressure, minor hyphema discussed. Patient expressed understanding and wished to proceed. SLT done without complication. Will assess response in 6 weeks.     Continue current medications:  o Travatan Z at bedtime each eye     RTC: 1-2 weeks VA, IOP    Physician Attestation     Attending Physician Attestation:  Complete  documentation of historical and exam elements from today's encounter can be found in the full encounter summary report (not reduplicated in this progress note). I personally obtained the chief complaint(s) and history of present illness. I confirmed and edited as necessary the review of systems, past medical/surgical history, family history, social history, and examination findings as documented by others; and I examined the patient myself. I personally reviewed the relevant tests, images, and reports as documented above. I personally reviewed the ophthalmic test(s) associated with this encounter. I formulated and edited as necessary the assessment and plan and discussed the findings and management plan with the patient and any family members present at the time of the visit.  Radha Ceja M.D., Glaucoma, 6/23/2023    My privilege to be part of your care,  Mahamed Rios MD, MSc  Ophthalmology PGY-3 resident physician

## 2023-06-29 ENCOUNTER — OFFICE VISIT (OUTPATIENT)
Dept: OPHTHALMOLOGY | Facility: CLINIC | Age: 83
End: 2023-06-29
Attending: OPHTHALMOLOGY
Payer: COMMERCIAL

## 2023-06-29 DIAGNOSIS — H40.1131 PRIMARY OPEN ANGLE GLAUCOMA OF BOTH EYES, MILD STAGE: Primary | ICD-10-CM

## 2023-06-29 PROCEDURE — 99024 POSTOP FOLLOW-UP VISIT: CPT | Performed by: OPHTHALMOLOGY

## 2023-06-29 PROCEDURE — G0463 HOSPITAL OUTPT CLINIC VISIT: HCPCS | Performed by: OPHTHALMOLOGY

## 2023-06-29 ASSESSMENT — VISUAL ACUITY
OD_SC: 20/30
OS_PH_SC: 20/40
OS_SC: 20/50
OD_SC+: +2
OS_SC+: -2
METHOD: SNELLEN - LINEAR

## 2023-06-29 ASSESSMENT — CONF VISUAL FIELD
OD_NORMAL: 1
OS_NORMAL: 1
OS_SUPERIOR_NASAL_RESTRICTION: 0
OD_INFERIOR_NASAL_RESTRICTION: 0
OD_SUPERIOR_TEMPORAL_RESTRICTION: 0
OD_INFERIOR_TEMPORAL_RESTRICTION: 0
METHOD: COUNTING FINGERS
OS_SUPERIOR_TEMPORAL_RESTRICTION: 0
OS_INFERIOR_TEMPORAL_RESTRICTION: 0
OS_INFERIOR_NASAL_RESTRICTION: 0
OD_SUPERIOR_NASAL_RESTRICTION: 0

## 2023-06-29 ASSESSMENT — TONOMETRY
IOP_METHOD: APPLANATION
OD_IOP_MMHG: 15
OS_IOP_MMHG: 15
OS_IOP_MMHG: 17
OD_IOP_MMHG: 27

## 2023-06-29 ASSESSMENT — EXTERNAL EXAM - RIGHT EYE: OD_EXAM: NORMAL

## 2023-06-29 ASSESSMENT — SLIT LAMP EXAM - LIDS
COMMENTS: NORMAL
COMMENTS: NORMAL

## 2023-06-29 ASSESSMENT — EXTERNAL EXAM - LEFT EYE: OS_EXAM: NORMAL

## 2023-06-29 NOTE — NURSING NOTE
Chief Complaints and History of Present Illnesses   Patient presents with     Glaucoma Follow-Up     Chief Complaint(s) and History of Present Illness(es)     Glaucoma Follow-Up            Laterality: both eyes (States va is the same since last visit  )    Associated symptoms: itching.  Negative for dryness, redness, tearing, flashes and floaters    Treatment side effects: none    Compliance with Treatment: always    Pain scale: 0/10          Comments    Here for Primary open angle glaucoma of both eyes, mild stage  Travatan Z at bedtime each eye   Ermelinda Medina COT 2:46 PM June 29, 2023

## 2023-06-29 NOTE — PROGRESS NOTES
[facility] - Glaucoma clinic    Chief Complaint/Presenting Concern: Glaucoma evaluation     History of Present Illness:   Brandy Leon is a 82 year old patient who presents for evaluation of POAG.   Patient was dx due to rNFL thinning in 2015 by Dr Austin Serna. Patient has been on latanoprost at bedtime ever since. Had CE/IOL in late 2018. Had BULB with Dr Moran in 2022. Re-established care with Dr Gurjit Cardoso 03/17/2023 during which time IOP was 26 and 18 on intermittent latanoprost due to drop intolerance. Switched to Travatan Z.   - 05/30/2023: s/p right eye SLT     Today, 06/29/2023, patient states stable vision since last visit. No eye pain or pressure sensation.     Relevant Past Medical/Family/Social History:  Past Medical History:   Diagnosis Date     Actinic keratosis 3/12/2013     Degenerative joint disease      Diabetes (H)      Gastroesophageal reflux disease without esophagitis 12/11/2020     Rheumatic fever 1957    x2 between the ages of 15-18     Seasonal allergies        Relevant Review of Systems:  None are relevant  1. Diagnosis: Primary open angle glaucoma   ICD-10 stages Mild or early-stage glaucoma - pre-perimetric glaucoma (with a normal visual field)  Year diagnosis: 2018  Previous glaucoma surgery/laser:    Both eyes: CE/IOL 2018 by Dr Austin Serna   Maximum intraocular pressure: 30/25  Current ophthalmic medications:    Both eyes: Travatan Z   Family history of any glaucoma: positive  CCT (um) 5/27/23: 546/552  Gonioscopy 2021   Right eye: open 360   Left eye: open 360  Refractive status: Almost emmetropic prior to CE/IOL  Trauma history: negative  Steroid exposure: positive  Vasospastic disease: Migrane/Raynaud phenomenon: negative  A past hemodynamic crisis or Low BP: negative  Meds AEs/intolerance:   Sulfa antibiotics   Preservatives in topical eye medications  Focused PMHx:  Asthma and respiratory problems: positive (DINORA)  Cardiovascular disease:  positive (HTN, IDDM2)  Renal disease: negative  Nephrolithiasis: negative  Sulfa allergies: positive  Anticoagulants: negative     Prior testing  HVF's (Dr Austin Serna)  2019: right eye essentially full, left eye unreliable with scattered inf defects  2018: unreliable fields  2017: unreliable fields, right eye superior defect, left eye inferior hemifield loss crossing both midlines  2016: unreliable fields  2015: mod reliability, essentially full each eye     HVF 9/20/21  Right Eye: low reliability (8/13 FL, 39% FP, 30% FN), MD -3.21, scattered nonspecific defects  Left Eye: low reliability (6/11 FL, 7% FP, 20% FN), MD -5.29, scattered defects    Visual field 5/30/23:   Right eye - nasal step, superior arcuate defect, reliable  Left eye - paracentral and peripheral defects  OCT Optic Nerve RNFL Spectralis 5/30/23  Right eye: IT RNFL thinning, worsening   Left eye: IT RNFL thinning, poor tracing     Today's testing:   IOP: 27 and 17 mmHg by applanation    Additional Ocular History:     2. Pseudophakia, each eye   - Monitor    Plan/Recommendations:    Discussed findings with patient.    Patient has POAG mild with prior progressive rNFL thinning and elevated IOP, thus Dr Serna began IOP lowering therapy in 08/2015.    On today's testing, there is right eye progression     IOP goal teens both eyes     Discussed options: continue present management, add PF options (eg. Cosopt, no significant contraindications, DINORA is relative only), SLT, MIGS (eg. iStent Infinite). Patient agreeed on SLT right eye done 05/30/2023.   IOP elevated today, likely transient.     Continue current medications:  o Travatan Z at bedtime left eye only. Hold off in right eye to assess response.     RTC: 4 weeks VA, IOP check result SLT right eye     Physician Attestation     Attending Physician Attestation:  Complete documentation of historical and exam elements from today's encounter can be found in the full encounter summary report (not  reduplicated in this progress note). I personally obtained the chief complaint(s) and history of present illness. I confirmed and edited as necessary the review of systems, past medical/surgical history, family history, social history, and examination findings as documented by others; and I examined the patient myself. I personally reviewed the relevant tests, images, and reports as documented above. I formulated and edited as necessary the assessment and plan and discussed the findings and management plan with the patient and any family members present at the time of the visit.  Radha Ceja M.D., Glaucoma, 6/29/2023

## 2023-07-14 NOTE — PROGRESS NOTES
Outcome for 07/14/23 8:46 AM: DataContacthart message sent  Ellen Leary MA  Outcome for 07/19/23 2:28 PM: Left Voicemail   Ellen Leary MA  Outcome for 07/20/23 12:04 PM: Per patient, will send BG readings via [employer]  Shantell Valerio MA  Outcome for 07/21/23 6:31 AM: Glucose readings sent via [employer] other things you bake  Shantell Valerio MA    Patient is showing 2/5 MNCM met. BP out range, A1c not in range and Aspirin not prescribed   Ellen Leary MA

## 2023-07-16 ENCOUNTER — HEALTH MAINTENANCE LETTER (OUTPATIENT)
Age: 83
End: 2023-07-16

## 2023-07-19 ENCOUNTER — TELEPHONE (OUTPATIENT)
Dept: ENDOCRINOLOGY | Facility: CLINIC | Age: 83
End: 2023-07-19
Payer: COMMERCIAL

## 2023-07-19 NOTE — TELEPHONE ENCOUNTER
Attempted to reach patient for upcoming appointment; 14 days of glucose data needed. No answer, LM on VM to call office back.     Appointment with Whit Arrieta PA-C on 7/21/23    Ellen Leary MA

## 2023-07-20 ENCOUNTER — MYC MEDICAL ADVICE (OUTPATIENT)
Dept: EDUCATION SERVICES | Facility: CLINIC | Age: 83
End: 2023-07-20
Payer: COMMERCIAL

## 2023-07-20 ASSESSMENT — ENCOUNTER SYMPTOMS
POOR WOUND HEALING: 0
HEADACHES: 0
TREMORS: 0
DIZZINESS: 1
HEMATURIA: 0
PARALYSIS: 0
DYSURIA: 0
MEMORY LOSS: 0
DIFFICULTY URINATING: 0
SKIN CHANGES: 0
NUMBNESS: 1
LOSS OF CONSCIOUSNESS: 0
SEIZURES: 0
DISTURBANCES IN COORDINATION: 0
WEAKNESS: 0
NAIL CHANGES: 0
TINGLING: 1
SPEECH CHANGE: 0
FLANK PAIN: 0

## 2023-07-20 NOTE — TELEPHONE ENCOUNTER
Outcome for 07/20/23 12:06 PM: Per patient, will send BG readings via [facility]  Shantell Valerio MA

## 2023-07-21 ENCOUNTER — VIRTUAL VISIT (OUTPATIENT)
Dept: ENDOCRINOLOGY | Facility: CLINIC | Age: 83
End: 2023-07-21
Payer: COMMERCIAL

## 2023-07-21 DIAGNOSIS — Z79.4 TYPE 2 DIABETES MELLITUS WITH HYPERGLYCEMIA, WITH LONG-TERM CURRENT USE OF INSULIN (H): ICD-10-CM

## 2023-07-21 DIAGNOSIS — I10 HYPERTENSION GOAL BP (BLOOD PRESSURE) < 140/90: ICD-10-CM

## 2023-07-21 DIAGNOSIS — E11.65 TYPE 2 DIABETES MELLITUS WITH HYPERGLYCEMIA, WITH LONG-TERM CURRENT USE OF INSULIN (H): ICD-10-CM

## 2023-07-21 PROCEDURE — 99214 OFFICE O/P EST MOD 30 MIN: CPT | Mod: 95 | Performed by: PHYSICIAN ASSISTANT

## 2023-07-21 RX ORDER — IRBESARTAN 75 MG/1
75 TABLET ORAL AT BEDTIME
Qty: 90 TABLET | Refills: 1 | Status: SHIPPED | OUTPATIENT
Start: 2023-07-21 | End: 2024-01-23

## 2023-07-21 NOTE — PROGRESS NOTES
Assessment/Plan :   1. Type 2 DM with long term use of insulin. Zoila is doing okay. We need to make adjustments to her insulin but I am worried about her risk for hypoglycemia and would like her to get set up with the Dexcom G7 CGMS. I gave the phone number of the [facility] to her daughter. She needs to call them, today, to see why the sensors have yet to be shipped. As soon as the have the sensors, they will contact Earnestine Kinsey RN for training.     In the meantime, Zoila will continue with 58 unit(s) of Toujeo at night. She is also to continue with 16 unit(s) of Novolog with meals. If she forgets to take the Novolog before she eats, it is okay if she takes it while eating.     She also needs to continue to work on monitoring her blood sugars twice daily until she has her Dexcom sensor.    We have an appt scheduled for next month and I will have scheduling reach out to her to get an appt scheduled for September, as wel.    Due to the COVID 19 pandemic this visit was a telephone/video visit in order to help prevent spread of infection in this patient and the general population. The patient gave verbal consent for the visit today. I have independently reviewed and interpreted labs, imaging as indicated.       Distant Location (provider location):  On-site  Mode of Communication:  Video Conference via Trak  Chart review/prep time 5   Joined the call at 7/21/2023, 12:04:57 pm.  Left the call at 7/21/2023, 12:20:08 pm.  You were on the call for 15 minutes 10 seconds .  24 minutes spent on the date of the encounter doing chart review, history and exam, documentation and further activities as noted above.      Chief complaint:  Brandy is a 82 year old female who returns for follow-up of Type 2 DM with long term use of insulin.    I have reviewed Care Everywhere including [facility], [facility], [facility], [facility], [place], [facility], [facility] , [facility], [place] lab reports,  imaging reports and provider notes as indicated.      HISTORY OF PRESENT ILLNESS  Zoila remains stable on her current medications. She has transitioned to Toujeo and she feels like her numbers are more stable. She is currently taking 48 unit(s) of Toujeo at bedtime along with metformin 1000 mg twice daily, glipizide 10 mg twice daily and Novolog 16 unit(s) with meals. They had discussed adding a correction factor to the pre-meal Novolog dosing but they have not been monitoring her blood sugars consistently enough to add a correction factor. They have continued with fingerstick testing because they never heard from the pharmacy regarding the new Dexcom sensor.    Zoila has not had any problems with severe hyperglycemia and/or hypoglycemia. She has had a few higher blood sugars before bed and her daughter thinks it is connected to them eating dinner at a later time. Zoila's daughter has recently become her full time care giver and this has led to a slight change in her schedule. They are still trying to adjust to the changes. She has not noticed any worsening difficulty with vision or increasing numbness/tingling in her feet. Zoila does occasionally forget to take the Novolog before meals but she is trying to be better.     Endocrine relevant labs are as follows:      Latest Reference Range & Units 04/14/23 11:30   Hemoglobin A1C POCT 4.3 - <5.7 % 12.5 !   !: Data is abnormal    REVIEW OF SYSTEMS  Answers for HPI/ROS submitted by the patient on 7/20/2023  General Symptoms: No  Skin Symptoms: Yes  HENT Symptoms: No  EYE SYMPTOMS: No  HEART SYMPTOMS: No  LUNG SYMPTOMS: No  INTESTINAL SYMPTOMS: No  URINARY SYMPTOMS: Yes  GYNECOLOGIC SYMPTOMS: No  BREAST SYMPTOMS: No  SKELETAL SYMPTOMS: No  BLOOD SYMPTOMS: No  NERVOUS SYSTEM SYMPTOMS: Yes  MENTAL HEALTH SYMPTOMS: No  Changes in hair: No  Changes in moles/birth marks: No  Itching: Yes  Rashes: No  Changes in nails: No  Acne: No  Hair in places you don't want it: No  Change in  facial hair: No  Warts: No  Non-healing sores: No  Scarring: No  Flaking of skin: Yes  Color changes of hands/feet in cold : No  Sun sensitivity: Yes  Skin thickening: No  Trouble holding urine or incontinence: Yes  Pain or burning: No  Trouble starting or stopping: No  Increased frequency of urination: No  Blood in urine: No  Decreased frequency of urination: No  Frequent nighttime urination: Yes  Flank pain: No  Difficulty emptying bladder: No  Trouble with coordination: No  Dizziness or trouble with balance: Yes  Fainting or black-out spells: No  Memory loss: No  Headache: No  Seizures: No  Speech problems: No  Tingling: Yes  Tremor: No  Weakness: No  Difficulty walking: Yes  Paralysis: No  Numbness: Yes      Endocrine: positive for diabetes  Skin: negative  Eyes: positive for visual blurring, glaucoma, negative for, redness, tearing  Ears/Nose/Throat: negative for, nasal congestion, persistent sore throat, hoarseness  Respiratory: No shortness of breath, dyspnea on exertion, cough, or hemoptysis  Cardiovascular: negative for, chest pain, dyspnea on exertion and exercise intolerance  Gastrointestinal: negative for, nausea, vomiting, constipation and diarrhea  Genitourinary: negative for, nocturia, dysuria, frequency and urgency  Musculoskeletal: negative for, muscular weakness and nocturnal cramping  Neurologic: positive for memory problems, negative for and numbness or tingling of feet  Psychiatric: negative  Hematologic/Lymphatic/Immunologic: negative    Past Medical History  Past Medical History:   Diagnosis Date     Actinic keratosis 3/12/2013     Degenerative joint disease      Diabetes (H)      Gastroesophageal reflux disease without esophagitis 12/11/2020     Rheumatic fever 1957    x2 between the ages of 15-18     Seasonal allergies        Medications  Current Outpatient Medications   Medication Sig Dispense Refill     Blood Glucose Monitoring Suppl (ONETOUCH VERIO FLEX SYSTEM) w/Device KIT USE TO TEST  BLOOD SUGAR 3 TIMES DAILY (OK TO SUBSTITUTE ALTERNATE BRAND PER INSURANCE FORMULARY. IF ONE TOUCH ONLY GIVE VERIO NOT ULTRA) 1 kit 1     Continuous Blood Gluc  (DEXCOM G7 ) SANTANA Use to read blood sugars as per 's instructions. 1 each 0     Continuous Blood Gluc Sensor (DEXCOM G7 SENSOR) MISC Change every 10 days. 3 each 5     fexofenadine (ALLEGRA) 180 MG tablet Take 180 mg by mouth as needed       ibuprofen (ADVIL/MOTRIN) 200 MG capsule Take 400 mg by mouth every 4 hours as needed        insulin aspart (NOVOLOG FLEXPEN) 100 UNIT/ML pen Inject 16 units Subcutaneous before breakfast, lunch and dinner. Take about 15 minutes before eating. Do not take if not eating or eating <15 gm carbohydrates. 30 mL 1     insulin glargine U-300 (TOUJEO SOLOSTAR) 300 UNIT/ML (1 units dial) pen Inject 48 Units Subcutaneous At Bedtime 9 mL 1     insulin pen needle (BD PEN NEEDLE JUAN C 2ND GEN) 32G X 4 MM miscellaneous Use 4 pen needles daily or as directed. 400 each 2     irbesartan (AVAPRO) 75 MG tablet Take 1 tablet (75 mg) by mouth At Bedtime 90 tablet 1     metFORMIN (GLUCOPHAGE) 1000 MG tablet Take 1 tablet (1,000 mg) by mouth 2 times daily (with meals) 180 tablet 1     OneTouch Delica Lancets 30G MISC 1 lancet 3 times daily (OK to substitute alternate brand per insurance formulary.  If One Touch only give Verio not Ultra) 100 each 11     ONETOUCH VERIO IQ test strip USE TO TEST BLOOD SUGARS 3 TIMES DAILY, ON INSULIN (OK TO SUBSTITUTE ALTERNATE BRAND PER INSURANCE FORMULARY. IF ONE TOUCH ONLY GIVE VERIO NOT ULTRA) 100 strip 4     rosuvastatin (CRESTOR) 5 MG tablet TAKE 1 TABLET BY MOUTH TWICE WEEKLY 24 tablet 2     SYNTHROID 137 MCG tablet Take 1 tablet (137 mcg) by mouth daily 90 tablet 3     travoprost BAK FREE (TRAVATAN Z) 0.004 % ophthalmic solution Place 1 drop into both eyes At Bedtime 2.5 mL 11     triamcinolone (KENALOG) 0.1 % external cream APPLY TO AFFECTED AREA TWICE A DAY FOR 2 WEEKS.  Please  keep 9-28-22 clinic appt for refills 45 g 0       Allergies  Allergies   Allergen Reactions     New Medication Allergy Rash     Soliqua      Ace Inhibitors Cough     Estradiol Itching     Lipitor [Atorvastatin Calcium]      Weak and shaky     Sulfa Antibiotics      Rash       Zocor [Simvastatin]      Weak and shakey     Triamterene Dermatitis     Possible photosensitivity dermatitis, mild.  (changed to hctz alone 6/4/07, will see if this helps)       Family History  family history includes Breast Cancer in her daughter; Cancer in her father and sister; Cerebrovascular Disease in her maternal grandfather, paternal grandfather, and paternal grandmother; Endometrial Cancer in her daughter; Eye Disorder in her brother, mother, and paternal grandmother; Heart Disease in her paternal grandfather.    Social History  Social History     Tobacco Use     Smoking status: Never     Passive exposure: Never     Smokeless tobacco: Never     Tobacco comments:     has had exposure to second hand smoke in the past from [employer], no current exposure   Vaping Use     Vaping Use: Never used   Substance Use Topics     Alcohol use: No     Drug use: No       Physical Exam  There is no height or weight on file to calculate BMI.  GENERAL: no distress. She is accompanied by her daughter.  SKIN: Visible skin clear. No significant rash, abnormal pigmentation or lesions.  EYES: Eyes grossly normal to inspection.  No discharge or erythema, or obvious scleral/conjunctival abnormalities.  NECK: visible goiter is not present; no visible neck masses  RESP: No audible wheeze, cough, or visible cyanosis.  No visible retractions or increased work of breathing.    NEURO: Awake, alert, responds appropriately to questions.  Mentation and speech fluent.  PSYCH:affect normal and appearance well-groomed.      DATA REVIEW    Date AM PM   7/1 238 217   7/2  153   7/3 160    7/4  210   7/9  141/213   7/10  323   7/11 223 427   7/12 193 361   7/13 182 367   7/14 177     7/16 174    7/17 145    7/18 184    7/19 334    7/20 486

## 2023-07-21 NOTE — NURSING NOTE
Is the patient currently in the state of [place]? YES    Visit mode:VIDEO    If the visit is dropped, the patient can be reconnected by: VIDEO VISIT: Text to cell phone: [phone]    Will anyone else be joining the visit? NO      How would you like to obtain your AVS? MyChart    Are changes needed to the allergy or medication list? NO    Reason for visit: RECHECK and Video Visit

## 2023-07-21 NOTE — PROGRESS NOTES
Virtual Visit Details    Type of service:  Video Visit     Originating Location (pt. Location): Home    Distant Location (provider location):  On-site  Platform used for Video Visit: Isha

## 2023-07-21 NOTE — LETTER
7/21/2023         RE: Brandy Leon  [address] [place] [address]        Dear Colleague,    Thank you for referring your patient, Brandy Leon, to the [facility]. Please see a copy of my visit note below.    Outcome for 07/14/23 8:46 AM: Mavatar message sent  Ellen Leary MA  Outcome for 07/19/23 2:28 PM: Left Voicemail   Ellen Leary MA  Outcome for 07/20/23 12:04 PM: Per patient, will send BG readings via [employer]  Shantell Valerio MA  Outcome for 07/21/23 6:31 AM: Glucose readings sent via Mavatar  Shantell Valerio MA    Patient is showing 2/5 [place]CM met. BP out range, A1c not in range and Aspirin not prescribed   Ellen Leary MA           Virtual Visit Details    Type of service:  Video Visit     Originating Location (pt. Location): Home    Distant Location (provider location):  On-site  Platform used for Video Visit: Isha      Assessment/Plan :   1. Type 2 DM with long term use of insulin. Zoila is doing okay. We need to make adjustments to her insulin but I am worried about her risk for hypoglycemia and would like her to get set up with the Dexcom G7 CGMS. I gave the phone number of the [facility] to her daughter. She needs to call them, today, to see why the sensors have yet to be shipped. As soon as the have the sensors, they will contact Earnestine Kinsey RN for training.     In the meantime, Zoila will continue with 58 unit(s) of Toujeo at night. She is also to continue with 16 unit(s) of Novolog with meals. If she forgets to take the Novolog before she eats, it is okay if she takes it while eating.     She also needs to continue to work on monitoring her blood sugars twice daily until she has her Dexcom sensor.    We have an appt scheduled for next month and I will have scheduling reach out to her to get an appt scheduled for September, as wel.    Due to the COVID 19 pandemic this visit was a telephone/video visit in  order to help prevent spread of infection in this patient and the general population. The patient gave verbal consent for the visit today. I have independently reviewed and interpreted labs, imaging as indicated.       Distant Location (provider location):  On-site  Mode of Communication:  Video Conference via [employer]  Chart review/prep time 5   Joined the call at 7/21/2023, 12:04:57 pm.  Left the call at 7/21/2023, 12:20:08 pm.  You were on the call for 15 minutes 10 seconds .  24 minutes spent on the date of the encounter doing chart review, history and exam, documentation and further activities as noted above.      Chief complaint:  Brandy is a 82 year old female who returns for follow-up of Type 2 DM with long term use of insulin.    I have reviewed Care Everywhere including [facility], [facility], [facility], [facility], [facility] , [facility], [place] lab reports, imaging reports and provider notes as indicated.      HISTORY OF PRESENT ILLNESS  Zoila remains stable on her current medications. She has transitioned to Toujeo and she feels like her numbers are more stable. She is currently taking 48 unit(s) of Toujeo at bedtime along with metformin 1000 mg twice daily, glipizide 10 mg twice daily and Novolog 16 unit(s) with meals. They had discussed adding a correction factor to the pre-meal Novolog dosing but they have not been monitoring her blood sugars consistently enough to add a correction factor. They have continued with fingerstick testing because they never heard from the pharmacy regarding the new Dexcom sensor.    Zoila has not had any problems with severe hyperglycemia and/or hypoglycemia. She has had a few higher blood sugars before bed and her daughter thinks it is connected to them eating dinner at a later time. Zoila's daughter has recently become her full time care giver and this has led to a slight change in her schedule. They are still trying to adjust to the  changes. She has not noticed any worsening difficulty with vision or increasing numbness/tingling in her feet. Zoila does occasionally forget to take the Novolog before meals but she is trying to be better.     Endocrine relevant labs are as follows:      Latest Reference Range & Units 04/14/23 11:30   Hemoglobin A1C POCT 4.3 - <5.7 % 12.5 !   !: Data is abnormal    REVIEW OF SYSTEMS  Answers for HPI/ROS submitted by the patient on 7/20/2023  General Symptoms: No  Skin Symptoms: Yes  HENT Symptoms: No  EYE SYMPTOMS: No  HEART SYMPTOMS: No  LUNG SYMPTOMS: No  INTESTINAL SYMPTOMS: No  URINARY SYMPTOMS: Yes  GYNECOLOGIC SYMPTOMS: No  BREAST SYMPTOMS: No  SKELETAL SYMPTOMS: No  BLOOD SYMPTOMS: No  NERVOUS SYSTEM SYMPTOMS: Yes  MENTAL HEALTH SYMPTOMS: No  Changes in hair: No  Changes in moles/birth marks: No  Itching: Yes  Rashes: No  Changes in nails: No  Acne: No  Hair in places you don't want it: No  Change in facial hair: No  Warts: No  Non-healing sores: No  Scarring: No  Flaking of skin: Yes  Color changes of hands/feet in cold : No  Sun sensitivity: Yes  Skin thickening: No  Trouble holding urine or incontinence: Yes  Pain or burning: No  Trouble starting or stopping: No  Increased frequency of urination: No  Blood in urine: No  Decreased frequency of urination: No  Frequent nighttime urination: Yes  Flank pain: No  Difficulty emptying bladder: No  Trouble with coordination: No  Dizziness or trouble with balance: Yes  Fainting or black-out spells: No  Memory loss: No  Headache: No  Seizures: No  Speech problems: No  Tingling: Yes  Tremor: No  Weakness: No  Difficulty walking: Yes  Paralysis: No  Numbness: Yes      Endocrine: positive for diabetes  Skin: negative  Eyes: positive for visual blurring, glaucoma, negative for, redness, tearing  Ears/Nose/Throat: negative for, nasal congestion, persistent sore throat, hoarseness  Respiratory: No shortness of breath, dyspnea on exertion, cough, or  hemoptysis  Cardiovascular: negative for, chest pain, dyspnea on exertion and exercise intolerance  Gastrointestinal: negative for, nausea, vomiting, constipation and diarrhea  Genitourinary: negative for, nocturia, dysuria, frequency and urgency  Musculoskeletal: negative for, muscular weakness and nocturnal cramping  Neurologic: positive for memory problems, negative for and numbness or tingling of feet  Psychiatric: negative  Hematologic/Lymphatic/Immunologic: negative    Past Medical History  Past Medical History:   Diagnosis Date     Actinic keratosis 3/12/2013     Degenerative joint disease      Diabetes (H)      Gastroesophageal reflux disease without esophagitis 12/11/2020     Rheumatic fever 1957    x2 between the ages of 15-18     Seasonal allergies        Medications  Current Outpatient Medications   Medication Sig Dispense Refill     Blood Glucose Monitoring Suppl (ONETOUCH VERIO FLEX SYSTEM) w/Device KIT USE TO TEST BLOOD SUGAR 3 TIMES DAILY (OK TO SUBSTITUTE ALTERNATE BRAND PER INSURANCE FORMULARY. IF ONE TOUCH ONLY GIVE VERIO NOT ULTRA) 1 kit 1     Continuous Blood Gluc  (DEXCOM G7 ) SANTANA Use to read blood sugars as per 's instructions. 1 each 0     Continuous Blood Gluc Sensor (DEXCOM G7 SENSOR) MISC Change every 10 days. 3 each 5     fexofenadine (ALLEGRA) 180 MG tablet Take 180 mg by mouth as needed       ibuprofen (ADVIL/MOTRIN) 200 MG capsule Take 400 mg by mouth every 4 hours as needed        insulin aspart (NOVOLOG FLEXPEN) 100 UNIT/ML pen Inject 16 units Subcutaneous before breakfast, lunch and dinner. Take about 15 minutes before eating. Do not take if not eating or eating <15 gm carbohydrates. 30 mL 1     insulin glargine U-300 (TOUJEO SOLOSTAR) 300 UNIT/ML (1 units dial) pen Inject 48 Units Subcutaneous At Bedtime 9 mL 1     insulin pen needle (BD PEN NEEDLE JUAN C 2ND GEN) 32G X 4 MM miscellaneous Use 4 pen needles daily or as directed. 400 each 2     irbesartan  (AVAPRO) 75 MG tablet Take 1 tablet (75 mg) by mouth At Bedtime 90 tablet 1     metFORMIN (GLUCOPHAGE) 1000 MG tablet Take 1 tablet (1,000 mg) by mouth 2 times daily (with meals) 180 tablet 1     OneTouch Delica Lancets 30G MISC 1 lancet 3 times daily (OK to substitute alternate brand per insurance formulary.  If One Touch only give Verio not Ultra) 100 each 11     ONETOUCH VERIO IQ test strip USE TO TEST BLOOD SUGARS 3 TIMES DAILY, ON INSULIN (OK TO SUBSTITUTE ALTERNATE BRAND PER INSURANCE FORMULARY. IF ONE TOUCH ONLY GIVE VERIO NOT ULTRA) 100 strip 4     rosuvastatin (CRESTOR) 5 MG tablet TAKE 1 TABLET BY MOUTH TWICE WEEKLY 24 tablet 2     SYNTHROID 137 MCG tablet Take 1 tablet (137 mcg) by mouth daily 90 tablet 3     travoprost SIXTO FREE (TRAVATAN Z) 0.004 % ophthalmic solution Place 1 drop into both eyes At Bedtime 2.5 mL 11     triamcinolone (KENALOG) 0.1 % external cream APPLY TO AFFECTED AREA TWICE A DAY FOR 2 WEEKS.  Please keep 9-28-22 clinic appt for refills 45 g 0       Allergies  Allergies   Allergen Reactions     New Medication Allergy Rash     Soliqua      Ace Inhibitors Cough     Estradiol Itching     Lipitor [Atorvastatin Calcium]      Weak and shaky     Sulfa Antibiotics      Rash       Zocor [Simvastatin]      Weak and shakey     Triamterene Dermatitis     Possible photosensitivity dermatitis, mild.  (changed to hctz alone 6/4/07, will see if this helps)       Family History  family history includes Breast Cancer in her daughter; Cancer in her father and sister; Cerebrovascular Disease in her maternal grandfather, paternal grandfather, and paternal grandmother; Endometrial Cancer in her daughter; Eye Disorder in her brother, mother, and paternal grandmother; Heart Disease in her paternal grandfather.    Social History  Social History     Tobacco Use     Smoking status: Never     Passive exposure: Never     Smokeless tobacco: Never     Tobacco comments:     has had exposure to second hand smoke in  the past from [facility], no current exposure   Vaping Use     Vaping Use: Never used   Substance Use Topics     Alcohol use: No     Drug use: No       Physical Exam  There is no height or weight on file to calculate BMI.  GENERAL: no distress. She is accompanied by her daughter.  SKIN: Visible skin clear. No significant rash, abnormal pigmentation or lesions.  EYES: Eyes grossly normal to inspection.  No discharge or erythema, or obvious scleral/conjunctival abnormalities.  NECK: visible goiter is not present; no visible neck masses  RESP: No audible wheeze, cough, or visible cyanosis.  No visible retractions or increased work of breathing.    NEURO: Awake, alert, responds appropriately to questions.  Mentation and speech fluent.  PSYCH:affect normal and appearance well-groomed.      DATA REVIEW    Date AM PM   7/1 238 217   7/2  153   7/3 160    7/4  210   7/9  141/213   7/10  323   7/11 223 427   7/12 193 361   7/13 182 367   7/14 177    7/16 174    7/17 145    7/18 184    7/19 334    7/20 278            Again, thank you for allowing me to participate in the care of your patient.        Sincerely,        Whit Arrieta PA-C

## 2023-08-03 ENCOUNTER — OFFICE VISIT (OUTPATIENT)
Dept: OPHTHALMOLOGY | Facility: CLINIC | Age: 83
End: 2023-08-03
Attending: OPHTHALMOLOGY
Payer: COMMERCIAL

## 2023-08-03 DIAGNOSIS — H40.1131 PRIMARY OPEN ANGLE GLAUCOMA OF BOTH EYES, MILD STAGE: Primary | ICD-10-CM

## 2023-08-03 PROCEDURE — G0463 HOSPITAL OUTPT CLINIC VISIT: HCPCS | Performed by: OPHTHALMOLOGY

## 2023-08-03 PROCEDURE — 99214 OFFICE O/P EST MOD 30 MIN: CPT | Performed by: OPHTHALMOLOGY

## 2023-08-03 RX ORDER — DORZOLAMIDE HYDROCHLORIDE AND TIMOLOL MALEATE PRESERVATIVE FREE 20; 5 MG/ML; MG/ML
1 SOLUTION/ DROPS OPHTHALMIC 2 TIMES DAILY
Qty: 45 EACH | Refills: 3 | Status: SHIPPED | OUTPATIENT
Start: 2023-08-03 | End: 2023-10-05

## 2023-08-03 ASSESSMENT — TONOMETRY
OS_IOP_MMHG: 19
OD_IOP_MMHG: 15
OS_IOP_MMHG: 18
IOP_METHOD: APPLANATION
IOP_METHOD: APPLANATION
OS_IOP_MMHG: 15
OD_IOP_MMHG: 20
OD_IOP_MMHG: 21

## 2023-08-03 ASSESSMENT — EXTERNAL EXAM - LEFT EYE: OS_EXAM: NORMAL

## 2023-08-03 ASSESSMENT — VISUAL ACUITY
OD_SC: 20/25
OD_SC+: -3
OS_PH_SC: 20/30
METHOD: SNELLEN - LINEAR
OS_SC: 20/40
OS_SC+: -1

## 2023-08-03 ASSESSMENT — SLIT LAMP EXAM - LIDS
COMMENTS: NORMAL
COMMENTS: NORMAL

## 2023-08-03 ASSESSMENT — EXTERNAL EXAM - RIGHT EYE: OD_EXAM: NORMAL

## 2023-08-03 NOTE — NURSING NOTE
Chief Complaints and History of Present Illnesses   Patient presents with    Post Op (Ophthalmology) Right Eye     Chief Complaint(s) and History of Present Illness(es)       Post Op (Ophthalmology) Right Eye              Laterality: right eye    Associated symptoms: Negative for eye pain, flashes and floaters    Treatments tried: eye drops              Comments    Here for SLT post op right eye. Vision is about the same. Compliant with drops. No eye pain. No flashes or floaters.    Haroon Galo COT 2:30 PM August 3, 2023

## 2023-08-03 NOTE — PROGRESS NOTES
[facility] - Glaucoma clinic    Chief Complaint/Presenting Concern: Glaucoma    History of Present Illness:   Brandy Leon is a 82 year old patient who presents for evaluation of POAG.   Patient was dx due to rNFL thinning in 2015 by Dr Austin Serna. Patient has been on latanoprost at bedtime ever since. Had CE/IOL in late 2018. Had BULB with Dr Moran in 2022. Re-established care with Dr Gurjit Cardoso 03/17/2023 during which time IOP was 26 and 18 on intermittent latanoprost due to drop intolerance. Switched to Travatan Z.   - 05/30/2023: s/p right eye SLT     Today, 08/03/2023, patient states stable vision since last visit. No eye pain or pressure sensation. Using the travatan in both eyes at bedtime (supposed to be left eye only)    Relevant Past Medical/Family/Social History:  Past Medical History:   Diagnosis Date    Actinic keratosis 3/12/2013    Degenerative joint disease     Diabetes (H)     Gastroesophageal reflux disease without esophagitis 12/11/2020    Rheumatic fever 1957    x2 between the ages of 15-18    Seasonal allergies      Relevant Review of Systems:  None are relevant  Diagnosis: Primary open angle glaucoma   ICD-10 stages Mild or early-stage glaucoma - pre-perimetric glaucoma (with a normal visual field)  Year diagnosis: 2018  Previous glaucoma surgery/laser:    Both eyes: CE/IOL 2018 by Dr Austin Senra   Maximum intraocular pressure: 30/25  Current ophthalmic medications:    Both eyes: Travatan Z   Family history of any glaucoma: positive  CCT (um) 5/27/23: 546/552  Gonioscopy 2021   Right eye: open 360   Left eye: open 360  Refractive status: Almost emmetropic prior to CE/IOL  Trauma history: negative  Steroid exposure: positive  Vasospastic disease: Migrane/Raynaud phenomenon: negative  A past hemodynamic crisis or Low BP: negative  Meds AEs/intolerance:   Sulfa antibiotics   Preservatives in topical eye medications  Focused PMHx:  Asthma and respiratory  problems: positive (DINORA)  Cardiovascular disease: positive (HTN, IDDM2)  Renal disease: negative  Nephrolithiasis: negative  Sulfa allergies: positive  Anticoagulants: negative     Prior testing  HVF's (Dr Austin Serna)  2019: right eye essentially full, left eye unreliable with scattered inf defects  2018: unreliable fields  2017: unreliable fields, right eye superior defect, left eye inferior hemifield loss crossing both midlines  2016: unreliable fields  2015: mod reliability, essentially full each eye  HVF 9/20/21  Right Eye: low reliability (8/13 FL, 39% FP, 30% FN), MD -3.21, scattered nonspecific defects  Left Eye: low reliability (6/11 FL, 7% FP, 20% FN), MD -5.29, scattered defects  Visual field 5/30/23:   Right eye - nasal step, superior arcuate defect, reliable  Left eye - paracentral and peripheral defects  OCT Optic Nerve RNFL Spectralis 5/30/23  Right eye: IT RNFL thinning, worsening   Left eye: IT RNFL thinning, poor tracing     Today's testing:   IOP: 20 and 18 mmHg by applanation    Additional Ocular History:     2. Pseudophakia, each eye   - Monitor    Plan/Recommendations:  Discussed findings with patient.  Patient has POAG mild with prior progressive rNFL thinning and elevated IOP, thus Dr Serna began IOP lowering therapy in 08/2015.  On today's testing, there is right eye progression   IOP goal teens both eyes. S/p SLT right eye done 05/30/2023, not much improvement.   Discussed options: continue present management, add PF options (eg. Cosopt, no significant contraindications, DINORA is relative only), MIGS (eg. iStent Infinite). Patient agrees to start Cosopt PF   Start Cosopt PF BID right eye, if good response add to left eye   Continue Travatan Z at bedtime     RTC: 2 months [facility], IOP      Physician Attestation     Attending Physician Attestation:  Complete documentation of historical and exam elements from today's encounter can be found in the full encounter summary report (not reduplicated  in this progress note). I personally obtained the chief complaint(s) and history of present illness. I confirmed and edited as necessary the review of systems, past medical/surgical history, family history, social history, and examination findings as documented by others; and I examined the patient myself. I personally reviewed the relevant tests, images, and reports as documented above. I formulated and edited as necessary the assessment and plan and discussed the findings and management plan with the patient and any family members present at the time of the visit.  Radha Ceja M.D., Glaucoma, August 3, 2023

## 2023-08-03 NOTE — PATIENT INSTRUCTIONS
Continue Travatan Z at bedtime each eye     Start Cosopt Preservative free 1 drop twice daily each eye

## 2023-08-22 DIAGNOSIS — E11.65 TYPE 2 DIABETES MELLITUS WITH HYPERGLYCEMIA, WITH LONG-TERM CURRENT USE OF INSULIN (H): ICD-10-CM

## 2023-08-22 DIAGNOSIS — Z79.4 TYPE 2 DIABETES MELLITUS WITH HYPERGLYCEMIA, WITH LONG-TERM CURRENT USE OF INSULIN (H): ICD-10-CM

## 2023-08-22 RX ORDER — BLOOD SUGAR DIAGNOSTIC
STRIP MISCELLANEOUS
Qty: 100 STRIP | Refills: 4 | Status: SHIPPED | OUTPATIENT
Start: 2023-08-22

## 2023-08-22 ASSESSMENT — ENCOUNTER SYMPTOMS
DIFFICULTY URINATING: 0
DYSURIA: 0
DISTURBANCES IN COORDINATION: 0
NUMBNESS: 1
JAUNDICE: 0
HEMATURIA: 0
CONSTIPATION: 0
SPEECH CHANGE: 0
ABDOMINAL PAIN: 0
RECTAL PAIN: 0
DIZZINESS: 1
WEAKNESS: 0
TREMORS: 0
LOSS OF CONSCIOUSNESS: 0
SEIZURES: 0
HEADACHES: 0
BLOATING: 1
MEMORY LOSS: 0
HEARTBURN: 0
DIARRHEA: 0
BLOOD IN STOOL: 0
FLANK PAIN: 0
TINGLING: 1
BOWEL INCONTINENCE: 0
PARALYSIS: 0
VOMITING: 0
NAUSEA: 0

## 2023-08-25 ENCOUNTER — VIRTUAL VISIT (OUTPATIENT)
Dept: ENDOCRINOLOGY | Facility: CLINIC | Age: 83
End: 2023-08-25
Payer: COMMERCIAL

## 2023-08-25 VITALS — WEIGHT: 180 LBS | BODY MASS INDEX: 30.66 KG/M2

## 2023-08-25 DIAGNOSIS — Z79.4 TYPE 2 DIABETES MELLITUS WITH HYPERGLYCEMIA, WITH LONG-TERM CURRENT USE OF INSULIN (H): Primary | ICD-10-CM

## 2023-08-25 DIAGNOSIS — E11.65 TYPE 2 DIABETES MELLITUS WITH HYPERGLYCEMIA, WITH LONG-TERM CURRENT USE OF INSULIN (H): Primary | ICD-10-CM

## 2023-08-25 PROCEDURE — 99213 OFFICE O/P EST LOW 20 MIN: CPT | Mod: 95 | Performed by: PHYSICIAN ASSISTANT

## 2023-08-25 NOTE — NURSING NOTE
Is the patient currently in the state of [place]? YES    Visit mode:VIDEO    If the visit is dropped, the patient can be reconnected by: VIDEO VISIT: Text to cell phone:   Telephone Information:   Mobile [phone]       Will anyone else be joining the visit? NO  (If patient encounters technical issues they should call [phone]    How would you like to obtain your AVS? MyChart    Are changes needed to the allergy or medication list? No    Reason for visit: RECHECK and Video Visit    Cari LIRA

## 2023-08-25 NOTE — PROGRESS NOTES
Assessment/Plan :   Type 2 DM, uncontrolled. We discussed Zoila's current medications and her current blood sugars. She is not checking her blood sugars enough. I understand that she does not like poking her fingers, but then she needs to try the sensor. She may have a skin reaction but we won't know, unless she tries. She is also worried about the cost. We discussed options and she will pay for the Dexcom G7  and we will give her a sample of the Dexcom G7 sensor. This way she can try the CGMS device without spending a significant amount of money. I do not have enough information to make any adjustments to her medications. Her daughter will plan on stopping by next week to  a sample of the sensor. If they have trouble applying the sensor, she is to contact our office. We will follow-up in 1 month.     Due to the COVID 19 pandemic this visit was a telephone/video visit in order to help prevent spread of infection in this patient and the general population. The patient gave verbal consent for the visit today. I have independently reviewed and interpreted labs, imaging as indicated.       Distant Location (provider location):  On-site  Mode of Communication:  Video Conference via [facility]  Chart review/prep time 5   Joined the call at 8/25/2023, 12:15:30 pm.  Left the call at 8/25/2023, 12:26:50 pm.  You were on the call for 11 minutes 19 seconds .  20 minutes spent on the date of the encounter doing chart review, history and exam, documentation and further activities as noted above.      Chief complaint:  Brandy is a 83 year old female who returns for follow-up of Type 2 DM, uncontrolled.    I have reviewed Care Everywhere including [facility], [facility], [facility], [facility], [place], [facility], [facility] , [facility], [facility] lab reports, imaging reports and provider notes as indicated.      HISTORY OF PRESENT ILLNESS  Zoila continues to work on managing her blood sugars. We had discussed  starting sensor therapy, at the last visit. Zoila is worried about the possibility of a skin reaction and she decided not to  the sensors. She continues to take her medication, as directed, and she is using fingerstick testing to monitor her blood sugars.    She continues to take 48 unit(s) of Toujeo at bedtime along with metformin 1000 mg twice daily, glipizide 10 mg twice daily and Novolog 16 unit(s) with meals. She feels like her blood sugars are stable and she denies any problems with severe hyperglycemia and/or hypoglycemia. She is only monitoring her blood sugars once every few days or as needed. She doesn't like to poke her fingers and complains that it hurts. She has not had any problems with blurry vision or worsening numbness/tingling in her feet. She does have issues with balance and she does not like to leave the house.     Endocrine relevant labs are as follows:     Latest Reference Range & Units 04/14/23 11:30   Hemoglobin A1C POCT 4.3 - <5.7 % 12.5 !   !: Data is abnormal    REVIEW OF SYSTEMS  Answers submitted by the patient for this visit:  Symptoms you have experienced in the last 30 days (Submitted on 8/22/2023)  General Symptoms: No  Skin Symptoms: No  HENT Symptoms: No  EYE SYMPTOMS: No  HEART SYMPTOMS: No  LUNG SYMPTOMS: No  INTESTINAL SYMPTOMS: Yes  URINARY SYMPTOMS: Yes  GYNECOLOGIC SYMPTOMS: No  BREAST SYMPTOMS: No  SKELETAL SYMPTOMS: No  BLOOD SYMPTOMS: No  NERVOUS SYSTEM SYMPTOMS: Yes  MENTAL HEALTH SYMPTOMS: No  Please answer the questions below to tell us what conditions you are experiencing: (Submitted on 8/22/2023)  Heart burn or indigestion: No  Nausea: No  Vomiting: No  Abdominal pain: No  Bloating: Yes  Constipation: No  Diarrhea: No  Blood in stool: No  Black stools: No  Rectal or Anal pain: No  Fecal incontinence: No  Yellowing of skin or eyes: No  Vomit with blood: No  Change in stools: No  Please answer the questions below to tell us what condition you are experiencing:  (Submitted on 8/22/2023)  Trouble holding urine or incontinence: Yes  Pain or burning: No  Trouble starting or stopping: No  Increased frequency of urination: No  Blood in urine: No  Decreased frequency of urination: No  Frequent nighttime urination: No  Flank pain: No  Difficulty emptying bladder: No  Please answer the questions below to tell us what condition you are experiencing: (Submitted on 8/22/2023)  Trouble with coordination: No  Dizziness or trouble with balance: Yes  Fainting or black-out spells: No  Memory loss: No  Headache: No  Seizures: No  Speech problems: No  Tingling: Yes  Tremor: No  Weakness: No  Difficulty walking: Yes  Paralysis: No  Numbness: Yes    Endocrine: positive for diabetes  Skin: negative  Eyes: negative for, visual blurring, redness, tearing  Ears/Nose/Throat: negative  Respiratory: No shortness of breath, dyspnea on exertion, cough, or hemoptysis  Cardiovascular: negative for, irregular heart beat, chest pain, and exercise intolerance  Gastrointestinal: negative for, nausea, vomiting, constipation, and diarrhea  Genitourinary: negative for, nocturia, dysuria, frequency, and urgency  Musculoskeletal: negative for, muscular weakness, and nocturnal cramping  Neurologic: negative for, local weakness, and numbness or tingling of feet  Psychiatric: negative  Hematologic/Lymphatic/Immunologic: negative    Past Medical History  Past Medical History:   Diagnosis Date    Actinic keratosis 3/12/2013    Degenerative joint disease     Diabetes (H)     Gastroesophageal reflux disease without esophagitis 12/11/2020    Rheumatic fever 1957    x2 between the ages of 15-18    Seasonal allergies        Medications  Current Outpatient Medications   Medication Sig Dispense Refill    Blood Glucose Monitoring Suppl (ONETOUCH VERIO FLEX SYSTEM) w/Device KIT USE TO TEST BLOOD SUGAR 3 TIMES DAILY (OK TO SUBSTITUTE ALTERNATE BRAND PER INSURANCE FORMULARY. IF ONE TOUCH ONLY GIVE VERIO NOT ULTRA) 1 kit 1     Continuous Blood Gluc  (DEXCOM G7 ) SANTANA Use to read blood sugars as per 's instructions. 1 each 0    Continuous Blood Gluc Sensor (DEXCOM G7 SENSOR) MISC Change every 10 days. 3 each 5    dorzolamide-timolol PF (COSOPT PF) 2-0.5 % opthalmic solutionh Place 1 drop into the right eye 2 times daily 45 each 3    fexofenadine (ALLEGRA) 180 MG tablet Take 180 mg by mouth as needed      ibuprofen (ADVIL/MOTRIN) 200 MG capsule Take 400 mg by mouth every 4 hours as needed       insulin aspart (NOVOLOG FLEXPEN) 100 UNIT/ML pen Inject 16 units Subcutaneous before breakfast, lunch and dinner. Take about 15 minutes before eating. Do not take if not eating or eating <15 gm carbohydrates. 30 mL 1    insulin glargine U-300 (TOUJEO SOLOSTAR) 300 UNIT/ML (1 units dial) pen Inject 48 Units Subcutaneous At Bedtime 9 mL 1    insulin pen needle (BD PEN NEEDLE JUAN C 2ND GEN) 32G X 4 MM miscellaneous Use 4 pen needles daily or as directed. 400 each 2    irbesartan (AVAPRO) 75 MG tablet Take 1 tablet (75 mg) by mouth At Bedtime 90 tablet 1    metFORMIN (GLUCOPHAGE) 1000 MG tablet Take 1 tablet (1,000 mg) by mouth 2 times daily (with meals) 180 tablet 1    OneTouch Delica Lancets 30G MISC 1 lancet 3 times daily (OK to substitute alternate brand per insurance formulary.  If One Touch only give Verio not Ultra) 100 each 11    ONETOUCH VERIO IQ test strip USE TO TEST BLOOD SUGARS 3 TIMES DAILY, ON INSULIN (OK TO SUBSTITUTE ALTERNATE BRAND PER INSURANCE FORMULARY. IF ONE TOUCH ONLY GIVE VERIO NOT ULTRA) 100 strip 4    rosuvastatin (CRESTOR) 5 MG tablet TAKE 1 TABLET BY MOUTH TWICE WEEKLY 24 tablet 2    SYNTHROID 137 MCG tablet Take 1 tablet (137 mcg) by mouth daily 90 tablet 3    travoprost BAK FREE (TRAVATAN Z) 0.004 % ophthalmic solution Place 1 drop into both eyes At Bedtime 2.5 mL 11    triamcinolone (KENALOG) 0.1 % external cream APPLY TO AFFECTED AREA TWICE A DAY FOR 2 WEEKS.  Please keep 9-28-22 clinic appt for  refills 45 g 0       Allergies  Allergies   Allergen Reactions    New Medication Allergy Rash     Soliqua     Ace Inhibitors Cough    Estradiol Itching    Lipitor [Atorvastatin Calcium]      Weak and shaky    Sulfa Antibiotics      Rash      Zocor [Simvastatin]      Weak and shakey    Triamterene Dermatitis     Possible photosensitivity dermatitis, mild.  (changed to hctz alone 6/4/07, will see if this helps)       Family History  family history includes Breast Cancer in her daughter; Cancer in her father and sister; Cerebrovascular Disease in her maternal grandfather, paternal grandfather, and paternal grandmother; Endometrial Cancer in her daughter; Eye Disorder in her brother, mother, and paternal grandmother; Heart Disease in her paternal grandfather.    Social History  Social History     Tobacco Use    Smoking status: Never     Passive exposure: Never    Smokeless tobacco: Never    Tobacco comments:     has had exposure to second hand smoke in the past from [employer], no current exposure   Vaping Use    Vaping Use: Never used   Substance Use Topics    Alcohol use: No    Drug use: No       Physical Exam  Body mass index is 30.66 kg/m .  GENERAL: no distress. She is accompanied by her daughter who is also her primary caregiver  SKIN: Visible skin clear. No significant rash, abnormal pigmentation or lesions.  EYES: Eyes grossly normal to inspection.  No discharge or erythema, or obvious scleral/conjunctival abnormalities.  NECK: visible goiter is not present; no visible neck masses  RESP: No audible wheeze, cough, or visible cyanosis.  No visible retractions or increased work of breathing.    NEURO: Awake, alert, responds appropriately to questions.  Mentation and speech fluent.  PSYCH:affect normal and appearance well-groomed.      DATA REVIEW  Please see attached glucose logs

## 2023-09-15 ENCOUNTER — ALLIED HEALTH/NURSE VISIT (OUTPATIENT)
Dept: EDUCATION SERVICES | Facility: CLINIC | Age: 83
End: 2023-09-15
Payer: COMMERCIAL

## 2023-09-15 DIAGNOSIS — Z79.4 TYPE 2 DIABETES MELLITUS WITH HYPERGLYCEMIA, WITH LONG-TERM CURRENT USE OF INSULIN (H): Chronic | ICD-10-CM

## 2023-09-15 DIAGNOSIS — E11.65 TYPE 2 DIABETES MELLITUS WITH HYPERGLYCEMIA, WITH LONG-TERM CURRENT USE OF INSULIN (H): Chronic | ICD-10-CM

## 2023-09-15 LAB — HBA1C MFR BLD: 11.6 % (ref 4.3–?)

## 2023-09-15 PROCEDURE — G0108 DIAB MANAGE TRN  PER INDIV: HCPCS | Mod: AE

## 2023-09-15 PROCEDURE — 83036 HEMOGLOBIN GLYCOSYLATED A1C: CPT

## 2023-09-15 NOTE — PATIENT INSTRUCTIONS
PLAN:  *Continue with Toujeo 52 units  *Try to work on not missing meal time doses  *Novolog 16 units before meals + correction scale with meals only of 1 unit per 50>150  150-200=1 units   201-250=2 units  251-300=3 units  301-350=4 units  *A1C today was 11.6, previously was 12.5  *Change sensor in 10 days    FOLLOW-UP:    *10/5 with Whit Kinsey RN, Diabetes Educator  Diabetes Education Department  [facility], [place]  [phone]

## 2023-09-15 NOTE — PROGRESS NOTES
Diabetes Self-Management Education & Support  Brandy Leon presents today for education related to Type 2 diabetes    Patient is being treated with:  oral agents and insulin  She is accompanied by daughter, Juliet    Year of diagnosis: 2015  Referring provider:  Dr. Morris  Living Situation: Home, daughter/son split time at house helping  Employment: Retired    PATIENT CONCERNS RELATED TO DIABETES SELF MANAGEMENT:   Worried will have skin reaction to sensor    ASSESSMENT:    Taking Medication:   Current Diabetes Management per Patient:  Taking diabetes medications? Yes  Toujeo 52 units before bed  Novolog 16 units before meals, misses at least 1-2 doses per day. No correction. TDD 32 units  Metformin 1000 mg BID  Glipizide 10 mg twice daily     Monitoring  Patient glucose self monitoring as follows: once daily  BG meter: One Touch Verio  BG results: Fasting in morning and before bed-11 am  Range per daughter is 171-503    Patient's most recent   Lab Results   Component Value Date    A1C 10.6 12/13/2022    A1C 10.3 05/17/2021      Patient's A1C goal: <8.0    Activity: no regular exercise program, fear of falling    Healthy Eating:  Patient currently eats 3 meals 1 snacks per day   Breakfast: Cereal, wheat bread with cheese, banana, yogurt, coffee with whole milk  Lunch: Beef stick, cheese, popcorn  Dinner: Fish, leftovers, roast, burgers, potatoes, rarely has pasta, pizza with salad/ranch dressing, water  Snacks: Popcorn  Water throughout day, no regular soda    Problem Solving:    Patient is at risk of hypoglycemia?: NO  Hospitalizations for hyper or hypoglycemia: No    Healthy Coping and Stress Management:   Sources of stress identified by patient  Other (please specify):  Not assessed  Coping mechanisms identified by patient:  Other (please specify):  Not assessed      EDUCATION and INSTRUCTION PROVIDED AT THIS VISIT:    T2 seen in clinic for Dexcom G7 start. She is accompanied by daughter, Juliet. A1C today 11.6,  improved from 12.5.Has tried CGM in the past and had issues with skin reaction and sensor falling off. Unfortunately, they did not bring glucometer with to visit. Juliet reports BG range from 171-503. She has transitioned to U300, 52 units in evening. She continues to miss 1-2 meal times doses per day. She is not using correction scale. Discussed with patient: purpose of sensor, variability between blood glucose and sensor values, meaning of trending arrows,  cannot be further than 20 feet from sensor, cannot wear sensor when having MRI or CT scan, informed to not go through full body scanner at airport, and sensor is waterproof. Reminded that if sensor reading does not match symptoms, to check with fingerstick. Low alert set at 70. High alert set at 400. Informed urgent low 55 or less, cannot be turned off. There is a 30 minute warm up period. Data is least accurate the first 12-24 hours. Dexcom sensor is changed every 10 days. Sensor was placed on left arm by daughter without difficulty. Plan as noted below. Follow up with Whit Arrieta in two weeks.     Earnestine Kinsey, RN Diabetes Educator  Diabetes Education Department  [facility], [facility]  [phone]    G7 Sensor:LOT [id number]  EXP 10/31/2024    Patient-stated goal written and given to Brandy Leon.  Verbalized and demonstrated understanding of instructions.   See patient instructions  AVS printed and given to patient      PLAN:  *Continue with Toujeo 52 units  *Try to work on not missing meal time doses  *Novolog 16 units before meals + correction scale with meals only of 1 unit per 50>150  150-200=1 units   201-250=2 units  251-300=3 units  301-350=4 units  *A1C today was 11.6, previously was 12.5    FOLLOW-UP:    *10/5 with Whit Arrieta    Time spent with patient at today's visit was 60 minutes.      Any diabetes medication dose changes were made via the CDE Protocol and Collaborative Practice Agreement with [facility].  A copy of this encounter was provided to patient's referring provider-Whit Arrieta, in clinic.

## 2023-09-25 ENCOUNTER — PATIENT OUTREACH (OUTPATIENT)
Dept: CARE COORDINATION | Facility: CLINIC | Age: 83
End: 2023-09-25
Payer: COMMERCIAL

## 2023-09-27 ENCOUNTER — MYC MEDICAL ADVICE (OUTPATIENT)
Dept: ENDOCRINOLOGY | Facility: CLINIC | Age: 83
End: 2023-09-27
Payer: COMMERCIAL

## 2023-09-27 DIAGNOSIS — E11.65 TYPE 2 DIABETES MELLITUS WITH HYPERGLYCEMIA, WITH LONG-TERM CURRENT USE OF INSULIN (H): Primary | ICD-10-CM

## 2023-09-27 DIAGNOSIS — Z79.4 TYPE 2 DIABETES MELLITUS WITH HYPERGLYCEMIA, WITH LONG-TERM CURRENT USE OF INSULIN (H): Primary | ICD-10-CM

## 2023-10-04 NOTE — PROGRESS NOTES
"[facility] - Glaucoma clinic    Chief Complaint/Presenting Concern: Glaucoma    History of Present Illness:   Brandy Leon is a 83 year old patient who presents for evaluation of POAG.   Patient was dx due to rNFL thinning in 2015 by Dr Austin Serna. Patient has been on latanoprost at bedtime ever since. Had CE/IOL in late 2018. Had BULB with Dr Moran in 2022. Re-established care with Dr Gurjit Cardoso 03/17/2023 during which time IOP was 26 and 18 on intermittent latanoprost due to drop intolerance. Switched to Travatan Z. Also started Cosopt PF 8/3/23 right eye only.   - 05/30/2023: s/p right eye SLT     Today, 10/05/2023, patient states stable vision since last visit. No eye pain or pressure sensation. Using the travatan in both eyes at bedtime (supposed to be left eye only). Also using Cosopt PF right eye mostly at night, but complains of burning that last 30 seconds. Also her eyes feel like there is \"glue\" in them. No using artificial tears.     Relevant Past Medical/Family/Social History:  Past Medical History:   Diagnosis Date    Actinic keratosis 3/12/2013    Degenerative joint disease     Diabetes (H)     Gastroesophageal reflux disease without esophagitis 12/11/2020    Rheumatic fever 1957    x2 between the ages of 15-18    Seasonal allergies      Relevant Review of Systems:  None are relevant  Diagnosis: Primary open angle glaucoma   ICD-10 stages Mild or early-stage glaucoma - pre-perimetric glaucoma (with a normal visual field)  Year diagnosis: 2018  Previous glaucoma surgery/laser:    Both eyes: CE/IOL 2018 by Dr Austin Serna   Maximum intraocular pressure: 30/25  Current ophthalmic medications:    Both eyes Travatan Z, Cosopt right eye  Family history of any glaucoma: positive  CCT (um) 5/27/23: 546/552  Gonioscopy 2021   Right eye: open 360   Left eye: open 360  Refractive status: Almost emmetropic prior to CE/IOL  Trauma history: negative  Steroid exposure: " positive  Vasospastic disease: Migrane/Raynaud phenomenon: negative  A past hemodynamic crisis or Low BP: negative  Meds AEs/intolerance:  Sulfa antibiotics  Preservatives in topical eye medications  Cosopt, minimal response   Focused PMHx:  Asthma and respiratory problems: positive (DINORA)  Cardiovascular disease: positive (HTN, IDDM2)    Prior testing  HVF's (Dr Austin Serna)  2019: right eye essentially full, left eye unreliable with scattered inf defects  2018: unreliable fields  2017: unreliable fields, right eye superior defect, left eye inferior hemifield loss crossing both midlines  2016: unreliable fields  2015: mod reliability, essentially full each eye  HVF 9/20/21  Right Eye: low reliability (8/13 FL, 39% FP, 30% FN), MD -3.21, scattered nonspecific defects  Left Eye: low reliability (6/11 FL, 7% FP, 20% FN), MD -5.29, scattered defects  Visual field 5/30/23:   Right eye - nasal step, superior arcuate defect, reliable  Left eye - paracentral and peripheral defects  OCT Optic Nerve RNFL Spectralis 5/30/23  Right eye: IT RNFL thinning, worsening   Left eye: IT RNFL thinning, poor tracing     Today's testing:   IOP: 22/20 mmHg by applanation    Additional Ocular History:     2. Pseudophakia, each eye   - Monitor    Plan/Recommendations:  Discussed findings with patient.  Patient has POAG mild with prior progressive rNFL thinning and elevated IOP, thus Dr Serna began IOP lowering therapy in 08/2015.  On prior testing, there is right eye progression   IOP goal teens both eyes. S/p SLT right eye done 05/30/2023, not much improvement.   Patient is not using Cosopt in the morning, discussed with the patient the importance of compliance with drops and she elects to try committing to the drops before moving to glaucoma surgery options.   Use Cosopt PF BID each eye  Continue Travatan Z at bedtime each eye     RTC: 2 months VA, IOP      Physician Attestation     Attending Physician Attestation:  Complete  documentation of historical and exam elements from today's encounter can be found in the full encounter summary report (not reduplicated in this progress note). I personally obtained the chief complaint(s) and history of present illness. I confirmed and edited as necessary the review of systems, past medical/surgical history, family history, social history, and examination findings as documented by others; and I examined the patient myself. I personally reviewed the relevant tests, images, and reports as documented above. I formulated and edited as necessary the assessment and plan and discussed the findings and management plan with the patient and any family members present at the time of the visit.  Radha Ceja M.D., Glaucoma, October 5, 2023

## 2023-10-05 ENCOUNTER — OFFICE VISIT (OUTPATIENT)
Dept: OPHTHALMOLOGY | Facility: CLINIC | Age: 83
End: 2023-10-05
Attending: OPHTHALMOLOGY
Payer: COMMERCIAL

## 2023-10-05 DIAGNOSIS — H04.129 DRY EYE: ICD-10-CM

## 2023-10-05 DIAGNOSIS — H40.1134 PRIMARY OPEN ANGLE GLAUCOMA (POAG) OF BOTH EYES, INDETERMINATE STAGE: Primary | ICD-10-CM

## 2023-10-05 PROCEDURE — G0463 HOSPITAL OUTPT CLINIC VISIT: HCPCS | Performed by: OPHTHALMOLOGY

## 2023-10-05 PROCEDURE — 92012 INTRM OPH EXAM EST PATIENT: CPT | Mod: GC | Performed by: OPHTHALMOLOGY

## 2023-10-05 RX ORDER — DORZOLAMIDE HYDROCHLORIDE AND TIMOLOL MALEATE PRESERVATIVE FREE 20; 5 MG/ML; MG/ML
1 SOLUTION/ DROPS OPHTHALMIC 2 TIMES DAILY
Qty: 45 EACH | Refills: 3 | Status: SHIPPED | OUTPATIENT
Start: 2023-10-05

## 2023-10-05 ASSESSMENT — VISUAL ACUITY
METHOD: SNELLEN - LINEAR
OS_PH_SC+: -2
OD_SC+: -2
OS_SC: 20/50
OS_PH_SC: 20/30
OD_PH_SC: 20/30
OS_SC+: -1
OD_SC: 20/40

## 2023-10-05 ASSESSMENT — REFRACTION_WEARINGRX
OS_AXIS: 155
OD_AXIS: 035
OD_SPHERE: -0.50
OD_CYLINDER: +0.50
OS_SPHERE: -1.00
OS_CYLINDER: +1.00
OD_ADD: +2.75
SPECS_TYPE: BIFOCAL
OS_ADD: +2.75

## 2023-10-05 ASSESSMENT — EXTERNAL EXAM - LEFT EYE: OS_EXAM: NORMAL

## 2023-10-05 ASSESSMENT — TONOMETRY
OS_IOP_MMHG: 15
OS_IOP_MMHG: 20
OD_IOP_MMHG: 15
IOP_METHOD: APPLANATION
OS_IOP_MMHG: 19
OD_IOP_MMHG: 22
IOP_METHOD: TONOPEN
OD_IOP_MMHG: 21

## 2023-10-05 ASSESSMENT — CUP TO DISC RATIO
OD_RATIO: 0.5
OS_RATIO: 0.5

## 2023-10-05 ASSESSMENT — SLIT LAMP EXAM - LIDS
COMMENTS: NORMAL
COMMENTS: NORMAL

## 2023-10-05 ASSESSMENT — CONF VISUAL FIELD
OD_SUPERIOR_TEMPORAL_RESTRICTION: 0
OD_INFERIOR_TEMPORAL_RESTRICTION: 0
OD_SUPERIOR_NASAL_RESTRICTION: 0
OS_SUPERIOR_TEMPORAL_RESTRICTION: 3
OD_NORMAL: 1
OD_INFERIOR_NASAL_RESTRICTION: 0
OS_INFERIOR_NASAL_RESTRICTION: 3
METHOD: COUNTING FINGERS

## 2023-10-05 ASSESSMENT — EXTERNAL EXAM - RIGHT EYE: OD_EXAM: NORMAL

## 2023-10-05 NOTE — NURSING NOTE
Chief Complaints and History of Present Illnesses   Patient presents with    Follow Up     Primary open angle glaucoma of both eyes     Chief Complaint(s) and History of Present Illness(es)       Follow Up              Comments: Primary open angle glaucoma of both eyes              Comments    Pt states that she is having a lot of burning from the Travatan  Also c/o sticky feeling in both eyes  Pt states no change in VA since last visit  No flashes, floaters or eye pain    .nosi

## 2023-10-05 NOTE — PATIENT INSTRUCTIONS
Continue Travatan Z at bedtime each eye     Continue Cosopt Preservative free 1 drop twice daily each eye

## 2023-10-06 RX ORDER — ACYCLOVIR 400 MG/1
TABLET ORAL
Qty: 3 EACH | Refills: 5 | Status: SHIPPED | OUTPATIENT
Start: 2023-10-06 | End: 2023-11-03

## 2023-10-09 ENCOUNTER — PATIENT OUTREACH (OUTPATIENT)
Dept: CARE COORDINATION | Facility: CLINIC | Age: 83
End: 2023-10-09
Payer: COMMERCIAL

## 2023-10-16 NOTE — PROGRESS NOTES
Medication Therapy Management (MTM) Encounter    ASSESSMENT:                            Medication Adherence/Access: No issues identified    Type 2 Diabetes:  Not meeting A1c goal of <8%. Discussed the risks of high blood sugars and importance of bringing the blood sugars into recommended levels. Would benefit from taking 16 units of Novolog before meals plus sliding scale as recommended by endocrinology. Patient is willing to take her Novolog consistently before breakfast and supper (when she is having a meal). Patient would benefit from a follow up with CDE and endocrinology.    Hypertension: At home blood pressure meeting goal <140/90mmHg. Would benefit from updated in clinic blood pressure.    PLAN:                          1.  Take Novolog consistently before breakfast and supper  2. Schedule a follow up appointment with Darren in endocrinology.    Follow-up: 3 months with MTM, endocrinology, CDE as soon as you can schedule.    SUBJECTIVE/OBJECTIVE:                          Brandy Leon is a 83 year old female contacted via secure video for a follow up visit.  Today's visit is a follow-up MTM visit from 5/22/2023. Patient's daughter, Juliet was also present for the visit.    Reason for visit: blood sugars, blood pressure    Allergies/ADRs: Reviewed in chart  Past Medical History: Reviewed in chart  Tobacco: She reports that she has never smoked. She has never been exposed to tobacco smoke. She has never used smokeless tobacco.  Alcohol: none  Caffeine: 2-3 cups coffee/day  Activity:None; daughter asks her to get up and walk around the house with walker  Has not had any falls.    Medication Adherence/Access:   Misses Novolog-patient says she feels better when she doesn't take it. Feels more alert  Uses [facility] in [place]    Does not have any goals for her health. Her health is somewhat important to her.    Diabetes   Toujeo U300 52 units daily   Novolog 16 units before breakfast, 16 units before  lunch and 16 units before supper + sliding scale-maybe taking 14 doses a week; doesn't really feel like taking her injections three times a day. Doesn't feel like any additional support would be helpful for her to take her injections consistently.  metformin 1000 mg twice daily  Glipizide ER 10mg twice daily     Patient is not experiencing side effects.  Blood sugar monitoring: CGM Dexcom using a   7 day-Time in range 5%, 34% high, 61% very high  Avg glucose for last 7 day 292mg/dL  No low blood sugars  Current diabetes symptoms: none  Diet/Exercise: No exercise. Patient typically eats 2 meals a day-breakfast and supper.     Eye exam is up to date  Foot exam: up to date  Urine Albumin:   Lab Results   Component Value Date    UMALCR 26.32 (H) 10/24/2022      Lab Results   Component Value Date    A1C 10.6 (H) 12/13/2022     Hypertension   Irbesartan 75mg daily    Patient reports no current medication side effects  Patient  patient does not consistently check blood pressure .  Today while on the phone blood pressure on with wrist cuff was 107/60mmHg P 87     BP Readings from Last 3 Encounters:   04/14/23 (!) 149/76   12/13/22 116/68   12/01/22 128/60     Pulse Readings from Last 3 Encounters:   04/14/23 95   12/13/22 110   12/01/22 105       Today's Vitals: There were no vitals taken for this visit.  ----------------    I spent 30 minutes with this patient today. All changes were made via collaborative practice agreement with Fernanda Miller MD. A copy of the visit note was provided to the patient's provider(s).    The patient was sent via [employer] a summary of these recommendations.     Medication Therapy Management  Mirna Perez, Pharm.D, [facility]  Medication Therapy Management Pharmacist    Telemedicine Visit Details  Type of service:  Telephone visit  Start Time: 3:33PM  End Time: 4:03PM     Medication Therapy Recommendations  Type 2 diabetes mellitus with hyperglycemia, with long-term current use of  insulin (H)    Current Medication: insulin aspart (NOVOLOG FLEXPEN) 100 UNIT/ML pen   Rationale: Patient prefers not to take - Adherence - Adherence   Recommendation: Provide Education   Status: Patient Agreed - Adherence/Education

## 2023-10-17 ENCOUNTER — VIRTUAL VISIT (OUTPATIENT)
Dept: PHARMACY | Facility: CLINIC | Age: 83
End: 2023-10-17
Payer: COMMERCIAL

## 2023-10-17 DIAGNOSIS — Z79.4 TYPE 2 DIABETES MELLITUS WITH HYPERGLYCEMIA, WITH LONG-TERM CURRENT USE OF INSULIN (H): Primary | ICD-10-CM

## 2023-10-17 DIAGNOSIS — I10 HYPERTENSION GOAL BP (BLOOD PRESSURE) < 140/90: ICD-10-CM

## 2023-10-17 DIAGNOSIS — E11.65 TYPE 2 DIABETES MELLITUS WITH HYPERGLYCEMIA, WITH LONG-TERM CURRENT USE OF INSULIN (H): Primary | ICD-10-CM

## 2023-10-17 PROCEDURE — 99607 MTMS BY PHARM ADDL 15 MIN: CPT | Mod: VID | Performed by: PHARMACIST

## 2023-10-17 PROCEDURE — 99606 MTMS BY PHARM EST 15 MIN: CPT | Mod: VID | Performed by: PHARMACIST

## 2023-10-17 NOTE — PATIENT INSTRUCTIONS
"Recommendations from today's MTM visit:                                                         1.  Take Novolog consistently before breakfast and supper  2. Schedule a follow up appointment with Darren in endocrinology.    Follow-up: 3 months with MTM, endocrinology, CDE as soon as you can schedule.    It was great speaking with you today.  I value your experience and would be very thankful for your time in providing feedback in our clinic survey. In the next few days, you may receive an email or text message from [employer] with a link to a survey related to your  clinical pharmacist.\"     To schedule another MTM appointment, please call the clinic directly or you may call the MTM scheduling line at [phone] or toll-free at [phone].     My Clinical Pharmacist's contact information:                                                      Please feel free to contact me with any questions or concerns you have.      Mirna Perez, Pharm.D, BCACP  Medication Therapy Management Pharmacist      "

## 2023-10-17 NOTE — Clinical Note
Dennis Colby, are you planning on reaching out to [facility] and following up? Her blood sugars are high per patient report. She hasn't been taking Novolog consistently. She said she could commit to taking 2 injections a day which she is going to start doing. If you aren't planning on following up I certainly can too, just let me know.

## 2023-10-20 ENCOUNTER — TELEPHONE (OUTPATIENT)
Dept: ENDOCRINOLOGY | Facility: CLINIC | Age: 83
End: 2023-10-20
Payer: COMMERCIAL

## 2023-10-20 NOTE — TELEPHONE ENCOUNTER
Left Voicemail (1st Attempt) for the patient to call back and schedule the following:    Appointment type: Return Diabetes  Provider: Mariana Arrieta  Return date: next available  Specialty phone number: [phone]  Additional appointment(s) needed:   Additonal Notes:     Good morning,   Are you able to reach out to daughter to schedule follow up with Whit Berny? Next available OK.   Thank you!   Earnestine Kinsey RN, Diabetes Educator   Diabetes Education Department       Also sent a [employer] message.    Yudelka R/Procedure    [facility]   Neurology, NeuroSurgery, NeuroPsychology, Pain Management and Cardiology Specialties  Medical/Surgical Adult Specialties

## 2023-10-24 ENCOUNTER — MYC MEDICAL ADVICE (OUTPATIENT)
Dept: EDUCATION SERVICES | Facility: CLINIC | Age: 83
End: 2023-10-24
Payer: COMMERCIAL

## 2023-10-24 NOTE — PROGRESS NOTES
10/24 Patient is currently scheduled for an appointment with mariely valles.     Mary moran Procedure   Orthopedics, Podiatry, Sports Medicine, Ent ,Eye , Audiology, Adult Endocrine & Diabetes, Nutrition & Medication Therapy Management Specialties   [facility]

## 2023-11-02 DIAGNOSIS — E11.65 TYPE 2 DIABETES MELLITUS WITH HYPERGLYCEMIA, WITH LONG-TERM CURRENT USE OF INSULIN (H): ICD-10-CM

## 2023-11-02 DIAGNOSIS — Z79.4 TYPE 2 DIABETES MELLITUS WITH HYPERGLYCEMIA, WITH LONG-TERM CURRENT USE OF INSULIN (H): ICD-10-CM

## 2023-11-02 NOTE — TELEPHONE ENCOUNTER
Will address refill at 11/3 visit.    Earnestine Kinsey RN, Diabetes Educator  Diabetes Education Department  [facility] [place]  [phone]

## 2023-11-03 ENCOUNTER — VIRTUAL VISIT (OUTPATIENT)
Dept: EDUCATION SERVICES | Facility: CLINIC | Age: 83
End: 2023-11-03
Payer: COMMERCIAL

## 2023-11-03 ENCOUNTER — MYC REFILL (OUTPATIENT)
Dept: ENDOCRINOLOGY | Facility: CLINIC | Age: 83
End: 2023-11-03

## 2023-11-03 DIAGNOSIS — Z79.4 TYPE 2 DIABETES MELLITUS WITH HYPERGLYCEMIA, WITH LONG-TERM CURRENT USE OF INSULIN (H): Primary | Chronic | ICD-10-CM

## 2023-11-03 DIAGNOSIS — E11.65 TYPE 2 DIABETES MELLITUS WITH HYPERGLYCEMIA, WITH LONG-TERM CURRENT USE OF INSULIN (H): ICD-10-CM

## 2023-11-03 DIAGNOSIS — Z79.4 TYPE 2 DIABETES MELLITUS WITH HYPERGLYCEMIA, WITH LONG-TERM CURRENT USE OF INSULIN (H): ICD-10-CM

## 2023-11-03 DIAGNOSIS — E11.65 TYPE 2 DIABETES MELLITUS WITH HYPERGLYCEMIA, WITH LONG-TERM CURRENT USE OF INSULIN (H): Primary | Chronic | ICD-10-CM

## 2023-11-03 PROCEDURE — G0108 DIAB MANAGE TRN  PER INDIV: HCPCS | Mod: 95

## 2023-11-03 RX ORDER — ACYCLOVIR 400 MG/1
TABLET ORAL
Qty: 3 EACH | Refills: 5 | Status: SHIPPED | OUTPATIENT
Start: 2023-11-03 | End: 2024-05-13

## 2023-11-03 RX ORDER — INSULIN GLARGINE 300 U/ML
INJECTION, SOLUTION SUBCUTANEOUS
Qty: 9 ML | Refills: 1 | Status: SHIPPED | OUTPATIENT
Start: 2023-11-03 | End: 2024-02-23

## 2023-11-03 NOTE — PATIENT INSTRUCTIONS
PLAN:  *Take Novolog 5 units for mid afternoon snack  *Continue Toujeo 52 units daily  *Novolog 16 units before meals + correction scale with meals only of 1 unit per 50>150  150-200=1 units   201-250=2 units  251-300=3 units  301-350=4 units  *Place pen needle in pill pack with Glipizide as reminder to take insulin    FOLLOW-UP:    *12/1 Virtually with Earnestine at 11am

## 2023-11-03 NOTE — PROGRESS NOTES
Virtual Visit Details    Type of service:  Video Visit   Video Start Time:  11:07 AM  Video End Time:11:37 AM    Originating Location (pt. Location): Home    Distant Location (provider location):  On-site  Platform used for Video Visit: [employer]    Diabetes Self-Management Education & Support  Brandy Leon presents today for education related to Type 2 diabetes    Patient is being treated with:  oral agents and insulin  She is accompanied by daughter, Juliet    Year of diagnosis: 2015  Referring provider:  Dr. Morris  Living Situation: Home, daughter/son split time at house helping  Employment: Retired    PATIENT CONCERNS RELATED TO DIABETES SELF MANAGEMENT:   None    ASSESSMENT:    Taking Medication:   Current Diabetes Management per Patient:  Taking diabetes medications? Yes  Toujeo 52 units before bed  Novolog 16 units before meals, misses at least 1-2 doses per day. No correction. TDD 32 units  Metformin 1000 mg BID  Glipizide 10 mg twice daily     Monitoring  Patient glucose self monitoring as follows: CGM  BG meter: Dexcom G7 via   BG results:         Patient's most recent   Lab Results   Component Value Date    A1C 10.6 12/13/2022    A1C 10.3 05/17/2021      Patient's A1C goal: <8.0    Activity: no regular exercise program, fear of falling    Healthy Eating:  Patient currently eats 3 meals 1 snacks per day   Breakfast 11-12pm: Cereal, wheat bread with cheese, banana, yogurt, coffee with whole milk  Lunch 2-3pm: Beef stick, cheese, popcorn  Dinner 6pm: Fish, leftovers, roast, burgers, potatoes, rarely has pasta, pizza with salad/ranch dressing, water  Snacks: Popcorn  Water throughout day, no regular soda    Problem Solving:    Patient is at risk of hypoglycemia?: NO  Hospitalizations for hyper or hypoglycemia: No    Healthy Coping and Stress Management:   Sources of stress identified by patient  Other (please specify):  Not assessed  Coping mechanisms identified by patient:  Other (please specify):   "Not assessed      EDUCATION and INSTRUCTION PROVIDED AT THIS VISIT:    T2 seen Virtually to review blood sugars. She is accompanied by daughter, Juliet. She started the Dexcom G7 in September and is liking not having to do fingersticks, she is not having any reactions to the adhesive. TIR 45%, TAR >180 25%, TAR >250 30%, with no lows. CV 38.1%. Running upper 200's most days starting early afternoon through remainder of day. She is still missing 1-2 mealtime injections per day; said she just \"doesn't think about taking it.\"  Does not miss Toujeo doses. We discussed and agreed to trial putting pen needle in pill box with Glipizide dose as a reminder to take insulin. Follow up in 4 weeks.    Patient-stated goal written and given to Brandy Leon.  Verbalized and demonstrated understanding of instructions.   See patient instructions  AVS printed and given to patient    PLAN:  *Take Novolog 5 units for mid afternoon snack  *Continue Toujeo 52 units daily  *Novolog 16 units before meals + correction scale with meals only of 1 unit per 50>150  150-200=1 units   201-250=2 units  251-300=3 units  301-350=4 units  *Place pen needle in pill pack with Glipizide as reminder to take insulin    FOLLOW-UP:    *12/1 Virtually with Earnestine at 11am    Time spent with patient at today's visit was 30 minutes.      Any diabetes medication dose changes were made via the CDE Protocol and Collaborative Practice Agreement with Ryann and ELYSE Laura.  A copy of this encounter was provided to patient's referring provider-Whit Arrieta, in clinic.    "

## 2023-11-03 NOTE — NURSING NOTE
Is the patient currently in the state of [place]? YES    Visit mode:VIDEO    If the visit is dropped, the patient can be reconnected by: VIDEO VISIT: Send to e-mail at: [email or web address]    Will anyone else be joining the visit? NO  (If patient encounters technical issues they should call [phone]    How would you like to obtain your AVS? MyChart    Are changes needed to the allergy or medication list? No not that daughter/pt noted. E-check in was completed prior to visit.    Reason for visit: RECHECK    Taylor LIRA

## 2023-11-03 NOTE — LETTER
11/3/2023         RE: Brandy Leon  [address] [place] [address]        Dear Colleague,    Thank you for referring your patient, Brandy Leon, to the [facility]. Please see a copy of my visit note below.    Virtual Visit Details    Type of service:  Video Visit   Video Start Time:  11:07 AM  Video End Time:11:37 AM    Originating Location (pt. Location): Home    Distant Location (provider location):  On-site  Platform used for Video Visit: [employer] [place]    Diabetes Self-Management Education & Support  Brandy Leon presents today for education related to Type 2 diabetes    Patient is being treated with:  oral agents and insulin  She is accompanied by daughter, Juliet    Year of diagnosis: 2015  Referring provider:  Dr. Morris  Living Situation: Home, daughter/son split time at house helping  Employment: Retired    PATIENT CONCERNS RELATED TO DIABETES SELF MANAGEMENT:   None    ASSESSMENT:    Taking Medication:   Current Diabetes Management per Patient:  Taking diabetes medications? Yes  Toujeo 52 units before bed  Novolog 16 units before meals, misses at least 1-2 doses per day. No correction. TDD 32 units  Metformin 1000 mg BID  Glipizide 10 mg twice daily     Monitoring  Patient glucose self monitoring as follows: CGM  BG meter: Dexcom G7 via   BG results:         Patient's most recent   Lab Results   Component Value Date    A1C 10.6 12/13/2022    A1C 10.3 05/17/2021      Patient's A1C goal: <8.0    Activity: no regular exercise program, fear of falling    Healthy Eating:  Patient currently eats 3 meals 1 snacks per day   Breakfast 11-12pm: Cereal, wheat bread with cheese, banana, yogurt, coffee with whole milk  Lunch 2-3pm: Beef stick, cheese, popcorn  Dinner 6pm: Fish, leftovers, roast, burgers, potatoes, rarely has pasta, pizza with salad/ranch dressing, water  Snacks: Popcorn  Water throughout day, no regular soda    Problem Solving:    Patient  "is at risk of hypoglycemia?: NO  Hospitalizations for hyper or hypoglycemia: No    Healthy Coping and Stress Management:   Sources of stress identified by patient  Other (please specify):  Not assessed  Coping mechanisms identified by patient:  Other (please specify):  Not assessed      EDUCATION and INSTRUCTION PROVIDED AT THIS VISIT:    T2 seen Virtually to review blood sugars. She is accompanied by daughter, Juliet. She started the Dexcom G7 in September and is liking not having to do fingersticks, she is not having any reactions to the adhesive. TIR 45%, TAR >180 25%, TAR >250 30%, with no lows. CV 38.1%. Running upper 200's most days starting early afternoon through remainder of day. She is still missing 1-2 mealtime injections per day; said she just \"doesn't think about taking it.\"  Does not miss Toujeo doses. We discussed and agreed to trial putting pen needle in pill box with Glipizide dose as a reminder to take insulin. Follow up in 4 weeks.    Patient-stated goal written and given to Brandy Leon.  Verbalized and demonstrated understanding of instructions.   See patient instructions  AVS printed and given to patient    PLAN:  *Take Novolog 5 units for mid afternoon snack  *Continue Toujeo 52 units daily  *Novolog 16 units before meals + correction scale with meals only of 1 unit per 50>150  150-200=1 units   201-250=2 units  251-300=3 units  301-350=4 units  *Place pen needle in pill pack with Glipizide as reminder to take insulin    FOLLOW-UP:    *12/1 Virtually with Earnestine at 11am    Time spent with patient at today's visit was 30 minutes.      Any diabetes medication dose changes were made via the CDE Protocol and Collaborative Practice Agreement with [place] [facility].  A copy of this encounter was provided to patient's referring provider-Whit Arrieta, in clinic.      Again, thank you for allowing me to participate in the care of your patient.        Sincerely,        Earnestine Kinsey, " RN

## 2023-12-03 ENCOUNTER — HEALTH MAINTENANCE LETTER (OUTPATIENT)
Age: 83
End: 2023-12-03

## 2023-12-19 ENCOUNTER — VIRTUAL VISIT (OUTPATIENT)
Dept: EDUCATION SERVICES | Facility: CLINIC | Age: 83
End: 2023-12-19
Payer: COMMERCIAL

## 2023-12-19 VITALS — BODY MASS INDEX: 29.12 KG/M2 | WEIGHT: 171 LBS

## 2023-12-19 DIAGNOSIS — E11.65 TYPE 2 DIABETES MELLITUS WITH HYPERGLYCEMIA, WITH LONG-TERM CURRENT USE OF INSULIN (H): Primary | Chronic | ICD-10-CM

## 2023-12-19 DIAGNOSIS — Z79.4 TYPE 2 DIABETES MELLITUS WITH HYPERGLYCEMIA, WITH LONG-TERM CURRENT USE OF INSULIN (H): Primary | Chronic | ICD-10-CM

## 2023-12-19 PROCEDURE — G0108 DIAB MANAGE TRN  PER INDIV: HCPCS | Mod: 95

## 2023-12-19 NOTE — PROGRESS NOTES
Virtual Visit Details    Type of service:  Video Visit   Video Start Time: 11:01 AM  Video End Time: 11:44 AM    Originating Location (pt. Location): Home    Distant Location (provider location):  On-site  Platform used for Video Visit: Isha    Diabetes Self-Management Education & Support  Brandy Leon presents today for education related to Type 2 diabetes    Patient is being treated with:  oral agents and insulin  She is accompanied by daughter, Juliet    Year of diagnosis: 2015  Referring provider:  Dr. Morris  Living Situation: Home, daughter/son split time at house helping  Employment: Retired    PATIENT CONCERNS RELATED TO DIABETES SELF MANAGEMENT:   None    ASSESSMENT:    Taking Medication:   Current Diabetes Management per Patient:  Taking diabetes medications? Yes  Toujeo 52 units before bed  Novolog 16 units before meals + standard correction, misses at least 1-2 doses per day.TDD 34 units  Metformin 1000 mg BID  Glipizide 10 mg twice daily     Monitoring  Patient glucose self monitoring as follows: CGM  BG meter: Dexcom G7 via   BG results:             Patient's most recent   Lab Results   Component Value Date    A1C 10.6 12/13/2022    A1C 10.3 05/17/2021      Patient's A1C goal: <8.0    Activity: no regular exercise program, fear of falling    Healthy Eating:  Patient currently eats 3 meals 1 snacks per day   Breakfast 11-12pm: Cereal, wheat bread with cheese, banana, yogurt, coffee with whole milk  Lunch/Snack 2-3pm: Beef stick, cheese, popcorn  Dinner 5-6pm: Fish, leftovers, roast, burgers, potatoes, rarely has pasta, pizza with salad/ranch dressing, water  Snacks: Popcorn  Water throughout day, no regular soda    Problem Solving:    Patient is at risk of hypoglycemia?: NO  Hospitalizations for hyper or hypoglycemia: No    Healthy Coping and Stress Management:   Sources of stress identified by patient  Other (please specify):  None  Coping mechanisms identified by patient:  Watches TV,  sleeps      EDUCATION and INSTRUCTION PROVIDED AT THIS VISIT:    T2DM seen Virtually to review blood sugars. She is accompanied by daughter, Juliet. She was without a sensor for a few days; is now on auto refill. Dexcom TIR 36%, TAR >180 36%, TAR >250 28%, with no lows. CV 35.6%. She did have a brief evening and overnight low likely from taking Novolog and not consuming CHO. BG this morning looks great; SG at time of call 208. Primarily running >180 most days, up to HIGH. She does not take Novolog for lunch/snack. Patient was able to verbalize what items in her diet contain CHO. We will increase Novolog with breakfast and dinner, work on taking Novolog for lunch/snack. Follow up in February with Whit Arrieta.     Topics Reviewed:  Function of liver  Effects illness has on BG/Take Toujeo  No Novolog if no CHO    Patient-stated goal written and given to Brandy Leon.  Verbalized and demonstrated understanding of instructions.   See patient instructions  AVS printed and given to patient-via My Chart    PLAN:  *Take Novolog 5 units for mid afternoon snack with carbs  *Continue Toujeo 52 units daily  *Novolog 18 units + correction scale with breakfast of 1 unit per 50>150  *Novolog 17 units + correction scale with dinner  150-200=1 units   201-250=2 units  251-300=3 units  301-350=4 units  *Do not take Novolog if you aren't eating carbs but you would still use correction scale and take Novolog insulin if needed for high number  *Please let me know if you have more than two lows <80 in one week or a few days in a row    FOLLOW-UP:    *2/7/2024 with Whit Arrieta    Time spent with patient at today's visit was 43 minutes.      Any diabetes medication dose changes were made via the CDE Protocol and Collaborative Practice Agreement with [place] and  [place].  A copy of this encounter was provided to patient's referring provider.

## 2023-12-19 NOTE — PATIENT INSTRUCTIONS
PLAN:  *Take Novolog 5 units for mid afternoon snack with carbs  *Continue Toujeo 52 units daily  *Novolog 18 units + correction scale with breakfast of 1 unit per 50>150  *Novolog 17 units + correction scale with dinner  150-200=1 units   201-250=2 units  251-300=3 units  301-350=4 units  *Do not take Novolog if you aren't eating carbs but you would still use correction scale and take Novolog insulin if needed for high number  *Please let me know if you have more than two lows <80 in one week or a few days in a row    FOLLOW-UP:    *2/7/2024 with Whit Arrieta

## 2023-12-19 NOTE — LETTER
12/19/2023         RE: Brandy Leon  [address] [facility] [place] [address]        Dear Colleague,    Thank you for referring your patient, Brandy Leon, to the [facility]. Please see a copy of my visit note below.    Virtual Visit Details    Type of service:  Video Visit   Video Start Time: 11:01 AM  Video End Time: 11:44 AM    Originating Location (pt. Location): Home    Distant Location (provider location):  On-site  Platform used for Video Visit: OrtizZumbl    Diabetes Self-Management Education & Support  Brandy Leon presents today for education related to Type 2 diabetes    Patient is being treated with:  oral agents and insulin  She is accompanied by daughter, Juliet    Year of diagnosis: 2015  Referring provider:  Dr. Morris  Living Situation: Home, daughter/son split time at house helping  Employment: Retired    PATIENT CONCERNS RELATED TO DIABETES SELF MANAGEMENT:   None    ASSESSMENT:    Taking Medication:   Current Diabetes Management per Patient:  Taking diabetes medications? Yes  Toujeo 52 units before bed  Novolog 16 units before meals + standard correction, misses at least 1-2 doses per day.TDD 34 units  Metformin 1000 mg BID  Glipizide 10 mg twice daily     Monitoring  Patient glucose self monitoring as follows: CGM  BG meter: Dexcom G7 via   BG results:             Patient's most recent   Lab Results   Component Value Date    A1C 10.6 12/13/2022    A1C 10.3 05/17/2021      Patient's A1C goal: <8.0    Activity: no regular exercise program, fear of falling    Healthy Eating:  Patient currently eats 3 meals 1 snacks per day   Breakfast 11-12pm: Cereal, wheat bread with cheese, banana, yogurt, coffee with whole milk  Lunch/Snack 2-3pm: Beef stick, cheese, popcorn  Dinner 5-6pm: Fish, leftovers, roast, burgers, potatoes, rarely has pasta, pizza with salad/ranch dressing, water  Snacks: Popcorn  Water throughout day, no regular soda    Problem  Solving:    Patient is at risk of hypoglycemia?: NO  Hospitalizations for hyper or hypoglycemia: No    Healthy Coping and Stress Management:   Sources of stress identified by patient  Other (please specify):  None  Coping mechanisms identified by patient:  Watches TV, sleeps      EDUCATION and INSTRUCTION PROVIDED AT THIS VISIT:    T2DM seen Virtually to review blood sugars. She is accompanied by daughter, Juliet. She was without a sensor for a few days; is now on auto refill. Dexcom TIR 36%, TAR >180 36%, TAR >250 28%, with no lows. CV 35.6%. She did have a brief evening and overnight low likely from taking Novolog and not consuming CHO. BG this morning looks great; SG at time of call 208. Primarily running >180 most days, up to HIGH. She does not take Novolog for lunch/snack. Patient was able to verbalize what items in her diet contain CHO. We will increase Novolog with breakfast and dinner, work on taking Novolog for lunch/snack. Follow up in February with Whit Arrieta.     Topics Reviewed:  Function of liver  Effects illness has on BG/Take Toujeo  No Novolog if no CHO    Patient-stated goal written and given to Brandy Leon.  Verbalized and demonstrated understanding of instructions.   See patient instructions  AVS printed and given to patient-via My Chart    PLAN:  *Take Novolog 5 units for mid afternoon snack with carbs  *Continue Toujeo 52 units daily  *Novolog 18 units + correction scale with breakfast of 1 unit per 50>150  *Novolog 17 units + correction scale with dinner  150-200=1 units   201-250=2 units  251-300=3 units  301-350=4 units  *Do not take Novolog if you aren't eating carbs but you would still use correction scale and take Novolog insulin if needed for high number  *Please let me know if you have more than two lows <80 in one week or a few days in a row    FOLLOW-UP:    *2/7/2024 with Whit Arrieta    Time spent with patient at today's visit was 43 minutes.      Any diabetes medication dose  changes were made via the CDE Protocol and Collaborative Practice Agreement with [place] and  [facility].  A copy of this encounter was provided to patient's referring provider.        Again, thank you for allowing me to participate in the care of your patient.        Sincerely,        Earnestine Kinsey RN

## 2023-12-19 NOTE — NURSING NOTE
Is the patient currently in the state of [place]? YES    Visit mode:VIDEO    If the visit is dropped, the patient can be reconnected by: VIDEO VISIT: Text to cell phone:   Telephone Information:   Mobile [phone]       Will anyone else be joining the visit? NO  (If patient encounters technical issues they should call [phone]    How would you like to obtain your AVS? MyChart    Are changes needed to the allergy or medication list? No    Reason for visit: RECHECK and Video Visit    Cari LIRA

## 2023-12-28 ENCOUNTER — OFFICE VISIT (OUTPATIENT)
Dept: OPHTHALMOLOGY | Facility: CLINIC | Age: 83
End: 2023-12-28
Attending: OPHTHALMOLOGY
Payer: COMMERCIAL

## 2023-12-28 DIAGNOSIS — H40.1131 PRIMARY OPEN ANGLE GLAUCOMA OF BOTH EYES, MILD STAGE: Primary | ICD-10-CM

## 2023-12-28 PROCEDURE — G0463 HOSPITAL OUTPT CLINIC VISIT: HCPCS | Performed by: OPHTHALMOLOGY

## 2023-12-28 PROCEDURE — 99214 OFFICE O/P EST MOD 30 MIN: CPT | Performed by: OPHTHALMOLOGY

## 2023-12-28 ASSESSMENT — VISUAL ACUITY
OS_PH_SC: 20/40
OD_SC+: -1
OD_SC: 20/30
OS_SC: 20/50
OS_SC+: -2
METHOD: SNELLEN - LINEAR
OD_PH_SC: 20/25
OD_PH_SC+: -2

## 2023-12-28 ASSESSMENT — CUP TO DISC RATIO
OS_RATIO: 0.5
OD_RATIO: 0.5

## 2023-12-28 ASSESSMENT — TONOMETRY
OD_IOP_MMHG: 18
OS_IOP_MMHG: 18
OS_IOP_MMHG: 15
OD_IOP_MMHG: 15
IOP_METHOD: TONOPEN

## 2023-12-28 ASSESSMENT — CONF VISUAL FIELD
METHOD: COUNTING FINGERS
OD_NORMAL: 1
OD_SUPERIOR_TEMPORAL_RESTRICTION: 0
OS_SUPERIOR_TEMPORAL_RESTRICTION: 0
OD_INFERIOR_TEMPORAL_RESTRICTION: 0
OS_INFERIOR_TEMPORAL_RESTRICTION: 3
OD_SUPERIOR_NASAL_RESTRICTION: 0
OS_SUPERIOR_NASAL_RESTRICTION: 3
OS_INFERIOR_NASAL_RESTRICTION: 3
OD_INFERIOR_NASAL_RESTRICTION: 0

## 2023-12-28 ASSESSMENT — SLIT LAMP EXAM - LIDS
COMMENTS: NORMAL
COMMENTS: NORMAL

## 2023-12-28 ASSESSMENT — EXTERNAL EXAM - RIGHT EYE: OD_EXAM: NORMAL

## 2023-12-28 ASSESSMENT — EXTERNAL EXAM - LEFT EYE: OS_EXAM: NORMAL

## 2023-12-28 NOTE — PROGRESS NOTES
[facility] - Glaucoma clinic    Chief Complaint/Presenting Concern: Glaucoma    History of Present Illness:   Brandy Leon is a 83 year old patient who presents for evaluation of POAG.   Patient was dx due to rNFL thinning in 2015 by Dr Austin Serna. Patient has been on latanoprost at bedtime ever since. Had CE/IOL in late 2018. Had BULB with Dr Moran in 2022. Re-established care with Dr Gurjit Cardoso 03/17/2023 during which time IOP was 26 and 18 on intermittent latanoprost due to drop intolerance. Switched to Travatan Z. Also started Cosopt PF 8/3/23 right eye only.   - 05/30/2023: s/p right eye SLT     Today, 12/28/2023, patient presents for 2 month follow up. No vision changes. Using drops when daughter is around 5-6 days a week. Using once daily not twice as prescribed.    Relevant Past Medical/Family/Social History:  Past Medical History:   Diagnosis Date     Actinic keratosis 3/12/2013     Degenerative joint disease      Diabetes (H)      Gastroesophageal reflux disease without esophagitis 12/11/2020     Rheumatic fever 1957    x2 between the ages of 15-18     Seasonal allergies      Relevant Review of Systems:  None are relevant  1. Diagnosis: Primary open angle glaucoma   ICD-10 stages Mild or early-stage glaucoma - pre-perimetric glaucoma (with a normal visual field)  Year diagnosis: 2018  Previous glaucoma surgery/laser:    Both eyes: CE/IOL 2018 by Dr Austin Serna   Maximum intraocular pressure: 30/25  Current ophthalmic medications:    Both eyes Travatan Z, Cosopt right eye  Family history of any glaucoma: positive  CCT (um) 5/27/23: 546/552  Gonioscopy 2021   Right eye: open 360   Left eye: open 360  Refractive status: Almost emmetropic prior to CE/IOL  Trauma history: negative  Steroid exposure: positive  Vasospastic disease: Migrane/Raynaud phenomenon: negative  A past hemodynamic crisis or Low BP: negative  Meds AEs/intolerance:  Sulfa antibiotics  Preservatives in  topical eye medications  Cosopt, minimal response   Focused PMHx:  Asthma and respiratory problems: positive (DINORA)  Cardiovascular disease: positive (HTN, IDDM2)    Prior testing  HVF's (Dr Austin Serna)  2019: right eye essentially full, left eye unreliable with scattered inf defects  2018: unreliable fields  2017: unreliable fields, right eye superior defect, left eye inferior hemifield loss crossing both midlines  2016: unreliable fields  2015: mod reliability, essentially full each eye  HVF 9/20/21  Right Eye: low reliability (8/13 FL, 39% FP, 30% FN), MD -3.21, scattered nonspecific defects  Left Eye: low reliability (6/11 FL, 7% FP, 20% FN), MD -5.29, scattered defects  Visual field 5/30/23:   Right eye - nasal step, superior arcuate defect, reliable  Left eye - paracentral and peripheral defects  OCT Optic Nerve RNFL Spectralis 5/30/23  Right eye: IT RNFL thinning, worsening   Left eye: IT RNFL thinning, poor tracing     Today's testing:   IOP 18/18 today by [facility]    Additional Ocular History:     2. Pseudophakia, each eye   - Monitor    Plan/Recommendations:  ? Discussed findings with patient.  ? Patient has POAG mild with prior progressive rNFL thinning and elevated IOP, thus Dr Serna began IOP lowering therapy in 08/2015.  ? On prior testing, there is right eye progression   ? IOP goal teens both eyes. S/p SLT right eye done 05/30/2023, not much improvement.   ? Patient is not using Cosopt in the morning, discussed with the patient the importance of compliance with drops, unable to keep up with the drops. Recommend SLT before proceeding with glaucoma surgery. Risk and benefit of SLT including the risk of IOP spike discussed with the patient. Wishes to proceed with the laser.   ? Use Cosopt PF BID each eye  ? Continue Travatan Z at bedtime each eye     Schedule SLT each eye       Physician Attestation     Attending Physician Attestation:  Complete documentation of historical and exam elements from  today's encounter can be found in the full encounter summary report (not reduplicated in this progress note). I personally obtained the chief complaint(s) and history of present illness. I confirmed and edited as necessary the review of systems, past medical/surgical history, family history, social history, and examination findings as documented by others; and I examined the patient myself. I personally reviewed the relevant tests, images, and reports as documented above. I formulated and edited as necessary the assessment and plan and discussed the findings and management plan with the patient and any family members present at the time of the visit.  Radha Ceja M.D., Glaucoma, 12/28/23

## 2023-12-28 NOTE — NURSING NOTE
Chief Complaints and History of Present Illnesses   Patient presents with    Glaucoma Follow-Up     Primary open angle glaucoma (POAG) of both eyes, indeterminate stage      Chief Complaint(s) and History of Present Illness(es)       Glaucoma Follow-Up              Laterality: both eyes    Associated symptoms: dryness.  Negative for eye pain, flashes and floaters    Comments: Primary open angle glaucoma (POAG) of both eyes, indeterminate stage               Comments    Pt states vision is stable.  No pain.  Drops burn.  No flashes/floaters.  Pt still just doing drops when daughter is around.    Cosopt   Travatan Z   AT's    DM 2  BS were 206 this am.  Lab Results       Component                Value               Date                       A1C                      10.6                12/13/2022                 A1C                      11.1                07/06/2022                 A1C                      9.6                 03/28/2022                 A1C                      9.5                 12/20/2021                 A1C                      9.8                 09/20/2021                 A1C                      10.3                05/17/2021                 A1C                      9.2                 02/16/2021                 A1C                      8.7                 11/12/2020                 A1C                      10.0                08/20/2020                 A1C                      10.5                01/10/2020              JOHNNY Blank December 28, 2023 10:39 AM

## 2024-01-17 DIAGNOSIS — H40.1131 PRIMARY OPEN ANGLE GLAUCOMA (POAG) OF BOTH EYES, MILD STAGE: ICD-10-CM

## 2024-01-17 RX ORDER — TRAVOPROST OPHTHALMIC SOLUTION 0.04 MG/ML
1 SOLUTION OPHTHALMIC AT BEDTIME
Qty: 2.5 ML | Refills: 11 | Status: SHIPPED | OUTPATIENT
Start: 2024-01-17

## 2024-01-17 NOTE — TELEPHONE ENCOUNTER
TRAVOPROST 0.004% EYE DROP   Last Written Prescription Date:  1/13/2023  Last Fill Quantity: 2.5,   # refills: 11  Last Office Visit : 12/28/2023  Future Office visit:  2/9/2024  2.5 mL, 11 Refills sent to j carlos Mercedes RN  Central Triage Red Flags/Med Refills

## 2024-01-22 ENCOUNTER — MYC MEDICAL ADVICE (OUTPATIENT)
Dept: FAMILY MEDICINE | Facility: CLINIC | Age: 84
End: 2024-01-22
Payer: COMMERCIAL

## 2024-01-22 NOTE — PROGRESS NOTES
Medication Therapy Management (MTM) Encounter    ASSESSMENT:                            Medication Adherence/Access: No issues identified    Type 2 Diabetes:  Not meeting A1c goal of <8%. Discussed importance of taking Novolog consistently to bring blood sugars down. Was not able to come up with any concrete solutions for improving adherence today. Patient will continue to try and focus on getting her doses. Recommend not skipping doses of Toujeo. Reviewed patient's administration technique today and seems appropriate. Discussed possibly trying CeQur, patient declines.    Hypertension: At home blood pressure meeting goal <140/90mmHg. Would benefit from updated in clinic blood pressure. Will continue off of irbesartan for now    Hyperlipidemia:   Due for updated labs  Put rosuvastatin in med boxes on Monday and Fridays    Hypothyroidism:   Due for updated labs    Allergic Rhinitis:  Stable.    Glaucoma:  Plan in place with ophthalmology.    PLAN:                          1.  Take Novolog consistently before breakfast and supper  2. Take Toujeo as prescribed. Do not skip doses  3. Put rosuvastatin in med boxes on Mondays and Wednesdays  4. Due for updated labs-A1c, lipids, TSH, microalbumin    Follow-up: 3 months with MTM    SUBJECTIVE/OBJECTIVE:                          Brandy Leon is a 83 year old female contacted via secure video for a follow up visit.  Today's visit is a follow-up MTM visit from 10/17/2023. Patient's daughter, Juliet was also present for the visit.    Reason for visit: blood sugars, blood pressure    Allergies/ADRs: Reviewed in chart  Past Medical History: Reviewed in chart  Tobacco: She reports that she has never smoked. She has never been exposed to tobacco smoke. She has never used smokeless tobacco.  Alcohol: none  Caffeine: 2-3 cups coffee/day  Activity:None; daughter asks her to get up and walk around the house with walker    Medication Adherence/Access:   Misses Novolog-  [facility]  [place]  Daughter helps her set up her medication boxes.    Has fallen 3 times since Thanksgiving. When she pivots, she loses her balance. Slipped out of bed. No injuries. [facility] came out for an assessment. Has upcoming appointment with PCP on Thursday.    Diabetes   Toujeo U300 52 units daily   Novolog 18 units before breakfast, 5 units before lunch (usually just has a snack) and 17 units before supper + sliding scale If Juliet is not there she will forget to take the Novolog. If Zoila's sons are there but they aren't helpful in reminding Zoila to take her medication.  metformin 1000 mg twice daily  Glipizide ER 10mg twice daily     Patient is not experiencing side effects.  If blood sugar is normal 120-150mg/dL they will skip the Toujeo.  Nighttime insulin is on dresser. Has med box set up with needles in it to remind her to take her insulin. Notices red marks on her stomach and if Zoila does it herself her needles are bent.  Blood sugar monitoring: CGM Dexcom using a   7 day-Time in range 19%, 47% high, 40% very high  Avg glucose for last 7 day 246mg/dL  Follows with CDE and endocrinology  No low blood sugars  Current diabetes symptoms: none  Diet/Exercise: No exercise. Patient typically eats 2 meals a day-breakfast and supper. Is only eating one slice of toast instead of 2 for breakfast.      Eye exam is up to date  Foot exam: due  Urine Albumin:   Lab Results   Component Value Date    MICROL 10 10/24/2022    MICROL 40 11/12/2020     Lab Results   Component Value Date    A1C 10.6 12/13/2022    A1C 11.1 07/06/2022    A1C 9.6 03/28/2022    A1C 9.5 12/20/2021    A1C 9.8 09/20/2021    A1C 10.3 05/17/2021    A1C 9.2 02/16/2021    A1C 8.7 11/12/2020    A1C 10.0 08/20/2020    A1C 10.5 01/10/2020     Hypertension   Irbesartan 75mg daily    Doesn't want to take this any more. Doesn't feel good when she takes it. Feels dizzy. Has not been taking since November. Reports her dizziness has been better since  stopping the irbesartan and daughter agrees.   Patient reports no current medication side effects  Patient  patient does not consistently check blood pressure .  Today while on the phone blood pressure on with wrist cuff was 106/57 P77     BP Readings from Last 3 Encounters:   04/14/23 (!) 149/76   12/13/22 116/68   12/01/22 128/60     Pulse Readings from Last 3 Encounters:   04/14/23 95   12/13/22 110   12/01/22 105     Hyperlipidemia:   rosuvastatin 5mg twice weekly (Monday and Fridays) These are not in her pill box, she just knows she just needs to take them. Has not had this filled since July 2023 for a 90 day supply.  Patient reports no current medication side effects.  The ASCVD Risk score (Laurence PICKERING, et al., 2019) failed to calculate for the following reasons:    The 2019 ASCVD risk score is only valid for ages 40 to 79  Recent Labs   Lab Test 10/24/22  1417 11/16/21  0935   CHOL 242* 203*   HDL 56 52   * 97   TRIG 348* 270*     Hypothyroidism:   Levothyroxine 137 mcg daily  Patient is having the following symptoms: none.   TSH   Date Value Ref Range Status   02/10/2023 7.29 (H) 0.40 - 4.00 mU/L Final   11/12/2020 3.00 0.40 - 4.00 mU/L Final     Allergic Rhinitis:   fexofenadine 180 mg once daily as needed  Patient reports no current medication side effects.      Patient feels that current therapy is effective.     Glaucoma:  Cosopt PF 2-0.5% 1 drop both eyes twice daily- usually only getting in once a day  Travoprost 0.004%  1 drop into both eyes at bedtime  Natural Tears 1 drop into both eyes daily as needed-rarely uses    Can't open the cosopt because they come in little tubes, and needs help using them and sometimes daughter forgets    Is going to have  eye surgery on 2/9 to drain the pressures.    Today's Vitals: There were no vitals taken for this visit.  ----------------    I spent 53 minutes with this patient today. All changes were made via collaborative practice agreement with Fernanda CANTOR  MD Angela. A copy of the visit note was provided to the patient's provider(s).    The patient was sent via [employer] a summary of these recommendations.     Medication Therapy Management  Mirna Perez, Pharm.D, [facility]  Medication Therapy Management Pharmacist    Telemedicine Visit Details  Type of service:  Telephone visit  Start Time: 3:32PM  End Time: 4:25PM     Medication Therapy Recommendations  Dyslipidemia, goal LDL below 100    Current Medication: rosuvastatin (CRESTOR) 5 MG tablet   Rationale: Incorrect administration - Dosage too low - Effectiveness   Recommendation: Provide Adherence Intervention   Status: Patient Agreed - Adherence/Education         Type 2 diabetes mellitus with hyperglycemia, with long-term current use of insulin (H)    Current Medication: insulin glargine U-300 (TOUJEO SOLOSTAR) 300 UNIT/ML (1 units dial) pen   Rationale: Does not understand instructions - Adherence - Adherence   Recommendation: Provide Education   Status: Patient Agreed - Adherence/Education

## 2024-01-23 ENCOUNTER — VIRTUAL VISIT (OUTPATIENT)
Dept: PHARMACY | Facility: CLINIC | Age: 84
End: 2024-01-23
Payer: COMMERCIAL

## 2024-01-23 DIAGNOSIS — E11.65 TYPE 2 DIABETES MELLITUS WITH HYPERGLYCEMIA, WITH LONG-TERM CURRENT USE OF INSULIN (H): Primary | ICD-10-CM

## 2024-01-23 DIAGNOSIS — J30.2 SEASONAL ALLERGIES: ICD-10-CM

## 2024-01-23 DIAGNOSIS — Z79.4 TYPE 2 DIABETES MELLITUS WITH HYPERGLYCEMIA, WITH LONG-TERM CURRENT USE OF INSULIN (H): Primary | ICD-10-CM

## 2024-01-23 DIAGNOSIS — E78.5 DYSLIPIDEMIA, GOAL LDL BELOW 100: ICD-10-CM

## 2024-01-23 DIAGNOSIS — H40.9 GLAUCOMA, UNSPECIFIED GLAUCOMA TYPE, UNSPECIFIED LATERALITY: ICD-10-CM

## 2024-01-23 DIAGNOSIS — I10 HYPERTENSION GOAL BP (BLOOD PRESSURE) < 140/90: ICD-10-CM

## 2024-01-23 DIAGNOSIS — E03.9 ACQUIRED HYPOTHYROIDISM: ICD-10-CM

## 2024-01-23 PROCEDURE — 99605 MTMS BY PHARM NP 15 MIN: CPT | Mod: 95 | Performed by: PHARMACIST

## 2024-01-23 PROCEDURE — 99607 MTMS BY PHARM ADDL 15 MIN: CPT | Mod: 95 | Performed by: PHARMACIST

## 2024-01-23 RX ORDER — GLIPIZIDE 10 MG/1
10 TABLET, FILM COATED, EXTENDED RELEASE ORAL 2 TIMES DAILY
COMMUNITY
Start: 2022-03-01 | End: 2024-03-28

## 2024-01-23 NOTE — LETTER
January 23, 2024  Brandy Leon  [address] [facility] [address] [place] [address]    Dear Ms. Leon, [place] [facility] [place]     Thank you for talking with me on Jan 23, 2024 about your health and medications. As a follow-up to our conversation, I have included two documents:      Your Recommended To-Do List has steps you should take to get the best results from your medications.  Your Medication List will help you keep track of your medications and how to take them.    If you want to talk about these documents, please call Mirna Perez RPH at phone: [phone], Monday-Friday 8-4:30pm.    I look forward to working with you and your doctors to make sure your medications work well for you.    Sincerely,  Mirna Perez RPH  [facility] Pharmacist, [facility]

## 2024-01-23 NOTE — LETTER
"Recommended To-Do List      Prepared on: 01/23/2024       You can get the best results from your medications by completing the items on this \"To-Do List.\"      Bring your To-Do List when you go to your doctor. And, share it with your family or caregivers.    My To-Do List:  What we talked about: What I should do:   Your medication dosage being too low    Reminder to take your medication as prescribed for rosuvastatin (CRESTOR)          What we talked about: What I should do:   The importance of taking your medication as intended    Education: Do not skip your dose of Toujeo           What we talked about: What I should do:                     "

## 2024-01-23 NOTE — LETTER
_  Medication List        Prepared on: 01/23/2024     Bring your Medication List when you go to the doctor, hospital, or   emergency room. And, share it with your family or caregivers.     Note any changes to how you take your medications.  Cross out medications when you no longer use them.    Medication How I take it Why I use it Prescriber   dextran 70-hypromellose (TEARS NATURALE FREE PF) 0.1-0.3 % ophthalmic solution Place 1 drop into both eyes daily as needed Dry eye Clari Maciel MD   dorzolamide-timolol PF (COSOPT PF) 2-0.5 % opthalmic solutionh Place 1 drop into both eyes 2 times daily Primary open angle glaucoma (POAG) of both eyes, indeterminate stage Clari Maciel MD   fexofenadine (ALLEGRA) 180 MG tablet Take 180 mg by mouth as needed  Allergies Patient Reported   glipiZIDE (GLUCOTROL XL) 10 MG 24 hr tablet Take 10 mg by mouth 2 times daily  Diabetes Fernanda Miller MD   ibuprofen (ADVIL/MOTRIN) 200 MG capsule Take 400 mg by mouth every 4 hours as needed   Pain Patient Reported   insulin aspart (NOVOLOG FLEXPEN) 100 UNIT/ML pen Inject 16 units Subcutaneous before breakfast, lunch and dinner. Take about 15 minutes before eating. Do not take if not eating or eating <15 gm carbohydrates. Type 2 diabetes mellitus with hyperglycemia, with long-term current use of insulin (H) Fernanda Miller MD   insulin glargine U-300 (TOUJEO SOLOSTAR) 300 UNIT/ML (1 units dial) pen Inject 52 units daily + 2 units for priming of pen Type 2 diabetes mellitus with hyperglycemia, with long-term current use of insulin (H) Whit Arrieta PA-C   metFORMIN (GLUCOPHAGE) 1000 MG tablet Take 1 tablet (1,000 mg) by mouth 2 times daily (with meals) Type 2 diabetes mellitus with hyperglycemia, with long-term current use of insulin (H) Whit Arrieta PA-C   rosuvastatin (CRESTOR) 5 MG tablet TAKE 1 TABLET BY MOUTH TWICE WEEKLY Dyslipidemia, goal LDL below 100 Fernanda Miller MD   SYNTHROID 137 MCG tablet Take 1 tablet (137 mcg) by mouth  daily Acquired Hypothyroidism Fernanda Miller MD   travoprost SIXTO FREE (TRAVATAN Z) 0.004 % ophthalmic solution Place 1 drop into both eyes at bedtime Primary open angle glaucoma (POAG) of both eyes, mild stage Gurjit Cardoso, GINO   triamcinolone (KENALOG) 0.1 % external cream APPLY TO AFFECTED AREA TWICE A DAY FOR 2 WEEKS.  Please keep 9-28-22 clinic appt for refills Dermatitis Gregory Salas MD         Add new medications, over-the-counter drugs, herbals, vitamins, or  minerals in the blank rows below.    Medication How I take it Why I use it Prescriber                                      Allergies:      new medication allergy; ace inhibitors; estradiol; lipitor [atorvastatin calcium]; sulfa antibiotics; zocor [simvastatin]; triamterene        Side effects I have had:               Other Information:              My notes and questions:

## 2024-01-23 NOTE — PATIENT INSTRUCTIONS
"Recommendations from today's MTM visit:                                                         Take Novolog consistently before breakfast and supper  2. Take Toujeo as prescribed. Do not skip doses  3. Put rosuvastatin in med boxes on Mondays and Wednesdays  4. Due for updated labs-A1c, lipids, TSH, microalbumin      Follow-up: 3 months,    It was great speaking with you today.  I value your experience and would be very thankful for your time in providing feedback in our clinic survey. In the next few days, you may receive an email or text message from [employer] with a link to a survey related to your  clinical pharmacist.\"     To schedule another MTM appointment, please call the clinic directly or you may call the MTM scheduling line at [phone] or toll-free at [phone].     My Clinical Pharmacist's contact information:                                                      Please feel free to contact me with any questions or concerns you have.      Mirna Perez, Pharm.D, BCACP  Medication Therapy Management Pharmacist      "

## 2024-01-25 ENCOUNTER — LAB (OUTPATIENT)
Dept: LAB | Facility: CLINIC | Age: 84
End: 2024-01-25
Payer: COMMERCIAL

## 2024-01-25 ENCOUNTER — TELEPHONE (OUTPATIENT)
Dept: FAMILY MEDICINE | Facility: CLINIC | Age: 84
End: 2024-01-25

## 2024-01-25 ENCOUNTER — OFFICE VISIT (OUTPATIENT)
Dept: FAMILY MEDICINE | Facility: CLINIC | Age: 84
End: 2024-01-25
Payer: COMMERCIAL

## 2024-01-25 VITALS
RESPIRATION RATE: 20 BRPM | HEART RATE: 103 BPM | WEIGHT: 179 LBS | BODY MASS INDEX: 29.82 KG/M2 | SYSTOLIC BLOOD PRESSURE: 134 MMHG | TEMPERATURE: 97.7 F | OXYGEN SATURATION: 94 % | DIASTOLIC BLOOD PRESSURE: 83 MMHG | HEIGHT: 65 IN

## 2024-01-25 DIAGNOSIS — N30.00 ACUTE CYSTITIS WITHOUT HEMATURIA: ICD-10-CM

## 2024-01-25 DIAGNOSIS — E78.5 DYSLIPIDEMIA, GOAL LDL BELOW 100: ICD-10-CM

## 2024-01-25 DIAGNOSIS — Z79.4 TYPE 2 DIABETES MELLITUS WITH HYPERGLYCEMIA, WITH LONG-TERM CURRENT USE OF INSULIN (H): ICD-10-CM

## 2024-01-25 DIAGNOSIS — E11.65 TYPE 2 DIABETES MELLITUS WITH HYPERGLYCEMIA, WITH LONG-TERM CURRENT USE OF INSULIN (H): ICD-10-CM

## 2024-01-25 DIAGNOSIS — E03.9 ACQUIRED HYPOTHYROIDISM: ICD-10-CM

## 2024-01-25 DIAGNOSIS — E03.9 ACQUIRED HYPOTHYROIDISM: Chronic | ICD-10-CM

## 2024-01-25 DIAGNOSIS — E11.51 DIABETES MELLITUS WITH PERIPHERAL VASCULAR DISEASE (H): ICD-10-CM

## 2024-01-25 DIAGNOSIS — N39.46 MIXED STRESS AND URGE URINARY INCONTINENCE: ICD-10-CM

## 2024-01-25 DIAGNOSIS — R29.6 FALLS FREQUENTLY: ICD-10-CM

## 2024-01-25 DIAGNOSIS — E78.5 HYPERLIPIDEMIA LDL GOAL <100: ICD-10-CM

## 2024-01-25 DIAGNOSIS — G91.9 HYDROCEPHALUS, UNSPECIFIED TYPE (H): ICD-10-CM

## 2024-01-25 DIAGNOSIS — N18.2 CHRONIC KIDNEY DISEASE, STAGE 2 (MILD): ICD-10-CM

## 2024-01-25 DIAGNOSIS — G47.33 OSA (OBSTRUCTIVE SLEEP APNEA): ICD-10-CM

## 2024-01-25 DIAGNOSIS — Z00.00 ENCOUNTER FOR MEDICARE ANNUAL WELLNESS EXAM: Primary | ICD-10-CM

## 2024-01-25 LAB
ALBUMIN UR-MCNC: NEGATIVE MG/DL
APPEARANCE UR: ABNORMAL
BACTERIA #/AREA URNS HPF: ABNORMAL /HPF
BILIRUB UR QL STRIP: NEGATIVE
COLOR UR AUTO: YELLOW
GLUCOSE UR STRIP-MCNC: NEGATIVE MG/DL
HBA1C MFR BLD: 9.4 % (ref 0–5.6)
HGB UR QL STRIP: ABNORMAL
HOLD SPECIMEN: NORMAL
KETONES UR STRIP-MCNC: NEGATIVE MG/DL
LEUKOCYTE ESTERASE UR QL STRIP: ABNORMAL
NITRATE UR QL: NEGATIVE
PH UR STRIP: 6 [PH] (ref 5–7)
RBC #/AREA URNS AUTO: ABNORMAL /HPF
SP GR UR STRIP: 1.01 (ref 1–1.03)
SQUAMOUS #/AREA URNS AUTO: ABNORMAL /LPF
UROBILINOGEN UR STRIP-ACNC: 0.2 E.U./DL
WBC #/AREA URNS AUTO: >100 /HPF

## 2024-01-25 PROCEDURE — 82570 ASSAY OF URINE CREATININE: CPT

## 2024-01-25 PROCEDURE — 80061 LIPID PANEL: CPT

## 2024-01-25 PROCEDURE — 87086 URINE CULTURE/COLONY COUNT: CPT | Performed by: FAMILY MEDICINE

## 2024-01-25 PROCEDURE — 99207 PR FOOT EXAM NO CHARGE: CPT | Performed by: FAMILY MEDICINE

## 2024-01-25 PROCEDURE — 99214 OFFICE O/P EST MOD 30 MIN: CPT | Mod: 25 | Performed by: FAMILY MEDICINE

## 2024-01-25 PROCEDURE — 83036 HEMOGLOBIN GLYCOSYLATED A1C: CPT

## 2024-01-25 PROCEDURE — 84443 ASSAY THYROID STIM HORMONE: CPT

## 2024-01-25 PROCEDURE — 82043 UR ALBUMIN QUANTITATIVE: CPT

## 2024-01-25 PROCEDURE — G0439 PPPS, SUBSEQ VISIT: HCPCS | Performed by: FAMILY MEDICINE

## 2024-01-25 PROCEDURE — 87186 SC STD MICRODIL/AGAR DIL: CPT | Performed by: FAMILY MEDICINE

## 2024-01-25 PROCEDURE — 87088 URINE BACTERIA CULTURE: CPT | Performed by: FAMILY MEDICINE

## 2024-01-25 PROCEDURE — 36415 COLL VENOUS BLD VENIPUNCTURE: CPT

## 2024-01-25 PROCEDURE — 81001 URINALYSIS AUTO W/SCOPE: CPT | Performed by: FAMILY MEDICINE

## 2024-01-25 PROCEDURE — 80053 COMPREHEN METABOLIC PANEL: CPT | Performed by: FAMILY MEDICINE

## 2024-01-25 RX ORDER — RESPIRATORY SYNCYTIAL VIRUS VACCINE 120MCG/0.5
0.5 KIT INTRAMUSCULAR ONCE
Qty: 1 EACH | Refills: 0 | Status: CANCELLED | OUTPATIENT
Start: 2024-01-25 | End: 2024-01-25

## 2024-01-25 ASSESSMENT — ENCOUNTER SYMPTOMS
BREAST MASS: 0
ARTHRALGIAS: 1
PALPITATIONS: 0
DIARRHEA: 0
HEADACHES: 0
EYE PAIN: 1
ABDOMINAL PAIN: 0
HEARTBURN: 0
NERVOUS/ANXIOUS: 1
MYALGIAS: 0
PARESTHESIAS: 1
NAUSEA: 0
HEMATOCHEZIA: 0
CHILLS: 0
HEMATURIA: 0
DIZZINESS: 1
CONSTIPATION: 0
FREQUENCY: 1
SORE THROAT: 0
SHORTNESS OF BREATH: 0
DYSURIA: 0
FEVER: 0
JOINT SWELLING: 1
COUGH: 0
WEAKNESS: 1

## 2024-01-25 ASSESSMENT — ACTIVITIES OF DAILY LIVING (ADL)
CURRENT_FUNCTION: SHOPPING REQUIRES ASSISTANCE
CURRENT_FUNCTION: MEDICATION ADMINISTRATION REQUIRES ASSISTANCE
CURRENT_FUNCTION: HOUSEWORK REQUIRES ASSISTANCE
CURRENT_FUNCTION: PREPARING MEALS REQUIRES ASSISTANCE
CURRENT_FUNCTION: BATHING REQUIRES ASSISTANCE
CURRENT_FUNCTION: TELEPHONE REQUIRES ASSISTANCE
CURRENT_FUNCTION: TRANSPORTATION REQUIRES ASSISTANCE
CURRENT_FUNCTION: LAUNDRY REQUIRES ASSISTANCE

## 2024-01-25 ASSESSMENT — PAIN SCALES - GENERAL: PAINLEVEL: MODERATE PAIN (4)

## 2024-01-25 NOTE — PROGRESS NOTES
"Preventive Care Visit  [facility]  Fernanda Miller MD, Family Medicine  Jan 25, 2024      SUBJECTIVE:   Zoila is a 83 year old, presenting for the following:  Wellness Visit        1/25/2024     2:56 PM   Additional Questions   Roomed by Chrystal MESSER   Accompanied by Daughter  Juliet         1/25/2024     2:56 PM   Patient Reported Additional Medications   Patient reports taking the following new medications none     Are you in the first 12 months of your Medicare coverage?  No    Healthy Habits:     In general, how would you rate your overall health?  Fair    Frequency of exercise:  None    Do you usually eat at least 4 servings of fruit and vegetables a day, include whole grains    & fiber and avoid regularly eating high fat or \"junk\" foods?  No    Taking medications regularly:  No    Barriers to taking medications:  Cost of medication, Problems remembering to take them and Side effects    Medication side effects:  None, No muscle aches and No significant flushing    Ability to successfully perform activities of daily living:  Telephone requires assistance, transportation requires assistance, shopping requires assistance, preparing meals requires assistance, housework requires assistance, bathing requires assistance, laundry requires assistance and medication administration requires assistance    Home Safety:  No safety concerns identified    Hearing Impairment:  No hearing concerns    In the past 6 months, have you been bothered by leaking of urine? Yes    In general, how would you rate your overall mental or emotional health?  Fair    Additional concerns today:  Yes    Today's PHQ-9 Score:       1/25/2024     2:34 PM   PHQ-9 SCORE   PHQ-9 Total Score MyChart 12 (Moderate depression)   PHQ-9 Total Score 12         Have you ever done Advance Care Planning? (For example, a Health Directive, POLST, or a discussion with a medical provider or your loved ones about your wishes): Yes, patient states has " an Advance Care Planning document and will bring a copy to the clinic.       Fall risk  Fallen 2 or more times in the past year?: Yes  Any fall with injury in the past year?: Yes  Uses walker.     Cognitive Screening   1) Repeat 3 items (Leader, Season, Table)    2) Clock draw: ABNORMAL    3) 3 item recall: Recalls 1 object   Results: ABNORMAL clock, 1-2 items recalled: PROBABLE COGNITIVE IMPAIRMENT, **INFORM PROVIDER**  Known cognitive decline.  Mini-CogTM Copyright [employer]. Licensed by the author for use in [facility]; reprinted with permission ([email or web address]). All rights reserved.      Do you have sleep apnea, excessive snoring or daytime drowsiness? : yes    Reviewed and updated as needed this visit by clinical staff   Tobacco  Allergies  Meds              Reviewed and updated as needed this visit by Provider                  Social History     Tobacco Use    Smoking status: Never     Passive exposure: Never    Smokeless tobacco: Never    Tobacco comments:     has had exposure to second hand smoke in the past from [place], no current exposure   Substance Use Topics    Alcohol use: No             1/25/2024     2:32 PM   Alcohol Use   Prescreen: >3 drinks/day or >7 drinks/week? Not Applicable     Do you have a current opioid prescription? No  Do you use any other controlled substances or medications that are not prescribed by a provider? None          Diabetes Follow-up    How often are you checking your blood sugar? Continuous glucose monitor  What time of day are you checking your blood sugars (select all that apply)?  Before and after meals  Have you had any blood sugars above 200?  Yes   Have you had any blood sugars below 70?  No  What symptoms do you notice when your blood sugar is low?  Shaky and Dizzy  What concerns do you have today about your diabetes? None   Do you have any of these symptoms? (Select all that apply)  Numbness in feet      BP Readings from Last 2 Encounters:   01/25/24  134/83   04/14/23 (!) 149/76     Hemoglobin A1C (%)   Date Value   01/25/2024 9.4 (H)   12/13/2022 10.6 (H)   05/17/2021 10.3 (H)   02/16/2021 9.2 (H)     LDL Cholesterol Calculated (mg/dL)   Date Value   01/25/2024 92   10/24/2022 116 (H)   11/12/2020 102 (H)   10/25/2019 91             Hyperlipidemia Follow-Up    Are you regularly taking any medication or supplement to lower your cholesterol?   Yes- crestor 2/week   Are you having muscle aches or other side effects that you think could be caused by your cholesterol lowering medication?  No    Chronic Kidney Disease Follow-up    Do you take any over the counter pain medicine?: No  Hypothyroidism Follow-up    Since last visit, patient describes the following symptoms: Weight stable, no hair loss, no skin changes, no constipation, no loose stools      DINORA:  using CPAP regularly.  Needs supplies.      Frequent falls :  has needed EMS for falls recently.      Current providers sharing in care for this patient include:   Patient Care Team:  Fernanda Miller MD as PCP - General (Family Medicine)  Macie Davila, RN as   Mirna Perez [facility] as Pharmacist (Pharmacist)  Ehsan Araya DPM as Assigned Musculoskeletal Provider  Mitchel Kenney MD as Assigned Neuroscience Provider  Fernanda Miller MD as Assigned PCP  Rachel Morris MD as Assigned Endocrinology Provider  Mirna Perez [facility] as Assigned MTM Pharmacist  Elysia Davila RD as Diabetes Educator (Dietitian, Registered)  Radha Ceja MD as Assigned Surgical Provider  Whit Arrieta PA-C as Physician Assistant (Endocrinology, Diabetes, and Metabolism)    The following health maintenance items are reviewed in Epic and correct as of today:  Health Maintenance   Topic Date Due    RSV VACCINE (Pregnancy & 60+) (1 - 1-dose 60+ series) Never done    DTAP/TDAP/TD IMMUNIZATION (1 - Tdap) 03/29/2022    INFLUENZA VACCINE (1) 09/01/2023    COVID-19 Vaccine (5 - 2023-24 season) 09/01/2023     MEDICARE ANNUAL WELLNESS VISIT  10/24/2023    LIPID  10/24/2023    MICROALBUMIN  10/24/2023    DIABETIC FOOT EXAM  10/24/2023    BMP  12/01/2023    ANNUAL REVIEW OF HM ORDERS  12/01/2023    A1C  12/15/2023    TSH W/FREE T4 REFLEX  02/10/2024    EYE EXAM  10/05/2024    FALL RISK ASSESSMENT  01/25/2025    ADVANCE CARE PLANNING  10/25/2027    DEXA  08/18/2030    PHQ-2 (once per calendar year)  Completed    URINALYSIS  Completed    ZOSTER IMMUNIZATION  Completed    Pneumococcal Vaccine: 65+ Years  Addressed    IPV IMMUNIZATION  Aged Out    HPV IMMUNIZATION  Aged Out    MENINGITIS IMMUNIZATION  Aged Out    RSV MONOCLONAL ANTIBODY  Aged Out    COLORECTAL CANCER SCREENING  Discontinued     BP Readings from Last 3 Encounters:   01/25/24 134/83   04/14/23 (!) 149/76   12/13/22 116/68    Wt Readings from Last 3 Encounters:   01/25/24 81.2 kg (179 lb)   12/19/23 77.6 kg (171 lb)   08/25/23 81.6 kg (180 lb)                  Mammogram Screening: Mammogram Screening - Patient over age 75, has elected to continue with screening.      Pertinent mammograms are reviewed under the imaging tab.  Review of Systems   Constitutional:  Negative for chills and fever.   HENT:  Negative for congestion, ear pain, hearing loss and sore throat.    Eyes:  Positive for pain and visual disturbance.   Respiratory:  Negative for cough and shortness of breath.    Cardiovascular:  Negative for chest pain and palpitations.   Gastrointestinal:  Negative for abdominal pain, constipation, diarrhea and nausea.   Genitourinary:  Positive for frequency and urgency. Negative for dysuria, genital sores, hematuria, pelvic pain, vaginal bleeding and vaginal discharge.   Musculoskeletal:  Positive for arthralgias and joint swelling. Negative for myalgias.   Skin:  Positive for rash.   Neurological:  Positive for dizziness and weakness. Negative for headaches.   Psychiatric/Behavioral:  The patient is nervous/anxious.       Ongoing care with  "ophthalmology.    OBJECTIVE:   /83 (BP Location: Right arm, Patient Position: Sitting, Cuff Size: Adult Large)   Pulse 103   Temp 97.7  F (36.5  C) (Oral)   Resp 20   Ht 1.638 m (5' 4.5\")   Wt 81.2 kg (179 lb)   SpO2 94%   BMI 30.25 kg/m     Estimated body mass index is 30.25 kg/m  as calculated from the following:    Height as of this encounter: 1.638 m (5' 4.5\").    Weight as of this encounter: 81.2 kg (179 lb).  Physical Exam  GENERAL: alert and no distress  EYES: Eyes grossly normal to inspection, PERRL and conjunctivae and sclerae normal  HENT: ear canals and TM's normal, nose and mouth without ulcers or lesions  NECK: no adenopathy, no asymmetry, masses, or scars  RESP: lungs clear to auscultation - no rales, rhonchi or wheezes  CV: regular rate and rhythm, normal S1 S2, no S3 or S4, no murmur, click or rub, no peripheral edema  MS: no gross musculoskeletal defects noted, no edema  SKIN: no suspicious lesions or rashes  NEURO: Normal strength and tone, speech normal, and memory deficit for recent events.  PSYCH: mentation appears normal, affect normal/bright  LYMPH: no cervical, supraclavicular, axillary, or inguinal adenopathy  Diabetic foot exam: normal DP and PT pulses, no trophic changes or ulcerative lesions, absent sensation on monofilament testing.     Diagnostic Test Results:  Labs reviewed in Epic  Results for orders placed or performed in visit on 01/25/24   Comprehensive metabolic panel (BMP + Alb, Alk Phos, ALT, AST, Total. Bili, TP)     Status: Abnormal   Result Value Ref Range    Sodium 137 135 - 145 mmol/L    Potassium 4.4 3.4 - 5.3 mmol/L    Carbon Dioxide (CO2) 24 22 - 29 mmol/L    Anion Gap 15 7 - 15 mmol/L    Urea Nitrogen 12.8 8.0 - 23.0 mg/dL    Creatinine 0.78 0.51 - 0.95 mg/dL    GFR Estimate 75 >60 mL/min/1.73m2    Calcium 9.6 8.8 - 10.2 mg/dL    Chloride 98 98 - 107 mmol/L    Glucose 137 (H) 70 - 99 mg/dL    Alkaline Phosphatase 99 40 - 150 U/L    AST 20 0 - 45 U/L    ALT " 14 0 - 50 U/L    Protein Total 8.1 6.4 - 8.3 g/dL    Albumin 4.2 3.5 - 5.2 g/dL    Bilirubin Total 0.4 <=1.2 mg/dL   UA Macroscopic with reflex to Microscopic and Culture - Lab Collect     Status: Abnormal    Specimen: Urine, NOS   Result Value Ref Range    Color Urine Yellow Colorless, Straw, Light Yellow, Yellow    Appearance Urine Cloudy (A) Clear    Glucose Urine Negative Negative mg/dL    Bilirubin Urine Negative Negative    Ketones Urine Negative Negative mg/dL    Specific Gravity Urine 1.010 1.003 - 1.035    Blood Urine Small (A) Negative    pH Urine 6.0 5.0 - 7.0    Protein Albumin Urine Negative Negative mg/dL    Urobilinogen Urine 0.2 0.2, 1.0 E.U./dL    Nitrite Urine Negative Negative    Leukocyte Esterase Urine Large (A) Negative   Urine Microscopic Exam     Status: Abnormal   Result Value Ref Range    Bacteria Urine Few (A) None Seen /HPF    RBC Urine 2-5 (A) 0-2 /HPF /HPF    WBC Urine >100 (A) 0-5 /HPF /HPF    Squamous Epithelials Urine Few (A) None Seen /LPF   Results for orders placed or performed in visit on 01/25/24   Hemoglobin A1c     Status: Abnormal   Result Value Ref Range    Hemoglobin A1C 9.4 (H) 0.0 - 5.6 %   Albumin Random Urine Quantitative with Creat Ratio     Status: Abnormal   Result Value Ref Range    Creatinine Urine mg/dL 34.7 mg/dL    Albumin Urine mg/L 21.0 mg/L    Albumin Urine mg/g Cr 60.52 (H) 0.00 - 25.00 mg/g Cr   TSH with free T4 reflex     Status: Normal   Result Value Ref Range    TSH 2.92 0.30 - 4.20 uIU/mL   Lipid panel reflex to direct LDL Fasting     Status: None   Result Value Ref Range    Cholesterol 172 <200 mg/dL    Triglycerides 144 <150 mg/dL    Direct Measure HDL 51 >=50 mg/dL    LDL Cholesterol Calculated 92 <=100 mg/dL    Non HDL Cholesterol 121 <130 mg/dL    Patient Fasting > 8hrs? Yes     Narrative    Cholesterol  Desirable:  <200 mg/dL    Triglycerides  Normal:  Less than 150 mg/dL  Borderline High:  150-199 mg/dL  High:  200-499 mg/dL  Very High:  Greater  than or equal to 500 mg/dL    Direct Measure HDL  Female:  Greater than or equal to 50 mg/dL   Male:  Greater than or equal to 40 mg/dL    LDL Cholesterol  Desirable:  <100mg/dL  Above Desirable:  100-129 mg/dL   Borderline High:  130-159 mg/dL   High:  160-189 mg/dL   Very High:  >= 190 mg/dL    Non HDL Cholesterol  Desirable:  130 mg/dL  Above Desirable:  130-159 mg/dL  Borderline High:  160-189 mg/dL  High:  190-219 mg/dL  Very High:  Greater than or equal to 220 mg/dL   Extra Tube     Status: None    Narrative    The following orders were created for panel order Extra Tube.  Procedure                               Abnormality         Status                     ---------                               -----------         ------                     Extra Red Top Tube[[id number]]                               Final result                 Please view results for these tests on the individual orders.   Extra Red Top Tube     Status: None   Result Value Ref Range    Hold Specimen JIC        ASSESSMENT / PLAN:   Encounter for Medicare annual wellness exam  Screening and preventative care discussed.  Daughter is providing the majority of patient cares.  She is unable to be there consistently due to work duties.  Form for FMLA completed today after office visit for daughter.   Home care and care coordination referrals given to assist patient and family in home setting.      Chronic kidney disease, stage 2 (mild)  Kidney function unchanged.  Consider use of ACE in future if declining kidney function.    - Primary Care - Care Coordination Referral; Future  - Home Care Referral  - Comprehensive metabolic panel (BMP + Alb, Alk Phos, ALT, AST, Total. Bili, TP)    Mixed stress and urge urinary incontinence  Chronic issue.  Concern for infection complicating chronic symptoms.  Await urine culture.   - UA Macroscopic with reflex to Microscopic and Culture - Lab Collect  - Urine Microscopic Exam  - Urine Culture    Type 2 diabetes  mellitus with hyperglycemia, with long-term current use of insulin (H)  Diabetes mellitus with peripheral vascular disease (H)  Some improvement in A1C noted.  More consistent use of short acting insulin will likely bring better control.   - Primary Care - Care Coordination Referral; Future  - Home Care Referral  - Comprehensive metabolic panel (BMP + Alb, Alk Phos, ALT, AST, Total. Bili, TP)  - FOOT EXAM      Hyperlipidemia LDL goal <100  Twice weekly rosuvastatin to continue.  - Primary Care - Care Coordination Referral; Future  - Home Care Referral  - Comprehensive metabolic panel (BMP + Alb, Alk Phos, ALT, AST, Total. Bili, TP)    DINORA (obstructive sleep apnea)  Ongoing use of CPAP.  New supplies are needed.  Orders sent to her medical supply location.  - Primary Care - Care Coordination Referral; Future  - Home Care Referral  - CPAP Order for DME - ONLY FOR DME    Acquired hypothyroidism  Euthyroid on current testing.  Refill brand-name Synthroid given.  - Primary Care - Care Coordination Referral; Future  - Home Care Referral    Falls frequently  Unsteady gait that requires assistance with walker to move to bathroom for toileting, bathing and to kitchen for eating.  She is capable of safely using walker to improve her mobility and allow her to function at her current residence.  She is in need of a new walker and an order is sent to her medical supply patient ([facility]).  She would also benefit from evaluation in the home for physical therapy as well as safety assessment due to recent falls.  - Primary Care - Care Coordination Referral; Future  - Home Care Referral  - Walker Order for DME - ONLY FOR DME    Hydrocephalus, unspecified type (H)  Reassessment of hydrocephalus can be considered if falls persist.  She was evaluated for this in the past with neurosurgery and they did not feel she was a shunt candidate.  (June 2022)      Patient has been advised of split billing requirements and indicates  "understanding: Yes    Depression Screening Follow Up        1/25/2024     2:34 PM   PHQ   PHQ-9 Total Score 12   Q9: Thoughts of better off dead/self-harm past 2 weeks Not at all         Follow Up Actions Taken  Crisis resource information provided in After Visit Summary       Counseling  Reviewed preventive health counseling, as reflected in patient instructions       Regular exercise       Healthy diet/nutrition       Vision screening       Dental care       Bladder control       Immunizations  Declined: Covid-19, Influenza, and Td due to Concerns about side effects/safety             Osteoporosis prevention/bone health      BMI  Estimated body mass index is 30.25 kg/m  as calculated from the following:    Height as of this encounter: 1.638 m (5' 4.5\").    Weight as of this encounter: 81.2 kg (179 lb).         She reports that she has never smoked. She has never been exposed to tobacco smoke. She has never used smokeless tobacco.      Appropriate preventive services were discussed with this patient, including applicable screening as appropriate for fall prevention, nutrition, physical activity, Tobacco-use cessation, weight loss and cognition.  Checklist reviewing preventive services available has been given to the patient.    Reviewed patients plan of care and provided an AVS. The Basic Care Plan (routine screening as documented in Health Maintenance) for Brandy meets the Care Plan requirement. This Care Plan has been established and reviewed with the Patient and daughter.          Signed Electronically by: Fernanda Miller MD    Identified Health Risks  I have reviewed Opioid Use Disorder and Substance Use Disorder risk factors and made any needed referrals. The patient was provided with suggestions to help her develop a healthy physical lifestyle.  She is at risk for lack of exercise and has been provided with information to increase physical activity for the benefit of her well-being.  The patient was " counseled and encouraged to consider modifying their diet and eating habits. She was provided with information on recommended healthy diet options.  The patient reports that she has difficulty with activities of daily living. I have asked that the patient make a follow up appointment in 26 weeks where this issue will be further evaluated and addressed. Home care and Care Coordination referrals given to assess for in home needs.  Information on urinary incontinence and treatment options given to patient.  The patient was provided with suggestions to help her develop a healthy emotional lifestyle.  The patient s PHQ-9 score is consistent with moderate depression. She was provided with information regarding depression and was advised to schedule a follow up appointment in 26 weeks to further address this issue.  She is at risk for falling and has been provided with information to reduce the risk of falling at home.

## 2024-01-25 NOTE — PATIENT INSTRUCTIONS
I have added labs to blood and urine in lab today.   Form for FMLA will be completed and mailed to home.    Referral for home care and care coordination.    We will send order to [facility] for CPA and wheeled walker.    Patient Education   Personalized Prevention Plan  You are due for the preventive services outlined below.  Your care team is available to assist you in scheduling these services.  If you have already completed any of these items, please share that information with your care team to update in your medical record.  Health Maintenance Due   Topic Date Due    RSV VACCINE (Pregnancy & 60+) (1 - 1-dose 60+ series) Never done    Diptheria Tetanus Pertussis (DTAP/TDAP/TD) Vaccine (1 - Tdap) 03/29/2022    Flu Vaccine (1) 09/01/2023    COVID-19 Vaccine (5 - 2023-24 season) 09/01/2023    Annual Wellness Visit  10/24/2023    Cholesterol Lab  10/24/2023    Kidney Microalbumin Urine Test  10/24/2023    Diabetic Foot Exam  10/24/2023    Basic Metabolic Panel  12/01/2023    Thyroid Function Lab  02/10/2024     Your Health Risk Assessment indicates you feel you are not in good health    A healthy lifestyle helps keep the body fit and the mind alert. It helps protect you from disease, helps you fight disease, and helps prevent chronic disease (disease that doesn't go away) from getting worse. This is important as you get older and begin to notice twinges in muscles and joints and a decline in the strength and stamina you once took for granted. A healthy lifestyle includes good healthcare, good nutrition, weight control, recreation, and regular exercise. Avoid harmful substances and do what you can to keep safe. Another part of a healthy lifestyle is stay mentally active and socially involved.    Good healthcare   Have a wellness visit every year.   If you have new symptoms, let us know right away. Don't wait until the next checkup.   Take medicines exactly as prescribed and keep your medicines in a safe place.  "Tell us if your medicine causes problems.   Healthy diet and weight control   Eat 3 or 4 small, nutritious, low-fat, high-fiber meals a day. Include a variety of fruits, vegetables, and whole-grain foods.   Make sure you get enough calcium in your diet. Calcium, vitamin D, and exercise help prevent osteoporosis (bone thinning).   If you live alone, try eating with others when you can. That way you get a good meal and have company while you eat it.   Try to keep a healthy weight. If you eat more calories than your body uses for energy, it will be stored as fat and you will gain weight.     Recreation   Recreation is not limited to sports and team events. It includes any activity that provides relaxation, interest, enjoyment, and exercise. Recreation provides an outlet for physical, mental, and social energy. It can give a sense of worth and achievement. It can help you stay healthy.    Mental Exercise and Social Involvement  Mental and emotional health is as important as physical health. Keep in touch with friends and family. Stay as active as possible. Continue to learn and challenge yourself.   Things you can do to stay mentally active are:  Learn something new, like a foreign language or musical instrument.   Play SCRABBLE or do crossword puzzles. If you cannot find people to play these games with you at home, you can play them with others on your computer through the Internet.   Join a games club--anything from card games to chess or checkers or lawn bowling.   Start a new hobby.   Go back to school.   Volunteer.   Read.   Keep up with world events.  Learning About Being Physically Active  What is physical activity?     Being physically active means doing any kind of activity that gets your body moving.  The types of physical activity that can help you get fit and stay healthy include:  Aerobic or \"cardio\" activities. These make your heart beat faster and make you breathe harder, such as brisk walking, riding a " "bike, or running. They strengthen your heart and lungs and build up your endurance.  Strength training activities. These make your muscles work against, or \"resist,\" something. Examples include lifting weights or doing push-ups. These activities help tone and strengthen your muscles and bones.  Stretches. These let you move your joints and muscles through their full range of motion. Stretching helps you be more flexible.  Reaching a balance between these three types of physical activity is important because each one contributes to your overall fitness.  What are the benefits of being active?  Being active is one of the best things you can do for your health. It helps you to:  Feel stronger and have more energy to do all the things you like to do.  Focus better at school or work.  Feel, think, and sleep better.  Reach and stay at a healthy weight.  Lose fat and build lean muscle.  Lower your risk for serious health problems, including diabetes, heart attack, high blood pressure, and some cancers.  Keep your heart, lungs, bones, muscles, and joints strong and healthy.  How can you make being active part of your life?  Start slowly. Make it your long-term goal to get at least 30 minutes of exercise on most days of the week. Walking is a good choice. You also may want to do other activities, such as running, swimming, cycling, or playing tennis or team sports.  Pick activities that you like--ones that make your heart beat faster, your muscles stronger, and your muscles and joints more flexible. If you find more than one thing you like doing, do them all. You don't have to do the same thing every day.  Get your heart pumping every day. Any activity that makes your heart beat faster and keeps it at that rate for a while counts.  Here are some great ways to get your heart beating faster:  Go for a brisk walk, run, or hike.  Go for a swim or bike ride.  Take an online exercise class or dance.  Play a game of touch football, " "basketball, or soccer.  Play tennis, pickleball, or racquetball.  Climb stairs.  Even some household chores can be aerobic. Just do them at a faster pace. Raking or mowing the lawn, sweeping the garage, and vacuuming and cleaning your home all can help get your heart rate up.  Strengthen your muscles during the week. You don't have to lift heavy weights or grow big, bulky muscles to get stronger. Doing a few simple activities that make your muscles work against, or \"resist,\" something can help you get stronger. Aim for at least twice a week.  For example, you can:  Do push-ups or sit-ups, which use your own body weight as resistance.  Lift weights or dumbbells or use stretch bands at home or in a gym or community center.  Stretch your muscles often. Stretching will help you as you become more active. It can help you stay flexible and loosen tight muscles. It can also help improve your balance and posture and can be a great way to relax.  Be sure to stretch the muscles you'll be using when you work out. It's best to warm your muscles slightly before you stretch them. Walk or do some other light aerobic activity for a few minutes. Then start stretching.  When you stretch your muscles:  Do it slowly. Stretching is not about going fast or making sudden movements.  Don't push or bounce during a stretch.  Hold each stretch for at least 15 to 30 seconds, if you can. You should feel a stretch in the muscle, but not pain.  Breathe out as you do the stretch. Then breathe in as you hold the stretch. Don't hold your breath.  If you're worried about how more activity might affect your health, have a checkup before you start. Follow any special advice your doctor gives you for getting a smart start.  Where can you learn more?  Go to https://www.healthwise.net/patiented  Enter W332 in the search box to learn more about \"Learning About Being Physically Active.\"  Current as of: June 6, 2023               Content Version: 13.8    " 4453-7869 [employer].   Care instructions adapted under license by your healthcare professional. If you have questions about a medical condition or this instruction, always ask your healthcare professional. [employer] disclaims any warranty or liability for your use of this information.      Learning About Dietary Guidelines  What are the Dietary Guidelines for Americans?     Dietary Guidelines for Americans provide tips for eating well and staying healthy. This helps reduce the risk for long-term (chronic) diseases.  These guidelines recommend that you:  Eat and drink the right amount for you. The [place] government's food guide is called MyPlate. It can help you make your own well-balanced eating plan.  Try to balance your eating with your activity. This helps you stay at a healthy weight.  Drink alcohol in moderation, if at all.  Limit foods high in salt, saturated fat, trans fat, and added sugar.  These guidelines are from the U.S. Department of Agriculture and the U.S[place] Department of Health and Human Services. They are updated every 5 years.  What is MyPlate?  MyPlate is the [place] government's food guide. It can help you make your own well-balanced eating plan. A balanced eating plan means that you eat enough, but not too much, and that your food gives you the nutrients you need to stay healthy.  MyPlate focuses on eating plenty of whole grains, fruits, and vegetables, and on limiting fat and sugar. It is available online at www.ChooseMyPlate.gov.  How can you get started?  If you're trying to eat healthier, you can slowly change your eating habits over time. You don't have to make big changes all at once. Start by adding one or two healthy foods to your meals each day.  Grains  Choose whole-grain breads and cereals and whole-wheat pasta and whole-grain crackers.  Vegetables  Eat a variety of vegetables every day. They have lots of nutrients and are part of a heart-healthy  "diet.  Fruits  Eat a variety of fruits every day. Fruits contain lots of nutrients. Choose fresh fruit instead of fruit juice.  Protein foods  Choose fish and lean poultry more often. Eat red meat and fried meats less often. Dried beans, tofu, and nuts are also good sources of protein.  Dairy  Choose low-fat or fat-free products from this food group. If you have problems digesting milk, try eating cheese or yogurt instead.  Fats and oils  Limit fats and oils if you're trying to cut calories. Choose healthy fats when you cook. These include canola oil and olive oil.  Where can you learn more?  Go to https://www.LLamasoft.net/patiented  Enter D676 in the search box to learn more about \"Learning About Dietary Guidelines.\"  Current as of: February 28, 2023               Content Version: 13.8    0226-8390 [employer].   Care instructions adapted under license by your healthcare professional. If you have questions about a medical condition or this instruction, always ask your healthcare professional. [employer] disclaims any warranty or liability for your use of this information.      Activities of Daily Living    Your Health Risk Assessment indicates you have difficulties with activities of daily living such as housework, bathing, preparing meals, taking medication, etc. Please make a follow up appointment for us to address this issue in more detail.  Bladder Training: Care Instructions  Your Care Instructions     Bladder training is used to treat urge incontinence and stress incontinence. Urge incontinence means that the need to urinate comes on so fast that you can't get to a toilet in time. Stress incontinence means that you leak urine because of pressure on your bladder. For example, it may happen when you laugh, cough, or lift something heavy.  Bladder training can increase how long you can wait before you have to urinate. It can also help your bladder hold more urine. And it can give you " better control over the urge to urinate.  It is important to remember that bladder training takes a few weeks to a few months to make a difference. You may not see results right away, but don't give up.  Follow-up care is a key part of your treatment and safety. Be sure to make and go to all appointments, and call your doctor if you are having problems. It's also a good idea to know your test results and keep a list of the medicines you take.  How can you care for yourself at home?  Work with your doctor to come up with a bladder training program that is right for you. You may use one or more of the following methods.  Delayed urination  In the beginning, try to keep from urinating for 5 minutes after you first feel the need to go.  While you wait, take deep, slow breaths to relax. Kegel exercises can also help you delay the need to go to the bathroom.  After some practice, when you can easily wait 5 minutes to urinate, try to wait 10 minutes before you urinate.  Slowly increase the waiting period until you are able to control when you have to urinate.  Scheduled urination  Empty your bladder when you first wake up in the morning.  Schedule times throughout the day when you will urinate.  Start by going to the bathroom every hour, even if you don't need to go.  Slowly increase the time between trips to the bathroom.  When you have found a schedule that works well for you, keep doing it.  If you wake up during the night and have to urinate, do it. Apply your schedule to waking hours only.  Kegel exercises  These tighten and strengthen pelvic muscles, which can help you control the flow of urine. (If doing these exercises causes pain, stop doing them and talk with your doctor.) To do Kegel exercises:  Squeeze your muscles as if you were trying not to pass gas. Or squeeze your muscles as if you were stopping the flow of urine. Your belly, legs, and buttocks shouldn't move.  Hold the squeeze for 3 seconds, then relax  "for 5 to 10 seconds.  Start with 3 seconds, then add 1 second each week until you are able to squeeze for 10 seconds.  Repeat the exercise 10 times a session. Do 3 to 8 sessions a day.  When should you call for help?  Watch closely for changes in your health, and be sure to contact your doctor if:    Your incontinence is getting worse.     You do not get better as expected.   Where can you learn more?  Go to https://www.Sopogy.net/patiented  Enter V684 in the search box to learn more about \"Bladder Training: Care Instructions.\"  Current as of: February 28, 2023               Content Version: 13.8    7341-0035 [employer].   Care instructions adapted under license by your healthcare professional. If you have questions about a medical condition or this instruction, always ask your healthcare professional. [employer] disclaims any warranty or liability for your use of this information.      Your Health Risk Assessment indicates you feel you are not in good emotional health.    Recreation   Recreation is not limited to sports and team events. It includes any activity that provides relaxation, interest, enjoyment, and exercise. Recreation provides an outlet for physical, mental, and social energy. It can give a sense of worth and achievement. It can help you stay healthy.    Mental Exercise and Social Involvement  Mental and emotional health is as important as physical health. Keep in touch with friends and family. Stay as active as possible. Continue to learn and challenge yourself.   Things you can do to stay mentally active are:  Learn something new, like a foreign language or musical instrument.   Play SCRABBLE or do crossword puzzles. If you cannot find people to play these games with you at home, you can play them with others on your computer through the Internet.   Join a games club--anything from card games to chess or checkers or lawn bowling.   Start a new hobby.   Go back to " school.   Volunteer.   Read.   Keep up with world events.  Learning About Depression Screening  What is depression screening?  Depression screening is a way to see if you have depression symptoms. It may be done by a doctor or counselor. It's often part of a routine checkup. That's because your mental health is just as important as your physical health.  Depression is a mental health condition that affects how you feel, think, and act. You may:  Have less energy.  Lose interest in your daily activities.  Feel sad and grouchy for a long time.  Depression is very common. It affects people of all ages.  Many things can lead to depression. Some people become depressed after they have a stroke or find out they have a major illness like cancer or heart disease. The death of a loved one or a breakup may lead to depression. It can run in families. Most experts believe that a combination of inherited genes and stressful life events can cause it.  What happens during screening?  You may be asked to fill out a form about your depression symptoms. You and the doctor will discuss your answers. The doctor may ask you more questions to learn more about how you think, act, and feel.  What happens after screening?  If you have symptoms of depression, your doctor will talk to you about your options.  Doctors usually treat depression with medicines or counseling. Often, combining the two works best. Many people don't get help because they think that they'll get over the depression on their own. But people with depression may not get better unless they get treatment.  The cause of depression is not well understood. There may be many factors involved. But if you have depression, it's not your fault.  A serious symptom of depression is thinking about death or suicide. If you or someone you care about talks about this or about feeling hopeless, get help right away.  It's important to know that depression can be treated. Medicine,  "counseling, and self-care may help.  Where can you learn more?  Go to https://www.[employer] Stop.net/patiented  Enter T185 in the search box to learn more about \"Learning About Depression Screening.\"  Current as of: June 25, 2023               Content Version: 13.8    0470-0372 [employer].   Care instructions adapted under license by your healthcare professional. If you have questions about a medical condition or this instruction, always ask your healthcare professional. [employer] disclaims any warranty or liability for your use of this information.      Preventing Falls: Care Instructions  Injuries and health problems such as trouble walking or poor eyesight can increase your risk of falling. So can some medicines. But there are things you can do to help prevent falls. You can exercise to get stronger. You can also arrange your home to make it safer.    Talk to your doctor about the medicines you take. Ask if any of them increase the risk of falls and whether they can be changed or stopped.   Try to exercise regularly. It can help improve your strength and balance. This can help lower your risk of falling.     Practice fall safety and prevention.    Wear low-heeled shoes that fit well and give your feet good support. Talk to your doctor if you have foot problems that make this hard.  Carry a cellphone or wear a medical alert device that you can use to call for help.  Use stepladders instead of chairs to reach high objects. Don't climb if you're at risk for falls. Ask for help, if needed.  Wear the correct eyeglasses, if you need them.    Make your home safer.    Remove rugs, cords, clutter, and furniture from walkways.  Keep your house well lit. Use night-lights in hallways and bathrooms.  Install and use sturdy handrails on stairways.  Wear nonskid footwear, even inside. Don't walk barefoot or in socks without shoes.    Be safe outside.    Use handrails, curb cuts, and ramps whenever " "possible.  Keep your hands free by using a shoulder bag or backpack.  Try to walk in well-lit areas. Watch out for uneven ground, changes in pavement, and debris.  Be careful in the winter. Walk on the grass or gravel when sidewalks are slippery. Use de-icer on steps and walkways. Add non-slip devices to shoes.    Put grab bars and nonskid mats in your shower or tub and near the toilet. Try to use a shower chair or bath bench when bathing.   Get into a tub or shower by putting in your weaker leg first. Get out with your strong side first. Have a phone or medical alert device in the bathroom with you.   Where can you learn more?  Go to https://www.Lagiar.SuperCloud/patiented  Enter G117 in the search box to learn more about \"Preventing Falls: Care Instructions.\"  Current as of: July 18, 2023               Content Version: 13.8    5095-1223 [employer].   Care instructions adapted under license by your healthcare professional. If you have questions about a medical condition or this instruction, always ask your healthcare professional. [employer] disclaims any warranty or liability for your use of this information.      How to Get Up Safely After a Fall: Care Instructions  Overview     If you have injuries, health problems, or other reasons that may make it easy for you to fall at home, it is a good idea to learn how to get up safely after a fall. Learning how to get up correctly can help you avoid making an injury worse.  Also, knowing what to do if you cannot get up can help you stay safe until help arrives.  Follow-up care is a key part of your treatment and safety. Be sure to make and go to all appointments, and call your doctor if you are having problems. It's also a good idea to know your test results and keep a list of the medicines you take.  How can you care for yourself after a fall?  If you think you can get up  First lie still for a few minutes and think about how you feel. If your " body feels okay and you think you can get up safely, follow the rest of the steps below:  Look for a chair or other piece of furniture that is close to you.  Roll onto your side and rest. Roll by turning your head in the direction you want to roll, move your shoulder and arm, then hip and leg in the same direction.  Lie still for a moment to let your blood pressure adjust.  Slowly push your upper body up, lift your head, and take a moment to rest.  Slowly get up on your hands and knees, and crawl to the chair or other stable piece of furniture.  Put your hands on the chair.  Move one foot forward, and place it flat on the floor. Your other leg should be bent with the knee on the floor.  Rise slowly, turn your body, and sit in the chair. Stay seated for a bit and think about how you feel. Call for help. Even if you feel okay, let someone know what happened to you. You might not know that you have a serious injury.  If you cannot get up  If you think you are injured after a fall or you cannot get up, try not to panic.  Call out for help.  If you have a phone within reach or you have an emergency call device, use it to call for help.  If you do not have a phone within reach, try to slide yourself toward it. If you cannot get to the phone, try to slide toward a door or window or a place where you think you can be heard.  Benzie or use an object to make noise so someone might hear you.  If you can reach something that you can use for a pillow, place it under your head. Try to stay warm by covering yourself with a blanket or clothing while you wait for help.  When should you call for help?   Call 911 anytime you think you may need emergency care. For example, call if:    You passed out (lost consciousness).     You cannot get up after a fall.     You have severe pain.   Call your doctor now or seek immediate medical care if:    You have new or worse pain.     You are dizzy or lightheaded.     You hit your head.   Watch  "closely for changes in your health, and be sure to contact your doctor if:    You do not get better as expected.   Where can you learn more?  Go to https://www.Qinti.net/patiented  Enter G513 in the search box to learn more about \"How to Get Up Safely After a Fall: Care Instructions.\"  Current as of: November 13, 2022               Content Version: 13.8    3075-8895 [employer].   Care instructions adapted under license by your healthcare professional. If you have questions about a medical condition or this instruction, always ask your healthcare professional. [employer] disclaims any warranty or liability for your use of this information.         "

## 2024-01-26 ENCOUNTER — PATIENT OUTREACH (OUTPATIENT)
Dept: CARE COORDINATION | Facility: CLINIC | Age: 84
End: 2024-01-26
Payer: COMMERCIAL

## 2024-01-26 LAB
ALBUMIN SERPL BCG-MCNC: 4.2 G/DL (ref 3.5–5.2)
ALP SERPL-CCNC: 99 U/L (ref 40–150)
ALT SERPL W P-5'-P-CCNC: 14 U/L (ref 0–50)
ANION GAP SERPL CALCULATED.3IONS-SCNC: 15 MMOL/L (ref 7–15)
AST SERPL W P-5'-P-CCNC: 20 U/L (ref 0–45)
BILIRUB SERPL-MCNC: 0.4 MG/DL
BUN SERPL-MCNC: 12.8 MG/DL (ref 8–23)
CALCIUM SERPL-MCNC: 9.6 MG/DL (ref 8.8–10.2)
CHLORIDE SERPL-SCNC: 98 MMOL/L (ref 98–107)
CHOLEST SERPL-MCNC: 172 MG/DL
CREAT SERPL-MCNC: 0.78 MG/DL (ref 0.51–0.95)
CREAT UR-MCNC: 34.7 MG/DL
DEPRECATED HCO3 PLAS-SCNC: 24 MMOL/L (ref 22–29)
EGFRCR SERPLBLD CKD-EPI 2021: 75 ML/MIN/1.73M2
FASTING STATUS PATIENT QL REPORTED: YES
GLUCOSE SERPL-MCNC: 137 MG/DL (ref 70–99)
HDLC SERPL-MCNC: 51 MG/DL
LDLC SERPL CALC-MCNC: 92 MG/DL
MICROALBUMIN UR-MCNC: 21 MG/L
MICROALBUMIN/CREAT UR: 60.52 MG/G CR (ref 0–25)
NONHDLC SERPL-MCNC: 121 MG/DL
POTASSIUM SERPL-SCNC: 4.4 MMOL/L (ref 3.4–5.3)
PROT SERPL-MCNC: 8.1 G/DL (ref 6.4–8.3)
SODIUM SERPL-SCNC: 137 MMOL/L (ref 135–145)
TRIGL SERPL-MCNC: 144 MG/DL
TSH SERPL DL<=0.005 MIU/L-ACNC: 2.92 UIU/ML (ref 0.3–4.2)

## 2024-01-26 RX ORDER — ROSUVASTATIN CALCIUM 5 MG/1
TABLET, COATED ORAL
Qty: 24 TABLET | Refills: 3 | Status: SHIPPED | OUTPATIENT
Start: 2024-01-26

## 2024-01-26 RX ORDER — LEVOTHYROXINE SODIUM 137 MCG
137 TABLET ORAL DAILY
Qty: 90 TABLET | Refills: 3 | Status: SHIPPED | OUTPATIENT
Start: 2024-01-26

## 2024-01-26 NOTE — RESULT ENCOUNTER NOTE
Your urine protein level is elevated.  This is likely related to the elevated blood sugar.  As blood sugars continue to come down this will hopefully improve.  If this continues to be elevated over time, we can use a low dose of blood pressure medication to protect the kidneys in the future.  As we discussed in the office, your long term blood sugar testing is much improved.  It remains elevated and use of the short acting insulin more regularly will likely continue to bring this down toward goal.  Thyroid testing is normal.  Cholesterol is under excellent control with the current 2 time weekly treatment.  Please call or MyChart message me if you have any questions.      MARISSAK

## 2024-01-26 NOTE — TELEPHONE ENCOUNTER
Please mail [facility] [employer] Family Member's Serious Health Condition form to patient home.    Fax orders for walker and CPAP supplies to [facility].    All these in box in provider office.      CK

## 2024-01-26 NOTE — TELEPHONE ENCOUNTER
Fax orders for walker and CPAP supplies to [facility]. [phone]      NYL mailed to pt's home    Ju Londono

## 2024-01-26 NOTE — RESULT ENCOUNTER NOTE
Your urine testing indicates a potential infection.  We are culturing the urine and will send result with recommendations when that returns.  Kidney function is normal.  Liver testing is normal.  Please call or MyChart message me if you have any questions.      MARISSAK

## 2024-01-26 NOTE — PROGRESS NOTES
Community Health Worker Initial Outreach    CHW Initial Information Gathering:  Referral Source: PCP  Preferred Hospital: [facility] [place]  [phone]  Current living arrangement:: I live in a private home with family  Type of residence:: Private home - stairs  Community Resources: None  Equipment Currently Used at Home: none  Informal Support system:: Children, Minnie based  No PCP office visit in Past Year: No  Transportation means:: Family  CHW Additional Questions  If ED/Hospital discharge, follow-up appointment scheduled as recommended?: N/A  Medication changes made following ED/Hospital discharge?: N/A  MyChart active?: Yes  Patient sent Social Determinants of Health questionnaire?: Yes    Patient accepts CC: Yes. Patient scheduled for assessment with CC SW on 1/31 at 11 am. Patient noted desire to discuss life alert. Patient has one but it doesn't work and unsure what company it is through. Would like to find a reliable service. .     Crystal Daley, ANSLEY  [facility] Roseanne Abarca, Shady [facility] [place] and [facility]  [phone]

## 2024-01-27 LAB — BACTERIA UR CULT: ABNORMAL

## 2024-01-28 RX ORDER — NITROFURANTOIN 25; 75 MG/1; MG/1
100 CAPSULE ORAL 2 TIMES DAILY
Qty: 10 CAPSULE | Refills: 0 | Status: SHIPPED | OUTPATIENT
Start: 2024-01-28 | End: 2024-02-02

## 2024-01-28 NOTE — RESULT ENCOUNTER NOTE
Please call if message has not been read.  PSK    The final urine culture does show an infection.  I am sending in a prescription for Nitrofurantoin twice a day for 5 days for treatment of this.  Please call or MyChart message me if you have any questions.      PSK

## 2024-01-29 ENCOUNTER — TELEPHONE (OUTPATIENT)
Dept: FAMILY MEDICINE | Facility: CLINIC | Age: 84
End: 2024-01-29
Payer: COMMERCIAL

## 2024-01-29 NOTE — TELEPHONE ENCOUNTER
RN called patient and LVM to call clinic at [phone].      If patient calls back, please relay provider message below.     Anay Anaya RN     ----- Message from Fernanda Miller MD sent at 1/28/2024  2:22 PM CST -----  Please call if message has not been read.  PSK    The final urine culture does show an infection.  I am sending in a prescription for Nitrofurantoin twice a day for 5 days for treatment of this.  Please call or MyChart message me if you have any questions.      PSK

## 2024-01-30 NOTE — TELEPHONE ENCOUNTER
This writer attempted to contact patient on 01/30/24.    Reason for call provider's message and left message to call clinic back at [phone].    If patient calls back:   Relay message below, (read verbatim), document that pt called and close encounter.    KATHY Solano  [facility]

## 2024-01-31 ENCOUNTER — PATIENT OUTREACH (OUTPATIENT)
Dept: NURSING | Facility: CLINIC | Age: 84
End: 2024-01-31
Payer: COMMERCIAL

## 2024-01-31 NOTE — LETTER
[facility]  [address]  [place] [address]    January 31, 2024    Brandy Leon  [address] [place] [address]      Dear Brandy,    I am a clinic care coordinator who works with Fernanda Miller MD with the [facility]. I wanted to thank you for spending the time to talk with me.  Below is a description of clinic care coordination and how I can further assist you.       The clinic care coordination team is made up of a registered nurse, , financial resource worker and community health worker who understand the health care system. The goal of clinic care coordination is to help you manage your health and improve access to the health care system. Our team works alongside your provider to assist you in determining your health and social needs. We can help you obtain health care and community resources, providing you with necessary information and education. We can work with you through any barriers and develop a care plan that helps coordinate and strengthen the communication between you and your care team.  Our services are voluntary and are offered without charge to you personally.    Please feel free to contact me with any questions or concerns regarding care coordination and what we can offer.      We are focused on providing you with the highest-quality healthcare experience possible.    Sincerely,     Sharri Rodriguez, [facility]  Social Work Primary Care Clinic Care Coordinator  [facility]  [phone]  [email or web address]

## 2024-01-31 NOTE — LETTER
January 31, 2024      BRANDY LAST  [address] [place] [address]      Dear Brandy:    Arnol, my name is Sharri. You are eligible for [facility] Case Management program through your enrollment in [employer]. We think you may benefit from this program. I am writing to invite you to be in our Case Management program.     The following are a few things the Case Management program can help you with:  Select or change your primary care doctor or primary care clinic  Find a specialist, if needed, near your home  Receive preventive care, such as flu shots  Join programs offered by [facility] that interest you, like wellness programs    As your , I will do the following to enroll you in the Case Management Program:  Schedule a telephone call with you to answer any questions you may have about case management  Conduct an assessment by phone to identify needs case management can help you with  Develop a care plan to address those needs  Help you obtain available care and resources as needed    I will call you soon to discuss your interest in this program and your health care needs.    Being in the Case Management program is voluntary and offered to you at no cost. If you accept being in the Case Management program, you can stop any time by calling me at [phone].    Sincerely,    Sharri Rodriguez, [facility]  Social Work Primary Care Clinic Care Coordinator  [facility]  [phone]  [email or web address] [place] [address]  [phone]  fax [phone]  [facility]    [id number]                                   (01/2020)

## 2024-01-31 NOTE — LETTER
[facility]  [address]  [place] [address]    January 31, 2024        Brandy Leon  [address] [address]          Dear Horacio Nava is a Patient Centered Plan of Care for your enrollment in Care Coordination. Please let us know if you have additional questions, concerns, or goals that we can assist with.    Sincerely,    Sharri Rodriguez, [facility]  Social Work Primary Care Clinic Care Coordinator  [facility]  [phone]  [email or web address]  Patient Centered Plan of Care  About Me:        Patient Name:  Brandy Leon    YOB: 1940  Age:         83 year old   [place] MRN:    [id number] Telephone Information:  Home Phone [phone]   Mobile [phone]       Address:  [address] [place] [address] Email address:  [email or web address]      Emergency Contact(s)    Name Relationship Lgl Grd Work Phone Home Phone Mobile Phone   1. ALICIA PALMA Daughter No [phone]  [phone]   2. DAWN GARCIA Son No [phone]   3. SUZANNA GARCIA Son No   [phone]           Primary language:  English     needed? No   [place] [employer]:  [phone] op. 1  Other communication barriers:Physical impairment    Preferred Method of Communication:  Mail  Current living arrangement: I live in a private home with family    Mobility Status/ Medical Equipment: No data recorded    Health Maintenance  Health Maintenance Reviewed:   Health Maintenance Due   Topic Date Due    RSV VACCINE (Pregnancy & 60+) (1 - 1-dose 60+ series) Never done    DTAP/TDAP/TD IMMUNIZATION (1 - Tdap) 03/29/2022    INFLUENZA VACCINE (1) 09/01/2023    COVID-19 Vaccine (5 - 2023-24 season) 09/01/2023     My Access Plan  Medical Emergency 911   Primary Clinic Line [facility] - [phone]   24 Hour  Appointment Line [phone] or  [phone]) (toll-free)   24 Hour Nurse Line [phone] (toll-free)   Preferred Urgent Care No data recorded   [facility]  [phone]     Preferred Pharmacy [id number] [place] - [address] [facility] [facility]     Behavioral Health Crisis Line The National Suicide Prevention Lifeline at 1-745.528.8300 or Text/Call 918     My Care Team Members  Patient Care Team         Relationship Specialty Notifications Start End    Fernanda Miller MD PCP - General Family Medicine  11/23/21     Phone: [phone] Fax: [phone] [address] [facility] [address]    Macie Davila, KATHY Case Manager   5/17/16     [facility] Care Coordination RN    Phone: [phone] Fax: [phone]        Mirna Perez [facility] Pharmacist Pharmacist  11/6/19     Phone: [phone] [address] [place] [address]    Ehsan Araya DPM Assigned Musculoskeletal Provider   10/23/20     Phone: [phone] Fax: [phone]         [facility] [address] [address]    Mitchel Kenney MD Assigned Neuroscience Provider   7/18/21     Phone: [phone] Fax: [phone]         [address] [place] [address]    Fernanda Miller MD Assigned PCP   5/15/22     Phone: [phone] Fax: [phone] [address] [facility] [address]    Rachel Morris MD Assigned Endocrinology Provider   9/10/22     Phone: [phone] Fax: [phone]         [address] [place] [address]    Mirna Perez [facility] Assigned MTM Pharmacist   9/28/22     Phone: [phone] [address] [place] [address]    Elysia Davila RD Diabetes Educator Dietitian, Registered  1/3/23     Phone: [phone] Fax: [phone]         [address] [place] [address]    Radha Ceja MD Assigned Surgical Provider    8/12/23     Phone: [phone] Fax: [phone] [address] [address]    Whit Arrieta PA-C Physician Assistant Endocrinology, Diabetes, and Metabolism  10/23/23     Phone: [phone] Fax: [phone] [address] [address]    Sharri Rodriguez LICSW Lead Care Coordinator  Admissions 1/26/24                 My Care Plans  Self Management and Treatment Plan    Care Plan  Care Plan: Help At Home       Problem: Insufficient In-home support       Goal: I will be safe at home       Start Date: 1/31/2024 Expected End Date: 7/31/2024    Note:     Barriers: Cost of care, life alert issues  Strengths: Working with care coordination  Patient expressed understanding of goal: Yes  Action steps to achieve this goal:  1. I will attempt to identify the company Life Alert is through.  2. I will reach out to caregiver resources provided by care coordination.   3. I will continue to work with care coordination for additional resources as needed.                                Action Plans on File:      Advance Care Plans/Directives:   Advanced Care Plan/Directives on file:   No    Discussed with patient/caregiver(s):   Other (Comment) (Will bring in AD info)             My Medical and Care Information  Problem List   Patient Active Problem List   Diagnosis    Seasonal allergies    Hypothyroidism    Hyperlipidemia LDL goal <100    Fibromyalgia    Osteoarthritis    Obesity    Type 2 diabetes mellitus with hyperglycemia, with long-term current use of insulin (H)    Hypertension goal BP (blood pressure) < 140/90    Overactive bladder    Recurrent UTI    Pseudophakia of both eyes    DINORA (obstructive sleep apnea)- severe (AHI 56)    Contusion of right knee    Gait disorder    Gastroesophageal reflux disease without esophagitis    Urge incontinence    Chronic kidney disease, stage 2 (mild)    Hydrocephalus, unspecified type (H)    Diabetes mellitus with peripheral vascular disease (H)       Current Medications and Allergies:    Current Outpatient Medications   Medication    Blood Glucose Monitoring Suppl (ONETOUCH VERIO FLEX SYSTEM) w/Device KIT    Continuous Blood Gluc  (DEXCOM G7 ) SANTANA    Continuous Blood Gluc Sensor (DEXCOM G7 SENSOR) MISC    dextran 70-hypromellose (TEARS NATURALE FREE PF) 0.1-0.3 % ophthalmic solution    dorzolamide-timolol PF (COSOPT PF) 2-0.5 % opthalmic solutionh    fexofenadine (ALLEGRA) 180 MG tablet    glipiZIDE (GLUCOTROL XL) 10 MG 24 hr tablet    ibuprofen (ADVIL/MOTRIN) 200 MG capsule    insulin aspart (NOVOLOG FLEXPEN) 100 UNIT/ML pen    insulin glargine U-300 (TOUJEO SOLOSTAR) 300 UNIT/ML (1 units dial) pen    insulin pen needle (BD PEN NEEDLE JUAN C 2ND GEN) 32G X 4 MM miscellaneous    metFORMIN (GLUCOPHAGE) 1000 MG tablet    nitroFURantoin macrocrystal-monohydrate (MACROBID) 100 MG capsule    OneTouch Delica Lancets 30G MISC    ONETOUCH VERIO IQ test strip    rosuvastatin (CRESTOR) 5 MG tablet    SYNTHROID 137 MCG tablet    travoprost BAK FREE (TRAVATAN Z) 0.004 % ophthalmic solution    triamcinolone (KENALOG) 0.1 % external cream     No current facility-administered medications for this visit.     Facility-Administered Medications Ordered in Other Visits   Medication    DOBUTamine 500 mg in dextrose 5% 250 mL (adult std conc) premix     Care Coordination Start Date: 1/25/2024   Frequency of Care Coordination: monthly, more frequently as needed     Form Last Updated: 01/31/2024

## 2024-01-31 NOTE — TELEPHONE ENCOUNTER
Writer called daughter (CtC on file).  Daughter stated that they did look at the mychart message yesterday and she is aware of the antibiotic and is going to pick it up today.      Kristina Kjellberg, MSN, RN

## 2024-01-31 NOTE — LETTER
[facility]  [address]  [place] [address]    January 31, 2024    Brandy Leon  [address] [place] [address]      Dear Fernanda,    I am a clinic care coordinator who works with Fernanda Miller MD with the [facility]. I wanted to thank you for spending the time to talk with me.  Below is a description of clinic care coordination and how I can further assist you.       The clinic care coordination team is made up of a registered nurse, , financial resource worker and community health worker who understand the health care system. The goal of clinic care coordination is to help you manage your health and improve access to the health care system. Our team works alongside your provider to assist you in determining your health and social needs. We can help you obtain health care and community resources, providing you with necessary information and education. We can work with you through any barriers and develop a care plan that helps coordinate and strengthen the communication between you and your care team.  Our services are voluntary and are offered without charge to you personally.    Please feel free to contact me with any questions or concerns regarding care coordination and what we can offer.      We are focused on providing you with the highest-quality healthcare experience possible.    Sincerely,     Sharri Rodriguez, [facility]  Social Work Primary Care Clinic Care Coordinator  [facility]  [phone]  [email or web address]

## 2024-01-31 NOTE — PROGRESS NOTES
Clinic Care Coordination Contact  Clinic Care Coordination Contact  OUTREACH    Referral Information:  Referral Source: PCP    Primary Diagnosis: Psychosocial    Chief Complaint   Patient presents with    Clinic Care Coordination - Initial     SW CC     [facility] CC outreached to patient's daughter for initial assessment, CTC on file. Patient's daughter, Fernanda, reports that the patient has been charged every month for her life alert system, but they are unsure which company it is through and the bank is unsure as well, [facility] CC suggested calling the [employer] and a few other life alert companies to identify where the charge is coming from, Fernanda reports the system has not been working and she doesn't know who to reach out to. [facility]VA CC to send other life alert type systems like [employer], Medical Guardian, and Lifeline.    Fernanda reports caregiver role strain. [facility] CC suggested respite care. Fernanda reports she has someone from home care coming out on Monday to do an assessment. Fernanda also reports she is going to reach out to [employer]. [facility] CC also recommended resources such as Senior Linkage Line, Senior Community Services for caregiver support. [facility] CC to also send a list of respite agencies, [employer], Home Instead, and [employer].     Universal Utilization:   Clinic Utilization  Difficulty keeping appointments:: No  Compliance Concerns: No  No-Show Concerns: No  No PCP office visit in Past Year: No  Utilization      No Show Count (past year)  1             ED Visits  0             Hospital Admissions  0                    Current as of: 1/30/2024  7:47 PM              Clinical Concerns:  Current Medical Concerns:  See recent OV note.     Current Behavioral Concerns: N/A    Education Provided to patient: Role of care coordination, resources for support, life alert resources.       Health Maintenance Reviewed: Not assessed  Clinical Pathway: None    Medication Management:  Medication review status: Medications not reviewed due to  nature of call.     Functional Status:  Dependent ADLs:: Bathing  Dependent IADLs:: Cleaning, Cooking, Shopping, Meal Preparation, Laundry, Transportation  Fallen 2 or more times in the past year?: Yes  Any fall with injury in the past year?: Yes    Living Situation:  Current living arrangement:: I live in a private home with family  Type of residence:: Private home - stairs    Lifestyle & Psychosocial Needs:    Social Determinants of Health     Food Insecurity: Low Risk  (1/25/2024)    Food Insecurity     Within the past 12 months, did you worry that your food would run out before you got money to buy more?: No     Within the past 12 months, did the food you bought just not last and you didn t have money to get more?: No   Depression: At risk (1/25/2024)    PHQ-2     PHQ-2 Score: 3   Housing Stability: Low Risk  (1/25/2024)    Housing Stability     Do you have housing? : Yes     Are you worried about losing your housing?: No   Tobacco Use: Low Risk  (1/25/2024)    Patient History     Smoking Tobacco Use: Never     Smokeless Tobacco Use: Never     Passive Exposure: Never   Financial Resource Strain: Low Risk  (1/25/2024)    Financial Resource Strain     Within the past 12 months, have you or your family members you live with been unable to get utilities (heat, electricity) when it was really needed?: No   Alcohol Use: Not on file   Transportation Needs: Low Risk  (1/25/2024)    Transportation Needs     Within the past 12 months, has lack of transportation kept you from medical appointments, getting your medicines, non-medical meetings or appointments, work, or from getting things that you need?: No   Physical Activity: Not on file   Interpersonal Safety: Low Risk  (1/25/2024)    Interpersonal Safety     Do you feel physically and emotionally safe where you currently live?: Yes     Within the past 12 months, have you been hit, slapped, kicked or otherwise physically hurt by someone?: No     Within the past 12 months,  have you been humiliated or emotionally abused in other ways by your partner or ex-partner?: No   Stress: Not on file   Social Connections: Not on file     Inadequate nutrition (GOAL):: No  Tube Feeding: No  Inadequate activity/exercise (GOAL):: No  Significant changes in sleep pattern (GOAL): No  Transportation means:: Family     Chemical Dependency Status: No Current Concerns  Informal Support system:: Children, Minnie based      Resources and Interventions:  Current Resources:   Community Resources: None  Equipment Currently Used at Home: none  Employment Status: retired     Advance Care Plan/Directive  Advanced Care Plans/Directives on file:: No  Discussed with patient/caregiver:: Other (Comment) (Will bring in AD info)    Referrals Placed: None     Care Plan:  Care Plan: Help At Home       Problem: Insufficient In-home support       Goal: I will be safe at home       Start Date: 1/31/2024 Expected End Date: 7/31/2024    Note:     Barriers: Cost of care, life alert issues  Strengths: Working with care coordination  Patient expressed understanding of goal: Yes  Action steps to achieve this goal:  1. I will attempt to identify the company Life Alert is through.  2. I will reach out to caregiver resources provided by care coordination.   3. I will continue to work with care coordination for additional resources as needed.                                Patient/Caregiver understanding: Caregiver reports understanding and denies any additional questions or concerns at this times. [facility] CC engaged in AIDET communication during encounter.      Outreach Frequency: monthly, more frequently as needed  Future Appointments                In 1 week Whit Arrieta PA-C [facility] [place]    In 1 week Radha Ceja MD [facility]    In 2 weeks Radha Ceja MD [facility]    In 2 weeks Ehsan Araya DPM M  [facility]    In 2 months Mirna Perez RPH [facility] MG    In 1 year Fernanda Miller MD [facility]            Plan:  CC to send patient [employer] intro letter, CC introduction letter and complex care plan, as well as MyChart message of resources. SW CC will outreach again in one month.     Sharri Rodriguez [facility]  Social Work Primary Care Clinic Care Coordinator  [facility]  [phone]  [email or web address]

## 2024-02-01 ENCOUNTER — TELEPHONE (OUTPATIENT)
Dept: FAMILY MEDICINE | Facility: CLINIC | Age: 84
End: 2024-02-01
Payer: COMMERCIAL

## 2024-02-01 NOTE — TELEPHONE ENCOUNTER
Home Care is calling regarding an established patient with [facility].       Requesting orders from: Fernanda Miller  Provider is following patient: Yes  Is this a 60-day recertification request?  No    Orders Requested    Skilled Nursing  Request for delay in care, service is not able to be provided within same scheduled day.   D/t staffing, delayed until 2/5/24    Physical Therapy  Request for delay in care, service is not able to be provided within same scheduled day.   D/t staffing, delayed until 2/5/24      Information was gathered and will be sent to provider for review.  RN will contact Home Care with information after provider review.  Confirmed ok to leave a detailed message with call back.  Contact information confirmed and updated as needed.    Leatha Marroquin RN

## 2024-02-01 NOTE — TELEPHONE ENCOUNTER
RN called Yeimy CHAVEZ and left voicemail on confidential voicemail line with provider's authorization for delay in services for skilled nursing and physical therapy.     PATRICIO CardonaN, RN, PHN  [facility]

## 2024-02-02 ENCOUNTER — TELEPHONE (OUTPATIENT)
Dept: FAMILY MEDICINE | Facility: CLINIC | Age: 84
End: 2024-02-02

## 2024-02-02 NOTE — TELEPHONE ENCOUNTER
Forms/Letter Request     Type of form/letter: [facility], Order Number: [id number]     Have you been seen for this request: N/A     Do we have the form/letter: Yes: Will place form on providers desk for review/signature     When is form/letter needed by: ASAP     How would you like the form/letter returned: Fax : [phone]

## 2024-02-05 ENCOUNTER — TELEPHONE (OUTPATIENT)
Dept: FAMILY MEDICINE | Facility: CLINIC | Age: 84
End: 2024-02-05
Payer: COMMERCIAL

## 2024-02-05 ENCOUNTER — MEDICAL CORRESPONDENCE (OUTPATIENT)
Dept: HEALTH INFORMATION MANAGEMENT | Facility: CLINIC | Age: 84
End: 2024-02-05
Payer: COMMERCIAL

## 2024-02-05 NOTE — TELEPHONE ENCOUNTER
This writer attempted to contact Mario on 02/05/24.    Reason for call provider's message and left detailed message of approved home care orders.     If Mario calls back:   Relay message below. Document that Mario called and close encounter.        KATHY Solano  [facility]

## 2024-02-05 NOTE — TELEPHONE ENCOUNTER
Forms/Letter Request     Type of form/letter: [facility], Walker Clarification of written/ verbal Order, Initial order date: 02/02/2024     Have you been seen for this request: N/A     Do we have the form/letter: Yes: Will place form on providers desk for review/signature     When is form/letter needed by: ASAP     How would you like the form/letter returned: Fax : [phone]

## 2024-02-05 NOTE — TELEPHONE ENCOUNTER
Home Care is calling regarding an established patient with [facility].       Requesting orders from: Fernanda Miller  Provider is following patient: Yes  Is this a 60-day recertification request?  No    Orders Requested    Skilled Nursing  Request for initial certification (first set of orders)   Frequency:  2x/wk for 1 wks  then 1x/wk for 3 wks  Then Every other week for 5 weeks    Physical Therapy  Request for initial evaluation and treatment (one time)     Occupational Therapy  Request for initial evaluation and treatment (one time)     HHA (Home Health Aide)  Request for initial certification (first set of orders)   Frequency:  1x/wk for 4 wks      Verbal orders given for PT and OT Eval and Treats.  Information was gathered for Skilled Nursing and HHA.  Provider review needed.  RN will contact Home Care with information after provider review.  Confirmed ok to leave a detailed message with call back.  Contact information confirmed and updated as needed.    Kristina M Kjellberg, RN

## 2024-02-05 NOTE — TELEPHONE ENCOUNTER
[facility] forms completed will be faxed to [phone]          Batool Sanabria Facilitator

## 2024-02-06 ENCOUNTER — TELEPHONE (OUTPATIENT)
Dept: FAMILY MEDICINE | Facility: CLINIC | Age: 84
End: 2024-02-06
Payer: COMMERCIAL

## 2024-02-06 NOTE — TELEPHONE ENCOUNTER
Forms/Letter Request    Type of form/letter: [facility] Written/Verbal Order     Do we have the form/letter: Yes: in provider bin    How would you like the form/letter returned: Fax : [phone]     IRP Occupational Therapy Treatment  Plan of Care Note    Primary Rehabilitation Diagnosis: stroke     Planned Discharge Destination: Home    Assessment: Treatment today focused on showering, dressing, oral cares, grooming and functional mobility.  Now at modified independent level using 4ww and DME.  Patient is demonstrating good progress supported by increased independence.    Patient limited at this time by decreased right sided strength and motor control.  Patient will benefit from further skilled OT  for continued training with ADLs and functional mobiltiy to help the patient meet their goals of safe home discharge.      PM session:  Performed and advanced RUE table top strength and coordination activities.  Increased performance vs previous sessions.    Recommendations and Plan:     Recommendations for Discharge: OT WI: Home;Home therapy (11/03/21 0829)    Treatment Plan for Next Session: AM: shower or ADL at sink     Below is key objective and subjective information from the last 24 hours.  For further details and goals, please refer to the OT Assess/Treat/Goals flowsheet.    Subjective: Subjective: \"Every extra day helps\" (11/03/21 0829)    Precautions:  Precautions  Other Precautions: fall risk (10/28/21 0915)    Prior Living Situation:  Type of Home: Condo (10/20/21 1500)  Lives With: Spouse (10/20/21 1500)    ADLs:  Self Cares/ADL's  Grooming Assistance: Modified independent;Standing at sink;Sitting at sink (11/03/21 0829)  Oral Hygiene Assistance: Modified independent;Standing at sink;Sitting at sink (11/03/21 0829)  Bathing Assistance: Modified independent (11/03/21 0829)  Bathing Deficit: Use of adaptive equipment (11/03/21 0829)  Bathing Equipment Used: Grab bar;Tub chair with back rest;Hand held shower (11/03/21 0829)  Upper Body Dressing Assistance: Independent (11/03/21 0829)  Lower Body Clothing Assistance: Independent (11/03/21 0829)  Footwear Assistance: Independent (11/03/21 0829)    Household  Mobility:  Household Mobility  Supine to Sit: Modified Independent (11/03/21 0829)  Sit to Stand: Modified Independent (11/03/21 0829)  Stand to Sit: Modified Independent (11/03/21 0829)  Shower Transfers: Modified Independent (11/03/21 0829)  Transfer Equipment: 4ww (11/03/21 0829)  Sitting - Static: Independent (11/03/21 0829)  Sitting - Dynamic: Independent (11/03/21 0829)  Standing - Static: Modified Independent (11/03/21 0829)  Standing - Dynamic: Modified Independent (11/03/21 0829)    Home Management:       Education:     Education Provided  On this date, education was provided to patient regarding diagnosis considerations pertaining to rehab, safe use of equipment, self-care activities, bathroom transfers and household mobility. The response to education was/were: Verbalizes understanding and Demonstrates understanding.    Equipment:  PT/OT ADL Equipment for Discharge: Has reacher, sock aid (11/03/21 0829)  PT/OT Mobility Equipment for Discharge: has 4ww (11/03/21 0829)    .Co-morbidities:   Patient Active Problem List   Diagnosis   • Hyperlipidemia   • Diffuse large B cell lymphoma (CMS/HCC)   • Colon polyps   • Rheumatoid arthritis, seropositive (CMS/HCC)   • Long-term use of immunosuppressant medication   • Thrombocytopenia (CMS/HCC)   • Primary osteoarthritis of both hands   • Status post total left knee replacement   • Lumbar radiculopathy   • Moderate major depression (CMS/HCC)   • Vitamin B1 deficiency   • Vitamin B6 deficiency   • S/P lumbar laminectomy   • Acute ischemic left MCA stroke (CMS/HCC)   • Expressive aphasia   • Right sided weakness       Task Modification: clinical decision making of moderate complexity, minimal to moderate task modification    Interventions and Treatment Time:  Treatment Interventions: ADL retraining;Functional transfer training;Endurance training;Patient/Family training;Equipment eval/education;Compensatory technique education;Neuro muscular reeducation (11/03/21  0829)  OT Time Spent: 45 minutes (11/03/21 0829)

## 2024-02-07 ENCOUNTER — PATIENT OUTREACH (OUTPATIENT)
Dept: CARE COORDINATION | Facility: CLINIC | Age: 84
End: 2024-02-07

## 2024-02-07 NOTE — PROGRESS NOTES
Clinic Care Coordination Contact    Situation: Patient chart reviewed by care coordinator.    Background: Patient enrolled in care coordination.    Assessment: Patient went to ED at [facility] on 2/6 for a fall. Patient discharged home same day.    Plan/Recommendations: Patient will have home health care coming out soon. ALVA WONG will continue to outreach to patient's daughter on 2/29.    Sharri Rodriguez [facility]  Social Work Primary Care Clinic Care Coordinator  [facility]  [phone]  [email or web address]

## 2024-02-08 ENCOUNTER — MEDICAL CORRESPONDENCE (OUTPATIENT)
Dept: HEALTH INFORMATION MANAGEMENT | Facility: CLINIC | Age: 84
End: 2024-02-08
Payer: COMMERCIAL

## 2024-02-09 ENCOUNTER — OFFICE VISIT (OUTPATIENT)
Dept: OPHTHALMOLOGY | Facility: CLINIC | Age: 84
End: 2024-02-09
Attending: OPHTHALMOLOGY
Payer: COMMERCIAL

## 2024-02-09 DIAGNOSIS — H40.1134 PRIMARY OPEN ANGLE GLAUCOMA (POAG) OF BOTH EYES, INDETERMINATE STAGE: Primary | ICD-10-CM

## 2024-02-09 PROCEDURE — G0463 HOSPITAL OUTPT CLINIC VISIT: HCPCS | Performed by: OPHTHALMOLOGY

## 2024-02-09 PROCEDURE — 92012 INTRM OPH EXAM EST PATIENT: CPT | Mod: GC | Performed by: OPHTHALMOLOGY

## 2024-02-09 PROCEDURE — 92133 CPTRZD OPH DX IMG PST SGM ON: CPT | Performed by: OPHTHALMOLOGY

## 2024-02-09 ASSESSMENT — TONOMETRY
OS_IOP_MMHG: 18
OS_IOP_MMHG: 16
OD_IOP_MMHG: 17
OD_IOP_MMHG: 19
OD_IOP_MMHG: 15
OS_IOP_MMHG: 15
IOP_METHOD: TONOPEN
IOP_METHOD: APPLANATION

## 2024-02-09 ASSESSMENT — VISUAL ACUITY
OD_PH_SC: 20/30
METHOD: SNELLEN - LINEAR
OD_SC+: +2
OD_SC: 20/50
OS_SC: 20/40

## 2024-02-09 ASSESSMENT — SLIT LAMP EXAM - LIDS
COMMENTS: NORMAL
COMMENTS: NORMAL

## 2024-02-09 ASSESSMENT — CUP TO DISC RATIO
OS_RATIO: 0.5
OD_RATIO: 0.5

## 2024-02-09 ASSESSMENT — EXTERNAL EXAM - LEFT EYE: OS_EXAM: NORMAL

## 2024-02-09 ASSESSMENT — EXTERNAL EXAM - RIGHT EYE: OD_EXAM: NORMAL

## 2024-02-09 NOTE — NURSING NOTE
"Chief Complaints and History of Present Illnesses   Patient presents with    Primary Open Angle Glaucoma Follow Up     Follow up for mild POAG each eye with SLT each eye today.     Patient fell and hit the back of her head a few days ago. She was in the hospital for the day, but she's doing better. Patient states vision is stable each eye in the last couple months. Per patient's daughter, \"She has a hard time getting the Cosopt PF drops in, so she misses that one frequently.\"      Chief Complaint(s) and History of Present Illness(es)       Primary Open Angle Glaucoma Follow Up              Laterality: both eyes    Associated symptoms: Negative for eye pain    Comments: Follow up for mild POAG each eye with SLT each eye today.     Patient fell and hit the back of her head a few days ago. She was in the hospital for the day, but she's doing better. Patient states vision is stable each eye in the last couple months. Per patient's daughter, \"She has a hard time getting the Cosopt PF drops in, so she misses that one frequently.\"               Comments    Ocular meds:   - Cosopt PF BID each eye, \"doesn't get the drops in every day\"   - Travatan Z at bedtime each eye     JOHNNY Rose 10:29 AM 02/09/2024                     "

## 2024-02-09 NOTE — PROGRESS NOTES
[facility] - Glaucoma clinic    Chief Complaint/Presenting Concern: Glaucoma    History of Present Illness:   Brandy Leon is a 83 year old patient who presents for evaluation of POAG.   Patient was dx due to rNFL thinning in 2015 by Dr Austin Serna. Patient has been on latanoprost at bedtime ever since. Had CE/IOL in late 2018. Had BULB with Dr Moran in 2022. Re-established care with Dr Gurjit Cardoso 03/17/2023 during which time IOP was 26 and 18 on intermittent latanoprost due to drop intolerance. Switched to Travatan Z. Also started Cosopt PF 8/3/23 right eye only.   - 05/30/2023: s/p right eye SLT     Today, 02/09/2024, patient presents for SLT. She did not use Cosopt this morning but otherwise she is getting it in twice a day.    Relevant Past Medical/Family/Social History:  Past Medical History:   Diagnosis Date    Actinic keratosis 3/12/2013    Degenerative joint disease     Diabetes (H)     Gastroesophageal reflux disease without esophagitis 12/11/2020    Rheumatic fever 1957    x2 between the ages of 15-18    Seasonal allergies      Relevant Review of Systems:  None are relevant  Diagnosis: Primary open angle glaucoma   ICD-10 stages Mild or early-stage glaucoma - pre-perimetric glaucoma (with a normal visual field)  Year diagnosis: 2018  Previous glaucoma surgery/laser:    Both eyes: CE/IOL 2018 by Dr Austin Serna   Maximum intraocular pressure: 30/25  Current ophthalmic medications:    Both eyes Travatan Z, Cosopt right eye  Family history of any glaucoma: positive  CCT (um) 5/27/23: 546/552  Gonioscopy 2021   Right eye: open 360   Left eye: open 360  Refractive status: Almost emmetropic prior to CE/IOL  Trauma history: negative  Steroid exposure: positive  Vasospastic disease: Migrane/Raynaud phenomenon: negative  A past hemodynamic crisis or Low BP: negative  Meds AEs/intolerance:  Sulfa antibiotics  Preservatives in topical eye medications  Cosopt, minimal response    Focused PMHx:  Asthma and respiratory problems: positive (DINORA)  Cardiovascular disease: positive (HTN, IDDM2)  Prior testing  HVF's (Dr Austin Serna)  2019: right eye essentially full, left eye unreliable with scattered inf defects  2018: unreliable fields  2017: unreliable fields, right eye superior defect, left eye inferior hemifield loss crossing both midlines  2016: unreliable fields  2015: mod reliability, essentially full each eye  HVF 9/20/21  Right Eye: low reliability (8/13 FL, 39% FP, 30% FN), MD -3.21, scattered nonspecific defects  Left Eye: low reliability (6/11 FL, 7% FP, 20% FN), MD -5.29, scattered defects  Visual field 5/30/23:   Right eye - nasal step, superior arcuate defect, reliable  Left eye - paracentral and peripheral defects    Today's testing:  IOP 17/16 today by Applanation  OCT Optic Nerve RNFL Spectralis 2/9/24  Right eye: IT RNFL thinning, stable    Left eye: IT RNFL thinning, stable     Additional Ocular History:     2. Pseudophakia, each eye   - Monitor    Plan/Recommendations:  Discussed findings with patient.  Patient has POAG mild with prior progressive rNFL thinning and elevated IOP, thus Dr Serna began IOP lowering therapy in 08/2015.  On prior testing, there is right eye progression   IOP goal teens both eyes. S/p SLT right eye done 05/30/2023, not much improvement.   Patient is not using Cosopt, discussed with the patient the importance of compliance with drops. IOP today stable and OCT stable, will keep on travatan for now.   Continue Travatan Z at bedtime each eye     RTC in 3 months VF, possible SLT    Jocelyne Jackson MD  PGY3 Ophthalmology Resident  [facility]      Physician Attestation     Attending Physician Attestation:  Complete documentation of historical and exam elements from today's encounter can be found in the full encounter summary report (not reduplicated in this progress note). I personally obtained the chief complaint(s) and history of  present illness. I confirmed and edited as necessary the review of systems, past medical/surgical history, family history, social history, and examination findings as documented by others; and I examined the patient myself. I personally reviewed the relevant tests, images, and reports as documented above. I personally reviewed the ophthalmic test(s) associated with this encounter, agree with the interpretation(s) as documented by the resident/fellow and have edited the corresponding report(s) as necessary. I formulated and edited as necessary the assessment and plan and discussed the findings and management plan with the patient and any family members present at the time of the visit.  Radha Ceja M.D., Glaucoma, February 9, 2024

## 2024-02-12 ENCOUNTER — TELEPHONE (OUTPATIENT)
Dept: FAMILY MEDICINE | Facility: CLINIC | Age: 84
End: 2024-02-12
Payer: COMMERCIAL

## 2024-02-12 NOTE — TELEPHONE ENCOUNTER
RN called Ed and left detailed message and to call clinic at [phone] if he had any further questions.    KATHY Harding  [facility] T[facility]

## 2024-02-12 NOTE — TELEPHONE ENCOUNTER
Home Care is calling regarding an established patient with [facility].       Requesting orders from: Fernanda Miller  Provider is following patient: Yes  Is this a 60-day recertification request?  No    Orders Requested    Physical Therapy  Request for continuation of care with change in frequency  Frequency:  1x/wk for 2 wks  1x every other wk for 3 wks.        Family reports that pt fell on  2/6.  No open areas. Did not hit head. States that left elbow is achy and pt went in to the ED.  Pt participated well in PT today.      Confirmed ok to leave a detailed message with call back.  Contact information confirmed and updated as needed.    Haven Rivers RN

## 2024-02-13 ENCOUNTER — TELEPHONE (OUTPATIENT)
Dept: FAMILY MEDICINE | Facility: CLINIC | Age: 84
End: 2024-02-13
Payer: COMMERCIAL

## 2024-02-13 NOTE — TELEPHONE ENCOUNTER
Ok to leave a message with the provider's response.        Home Care is calling regarding an established patient with [facility].       Requesting orders from: Fernanda Miller  Provider is following patient: No       Orders Requested    Occupational Therapy  Request for 1 visit every other week for 3 visits.  This is to work on ADL safety.  Patient had a fall and was in the E.R. on 2/6/2024.       Information was gathered and will be sent to provider for review.  RN will contact Home Care with information after provider review.  Information was gathered and will be sent to provider to confirm provider will be following patient.  RN will contact Home Care with information after provider review.  Confirmed ok to leave a detailed message with call back.  Contact information confirmed and updated as needed.    Minda Medina, KATHY

## 2024-02-16 ENCOUNTER — OFFICE VISIT (OUTPATIENT)
Dept: PODIATRY | Facility: CLINIC | Age: 84
End: 2024-02-16
Payer: COMMERCIAL

## 2024-02-16 DIAGNOSIS — E11.69 TYPE 2 DIABETES MELLITUS WITH DIABETIC FOOT DEFORMITY (H): ICD-10-CM

## 2024-02-16 DIAGNOSIS — B35.1 ONYCHOMYCOSIS: ICD-10-CM

## 2024-02-16 DIAGNOSIS — B35.3 TINEA PEDIS OF BOTH FEET: ICD-10-CM

## 2024-02-16 DIAGNOSIS — Z87.2 HISTORY OF SKIN ULCER: ICD-10-CM

## 2024-02-16 DIAGNOSIS — E11.49 TYPE II OR UNSPECIFIED TYPE DIABETES MELLITUS WITH NEUROLOGICAL MANIFESTATIONS, NOT STATED AS UNCONTROLLED(250.60) (H): Primary | ICD-10-CM

## 2024-02-16 DIAGNOSIS — M21.969 TYPE 2 DIABETES MELLITUS WITH DIABETIC FOOT DEFORMITY (H): ICD-10-CM

## 2024-02-16 DIAGNOSIS — E11.51 DIABETES MELLITUS WITH PERIPHERAL VASCULAR DISEASE (H): ICD-10-CM

## 2024-02-16 PROCEDURE — 11720 DEBRIDE NAIL 1-5: CPT | Mod: Q6 | Performed by: PODIATRIST

## 2024-02-16 PROCEDURE — 99214 OFFICE O/P EST MOD 30 MIN: CPT | Mod: 25 | Performed by: PODIATRIST

## 2024-02-16 RX ORDER — CICLOPIROX OLAMINE 7.7 MG/G
CREAM TOPICAL 2 TIMES DAILY
Qty: 90 G | Refills: 5 | Status: SHIPPED | OUTPATIENT
Start: 2024-02-16

## 2024-02-16 NOTE — PROGRESS NOTES
Past Medical History:   Diagnosis Date    Actinic keratosis 3/12/2013    Degenerative joint disease     Diabetes (H)     Gastroesophageal reflux disease without esophagitis 12/11/2020    Rheumatic fever 1957    x2 between the ages of 15-18    Seasonal allergies      Patient Active Problem List   Diagnosis    Seasonal allergies    Hypothyroidism    Hyperlipidemia LDL goal <100    Fibromyalgia    Osteoarthritis    Obesity    Type 2 diabetes mellitus with hyperglycemia, with long-term current use of insulin (H)    Hypertension goal BP (blood pressure) < 140/90    Overactive bladder    Recurrent UTI    Pseudophakia of both eyes    DINORA (obstructive sleep apnea)- severe (AHI 56)    Contusion of right knee    Gait disorder    Gastroesophageal reflux disease without esophagitis    Urge incontinence    Chronic kidney disease, stage 2 (mild)    Hydrocephalus, unspecified type (H)    Diabetes mellitus with peripheral vascular disease (H)     Past Surgical History:   Procedure Laterality Date    BLEPHAROPLASTY Bilateral 4/1/2022    Procedure: Bilateral upper eyelid blepharoplasty;  Surgeon: Fidelia Moran MD;  Location: [facility] OR    CATARACT IOL, RT/LT      COLONOSCOPY      COLONOSCOPY N/A 8/13/2015    Procedure: COLONOSCOPY;  Surgeon: Duane, William Charles, MD;  Location: [facility] OR    COLONOSCOPY WITH CO2 INSUFFLATION N/A 8/13/2015    Procedure: COLONOSCOPY WITH CO2 INSUFFLATION;  Surgeon: Duane, William Charles, MD;  Location: [facility] OR    PHACOEMULSIFICATION WITH STANDARD INTRAOCULAR LENS IMPLANT Left 10/25/2018    Procedure: LEFT PHACOEMULSIFICATION WITH STANDARD INTRAOCULAR LENS IMPLANT;  Surgeon: Thomas Serna MD;  Location: [facility] OR    PHACOEMULSIFICATION WITH STANDARD INTRAOCULAR LENS IMPLANT Right 11/8/2018    Procedure: RIGHT PHACOEMULSIFICATION WITH STANDARD INTRAOCULAR LENS IMPLANT;  Surgeon: Thomas Serna MD;  Location: [facility] OR    THYROIDECTOMY       ZZC APPENDECTOMY      ZZC ARTHROPLASTY TMJ       Social  History     Socioeconomic History    Marital status:      Spouse name: Not on file    Number of children: Not on file    Years of education: Not on file    Highest education level: Not on file   Occupational History    Occupation:    Tobacco Use    Smoking status: Never     Passive exposure: Never    Smokeless tobacco: Never    Tobacco comments:     has had exposure to second hand smoke in the past from [employer], no current exposure   Vaping Use    Vaping Use: Never used   Substance and Sexual Activity    Alcohol use: No    Drug use: No    Sexual activity: Never   Other Topics Concern    Parent/sibling w/ CABG, MI or angioplasty before 65F 55M? 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    Hobby Hazards No    Sleep Concern Yes     Comment: difficulty staying asleep, up and down all night    Stress Concern No    Weight Concern Yes     Comment: would like to lose    Special Diet Yes     Comment: low card, low sugar diet    Back Care Yes     Comment: chiropratior    Exercise Yes     Comment: almost everyday    Bike Helmet No     Comment: does not ride bicycle any more    Seat Belt Yes    Self-Exams Yes   Social History Narrative    Not on file     Social Determinants of Health     Financial Resource Strain: Low Risk  (1/25/2024)    Financial Resource Strain     Within the past 12 months, have you or your family members you live with been unable to get utilities (heat, electricity) when it was really needed?: No   Food Insecurity: Low Risk  (1/25/2024)    Food Insecurity     Within the past 12 months, did you worry that your food would run out before you got money to buy more?: No     Within the past 12 months, did the food you bought just not last and you didn t have money to get more?: No   Transportation Needs: Low  Risk  (1/25/2024)    Transportation Needs     Within the past 12 months, has lack of transportation kept you from medical appointments, getting your medicines, non-medical meetings or appointments, work, or from getting things that you need?: No   Physical Activity: Not on file   Stress: Not on file   Social Connections: Not on file   Interpersonal Safety: Low Risk  (1/25/2024)    Interpersonal Safety     Do you feel physically and emotionally safe where you currently live?: Yes     Within the past 12 months, have you been hit, slapped, kicked or otherwise physically hurt by someone?: No     Within the past 12 months, have you been humiliated or emotionally abused in other ways by your partner or ex-partner?: No   Housing Stability: Low Risk  (1/25/2024)    Housing Stability     Do you have housing? : Yes     Are you worried about losing your housing?: No     Family History   Problem Relation Age of Onset    Eye Disorder Mother         cataracts    Cancer Father         skin and leukemia    Cancer Sister         Breast Cancer    Eye Disorder Brother         cataract    Cerebrovascular Disease Maternal Grandfather     Cerebrovascular Disease Paternal Grandmother     Eye Disorder Paternal Grandmother         cataracts    Cerebrovascular Disease Paternal Grandfather     Heart Disease Paternal Grandfather     Breast Cancer Daughter     Endometrial Cancer Daughter     Glaucoma No family hx of     Macular Degeneration No family hx of          Lab Results   Component Value Date    A1C 9.4 01/25/2024    A1C 10.6 12/13/2022    A1C 11.1 07/06/2022    A1C 9.6 03/28/2022    A1C 9.5 12/20/2021    A1C 10.3 05/17/2021    A1C 9.2 02/16/2021    A1C 8.7 11/12/2020    A1C 10.0 08/20/2020    A1C 10.5 01/10/2020         Last Comprehensive Metabolic Panel:  Lab Results   Component Value Date     01/25/2024    POTASSIUM 4.4 01/25/2024    CHLORIDE 98 01/25/2024    CO2 24 01/25/2024    ANIONGAP 15 01/25/2024     (H) 01/25/2024  "   BUN 12.8 01/25/2024    CR 0.78 01/25/2024    GFRESTIMATED 75 01/25/2024    FREDY 9.6 01/25/2024       Lab Results   Component Value Date    AST 20 01/25/2024    AST 14 03/16/2021     Lab Results   Component Value Date    ALT 14 01/25/2024    ALT 28 03/16/2021     No results found for: \"BILICONJ\"   Lab Results   Component Value Date    BILITOTAL 0.4 01/25/2024    BILITOTAL 0.3 03/16/2021     Lab Results   Component Value Date    ALBUMIN 4.2 01/25/2024    ALBUMIN 3.5 03/28/2022    ALBUMIN 3.6 03/16/2021     Lab Results   Component Value Date    PROTTOTAL 8.1 01/25/2024    PROTTOTAL 8.0 03/16/2021      Lab Results   Component Value Date    ALKPHOS 99 01/25/2024    ALKPHOS 82 03/16/2021           Subjective findings- 83-year-old returns clinic with daughter for Diabetic foot cares.  Relates they need more antifungal cream and MicroKlenz, relates have not been using lotion on the feet but they do occasionally clean the feet with MicroKlenz, relates to no ulcers or sores since we seen her last, no injuries, does have numbness tingling Neuropathy in her feet, needs an order for Diabetic shoes, relates she needs her toenails cut.      Objective findings- DP and PT are 2 out of 4 bilaterally.  Has decreased hair growth bilaterally.  Has minimal peripheral edema bilaterally.  Has decreased ankle joint dorsiflexion right more so than left.  Sharp/dull is decreased with 5.07 Symes Yao monofilament bilaterally, proprioception is decreased bilaterally, deep tendon reflexes are intact bilaterally, no gross tendon voids bilaterally.  Has dry scaly skin moccasin pattern bilaterally.  Has dystrophic brittle thickened nails with subungual debris dystrophy and discoloration Hallux nails greater than rest bilaterally.  There is no erythema, no drainage, no odor, no calor, no pain on palpation bilaterally.    Assessment and plan- Diabetes with peripheral Neuropathy, Diabetes with Equinus, Diabetes with peripheral Vascular disease, " Tinea Pedis bilaterally, Onychomycosis and Onychauxis bilaterally.  History of ankle ulcers.  Diagnosis and treatment options discussed with them.  All the toenails were debrided reduced bilaterally upon consent.  Prescription for Loprox cream given use discussed with them.  MicroKlenz dispensed and use discussed with them.  Prescription for Diabetic shoes and inserts given and they are given the phone number address orthotics prosthetics lab to pick these up.  Previous notes reviewed.  Return to clinic and see me in 2 to 3 months.                      Moderate level of medical decision making.

## 2024-02-16 NOTE — LETTER
2/16/2024         RE: Brandy Leon  [address] [place] [address]        Dear Colleague,    Thank you for referring your patient, Brandy Leon, to the [facility]. Please see a copy of my visit note below.    Past Medical History:   Diagnosis Date     Actinic keratosis 3/12/2013     Degenerative joint disease      Diabetes (H)      Gastroesophageal reflux disease without esophagitis 12/11/2020     Rheumatic fever 1956    x2 between the ages of 15-18     Seasonal allergies      Patient Active Problem List   Diagnosis     Seasonal allergies     Hypothyroidism     Hyperlipidemia LDL goal <100     Fibromyalgia     Osteoarthritis     Obesity     Type 2 diabetes mellitus with hyperglycemia, with long-term current use of insulin (H)     Hypertension goal BP (blood pressure) < 140/90     Overactive bladder     Recurrent UTI     Pseudophakia of both eyes     DINORA (obstructive sleep apnea)- severe (AHI 56)     Contusion of right knee     Gait disorder     Gastroesophageal reflux disease without esophagitis     Urge incontinence     Chronic kidney disease, stage 2 (mild)     Hydrocephalus, unspecified type (H)     Diabetes mellitus with peripheral vascular disease (H)     Past Surgical History:   Procedure Laterality Date     BLEPHAROPLASTY Bilateral 4/1/2022    Procedure: Bilateral upper eyelid blepharoplasty;  Surgeon: Fidelia Moran MD;  Location: [facility] OR     CATARACT IOL, RT/LT       COLONOSCOPY       COLONOSCOPY N/A 8/13/2015    Procedure: COLONOSCOPY;  Surgeon: Duane, William Charles, MD;  Location: [facility] OR     COLONOSCOPY WITH CO2 INSUFFLATION N/A 8/13/2015    Procedure: COLONOSCOPY WITH CO2 INSUFFLATION;  Surgeon: Duane, William Charles, MD;  Location: [facility] OR     PHACOEMULSIFICATION WITH STANDARD INTRAOCULAR LENS IMPLANT Left 10/25/2018    Procedure: LEFT PHACOEMULSIFICATION WITH STANDARD INTRAOCULAR LENS IMPLANT;  Surgeon: Thomas Serna MD;   Location: [facility] OR     PHACOEMULSIFICATION WITH STANDARD INTRAOCULAR LENS IMPLANT Right 11/8/2018    Procedure: RIGHT PHACOEMULSIFICATION WITH STANDARD INTRAOCULAR LENS IMPLANT;  Surgeon: Thomas Serna MD;  Location: [facility] OR     THYROIDECTOMY        ZZC APPENDECTOMY       ZZC ARTHROPLASTY TMJ       Social History     Socioeconomic History     Marital status:      Spouse name: Not on file     Number of children: Not on file     Years of education: Not on file     Highest education level: Not on file   Occupational History     Occupation:    Tobacco Use     Smoking status: Never     Passive exposure: Never     Smokeless tobacco: Never     Tobacco comments:     has had exposure to second hand smoke in the past from [employer], no current exposure   Vaping Use     Vaping Use: Never used   Substance and Sexual Activity     Alcohol use: No     Drug use: No     Sexual activity: Never   Other Topics Concern     Parent/sibling w/ CABG, MI or angioplasty before 65F 55M? 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     Hobby Hazards No     Sleep Concern Yes     Comment: difficulty staying asleep, up and down all night     Stress Concern No     Weight Concern Yes     Comment: would like to lose     Special Diet Yes     Comment: low card, low sugar diet     Back Care Yes     Comment: chiropratior     Exercise Yes     Comment: almost everyday     Bike Helmet No     Comment: does not ride bicycle any more     Seat Belt Yes     Self-Exams Yes   Social History Narrative     Not on file     Social Determinants of Health     Financial Resource Strain: Low Risk  (1/25/2024)    Financial Resource Strain      Within the past 12 months, have you or your family members you live with been unable to get utilities (heat, electricity) when  it was really needed?: No   Food Insecurity: Low Risk  (1/25/2024)    Food Insecurity      Within the past 12 months, did you worry that your food would run out before you got money to buy more?: No      Within the past 12 months, did the food you bought just not last and you didn t have money to get more?: No   Transportation Needs: Low Risk  (1/25/2024)    Transportation Needs      Within the past 12 months, has lack of transportation kept you from medical appointments, getting your medicines, non-medical meetings or appointments, work, or from getting things that you need?: No   Physical Activity: Not on file   Stress: Not on file   Social Connections: Not on file   Interpersonal Safety: Low Risk  (1/25/2024)    Interpersonal Safety      Do you feel physically and emotionally safe where you currently live?: Yes      Within the past 12 months, have you been hit, slapped, kicked or otherwise physically hurt by someone?: No      Within the past 12 months, have you been humiliated or emotionally abused in other ways by your partner or ex-partner?: No   Housing Stability: Low Risk  (1/25/2024)    Housing Stability      Do you have housing? : Yes      Are you worried about losing your housing?: No     Family History   Problem Relation Age of Onset     Eye Disorder Mother         cataracts     Cancer Father         skin and leukemia     Cancer Sister         Breast Cancer     Eye Disorder Brother         cataract     Cerebrovascular Disease Maternal Grandfather      Cerebrovascular Disease Paternal Grandmother      Eye Disorder Paternal Grandmother         cataracts     Cerebrovascular Disease Paternal Grandfather      Heart Disease Paternal Grandfather      Breast Cancer Daughter      Endometrial Cancer Daughter      Glaucoma No family hx of      Macular Degeneration No family hx of          Lab Results   Component Value Date    A1C 9.4 01/25/2024    A1C 10.6 12/13/2022    A1C 11.1 07/06/2022    A1C 9.6 03/28/2022     "A1C 9.5 12/20/2021    A1C 10.3 05/17/2021    A1C 9.2 02/16/2021    A1C 8.7 11/12/2020    A1C 10.0 08/20/2020    A1C 10.5 01/10/2020         Last Comprehensive Metabolic Panel:  Lab Results   Component Value Date     01/25/2024    POTASSIUM 4.4 01/25/2024    CHLORIDE 98 01/25/2024    CO2 24 01/25/2024    ANIONGAP 15 01/25/2024     (H) 01/25/2024    BUN 12.8 01/25/2024    CR 0.78 01/25/2024    GFRESTIMATED 75 01/25/2024    FREDY 9.6 01/25/2024       Lab Results   Component Value Date    AST 20 01/25/2024    AST 14 03/16/2021     Lab Results   Component Value Date    ALT 14 01/25/2024    ALT 28 03/16/2021     No results found for: \"BILICONJ\"   Lab Results   Component Value Date    BILITOTAL 0.4 01/25/2024    BILITOTAL 0.3 03/16/2021     Lab Results   Component Value Date    ALBUMIN 4.2 01/25/2024    ALBUMIN 3.5 03/28/2022    ALBUMIN 3.6 03/16/2021     Lab Results   Component Value Date    PROTTOTAL 8.1 01/25/2024    PROTTOTAL 8.0 03/16/2021      Lab Results   Component Value Date    ALKPHOS 99 01/25/2024    ALKPHOS 82 03/16/2021           Subjective findings- 83-year-old returns clinic with daughter for Diabetic foot cares.  Relates they need more antifungal cream and MicroKlenz, relates have not been using lotion on the feet but they do occasionally clean the feet with MicroKlenz, relates to no ulcers or sores since we seen her last, no injuries, does have numbness tingling Neuropathy in her feet, needs an order for Diabetic shoes, relates she needs her toenails cut.      Objective findings- DP and PT are 2 out of 4 bilaterally.  Has decreased hair growth bilaterally.  Has minimal peripheral edema bilaterally.  Has decreased ankle joint dorsiflexion right more so than left.  Sharp/dull is decreased with 5.07 Symes Yao monofilament bilaterally, proprioception is decreased bilaterally, deep tendon reflexes are intact bilaterally, no gross tendon voids bilaterally.  Has dry scaly skin moccasin pattern " bilaterally.  Has dystrophic brittle thickened nails with subungual debris dystrophy and discoloration Hallux nails greater than rest bilaterally.  There is no erythema, no drainage, no odor, no calor, no pain on palpation bilaterally.    Assessment and plan- Diabetes with peripheral Neuropathy, Diabetes with Equinus, Diabetes with peripheral Vascular disease, Tinea Pedis bilaterally, Onychomycosis and Onychauxis bilaterally.  History of ankle ulcers.  Diagnosis and treatment options discussed with them.  All the toenails were debrided reduced bilaterally upon consent.  Prescription for Loprox cream given use discussed with them.  MicroKlenz dispensed and use discussed with them.  Prescription for Diabetic shoes and inserts given and they are given the phone number address orthotics prosthetics lab to pick these up.  Previous notes reviewed.  Return to clinic and see me in 2 to 3 months.                      Moderate level of medical decision making.      Again, thank you for allowing me to participate in the care of your patient.        Sincerely,        Ehsan Araya DPM

## 2024-02-23 ENCOUNTER — TELEPHONE (OUTPATIENT)
Dept: FAMILY MEDICINE | Facility: CLINIC | Age: 84
End: 2024-02-23
Payer: COMMERCIAL

## 2024-02-23 DIAGNOSIS — E11.65 TYPE 2 DIABETES MELLITUS WITH HYPERGLYCEMIA, WITH LONG-TERM CURRENT USE OF INSULIN (H): ICD-10-CM

## 2024-02-23 DIAGNOSIS — Z79.4 TYPE 2 DIABETES MELLITUS WITH HYPERGLYCEMIA, WITH LONG-TERM CURRENT USE OF INSULIN (H): ICD-10-CM

## 2024-02-23 RX ORDER — INSULIN GLARGINE 300 U/ML
INJECTION, SOLUTION SUBCUTANEOUS
Qty: 9 ML | Refills: 1 | Status: SHIPPED | OUTPATIENT
Start: 2024-02-23 | End: 2024-06-14

## 2024-02-23 NOTE — TELEPHONE ENCOUNTER
TOUJEO SOLOSTAR 300 UNIT/ML   Last Written Prescription Date:  11/3/2023  Last Fill Quantity: 9,   # refills: 1  Last Office Visit : 8/25/2023  Future Office visit:  7/1/2024    Routing refill request to provider for review/approval because:  Drug not on the [facility], [facility] or  Health refill protocol or controlled substance    Nohemy Mercedes RN  Central Triage Red Flags/Med Refills

## 2024-02-23 NOTE — TELEPHONE ENCOUNTER
Forms/Letter Request    Type of form/letter: Nursing Home/Assisted Living Orders      Do we have the form/letter: Yes: [facility], Order Number: [id number]    Who is the form from? Home care    Where did/will the form come from? form was faxed in    When is form/letter needed by: N/A    How would you like the form/letter returned: Fax : [phone]

## 2024-02-26 ENCOUNTER — MEDICAL CORRESPONDENCE (OUTPATIENT)
Dept: HEALTH INFORMATION MANAGEMENT | Facility: CLINIC | Age: 84
End: 2024-02-26
Payer: COMMERCIAL

## 2024-02-29 ENCOUNTER — TELEPHONE (OUTPATIENT)
Dept: FAMILY MEDICINE | Facility: CLINIC | Age: 84
End: 2024-02-29
Payer: COMMERCIAL

## 2024-02-29 ENCOUNTER — PATIENT OUTREACH (OUTPATIENT)
Dept: CARE COORDINATION | Facility: CLINIC | Age: 84
End: 2024-02-29
Payer: COMMERCIAL

## 2024-02-29 DIAGNOSIS — Z53.9 DIAGNOSIS NOT YET DEFINED: Primary | ICD-10-CM

## 2024-02-29 PROCEDURE — G0180 MD CERTIFICATION HHA PATIENT: HCPCS | Performed by: FAMILY MEDICINE

## 2024-02-29 NOTE — PROGRESS NOTES
Clinic Care Coordination Contact  [facility]/Voicemail    Clinical Data: Care Coordinator Outreach    Outreach Documentation Number of Outreach Attempt   2/29/2024  10:41 AM 1       Left message on daughter's voicemail with call back information and requested return call.    Plan: Care Coordinator will try to reach patient again in 3-5 business days.    Sharri Rodriguez [facility]  Social Work Primary Care Clinic Care Coordinator  [facility]  [phone]  [email or web address]

## 2024-02-29 NOTE — TELEPHONE ENCOUNTER
Forms/Letter Request    Type of form/letter: Home Health Certification      Do we have the form/letter: Yes: [facility] , Order Number: [id number]    Who is the form from? Home care    Where did/will the form come from? form was faxed in    When is form/letter needed by: N/A    How would you like the form/letter returned: Fax : [phone]

## 2024-03-07 NOTE — PROGRESS NOTES
Clinic Care Coordination Contact  [facility]/Voicemail    Clinical Data: Care Coordinator Outreach    Outreach Documentation Number of Outreach Attempt   2/29/2024  10:41 AM 1   3/7/2024   9:12 AM 2       Left message on daughter's voicemail with call back information and requested return call.    Plan: Care Coordinator will try to reach patient again in 1 month.    Sharri Rodriguez, [facility]  Social Work Primary Care Clinic Care Coordinator  [facility]  [phone]  [email or web address]

## 2024-03-28 ENCOUNTER — PATIENT OUTREACH (OUTPATIENT)
Dept: CARE COORDINATION | Facility: CLINIC | Age: 84
End: 2024-03-28
Payer: COMMERCIAL

## 2024-03-28 DIAGNOSIS — E11.65 TYPE 2 DIABETES MELLITUS WITH HYPERGLYCEMIA, WITH LONG-TERM CURRENT USE OF INSULIN (H): Primary | Chronic | ICD-10-CM

## 2024-03-28 DIAGNOSIS — Z79.4 TYPE 2 DIABETES MELLITUS WITH HYPERGLYCEMIA, WITH LONG-TERM CURRENT USE OF INSULIN (H): Primary | Chronic | ICD-10-CM

## 2024-03-28 DIAGNOSIS — E11.65 TYPE 2 DIABETES MELLITUS WITH HYPERGLYCEMIA, WITH LONG-TERM CURRENT USE OF INSULIN (H): Primary | ICD-10-CM

## 2024-03-28 DIAGNOSIS — Z79.4 TYPE 2 DIABETES MELLITUS WITH HYPERGLYCEMIA, WITH LONG-TERM CURRENT USE OF INSULIN (H): Primary | ICD-10-CM

## 2024-03-28 RX ORDER — GLIPIZIDE 10 MG/1
10 TABLET, FILM COATED, EXTENDED RELEASE ORAL 2 TIMES DAILY
Qty: 180 TABLET | Refills: 1 | Status: SHIPPED | OUTPATIENT
Start: 2024-03-28 | End: 2024-04-23

## 2024-03-28 NOTE — LETTER
[facility] CARE COORDINATION  [address]  [facility] [address]    March 28, 2024    Brandy Leon  [address] [place] [address]      Dear Yehuda,    I have been unsuccessful in reaching you since our last contact. At this time the Care Coordination team will make no further attempts to reach you, however this does not change your ability to continue receiving care from your providers at your primary care clinic. If you need additional support from a care coordinator in the future please contact Crystal at [phone] or your clinic directly.    All of us at [facility] are invested in your health and are here to assist you in meeting your goals.     Sincerely,    Care Coordination Team

## 2024-03-28 NOTE — PROGRESS NOTES
Clinic Care Coordination Contact  [facility]    Clinical Data: Care Coordinator Outreach    Outreach Documentation Number of Outreach Attempt   2/29/2024  10:41 AM 1   3/7/2024   9:12 AM 2   3/28/2024   1:10 PM 3       Unable to leave VM, VM box full.     Plan: Care Coordinator will send disenrollment letter with care coordinator contact information via [employer]. Care Coordinator will do no further outreaches at this time.    Sharri Rodriguez, [facility]  Social Work Primary Care Clinic Care Coordinator  [facility]  [phone]  [email or web address]

## 2024-04-01 ENCOUNTER — TELEPHONE (OUTPATIENT)
Dept: FAMILY MEDICINE | Facility: CLINIC | Age: 84
End: 2024-04-01
Payer: COMMERCIAL

## 2024-04-01 NOTE — TELEPHONE ENCOUNTER
Home Care is calling regarding an established patient with [facility].       Requesting orders from: Fernanda Miller  Provider is following patient: Yes  Is this a 60-day recertification request?  Yes    Orders Requested    Skilled Nursing  Request for recertification   Frequency: 1 visit every other week x 8 weeks & 3 PRN visits.       Confirmed ok to leave a detailed message with call back.  Contact information confirmed and updated as needed.    Anay Anaya RN

## 2024-04-01 NOTE — TELEPHONE ENCOUNTER
Called KATHY García back and left  notifying her that PCP has approved the requested home care orders below. Left phone number for clinic in case of any further questions/concerns.     TADEO Watts, RN   [facility] Primary Care [facility]

## 2024-04-03 ENCOUNTER — TELEPHONE (OUTPATIENT)
Dept: FAMILY MEDICINE | Facility: CLINIC | Age: 84
End: 2024-04-03
Payer: COMMERCIAL

## 2024-04-03 NOTE — TELEPHONE ENCOUNTER
Writer called Ed and relayed provider message below as written. He verbalized understanding and had no further questions or concerns at this time.    Mehdi Zamora RN  [facility]

## 2024-04-03 NOTE — TELEPHONE ENCOUNTER
Ed Physical Therapist from [facility] is calling regarding an established patient with [facility].        4/3/2024     3:41 PM 4/1/2024    12:43 PM   Home Care Information   Current following provider Dr. Miller     Name/Phone Number Clovis Ward Therapist @ [phone]    Home Care agency  [facility]     Requesting orders from: Fernanda Miller  Provider is following patient: Yes  Is this a 60-day recertification request?  Yes    Orders Requested    Physical Therapy  Request for recertification   Frequency:  1 visit in April  ; 2 visits in May      Information was gathered and will be sent to provider for review.  RN will contact Home Care with information after provider review.  Confirmed ok to leave a detailed message with call back.  Contact information confirmed and updated as needed.        Betsey Hodges RN  Clinical Triage/Primary Care  [facility]

## 2024-04-08 DIAGNOSIS — Z53.9 DIAGNOSIS NOT YET DEFINED: Primary | ICD-10-CM

## 2024-04-08 PROCEDURE — G0179 MD RECERTIFICATION HHA PT: HCPCS | Performed by: FAMILY MEDICINE

## 2024-04-16 ENCOUNTER — TELEPHONE (OUTPATIENT)
Dept: FAMILY MEDICINE | Facility: CLINIC | Age: 84
End: 2024-04-16
Payer: COMMERCIAL

## 2024-04-16 NOTE — TELEPHONE ENCOUNTER
Forms/Letter Request    Type of form/letter: Home Health Certification  Do we have the form/letter: Yes: Place form on provider's desk    Who is the form from? [facility]  [id number]    Where did/will the form come from? form was faxed in      How would you like the form/letter returned: Fax : [phone]

## 2024-04-19 ENCOUNTER — NURSE TRIAGE (OUTPATIENT)
Dept: NURSING | Facility: CLINIC | Age: 84
End: 2024-04-19
Payer: COMMERCIAL

## 2024-04-19 ENCOUNTER — TELEPHONE (OUTPATIENT)
Dept: NURSING | Facility: CLINIC | Age: 84
End: 2024-04-19
Payer: COMMERCIAL

## 2024-04-19 DIAGNOSIS — Z79.4 TYPE 2 DIABETES MELLITUS WITH HYPERGLYCEMIA, WITH LONG-TERM CURRENT USE OF INSULIN (H): Primary | Chronic | ICD-10-CM

## 2024-04-19 DIAGNOSIS — E11.65 TYPE 2 DIABETES MELLITUS WITH HYPERGLYCEMIA, WITH LONG-TERM CURRENT USE OF INSULIN (H): Primary | Chronic | ICD-10-CM

## 2024-04-19 NOTE — TELEPHONE ENCOUNTER
Nurse Triage SBAR    Is this a 2nd Level Triage? YES, LICENSED PRACTITIONER REVIEW IS REQUIRED    Situation: Pt's son calling with concerns for hypoglycemia.    Background: Pt's BG's have been in the 40's in the middle of the night for the last 2 nights.    Assessment: Pt has a Dexcom which alerts her when her BG is low. Her son gave her juice and a cookie for her low BG last night which brought up her BG. BG currently is 133. Pt did take all of her evening medications already as well as her Novolog. Pt is currently not symptomatic.     Protocol Recommended Disposition:   Call PCP Within 24 Hours    Recommendation: On call provider spoken to who stated pt should hold evening Glipizide until discussed with PCP. She is to also eat a snack consisting of carbs and protein before going to bed. This information was relayed to son who stated understanding. Please review and contact daughter Fernanda to discuss.    Reason for Disposition   [1] Caller has NON-URGENT medication or insulin pump question AND [2] triager unable to answer question    Additional Information   Negative: Acting confused (e.g., disoriented, slurred speech)   Negative: Sounds like a life-threatening emergency to the triager   Negative: Unconscious or difficult to awaken   Negative: Seizure occurs   Negative: Very weak (e.g., can't stand)   Negative: Patient sounds very sick or weak to the triager   Negative: [1] Low blood sugar symptoms persist > 30 minutes AND [2] using low blood sugar Care Advice   Negative: [1] Low blood glucose (70 mg/dl [3.9 mmol/l] or below) persists > 30 minutes AND [2] using low blood sugar Care Advice   Negative: [1] Vomiting AND [2] signs of dehydration (e.g., very dry mouth, lightheaded, dark urine)   Negative: [1] Low blood sugar symptoms with no other adult present AND [2] hasn't tried Care Advice   Negative: [1] Low blood glucose (70 mg/dl [3.9 mmol/l] or below)) with no other adult present AND [2] hasn't tried Care  Advice   Negative: Diabetes drug error or overdose (e.g., insulin error or extra dose)   Negative: [1] Caller has URGENT medication or insulin pump question AND [2] triager unable to answer question   Negative: [1] Blood glucose 70  mg/dL (3.9 mmol/L) or below OR symptomatic, now improved with Care Advice AND [2] cause unknown    Protocols used: Diabetes - Low Blood Sugar-A-    Tarah Carver RN  [facility] Nurse Advisor   4/19/2024  6:35 PM

## 2024-04-19 NOTE — TELEPHONE ENCOUNTER
The niece is calling for her grandmother but she is not listed.  She reports she will drive over there and call back within 15 minutes

## 2024-04-20 NOTE — TELEPHONE ENCOUNTER
Received call from patient's son, Aaron, and granddaughter. They state they had talked to a triager earlier today regarding patient's blood sugars. Last night her BG was in the 200s when she went to bed. Her BG dropped to 45 at 2 am. On-call provider had recommended holding glipizide tonight, but patient had already taken glipizide 10 mg. She also took metformin 1000 mg and Novolog 17 units with supper. She is due for Solostar 52 units. Her BG is 280 right now. They are wondering if they should decrease the amount of Solostar or hold it, and what they should do for the rest of the weekend until they can follow up with her PCP on Monday. Report given to Tarah OSWALD, nurse triager for continuation of care.    Reason for Disposition   [1] Caller has URGENT medicine question about med that PCP or specialist prescribed AND [2] triager unable to answer question    Protocols used: Medication Question Call-A-

## 2024-04-20 NOTE — TELEPHONE ENCOUNTER
Spoke to on call provider who stated pt can go down to 44 units of Solostar. This information relayed to Aaron who stated understanding and was agreeable to plan.    Tarah Carver RN  [facility] Nurse Advisor   4/19/2024  8:21 PM

## 2024-04-20 NOTE — TELEPHONE ENCOUNTER
Please call patient family - daughter -   if patient able to send sugar readings to diabetic ed that would be good to have a more comprehensive review of levels before discontinuing the pm glipizide long term.    A1C end of this month is recommended as well.    CK

## 2024-04-22 NOTE — TELEPHONE ENCOUNTER
Called patient's daughter, Fernanda and relayed provider's message.     Fernanda verbalized understanding and stated she will download information from dexcom  today. States patient has MTM with Mrina Perez tomorrow and can schedule appointment with Mariana Arrieta PA-C if needed.   Fernanda schulz will scheduled lab appointment. Writer informed hemoglobin A1C order has been ordered.     KATHY Solano  [facility]

## 2024-04-22 NOTE — PROGRESS NOTES
Medication Therapy Management (MTM) Encounter    ASSESSMENT:                            Medication Adherence/Access: No issues identified    Diabetes:   Patient is not meeting A1c goal of < 8%.  Patient is not meeting goal of > 70% time in target with continuous glucose monitoring.   Patient would benefit from increasing Novolog dose at breakfast and supper.     Hyperlipidemia:   Stable    PLAN:                          1. Recommend increasing Novolog before breakfast and supper to 19 units.    Follow-up: 2 weeks     SUBJECTIVE/OBJECTIVE:                          Brandy Leon is a 83 year old female contacted via telephone for a follow up visit.  Today's visit is a follow-up MTM visit from 1/23/2024. Patient's daughter, Juliet was also present for the visit.    Reason for visit: blood sugars    Allergies/ADRs: Reviewed in chart  Past Medical History: Reviewed in chart  Tobacco: She reports that she has never smoked. She has never been exposed to tobacco smoke. She has never used smokeless tobacco.  Alcohol: none  Caffeine: 2-3 cups coffee/day  Activity:None; daughter asks her to get up and walk around the house with walker    Medication Adherence/Access:   Uses [facility] in [place]  Daughter helps her set up her medication boxes.    Diabetes   Toujeo U300 52 units daily takes between 830pm-10pm  Novolog 18 units before breakfast, 5 units before lunch (usually just has a snack) or 18 units before  lunch if having a meal and 17 units before supper + sliding scale  metformin 1000 mg twice daily  Glipizide ER 10mg daily taking in the morning (dose was decreased because she was of hypoglycemia 4/19). Zoila feels better off of the second dose of glipizide. Juliet was out of town for 2 weeks so other family members were helping Zoila during that time-not sure what the cause of the low blood sugars was.    Patient is not experiencing side effects.    Blood sugar monitoring: CGM Dexcom using a                       Has not  had any lows since decreasing glipizide dose.   Follows with CDE and endocrinology  Current diabetes symptoms: none  Diet/Exercise: No exercise. Patient typically eats 2 meals a day-breakfast and supper. Dinner 6-8pm and lunch noon.    Eye exam is up to date  Foot exam: due  Urine Albumin:   Lab Results   Component Value Date    MICROL 10 10/24/2022    MICROL 40 11/12/2020     Lab Results   Component Value Date    A1C 9.4 01/25/2024    A1C 10.6 12/13/2022    A1C 11.1 07/06/2022    A1C 9.6 03/28/2022    A1C 9.5 12/20/2021    A1C 10.3 05/17/2021    A1C 9.2 02/16/2021    A1C 8.7 11/12/2020    A1C 10.0 08/20/2020    A1C 10.5 01/10/2020     Hyperlipidemia:   rosuvastatin 5mg twice weekly (Monday and Fridays)   Patient reports no current medication side effects.  The ASCVD Risk score (Laurence DK, et al., 2019) failed to calculate for the following reasons:    The 2019 ASCVD risk score is only valid for ages 40 to 79    Recent Labs   Lab Test 01/25/24  1441 10/24/22  1417   CHOL 172 242*   HDL 51 56   LDL 92 116*   TRIG 144 348*     Today's Vitals: There were no vitals taken for this visit.  ----------------    I spent 24 minutes with this patient today. All changes were made via collaborative practice agreement with Fernanda Miller MD. A copy of the visit note was provided to the patient's provider(s).    The patient was sent via Branch a summary of these recommendations.     Medication Therapy Management  Mirna Perez, Pharm.D, BCACP  Medication Therapy Management Pharmacist    Telemedicine Visit Details  Type of service:  Telephone visit  Start Time: 4:06PM  End Time: 4:30PM     Medication Therapy Recommendations  No medication therapy recommendations to display

## 2024-04-23 ENCOUNTER — VIRTUAL VISIT (OUTPATIENT)
Dept: PHARMACY | Facility: CLINIC | Age: 84
End: 2024-04-23
Payer: COMMERCIAL

## 2024-04-23 DIAGNOSIS — Z79.4 TYPE 2 DIABETES MELLITUS WITH HYPERGLYCEMIA, WITH LONG-TERM CURRENT USE OF INSULIN (H): Chronic | ICD-10-CM

## 2024-04-23 DIAGNOSIS — E78.5 DYSLIPIDEMIA, GOAL LDL BELOW 100: Primary | ICD-10-CM

## 2024-04-23 DIAGNOSIS — E11.65 TYPE 2 DIABETES MELLITUS WITH HYPERGLYCEMIA, WITH LONG-TERM CURRENT USE OF INSULIN (H): Chronic | ICD-10-CM

## 2024-04-23 PROCEDURE — 99607 MTMS BY PHARM ADDL 15 MIN: CPT | Mod: 93 | Performed by: PHARMACIST

## 2024-04-23 PROCEDURE — 99606 MTMS BY PHARM EST 15 MIN: CPT | Mod: 93 | Performed by: PHARMACIST

## 2024-04-23 RX ORDER — GLIPIZIDE 10 MG/1
10 TABLET, FILM COATED, EXTENDED RELEASE ORAL DAILY
Qty: 180 TABLET | Refills: 1 | Status: SHIPPED | OUTPATIENT
Start: 2024-04-23 | End: 2024-07-01

## 2024-04-24 RX ORDER — INSULIN ASPART 100 [IU]/ML
INJECTION, SOLUTION INTRAVENOUS; SUBCUTANEOUS
Qty: 30 ML | Refills: 1 | Status: SHIPPED | OUTPATIENT
Start: 2024-04-24 | End: 2024-05-13

## 2024-04-24 RX ORDER — PEN NEEDLE, DIABETIC 32GX 5/32"
NEEDLE, DISPOSABLE MISCELLANEOUS
Qty: 400 EACH | Refills: 2 | Status: SHIPPED | OUTPATIENT
Start: 2024-04-24

## 2024-04-24 NOTE — PATIENT INSTRUCTIONS
"Recommendations from today's MTM visit:                                                         1. Recommend increasing Novolog before breakfast and supper to 19 units.    Follow-up: 2 weeks    It was great speaking with you today.  I value your experience and would be very thankful for your time in providing feedback in our clinic survey. In the next few days, you may receive an email or text message from [facility] with a link to a survey related to your  clinical pharmacist.\"     To schedule another MTM appointment, please call the clinic directly or you may call the MTM scheduling line at [phone] or toll-free at [phone].     My Clinical Pharmacist's contact information:                                                      Please feel free to contact me with any questions or concerns you have.      Mirna Perez, Pharm.D, BCACP  Medication Therapy Management Pharmacist      "

## 2024-05-02 ENCOUNTER — TELEPHONE (OUTPATIENT)
Dept: FAMILY MEDICINE | Facility: CLINIC | Age: 84
End: 2024-05-02
Payer: COMMERCIAL

## 2024-05-02 NOTE — TELEPHONE ENCOUNTER
Home Care is calling regarding an established patient with [facility].        5/2/2024     3:04 PM 4/3/2024     3:41 PM   Home Care Information   Current following provider Dr. Angela Miller    Name/Phone Number Ed [phone] Ed Physical Therapist @ [phone]   Home Care agency [facility]      Requesting orders from: Fernanda iMller  Provider is following patient: Yes  Is this a 60-day recertification request?  No    Orders Requested    Skilled Nursing  Request for delay in care/reschedule, service is not able to be provided within same scheduled day due to patient symptoms (read below).   Add on a visit (1x/1 week) for the week of 5/5/24.    Physical Therapy  Request for delay in care/reschedule, service is not able to be provided within same scheduled day due to patient symptoms (read below).   Add on a visit (1x/1 week) for the week of 5/5/24.      [facility] added visits are due to medication error and fall.     Medication error happened over the weekend when patient's caregiver (not the primary caregiver) accidentally doubled glipizide dose in medication box. Pt 2 days ago had low blood sugars at 62. Pt was dizzy and dtr assisted the patient to the floor (assisted fall). EMS was called. No head or other injuries. BS today at 132. Per Ed, vital signs are stable. Ed reports patient feels more groggy and tired today. Dtr (priamry caregiver) is with patient now. Pt is never alone.     Confirmed ok to leave a detailed message with call back.  Contact information confirmed and updated as needed.    Flori Cedeno, RN

## 2024-05-03 NOTE — TELEPHONE ENCOUNTER
Called NIGEL Ward back and left  on confidential line notifying him that Dr. Miller has approved the requested home care orders below. Left phone number for clinic in case of any questions/concerns.     PATRICIO WattsN, RN   [facility]

## 2024-05-05 DIAGNOSIS — Z79.4 TYPE 2 DIABETES MELLITUS WITH HYPERGLYCEMIA, WITH LONG-TERM CURRENT USE OF INSULIN (H): ICD-10-CM

## 2024-05-05 DIAGNOSIS — E11.65 TYPE 2 DIABETES MELLITUS WITH HYPERGLYCEMIA, WITH LONG-TERM CURRENT USE OF INSULIN (H): ICD-10-CM

## 2024-05-07 ENCOUNTER — TELEPHONE (OUTPATIENT)
Dept: FAMILY MEDICINE | Facility: CLINIC | Age: 84
End: 2024-05-07
Payer: COMMERCIAL

## 2024-05-07 NOTE — TELEPHONE ENCOUNTER
\  Forms/Letter Request    Type of form/letter: [facility]   Order # [id number]    Do we have the form/letter: Yes:     Who is the form from? Home care    Where did/will the form come from? form was faxed in    When is form/letter needed by: asap    How would you like the form/letter returned: Fax : [phone]

## 2024-05-08 DIAGNOSIS — H40.1131 PRIMARY OPEN ANGLE GLAUCOMA (POAG) OF BOTH EYES, MILD STAGE: Primary | ICD-10-CM

## 2024-05-09 ENCOUNTER — MEDICAL CORRESPONDENCE (OUTPATIENT)
Dept: HEALTH INFORMATION MANAGEMENT | Facility: CLINIC | Age: 84
End: 2024-05-09
Payer: COMMERCIAL

## 2024-05-09 ENCOUNTER — MYC MEDICAL ADVICE (OUTPATIENT)
Dept: FAMILY MEDICINE | Facility: CLINIC | Age: 84
End: 2024-05-09
Payer: COMMERCIAL

## 2024-05-09 DIAGNOSIS — Z79.4 TYPE 2 DIABETES MELLITUS WITH HYPERGLYCEMIA, WITH LONG-TERM CURRENT USE OF INSULIN (H): ICD-10-CM

## 2024-05-09 DIAGNOSIS — E11.65 TYPE 2 DIABETES MELLITUS WITH HYPERGLYCEMIA, WITH LONG-TERM CURRENT USE OF INSULIN (H): ICD-10-CM

## 2024-05-10 ENCOUNTER — OFFICE VISIT (OUTPATIENT)
Dept: OPHTHALMOLOGY | Facility: CLINIC | Age: 84
End: 2024-05-10
Attending: OPHTHALMOLOGY
Payer: COMMERCIAL

## 2024-05-10 DIAGNOSIS — H40.1132 PRIMARY OPEN ANGLE GLAUCOMA (POAG) OF BOTH EYES, MODERATE STAGE: ICD-10-CM

## 2024-05-10 PROCEDURE — G0463 HOSPITAL OUTPT CLINIC VISIT: HCPCS | Performed by: OPHTHALMOLOGY

## 2024-05-10 PROCEDURE — 99214 OFFICE O/P EST MOD 30 MIN: CPT | Performed by: OPHTHALMOLOGY

## 2024-05-10 PROCEDURE — 92083 EXTENDED VISUAL FIELD XM: CPT | Performed by: OPHTHALMOLOGY

## 2024-05-10 ASSESSMENT — VISUAL ACUITY
OS_PH_SC: 20/40
OS_SC: 20/70
OS_SC+: +1
METHOD: SNELLEN - LINEAR
OD_SC+: +2
OD_SC: 20/50

## 2024-05-10 ASSESSMENT — TONOMETRY
OD_IOP_MMHG: 22
OS_IOP_MMHG: 15
IOP_METHOD: TONOPEN
OD_IOP_MMHG: 15

## 2024-05-10 ASSESSMENT — EXTERNAL EXAM - RIGHT EYE: OD_EXAM: NORMAL

## 2024-05-10 ASSESSMENT — SLIT LAMP EXAM - LIDS
COMMENTS: NORMAL
COMMENTS: NORMAL

## 2024-05-10 ASSESSMENT — CUP TO DISC RATIO
OD_RATIO: 0.5
OS_RATIO: 0.5

## 2024-05-10 ASSESSMENT — EXTERNAL EXAM - LEFT EYE: OS_EXAM: NORMAL

## 2024-05-10 NOTE — TELEPHONE ENCOUNTER
Not sure this is a prior authorization issue.    Please contact pharmacy at the location below.  I do not see that we have done any request from them but it sounds like they are needing information for completing this prescription for NovoLog and the Dexcom sensors.            Pharmacy Contact    Telephone Fax   [phone]     Pharmacy Address and Hours    Address Hours   [address] [address] Not open 24 hours

## 2024-05-10 NOTE — TELEPHONE ENCOUNTER
Routing to provider to review and advise. Do you want us to start a prior auth?    Mehdi Zamora RN  [facility]

## 2024-05-10 NOTE — PROGRESS NOTES
[facility] - Glaucoma clinic    Chief Complaint/Presenting Concern: Glaucoma    History of Present Illness:   Brandy Leon is a 83 year old patient who presents for evaluation of POAG.   Patient was dx due to rNFL thinning in 2015 by Dr Austin Serna.  - 05/30/2023: s/p right eye SLT    Today, 05/10/2024, patient presents for glaucoma follow up. Using Travatan each eye with overall good compliance.     Relevant Past Medical/Family/Social History:  Past Medical History:   Diagnosis Date    Actinic keratosis 3/12/2013    Degenerative joint disease     Diabetes (H)     Gastroesophageal reflux disease without esophagitis 12/11/2020    Rheumatic fever 1957    x2 between the ages of 15-18    Seasonal allergies      Relevant Review of Systems:  None are relevant  Diagnosis: Primary open angle glaucoma , moderate each eye   Year diagnosis: 2018  Previous glaucoma surgery/laser:    Both eyes: CE/IOL 2018 by Dr Austin Serna   Maximum intraocular pressure: 30/25  Current ophthalmic medications:    Both eyes Travatan Z, Cosopt right eye  Family history of any glaucoma: positive  CCT (um) 5/27/23: 546/552  Gonioscopy 2021   Right eye: open 360   Left eye: open 360  Refractive status: Almost emmetropic prior to CE/IOL  Trauma history: negative  Steroid exposure: positive  Vasospastic disease: Migrane/Raynaud phenomenon: negative  A past hemodynamic crisis or Low BP: negative  Meds AEs/intolerance:  Sulfa antibiotics  Preservatives in topical eye medications  Cosopt, minimal response   Focused PMHx:  Asthma and respiratory problems: positive (DINORA)  Cardiovascular disease: positive (HTN, IDDM2)  Prior testing  HVF's (Dr Austin Serna)  2019: right eye essentially full, left eye unreliable with scattered inf defects  2018: unreliable fields  2017: unreliable fields, right eye superior defect, left eye inferior hemifield loss crossing both midlines  2016: unreliable fields  2015: mod reliability, essentially  full each eye  HVF 9/20/21  Right Eye: low reliability (8/13 FL, 39% FP, 30% FN), MD -3.21, scattered nonspecific defects  Left Eye: low reliability (6/11 FL, 7% FP, 20% FN), MD -5.29, scattered defects  OCT Optic Nerve RNFL Spectralis 2/9/24  Right eye: IT RNFL thinning, stable    Left eye: IT RNFL thinning, stable     Today's testing:  IOP 22/15 mmHg today  Visual field 5/10/24   Right eye - nasal step, superior arcuate defect, fluctuating  Left eye - paracentral and peripheral defects, fluctuating    Additional Ocular History:     2. Pseudophakia, each eye   - Monitor    Plan/Recommendations:  Discussed findings with patient.  Patient has POAG moderate with prior progressive rNFL thinning and elevated IOP, thus Dr Serna began IOP lowering therapy in 08/2015.  On prior testing, there is right eye progression   IOP goal teens both eyes. S/p SLT right eye done 05/30/2023, not much improvement.   IOP elavted in the right eye today, patient has tried using more drops multiple times before with little success in compliance. Recommend iStent nitish surgery right eye.   Risks and benefits of iStent standalone in the right eye, including risks of bleeding, infection, need for additional surgery, failure of surgery, and vision loss were explained to patient, who expressed understanding and wishes to proceed with surgery  Continue Travatan Z at bedtime each eye     Schedule iStent infinite right eye, topical       Physician Attestation     Attending Physician Attestation:  Complete documentation of historical and exam elements from today's encounter can be found in the full encounter summary report (not reduplicated in this progress note). I personally obtained the chief complaint(s) and history of present illness. I confirmed and edited as necessary the review of systems, past medical/surgical history, family history, social history, and examination findings as documented by others; and I examined the patient myself. I  personally reviewed the relevant tests, images, and reports as documented above. I personally reviewed the ophthalmic test(s) associated with this encounter. I formulated and edited as necessary the assessment and plan and discussed the findings and management plan with the patient and any family members present at the time of the visit.  Radha Ceja M.D., Glaucoma, May 10, 2024

## 2024-05-10 NOTE — NURSING NOTE
"Chief Complaints and History of Present Illnesses   Patient presents with    Primary Open Angle Glaucoma Follow Up     3 month follow up for mild POAG each eye.     Patient notes vision is stable each eye in the last few months. Denies eye pain. Denies crusty lids in the morning. Patient states compliant with Travaprost drops.      Chief Complaint(s) and History of Present Illness(es)       Primary Open Angle Glaucoma Follow Up              Laterality: both eyes    Associated symptoms: dryness (feels like \"glue\" in eyes).  Negative for eye pain, redness and tearing    Pain scale: 0/10    Comments: 3 month follow up for mild POAG each eye.     Patient notes vision is stable each eye in the last few months. Denies eye pain. Denies crusty lids in the morning. Patient states compliant with Travaprost drops.               Comments    Ocular meds:   - Travaprost at bedtime each eye     JOHNNY Rose 11:01 AM 05/10/2024                     "

## 2024-05-13 ENCOUNTER — PREP FOR PROCEDURE (OUTPATIENT)
Dept: OPHTHALMOLOGY | Facility: CLINIC | Age: 84
End: 2024-05-13
Payer: COMMERCIAL

## 2024-05-13 DIAGNOSIS — H40.1132 PRIMARY OPEN ANGLE GLAUCOMA (POAG) OF BOTH EYES, MODERATE STAGE: Primary | ICD-10-CM

## 2024-05-13 RX ORDER — ACYCLOVIR 400 MG/1
TABLET ORAL
Qty: 3 EACH | Refills: 5 | OUTPATIENT
Start: 2024-05-13

## 2024-05-13 RX ORDER — INSULIN ASPART 100 [IU]/ML
19 INJECTION, SOLUTION INTRAVENOUS; SUBCUTANEOUS
Qty: 30 ML | Refills: 1 | Status: SHIPPED | OUTPATIENT
Start: 2024-05-13

## 2024-05-13 RX ORDER — PROPARACAINE HYDROCHLORIDE 5 MG/ML
1 SOLUTION/ DROPS OPHTHALMIC ONCE
Status: CANCELLED | OUTPATIENT
Start: 2024-05-13 | End: 2024-05-13

## 2024-05-13 RX ORDER — ACYCLOVIR 400 MG/1
TABLET ORAL
Qty: 3 EACH | Refills: 5 | Status: SHIPPED | OUTPATIENT
Start: 2024-05-13

## 2024-05-14 ENCOUNTER — TELEPHONE (OUTPATIENT)
Dept: OPHTHALMOLOGY | Facility: CLINIC | Age: 84
End: 2024-05-14
Payer: COMMERCIAL

## 2024-05-14 PROBLEM — H40.1132 PRIMARY OPEN ANGLE GLAUCOMA (POAG) OF BOTH EYES, MODERATE STAGE: Status: ACTIVE | Noted: 2024-05-13

## 2024-05-14 NOTE — TELEPHONE ENCOUNTER
Called patient to schedule surgery with Dr Ceja    Spoke with: patient's daughter, Juliet    Date of Surgery: 6/5 (Right)      Patient aware of approximate arrival time: Yes at early morning     Location of surgery: [facility] (Right)     Pre-Op H&P: Primary Care Clinic at [facility]     Informed patient that they need to call to schedule pre-op H&P with [facility]  within 30 days of surgery date: Yes    Post-Op Appt Dates: 6/6, 6/13, 7/2     Discussed with patient pre-op RN will call 2-3 days prior to surgery with arrival time and instructions:  Yes    Discussed with patient PAC RN will provide arrival time and instructions for surgery at the time of the appointment: [[place] locations only]: Not Applicable      Standard Surgery Packet Sent: Yes 05/14/24  via [employer] Message      Surgical Soap Discussed with Patient: No      Additional Information Sent in Packet: Post-op Appointment Itinerary      Informed patient that they will need an adult  to bring patient home from surgery: Yes  : Patient's daughter will drive her         Additional Comments:        All patients questions were answered and was instructed to review surgical packet and call back [phone] with any questions or concerns.       Gela Kwan on 5/14/2024 at 10:03 AM

## 2024-05-17 ENCOUNTER — LAB (OUTPATIENT)
Dept: LAB | Facility: CLINIC | Age: 84
End: 2024-05-17
Payer: COMMERCIAL

## 2024-05-17 ENCOUNTER — OFFICE VISIT (OUTPATIENT)
Dept: PODIATRY | Facility: CLINIC | Age: 84
End: 2024-05-17
Payer: COMMERCIAL

## 2024-05-17 DIAGNOSIS — E11.69 TYPE 2 DIABETES MELLITUS WITH DIABETIC FOOT DEFORMITY (H): ICD-10-CM

## 2024-05-17 DIAGNOSIS — E11.49 TYPE II OR UNSPECIFIED TYPE DIABETES MELLITUS WITH NEUROLOGICAL MANIFESTATIONS, NOT STATED AS UNCONTROLLED(250.60) (H): Primary | ICD-10-CM

## 2024-05-17 DIAGNOSIS — M21.969 TYPE 2 DIABETES MELLITUS WITH DIABETIC FOOT DEFORMITY (H): ICD-10-CM

## 2024-05-17 DIAGNOSIS — E11.65 TYPE 2 DIABETES MELLITUS WITH HYPERGLYCEMIA, WITH LONG-TERM CURRENT USE OF INSULIN (H): Chronic | ICD-10-CM

## 2024-05-17 DIAGNOSIS — B35.1 ONYCHOMYCOSIS: ICD-10-CM

## 2024-05-17 DIAGNOSIS — Z79.4 TYPE 2 DIABETES MELLITUS WITH HYPERGLYCEMIA, WITH LONG-TERM CURRENT USE OF INSULIN (H): Chronic | ICD-10-CM

## 2024-05-17 DIAGNOSIS — E11.51 DIABETES MELLITUS WITH PERIPHERAL VASCULAR DISEASE (H): ICD-10-CM

## 2024-05-17 LAB — HBA1C MFR BLD: 7.7 % (ref 0–5.6)

## 2024-05-17 PROCEDURE — 36415 COLL VENOUS BLD VENIPUNCTURE: CPT

## 2024-05-17 PROCEDURE — 83036 HEMOGLOBIN GLYCOSYLATED A1C: CPT

## 2024-05-17 PROCEDURE — 99214 OFFICE O/P EST MOD 30 MIN: CPT | Performed by: PODIATRIST

## 2024-05-17 NOTE — LETTER
5/17/2024         RE: Brandy Leon  [address] [place] [address]        Dear Colleague,    Thank you for referring your patient, Brandy Leon, to the [facility]. Please see a copy of my visit note below.    Past Medical History:   Diagnosis Date     Actinic keratosis 3/12/2013     Degenerative joint disease      Diabetes (H)      Gastroesophageal reflux disease without esophagitis 12/11/2020     Rheumatic fever 1957    x2 between the ages of 15-18     Seasonal allergies      Patient Active Problem List   Diagnosis     Seasonal allergies     Hypothyroidism     Hyperlipidemia LDL goal <100     Fibromyalgia     Osteoarthritis     Obesity     Type 2 diabetes mellitus with hyperglycemia, with long-term current use of insulin (H)     Hypertension goal BP (blood pressure) < 140/90     Overactive bladder     Recurrent UTI     Pseudophakia of both eyes     DINORA (obstructive sleep apnea)- severe (AHI 56)     Contusion of right knee     Gait disorder     Gastroesophageal reflux disease without esophagitis     Urge incontinence     Chronic kidney disease, stage 2 (mild)     Hydrocephalus, unspecified type (H)     Diabetes mellitus with peripheral vascular disease (H)     Primary open angle glaucoma (POAG) of both eyes, moderate stage     Past Surgical History:   Procedure Laterality Date     BLEPHAROPLASTY Bilateral 4/1/2022    Procedure: Bilateral upper eyelid blepharoplasty;  Surgeon: Fidelia Moran MD;  Location: [facility] OR     CATARACT IOL, RT/LT       COLONOSCOPY       COLONOSCOPY N/A 8/13/2015    Procedure: COLONOSCOPY;  Surgeon: Duane, William Charles, MD;  Location: [facility] OR     COLONOSCOPY WITH CO2 INSUFFLATION N/A 8/13/2015    Procedure: COLONOSCOPY WITH CO2 INSUFFLATION;  Surgeon: Duane, William Charles, MD;  Location: [facility] OR     PHACOEMULSIFICATION WITH STANDARD INTRAOCULAR LENS IMPLANT Left 10/25/2018    Procedure: LEFT PHACOEMULSIFICATION WITH STANDARD  INTRAOCULAR LENS IMPLANT;  Surgeon: Thomas Serna MD;  Location: [facility] OR     PHACOEMULSIFICATION WITH STANDARD INTRAOCULAR LENS IMPLANT Right 11/8/2018    Procedure: RIGHT PHACOEMULSIFICATION WITH STANDARD INTRAOCULAR LENS IMPLANT;  Surgeon: Thomas Serna MD;  Location: [facility] OR     THYROIDECTOMY        ZZC APPENDECTOMY       ZZC ARTHROPLASTY TMJ       Social History     Socioeconomic History     Marital status:      Spouse name: Not on file     Number of children: Not on file     Years of education: Not on file     Highest education level: Not on file   Occupational History     Occupation:    Tobacco Use     Smoking status: Never     Passive exposure: Never     Smokeless tobacco: Never     Tobacco comments:     has had exposure to second hand smoke in the past from [employer], no current exposure   Vaping Use     Vaping status: Never Used   Substance and Sexual Activity     Alcohol use: No     Drug use: No     Sexual activity: Never   Other Topics Concern     Parent/sibling w/ CABG, MI or angioplasty before 65F 55M? 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     Hobby Hazards No     Sleep Concern Yes     Comment: difficulty staying asleep, up and down all night     Stress Concern No     Weight Concern Yes     Comment: would like to lose     Special Diet Yes     Comment: low card, low sugar diet     Back Care Yes     Comment: chiropratior     Exercise Yes     Comment: almost everyday     Bike Helmet No     Comment: does not ride bicycle any more     Seat Belt Yes     Self-Exams Yes   Social History Narrative     Not on file     Social Determinants of Health     Financial Resource Strain: Low Risk  (1/25/2024)    Financial Resource Strain      Within the past 12 months, have you or your family  members you live with been unable to get utilities (heat, electricity) when it was really needed?: No   Food Insecurity: Low Risk  (1/25/2024)    Food Insecurity      Within the past 12 months, did you worry that your food would run out before you got money to buy more?: No      Within the past 12 months, did the food you bought just not last and you didn t have money to get more?: No   Transportation Needs: Low Risk  (1/25/2024)    Transportation Needs      Within the past 12 months, has lack of transportation kept you from medical appointments, getting your medicines, non-medical meetings or appointments, work, or from getting things that you need?: No   Physical Activity: Not on file   Stress: Not on file   Social Connections: Not on file   Interpersonal Safety: Not At Risk (2/6/2024)    [facility]     Humiliation, Afraid, Rape, and Kick questionnaire      Fear of Current or Ex-Partner: No      Emotionally Abused: No      Physically Abused: No      Sexually Abused: No   Housing Stability: Low Risk  (1/25/2024)    Housing Stability      Do you have housing? : Yes      Are you worried about losing your housing?: No     Family History   Problem Relation Age of Onset     Eye Disorder Mother         cataracts     Cancer Father         skin and leukemia     Cancer Sister         Breast Cancer     Eye Disorder Brother         cataract     Cerebrovascular Disease Maternal Grandfather      Cerebrovascular Disease Paternal Grandmother      Eye Disorder Paternal Grandmother         cataracts     Cerebrovascular Disease Paternal Grandfather      Heart Disease Paternal Grandfather      Breast Cancer Daughter      Endometrial Cancer Daughter      Glaucoma No family hx of      Macular Degeneration No family hx of          Lab Results   Component Value Date    A1C 9.4 01/25/2024    A1C 10.6 12/13/2022    A1C 11.1 07/06/2022    A1C 9.6 03/28/2022    A1C 9.5 12/20/2021    A1C 10.3  05/17/2021    A1C 9.2 02/16/2021    A1C 8.7 11/12/2020    A1C 10.0 08/20/2020    A1C 10.5 01/10/2020             Subjective findings- 83-year-old returns clinic with daughter for Diabetic foot cares.  Relates they did not get the Diabetic shoes and need MicroKlenz, relates to using the Loprox cream every night, relates to cleaning the feet daily with MicroKlenz and that is helping, relates to no ulcers or sores since we seen her last, no injuries, does have numbness tingling Neuropathy in her feet, needs an order for Diabetic shoes, relates she needs her toenails cut.       Objective findings- DP and PT are 2 out of 4 bilaterally.  Has decreased hair growth bilaterally.  Has minimal peripheral edema bilaterally.  Has decreased ankle joint dorsiflexion right more so than left.  No gross tendon voids bilaterally.  Has dystrophic brittle thickened nails with subungual debris dystrophy and discoloration Hallux nails greater than rest bilaterally.  There is no erythema, no drainage, no odor, no calor, no pain on palpation bilaterally.     Assessment and plan- Diabetes with peripheral Neuropathy, Diabetes with Equinus, Diabetes with peripheral Vascular disease, Tinea Pedis bilaterally is resolved, Onychomycosis and Onychauxis bilaterally.  History of ankle ulcers.  Diagnosis and treatment options discussed with them.  Continue the Loprox cream on the toenails.  MicroKlenz dispensed and use discussed with them.  Previous notes reviewed.  Return to clinic and see me in 3-4 months.                                        Moderate level of medical decision making.      Again, thank you for allowing me to participate in the care of your patient.        Sincerely,        Ehsan Araya DPM

## 2024-05-17 NOTE — NURSING NOTE
Brandy Leon's chief complaint for this visit includes:  Chief Complaint   Patient presents with    Consult     Bilateral foot cares, toe nail fungus on the big right toe     PCP: Fernanda Miller    Referring Provider:  Referred Erlin, MD  No address on file    There were no vitals taken for this visit.  Data Unavailable     Do you need any medication refills at today's visit? NO    Allergies   Allergen Reactions    New Medication Allergy Rash     Soliqua     Ace Inhibitors Cough    Estradiol Itching    Lipitor [Atorvastatin Calcium]      Weak and shaky    Sulfa Antibiotics      Rash      Zocor [Simvastatin]      Weak and shakey    Triamterene Dermatitis     Possible photosensitivity dermatitis, mild.  (changed to hctz alone 6/4/07, will see if this helps)       Ivelisse Del Cid LPN

## 2024-05-17 NOTE — PROGRESS NOTES
Past Medical History:   Diagnosis Date    Actinic keratosis 3/12/2013    Degenerative joint disease     Diabetes (H)     Gastroesophageal reflux disease without esophagitis 12/11/2020    Rheumatic fever 1957    x2 between the ages of 15-18    Seasonal allergies      Patient Active Problem List   Diagnosis    Seasonal allergies    Hypothyroidism    Hyperlipidemia LDL goal <100    Fibromyalgia    Osteoarthritis    Obesity    Type 2 diabetes mellitus with hyperglycemia, with long-term current use of insulin (H)    Hypertension goal BP (blood pressure) < 140/90    Overactive bladder    Recurrent UTI    Pseudophakia of both eyes    DINORA (obstructive sleep apnea)- severe (AHI 56)    Contusion of right knee    Gait disorder    Gastroesophageal reflux disease without esophagitis    Urge incontinence    Chronic kidney disease, stage 2 (mild)    Hydrocephalus, unspecified type (H)    Diabetes mellitus with peripheral vascular disease (H)    Primary open angle glaucoma (POAG) of both eyes, moderate stage     Past Surgical History:   Procedure Laterality Date    BLEPHAROPLASTY Bilateral 4/1/2022    Procedure: Bilateral upper eyelid blepharoplasty;  Surgeon: Fidelia Moran MD;  Location: [facility]    CATARACT IOL, RT/LT      COLONOSCOPY      COLONOSCOPY N/A 8/13/2015    Procedure: COLONOSCOPY;  Surgeon: Duane, William Charles, MD;  Location: [facility]    COLONOSCOPY WITH CO2 INSUFFLATION N/A 8/13/2015    Procedure: COLONOSCOPY WITH CO2 INSUFFLATION;  Surgeon: Duane, William Charles, MD;  Location: [facility]    PHACOEMULSIFICATION WITH STANDARD INTRAOCULAR LENS IMPLANT Left 10/25/2018    Procedure: LEFT PHACOEMULSIFICATION WITH STANDARD INTRAOCULAR LENS IMPLANT;  Surgeon: Thomas Serna MD;  Location: [facility]    PHACOEMULSIFICATION WITH STANDARD INTRAOCULAR LENS IMPLANT Right 11/8/2018    Procedure: RIGHT PHACOEMULSIFICATION WITH STANDARD INTRAOCULAR LENS IMPLANT;  Surgeon: Thomas Serna MD;  Location: [facility]     THYROIDECTOMY       ZZC APPENDECTOMY      ZZC ARTHROPLASTY TMJ       Social History     Socioeconomic History    Marital status:      Spouse name: Not on file    Number of children: Not on file    Years of education: Not on file    Highest education level: Not on file   Occupational History    Occupation:    Tobacco Use    Smoking status: Never     Passive exposure: Never    Smokeless tobacco: Never    Tobacco comments:     has had exposure to second hand smoke in the past from [employer], no current exposure   Vaping Use    Vaping status: Never Used   Substance and Sexual Activity    Alcohol use: No    Drug use: No    Sexual activity: Never   Other Topics Concern    Parent/sibling w/ CABG, MI or angioplasty before 65F 55M? 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    Hobby Hazards No    Sleep Concern Yes     Comment: difficulty staying asleep, up and down all night    Stress Concern No    Weight Concern Yes     Comment: would like to lose    Special Diet Yes     Comment: low card, low sugar diet    Back Care Yes     Comment: chiropratior    Exercise Yes     Comment: almost everyday    Bike Helmet No     Comment: does not ride bicycle any more    Seat Belt Yes    Self-Exams Yes   Social History Narrative    Not on file     Social Determinants of Health     Financial Resource Strain: Low Risk  (1/25/2024)    Financial Resource Strain     Within the past 12 months, have you or your family members you live with been unable to get utilities (heat, electricity) when it was really needed?: No   Food Insecurity: Low Risk  (1/25/2024)    Food Insecurity     Within the past 12 months, did you worry that your food would run out before you got money to buy more?: No     Within the past 12 months, did the food you bought just not  last and you didn t have money to get more?: No   Transportation Needs: Low Risk  (1/25/2024)    Transportation Needs     Within the past 12 months, has lack of transportation kept you from medical appointments, getting your medicines, non-medical meetings or appointments, work, or from getting things that you need?: No   Physical Activity: Not on file   Stress: Not on file   Social Connections: Not on file   Interpersonal Safety: Not At Risk (2/6/2024)    [facility]     Humiliation, Afraid, Rape, and Kick questionnaire     Fear of Current or Ex-Partner: No     Emotionally Abused: No     Physically Abused: No     Sexually Abused: No   Housing Stability: Low Risk  (1/25/2024)    Housing Stability     Do you have housing? : Yes     Are you worried about losing your housing?: No     Family History   Problem Relation Age of Onset    Eye Disorder Mother         cataracts    Cancer Father         skin and leukemia    Cancer Sister         Breast Cancer    Eye Disorder Brother         cataract    Cerebrovascular Disease Maternal Grandfather     Cerebrovascular Disease Paternal Grandmother     Eye Disorder Paternal Grandmother         cataracts    Cerebrovascular Disease Paternal Grandfather     Heart Disease Paternal Grandfather     Breast Cancer Daughter     Endometrial Cancer Daughter     Glaucoma No family hx of     Macular Degeneration No family hx of          Lab Results   Component Value Date    A1C 9.4 01/25/2024    A1C 10.6 12/13/2022    A1C 11.1 07/06/2022    A1C 9.6 03/28/2022    A1C 9.5 12/20/2021    A1C 10.3 05/17/2021    A1C 9.2 02/16/2021    A1C 8.7 11/12/2020    A1C 10.0 08/20/2020    A1C 10.5 01/10/2020             Subjective findings- 83-year-old returns clinic with daughter for Diabetic foot cares.  Relates they did not get the Diabetic shoes and need MicroKlenz, relates to using the Loprox cream every night, relates to cleaning the feet daily with  MicroKlenz and that is helping, relates to no ulcers or sores since we seen her last, no injuries, does have numbness tingling Neuropathy in her feet, needs an order for Diabetic shoes, relates she needs her toenails cut.       Objective findings- DP and PT are 2 out of 4 bilaterally.  Has decreased hair growth bilaterally.  Has minimal peripheral edema bilaterally.  Has decreased ankle joint dorsiflexion right more so than left.  No gross tendon voids bilaterally.  Has dystrophic brittle thickened nails with subungual debris dystrophy and discoloration Hallux nails greater than rest bilaterally.  There is no erythema, no drainage, no odor, no calor, no pain on palpation bilaterally.     Assessment and plan- Diabetes with peripheral Neuropathy, Diabetes with Equinus, Diabetes with peripheral Vascular disease, Tinea Pedis bilaterally is resolved, Onychomycosis and Onychauxis bilaterally.  History of ankle ulcers.  Diagnosis and treatment options discussed with them.  Continue the Loprox cream on the toenails.  MicroKlenz dispensed and use discussed with them.  Previous notes reviewed.  Return to clinic and see me in 3-4 months.                                        Moderate level of medical decision making.

## 2024-05-17 NOTE — RESULT ENCOUNTER NOTE
Your long term blood sugar testing is MUCH improved and in the goal range for you.  I would recommend you continue your current treatment.  Great work!  CK

## 2024-05-22 ENCOUNTER — OFFICE VISIT (OUTPATIENT)
Dept: FAMILY MEDICINE | Facility: CLINIC | Age: 84
End: 2024-05-22
Payer: COMMERCIAL

## 2024-05-22 VITALS
BODY MASS INDEX: 31.34 KG/M2 | HEIGHT: 64 IN | DIASTOLIC BLOOD PRESSURE: 82 MMHG | SYSTOLIC BLOOD PRESSURE: 134 MMHG | WEIGHT: 183.6 LBS | HEART RATE: 81 BPM | RESPIRATION RATE: 19 BRPM

## 2024-05-22 DIAGNOSIS — H40.1112 PRIMARY OPEN ANGLE GLAUCOMA (POAG) OF RIGHT EYE, MODERATE STAGE: ICD-10-CM

## 2024-05-22 DIAGNOSIS — E89.0 POSTOPERATIVE HYPOTHYROIDISM: Chronic | ICD-10-CM

## 2024-05-22 DIAGNOSIS — I10 HYPERTENSION GOAL BP (BLOOD PRESSURE) < 140/90: Chronic | ICD-10-CM

## 2024-05-22 DIAGNOSIS — Z79.4 TYPE 2 DIABETES MELLITUS WITH HYPERGLYCEMIA, WITH LONG-TERM CURRENT USE OF INSULIN (H): ICD-10-CM

## 2024-05-22 DIAGNOSIS — Z01.818 PRE-OPERATIVE GENERAL PHYSICAL EXAMINATION: Primary | ICD-10-CM

## 2024-05-22 DIAGNOSIS — Z29.11 NEED FOR VACCINATION AGAINST RESPIRATORY SYNCYTIAL VIRUS: ICD-10-CM

## 2024-05-22 DIAGNOSIS — Z23 NEED FOR TDAP VACCINATION: ICD-10-CM

## 2024-05-22 DIAGNOSIS — E11.65 TYPE 2 DIABETES MELLITUS WITH HYPERGLYCEMIA, WITH LONG-TERM CURRENT USE OF INSULIN (H): ICD-10-CM

## 2024-05-22 DIAGNOSIS — Z28.21 VACCINATION NOT CARRIED OUT BECAUSE OF PATIENT REFUSAL: ICD-10-CM

## 2024-05-22 PROBLEM — I35.0 NONRHEUMATIC AORTIC VALVE STENOSIS: Status: ACTIVE | Noted: 2024-05-22

## 2024-05-22 PROCEDURE — 84132 ASSAY OF SERUM POTASSIUM: CPT | Performed by: FAMILY MEDICINE

## 2024-05-22 PROCEDURE — 99214 OFFICE O/P EST MOD 30 MIN: CPT | Performed by: FAMILY MEDICINE

## 2024-05-22 PROCEDURE — 36415 COLL VENOUS BLD VENIPUNCTURE: CPT | Performed by: FAMILY MEDICINE

## 2024-05-22 PROCEDURE — 82565 ASSAY OF CREATININE: CPT | Performed by: FAMILY MEDICINE

## 2024-05-22 PROCEDURE — 84443 ASSAY THYROID STIM HORMONE: CPT | Performed by: FAMILY MEDICINE

## 2024-05-22 PROCEDURE — 82947 ASSAY GLUCOSE BLOOD QUANT: CPT | Performed by: FAMILY MEDICINE

## 2024-05-22 RX ORDER — RESPIRATORY SYNCYTIAL VIRUS VACCINE 120MCG/0.5
0.5 KIT INTRAMUSCULAR ONCE
Qty: 1 EACH | Refills: 0 | Status: CANCELLED | OUTPATIENT
Start: 2024-05-22 | End: 2024-05-22

## 2024-05-22 ASSESSMENT — PAIN SCALES - GENERAL: PAINLEVEL: NO PAIN (0)

## 2024-05-22 NOTE — PATIENT INSTRUCTIONS

## 2024-05-22 NOTE — PROGRESS NOTES
Preoperative Evaluation  [address] [address]  Phone: [phone]  Primary Provider: Fernanda Miller MD  Pre-op Performing Provider: King Arora MD  May 22, 2024             5/21/2024   Surgical Information   What procedure is being done? right eye stent   Facility or Hospital where procedure/surgery will be performed: [facility] [address] [place] [address]   Who is doing the procedure / surgery? Dr Radha Ceja   Date of surgery / procedure: 6/5/2024   Time of surgery / procedure: 6:30am   Where do you plan to recover after surgery? at home with family     Fax number for surgical facility: Note does not need to be faxed, will be available electronically in Epic.    Assessment & Plan     The proposed surgical procedure is considered LOW risk.    (Z01.818) Pre-operative general physical examination  (primary encounter diagnosis)  (H40.1112) Primary open angle glaucoma (POAG) of right eye, moderate stage  Comment:   Plan: Glaucoma surgery.     (E11.65,  Z79.4) Type 2 diabetes mellitus with hyperglycemia, with long-term current use of insulin (H)  Comment: well controlled.   Plan: Glucose        Follow with Endo scheduled for 7/1/24    (E89.0) Postoperative hypothyroidism  Comment: Pre-op lab update.   Plan: TSH            (I10) Hypertension goal BP (blood pressure) < 140/90  Comment: well controlled.   Plan: Creatinine, Potassium            (Z28.21) Vaccination not carried out because of patient refusal  Comment: COVID-19   Plan:     (Z23) Need for Tdap vaccination  (Z29.11) Need for vaccination against respiratory syncytial virus  Comment: pharmacy  Plan: VISs provided.       Implanted Device  None       - No identified additional risk factors other than previously addressed    Antiplatelet or Anticoagulation Medication Instructions   - Patient is on no antiplatelet or anticoagulation  medications.    Additional Medication Instructions   - Long acting insulin (e.g. Toujeo, glargine, detemir): Take 80% of the usual evening or morning dose before surgery, which would be 41 units.     - short acting insulin (e.g. Novolog, regular, lispro, aspart): DO NOT TAKE on the morning of surgery.    - metformin: DO NOT TAKE day of surgery.   - sulfonylurea (e.g. glyburide, glipizide): DO NOT TAKE day of surgery    Recommendation  Approval given to proceed with proposed procedure, without further diagnostic evaluation.      Taz Cummings is a 83 year old, presenting for the following:  Pre-Op Exam          5/22/2024    12:39 PM   Additional Questions   Roomed by Susan   Accompanied by Daughter     MEGHAN related to upcoming procedure: This 83 year old female complains of needing surgery to treat the glaucoma in her right eye. She has had glaucoma (or suspect) since at least 2009. Recent laser surgery was only effective at releasing eye pressure until about December 2023.        5/21/2024   Pre-Op Questionnaire   Have you ever had a heart attack or stroke? No   Have you ever had surgery on your heart or blood vessels, such as a stent placement, a coronary artery bypass, or surgery on an artery in your head, neck, heart, or legs? No   Do you have chest pain with activity? No   Do you have a history of heart failure? No   Do you currently have a cold, bronchitis or symptoms of other infection? No   Do you have a cough, shortness of breath, or wheezing? No   Do you or anyone in your family have previous history of blood clots? No   Do you or does anyone in your family have a serious bleeding problem such as prolonged bleeding following surgeries or cuts? No   Have you ever had problems with anemia or been told to take iron pills? No   Have you had any abnormal blood loss such as black, tarry or bloody stools, or abnormal vaginal bleeding? No   Have you ever had a blood transfusion? Associated with thyroid surgery,  young adult   Are you willing to have a blood transfusion if it is medically needed before, during, or after your surgery? Yes   Have you or any of your relatives ever had problems with anesthesia? None known   Do you have sleep apnea, excessive snoring or daytime drowsiness? (!) YES   Do you have a CPAP machine? Yes   Do you have any artifical heart valves or other implanted medical devices like a pacemaker, defibrillator, or continuous glucose monitor? No   Do you have artificial joints? No   Are you allergic to latex? No     Health Care Directive  Patient does not have a Health Care Directive or Living Will: Patient states has Advance Directive and will bring in a copy to clinic.    Preoperative Review of    reviewed - no record of controlled substances prescribed.    Status of Chronic Conditions:  See problem list for active medical problems.  Problems all longstanding and stable, except as noted/documented.  See ROS for pertinent symptoms related to these conditions.    Patient Active Problem List    Diagnosis Date Noted    Primary open angle glaucoma (POAG) of both eyes, moderate stage 05/13/2024     Priority: Medium    Hydrocephalus, unspecified type (H) 03/29/2022     Priority: Medium    Diabetes mellitus with peripheral vascular disease (H) 03/29/2022     Priority: Medium    Chronic kidney disease, stage 2 (mild) 12/13/2021     Priority: Medium    Urge incontinence 10/04/2021     Priority: Medium    Gastroesophageal reflux disease without esophagitis 12/11/2020     Priority: Medium    Gait disorder 07/01/2020     Priority: Medium    Contusion of right knee 03/09/2020     Priority: Medium    DINORA (obstructive sleep apnea)- severe (AHI 56) 12/27/2018     Priority: Medium     12/26/2018 [place] [facility] Study (189.0 lbs) - AHI 56.1, RDI 77.8, Supine AHI 56.1, REM AHI 35.3, Low O2 48.6%, Time Spent ?88% 15.4 minutes / Time Spent ?89% 20.4 minutes.      Pseudophakia of both eyes 12/05/2018      Priority: Medium    Recurrent UTI 06/29/2016     Priority: Medium    Overactive bladder 12/22/2015     Priority: Medium    Obesity 10/23/2015     Priority: Medium    Type 2 diabetes mellitus with hyperglycemia, with long-term current use of insulin (H) 10/23/2015     Priority: Medium    Hypertension goal BP (blood pressure) < 140/90 10/23/2015     Priority: Medium    Osteoarthritis 12/04/2009     Priority: Medium     Per Dr. Ya 2009      Fibromyalgia 05/22/2009     Priority: Medium    Hyperlipidemia LDL goal <100 04/29/2009     Priority: Medium    Seasonal allergies      Priority: Medium    Hypothyroidism      Priority: Medium     s/p subtotal thyroidectomy         Past Medical History:   Diagnosis Date    Actinic keratosis 3/12/2013    Degenerative joint disease     Diabetes (H)     Gastroesophageal reflux disease without esophagitis 12/11/2020    Rheumatic fever 1957    x2 between the ages of 15-18    Seasonal allergies      Past Surgical History:   Procedure Laterality Date    BLEPHAROPLASTY Bilateral 4/1/2022    Procedure: Bilateral upper eyelid blepharoplasty;  Surgeon: Fidelia Moran MD;  Location: [facility]    CATARACT IOL, RT/LT      COLONOSCOPY      COLONOSCOPY N/A 8/13/2015    Procedure: COLONOSCOPY;  Surgeon: Duane, William Charles, MD;  Location: [facility]    COLONOSCOPY WITH CO2 INSUFFLATION N/A 8/13/2015    Procedure: COLONOSCOPY WITH CO2 INSUFFLATION;  Surgeon: Duane, William Charles, MD;  Location: [facility]    PHACOEMULSIFICATION WITH STANDARD INTRAOCULAR LENS IMPLANT Left 10/25/2018    Procedure: LEFT PHACOEMULSIFICATION WITH STANDARD INTRAOCULAR LENS IMPLANT;  Surgeon: Thomas Serna MD;  Location: [facility]    PHACOEMULSIFICATION WITH STANDARD INTRAOCULAR LENS IMPLANT Right 11/8/2018    Procedure: RIGHT PHACOEMULSIFICATION WITH STANDARD INTRAOCULAR LENS IMPLANT;  Surgeon: Tohmas Serna MD;  Location: [facility]    THYROIDECTOMY       ZZC APPENDECTOMY      ZZC ARTHROPLASTY TMJ        Current Outpatient Medications   Medication Sig Dispense Refill    Blood Glucose Monitoring Suppl (ONETOUCH VERIO FLEX SYSTEM) w/Device KIT USE TO TEST BLOOD SUGAR 3 TIMES DAILY (OK TO SUBSTITUTE ALTERNATE BRAND PER INSURANCE FORMULARY. IF ONE TOUCH ONLY GIVE VERIO NOT ULTRA) 1 kit 1    ciclopirox (LOPROX) 0.77 % cream Apply topically 2 times daily To feet and toenails. 90 g 5    Continuous Blood Gluc  (DEXCOM G7 ) SANTANA Use to read blood sugars as per 's instructions. 1 each 0    Continuous Glucose Sensor (DEXCOM G7 SENSOR) MISC Change every 10 days. 3 each 5    dorzolamide-timolol PF (COSOPT PF) 2-0.5 % opthalmic solutionh Place 1 drop into both eyes 2 times daily 45 each 3    fexofenadine (ALLEGRA) 180 MG tablet Take 180 mg by mouth as needed      glipiZIDE (GLUCOTROL XL) 10 MG 24 hr tablet Take 1 tablet (10 mg) by mouth daily 180 tablet 1    ibuprofen (ADVIL/MOTRIN) 200 MG capsule Take 400 mg by mouth every 4 hours as needed       insulin aspart (NOVOLOG FLEXPEN) 100 UNIT/ML pen Inject 19 Units Subcutaneous 3 times daily (with meals) AND follow sliding scale:  150- 200 1 unit, 201-250 2 units, 251-300 3 units , 301-350 4 units 30 mL 1    insulin glargine U-300 (TOUJEO SOLOSTAR) 300 UNIT/ML (1 units dial) pen INJECT 52 UNITS DAILY + 2 UNITS FOR PRIMING OF PEN 9 mL 1    insulin pen needle (BD PEN NEEDLE JUAN C 2ND GEN) 32G X 4 MM miscellaneous Use 4 pen needles daily or as directed. 400 each 2    metFORMIN (GLUCOPHAGE) 1000 MG tablet Take 1 tablet (1,000 mg) by mouth 2 times daily (with meals) 180 tablet 1    OneTouch Delica Lancets 30G MISC 1 lancet 3 times daily (OK to substitute alternate brand per insurance formulary.  If One Touch only give Verio not Ultra) 100 each 11    ONETOUCH VERIO IQ test strip USE TO TEST BLOOD SUGARS 3 TIMES DAILY, ON INSULIN (OK TO SUBSTITUTE ALTERNATE BRAND PER INSURANCE FORMULARY. IF ONE TOUCH ONLY GIVE VERIO NOT ULTRA) 100 strip 4    rosuvastatin  (CRESTOR) 5 MG tablet TAKE 1 TABLET BY MOUTH TWICE WEEKLY 24 tablet 3    SYNTHROID 137 MCG tablet Take 1 tablet (137 mcg) by mouth daily 90 tablet 3    travoprost SIXTO FREE (TRAVATAN Z) 0.004 % ophthalmic solution Place 1 drop into both eyes at bedtime 2.5 mL 11    triamcinolone (KENALOG) 0.1 % external cream APPLY TO AFFECTED AREA TWICE A DAY FOR 2 WEEKS.  Please keep 9-28-22 clinic appt for refills 45 g 0       Allergies   Allergen Reactions    New Medication Allergy Rash     Soliqua     Ace Inhibitors Cough    Estradiol Itching    Lipitor [Atorvastatin Calcium]      Weak and shaky    Sulfa Antibiotics      Rash      Zocor [Simvastatin]      Weak and shakey    Triamterene Dermatitis     Possible photosensitivity dermatitis, mild.  (changed to hctz alone 6/4/07, will see if this helps)        Social History     Tobacco Use    Smoking status: Never     Passive exposure: Never    Smokeless tobacco: Never    Tobacco comments:     has had exposure to second hand smoke in the past from [facility], no current exposure   Substance Use Topics    Alcohol use: No     Family History   Problem Relation Age of Onset    Eye Disorder Mother         cataracts    Cancer Father         skin and leukemia    Cancer Sister         Breast Cancer    Eye Disorder Brother         cataract    Cerebrovascular Disease Maternal Grandfather     Cerebrovascular Disease Paternal Grandmother     Eye Disorder Paternal Grandmother         cataracts    Cerebrovascular Disease Paternal Grandfather     Heart Disease Paternal Grandfather     Breast Cancer Daughter     Endometrial Cancer Daughter     Glaucoma No family hx of     Macular Degeneration No family hx of      History   Drug Use No             Review of Systems  Constitutional, neuro, ENT, endocrine, pulmonary, cardiac, gastrointestinal, genitourinary, musculoskeletal, integument and psychiatric systems are negative, except as otherwise noted.    Objective    /82 (BP Location: Left arm,  "Patient Position: Sitting, Cuff Size: Adult Regular)   Pulse 81   Resp 19   Ht 5' 4\" (1.626 m)   Wt 183 lb 9.6 oz (83.3 kg)   BMI 31.51 kg/m     Estimated body mass index is 31.51 kg/m  as calculated from the following:    Height as of this encounter: 5' 4\" (1.626 m).    Weight as of this encounter: 183 lb 9.6 oz (83.3 kg).  Physical Exam  GENERAL: healthy, alert and no distress  EYES: Eyes grossly normal to inspection, PERRL, EOMI, sclerae white and conjunctivae normal  HENT: ear canals and TM's normal, mouth without ulcers or lesions  RESP: lungs clear to auscultation - no crackles or wheezes, no areas of dullness, no tachypnea  CV: Heart regular rate and rhythm, 2/6 systolic murmur (aortic stenosis). No click or rub. No peripheral edema and peripheral pulses strong  MS: no gross musculoskeletal defects noted, no edema  SKIN: no suspicious lesions or rashes to visible skin  NEURO: Normal strength and tone, sensory exam grossly normal, mentation intact, oriented times 3 and cranial nerves 2-12 intact  PSYCH: mentation appears normal, affect normal/bright       Recent Labs   Lab Test 05/17/24  1200 01/25/24  1441   NA  --  137   POTASSIUM  --  4.4   CR  --  0.78   A1C 7.7* 9.4*        Diagnostics  Labs pending at this time.  Results will be reviewed when available.   No EKG required for low risk surgery (cataract, skin procedure, breast biopsy, etc).    Revised Cardiac Risk Index (RCRI)  The patient has the following serious cardiovascular risks for perioperative complications:   - Diabetes Mellitus (on Insulin) = 1 point     RCRI Interpretation: 1 point: Class II (low risk - 0.9% complication rate)         Signed Electronically by: King Arora MD  Copy of this evaluation report is provided to requesting physician.        The information in this document, created by the medical scribe for me, accurately reflects the services I personally performed and the decisions made by me. I have reviewed and " approved this document for accuracy prior to signature.  Jeremy Garcia

## 2024-05-23 ENCOUNTER — TELEPHONE (OUTPATIENT)
Dept: FAMILY MEDICINE | Facility: CLINIC | Age: 84
End: 2024-05-23
Payer: COMMERCIAL

## 2024-05-23 ENCOUNTER — MEDICAL CORRESPONDENCE (OUTPATIENT)
Dept: HEALTH INFORMATION MANAGEMENT | Facility: CLINIC | Age: 84
End: 2024-05-23
Payer: COMMERCIAL

## 2024-05-23 LAB
CREAT SERPL-MCNC: 0.77 MG/DL (ref 0.51–0.95)
EGFRCR SERPLBLD CKD-EPI 2021: 77 ML/MIN/1.73M2
FASTING STATUS PATIENT QL REPORTED: NO
GLUCOSE SERPL-MCNC: 73 MG/DL (ref 70–99)
POTASSIUM SERPL-SCNC: 4.7 MMOL/L (ref 3.4–5.3)
TSH SERPL DL<=0.005 MIU/L-ACNC: 2.09 UIU/ML (ref 0.3–4.2)

## 2024-05-23 NOTE — TELEPHONE ENCOUNTER
Forms/Letter Request    Type of form/letter: [facility] order # [id number]    Do we have the form/letter: Yes: Placed in providers in box for review    Where did/will the form come from? form was faxed in    When is form/letter needed by: ASAP    How would you like the form/letter returned: Fax : [phone]

## 2024-06-03 ENCOUNTER — VIRTUAL VISIT (OUTPATIENT)
Dept: PHARMACY | Facility: CLINIC | Age: 84
End: 2024-06-03
Payer: COMMERCIAL

## 2024-06-03 DIAGNOSIS — Z79.4 TYPE 2 DIABETES MELLITUS WITH HYPERGLYCEMIA, WITH LONG-TERM CURRENT USE OF INSULIN (H): Primary | Chronic | ICD-10-CM

## 2024-06-03 DIAGNOSIS — I10 HYPERTENSION GOAL BP (BLOOD PRESSURE) < 140/90: Chronic | ICD-10-CM

## 2024-06-03 DIAGNOSIS — E78.5 HYPERLIPIDEMIA LDL GOAL <100: ICD-10-CM

## 2024-06-03 DIAGNOSIS — E11.65 TYPE 2 DIABETES MELLITUS WITH HYPERGLYCEMIA, WITH LONG-TERM CURRENT USE OF INSULIN (H): Primary | Chronic | ICD-10-CM

## 2024-06-03 PROCEDURE — 99606 MTMS BY PHARM EST 15 MIN: CPT | Mod: 95 | Performed by: PHARMACIST

## 2024-06-03 NOTE — PROGRESS NOTES
Medication Therapy Management (MTM) Encounter    ASSESSMENT:                            Medication Adherence/Access: Advised to leave medications at bedside as a reminder to take medication.     Diabetes  Patient is at goal with A1c < 8, much improved. Discussed with patient on the importance of medication adherence and that elevated glucose levels could be due to missing medication doses. Discussed with patient that urinary incontinence could be due to hyperglycemia or UTI. Patient would benefit from meeting with provider to assess urinary incontinence and if an infection is present.    Hypertension  Stable. Discuss with patient to monitor symptoms of low blood pressure such as lightheadedness and dizziness and to seek emergency help if symptoms progress.     Hyperlipidemia  Stable.     PLAN:                            No medication changes at this time.     Schedule appointment with provider to assess urinary Incontinence.     Follow-up: 1 Month with endocrinology, 2 months with MTM     SUBJECTIVE/OBJECTIVE:                          Zoila Leon is a 83 year old female contacted via secure video for a follow-up visit, along wit her daughter, Juliet.     Reason for visit: Diabetes Follow-up.    Allergies/ADRs: Reviewed in chart  Past Medical History: Reviewed in chart  Tobacco: She reports that she has never smoked. She has never been exposed to tobacco smoke. She has never used smokeless tobacco.  Alcohol: none    Medication Adherence/Access: Patient reports missing a few of her medication doses. She reported leaving her Toujeo under her pillow at bedtime and missing her metformin dose the last 3 - 4 evenings.     Diabetes:  Novolog Flexpen: 19 units at breakfast, 20 units at lunch, 20 units at dinner.   Toujeo Solostar 52 units daily + 2 units for priming.   Metformin 1000 mg twice daily  Glipizide 10 mg daily   Patient reports no adverse effects from medications. She mentioned placing her Toujeo beside her pillow  as a reminder, but often forgets to take her dose and sometimes discovers it underneath her pillow.  Patient has difficulty remembering to take her medications. Patient reported having urinary incontinence everyday, at bedtime, for the last couple of weeks, daughter wondering if her mom has an UTI.    Patient does occasional glucose testing  and CGM. She reports the following glucose levels.   June 03:  - 12:00 pm was 260 mmHg (1 hr post prandial)  May 13:  - 8:00 pm was 167 mmHg.   - 12:00 pm was 240 mmHg.  - 4:07 pm was 89 mmHg.   - 5:05 pm was 133 mmHg  May 03:  - 6:41 am was 134 mmHg.   - 2:00 pm was 139 mmHg.   - 6:34 pm was 321 mmHg.     Continuous Glucose Monitoring: was not able to view data.     Diet/Exercise:   Breakfast- egg sadwich, wheat bread, banana, coffee with whole milk  Lunch - tuna fish sandwich, snack plate with fruit, cheese sticks, popcorn with salt (iodize).  Dinner - arbys, roast beef, cheese potatoes. Salads.     Lab Results   Component Value Date    A1C 7.7 05/17/2024    A1C 9.4 01/25/2024    A1C 10.6 12/13/2022    A1C 11.1 07/06/2022    A1C 9.6 03/28/2022    A1C 10.3 05/17/2021    A1C 9.2 02/16/2021    A1C 8.7 11/12/2020    A1C 10.0 08/20/2020    A1C 10.5 01/10/2020       Hypertension:  Patient is currently not on any medications for BP.   At home blood pressure for today at 1:27 pm - 91/56 mmHg.   Patient checks BP occasionally. Patient reports no dizziness or lightheadedness.       Hyperlipidemia:  Rosuvastatin Twice weekly (M and TH)  Patient reports no side effects.     Recent Labs   Lab Test 01/25/24  1441 10/24/22  1417   CHOL 172 242*   HDL 51 56   LDL 92 116*   TRIG 144 348*       Today's Vitals: There were no vitals taken for this visit.  ----------------    I spent 33 minutes with this patient today. All changes were made via collaborative practice agreement with Fernanda Miller A copy of the visit note was provided to the patient's provider(s).    A summary of these  recommendations was sent via [employer].    Te Ibrahim  4th year Student Pharmacist    Mirna Perez, Pharm.D, BCACP  Medication Therapy Management Pharmacist      Telemedicine Visit Details  Type of service:  Video Conference via [employer].  Start Time:  1:00 pm  End Time:  1:33 pm     Medication Therapy Recommendations  No medication therapy recommendations to display

## 2024-06-03 NOTE — PATIENT INSTRUCTIONS
"Recommendations from today's MTM visit:                                                         No medication changes at this time.      Schedule appointment with provider to assess urinary Incontinence.    Follow-up: 08/26/2024    It was great speaking with you today.  I value your experience and would be very thankful for your time in providing feedback in our clinic survey. In the next few days, you may receive an email or text message from [employer] with a link to a survey related to your  clinical pharmacist.\"     To schedule another MTM appointment, please call the clinic directly or you may call the MTM scheduling line at [phone] or toll-free at [phone].     My Clinical Pharmacist's contact information:                                                      Please feel free to contact me with any questions or concerns you have.      Mirna Perez, Pharm.D, [facility]  Medication Therapy Management Pharmacist      "

## 2024-06-04 ENCOUNTER — MEDICAL CORRESPONDENCE (OUTPATIENT)
Dept: HEALTH INFORMATION MANAGEMENT | Facility: CLINIC | Age: 84
End: 2024-06-04

## 2024-06-04 ENCOUNTER — ANESTHESIA EVENT (OUTPATIENT)
Dept: SURGERY | Facility: AMBULATORY SURGERY CENTER | Age: 84
End: 2024-06-04
Payer: COMMERCIAL

## 2024-06-04 ASSESSMENT — ENCOUNTER SYMPTOMS
DYSRHYTHMIAS: 0
SEIZURES: 0

## 2024-06-04 ASSESSMENT — LIFESTYLE VARIABLES: TOBACCO_USE: 0

## 2024-06-04 NOTE — ANESTHESIA PREPROCEDURE EVALUATION
Anesthesia Pre-Procedure Evaluation    Patient: Brandy Leon   MRN: [id number] : 1940        Procedure : Procedure(s):  RIGHT EYE STENT INSERTION, TRABECULAR MICRO-BYPASS, GLAUCOMA          Past Medical History:   Diagnosis Date    Actinic keratosis 3/12/2013    Degenerative joint disease     Diabetes (H)     Gastroesophageal reflux disease without esophagitis 2020    Rheumatic fever 1957    x2 between the ages of 16-18    Seasonal allergies       Past Surgical History:   Procedure Laterality Date    BLEPHAROPLASTY Bilateral 2022    Procedure: Bilateral upper eyelid blepharoplasty;  Surgeon: Fidelia Moran MD;  Location: [facility] OR    COLONOSCOPY N/A 2015    Procedure: COLONOSCOPY;  Surgeon: Duane, William Charles, MD;  Location: [facility] OR    COLONOSCOPY WITH CO2 INSUFFLATION N/A 2015    Procedure: COLONOSCOPY WITH CO2 INSUFFLATION;  Surgeon: Duane, William Charles, MD;  Location: [facility] OR    GLAUCOMA SURGERY Right     Laser    PHACOEMULSIFICATION WITH STANDARD INTRAOCULAR LENS IMPLANT Left 10/25/2018    Procedure: LEFT PHACOEMULSIFICATION WITH STANDARD INTRAOCULAR LENS IMPLANT;  Surgeon: Thomas Serna MD;  Location: [facility] OR    PHACOEMULSIFICATION WITH STANDARD INTRAOCULAR LENS IMPLANT Right 2018    Procedure: RIGHT PHACOEMULSIFICATION WITH STANDARD INTRAOCULAR LENS IMPLANT;  Surgeon: Thomas Serna MD;  Location: [facility] OR    THYROIDECTOMY       around age 21    ZZC APPENDECTOMY      ZZC ARTHROPLASTY TMJ        Allergies   Allergen Reactions    New Medication Allergy Rash     Soliqua     Ace Inhibitors Cough    Estradiol Itching    Lipitor [Atorvastatin Calcium]      Weak and shaky    Sulfa Antibiotics      Rash      Zocor [Simvastatin]      Weak and shakey    Triamterene Dermatitis     Possible photosensitivity dermatitis, mild.  (changed to hctz alone 07, will see if this helps)      Social History     Tobacco Use    Smoking status: Never     Passive  exposure: Never    Smokeless tobacco: Never    Tobacco comments:     has had exposure to second hand smoke in the past from [facility], no current exposure   Substance Use Topics    Alcohol use: No      Wt Readings from Last 1 Encounters:   05/22/24 83.3 kg (183 lb 9.6 oz)        Anesthesia Evaluation   Pt has had prior anesthetic. Type: General.    No history of anesthetic complications       ROS/MED HX  ENT/Pulmonary:     (+) sleep apnea, uses CPAP,                                   (-) tobacco use and asthma   Neurologic: Comment: Balance problems, LE weakness for past couple years    (+)    no peripheral neuropathy - hands - CTS.                        (-) no seizures and no CVA   Cardiovascular:     (+) Dyslipidemia hypertension- -   -  - -                                   (-) CAD and arrhythmias   METS/Exercise Tolerance:     Hematologic:       Musculoskeletal:       GI/Hepatic:     (+) GERD,                   Renal/Genitourinary:    (-) renal disease   Endo:     (+)  type II DM,   Using insulin,     thyroid problem, hypothyroidism,    Obesity,       Psychiatric/Substance Use:       Infectious Disease:    (-) Recent Fever   Malignancy:       Other:            Physical Exam    Airway  airway exam normal      Mallampati: II   TM distance: > 3 FB   Neck ROM: full   Mouth opening: > 3 cm    Respiratory Devices and Support         Dental       (+) Modest Abnormalities - crowns, retainers, 1 or 2 missing teeth      Cardiovascular   cardiovascular exam normal          Pulmonary   pulmonary exam normal                OUTSIDE LABS:  CBC:   Lab Results   Component Value Date    WBC 12.9 (H) 12/01/2022    WBC 11.4 (H) 03/28/2022    HGB 13.8 12/01/2022    HGB 14.6 03/28/2022    HCT 43.1 12/01/2022    HCT 45.4 03/28/2022     12/01/2022     03/28/2022     BMP:   Lab Results   Component Value Date     01/25/2024     12/01/2022    POTASSIUM 4.7 05/22/2024    POTASSIUM 4.4 01/25/2024    CHLORIDE 98  01/25/2024    CHLORIDE 99 12/01/2022    CO2 24 01/25/2024    CO2 28 12/01/2022    BUN 12.8 01/25/2024    BUN 24 12/01/2022    CR 0.77 05/22/2024    CR 0.78 01/25/2024    GLC 73 05/22/2024     (H) 01/25/2024     COAGS:   Lab Results   Component Value Date    INR 1.0 (L) 09/07/2021     POC:   Lab Results   Component Value Date     (H) 11/08/2018     HEPATIC:   Lab Results   Component Value Date    ALBUMIN 4.2 01/25/2024    PROTTOTAL 8.1 01/25/2024    ALT 14 01/25/2024    AST 20 01/25/2024    ALKPHOS 99 01/25/2024    BILITOTAL 0.4 01/25/2024     OTHER:   Lab Results   Component Value Date    A1C 7.7 (H) 05/17/2024    FREDY 9.6 01/25/2024    PHOS 3.6 10/25/2019    TSH 2.09 05/22/2024    T4 0.91 02/10/2023    CRP 5.5 07/18/2022    SED 17 07/18/2022       Anesthesia Plan    ASA Status:  3    NPO Status:  NPO Appropriate    Anesthesia Type: MAC.     - Reason for MAC: straight local not clinically adequate   Induction: Intravenous.           Consents    Anesthesia Plan(s) and associated risks, benefits, and realistic alternatives discussed. Questions answered and patient/representative(s) expressed understanding.     - Discussed: Risks, Benefits and Alternatives for BOTH SEDATION and the PROCEDURE were discussed     - Discussed with:  Patient      - Extended Intubation/Ventilatory Support Discussed: No.      - Patient is DNR/DNI Status: No     Use of blood products discussed: No .     Postoperative Care    Pain management: Multi-modal analgesia.   PONV prophylaxis: Ondansetron (or other 5HT-3)     Comments:               Rodney Mcwilliams MD    I have reviewed the pertinent notes and labs in the chart from the past 30 days and (re)examined the patient.  Any updates or changes from those notes are reflected in this note.              # DMII: A1C = 7.7 % (Ref range: 0.0 - 5.6 %) within past 6 months  # Obesity: Estimated body mass index is 31.51 kg/m  as calculated from the following:    Height as of 5/22/24: 1.626 m  "(5' 4\").    Weight as of 5/22/24: 83.3 kg (183 lb 9.6 oz).      "

## 2024-06-05 ENCOUNTER — HOSPITAL ENCOUNTER (OUTPATIENT)
Facility: AMBULATORY SURGERY CENTER | Age: 84
Discharge: HOME | End: 2024-06-05
Attending: OPHTHALMOLOGY
Payer: COMMERCIAL

## 2024-06-05 ENCOUNTER — ANESTHESIA (OUTPATIENT)
Dept: SURGERY | Facility: AMBULATORY SURGERY CENTER | Age: 84
End: 2024-06-05
Payer: COMMERCIAL

## 2024-06-05 VITALS
HEART RATE: 77 BPM | RESPIRATION RATE: 12 BRPM | OXYGEN SATURATION: 96 % | SYSTOLIC BLOOD PRESSURE: 127 MMHG | HEIGHT: 64 IN | WEIGHT: 183 LBS | TEMPERATURE: 97 F | BODY MASS INDEX: 31.24 KG/M2 | DIASTOLIC BLOOD PRESSURE: 65 MMHG

## 2024-06-05 DIAGNOSIS — H40.1132 PRIMARY OPEN ANGLE GLAUCOMA (POAG) OF BOTH EYES, MODERATE STAGE: ICD-10-CM

## 2024-06-05 LAB — GLUCOSE BLDC GLUCOMTR-MCNC: 151 MG/DL (ref 70–99)

## 2024-06-05 PROCEDURE — 0671T INSJ ANT SGM AQ DRG DEV 1+: CPT | Mod: RT

## 2024-06-05 PROCEDURE — 0671T INSJ ANT SGM AQ DRG DEV 1+: CPT | Performed by: STUDENT IN AN ORGANIZED HEALTH CARE EDUCATION/TRAINING PROGRAM

## 2024-06-05 PROCEDURE — 82962 GLUCOSE BLOOD TEST: CPT | Performed by: PATHOLOGY

## 2024-06-05 PROCEDURE — 99100 ANES PT EXTEME AGE<1 YR&>70: CPT | Performed by: STUDENT IN AN ORGANIZED HEALTH CARE EDUCATION/TRAINING PROGRAM

## 2024-06-05 PROCEDURE — 99100 ANES PT EXTEME AGE<1 YR&>70: CPT | Performed by: NURSE ANESTHETIST, CERTIFIED REGISTERED

## 2024-06-05 PROCEDURE — C1783 OCULAR IMP, AQUEOUS DRAIN DE: HCPCS | Mod: RT

## 2024-06-05 PROCEDURE — 0671T INSJ ANT SGM AQ DRG DEV 1+: CPT | Performed by: NURSE ANESTHETIST, CERTIFIED REGISTERED

## 2024-06-05 DEVICE — IMPLANTABLE DEVICE: Type: IMPLANTABLE DEVICE | Site: EYE | Status: FUNCTIONAL

## 2024-06-05 RX ORDER — ONDANSETRON 4 MG/1
4 TABLET, ORALLY DISINTEGRATING ORAL EVERY 30 MIN PRN
Status: DISCONTINUED | OUTPATIENT
Start: 2024-06-05 | End: 2024-06-06 | Stop reason: HOSPADM

## 2024-06-05 RX ORDER — PROPARACAINE HYDROCHLORIDE 5 MG/ML
1 SOLUTION/ DROPS OPHTHALMIC ONCE
Status: COMPLETED | OUTPATIENT
Start: 2024-06-05 | End: 2024-06-05

## 2024-06-05 RX ORDER — ONDANSETRON 2 MG/ML
4 INJECTION INTRAMUSCULAR; INTRAVENOUS EVERY 30 MIN PRN
Status: DISCONTINUED | OUTPATIENT
Start: 2024-06-05 | End: 2024-06-05 | Stop reason: HOSPADM

## 2024-06-05 RX ORDER — DEXAMETHASONE SODIUM PHOSPHATE 10 MG/ML
4 INJECTION, SOLUTION INTRAMUSCULAR; INTRAVENOUS
Status: DISCONTINUED | OUTPATIENT
Start: 2024-06-05 | End: 2024-06-06 | Stop reason: HOSPADM

## 2024-06-05 RX ORDER — TETRACAINE HYDROCHLORIDE 5 MG/ML
SOLUTION OPHTHALMIC PRN
Status: DISCONTINUED | OUTPATIENT
Start: 2024-06-05 | End: 2024-06-05 | Stop reason: HOSPADM

## 2024-06-05 RX ORDER — ONDANSETRON 4 MG/1
4 TABLET, ORALLY DISINTEGRATING ORAL EVERY 30 MIN PRN
Status: DISCONTINUED | OUTPATIENT
Start: 2024-06-05 | End: 2024-06-05 | Stop reason: HOSPADM

## 2024-06-05 RX ORDER — PREDNISOLONE ACETATE 10 MG/ML
1-2 SUSPENSION/ DROPS OPHTHALMIC 4 TIMES DAILY
Qty: 15 ML | Refills: 1 | Status: SHIPPED | OUTPATIENT
Start: 2024-06-05

## 2024-06-05 RX ORDER — LIDOCAINE 40 MG/G
CREAM TOPICAL
Status: DISCONTINUED | OUTPATIENT
Start: 2024-06-05 | End: 2024-06-05 | Stop reason: HOSPADM

## 2024-06-05 RX ORDER — MOXIFLOXACIN IN NACL,ISO-OS/PF 0.3MG/0.3
SYRINGE (ML) INTRAOCULAR PRN
Status: DISCONTINUED | OUTPATIENT
Start: 2024-06-05 | End: 2024-06-05 | Stop reason: HOSPADM

## 2024-06-05 RX ORDER — SODIUM CHLORIDE, SODIUM LACTATE, POTASSIUM CHLORIDE, CALCIUM CHLORIDE 600; 310; 30; 20 MG/100ML; MG/100ML; MG/100ML; MG/100ML
INJECTION, SOLUTION INTRAVENOUS CONTINUOUS
Status: DISCONTINUED | OUTPATIENT
Start: 2024-06-05 | End: 2024-06-06 | Stop reason: HOSPADM

## 2024-06-05 RX ORDER — BALANCED SALT SOLUTION 6.4; .75; .48; .3; 3.9; 1.7 MG/ML; MG/ML; MG/ML; MG/ML; MG/ML; MG/ML
SOLUTION OPHTHALMIC PRN
Status: DISCONTINUED | OUTPATIENT
Start: 2024-06-05 | End: 2024-06-05 | Stop reason: HOSPADM

## 2024-06-05 RX ORDER — SODIUM CHLORIDE, SODIUM LACTATE, POTASSIUM CHLORIDE, CALCIUM CHLORIDE 600; 310; 30; 20 MG/100ML; MG/100ML; MG/100ML; MG/100ML
INJECTION, SOLUTION INTRAVENOUS CONTINUOUS
Status: DISCONTINUED | OUTPATIENT
Start: 2024-06-05 | End: 2024-06-05 | Stop reason: HOSPADM

## 2024-06-05 RX ORDER — DEXAMETHASONE SODIUM PHOSPHATE 10 MG/ML
4 INJECTION, SOLUTION INTRAMUSCULAR; INTRAVENOUS
Status: DISCONTINUED | OUTPATIENT
Start: 2024-06-05 | End: 2024-06-05 | Stop reason: HOSPADM

## 2024-06-05 RX ORDER — MOXIFLOXACIN 5 MG/ML
1 SOLUTION/ DROPS OPHTHALMIC 4 TIMES DAILY
Qty: 3 ML | Refills: 1 | Status: SHIPPED | OUTPATIENT
Start: 2024-06-05

## 2024-06-05 RX ORDER — DEXAMETHASONE SODIUM PHOSPHATE 4 MG/ML
INJECTION, SOLUTION INTRA-ARTICULAR; INTRALESIONAL; INTRAMUSCULAR; INTRAVENOUS; SOFT TISSUE PRN
Status: DISCONTINUED | OUTPATIENT
Start: 2024-06-05 | End: 2024-06-05 | Stop reason: HOSPADM

## 2024-06-05 RX ORDER — NALOXONE HYDROCHLORIDE 0.4 MG/ML
0.1 INJECTION, SOLUTION INTRAMUSCULAR; INTRAVENOUS; SUBCUTANEOUS
Status: DISCONTINUED | OUTPATIENT
Start: 2024-06-05 | End: 2024-06-06 | Stop reason: HOSPADM

## 2024-06-05 RX ORDER — ACETAMINOPHEN 325 MG/1
975 TABLET ORAL ONCE
Status: COMPLETED | OUTPATIENT
Start: 2024-06-05 | End: 2024-06-05

## 2024-06-05 RX ORDER — FENTANYL CITRATE 50 UG/ML
12.5 INJECTION, SOLUTION INTRAMUSCULAR; INTRAVENOUS EVERY 5 MIN PRN
Status: DISCONTINUED | OUTPATIENT
Start: 2024-06-05 | End: 2024-06-05 | Stop reason: HOSPADM

## 2024-06-05 RX ORDER — ONDANSETRON 2 MG/ML
4 INJECTION INTRAMUSCULAR; INTRAVENOUS EVERY 30 MIN PRN
Status: DISCONTINUED | OUTPATIENT
Start: 2024-06-05 | End: 2024-06-06 | Stop reason: HOSPADM

## 2024-06-05 RX ORDER — LIDOCAINE HYDROCHLORIDE 10 MG/ML
INJECTION, SOLUTION EPIDURAL; INFILTRATION; INTRACAUDAL; PERINEURAL PRN
Status: DISCONTINUED | OUTPATIENT
Start: 2024-06-05 | End: 2024-06-05 | Stop reason: HOSPADM

## 2024-06-05 RX ORDER — NALOXONE HYDROCHLORIDE 0.4 MG/ML
0.1 INJECTION, SOLUTION INTRAMUSCULAR; INTRAVENOUS; SUBCUTANEOUS
Status: DISCONTINUED | OUTPATIENT
Start: 2024-06-05 | End: 2024-06-05 | Stop reason: HOSPADM

## 2024-06-05 RX ADMIN — SODIUM CHLORIDE, SODIUM LACTATE, POTASSIUM CHLORIDE, CALCIUM CHLORIDE: 600; 310; 30; 20 INJECTION, SOLUTION INTRAVENOUS at 07:06

## 2024-06-05 RX ADMIN — ACETAMINOPHEN 975 MG: 325 TABLET ORAL at 07:06

## 2024-06-05 RX ADMIN — PROPARACAINE HYDROCHLORIDE 1 DROP: 5 SOLUTION/ DROPS OPHTHALMIC at 06:51

## 2024-06-05 NOTE — BRIEF OP NOTE
[facility]    Brief Operative Note    Pre-operative diagnosis: Primary open angle glaucoma (POAG) of both eyes, moderate stage [H40.1132]  Post-operative diagnosis Same as pre-operative diagnosis    Procedure: RIGHT EYE STENT INSERTION, TRABECULAR MICRO-BYPASS, GLAUCOMA, Right - Eye    Surgeon: Surgeons and Role:     * Radha Ceja MD - Primary     * Blake Albarran MD - Fellow - Assisting  Anesthesia: MAC with Topical   Estimated Blood Loss: None    Drains: None  Specimens: * No specimens in log *  Findings:   As expected .  Complications: None.  Implants:   Implant Name Type Inv. Item Serial No.  Lot No. LRB No. Used Action   iStent infinite Trabecular Micro-Bypass System Lens/Eye Implant  [id number] [facility] [id number] Right 1 Implanted           Blake Albarran MD

## 2024-06-05 NOTE — ANESTHESIA POSTPROCEDURE EVALUATION
Patient: Brandy Leon    Procedure: Procedure(s):  RIGHT EYE STENT INSERTION, TRABECULAR MICRO-BYPASS, GLAUCOMA       Anesthesia Type:  MAC    Note:  Disposition: Outpatient   Postop Pain Control: Uneventful            Sign Out: Well controlled pain   PONV: No   Neuro/Psych: Uneventful            Sign Out: Acceptable/Baseline neuro status   Airway/Respiratory: Uneventful            Sign Out: Acceptable/Baseline resp. status   CV/Hemodynamics: Uneventful            Sign Out: Acceptable CV status; No obvious hypovolemia; No obvious fluid overload   Other NRE:    DID A NON-ROUTINE EVENT OCCUR?            Last vitals:  Vitals Value Taken Time   /65 06/05/24 0827   Temp 36.1  C (97  F) 06/05/24 0827   Pulse     Resp 12 06/05/24 0827   SpO2 96 % 06/05/24 0827       Electronically Signed By: Rodney Mcwilliams MD  June 5, 2024  9:51 AM

## 2024-06-05 NOTE — DISCHARGE INSTRUCTIONS
Discharge Instructions Following Glaucoma Surgery    Date: 6/5/2024    EYE MEDICATIONS:  You should start drops TOMORROW after patch removal in clinic.      Vigamox - 1 drop 4 times a day to operative eye for 1 week   Prednisolone - 1 drop 4 times a day to operative eye  We will discuss which glaucoma drops to continue after we check your eye pressure and examine your eye in clinic.     For 5 days: Wear your glasses or leave the eye uncovered while awake.    Wear the eye shield every night and during daytime naps.  DO NOT use padding under eye shield.    You may notice blurred or double vision initially, this will gradually clear.     If you experience any severe pain, sudden decrease in vision, or discharge for the eye, contact your doctor immediately.     Minor itching, scratching, burning etc. is normal. Use regular or extra-strength Tylenol for discomfort.    Refrain from heavy lifting, excessive bending over, and heavy exertion for 1 week.    You may bathe or shower but do not get the eye wet.    Do not rub or push on the operative eye for 1 week.  You may wipe the lids gently with a warm wet cloth to remove matter from eyelashes.    Continue with your usual diet and medications prescribed by your doctor.    Sunglasses will increase your comfort post-operatively.    Post Operative Visit on 6/6/24  Bring all material and medications to the office on the first post-op day.  Call your surgeon at [phone] or the answering service for any problems, especially pain.        [facility]  Home Care Following Anesthesia  For 24 hours after surgery:  Get plenty of rest.  A responsible adult must stay with you for at least 24 hours after you leave the surgery center.  Do not drive or use heavy equipment.  If you have weakness or tingling, don't drive or use heavy equipment until this feeling goes away.   Do not drink alcohol.   Avoid strenuous or risky activities.  Ask  for help when climbing stairs.  You may feel lightheaded.  IF so, sit for a few minutes before standing.  Have someone help you get up.   If you have nausea (feel sick to your stomach): Drink only clear liquids such as apple juice, ginger ale, broth or 7-Up.  Rest may also help.  Be sure to drink enough fluids.  Move to a regular diet as you feel able.   You may have a slight fever.  Call the doctor if your fever is over 100 F (37.7 C) (taken under the tongue) or lasts longer than 24 hours.  You may have a dry mouth, a sore throat, muscle aches or trouble sleeping. These should go away after 24 hours.  Do not make important or legal decisions.   It is recommended to avoid smoking.               Tips for taking pain medications  To get the best pain relief possible, remember these points:  Take pain medications as directed, before pain becomes severe.  Pain medication can upset your stomach: taking it with food may help.  Constipation is a common side effect of pain medication. Drink plenty of  fluids.  Eat foods high in fiber. Take a stool softener if recommended by your doctor or pharmacist.  Do not drink alcohol, drive or operate machinery while taking pain medications.  Ask about other ways to control pain, such as with heat, ice or relaxation.    Tylenol/Acetaminophen Consumption    If you feel your pain relief is insufficient, you may take Tylenol/Acetaminophen in addition to your narcotic pain medication.   Be careful not to exceed 4,000 mg of Tylenol/Acetaminophen in a 24 hour period from all sources.  If you are taking extra strength Tylenol/acetaminophen (500 mg), the maximum dose is 8 tablets in 24 hours.  If you are taking regular strength acetaminophen (325 mg), the maximum dose is 12 tablets in 24 hours.  Tylenol 975 mg given at 7:00 am.   Ok to take more after 1:00 pm.       Call a doctor for any of the following:  Signs of infection (fever, growing tenderness at the surgery site, a large amount of  drainage or bleeding, severe pain, foul-smelling drainage, redness, swelling).  It has been over 8 to 10 hours since surgery and you are still not able to urinate (pass water).  Headache for over 24 hours.  Signs of Covid-19 infection (temperature over 100 degrees, shortness of breath, cough, loss of taste/smell, generalized body aches, persistent headache, chills, sore throat, nausea/vomiting/diarrhea)  Your doctor is:       Dr. Radha Ceja, Ophthalmology: [phone]               Or dial [phone] and ask for the resident on call for:  Ophthalmology  For emergency care, call the:  [facility]:  [phone] (TTY for hearing impaired: [phone])

## 2024-06-05 NOTE — ANESTHESIA CARE TRANSFER NOTE
Patient: Brandy Leon    Procedure: Procedure(s):  RIGHT EYE STENT INSERTION, TRABECULAR MICRO-BYPASS, GLAUCOMA       Diagnosis: Primary open angle glaucoma (POAG) of both eyes, moderate stage [H40.1132]  Diagnosis Additional Information: No value filed.    Anesthesia Type:   MAC     Note:    Oropharynx: spontaneously breathing  Level of Consciousness: awake  Oxygen Supplementation: room air    Independent Airway: airway patency satisfactory and stable  Dentition: dentition unchanged  Vital Signs Stable: post-procedure vital signs reviewed and stable  Report to RN Given: handoff report given  Patient transferred to: Phase II    Handoff Report: Identifed the Patient, Identified the Reponsible Provider, Reviewed the pertinent medical history, Discussed the surgical course, Reviewed Intra-OP anesthesia mangement and issues during anesthesia, Set expectations for post-procedure period and Allowed opportunity for questions and acknowledgement of understanding  Vitals:  Vitals Value Taken Time   BP     Temp     Pulse     Resp     SpO2         Electronically Signed By: MAGO Lazaro CRNA  June 5, 2024  7:54 AM

## 2024-06-06 ENCOUNTER — OFFICE VISIT (OUTPATIENT)
Dept: OPHTHALMOLOGY | Facility: CLINIC | Age: 84
End: 2024-06-06
Attending: OPHTHALMOLOGY
Payer: COMMERCIAL

## 2024-06-06 DIAGNOSIS — Z98.890 POSTOPERATIVE EYE STATE: ICD-10-CM

## 2024-06-06 DIAGNOSIS — H40.1132 PRIMARY OPEN ANGLE GLAUCOMA (POAG) OF BOTH EYES, MODERATE STAGE: Primary | ICD-10-CM

## 2024-06-06 DIAGNOSIS — E11.3591 PROLIFERATIVE DIABETIC RETINOPATHY OF RIGHT EYE WITHOUT MACULAR EDEMA ASSOCIATED WITH TYPE 2 DIABETES MELLITUS (H): ICD-10-CM

## 2024-06-06 PROCEDURE — 99207 FUNDUS PHOTOS OU (BOTH EYES): CPT | Performed by: OPHTHALMOLOGY

## 2024-06-06 PROCEDURE — G0463 HOSPITAL OUTPT CLINIC VISIT: HCPCS | Performed by: OPHTHALMOLOGY

## 2024-06-06 PROCEDURE — 92250 FUNDUS PHOTOGRAPHY W/I&R: CPT | Mod: 59 | Performed by: OPHTHALMOLOGY

## 2024-06-06 PROCEDURE — 92134 CPTRZ OPH DX IMG PST SGM RTA: CPT | Performed by: OPHTHALMOLOGY

## 2024-06-06 PROCEDURE — 99024 POSTOP FOLLOW-UP VISIT: CPT | Performed by: OPHTHALMOLOGY

## 2024-06-06 ASSESSMENT — CUP TO DISC RATIO
OS_RATIO: 0.5
OD_RATIO: 0.5

## 2024-06-06 ASSESSMENT — TONOMETRY
OS_IOP_MMHG: 15
IOP_METHOD: APPLANATION
OD_IOP_MMHG: 15
OD_IOP_MMHG: 15
OS_IOP_MMHG: 17
OD_IOP_MMHG: 17
IOP_METHOD: APPLANATION

## 2024-06-06 ASSESSMENT — VISUAL ACUITY
OS_SC: 20/60
OS_PH_SC: 20/40
OD_SC: 20/25
OD_SC+: -3
METHOD: SNELLEN - LINEAR

## 2024-06-06 ASSESSMENT — SLIT LAMP EXAM - LIDS
COMMENTS: NORMAL
COMMENTS: NORMAL

## 2024-06-06 ASSESSMENT — EXTERNAL EXAM - RIGHT EYE: OD_EXAM: NORMAL

## 2024-06-06 ASSESSMENT — EXTERNAL EXAM - LEFT EYE: OS_EXAM: NORMAL

## 2024-06-06 NOTE — NURSING NOTE
Chief Complaints and History of Present Illnesses   Patient presents with    Post Op (Ophthalmology) Right Eye     Chief Complaint(s) and History of Present Illness(es)       Post Op (Ophthalmology) Right Eye              Laterality: right eye    Associated symptoms: Negative for dryness, eye pain, tearing, floaters, itching and burning    Pain scale: 0/10              Comments    Zoila is here one day post right stent insertion. She states no pain yesterday or today. She says while sleeping last night, she removed her eye cover.    Galen Pettit COT 9:16 AM June 6, 2024

## 2024-06-06 NOTE — PATIENT INSTRUCTIONS
Continue Travatan Z at bedtime each eye     Vigamox - 1 drop 4 times a day to operative eye for 1 week    Prednisolone - 1 drop 4 times a day to operative eye                    For 5 days: Wear your glasses or leave the eye uncovered while awake.    Wear the eye shield every night and during daytime naps.  DO NOT use padding under eye shield.    You may notice blurred or double vision initially, this will gradually clear.     If you experience any severe pain, sudden decrease in vision, or discharge for the eye, contact your doctor immediately.     Minor itching, scratching, burning etc. is normal. Use regular or extra-strength Tylenol for discomfort.    Refrain from heavy lifting, excessive bending over, and heavy exertion for 1 week.    You may bathe or shower but do not get the eye wet.    Do not rub or push on the operative eye for 1 week.  You may wipe the lids gently with a warm wet cloth to remove matter from eyelashes.    Continue with your usual diet and medications prescribed by your doctor.    Sunglasses will increase your comfort post-operatively.    Call your surgeon at [phone] or the answering service for any problems, especially pain.

## 2024-06-06 NOTE — PROGRESS NOTES
[facility] - Glaucoma clinic    Chief Complaint/Presenting Concern: Glaucoma    History of Present Illness:   Brandy Leon is a 83 year old patient who presents for evaluation of POAG.   Patient was dx due to rNFL thinning in 2015 by Dr Austin Serna.  - 05/30/2023: s/p right eye SLT    Today, 06/06/2024, POD1 s/p iStent infinite right eye (06/5/24), doing well    Relevant Past Medical/Family/Social History:  Past Medical History:   Diagnosis Date    Actinic keratosis 3/12/2013    Degenerative joint disease     Diabetes (H)     Gastroesophageal reflux disease without esophagitis 12/11/2020    Rheumatic fever 1957    x2 between the ages of 15-18    Seasonal allergies      Relevant Review of Systems:  None are relevant  Diagnosis: Primary open angle glaucoma , moderate each eye   Year diagnosis: 2018  Previous glaucoma surgery/laser:    Both eyes: CE/IOL 2018 by Dr Austin Serna   Maximum intraocular pressure: 30/25  Current ophthalmic medications:    Both eyes Travatan Z, Cosopt right eye  Family history of any glaucoma: positive  CCT (um) 5/27/23: 546/552  Gonioscopy 2021   Right eye: open 360   Left eye: open 360  Refractive status: Almost emmetropic prior to CE/IOL  Trauma history: negative  Steroid exposure: positive  Vasospastic disease: Migrane/Raynaud phenomenon: negative  A past hemodynamic crisis or Low BP: negative  Meds AEs/intolerance:  Sulfa antibiotics  Preservatives in topical eye medications  Cosopt, minimal response   Focused PMHx:  Asthma and respiratory problems: positive (DINORA)  Cardiovascular disease: positive (HTN, IDDM2)  Prior testing  HVF's (Dr Austin Serna)  2019: right eye essentially full, left eye unreliable with scattered inf defects  2018: unreliable fields  2017: unreliable fields, right eye superior defect, left eye inferior hemifield loss crossing both midlines  2016: unreliable fields  2015: mod reliability, essentially full each eye  HVF 9/20/21  Right Eye:  low reliability (8/13 FL, 39% FP, 30% FN), MD -3.21, scattered nonspecific defects  Left Eye: low reliability (6/11 FL, 7% FP, 20% FN), MD -5.29, scattered defects  OCT Optic Nerve RNFL Spectralis 2/9/24  Right eye: IT RNFL thinning, stable    Left eye: IT RNFL thinning, stable   Visual field 5/10/24   Right eye - nasal step, superior arcuate defect, fluctuating  Left eye - paracentral and peripheral defects, fluctuating    Additional Ocular History:     2. Pseudophakia, each eye   - Monitor    3. NVI/NVA right eye. PDR right eye?  Patient noted to have NVI and NVA during surgery (06/5/24) yesterday which were not noted on preop exam, known to have diabetes. Will refer to retina for evaluation.  Fundus exam with dot blot hemorrhages     4. NPDR left eye     Plan/Recommendations:  Discussed findings with patient.  Patient has POAG moderate with prior progressive rNFL thinning and elevated IOP, thus Dr Serna began IOP lowering therapy in 08/2015.  On prior testing, there is right eye progression   IOP goal teens both eyes.  POD1 s/p iStent infinite right eye (06/5/24), doing well  Continue Prednisolone QID right eye   Continue Vigamox QID right eye   Postop precautions and instructions given to the patient  Continue Travatan Z at bedtime each eye   Patient noted to have NVI and NVA during surgery yesterday which were not noted on preop exam, known to have diabetes. Will refer to retina for evaluation. Starting to develop PDR right eye, and NPDR left eye.     RTC in 1 week VA, IOP   Refer to retina ASAP       Physician Attestation     Attending Physician Attestation:  Complete documentation of historical and exam elements from today's encounter can be found in the full encounter summary report (not reduplicated in this progress note). I personally obtained the chief complaint(s) and history of present illness. I confirmed and edited as necessary the review of systems, past medical/surgical history, family history,  social history, and examination findings as documented by others; and I examined the patient myself. I personally reviewed the relevant tests, images, and reports as documented above. I personally reviewed the ophthalmic test(s) associated with this encounter. I formulated and edited as necessary the assessment and plan and discussed the findings and management plan with the patient and any family members present at the time of the visit.  Radha Ceja M.D., Glaucoma, June 6, 2024

## 2024-06-08 NOTE — OP NOTE
Brandy Leon  [id number]  6/5/2024    PREOPERATIVE DIAGNOSIS:   1. Primary open angle glaucoma (POAG) of right eye, moderate stage      POSTOPERATIVE DIAGNOSIS: Same     PROCEDURES:   1. Trabecular meshwork iStent insertion right eye    ANESTHESIA: MAC with topical    SURGEON:  Radha Ceja MD- Primary Surgeon   Blake Albarran MD- Assisting resident     COMPLICATIONS: None    FINDINGS:  Anatomy as expected    ESTIMATED BLOOD LOSS: minimal    SPECIMENS: none    Implant Name Type Inv. Item Serial No.  Lot No. LRB No. Used Action   iStent infinite Trabecular Micro-Bypass System Lens/Eye Implant  [id number] [facility] [id number] Right 1 Implanted     PROCEDURE:  Local anesthesia  Betadine paint and drop in holding room   Prep and drape in usual manner in OR  Time out according to protocol  Nasal lid speculum placed  Paracentesis made with sideport blade  Preservative free lidocaine, and  Viscoelastic injected  Clear corneal incision made with 2.5 mm keratome blade  The patient and the operating microscope were repositioned to allow for direct gonioscopy.  A [place] lens was placed to visualize the nasal angle anatomy.  The intended insertion site at the trabecular meshwork was identified.  Using a iStent infinite device, iStent was inserted into the Schlemm's canal was. Gentle manipulation was done to confirm seating of the device and proper positioning within the angle. Two more iStents were inserted 3 clock hours away from the initial stent placement in the superonasal and inferonasal angle. The remaining viscoelastic was removed from the anterior chamber in its entirety, the wound were hydrated and found to be self-sealing. Intracameral moxifloxacin was administered. Tactile pressure was confirmed to be in a normal range.  Subconjunctival injection of steroids  Speculum removed from the eye.  Anti-inflammatory and antibiotic ointment instilled into the eye.  Patch and plastic shield placed on the  eye.    The patient tolerated the procedure well. There were no complications and the patient left the operating room in good condition. Arrangements were made for the patient to be seen in the outpatient clinic on the first postoperative day.

## 2024-06-11 ENCOUNTER — OFFICE VISIT (OUTPATIENT)
Dept: OPHTHALMOLOGY | Facility: CLINIC | Age: 84
End: 2024-06-11
Attending: OPHTHALMOLOGY
Payer: COMMERCIAL

## 2024-06-11 DIAGNOSIS — Z79.4 TYPE 2 DIABETES MELLITUS WITH HYPERGLYCEMIA, WITH LONG-TERM CURRENT USE OF INSULIN (H): ICD-10-CM

## 2024-06-11 DIAGNOSIS — E11.65 TYPE 2 DIABETES MELLITUS WITH HYPERGLYCEMIA, WITH LONG-TERM CURRENT USE OF INSULIN (H): ICD-10-CM

## 2024-06-11 DIAGNOSIS — E11.3591 PROLIFERATIVE DIABETIC RETINOPATHY OF RIGHT EYE WITHOUT MACULAR EDEMA ASSOCIATED WITH TYPE 2 DIABETES MELLITUS (H): Primary | ICD-10-CM

## 2024-06-11 PROCEDURE — 67228 TREATMENT X10SV RETINOPATHY: CPT | Mod: RT | Performed by: OPHTHALMOLOGY

## 2024-06-11 PROCEDURE — G0463 HOSPITAL OUTPT CLINIC VISIT: HCPCS | Mod: 25 | Performed by: OPHTHALMOLOGY

## 2024-06-11 PROCEDURE — 92235 FLUORESCEIN ANGRPH MLTIFRAME: CPT | Performed by: OPHTHALMOLOGY

## 2024-06-11 PROCEDURE — 92235 FLUORESCEIN ANGRPH MLTIFRAME: CPT | Mod: 26 | Performed by: OPHTHALMOLOGY

## 2024-06-11 ASSESSMENT — CONF VISUAL FIELD
OD_SUPERIOR_NASAL_RESTRICTION: 0
OS_INFERIOR_NASAL_RESTRICTION: 0
OS_NORMAL: 1
OD_NORMAL: 1
OD_INFERIOR_NASAL_RESTRICTION: 0
OS_SUPERIOR_NASAL_RESTRICTION: 0
OS_INFERIOR_TEMPORAL_RESTRICTION: 0
OD_INFERIOR_TEMPORAL_RESTRICTION: 0
OD_SUPERIOR_TEMPORAL_RESTRICTION: 0
OS_SUPERIOR_TEMPORAL_RESTRICTION: 0
METHOD: COUNTING FINGERS

## 2024-06-11 ASSESSMENT — SLIT LAMP EXAM - LIDS
COMMENTS: NORMAL
COMMENTS: NORMAL

## 2024-06-11 ASSESSMENT — VISUAL ACUITY
OS_PH_SC: 20/30
METHOD: SNELLEN - LINEAR
OD_SC+: -3
OS_SC: 20/60
OD_SC: 20/25
OS_SC+: -2

## 2024-06-11 ASSESSMENT — CUP TO DISC RATIO
OS_RATIO: 0.5
OD_RATIO: 0.5

## 2024-06-11 ASSESSMENT — TONOMETRY
OS_IOP_MMHG: 15
OD_IOP_MMHG: 15
OD_IOP_MMHG: 19
OS_IOP_MMHG: 16
IOP_METHOD: TONOPEN

## 2024-06-11 ASSESSMENT — EXTERNAL EXAM - LEFT EYE: OS_EXAM: NORMAL

## 2024-06-11 ASSESSMENT — EXTERNAL EXAM - RIGHT EYE: OD_EXAM: NORMAL

## 2024-06-11 NOTE — PROGRESS NOTES
CC -   PDR    INTERVAL HISTORY - Initial visit with me, referred for PDR eval OD by Marcial. NVI/NVA noted OD during iStent placement 6/5/24, prior thought to have NPDR      PMH -   Brandy Leon is a  83 year old year-old patient with history of PDR new Dx 6/5/24, with NVI/NVA noted during iStent placement      DM II since ~ 2000, prior poor BG control A1c ~ 12, no h/o smoking, recent good control in 2024          PAST OCULAR SURGERY  CE/IOL OU 2018 (Kareem)  SLT OD 5/2023  iStent OD 6/5/24 (Marcial)    RETINAL IMAGING:  OCT 06/11/24   OD - mild MAs/DME, PHF attached to oNH  OS - mild ERM, no fluid, PVD    FA 06/11/24  OD - mod peripheral ischemia, NVE ST, mod macular ischemia, pig atrophy periphery obscuring vessels  OS - mod peripheral ischemia, no NV, mod macular ischemia, pig atrophy periphery obscuring vessels    ASSESSMENT & PLAN    # PDR OD   - new Dx 6/2024 with NVI/NVA noted intra-op   - unusual given age to have new PDR   - mild NVE & mod ischemia on FA 6/2024   - suspect 2/2 much better BG control in 2024     - advise PRP start today 6/11/24 (#850)   - recheck 1-2 months   - may need more fill-in   - may consider Avastin in future, given mild findings start with PRP     - r/b/a PRP d/w patient: peripheral/night vision loss, vision loss, need for more Tx, resident/fellow performance      # NVG OD   - NVI/NVA noted intra-op 6/2024   - see above      # NPDR OS  - no NV on FA 6/2024  - ?moderate ischemia, periphery obscured by WD from pig atrpohy      # syneresis OD/PVD OS        # POAG OD > OS      Return in about 1 month (around 7/11/2024) for DFE OU, OCT OU.     ATTESTATION     Attending Attestation:     Complete documentation of historical and exam elements from today's encounter can be found in the full encounter summary report (not reduplicated in this progress note).  I personally obtained the chief complaint(s) and history of present illness.  I confirmed and edited as necessary the  review of systems, past medical/surgical history, family history, social history, and examination findings as documented by others; and I examined the patient myself.  I personally reviewed the relevant tests, images, and reports as documented above.  I personally reviewed the ophthalmic test(s) associated with this encounter, agree with the interpretation(s) as documented by the resident/fellow, and have edited the corresponding report(s) as necessary.   I formulated and edited as necessary the assessment and plan and discussed the findings and management plan with the patient and family and I was present for critical portions of the procedure carried out by the resident/fellow and immediately available for the entire procedure    Krystin West MD, PhD  , Vitreoretinal Surgery  Department of Ophthalmology  [facility]

## 2024-06-11 NOTE — NURSING NOTE
Chief Complaints and History of Present Illnesses   Patient presents with    New Patient     New retina evaluation.     Chief Complaint(s) and History of Present Illness(es)       New Patient              Laterality: both eyes    Associated symptoms: Negative for flashes and floaters    Pain scale: 0/10    Comments: New retina evaluation.              Comments    The patient reports stable vision.  She is using Prednisolone and Vigamox four times daily in the Right eye.  She uses Travapost at bedtime in each eye.  Lab Results       Component                Value               Date                       A1C                      7.7                 05/17/2024                 A1C                      9.4                 01/25/2024                 A1C                      10.6                12/13/2022                 A1C                      11.1                07/06/2022                 A1C                      9.6                 03/28/2022                 A1C                      10.3                05/17/2021                 A1C                      9.2                 02/16/2021                 A1C                      8.7                 11/12/2020                 A1C                      10.0                08/20/2020                 A1C                      10.5                01/10/2020             Hellen Quan, COA, COA 1:17 PM 06/11/2024

## 2024-06-14 ENCOUNTER — TELEPHONE (OUTPATIENT)
Dept: FAMILY MEDICINE | Facility: CLINIC | Age: 84
End: 2024-06-14
Payer: COMMERCIAL

## 2024-06-14 ENCOUNTER — TELEPHONE (OUTPATIENT)
Dept: FAMILY MEDICINE | Facility: CLINIC | Age: 84
End: 2024-06-14

## 2024-06-14 RX ORDER — INSULIN GLARGINE 300 U/ML
INJECTION, SOLUTION SUBCUTANEOUS
Qty: 9 ML | Refills: 0 | Status: SHIPPED | OUTPATIENT
Start: 2024-06-14 | End: 2024-07-01

## 2024-06-14 NOTE — TELEPHONE ENCOUNTER
KATHY Martins with [facility] is calling regarding an established patient with [facility].        5/2/2024     3:04 PM 4/3/2024     3:41 PM   Home Care Information   Current following provider Dr. Angela Miller    Name/Phone Number Ed [phone] Ed Physical Therapist @ [phone]   Home Care agency [facility]      Requesting orders from: Fernanda Miller  Provider is following patient: Yes  Is this a 60-day recertification request?  No    Orders Requested    Occupational Therapy  Request for initial evaluation and treatment (one time)       Verbal orders given.  Home Care will send orders for provider to sign.  Confirmed ok to leave a detailed message with call back.  Contact information confirmed and updated as needed.        Betsey Hodges RN  Clinical Triage/Primary Care  [facility]

## 2024-06-14 NOTE — TELEPHONE ENCOUNTER
TOUJEO SOLOSTAR 300 UNIT/ML       Last Written Prescription Date:  2-23-24  Last Fill Quantity: 9 mlm,   # refills: 1  Last Office Visit : 7-21-23  Future Office visit:  7-21-24    Routing refill request to provider for review/approval because:  Insulin and insulin pump supplies - refilled per Endocrine clinic.

## 2024-06-14 NOTE — TELEPHONE ENCOUNTER
Forms/Letter Request     Type of form/letter: Home Health Certification order # [id number]     Do we have the form/letter: Yes: Will place in provider bin     Who is the form from? [employer]     Where did/will the form come from? form was faxed in     When is form/letter needed by: asap     How would you like the form/letter returned: Fax : [phone]

## 2024-06-17 DIAGNOSIS — Z53.9 DIAGNOSIS NOT YET DEFINED: Primary | ICD-10-CM

## 2024-06-17 PROCEDURE — G0179 MD RECERTIFICATION HHA PT: HCPCS | Performed by: FAMILY MEDICINE

## 2024-06-17 NOTE — TELEPHONE ENCOUNTER
[facility] forms completed and signed will be faxed to [phone]  Order # [id number]      Batool Sanabria Facilitator

## 2024-06-19 ENCOUNTER — TELEPHONE (OUTPATIENT)
Dept: FAMILY MEDICINE | Facility: CLINIC | Age: 84
End: 2024-06-19
Payer: COMMERCIAL

## 2024-06-19 DIAGNOSIS — Z79.4 TYPE 2 DIABETES MELLITUS WITH HYPERGLYCEMIA, WITH LONG-TERM CURRENT USE OF INSULIN (H): ICD-10-CM

## 2024-06-19 DIAGNOSIS — E11.65 TYPE 2 DIABETES MELLITUS WITH HYPERGLYCEMIA, WITH LONG-TERM CURRENT USE OF INSULIN (H): ICD-10-CM

## 2024-06-19 RX ORDER — INSULIN GLARGINE 300 U/ML
INJECTION, SOLUTION SUBCUTANEOUS
OUTPATIENT
Start: 2024-06-19

## 2024-06-19 NOTE — TELEPHONE ENCOUNTER
TOUJEO SOLOSTAR 300 UNIT/ML       Last Written Prescription Date:  6-14-24  Last Fill Quantity: 9 ml,   # refills: 0  Last Office Visit : 7-21-23  Future Office visit:  7-1-24    Routing refill request to provider for review/approval because:  Insulin and insulin pump supplies - refilled per Endocrine clinic.

## 2024-06-20 ENCOUNTER — MEDICAL CORRESPONDENCE (OUTPATIENT)
Dept: HEALTH INFORMATION MANAGEMENT | Facility: CLINIC | Age: 84
End: 2024-06-20
Payer: COMMERCIAL

## 2024-06-25 NOTE — PROGRESS NOTES
6/13 through 6/23:     6/13 2:54pm 255 after lunch,   6/14 11:04am 68 before breakfast, 6/14 1:54pm 155 lunch time,6/14 6:14pm 137 dinner time, 6/14 9:10pm 116 bedtime,   6/15 11:00am 89 before breakfast,   6/16 8:14am 134 before breakfast, 6/16 2:34pm 374 after lunch , 6/16 6:14 pm 223 dinnertime,   6/17 8:09am 149 before breakfast, 6/17 5:58pm 228 dinnertime, 6/17pm 8:56pm 143 bedtime,   6/18 2:33pm 121 lunchtime, 6/18 6:27pm 81 dinnertime,   6/19 10:13am 140 before breakfast, 6/19 1:35pm 80 lunchtime, 6/19 2:58pm 111, 6/19 5:56pm 134 dinnertime,  6/20 1:22pm 161 lunchtime, 6/20 6:21pm 103 dinnertime,   6/21 9:17am 151 breakfast, 6/21 12:58pm 329 lunchtime, 6/21 7:18pm 117, 6/21 8:40pm 217 bedtime,   6/22 9:07am 111 breakfast, 6/22 11:53am 218 lunchtime, 6/22 5:34pm 217 dinnertime,   6/23 11:23am 135 breakfast               Endocrinology and Diabetes Clinic     Follow up     Assessment/Plan :   83 y old woman with Type 2 DM, hypothyroidism dyslipidemia with much improved blood glucose control.    Blood glucose control: much improved, on Tresiba, Glipzide, metformin, continue; cont G7 dexcom  Blood pressure: high on check in , better on recheck  Dyslipidemia doing well on rosuvastatin  Renal function normal  Eye: DR, followed by retina specialist  Feet neuropathy, followed by podiatry    40 minutes spent on the date of the encounter doing chart review, review of test results, interpretation of tests, patient visit and documentation     This note was generated using computer recognized voice recognition. This might result in some expected imperfection.    Rachel Morris MD  Endocrinology and Diabetes  Telephone contact:  [facility] [phone]  [facility] [phone]       Rachel Morris MD  Endocrinology and Diabetes  Telephone contact:  [facility] [phone]  [facility]  [phone]            Interval history  LV with Whit Arrieta on 8/23/23    Had been working with [facility]    Eyes DR right st post laser, seen q 3 m, retina specialist    Diabetes med   Toujo 52 units daily   Metfromin 1000 mg twice daily  Glipizide 10 mg once dailydialy  Novolog 16 units with measl      HAS started DEXCOM sensor  Has experience weigth gain since last 12 months      HISTORY OF PRESENT ILLNESS  Zoila continues to work on managing her blood sugars. We had discussed starting sensor therapy, at the last visit. Zoila is worried about the possibility of a skin reaction and she decided not to  the sensors. She continues to take her medication, as directed, and she is using fingerstick testing to monitor her blood sugars.    She continues to take 48 unit(s) of Toujeo at bedtime along with metformin 1000 mg twice daily, glipizide 10 mg twice daily and Novolog 16 unit(s) with meals. She feels like her blood sugars are stable and she denies any problems with severe hyperglycemia and/or hypoglycemia. She is only monitoring her blood sugars once every few days or as needed. She doesn't like to poke her fingers and complains that it hurts. She has not had any problems with blurry vision or worsening numbness/tingling in her feet. She does have issues with balance and she does not like to leave the house.     Physical exam  BP (!) 168/74 (BP Location: Left arm, Patient Position: Sitting, Cuff Size: Adult Regular)   Pulse 106   Resp 16   Wt 84.4 kg (186 lb)   SpO2 96%   BMI 31.93 kg/m    Gen NAD  Skin: negative  Eyes: negative for, visual blurring, redness, tearing  Ears/Nose/Throat: negative    Past Medical History  Past Medical History:   Diagnosis Date    Actinic keratosis 3/12/2013    Degenerative joint disease     Diabetes (H)     Gastroesophageal reflux disease without esophagitis 12/11/2020    Rheumatic fever 1957    x2 between the ages of 15-18    Seasonal allergies        Medications  Current  Outpatient Medications   Medication Sig Dispense Refill    Blood Glucose Monitoring Suppl (ONETOUCH VERIO FLEX SYSTEM) w/Device KIT USE TO TEST BLOOD SUGAR 3 TIMES DAILY (OK TO SUBSTITUTE ALTERNATE BRAND PER INSURANCE FORMULARY. IF ONE TOUCH ONLY GIVE VERIO NOT ULTRA) 1 kit 1    ciclopirox (LOPROX) 0.77 % cream Apply topically 2 times daily To feet and toenails. 90 g 5    Continuous Blood Gluc  (DEXCOM G7 ) SANTANA Use to read blood sugars as per 's instructions. 1 each 0    Continuous Glucose Sensor (DEXCOM G7 SENSOR) MISC Change every 10 days. 3 each 5    dorzolamide-timolol PF (COSOPT PF) 2-0.5 % opthalmic solutionh Place 1 drop into both eyes 2 times daily 45 each 3    fexofenadine (ALLEGRA) 180 MG tablet Take 180 mg by mouth as needed      glipiZIDE (GLUCOTROL XL) 10 MG 24 hr tablet Take 1 tablet (10 mg) by mouth daily 180 tablet 1    ibuprofen (ADVIL/MOTRIN) 200 MG capsule Take 400 mg by mouth every 4 hours as needed       insulin aspart (NOVOLOG FLEXPEN) 100 UNIT/ML pen Inject 19 Units Subcutaneous 3 times daily (with meals) AND follow sliding scale:  150- 200 1 unit, 201-250 2 units, 251-300 3 units , 301-350 4 units 30 mL 1    insulin glargine U-300 (TOUJEO SOLOSTAR) 300 UNIT/ML (1 units dial) pen INJECT 52 UNITS DAILY + 2 UNITS FOR PRIMING OF PEN 9 mL 0    insulin pen needle (BD PEN NEEDLE JUAN C 2ND GEN) 32G X 4 MM miscellaneous Use 4 pen needles daily or as directed. 400 each 2    metFORMIN (GLUCOPHAGE) 1000 MG tablet Take 1 tablet (1,000 mg) by mouth 2 times daily (with meals) 180 tablet 1    moxifloxacin (VIGAMOX) 0.5 % ophthalmic solution Place 1 drop into the right eye 4 times daily 3 mL 1    OneTouch Delica Lancets 30G MISC 1 lancet 3 times daily (OK to substitute alternate brand per insurance formulary.  If One Touch only give Verio not Ultra) 100 each 11    ONETOUCH VERIO IQ test strip USE TO TEST BLOOD SUGARS 3 TIMES DAILY, ON INSULIN (OK TO SUBSTITUTE ALTERNATE BRAND PER  INSURANCE FORMULARY. IF ONE TOUCH ONLY GIVE VERIO NOT ULTRA) 100 strip 4    prednisoLONE acetate (PRED FORTE) 1 % ophthalmic suspension Place 1-2 drops into the right eye 4 times daily 15 mL 1    rosuvastatin (CRESTOR) 5 MG tablet TAKE 1 TABLET BY MOUTH TWICE WEEKLY 24 tablet 3    SYNTHROID 137 MCG tablet Take 1 tablet (137 mcg) by mouth daily 90 tablet 3    travoprost SIXTO FREE (TRAVATAN Z) 0.004 % ophthalmic solution Place 1 drop into both eyes at bedtime 2.5 mL 11    triamcinolone (KENALOG) 0.1 % external cream APPLY TO AFFECTED AREA TWICE A DAY FOR 2 WEEKS.  Please keep 9-28-22 clinic appt for refills 45 g 0       Allergies  Allergies   Allergen Reactions    New Medication Allergy Rash     Soliqua     Ace Inhibitors Cough    Estradiol Itching    Lipitor [Atorvastatin Calcium]      Weak and shaky    Sulfa Antibiotics      Rash      Zocor [Simvastatin]      Weak and shakey    Triamterene Dermatitis     Possible photosensitivity dermatitis, mild.  (changed to hctz alone 6/4/07, will see if this helps)       Family History  family history includes Breast Cancer in her daughter; Cancer in her father and sister; Cerebrovascular Disease in her maternal grandfather, paternal grandfather, and paternal grandmother; Endometrial Cancer in her daughter; Eye Disorder in her brother, mother, and paternal grandmother; Heart Disease in her paternal grandfather.    Social History  Social History     Tobacco Use    Smoking status: Never     Passive exposure: Never    Smokeless tobacco: Never    Tobacco comments:     has had exposure to second hand smoke in the past from [facility], no current exposure   Vaping Use    Vaping status: Never Used   Substance Use Topics    Alcohol use: No    Drug use: No       Physical Exam  BP (!) 168/74 (BP Location: Left arm, Patient Position: Sitting, Cuff Size: Adult Regular)   Pulse 106   Resp 16   Wt 84.4 kg (186 lb)   SpO2 96%   BMI 31.93 kg/m      Wt Readings from Last 4 Encounters:   06/05/24  83 kg (183 lb)   05/22/24 83.3 kg (183 lb 9.6 oz)   01/25/24 81.2 kg (179 lb)   12/19/23 77.6 kg (171 lb)     GENERAL: no distress. She is accompanied by her daughter who is also her primary caregiver  SKIN: Visible skin clear. No significant rash, abnormal pigmentation or lesions.  EYES: Eyes grossly normal to inspection.  No discharge or erythema, or obvious scleral/conjunctival abnormalities.  NECK: visible goiter is not present; no visible neck masses  RESP: No audible wheeze, cough, or visible cyanosis.  No visible retractions or increased work of breathing.    NEURO: Awake, alert, responds appropriately to questions.  Mentation and speech fluent.  PSYCH:affect normal and appearance well-groomed.    Diabetic foot exam:  Inspection edematous  Sensation to monofilament absent bilaterally  Sensation to fibration intact bilaterally  Skin: intact  Thickened toenails      Lab    Lab Results   Component Value Date     01/25/2024    CHLORIDE 98 01/25/2024    CO2 24 01/25/2024     (H) 06/05/2024    CR 0.77 05/22/2024    CR 0.78 01/25/2024    CR 0.87 12/01/2022    CR 0.77 03/28/2022    CR 0.76 11/16/2021    FREDY 9.6 01/25/2024    ALBUMIN 4.2 01/25/2024    ALKPHOS 99 01/25/2024    LDL 92 01/25/2024    HDL 51 01/25/2024    TRIG 144 01/25/2024    TSH 2.09 05/22/2024    TSH 2.92 01/25/2024    TSH 7.29 (H) 02/10/2023     Lab Results   Component Value Date    MICROL 21.0 01/25/2024    MICROL 10 10/24/2022    MICROL 34 11/16/2021    MICROL 40 11/12/2020    MICROL 16 10/25/2019     Lab Results   Component Value Date    A1C 7.7 (H) 05/17/2024    A1C 9.4 (H) 01/25/2024    A1C 10.6 (H) 12/13/2022    A1C 11.1 (H) 07/06/2022    A1C 9.6 (H) 03/28/2022       Lab Results   Component Value Date    HGB 13.8 12/01/2022

## 2024-06-27 ENCOUNTER — TELEPHONE (OUTPATIENT)
Dept: FAMILY MEDICINE | Facility: CLINIC | Age: 84
End: 2024-06-27
Payer: COMMERCIAL

## 2024-06-27 NOTE — TELEPHONE ENCOUNTER
"  Home Care is calling regarding an established patient with  [facility].        5/2/2024     3:04 PM 4/3/2024     3:41 PM   Home Care Information   Current following provider Dr. Angela Miller    Name/Phone Number Ed [phone] Ed Physical Therapist @ [phone]   Home Care agency [facility]      Requesting orders from: Fernanda Miller  Provider is following patient: Yes  Is this a 60-day recertification request?  No    Orders Requested    Occupational Therapy  Kari is calling to request occupational therapy every other week for 4 weeks due to patient is a \"hoarder, living conditions are very bad, and home is not functional.\"    Patient was advise to transition to assisted living. States  is involved and occupational therapy to monitor/assist for transition.     Informed PCP is out office until 6/30/24.    Information was gathered and will be sent to provider for review.  RN will contact Home Care with information after provider review.  Confirmed ok to leave a detailed message with call back.  Contact information confirmed and updated as needed.    Xiomara Galo RN    "

## 2024-07-01 ENCOUNTER — MEDICAL CORRESPONDENCE (OUTPATIENT)
Dept: HEALTH INFORMATION MANAGEMENT | Facility: CLINIC | Age: 84
End: 2024-07-01

## 2024-07-01 ENCOUNTER — ANCILLARY PROCEDURE (OUTPATIENT)
Dept: GENERAL RADIOLOGY | Facility: CLINIC | Age: 84
End: 2024-07-01
Attending: INTERNAL MEDICINE
Payer: COMMERCIAL

## 2024-07-01 ENCOUNTER — OFFICE VISIT (OUTPATIENT)
Dept: ENDOCRINOLOGY | Facility: CLINIC | Age: 84
End: 2024-07-01
Payer: COMMERCIAL

## 2024-07-01 ENCOUNTER — OFFICE VISIT (OUTPATIENT)
Dept: PEDIATRICS | Facility: CLINIC | Age: 84
End: 2024-07-01
Payer: COMMERCIAL

## 2024-07-01 ENCOUNTER — TELEPHONE (OUTPATIENT)
Dept: FAMILY MEDICINE | Facility: CLINIC | Age: 84
End: 2024-07-01

## 2024-07-01 ENCOUNTER — DOCUMENTATION ONLY (OUTPATIENT)
Dept: ONCOLOGY | Facility: CLINIC | Age: 84
End: 2024-07-01

## 2024-07-01 ENCOUNTER — MYC MEDICAL ADVICE (OUTPATIENT)
Dept: FAMILY MEDICINE | Facility: CLINIC | Age: 84
End: 2024-07-01

## 2024-07-01 VITALS
SYSTOLIC BLOOD PRESSURE: 132 MMHG | HEART RATE: 62 BPM | BODY MASS INDEX: 31.93 KG/M2 | RESPIRATION RATE: 16 BRPM | OXYGEN SATURATION: 96 % | DIASTOLIC BLOOD PRESSURE: 81 MMHG | WEIGHT: 186 LBS

## 2024-07-01 VITALS
BODY MASS INDEX: 31.76 KG/M2 | SYSTOLIC BLOOD PRESSURE: 126 MMHG | TEMPERATURE: 97.5 F | HEART RATE: 103 BPM | WEIGHT: 186 LBS | HEIGHT: 64 IN | RESPIRATION RATE: 22 BRPM | DIASTOLIC BLOOD PRESSURE: 74 MMHG | OXYGEN SATURATION: 95 %

## 2024-07-01 DIAGNOSIS — S42.292A OTHER CLOSED DISPLACED FRACTURE OF PROXIMAL END OF LEFT HUMERUS, INITIAL ENCOUNTER: ICD-10-CM

## 2024-07-01 DIAGNOSIS — E11.65 TYPE 2 DIABETES MELLITUS WITH HYPERGLYCEMIA, WITH LONG-TERM CURRENT USE OF INSULIN (H): ICD-10-CM

## 2024-07-01 DIAGNOSIS — M79.602 PAIN OF LEFT UPPER EXTREMITY: ICD-10-CM
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
## 2024-07-01 DIAGNOSIS — Z79.4 TYPE 2 DIABETES MELLITUS WITH HYPERGLYCEMIA, WITH LONG-TERM CURRENT USE OF INSULIN (H): ICD-10-CM

## 2024-07-01 DIAGNOSIS — M79.602 PAIN OF LEFT UPPER EXTREMITY: Primary | ICD-10-CM

## 2024-07-01 PROCEDURE — 99215 OFFICE O/P EST HI 40 MIN: CPT | Performed by: INTERNAL MEDICINE

## 2024-07-01 PROCEDURE — 73090 X-RAY EXAM OF FOREARM: CPT | Mod: LT | Performed by: RADIOLOGY

## 2024-07-01 PROCEDURE — 73060 X-RAY EXAM OF HUMERUS: CPT | Mod: LT | Performed by: RADIOLOGY

## 2024-07-01 RX ORDER — GLIPIZIDE 10 MG/1
10 TABLET, FILM COATED, EXTENDED RELEASE ORAL DAILY
Qty: 180 TABLET | Refills: 1 | Status: SHIPPED | OUTPATIENT
Start: 2024-07-01

## 2024-07-01 RX ORDER — ACETAMINOPHEN 325 MG/1
650 TABLET ORAL ONCE
Status: COMPLETED | OUTPATIENT
Start: 2024-07-01 | End: 2024-07-01

## 2024-07-01 RX ORDER — INSULIN GLARGINE 300 U/ML
INJECTION, SOLUTION SUBCUTANEOUS
Qty: 9 ML | Refills: 0 | Status: SHIPPED | OUTPATIENT
Start: 2024-07-01

## 2024-07-01 RX ORDER — OXYCODONE HYDROCHLORIDE 5 MG/1
5 TABLET ORAL EVERY 6 HOURS PRN
Qty: 12 TABLET | Refills: 0 | Status: SHIPPED | OUTPATIENT
Start: 2024-07-01 | End: 2024-07-04

## 2024-07-01 RX ADMIN — ACETAMINOPHEN 650 MG: 325 TABLET ORAL at 14:39

## 2024-07-01 ASSESSMENT — PAIN SCALES - GENERAL: PAINLEVEL: WORST PAIN (10)

## 2024-07-01 NOTE — PATIENT INSTRUCTIONS
Welcome to the [facility] Endocrinology and Diabetes Clinics     Our Endocrinology Clinics are here to provide you with a team-based, collaborative approach in the diagnosis and treatment of patients with diabetes and endocrine disorders. The team is made up of Physicians, Physician Assistants, Certified Diabetes Educators, Registered Nurses, Medical Assistants, Emergency Medical Technicians, and many others, all of whom have the unified goal of providing our patients with high quality care.     Please see below for some helpful tips to best navigate and use the [facility] Endocrinology clinic:     [place] Respect: At [facility], we are committed to a respectful and safe space for all patients, visitors, and staff.  We believe that mutual respect between patients and their care team is the foundation of quality care.  It is our expectation that you will be treated with respect by your care team.  In turn, we ask that all communication with the care team (written and verbal) be respectful and free from profanity, threatening, or abusive language.  Disrespectful communication undermines our therapeutic relationship with you and may result in us being unable to continue to provide your care.    Refills: A provider must see you at least annually to prescribe and refill medications. This is to ensure your safety as well as meet insurance and compliance regulations.    Scheduling: Many of our Providers offer both in-person or video visits. Please call to schedule any needed follow ups as soon as possible because our provider schedules fill up very quickly. Our care team has the right to require an in-person visit when they believe that it is medically necessary. Please remember that for any virtual visits, you must be in the [facility] at the time of the visit, otherwise we are unable to see you and you will need to be rescheduled.    Missed Appointments: If you need to cancel or miss your  scheduled appointment, please call the clinic at [phone] to reschedule.  Please note if you repeatedly miss appointments or repeatedly miss appointments without calling to inform us ahead of time (no-show), the clinic may elect to not allow you to reschedule without speaking to a manager, may require a Partnership In Care Agreement prior to rescheduling, or could result in you no longer being able to receive care from the clinic. Providing the clinic with timely notification if you have to miss an appointment, allows us to better serve the needs of all of our patients.    Primary Care Provider: Our Endocrinologists are Specialists in their field. We expect you to have a Primary Care Provider established to handle any needs outside of your diabetes and endocrine care.  We would be happy to assist you find a Primary Care Provider, if you do not have one.    Center'd: Center'd is a wonderful resource that allows you access to your Care Team via online or the cindy. Please ask a member of the team if you would like help creating an account. Please note that it may take up to 2 business days for a response. Center'd messages are not reviewed on weekends or after business hours.  Emergent or urgent care needs should never be communicated via Center'd.  If you experience a medical emergency call 911 or go to the nearest emergency room.    Labs: It is recommended that you stay within the [facility] for labs but you are welcome to obtain ordered labs (with some exceptions) from any location of your choice as long as they are able to complete and process the needed labs. If you need us to fax orders to your preferred lab, please provide us the name and fax number of the lab you would like to go to so we can fax the orders. If your labs are drawn outside of the [facility], please have them fax the results to [phone] ([place]) or [phone] ([place]) or via [facility]. It is your  responsibility to ensure that outside lab results are sent to us.    We look forward to working with you. Please do not hesitate to reach out with any questions.    Thank you,    The Endocrine Team      If blood glucose is below 100 mg/dl before meals, start eating and take 5 units less the usually (14-15 units)              [facility] Address:   [place] Address:     [address] [place] [address]    Phone: [phone]  Fax: [phone] [address] [place] [address]    Phone: [phone]  Fax: [phone]     [facility] Cost Estimate Phone Number: [phone]    General Lab and Imaging Scheduling Phone Number: [phone]

## 2024-07-01 NOTE — PROGRESS NOTES
Acute and Diagnostic Services Clinic Visit    Assessment & Plan     Pain of left upper extremity    Imaging obtained showing humerus fracture. Discussed with orthopedic team who recommend sling and follow-up with ortho in one week. Sling applied in clinic and patient counselled on care at home. Small amount of oxycodone provided. She has home nursing aid coming on Wednesday in additionto existing home PT and OT so is well supported.       - XR Humerus Left G/E 2 Views; Future  - XR Forearm Left 2 Views; Future  - acetaminophen (TYLENOL) tablet 650 mg  - ARM SLING L    Fall, initial encounter  As above. Also with small abrasion on left forearm, bandaged  - ARM SLING L    Other closed displaced fracture of proximal end of left humerus, initial encounter    - oxyCODONE (ROXICODONE) 5 MG tablet; Take 1 tablet (5 mg) by mouth every 6 hours as needed for pain  - Orthopedic  Referral; Future  - ARM SLING L    40 minutes were spent doing chart review, history and exam, documentation and further activities per the note.            No follow-ups on file.    Taz Cummings is a 83 year old, presenting for the following health issues:  Fall (Fall from standing Left side, left arm pain, )    Fall       Was walking out of the building after an appointment and tripped when walking on the carpet. Also had a low blodo sugar warning from her dexcom.     Musculoskeletal problem/pain  Onset/Duration: Today - Fall in the entry way   Description:       Location: Left Arm        Joint swelling: no        Redness: no        Pain: 10/10       Warmth: no   Progression of symptoms same  Accompanying signs and symptoms:       Fevers: no        Numbness/tingling/weakness: no   History        Trauma to the area: YES- Fall from Standing to Left side         Previous history of Gout: no         Alcohol usage: no         Diuretic use: no         Recent illness: no         Previous similar problem: no         Previous evaluation: no  "  Aggravating factors include: none  Therapies tried and outcome: nothing  Have you had any surgeries on your arteries of veins: No            Objective    /74 (BP Location: Right arm, Patient Position: Sitting, Cuff Size: Adult Large)   Pulse 103   Temp 97.5  F (36.4  C) (Oral)   Resp 22   Ht 1.626 m (5' 4\")   Wt 84.4 kg (186 lb)   SpO2 95%   BMI 31.93 kg/m    Body mass index is 31.93 kg/m .  Physical Exam   GENERAL: alert and no distress  RESP: lungs clear to auscultation - no rales, rhonchi or wheezes  CV: regular rate and rhythm, normal S1 S2, no S3 or S4, no murmur, click or rub, no peripheral edema  MS: TTP over left upper arm. Small abrasion on forearm            Signed Electronically by:  ADS PROVIDER    "

## 2024-07-01 NOTE — NURSING NOTE
Brandy Leon's goals for this visit include:   Chief Complaint   Patient presents with    Follow Up     DMII       She requests these members of her care team be copied on today's visit information: yes    PCP: Fernanda Miller    Referring Provider:  Referred Self, MD  No address on file    BP (!) 168/74 (BP Location: Left arm, Patient Position: Sitting, Cuff Size: Adult Regular)   Pulse 106   Resp 16   Wt 84.4 kg (186 lb)   SpO2 96%   BMI 31.93 kg/m      Do you need any medication refills at today's visit? None    Roman Kenny, EMT

## 2024-07-01 NOTE — LETTER
7/1/2024      Brandy Leon  [address] [place] [address]      Dear Colleague,    Thank you for referring your patient, Brandy Leon, to the [facility]. Please see a copy of my visit note below.    6/13 through 6/23:     6/13 2:54pm 255 after lunch,   6/14 11:04am 68 before breakfast, 6/14 1:54pm 155 lunch time,6/14 6:14pm 137 dinner time, 6/14 9:10pm 116 bedtime,   6/15 11:00am 89 before breakfast,   6/16 8:14am 134 before breakfast, 6/16 2:34pm 374 after lunch , 6/16 6:14 pm 223 dinnertime,   6/17 8:09am 149 before breakfast, 6/17 5:58pm 228 dinnertime, 6/17pm 8:56pm 143 bedtime,   6/18 2:33pm 121 lunchtime, 6/18 6:27pm 81 dinnertime,   6/19 10:13am 140 before breakfast, 6/19 1:35pm 80 lunchtime, 6/19 2:58pm 111, 6/19 5:56pm 134 dinnertime,  6/20 1:22pm 161 lunchtime, 6/20 6:21pm 103 dinnertime,   6/21 9:17am 151 breakfast, 6/21 12:58pm 329 lunchtime, 6/21 7:18pm 117, 6/21 8:40pm 217 bedtime,   6/22 9:07am 111 breakfast, 6/22 11:53am 218 lunchtime, 6/22 5:34pm 217 dinnertime,   6/23 11:23am 135 breakfast               Endocrinology and Diabetes Clinic     Follow up     Assessment/Plan :   83 y old woman with Type 2 DM, hypothyroidism dyslipidemia with much improved blood glucose control.    Blood glucose control: much improved, on Tresiba, Glipzide, metformin, continue; cont G7 dexcom  Blood pressure: high on check in , better on recheck  Dyslipidemia doing well on rosuvastatin  Renal function normal  Eye: DR, followed by retina specialist  Feet neuropathy, followed by podiatry    40 minutes spent on the date of the encounter doing chart review, review of test results, interpretation of tests, patient visit and documentation     This note was generated using computer recognized voice recognition. This might result in some expected imperfection.    Rachel Morris MD  Endocrinology and Diabetes  Telephone contact:  [facility]  [place] [phone]  [facility] [phone]       Rachel Morris MD  Endocrinology and Diabetes  Telephone contact:  [facility] [place] [phone]  [facility] [phone]            Interval history  LV with Whit Arrieta on 8/23/23    Had been working with PharmD    Eyes DR right st post laser, seen q 3 m, retina specialist    Diabetes med   Toujo 52 units daily   Metfromin 1000 mg twice daily  Glipizide 10 mg once dailydialy  Novolog 16 units with measl      HAS started DEXCOM sensor  Has experience weigth gain since last 12 months      HISTORY OF PRESENT ILLNESS  Zoila continues to work on managing her blood sugars. We had discussed starting sensor therapy, at the last visit. Zoila is worried about the possibility of a skin reaction and she decided not to  the sensors. She continues to take her medication, as directed, and she is using fingerstick testing to monitor her blood sugars.    She continues to take 48 unit(s) of Toujeo at bedtime along with metformin 1000 mg twice daily, glipizide 10 mg twice daily and Novolog 16 unit(s) with meals. She feels like her blood sugars are stable and she denies any problems with severe hyperglycemia and/or hypoglycemia. She is only monitoring her blood sugars once every few days or as needed. She doesn't like to poke her fingers and complains that it hurts. She has not had any problems with blurry vision or worsening numbness/tingling in her feet. She does have issues with balance and she does not like to leave the house.     Physical exam  BP (!) 168/74 (BP Location: Left arm, Patient Position: Sitting, Cuff Size: Adult Regular)   Pulse 106   Resp 16   Wt 84.4 kg (186 lb)   SpO2 96%   BMI 31.93 kg/m    Gen NAD  Skin: negative  Eyes: negative for, visual blurring, redness, tearing  Ears/Nose/Throat: negative    Past Medical History  Past Medical History:   Diagnosis Date     Actinic keratosis  3/12/2013     Degenerative joint disease      Diabetes (H)      Gastroesophageal reflux disease without esophagitis 12/11/2020     Rheumatic fever 1957    x2 between the ages of 15-18     Seasonal allergies        Medications  Current Outpatient Medications   Medication Sig Dispense Refill     Blood Glucose Monitoring Suppl (ONETOUCH VERIO FLEX SYSTEM) w/Device KIT USE TO TEST BLOOD SUGAR 3 TIMES DAILY (OK TO SUBSTITUTE ALTERNATE BRAND PER INSURANCE FORMULARY. IF ONE TOUCH ONLY GIVE VERIO NOT ULTRA) 1 kit 1     ciclopirox (LOPROX) 0.77 % cream Apply topically 2 times daily To feet and toenails. 90 g 5     Continuous Blood Gluc  (DEXCOM G7 ) SANTANA Use to read blood sugars as per 's instructions. 1 each 0     Continuous Glucose Sensor (DEXCOM G7 SENSOR) MISC Change every 10 days. 3 each 5     dorzolamide-timolol PF (COSOPT PF) 2-0.5 % opthalmic solutionh Place 1 drop into both eyes 2 times daily 45 each 3     fexofenadine (ALLEGRA) 180 MG tablet Take 180 mg by mouth as needed       glipiZIDE (GLUCOTROL XL) 10 MG 24 hr tablet Take 1 tablet (10 mg) by mouth daily 180 tablet 1     ibuprofen (ADVIL/MOTRIN) 200 MG capsule Take 400 mg by mouth every 4 hours as needed        insulin aspart (NOVOLOG FLEXPEN) 100 UNIT/ML pen Inject 19 Units Subcutaneous 3 times daily (with meals) AND follow sliding scale:  150- 200 1 unit, 201-250 2 units, 251-300 3 units , 301-350 4 units 30 mL 1     insulin glargine U-300 (TOUJEO SOLOSTAR) 300 UNIT/ML (1 units dial) pen INJECT 52 UNITS DAILY + 2 UNITS FOR PRIMING OF PEN 9 mL 0     insulin pen needle (BD PEN NEEDLE JUAN C 2ND GEN) 32G X 4 MM miscellaneous Use 4 pen needles daily or as directed. 400 each 2     metFORMIN (GLUCOPHAGE) 1000 MG tablet Take 1 tablet (1,000 mg) by mouth 2 times daily (with meals) 180 tablet 1     moxifloxacin (VIGAMOX) 0.5 % ophthalmic solution Place 1 drop into the right eye 4 times daily 3 mL 1     OneTouch Delica Lancets 30G MISC 1 lancet  3 times daily (OK to substitute alternate brand per insurance formulary.  If One Touch only give Verio not Ultra) 100 each 11     ONETOUCH VERIO IQ test strip USE TO TEST BLOOD SUGARS 3 TIMES DAILY, ON INSULIN (OK TO SUBSTITUTE ALTERNATE BRAND PER INSURANCE FORMULARY. IF ONE TOUCH ONLY GIVE VERIO NOT ULTRA) 100 strip 4     prednisoLONE acetate (PRED FORTE) 1 % ophthalmic suspension Place 1-2 drops into the right eye 4 times daily 15 mL 1     rosuvastatin (CRESTOR) 5 MG tablet TAKE 1 TABLET BY MOUTH TWICE WEEKLY 24 tablet 3     SYNTHROID 137 MCG tablet Take 1 tablet (137 mcg) by mouth daily 90 tablet 3     travoprost SIXTO FREE (TRAVATAN Z) 0.004 % ophthalmic solution Place 1 drop into both eyes at bedtime 2.5 mL 11     triamcinolone (KENALOG) 0.1 % external cream APPLY TO AFFECTED AREA TWICE A DAY FOR 2 WEEKS.  Please keep 9-28-22 clinic appt for refills 45 g 0       Allergies  Allergies   Allergen Reactions     New Medication Allergy Rash     Soliqua      Ace Inhibitors Cough     Estradiol Itching     Lipitor [Atorvastatin Calcium]      Weak and shaky     Sulfa Antibiotics      Rash       Zocor [Simvastatin]      Weak and shakey     Triamterene Dermatitis     Possible photosensitivity dermatitis, mild.  (changed to hctz alone 6/4/07, will see if this helps)       Family History  family history includes Breast Cancer in her daughter; Cancer in her father and sister; Cerebrovascular Disease in her maternal grandfather, paternal grandfather, and paternal grandmother; Endometrial Cancer in her daughter; Eye Disorder in her brother, mother, and paternal grandmother; Heart Disease in her paternal grandfather.    Social History  Social History     Tobacco Use     Smoking status: Never     Passive exposure: Never     Smokeless tobacco: Never     Tobacco comments:     [facility]   Vaping Use     Vaping status: Never Used   Substance Use Topics     Alcohol  use: No     Drug use: No       Physical Exam  BP (!) 168/74 (BP Location: Left arm, Patient Position: Sitting, Cuff Size: Adult Regular)   Pulse 106   Resp 16   Wt 84.4 kg (186 lb)   SpO2 96%   BMI 31.93 kg/m      Wt Readings from Last 4 Encounters:   06/05/24 83 kg (183 lb)   05/22/24 83.3 kg (183 lb 9.6 oz)   01/25/24 81.2 kg (179 lb)   12/19/23 77.6 kg (171 lb)     GENERAL: no distress. She is accompanied by her daughter who is also her primary caregiver  SKIN: Visible skin clear. No significant rash, abnormal pigmentation or lesions.  EYES: Eyes grossly normal to inspection.  No discharge or erythema, or obvious scleral/conjunctival abnormalities.  NECK: visible goiter is not present; no visible neck masses  RESP: No audible wheeze, cough, or visible cyanosis.  No visible retractions or increased work of breathing.    NEURO: Awake, alert, responds appropriately to questions.  Mentation and speech fluent.  PSYCH:affect normal and appearance well-groomed.    Diabetic foot exam:  Inspection edematous  Sensation to monofilament absent bilaterally  Sensation to fibration intact bilaterally  Skin: intact  Thickened toenails      Lab    Lab Results   Component Value Date     01/25/2024    CHLORIDE 98 01/25/2024    CO2 24 01/25/2024     (H) 06/05/2024    CR 0.77 05/22/2024    CR 0.78 01/25/2024    CR 0.87 12/01/2022    CR 0.77 03/28/2022    CR 0.76 11/16/2021    FREDY 9.6 01/25/2024    ALBUMIN 4.2 01/25/2024    ALKPHOS 99 01/25/2024    LDL 92 01/25/2024    HDL 51 01/25/2024    TRIG 144 01/25/2024    TSH 2.09 05/22/2024    TSH 2.92 01/25/2024    TSH 7.29 (H) 02/10/2023     Lab Results   Component Value Date    MICROL 21.0 01/25/2024    MICROL 10 10/24/2022    MICROL 34 11/16/2021    MICROL 40 11/12/2020    MICROL 16 10/25/2019     Lab Results   Component Value Date    A1C 7.7 (H) 05/17/2024    A1C 9.4 (H) 01/25/2024    A1C 10.6 (H) 12/13/2022    A1C 11.1 (H) 07/06/2022    A1C 9.6 (H) 03/28/2022       Lab  Results   Component Value Date    HGB 13.8 12/01/2022       Again, thank you for allowing me to participate in the care of your patient.        Sincerely,        Rachel Morris MD

## 2024-07-01 NOTE — NURSING NOTE
Rapid Response Epic Documentation     Situation:   Pt was walking with a walker and was exiting the east entrance. Pt caregiver stated she told pt to wait until the car was pulled to the door. Pt started walking and fell from a standing position. Pt has a hx or neuropathy in legs. Pt fell on left side and is now complaining of left arm pain that pt rates a 10 on a 0-10 pain scale. Pt denies hitting head. Pt denies any head, neck or back pain. Pt was assisted to a sitting position and exam was done. Sling was placed under left arm for support. Pt assisted to a stand and helped to wheel chair. Pt was brought to [facility] clinic for exam and possible x-ray.            Assessment:       Pt found laying in left lateral recovery position on ground. Pt complaining of left arm pain that they rate a 10 on a 0-10 pain scale. Pt denies any head, neck or back pain. Pt caregiver stated pt fell from a standing position. Pt caregiver stated they told pt to wait as the car was being pulled to the entrance. Pt has hx of neuropathy of the legs. Pt has good CMS in extremities. Pt has no other complaints. Pt arm was placed in sling for support. Pt was helped to a wheel chair and brought to [facility] clinic for exam and possible x-ray.      BP:  126/74    Pulse: 102  Respiration: 22  SPO2: 95 %  Glucose: N/A mg/dl  Mental Status: Alert  CMS: Intact  Stroke Scale: Negative  EKG: Not Performed      Treatment:    Sling for left arm/shoulder      Location:      1st Floor East Entrance    Disposition:      Brought to [facility] clinic for exam and possible x-ray    Protocol Used:     N/A

## 2024-07-02 ENCOUNTER — TELEPHONE (OUTPATIENT)
Dept: ENDOCRINOLOGY | Facility: CLINIC | Age: 84
End: 2024-07-02
Payer: COMMERCIAL

## 2024-07-02 NOTE — TELEPHONE ENCOUNTER
"Called patient to check in, spoke with daughter and patient on speaker phone. Per daughter, patient is doing okay overall and per patient, pain in her arm is \"better than yesterday\" . Daughter states she has pain medicine prescribed.     -Eve Crenshaw RN   Endocrine Manager   "

## 2024-07-02 NOTE — TELEPHONE ENCOUNTER
Fax form PSK   
Forms/Letter Request     Type of form/letter: Home Health Certification     Do we have the form/letter: Yes: will place form in providers bin     Who is the form from? [facility] [phone]     Where did/will the form come from? form was faxed in     When is form/letter needed by: 3-5 days     How would you like the form/letter returned: Fax : [phone]  
[facility] forms completed and signed will be faxed to [phone]  Order # [id number]      Batool Sanabria Facilitator     
no

## 2024-07-09 ENCOUNTER — LAB REQUISITION (OUTPATIENT)
Dept: LAB | Facility: CLINIC | Age: 84
End: 2024-07-09
Payer: COMMERCIAL

## 2024-07-09 DIAGNOSIS — N18.30 CHRONIC KIDNEY DISEASE, STAGE 3 UNSPECIFIED (H): ICD-10-CM

## 2024-07-11 LAB
ANION GAP SERPL CALCULATED.3IONS-SCNC: 15 MMOL/L (ref 7–15)
BASOPHILS # BLD AUTO: 0.1 10E3/UL (ref 0–0.2)
BASOPHILS NFR BLD AUTO: 0 %
BUN SERPL-MCNC: 20.3 MG/DL (ref 8–23)
CALCIUM SERPL-MCNC: 9.6 MG/DL (ref 8.8–10.2)
CHLORIDE SERPL-SCNC: 96 MMOL/L (ref 98–107)
CREAT SERPL-MCNC: 0.78 MG/DL (ref 0.51–0.95)
DEPRECATED HCO3 PLAS-SCNC: 23 MMOL/L (ref 22–29)
EGFRCR SERPLBLD CKD-EPI 2021: 75 ML/MIN/1.73M2
EOSINOPHIL # BLD AUTO: 0.7 10E3/UL (ref 0–0.7)
EOSINOPHIL NFR BLD AUTO: 5 %
ERYTHROCYTE [DISTWIDTH] IN BLOOD BY AUTOMATED COUNT: 14.2 % (ref 10–15)
GLUCOSE SERPL-MCNC: 221 MG/DL (ref 70–99)
HCT VFR BLD AUTO: 41.7 % (ref 35–47)
HGB BLD-MCNC: 14 G/DL (ref 11.7–15.7)
IMM GRANULOCYTES # BLD: 0.3 10E3/UL
IMM GRANULOCYTES NFR BLD: 2 %
LYMPHOCYTES # BLD AUTO: 3.2 10E3/UL (ref 0.8–5.3)
LYMPHOCYTES NFR BLD AUTO: 20 %
MCH RBC QN AUTO: 30.4 PG (ref 26.5–33)
MCHC RBC AUTO-ENTMCNC: 33.6 G/DL (ref 31.5–36.5)
MCV RBC AUTO: 91 FL (ref 78–100)
MONOCYTES # BLD AUTO: 1.4 10E3/UL (ref 0–1.3)
MONOCYTES NFR BLD AUTO: 8 %
NEUTROPHILS # BLD AUTO: 10.7 10E3/UL (ref 1.6–8.3)
NEUTROPHILS NFR BLD AUTO: 65 %
NRBC # BLD AUTO: 0 10E3/UL
NRBC BLD AUTO-RTO: 0 /100
PLATELET # BLD AUTO: 451 10E3/UL (ref 150–450)
POTASSIUM SERPL-SCNC: 4.4 MMOL/L (ref 3.4–5.3)
RBC # BLD AUTO: 4.61 10E6/UL (ref 3.8–5.2)
SODIUM SERPL-SCNC: 134 MMOL/L (ref 135–145)
WBC # BLD AUTO: 16.3 10E3/UL (ref 4–11)

## 2024-07-11 PROCEDURE — 80048 BASIC METABOLIC PNL TOTAL CA: CPT | Mod: ORL | Performed by: FAMILY MEDICINE

## 2024-07-11 PROCEDURE — 85025 COMPLETE CBC W/AUTO DIFF WBC: CPT | Mod: ORL | Performed by: FAMILY MEDICINE

## 2024-07-11 PROCEDURE — 36415 COLL VENOUS BLD VENIPUNCTURE: CPT | Mod: ORL | Performed by: FAMILY MEDICINE

## 2024-07-11 PROCEDURE — P9603 ONE-WAY ALLOW PRORATED MILES: HCPCS | Mod: ORL | Performed by: FAMILY MEDICINE

## 2024-07-12 ENCOUNTER — TRANSFERRED RECORDS (OUTPATIENT)
Dept: HEALTH INFORMATION MANAGEMENT | Facility: CLINIC | Age: 84
End: 2024-07-12
Payer: COMMERCIAL

## 2024-07-16 ENCOUNTER — LAB REQUISITION (OUTPATIENT)
Dept: LAB | Facility: CLINIC | Age: 84
End: 2024-07-16
Payer: COMMERCIAL

## 2024-07-16 DIAGNOSIS — D72.829 ELEVATED WHITE BLOOD CELL COUNT, UNSPECIFIED: ICD-10-CM

## 2024-07-18 LAB
BASOPHILS # BLD AUTO: 0.1 10E3/UL (ref 0–0.2)
BASOPHILS NFR BLD AUTO: 1 %
EOSINOPHIL # BLD AUTO: 0.6 10E3/UL (ref 0–0.7)
EOSINOPHIL NFR BLD AUTO: 5 %
ERYTHROCYTE [DISTWIDTH] IN BLOOD BY AUTOMATED COUNT: 14.8 % (ref 10–15)
HCT VFR BLD AUTO: 38.8 % (ref 35–47)
HGB BLD-MCNC: 12.3 G/DL (ref 11.7–15.7)
IMM GRANULOCYTES # BLD: 0.1 10E3/UL
IMM GRANULOCYTES NFR BLD: 1 %
LYMPHOCYTES # BLD AUTO: 2.8 10E3/UL (ref 0.8–5.3)
LYMPHOCYTES NFR BLD AUTO: 23 %
MCH RBC QN AUTO: 29.3 PG (ref 26.5–33)
MCHC RBC AUTO-ENTMCNC: 31.7 G/DL (ref 31.5–36.5)
MCV RBC AUTO: 92 FL (ref 78–100)
MONOCYTES # BLD AUTO: 1.2 10E3/UL (ref 0–1.3)
MONOCYTES NFR BLD AUTO: 10 %
NEUTROPHILS # BLD AUTO: 7.5 10E3/UL (ref 1.6–8.3)
NEUTROPHILS NFR BLD AUTO: 60 %
NRBC # BLD AUTO: 0 10E3/UL
NRBC BLD AUTO-RTO: 0 /100
PLATELET # BLD AUTO: 410 10E3/UL (ref 150–450)
RBC # BLD AUTO: 4.2 10E6/UL (ref 3.8–5.2)
WBC # BLD AUTO: 12.2 10E3/UL (ref 4–11)

## 2024-07-18 PROCEDURE — 85025 COMPLETE CBC W/AUTO DIFF WBC: CPT | Mod: ORL | Performed by: FAMILY MEDICINE

## 2024-07-18 PROCEDURE — P9603 ONE-WAY ALLOW PRORATED MILES: HCPCS | Mod: ORL | Performed by: FAMILY MEDICINE

## 2024-07-18 PROCEDURE — 36415 COLL VENOUS BLD VENIPUNCTURE: CPT | Mod: ORL | Performed by: FAMILY MEDICINE

## 2024-07-23 ENCOUNTER — LAB REQUISITION (OUTPATIENT)
Dept: LAB | Facility: CLINIC | Age: 84
End: 2024-07-23
Payer: COMMERCIAL

## 2024-07-23 DIAGNOSIS — S42.302D UNSPECIFIED FRACTURE OF SHAFT OF HUMERUS, LEFT ARM, SUBSEQUENT ENCOUNTER FOR FRACTURE WITH ROUTINE HEALING: ICD-10-CM

## 2024-07-25 LAB
BASOPHILS # BLD AUTO: 0.1 10E3/UL (ref 0–0.2)
BASOPHILS NFR BLD AUTO: 1 %
EOSINOPHIL # BLD AUTO: 0.6 10E3/UL (ref 0–0.7)
EOSINOPHIL NFR BLD AUTO: 6 %
ERYTHROCYTE [DISTWIDTH] IN BLOOD BY AUTOMATED COUNT: 15.1 % (ref 10–15)
HCT VFR BLD AUTO: 39.6 % (ref 35–47)
HGB BLD-MCNC: 12.3 G/DL (ref 11.7–15.7)
IMM GRANULOCYTES # BLD: 0 10E3/UL
IMM GRANULOCYTES NFR BLD: 0 %
LYMPHOCYTES # BLD AUTO: 2.2 10E3/UL (ref 0.8–5.3)
LYMPHOCYTES NFR BLD AUTO: 21 %
MCH RBC QN AUTO: 29.1 PG (ref 26.5–33)
MCHC RBC AUTO-ENTMCNC: 31.1 G/DL (ref 31.5–36.5)
MCV RBC AUTO: 94 FL (ref 78–100)
MONOCYTES # BLD AUTO: 1.1 10E3/UL (ref 0–1.3)
MONOCYTES NFR BLD AUTO: 10 %
NEUTROPHILS # BLD AUTO: 6.5 10E3/UL (ref 1.6–8.3)
NEUTROPHILS NFR BLD AUTO: 62 %
NRBC # BLD AUTO: 0 10E3/UL
NRBC BLD AUTO-RTO: 0 /100
PLATELET # BLD AUTO: 388 10E3/UL (ref 150–450)
RBC # BLD AUTO: 4.22 10E6/UL (ref 3.8–5.2)
WBC # BLD AUTO: 10.5 10E3/UL (ref 4–11)

## 2024-07-25 PROCEDURE — 36415 COLL VENOUS BLD VENIPUNCTURE: CPT | Mod: ORL | Performed by: FAMILY MEDICINE

## 2024-07-25 PROCEDURE — P9603 ONE-WAY ALLOW PRORATED MILES: HCPCS | Mod: ORL | Performed by: FAMILY MEDICINE

## 2024-07-25 PROCEDURE — 85025 COMPLETE CBC W/AUTO DIFF WBC: CPT | Mod: ORL | Performed by: FAMILY MEDICINE

## 2024-07-30 ENCOUNTER — LAB REQUISITION (OUTPATIENT)
Dept: LAB | Facility: CLINIC | Age: 84
End: 2024-07-30
Payer: COMMERCIAL

## 2024-07-30 DIAGNOSIS — E03.9 HYPOTHYROIDISM, UNSPECIFIED: ICD-10-CM

## 2024-07-31 LAB
T4 FREE SERPL-MCNC: 1.5 NG/DL (ref 0.9–1.7)
TSH SERPL DL<=0.005 MIU/L-ACNC: 3.16 UIU/ML (ref 0.3–4.2)

## 2024-07-31 PROCEDURE — 84439 ASSAY OF FREE THYROXINE: CPT | Mod: ORL | Performed by: FAMILY MEDICINE

## 2024-07-31 PROCEDURE — P9604 ONE-WAY ALLOW PRORATED TRIP: HCPCS | Mod: ORL | Performed by: FAMILY MEDICINE

## 2024-07-31 PROCEDURE — 84443 ASSAY THYROID STIM HORMONE: CPT | Mod: ORL | Performed by: FAMILY MEDICINE

## 2024-07-31 PROCEDURE — 36415 COLL VENOUS BLD VENIPUNCTURE: CPT | Mod: ORL | Performed by: FAMILY MEDICINE

## 2024-09-08 ENCOUNTER — HEALTH MAINTENANCE LETTER (OUTPATIENT)
Age: 84
End: 2024-09-08

## 2024-10-08 ENCOUNTER — TRANSFERRED RECORDS (OUTPATIENT)
Dept: HEALTH INFORMATION MANAGEMENT | Facility: CLINIC | Age: 84
End: 2024-10-08
Payer: COMMERCIAL

## 2024-10-10 ENCOUNTER — LAB REQUISITION (OUTPATIENT)
Dept: LAB | Facility: CLINIC | Age: 84
End: 2024-10-10
Payer: COMMERCIAL

## 2024-10-10 DIAGNOSIS — E11.9 TYPE 2 DIABETES MELLITUS WITHOUT COMPLICATIONS (H): ICD-10-CM

## 2024-10-11 LAB
EST. AVERAGE GLUCOSE BLD GHB EST-MCNC: 166 MG/DL
HBA1C MFR BLD: 7.4 %

## 2024-10-11 PROCEDURE — 83036 HEMOGLOBIN GLYCOSYLATED A1C: CPT | Mod: ORL | Performed by: FAMILY MEDICINE

## 2024-10-11 PROCEDURE — P9604 ONE-WAY ALLOW PRORATED TRIP: HCPCS | Mod: ORL | Performed by: FAMILY MEDICINE

## 2024-10-11 PROCEDURE — 36415 COLL VENOUS BLD VENIPUNCTURE: CPT | Mod: ORL | Performed by: FAMILY MEDICINE

## 2024-10-21 NOTE — LETTER
9/20/2021         RE: Brandy Leon  [address] [facility] [place] [address]        Dear Colleague,    Thank you for referring your patient, Brandy Leon, to [facility]. Please see a copy of my visit note below.    Past Medical History:   Diagnosis Date     Actinic keratosis 3/12/2013     Degenerative joint disease      Diabetes (H)      Gastroesophageal reflux disease without esophagitis 12/11/2020     Rheumatic fever 1956    x2 between the ages of 15-18     Seasonal allergies      Patient Active Problem List   Diagnosis     Seasonal allergies     Hypothyroidism     Hyperlipidemia LDL goal <100     Fibromyalgia     Osteoarthritis     Advanced directives, counseling/discussion     Obesity     Type 2 diabetes mellitus with hyperglycemia, with long-term current use of insulin (H)     Hypertension goal BP (blood pressure) < 140/90     Overactive bladder     Recurrent UTI     Pseudophakia of both eyes     DINORA (obstructive sleep apnea)- severe (AHI 56)     Contusion of right knee     Gait disorder     Gastroesophageal reflux disease without esophagitis     Past Surgical History:   Procedure Laterality Date     C APPENDECTOMY       C ARTHROPLASTY TMJ       CATARACT IOL, RT/LT       COLONOSCOPY       COLONOSCOPY N/A 8/13/2015    Procedure: COLONOSCOPY;  Surgeon: Duane, William Charles, MD;  Location: [facility] OR     COLONOSCOPY WITH CO2 INSUFFLATION N/A 8/13/2015    Procedure: COLONOSCOPY WITH CO2 INSUFFLATION;  Surgeon: Duane, William Charles, MD;  Location: [facility] OR     PHACOEMULSIFICATION WITH STANDARD INTRAOCULAR LENS IMPLANT Left 10/25/2018    Procedure: LEFT PHACOEMULSIFICATION WITH STANDARD INTRAOCULAR LENS IMPLANT;  Surgeon: Thomas Serna MD;  Location: [facility] OR     PHACOEMULSIFICATION WITH STANDARD INTRAOCULAR LENS IMPLANT Right 11/8/2018    Procedure: RIGHT PHACOEMULSIFICATION WITH STANDARD INTRAOCULAR LENS IMPLANT;  Surgeon: Thomas Serna MD;   Called [facility] and was told   [place] is the   Ext 277  Hours:  6am-12:30     Was transferred to the ext and was able to leave a message  Trying to schedule the next available F/U with Teresa NEAL    Location: [facility] OR     THYROIDECTOMY        Social History     Socioeconomic History     Marital status:      Spouse name: Not on file     Number of children: Not on file     Years of education: Not on file     Highest education level: Not on file   Occupational History     Occupation:    Tobacco Use     Smoking status: Never Smoker     Smokeless tobacco: Never Used     Tobacco comment: has had exposure to second hand smoke in the past from [employer], no current exposure   Substance and Sexual Activity     Alcohol use: No     Drug use: No     Sexual activity: Never   Other Topics Concern     Parent/sibling w/ CABG, MI or angioplasty before 65F 55M? 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     Hobby Hazards No     Sleep Concern Yes     Comment: difficulty staying asleep, up and down all night     Stress Concern No     Weight Concern Yes     Comment: would like to lose     Special Diet Yes     Comment: low card, low sugar diet     Back Care Yes     Comment: chiropratior     Exercise Yes     Comment: almost everyday     Bike Helmet No     Comment: does not ride bicycle any more     Seat Belt Yes     Self-Exams Yes   Social History Narrative     Not on file     Social Determinants of Health     Financial Resource Strain:      Difficulty of Paying Living Expenses:    Food Insecurity:      Worried About Running Out of Food in the Last Year:      Ran Out of Food in the Last Year:    Transportation Needs:      Lack of Transportation (Medical):      Lack of Transportation (Non-Medical):    Physical Activity:      Days of Exercise per Week:      Minutes of Exercise per Session:    Stress:      Feeling of Stress :    Social Connections:      Frequency of Communication with Friends and Family:      Frequency of Social  Gatherings with Friends and Family:      Attends Church Services:      Active Member of Clubs or Organizations:      Attends Club or Organization Meetings:      Marital Status:    Intimate Partner Violence:      Fear of Current or Ex-Partner:      Emotionally Abused:      Physically Abused:      Sexually Abused:      Family History   Problem Relation Age of Onset     Cancer Father         skin and leukemia     Cerebrovascular Disease Maternal Grandfather      Cerebrovascular Disease Paternal Grandmother      Eye Disorder Paternal Grandmother         cataracts     Cerebrovascular Disease Paternal Grandfather      Heart Disease Paternal Grandfather      Cancer Sister         Breast Cancer     Eye Disorder Mother         cataracts     Eye Disorder Brother         cataract     Breast Cancer Daughter      Other Cancer Daughter         ovarian cancer     Glaucoma No family hx of      Macular Degeneration No family hx of      Lab Results   Component Value Date    A1C 10.3 05/17/2021    A1C 9.2 02/16/2021    A1C 8.7 11/12/2020    A1C 10.0 08/20/2020    A1C 10.5 01/10/2020     SUBJECTIVE FINDINGS:  A 81-year-old female returns to clinic for toenail care, diabetic foot cares.  She relates that she has numbness and tingling neuropathy in her feet.  Relates she is not wearing Diabetic shoes and does not want to get any.  She relates she is intermittently using lotion on her feet.        OBJECTIVE FINDINGS:  DP and PT are 2/4 bilaterally.  She has minimal dry scaly skin bilaterally.  She has dystrophic, thickened nails with subungual debris, dystrophy and discoloration to differing degrees 1 through 5 bilaterally.  She has dorsally contracted digits 2 through 5 bilaterally.  There is no erythema, no drainage, no odor, no calor bilaterally.  Sharp/dull is decreased with 5.07 Semmmes weinstien monofilament bilaterally.  She has plastic product hook stuck on bottom of right foot, no ulceration present, no debris in shoes.         ASSESSMENT AND PLAN:  Onychomycosis and onychauxis bilaterally.  She is diabetic with peripheral neuropathy and peripheral vascular disease.  Diagnosis and treatment were discussed with her.  All the toenails were debrided or reduced bilaterally upon consent.  She relates she cleans her feet with [employer] and needs some of this, this was dispensed.  Return to clinic in 3-4 months.  Previous notes were reviewed.       Again, thank you for allowing me to participate in the care of your patient.        Sincerely,        Ehsan Araya DPM

## 2024-12-06 ENCOUNTER — LAB REQUISITION (OUTPATIENT)
Dept: LAB | Facility: CLINIC | Age: 84
End: 2024-12-06
Payer: COMMERCIAL

## 2024-12-06 DIAGNOSIS — R60.0 LOCALIZED EDEMA: ICD-10-CM

## 2024-12-06 PROCEDURE — 84156 ASSAY OF PROTEIN URINE: CPT | Mod: ORL | Performed by: FAMILY MEDICINE

## 2024-12-07 LAB
ALBUMIN MFR UR ELPH: 6.2 MG/DL
CREAT UR-MCNC: 22.9 MG/DL
PROT/CREAT 24H UR: 0.27 MG/MG CR (ref 0–0.2)

## 2024-12-09 LAB
ALBUMIN SERPL BCG-MCNC: 4.1 G/DL (ref 3.5–5.2)
ALP SERPL-CCNC: 107 U/L (ref 40–150)
ALT SERPL W P-5'-P-CCNC: 22 U/L (ref 0–50)
ANION GAP SERPL CALCULATED.3IONS-SCNC: 16 MMOL/L (ref 7–15)
AST SERPL W P-5'-P-CCNC: 28 U/L (ref 0–45)
BILIRUB SERPL-MCNC: 0.3 MG/DL
BUN SERPL-MCNC: 21.7 MG/DL (ref 8–23)
CALCIUM SERPL-MCNC: 9.5 MG/DL (ref 8.8–10.4)
CHLORIDE SERPL-SCNC: 95 MMOL/L (ref 98–107)
CREAT SERPL-MCNC: 0.84 MG/DL (ref 0.51–0.95)
EGFRCR SERPLBLD CKD-EPI 2021: 69 ML/MIN/1.73M2
ERYTHROCYTE [DISTWIDTH] IN BLOOD BY AUTOMATED COUNT: 14 % (ref 10–15)
GLUCOSE SERPL-MCNC: 263 MG/DL (ref 70–99)
HCO3 SERPL-SCNC: 25 MMOL/L (ref 22–29)
HCT VFR BLD AUTO: 46.6 % (ref 35–47)
HGB BLD-MCNC: 14.4 G/DL (ref 11.7–15.7)
MCH RBC QN AUTO: 28.4 PG (ref 26.5–33)
MCHC RBC AUTO-ENTMCNC: 30.9 G/DL (ref 31.5–36.5)
MCV RBC AUTO: 92 FL (ref 78–100)
NT-PROBNP SERPL-MCNC: 109 PG/ML (ref 0–1800)
PLATELET # BLD AUTO: 400 10E3/UL (ref 150–450)
POTASSIUM SERPL-SCNC: 4.4 MMOL/L (ref 3.4–5.3)
PROT SERPL-MCNC: 8.2 G/DL (ref 6.4–8.3)
RBC # BLD AUTO: 5.07 10E6/UL (ref 3.8–5.2)
SODIUM SERPL-SCNC: 136 MMOL/L (ref 135–145)
WBC # BLD AUTO: 15.2 10E3/UL (ref 4–11)

## 2024-12-09 PROCEDURE — 85027 COMPLETE CBC AUTOMATED: CPT | Mod: ORL | Performed by: FAMILY MEDICINE

## 2024-12-09 PROCEDURE — P9603 ONE-WAY ALLOW PRORATED MILES: HCPCS | Mod: ORL | Performed by: FAMILY MEDICINE

## 2024-12-09 PROCEDURE — 36415 COLL VENOUS BLD VENIPUNCTURE: CPT | Mod: ORL | Performed by: FAMILY MEDICINE

## 2024-12-09 PROCEDURE — 83880 ASSAY OF NATRIURETIC PEPTIDE: CPT | Mod: ORL | Performed by: FAMILY MEDICINE

## 2024-12-09 PROCEDURE — 80053 COMPREHEN METABOLIC PANEL: CPT | Mod: ORL | Performed by: FAMILY MEDICINE

## 2024-12-12 ENCOUNTER — LAB REQUISITION (OUTPATIENT)
Dept: LAB | Facility: CLINIC | Age: 84
End: 2024-12-12
Payer: COMMERCIAL

## 2024-12-12 DIAGNOSIS — E11.22 TYPE 2 DIABETES MELLITUS WITH DIABETIC CHRONIC KIDNEY DISEASE (H): ICD-10-CM

## 2024-12-12 DIAGNOSIS — D72.829 ELEVATED WHITE BLOOD CELL COUNT, UNSPECIFIED: ICD-10-CM

## 2024-12-12 DIAGNOSIS — E03.9 HYPOTHYROIDISM, UNSPECIFIED: ICD-10-CM

## 2024-12-13 LAB
EST. AVERAGE GLUCOSE BLD GHB EST-MCNC: 180 MG/DL
HBA1C MFR BLD: 7.9 %
TSH SERPL DL<=0.005 MIU/L-ACNC: 2.56 UIU/ML (ref 0.3–4.2)
VIT D+METAB SERPL-MCNC: 30 NG/ML (ref 20–50)

## 2024-12-13 PROCEDURE — 36415 COLL VENOUS BLD VENIPUNCTURE: CPT | Mod: ORL | Performed by: FAMILY MEDICINE

## 2024-12-13 PROCEDURE — 84443 ASSAY THYROID STIM HORMONE: CPT | Mod: ORL | Performed by: FAMILY MEDICINE

## 2024-12-13 PROCEDURE — 82306 VITAMIN D 25 HYDROXY: CPT | Mod: ORL | Performed by: FAMILY MEDICINE

## 2024-12-13 PROCEDURE — 83036 HEMOGLOBIN GLYCOSYLATED A1C: CPT | Mod: ORL | Performed by: FAMILY MEDICINE

## 2024-12-13 PROCEDURE — P9603 ONE-WAY ALLOW PRORATED MILES: HCPCS | Mod: ORL | Performed by: FAMILY MEDICINE

## 2024-12-16 LAB
BASOPHILS # BLD AUTO: 0.1 10E3/UL (ref 0–0.2)
BASOPHILS NFR BLD AUTO: 0 %
EOSINOPHIL # BLD AUTO: 0.7 10E3/UL (ref 0–0.7)
EOSINOPHIL NFR BLD AUTO: 4 %
ERYTHROCYTE [DISTWIDTH] IN BLOOD BY AUTOMATED COUNT: 13.9 % (ref 10–15)
HCT VFR BLD AUTO: 43.4 % (ref 35–47)
HGB BLD-MCNC: 13.6 G/DL (ref 11.7–15.7)
IMM GRANULOCYTES # BLD: 0.1 10E3/UL
IMM GRANULOCYTES NFR BLD: 1 %
LYMPHOCYTES # BLD AUTO: 2.8 10E3/UL (ref 0.8–5.3)
LYMPHOCYTES NFR BLD AUTO: 17 %
MCH RBC QN AUTO: 28.9 PG (ref 26.5–33)
MCHC RBC AUTO-ENTMCNC: 31.3 G/DL (ref 31.5–36.5)
MCV RBC AUTO: 92 FL (ref 78–100)
MONOCYTES # BLD AUTO: 1.4 10E3/UL (ref 0–1.3)
MONOCYTES NFR BLD AUTO: 8 %
NEUTROPHILS # BLD AUTO: 11.8 10E3/UL (ref 1.6–8.3)
NEUTROPHILS NFR BLD AUTO: 70 %
NRBC # BLD AUTO: 0 10E3/UL
NRBC BLD AUTO-RTO: 0 /100
PLATELET # BLD AUTO: 365 10E3/UL (ref 150–450)
RBC # BLD AUTO: 4.71 10E6/UL (ref 3.8–5.2)
WBC # BLD AUTO: 16.8 10E3/UL (ref 4–11)

## 2024-12-16 PROCEDURE — 85025 COMPLETE CBC W/AUTO DIFF WBC: CPT | Mod: ORL | Performed by: FAMILY MEDICINE

## 2024-12-16 PROCEDURE — P9604 ONE-WAY ALLOW PRORATED TRIP: HCPCS | Mod: ORL | Performed by: FAMILY MEDICINE

## 2024-12-16 PROCEDURE — 36415 COLL VENOUS BLD VENIPUNCTURE: CPT | Mod: ORL | Performed by: FAMILY MEDICINE

## 2024-12-17 ENCOUNTER — LAB REQUISITION (OUTPATIENT)
Dept: LAB | Facility: CLINIC | Age: 84
End: 2024-12-17
Payer: COMMERCIAL

## 2024-12-17 DIAGNOSIS — R39.15 URGENCY OF URINATION: ICD-10-CM

## 2024-12-23 ENCOUNTER — LAB REQUISITION (OUTPATIENT)
Dept: LAB | Facility: CLINIC | Age: 84
End: 2024-12-23
Payer: COMMERCIAL

## 2024-12-23 DIAGNOSIS — R60.0 LOCALIZED EDEMA: ICD-10-CM

## 2024-12-24 LAB
ANION GAP SERPL CALCULATED.3IONS-SCNC: 12 MMOL/L (ref 7–15)
BUN SERPL-MCNC: 17 MG/DL (ref 8–23)
CALCIUM SERPL-MCNC: 9.1 MG/DL (ref 8.8–10.4)
CHLORIDE SERPL-SCNC: 101 MMOL/L (ref 98–107)
CREAT SERPL-MCNC: 0.87 MG/DL (ref 0.51–0.95)
EGFRCR SERPLBLD CKD-EPI 2021: 66 ML/MIN/1.73M2
GLUCOSE SERPL-MCNC: 90 MG/DL (ref 70–99)
HCO3 SERPL-SCNC: 27 MMOL/L (ref 22–29)
POTASSIUM SERPL-SCNC: 4.5 MMOL/L (ref 3.4–5.3)
SODIUM SERPL-SCNC: 140 MMOL/L (ref 135–145)

## 2025-01-10 ENCOUNTER — TRANSFERRED RECORDS (OUTPATIENT)
Dept: HEALTH INFORMATION MANAGEMENT | Facility: CLINIC | Age: 85
End: 2025-01-10
Payer: COMMERCIAL

## 2025-01-14 ENCOUNTER — TELEPHONE (OUTPATIENT)
Dept: PHARMACY | Facility: CLINIC | Age: 85
End: 2025-01-14
Payer: COMMERCIAL

## 2025-01-14 NOTE — TELEPHONE ENCOUNTER
Called patient to schedule a follow up MTM appointment.  Talked to patient's daughter and patient is in a nursing home in memory care. MTM will no longer be following.  Mirna Perez, Pharm.D, BCACP  Medication Therapy Management Pharmacist

## 2025-01-20 ENCOUNTER — LAB REQUISITION (OUTPATIENT)
Dept: LAB | Facility: CLINIC | Age: 85
End: 2025-01-20
Payer: COMMERCIAL

## 2025-01-20 DIAGNOSIS — M62.81 MUSCLE WEAKNESS (GENERALIZED): ICD-10-CM

## 2025-01-21 ENCOUNTER — LAB REQUISITION (OUTPATIENT)
Dept: LAB | Facility: CLINIC | Age: 85
End: 2025-01-21
Payer: COMMERCIAL

## 2025-01-21 DIAGNOSIS — N39.42 INCONTINENCE WITHOUT SENSORY AWARENESS: ICD-10-CM

## 2025-01-21 DIAGNOSIS — R11.0 NAUSEA: ICD-10-CM

## 2025-01-21 LAB
ALBUMIN UR-MCNC: 100 MG/DL
ANION GAP SERPL CALCULATED.3IONS-SCNC: 16 MMOL/L (ref 7–15)
APPEARANCE UR: ABNORMAL
BACTERIA #/AREA URNS HPF: ABNORMAL /HPF
BILIRUB UR QL STRIP: NEGATIVE
BUN SERPL-MCNC: 26.6 MG/DL (ref 8–23)
CALCIUM SERPL-MCNC: 9.2 MG/DL (ref 8.8–10.4)
CHLORIDE SERPL-SCNC: 98 MMOL/L (ref 98–107)
COLOR UR AUTO: ABNORMAL
CREAT SERPL-MCNC: 0.91 MG/DL (ref 0.51–0.95)
EGFRCR SERPLBLD CKD-EPI 2021: 62 ML/MIN/1.73M2
ERYTHROCYTE [DISTWIDTH] IN BLOOD BY AUTOMATED COUNT: 14 % (ref 10–15)
FLUAV RNA SPEC QL NAA+PROBE: NEGATIVE
FLUBV RNA RESP QL NAA+PROBE: NEGATIVE
GLUCOSE SERPL-MCNC: 291 MG/DL (ref 70–99)
GLUCOSE UR STRIP-MCNC: 70 MG/DL
HCO3 SERPL-SCNC: 23 MMOL/L (ref 22–29)
HCT VFR BLD AUTO: 43.4 % (ref 35–47)
HGB BLD-MCNC: 13.5 G/DL (ref 11.7–15.7)
HGB UR QL STRIP: ABNORMAL
KETONES UR STRIP-MCNC: 10 MG/DL
LEUKOCYTE ESTERASE UR QL STRIP: ABNORMAL
MCH RBC QN AUTO: 28.1 PG (ref 26.5–33)
MCHC RBC AUTO-ENTMCNC: 31.1 G/DL (ref 31.5–36.5)
MCV RBC AUTO: 90 FL (ref 78–100)
NITRATE UR QL: NEGATIVE
PH UR STRIP: 5.5 [PH] (ref 5–7)
PLATELET # BLD AUTO: 435 10E3/UL (ref 150–450)
POTASSIUM SERPL-SCNC: 4.4 MMOL/L (ref 3.4–5.3)
RBC # BLD AUTO: 4.81 10E6/UL (ref 3.8–5.2)
RBC URINE: 7 /HPF
RSV RNA SPEC NAA+PROBE: NEGATIVE
SARS-COV-2 RNA RESP QL NAA+PROBE: NEGATIVE
SODIUM SERPL-SCNC: 137 MMOL/L (ref 135–145)
SP GR UR STRIP: 1.02 (ref 1–1.03)
UROBILINOGEN UR STRIP-MCNC: NORMAL MG/DL
WBC # BLD AUTO: 20 10E3/UL (ref 4–11)
WBC CLUMPS #/AREA URNS HPF: PRESENT /HPF
WBC URINE: >182 /HPF

## 2025-01-23 ENCOUNTER — LAB REQUISITION (OUTPATIENT)
Dept: LAB | Facility: CLINIC | Age: 85
End: 2025-01-23
Payer: COMMERCIAL

## 2025-01-23 DIAGNOSIS — R11.10 VOMITING, UNSPECIFIED: ICD-10-CM

## 2025-01-23 LAB — BACTERIA UR CULT: ABNORMAL

## 2025-01-24 LAB
ALBUMIN SERPL BCG-MCNC: 3.1 G/DL (ref 3.5–5.2)
ALP SERPL-CCNC: 123 U/L (ref 40–150)
ALT SERPL W P-5'-P-CCNC: 43 U/L (ref 0–50)
ANION GAP SERPL CALCULATED.3IONS-SCNC: 15 MMOL/L (ref 7–15)
AST SERPL W P-5'-P-CCNC: 47 U/L (ref 0–45)
BILIRUB SERPL-MCNC: 0.4 MG/DL
BUN SERPL-MCNC: 34.2 MG/DL (ref 8–23)
CALCIUM SERPL-MCNC: 9.3 MG/DL (ref 8.8–10.4)
CHLORIDE SERPL-SCNC: 98 MMOL/L (ref 98–107)
CREAT SERPL-MCNC: 0.94 MG/DL (ref 0.51–0.95)
EGFRCR SERPLBLD CKD-EPI 2021: 60 ML/MIN/1.73M2
GLUCOSE SERPL-MCNC: 352 MG/DL (ref 70–99)
HCO3 SERPL-SCNC: 23 MMOL/L (ref 22–29)
POTASSIUM SERPL-SCNC: 4.4 MMOL/L (ref 3.4–5.3)
PROT SERPL-MCNC: 8 G/DL (ref 6.4–8.3)
SODIUM SERPL-SCNC: 136 MMOL/L (ref 135–145)

## 2025-01-24 PROCEDURE — 80053 COMPREHEN METABOLIC PANEL: CPT | Mod: ORL | Performed by: FAMILY MEDICINE

## 2025-01-24 PROCEDURE — P9604 ONE-WAY ALLOW PRORATED TRIP: HCPCS | Mod: ORL | Performed by: FAMILY MEDICINE

## 2025-01-24 PROCEDURE — 36415 COLL VENOUS BLD VENIPUNCTURE: CPT | Mod: ORL | Performed by: FAMILY MEDICINE

## 2025-01-28 ENCOUNTER — LAB REQUISITION (OUTPATIENT)
Dept: LAB | Facility: CLINIC | Age: 85
End: 2025-01-28
Payer: COMMERCIAL

## 2025-01-28 DIAGNOSIS — D72.829 ELEVATED WHITE BLOOD CELL COUNT, UNSPECIFIED: ICD-10-CM

## 2025-01-29 LAB
ANION GAP SERPL CALCULATED.3IONS-SCNC: 10 MMOL/L (ref 7–15)
BASOPHILS # BLD AUTO: 0.1 10E3/UL (ref 0–0.2)
BASOPHILS NFR BLD AUTO: 0 %
BUN SERPL-MCNC: 37.7 MG/DL (ref 8–23)
CALCIUM SERPL-MCNC: 9.2 MG/DL (ref 8.8–10.4)
CHLORIDE SERPL-SCNC: 99 MMOL/L (ref 98–107)
CREAT SERPL-MCNC: 0.69 MG/DL (ref 0.51–0.95)
EGFRCR SERPLBLD CKD-EPI 2021: 86 ML/MIN/1.73M2
EOSINOPHIL # BLD AUTO: 0.7 10E3/UL (ref 0–0.7)
EOSINOPHIL NFR BLD AUTO: 4 %
ERYTHROCYTE [DISTWIDTH] IN BLOOD BY AUTOMATED COUNT: 14.5 % (ref 10–15)
GLUCOSE SERPL-MCNC: 343 MG/DL (ref 70–99)
HCO3 SERPL-SCNC: 26 MMOL/L (ref 22–29)
HCT VFR BLD AUTO: 40.1 % (ref 35–47)
HGB BLD-MCNC: 12.4 G/DL (ref 11.7–15.7)
IMM GRANULOCYTES # BLD: 0.5 10E3/UL
IMM GRANULOCYTES NFR BLD: 3 %
LYMPHOCYTES # BLD AUTO: 2.1 10E3/UL (ref 0.8–5.3)
LYMPHOCYTES NFR BLD AUTO: 12 %
MCH RBC QN AUTO: 28.1 PG (ref 26.5–33)
MCHC RBC AUTO-ENTMCNC: 30.9 G/DL (ref 31.5–36.5)
MCV RBC AUTO: 91 FL (ref 78–100)
MONOCYTES # BLD AUTO: 1.2 10E3/UL (ref 0–1.3)
MONOCYTES NFR BLD AUTO: 7 %
NEUTROPHILS # BLD AUTO: 13.2 10E3/UL (ref 1.6–8.3)
NEUTROPHILS NFR BLD AUTO: 74 %
NRBC # BLD AUTO: 0 10E3/UL
NRBC BLD AUTO-RTO: 0 /100
NT-PROBNP SERPL-MCNC: 345 PG/ML (ref 0–1800)
PLATELET # BLD AUTO: 284 10E3/UL (ref 150–450)
POTASSIUM SERPL-SCNC: 4.1 MMOL/L (ref 3.4–5.3)
RBC # BLD AUTO: 4.41 10E6/UL (ref 3.8–5.2)
SODIUM SERPL-SCNC: 135 MMOL/L (ref 135–145)
WBC # BLD AUTO: 17.8 10E3/UL (ref 4–11)

## 2025-01-29 PROCEDURE — 36415 COLL VENOUS BLD VENIPUNCTURE: CPT | Mod: ORL | Performed by: FAMILY MEDICINE

## 2025-01-29 PROCEDURE — 83880 ASSAY OF NATRIURETIC PEPTIDE: CPT | Mod: ORL | Performed by: FAMILY MEDICINE

## 2025-01-29 PROCEDURE — 80048 BASIC METABOLIC PNL TOTAL CA: CPT | Mod: ORL | Performed by: FAMILY MEDICINE

## 2025-01-29 PROCEDURE — P9603 ONE-WAY ALLOW PRORATED MILES: HCPCS | Mod: ORL | Performed by: FAMILY MEDICINE

## 2025-01-29 PROCEDURE — 85025 COMPLETE CBC W/AUTO DIFF WBC: CPT | Mod: ORL | Performed by: FAMILY MEDICINE

## 2025-01-30 ENCOUNTER — LAB REQUISITION (OUTPATIENT)
Dept: LAB | Facility: CLINIC | Age: 85
End: 2025-01-30
Payer: COMMERCIAL

## 2025-01-30 DIAGNOSIS — D72.829 ELEVATED WHITE BLOOD CELL COUNT, UNSPECIFIED: ICD-10-CM

## 2025-02-04 LAB
CRP SERPL-MCNC: 35.9 MG/L
ERYTHROCYTE [DISTWIDTH] IN BLOOD BY AUTOMATED COUNT: 15.3 % (ref 10–15)
HCT VFR BLD AUTO: 38.7 % (ref 35–47)
HGB BLD-MCNC: 12.8 G/DL (ref 11.7–15.7)
MCH RBC QN AUTO: 29.1 PG (ref 26.5–33)
MCHC RBC AUTO-ENTMCNC: 33.1 G/DL (ref 31.5–36.5)
MCV RBC AUTO: 88 FL (ref 78–100)
PLATELET # BLD AUTO: 437 10E3/UL (ref 150–450)
PROCALCITONIN SERPL IA-MCNC: 0.37 NG/ML
RBC # BLD AUTO: 4.4 10E6/UL (ref 3.8–5.2)
WBC # BLD AUTO: 14.6 10E3/UL (ref 4–11)

## 2025-02-06 ENCOUNTER — LAB REQUISITION (OUTPATIENT)
Dept: LAB | Facility: CLINIC | Age: 85
End: 2025-02-06
Payer: COMMERCIAL

## 2025-02-06 DIAGNOSIS — D72.829 ELEVATED WHITE BLOOD CELL COUNT, UNSPECIFIED: ICD-10-CM

## 2025-02-11 LAB
ANION GAP SERPL CALCULATED.3IONS-SCNC: 17 MMOL/L (ref 7–15)
BUN SERPL-MCNC: 14.4 MG/DL (ref 8–23)
CALCIUM SERPL-MCNC: 9.1 MG/DL (ref 8.8–10.4)
CHLORIDE SERPL-SCNC: 95 MMOL/L (ref 98–107)
CREAT SERPL-MCNC: 0.58 MG/DL (ref 0.51–0.95)
CRP SERPL-MCNC: 41.6 MG/L
EGFRCR SERPLBLD CKD-EPI 2021: 89 ML/MIN/1.73M2
ERYTHROCYTE [DISTWIDTH] IN BLOOD BY AUTOMATED COUNT: 15.5 % (ref 10–15)
GLUCOSE SERPL-MCNC: 124 MG/DL (ref 70–99)
HCO3 SERPL-SCNC: 25 MMOL/L (ref 22–29)
HCT VFR BLD AUTO: 44.1 % (ref 35–47)
HGB BLD-MCNC: 13.4 G/DL (ref 11.7–15.7)
MCH RBC QN AUTO: 27.9 PG (ref 26.5–33)
MCHC RBC AUTO-ENTMCNC: 30.4 G/DL (ref 31.5–36.5)
MCV RBC AUTO: 92 FL (ref 78–100)
PLATELET # BLD AUTO: 384 10E3/UL (ref 150–450)
POTASSIUM SERPL-SCNC: 4.2 MMOL/L (ref 3.4–5.3)
RBC # BLD AUTO: 4.8 10E6/UL (ref 3.8–5.2)
SODIUM SERPL-SCNC: 137 MMOL/L (ref 135–145)
WBC # BLD AUTO: 11.8 10E3/UL (ref 4–11)

## 2025-03-08 ENCOUNTER — HEALTH MAINTENANCE LETTER (OUTPATIENT)
Age: 85
End: 2025-03-08

## 2025-03-30 ENCOUNTER — HEALTH MAINTENANCE LETTER (OUTPATIENT)
Age: 85
End: 2025-03-30

## 2025-07-13 ENCOUNTER — HEALTH MAINTENANCE LETTER (OUTPATIENT)
Age: 85
End: 2025-07-13

## 2025-07-24 ENCOUNTER — PATIENT OUTREACH (OUTPATIENT)
Dept: CARE COORDINATION | Facility: CLINIC | Age: 85
End: 2025-07-24
Payer: COMMERCIAL

## 2025-08-25 ENCOUNTER — TELEPHONE (OUTPATIENT)
Dept: FAMILY MEDICINE | Facility: CLINIC | Age: 85
End: 2025-08-25
Payer: COMMERCIAL

## (undated) DEVICE — SU PLAIN 6-0 G-1DA 18" 770G

## (undated) DEVICE — GLOVE PROTEXIS W/NEU-THERA 8.0  2D73TE80

## (undated) DEVICE — EYE PACK CUSTOM CATARACT AS12127-01

## (undated) DEVICE — LINEN TOWEL PACK X5 5464

## (undated) DEVICE — GLOVE PROTEXIS W/NEU-THERA 7.5  2D73TE75

## (undated) DEVICE — SOL WATER IRRIG 1000ML BOTTLE 07139-09

## (undated) DEVICE — PACK OCULOPLATIC SEN15OCFSD

## (undated) DEVICE — EYE PACK CUSTOM ANTERIOR 30DEG TIP CENTURION PPK6682-04

## (undated) DEVICE — EYE PREP BETADINE 5% SOLUTION 30ML 0065-0411-30

## (undated) DEVICE — PACK CATARACT CUSTOM ASC SEY15CPUMC

## (undated) DEVICE — ESU PENCIL W/SMOKE EVAC CVPLP2000

## (undated) DEVICE — SOL NACL 0.9% IRRIG 1000ML BOTTLE 2F7124

## (undated) DEVICE — ESU NDL COLORADO MICRO 3CM STR N103A

## (undated) DEVICE — EYE TIP IRRIGATION & ASPIRATION POLYMER CVD 0.3MM 8065751512

## (undated) DEVICE — SU MONOCRYL 5-0 P-3 18" UND Y493G

## (undated) DEVICE — APPLICATORS COTTON TIP 6"X2 STERILE LF C15053-006

## (undated) DEVICE — EYE CANN IRR 25GA HYDRODISSECTING 585037

## (undated) DEVICE — CATARACT BLADE PACK 2.6MM 58001899

## (undated) DEVICE — GLOVE PROTEXIS MICRO 7.0  2D73PM70

## (undated) DEVICE — DRSG TEGADERM 4X4 3/4" 1626W

## (undated) DEVICE — EYE FILTER MILLEX 0.22 MICRON

## (undated) DEVICE — SOL WATER IRRIG 500ML BOTTLE 2F7113

## (undated) DEVICE — ESU PENCIL W/HOLSTER

## (undated) DEVICE — APPLICATOR COTTON TIP 3" PKG OF 10 34831010

## (undated) DEVICE — DRAPE EYE SHEET 8441

## (undated) DEVICE — SOL WATER IRRIG 1000ML BOTTLE 2F7114

## (undated) DEVICE — EYE SHIELD PLASTIC

## (undated) RX ORDER — LIDOCAINE HCL/EPINEPHRINE/PF 2%-1:200K
VIAL (ML) INJECTION
Status: DISPENSED
Start: 2022-04-01

## (undated) RX ORDER — ACETAMINOPHEN 325 MG/1
TABLET ORAL
Status: DISPENSED
Start: 2018-11-08

## (undated) RX ORDER — PROPOFOL 10 MG/ML
INJECTION, EMULSION INTRAVENOUS
Status: DISPENSED
Start: 2024-06-05

## (undated) RX ORDER — ACETAMINOPHEN 325 MG/1
TABLET ORAL
Status: DISPENSED
Start: 2018-10-25

## (undated) RX ORDER — LIDOCAINE HYDROCHLORIDE 10 MG/ML
INJECTION, SOLUTION EPIDURAL; INFILTRATION; INTRACAUDAL; PERINEURAL
Status: DISPENSED
Start: 2021-09-07

## (undated) RX ORDER — ACETAMINOPHEN 325 MG/1
TABLET ORAL
Status: DISPENSED
Start: 2024-06-05

## (undated) RX ORDER — LIDOCAINE HYDROCHLORIDE 20 MG/ML
INJECTION, SOLUTION EPIDURAL; INFILTRATION; INTRACAUDAL; PERINEURAL
Status: DISPENSED
Start: 2022-04-01

## (undated) RX ORDER — ONDANSETRON 2 MG/ML
INJECTION INTRAMUSCULAR; INTRAVENOUS
Status: DISPENSED
Start: 2022-04-01

## (undated) RX ORDER — LIDOCAINE HYDROCHLORIDE AND EPINEPHRINE 10; 10 MG/ML; UG/ML
INJECTION, SOLUTION INFILTRATION; PERINEURAL
Status: DISPENSED
Start: 2024-06-05

## (undated) RX ORDER — CYCLOPENTOLATE HYDROCHLORIDE 10 MG/ML
SOLUTION/ DROPS OPHTHALMIC
Status: DISPENSED
Start: 2018-10-25